# Patient Record
Sex: MALE | Race: WHITE | NOT HISPANIC OR LATINO | Employment: OTHER | URBAN - METROPOLITAN AREA
[De-identification: names, ages, dates, MRNs, and addresses within clinical notes are randomized per-mention and may not be internally consistent; named-entity substitution may affect disease eponyms.]

---

## 2022-09-20 ENCOUNTER — HOSPITAL ENCOUNTER (INPATIENT)
Facility: HOSPITAL | Age: 72
LOS: 4 days | Discharge: HOME | End: 2022-09-24
Attending: EMERGENCY MEDICINE | Admitting: INTERNAL MEDICINE

## 2022-09-20 ENCOUNTER — APPOINTMENT (OUTPATIENT)
Dept: CT IMAGING | Facility: HOSPITAL | Age: 72
End: 2022-09-20

## 2022-09-20 DIAGNOSIS — K62.89 RECTAL MASS: ICD-10-CM

## 2022-09-20 DIAGNOSIS — C79.9 METASTATIC MALIGNANT NEOPLASM, UNSPECIFIED SITE: ICD-10-CM

## 2022-09-20 DIAGNOSIS — C20 RECTAL CANCER: Primary | ICD-10-CM

## 2022-09-20 LAB
ALBUMIN SERPL-MCNC: 2.5 G/DL (ref 3.5–5.2)
ALBUMIN/GLOB SERPL: 0.9 G/DL
ALP SERPL-CCNC: 192 U/L (ref 39–117)
ALT SERPL W P-5'-P-CCNC: 30 U/L (ref 1–41)
ANION GAP SERPL CALCULATED.3IONS-SCNC: 13 MMOL/L (ref 5–15)
AST SERPL-CCNC: 25 U/L (ref 1–40)
B PARAPERT DNA SPEC QL NAA+PROBE: NOT DETECTED
B PERT DNA SPEC QL NAA+PROBE: NOT DETECTED
BASOPHILS # BLD AUTO: 0.01 10*3/MM3 (ref 0–0.2)
BASOPHILS NFR BLD AUTO: 0.1 % (ref 0–1.5)
BILIRUB SERPL-MCNC: 0.7 MG/DL (ref 0–1.2)
BILIRUB UR QL STRIP: NEGATIVE
BUN SERPL-MCNC: 9 MG/DL (ref 8–23)
BUN/CREAT SERPL: 22 (ref 7–25)
C DIFF TOX GENS STL QL NAA+PROBE: NOT DETECTED
C PNEUM DNA NPH QL NAA+NON-PROBE: NOT DETECTED
CALCIUM SPEC-SCNC: 8 MG/DL (ref 8.6–10.5)
CHLORIDE SERPL-SCNC: 95 MMOL/L (ref 98–107)
CLARITY UR: CLEAR
CO2 SERPL-SCNC: 21 MMOL/L (ref 22–29)
COLOR UR: ABNORMAL
CREAT SERPL-MCNC: 0.41 MG/DL (ref 0.76–1.27)
D DIMER PPP FEU-MCNC: 2.15 MCGFEU/ML (ref 0.01–0.5)
D-LACTATE SERPL-SCNC: 1.1 MMOL/L (ref 0.5–2)
DEPRECATED RDW RBC AUTO: 46.4 FL (ref 37–54)
EGFRCR SERPLBLD CKD-EPI 2021: 116.5 ML/MIN/1.73
EOSINOPHIL # BLD AUTO: 0.01 10*3/MM3 (ref 0–0.4)
EOSINOPHIL NFR BLD AUTO: 0.1 % (ref 0.3–6.2)
ERYTHROCYTE [DISTWIDTH] IN BLOOD BY AUTOMATED COUNT: 14.9 % (ref 12.3–15.4)
FLUAV SUBTYP SPEC NAA+PROBE: NOT DETECTED
FLUBV RNA ISLT QL NAA+PROBE: NOT DETECTED
GLOBULIN UR ELPH-MCNC: 2.9 GM/DL
GLUCOSE SERPL-MCNC: 93 MG/DL (ref 65–99)
GLUCOSE UR STRIP-MCNC: NEGATIVE MG/DL
HADV DNA SPEC NAA+PROBE: NOT DETECTED
HCOV 229E RNA SPEC QL NAA+PROBE: NOT DETECTED
HCOV HKU1 RNA SPEC QL NAA+PROBE: NOT DETECTED
HCOV NL63 RNA SPEC QL NAA+PROBE: NOT DETECTED
HCOV OC43 RNA SPEC QL NAA+PROBE: NOT DETECTED
HCT VFR BLD AUTO: 28.5 % (ref 37.5–51)
HGB BLD-MCNC: 9.4 G/DL (ref 13–17.7)
HGB UR QL STRIP.AUTO: NEGATIVE
HMPV RNA NPH QL NAA+NON-PROBE: NOT DETECTED
HOLD SPECIMEN: NORMAL
HPIV1 RNA ISLT QL NAA+PROBE: NOT DETECTED
HPIV2 RNA SPEC QL NAA+PROBE: NOT DETECTED
HPIV3 RNA NPH QL NAA+PROBE: NOT DETECTED
HPIV4 P GENE NPH QL NAA+PROBE: NOT DETECTED
IMM GRANULOCYTES # BLD AUTO: 0.05 10*3/MM3 (ref 0–0.05)
IMM GRANULOCYTES NFR BLD AUTO: 0.6 % (ref 0–0.5)
KETONES UR QL STRIP: NEGATIVE
LEUKOCYTE ESTERASE UR QL STRIP.AUTO: NEGATIVE
LYMPHOCYTES # BLD AUTO: 0.31 10*3/MM3 (ref 0.7–3.1)
LYMPHOCYTES NFR BLD AUTO: 3.8 % (ref 19.6–45.3)
M PNEUMO IGG SER IA-ACNC: NOT DETECTED
MAGNESIUM SERPL-MCNC: 1.8 MG/DL (ref 1.6–2.4)
MCH RBC QN AUTO: 28.1 PG (ref 26.6–33)
MCHC RBC AUTO-ENTMCNC: 33 G/DL (ref 31.5–35.7)
MCV RBC AUTO: 85.1 FL (ref 79–97)
MONOCYTES # BLD AUTO: 0.92 10*3/MM3 (ref 0.1–0.9)
MONOCYTES NFR BLD AUTO: 11.3 % (ref 5–12)
NEUTROPHILS NFR BLD AUTO: 6.86 10*3/MM3 (ref 1.7–7)
NEUTROPHILS NFR BLD AUTO: 84.1 % (ref 42.7–76)
NITRITE UR QL STRIP: NEGATIVE
NRBC BLD AUTO-RTO: 0 /100 WBC (ref 0–0.2)
PH UR STRIP.AUTO: 6.5 [PH] (ref 5–8)
PLATELET # BLD AUTO: 324 10*3/MM3 (ref 140–450)
PMV BLD AUTO: 9.1 FL (ref 6–12)
POTASSIUM SERPL-SCNC: 3.9 MMOL/L (ref 3.5–5.2)
PROCALCITONIN SERPL-MCNC: 0.12 NG/ML (ref 0–0.25)
PROT SERPL-MCNC: 5.4 G/DL (ref 6–8.5)
PROT UR QL STRIP: NEGATIVE
RBC # BLD AUTO: 3.35 10*6/MM3 (ref 4.14–5.8)
RHINOVIRUS RNA SPEC NAA+PROBE: NOT DETECTED
RSV RNA NPH QL NAA+NON-PROBE: NOT DETECTED
SARS-COV-2 RNA NPH QL NAA+NON-PROBE: NOT DETECTED
SODIUM SERPL-SCNC: 129 MMOL/L (ref 136–145)
SP GR UR STRIP: 1.02 (ref 1–1.03)
UROBILINOGEN UR QL STRIP: ABNORMAL
WBC NRBC COR # BLD: 8.16 10*3/MM3 (ref 3.4–10.8)
WHOLE BLOOD HOLD COAG: NORMAL
WHOLE BLOOD HOLD SPECIMEN: NORMAL

## 2022-09-20 PROCEDURE — 25010000002 IOPAMIDOL 61 % SOLUTION: Performed by: EMERGENCY MEDICINE

## 2022-09-20 PROCEDURE — 86901 BLOOD TYPING SEROLOGIC RH(D): CPT

## 2022-09-20 PROCEDURE — 85025 COMPLETE CBC W/AUTO DIFF WBC: CPT | Performed by: EMERGENCY MEDICINE

## 2022-09-20 PROCEDURE — 74177 CT ABD & PELVIS W/CONTRAST: CPT

## 2022-09-20 PROCEDURE — 25010000002 VANCOMYCIN 10 G RECONSTITUTED SOLUTION: Performed by: EMERGENCY MEDICINE

## 2022-09-20 PROCEDURE — 81003 URINALYSIS AUTO W/O SCOPE: CPT | Performed by: EMERGENCY MEDICINE

## 2022-09-20 PROCEDURE — 87040 BLOOD CULTURE FOR BACTERIA: CPT | Performed by: EMERGENCY MEDICINE

## 2022-09-20 PROCEDURE — 87507 IADNA-DNA/RNA PROBE TQ 12-25: CPT | Performed by: EMERGENCY MEDICINE

## 2022-09-20 PROCEDURE — 36415 COLL VENOUS BLD VENIPUNCTURE: CPT

## 2022-09-20 PROCEDURE — 83735 ASSAY OF MAGNESIUM: CPT | Performed by: EMERGENCY MEDICINE

## 2022-09-20 PROCEDURE — 84145 PROCALCITONIN (PCT): CPT | Performed by: EMERGENCY MEDICINE

## 2022-09-20 PROCEDURE — 80053 COMPREHEN METABOLIC PANEL: CPT | Performed by: EMERGENCY MEDICINE

## 2022-09-20 PROCEDURE — 99285 EMERGENCY DEPT VISIT HI MDM: CPT

## 2022-09-20 PROCEDURE — 85379 FIBRIN DEGRADATION QUANT: CPT | Performed by: EMERGENCY MEDICINE

## 2022-09-20 PROCEDURE — 86900 BLOOD TYPING SEROLOGIC ABO: CPT

## 2022-09-20 PROCEDURE — 0202U NFCT DS 22 TRGT SARS-COV-2: CPT | Performed by: EMERGENCY MEDICINE

## 2022-09-20 PROCEDURE — 87493 C DIFF AMPLIFIED PROBE: CPT | Performed by: EMERGENCY MEDICINE

## 2022-09-20 PROCEDURE — 93005 ELECTROCARDIOGRAM TRACING: CPT | Performed by: EMERGENCY MEDICINE

## 2022-09-20 PROCEDURE — 25010000002 PIPERACILLIN SOD-TAZOBACTAM PER 1 G: Performed by: EMERGENCY MEDICINE

## 2022-09-20 PROCEDURE — 83605 ASSAY OF LACTIC ACID: CPT | Performed by: EMERGENCY MEDICINE

## 2022-09-20 PROCEDURE — 83930 ASSAY OF BLOOD OSMOLALITY: CPT | Performed by: NURSE PRACTITIONER

## 2022-09-20 RX ORDER — SODIUM CHLORIDE 0.9 % (FLUSH) 0.9 %
10 SYRINGE (ML) INJECTION AS NEEDED
Status: DISCONTINUED | OUTPATIENT
Start: 2022-09-20 | End: 2022-09-24 | Stop reason: HOSPADM

## 2022-09-20 RX ORDER — ACETAMINOPHEN 325 MG/1
650 TABLET ORAL ONCE
Status: COMPLETED | OUTPATIENT
Start: 2022-09-20 | End: 2022-09-20

## 2022-09-20 RX ADMIN — TAZOBACTAM SODIUM AND PIPERACILLIN SODIUM 3.38 G: 375; 3 INJECTION, SOLUTION INTRAVENOUS at 22:39

## 2022-09-20 RX ADMIN — VANCOMYCIN HYDROCHLORIDE 1500 MG: 10 INJECTION, POWDER, LYOPHILIZED, FOR SOLUTION INTRAVENOUS at 23:20

## 2022-09-20 RX ADMIN — IOPAMIDOL 100 ML: 612 INJECTION, SOLUTION INTRAVENOUS at 20:09

## 2022-09-20 RX ADMIN — ACETAMINOPHEN 650 MG: 325 TABLET, FILM COATED ORAL at 22:42

## 2022-09-20 RX ADMIN — SODIUM CHLORIDE 1000 ML: 9 INJECTION, SOLUTION INTRAVENOUS at 18:43

## 2022-09-21 ENCOUNTER — ANESTHESIA EVENT (OUTPATIENT)
Dept: PERIOP | Facility: HOSPITAL | Age: 72
End: 2022-09-21

## 2022-09-21 ENCOUNTER — APPOINTMENT (OUTPATIENT)
Dept: CT IMAGING | Facility: HOSPITAL | Age: 72
End: 2022-09-21

## 2022-09-21 PROBLEM — E78.5 DYSLIPIDEMIA: Status: ACTIVE | Noted: 2022-09-21

## 2022-09-21 PROBLEM — E88.09 HYPOALBUMINEMIA: Status: ACTIVE | Noted: 2022-09-21

## 2022-09-21 PROBLEM — E83.42 HYPOMAGNESEMIA: Status: ACTIVE | Noted: 2022-09-21

## 2022-09-21 PROBLEM — R63.4 UNINTENTIONAL WEIGHT LOSS: Status: ACTIVE | Noted: 2022-09-21

## 2022-09-21 PROBLEM — E87.1 HYPONATREMIA: Status: ACTIVE | Noted: 2022-09-21

## 2022-09-21 PROBLEM — D64.9 ANEMIA: Status: ACTIVE | Noted: 2022-09-21

## 2022-09-21 PROBLEM — I10 ESSENTIAL HYPERTENSION: Status: ACTIVE | Noted: 2022-09-21

## 2022-09-21 PROBLEM — E86.0 DEHYDRATION: Status: ACTIVE | Noted: 2022-09-21

## 2022-09-21 LAB
ABO GROUP BLD: NORMAL
ABO GROUP BLD: NORMAL
ADV 40+41 DNA STL QL NAA+NON-PROBE: NOT DETECTED
ANION GAP SERPL CALCULATED.3IONS-SCNC: 10 MMOL/L (ref 5–15)
ANION GAP SERPL CALCULATED.3IONS-SCNC: 13 MMOL/L (ref 5–15)
ASTRO TYP 1-8 RNA STL QL NAA+NON-PROBE: NOT DETECTED
BASOPHILS # BLD AUTO: 0.01 10*3/MM3 (ref 0–0.2)
BASOPHILS NFR BLD AUTO: 0.1 % (ref 0–1.5)
BLD GP AB SCN SERPL QL: NEGATIVE
BUN SERPL-MCNC: 7 MG/DL (ref 8–23)
BUN SERPL-MCNC: 7 MG/DL (ref 8–23)
BUN/CREAT SERPL: 15.6 (ref 7–25)
BUN/CREAT SERPL: 15.9 (ref 7–25)
C CAYETANENSIS DNA STL QL NAA+NON-PROBE: NOT DETECTED
C COLI+JEJ+UPSA DNA STL QL NAA+NON-PROBE: NOT DETECTED
CALCIUM SPEC-SCNC: 8.8 MG/DL (ref 8.6–10.5)
CALCIUM SPEC-SCNC: 8.9 MG/DL (ref 8.6–10.5)
CEA SERPL-MCNC: 1.82 NG/ML
CHLORIDE SERPL-SCNC: 94 MMOL/L (ref 98–107)
CHLORIDE SERPL-SCNC: 95 MMOL/L (ref 98–107)
CO2 SERPL-SCNC: 23 MMOL/L (ref 22–29)
CO2 SERPL-SCNC: 24 MMOL/L (ref 22–29)
CREAT SERPL-MCNC: 0.44 MG/DL (ref 0.76–1.27)
CREAT SERPL-MCNC: 0.45 MG/DL (ref 0.76–1.27)
CREAT UR-MCNC: 11.8 MG/DL
CRYPTOSP DNA STL QL NAA+NON-PROBE: NOT DETECTED
DEPRECATED RDW RBC AUTO: 47.2 FL (ref 37–54)
E HISTOLYT DNA STL QL NAA+NON-PROBE: NOT DETECTED
EAEC PAA PLAS AGGR+AATA ST NAA+NON-PRB: NOT DETECTED
EC STX1+STX2 GENES STL QL NAA+NON-PROBE: NOT DETECTED
EGFRCR SERPLBLD CKD-EPI 2021: 113.3 ML/MIN/1.73
EGFRCR SERPLBLD CKD-EPI 2021: 114 ML/MIN/1.73
EOSINOPHIL # BLD AUTO: 0.02 10*3/MM3 (ref 0–0.4)
EOSINOPHIL NFR BLD AUTO: 0.3 % (ref 0.3–6.2)
EPEC EAE GENE STL QL NAA+NON-PROBE: DETECTED
ERYTHROCYTE [DISTWIDTH] IN BLOOD BY AUTOMATED COUNT: 15 % (ref 12.3–15.4)
ETEC LTA+ST1A+ST1B TOX ST NAA+NON-PROBE: DETECTED
FERRITIN SERPL-MCNC: 891.4 NG/ML (ref 30–400)
FOLATE SERPL-MCNC: 6.1 NG/ML (ref 4.78–24.2)
G LAMBLIA DNA STL QL NAA+NON-PROBE: NOT DETECTED
GLUCOSE SERPL-MCNC: 95 MG/DL (ref 65–99)
GLUCOSE SERPL-MCNC: 99 MG/DL (ref 65–99)
HAPTOGLOB SERPL-MCNC: 376 MG/DL (ref 30–200)
HBA1C MFR BLD: 5.5 % (ref 4.8–5.6)
HCT VFR BLD AUTO: 29.3 % (ref 37.5–51)
HEMOCCULT STL QL: NORMAL
HGB BLD-MCNC: 9.3 G/DL (ref 13–17.7)
IMM GRANULOCYTES # BLD AUTO: 0.05 10*3/MM3 (ref 0–0.05)
IMM GRANULOCYTES NFR BLD AUTO: 0.7 % (ref 0–0.5)
IRON 24H UR-MRATE: 17 MCG/DL (ref 59–158)
IRON SATN MFR SERPL: 8 % (ref 20–50)
LDH SERPL-CCNC: 197 U/L (ref 135–225)
LYMPHOCYTES # BLD AUTO: 0.26 10*3/MM3 (ref 0.7–3.1)
LYMPHOCYTES NFR BLD AUTO: 3.6 % (ref 19.6–45.3)
MAGNESIUM SERPL-MCNC: 1.9 MG/DL (ref 1.6–2.4)
MCH RBC QN AUTO: 27.1 PG (ref 26.6–33)
MCHC RBC AUTO-ENTMCNC: 31.7 G/DL (ref 31.5–35.7)
MCV RBC AUTO: 85.4 FL (ref 79–97)
MONOCYTES # BLD AUTO: 0.87 10*3/MM3 (ref 0.1–0.9)
MONOCYTES NFR BLD AUTO: 12 % (ref 5–12)
MRSA DNA SPEC QL NAA+PROBE: NEGATIVE
NEUTROPHILS NFR BLD AUTO: 6.01 10*3/MM3 (ref 1.7–7)
NEUTROPHILS NFR BLD AUTO: 83.3 % (ref 42.7–76)
NOROVIRUS GI+II RNA STL QL NAA+NON-PROBE: NOT DETECTED
NRBC BLD AUTO-RTO: 0 /100 WBC (ref 0–0.2)
OSMOLALITY SERPL: 263 MOSM/KG (ref 275–295)
OSMOLALITY UR: 115 MOSM/KG (ref 300–1100)
P SHIGELLOIDES DNA STL QL NAA+NON-PROBE: NOT DETECTED
PHOSPHATE SERPL-MCNC: 3.8 MG/DL (ref 2.5–4.5)
PLATELET # BLD AUTO: 360 10*3/MM3 (ref 140–450)
PMV BLD AUTO: 9.6 FL (ref 6–12)
POTASSIUM SERPL-SCNC: 3.6 MMOL/L (ref 3.5–5.2)
POTASSIUM SERPL-SCNC: 3.7 MMOL/L (ref 3.5–5.2)
RBC # BLD AUTO: 3.43 10*6/MM3 (ref 4.14–5.8)
RETICS # AUTO: 0.06 10*6/MM3 (ref 0.02–0.13)
RETICS/RBC NFR AUTO: 1.74 % (ref 0.7–1.9)
RH BLD: POSITIVE
RH BLD: POSITIVE
RVA RNA STL QL NAA+NON-PROBE: NOT DETECTED
S ENT+BONG DNA STL QL NAA+NON-PROBE: NOT DETECTED
SAPO I+II+IV+V RNA STL QL NAA+NON-PROBE: NOT DETECTED
SHIGELLA SP+EIEC IPAH ST NAA+NON-PROBE: NOT DETECTED
SODIUM SERPL-SCNC: 129 MMOL/L (ref 136–145)
SODIUM SERPL-SCNC: 130 MMOL/L (ref 136–145)
SODIUM UR-SCNC: <20 MMOL/L
T&S EXPIRATION DATE: NORMAL
TIBC SERPL-MCNC: 201 MCG/DL (ref 298–536)
TRANSFERRIN SERPL-MCNC: 135 MG/DL (ref 200–360)
V CHOL+PARA+VUL DNA STL QL NAA+NON-PROBE: NOT DETECTED
V CHOLERAE DNA STL QL NAA+NON-PROBE: NOT DETECTED
VIT B12 BLD-MCNC: 672 PG/ML (ref 211–946)
WBC NRBC COR # BLD: 7.22 10*3/MM3 (ref 3.4–10.8)
Y ENTEROCOL DNA STL QL NAA+NON-PROBE: NOT DETECTED

## 2022-09-21 PROCEDURE — 25010000002 VANCOMYCIN PER 500 MG

## 2022-09-21 PROCEDURE — 99223 1ST HOSP IP/OBS HIGH 75: CPT | Performed by: INTERNAL MEDICINE

## 2022-09-21 PROCEDURE — 0 IOPAMIDOL PER 1 ML: Performed by: FAMILY MEDICINE

## 2022-09-21 PROCEDURE — 87641 MR-STAPH DNA AMP PROBE: CPT | Performed by: NURSE PRACTITIONER

## 2022-09-21 PROCEDURE — 92610 EVALUATE SWALLOWING FUNCTION: CPT | Performed by: SPEECH-LANGUAGE PATHOLOGIST

## 2022-09-21 PROCEDURE — 97161 PT EVAL LOW COMPLEX 20 MIN: CPT

## 2022-09-21 PROCEDURE — 80048 BASIC METABOLIC PNL TOTAL CA: CPT | Performed by: NURSE PRACTITIONER

## 2022-09-21 PROCEDURE — 71275 CT ANGIOGRAPHY CHEST: CPT

## 2022-09-21 PROCEDURE — 83615 LACTATE (LD) (LDH) ENZYME: CPT | Performed by: NURSE PRACTITIONER

## 2022-09-21 PROCEDURE — 83010 ASSAY OF HAPTOGLOBIN QUANT: CPT | Performed by: NURSE PRACTITIONER

## 2022-09-21 PROCEDURE — 83540 ASSAY OF IRON: CPT | Performed by: NURSE PRACTITIONER

## 2022-09-21 PROCEDURE — 85045 AUTOMATED RETICULOCYTE COUNT: CPT | Performed by: NURSE PRACTITIONER

## 2022-09-21 PROCEDURE — 86901 BLOOD TYPING SEROLOGIC RH(D): CPT | Performed by: STUDENT IN AN ORGANIZED HEALTH CARE EDUCATION/TRAINING PROGRAM

## 2022-09-21 PROCEDURE — 84300 ASSAY OF URINE SODIUM: CPT | Performed by: NURSE PRACTITIONER

## 2022-09-21 PROCEDURE — 86850 RBC ANTIBODY SCREEN: CPT | Performed by: STUDENT IN AN ORGANIZED HEALTH CARE EDUCATION/TRAINING PROGRAM

## 2022-09-21 PROCEDURE — 84466 ASSAY OF TRANSFERRIN: CPT | Performed by: NURSE PRACTITIONER

## 2022-09-21 PROCEDURE — 82607 VITAMIN B-12: CPT | Performed by: NURSE PRACTITIONER

## 2022-09-21 PROCEDURE — 84100 ASSAY OF PHOSPHORUS: CPT | Performed by: NURSE PRACTITIONER

## 2022-09-21 PROCEDURE — 0 MAGNESIUM SULFATE 4 GM/100ML SOLUTION: Performed by: NURSE PRACTITIONER

## 2022-09-21 PROCEDURE — 83036 HEMOGLOBIN GLYCOSYLATED A1C: CPT | Performed by: NURSE PRACTITIONER

## 2022-09-21 PROCEDURE — 85014 HEMATOCRIT: CPT | Performed by: NURSE PRACTITIONER

## 2022-09-21 PROCEDURE — 82728 ASSAY OF FERRITIN: CPT | Performed by: NURSE PRACTITIONER

## 2022-09-21 PROCEDURE — 82747 ASSAY OF FOLIC ACID RBC: CPT | Performed by: NURSE PRACTITIONER

## 2022-09-21 PROCEDURE — 82378 CARCINOEMBRYONIC ANTIGEN: CPT | Performed by: STUDENT IN AN ORGANIZED HEALTH CARE EDUCATION/TRAINING PROGRAM

## 2022-09-21 PROCEDURE — 82570 ASSAY OF URINE CREATININE: CPT | Performed by: NURSE PRACTITIONER

## 2022-09-21 PROCEDURE — 82746 ASSAY OF FOLIC ACID SERUM: CPT | Performed by: NURSE PRACTITIONER

## 2022-09-21 PROCEDURE — 85025 COMPLETE CBC W/AUTO DIFF WBC: CPT | Performed by: NURSE PRACTITIONER

## 2022-09-21 PROCEDURE — 99222 1ST HOSP IP/OBS MODERATE 55: CPT | Performed by: INTERNAL MEDICINE

## 2022-09-21 PROCEDURE — 83735 ASSAY OF MAGNESIUM: CPT | Performed by: NURSE PRACTITIONER

## 2022-09-21 PROCEDURE — 82272 OCCULT BLD FECES 1-3 TESTS: CPT | Performed by: NURSE PRACTITIONER

## 2022-09-21 PROCEDURE — 86900 BLOOD TYPING SEROLOGIC ABO: CPT | Performed by: STUDENT IN AN ORGANIZED HEALTH CARE EDUCATION/TRAINING PROGRAM

## 2022-09-21 PROCEDURE — 83935 ASSAY OF URINE OSMOLALITY: CPT | Performed by: NURSE PRACTITIONER

## 2022-09-21 PROCEDURE — 25010000002 PIPERACILLIN SOD-TAZOBACTAM PER 1 G: Performed by: NURSE PRACTITIONER

## 2022-09-21 RX ORDER — ROSUVASTATIN CALCIUM 10 MG/1
10 TABLET, COATED ORAL DAILY
Status: DISCONTINUED | OUTPATIENT
Start: 2022-09-21 | End: 2022-09-24 | Stop reason: HOSPADM

## 2022-09-21 RX ORDER — NEOMYCIN SULFATE 500 MG/1
1000 TABLET ORAL ONCE
Status: DISCONTINUED | OUTPATIENT
Start: 2022-09-21 | End: 2022-09-21

## 2022-09-21 RX ORDER — SODIUM CHLORIDE 9 MG/ML
100 INJECTION, SOLUTION INTRAVENOUS CONTINUOUS
Status: DISCONTINUED | OUTPATIENT
Start: 2022-09-21 | End: 2022-09-23

## 2022-09-21 RX ORDER — ACETAMINOPHEN 325 MG/1
650 TABLET ORAL EVERY 4 HOURS PRN
Status: DISCONTINUED | OUTPATIENT
Start: 2022-09-21 | End: 2022-09-24 | Stop reason: HOSPADM

## 2022-09-21 RX ORDER — SODIUM CHLORIDE 0.9 % (FLUSH) 0.9 %
10 SYRINGE (ML) INJECTION AS NEEDED
Status: CANCELLED | OUTPATIENT
Start: 2022-09-21

## 2022-09-21 RX ORDER — MAGNESIUM SULFATE HEPTAHYDRATE 40 MG/ML
2 INJECTION, SOLUTION INTRAVENOUS AS NEEDED
Status: DISCONTINUED | OUTPATIENT
Start: 2022-09-21 | End: 2022-09-24 | Stop reason: HOSPADM

## 2022-09-21 RX ORDER — FAMOTIDINE 20 MG/1
20 TABLET, FILM COATED ORAL ONCE
Status: CANCELLED | OUTPATIENT
Start: 2022-09-21 | End: 2022-09-21

## 2022-09-21 RX ORDER — LISINOPRIL 10 MG/1
10 TABLET ORAL DAILY
Status: DISCONTINUED | OUTPATIENT
Start: 2022-09-21 | End: 2022-09-24 | Stop reason: HOSPADM

## 2022-09-21 RX ORDER — MONTELUKAST SODIUM 10 MG/1
10 TABLET ORAL NIGHTLY
Status: DISCONTINUED | OUTPATIENT
Start: 2022-09-21 | End: 2022-09-24 | Stop reason: HOSPADM

## 2022-09-21 RX ORDER — MAGNESIUM SULFATE HEPTAHYDRATE 40 MG/ML
4 INJECTION, SOLUTION INTRAVENOUS AS NEEDED
Status: DISCONTINUED | OUTPATIENT
Start: 2022-09-21 | End: 2022-09-24 | Stop reason: HOSPADM

## 2022-09-21 RX ORDER — NEOMYCIN SULFATE 500 MG/1
1000 TABLET ORAL ONCE
Status: DISCONTINUED | OUTPATIENT
Start: 2022-09-22 | End: 2022-09-21

## 2022-09-21 RX ORDER — ACETAMINOPHEN 160 MG/5ML
650 SOLUTION ORAL EVERY 4 HOURS PRN
Status: DISCONTINUED | OUTPATIENT
Start: 2022-09-21 | End: 2022-09-24 | Stop reason: HOSPADM

## 2022-09-21 RX ORDER — POLYETHYLENE GLYCOL 3350 17 G/17G
17 POWDER, FOR SOLUTION ORAL
Status: COMPLETED | OUTPATIENT
Start: 2022-09-21 | End: 2022-09-21

## 2022-09-21 RX ORDER — ROSUVASTATIN CALCIUM 10 MG/1
10 TABLET, COATED ORAL DAILY
COMMUNITY
End: 2022-10-20 | Stop reason: HOSPADM

## 2022-09-21 RX ORDER — METRONIDAZOLE 500 MG/1
500 TABLET ORAL ONCE
Status: COMPLETED | OUTPATIENT
Start: 2022-09-21 | End: 2022-09-21

## 2022-09-21 RX ORDER — MONTELUKAST SODIUM 10 MG/1
10 TABLET ORAL NIGHTLY
COMMUNITY
End: 2022-10-03

## 2022-09-21 RX ORDER — SODIUM CHLORIDE 0.9 % (FLUSH) 0.9 %
10 SYRINGE (ML) INJECTION AS NEEDED
Status: DISCONTINUED | OUTPATIENT
Start: 2022-09-21 | End: 2022-09-24 | Stop reason: HOSPADM

## 2022-09-21 RX ORDER — LISINOPRIL 10 MG/1
10 TABLET ORAL DAILY
COMMUNITY
End: 2022-10-03

## 2022-09-21 RX ORDER — SODIUM CHLORIDE 0.9 % (FLUSH) 0.9 %
10 SYRINGE (ML) INJECTION EVERY 12 HOURS SCHEDULED
Status: DISCONTINUED | OUTPATIENT
Start: 2022-09-21 | End: 2022-09-24 | Stop reason: HOSPADM

## 2022-09-21 RX ORDER — SODIUM CHLORIDE 0.9 % (FLUSH) 0.9 %
10 SYRINGE (ML) INJECTION EVERY 12 HOURS SCHEDULED
Status: CANCELLED | OUTPATIENT
Start: 2022-09-21

## 2022-09-21 RX ORDER — ACETAMINOPHEN 650 MG/1
650 SUPPOSITORY RECTAL EVERY 4 HOURS PRN
Status: DISCONTINUED | OUTPATIENT
Start: 2022-09-21 | End: 2022-09-24 | Stop reason: HOSPADM

## 2022-09-21 RX ADMIN — Medication 10 ML: at 08:44

## 2022-09-21 RX ADMIN — METRONIDAZOLE 500 MG: 500 TABLET ORAL at 22:56

## 2022-09-21 RX ADMIN — LISINOPRIL 10 MG: 10 TABLET ORAL at 08:43

## 2022-09-21 RX ADMIN — METRONIDAZOLE 500 MG: 500 TABLET ORAL at 16:42

## 2022-09-21 RX ADMIN — ACETAMINOPHEN 650 MG: 325 TABLET, FILM COATED ORAL at 08:43

## 2022-09-21 RX ADMIN — POLYETHYLENE GLYCOL 3350 17 G: 17 POWDER, FOR SOLUTION ORAL at 16:42

## 2022-09-21 RX ADMIN — POLYETHYLENE GLYCOL 3350 17 G: 17 POWDER, FOR SOLUTION ORAL at 20:36

## 2022-09-21 RX ADMIN — POLYETHYLENE GLYCOL 3350 17 G: 17 POWDER, FOR SOLUTION ORAL at 17:37

## 2022-09-21 RX ADMIN — VANCOMYCIN HYDROCHLORIDE 750 MG: 750 INJECTION, SOLUTION INTRAVENOUS at 11:39

## 2022-09-21 RX ADMIN — TAZOBACTAM SODIUM AND PIPERACILLIN SODIUM 3.38 G: 375; 3 INJECTION, SOLUTION INTRAVENOUS at 05:21

## 2022-09-21 RX ADMIN — ACETAMINOPHEN 650 MG: 325 TABLET, FILM COATED ORAL at 20:44

## 2022-09-21 RX ADMIN — SODIUM CHLORIDE 50 ML/HR: 9 INJECTION, SOLUTION INTRAVENOUS at 02:23

## 2022-09-21 RX ADMIN — POLYETHYLENE GLYCOL 3350 17 G: 17 POWDER, FOR SOLUTION ORAL at 19:19

## 2022-09-21 RX ADMIN — MAGNESIUM SULFATE HEPTAHYDRATE 4 G: 40 INJECTION, SOLUTION INTRAVENOUS at 11:39

## 2022-09-21 RX ADMIN — Medication 10 ML: at 20:36

## 2022-09-21 RX ADMIN — METRONIDAZOLE 500 MG: 500 TABLET ORAL at 19:19

## 2022-09-21 RX ADMIN — SODIUM CHLORIDE 100 ML/HR: 9 INJECTION, SOLUTION INTRAVENOUS at 20:36

## 2022-09-21 RX ADMIN — POLYETHYLENE GLYCOL 3350 17 G: 17 POWDER, FOR SOLUTION ORAL at 20:54

## 2022-09-21 RX ADMIN — ROSUVASTATIN CALCIUM 10 MG: 10 TABLET, FILM COATED ORAL at 08:43

## 2022-09-21 RX ADMIN — IOPAMIDOL 80 ML: 755 INJECTION, SOLUTION INTRAVENOUS at 02:18

## 2022-09-21 RX ADMIN — MONTELUKAST 10 MG: 10 TABLET, FILM COATED ORAL at 19:19

## 2022-09-22 ENCOUNTER — ANESTHESIA EVENT CONVERTED (OUTPATIENT)
Dept: ANESTHESIOLOGY | Facility: HOSPITAL | Age: 72
End: 2022-09-22

## 2022-09-22 ENCOUNTER — ANESTHESIA (OUTPATIENT)
Dept: PERIOP | Facility: HOSPITAL | Age: 72
End: 2022-09-22

## 2022-09-22 LAB
ANION GAP SERPL CALCULATED.3IONS-SCNC: 13 MMOL/L (ref 5–15)
BASOPHILS # BLD AUTO: 0.02 10*3/MM3 (ref 0–0.2)
BASOPHILS NFR BLD AUTO: 0.2 % (ref 0–1.5)
BUN SERPL-MCNC: 9 MG/DL (ref 8–23)
BUN/CREAT SERPL: 17.6 (ref 7–25)
CALCIUM SPEC-SCNC: 8.5 MG/DL (ref 8.6–10.5)
CHLORIDE SERPL-SCNC: 95 MMOL/L (ref 98–107)
CO2 SERPL-SCNC: 23 MMOL/L (ref 22–29)
CREAT SERPL-MCNC: 0.51 MG/DL (ref 0.76–1.27)
DEPRECATED RDW RBC AUTO: 47.8 FL (ref 37–54)
EGFRCR SERPLBLD CKD-EPI 2021: 109.1 ML/MIN/1.73
EOSINOPHIL # BLD AUTO: 0.02 10*3/MM3 (ref 0–0.4)
EOSINOPHIL NFR BLD AUTO: 0.2 % (ref 0.3–6.2)
ERYTHROCYTE [DISTWIDTH] IN BLOOD BY AUTOMATED COUNT: 15.1 % (ref 12.3–15.4)
FOLATE BLD-MCNC: 286 NG/ML
FOLATE RBC-MCNC: 938 NG/ML
GLUCOSE SERPL-MCNC: 97 MG/DL (ref 65–99)
HCT VFR BLD AUTO: 29.5 % (ref 37.5–51)
HCT VFR BLD AUTO: 30.5 % (ref 37.5–51)
HGB BLD-MCNC: 9.6 G/DL (ref 13–17.7)
IMM GRANULOCYTES # BLD AUTO: 0.06 10*3/MM3 (ref 0–0.05)
IMM GRANULOCYTES NFR BLD AUTO: 0.6 % (ref 0–0.5)
LYMPHOCYTES # BLD AUTO: 0.63 10*3/MM3 (ref 0.7–3.1)
LYMPHOCYTES NFR BLD AUTO: 6.5 % (ref 19.6–45.3)
MAGNESIUM SERPL-MCNC: 2.5 MG/DL (ref 1.6–2.4)
MCH RBC QN AUTO: 28.1 PG (ref 26.6–33)
MCHC RBC AUTO-ENTMCNC: 32.5 G/DL (ref 31.5–35.7)
MCV RBC AUTO: 86.3 FL (ref 79–97)
MONOCYTES # BLD AUTO: 1.03 10*3/MM3 (ref 0.1–0.9)
MONOCYTES NFR BLD AUTO: 10.6 % (ref 5–12)
NEUTROPHILS NFR BLD AUTO: 7.93 10*3/MM3 (ref 1.7–7)
NEUTROPHILS NFR BLD AUTO: 81.9 % (ref 42.7–76)
NRBC BLD AUTO-RTO: 0 /100 WBC (ref 0–0.2)
PLATELET # BLD AUTO: 353 10*3/MM3 (ref 140–450)
PMV BLD AUTO: 9.5 FL (ref 6–12)
POTASSIUM SERPL-SCNC: 4.7 MMOL/L (ref 3.5–5.2)
RBC # BLD AUTO: 3.42 10*6/MM3 (ref 4.14–5.8)
SODIUM SERPL-SCNC: 131 MMOL/L (ref 136–145)
WBC NRBC COR # BLD: 9.69 10*3/MM3 (ref 3.4–10.8)

## 2022-09-22 PROCEDURE — 44188 LAP COLOSTOMY: CPT | Performed by: PHYSICIAN ASSISTANT

## 2022-09-22 PROCEDURE — 0DBP8ZX EXCISION OF RECTUM, VIA NATURAL OR ARTIFICIAL OPENING ENDOSCOPIC, DIAGNOSTIC: ICD-10-PCS | Performed by: STUDENT IN AN ORGANIZED HEALTH CARE EDUCATION/TRAINING PROGRAM

## 2022-09-22 PROCEDURE — 0D1E4Z4 BYPASS LARGE INTESTINE TO CUTANEOUS, PERCUTANEOUS ENDOSCOPIC APPROACH: ICD-10-PCS | Performed by: STUDENT IN AN ORGANIZED HEALTH CARE EDUCATION/TRAINING PROGRAM

## 2022-09-22 PROCEDURE — 80048 BASIC METABOLIC PNL TOTAL CA: CPT | Performed by: INTERNAL MEDICINE

## 2022-09-22 PROCEDURE — 25010000002 DEXAMETHASONE SODIUM PHOSPHATE 10 MG/ML SOLUTION: Performed by: INTERNAL MEDICINE

## 2022-09-22 PROCEDURE — 99233 SBSQ HOSP IP/OBS HIGH 50: CPT | Performed by: INTERNAL MEDICINE

## 2022-09-22 PROCEDURE — 88360 TUMOR IMMUNOHISTOCHEM/MANUAL: CPT | Performed by: STUDENT IN AN ORGANIZED HEALTH CARE EDUCATION/TRAINING PROGRAM

## 2022-09-22 PROCEDURE — 88341 IMHCHEM/IMCYTCHM EA ADD ANTB: CPT | Performed by: STUDENT IN AN ORGANIZED HEALTH CARE EDUCATION/TRAINING PROGRAM

## 2022-09-22 PROCEDURE — 25010000002 ONDANSETRON PER 1 MG: Performed by: ANESTHESIOLOGY

## 2022-09-22 PROCEDURE — 25010000002 PIPERACILLIN SOD-TAZOBACTAM PER 1 G: Performed by: STUDENT IN AN ORGANIZED HEALTH CARE EDUCATION/TRAINING PROGRAM

## 2022-09-22 PROCEDURE — 25010000002 PROPOFOL 10 MG/ML EMULSION

## 2022-09-22 PROCEDURE — 83735 ASSAY OF MAGNESIUM: CPT | Performed by: INTERNAL MEDICINE

## 2022-09-22 PROCEDURE — 25010000002 FENTANYL CITRATE (PF) 50 MCG/ML SOLUTION

## 2022-09-22 PROCEDURE — 85025 COMPLETE CBC W/AUTO DIFF WBC: CPT | Performed by: INTERNAL MEDICINE

## 2022-09-22 PROCEDURE — 88342 IMHCHEM/IMCYTCHM 1ST ANTB: CPT | Performed by: STUDENT IN AN ORGANIZED HEALTH CARE EDUCATION/TRAINING PROGRAM

## 2022-09-22 PROCEDURE — 25010000002 DEXAMETHASONE PER 1 MG

## 2022-09-22 PROCEDURE — 88305 TISSUE EXAM BY PATHOLOGIST: CPT | Performed by: STUDENT IN AN ORGANIZED HEALTH CARE EDUCATION/TRAINING PROGRAM

## 2022-09-22 PROCEDURE — 25010000002 ERTAPENEM PER 500 MG

## 2022-09-22 RX ORDER — ONDANSETRON 2 MG/ML
INJECTION INTRAMUSCULAR; INTRAVENOUS AS NEEDED
Status: DISCONTINUED | OUTPATIENT
Start: 2022-09-22 | End: 2022-09-22 | Stop reason: SURG

## 2022-09-22 RX ORDER — GLYCOPYRROLATE 0.2 MG/ML
INJECTION INTRAMUSCULAR; INTRAVENOUS AS NEEDED
Status: DISCONTINUED | OUTPATIENT
Start: 2022-09-22 | End: 2022-09-22 | Stop reason: SURG

## 2022-09-22 RX ORDER — LIDOCAINE HYDROCHLORIDE 10 MG/ML
0.5 INJECTION, SOLUTION EPIDURAL; INFILTRATION; INTRACAUDAL; PERINEURAL ONCE AS NEEDED
Status: DISCONTINUED | OUTPATIENT
Start: 2022-09-22 | End: 2022-09-22 | Stop reason: HOSPADM

## 2022-09-22 RX ORDER — SODIUM CHLORIDE, SODIUM LACTATE, POTASSIUM CHLORIDE, CALCIUM CHLORIDE 600; 310; 30; 20 MG/100ML; MG/100ML; MG/100ML; MG/100ML
INJECTION, SOLUTION INTRAVENOUS CONTINUOUS PRN
Status: DISCONTINUED | OUTPATIENT
Start: 2022-09-22 | End: 2022-09-22 | Stop reason: SURG

## 2022-09-22 RX ORDER — SODIUM CHLORIDE 9 MG/ML
75 INJECTION, SOLUTION INTRAVENOUS CONTINUOUS
Status: DISCONTINUED | OUTPATIENT
Start: 2022-09-22 | End: 2022-09-24 | Stop reason: HOSPADM

## 2022-09-22 RX ORDER — DEXAMETHASONE SODIUM PHOSPHATE 10 MG/ML
INJECTION, SOLUTION INTRAMUSCULAR; INTRAVENOUS
Status: COMPLETED | OUTPATIENT
Start: 2022-09-22 | End: 2022-09-22

## 2022-09-22 RX ORDER — FAMOTIDINE 10 MG/ML
20 INJECTION, SOLUTION INTRAVENOUS ONCE
Status: COMPLETED | OUTPATIENT
Start: 2022-09-22 | End: 2022-09-22

## 2022-09-22 RX ORDER — MIDAZOLAM HYDROCHLORIDE 1 MG/ML
0.5 INJECTION INTRAMUSCULAR; INTRAVENOUS
Status: DISCONTINUED | OUTPATIENT
Start: 2022-09-22 | End: 2022-09-22 | Stop reason: HOSPADM

## 2022-09-22 RX ORDER — ROCURONIUM BROMIDE 10 MG/ML
INJECTION, SOLUTION INTRAVENOUS AS NEEDED
Status: DISCONTINUED | OUTPATIENT
Start: 2022-09-22 | End: 2022-09-22 | Stop reason: SURG

## 2022-09-22 RX ORDER — DEXAMETHASONE SODIUM PHOSPHATE 10 MG/ML
INJECTION INTRAMUSCULAR; INTRAVENOUS AS NEEDED
Status: DISCONTINUED | OUTPATIENT
Start: 2022-09-22 | End: 2022-09-22 | Stop reason: SURG

## 2022-09-22 RX ORDER — PROPOFOL 10 MG/ML
VIAL (ML) INTRAVENOUS AS NEEDED
Status: DISCONTINUED | OUTPATIENT
Start: 2022-09-22 | End: 2022-09-22 | Stop reason: SURG

## 2022-09-22 RX ORDER — FENTANYL CITRATE 50 UG/ML
INJECTION, SOLUTION INTRAMUSCULAR; INTRAVENOUS AS NEEDED
Status: DISCONTINUED | OUTPATIENT
Start: 2022-09-22 | End: 2022-09-22 | Stop reason: SURG

## 2022-09-22 RX ORDER — LIDOCAINE HYDROCHLORIDE 10 MG/ML
INJECTION, SOLUTION EPIDURAL; INFILTRATION; INTRACAUDAL; PERINEURAL AS NEEDED
Status: DISCONTINUED | OUTPATIENT
Start: 2022-09-22 | End: 2022-09-22 | Stop reason: SURG

## 2022-09-22 RX ORDER — MAGNESIUM HYDROXIDE 1200 MG/15ML
LIQUID ORAL AS NEEDED
Status: DISCONTINUED | OUTPATIENT
Start: 2022-09-22 | End: 2022-09-22 | Stop reason: HOSPADM

## 2022-09-22 RX ORDER — ENOXAPARIN SODIUM 100 MG/ML
40 INJECTION SUBCUTANEOUS DAILY
Status: DISCONTINUED | OUTPATIENT
Start: 2022-09-23 | End: 2022-09-24 | Stop reason: HOSPADM

## 2022-09-22 RX ORDER — BUPIVACAINE HYDROCHLORIDE 2.5 MG/ML
INJECTION, SOLUTION EPIDURAL; INFILTRATION; INTRACAUDAL
Status: COMPLETED | OUTPATIENT
Start: 2022-09-22 | End: 2022-09-22

## 2022-09-22 RX ORDER — LABETALOL HYDROCHLORIDE 5 MG/ML
5 INJECTION, SOLUTION INTRAVENOUS
Status: DISCONTINUED | OUTPATIENT
Start: 2022-09-22 | End: 2022-09-22 | Stop reason: HOSPADM

## 2022-09-22 RX ORDER — ACETAMINOPHEN 650 MG/1
650 SUPPOSITORY RECTAL ONCE AS NEEDED
Status: DISCONTINUED | OUTPATIENT
Start: 2022-09-22 | End: 2022-09-22 | Stop reason: HOSPADM

## 2022-09-22 RX ORDER — SODIUM CHLORIDE, SODIUM LACTATE, POTASSIUM CHLORIDE, CALCIUM CHLORIDE 600; 310; 30; 20 MG/100ML; MG/100ML; MG/100ML; MG/100ML
9 INJECTION, SOLUTION INTRAVENOUS CONTINUOUS
Status: DISCONTINUED | OUTPATIENT
Start: 2022-09-22 | End: 2022-09-22

## 2022-09-22 RX ORDER — EPHEDRINE SULFATE 50 MG/ML
5 INJECTION, SOLUTION INTRAVENOUS ONCE AS NEEDED
Status: DISCONTINUED | OUTPATIENT
Start: 2022-09-22 | End: 2022-09-22 | Stop reason: HOSPADM

## 2022-09-22 RX ORDER — FENTANYL CITRATE 50 UG/ML
50 INJECTION, SOLUTION INTRAMUSCULAR; INTRAVENOUS
Status: DISCONTINUED | OUTPATIENT
Start: 2022-09-22 | End: 2022-09-22 | Stop reason: HOSPADM

## 2022-09-22 RX ORDER — HYDROMORPHONE HYDROCHLORIDE 1 MG/ML
0.5 INJECTION, SOLUTION INTRAMUSCULAR; INTRAVENOUS; SUBCUTANEOUS
Status: DISCONTINUED | OUTPATIENT
Start: 2022-09-22 | End: 2022-09-24 | Stop reason: HOSPADM

## 2022-09-22 RX ORDER — ACETAMINOPHEN 325 MG/1
650 TABLET ORAL EVERY 4 HOURS PRN
Status: DISCONTINUED | OUTPATIENT
Start: 2022-09-22 | End: 2022-09-22 | Stop reason: SDUPTHER

## 2022-09-22 RX ORDER — OXYCODONE HYDROCHLORIDE 5 MG/1
5 TABLET ORAL ONCE AS NEEDED
Status: DISCONTINUED | OUTPATIENT
Start: 2022-09-22 | End: 2022-09-22 | Stop reason: HOSPADM

## 2022-09-22 RX ORDER — NAPROXEN 250 MG/1
250 TABLET ORAL 2 TIMES DAILY PRN
Status: DISCONTINUED | OUTPATIENT
Start: 2022-09-22 | End: 2022-09-24 | Stop reason: HOSPADM

## 2022-09-22 RX ORDER — ONDANSETRON 2 MG/ML
4 INJECTION INTRAMUSCULAR; INTRAVENOUS ONCE AS NEEDED
Status: DISCONTINUED | OUTPATIENT
Start: 2022-09-22 | End: 2022-09-22 | Stop reason: HOSPADM

## 2022-09-22 RX ORDER — NALOXONE HCL 0.4 MG/ML
0.4 VIAL (ML) INJECTION
Status: DISCONTINUED | OUTPATIENT
Start: 2022-09-22 | End: 2022-09-24 | Stop reason: HOSPADM

## 2022-09-22 RX ORDER — ACETAMINOPHEN 325 MG/1
650 TABLET ORAL ONCE AS NEEDED
Status: DISCONTINUED | OUTPATIENT
Start: 2022-09-22 | End: 2022-09-22 | Stop reason: HOSPADM

## 2022-09-22 RX ADMIN — Medication 10 ML: at 11:08

## 2022-09-22 RX ADMIN — SODIUM CHLORIDE 75 ML/HR: 9 INJECTION, SOLUTION INTRAVENOUS at 15:48

## 2022-09-22 RX ADMIN — DEXAMETHASONE SODIUM PHOSPHATE 4 MG: 10 INJECTION, SOLUTION INTRAMUSCULAR; INTRAVENOUS at 12:40

## 2022-09-22 RX ADMIN — GLYCOPYRROLATE 0.2 MG: 0.2 INJECTION INTRAMUSCULAR; INTRAVENOUS at 12:56

## 2022-09-22 RX ADMIN — DEXAMETHASONE SODIUM PHOSPHATE 8 MG: 10 INJECTION INTRAMUSCULAR; INTRAVENOUS at 12:39

## 2022-09-22 RX ADMIN — TAZOBACTAM SODIUM AND PIPERACILLIN SODIUM 3.38 G: 375; 3 INJECTION, SOLUTION INTRAVENOUS at 17:10

## 2022-09-22 RX ADMIN — FENTANYL CITRATE 50 MCG: 50 INJECTION, SOLUTION INTRAMUSCULAR; INTRAVENOUS at 12:27

## 2022-09-22 RX ADMIN — FAMOTIDINE 20 MG: 10 INJECTION INTRAVENOUS at 11:06

## 2022-09-22 RX ADMIN — LISINOPRIL 10 MG: 10 TABLET ORAL at 08:08

## 2022-09-22 RX ADMIN — ROCURONIUM BROMIDE 50 MG: 50 INJECTION, SOLUTION INTRAVENOUS at 12:29

## 2022-09-22 RX ADMIN — SODIUM CHLORIDE, POTASSIUM CHLORIDE, SODIUM LACTATE AND CALCIUM CHLORIDE 9 ML/HR: 600; 310; 30; 20 INJECTION, SOLUTION INTRAVENOUS at 11:08

## 2022-09-22 RX ADMIN — FENTANYL CITRATE 50 MCG: 50 INJECTION, SOLUTION INTRAMUSCULAR; INTRAVENOUS at 12:51

## 2022-09-22 RX ADMIN — MONTELUKAST 10 MG: 10 TABLET, FILM COATED ORAL at 20:23

## 2022-09-22 RX ADMIN — PROPOFOL 150 MG: 10 INJECTION, EMULSION INTRAVENOUS at 12:27

## 2022-09-22 RX ADMIN — BUPIVACAINE HYDROCHLORIDE 60 ML: 2.5 INJECTION, SOLUTION EPIDURAL; INFILTRATION; INTRACAUDAL; PERINEURAL at 12:40

## 2022-09-22 RX ADMIN — ROSUVASTATIN CALCIUM 10 MG: 10 TABLET, FILM COATED ORAL at 08:08

## 2022-09-22 RX ADMIN — SUGAMMADEX 200 MG: 100 INJECTION, SOLUTION INTRAVENOUS at 13:34

## 2022-09-22 RX ADMIN — Medication 1 G: at 12:31

## 2022-09-22 RX ADMIN — GLYCOPYRROLATE 0.2 MG: 0.2 INJECTION INTRAMUSCULAR; INTRAVENOUS at 13:00

## 2022-09-22 RX ADMIN — SODIUM CHLORIDE, POTASSIUM CHLORIDE, SODIUM LACTATE AND CALCIUM CHLORIDE: 600; 310; 30; 20 INJECTION, SOLUTION INTRAVENOUS at 12:21

## 2022-09-22 RX ADMIN — ROCURONIUM BROMIDE 30 MG: 50 INJECTION, SOLUTION INTRAVENOUS at 12:44

## 2022-09-22 RX ADMIN — Medication 10 ML: at 08:07

## 2022-09-22 RX ADMIN — Medication 10 ML: at 20:23

## 2022-09-22 RX ADMIN — ONDANSETRON 4 MG: 2 INJECTION INTRAMUSCULAR; INTRAVENOUS at 13:32

## 2022-09-22 RX ADMIN — SODIUM CHLORIDE, POTASSIUM CHLORIDE, SODIUM LACTATE AND CALCIUM CHLORIDE: 600; 310; 30; 20 INJECTION, SOLUTION INTRAVENOUS at 13:35

## 2022-09-22 RX ADMIN — LIDOCAINE HYDROCHLORIDE 50 MG: 10 INJECTION, SOLUTION EPIDURAL; INFILTRATION; INTRACAUDAL; PERINEURAL at 12:27

## 2022-09-22 RX ADMIN — ACETAMINOPHEN 650 MG: 325 TABLET, FILM COATED ORAL at 20:28

## 2022-09-22 NOTE — ANESTHESIA POSTPROCEDURE EVALUATION
Patient: Abraham Wu    Procedure Summary     Date: 09/22/22 Room / Location:  [facility] /  [facility]    Anesthesia Start: 1221 Anesthesia Stop: 1419    Procedure: LAPAROSCOPIC COLOSTOMY CREATION, FLEXIBLE SIGMOIDOSCOPY (N/A Abdomen) Diagnosis:       Rectal mass      (Rectal mass)    Surgeons: Hiram Viveros MD Provider: Tim Garcia MD    Anesthesia Type: general with block ASA Status: 3          Anesthesia Type: general with block    Vitals  No vitals data found for the desired time range.          Post Anesthesia Care and Evaluation    Patient location during evaluation: PACU  Patient participation: complete - patient participated  Level of consciousness: awake and alert  Pain management: adequate    Airway patency: patent  Anesthetic complications: No anesthetic complications  PONV Status: none  Cardiovascular status: hemodynamically stable and acceptable  Respiratory status: nonlabored ventilation, acceptable and nasal cannula  Hydration status: acceptable

## 2022-09-22 NOTE — ANESTHESIA PREPROCEDURE EVALUATION
Anesthesia Evaluation                  Airway   Mallampati: II  Dental      Pulmonary    Cardiovascular     (+) hypertension,       Neuro/Psych  GI/Hepatic/Renal/Endo      Musculoskeletal     Abdominal    Substance History      OB/GYN          Other                        Anesthesia Plan    ASA 3     general with block     intravenous induction     Anesthetic plan, risks, benefits, and alternatives have been provided, discussed and informed consent has been obtained with: patient.        CODE STATUS:    Code Status (Patient has no pulse and is not breathing): CPR (Attempt to Resuscitate)  Medical Interventions (Patient has pulse or is breathing): Full Support

## 2022-09-22 NOTE — ANESTHESIA PROCEDURE NOTES
Peripheral Block      Patient reassessed immediately prior to procedure    Patient location during procedure: OR  Stop time: 9/22/2022 12:40 PM  Reason for block: at surgeon's request and post-op pain management  Performed byLEXY: Tracie Christiansen SRNA  Preanesthetic Checklist  Completed: patient identified, IV checked, site marked, risks and benefits discussed, surgical consent, monitors and equipment checked, pre-op evaluation and timeout performed  Prep:  Pt Position: supine  Sterile barriers:cap, gloves, mask and washed/disinfected hands  Prep: ChloraPrep  Patient monitoring: blood pressure monitoring, continuous pulse oximetry and EKG  Procedure    Sedation: yes  Performed under: general  Guidance:ultrasound guided  Images:still images obtained, printed/placed on chart    Laterality:Bilateral  Block Type:TAP  Injection Technique:single-shot  Needle Type:short-bevel and echogenic  Needle Gauge:20 G  Resistance on Injection: none    Medications Used: bupivacaine PF (MARCAINE) 0.25 % injection, 60 mL  dexamethasone sodium phosphate injection, 4 mg  Med administered at 9/22/2022 12:40 PM      Medications  Comment:Block Injection:  LA dose divided between Right and Left block        Post Assessment  Injection Assessment: negative aspiration for heme, incremental injection and no paresthesia on injection  Patient Tolerance:comfortable throughout block  Complications:no  Additional Notes      Under Ultrasound guidance, a BBraun 4inch 360 degree needle was advanced with Normal Saline hydro dissection of tissue.  The Internal Oblique and Transversus Abdominus muscles where visualized.  At or before the aponeurosis of Internal Oblique, local anesthetic spread was visualized in the Transversus Abdominus Plane. Injection was made incrementally with aspiration every 5 mls.  There was no  intravascular injection,  injection pressure was normal, there was no neural injection, and the procedure was completed without difficulty.   Thank You.

## 2022-09-22 NOTE — ANESTHESIA PROCEDURE NOTES
Airway  Urgency: elective    Date/Time: 9/22/2022 12:30 PM  Airway not difficult    General Information and Staff    Patient location during procedure: OR  CRNA/CAA: Lexus Huerta CRNA    Indications and Patient Condition  Indications for airway management: airway protection    Preoxygenated: yes  MILS not maintained throughout  Mask difficulty assessment: 1 - vent by mask    Final Airway Details  Final airway type: endotracheal airway      Successful airway: ETT  Cuffed: yes   Successful intubation technique: direct laryngoscopy  Endotracheal tube insertion site: oral  Blade: Erick  Blade size: 4  ETT size (mm): 7.5  Cormack-Lehane Classification: grade I - full view of glottis  Placement verified by: chest auscultation and capnometry   Measured from: lips  ETT/EBT  to lips (cm): 20  Number of attempts at approach: 1  Assessment: lips, teeth, and gum same as pre-op and atraumatic intubation    Additional Comments  Negative epigastric sounds, Breath sound equal bilaterally with symmetric chest rise and fall

## 2022-09-23 LAB
ANION GAP SERPL CALCULATED.3IONS-SCNC: 9 MMOL/L (ref 5–15)
BASOPHILS # BLD AUTO: 0.01 10*3/MM3 (ref 0–0.2)
BASOPHILS NFR BLD AUTO: 0.1 % (ref 0–1.5)
BUN SERPL-MCNC: 11 MG/DL (ref 8–23)
BUN/CREAT SERPL: 25.6 (ref 7–25)
CALCIUM SPEC-SCNC: 8.2 MG/DL (ref 8.6–10.5)
CHLORIDE SERPL-SCNC: 96 MMOL/L (ref 98–107)
CO2 SERPL-SCNC: 23 MMOL/L (ref 22–29)
CREAT SERPL-MCNC: 0.43 MG/DL (ref 0.76–1.27)
DEPRECATED RDW RBC AUTO: 47.8 FL (ref 37–54)
EGFRCR SERPLBLD CKD-EPI 2021: 114.8 ML/MIN/1.73
EOSINOPHIL # BLD AUTO: 0 10*3/MM3 (ref 0–0.4)
EOSINOPHIL NFR BLD AUTO: 0 % (ref 0.3–6.2)
ERYTHROCYTE [DISTWIDTH] IN BLOOD BY AUTOMATED COUNT: 15.2 % (ref 12.3–15.4)
GLUCOSE SERPL-MCNC: 166 MG/DL (ref 65–99)
HCT VFR BLD AUTO: 27.8 % (ref 37.5–51)
HGB BLD-MCNC: 8.9 G/DL (ref 13–17.7)
IMM GRANULOCYTES # BLD AUTO: 0.08 10*3/MM3 (ref 0–0.05)
IMM GRANULOCYTES NFR BLD AUTO: 0.7 % (ref 0–0.5)
LYMPHOCYTES # BLD AUTO: 0.27 10*3/MM3 (ref 0.7–3.1)
LYMPHOCYTES NFR BLD AUTO: 2.4 % (ref 19.6–45.3)
MAGNESIUM SERPL-MCNC: 2 MG/DL (ref 1.6–2.4)
MCH RBC QN AUTO: 27.5 PG (ref 26.6–33)
MCHC RBC AUTO-ENTMCNC: 32 G/DL (ref 31.5–35.7)
MCV RBC AUTO: 85.8 FL (ref 79–97)
MONOCYTES # BLD AUTO: 0.73 10*3/MM3 (ref 0.1–0.9)
MONOCYTES NFR BLD AUTO: 6.5 % (ref 5–12)
NEUTROPHILS NFR BLD AUTO: 10.08 10*3/MM3 (ref 1.7–7)
NEUTROPHILS NFR BLD AUTO: 90.3 % (ref 42.7–76)
NRBC BLD AUTO-RTO: 0 /100 WBC (ref 0–0.2)
PLATELET # BLD AUTO: 330 10*3/MM3 (ref 140–450)
PMV BLD AUTO: 9 FL (ref 6–12)
POTASSIUM SERPL-SCNC: 3.5 MMOL/L (ref 3.5–5.2)
RBC # BLD AUTO: 3.24 10*6/MM3 (ref 4.14–5.8)
SODIUM SERPL-SCNC: 128 MMOL/L (ref 136–145)
WBC NRBC COR # BLD: 11.17 10*3/MM3 (ref 3.4–10.8)

## 2022-09-23 PROCEDURE — 93005 ELECTROCARDIOGRAM TRACING: CPT | Performed by: NURSE PRACTITIONER

## 2022-09-23 PROCEDURE — 99232 SBSQ HOSP IP/OBS MODERATE 35: CPT | Performed by: INTERNAL MEDICINE

## 2022-09-23 PROCEDURE — 83735 ASSAY OF MAGNESIUM: CPT | Performed by: NURSE PRACTITIONER

## 2022-09-23 PROCEDURE — 25010000002 ENOXAPARIN PER 10 MG: Performed by: STUDENT IN AN ORGANIZED HEALTH CARE EDUCATION/TRAINING PROGRAM

## 2022-09-23 PROCEDURE — 93010 ELECTROCARDIOGRAM REPORT: CPT | Performed by: INTERNAL MEDICINE

## 2022-09-23 PROCEDURE — 85025 COMPLETE CBC W/AUTO DIFF WBC: CPT | Performed by: STUDENT IN AN ORGANIZED HEALTH CARE EDUCATION/TRAINING PROGRAM

## 2022-09-23 PROCEDURE — 80048 BASIC METABOLIC PNL TOTAL CA: CPT | Performed by: STUDENT IN AN ORGANIZED HEALTH CARE EDUCATION/TRAINING PROGRAM

## 2022-09-23 PROCEDURE — 25010000002 PIPERACILLIN SOD-TAZOBACTAM PER 1 G: Performed by: STUDENT IN AN ORGANIZED HEALTH CARE EDUCATION/TRAINING PROGRAM

## 2022-09-23 RX ORDER — METRONIDAZOLE 500 MG/1
500 TABLET ORAL EVERY 8 HOURS SCHEDULED
Status: DISCONTINUED | OUTPATIENT
Start: 2022-09-23 | End: 2022-09-24 | Stop reason: HOSPADM

## 2022-09-23 RX ORDER — LEVOFLOXACIN 750 MG/1
750 TABLET ORAL
Status: DISCONTINUED | OUTPATIENT
Start: 2022-09-23 | End: 2022-09-24 | Stop reason: HOSPADM

## 2022-09-23 RX ADMIN — ACETAMINOPHEN 650 MG: 325 TABLET, FILM COATED ORAL at 22:42

## 2022-09-23 RX ADMIN — LISINOPRIL 10 MG: 10 TABLET ORAL at 08:17

## 2022-09-23 RX ADMIN — MONTELUKAST 10 MG: 10 TABLET, FILM COATED ORAL at 22:43

## 2022-09-23 RX ADMIN — ROSUVASTATIN CALCIUM 10 MG: 10 TABLET, FILM COATED ORAL at 08:17

## 2022-09-23 RX ADMIN — SODIUM CHLORIDE 75 ML/HR: 9 INJECTION, SOLUTION INTRAVENOUS at 00:25

## 2022-09-23 RX ADMIN — ENOXAPARIN SODIUM 40 MG: 40 INJECTION SUBCUTANEOUS at 08:17

## 2022-09-23 RX ADMIN — TAZOBACTAM SODIUM AND PIPERACILLIN SODIUM 3.38 G: 375; 3 INJECTION, SOLUTION INTRAVENOUS at 00:26

## 2022-09-23 RX ADMIN — Medication 10 ML: at 22:43

## 2022-09-23 RX ADMIN — Medication 10 ML: at 08:21

## 2022-09-23 RX ADMIN — LEVOFLOXACIN 750 MG: 750 TABLET, FILM COATED ORAL at 08:17

## 2022-09-23 RX ADMIN — METRONIDAZOLE 500 MG: 500 TABLET ORAL at 22:43

## 2022-09-23 RX ADMIN — METRONIDAZOLE 500 MG: 500 TABLET ORAL at 14:32

## 2022-09-23 RX ADMIN — METRONIDAZOLE 500 MG: 500 TABLET ORAL at 08:17

## 2022-09-23 RX ADMIN — ACETAMINOPHEN 650 MG: 325 TABLET, FILM COATED ORAL at 12:18

## 2022-09-24 ENCOUNTER — READMISSION MANAGEMENT (OUTPATIENT)
Dept: CALL CENTER | Facility: HOSPITAL | Age: 72
End: 2022-09-24

## 2022-09-24 VITALS
DIASTOLIC BLOOD PRESSURE: 56 MMHG | OXYGEN SATURATION: 100 % | HEART RATE: 80 BPM | HEIGHT: 64 IN | WEIGHT: 187.1 LBS | RESPIRATION RATE: 18 BRPM | TEMPERATURE: 98.2 F | SYSTOLIC BLOOD PRESSURE: 129 MMHG | BODY MASS INDEX: 31.94 KG/M2

## 2022-09-24 PROBLEM — E86.0 DEHYDRATION: Status: RESOLVED | Noted: 2022-09-21 | Resolved: 2022-09-24

## 2022-09-24 PROBLEM — E88.09 HYPOALBUMINEMIA: Status: RESOLVED | Noted: 2022-09-21 | Resolved: 2022-09-24

## 2022-09-24 PROBLEM — E87.1 HYPONATREMIA: Status: RESOLVED | Noted: 2022-09-21 | Resolved: 2022-09-24

## 2022-09-24 PROBLEM — E83.42 HYPOMAGNESEMIA: Status: RESOLVED | Noted: 2022-09-21 | Resolved: 2022-09-24

## 2022-09-24 LAB
ANION GAP SERPL CALCULATED.3IONS-SCNC: 8 MMOL/L (ref 5–15)
BUN SERPL-MCNC: 13 MG/DL (ref 8–23)
BUN/CREAT SERPL: 27.1 (ref 7–25)
CALCIUM SPEC-SCNC: 8.3 MG/DL (ref 8.6–10.5)
CHLORIDE SERPL-SCNC: 98 MMOL/L (ref 98–107)
CO2 SERPL-SCNC: 25 MMOL/L (ref 22–29)
CREAT SERPL-MCNC: 0.48 MG/DL (ref 0.76–1.27)
EGFRCR SERPLBLD CKD-EPI 2021: 111.1 ML/MIN/1.73
GLUCOSE SERPL-MCNC: 112 MG/DL (ref 65–99)
POTASSIUM SERPL-SCNC: 3.7 MMOL/L (ref 3.5–5.2)
SODIUM SERPL-SCNC: 131 MMOL/L (ref 136–145)

## 2022-09-24 PROCEDURE — 25010000002 ENOXAPARIN PER 10 MG: Performed by: STUDENT IN AN ORGANIZED HEALTH CARE EDUCATION/TRAINING PROGRAM

## 2022-09-24 PROCEDURE — 80048 BASIC METABOLIC PNL TOTAL CA: CPT | Performed by: INTERNAL MEDICINE

## 2022-09-24 PROCEDURE — 99239 HOSP IP/OBS DSCHRG MGMT >30: CPT | Performed by: NURSE PRACTITIONER

## 2022-09-24 RX ORDER — LEVOFLOXACIN 750 MG/1
750 TABLET ORAL
Qty: 3 TABLET | Refills: 0 | Status: SHIPPED | OUTPATIENT
Start: 2022-09-25 | End: 2022-09-28

## 2022-09-24 RX ORDER — ACETAMINOPHEN 325 MG/1
650 TABLET ORAL EVERY 6 HOURS PRN
Status: ON HOLD
Start: 2022-09-24 | End: 2022-10-04

## 2022-09-24 RX ORDER — NAPROXEN 250 MG/1
250 TABLET ORAL 2 TIMES DAILY PRN
Start: 2022-09-24 | End: 2022-10-03

## 2022-09-24 RX ORDER — METRONIDAZOLE 500 MG/1
500 TABLET ORAL EVERY 8 HOURS SCHEDULED
Qty: 11 TABLET | Refills: 0 | Status: SHIPPED | OUTPATIENT
Start: 2022-09-24 | End: 2022-09-28

## 2022-09-24 RX ADMIN — ENOXAPARIN SODIUM 40 MG: 40 INJECTION SUBCUTANEOUS at 08:52

## 2022-09-24 RX ADMIN — ROSUVASTATIN CALCIUM 10 MG: 10 TABLET, FILM COATED ORAL at 08:52

## 2022-09-24 RX ADMIN — METRONIDAZOLE 500 MG: 500 TABLET ORAL at 06:44

## 2022-09-24 RX ADMIN — LEVOFLOXACIN 750 MG: 750 TABLET, FILM COATED ORAL at 08:52

## 2022-09-24 RX ADMIN — LISINOPRIL 10 MG: 10 TABLET ORAL at 08:52

## 2022-09-25 LAB
BACTERIA SPEC AEROBE CULT: NORMAL
BACTERIA SPEC AEROBE CULT: NORMAL
QT INTERVAL: 494 MS
QTC INTERVAL: 427 MS

## 2022-09-25 NOTE — OUTREACH NOTE
Prep Survey    Flowsheet Row Responses   [facility] facility patient discharged from? [facility]   Is LACE score < 7 ? No   Emergency Room discharge w/ pulse ox? No   Eligibility Readm Mgmt   Discharge diagnosis Rectal Mass   Does the patient have one of the following disease processes/diagnoses(primary or secondary)? General Surgery   Does the patient have Home health ordered? No   Is there a DME ordered? No   General alerts for this patient s/p laparoscopic colostomy creation   Prep survey completed? Yes          HANNAH DANIELS - Registered Nurse

## 2022-09-27 ENCOUNTER — READMISSION MANAGEMENT (OUTPATIENT)
Dept: CALL CENTER | Facility: HOSPITAL | Age: 72
End: 2022-09-27

## 2022-09-27 LAB
QT INTERVAL: 400 MS
QTC INTERVAL: 425 MS

## 2022-09-27 NOTE — OUTREACH NOTE
General Surgery Week 1 Survey    Flowsheet Row Responses   [facility] facility patient discharged from? [place]   Does the patient have one of the following disease processes/diagnoses(primary or secondary)? General Surgery   Week 1 attempt successful? No   Unsuccessful attempts Attempt 1          KASHMIR CHOI - Registered Nurse

## 2022-09-29 ENCOUNTER — TELEPHONE (OUTPATIENT)
Dept: ONCOLOGY | Facility: CLINIC | Age: 72
End: 2022-09-29

## 2022-09-29 ENCOUNTER — READMISSION MANAGEMENT (OUTPATIENT)
Dept: CALL CENTER | Facility: HOSPITAL | Age: 72
End: 2022-09-29

## 2022-09-29 NOTE — TELEPHONE ENCOUNTER
ARMAND IS CALLING WANTED TO TALK TO DENNISE. SHE STATES THAT HIS APPT WAS PUSHED BACK BECAUSE OF NOT HAVING RESULTS, AND SHE WAS WONDERING IF THEY WERE WAISTING TIME AND PUSHING IT BACK TO Tuesday WAS A LONG WAY AWAY AND IF THIS WAS SOMETHING THAT NEEDED TO BE TAKEN CARE OF QUICKER.

## 2022-09-29 NOTE — TELEPHONE ENCOUNTER
Contacted patient and notified him that Dr. Leary would like to re-schedule his appointment today due to his pathology report not being complete.  Dr. Leary would like to see him next Tuesday, 10/4/22 and scheduling will be calling him with appointment time.  Patient jelly/keegan Harry in referral department notified of appointment change need.

## 2022-09-29 NOTE — OUTREACH NOTE
General Surgery Week 1 Survey    Flowsheet Row Responses   [facility] patient discharged from? [place]   Does the patient have one of the following disease processes/diagnoses(primary or secondary)? General Surgery   Week 1 attempt successful? Yes   Call start time 1300   Call end time 1308   General alerts for this patient s/p laparoscopic colostomy creation   Discharge diagnosis Rectal Mass   Person spoke with today (if not patient) and relationship Peggy-spouse   Meds reviewed with patient/caregiver? Yes   Is the patient having any side effects they believe may be caused by any medication additions or changes? No   Does the patient have all medications related to this admission filled (includes all antibiotics, pain medications, etc.) Yes   Is the patient taking all medications as directed (includes completed medication regime)? Yes   Does the patient have a follow up appointment scheduled with their surgeon? Yes  [10/4/22]   Has the patient kept scheduled appointments due by today? N/A   Comments PCP appt next week   Has home health visited the patient within 72 hours of discharge? N/A   Psychosocial issues? No   Did the patient receive a copy of their discharge instructions? Yes   Nursing interventions Reviewed instructions with patient   What is the patient's perception of their health status since discharge? Improving   Nursing interventions Nurse provided patient education   Is the patient /caregiver able to teach back basic post-op care? Keep incision areas clean,dry and protected, Lifting as instructed by MD in discharge instructions   Is the patient/caregiver able to teach back signs and symptoms of incisional infection? Increased redness, swelling or pain at the incisonal site   Is the patient/caregiver able to teach back steps to recovery at home? Set small, achievable goals for return to baseline health   Is the patient/caregiver able to teach back the hierarchy of who to call/visit for  symptoms/problems? PCP, Specialist, Home health nurse, Urgent Care, ED, 911 Yes   Week 1 call completed? Yes          KIMBERLEE TOLENTINO - Registered Nurse

## 2022-09-29 NOTE — TELEPHONE ENCOUNTER
Explained to patient's spouse that Dr. Leary is out if the office on Fridays and she see's patients in [place] on Mondays and is out next week (except for Tuesday, when appointment is now scheduled.) She v/u.

## 2022-10-03 ENCOUNTER — HOSPITAL ENCOUNTER (INPATIENT)
Facility: HOSPITAL | Age: 72
LOS: 17 days | Discharge: HOME HEALTH | End: 2022-10-20
Attending: EMERGENCY MEDICINE | Admitting: INTERNAL MEDICINE

## 2022-10-03 ENCOUNTER — TELEPHONE (OUTPATIENT)
Dept: UROLOGY | Facility: CLINIC | Age: 72
End: 2022-10-03

## 2022-10-03 ENCOUNTER — APPOINTMENT (OUTPATIENT)
Dept: CT IMAGING | Facility: HOSPITAL | Age: 72
End: 2022-10-03

## 2022-10-03 ENCOUNTER — READMISSION MANAGEMENT (OUTPATIENT)
Dept: CALL CENTER | Facility: HOSPITAL | Age: 72
End: 2022-10-03

## 2022-10-03 ENCOUNTER — APPOINTMENT (OUTPATIENT)
Dept: CARDIOLOGY | Facility: HOSPITAL | Age: 72
End: 2022-10-03

## 2022-10-03 ENCOUNTER — APPOINTMENT (OUTPATIENT)
Dept: GENERAL RADIOLOGY | Facility: HOSPITAL | Age: 72
End: 2022-10-03

## 2022-10-03 DIAGNOSIS — K92.2 ACUTE UPPER GI BLEED: ICD-10-CM

## 2022-10-03 DIAGNOSIS — R13.10 DYSPHAGIA, UNSPECIFIED TYPE: ICD-10-CM

## 2022-10-03 DIAGNOSIS — K62.89 RECTAL MASS: ICD-10-CM

## 2022-10-03 DIAGNOSIS — K92.1 MELENA: ICD-10-CM

## 2022-10-03 DIAGNOSIS — E87.0 HYPERNATREMIA: ICD-10-CM

## 2022-10-03 DIAGNOSIS — E87.6 HYPOKALEMIA: ICD-10-CM

## 2022-10-03 DIAGNOSIS — C81.98 HODGKIN LYMPHOMA OF LYMPH NODES OF MULTIPLE REGIONS, UNSPECIFIED HODGKIN LYMPHOMA TYPE: ICD-10-CM

## 2022-10-03 DIAGNOSIS — C85.90 LYMPHOMA: ICD-10-CM

## 2022-10-03 DIAGNOSIS — C79.9 METASTATIC MALIGNANT NEOPLASM, UNSPECIFIED SITE: ICD-10-CM

## 2022-10-03 DIAGNOSIS — N13.30 HYDRONEPHROSIS, UNSPECIFIED HYDRONEPHROSIS TYPE: ICD-10-CM

## 2022-10-03 DIAGNOSIS — Z93.3 STATUS POST COLOSTOMY: ICD-10-CM

## 2022-10-03 DIAGNOSIS — R50.9 FEVER, UNSPECIFIED FEVER CAUSE: Primary | ICD-10-CM

## 2022-10-03 LAB
ALBUMIN SERPL-MCNC: 2.9 G/DL (ref 3.5–5.2)
ALBUMIN/GLOB SERPL: 0.9 G/DL
ALP SERPL-CCNC: 439 U/L (ref 39–117)
ALT SERPL W P-5'-P-CCNC: 48 U/L (ref 1–41)
ANION GAP SERPL CALCULATED.3IONS-SCNC: 17 MMOL/L (ref 5–15)
ASCENDING AORTA: 4.3 CM
AST SERPL-CCNC: 178 U/L (ref 1–40)
B PARAPERT DNA SPEC QL NAA+PROBE: NOT DETECTED
B PERT DNA SPEC QL NAA+PROBE: NOT DETECTED
BACTERIA UR QL AUTO: ABNORMAL /HPF
BACTERIA UR QL AUTO: ABNORMAL /HPF
BASOPHILS # BLD AUTO: 0.01 10*3/MM3 (ref 0–0.2)
BASOPHILS NFR BLD AUTO: 0.1 % (ref 0–1.5)
BH CV ECHO LEFT VENTRICLE GLOBAL LONGITUDINAL STRAIN: -27 %
BH CV ECHO MEAS - AO MAX PG: 13.3 MMHG
BH CV ECHO MEAS - AO MEAN PG: 7 MMHG
BH CV ECHO MEAS - AO ROOT DIAM: 3.6 CM
BH CV ECHO MEAS - AO V2 MAX: 182 CM/SEC
BH CV ECHO MEAS - AO V2 VTI: 40.7 CM
BH CV ECHO MEAS - AVA(I,D): 2.44 CM2
BH CV ECHO MEAS - EDV(CUBED): 117.6 ML
BH CV ECHO MEAS - EDV(MOD-SP2): 138 ML
BH CV ECHO MEAS - EDV(MOD-SP4): 181 ML
BH CV ECHO MEAS - EF(MOD-BP): 58.2 %
BH CV ECHO MEAS - EF(MOD-SP2): 54.6 %
BH CV ECHO MEAS - EF(MOD-SP4): 58 %
BH CV ECHO MEAS - ESV(CUBED): 19.7 ML
BH CV ECHO MEAS - ESV(MOD-SP2): 62.7 ML
BH CV ECHO MEAS - ESV(MOD-SP4): 76.1 ML
BH CV ECHO MEAS - FS: 44.9 %
BH CV ECHO MEAS - IVS/LVPW: 1 CM
BH CV ECHO MEAS - IVSD: 1.1 CM
BH CV ECHO MEAS - LA DIMENSION: 4 CM
BH CV ECHO MEAS - LAT PEAK E' VEL: 9.5 CM/SEC
BH CV ECHO MEAS - LV MASS(C)D: 200.5 GRAMS
BH CV ECHO MEAS - LV MAX PG: 7.6 MMHG
BH CV ECHO MEAS - LV MEAN PG: 4 MMHG
BH CV ECHO MEAS - LV V1 MAX: 137 CM/SEC
BH CV ECHO MEAS - LV V1 VTI: 31.7 CM
BH CV ECHO MEAS - LVIDD: 4.9 CM
BH CV ECHO MEAS - LVIDS: 2.7 CM
BH CV ECHO MEAS - LVOT AREA: 3.1 CM2
BH CV ECHO MEAS - LVOT DIAM: 2 CM
BH CV ECHO MEAS - LVPWD: 1.1 CM
BH CV ECHO MEAS - MED PEAK E' VEL: 7.7 CM/SEC
BH CV ECHO MEAS - MV A MAX VEL: 86.5 CM/SEC
BH CV ECHO MEAS - MV DEC TIME: 0.25 MSEC
BH CV ECHO MEAS - MV E MAX VEL: 103 CM/SEC
BH CV ECHO MEAS - MV E/A: 1.19
BH CV ECHO MEAS - PA ACC TIME: 0.15 SEC
BH CV ECHO MEAS - PA PR(ACCEL): 12.4 MMHG
BH CV ECHO MEAS - PA V2 MAX: 117 CM/SEC
BH CV ECHO MEAS - RAP SYSTOLE: 3 MMHG
BH CV ECHO MEAS - RVSP: 43 MMHG
BH CV ECHO MEAS - SV(LVOT): 99.4 ML
BH CV ECHO MEAS - SV(MOD-SP2): 75.3 ML
BH CV ECHO MEAS - SV(MOD-SP4): 104.9 ML
BH CV ECHO MEAS - TAPSE (>1.6): 2.7 CM
BH CV ECHO MEAS - TR MAX PG: 39.9 MMHG
BH CV ECHO MEAS - TR MAX VEL: 316 CM/SEC
BH CV ECHO MEASUREMENTS AVERAGE E/E' RATIO: 11.98
BH CV VAS BP LEFT ARM: NORMAL MMHG
BH CV XLRA - RV BASE: 4.3 CM
BH CV XLRA - RV LENGTH: 7.4 CM
BH CV XLRA - RV MID: 2.8 CM
BH CV XLRA - TDI S': 24.1 CM/SEC
BILIRUB SERPL-MCNC: 1 MG/DL (ref 0–1.2)
BILIRUB UR QL STRIP: NEGATIVE
BILIRUB UR QL STRIP: NEGATIVE
BUN SERPL-MCNC: 8 MG/DL (ref 8–23)
BUN/CREAT SERPL: 16 (ref 7–25)
C PNEUM DNA NPH QL NAA+NON-PROBE: NOT DETECTED
CALCIUM SPEC-SCNC: 8.6 MG/DL (ref 8.6–10.5)
CHLORIDE SERPL-SCNC: 109 MMOL/L (ref 98–107)
CLARITY UR: CLEAR
CLARITY UR: CLEAR
CO2 SERPL-SCNC: 28 MMOL/L (ref 22–29)
COLOR UR: ABNORMAL
COLOR UR: YELLOW
CREAT SERPL-MCNC: 0.5 MG/DL (ref 0.76–1.27)
D-LACTATE SERPL-SCNC: 1.2 MMOL/L (ref 0.5–2)
DEPRECATED RDW RBC AUTO: 46.7 FL (ref 37–54)
EGFRCR SERPLBLD CKD-EPI 2021: 109.7 ML/MIN/1.73
EOSINOPHIL # BLD AUTO: 0 10*3/MM3 (ref 0–0.4)
EOSINOPHIL NFR BLD AUTO: 0 % (ref 0.3–6.2)
ERYTHROCYTE [DISTWIDTH] IN BLOOD BY AUTOMATED COUNT: 15.2 % (ref 12.3–15.4)
ERYTHROCYTE [SEDIMENTATION RATE] IN BLOOD: 88 MM/HR (ref 0–20)
FLUAV SUBTYP SPEC NAA+PROBE: NOT DETECTED
FLUBV RNA ISLT QL NAA+PROBE: NOT DETECTED
GLOBULIN UR ELPH-MCNC: 3.4 GM/DL
GLUCOSE SERPL-MCNC: 106 MG/DL (ref 65–99)
GLUCOSE UR STRIP-MCNC: NEGATIVE MG/DL
GLUCOSE UR STRIP-MCNC: NEGATIVE MG/DL
HADV DNA SPEC NAA+PROBE: NOT DETECTED
HCOV 229E RNA SPEC QL NAA+PROBE: NOT DETECTED
HCOV HKU1 RNA SPEC QL NAA+PROBE: NOT DETECTED
HCOV NL63 RNA SPEC QL NAA+PROBE: NOT DETECTED
HCOV OC43 RNA SPEC QL NAA+PROBE: NOT DETECTED
HCT VFR BLD AUTO: 32.7 % (ref 37.5–51)
HGB BLD-MCNC: 10.6 G/DL (ref 13–17.7)
HGB UR QL STRIP.AUTO: ABNORMAL
HGB UR QL STRIP.AUTO: ABNORMAL
HMPV RNA NPH QL NAA+NON-PROBE: NOT DETECTED
HOLD SPECIMEN: NORMAL
HPIV1 RNA ISLT QL NAA+PROBE: NOT DETECTED
HPIV2 RNA SPEC QL NAA+PROBE: NOT DETECTED
HPIV3 RNA NPH QL NAA+PROBE: NOT DETECTED
HPIV4 P GENE NPH QL NAA+PROBE: NOT DETECTED
HYALINE CASTS UR QL AUTO: ABNORMAL /LPF
HYALINE CASTS UR QL AUTO: ABNORMAL /LPF
IMM GRANULOCYTES # BLD AUTO: 0.11 10*3/MM3 (ref 0–0.05)
IMM GRANULOCYTES NFR BLD AUTO: 1 % (ref 0–0.5)
KETONES UR QL STRIP: ABNORMAL
KETONES UR QL STRIP: NEGATIVE
LEFT ATRIUM VOLUME INDEX: 44.3 ML/M2
LEUKOCYTE ESTERASE UR QL STRIP.AUTO: ABNORMAL
LEUKOCYTE ESTERASE UR QL STRIP.AUTO: NEGATIVE
LYMPHOCYTES # BLD AUTO: 0.25 10*3/MM3 (ref 0.7–3.1)
LYMPHOCYTES NFR BLD AUTO: 2.3 % (ref 19.6–45.3)
M PNEUMO IGG SER IA-ACNC: NOT DETECTED
MAXIMAL PREDICTED HEART RATE: 150 BPM
MCH RBC QN AUTO: 27 PG (ref 26.6–33)
MCHC RBC AUTO-ENTMCNC: 32.4 G/DL (ref 31.5–35.7)
MCV RBC AUTO: 83.4 FL (ref 79–97)
MONOCYTES # BLD AUTO: 0.69 10*3/MM3 (ref 0.1–0.9)
MONOCYTES NFR BLD AUTO: 6.4 % (ref 5–12)
NEUTROPHILS NFR BLD AUTO: 9.73 10*3/MM3 (ref 1.7–7)
NEUTROPHILS NFR BLD AUTO: 90.2 % (ref 42.7–76)
NITRITE UR QL STRIP: NEGATIVE
NITRITE UR QL STRIP: NEGATIVE
NRBC BLD AUTO-RTO: 0 /100 WBC (ref 0–0.2)
PH UR STRIP.AUTO: 7 [PH] (ref 5–8)
PH UR STRIP.AUTO: 7 [PH] (ref 5–8)
PLATELET # BLD AUTO: 355 10*3/MM3 (ref 140–450)
PMV BLD AUTO: 8.8 FL (ref 6–12)
POTASSIUM SERPL-SCNC: 3.4 MMOL/L (ref 3.5–5.2)
POTASSIUM SERPL-SCNC: 4.1 MMOL/L (ref 3.5–5.2)
PROCALCITONIN SERPL-MCNC: 0.39 NG/ML (ref 0–0.25)
PROT SERPL-MCNC: 6.3 G/DL (ref 6–8.5)
PROT UR QL STRIP: ABNORMAL
PROT UR QL STRIP: ABNORMAL
RBC # BLD AUTO: 3.92 10*6/MM3 (ref 4.14–5.8)
RBC # UR STRIP: ABNORMAL /HPF
RBC # UR STRIP: ABNORMAL /HPF
REF LAB TEST METHOD: ABNORMAL
REF LAB TEST METHOD: ABNORMAL
RHINOVIRUS RNA SPEC NAA+PROBE: NOT DETECTED
RSV RNA NPH QL NAA+NON-PROBE: NOT DETECTED
SARS-COV-2 RNA NPH QL NAA+NON-PROBE: NOT DETECTED
SODIUM SERPL-SCNC: 154 MMOL/L (ref 136–145)
SP GR UR STRIP: 1.02 (ref 1–1.03)
SP GR UR STRIP: 1.02 (ref 1–1.03)
SQUAMOUS #/AREA URNS HPF: ABNORMAL /HPF
SQUAMOUS #/AREA URNS HPF: ABNORMAL /HPF
STRESS TARGET HR: 128 BPM
URATE SERPL-MCNC: 2 MG/DL (ref 3.4–7)
UROBILINOGEN UR QL STRIP: ABNORMAL
UROBILINOGEN UR QL STRIP: ABNORMAL
WBC # UR STRIP: ABNORMAL /HPF
WBC # UR STRIP: ABNORMAL /HPF
WBC NRBC COR # BLD: 10.79 10*3/MM3 (ref 3.4–10.8)
WHOLE BLOOD HOLD COAG: NORMAL
WHOLE BLOOD HOLD SPECIMEN: NORMAL

## 2022-10-03 PROCEDURE — 0202U NFCT DS 22 TRGT SARS-COV-2: CPT | Performed by: EMERGENCY MEDICINE

## 2022-10-03 PROCEDURE — 25010000002 PIPERACILLIN SOD-TAZOBACTAM PER 1 G: Performed by: EMERGENCY MEDICINE

## 2022-10-03 PROCEDURE — 83605 ASSAY OF LACTIC ACID: CPT | Performed by: EMERGENCY MEDICINE

## 2022-10-03 PROCEDURE — 85025 COMPLETE CBC W/AUTO DIFF WBC: CPT | Performed by: EMERGENCY MEDICINE

## 2022-10-03 PROCEDURE — 85652 RBC SED RATE AUTOMATED: CPT | Performed by: INTERNAL MEDICINE

## 2022-10-03 PROCEDURE — 84145 PROCALCITONIN (PCT): CPT | Performed by: EMERGENCY MEDICINE

## 2022-10-03 PROCEDURE — 99285 EMERGENCY DEPT VISIT HI MDM: CPT

## 2022-10-03 PROCEDURE — 82570 ASSAY OF URINE CREATININE: CPT | Performed by: INTERNAL MEDICINE

## 2022-10-03 PROCEDURE — 0 IOPAMIDOL PER 1 ML: Performed by: EMERGENCY MEDICINE

## 2022-10-03 PROCEDURE — 25010000002 PIPERACILLIN SOD-TAZOBACTAM PER 1 G: Performed by: INTERNAL MEDICINE

## 2022-10-03 PROCEDURE — 93306 TTE W/DOPPLER COMPLETE: CPT | Performed by: INTERNAL MEDICINE

## 2022-10-03 PROCEDURE — 93306 TTE W/DOPPLER COMPLETE: CPT

## 2022-10-03 PROCEDURE — 99223 1ST HOSP IP/OBS HIGH 75: CPT | Performed by: INTERNAL MEDICINE

## 2022-10-03 PROCEDURE — 99222 1ST HOSP IP/OBS MODERATE 55: CPT | Performed by: UROLOGY

## 2022-10-03 PROCEDURE — 71045 X-RAY EXAM CHEST 1 VIEW: CPT

## 2022-10-03 PROCEDURE — 84132 ASSAY OF SERUM POTASSIUM: CPT | Performed by: INTERNAL MEDICINE

## 2022-10-03 PROCEDURE — 84300 ASSAY OF URINE SODIUM: CPT | Performed by: INTERNAL MEDICINE

## 2022-10-03 PROCEDURE — 87040 BLOOD CULTURE FOR BACTERIA: CPT | Performed by: EMERGENCY MEDICINE

## 2022-10-03 PROCEDURE — 84550 ASSAY OF BLOOD/URIC ACID: CPT | Performed by: INTERNAL MEDICINE

## 2022-10-03 PROCEDURE — C1894 INTRO/SHEATH, NON-LASER: HCPCS

## 2022-10-03 PROCEDURE — 81001 URINALYSIS AUTO W/SCOPE: CPT | Performed by: UROLOGY

## 2022-10-03 PROCEDURE — 81001 URINALYSIS AUTO W/SCOPE: CPT | Performed by: EMERGENCY MEDICINE

## 2022-10-03 PROCEDURE — C1751 CATH, INF, PER/CENT/MIDLINE: HCPCS

## 2022-10-03 PROCEDURE — 71275 CT ANGIOGRAPHY CHEST: CPT

## 2022-10-03 PROCEDURE — 02HV33Z INSERTION OF INFUSION DEVICE INTO SUPERIOR VENA CAVA, PERCUTANEOUS APPROACH: ICD-10-PCS | Performed by: INTERNAL MEDICINE

## 2022-10-03 PROCEDURE — 25010000002 ENOXAPARIN PER 10 MG: Performed by: INTERNAL MEDICINE

## 2022-10-03 PROCEDURE — 74177 CT ABD & PELVIS W/CONTRAST: CPT

## 2022-10-03 PROCEDURE — 80053 COMPREHEN METABOLIC PANEL: CPT | Performed by: EMERGENCY MEDICINE

## 2022-10-03 RX ORDER — LISINOPRIL 10 MG/1
10 TABLET ORAL DAILY
COMMUNITY
End: 2022-10-20 | Stop reason: HOSPADM

## 2022-10-03 RX ORDER — IPRATROPIUM BROMIDE AND ALBUTEROL SULFATE 2.5; .5 MG/3ML; MG/3ML
3 SOLUTION RESPIRATORY (INHALATION) EVERY 6 HOURS SCHEDULED
COMMUNITY
End: 2022-10-03

## 2022-10-03 RX ORDER — LISINOPRIL 10 MG/1
10 TABLET ORAL DAILY
Status: DISCONTINUED | OUTPATIENT
Start: 2022-10-04 | End: 2022-10-11

## 2022-10-03 RX ORDER — SODIUM CHLORIDE 0.9 % (FLUSH) 0.9 %
10 SYRINGE (ML) INJECTION EVERY 12 HOURS SCHEDULED
Status: DISCONTINUED | OUTPATIENT
Start: 2022-10-03 | End: 2022-10-12

## 2022-10-03 RX ORDER — POTASSIUM CHLORIDE 7.45 MG/ML
10 INJECTION INTRAVENOUS
Status: DISCONTINUED | OUTPATIENT
Start: 2022-10-03 | End: 2022-10-14

## 2022-10-03 RX ORDER — TAMSULOSIN HYDROCHLORIDE 0.4 MG/1
0.4 CAPSULE ORAL DAILY
Status: DISCONTINUED | OUTPATIENT
Start: 2022-10-03 | End: 2022-10-20 | Stop reason: HOSPADM

## 2022-10-03 RX ORDER — SODIUM CHLORIDE 0.9 % (FLUSH) 0.9 %
10 SYRINGE (ML) INJECTION AS NEEDED
Status: DISCONTINUED | OUTPATIENT
Start: 2022-10-03 | End: 2022-10-11

## 2022-10-03 RX ORDER — DEXTROSE AND SODIUM CHLORIDE 5; .45 G/100ML; G/100ML
75 INJECTION, SOLUTION INTRAVENOUS CONTINUOUS
Status: DISCONTINUED | OUTPATIENT
Start: 2022-10-03 | End: 2022-10-04

## 2022-10-03 RX ORDER — MONTELUKAST SODIUM 10 MG/1
10 TABLET ORAL NIGHTLY
COMMUNITY
End: 2022-10-20 | Stop reason: HOSPADM

## 2022-10-03 RX ORDER — ACETAMINOPHEN 325 MG/1
TABLET ORAL EVERY 6 HOURS
COMMUNITY
End: 2022-10-03

## 2022-10-03 RX ORDER — ALLOPURINOL 100 MG/1
200 TABLET ORAL 3 TIMES DAILY
Status: DISCONTINUED | OUTPATIENT
Start: 2022-10-03 | End: 2022-10-11

## 2022-10-03 RX ORDER — NAPROXEN 250 MG/1
TABLET ORAL EVERY 12 HOURS SCHEDULED
COMMUNITY
End: 2022-10-03

## 2022-10-03 RX ORDER — SODIUM CHLORIDE 0.9 % (FLUSH) 0.9 %
10 SYRINGE (ML) INJECTION AS NEEDED
Status: DISCONTINUED | OUTPATIENT
Start: 2022-10-03 | End: 2022-10-17

## 2022-10-03 RX ORDER — LEVOFLOXACIN 750 MG/1
TABLET ORAL EVERY 24 HOURS
COMMUNITY
End: 2022-10-03

## 2022-10-03 RX ORDER — MONTELUKAST SODIUM 10 MG/1
10 TABLET ORAL NIGHTLY
Status: DISCONTINUED | OUTPATIENT
Start: 2022-10-03 | End: 2022-10-11

## 2022-10-03 RX ORDER — BISACODYL 10 MG
10 SUPPOSITORY, RECTAL RECTAL DAILY PRN
Status: DISCONTINUED | OUTPATIENT
Start: 2022-10-03 | End: 2022-10-20 | Stop reason: HOSPADM

## 2022-10-03 RX ORDER — NYSTATIN 100000 U/G
OINTMENT TOPICAL
COMMUNITY
Start: 2022-09-28 | End: 2022-10-03

## 2022-10-03 RX ORDER — ROSUVASTATIN CALCIUM 10 MG/1
10 TABLET, COATED ORAL DAILY
Status: DISCONTINUED | OUTPATIENT
Start: 2022-10-04 | End: 2022-10-11

## 2022-10-03 RX ORDER — AMOXICILLIN 875 MG/1
TABLET, COATED ORAL
COMMUNITY
Start: 2022-06-29 | End: 2022-10-03

## 2022-10-03 RX ORDER — FLUTICASONE PROPIONATE 50 MCG
1 SPRAY, SUSPENSION (ML) NASAL DAILY
COMMUNITY

## 2022-10-03 RX ORDER — BISACODYL 5 MG/1
5 TABLET, DELAYED RELEASE ORAL DAILY PRN
Status: DISCONTINUED | OUTPATIENT
Start: 2022-10-03 | End: 2022-10-11

## 2022-10-03 RX ORDER — SODIUM CHLORIDE 0.9 % (FLUSH) 0.9 %
10 SYRINGE (ML) INJECTION EVERY 12 HOURS SCHEDULED
Status: DISCONTINUED | OUTPATIENT
Start: 2022-10-03 | End: 2022-10-17

## 2022-10-03 RX ORDER — POTASSIUM CHLORIDE 750 MG/1
40 CAPSULE, EXTENDED RELEASE ORAL AS NEEDED
Status: DISCONTINUED | OUTPATIENT
Start: 2022-10-03 | End: 2022-10-20 | Stop reason: HOSPADM

## 2022-10-03 RX ORDER — AMOXICILLIN 250 MG
2 CAPSULE ORAL 2 TIMES DAILY
Status: DISCONTINUED | OUTPATIENT
Start: 2022-10-03 | End: 2022-10-11

## 2022-10-03 RX ORDER — ONDANSETRON 8 MG/1
TABLET, ORALLY DISINTEGRATING ORAL
COMMUNITY
Start: 2022-09-28 | End: 2022-10-03

## 2022-10-03 RX ORDER — ACETAMINOPHEN 325 MG/1
650 TABLET ORAL EVERY 6 HOURS PRN
Status: DISCONTINUED | OUTPATIENT
Start: 2022-10-03 | End: 2022-10-20 | Stop reason: HOSPADM

## 2022-10-03 RX ORDER — POTASSIUM CHLORIDE 1.5 G/1.77G
40 POWDER, FOR SOLUTION ORAL AS NEEDED
Status: DISCONTINUED | OUTPATIENT
Start: 2022-10-03 | End: 2022-10-12

## 2022-10-03 RX ORDER — POLYETHYLENE GLYCOL 3350 17 G/17G
17 POWDER, FOR SOLUTION ORAL DAILY PRN
Status: DISCONTINUED | OUTPATIENT
Start: 2022-10-03 | End: 2022-10-11

## 2022-10-03 RX ORDER — ONDANSETRON 2 MG/ML
4 INJECTION INTRAMUSCULAR; INTRAVENOUS EVERY 6 HOURS PRN
Status: DISCONTINUED | OUTPATIENT
Start: 2022-10-03 | End: 2022-10-20 | Stop reason: HOSPADM

## 2022-10-03 RX ORDER — ENOXAPARIN SODIUM 100 MG/ML
40 INJECTION SUBCUTANEOUS DAILY
Status: DISCONTINUED | OUTPATIENT
Start: 2022-10-03 | End: 2022-10-09

## 2022-10-03 RX ADMIN — ALLOPURINOL 200 MG: 100 TABLET ORAL at 19:58

## 2022-10-03 RX ADMIN — TAMSULOSIN HYDROCHLORIDE 0.4 MG: 0.4 CAPSULE ORAL at 18:31

## 2022-10-03 RX ADMIN — MONTELUKAST 10 MG: 10 TABLET, FILM COATED ORAL at 19:58

## 2022-10-03 RX ADMIN — POTASSIUM CHLORIDE 40 MEQ: 750 CAPSULE, EXTENDED RELEASE ORAL at 17:45

## 2022-10-03 RX ADMIN — ACETAMINOPHEN 650 MG: 325 TABLET, FILM COATED ORAL at 13:37

## 2022-10-03 RX ADMIN — TAZOBACTAM SODIUM AND PIPERACILLIN SODIUM 3.38 G: 375; 3 INJECTION, SOLUTION INTRAVENOUS at 10:51

## 2022-10-03 RX ADMIN — DEXTROSE AND SODIUM CHLORIDE 75 ML/HR: 5; 450 INJECTION, SOLUTION INTRAVENOUS at 17:33

## 2022-10-03 RX ADMIN — ALLOPURINOL 200 MG: 100 TABLET ORAL at 16:15

## 2022-10-03 RX ADMIN — DEXTROSE AND SODIUM CHLORIDE 75 ML/HR: 5; 450 INJECTION, SOLUTION INTRAVENOUS at 13:37

## 2022-10-03 RX ADMIN — TAZOBACTAM SODIUM AND PIPERACILLIN SODIUM 3.38 G: 375; 3 INJECTION, SOLUTION INTRAVENOUS at 23:40

## 2022-10-03 RX ADMIN — ACETAMINOPHEN 650 MG: 325 TABLET, FILM COATED ORAL at 23:40

## 2022-10-03 RX ADMIN — IOPAMIDOL 80 ML: 755 INJECTION, SOLUTION INTRAVENOUS at 08:39

## 2022-10-03 RX ADMIN — TAZOBACTAM SODIUM AND PIPERACILLIN SODIUM 3.38 G: 375; 3 INJECTION, SOLUTION INTRAVENOUS at 17:34

## 2022-10-03 RX ADMIN — Medication 10 ML: at 19:58

## 2022-10-03 RX ADMIN — ENOXAPARIN SODIUM 40 MG: 40 INJECTION SUBCUTANEOUS at 13:37

## 2022-10-03 RX ADMIN — POTASSIUM CHLORIDE 40 MEQ: 750 CAPSULE, EXTENDED RELEASE ORAL at 13:37

## 2022-10-03 NOTE — CONSULTS
DOM MADISON PICC placed by CAMRYN Ramirez Rn A-BC.  Xray ordered for confirmation.  37cm total length, 0cm exposed

## 2022-10-03 NOTE — CONSULTS
[facility] Consult Note     Date of Consult: 10/03/2022     Patient Name: Abraham Wu  MRN: [id number]   : 1950     Referring Provider: * No referring provider recorded for this case *    Care Team: Patient Care Team:  Jatinder Haynes MD as PCP - General (Family Medicine)     Reason for Hospitalization: Fever     History of Present Illness: Was contacted for hospital consultation on Abraham Wu a 72 y.o. male who presents to the hospital for fever.  He reports his fever was high as 102 and came into the ER.  He reports he does not have a urologist and has never seen a urologist before.  He recently underwent a colostomy with the colorectal service on .  This was done for a large perirectal mass causing obstruction.    He denies any flank pain or discomfort.  He reports that his abdominal pain has been improving since surgery.  He states that his stream is somewhat weaker but is not having any difficulty voiding.  He denies any frequency.  He does report urgency and states that when he needs to go he needs to go immediately.  This has been going on for quite some time.  He does report nocturia as well.  He states he has never been on any medication for his prostate.       I reviewed his CT scan from 22 as well as his scan from 10/3/22.  He does have a slight increase in the hydronephrosis bilaterally.  However, he has good uptake of contrast and has symmetric nephrograms.    His urinalysis had 31-50 RBCs and 6-12 white blood cells.  However, this was a dirty specimen with 7-12 squamous cells.  Nitrite negative     Subjective      Pertinent items are noted in HPI.     Past Medical History:   Past Medical History:   Diagnosis Date   • benign polypoid tissue right lung    • Hyperlipidemia    • Hypertension    • Mycobacterium mucogenicum    • SHERRI (obstructive sleep apnea)    • Seasonal allergies        Past Surgical History:   Past Surgical History:   Procedure Laterality  Date   • BRONCHOSCOPY      removal of obstructing polypoid tissue right middle lung   • COLOSTOMY N/A 9/22/2022    Procedure: LAPAROSCOPIC COLOSTOMY CREATION, FLEXIBLE SIGMOIDOSCOPY;  Surgeon: Hiram Viveros MD;  Location: [facility];  Service: General;  Laterality: N/A;       Family History:   Family History   Problem Relation Age of Onset   • Diabetes Mother    • Diabetes Father    • Heart disease Father        Social History:   Social History     Socioeconomic History   • Marital status:    Tobacco Use   • Smoking status: Never Smoker   • Smokeless tobacco: Never Used   Substance and Sexual Activity   • Alcohol use: Not Currently     Comment: previously drank 2 glasses of wine/beer nightly   • Drug use: Never       Medications:     Current Facility-Administered Medications:   •  acetaminophen (TYLENOL) tablet 650 mg, 650 mg, Oral, Q6H PRN, Lorri Lauren MD, 650 mg at 10/03/22 1337  •  allopurinol (ZYLOPRIM) tablet 200 mg, 200 mg, Oral, TID, River Odom MD, 200 mg at 10/03/22 1615  •  sennosides-docusate (PERICOLACE) 8.6-50 MG per tablet 2 tablet, 2 tablet, Oral, BID **AND** polyethylene glycol (MIRALAX) packet 17 g, 17 g, Oral, Daily PRN **AND** bisacodyl (DULCOLAX) EC tablet 5 mg, 5 mg, Oral, Daily PRN **AND** bisacodyl (DULCOLAX) suppository 10 mg, 10 mg, Rectal, Daily PRN, Lorri Lauren MD  •  dextrose 5 % and sodium chloride 0.45 % infusion, 75 mL/hr, Intravenous, Continuous, Lorri Lauren MD, Last Rate: 75 mL/hr at 10/03/22 1733, 75 mL/hr at 10/03/22 1733  •  Enoxaparin Sodium (LOVENOX) syringe 40 mg, 40 mg, Subcutaneous, Daily, Lorri Lauren MD, 40 mg at 10/03/22 1337  •  [START ON 10/4/2022] lisinopril (PRINIVIL,ZESTRIL) tablet 10 mg, 10 mg, Oral, Daily, Lorri Lauren MD  •  montelukast (SINGULAIR) tablet 10 mg, 10 mg, Oral, Nightly, Lorri Lauren MD  •  ondansetron (ZOFRAN) injection 4 mg, 4 mg, Intravenous, Q6H PRN, Lorri Lauren MD  •  piperacillin-tazobactam (ZOSYN)  3.375 g in iso-osmotic dextrose 50 ml (premix), 3.375 g, Intravenous, Q8H, Lorri Lauren MD, 3.375 g at 10/03/22 1734  •  potassium chloride (MICRO-K) CR capsule 40 mEq, 40 mEq, Oral, PRN, 40 mEq at 10/03/22 1337 **OR** potassium chloride (KLOR-CON) packet 40 mEq, 40 mEq, Oral, PRN **OR** potassium chloride 10 mEq in 100 mL IVPB, 10 mEq, Intravenous, Q1H PRN, Lorri Lauren MD  •  [START ON 10/4/2022] rosuvastatin (CRESTOR) tablet 10 mg, 10 mg, Oral, Daily, Lorri Lauren MD  •  sodium chloride 0.9 % flush 10 mL, 10 mL, Intravenous, PRN, Edouard Hernández, DO  •  sodium chloride 0.9 % flush 10 mL, 10 mL, Intravenous, Q12H, Lorri Lauren MD  •  sodium chloride 0.9 % flush 10 mL, 10 mL, Intravenous, PRN, Lorri Lauren MD  •  sodium chloride 0.9 % flush 10 mL, 10 mL, Intravenous, Q12H, River Odom MD  •  sodium chloride 0.9 % flush 10 mL, 10 mL, Intravenous, PRN, River Odom MD    Allergies:   No Known Allergies    Problem:   Patient Active Problem List   Diagnosis   • Rectal mass   • Anemia   • Essential hypertension   • Dyslipidemia   • Unintentional weight loss   • Hydronephrosis   • Febrile illness   • Hypernatremia   • Hypokalemia       Review of Systems:     12 point review of system was performed and is negative except above.      Objective     Physical Exam:  Vital Signs:   Temp:  [99.1 °F (37.3 °C)-101.7 °F (38.7 °C)] 99.1 °F (37.3 °C)  Heart Rate:  [64-88] 64  Resp:  [18] 18  BP: (137-149)/(72-88) 137/88  78 kg (172 lb)  Body mass index is 29.52 kg/m².     General: well appearing male in no acute distress  HEENT: sclerae anicteric, oropharynx clear  Lymphatics: no cervical, supraclavicular, inguinal, or axillary adenopathy  Neck: Supple. No thyromegaly.  Cardiovascular: regular rate and rhythm, no murmurs  Lungs: clear to auscultation bilaterally. No respiratory distress  Abdomen: soft, nontender, nondistended.  No palpable organomegaly  Extremities: no lower extremity edema, cyanosis, or  clubbing  Skin: no rashes, lesions, bruising, or petechiae  Neuro: Alert and oriented x3. Moves all extremities.        No intake/output data recorded.    Labs  Lab Results   Component Value Date    GLUCOSE 106 (H) 10/03/2022    CALCIUM 8.6 10/03/2022     (H) 10/03/2022    K 3.4 (L) 10/03/2022    CO2 28.0 10/03/2022     (H) 10/03/2022    BUN 8 10/03/2022    CREATININE 0.50 (L) 10/03/2022    BCR 16.0 10/03/2022    ANIONGAP 17.0 (H) 10/03/2022       Lab Results   Component Value Date    WBC 10.79 10/03/2022    HGB 10.6 (L) 10/03/2022    HCT 32.7 (L) 10/03/2022    MCV 83.4 10/03/2022     10/03/2022       No results found for: URINECX    Brief Urine Lab Results  (Last result in the past 365 days)      Color   Clarity   Blood   Leuk Est   Nitrite   Protein   CREAT   Urine HCG        10/03/22 0734 Dark Yellow   Clear   Moderate (2+)   Trace   Negative   100 mg/dL (2+)                 Radiographic Studies  CT Angiogram Chest With & Without Contrast    Result Date: 9/21/2022  1. No evidence of pulmonary embolism. 2. No evidence of thoracic aortic aneurysm or dissection. 3. Left upper quadrant mass likely related to the left kidney which was better evaluated on the CT abdomen and pelvis study. 4. Retroperitoneal adenopathy compatible with metastatic disease was also better evaluated on the CT abdomen and pelvis. 5. Small right pleural effusion. This was also better evaluated on the CT abdomen and pelvis due to the portal venous phase of imaging on this study. There did appear to be some mild soft tissue areas of enhancing nodularity as this still raises the suspicion of a malignant effusion. Electronically signed by:  Deonte Dash D.O.  9/21/2022 1:00 AM Mountain Time    CT Abdomen Pelvis With Contrast    Result Date: 10/3/2022  1. No evidence of pulmonary embolic disease. 2. Small but increasing right pleural effusion compared to 9/21/2022. 3. Stable small area of linear scarring in the right middle  lobe. 4. Mild ascending aortic ectasia to approximately 4.0 cm again incidentally noted.    CT SCAN OF THE ABDOMEN AND PELVIS WITH IV CONTRAST: Numerous hepatic metastases are again noted. There is extensive retroperitoneal lymphadenopathy, and an approximately 5.4 cm left upper quadrant mass involving the left lateral renal parenchyma. Volume and shape of the exophytic mass favors that this is invasive to the kidney, rather than originating from kidney. There are multiple small splenic lesions consistent with metastases. Adrenal glands appear to be spared. Gallbladder appears normal. Pancreas appears to be normal, although the tip of the pancreatic tail is immediately adjacent the left upper quadrant mass.  No upper abdominal free air is seen. There is a small amount of ascites. Diffusely increased bowel gas suggests a low level ileus. Left mid abdominal ostomy site is clear. Large pelvic mass is again noted, which appears to extend into the small bowel mesentery, although these may be adjacent but separate nodes. Bladder is normally distended and normal in appearance. There is relatively little intrapelvic free fluid.  Delayed venous phase images show contrast within the renal collecting systems, which appear minimally more dilated than on the prior study. Distal ureters may be mildly compressed by the large pelvic mass. The bladder itself is displaced anteriorly and superiorly, but normally distended and grossly normal in appearance. No evidence of a discrete, drainable inflammatory collection is appreciated. Bony structures appear to be intact.   IMPRESSION:  1. Expected changes of recent colostomy placement. No visible associated inflammation. 2. Advanced metastatic disease, including numerous liver lesions, extensive retrocrural lymphadenopathy, multiple splenic lesions, left upper quadrant mass that that involves the lateral margin of the left kidney. Large pelvic mass and extensive infiltration of the lower  "small bowel mesentery, whether by direct extension of tumor, or multiple irregularly enlarged adjacent lymph nodes. 3. Bilateral hydronephrosis which appears increased from 9/20/2022, but no evidence of high-grade obstructive uropathy. 4. Mildly increased ascites. No evidence of drainable inflammatory collection. 5. \"Gassy\" appearance of nondependent large and small bowel, suggesting a low level ileus  This report was finalized on 10/3/2022 9:14 AM by Dr. Del Wilks MD.      CT Abdomen Pelvis With Contrast    Result Date: 9/20/2022   1. Large rectal mass measuring 12.8 x 10.6 x 8.1 cm concerning for rectal adenocarcinoma. There is extension beyond the rectal wall and there is conglomerate adjacent lymphadenopathy. There is also bulky retroperitoneal lymphadenopathy and there are areas suspicious for left renal and liver metastasis. 2. There are lobulated areas of right basilar intrathoracic pleural thickening with small adjacent effusion concerning for pleural metastasis with malignant effusion. 3. Coronary artery disease, chronic bilateral L5 spondylolysis with spondylolisthesis and moderate spinal degenerative changes.  This report was finalized on 9/20/2022 8:33 PM by Ashutosh Banks MD.      XR Chest 1 View    Result Date: 10/3/2022   1. The tip of the right upper extremity PICC line terminates in the superior vena cava. 2. No active pulmonary disease. 3. Cardiomegaly.  This report was finalized on 10/3/2022 4:09 PM by Preet Kline MD.      CT Angiogram Chest    Result Date: 10/3/2022  1. No evidence of pulmonary embolic disease. 2. Small but increasing right pleural effusion compared to 9/21/2022. 3. Stable small area of linear scarring in the right middle lobe. 4. Mild ascending aortic ectasia to approximately 4.0 cm again incidentally noted.    CT SCAN OF THE ABDOMEN AND PELVIS WITH IV CONTRAST: Numerous hepatic metastases are again noted. There is extensive retroperitoneal lymphadenopathy, and an approximately " 5.4 cm left upper quadrant mass involving the left lateral renal parenchyma. Volume and shape of the exophytic mass favors that this is invasive to the kidney, rather than originating from kidney. There are multiple small splenic lesions consistent with metastases. Adrenal glands appear to be spared. Gallbladder appears normal. Pancreas appears to be normal, although the tip of the pancreatic tail is immediately adjacent the left upper quadrant mass.  No upper abdominal free air is seen. There is a small amount of ascites. Diffusely increased bowel gas suggests a low level ileus. Left mid abdominal ostomy site is clear. Large pelvic mass is again noted, which appears to extend into the small bowel mesentery, although these may be adjacent but separate nodes. Bladder is normally distended and normal in appearance. There is relatively little intrapelvic free fluid.  Delayed venous phase images show contrast within the renal collecting systems, which appear minimally more dilated than on the prior study. Distal ureters may be mildly compressed by the large pelvic mass. The bladder itself is displaced anteriorly and superiorly, but normally distended and grossly normal in appearance. No evidence of a discrete, drainable inflammatory collection is appreciated. Bony structures appear to be intact.   IMPRESSION:  1. Expected changes of recent colostomy placement. No visible associated inflammation. 2. Advanced metastatic disease, including numerous liver lesions, extensive retrocrural lymphadenopathy, multiple splenic lesions, left upper quadrant mass that that involves the lateral margin of the left kidney. Large pelvic mass and extensive infiltration of the lower small bowel mesentery, whether by direct extension of tumor, or multiple irregularly enlarged adjacent lymph nodes. 3. Bilateral hydronephrosis which appears increased from 9/20/2022, but no evidence of high-grade obstructive uropathy. 4. Mildly increased  "ascites. No evidence of drainable inflammatory collection. 5. \"Gassy\" appearance of nondependent large and small bowel, suggesting a low level ileus  This report was finalized on 10/3/2022 9:14 AM by Dr. Del Wilks MD.        Results Review:   I reviewed the patient's new clinical results.     Imaging Results (Last 72 Hours)     Procedure Component Value Units Date/Time    XR Chest 1 View [[id number]] Collected: 10/03/22 1608     Updated: 10/03/22 1612    Narrative:      DATE OF EXAM:  10/3/2022 3:51 PM     PROCEDURE:  XR CHEST 1 VW-     INDICATIONS:  Verify PICC placement      COMPARISON:  03/21/2005.     TECHNIQUE:   Single radiographic view of the chest was obtained.     FINDINGS:  The tip of the right upper extremity PICC line terminates in the  superior vena cava. The heart is enlarged. The pulmonary vascular  markings are normal. The lungs and pleural spaces are clear.  There are  chronic age-related changes involving the bony thorax and thoracic  aorta.       Impression:         1. The tip of the right upper extremity PICC line terminates in the  superior vena cava.  2. No active pulmonary disease.  3. Cardiomegaly.     This report was finalized on 10/3/2022 4:09 PM by Preet Kline MD.       CT Angiogram Chest [[id number]] Collected: 10/03/22 0849     Updated: 10/03/22 0917    Narrative:      DATE OF EXAM: 10/3/2022 8:20 AM     PROCEDURE: CT ANGIOGRAM CHEST-, CT ABDOMEN PELVIS W CONTRAST-     INDICATIONS: fever, unknown etiology, recent surgery, pleural effusions     COMPARISON: 9/21/2022 angiographic chest CT scan 9/20/2022 abdomen  pelvis CT scan     TECHNIQUE: Contiguous axial imaging was obtained from the thoracic inlet  through the chest abdomen and pelvis following the intravenous  administration of 80 mL of Isovue 370. Reconstructed coronal and  sagittal images were also obtained. Automated exposure control and  iterative reconstruction methods were used.     The radiation dose reduction device was turned " on for each scan per the  ALARA (As Low as Reasonably Achievable) protocol.     FINDINGS: Patient history indicates fever of unknown etiology, recent  colon surgery. The prior exams from 9/20/2022 and 9/21/2022 by report  showed a large rectal mass, extensive adenopathy, concern for renal and  hepatic metastatic disease. Small right pleural effusion.     ANGIOGRAPHIC CT SCAN OF THE CHEST: There is good contrast opacification  of the pulmonary arteries. No evidence of pulmonary embolic disease is  seen. Thoracic aorta is mildly ectatic to approximately 4.0 cm. There is  no evidence of dissection or focal aneurysm. There is moderately  extensive coronary artery calcification. No pericardial effusion or  mediastinal adenopathy is seen. No axillary or lower cervical  lymphadenopathy is appreciated. There is a relatively small but  increasing free-flowing right pleural effusion, and no left effusion.  The lungs appear clear except for mild discoid atelectasis associated  with the right pleural effusion, and focal peribronchial thickening  scarring and mild bronchiectasis of the posterior right middle lobe,  unchanged from prior study. Bony structures appear to be intact.       Impression:      1. No evidence of pulmonary embolic disease.  2. Small but increasing right pleural effusion compared to 9/21/2022.  3. Stable small area of linear scarring in the right middle lobe.  4. Mild ascending aortic ectasia to approximately 4.0 cm again  incidentally noted.           CT SCAN OF THE ABDOMEN AND PELVIS WITH IV CONTRAST: Numerous hepatic  metastases are again noted. There is extensive retroperitoneal  lymphadenopathy, and an approximately 5.4 cm left upper quadrant mass  involving the left lateral renal parenchyma. Volume and shape of the  exophytic mass favors that this is invasive to the kidney, rather than  originating from kidney. There are multiple small splenic lesions  consistent with metastases. Adrenal glands appear  "to be spared.  Gallbladder appears normal. Pancreas appears to be normal, although the  tip of the pancreatic tail is immediately adjacent the left upper  quadrant mass.     No upper abdominal free air is seen. There is a small amount of ascites.  Diffusely increased bowel gas suggests a low level ileus. Left mid  abdominal ostomy site is clear. Large pelvic mass is again noted, which  appears to extend into the small bowel mesentery, although these may be  adjacent but separate nodes. Bladder is normally distended and normal in  appearance. There is relatively little intrapelvic free fluid.     Delayed venous phase images show contrast within the renal collecting  systems, which appear minimally more dilated than on the prior study.  Distal ureters may be mildly compressed by the large pelvic mass. The  bladder itself is displaced anteriorly and superiorly, but normally  distended and grossly normal in appearance. No evidence of a discrete,  drainable inflammatory collection is appreciated. Bony structures appear  to be intact.        IMPRESSION:     1. Expected changes of recent colostomy placement. No visible associated  inflammation.  2. Advanced metastatic disease, including numerous liver lesions,  extensive retrocrural lymphadenopathy, multiple splenic lesions, left  upper quadrant mass that that involves the lateral margin of the left  kidney. Large pelvic mass and extensive infiltration of the lower small  bowel mesentery, whether by direct extension of tumor, or multiple  irregularly enlarged adjacent lymph nodes.  3. Bilateral hydronephrosis which appears increased from 9/20/2022, but  no evidence of high-grade obstructive uropathy.  4. Mildly increased ascites. No evidence of drainable inflammatory  collection.  5. \"Gassy\" appearance of nondependent large and small bowel, suggesting  a low level ileus     This report was finalized on 10/3/2022 9:14 AM by Dr. Del Wilks MD.       CT Abdomen Pelvis With " Contrast [[id number]] Collected: 10/03/22 0849     Updated: 10/03/22 0917    Narrative:      DATE OF EXAM: 10/3/2022 8:20 AM     PROCEDURE: CT ANGIOGRAM CHEST-, CT ABDOMEN PELVIS W CONTRAST-     INDICATIONS: fever, unknown etiology, recent surgery, pleural effusions     COMPARISON: 9/21/2022 angiographic chest CT scan 9/20/2022 abdomen  pelvis CT scan     TECHNIQUE: Contiguous axial imaging was obtained from the thoracic inlet  through the chest abdomen and pelvis following the intravenous  administration of 80 mL of Isovue 370. Reconstructed coronal and  sagittal images were also obtained. Automated exposure control and  iterative reconstruction methods were used.     The radiation dose reduction device was turned on for each scan per the  ALARA (As Low as Reasonably Achievable) protocol.     FINDINGS: Patient history indicates fever of unknown etiology, recent  colon surgery. The prior exams from 9/20/2022 and 9/21/2022 by report  showed a large rectal mass, extensive adenopathy, concern for renal and  hepatic metastatic disease. Small right pleural effusion.     ANGIOGRAPHIC CT SCAN OF THE CHEST: There is good contrast opacification  of the pulmonary arteries. No evidence of pulmonary embolic disease is  seen. Thoracic aorta is mildly ectatic to approximately 4.0 cm. There is  no evidence of dissection or focal aneurysm. There is moderately  extensive coronary artery calcification. No pericardial effusion or  mediastinal adenopathy is seen. No axillary or lower cervical  lymphadenopathy is appreciated. There is a relatively small but  increasing free-flowing right pleural effusion, and no left effusion.  The lungs appear clear except for mild discoid atelectasis associated  with the right pleural effusion, and focal peribronchial thickening  scarring and mild bronchiectasis of the posterior right middle lobe,  unchanged from prior study. Bony structures appear to be intact.       Impression:      1. No evidence of  pulmonary embolic disease.  2. Small but increasing right pleural effusion compared to 9/21/2022.  3. Stable small area of linear scarring in the right middle lobe.  4. Mild ascending aortic ectasia to approximately 4.0 cm again  incidentally noted.           CT SCAN OF THE ABDOMEN AND PELVIS WITH IV CONTRAST: Numerous hepatic  metastases are again noted. There is extensive retroperitoneal  lymphadenopathy, and an approximately 5.4 cm left upper quadrant mass  involving the left lateral renal parenchyma. Volume and shape of the  exophytic mass favors that this is invasive to the kidney, rather than  originating from kidney. There are multiple small splenic lesions  consistent with metastases. Adrenal glands appear to be spared.  Gallbladder appears normal. Pancreas appears to be normal, although the  tip of the pancreatic tail is immediately adjacent the left upper  quadrant mass.     No upper abdominal free air is seen. There is a small amount of ascites.  Diffusely increased bowel gas suggests a low level ileus. Left mid  abdominal ostomy site is clear. Large pelvic mass is again noted, which  appears to extend into the small bowel mesentery, although these may be  adjacent but separate nodes. Bladder is normally distended and normal in  appearance. There is relatively little intrapelvic free fluid.     Delayed venous phase images show contrast within the renal collecting  systems, which appear minimally more dilated than on the prior study.  Distal ureters may be mildly compressed by the large pelvic mass. The  bladder itself is displaced anteriorly and superiorly, but normally  distended and grossly normal in appearance. No evidence of a discrete,  drainable inflammatory collection is appreciated. Bony structures appear  to be intact.        IMPRESSION:     1. Expected changes of recent colostomy placement. No visible associated  inflammation.  2. Advanced metastatic disease, including numerous liver  "lesions,  extensive retrocrural lymphadenopathy, multiple splenic lesions, left  upper quadrant mass that that involves the lateral margin of the left  kidney. Large pelvic mass and extensive infiltration of the lower small  bowel mesentery, whether by direct extension of tumor, or multiple  irregularly enlarged adjacent lymph nodes.  3. Bilateral hydronephrosis which appears increased from 9/20/2022, but  no evidence of high-grade obstructive uropathy.  4. Mildly increased ascites. No evidence of drainable inflammatory  collection.  5. \"Gassy\" appearance of nondependent large and small bowel, suggesting  a low level ileus     This report was finalized on 10/3/2022 9:14 AM by Dr. Del Wilks MD.             Assessment / Plan      Assessment/Plan:  Mr. Wu is a 73 yo gentleman who is admitted with fever.  He does have mild hydronephrosis bilaterally, however, his bladder is empty on CT scan.  He also does not have any flank pain and his GFR and Cr is stable from previous.  At this time I do not believe he requires any urologic surgical intervention.  There are no diagnoses linked to this encounter.     1.  No stenting required at this time.  Should his kidney function diminish or he start having significant flank pain we can revisit stenting.  2.  Cont antibiotic coverage.   3.  We will send a urine culture although at this point he has received antibiotics and it will likely be negative.  (ORDERED)  4.  Cont to monitor renal function  5.  Will start tamsulosin for his mild lower urinary tract symptoms.  This may also help his hydronephrosis. (ORDERED)    I discussed the patients findings and my recommendations with the patient.     Time: Total face-to-face/floor time. 60 min    Bernadine Almodovar MD   10/03/22  17:44 EDT  "

## 2022-10-03 NOTE — CASE MANAGEMENT/SOCIAL WORK
Discharge Planning Assessment  [facility]     Patient Name: Abraham Wu  MRN: [id number]  Today's Date: 10/3/2022    Admit Date: 10/3/2022    Plan: initial   Discharge Needs Assessment     Row Name 10/03/22 1325       Living Environment    People in Home spouse    Current Living Arrangements home    Primary Care Provided by self    Provides Primary Care For no one    Family Caregiver if Needed spouse    Quality of Family Relationships involved;helpful;supportive    Able to Return to Prior Arrangements yes       Transition Planning    Patient/Family Anticipates Transition to home with family       Discharge Needs Assessment    Readmission Within the Last 30 Days other (see comments)  possible complication of disease process.    Equipment Currently Used at Home cpap    Outpatient/Agency/Support Group Needs homecare agency    Discharge Facility/Level of Care Needs home with home health               Discharge Plan     Row Name 10/03/22 1326       Plan    Plan initial    Patient/Family in Agreement with Plan yes    Plan Comments Mr. Wu lives with his spouse in [facility].  He has been independent with ADL's and uses bipap at night.  He is not current with home health or PT  Was just recently discharged after surgery. Plan pending patients progress. CM following for discharge planning.    Final Discharge Disposition Code 01 - home or self-care              Continued Care and Services - Admitted Since 10/3/2022    Coordination has not been started for this encounter.          Demographic Summary     Row Name 10/03/22 1328       General Information    Reason for Consult discharge planning    Preferred Language English    General Information Comments PCP - Jatinder Haynes               Functional Status     Row Name 10/03/22 1328       Functional Status    Usual Activity Tolerance good    Current Activity Tolerance moderate       Functional Status, IADL    Medications independent    Meal Preparation independent     Housekeeping independent    Laundry independent    Shopping independent               Psychosocial    No documentation.                Abuse/Neglect    No documentation.                Legal    No documentation.                Substance Abuse    No documentation.                Patient Forms    No documentation.                   Bette Ca RN     alert

## 2022-10-03 NOTE — TELEPHONE ENCOUNTER
Caller: MALVIN    Relationship to patient: HOSPITAL PROVIDER    Best call back number: [phone]    Patient is needing: MALVIN WAS CALLING ON BEHALF OF DR JUSTIN WHO WAS WANTING TO DO A CONSULT. HUB UNABLE TO TRANSFER CALL TO OFFICE.    PLEASE GIVE MALVIN A CALL BACK FOR THE CONSULT.

## 2022-10-03 NOTE — ED PROVIDER NOTES
Subjective   History of Present Illness  Patient is a very pleasant 72-year-old male who presents with a fever.  Wife states that yesterday evening he felt warm and had a temperature of 102.  Took Tylenol last night that improved the fever but again this morning they rechecked the temperature and it is again above 102 degrees, prompting his visit to the emergency department.  Surprisingly, he has not had any infectious symptoms.  He denies upper respiratory congestion, cough, shortness of breath, abdominal pain, nausea, vomiting, or other acute complaints.    Who presents status post diagnosis of unspecified perirectal cancer/mass and colostomy creation by the colorectal service on 9/22/22.  procedure went well with no complications.  He had experienced fevers and weight loss prior to his recent admission he was started on antibiotics for enterotoxic E. coli and completed that course.  Normal colostomy output.  No infectious symptoms.        Review of Systems   All other systems reviewed and are negative.      Past Medical History:   Diagnosis Date   • benign polypoid tissue right lung    • Hyperlipidemia    • Hypertension    • Mycobacterium mucogenicum    • SHERRI (obstructive sleep apnea)    • Seasonal allergies        No Known Allergies    Past Surgical History:   Procedure Laterality Date   • BRONCHOSCOPY      removal of obstructing polypoid tissue right middle lung   • COLOSTOMY N/A 9/22/2022    Procedure: LAPAROSCOPIC COLOSTOMY CREATION, FLEXIBLE SIGMOIDOSCOPY;  Surgeon: Hiram Viveros MD;  Location: [facility];  Service: General;  Laterality: N/A;       Family History   Problem Relation Age of Onset   • Diabetes Mother    • Diabetes Father    • Heart disease Father        Social History     Socioeconomic History   • Marital status:    Tobacco Use   • Smoking status: Never Smoker   • Smokeless tobacco: Never Used   Substance and Sexual Activity   • Alcohol use: Not Currently     Comment: previously  drank 2 glasses of wine/beer nightly   • Drug use: Never           Objective   Physical Exam  Vitals and nursing note reviewed.   Constitutional:       Appearance: Normal appearance.   HENT:      Head: Normocephalic and atraumatic.      Mouth/Throat:      Mouth: Mucous membranes are moist.   Eyes:      Extraocular Movements: Extraocular movements intact.      Conjunctiva/sclera: Conjunctivae normal.      Pupils: Pupils are equal, round, and reactive to light.   Cardiovascular:      Rate and Rhythm: Normal rate and regular rhythm.      Pulses: Normal pulses.      Heart sounds: Normal heart sounds. No murmur heard.    No friction rub. No gallop.   Pulmonary:      Effort: Pulmonary effort is normal. No respiratory distress.      Breath sounds: Normal breath sounds. No wheezing or rhonchi.   Abdominal:      General: Abdomen is flat. Bowel sounds are normal. There is no distension.      Palpations: Abdomen is soft.      Tenderness: There is no abdominal tenderness. There is no guarding or rebound.      Comments: Colostomy in place.  Normal appearing stool output.   Musculoskeletal:         General: No swelling, deformity or signs of injury. Normal range of motion.      Cervical back: Normal range of motion. No rigidity.   Skin:     General: Skin is warm and dry.      Capillary Refill: Capillary refill takes less than 2 seconds.      Coloration: Skin is not jaundiced.      Findings: No bruising or erythema.   Neurological:      General: No focal deficit present.      Mental Status: He is alert and oriented to person, place, and time. Mental status is at baseline.   Psychiatric:         Mood and Affect: Mood normal.         Behavior: Behavior normal.         Thought Content: Thought content normal.         Procedures           ED Course      Recent Results (from the past 24 hour(s))   Adult Transthoracic Echo Complete W/ Cont if Necessary Per Protocol    Collection Time: 10/03/22  5:01 PM   Result Value Ref Range    Target  HR (85%) 128 bpm    Max. Pred. HR (100%) 150 bpm    BH CV VAS BP LEFT /88 mmHg    RV S' 24.1 cm/sec    RV Base 4.3 cm    RV Length 7.4 cm    RV Mid 2.8 cm    Ascending aorta 4.3 cm    LA ESV Index (BP) 44.3 ml/m2    Avg E/e' ratio 11.98     LV GLOBAL STRAIN  -27.0 %    Ao root diam 3.6 cm    Ao pk ben 182.0 cm/sec    Ao V2 VTI 40.7 cm    ADAN(I,D) 2.44 cm2    EDV(cubed) 117.6 ml    EDV(MOD-sp2) 138.0 ml    EDV(MOD-sp4) 181.0 ml    EF(MOD-bp) 58.2 %    EF(MOD-sp2) 54.6 %    EF(MOD-sp4) 58.0 %    ESV(cubed) 19.7 ml    ESV(MOD-sp2) 62.7 ml    ESV(MOD-sp4) 76.1 ml    IVS/LVPW 1.00 cm    Lat Peak E' Ben 9.5 cm/sec    LV mass(C)d 200.5 grams    LV V1 max PG 7.6 mmHg    LV V1 mean PG 4.0 mmHg    LV V1 max 137.0 cm/sec    LVPWd 1.10 cm    Med Peak E' Ben 7.7 cm/sec    MV dec time 0.25 msec    PA acc time 0.15 sec    PA pr(Accel) 12.4 mmHg    PA V2 max 117.0 cm/sec    RAP systole 3 mmHg    RVSP(TR) 43 mmHg    SV(LVOT) 99.4 ml    SV(MOD-sp2) 75.3 ml    SV(MOD-sp4) 104.9 ml    TR max PG 39.9 mmHg    Ao max PG 13.3 mmHg    Ao mean PG 7.0 mmHg    FS 44.9 %    IVSd 1.10 cm    LA dimension (2D)  4.0 cm    LV V1 VTI 31.7 cm    LVIDd 4.9 cm    LVIDs 2.7 cm    LVOT area 3.1 cm2    LVOT diam 2.00 cm    MV E/A 1.19     MV A max ben 86.5 cm/sec    MV E max ben 103.0 cm/sec    TR max ben 316.0 cm/sec    TAPSE (>1.6) 2.7 cm   Urinalysis With Culture If Indicated - Urine, Clean Catch    Collection Time: 10/03/22  8:16 PM    Specimen: Urine, Clean Catch   Result Value Ref Range    Color, UA Yellow Yellow, Straw    Appearance, UA Clear Clear    pH, UA 7.0 5.0 - 8.0    Specific Gravity, UA 1.022 1.001 - 1.030    Glucose, UA Negative Negative    Ketones, UA Negative Negative    Bilirubin, UA Negative Negative    Blood, UA Trace (A) Negative    Protein, UA Trace (A) Negative    Leuk Esterase, UA Negative Negative    Nitrite, UA Negative Negative    Urobilinogen, UA 2.0 E.U./dL (A) 0.2 - 1.0 E.U./dL   Urinalysis, Microscopic Only - Urine,  Clean Catch    Collection Time: 10/03/22  8:16 PM    Specimen: Urine, Clean Catch   Result Value Ref Range    RBC, UA 0-2 None Seen, 0-2 /HPF    WBC, UA 3-5 (A) None Seen, 0-2 /HPF    Bacteria, UA Trace None Seen, Trace /HPF    Squamous Epithelial Cells, UA 0-2 None Seen, 0-2 /HPF    Hyaline Casts, UA None Seen 0 - 6 /LPF    Methodology Manual Light Microscopy    Potassium    Collection Time: 10/03/22 10:13 PM    Specimen: Blood   Result Value Ref Range    Potassium 4.1 3.5 - 5.2 mmol/L   Comprehensive Metabolic Panel    Collection Time: 10/04/22  6:39 AM    Specimen: Blood   Result Value Ref Range    Glucose 139 (H) 65 - 99 mg/dL    BUN 6 (L) 8 - 23 mg/dL    Creatinine 0.57 (L) 0.76 - 1.27 mg/dL    Sodium 130 (L) 136 - 145 mmol/L    Potassium 3.7 3.5 - 5.2 mmol/L    Chloride 92 (L) 98 - 107 mmol/L    CO2 30.0 (H) 22.0 - 29.0 mmol/L    Calcium 8.4 (L) 8.6 - 10.5 mg/dL    Total Protein 5.4 (L) 6.0 - 8.5 g/dL    Albumin 2.30 (L) 3.50 - 5.20 g/dL    ALT (SGPT) 52 (H) 1 - 41 U/L    AST (SGOT) 151 (H) 1 - 40 U/L    Alkaline Phosphatase 413 (H) 39 - 117 U/L    Total Bilirubin 1.4 (H) 0.0 - 1.2 mg/dL    Globulin 3.1 gm/dL    A/G Ratio 0.7 g/dL    BUN/Creatinine Ratio 10.5 7.0 - 25.0    Anion Gap 8.0 5.0 - 15.0 mmol/L    eGFR 105.5 >60.0 mL/min/1.73   CBC Auto Differential    Collection Time: 10/04/22  6:39 AM    Specimen: Blood   Result Value Ref Range    WBC 9.44 3.40 - 10.80 10*3/mm3    RBC 3.49 (L) 4.14 - 5.80 10*6/mm3    Hemoglobin 9.3 (L) 13.0 - 17.7 g/dL    Hematocrit 29.3 (L) 37.5 - 51.0 %    MCV 84.0 79.0 - 97.0 fL    MCH 26.6 26.6 - 33.0 pg    MCHC 31.7 31.5 - 35.7 g/dL    RDW 15.2 12.3 - 15.4 %    RDW-SD 46.3 37.0 - 54.0 fl    MPV 9.1 6.0 - 12.0 fL    Platelets 319 140 - 450 10*3/mm3    Neutrophil % 85.7 (H) 42.7 - 76.0 %    Lymphocyte % 3.5 (L) 19.6 - 45.3 %    Monocyte % 9.4 5.0 - 12.0 %    Eosinophil % 0.0 (L) 0.3 - 6.2 %    Basophil % 0.1 0.0 - 1.5 %    Immature Grans % 1.3 (H) 0.0 - 0.5 %    Neutrophils,  Absolute 8.09 (H) 1.70 - 7.00 10*3/mm3    Lymphocytes, Absolute 0.33 (L) 0.70 - 3.10 10*3/mm3    Monocytes, Absolute 0.89 0.10 - 0.90 10*3/mm3    Eosinophils, Absolute 0.00 0.00 - 0.40 10*3/mm3    Basophils, Absolute 0.01 0.00 - 0.20 10*3/mm3    Immature Grans, Absolute 0.12 (H) 0.00 - 0.05 10*3/mm3    nRBC 0.0 0.0 - 0.2 /100 WBC     Note: In addition to lab results from this visit, the labs listed above may include labs taken at another facility or during a different encounter within the last 24 hours. Please correlate lab times with ED admission and discharge times for further clarification of the services performed during this visit.    XR Chest 1 View   Final Result       1. The tip of the right upper extremity PICC line terminates in the   superior vena cava.   2. No active pulmonary disease.   3. Cardiomegaly.       This report was finalized on 10/3/2022 4:09 PM by Preet Kline MD.          CT Angiogram Chest   Final Result   1. No evidence of pulmonary embolic disease.   2. Small but increasing right pleural effusion compared to 9/21/2022.   3. Stable small area of linear scarring in the right middle lobe.   4. Mild ascending aortic ectasia to approximately 4.0 cm again   incidentally noted.               CT SCAN OF THE ABDOMEN AND PELVIS WITH IV CONTRAST: Numerous hepatic   metastases are again noted. There is extensive retroperitoneal   lymphadenopathy, and an approximately 5.4 cm left upper quadrant mass   involving the left lateral renal parenchyma. Volume and shape of the   exophytic mass favors that this is invasive to the kidney, rather than   originating from kidney. There are multiple small splenic lesions   consistent with metastases. Adrenal glands appear to be spared.   Gallbladder appears normal. Pancreas appears to be normal, although the   tip of the pancreatic tail is immediately adjacent the left upper   quadrant mass.       No upper abdominal free air is seen. There is a small amount of  "ascites.   Diffusely increased bowel gas suggests a low level ileus. Left mid   abdominal ostomy site is clear. Large pelvic mass is again noted, which   appears to extend into the small bowel mesentery, although these may be   adjacent but separate nodes. Bladder is normally distended and normal in   appearance. There is relatively little intrapelvic free fluid.       Delayed venous phase images show contrast within the renal collecting   systems, which appear minimally more dilated than on the prior study.   Distal ureters may be mildly compressed by the large pelvic mass. The   bladder itself is displaced anteriorly and superiorly, but normally   distended and grossly normal in appearance. No evidence of a discrete,   drainable inflammatory collection is appreciated. Bony structures appear   to be intact.           IMPRESSION:       1. Expected changes of recent colostomy placement. No visible associated   inflammation.   2. Advanced metastatic disease, including numerous liver lesions,   extensive retrocrural lymphadenopathy, multiple splenic lesions, left   upper quadrant mass that that involves the lateral margin of the left   kidney. Large pelvic mass and extensive infiltration of the lower small   bowel mesentery, whether by direct extension of tumor, or multiple   irregularly enlarged adjacent lymph nodes.   3. Bilateral hydronephrosis which appears increased from 9/20/2022, but   no evidence of high-grade obstructive uropathy.   4. Mildly increased ascites. No evidence of drainable inflammatory   collection.   5. \"Gassy\" appearance of nondependent large and small bowel, suggesting   a low level ileus       This report was finalized on 10/3/2022 9:14 AM by Dr. Del Wilks MD.          CT Abdomen Pelvis With Contrast   Final Result   1. No evidence of pulmonary embolic disease.   2. Small but increasing right pleural effusion compared to 9/21/2022.   3. Stable small area of linear scarring in the right middle " lobe.   4. Mild ascending aortic ectasia to approximately 4.0 cm again   incidentally noted.               CT SCAN OF THE ABDOMEN AND PELVIS WITH IV CONTRAST: Numerous hepatic   metastases are again noted. There is extensive retroperitoneal   lymphadenopathy, and an approximately 5.4 cm left upper quadrant mass   involving the left lateral renal parenchyma. Volume and shape of the   exophytic mass favors that this is invasive to the kidney, rather than   originating from kidney. There are multiple small splenic lesions   consistent with metastases. Adrenal glands appear to be spared.   Gallbladder appears normal. Pancreas appears to be normal, although the   tip of the pancreatic tail is immediately adjacent the left upper   quadrant mass.       No upper abdominal free air is seen. There is a small amount of ascites.   Diffusely increased bowel gas suggests a low level ileus. Left mid   abdominal ostomy site is clear. Large pelvic mass is again noted, which   appears to extend into the small bowel mesentery, although these may be   adjacent but separate nodes. Bladder is normally distended and normal in   appearance. There is relatively little intrapelvic free fluid.       Delayed venous phase images show contrast within the renal collecting   systems, which appear minimally more dilated than on the prior study.   Distal ureters may be mildly compressed by the large pelvic mass. The   bladder itself is displaced anteriorly and superiorly, but normally   distended and grossly normal in appearance. No evidence of a discrete,   drainable inflammatory collection is appreciated. Bony structures appear   to be intact.           IMPRESSION:       1. Expected changes of recent colostomy placement. No visible associated   inflammation.   2. Advanced metastatic disease, including numerous liver lesions,   extensive retrocrural lymphadenopathy, multiple splenic lesions, left   upper quadrant mass that that involves the lateral  "margin of the left   kidney. Large pelvic mass and extensive infiltration of the lower small   bowel mesentery, whether by direct extension of tumor, or multiple   irregularly enlarged adjacent lymph nodes.   3. Bilateral hydronephrosis which appears increased from 9/20/2022, but   no evidence of high-grade obstructive uropathy.   4. Mildly increased ascites. No evidence of drainable inflammatory   collection.   5. \"Gassy\" appearance of nondependent large and small bowel, suggesting   a low level ileus       This report was finalized on 10/3/2022 9:14 AM by Dr. Del Wilks MD.            Vitals:    10/04/22 0102 10/04/22 0404 10/04/22 0715 10/04/22 0845   BP:  145/82 146/84    BP Location:  Left arm Left arm    Patient Position:  Lying Lying    Pulse:   64 62   Resp:  18 18    Temp: 99.7 °F (37.6 °C) 96.2 °F (35.7 °C) 98.6 °F (37 °C)    TempSrc: Oral Oral Oral    SpO2:    100%   Weight:       Height:         Medications   sodium chloride 0.9 % flush 10 mL (has no administration in time range)   lisinopril (PRINIVIL,ZESTRIL) tablet 10 mg (has no administration in time range)   rosuvastatin (CRESTOR) tablet 10 mg (has no administration in time range)   montelukast (SINGULAIR) tablet 10 mg ( Oral Canceled Entry 10/3/22 2100)   sodium chloride 0.9 % flush 10 mL ( Intravenous Canceled Entry 10/4/22 0838)   sodium chloride 0.9 % flush 10 mL (has no administration in time range)   Enoxaparin Sodium (LOVENOX) syringe 40 mg (40 mg Subcutaneous Given 10/4/22 0838)   ondansetron (ZOFRAN) injection 4 mg (has no administration in time range)   sennosides-docusate (PERICOLACE) 8.6-50 MG per tablet 2 tablet ( Oral Canceled Entry 10/3/22 2100)     And   polyethylene glycol (MIRALAX) packet 17 g (has no administration in time range)     And   bisacodyl (DULCOLAX) EC tablet 5 mg (has no administration in time range)     And   bisacodyl (DULCOLAX) suppository 10 mg (has no administration in time range)   acetaminophen (TYLENOL) tablet " 650 mg (650 mg Oral Given 10/3/22 2340)   potassium chloride (MICRO-K) CR capsule 40 mEq (40 mEq Oral Given 10/3/22 1745)     Or   potassium chloride (KLOR-CON) packet 40 mEq ( Oral Not Given:  See Alt 10/3/22 1745)     Or   potassium chloride 10 mEq in 100 mL IVPB ( Intravenous Not Given:  See Alt 10/3/22 1745)   piperacillin-tazobactam (ZOSYN) 3.375 g in iso-osmotic dextrose 50 ml (premix) (3.375 g Intravenous New Bag 10/4/22 0838)   allopurinol (ZYLOPRIM) tablet 200 mg (200 mg Oral Not Given 10/4/22 1030)   sodium chloride 0.9 % flush 10 mL (10 mL Intravenous Given 10/4/22 0838)   sodium chloride 0.9 % flush 10 mL (has no administration in time range)   tamsulosin (FLOMAX) 24 hr capsule 0.4 mg (0.4 mg Oral Given 10/3/22 1831)   sodium chloride 0.9 % infusion (75 mL/hr Intravenous New Bag 10/4/22 1050)   iopamidol (ISOVUE-370) 76 % injection 100 mL (80 mL Intravenous Given 10/3/22 0839)   piperacillin-tazobactam (ZOSYN) 3.375 g in iso-osmotic dextrose 50 ml (premix) (0 g Intravenous Stopped 10/3/22 1153)     ECG/EMG Results (last 24 hours)     ** No results found for the last 24 hours. **        No orders to display                                            MDM    Final diagnoses:   Fever, unspecified fever cause   Hydronephrosis, unspecified hydronephrosis type   Status post colostomy (HCC)   Metastatic malignant neoplasm, unspecified site (HCC)   Hypernatremia   Hypokalemia       ED Disposition  ED Disposition     ED Disposition   Decision to Admit    Condition   --    Comment   Level of Care: Telemetry [5]   Diagnosis: Hydronephrosis [591.ICD-9-CM]   Admitting Physician: GILDARDO JUSTIN [[id number]]   Attending Physician: GILDARDO JUSTIN [[id number]]   Isolate for COVID?: No [0]   Certification: I Certify That Inpatient Hospital Services Are Medically Necessary For Greater Than 2 Midnights                  Edouard Hernández DO  10/04/22 1146

## 2022-10-03 NOTE — H&P
"Patient Care Team:  Jatinder Haynes MD as PCP - General (Family Medicine)    Chief complaint fever, postop    Subjective     This is a 72-year-old male who is well-known to me.  He is postop day 11 from laparoscopic loop colostomy creation with peritoneal nodule biopsies as well as flexible sigmoidoscopy with biopsies.  This was done for a large rectal mass and large bowel obstruction.  Pathology has not formally resulted but I had a discussion with the pathologist today, concerning for lymphoma, CD30 positive.    He was just seen in office 4 days ago, he states that overall he is doing well but occasionally will have a fever to 102 °F.  He denies any nausea or vomiting.  Ostomy continues to work and does not have abdominal pain.  In the ER today, his procalcitonin is elevated however his leukocytosis is within normal limits, lactate is normal and CT scan shows no signs of abscess however persistent large bulky lymphadenopathy convalescing into a large perirectal mass as well as other systemic signs of spread including left kidney lesion, liver lesions and right pleural lesions.    Review of Systems   Pertinent items are noted in HPI    History  Past Medical History:   Diagnosis Date   • benign polypoid tissue right lung    • Hyperlipidemia    • Hypertension    • Mycobacterium mucogenicum    • SHERRI (obstructive sleep apnea)    • Seasonal allergies        Objective     Vital Signs  Blood pressure 149/73, pulse 78, temperature 100.1 °F (37.8 °C), temperature source Oral, resp. rate 18, height 162.6 cm (64\"), weight 78 kg (172 lb), SpO2 93 %.    Physical Exam:      General Appearance:    Alert, cooperative, in no acute distress   Head:    Normocephalic, without obvious abnormality, atraumatic   Eyes:            Lids and lashes normal, conjunctivae and sclerae normal, no   icterus, no pallor, corneas clear, PERRLA   Ears:    Ears appear intact with no abnormalities noted   Throat:   No oral lesions, no thrush, oral " mucosa moist   Neck:   No adenopathy, supple, trachea midline, no thyromegaly, no   carotid bruit, no JVD   Back:     No kyphosis present, no scoliosis present, no skin lesions,      erythema or scars, no tenderness to percussion or                   palpation,   range of motion normal   Lungs:     Clear to auscultation,respirations regular, even and                  unlabored    Heart:    Regular rhythm and normal rate, normal S1 and S2, no            murmur, no gallop, no rub, no click   Chest Wall:    No abnormalities observed       Abdomen:    Soft, nontender, incisions are clean dry and intact.  Colostomy is pink patent and productive.   Rectal:     Deferred     Extremities:   Moves all extremities well, no edema, no cyanosis, no             redness   Pulses:   Pulses palpable and equal bilaterally   Skin:   No bleeding, bruising or rash   Lymph nodes:   No palpable adenopathy   Neurologic:   Cranial nerves 2 - 12 grossly intact, sensation intact, DTR       present and equal bilaterally         Results Review:    I reviewed the patient's new clinical results.    Assessment & Plan       Febrile illness    Rectal mass    Essential hypertension    Hydronephrosis    Hypernatremia    Hypokalemia      This is a 9-year-old male who is postop day 11 from a laparoscopic loop colostomy creation for impending intestinal obstruction from a large perirectal mass along with liver lesions, left kidney lesion and right pleural lesions.  Preliminary pathology favors lymphoma.    I certainly see no evidence of postoperative complication requiring any surgical intervention at this time  I think this is likely fevers from his lymphoma or tumor necrosis fevers as his rectal lesion is rather large and bulky  Suggest consultation of medical oncology, he saw Dr. Leary last admission  Follow-up pathology results  No surgical intervention plans at the moment    I discussed the patients findings and my recommendations with patient and  family.     Hiram Gill MD  10/03/22  12:21 EDT    Time: More than 50% of time spent in counseling and coordination of care:  Total face-to-face/floor time 30 min.  Time spent in counseling 20 min. Counseling included the following topics: Lymphoma, intestinal obstruction, postoperative care

## 2022-10-03 NOTE — H&P
[facility] Medicine Services  HISTORY AND PHYSICAL    Patient Name: Abraham Wu  : 1950  MRN: [id number]  Primary Care Physician: Jatinder Haynes MD  Date of admission: 10/3/2022      Subjective   Subjective     Chief Complaint: Fever    HPI:  Abraham Wu is a 72 y.o. male with history of hypertension, hyperlipidemia, SHERRI, recent admission to [facility] - for diarrhea, unintentional weight loss, found to have enteropathogenic and enterotoxigenic E. coli s/p Flagyl and Levaquin. He was also found to have a large rectal mass concerning for rectal adenocarcinoma s/p laparoscopic colostomy creation, pathology stillpending.  He presents to the ED with fever T-max 102.8 He denies and nausea, vomiting, no abd pain. Initial work-up reveals Pro-Robert 0.39, WBC 10.79, Na+ 154, potassium 3.4, anion gap of 17.  Respiratory panel PCR with COVID-19 was negative. CT abd/pelvis was concerning for advanced metastatic disease and bilateral hydronephrosis worse from previous.  Blood cultures were collected and patient did receive a dose of IV Zosyn.  Hospital medicine was asked to admit for further evaluation and management.      Review of Systems   Gen- + fevers, chills  CV- No chest pain, palpitations  Resp- No cough, dyspnea  GI- No N/V/D, abd pain    All other systems reviewed and are negative.     Personal History     Past Medical History:   Diagnosis Date   • benign polypoid tissue right lung    • Hyperlipidemia    • Hypertension    • Mycobacterium mucogenicum    • SHERRI (obstructive sleep apnea)    • Seasonal allergies              Past Surgical History:   Procedure Laterality Date   • BRONCHOSCOPY      removal of obstructing polypoid tissue right middle lung   • COLOSTOMY N/A 2022    Procedure: LAPAROSCOPIC COLOSTOMY CREATION, FLEXIBLE SIGMOIDOSCOPY;  Surgeon: Hiram Viveros MD;  Location: [facility];  Service: General;  Laterality: N/A;       Family History:  family history  includes Diabetes in his father and mother; Heart disease in his father. Otherwise pertinent FHx was reviewed and unremarkable.     Social History:  reports that he has never smoked. He has never used smokeless tobacco. He reports previous alcohol use. He reports that he does not use drugs.  Social History     Social History Narrative   • Not on file       Medications:  Available home medication information reviewed.  Medications Prior to Admission   Medication Sig Dispense Refill Last Dose   • acetaminophen (TYLENOL) 325 MG tablet Take 2 tablets by mouth Every 6 (Six) Hours As Needed for Mild Pain. (Patient taking differently: Take 325 mg by mouth Every 6 (Six) Hours As Needed for Mild Pain.)   10/2/2022 at Unknown time   • fluticasone (FLONASE) 50 MCG/ACT nasal spray 1 spray into the nostril(s) as directed by provider Daily.   Past Month at Unknown time   • lisinopril (PRINIVIL,ZESTRIL) 10 MG tablet Take 10 mg by mouth Daily.   10/3/2022 at Unknown time   • montelukast (SINGULAIR) 10 MG tablet Take 10 mg by mouth Every Night.   10/2/2022 at Unknown time   • rosuvastatin (CRESTOR) 10 MG tablet Take 10 mg by mouth Daily.   10/3/2022 at Unknown time       No Known Allergies    Objective   Objective     Vital Signs:   Temp:  [99.8 °F (37.7 °C)-101.7 °F (38.7 °C)] 101.7 °F (38.7 °C)  Heart Rate:  [70-88] 88  Resp:  [18] 18  BP: (137-149)/(72-88) 137/88       Physical Exam   Constitutional: Elderly male in no acute distress, awake, alert  Eyes: PERRLA, sclerae anicteric, no conjunctival injection  HENT: NCAT, mucous membranes moist  Neck: Supple, no thyromegaly, no lymphadenopathy, trachea midline  Respiratory: Clear to auscultation bilaterally, nonlabored respirations   Cardiovascular: RRR, no murmurs, rubs, or gallops, palpable pedal pulses bilaterally  Gastrointestinal: Positive bowel sounds, soft, nontender, nondistended  Musculoskeletal: No bilateral ankle edema, no clubbing or cyanosis to extremities  Psychiatric:  Appropriate affect, cooperative  Neurologic: Oriented x 3, strength symmetric in all extremities, Cranial Nerves grossly intact to confrontation, speech clear  Skin: No rashes    Result Review:  I have personally reviewed the results from the time of this admission to 10/3/2022 13:21 EDT and agree with these findings:  [x]  Laboratory list / accordion  [x]  Microbiology  [x]  Radiology  []  EKG/Telemetry   []  Cardiology/Vascular   []  Pathology  []  Old records  []  Other:  Most notable findings include:    LAB RESULTS:      Lab 10/03/22  0734   WBC 10.79   HEMOGLOBIN 10.6*   HEMATOCRIT 32.7*   PLATELETS 355   NEUTROS ABS 9.73*   IMMATURE GRANS (ABS) 0.11*   LYMPHS ABS 0.25*   MONOS ABS 0.69   EOS ABS 0.00   MCV 83.4   PROCALCITONIN 0.39*   LACTATE 1.2         Lab 10/03/22  0734   SODIUM 154*   POTASSIUM 3.4*   CHLORIDE 109*   CO2 28.0   ANION GAP 17.0*   BUN 8   CREATININE 0.50*   EGFR 109.7   GLUCOSE 106*   CALCIUM 8.6         Lab 10/03/22  0734   TOTAL PROTEIN 6.3   ALBUMIN 2.90*   GLOBULIN 3.4   ALT (SGPT) 48*   AST (SGOT) 178*   BILIRUBIN 1.0   ALK PHOS 439*                     UA    Urinalysis 9/20/22 10/3/22 10/3/22     0734 0734   Squamous Epithelial Cells, UA   7-12 (A)   Specific [place], UA 1.018 1.016    Ketones, UA Negative Trace (A)    Blood, UA Negative Moderate (2+) (A)    Leukocytes, UA Negative Trace (A)    Nitrite, UA Negative Negative    RBC, UA   31-50 (A)   WBC, UA   6-12 (A)   Bacteria, UA   Trace   (A) Abnormal value              Microbiology Results (last 10 days)     Procedure Component Value - Date/Time    Respiratory Panel PCR w/COVID-19(SARS-CoV-2) BARRY/KEIRA/MARY/PAD/COR/MAD/AKSHAT In-House, NP Swab in UTM/VTM, 3-4 HR TAT - Swab, Nasopharynx [[id number]]  (Normal) Collected: 10/03/22 0734    Lab Status: Final result Specimen: Swab from Nasopharynx Updated: 10/03/22 0836     ADENOVIRUS, PCR Not Detected     Coronavirus 229E Not Detected     Coronavirus HKU1 Not Detected     Coronavirus NL63 Not  Detected     Coronavirus OC43 Not Detected     COVID19 Not Detected     Human Metapneumovirus Not Detected     Human Rhinovirus/Enterovirus Not Detected     Influenza A PCR Not Detected     Influenza B PCR Not Detected     Parainfluenza Virus 1 Not Detected     Parainfluenza Virus 2 Not Detected     Parainfluenza Virus 3 Not Detected     Parainfluenza Virus 4 Not Detected     RSV, PCR Not Detected     Bordetella pertussis pcr Not Detected     Bordetella parapertussis PCR Not Detected     Chlamydophila pneumoniae PCR Not Detected     Mycoplasma pneumo by PCR Not Detected    Narrative:      In the setting of a positive respiratory panel with a viral infection PLUS a negative procalcitonin without other underlying concern for bacterial infection, consider observing off antibiotics or discontinuation of antibiotics and continue supportive care. If the respiratory panel is positive for atypical bacterial infection (Bordetella pertussis, Chlamydophila pneumoniae, or Mycoplasma pneumoniae), consider antibiotic de-escalation to target atypical bacterial infection.          CT Abdomen Pelvis With Contrast    Result Date: 10/3/2022  DATE OF EXAM: 10/3/2022 8:20 AM  PROCEDURE: CT ANGIOGRAM CHEST-, CT ABDOMEN PELVIS W CONTRAST-  INDICATIONS: fever, unknown etiology, recent surgery, pleural effusions  COMPARISON: 9/21/2022 angiographic chest CT scan 9/20/2022 abdomen pelvis CT scan  TECHNIQUE: Contiguous axial imaging was obtained from the thoracic inlet through the chest abdomen and pelvis following the intravenous administration of 80 mL of Isovue 370. Reconstructed coronal and sagittal images were also obtained. Automated exposure control and iterative reconstruction methods were used.  The radiation dose reduction device was turned on for each scan per the ALARA (As Low as Reasonably Achievable) protocol.  FINDINGS: Patient history indicates fever of unknown etiology, recent colon surgery. The prior exams from 9/20/2022 and  9/21/2022 by report showed a large rectal mass, extensive adenopathy, concern for renal and hepatic metastatic disease. Small right pleural effusion.  ANGIOGRAPHIC CT SCAN OF THE CHEST: There is good contrast opacification of the pulmonary arteries. No evidence of pulmonary embolic disease is seen. Thoracic aorta is mildly ectatic to approximately 4.0 cm. There is no evidence of dissection or focal aneurysm. There is moderately extensive coronary artery calcification. No pericardial effusion or mediastinal adenopathy is seen. No axillary or lower cervical lymphadenopathy is appreciated. There is a relatively small but increasing free-flowing right pleural effusion, and no left effusion. The lungs appear clear except for mild discoid atelectasis associated with the right pleural effusion, and focal peribronchial thickening scarring and mild bronchiectasis of the posterior right middle lobe, unchanged from prior study. Bony structures appear to be intact.      Impression: 1. No evidence of pulmonary embolic disease. 2. Small but increasing right pleural effusion compared to 9/21/2022. 3. Stable small area of linear scarring in the right middle lobe. 4. Mild ascending aortic ectasia to approximately 4.0 cm again incidentally noted.    CT SCAN OF THE ABDOMEN AND PELVIS WITH IV CONTRAST: Numerous hepatic metastases are again noted. There is extensive retroperitoneal lymphadenopathy, and an approximately 5.4 cm left upper quadrant mass involving the left lateral renal parenchyma. Volume and shape of the exophytic mass favors that this is invasive to the kidney, rather than originating from kidney. There are multiple small splenic lesions consistent with metastases. Adrenal glands appear to be spared. Gallbladder appears normal. Pancreas appears to be normal, although the tip of the pancreatic tail is immediately adjacent the left upper quadrant mass.  No upper abdominal free air is seen. There is a small amount of  "ascites. Diffusely increased bowel gas suggests a low level ileus. Left mid abdominal ostomy site is clear. Large pelvic mass is again noted, which appears to extend into the small bowel mesentery, although these may be adjacent but separate nodes. Bladder is normally distended and normal in appearance. There is relatively little intrapelvic free fluid.  Delayed venous phase images show contrast within the renal collecting systems, which appear minimally more dilated than on the prior study. Distal ureters may be mildly compressed by the large pelvic mass. The bladder itself is displaced anteriorly and superiorly, but normally distended and grossly normal in appearance. No evidence of a discrete, drainable inflammatory collection is appreciated. Bony structures appear to be intact.   IMPRESSION:  1. Expected changes of recent colostomy placement. No visible associated inflammation. 2. Advanced metastatic disease, including numerous liver lesions, extensive retrocrural lymphadenopathy, multiple splenic lesions, left upper quadrant mass that that involves the lateral margin of the left kidney. Large pelvic mass and extensive infiltration of the lower small bowel mesentery, whether by direct extension of tumor, or multiple irregularly enlarged adjacent lymph nodes. 3. Bilateral hydronephrosis which appears increased from 9/20/2022, but no evidence of high-grade obstructive uropathy. 4. Mildly increased ascites. No evidence of drainable inflammatory collection. 5. \"Gassy\" appearance of nondependent large and small bowel, suggesting a low level ileus  This report was finalized on 10/3/2022 9:14 AM by Dr. Del Wilks MD.      CT Angiogram Chest    Result Date: 10/3/2022  DATE OF EXAM: 10/3/2022 8:20 AM  PROCEDURE: CT ANGIOGRAM CHEST-, CT ABDOMEN PELVIS W CONTRAST-  INDICATIONS: fever, unknown etiology, recent surgery, pleural effusions  COMPARISON: 9/21/2022 angiographic chest CT scan 9/20/2022 abdomen pelvis CT scan  " TECHNIQUE: Contiguous axial imaging was obtained from the thoracic inlet through the chest abdomen and pelvis following the intravenous administration of 80 mL of Isovue 370. Reconstructed coronal and sagittal images were also obtained. Automated exposure control and iterative reconstruction methods were used.  The radiation dose reduction device was turned on for each scan per the ALARA (As Low as Reasonably Achievable) protocol.  FINDINGS: Patient history indicates fever of unknown etiology, recent colon surgery. The prior exams from 9/20/2022 and 9/21/2022 by report showed a large rectal mass, extensive adenopathy, concern for renal and hepatic metastatic disease. Small right pleural effusion.  ANGIOGRAPHIC CT SCAN OF THE CHEST: There is good contrast opacification of the pulmonary arteries. No evidence of pulmonary embolic disease is seen. Thoracic aorta is mildly ectatic to approximately 4.0 cm. There is no evidence of dissection or focal aneurysm. There is moderately extensive coronary artery calcification. No pericardial effusion or mediastinal adenopathy is seen. No axillary or lower cervical lymphadenopathy is appreciated. There is a relatively small but increasing free-flowing right pleural effusion, and no left effusion. The lungs appear clear except for mild discoid atelectasis associated with the right pleural effusion, and focal peribronchial thickening scarring and mild bronchiectasis of the posterior right middle lobe, unchanged from prior study. Bony structures appear to be intact.      Impression: 1. No evidence of pulmonary embolic disease. 2. Small but increasing right pleural effusion compared to 9/21/2022. 3. Stable small area of linear scarring in the right middle lobe. 4. Mild ascending aortic ectasia to approximately 4.0 cm again incidentally noted.    CT SCAN OF THE ABDOMEN AND PELVIS WITH IV CONTRAST: Numerous hepatic metastases are again noted. There is extensive retroperitoneal  lymphadenopathy, and an approximately 5.4 cm left upper quadrant mass involving the left lateral renal parenchyma. Volume and shape of the exophytic mass favors that this is invasive to the kidney, rather than originating from kidney. There are multiple small splenic lesions consistent with metastases. Adrenal glands appear to be spared. Gallbladder appears normal. Pancreas appears to be normal, although the tip of the pancreatic tail is immediately adjacent the left upper quadrant mass.  No upper abdominal free air is seen. There is a small amount of ascites. Diffusely increased bowel gas suggests a low level ileus. Left mid abdominal ostomy site is clear. Large pelvic mass is again noted, which appears to extend into the small bowel mesentery, although these may be adjacent but separate nodes. Bladder is normally distended and normal in appearance. There is relatively little intrapelvic free fluid.  Delayed venous phase images show contrast within the renal collecting systems, which appear minimally more dilated than on the prior study. Distal ureters may be mildly compressed by the large pelvic mass. The bladder itself is displaced anteriorly and superiorly, but normally distended and grossly normal in appearance. No evidence of a discrete, drainable inflammatory collection is appreciated. Bony structures appear to be intact.   IMPRESSION:  1. Expected changes of recent colostomy placement. No visible associated inflammation. 2. Advanced metastatic disease, including numerous liver lesions, extensive retrocrural lymphadenopathy, multiple splenic lesions, left upper quadrant mass that that involves the lateral margin of the left kidney. Large pelvic mass and extensive infiltration of the lower small bowel mesentery, whether by direct extension of tumor, or multiple irregularly enlarged adjacent lymph nodes. 3. Bilateral hydronephrosis which appears increased from 9/20/2022, but no evidence of high-grade  "obstructive uropathy. 4. Mildly increased ascites. No evidence of drainable inflammatory collection. 5. \"Gassy\" appearance of nondependent large and small bowel, suggesting a low level ileus  This report was finalized on 10/3/2022 9:14 AM by Dr. Del Wilks MD.            Assessment & Plan   Assessment & Plan     Active Hospital Problems    Diagnosis  POA   • **Febrile illness [R50.9]  Yes   • Hydronephrosis [N13.30]  Yes   • Hypernatremia [E87.0]  Yes   • Hypokalemia [E87.6]  Yes   • Essential hypertension [I10]  Yes   • Rectal mass [K62.89]  Yes     72-year-old male with history of hypertension, hyperlipidemia, SHERRI, recent admission to [facility] 9/21-9/24 for diarrhea, unintentional weight loss found to have enteropathogenic E. coli entero toxigenic E. coli s/p Flagyl and Levaquin he also found to have large rectal mass concerning for rectal adenocarcinoma s/p laparoscopic colostomy.  He presents to the ED with fever, of unknown etiology    Febrile illness  - Etiology remains unknown, suspect this is secondary to underlying malignancy, patient is also s/p recent treatment for enterotoxigenic enteropathogenic E. coli with Flagyl and Levaquin.    -He did have T-max 101.7, elevated procalcitonin, normal WBC, respiratory panel with COVID-19 is negative, UA was unremarkable.  -CT abd/pelvis, shows advanced metastatic disease, (liver, renal, splenic lesions)  -Patient did get a dose of Zosyn in the ED, will continue this for now.    Bilateral hydronephrosis  -Appears increased from prior imaging, however, no evidence of high-grade obstructive uropathy  -We will ask urology to evaluate    Large pelvic mass, ?adenocarcinoma  -He is s/p laparoscopic colostomy creation, CT abd/pelvis as above  -Pathology still pending, will consult heme-onc  -[facility] is already following.    Hypernatremia  -Suspect hypovolemic hypernatremia, we will start D5/NS @ 75ml/hr  -Encourage p.o. intake   -Follow-up repeat labs in the " a.m.    Hypokalemia  -Continue to monitor and replete per protocol    Chronic severe malnutrition  -We will have dietitian following    Hypertension  -BP remains stable, resume lisinopril    Hyperlipidemia  -Continue statin    SHERRI    DVT prophylaxis: Lovenox    CODE STATUS: Full code  Code Status and Medical Interventions:   Ordered at: 10/03/22 1318     Level Of Support Discussed With:    Patient     Code Status (Patient has no pulse and is not breathing):    CPR (Attempt to Resuscitate)     Medical Interventions (Patient has pulse or is breathing):    Full Support       Lorri Lauren MD  10/03/22

## 2022-10-03 NOTE — PLAN OF CARE
Goal Outcome Evaluation:  Plan of Care Reviewed With: patient, spouse        Progress: no change   VSS with patient on RA and NSR on tele. No complaints of pain and no acute events. Pt scheduled to have first chemo tomorrow. Will cont to monitor.

## 2022-10-03 NOTE — CONSULTS
HEMATOLOGY/ONCOLOGY INPATIENT CONSULT    REASON FOR CONSULT: Classical Hodgkin's lymphoma vs CD30 positive T/B-cell lymphoma    Subjective   HISTORY OF PRESENT ILLNESS;   Mr. Wu is a 72-year-old gent with a past medical history of hyperlipidemia, hypertension, SHERRI, seasonal allergies who presents to [facility] with fever of unknown origin.  Patient recently admitted in September 2022 for abdominal pain and discomfort.  Imaging at that time was notable for large rectal mass with concerns for large bowel obstruction.  Patient had a diverting loop colostomy placed at that time.  He has had continued fevers up to 102 since this admission.  Has lost 60 pounds in the past 9 months.  His rectal biopsy has resulted as a classical Hodgkin's lymphoma vs CD30 positive T/B-cell lymphoma.  He denies any dysuria however his UA was concerning for possible UTI.  CT scans concerning for bilateral hydronephrosis that is worse from his previous scans about a month ago.  He otherwise feels well and states that he is hungry.  Denies any nausea, vomiting, diarrhea, shortness of breath, chest pain      Past Medical History:   Diagnosis Date   • benign polypoid tissue right lung    • Hyperlipidemia    • Hypertension    • Mycobacterium mucogenicum    • SHERRI (obstructive sleep apnea)    • Seasonal allergies      Past Surgical History:   Procedure Laterality Date   • BRONCHOSCOPY      removal of obstructing polypoid tissue right middle lung   • COLOSTOMY N/A 9/22/2022    Procedure: LAPAROSCOPIC COLOSTOMY CREATION, FLEXIBLE SIGMOIDOSCOPY;  Surgeon: Hiram Viveros MD;  Location: [facility];  Service: General;  Laterality: N/A;       No current facility-administered medications on file prior to encounter.     Current Outpatient Medications on File Prior to Encounter   Medication Sig Dispense Refill   • acetaminophen (TYLENOL) 325 MG tablet Take 2 tablets by mouth Every 6 (Six) Hours As Needed for Mild Pain. (Patient taking  differently: Take 325 mg by mouth Every 6 (Six) Hours As Needed for Mild Pain.)     • fluticasone (FLONASE) 50 MCG/ACT nasal spray 1 spray into the nostril(s) as directed by provider Daily.     • lisinopril (PRINIVIL,ZESTRIL) 10 MG tablet Take 10 mg by mouth Daily.     • montelukast (SINGULAIR) 10 MG tablet Take 10 mg by mouth Every Night.     • rosuvastatin (CRESTOR) 10 MG tablet Take 10 mg by mouth Daily.     • [DISCONTINUED] amoxicillin (AMOXIL) 875 MG tablet      • [DISCONTINUED] ipratropium-albuterol (DUO-NEB) 0.5-2.5 mg/3 ml nebulizer 3 mL Every 6 (Six) Hours.     • [DISCONTINUED] levoFLOXacin (LEVAQUIN) 750 MG tablet Daily.     • [DISCONTINUED] naproxen (NAPROSYN) 250 MG tablet Take 1 tablet by mouth 2 (Two) Times a Day As Needed for Mild Pain.     • [DISCONTINUED] nystatin (MYCOSTATIN) 826498 UNIT/GM ointment nystatin 100,000 unit/gram topical ointment   APPLY TO THE AFFECTED AREA(S) BY TOPICAL ROUTE 2 TIMES PER DAY     • [DISCONTINUED] ondansetron ODT (ZOFRAN-ODT) 8 MG disintegrating tablet      • [DISCONTINUED] acetaminophen (TYLENOL) 325 MG tablet Every 6 (Six) Hours.     • [DISCONTINUED] lisinopril (PRINIVIL,ZESTRIL) 10 MG tablet Take 10 mg by mouth Daily.     • [DISCONTINUED] montelukast (SINGULAIR) 10 MG tablet Take 10 mg by mouth Every Night.     • [DISCONTINUED] naproxen (NAPROSYN) 250 MG tablet Every 12 (Twelve) Hours.         No Known Allergies    Social History     Socioeconomic History   • Marital status:    Tobacco Use   • Smoking status: Never Smoker   • Smokeless tobacco: Never Used   Substance and Sexual Activity   • Alcohol use: Not Currently     Comment: previously drank 2 glasses of wine/beer nightly   • Drug use: Never       Family History   Problem Relation Age of Onset   • Diabetes Mother    • Diabetes Father    • Heart disease Father          REVIEW OF SYSTEMS:  A 12 point review of systems was performed and is negative except as noted above.    Objective   PHYSICAL EXAM:    BP  "137/88 (BP Location: Left arm, Patient Position: Lying)   Pulse 77   Temp 99.1 °F (37.3 °C) (Oral)   Resp 18   Ht 162.6 cm (64\")   Wt 78 kg (172 lb)   SpO2 94%   BMI 29.52 kg/m²       General: well appearing male in no acute distress  HEENT: sclerae anicteric, oropharynx clear  Lymphatics: no cervical, supraclavicular, inguinal, or axillary adenopathy  Neck: Supple. No thyromegaly.  Cardiovascular: regular rate and rhythm, no murmurs  Lungs: clear to auscultation bilaterally. No respiratory distress  Abdomen: soft, nontender, nondistended.  No palpable organomegaly  Extremities: no lower extremity edema, cyanosis, or clubbing  Skin: no rashes, lesions, bruising, or petechiae  Neuro: Alert and oriented x3. Moves all extremities.    Results:    Results from last 7 days   Lab Units 10/03/22  0734   WBC 10*3/mm3 10.79   HEMOGLOBIN g/dL 10.6*   PLATELETS 10*3/mm3 355     Results from last 7 days   Lab Units 10/03/22  0734   SODIUM mmol/L 154*   POTASSIUM mmol/L 3.4*   CO2 mmol/L 28.0   BUN mg/dL 8   CREATININE mg/dL 0.50*   GLUCOSE mg/dL 106*     Results from last 7 days   Lab Units 10/03/22  0734   AST (SGOT) U/L 178*   ALT (SGPT) U/L 48*   BILIRUBIN mg/dL 1.0   ALK PHOS U/L 439*         CT Abdomen Pelvis With Contrast    Result Date: 10/3/2022  1. No evidence of pulmonary embolic disease. 2. Small but increasing right pleural effusion compared to 9/21/2022. 3. Stable small area of linear scarring in the right middle lobe. 4. Mild ascending aortic ectasia to approximately 4.0 cm again incidentally noted.    CT SCAN OF THE ABDOMEN AND PELVIS WITH IV CONTRAST: Numerous hepatic metastases are again noted. There is extensive retroperitoneal lymphadenopathy, and an approximately 5.4 cm left upper quadrant mass involving the left lateral renal parenchyma. Volume and shape of the exophytic mass favors that this is invasive to the kidney, rather than originating from kidney. There are multiple small splenic lesions " "consistent with metastases. Adrenal glands appear to be spared. Gallbladder appears normal. Pancreas appears to be normal, although the tip of the pancreatic tail is immediately adjacent the left upper quadrant mass.  No upper abdominal free air is seen. There is a small amount of ascites. Diffusely increased bowel gas suggests a low level ileus. Left mid abdominal ostomy site is clear. Large pelvic mass is again noted, which appears to extend into the small bowel mesentery, although these may be adjacent but separate nodes. Bladder is normally distended and normal in appearance. There is relatively little intrapelvic free fluid.  Delayed venous phase images show contrast within the renal collecting systems, which appear minimally more dilated than on the prior study. Distal ureters may be mildly compressed by the large pelvic mass. The bladder itself is displaced anteriorly and superiorly, but normally distended and grossly normal in appearance. No evidence of a discrete, drainable inflammatory collection is appreciated. Bony structures appear to be intact.   IMPRESSION:  1. Expected changes of recent colostomy placement. No visible associated inflammation. 2. Advanced metastatic disease, including numerous liver lesions, extensive retrocrural lymphadenopathy, multiple splenic lesions, left upper quadrant mass that that involves the lateral margin of the left kidney. Large pelvic mass and extensive infiltration of the lower small bowel mesentery, whether by direct extension of tumor, or multiple irregularly enlarged adjacent lymph nodes. 3. Bilateral hydronephrosis which appears increased from 9/20/2022, but no evidence of high-grade obstructive uropathy. 4. Mildly increased ascites. No evidence of drainable inflammatory collection. 5. \"Gassy\" appearance of nondependent large and small bowel, suggesting a low level ileus  This report was finalized on 10/3/2022 9:14 AM by Dr. Del Wilks MD.      CT Angiogram " Chest    Result Date: 10/3/2022  1. No evidence of pulmonary embolic disease. 2. Small but increasing right pleural effusion compared to 9/21/2022. 3. Stable small area of linear scarring in the right middle lobe. 4. Mild ascending aortic ectasia to approximately 4.0 cm again incidentally noted.    CT SCAN OF THE ABDOMEN AND PELVIS WITH IV CONTRAST: Numerous hepatic metastases are again noted. There is extensive retroperitoneal lymphadenopathy, and an approximately 5.4 cm left upper quadrant mass involving the left lateral renal parenchyma. Volume and shape of the exophytic mass favors that this is invasive to the kidney, rather than originating from kidney. There are multiple small splenic lesions consistent with metastases. Adrenal glands appear to be spared. Gallbladder appears normal. Pancreas appears to be normal, although the tip of the pancreatic tail is immediately adjacent the left upper quadrant mass.  No upper abdominal free air is seen. There is a small amount of ascites. Diffusely increased bowel gas suggests a low level ileus. Left mid abdominal ostomy site is clear. Large pelvic mass is again noted, which appears to extend into the small bowel mesentery, although these may be adjacent but separate nodes. Bladder is normally distended and normal in appearance. There is relatively little intrapelvic free fluid.  Delayed venous phase images show contrast within the renal collecting systems, which appear minimally more dilated than on the prior study. Distal ureters may be mildly compressed by the large pelvic mass. The bladder itself is displaced anteriorly and superiorly, but normally distended and grossly normal in appearance. No evidence of a discrete, drainable inflammatory collection is appreciated. Bony structures appear to be intact.   IMPRESSION:  1. Expected changes of recent colostomy placement. No visible associated inflammation. 2. Advanced metastatic disease, including numerous liver  "lesions, extensive retrocrural lymphadenopathy, multiple splenic lesions, left upper quadrant mass that that involves the lateral margin of the left kidney. Large pelvic mass and extensive infiltration of the lower small bowel mesentery, whether by direct extension of tumor, or multiple irregularly enlarged adjacent lymph nodes. 3. Bilateral hydronephrosis which appears increased from 9/20/2022, but no evidence of high-grade obstructive uropathy. 4. Mildly increased ascites. No evidence of drainable inflammatory collection. 5. \"Gassy\" appearance of nondependent large and small bowel, suggesting a low level ileus  This report was finalized on 10/3/2022 9:14 AM by Dr. Del Wilks MD.        Assessment    ASSESSMENT & PLAN:  Classical Hodgkin's lymphoma vs CD 30 positive T/B-cell lymphoma  -Noted on recent rectal biopsy  -CT C/A/P on admission concerning for significant disease burden involving the liver, lymph nodes, spleen, left upper quadrant mass, kidney, mesentery  -Discussed results with patient, wife and son today  -Patient would benefit from chemotherapy using either ABVD vs AAVD.  Will defer to Dr. Leary on which chemotherapy to use however patient would benefit from inpatient chemotherapy starting tomorrow  -Have ordered PICC placement  -Have ordered ECHO  -High risk for tumor lysis syndrome given his disease burden  -Have ordered uric acid, ESR and I started the patient on allopurinol  -Would consider monitoring tumor lysis labs every 8 hours for 24-48 hours after chemotherapy  -Have discussed the case with Dr. Lauren today and patient will be moved to  in preparation for chemotherapy tomorrow    Bilateral hydronephrosis  -Secondary to his active malignancy  -Urology consulted however patient likely to have a significant response to chemotherapy tomorrow  -Unless emergently needed, would consider holding on bilateral ureteral stent placement at this time    Fever of unknown origin  -Likely secondary to his " diffuse malignancy  -Okay for broad-spectrum antibiotics while infectious work-up is pending    Hypernatremia  -Unclear etiology  -Management per primary team    Thank you for the consult.  Please do not hesitate to contact with any questions or concerns.  Oncology team will continue to follow while inpatient    River Odom MD  Hematology and Oncology    10/3/2022  14:59 EDT

## 2022-10-03 NOTE — CASE MANAGEMENT/SOCIAL WORK
Case Management Readmission Assessment Note    Case Management Readmission Assessment (all recorded)     Readmission Interview     Row Name 10/03/22 1340             Readmission Indications    Is this hospitalization related to the prior hospital diagnosis? Yes      What was the reason you were admitted? Temperature of unknown origin      Row Name 10/03/22 1340             Recommendation for rehospitalization    Did you speak with your physician prior to coming to the hospital No      Did you seek care elsewhere prior to coming to the hospital? No      Row Name 10/03/22 1340             Follow up appointment    Do you have a PCP? Yes      Did you have an appointment with PCP/specialist after hospitalization within 7 days? Yes      Did you keep your appointment? Yes      Row Name 10/03/22 1340             Index discharge location/services    Where did you go upon discharge? Home      Do you have supportive family or friends in the home? Yes      Row Name 10/03/22 1340             Discharge Readiness    Recommendation based on interview Education on diagnosis/self management

## 2022-10-04 ENCOUNTER — ANESTHESIA (OUTPATIENT)
Dept: PERIOP | Facility: HOSPITAL | Age: 72
End: 2022-10-04

## 2022-10-04 ENCOUNTER — ANESTHESIA EVENT (OUTPATIENT)
Dept: PERIOP | Facility: HOSPITAL | Age: 72
End: 2022-10-04

## 2022-10-04 PROBLEM — C85.90 LYMPHOMA: Status: ACTIVE | Noted: 2022-10-04

## 2022-10-04 LAB
ABO GROUP BLD: NORMAL
ALBUMIN SERPL-MCNC: 2.3 G/DL (ref 3.5–5.2)
ALBUMIN/GLOB SERPL: 0.7 G/DL
ALP SERPL-CCNC: 413 U/L (ref 39–117)
ALT SERPL W P-5'-P-CCNC: 52 U/L (ref 1–41)
ANION GAP SERPL CALCULATED.3IONS-SCNC: 10 MMOL/L (ref 5–15)
ANION GAP SERPL CALCULATED.3IONS-SCNC: 8 MMOL/L (ref 5–15)
AST SERPL-CCNC: 151 U/L (ref 1–40)
BASOPHILS # BLD AUTO: 0.01 10*3/MM3 (ref 0–0.2)
BASOPHILS NFR BLD AUTO: 0.1 % (ref 0–1.5)
BILIRUB SERPL-MCNC: 1.4 MG/DL (ref 0–1.2)
BLD GP AB SCN SERPL QL: NEGATIVE
BUN SERPL-MCNC: 6 MG/DL (ref 8–23)
BUN SERPL-MCNC: 8 MG/DL (ref 8–23)
BUN/CREAT SERPL: 10.5 (ref 7–25)
BUN/CREAT SERPL: 16.7 (ref 7–25)
CALCIUM SPEC-SCNC: 7.9 MG/DL (ref 8.6–10.5)
CALCIUM SPEC-SCNC: 8.4 MG/DL (ref 8.6–10.5)
CHLORIDE SERPL-SCNC: 89 MMOL/L (ref 98–107)
CHLORIDE SERPL-SCNC: 92 MMOL/L (ref 98–107)
CO2 SERPL-SCNC: 28 MMOL/L (ref 22–29)
CO2 SERPL-SCNC: 30 MMOL/L (ref 22–29)
CREAT SERPL-MCNC: 0.48 MG/DL (ref 0.76–1.27)
CREAT SERPL-MCNC: 0.57 MG/DL (ref 0.76–1.27)
CYTO UR: NORMAL
DEPRECATED RDW RBC AUTO: 46.3 FL (ref 37–54)
EGFRCR SERPLBLD CKD-EPI 2021: 105.5 ML/MIN/1.73
EGFRCR SERPLBLD CKD-EPI 2021: 111.1 ML/MIN/1.73
EOSINOPHIL # BLD AUTO: 0 10*3/MM3 (ref 0–0.4)
EOSINOPHIL NFR BLD AUTO: 0 % (ref 0.3–6.2)
ERYTHROCYTE [DISTWIDTH] IN BLOOD BY AUTOMATED COUNT: 15.2 % (ref 12.3–15.4)
GLOBULIN UR ELPH-MCNC: 3.1 GM/DL
GLUCOSE SERPL-MCNC: 126 MG/DL (ref 65–99)
GLUCOSE SERPL-MCNC: 139 MG/DL (ref 65–99)
HCT VFR BLD AUTO: 29.3 % (ref 37.5–51)
HGB BLD-MCNC: 9.3 G/DL (ref 13–17.7)
IMM GRANULOCYTES # BLD AUTO: 0.12 10*3/MM3 (ref 0–0.05)
IMM GRANULOCYTES NFR BLD AUTO: 1.3 % (ref 0–0.5)
LAB AP CASE REPORT: NORMAL
LAB AP CLINICAL INFORMATION: NORMAL
LAB AP DIAGNOSIS COMMENT: NORMAL
LYMPHOCYTES # BLD AUTO: 0.33 10*3/MM3 (ref 0.7–3.1)
LYMPHOCYTES NFR BLD AUTO: 3.5 % (ref 19.6–45.3)
MCH RBC QN AUTO: 26.6 PG (ref 26.6–33)
MCHC RBC AUTO-ENTMCNC: 31.7 G/DL (ref 31.5–35.7)
MCV RBC AUTO: 84 FL (ref 79–97)
MONOCYTES # BLD AUTO: 0.89 10*3/MM3 (ref 0.1–0.9)
MONOCYTES NFR BLD AUTO: 9.4 % (ref 5–12)
NEUTROPHILS NFR BLD AUTO: 8.09 10*3/MM3 (ref 1.7–7)
NEUTROPHILS NFR BLD AUTO: 85.7 % (ref 42.7–76)
NRBC BLD AUTO-RTO: 0 /100 WBC (ref 0–0.2)
PATH REPORT.ADDENDUM SPEC: NORMAL
PATH REPORT.FINAL DX SPEC: NORMAL
PATH REPORT.GROSS SPEC: NORMAL
PLATELET # BLD AUTO: 319 10*3/MM3 (ref 140–450)
PMV BLD AUTO: 9.1 FL (ref 6–12)
POTASSIUM SERPL-SCNC: 3.3 MMOL/L (ref 3.5–5.2)
POTASSIUM SERPL-SCNC: 3.7 MMOL/L (ref 3.5–5.2)
PROT SERPL-MCNC: 5.4 G/DL (ref 6–8.5)
RBC # BLD AUTO: 3.49 10*6/MM3 (ref 4.14–5.8)
RH BLD: POSITIVE
SODIUM SERPL-SCNC: 127 MMOL/L (ref 136–145)
SODIUM SERPL-SCNC: 130 MMOL/L (ref 136–145)
T&S EXPIRATION DATE: NORMAL
WBC NRBC COR # BLD: 9.44 10*3/MM3 (ref 3.4–10.8)

## 2022-10-04 PROCEDURE — 25010000002 DEXAMETHASONE PER 1 MG

## 2022-10-04 PROCEDURE — 88305 TISSUE EXAM BY PATHOLOGIST: CPT | Performed by: STUDENT IN AN ORGANIZED HEALTH CARE EDUCATION/TRAINING PROGRAM

## 2022-10-04 PROCEDURE — 88360 TUMOR IMMUNOHISTOCHEM/MANUAL: CPT | Performed by: STUDENT IN AN ORGANIZED HEALTH CARE EDUCATION/TRAINING PROGRAM

## 2022-10-04 PROCEDURE — 86900 BLOOD TYPING SEROLOGIC ABO: CPT | Performed by: STUDENT IN AN ORGANIZED HEALTH CARE EDUCATION/TRAINING PROGRAM

## 2022-10-04 PROCEDURE — 88341 IMHCHEM/IMCYTCHM EA ADD ANTB: CPT | Performed by: STUDENT IN AN ORGANIZED HEALTH CARE EDUCATION/TRAINING PROGRAM

## 2022-10-04 PROCEDURE — 86901 BLOOD TYPING SEROLOGIC RH(D): CPT | Performed by: STUDENT IN AN ORGANIZED HEALTH CARE EDUCATION/TRAINING PROGRAM

## 2022-10-04 PROCEDURE — 25010000002 ENOXAPARIN PER 10 MG: Performed by: INTERNAL MEDICINE

## 2022-10-04 PROCEDURE — 80053 COMPREHEN METABOLIC PANEL: CPT | Performed by: INTERNAL MEDICINE

## 2022-10-04 PROCEDURE — 25010000002 PIPERACILLIN SOD-TAZOBACTAM PER 1 G: Performed by: STUDENT IN AN ORGANIZED HEALTH CARE EDUCATION/TRAINING PROGRAM

## 2022-10-04 PROCEDURE — 85025 COMPLETE CBC W/AUTO DIFF WBC: CPT | Performed by: INTERNAL MEDICINE

## 2022-10-04 PROCEDURE — 25010000002 PIPERACILLIN SOD-TAZOBACTAM PER 1 G: Performed by: INTERNAL MEDICINE

## 2022-10-04 PROCEDURE — 99233 SBSQ HOSP IP/OBS HIGH 50: CPT | Performed by: INTERNAL MEDICINE

## 2022-10-04 PROCEDURE — 25010000002 PIPERACILLIN SOD-TAZOBACTAM PER 1 G: Performed by: ANESTHESIOLOGY

## 2022-10-04 PROCEDURE — 88342 IMHCHEM/IMCYTCHM 1ST ANTB: CPT | Performed by: STUDENT IN AN ORGANIZED HEALTH CARE EDUCATION/TRAINING PROGRAM

## 2022-10-04 PROCEDURE — 25010000002 PROPOFOL 10 MG/ML EMULSION

## 2022-10-04 PROCEDURE — 25010000002 FENTANYL CITRATE (PF) 50 MCG/ML SOLUTION

## 2022-10-04 PROCEDURE — 25010000002 ONDANSETRON PER 1 MG: Performed by: ANESTHESIOLOGY

## 2022-10-04 PROCEDURE — 86850 RBC ANTIBODY SCREEN: CPT | Performed by: STUDENT IN AN ORGANIZED HEALTH CARE EDUCATION/TRAINING PROGRAM

## 2022-10-04 PROCEDURE — 0DBP3ZX EXCISION OF RECTUM, PERCUTANEOUS APPROACH, DIAGNOSTIC: ICD-10-PCS | Performed by: STUDENT IN AN ORGANIZED HEALTH CARE EDUCATION/TRAINING PROGRAM

## 2022-10-04 RX ORDER — LIDOCAINE HYDROCHLORIDE 20 MG/ML
INJECTION, SOLUTION INFILTRATION; PERINEURAL AS NEEDED
Status: DISCONTINUED | OUTPATIENT
Start: 2022-10-04 | End: 2022-10-04 | Stop reason: SURG

## 2022-10-04 RX ORDER — DEXAMETHASONE 4 MG/1
20 TABLET ORAL
Status: COMPLETED | OUTPATIENT
Start: 2022-10-05 | End: 2022-10-07

## 2022-10-04 RX ORDER — SODIUM CHLORIDE, SODIUM LACTATE, POTASSIUM CHLORIDE, CALCIUM CHLORIDE 600; 310; 30; 20 MG/100ML; MG/100ML; MG/100ML; MG/100ML
9 INJECTION, SOLUTION INTRAVENOUS CONTINUOUS PRN
Status: DISCONTINUED | OUTPATIENT
Start: 2022-10-04 | End: 2022-10-11

## 2022-10-04 RX ORDER — SODIUM CHLORIDE, SODIUM LACTATE, POTASSIUM CHLORIDE, CALCIUM CHLORIDE 600; 310; 30; 20 MG/100ML; MG/100ML; MG/100ML; MG/100ML
100 INJECTION, SOLUTION INTRAVENOUS CONTINUOUS
Status: DISCONTINUED | OUTPATIENT
Start: 2022-10-04 | End: 2022-10-05

## 2022-10-04 RX ORDER — FAMOTIDINE 20 MG/1
20 TABLET, FILM COATED ORAL
Status: COMPLETED | OUTPATIENT
Start: 2022-10-04 | End: 2022-10-04

## 2022-10-04 RX ORDER — LIDOCAINE HYDROCHLORIDE 10 MG/ML
0.5 INJECTION, SOLUTION EPIDURAL; INFILTRATION; INTRACAUDAL; PERINEURAL ONCE AS NEEDED
Status: COMPLETED | OUTPATIENT
Start: 2022-10-04 | End: 2022-10-04

## 2022-10-04 RX ORDER — ONDANSETRON 2 MG/ML
INJECTION INTRAMUSCULAR; INTRAVENOUS AS NEEDED
Status: DISCONTINUED | OUTPATIENT
Start: 2022-10-04 | End: 2022-10-04 | Stop reason: SURG

## 2022-10-04 RX ORDER — SODIUM CHLORIDE 9 MG/ML
100 INJECTION, SOLUTION INTRAVENOUS CONTINUOUS
Status: DISCONTINUED | OUTPATIENT
Start: 2022-10-04 | End: 2022-10-06

## 2022-10-04 RX ORDER — FENTANYL CITRATE 50 UG/ML
50 INJECTION, SOLUTION INTRAMUSCULAR; INTRAVENOUS
Status: DISCONTINUED | OUTPATIENT
Start: 2022-10-04 | End: 2022-10-04 | Stop reason: HOSPADM

## 2022-10-04 RX ORDER — ONDANSETRON 2 MG/ML
4 INJECTION INTRAMUSCULAR; INTRAVENOUS ONCE AS NEEDED
Status: DISCONTINUED | OUTPATIENT
Start: 2022-10-04 | End: 2022-10-04 | Stop reason: HOSPADM

## 2022-10-04 RX ORDER — FENTANYL CITRATE 50 UG/ML
INJECTION, SOLUTION INTRAMUSCULAR; INTRAVENOUS AS NEEDED
Status: DISCONTINUED | OUTPATIENT
Start: 2022-10-04 | End: 2022-10-04 | Stop reason: SURG

## 2022-10-04 RX ORDER — ROCURONIUM BROMIDE 10 MG/ML
INJECTION, SOLUTION INTRAVENOUS AS NEEDED
Status: DISCONTINUED | OUTPATIENT
Start: 2022-10-04 | End: 2022-10-04 | Stop reason: SURG

## 2022-10-04 RX ORDER — DEXAMETHASONE SODIUM PHOSPHATE 10 MG/ML
INJECTION INTRAMUSCULAR; INTRAVENOUS AS NEEDED
Status: DISCONTINUED | OUTPATIENT
Start: 2022-10-04 | End: 2022-10-04 | Stop reason: SURG

## 2022-10-04 RX ORDER — MAGNESIUM HYDROXIDE 1200 MG/15ML
LIQUID ORAL AS NEEDED
Status: DISCONTINUED | OUTPATIENT
Start: 2022-10-04 | End: 2022-10-04 | Stop reason: HOSPADM

## 2022-10-04 RX ORDER — PROPOFOL 10 MG/ML
VIAL (ML) INTRAVENOUS AS NEEDED
Status: DISCONTINUED | OUTPATIENT
Start: 2022-10-04 | End: 2022-10-04 | Stop reason: SURG

## 2022-10-04 RX ORDER — NALOXONE HCL 0.4 MG/ML
0.4 VIAL (ML) INJECTION AS NEEDED
Status: DISCONTINUED | OUTPATIENT
Start: 2022-10-04 | End: 2022-10-04 | Stop reason: HOSPADM

## 2022-10-04 RX ORDER — BUPIVACAINE HYDROCHLORIDE AND EPINEPHRINE 2.5; 5 MG/ML; UG/ML
INJECTION, SOLUTION EPIDURAL; INFILTRATION; INTRACAUDAL; PERINEURAL AS NEEDED
Status: DISCONTINUED | OUTPATIENT
Start: 2022-10-04 | End: 2022-10-04 | Stop reason: HOSPADM

## 2022-10-04 RX ORDER — SODIUM CHLORIDE 0.9 % (FLUSH) 0.9 %
10 SYRINGE (ML) INJECTION EVERY 12 HOURS SCHEDULED
Status: DISCONTINUED | OUTPATIENT
Start: 2022-10-04 | End: 2022-10-04 | Stop reason: HOSPADM

## 2022-10-04 RX ORDER — EPHEDRINE SULFATE 50 MG/ML
5 INJECTION, SOLUTION INTRAVENOUS ONCE AS NEEDED
Status: DISCONTINUED | OUTPATIENT
Start: 2022-10-04 | End: 2022-10-04 | Stop reason: HOSPADM

## 2022-10-04 RX ORDER — ACETAMINOPHEN AND CODEINE PHOSPHATE 300; 30 MG/1; MG/1
1 TABLET ORAL EVERY 4 HOURS PRN
Status: ACTIVE | OUTPATIENT
Start: 2022-10-04 | End: 2022-10-11

## 2022-10-04 RX ORDER — SODIUM CHLORIDE 0.9 % (FLUSH) 0.9 %
10 SYRINGE (ML) INJECTION AS NEEDED
Status: DISCONTINUED | OUTPATIENT
Start: 2022-10-04 | End: 2022-10-04 | Stop reason: HOSPADM

## 2022-10-04 RX ORDER — FLUTICASONE PROPIONATE 50 MCG
1 SPRAY, SUSPENSION (ML) NASAL DAILY
Status: DISCONTINUED | OUTPATIENT
Start: 2022-10-04 | End: 2022-10-20 | Stop reason: HOSPADM

## 2022-10-04 RX ADMIN — ENOXAPARIN SODIUM 40 MG: 40 INJECTION SUBCUTANEOUS at 08:38

## 2022-10-04 RX ADMIN — Medication 10 ML: at 20:32

## 2022-10-04 RX ADMIN — FAMOTIDINE 20 MG: 20 TABLET ORAL at 15:03

## 2022-10-04 RX ADMIN — LIDOCAINE HYDROCHLORIDE 0.5 ML: 10 INJECTION, SOLUTION EPIDURAL; INFILTRATION; INTRACAUDAL; PERINEURAL at 15:03

## 2022-10-04 RX ADMIN — ROCURONIUM BROMIDE 40 MG: 10 INJECTION INTRAVENOUS at 16:53

## 2022-10-04 RX ADMIN — SUGAMMADEX 200 MG: 100 INJECTION, SOLUTION INTRAVENOUS at 17:31

## 2022-10-04 RX ADMIN — MONTELUKAST 10 MG: 10 TABLET, FILM COATED ORAL at 20:33

## 2022-10-04 RX ADMIN — ALLOPURINOL 200 MG: 100 TABLET ORAL at 20:33

## 2022-10-04 RX ADMIN — ONDANSETRON 4 MG: 2 INJECTION INTRAMUSCULAR; INTRAVENOUS at 17:16

## 2022-10-04 RX ADMIN — FENTANYL CITRATE 100 MCG: 50 INJECTION, SOLUTION INTRAMUSCULAR; INTRAVENOUS at 16:52

## 2022-10-04 RX ADMIN — FLUTICASONE PROPIONATE 1 SPRAY: 50 SPRAY, METERED NASAL at 20:32

## 2022-10-04 RX ADMIN — PROPOFOL 160 MG: 10 INJECTION, EMULSION INTRAVENOUS at 16:52

## 2022-10-04 RX ADMIN — TAZOBACTAM SODIUM AND PIPERACILLIN SODIUM 3.38 G: 375; 3 INJECTION, SOLUTION INTRAVENOUS at 16:51

## 2022-10-04 RX ADMIN — LIDOCAINE HYDROCHLORIDE 50 MG: 20 INJECTION, SOLUTION INFILTRATION; PERINEURAL at 16:52

## 2022-10-04 RX ADMIN — Medication 10 ML: at 08:38

## 2022-10-04 RX ADMIN — DEXAMETHASONE SODIUM PHOSPHATE 4 MG: 10 INJECTION INTRAMUSCULAR; INTRAVENOUS at 16:57

## 2022-10-04 RX ADMIN — DEXTROSE AND SODIUM CHLORIDE 75 ML/HR: 5; 450 INJECTION, SOLUTION INTRAVENOUS at 09:18

## 2022-10-04 RX ADMIN — TAZOBACTAM SODIUM AND PIPERACILLIN SODIUM 3.38 G: 375; 3 INJECTION, SOLUTION INTRAVENOUS at 08:38

## 2022-10-04 RX ADMIN — SODIUM CHLORIDE 75 ML/HR: 9 INJECTION, SOLUTION INTRAVENOUS at 10:50

## 2022-10-04 RX ADMIN — SODIUM CHLORIDE, POTASSIUM CHLORIDE, SODIUM LACTATE AND CALCIUM CHLORIDE 9 ML/HR: 600; 310; 30; 20 INJECTION, SOLUTION INTRAVENOUS at 15:03

## 2022-10-04 RX ADMIN — TAZOBACTAM SODIUM AND PIPERACILLIN SODIUM 3.38 G: 375; 3 INJECTION, SOLUTION INTRAVENOUS at 18:30

## 2022-10-04 RX ADMIN — Medication 10 ML: at 20:41

## 2022-10-04 NOTE — PLAN OF CARE
Goal Outcome Evaluation:  Plan of Care Reviewed With: patient, spouse        Progress: no change   Pt is currently off unit getting anorectal biopsy..

## 2022-10-04 NOTE — PROGRESS NOTES
"[facility] ([facility])      Subjective: Pathology consistent with lymphoma, further tissue was required in order to further delineate his line of lymphoma.  I have been asked to obtain additional tissue.  He is having intermittent fevers still.  White count is normal.    Physical Exam:  Blood pressure 145/79, pulse 78, temperature (!) 101.7 °F (38.7 °C), temperature source Temporal, resp. rate 18, height 162.6 cm (64\"), weight 78 kg (172 lb), SpO2 95 %.    Abd: Soft, nontender, protuberant but nondistended.  Colostomy is pink patent and productive    Labs past 24 hours:  Lab Results (last 24 hours)     Procedure Component Value Units Date/Time    Blood Culture - Blood, Arm, Right [[id number]]  (Normal) Collected: 10/03/22 0808    Specimen: Blood from Arm, Right Updated: 10/04/22 1032     Blood Culture No growth at 24 hours    Comprehensive Metabolic Panel [[id number]]  (Abnormal) Collected: 10/04/22 0639    Specimen: Blood Updated: 10/04/22 0833     Glucose 139 mg/dL      BUN 6 mg/dL      Creatinine 0.57 mg/dL      Sodium 130 mmol/L      Potassium 3.7 mmol/L      Chloride 92 mmol/L      CO2 30.0 mmol/L      Calcium 8.4 mg/dL      Total Protein 5.4 g/dL      Albumin 2.30 g/dL      ALT (SGPT) 52 U/L      AST (SGOT) 151 U/L      Alkaline Phosphatase 413 U/L      Total Bilirubin 1.4 mg/dL      Globulin 3.1 gm/dL      Comment: Calculated Result        A/G Ratio 0.7 g/dL      BUN/Creatinine Ratio 10.5     Anion Gap 8.0 mmol/L      eGFR 105.5 mL/min/1.73      Comment: National Kidney Foundation and American Society of Nephrology (ASN) Task Force recommended calculation based on the Chronic Kidney Disease Epidemiology Collaboration (CKD-EPI) equation refit without adjustment for race.       Narrative:      GFR Normal >60  Chronic Kidney Disease <60  Kidney Failure <15      CBC Auto Differential [[id number]]  (Abnormal) Collected: 10/04/22 0639    Specimen: Blood Updated: 10/04/22 0806     WBC " 9.44 10*3/mm3      RBC 3.49 10*6/mm3      Hemoglobin 9.3 g/dL      Hematocrit 29.3 %      MCV 84.0 fL      MCH 26.6 pg      MCHC 31.7 g/dL      RDW 15.2 %      RDW-SD 46.3 fl      MPV 9.1 fL      Platelets 319 10*3/mm3      Neutrophil % 85.7 %      Lymphocyte % 3.5 %      Monocyte % 9.4 %      Eosinophil % 0.0 %      Basophil % 0.1 %      Immature Grans % 1.3 %      Neutrophils, Absolute 8.09 10*3/mm3      Lymphocytes, Absolute 0.33 10*3/mm3      Monocytes, Absolute 0.89 10*3/mm3      Eosinophils, Absolute 0.00 10*3/mm3      Basophils, Absolute 0.01 10*3/mm3      Immature Grans, Absolute 0.12 10*3/mm3      nRBC 0.0 /100 WBC     Blood Culture - Blood, Arm, Left [[id number]]  (Normal) Collected: 10/03/22 0734    Specimen: Blood from Arm, Left Updated: 10/04/22 0803     Blood Culture No growth at 24 hours    Potassium [[id number]]  (Normal) Collected: 10/03/22 2213    Specimen: Blood Updated: 10/03/22 2305     Potassium 4.1 mmol/L     Urinalysis, Microscopic Only - Urine, Clean Catch [[id number]]  (Abnormal) Collected: 10/03/22 2016    Specimen: Urine, Clean Catch Updated: 10/03/22 2045     RBC, UA 0-2 /HPF      WBC, UA 3-5 /HPF      Comment: Urine culture not indicated.        Bacteria, UA Trace /HPF      Squamous Epithelial Cells, UA 0-2 /HPF      Hyaline Casts, UA None Seen /LPF      Methodology Manual Light Microscopy    Urinalysis With Culture If Indicated - Urine, Clean Catch [[id number]]  (Abnormal) Collected: 10/03/22 2016    Specimen: Urine, Clean Catch Updated: 10/03/22 2027     Color, UA Yellow     Appearance, UA Clear     pH, UA 7.0     Specific Gravity, UA 1.022     Glucose, UA Negative     Ketones, UA Negative     Bilirubin, UA Negative     Blood, UA Trace     Protein, UA Trace     Leuk Esterase, UA Negative     Nitrite, UA Negative     Urobilinogen, UA 2.0 E.U./dL    Narrative:      In absence of clinical symptoms, the presence of pyuria, bacteria, and/or nitrites on the urinalysis result does not correlate  with infection.          I/O last shift:  I/O this shift:  In: 1000 [I.V.:1000]  Out: 100 [Urine:100]       Assessment and Plan:  72-year-old male with lymphoma involving the rectum, small bowel mesentery, liver, left kidney and right pleura.  Discussions with pathology and oncology, need further tissue to further delineate his lymphoma    Plan  I will proceed now to the OR for anorectal exam under anesthesia and transanal biopsy of mass  Hopefully will take multiple cores in order to elucidate a further diagnosis  I discussed this with both the patient and his wife, they understand the risks and benefits associated with this and wished to proceed.    Hiram Viveros MD  [facility] ([facility]  10/04/22  16:41 EDT

## 2022-10-04 NOTE — PLAN OF CARE
Goal Outcome Evaluation:  Progress: no change  Outcome Evaluation: Patient is A&O. NSR on tele. Placed on 2L nasal cannula while sleeping. Tmax of 101.7 overnight, PRN tylenol given. Good UO overnight. Continue plan of care.

## 2022-10-04 NOTE — PROGRESS NOTES
"HEMATOLOGY/ONCOLOGY PROGRESS NOTE    S: Feeling ok today. Denies abdominal pain or nausea/vomiting.    Past medical history, social history and family history was reviewed and unchanged from prior visit.    Review of Systems:  Limited by [facility]      Medications:  The current medication list was reviewed in the EMR    ALLERGIES:  No Known Allergies      Physical Exam    VITAL SIGNS:  /79 (BP Location: Right arm, Patient Position: Lying)   Pulse 78   Temp (!) 101.7 °F (38.7 °C) (Temporal)   Resp 18   Ht 162.6 cm (64\")   Wt 78 kg (172 lb)   SpO2 95%   BMI 29.52 kg/m²   Temp:  [96.2 °F (35.7 °C)-102.3 °F (39.1 °C)] 101.7 °F (38.7 °C)      Performance Status:                Physical Exam    General: well appearing, in no acute distress  HEENT: sclerae anicteric,   Lymphatics: no cervical, supraclavicular, or axillary adenopathy  Cardiovascular: regular rate and rhythm, no murmurs, rubs or gallops  Lungs: clear to auscultation bilaterally  Abdomen: soft, nontender, nondistended.  No palpable organomegaly  Extremities: no lower extremity edema  Skin: no rashes, lesions, bruising, or petechiae  Msk:  Shows no weakness of the large muscle groups  Psych: Mood is stable        RECENT LABS:    Lab Results   Component Value Date    HGB 9.3 (L) 10/04/2022    HCT 29.3 (L) 10/04/2022    MCV 84.0 10/04/2022     10/04/2022    WBC 9.44 10/04/2022    NEUTROABS 8.09 (H) 10/04/2022    LYMPHSABS 0.33 (L) 10/04/2022    MONOSABS 0.89 10/04/2022    EOSABS 0.00 10/04/2022    BASOSABS 0.01 10/04/2022       Lab Results   Component Value Date    GLUCOSE 139 (H) 10/04/2022    BUN 6 (L) 10/04/2022    CREATININE 0.57 (L) 10/04/2022     (L) 10/04/2022    K 3.7 10/04/2022    CL 92 (L) 10/04/2022    CO2 30.0 (H) 10/04/2022    CALCIUM 8.4 (L) 10/04/2022    PROTEINTOT 5.4 (L) 10/04/2022    ALBUMIN 2.30 (L) 10/04/2022    BILITOT 1.4 (H) 10/04/2022    ALKPHOS 413 (H) 10/04/2022     (H) 10/04/2022    ALT 52 (H) 10/04/2022 "     Assessment/Plan  72-year-old male with history of hypertension, sleep apnea, hard of hearing who presented with intractable diarrhea, low appetite, and a greater than 50 pound weight loss over several month period.  He also endorses intermittent fevers.  CT of the chest abdomen pelvis obtained in the showed of rectal mass spanning greater than 12 cm with adjacent adenopathy, bulky RP adenopathy, and findings concerning for left renal and liver metastases.  Small right pleural effusion with nodularity.  He is readmitted with fever.  Imaging shows increasing pleural effusion and bilateral hydronephrosis which appears increased from 9/20/2022.    I reviewed the results of the patient's pathology with Dr. Roberts and also discussed with his colorectal surgeon.  I recounted this discussion with the patient.  His biopsy shows a CD30 positive lymphoma, which could be either classic Hodgkin lymphoma or CD30 positive T-cell lymphoma, or a CD30 positive B-cell lymphoma.  The pathology specimen showed extensive crush artifact, limiting further studies.  Pathology does feel that additional tissue might help clarify the diagnosis.  His colorectal surgery is planning a transanal biopsy this afternoon for additional tissue.  He has a PICC placed for inpatient chemotherapy initiation.    After additional biopsy is obtained, we will start the patient on Decadron pending final pathology results.  I will asked my partner to follow-up with the patient with additional tissue results.  Depending on his clinical course, we may elect to proceed with further inpatient treatment pending these results.  An echo has already been obtained and shows an adequate EF.      Kaycee Leary MD  [facility]ematology and Oncology    10/4/2022       Time spent on the day of service was 50 min inclusive of 50% direct counseling.

## 2022-10-04 NOTE — PROGRESS NOTES
[facility] Medicine Services  PROGRESS NOTE    Patient Name: Abraham Wu  : 1950  MRN: [id number]    Date of Admission: 10/3/2022  Primary Care Physician: Jatinder Haynes MD    Subjective   Subjective     CC: Follow-up fever    HPI:No acute events overnight, patient states that he is doing ok, no fevers overnight    ROS:  Gen- No fevers, chills  CV- No chest pain, palpitations  Resp- No cough, dyspnea  GI- No N/V/D, abd pain      Objective   Objective     Vital Signs:   Temp:  [96.2 °F (35.7 °C)-101.7 °F (38.7 °C)] 98.6 °F (37 °C)  Heart Rate:  [64-88] 64  Resp:  [18] 18  BP: (135-150)/(72-88) 146/84  Flow (L/min):  [2] 2     Physical Exam:  Constitutional: No acute distress, awake, alert  HENT: NCAT, mucous membranes moist  Respiratory: Clear to auscultation bilaterally, respiratory effort normal   Cardiovascular: RRR, no murmurs, rubs, or gallops  Gastrointestinal: Positive bowel sounds, soft, nontender, nondistended  Musculoskeletal: No bilateral ankle edema  Psychiatric: Appropriate affect, cooperative  Neurologic: Oriented x 3, nonfocal  Skin: No rashes      Results Reviewed:  LAB RESULTS:      Lab 10/04/22  0639 10/03/22  0734   WBC 9.44 10.79   HEMOGLOBIN 9.3* 10.6*   HEMATOCRIT 29.3* 32.7*   PLATELETS 319 355   NEUTROS ABS 8.09* 9.73*   IMMATURE GRANS (ABS) 0.12* 0.11*   LYMPHS ABS 0.33* 0.25*   MONOS ABS 0.89 0.69   EOS ABS 0.00 0.00   MCV 84.0 83.4   SED RATE  --  88*   PROCALCITONIN  --  0.39*   LACTATE  --  1.2         Lab 10/03/22  2213 10/03/22  0734   SODIUM  --  154*   POTASSIUM 4.1 3.4*   CHLORIDE  --  109*   CO2  --  28.0   ANION GAP  --  17.0*   BUN  --  8   CREATININE  --  0.50*   EGFR  --  109.7   GLUCOSE  --  106*   CALCIUM  --  8.6         Lab 10/03/22  0734   TOTAL PROTEIN 6.3   ALBUMIN 2.90*   GLOBULIN 3.4   ALT (SGPT) 48*   AST (SGOT) 178*   BILIRUBIN 1.0   ALK PHOS 439*                     Brief Urine Lab Results  (Last result in the past 365 days)       Color   Clarity   Blood   Leuk Est   Nitrite   Protein   CREAT   Urine HCG        10/03/22 2016 Yellow   Clear   Trace   Negative   Negative   Trace                 Microbiology Results Abnormal     Procedure Component Value - Date/Time    Blood Culture - Blood, Arm, Left [[id number]]  (Normal) Collected: 10/03/22 0734    Lab Status: Preliminary result Specimen: Blood from Arm, Left Updated: 10/04/22 0803     Blood Culture No growth at 24 hours    Respiratory Panel PCR w/COVID-19(SARS-CoV-2) BARRY/KEIRA/MARY/PAD/COR/MAD/AKSHAT In-House, NP Swab in UTM/VTM, 3-4 HR TAT - Swab, Nasopharynx [[id number]]  (Normal) Collected: 10/03/22 0734    Lab Status: Final result Specimen: Swab from Nasopharynx Updated: 10/03/22 0836     ADENOVIRUS, PCR Not Detected     Coronavirus 229E Not Detected     Coronavirus HKU1 Not Detected     Coronavirus NL63 Not Detected     Coronavirus OC43 Not Detected     COVID19 Not Detected     Human Metapneumovirus Not Detected     Human Rhinovirus/Enterovirus Not Detected     Influenza A PCR Not Detected     Influenza B PCR Not Detected     Parainfluenza Virus 1 Not Detected     Parainfluenza Virus 2 Not Detected     Parainfluenza Virus 3 Not Detected     Parainfluenza Virus 4 Not Detected     RSV, PCR Not Detected     Bordetella pertussis pcr Not Detected     Bordetella parapertussis PCR Not Detected     Chlamydophila pneumoniae PCR Not Detected     Mycoplasma pneumo by PCR Not Detected    Narrative:      In the setting of a positive respiratory panel with a viral infection PLUS a negative procalcitonin without other underlying concern for bacterial infection, consider observing off antibiotics or discontinuation of antibiotics and continue supportive care. If the respiratory panel is positive for atypical bacterial infection (Bordetella pertussis, Chlamydophila pneumoniae, or Mycoplasma pneumoniae), consider antibiotic de-escalation to target atypical bacterial infection.          Adult Transthoracic  Echo Complete W/ Cont if Necessary Per Protocol    Result Date: 10/3/2022  · Left ventricular ejection fraction appears to be 56 - 60%. Left ventricular systolic function is normal. · Global longitudinal LV strain (GLS) = -27.0%. · Left atrial volume is moderately increased. · Mild mitral valve regurgitation is present. · Mild tricuspid valve regurgitation is present. · Estimated right ventricular systolic pressure from tricuspid regurgitation is mildly elevated (35-45 mmHg). · Mild dilation of the ascending aorta is present.      CT Abdomen Pelvis With Contrast    Result Date: 10/3/2022  DATE OF EXAM: 10/3/2022 8:20 AM  PROCEDURE: CT ANGIOGRAM CHEST-, CT ABDOMEN PELVIS W CONTRAST-  INDICATIONS: fever, unknown etiology, recent surgery, pleural effusions  COMPARISON: 9/21/2022 angiographic chest CT scan 9/20/2022 abdomen pelvis CT scan  TECHNIQUE: Contiguous axial imaging was obtained from the thoracic inlet through the chest abdomen and pelvis following the intravenous administration of 80 mL of Isovue 370. Reconstructed coronal and sagittal images were also obtained. Automated exposure control and iterative reconstruction methods were used.  The radiation dose reduction device was turned on for each scan per the ALARA (As Low as Reasonably Achievable) protocol.  FINDINGS: Patient history indicates fever of unknown etiology, recent colon surgery. The prior exams from 9/20/2022 and 9/21/2022 by report showed a large rectal mass, extensive adenopathy, concern for renal and hepatic metastatic disease. Small right pleural effusion.  ANGIOGRAPHIC CT SCAN OF THE CHEST: There is good contrast opacification of the pulmonary arteries. No evidence of pulmonary embolic disease is seen. Thoracic aorta is mildly ectatic to approximately 4.0 cm. There is no evidence of dissection or focal aneurysm. There is moderately extensive coronary artery calcification. No pericardial effusion or mediastinal adenopathy is seen. No axillary  or lower cervical lymphadenopathy is appreciated. There is a relatively small but increasing free-flowing right pleural effusion, and no left effusion. The lungs appear clear except for mild discoid atelectasis associated with the right pleural effusion, and focal peribronchial thickening scarring and mild bronchiectasis of the posterior right middle lobe, unchanged from prior study. Bony structures appear to be intact.      Impression: 1. No evidence of pulmonary embolic disease. 2. Small but increasing right pleural effusion compared to 9/21/2022. 3. Stable small area of linear scarring in the right middle lobe. 4. Mild ascending aortic ectasia to approximately 4.0 cm again incidentally noted.    CT SCAN OF THE ABDOMEN AND PELVIS WITH IV CONTRAST: Numerous hepatic metastases are again noted. There is extensive retroperitoneal lymphadenopathy, and an approximately 5.4 cm left upper quadrant mass involving the left lateral renal parenchyma. Volume and shape of the exophytic mass favors that this is invasive to the kidney, rather than originating from kidney. There are multiple small splenic lesions consistent with metastases. Adrenal glands appear to be spared. Gallbladder appears normal. Pancreas appears to be normal, although the tip of the pancreatic tail is immediately adjacent the left upper quadrant mass.  No upper abdominal free air is seen. There is a small amount of ascites. Diffusely increased bowel gas suggests a low level ileus. Left mid abdominal ostomy site is clear. Large pelvic mass is again noted, which appears to extend into the small bowel mesentery, although these may be adjacent but separate nodes. Bladder is normally distended and normal in appearance. There is relatively little intrapelvic free fluid.  Delayed venous phase images show contrast within the renal collecting systems, which appear minimally more dilated than on the prior study. Distal ureters may be mildly compressed by the large  "pelvic mass. The bladder itself is displaced anteriorly and superiorly, but normally distended and grossly normal in appearance. No evidence of a discrete, drainable inflammatory collection is appreciated. Bony structures appear to be intact.   IMPRESSION:  1. Expected changes of recent colostomy placement. No visible associated inflammation. 2. Advanced metastatic disease, including numerous liver lesions, extensive retrocrural lymphadenopathy, multiple splenic lesions, left upper quadrant mass that that involves the lateral margin of the left kidney. Large pelvic mass and extensive infiltration of the lower small bowel mesentery, whether by direct extension of tumor, or multiple irregularly enlarged adjacent lymph nodes. 3. Bilateral hydronephrosis which appears increased from 9/20/2022, but no evidence of high-grade obstructive uropathy. 4. Mildly increased ascites. No evidence of drainable inflammatory collection. 5. \"Gassy\" appearance of nondependent large and small bowel, suggesting a low level ileus  This report was finalized on 10/3/2022 9:14 AM by Dr. Del Wilks MD.      XR Chest 1 View    Result Date: 10/3/2022  DATE OF EXAM: 10/3/2022 3:51 PM  PROCEDURE: XR CHEST 1 VW-  INDICATIONS: Verify PICC placement  COMPARISON: 03/21/2005.  TECHNIQUE: Single radiographic view of the chest was obtained.  FINDINGS: The tip of the right upper extremity PICC line terminates in the superior vena cava. The heart is enlarged. The pulmonary vascular markings are normal. The lungs and pleural spaces are clear.  There are chronic age-related changes involving the bony thorax and thoracic aorta.      Impression:  1. The tip of the right upper extremity PICC line terminates in the superior vena cava. 2. No active pulmonary disease. 3. Cardiomegaly.  This report was finalized on 10/3/2022 4:09 PM by Preet Kline MD.      CT Angiogram Chest    Result Date: 10/3/2022  DATE OF EXAM: 10/3/2022 8:20 AM  PROCEDURE: CT ANGIOGRAM CHEST-, " CT ABDOMEN PELVIS W CONTRAST-  INDICATIONS: fever, unknown etiology, recent surgery, pleural effusions  COMPARISON: 9/21/2022 angiographic chest CT scan 9/20/2022 abdomen pelvis CT scan  TECHNIQUE: Contiguous axial imaging was obtained from the thoracic inlet through the chest abdomen and pelvis following the intravenous administration of 80 mL of Isovue 370. Reconstructed coronal and sagittal images were also obtained. Automated exposure control and iterative reconstruction methods were used.  The radiation dose reduction device was turned on for each scan per the ALARA (As Low as Reasonably Achievable) protocol.  FINDINGS: Patient history indicates fever of unknown etiology, recent colon surgery. The prior exams from 9/20/2022 and 9/21/2022 by report showed a large rectal mass, extensive adenopathy, concern for renal and hepatic metastatic disease. Small right pleural effusion.  ANGIOGRAPHIC CT SCAN OF THE CHEST: There is good contrast opacification of the pulmonary arteries. No evidence of pulmonary embolic disease is seen. Thoracic aorta is mildly ectatic to approximately 4.0 cm. There is no evidence of dissection or focal aneurysm. There is moderately extensive coronary artery calcification. No pericardial effusion or mediastinal adenopathy is seen. No axillary or lower cervical lymphadenopathy is appreciated. There is a relatively small but increasing free-flowing right pleural effusion, and no left effusion. The lungs appear clear except for mild discoid atelectasis associated with the right pleural effusion, and focal peribronchial thickening scarring and mild bronchiectasis of the posterior right middle lobe, unchanged from prior study. Bony structures appear to be intact.      Impression: 1. No evidence of pulmonary embolic disease. 2. Small but increasing right pleural effusion compared to 9/21/2022. 3. Stable small area of linear scarring in the right middle lobe. 4. Mild ascending aortic ectasia to  approximately 4.0 cm again incidentally noted.    CT SCAN OF THE ABDOMEN AND PELVIS WITH IV CONTRAST: Numerous hepatic metastases are again noted. There is extensive retroperitoneal lymphadenopathy, and an approximately 5.4 cm left upper quadrant mass involving the left lateral renal parenchyma. Volume and shape of the exophytic mass favors that this is invasive to the kidney, rather than originating from kidney. There are multiple small splenic lesions consistent with metastases. Adrenal glands appear to be spared. Gallbladder appears normal. Pancreas appears to be normal, although the tip of the pancreatic tail is immediately adjacent the left upper quadrant mass.  No upper abdominal free air is seen. There is a small amount of ascites. Diffusely increased bowel gas suggests a low level ileus. Left mid abdominal ostomy site is clear. Large pelvic mass is again noted, which appears to extend into the small bowel mesentery, although these may be adjacent but separate nodes. Bladder is normally distended and normal in appearance. There is relatively little intrapelvic free fluid.  Delayed venous phase images show contrast within the renal collecting systems, which appear minimally more dilated than on the prior study. Distal ureters may be mildly compressed by the large pelvic mass. The bladder itself is displaced anteriorly and superiorly, but normally distended and grossly normal in appearance. No evidence of a discrete, drainable inflammatory collection is appreciated. Bony structures appear to be intact.   IMPRESSION:  1. Expected changes of recent colostomy placement. No visible associated inflammation. 2. Advanced metastatic disease, including numerous liver lesions, extensive retrocrural lymphadenopathy, multiple splenic lesions, left upper quadrant mass that that involves the lateral margin of the left kidney. Large pelvic mass and extensive infiltration of the lower small bowel mesentery, whether by direct  "extension of tumor, or multiple irregularly enlarged adjacent lymph nodes. 3. Bilateral hydronephrosis which appears increased from 9/20/2022, but no evidence of high-grade obstructive uropathy. 4. Mildly increased ascites. No evidence of drainable inflammatory collection. 5. \"Gassy\" appearance of nondependent large and small bowel, suggesting a low level ileus  This report was finalized on 10/3/2022 9:14 AM by Dr. Del Wilks MD.        Results for orders placed during the hospital encounter of 10/03/22    Adult Transthoracic Echo Complete W/ Cont if Necessary Per Protocol    Interpretation Summary  · Left ventricular ejection fraction appears to be 56 - 60%. Left ventricular systolic function is normal.  · Global longitudinal LV strain (GLS) = -27.0%.  · Left atrial volume is moderately increased.  · Mild mitral valve regurgitation is present.  · Mild tricuspid valve regurgitation is present.  · Estimated right ventricular systolic pressure from tricuspid regurgitation is mildly elevated (35-45 mmHg).  · Mild dilation of the ascending aorta is present.      I have reviewed the medications:  Scheduled Meds:allopurinol, 200 mg, Oral, TID  enoxaparin, 40 mg, Subcutaneous, Daily  lisinopril, 10 mg, Oral, Daily  montelukast, 10 mg, Oral, Nightly  piperacillin-tazobactam, 3.375 g, Intravenous, Q8H  rosuvastatin, 10 mg, Oral, Daily  senna-docusate sodium, 2 tablet, Oral, BID  sodium chloride, 10 mL, Intravenous, Q12H  sodium chloride, 10 mL, Intravenous, Q12H  tamsulosin, 0.4 mg, Oral, Daily      Continuous Infusions:dextrose 5 % and sodium chloride 0.45 %, 75 mL/hr, Last Rate: 75 mL/hr (10/03/22 1733)      PRN Meds:.•  acetaminophen  •  senna-docusate sodium **AND** polyethylene glycol **AND** bisacodyl **AND** bisacodyl  •  ondansetron  •  potassium chloride **OR** potassium chloride **OR** potassium chloride  •  sodium chloride  •  sodium chloride  •  sodium chloride    Assessment & Plan   Assessment & Plan     Active " Hospital Problems    Diagnosis  POA   • **Febrile illness [R50.9]  Yes   • Hydronephrosis [N13.30]  Yes   • Hypernatremia [E87.0]  Yes   • Hypokalemia [E87.6]  Yes   • Essential hypertension [I10]  Yes   • Rectal mass [K62.89]  Yes      Resolved Hospital Problems   No resolved problems to display.        Brief Hospital Course to date:  Abraham Wu is a 72 y.o. male  with history of hypertension, hyperlipidemia, SHERRI, recent admission to [facility] 9/21-9/24 for diarrhea, unintentional weight loss found to have enteropathogenic E. coli entero toxigenic E. coli s/p Flagyl and Levaquin he also found to have large rectal mass  s/p laparoscopic colostomy, pathology concerning for lymphoma.  He presented to the ED with fever, likely secondary underlying malignancy     Febrile illness  -Suspect this is secondary to underlying malignancy,   -He did have T-max 101.7, elevated procalcitonin, normal WBC, respiratory panel with COVID-19 is negative, UA was unremarkable.  -CT abd/pelvis, continues to show advanced metastatic disease, (liver, renal, splenic lesions)  -Patient is currently on Zosyn, cultures NGTD, plan to discontinue antibiotics in the next few days, and monitor as his fever is likely secondary to lymphoma    Bilateral hydronephrosis  -Appears increased from prior imaging, however, no evidence of high-grade obstructive uropathy  -Suspect secondary to underlying malignancy, urology evaluated, started on tamsulosin, no stenting required at this time     Hodgkin's lymphoma  -Patient has large pelvic mass, he is s/p laparoscopic colostomy creation, CT abd/pelvis as above  -CRS following plan for transanal biopsy today  -Pathology is now positive for Hodgkin's lymphoma, CD30 positive  -Heme-onc following, plan to initiate chemotherapy in the next few days     Hypernatremia, resolved  -Suspect hypovolemic hypernatremia, s/p D5/NS @ 75ml/hr, we will switch to NS  -Encourage p.o. intake   -Follow-up repeat  labs     Hypokalemia  -Continue to monitor and replete per protocol     Chronic severe malnutrition  -We will have dietitian following.     Hypertension  -BP remains stable, resume lisinopril     Hyperlipidemia  -Continue statin     Expected Discharge Location and Transportation: Home versus rehab  Expected Discharge Date: 10/6/2022    DVT prophylaxis:  Medical DVT prophylaxis orders are present.     AM-PAC 6 Clicks Score (PT): 24 (10/03/22 1245)    CODE STATUS:   Code Status and Medical Interventions:   Ordered at: 10/03/22 1318     Level Of Support Discussed With:    Patient     Code Status (Patient has no pulse and is not breathing):    CPR (Attempt to Resuscitate)     Medical Interventions (Patient has pulse or is breathing):    Full Support       Lorri Lauren MD  10/04/22

## 2022-10-04 NOTE — OUTREACH NOTE
General Surgery Week 2 Survey    Flowsheet Row Responses   [facility] patient discharged from? [facility]   Does the patient have one of the following disease processes/diagnoses(primary or secondary)? General Surgery   Week 2 attempt successful? No   Unsuccessful attempts Attempt 1   Revoke Readmitted          LAWRENCE HINOJOSA - Registered Nurse

## 2022-10-04 NOTE — OP NOTE
[facility]  OPERATIVE REPORT      Abraham Wu  10/4/2022    Pre-op Diagnosis:   Rectal lymphoma      Post-op Diagnosis:    Post-Op Diagnosis Codes:  Same      Procedure(s):  EXAM UNDER ANESTHESIA, TRANSANAL  BIOPSY W/ TRUCUT NEEDLE (LITHOTOMY-CANDY CANE)    Surgeon(s):  Hiram Viveros MD    Anesthesia: General    Staff:   Circulator: Denisse Verma RN; Jagrtui Meneses RN        Estimated Blood Loss: 25 mL    Urine Voided: * No values recorded between 10/4/2022  4:45 PM and 10/4/2022  5:36 PM *    Specimens:                Specimens     ID Source Type Tests Collected By Collected At Frozen?    A Per Rectum Tissue · TISSUE PATHOLOGY EXAM   Hiram Viveros MD 10/4/22 5769     Description: TRANSRECTAL BIOPSY    This specimen was not marked as sent.                Drains:   Colostomy LLQ (Active)   Stomal Appliance 2 piece 10/04/22 1400   Stoma Appearance round;red;rosebud appearance 10/04/22 1400   Peristomal Assessment Clean;Intact 10/04/22 1400   Stoma Function flatus;stool 10/04/22 1400   Stool Color clear;yellow 10/04/22 1438   Stool Consistency mucoid 10/04/22 1438       [REMOVED] NG/OG Tube Orogastric 10 Fr Center mouth (Removed)       Findings: On exam: As stated before there is a large palpable mass on the posterior and left lateral aspect aspect of the rectal wall, this is just beyond the anal verge.  There was a cavitary portion roughly 8 cm up into the rectum which directed back towards the sacrum.  No obvious purulent material was seen here though.  Multiple biopsies of the Bhanu-Cut needle were performed here for adequate tissue sampling.  Small of venous oozing which improved with reapproximation of the anal canal.    Complications: None    Procedure in Detail:    After informed written consent was obtained, patient was brought to the operating theater, timeout was performed all parties in agreement.  Antibiotics were administered, patient was placed in  high lithotomy with candycane's.  The perineum was prepped and draped in usual sterile fashion.    I first started by anesthetizing the anus and the anal sphincter.  This was performed with 30 cc of quarter percent Marcaine to perform a perianal fan block.    I then serially dilated the anal canal which at most tolerated a medium Gonzales retractor due to the anal sphincter.  Here, I could easily palpate this mass which was posterior to the rectum and eroding into the rectum both posteriorly on the left lateral side. This mass was just beyond the anal verge.  Utilizing a Bhanu-Cut needle, multiple passes were performed obtaining core needle biopsies.  There was a cavitary portion roughly 8 cm up into the rectum which directed back towards the sacrum.  No obvious purulent material was seen here though. An attempt at complete hemostasis was futile due to this being raw surface neoplastic bleeding, However the bleeding did slow down with removal of the gonzales retractor with reapproximation of the rectal walls and closure of the anus.  Reinspection was show no significant bleeding.    Patient tolerated procedure well, he was subsequently extubated and transferred to the recovery room.  I immediately spoke to his wife Peggy about the procedure.    Hiram Gill MD     Date: 10/4/2022  Time: 17:37 EDT

## 2022-10-04 NOTE — ANESTHESIA PROCEDURE NOTES
Airway  Urgency: elective    Date/Time: 10/4/2022 4:53 PM  Airway not difficult    General Information and Staff    Patient location during procedure: OR  CRNA/CAA: Mohit Juárez CRNA  SRNA: Noemy Gates SRNA  Indications and Patient Condition  Indications for airway management: airway protection    Preoxygenated: yes  MILS maintained throughout  Mask difficulty assessment: 1 - vent by mask    Final Airway Details  Final airway type: endotracheal airway      Successful airway: ETT  Cuffed: yes   Successful intubation technique: direct laryngoscopy  Blade: Erick  Blade size: 4  ETT size (mm): 7.5  Cormack-Lehane Classification: grade I - full view of glottis  Placement verified by: chest auscultation   Cuff volume (mL): 7  Measured from: lips  ETT/EBT  to lips (cm): 22  Number of attempts at approach: 1  Assessment: lips, teeth, and gum same as pre-op and atraumatic intubation

## 2022-10-04 NOTE — PROGRESS NOTES
Discussed case with Oncology and pathology.  They would like additional tissue to further assist in the diagnosis.    I will add him on for anorectal exam under anesthesia with transanal biopsy of mass later today.    Continue n.p.o.  Full note to come later

## 2022-10-04 NOTE — ANESTHESIA POSTPROCEDURE EVALUATION
Patient: Abraham Wu    Procedure Summary     Date: 10/04/22 [facility] / [facility]:  [facility]  [facility]    Anesthesia Start: 1644 Anesthesia Stop: 1739    Procedure: EXAM UNDER ANESTHESIA, TRANSANAL  BIOPSY W/ TRUCUT NEEDLE (LITHOTOMY-CANDY CANE) (N/A Rectum) Diagnosis:     Surgeons: Hiram iVveros MD Provider: Mary Barroso DO    Anesthesia Type: general ASA Status: 3          Anesthesia Type: general    Vitals  No vitals data found for the desired time range.          Post Anesthesia Care and Evaluation    Patient location during evaluation: PACU  Patient participation: complete - patient participated  Level of consciousness: awake and alert  Pain score: 0  Pain management: adequate    Airway patency: patent  Anesthetic complications: No anesthetic complications  PONV Status: none  Cardiovascular status: hemodynamically stable and acceptable  Respiratory status: nonlabored ventilation, acceptable and nasal cannula  Hydration status: acceptable

## 2022-10-04 NOTE — ANESTHESIA PREPROCEDURE EVALUATION
Anesthesia Evaluation     Patient summary reviewed and Nursing notes reviewed   no history of anesthetic complications:  NPO Solid Status: > 8 hours  NPO Liquid Status: > 2 hours           Airway   Mallampati: II  TM distance: >3 FB  Neck ROM: full  No difficulty expected  Dental - normal exam     Pulmonary - normal exam   (+) sleep apnea,   (-) not a smoker  Cardiovascular - normal exam    (+) hypertension, hyperlipidemia,     ROS comment: 10/3/22 - lvef 56-60, mild mr, mild tr, rvsp 35-45, mild asc aorta dilation    Neuro/Psych  GI/Hepatic/Renal/Endo      Musculoskeletal     Abdominal    Substance History      OB/GYN          Other        ROS/Med Hx Other: 9/22 - [id number]  hgb 9.3 k  3.7    hodgekins lymphoma  nephrosis                  Anesthesia Plan    ASA 3     general     (Risks and benefits of general anesthesia discussed with patient (including MI, CVA, death, recall, aspiration, oropharyngeal/dental damage), questions answered, agreeable to proceed.  )  intravenous induction     Anesthetic plan, risks, benefits, and alternatives have been provided, discussed and informed consent has been obtained with: patient.    Plan discussed with CRNA.        CODE STATUS:    Level Of Support Discussed With: Patient  Code Status (Patient has no pulse and is not breathing): CPR (Attempt to Resuscitate)  Medical Interventions (Patient has pulse or is breathing): Full Support

## 2022-10-05 LAB
ALBUMIN SERPL-MCNC: 2.5 G/DL (ref 3.5–5.2)
ALBUMIN/GLOB SERPL: 1.2 G/DL
ALP SERPL-CCNC: 395 U/L (ref 39–117)
ALT SERPL W P-5'-P-CCNC: 41 U/L (ref 1–41)
ANION GAP SERPL CALCULATED.3IONS-SCNC: 11 MMOL/L (ref 5–15)
ANION GAP SERPL CALCULATED.3IONS-SCNC: 9 MMOL/L (ref 5–15)
AST SERPL-CCNC: 69 U/L (ref 1–40)
BASOPHILS # BLD AUTO: 0.01 10*3/MM3 (ref 0–0.2)
BASOPHILS NFR BLD AUTO: 0.1 % (ref 0–1.5)
BILIRUB SERPL-MCNC: 1 MG/DL (ref 0–1.2)
BUN SERPL-MCNC: 11 MG/DL (ref 8–23)
BUN SERPL-MCNC: 9 MG/DL (ref 8–23)
BUN/CREAT SERPL: 19.6 (ref 7–25)
BUN/CREAT SERPL: 22.9 (ref 7–25)
CALCIUM SPEC-SCNC: 8.1 MG/DL (ref 8.6–10.5)
CALCIUM SPEC-SCNC: 8.3 MG/DL (ref 8.6–10.5)
CHLORIDE SERPL-SCNC: 91 MMOL/L (ref 98–107)
CHLORIDE SERPL-SCNC: 92 MMOL/L (ref 98–107)
CO2 SERPL-SCNC: 26 MMOL/L (ref 22–29)
CO2 SERPL-SCNC: 27 MMOL/L (ref 22–29)
CREAT SERPL-MCNC: 0.46 MG/DL (ref 0.76–1.27)
CREAT SERPL-MCNC: 0.48 MG/DL (ref 0.76–1.27)
CREAT UR-MCNC: 58.8 MG/DL
DEPRECATED RDW RBC AUTO: 46.4 FL (ref 37–54)
EGFRCR SERPLBLD CKD-EPI 2021: 111.1 ML/MIN/1.73
EGFRCR SERPLBLD CKD-EPI 2021: 112.5 ML/MIN/1.73
EOSINOPHIL # BLD AUTO: 0 10*3/MM3 (ref 0–0.4)
EOSINOPHIL NFR BLD AUTO: 0 % (ref 0.3–6.2)
ERYTHROCYTE [DISTWIDTH] IN BLOOD BY AUTOMATED COUNT: 15.2 % (ref 12.3–15.4)
FERRITIN SERPL-MCNC: 2506 NG/ML (ref 30–400)
GLOBULIN UR ELPH-MCNC: 2.1 GM/DL
GLUCOSE SERPL-MCNC: 112 MG/DL (ref 65–99)
GLUCOSE SERPL-MCNC: 114 MG/DL (ref 65–99)
HCT VFR BLD AUTO: 25.3 % (ref 37.5–51)
HGB BLD-MCNC: 8.1 G/DL (ref 13–17.7)
IMM GRANULOCYTES # BLD AUTO: 0.15 10*3/MM3 (ref 0–0.05)
IMM GRANULOCYTES NFR BLD AUTO: 1.6 % (ref 0–0.5)
IRON 24H UR-MRATE: 9 MCG/DL (ref 59–158)
IRON SATN MFR SERPL: 7 % (ref 20–50)
LYMPHOCYTES # BLD AUTO: 0.21 10*3/MM3 (ref 0.7–3.1)
LYMPHOCYTES NFR BLD AUTO: 2.2 % (ref 19.6–45.3)
MCH RBC QN AUTO: 26.7 PG (ref 26.6–33)
MCHC RBC AUTO-ENTMCNC: 32 G/DL (ref 31.5–35.7)
MCV RBC AUTO: 83.5 FL (ref 79–97)
MONOCYTES # BLD AUTO: 0.62 10*3/MM3 (ref 0.1–0.9)
MONOCYTES NFR BLD AUTO: 6.6 % (ref 5–12)
NEUTROPHILS NFR BLD AUTO: 8.41 10*3/MM3 (ref 1.7–7)
NEUTROPHILS NFR BLD AUTO: 89.5 % (ref 42.7–76)
NRBC BLD AUTO-RTO: 0 /100 WBC (ref 0–0.2)
PLATELET # BLD AUTO: 296 10*3/MM3 (ref 140–450)
PMV BLD AUTO: 9.5 FL (ref 6–12)
POTASSIUM SERPL-SCNC: 3.4 MMOL/L (ref 3.5–5.2)
POTASSIUM SERPL-SCNC: 3.5 MMOL/L (ref 3.5–5.2)
POTASSIUM SERPL-SCNC: 4.1 MMOL/L (ref 3.5–5.2)
PROT SERPL-MCNC: 4.6 G/DL (ref 6–8.5)
RBC # BLD AUTO: 3.03 10*6/MM3 (ref 4.14–5.8)
SODIUM SERPL-SCNC: 127 MMOL/L (ref 136–145)
SODIUM SERPL-SCNC: 129 MMOL/L (ref 136–145)
SODIUM UR-SCNC: <20 MMOL/L
TIBC SERPL-MCNC: 122 MCG/DL (ref 298–536)
TRANSFERRIN SERPL-MCNC: 82 MG/DL (ref 200–360)
WBC NRBC COR # BLD: 9.4 10*3/MM3 (ref 3.4–10.8)

## 2022-10-05 PROCEDURE — 85025 COMPLETE CBC W/AUTO DIFF WBC: CPT | Performed by: STUDENT IN AN ORGANIZED HEALTH CARE EDUCATION/TRAINING PROGRAM

## 2022-10-05 PROCEDURE — 82728 ASSAY OF FERRITIN: CPT | Performed by: INTERNAL MEDICINE

## 2022-10-05 PROCEDURE — 99232 SBSQ HOSP IP/OBS MODERATE 35: CPT | Performed by: INTERNAL MEDICINE

## 2022-10-05 PROCEDURE — 25010000002 ENOXAPARIN PER 10 MG: Performed by: STUDENT IN AN ORGANIZED HEALTH CARE EDUCATION/TRAINING PROGRAM

## 2022-10-05 PROCEDURE — 84466 ASSAY OF TRANSFERRIN: CPT | Performed by: INTERNAL MEDICINE

## 2022-10-05 PROCEDURE — 25010000002 PIPERACILLIN SOD-TAZOBACTAM PER 1 G: Performed by: STUDENT IN AN ORGANIZED HEALTH CARE EDUCATION/TRAINING PROGRAM

## 2022-10-05 PROCEDURE — 63710000001 DEXAMETHASONE PER 0.25 MG: Performed by: STUDENT IN AN ORGANIZED HEALTH CARE EDUCATION/TRAINING PROGRAM

## 2022-10-05 PROCEDURE — 99231 SBSQ HOSP IP/OBS SF/LOW 25: CPT | Performed by: INTERNAL MEDICINE

## 2022-10-05 PROCEDURE — 83540 ASSAY OF IRON: CPT | Performed by: INTERNAL MEDICINE

## 2022-10-05 PROCEDURE — 84132 ASSAY OF SERUM POTASSIUM: CPT | Performed by: INTERNAL MEDICINE

## 2022-10-05 PROCEDURE — 80053 COMPREHEN METABOLIC PANEL: CPT | Performed by: STUDENT IN AN ORGANIZED HEALTH CARE EDUCATION/TRAINING PROGRAM

## 2022-10-05 RX ADMIN — POTASSIUM CHLORIDE 40 MEQ: 750 CAPSULE, EXTENDED RELEASE ORAL at 09:12

## 2022-10-05 RX ADMIN — Medication 10 ML: at 09:10

## 2022-10-05 RX ADMIN — ROSUVASTATIN CALCIUM 10 MG: 10 TABLET, COATED ORAL at 09:13

## 2022-10-05 RX ADMIN — SODIUM CHLORIDE 75 ML/HR: 9 INJECTION, SOLUTION INTRAVENOUS at 06:00

## 2022-10-05 RX ADMIN — POTASSIUM CHLORIDE 40 MEQ: 750 CAPSULE, EXTENDED RELEASE ORAL at 15:46

## 2022-10-05 RX ADMIN — ALLOPURINOL 200 MG: 100 TABLET ORAL at 21:06

## 2022-10-05 RX ADMIN — TAZOBACTAM SODIUM AND PIPERACILLIN SODIUM 3.38 G: 375; 3 INJECTION, SOLUTION INTRAVENOUS at 00:16

## 2022-10-05 RX ADMIN — Medication 10 ML: at 21:07

## 2022-10-05 RX ADMIN — Medication 10 ML: at 21:08

## 2022-10-05 RX ADMIN — FLUTICASONE PROPIONATE 1 SPRAY: 50 SPRAY, METERED NASAL at 09:22

## 2022-10-05 RX ADMIN — TAZOBACTAM SODIUM AND PIPERACILLIN SODIUM 3.38 G: 375; 3 INJECTION, SOLUTION INTRAVENOUS at 09:10

## 2022-10-05 RX ADMIN — ENOXAPARIN SODIUM 40 MG: 40 INJECTION SUBCUTANEOUS at 09:11

## 2022-10-05 RX ADMIN — ALLOPURINOL 200 MG: 100 TABLET ORAL at 09:12

## 2022-10-05 RX ADMIN — TAMSULOSIN HYDROCHLORIDE 0.4 MG: 0.4 CAPSULE ORAL at 09:13

## 2022-10-05 RX ADMIN — SODIUM CHLORIDE 100 ML/HR: 9 INJECTION, SOLUTION INTRAVENOUS at 15:46

## 2022-10-05 RX ADMIN — MONTELUKAST 10 MG: 10 TABLET, FILM COATED ORAL at 21:06

## 2022-10-05 RX ADMIN — LISINOPRIL 10 MG: 10 TABLET ORAL at 09:12

## 2022-10-05 RX ADMIN — Medication 10 ML: at 09:14

## 2022-10-05 RX ADMIN — ALLOPURINOL 200 MG: 100 TABLET ORAL at 15:46

## 2022-10-05 RX ADMIN — DEXAMETHASONE 20 MG: 4 TABLET ORAL at 09:11

## 2022-10-05 NOTE — ADDENDUM NOTE
Addendum  created 10/05/22 0922 by Mohit Juárez CRNA    Clinical Note Signed, Intraprocedure Blocks edited

## 2022-10-05 NOTE — PLAN OF CARE
Goal Outcome Evaluation:  Plan of Care Reviewed With: patient        Progress: no change  Outcome Evaluation: Patient afebrile overnight. VSS. Remains in NSR/SB. CPAP worn while asleep-- patient noted to desaturate to 40's% on CPAP-- pulse oximeter changed to forehead sensor and picks up much better than on finger. Pt currently 94% on 2L NC. Pt removed anal gauze himself this shift with small amount of bright red blood/mucous noted. No further bleeding noted. Continue current POC.

## 2022-10-05 NOTE — CASE MANAGEMENT/SOCIAL WORK
Continued Stay Note  [facility]     Patient Name: Abraham Wu  MRN: [id number]  Today's Date: 10/5/2022    Admit Date: 10/3/2022    Plan: Home   Discharge Plan     Row Name 10/05/22 1132       Plan    Plan Home    Patient/Family in Agreement with Plan yes    Plan Comments Patient's plan is still to return home at discharge.  No needs noted at this time.  CM will continue to follow.    Final Discharge Disposition Code 01 - home or self-care               Discharge Codes    No documentation.               Expected Discharge Date and Time     Expected Discharge Date Expected Discharge Time    Oct 7, 2022             Cyndie Newsome RN

## 2022-10-05 NOTE — PROGRESS NOTES
"HEMATOLOGY/ONCOLOGY PROGRESS NOTE    S: S/p repeat biopsy yesterday.        Past medical history, social history and family history was reviewed and unchanged from prior visit.    Review of Systems:    Review of Systems   Constitutional: Negative.    HENT: Negative.    Eyes: Negative.    Respiratory: Negative.    Cardiovascular: Negative.    Gastrointestinal: Positive for abdominal distention and abdominal pain.      Constitutional: Positive for appetite change, fatigue and unexpected weight change.   HENT:   Negative for mouth sores and nosebleeds.    Eyes: Negative for eye problems and icterus.   Respiratory: Positive for hemoptysis. Negative for cough.    Cardiovascular: Negative for leg swelling.   Gastrointestinal: Positive for abdominal distention, blood in stool and diarrhea.   Genitourinary: Negative for difficulty urinating and hematuria.    Musculoskeletal: Negative for back pain and neck pain.   Skin: Negative for itching and rash.   Neurological: Negative for headaches, numbness and seizures.   Psychiatric/Behavioral: Positive for depression. The patient is not nervous/anxious.        Medications:  The current medication list was reviewed in the EMR    ALLERGIES:  No Known Allergies      Physical Exam    VITAL SIGNS:  /75 (BP Location: Left arm, Patient Position: Sitting)   Pulse 65   Temp 98.2 °F (36.8 °C) (Oral)   Resp 18   Ht 162.6 cm (64\")   Wt 78 kg (172 lb)   SpO2 99%   BMI 29.52 kg/m²   Temp:  [97.9 °F (36.6 °C)-100.3 °F (37.9 °C)] 98.2 °F (36.8 °C)      Performance Status:1                Physical Exam    General: well appearing, in no acute distress  HEENT: sclerae anicteric, oropharynx clear, neck is supple  Lymphatics: no cervical, supraclavicular, or axillary adenopathy  Cardiovascular: regular rate and rhythm, no murmurs, rubs or gallops  Lungs: clear to auscultation bilaterally  Abdomen: soft, nontender, nondistended. Ostomy site not examined. +BS  Extremities: no lower " extremity edema  Skin: no rashes, lesions, bruising, or petechiae  Msk:  Shows no weakness of the large muscle groups  Psych: Mood is stable        RECENT LABS:    Lab Results   Component Value Date    HGB 8.1 (L) 10/05/2022    HCT 25.3 (L) 10/05/2022    MCV 83.5 10/05/2022     10/05/2022    WBC 9.40 10/05/2022    NEUTROABS 8.41 (H) 10/05/2022    LYMPHSABS 0.21 (L) 10/05/2022    MONOSABS 0.62 10/05/2022    EOSABS 0.00 10/05/2022    BASOSABS 0.01 10/05/2022       Lab Results   Component Value Date    GLUCOSE 114 (H) 10/05/2022    BUN 11 10/05/2022    CREATININE 0.48 (L) 10/05/2022     (L) 10/05/2022    K 3.5 10/05/2022    CL 92 (L) 10/05/2022    CO2 26.0 10/05/2022    CALCIUM 8.3 (L) 10/05/2022    PROTEINTOT 4.6 (L) 10/05/2022    ALBUMIN 2.50 (L) 10/05/2022    BILITOT 1.0 10/05/2022    ALKPHOS 395 (H) 10/05/2022    AST 69 (H) 10/05/2022    ALT 41 10/05/2022         Assessment/Plan  1.  Rectal mass  2. Liver mass  3.  Pleural effusion  4.  GI obstruction s/p diversion        Concerning for non-Hodgkin's lymphoma.  Its CD30 positive.  Repeat biopsy for further testing.  That was done yesterday.  Still pending.  I spoke to the patient and his family that we really need to know diagnosis before we can consider treatment.  We will have to initiate therapy while inpatient.        Sofya Osborn MD  [facility]    10/5/2022

## 2022-10-05 NOTE — PROGRESS NOTES
[facility] Medicine Services  PROGRESS NOTE    Patient Name: Abraham Wu  : 1950  MRN: [id number]    Date of Admission: 10/3/2022  Primary Care Physician: Jatinder Haynes MD    Subjective   Subjective     CC: Follow-up fever, lymphoma    HPI: No acute events overnight, patient that he rested well, has no new complaints this morning    ROS:  Gen- No fevers, chills  CV- No chest pain, palpitations  Resp- No cough, dyspnea  GI- No N/V/D, abd pain      Objective   Objective     Vital Signs:   Temp:  [97.9 °F (36.6 °C)-102.3 °F (39.1 °C)] 98.7 °F (37.1 °C)  Heart Rate:  [56-85] 65  Resp:  [16-18] 18  BP: (113-151)/() 126/63  Flow (L/min):  [1.5-4] 2     Physical Exam:  Constitutional: Elderly male, ill-appearing but nontoxic,awake, alert, seated in chair  HENT: NCAT, mucous membranes moist  Respiratory: Clear to auscultation bilaterally, respiratory effort normal   Cardiovascular: RRR, no murmurs, rubs, or gallops  Gastrointestinal: Positive bowel sounds, soft, nontender, nondistended  Musculoskeletal: No bilateral ankle edema  Psychiatric: Appropriate affect, cooperative  Neurologic: Oriented x 3, nonfocal  Skin: No rashes    Results Reviewed:  LAB RESULTS:      Lab 10/05/22  0456 10/04/22  0639 10/03/22  0734   WBC 9.40 9.44 10.79   HEMOGLOBIN 8.1* 9.3* 10.6*   HEMATOCRIT 25.3* 29.3* 32.7*   PLATELETS 296 319 355   NEUTROS ABS 8.41* 8.09* 9.73*   IMMATURE GRANS (ABS) 0.15* 0.12* 0.11*   LYMPHS ABS 0.21* 0.33* 0.25*   MONOS ABS 0.62 0.89 0.69   EOS ABS 0.00 0.00 0.00   MCV 83.5 84.0 83.4   SED RATE  --   --  88*   PROCALCITONIN  --   --  0.39*   LACTATE  --   --  1.2         Lab 10/05/22  0456 10/04/22  1941 10/04/22  0639 10/03/22  2213 10/03/22  0734   SODIUM 129* 127* 130*  --  154*   POTASSIUM 3.4* 3.3* 3.7 4.1 3.4*   CHLORIDE 91* 89* 92*  --  109*   CO2 27.0 28.0 30.0*  --  28.0   ANION GAP 11.0 10.0 8.0  --  17.0*   BUN 9 8 6*  --  8   CREATININE 0.46* 0.48* 0.57*  --   0.50*   EGFR 112.5 111.1 105.5  --  109.7   GLUCOSE 112* 126* 139*  --  106*   CALCIUM 8.1* 7.9* 8.4*  --  8.6         Lab 10/05/22  0456 10/04/22  0639 10/03/22  0734   TOTAL PROTEIN 4.6* 5.4* 6.3   ALBUMIN 2.50* 2.30* 2.90*   GLOBULIN 2.1 3.1 3.4   ALT (SGPT) 41 52* 48*   AST (SGOT) 69* 151* 178*   BILIRUBIN 1.0 1.4* 1.0   ALK PHOS 395* 413* 439*                 Lab 10/04/22  1342   ABO TYPING O   RH TYPING Positive   ANTIBODY SCREEN Negative         Brief Urine Lab Results  (Last result in the past 365 days)      Color   Clarity   Blood   Leuk Est   Nitrite   Protein   CREAT   Urine HCG        10/03/22 2016 Yellow   Clear   Trace   Negative   Negative   Trace                 Microbiology Results Abnormal     Procedure Component Value - Date/Time    Blood Culture - Blood, Arm, Right [[id number]]  (Normal) Collected: 10/03/22 0808    Lab Status: Preliminary result Specimen: Blood from Arm, Right Updated: 10/04/22 1032     Blood Culture No growth at 24 hours    Blood Culture - Blood, Arm, Left [[id number]]  (Normal) Collected: 10/03/22 0734    Lab Status: Preliminary result Specimen: Blood from Arm, Left Updated: 10/04/22 0803     Blood Culture No growth at 24 hours    Respiratory Panel PCR w/COVID-19(SARS-CoV-2) BARRY/KEIRA/MARY/PAD/COR/MAD/AKSHAT In-House, NP Swab in UTM/VTM, 3-4 HR TAT - Swab, Nasopharynx [[id number]]  (Normal) Collected: 10/03/22 0734    Lab Status: Final result Specimen: Swab from Nasopharynx Updated: 10/03/22 0836     ADENOVIRUS, PCR Not Detected     Coronavirus 229E Not Detected     Coronavirus HKU1 Not Detected     Coronavirus NL63 Not Detected     Coronavirus OC43 Not Detected     COVID19 Not Detected     Human Metapneumovirus Not Detected     Human Rhinovirus/Enterovirus Not Detected     Influenza A PCR Not Detected     Influenza B PCR Not Detected     Parainfluenza Virus 1 Not Detected     Parainfluenza Virus 2 Not Detected     Parainfluenza Virus 3 Not Detected     Parainfluenza Virus 4 Not  Detected     RSV, PCR Not Detected     Bordetella pertussis pcr Not Detected     Bordetella parapertussis PCR Not Detected     Chlamydophila pneumoniae PCR Not Detected     Mycoplasma pneumo by PCR Not Detected    Narrative:      In the setting of a positive respiratory panel with a viral infection PLUS a negative procalcitonin without other underlying concern for bacterial infection, consider observing off antibiotics or discontinuation of antibiotics and continue supportive care. If the respiratory panel is positive for atypical bacterial infection (Bordetella pertussis, Chlamydophila pneumoniae, or Mycoplasma pneumoniae), consider antibiotic de-escalation to target atypical bacterial infection.          Adult Transthoracic Echo Complete W/ Cont if Necessary Per Protocol    Result Date: 10/3/2022  · Left ventricular ejection fraction appears to be 56 - 60%. Left ventricular systolic function is normal. · Global longitudinal LV strain (GLS) = -27.0%. · Left atrial volume is moderately increased. · Mild mitral valve regurgitation is present. · Mild tricuspid valve regurgitation is present. · Estimated right ventricular systolic pressure from tricuspid regurgitation is mildly elevated (35-45 mmHg). · Mild dilation of the ascending aorta is present.      CT Abdomen Pelvis With Contrast    Result Date: 10/3/2022  DATE OF EXAM: 10/3/2022 8:20 AM  PROCEDURE: CT ANGIOGRAM CHEST-, CT ABDOMEN PELVIS W CONTRAST-  INDICATIONS: fever, unknown etiology, recent surgery, pleural effusions  COMPARISON: 9/21/2022 angiographic chest CT scan 9/20/2022 abdomen pelvis CT scan  TECHNIQUE: Contiguous axial imaging was obtained from the thoracic inlet through the chest abdomen and pelvis following the intravenous administration of 80 mL of Isovue 370. Reconstructed coronal and sagittal images were also obtained. Automated exposure control and iterative reconstruction methods were used.  The radiation dose reduction device was turned on  for each scan per the ALARA (As Low as Reasonably Achievable) protocol.  FINDINGS: Patient history indicates fever of unknown etiology, recent colon surgery. The prior exams from 9/20/2022 and 9/21/2022 by report showed a large rectal mass, extensive adenopathy, concern for renal and hepatic metastatic disease. Small right pleural effusion.  ANGIOGRAPHIC CT SCAN OF THE CHEST: There is good contrast opacification of the pulmonary arteries. No evidence of pulmonary embolic disease is seen. Thoracic aorta is mildly ectatic to approximately 4.0 cm. There is no evidence of dissection or focal aneurysm. There is moderately extensive coronary artery calcification. No pericardial effusion or mediastinal adenopathy is seen. No axillary or lower cervical lymphadenopathy is appreciated. There is a relatively small but increasing free-flowing right pleural effusion, and no left effusion. The lungs appear clear except for mild discoid atelectasis associated with the right pleural effusion, and focal peribronchial thickening scarring and mild bronchiectasis of the posterior right middle lobe, unchanged from prior study. Bony structures appear to be intact.      Impression: 1. No evidence of pulmonary embolic disease. 2. Small but increasing right pleural effusion compared to 9/21/2022. 3. Stable small area of linear scarring in the right middle lobe. 4. Mild ascending aortic ectasia to approximately 4.0 cm again incidentally noted.    CT SCAN OF THE ABDOMEN AND PELVIS WITH IV CONTRAST: Numerous hepatic metastases are again noted. There is extensive retroperitoneal lymphadenopathy, and an approximately 5.4 cm left upper quadrant mass involving the left lateral renal parenchyma. Volume and shape of the exophytic mass favors that this is invasive to the kidney, rather than originating from kidney. There are multiple small splenic lesions consistent with metastases. Adrenal glands appear to be spared. Gallbladder appears normal.  "Pancreas appears to be normal, although the tip of the pancreatic tail is immediately adjacent the left upper quadrant mass.  No upper abdominal free air is seen. There is a small amount of ascites. Diffusely increased bowel gas suggests a low level ileus. Left mid abdominal ostomy site is clear. Large pelvic mass is again noted, which appears to extend into the small bowel mesentery, although these may be adjacent but separate nodes. Bladder is normally distended and normal in appearance. There is relatively little intrapelvic free fluid.  Delayed venous phase images show contrast within the renal collecting systems, which appear minimally more dilated than on the prior study. Distal ureters may be mildly compressed by the large pelvic mass. The bladder itself is displaced anteriorly and superiorly, but normally distended and grossly normal in appearance. No evidence of a discrete, drainable inflammatory collection is appreciated. Bony structures appear to be intact.   IMPRESSION:  1. Expected changes of recent colostomy placement. No visible associated inflammation. 2. Advanced metastatic disease, including numerous liver lesions, extensive retrocrural lymphadenopathy, multiple splenic lesions, left upper quadrant mass that that involves the lateral margin of the left kidney. Large pelvic mass and extensive infiltration of the lower small bowel mesentery, whether by direct extension of tumor, or multiple irregularly enlarged adjacent lymph nodes. 3. Bilateral hydronephrosis which appears increased from 9/20/2022, but no evidence of high-grade obstructive uropathy. 4. Mildly increased ascites. No evidence of drainable inflammatory collection. 5. \"Gassy\" appearance of nondependent large and small bowel, suggesting a low level ileus  This report was finalized on 10/3/2022 9:14 AM by Dr. Del Wilks MD.      XR Chest 1 View    Result Date: 10/3/2022  DATE OF EXAM: 10/3/2022 3:51 PM  PROCEDURE: XR CHEST 1 VW-  " INDICATIONS: Verify PICC placement  COMPARISON: 03/21/2005.  TECHNIQUE: Single radiographic view of the chest was obtained.  FINDINGS: The tip of the right upper extremity PICC line terminates in the superior vena cava. The heart is enlarged. The pulmonary vascular markings are normal. The lungs and pleural spaces are clear.  There are chronic age-related changes involving the bony thorax and thoracic aorta.      Impression:  1. The tip of the right upper extremity PICC line terminates in the superior vena cava. 2. No active pulmonary disease. 3. Cardiomegaly.  This report was finalized on 10/3/2022 4:09 PM by Preet Kline MD.      CT Angiogram Chest    Result Date: 10/3/2022  DATE OF EXAM: 10/3/2022 8:20 AM  PROCEDURE: CT ANGIOGRAM CHEST-, CT ABDOMEN PELVIS W CONTRAST-  INDICATIONS: fever, unknown etiology, recent surgery, pleural effusions  COMPARISON: 9/21/2022 angiographic chest CT scan 9/20/2022 abdomen pelvis CT scan  TECHNIQUE: Contiguous axial imaging was obtained from the thoracic inlet through the chest abdomen and pelvis following the intravenous administration of 80 mL of Isovue 370. Reconstructed coronal and sagittal images were also obtained. Automated exposure control and iterative reconstruction methods were used.  The radiation dose reduction device was turned on for each scan per the ALARA (As Low as Reasonably Achievable) protocol.  FINDINGS: Patient history indicates fever of unknown etiology, recent colon surgery. The prior exams from 9/20/2022 and 9/21/2022 by report showed a large rectal mass, extensive adenopathy, concern for renal and hepatic metastatic disease. Small right pleural effusion.  ANGIOGRAPHIC CT SCAN OF THE CHEST: There is good contrast opacification of the pulmonary arteries. No evidence of pulmonary embolic disease is seen. Thoracic aorta is mildly ectatic to approximately 4.0 cm. There is no evidence of dissection or focal aneurysm. There is moderately extensive coronary  artery calcification. No pericardial effusion or mediastinal adenopathy is seen. No axillary or lower cervical lymphadenopathy is appreciated. There is a relatively small but increasing free-flowing right pleural effusion, and no left effusion. The lungs appear clear except for mild discoid atelectasis associated with the right pleural effusion, and focal peribronchial thickening scarring and mild bronchiectasis of the posterior right middle lobe, unchanged from prior study. Bony structures appear to be intact.      Impression: 1. No evidence of pulmonary embolic disease. 2. Small but increasing right pleural effusion compared to 9/21/2022. 3. Stable small area of linear scarring in the right middle lobe. 4. Mild ascending aortic ectasia to approximately 4.0 cm again incidentally noted.    CT SCAN OF THE ABDOMEN AND PELVIS WITH IV CONTRAST: Numerous hepatic metastases are again noted. There is extensive retroperitoneal lymphadenopathy, and an approximately 5.4 cm left upper quadrant mass involving the left lateral renal parenchyma. Volume and shape of the exophytic mass favors that this is invasive to the kidney, rather than originating from kidney. There are multiple small splenic lesions consistent with metastases. Adrenal glands appear to be spared. Gallbladder appears normal. Pancreas appears to be normal, although the tip of the pancreatic tail is immediately adjacent the left upper quadrant mass.  No upper abdominal free air is seen. There is a small amount of ascites. Diffusely increased bowel gas suggests a low level ileus. Left mid abdominal ostomy site is clear. Large pelvic mass is again noted, which appears to extend into the small bowel mesentery, although these may be adjacent but separate nodes. Bladder is normally distended and normal in appearance. There is relatively little intrapelvic free fluid.  Delayed venous phase images show contrast within the renal collecting systems, which appear minimally  "more dilated than on the prior study. Distal ureters may be mildly compressed by the large pelvic mass. The bladder itself is displaced anteriorly and superiorly, but normally distended and grossly normal in appearance. No evidence of a discrete, drainable inflammatory collection is appreciated. Bony structures appear to be intact.   IMPRESSION:  1. Expected changes of recent colostomy placement. No visible associated inflammation. 2. Advanced metastatic disease, including numerous liver lesions, extensive retrocrural lymphadenopathy, multiple splenic lesions, left upper quadrant mass that that involves the lateral margin of the left kidney. Large pelvic mass and extensive infiltration of the lower small bowel mesentery, whether by direct extension of tumor, or multiple irregularly enlarged adjacent lymph nodes. 3. Bilateral hydronephrosis which appears increased from 9/20/2022, but no evidence of high-grade obstructive uropathy. 4. Mildly increased ascites. No evidence of drainable inflammatory collection. 5. \"Gassy\" appearance of nondependent large and small bowel, suggesting a low level ileus  This report was finalized on 10/3/2022 9:14 AM by Dr. Del Wilks MD.        Results for orders placed during the hospital encounter of 10/03/22    Adult Transthoracic Echo Complete W/ Cont if Necessary Per Protocol    Interpretation Summary  · Left ventricular ejection fraction appears to be 56 - 60%. Left ventricular systolic function is normal.  · Global longitudinal LV strain (GLS) = -27.0%.  · Left atrial volume is moderately increased.  · Mild mitral valve regurgitation is present.  · Mild tricuspid valve regurgitation is present.  · Estimated right ventricular systolic pressure from tricuspid regurgitation is mildly elevated (35-45 mmHg).  · Mild dilation of the ascending aorta is present.      I have reviewed the medications:  Scheduled Meds:allopurinol, 200 mg, Oral, TID  dexamethasone, 20 mg, Oral, Daily With " Breakfast  enoxaparin, 40 mg, Subcutaneous, Daily  fluticasone, 1 spray, Nasal, Daily  lisinopril, 10 mg, Oral, Daily  montelukast, 10 mg, Oral, Nightly  piperacillin-tazobactam, 3.375 g, Intravenous, Q8H  rosuvastatin, 10 mg, Oral, Daily  senna-docusate sodium, 2 tablet, Oral, BID  sodium chloride, 10 mL, Intravenous, Q12H  sodium chloride, 10 mL, Intravenous, Q12H  tamsulosin, 0.4 mg, Oral, Daily      Continuous Infusions:lactated ringers, 9 mL/hr, Last Rate: 9 mL/hr (10/04/22 1644)  lactated ringers, 100 mL/hr  sodium chloride, 75 mL/hr, Last Rate: 75 mL/hr (10/05/22 0600)      PRN Meds:.•  acetaminophen  •  acetaminophen-codeine  •  senna-docusate sodium **AND** polyethylene glycol **AND** bisacodyl **AND** bisacodyl  •  lactated ringers  •  ondansetron  •  potassium chloride **OR** potassium chloride **OR** potassium chloride  •  sodium chloride  •  sodium chloride  •  sodium chloride    Assessment & Plan   Assessment & Plan     Active Hospital Problems    Diagnosis  POA   • **Lymphoma (HCC) [C85.90]  Unknown   • Hydronephrosis [N13.30]  Yes   • Febrile illness [R50.9]  Yes   • Hypernatremia [E87.0]  Yes   • Hypokalemia [E87.6]  Yes   • Essential hypertension [I10]  Yes   • Rectal mass [K62.89]  Yes      Resolved Hospital Problems   No resolved problems to display.        Brief Hospital Course to date:  Abraham Wu is a 72 y.o. male  with history of hypertension, hyperlipidemia, SHERRI, recent admission to [facility] 9/21-9/24 for diarrhea, unintentional weight loss found to have enteropathogenic E. coli entero toxigenic E. coli s/p Flagyl and Levaquin he also found to have large rectal mass  s/p laparoscopic colostomy, pathology concerning for lymphoma.  He presented to the ED with fever, likely secondary underlying malignancy     Febrile illness  -Suspect this is secondary to underlying malignancy,   -He did have T-max 101.7, elevated procalcitonin, normal WBC, respiratory panel with COVID-19 is negative, UA was  unremarkable.  -CT abd/pelvis, continues to show advanced metastatic disease, (liver, renal, splenic lesions)  -Patient is currently on Zosyn, cultures NGTD, we will discontinue antibiotics  today and monitor as his fever is likely secondary to lymphoma     Lymphoma  -Patient has large pelvic mass, he is s/p laparoscopic colostomy creation, CT abd/pelvis as above  -Pathology shows CD30 positive lymphoma, per hematology, this could be classic Hodgkin's lymphoma or CD30 positive T or B-cell lymphoma.  CRS following he is s/p transanal biopsy to obtain more tissue to assist with definitive diagnosis  -Heme-onc following, plan to initiate chemotherapy  once diagnosis is confirmed  -He has been started on steroids    Bilateral hydronephrosis  -Appears increased from prior imaging, however, no evidence of high-grade obstructive uropathy  -Suspect secondary to underlying malignancy, urology evaluated, started on tamsulosin, no stenting required at this time.      Hyponatremia  -Patient was initially hypernatremic on presentation, this was suspected to be hypovolemic hypernatremia, he is s/p D5/half NS, however, sodium has now dropped   -Will send urinary electrolytes  -Encourage p.o. intake, continue IV fluids with normal saline  -Consider renal evaluation if sodium does not improve     Anemia  -H&H trending down, currently low but stable, suspect dilutional  -We will send iron studies and continue to monitor for now    Hypokalemia  -Continue to monitor and replete per protocol.     Chronic severe malnutrition  -We will have dietitian following.     Hypertension  -BP remains stable, resume lisinopril     Hyperlipidemia  -Continue statin     Expected Discharge Location and Transportation: Home versus rehab  Expected Discharge Date: 10/8/2022    DVT prophylaxis:  Medical DVT prophylaxis orders are present.     AM-PAC 6 Clicks Score (PT): 24 (10/04/22 5410)    CODE STATUS:   Code Status and Medical Interventions:   Ordered  at: 10/03/22 1318     Level Of Support Discussed With:    Patient     Code Status (Patient has no pulse and is not breathing):    CPR (Attempt to Resuscitate)     Medical Interventions (Patient has pulse or is breathing):    Full Support       Lorri Lauren MD  10/05/22

## 2022-10-05 NOTE — PROGRESS NOTES
"[facility] ([facility]    POD #1 from anorectal exam under anesthesia and transanal biopsy of perirectal and presacral mass  Length of stay: 2 days    Subjective: Patient is doing well this morning.  Had a minimal amount of bleeding overnight transanally.  He is starting steroid treatment this morning    Physical Exam:  Blood pressure 132/75, pulse 65, temperature 98.2 °F (36.8 °C), temperature source Oral, resp. rate 18, height 162.6 cm (64\"), weight 78 kg (172 lb), SpO2 99 %.    Abd: Soft, nontender, productive ostomy    Labs past 24 hours:  Lab Results (last 24 hours)     Procedure Component Value Units Date/Time    Blood Culture - Blood, Arm, Right [[id number]]  (Normal) Collected: 10/03/22 0808    Specimen: Blood from Arm, Right Updated: 10/05/22 1030     Blood Culture No growth at 2 days    Sodium, Urine, Random - Urine, Clean Catch [[id number]] Collected: 10/03/22 2016    Specimen: Urine, Clean Catch Updated: 10/05/22 0846     Sodium, Urine <20 mmol/L     Narrative:      Reference intervals for random urine have not been established.  Clinical usage is dependent upon physician's interpretation in combination with other laboratory tests.       Creatinine, Urine, Random - Urine, Clean Catch [[id number]] Collected: 10/03/22 2016    Specimen: Urine, Clean Catch Updated: 10/05/22 0810    Iron Profile [[id number]] Collected: 10/05/22 0456    Specimen: Blood Updated: 10/05/22 0807    Ferritin [[id number]] Collected: 10/05/22 0456    Specimen: Blood Updated: 10/05/22 0807    Blood Culture - Blood, Arm, Left [[id number]]  (Normal) Collected: 10/03/22 0734    Specimen: Blood from Arm, Left Updated: 10/05/22 0800     Blood Culture No growth at 2 days    Tissue Pathology Exam [[id number]] Collected: 10/04/22 1709    Specimen: Tissue from Per Rectum Updated: 10/05/22 0702    Comprehensive Metabolic Panel [[id number]]  (Abnormal) Collected: 10/05/22 0456    Specimen: Blood Updated: 10/05/22 0631 "     Glucose 112 mg/dL      BUN 9 mg/dL      Creatinine 0.46 mg/dL      Sodium 129 mmol/L      Potassium 3.4 mmol/L      Chloride 91 mmol/L      CO2 27.0 mmol/L      Calcium 8.1 mg/dL      Total Protein 4.6 g/dL      Albumin 2.50 g/dL      ALT (SGPT) 41 U/L      AST (SGOT) 69 U/L      Alkaline Phosphatase 395 U/L      Total Bilirubin 1.0 mg/dL      Globulin 2.1 gm/dL      Comment: Calculated Result        A/G Ratio 1.2 g/dL      BUN/Creatinine Ratio 19.6     Anion Gap 11.0 mmol/L      eGFR 112.5 mL/min/1.73      Comment: National Kidney Foundation and American Society of Nephrology (ASN) Task Force recommended calculation based on the Chronic Kidney Disease Epidemiology Collaboration (CKD-EPI) equation refit without adjustment for race.       Narrative:      GFR Normal >60  Chronic Kidney Disease <60  Kidney Failure <15      CBC & Differential [[id number]]  (Abnormal) Collected: 10/05/22 0456    Specimen: Blood Updated: 10/05/22 0612    Narrative:      The following orders were created for panel order CBC & Differential.  Procedure                               Abnormality         Status                     ---------                               -----------         ------                     CBC Auto Differential[[id number]]        Abnormal            Final result                 Please view results for these tests on the individual orders.    CBC Auto Differential [[id number]]  (Abnormal) Collected: 10/05/22 0456    Specimen: Blood Updated: 10/05/22 0612     WBC 9.40 10*3/mm3      RBC 3.03 10*6/mm3      Hemoglobin 8.1 g/dL      Hematocrit 25.3 %      MCV 83.5 fL      MCH 26.7 pg      MCHC 32.0 g/dL      RDW 15.2 %      RDW-SD 46.4 fl      MPV 9.5 fL      Platelets 296 10*3/mm3      Neutrophil % 89.5 %      Lymphocyte % 2.2 %      Monocyte % 6.6 %      Eosinophil % 0.0 %      Basophil % 0.1 %      Immature Grans % 1.6 %      Neutrophils, Absolute 8.41 10*3/mm3      Lymphocytes, Absolute 0.21 10*3/mm3      Monocytes,  Absolute 0.62 10*3/mm3      Eosinophils, Absolute 0.00 10*3/mm3      Basophils, Absolute 0.01 10*3/mm3      Immature Grans, Absolute 0.15 10*3/mm3      nRBC 0.0 /100 WBC     Basic Metabolic Panel [[id number]]  (Abnormal) Collected: 10/04/22 1941    Specimen: Blood Updated: 10/04/22 2035     Glucose 126 mg/dL      BUN 8 mg/dL      Creatinine 0.48 mg/dL      Sodium 127 mmol/L      Potassium 3.3 mmol/L      Chloride 89 mmol/L      CO2 28.0 mmol/L      Calcium 7.9 mg/dL      BUN/Creatinine Ratio 16.7     Anion Gap 10.0 mmol/L      eGFR 111.1 mL/min/1.73      Comment: National Kidney Foundation and American Society of Nephrology (ASN) Task Force recommended calculation based on the Chronic Kidney Disease Epidemiology Collaboration (CKD-EPI) equation refit without adjustment for race.       Narrative:      GFR Normal >60  Chronic Kidney Disease <60  Kidney Failure <15            I/O last shift:  I/O this shift:  In: 480 [P.O.:480]  Out: -        Pathology:  Order Name Source Comment Collection Info Order Time   TISSUE PATHOLOGY EXAM Per Rectum  Collected By: Hiram Viveros MD 10/4/2022  5:10 PM     Release to patient   Routine Release        .    Assessment and Plan:  Pleasant 72-year-old male with lymphoma involving the rectum, small bowel mesentery, liver, left kidney and right pleura.  Status post diversion and now postop day 1 from transanal biopsy for additional tissue to delineate his lymphoma    Plan  He is cleared from my perspective to proceed with any sort of steroids or antineoplastic medications as needed  I did discuss with them the fact that this lymphoma encases a large portion of the rectum and that as he begins his antineoplastic therapy, he may develop some complications as this lymphoma has likely replaced a large portion of the wall of the rectum  He is diverted which should be helpful in decreasing the likelihood of serious complications such as peritonitis but they were made aware of this  potential risk    I will continue to spotcheck him intermittently in the hospital, please call me with any questions or concerns  He does have a follow-up appointment with me in 1 month    Hiram Viveros MD  [facility] ([facility]  10/05/22  11:49 EDT

## 2022-10-06 LAB
ANION GAP SERPL CALCULATED.3IONS-SCNC: 13 MMOL/L (ref 5–15)
BASOPHILS # BLD AUTO: 0.02 10*3/MM3 (ref 0–0.2)
BASOPHILS NFR BLD AUTO: 0.1 % (ref 0–1.5)
BUN SERPL-MCNC: 12 MG/DL (ref 8–23)
BUN/CREAT SERPL: 27.9 (ref 7–25)
CALCIUM SPEC-SCNC: 8.4 MG/DL (ref 8.6–10.5)
CHLORIDE SERPL-SCNC: 92 MMOL/L (ref 98–107)
CO2 SERPL-SCNC: 23 MMOL/L (ref 22–29)
CREAT SERPL-MCNC: 0.43 MG/DL (ref 0.76–1.27)
DEPRECATED RDW RBC AUTO: 46.6 FL (ref 37–54)
EGFRCR SERPLBLD CKD-EPI 2021: 114.8 ML/MIN/1.73
EOSINOPHIL # BLD AUTO: 0 10*3/MM3 (ref 0–0.4)
EOSINOPHIL NFR BLD AUTO: 0 % (ref 0.3–6.2)
ERYTHROCYTE [DISTWIDTH] IN BLOOD BY AUTOMATED COUNT: 15.4 % (ref 12.3–15.4)
GLUCOSE SERPL-MCNC: 123 MG/DL (ref 65–99)
HCT VFR BLD AUTO: 31.6 % (ref 37.5–51)
HGB BLD-MCNC: 10.2 G/DL (ref 13–17.7)
IMM GRANULOCYTES # BLD AUTO: 0.47 10*3/MM3 (ref 0–0.05)
IMM GRANULOCYTES NFR BLD AUTO: 2.4 % (ref 0–0.5)
LYMPHOCYTES # BLD AUTO: 0.18 10*3/MM3 (ref 0.7–3.1)
LYMPHOCYTES NFR BLD AUTO: 0.9 % (ref 19.6–45.3)
MCH RBC QN AUTO: 27.1 PG (ref 26.6–33)
MCHC RBC AUTO-ENTMCNC: 32.3 G/DL (ref 31.5–35.7)
MCV RBC AUTO: 84 FL (ref 79–97)
MONOCYTES # BLD AUTO: 0.77 10*3/MM3 (ref 0.1–0.9)
MONOCYTES NFR BLD AUTO: 3.9 % (ref 5–12)
NEUTROPHILS NFR BLD AUTO: 18.51 10*3/MM3 (ref 1.7–7)
NEUTROPHILS NFR BLD AUTO: 92.7 % (ref 42.7–76)
NRBC BLD AUTO-RTO: 0 /100 WBC (ref 0–0.2)
PLATELET # BLD AUTO: 402 10*3/MM3 (ref 140–450)
PMV BLD AUTO: 9.3 FL (ref 6–12)
POTASSIUM SERPL-SCNC: 4.1 MMOL/L (ref 3.5–5.2)
RBC # BLD AUTO: 3.76 10*6/MM3 (ref 4.14–5.8)
SODIUM SERPL-SCNC: 128 MMOL/L (ref 136–145)
WBC NRBC COR # BLD: 19.95 10*3/MM3 (ref 3.4–10.8)

## 2022-10-06 PROCEDURE — 99232 SBSQ HOSP IP/OBS MODERATE 35: CPT | Performed by: INTERNAL MEDICINE

## 2022-10-06 PROCEDURE — 25010000002 ONDANSETRON PER 1 MG: Performed by: STUDENT IN AN ORGANIZED HEALTH CARE EDUCATION/TRAINING PROGRAM

## 2022-10-06 PROCEDURE — 80048 BASIC METABOLIC PNL TOTAL CA: CPT | Performed by: INTERNAL MEDICINE

## 2022-10-06 PROCEDURE — 93005 ELECTROCARDIOGRAM TRACING: CPT | Performed by: INTERNAL MEDICINE

## 2022-10-06 PROCEDURE — 63710000001 DEXAMETHASONE PER 0.25 MG: Performed by: STUDENT IN AN ORGANIZED HEALTH CARE EDUCATION/TRAINING PROGRAM

## 2022-10-06 PROCEDURE — 85025 COMPLETE CBC W/AUTO DIFF WBC: CPT | Performed by: INTERNAL MEDICINE

## 2022-10-06 PROCEDURE — 92610 EVALUATE SWALLOWING FUNCTION: CPT | Performed by: SPEECH-LANGUAGE PATHOLOGIST

## 2022-10-06 PROCEDURE — 93010 ELECTROCARDIOGRAM REPORT: CPT | Performed by: INTERNAL MEDICINE

## 2022-10-06 PROCEDURE — 25010000002 ENOXAPARIN PER 10 MG: Performed by: STUDENT IN AN ORGANIZED HEALTH CARE EDUCATION/TRAINING PROGRAM

## 2022-10-06 RX ORDER — SODIUM CHLORIDE 1000 MG
2 TABLET, SOLUBLE MISCELLANEOUS
Status: DISCONTINUED | OUTPATIENT
Start: 2022-10-07 | End: 2022-10-10

## 2022-10-06 RX ORDER — QUETIAPINE FUMARATE 25 MG/1
25 TABLET, FILM COATED ORAL NIGHTLY
Status: DISCONTINUED | OUTPATIENT
Start: 2022-10-06 | End: 2022-10-07

## 2022-10-06 RX ADMIN — Medication 10 ML: at 08:01

## 2022-10-06 RX ADMIN — ALLOPURINOL 200 MG: 100 TABLET ORAL at 21:21

## 2022-10-06 RX ADMIN — ONDANSETRON 4 MG: 2 INJECTION INTRAMUSCULAR; INTRAVENOUS at 21:21

## 2022-10-06 RX ADMIN — ROSUVASTATIN CALCIUM 10 MG: 10 TABLET, COATED ORAL at 08:01

## 2022-10-06 RX ADMIN — TAMSULOSIN HYDROCHLORIDE 0.4 MG: 0.4 CAPSULE ORAL at 08:01

## 2022-10-06 RX ADMIN — LISINOPRIL 10 MG: 10 TABLET ORAL at 08:01

## 2022-10-06 RX ADMIN — ALLOPURINOL 200 MG: 100 TABLET ORAL at 08:01

## 2022-10-06 RX ADMIN — ENOXAPARIN SODIUM 40 MG: 40 INJECTION SUBCUTANEOUS at 08:01

## 2022-10-06 RX ADMIN — MONTELUKAST 10 MG: 10 TABLET, FILM COATED ORAL at 21:21

## 2022-10-06 RX ADMIN — Medication 10 ML: at 21:22

## 2022-10-06 RX ADMIN — ALLOPURINOL 200 MG: 100 TABLET ORAL at 16:02

## 2022-10-06 RX ADMIN — DEXAMETHASONE 20 MG: 4 TABLET ORAL at 08:00

## 2022-10-06 RX ADMIN — FLUTICASONE PROPIONATE 1 SPRAY: 50 SPRAY, METERED NASAL at 08:02

## 2022-10-06 RX ADMIN — QUETIAPINE FUMARATE 25 MG: 25 TABLET ORAL at 21:21

## 2022-10-06 NOTE — PLAN OF CARE
"Goal Outcome Evaluation:  Plan of Care Reviewed With: patient        Progress: no change  Outcome Evaluation: Pt remains afebrile this shift. VSS. Remains on 2L NC while asleep-- saturations remained in 90s overnight; patient wanted trial without CPAP because of his sore/dry throat. No other events overnight. Continue to await tissue pathology.     Addendum: This AM, 05:39 EDT  Patient's wife states he began \"talking out of his head\" yesterday afternoon and his wife also endorses visual hallucinations (\"did you see that man come in here?\"/\"do you see that light under the door?\") Patient's wife is concerned for possible brain mets? Patient still A&O x4.  "

## 2022-10-06 NOTE — PLAN OF CARE
Goal Outcome Evaluation:  Plan of Care Reviewed With: patient, spouse, son        Progress:  (initial eval)   SLP evaluation completed. Will continue to address dysphagia w/ plan for MBS w/ ltd UGI. Please see note for further details and recommendations.

## 2022-10-06 NOTE — PROGRESS NOTES
[facility] Medicine Services  PROGRESS NOTE    Patient Name: Abraham Wu  : 1950  MRN: [id number]    Date of Admission: 10/3/2022  Primary Care Physician: Jatinder Haynes MD    Subjective   Subjective     CC: Follow-up fever, lymphoma    HPI: Per spouse, patient was restless, confused overnight, he did not get much sleep this morning he is awake alert and appropriate    ROS:  Gen- No fevers, chills  CV- No chest pain, palpitations  Resp- No cough, dyspnea  GI- No N/V/D, abd pain    Objective   Objective     Vital Signs:   Temp:  [97.1 °F (36.2 °C)-98.6 °F (37 °C)] 97.2 °F (36.2 °C)  Heart Rate:  [57-70] 66  Resp:  [18] 18  BP: (124-143)/(69-79) 136/79  Flow (L/min):  [2] 2     Physical Exam:  Constitutional: Ill-appearing male, in no acute distress, awake, alert  HENT: NCAT, mucous membranes moist  Respiratory: Clear to auscultation bilaterally, respiratory effort normal   Cardiovascular: RRR, no murmurs, rubs, or gallops  Gastrointestinal: Positive bowel sounds, soft, nontender, nondistended  Musculoskeletal: Left lower extremity edema 3+  Psychiatric: Appropriate affect, cooperative  Neurologic: Oriented x 3, nonfocal  Skin: No rashes    Results Reviewed:  LAB RESULTS:      Lab 10/06/22  0614 10/05/22  0456 10/04/22  0639 10/03/22  0734   WBC 19.95* 9.40 9.44 10.79   HEMOGLOBIN 10.2* 8.1* 9.3* 10.6*   HEMATOCRIT 31.6* 25.3* 29.3* 32.7*   PLATELETS 402 296 319 355   NEUTROS ABS 18.51* 8.41* 8.09* 9.73*   IMMATURE GRANS (ABS) 0.47* 0.15* 0.12* 0.11*   LYMPHS ABS 0.18* 0.21* 0.33* 0.25*   MONOS ABS 0.77 0.62 0.89 0.69   EOS ABS 0.00 0.00 0.00 0.00   MCV 84.0 83.5 84.0 83.4   SED RATE  --   --   --  88*   PROCALCITONIN  --   --   --  0.39*   LACTATE  --   --   --  1.2         Lab 10/06/22  0614 10/05/22  2020 10/05/22  1153 10/05/22  0456 10/04/22  1941 10/04/22  0639   SODIUM 128*  --  127* 129* 127* 130*   POTASSIUM 4.1 4.1 3.5 3.4* 3.3* 3.7   CHLORIDE 92*  --  92* 91* 89* 92*    CO2 23.0  --  26.0 27.0 28.0 30.0*   ANION GAP 13.0  --  9.0 11.0 10.0 8.0   BUN 12  --  11 9 8 6*   CREATININE 0.43*  --  0.48* 0.46* 0.48* 0.57*   EGFR 114.8  --  111.1 112.5 111.1 105.5   GLUCOSE 123*  --  114* 112* 126* 139*   CALCIUM 8.4*  --  8.3* 8.1* 7.9* 8.4*         Lab 10/05/22  0456 10/04/22  0639 10/03/22  0734   TOTAL PROTEIN 4.6* 5.4* 6.3   ALBUMIN 2.50* 2.30* 2.90*   GLOBULIN 2.1 3.1 3.4   ALT (SGPT) 41 52* 48*   AST (SGOT) 69* 151* 178*   BILIRUBIN 1.0 1.4* 1.0   ALK PHOS 395* 413* 439*                 Lab 10/05/22  0456 10/04/22  1342   IRON 9*  --    IRON SATURATION 7*  --    TIBC 122*  --    TRANSFERRIN 82*  --    FERRITIN 2,506.00*  --    ABO TYPING  --  O   RH TYPING  --  Positive   ANTIBODY SCREEN  --  Negative         Brief Urine Lab Results  (Last result in the past 365 days)      Color   Clarity   Blood   Leuk Est   Nitrite   Protein   CREAT   Urine HCG        10/03/22 2016             58.8         10/03/22 2016 Yellow   Clear   Trace   Negative   Negative   Trace                 Microbiology Results Abnormal     Procedure Component Value - Date/Time    Blood Culture - Blood, Arm, Left [[id number]]  (Normal) Collected: 10/03/22 0734    Lab Status: Preliminary result Specimen: Blood from Arm, Left Updated: 10/06/22 0802     Blood Culture No growth at 3 days    Blood Culture - Blood, Arm, Right [[id number]]  (Normal) Collected: 10/03/22 0808    Lab Status: Preliminary result Specimen: Blood from Arm, Right Updated: 10/05/22 1030     Blood Culture No growth at 2 days    Respiratory Panel PCR w/COVID-19(SARS-CoV-2) BARRY/KEIRA/MARY/PAD/COR/MAD/AKSHAT In-House, NP Swab in UTM/VTM, 3-4 HR TAT - Swab, Nasopharynx [[id number]]  (Normal) Collected: 10/03/22 0734    Lab Status: Final result Specimen: Swab from Nasopharynx Updated: 10/03/22 0836     ADENOVIRUS, PCR Not Detected     Coronavirus 229E Not Detected     Coronavirus HKU1 Not Detected     Coronavirus NL63 Not Detected     Coronavirus OC43 Not  Detected     COVID19 Not Detected     Human Metapneumovirus Not Detected     Human Rhinovirus/Enterovirus Not Detected     Influenza A PCR Not Detected     Influenza B PCR Not Detected     Parainfluenza Virus 1 Not Detected     Parainfluenza Virus 2 Not Detected     Parainfluenza Virus 3 Not Detected     Parainfluenza Virus 4 Not Detected     RSV, PCR Not Detected     Bordetella pertussis pcr Not Detected     Bordetella parapertussis PCR Not Detected     Chlamydophila pneumoniae PCR Not Detected     Mycoplasma pneumo by PCR Not Detected    Narrative:      In the setting of a positive respiratory panel with a viral infection PLUS a negative procalcitonin without other underlying concern for bacterial infection, consider observing off antibiotics or discontinuation of antibiotics and continue supportive care. If the respiratory panel is positive for atypical bacterial infection (Bordetella pertussis, Chlamydophila pneumoniae, or Mycoplasma pneumoniae), consider antibiotic de-escalation to target atypical bacterial infection.          No radiology results from the last 24 hrs    Results for orders placed during the hospital encounter of 10/03/22    Adult Transthoracic Echo Complete W/ Cont if Necessary Per Protocol    Interpretation Summary  · Left ventricular ejection fraction appears to be 56 - 60%. Left ventricular systolic function is normal.  · Global longitudinal LV strain (GLS) = -27.0%.  · Left atrial volume is moderately increased.  · Mild mitral valve regurgitation is present.  · Mild tricuspid valve regurgitation is present.  · Estimated right ventricular systolic pressure from tricuspid regurgitation is mildly elevated (35-45 mmHg).  · Mild dilation of the ascending aorta is present.      I have reviewed the medications:  Scheduled Meds:allopurinol, 200 mg, Oral, TID  dexamethasone, 20 mg, Oral, Daily With Breakfast  enoxaparin, 40 mg, Subcutaneous, Daily  fluticasone, 1 spray, Nasal, Daily  lisinopril, 10  mg, Oral, Daily  montelukast, 10 mg, Oral, Nightly  rosuvastatin, 10 mg, Oral, Daily  senna-docusate sodium, 2 tablet, Oral, BID  sodium chloride, 10 mL, Intravenous, Q12H  sodium chloride, 10 mL, Intravenous, Q12H  tamsulosin, 0.4 mg, Oral, Daily      Continuous Infusions:lactated ringers, 9 mL/hr, Last Rate: 9 mL/hr (10/04/22 1644)  sodium chloride, 100 mL/hr, Last Rate: 100 mL/hr (10/06/22 0800)      PRN Meds:.•  acetaminophen  •  acetaminophen-codeine  •  senna-docusate sodium **AND** polyethylene glycol **AND** bisacodyl **AND** bisacodyl  •  lactated ringers  •  ondansetron  •  potassium chloride **OR** potassium chloride **OR** potassium chloride  •  sodium chloride  •  sodium chloride  •  sodium chloride    Assessment & Plan   Assessment & Plan     Active Hospital Problems    Diagnosis  POA   • **Lymphoma (HCC) [C85.90]  Unknown   • Hydronephrosis [N13.30]  Yes   • Febrile illness [R50.9]  Yes   • Hypernatremia [E87.0]  Yes   • Hypokalemia [E87.6]  Yes   • Essential hypertension [I10]  Yes   • Rectal mass [K62.89]  Yes      Resolved Hospital Problems   No resolved problems to display.        Brief Hospital Course to date:  Abraham Wu is a 72 y.o. male  with history of hypertension, hyperlipidemia, SHERRI, recent admission to [facility] 9/21-9/24 for diarrhea, unintentional weight loss found to have enteropathogenic E. coli entero toxigenic E. coli s/p Flagyl and Levaquin he also found to have large rectal mass  s/p laparoscopic colostomy, pathology concerning for lymphoma.  He presented to the ED with fever, likely secondary underlying malignancy     Febrile illness  -Suspect this is secondary to underlying malignancy,   -He did have T-max 101.7, elevated procalcitonin, normal WBC, respiratory panel with COVID-19 is negative, UA was unremarkable.  -CT abd/pelvis, continues to show advanced metastatic disease, (liver, renal, splenic lesions)  -Patient is s/p diuretics, blood cultures NGTD,  these have been  discontinue as his fever is likely secondary to lymphoma     Lymphoma  -Patient has large pelvic mass, he is s/p laparoscopic colostomy creation, CT abd/pelvis as above  -Pathology shows CD30 positive lymphoma, per hematology, this could be classic Hodgkin's lymphoma or CD30 positive T or B-cell lymphoma.  CRS following, he is s/p transanal biopsy to obtain more tissue to assist with definitive diagnosis  -Heme-onc following, plan to initiate chemotherapy  once diagnosis is confirmed  -He has been started on steroids     Leukocytosis  -WBC elevated this morning, suspect this is secondary to steroids  -We will continue to monitor for now    Bilateral hydronephrosis  -Appears increased from prior imaging, however, no evidence of high-grade obstructive uropathy  -Suspect secondary to underlying malignancy, urology evaluated, started on tamsulosin, no stenting required at this time.      Hyponatremia  -Patient was initially hypernatremic on presentation, this was suspected to be hypovolemic hypernatremia, he is s/p D5/half NS, however, sodium is now low ?  SIADH  -Will send urinary electrolytes  -Encourage p.o. intake, continue IV fluids with normal saline  -We will ask renal to evaluate     Anemia  -H&H trending down, currently low but stable, suspect dilutional  -We will send iron studies and continue to monitor for now     Hypokalemia  -Continue to monitor and replete per protocol.     Chronic severe malnutrition  -We will have dietitian following.     Hypertension  -BP remains stable, resume lisinopril     Hyperlipidemia  -Continue statin     Expected Discharge Location and Transportation: Home versus rehab  Expected Discharge Date: 10/8/2022    DVT prophylaxis:  Medical DVT prophylaxis orders are present.     AM-PAC 6 Clicks Score (PT): 24 (10/06/22 0800)    CODE STATUS:   Code Status and Medical Interventions:   Ordered at: 10/03/22 1318     Level Of Support Discussed With:    Patient     Code Status (Patient has  no pulse and is not breathing):    CPR (Attempt to Resuscitate)     Medical Interventions (Patient has pulse or is breathing):    Full Support       Lorri Lauren MD  10/06/22

## 2022-10-06 NOTE — THERAPY EVALUATION
Acute Care - Speech Language Pathology   Swallow Initial Evaluation [facility]   Clinical Swallow Evaluation       Patient Name: Abraham Wu  : 1950  MRN: [id number]  Today's Date: 10/6/2022               Admit Date: 10/3/2022    Visit Dx:     ICD-10-CM ICD-9-CM   1. Fever, unspecified fever cause  R50.9 780.60   2. Hydronephrosis, unspecified hydronephrosis type  N13.30 591   3. Status post colostomy (HCC)  Z93.3 V44.3   4. Metastatic malignant neoplasm, unspecified site (HCC)  C79.9 199.1   5. Hypernatremia  E87.0 276.0   6. Hypokalemia  E87.6 276.8   7. Lymphoma (HCC)  C85.90 202.80   8. Dysphagia, unspecified type  R13.10 787.20     Patient Active Problem List   Diagnosis   • Rectal mass   • Anemia   • Essential hypertension   • Dyslipidemia   • Unintentional weight loss   • Hydronephrosis   • Febrile illness   • Hypernatremia   • Hypokalemia   • Lymphoma (HCC)     Past Medical History:   Diagnosis Date   • benign polypoid tissue right lung    • Hyperlipidemia    • Hypertension    • Mycobacterium mucogenicum    • SHERRI (obstructive sleep apnea)    • Seasonal allergies      Past Surgical History:   Procedure Laterality Date   • BRONCHOSCOPY      removal of obstructing polypoid tissue right middle lung   • COLOSTOMY N/A 2022    Procedure: LAPAROSCOPIC COLOSTOMY CREATION, FLEXIBLE SIGMOIDOSCOPY;  Surgeon: Hiram Viveros MD;  Location: [facility];  Service: General;  Laterality: N/A;   • EXAM UNDER ANESTHESIA, RECTAL BIOPSY N/A 10/4/2022    Procedure: EXAM UNDER ANESTHESIA, TRANSANAL BIOPSY WITH TRUCUT NEEDLE (LITHOTOMY-CANDY CANE);  Surgeon: Hiram Viveros MD;  Location: [facility];  Service: General;  Laterality: N/A;       SLP Recommendation and Plan  SLP Swallowing Diagnosis: suspected pharyngeal dysphagia, R/O pharyngeal dysphagia (10/06/22 115)  SLP Diet Recommendation: regular textures, thin liquids, other (see comments) (no straws) (10/06/22 1155)  Recommended Precautions and  Strategies: upright posture during/after eating, small bites of food and sips of liquid, general aspiration precautions, no straw, reflux precautions (10/06/22 1155)  SLP Rec. for Method of Medication Administration: meds whole, with pudding or applesauce, as tolerated (10/06/22 1155)     Monitor for Signs of Aspiration: yes, notify SLP if any concerns (10/06/22 1155)  Recommended Diagnostics: VFSS ([facility]), other (see comments) (10/7 w/ ltd UGI) (10/06/22 1155)  Swallow Criteria for Skilled Therapeutic Interventions Met: demonstrates skilled criteria (10/06/22 1155)  Anticipated Discharge Disposition (SLP): unknown, anticipate therapy at next level of care (10/06/22 1155)  Rehab Potential/Prognosis, Swallowing: good, to achieve stated therapy goals (10/06/22 1155)     Predicted Duration Therapy Intervention (Days): until discharge (10/06/22 1155)                                     Plan of Care Reviewed With: patient, spouse, son  Progress:  (initial eval)      SWALLOW EVALUATION (last 72 hours)     SLP Adult Swallow Evaluation     Row Name 10/06/22 1155                   Rehab Evaluation    Document Type evaluation  -CJ        Subjective Information no complaints  -CJ        Patient Observations alert;cooperative  -CJ        Patient/Family/Caregiver Comments/Observations spouse and son present  -CJ        Patient Effort good  -CJ        Symptoms Noted During/After Treatment none  -CJ                  General Information    Patient Profile Reviewed yes  -CJ        Pertinent History Of Current Problem Pt adm w/ fever, hypernatremia, hypokalemia,lymphoma. Has sig h/o recent dx of rectal mass w/ metastatic disease, anemia, dyslipidemia, weight loss. Pt was seen during previous admission w/ CSE completed w no suspected impairment. Pt now reports increased pain in throat as well as esophageal concerns  -CJ        Current Method of Nutrition regular textures;thin liquids  -CJ        Precautions/Limitations, Vision WFL;for  purposes of eval  -CJ        Precautions/Limitations, Hearing WFL;for purposes of eval  -CJ        Prior Level of Function-Communication unknown  -CJ        Prior Level of Function-Swallowing unknown  -        Plans/Goals Discussed with patient;spouse/S.O.;family  -        Barriers to Rehab medically complex  -        Patient's Goals for Discharge patient did not state  -                  Pain    Additional Documentation Pain Scale: FACES Pre/Post-Treatment (Group)  -CJ                  Pain Scale: FACES Pre/Post-Treatment    Pain: FACES Scale, Pretreatment 0-->no hurt  -CJ        Posttreatment Pain Rating 0-->no hurt  -CJ                  Oral Motor Structure and Function    Dentition Assessment missing teeth  -CJ        Secretion Management WNL/WFL  -CJ        Mucosal Quality moist, healthy  -                  Oral Musculature and Cranial Nerve Assessment    Oral Motor General Assessment WFL  -                  General Eating/Swallowing Observations    Respiratory Support Currently in Use nasal cannula  -        Eating/Swallowing Skills self-fed  -        Positioning During Eating upright 90 degree;upright in bed  -        Utensils Used spoon;cup;straw  -        Consistencies Trialed regular textures;ice chips;thin liquids;pureed  -CJ                  Clinical Swallow Eval    Pharyngeal Phase suspected pharyngeal impairment  -CJ        Esophageal Phase suspected esophageal impairment  -        Clinical Swallow Evaluation Summary Oral phase is seemingly wfl. Adequate mastication w/ solids. No oral residue. Pt does report discomfort w/ solid foods. Throat clear w/ thins via straw only. No other overt s/s of aspiration. No other clinical s/s concerning for silent aspiration as pt is w/o changes in vocal quality, respirations or secretions post po presentations. Will recommend continue regulat diet w/ thins, no straws and plan for MBS w/ ltd UGI. Pt w/ belching and complaining of sticking  -CJ                   Pharyngeal Phase Concerns    Pharyngeal Phase Concerns throat clear  -        Throat Clear thin  -                  Esophageal Phase Concerns    Esophageal Phase Concerns belching;sensation of material sticking  -        Belching all consistencies  -        Sensation of Material Sticking all consistencies  -                  SLP Evaluation Clinical Impression    SLP Swallowing Diagnosis suspected pharyngeal dysphagia;R/O pharyngeal dysphagia  -        Functional Impact risk of aspiration/pneumonia  -        Rehab Potential/Prognosis, Swallowing good, to achieve stated therapy goals  -        Swallow Criteria for Skilled Therapeutic Interventions Met demonstrates skilled criteria  -                  Recommendations    Predicted Duration Therapy Intervention (Days) until discharge  -        SLP Diet Recommendation regular textures;thin liquids;other (see comments)  no straws  -        Recommended Diagnostics VFSS (MBS);other (see comments)  10/7 w/ ltd UGI  -        Recommended Precautions and Strategies upright posture during/after eating;small bites of food and sips of liquid;general aspiration precautions;no straw;reflux precautions  -        Oral Care Recommendations Oral Care BID/PRN  -        SLP Rec. for Method of Medication Administration meds whole;with pudding or applesauce;as tolerated  -        Monitor for Signs of Aspiration yes;notify SLP if any concerns  -        Anticipated Discharge Disposition (SLP) unknown;anticipate therapy at next level of care  -              User Key  (r) = Recorded By, (t) = Taken By, (c) = Cosigned By    Initials Name Effective Dates    Divya Kilgore, MS CCC-SLP 06/16/21 -                 EDUCATION  The patient has been educated in the following areas:   Dysphagia (Swallowing Impairment) Oral Care/Hydration Modified Diet Instruction.              Time Calculation:    Time Calculation- SLP     Row Name 10/06/22 3397              Time Calculation- SLP    SLP Start Time 1155  -      SLP Received On 10/06/22  -              Untimed Charges    [id number] Eval Oral Pharyng Swallow Minutes 40  -CJ              Total Minutes    Untimed Charges Total Minutes 40  -CJ       Total Minutes 40  -CJ            User Key  (r) = Recorded By, (t) = Taken By, (c) = Cosigned By    Initials Name Provider Type    Divya Kilgore MS CCC-SLP Speech and Language Pathologist                Therapy Charges for Today     Code Description Service Date Service Provider Modifiers Qty    [id number]  ST EVAL ORAL PHARYNG SWALLOW 3 10/6/2022 Divya Sims MS CCC-SLP GN 1               Divya Sims MS CCC-SLP  10/6/2022

## 2022-10-06 NOTE — PROGRESS NOTES
"[facility]    2 Days Post-Op   Length of stay: 3 days    Subjective: Patient doing fair today.  He did not have much sleep yesterday evening.  Denies any significant pain.  Minimal bleeding per rectum.  Started steroid therapy yesterday, pathology still pending    Physical Exam:  Blood pressure 140/80, pulse 64, temperature 97.5 °F (36.4 °C), temperature source Oral, resp. rate 18, height 162.6 cm (64\"), weight 78 kg (172 lb), SpO2 100 %.    Abd: Soft, nontender, nondistended.  Colostomy is pink patent and productive    Labs past 24 hours:  Lab Results (last 24 hours)     Procedure Component Value Units Date/Time    Blood Culture - Blood, Arm, Right [[id number]]  (Normal) Collected: 10/03/22 0808    Specimen: Blood from Arm, Right Updated: 10/06/22 1032     Blood Culture No growth at 3 days    Basic Metabolic Panel [[id number]]  (Abnormal) Collected: 10/06/22 0614    Specimen: Blood Updated: 10/06/22 0815     Glucose 123 mg/dL      BUN 12 mg/dL      Creatinine 0.43 mg/dL      Sodium 128 mmol/L      Potassium 4.1 mmol/L      Chloride 92 mmol/L      CO2 23.0 mmol/L      Calcium 8.4 mg/dL      BUN/Creatinine Ratio 27.9     Anion Gap 13.0 mmol/L      eGFR 114.8 mL/min/1.73      Comment: National Kidney Foundation and American Society of Nephrology (ASN) Task Force recommended calculation based on the Chronic Kidney Disease Epidemiology Collaboration (CKD-EPI) equation refit without adjustment for race.       Narrative:      GFR Normal >60  Chronic Kidney Disease <60  Kidney Failure <15      Blood Culture - Blood, Arm, Left [[id number]]  (Normal) Collected: 10/03/22 0734    Specimen: Blood from Arm, Left Updated: 10/06/22 0802     Blood Culture No growth at 3 days    CBC & Differential [[id number]]  (Abnormal) Collected: 10/06/22 0614    Specimen: Blood Updated: 10/06/22 0757    Narrative:      The following orders were created for panel order CBC & Differential.  Procedure      "                          Abnormality         Status                     ---------                               -----------         ------                     CBC Auto Differential[[id number]]        Abnormal            Final result                 Please view results for these tests on the individual orders.    CBC Auto Differential [[id number]]  (Abnormal) Collected: 10/06/22 0614    Specimen: Blood Updated: 10/06/22 0757     WBC 19.95 10*3/mm3      RBC 3.76 10*6/mm3      Hemoglobin 10.2 g/dL      Hematocrit 31.6 %      MCV 84.0 fL      MCH 27.1 pg      MCHC 32.3 g/dL      RDW 15.4 %      RDW-SD 46.6 fl      MPV 9.3 fL      Platelets 402 10*3/mm3      Neutrophil % 92.7 %      Lymphocyte % 0.9 %      Monocyte % 3.9 %      Eosinophil % 0.0 %      Basophil % 0.1 %      Immature Grans % 2.4 %      Neutrophils, Absolute 18.51 10*3/mm3      Lymphocytes, Absolute 0.18 10*3/mm3      Monocytes, Absolute 0.77 10*3/mm3      Eosinophils, Absolute 0.00 10*3/mm3      Basophils, Absolute 0.02 10*3/mm3      Immature Grans, Absolute 0.47 10*3/mm3      nRBC 0.0 /100 WBC     Potassium [[id number]]  (Normal) Collected: 10/05/22 2020    Specimen: Blood Updated: 10/05/22 2056     Potassium 4.1 mmol/L     Creatinine, Urine, Random - Urine, Clean Catch [[id number]] Collected: 10/03/22 2016    Specimen: Urine, Clean Catch Updated: 10/05/22 1346     Creatinine, Urine 58.8 mg/dL     Narrative:      Reference intervals for random urine have not been established.  Clinical usage is dependent upon physician's interpretation in combination with other laboratory tests.       Basic Metabolic Panel [[id number]]  (Abnormal) Collected: 10/05/22 1153    Specimen: Blood Updated: 10/05/22 1241     Glucose 114 mg/dL      BUN 11 mg/dL      Creatinine 0.48 mg/dL      Sodium 127 mmol/L      Potassium 3.5 mmol/L      Chloride 92 mmol/L      CO2 26.0 mmol/L      Calcium 8.3 mg/dL      BUN/Creatinine Ratio 22.9     Anion Gap 9.0 mmol/L      eGFR 111.1 mL/min/1.73       Comment: National Kidney Foundation and American Society of Nephrology (ASN) Task Force recommended calculation based on the Chronic Kidney Disease Epidemiology Collaboration (CKD-EPI) equation refit without adjustment for race.       Narrative:      GFR Normal >60  Chronic Kidney Disease <60  Kidney Failure <15      Ferritin [[id number]]  (Abnormal) Collected: 10/05/22 0456    Specimen: Blood Updated: 10/05/22 1233     Ferritin 2,506.00 ng/mL     Narrative:      Results may be falsely decreased if patient taking Biotin.            I/O last shift:  No intake/output data recorded.       Pathology:  Order Name Source Comment Collection Info Order Time   TISSUE PATHOLOGY EXAM Per Rectum  Collected By: Hiram Viveros MD 10/4/2022  5:10 PM     Release to patient   Routine Release        .    Assessment and Plan:  72-year-old male with lymphoma involving the rectum, small bowel mesentery, liver, left kidney and right pleura.  Status post diversion and now postop day 1 2 from transanal biopsy for additional tissue to delineate lineage of lymphoma    Plan  Clear to initiate any sort of antineoplastic therapy, final pathology still pending  I did discuss again the fact that his lymphoma encases a large portion of the rectum as he begins antineoplastic therapy, he may develop complications as his lymphoma has replaced a large portion of the rectum.  He is diverted which should be helpful in decreasing the likelihood of serious complications such as peritonitis but they were again made aware of his risks.      Hiram Viveros MD  [facility]  10/06/22  12:26 EDT

## 2022-10-06 NOTE — PLAN OF CARE
Problem: Adult Inpatient Plan of Care  Goal: Plan of Care Review  Outcome: Ongoing, Progressing  Flowsheets (Taken 10/6/2022 5607)  Progress: no change  Plan of Care Reviewed With: patient  Outcome Evaluation: VSS. On 2 liters. Ekg done. Seroquel started for nights. Speech consulted and will have a MBS tomorrow.   Goal Outcome Evaluation:  Plan of Care Reviewed With: patient        Progress: no change  Outcome Evaluation: VSS. On 2 liters. Ekg done. Seroquel started for nights. Speech consulted and will have a MBS tomorrow.

## 2022-10-07 ENCOUNTER — APPOINTMENT (OUTPATIENT)
Dept: GENERAL RADIOLOGY | Facility: HOSPITAL | Age: 72
End: 2022-10-07

## 2022-10-07 PROBLEM — C81.90 HODGKIN LYMPHOMA: Status: ACTIVE | Noted: 2022-10-07

## 2022-10-07 LAB
ALBUMIN SERPL-MCNC: 2.7 G/DL (ref 3.5–5.2)
ALBUMIN/GLOB SERPL: 1.1 G/DL
ALP SERPL-CCNC: 457 U/L (ref 39–117)
ALT SERPL W P-5'-P-CCNC: 113 U/L (ref 1–41)
ANION GAP SERPL CALCULATED.3IONS-SCNC: 11 MMOL/L (ref 5–15)
AST SERPL-CCNC: 255 U/L (ref 1–40)
BASOPHILS # BLD AUTO: 0.02 10*3/MM3 (ref 0–0.2)
BASOPHILS NFR BLD AUTO: 0.1 % (ref 0–1.5)
BILIRUB SERPL-MCNC: 0.8 MG/DL (ref 0–1.2)
BUN SERPL-MCNC: 13 MG/DL (ref 8–23)
BUN/CREAT SERPL: 23.6 (ref 7–25)
CALCIUM SPEC-SCNC: 9 MG/DL (ref 8.6–10.5)
CHLORIDE SERPL-SCNC: 94 MMOL/L (ref 98–107)
CO2 SERPL-SCNC: 25 MMOL/L (ref 22–29)
CREAT SERPL-MCNC: 0.55 MG/DL (ref 0.76–1.27)
CYTO UR: NORMAL
DEPRECATED RDW RBC AUTO: 47.3 FL (ref 37–54)
EGFRCR SERPLBLD CKD-EPI 2021: 106.6 ML/MIN/1.73
EOSINOPHIL # BLD AUTO: 0.01 10*3/MM3 (ref 0–0.4)
EOSINOPHIL NFR BLD AUTO: 0 % (ref 0.3–6.2)
ERYTHROCYTE [DISTWIDTH] IN BLOOD BY AUTOMATED COUNT: 15.7 % (ref 12.3–15.4)
GLOBULIN UR ELPH-MCNC: 2.5 GM/DL
GLUCOSE SERPL-MCNC: 148 MG/DL (ref 65–99)
HCT VFR BLD AUTO: 34.2 % (ref 37.5–51)
HGB BLD-MCNC: 10.8 G/DL (ref 13–17.7)
IMM GRANULOCYTES # BLD AUTO: 0.44 10*3/MM3 (ref 0–0.05)
IMM GRANULOCYTES NFR BLD AUTO: 2 % (ref 0–0.5)
LAB AP CASE REPORT: NORMAL
LAB AP CLINICAL INFORMATION: NORMAL
LAB AP DIAGNOSIS COMMENT: NORMAL
LYMPHOCYTES # BLD AUTO: 0.57 10*3/MM3 (ref 0.7–3.1)
LYMPHOCYTES NFR BLD AUTO: 2.6 % (ref 19.6–45.3)
MCH RBC QN AUTO: 26.5 PG (ref 26.6–33)
MCHC RBC AUTO-ENTMCNC: 31.6 G/DL (ref 31.5–35.7)
MCV RBC AUTO: 83.8 FL (ref 79–97)
MONOCYTES # BLD AUTO: 0.72 10*3/MM3 (ref 0.1–0.9)
MONOCYTES NFR BLD AUTO: 3.3 % (ref 5–12)
NEUTROPHILS NFR BLD AUTO: 20.04 10*3/MM3 (ref 1.7–7)
NEUTROPHILS NFR BLD AUTO: 92 % (ref 42.7–76)
NRBC BLD AUTO-RTO: 0 /100 WBC (ref 0–0.2)
PATH REPORT.FINAL DX SPEC: NORMAL
PATH REPORT.GROSS SPEC: NORMAL
PLATELET # BLD AUTO: 489 10*3/MM3 (ref 140–450)
PMV BLD AUTO: 9.3 FL (ref 6–12)
POTASSIUM SERPL-SCNC: 4.2 MMOL/L (ref 3.5–5.2)
PROT SERPL-MCNC: 5.2 G/DL (ref 6–8.5)
RBC # BLD AUTO: 4.08 10*6/MM3 (ref 4.14–5.8)
SODIUM SERPL-SCNC: 130 MMOL/L (ref 136–145)
WBC NRBC COR # BLD: 21.8 10*3/MM3 (ref 3.4–10.8)

## 2022-10-07 PROCEDURE — 92611 MOTION FLUOROSCOPY/SWALLOW: CPT

## 2022-10-07 PROCEDURE — 74240 X-RAY XM UPR GI TRC 1CNTRST: CPT

## 2022-10-07 PROCEDURE — 85025 COMPLETE CBC W/AUTO DIFF WBC: CPT | Performed by: INTERNAL MEDICINE

## 2022-10-07 PROCEDURE — 80053 COMPREHEN METABOLIC PANEL: CPT | Performed by: INTERNAL MEDICINE

## 2022-10-07 PROCEDURE — 74230 X-RAY XM SWLNG FUNCJ C+: CPT

## 2022-10-07 PROCEDURE — 99232 SBSQ HOSP IP/OBS MODERATE 35: CPT | Performed by: INTERNAL MEDICINE

## 2022-10-07 PROCEDURE — 63710000001 DEXAMETHASONE PER 0.25 MG: Performed by: STUDENT IN AN ORGANIZED HEALTH CARE EDUCATION/TRAINING PROGRAM

## 2022-10-07 PROCEDURE — 25010000002 ENOXAPARIN PER 10 MG: Performed by: STUDENT IN AN ORGANIZED HEALTH CARE EDUCATION/TRAINING PROGRAM

## 2022-10-07 RX ORDER — SODIUM CHLORIDE 9 MG/ML
250 INJECTION, SOLUTION INTRAVENOUS ONCE
Status: CANCELLED | OUTPATIENT
Start: 2022-11-01

## 2022-10-07 RX ORDER — DIPHENHYDRAMINE HCL 25 MG
25 CAPSULE ORAL ONCE
Status: CANCELLED | OUTPATIENT
Start: 2022-11-01

## 2022-10-07 RX ORDER — DOXORUBICIN HYDROCHLORIDE 2 MG/ML
25 INJECTION, SOLUTION INTRAVENOUS ONCE
Status: CANCELLED | OUTPATIENT
Start: 2022-11-01

## 2022-10-07 RX ORDER — QUETIAPINE FUMARATE 25 MG/1
12.5 TABLET, FILM COATED ORAL NIGHTLY
Status: DISCONTINUED | OUTPATIENT
Start: 2022-10-07 | End: 2022-10-11

## 2022-10-07 RX ORDER — OLANZAPINE 5 MG/1
5 TABLET ORAL ONCE
Status: CANCELLED | OUTPATIENT
Start: 2022-11-01 | End: 2022-10-22

## 2022-10-07 RX ORDER — PALONOSETRON 0.05 MG/ML
0.25 INJECTION, SOLUTION INTRAVENOUS ONCE
Status: CANCELLED | OUTPATIENT
Start: 2022-11-01

## 2022-10-07 RX ORDER — ACETAMINOPHEN 325 MG/1
650 TABLET ORAL ONCE
Status: CANCELLED | OUTPATIENT
Start: 2022-11-01

## 2022-10-07 RX ADMIN — SODIUM CHLORIDE 2 G: 1 TABLET ORAL at 08:28

## 2022-10-07 RX ADMIN — ALLOPURINOL 200 MG: 100 TABLET ORAL at 08:25

## 2022-10-07 RX ADMIN — BARIUM SULFATE 100 ML: 0.81 POWDER, FOR SUSPENSION ORAL at 11:17

## 2022-10-07 RX ADMIN — SODIUM CHLORIDE 2 G: 1 TABLET ORAL at 17:37

## 2022-10-07 RX ADMIN — ROSUVASTATIN CALCIUM 10 MG: 10 TABLET, COATED ORAL at 08:26

## 2022-10-07 RX ADMIN — DEXAMETHASONE 20 MG: 4 TABLET ORAL at 08:25

## 2022-10-07 RX ADMIN — MONTELUKAST 10 MG: 10 TABLET, FILM COATED ORAL at 20:00

## 2022-10-07 RX ADMIN — ENOXAPARIN SODIUM 40 MG: 40 INJECTION SUBCUTANEOUS at 08:26

## 2022-10-07 RX ADMIN — Medication 10 ML: at 20:42

## 2022-10-07 RX ADMIN — BARIUM SULFATE 20 ML: 400 PASTE ORAL at 11:17

## 2022-10-07 RX ADMIN — TAMSULOSIN HYDROCHLORIDE 0.4 MG: 0.4 CAPSULE ORAL at 08:25

## 2022-10-07 RX ADMIN — LISINOPRIL 10 MG: 10 TABLET ORAL at 08:26

## 2022-10-07 RX ADMIN — Medication 10 ML: at 08:26

## 2022-10-07 RX ADMIN — QUETIAPINE FUMARATE 12.5 MG: 25 TABLET ORAL at 20:01

## 2022-10-07 RX ADMIN — Medication 10 ML: at 20:01

## 2022-10-07 RX ADMIN — ALLOPURINOL 200 MG: 100 TABLET ORAL at 20:00

## 2022-10-07 RX ADMIN — FLUTICASONE PROPIONATE 1 SPRAY: 50 SPRAY, METERED NASAL at 08:27

## 2022-10-07 RX ADMIN — SODIUM CHLORIDE 2 G: 1 TABLET ORAL at 12:47

## 2022-10-07 RX ADMIN — ALLOPURINOL 200 MG: 100 TABLET ORAL at 15:49

## 2022-10-07 NOTE — PROGRESS NOTES
"[facility]    3 Days Post-Op   Length of stay: 4 days    Subjective: Patient doing better this morning.  States he was able to get 6 to 7 hours of sleep.  He does have increasing leukocytosis secondary to steroid therapy.  Denies any nausea vomiting.  No bleeding per rectum.    Physical Exam:  Blood pressure 135/72, pulse 75, temperature 97.7 °F (36.5 °C), temperature source Oral, resp. rate 18, height 162.6 cm (64\"), weight 78 kg (172 lb), SpO2 97 %.    Abd: Soft, nontender, incisions are clean dry and intact.  Ostomy is pink patent and productive    Labs past 24 hours:  Lab Results (last 24 hours)     Procedure Component Value Units Date/Time    CBC & Differential [[id number]]  (Abnormal) Collected: 10/07/22 0647    Specimen: Blood Updated: 10/07/22 0728    Narrative:      The following orders were created for panel order CBC & Differential.  Procedure                               Abnormality         Status                     ---------                               -----------         ------                     CBC Auto Differential[[id number]]        Abnormal            Final result                 Please view results for these tests on the individual orders.    CBC Auto Differential [[id number]]  (Abnormal) Collected: 10/07/22 0647    Specimen: Blood Updated: 10/07/22 0728     WBC 21.80 10*3/mm3      RBC 4.08 10*6/mm3      Hemoglobin 10.8 g/dL      Hematocrit 34.2 %      MCV 83.8 fL      MCH 26.5 pg      MCHC 31.6 g/dL      RDW 15.7 %      RDW-SD 47.3 fl      MPV 9.3 fL      Platelets 489 10*3/mm3      Neutrophil % 92.0 %      Lymphocyte % 2.6 %      Monocyte % 3.3 %      Eosinophil % 0.0 %      Basophil % 0.1 %      Immature Grans % 2.0 %      Neutrophils, Absolute 20.04 10*3/mm3      Lymphocytes, Absolute 0.57 10*3/mm3      Monocytes, Absolute 0.72 10*3/mm3      Eosinophils, Absolute 0.01 10*3/mm3      Basophils, Absolute 0.02 10*3/mm3      Immature Grans, Absolute " 0.44 10*3/mm3      nRBC 0.0 /100 WBC     Comprehensive Metabolic Panel [[id number]] Collected: 10/07/22 0647    Specimen: Blood Updated: 10/07/22 0721    Blood Culture - Blood, Arm, Right [[id number]]  (Normal) Collected: 10/03/22 0808    Specimen: Blood from Arm, Right Updated: 10/06/22 1032     Blood Culture No growth at 3 days    Basic Metabolic Panel [[id number]]  (Abnormal) Collected: 10/06/22 0614    Specimen: Blood Updated: 10/06/22 0815     Glucose 123 mg/dL      BUN 12 mg/dL      Creatinine 0.43 mg/dL      Sodium 128 mmol/L      Potassium 4.1 mmol/L      Chloride 92 mmol/L      CO2 23.0 mmol/L      Calcium 8.4 mg/dL      BUN/Creatinine Ratio 27.9     Anion Gap 13.0 mmol/L      eGFR 114.8 mL/min/1.73      Comment: National Kidney Foundation and American Society of Nephrology (ASN) Task Force recommended calculation based on the Chronic Kidney Disease Epidemiology Collaboration (CKD-EPI) equation refit without adjustment for race.       Narrative:      GFR Normal >60  Chronic Kidney Disease <60  Kidney Failure <15      Blood Culture - Blood, Arm, Left [[id number]]  (Normal) Collected: 10/03/22 0734    Specimen: Blood from Arm, Left Updated: 10/06/22 0802     Blood Culture No growth at 3 days    CBC & Differential [[id number]]  (Abnormal) Collected: 10/06/22 0614    Specimen: Blood Updated: 10/06/22 0757    Narrative:      The following orders were created for panel order CBC & Differential.  Procedure                               Abnormality         Status                     ---------                               -----------         ------                     CBC Auto Differential[[id number]]        Abnormal            Final result                 Please view results for these tests on the individual orders.    CBC Auto Differential [[id number]]  (Abnormal) Collected: 10/06/22 0614    Specimen: Blood Updated: 10/06/22 0757     WBC 19.95 10*3/mm3      RBC 3.76 10*6/mm3      Hemoglobin 10.2 g/dL      Hematocrit  31.6 %      MCV 84.0 fL      MCH 27.1 pg      MCHC 32.3 g/dL      RDW 15.4 %      RDW-SD 46.6 fl      MPV 9.3 fL      Platelets 402 10*3/mm3      Neutrophil % 92.7 %      Lymphocyte % 0.9 %      Monocyte % 3.9 %      Eosinophil % 0.0 %      Basophil % 0.1 %      Immature Grans % 2.4 %      Neutrophils, Absolute 18.51 10*3/mm3      Lymphocytes, Absolute 0.18 10*3/mm3      Monocytes, Absolute 0.77 10*3/mm3      Eosinophils, Absolute 0.00 10*3/mm3      Basophils, Absolute 0.02 10*3/mm3      Immature Grans, Absolute 0.47 10*3/mm3      nRBC 0.0 /100 WBC           I/O last shift:  No intake/output data recorded.       Pathology:  Order Name Source Comment Collection Info Order Time   TISSUE PATHOLOGY EXAM Per Rectum  Collected By: Hiram Viveros MD 10/4/2022  5:10 PM     Release to patient   Routine Release        .    Assessment and Plan:  72-year-old male with lymphoma involving the rectum, left kidney, liver and right pleura, now initiated on steroid therapy.  Final pathology from repeat biopsy still pending    Plan  Clear to initiate any sort of antineoplastic therapy, final pathology still pending  I did discuss again the fact that his lymphoma encases a large portion of the rectum as he begins antineoplastic therapy, he may develop complications as his lymphoma has replaced a large portion of the rectum.  He is diverted which should be helpful in decreasing the likelihood of serious complications such as peritonitis but they were again made aware of his risks.    I will see him next Monday, please call me if he were to develop any complications.    Hiram Viveros MD  [facility]  10/07/22  07:45 EDT

## 2022-10-07 NOTE — MBS/VFSS/FEES
Acute Care - Speech Language Pathology   Swallow Initial Evaluation  [place]   Modified Barium Swallow Study (MBS)       Patient Name: Abraham Wu  : 1950  MRN: [id number]  Today's Date: 10/7/2022               Admit Date: 10/3/2022    Visit Dx:     ICD-10-CM ICD-9-CM   1. Fever, unspecified fever cause  R50.9 780.60   2. Hydronephrosis, unspecified hydronephrosis type  N13.30 591   3. Status post colostomy (HCC)  Z93.3 V44.3   4. Metastatic malignant neoplasm, unspecified site (HCC)  C79.9 199.1   5. Hypernatremia  E87.0 276.0   6. Hypokalemia  E87.6 276.8   7. Lymphoma (HCC)  C85.90 202.80   8. Dysphagia, unspecified type  R13.10 787.20     Patient Active Problem List   Diagnosis   • Rectal mass   • Anemia   • Essential hypertension   • Dyslipidemia   • Unintentional weight loss   • Hydronephrosis   • Febrile illness   • Hypernatremia   • Hypokalemia   • Lymphoma (HCC)     Past Medical History:   Diagnosis Date   • benign polypoid tissue right lung    • Hyperlipidemia    • Hypertension    • Mycobacterium mucogenicum    • SHERRI (obstructive sleep apnea)    • Seasonal allergies      Past Surgical History:   Procedure Laterality Date   • BRONCHOSCOPY      removal of obstructing polypoid tissue right middle lung   • COLOSTOMY N/A 2022    Procedure: LAPAROSCOPIC COLOSTOMY CREATION, FLEXIBLE SIGMOIDOSCOPY;  Surgeon: Hiram Viveros MD;  Location: [facility];  Service: General;  Laterality: N/A;   • EXAM UNDER ANESTHESIA, RECTAL BIOPSY N/A 10/4/2022    Procedure: EXAM UNDER ANESTHESIA, TRANSANAL BIOPSY WITH TRUCUT NEEDLE (LITHOTOMY-CANDY CANE);  Surgeon: Hiram Viveros MD;  Location: [facility];  Service: General;  Laterality: N/A;       SLP Recommendation and Plan  SLP Swallowing Diagnosis: functional oral phase, functional pharyngeal phase (10/07/22 1050)  SLP Diet Recommendation: regular textures, thin liquids (10/07/22 1050)  Recommended Precautions and Strategies: upright posture  during/after eating, small bites of food and sips of liquid, general aspiration precautions, no straw, reflux precautions (10/07/22 1050)  SLP Rec. for Method of Medication Administration: meds whole, with pudding or applesauce, as tolerated (10/07/22 1050)     Monitor for Signs of Aspiration: yes, notify SLP if any concerns (10/07/22 1050)     Swallow Criteria for Skilled Therapeutic Interventions Met: demonstrates skilled criteria (10/07/22 1050)  Anticipated Discharge Disposition (SLP): unknown, anticipate therapy at next level of care (10/07/22 1050)  Rehab Potential/Prognosis, Swallowing: good, to achieve stated therapy goals (10/07/22 1050)  Therapy Frequency (Swallow): PRN (10/07/22 1050)  Predicted Duration Therapy Intervention (Days): until discharge (10/07/22 1050)                                            SWALLOW EVALUATION (last 72 hours)     SLP Adult Swallow Evaluation     Row Name 10/07/22 1050 10/06/22 1155                Rehab Evaluation    Document Type evaluation  - evaluation  -       Subjective Information no complaints  - no complaints  -       Patient Observations alert;cooperative  - alert;cooperative  -       Patient/Family/Caregiver Comments/Observations No family present  - spouse and son present  -       Patient Effort good  - good  -       Symptoms Noted During/After Treatment none  - none  -                General Information    Patient Profile Reviewed yes  - yes  -       Pertinent History Of Current Problem See initial eval, pt referred for MBS. Completed in conjunction w/ CJ  - Pt adm w/ fever, hypernatremia, hypokalemia,lymphoma. Has sig h/o recent dx of rectal mass w/ metastatic disease, anemia, dyslipidemia, weight loss. Pt was seen during previous admission w/ CSE completed w no suspected impairment. Pt now reports increased pain in throat as well as esophageal concerns  -       Current Method of Nutrition regular textures;thin liquids  -  regular textures;thin liquids  -CJ       Precautions/Limitations, Vision WFL;for purposes of eval  - WFL;for purposes of eval  -       Precautions/Limitations, Hearing WFL;for purposes of eval  - WFL;for purposes of eval  -       Prior Level of Function-Communication unknown  - unknown  -       Prior Level of Function-Swallowing unknown  - unknown  -       Plans/Goals Discussed with patient;agreed upon  - patient;spouse/S.O.;family  -       Barriers to Rehab medically complex  - medically complex  -       Patient's Goals for Discharge patient did not state  - patient did not state  -                Pain    Additional Documentation Pain Scale: FACES Pre/Post-Treatment (Group)  - Pain Scale: FACES Pre/Post-Treatment (Group)  -                Pain Scale: FACES Pre/Post-Treatment    Pain: FACES Scale, Pretreatment 0-->no hurt  - 0-->no hurt  -       Posttreatment Pain Rating 0-->no hurt  - 0-->no hurt  -                Oral Motor Structure and Function    Dentition Assessment -- missing teeth  -       Secretion Management -- WNL/WFL  -       Mucosal Quality -- moist, healthy  -                Oral Musculature and Cranial Nerve Assessment    Oral Motor General Assessment -- WFL  -                General Eating/Swallowing Observations    Respiratory Support Currently in Use -- nasal cannula  -       Eating/Swallowing Skills -- self-fed  -       Positioning During Eating -- upright 90 degree;upright in bed  -       Utensils Used -- spoon;cup;straw  -       Consistencies Trialed -- regular textures;ice chips;thin liquids;pureed  -                Clinical Swallow Eval    Pharyngeal Phase -- suspected pharyngeal impairment  -       Esophageal Phase -- suspected esophageal impairment  -       Clinical Swallow Evaluation Summary -- Oral phase is seemingly wfl. Adequate mastication w/ solids. No oral residue. Pt does report discomfort w/ solid foods. Throat clear w/  thins via straw only. No other overt s/s of aspiration. No other clinical s/s concerning for silent aspiration as pt is w/o changes in vocal quality, respirations or secretions post po presentations. Will recommend continue regulat diet w/ thins, no straws and plan for MBS w/ ltd UGI. Pt w/ belching and complaining of sticking  -                Pharyngeal Phase Concerns    Pharyngeal Phase Concerns -- throat clear  -       Throat Clear -- thin  -CJ                Esophageal Phase Concerns    Esophageal Phase Concerns -- belching;sensation of material sticking  -       Belching -- all consistencies  -       Sensation of Material Sticking -- all consistencies  -CJ                MBS/VFSS    Utensils Used spoon;cup;straw  - --       Consistencies Trialed thin liquids;pudding thick;regular textures  - --                MBS/VFSS Interpretation    Oral Prep Phase WFL  - --       Oral Transit Phase WFL  - --       Oral Residue WFL  - --       VFSS Summary Oral & pharyngeal phases grossly functional. Prespill w/ thins to the pyriforms. Transient penetration w/ thin, material fully cleared from vestibule upon completion of the swallow. No other events of laryngeal aspiration/penetration w/ pudding or regular solid textures. Pt noted w/ throat clear in response to mild residue w/ pudding; however residue not signifantly impacting safety of swallow. Pt ok to cont regular diet w/ thin liquids.  SLP will f/u for diet tolerance  - --                Initiation of Pharyngeal Swallow    Initiation of Pharyngeal Swallow bolus in pyriform sinuses  - --       Pharyngeal Phase functional pharyngeal phase of swallowing  - --                Esophageal Phase    Esophageal Phase see radiology report for further details;other (see comments)  Osteophytes C4-C7 & prominent CP per radiology PA  - --                SLP Evaluation Clinical Impression    SLP Swallowing Diagnosis functional oral phase;functional pharyngeal  phase  - suspected pharyngeal dysphagia;R/O pharyngeal dysphagia  -       Functional Impact risk of aspiration/pneumonia  - risk of aspiration/pneumonia  -       Rehab Potential/Prognosis, Swallowing good, to achieve stated therapy goals  - good, to achieve stated therapy goals  -       Swallow Criteria for Skilled Therapeutic Interventions Met demonstrates skilled criteria  - demonstrates skilled criteria  -                Recommendations    Therapy Frequency (Swallow) PRN  - --       Predicted Duration Therapy Intervention (Days) until discharge  - until discharge  -       SLP Diet Recommendation regular textures;thin liquids  - regular textures;thin liquids;other (see comments)  no straws  -       Recommended Diagnostics -- VFSS (MBS);other (see comments)  10/7 w/ ltd UGI  -       Recommended Precautions and Strategies upright posture during/after eating;small bites of food and sips of liquid;general aspiration precautions;no straw;reflux precautions  - upright posture during/after eating;small bites of food and sips of liquid;general aspiration precautions;no straw;reflux precautions  -       Oral Care Recommendations Oral Care BID/PRN  - Oral Care BID/PRN  -       SLP Rec. for Method of Medication Administration meds whole;with pudding or applesauce;as tolerated  - meds whole;with pudding or applesauce;as tolerated  -       Monitor for Signs of Aspiration yes;notify SLP if any concerns  - yes;notify SLP if any concerns  -       Anticipated Discharge Disposition (SLP) unknown;anticipate therapy at next level of care  - unknown;anticipate therapy at next level of care  -             User Key  (r) = Recorded By, (t) = Taken By, (c) = Cosigned By    Initials Name Effective Dates     Divya Sims, MS CCC-SLP 06/16/21 -      Yuliet Tucker MS, CFY-SLP 06/22/22 -                 EDUCATION  The patient has been educated in the following areas:   Dysphagia  (Swallowing Impairment).        SLP GOALS     Row Name 10/07/22 1050             (LTG) Patient will demonstrate functional swallow for    Diet Texture (Demonstrate functional swallow) regular textures  -      Liquid viscosity (Demonstrate functional swallow) thin liquids  -      [place] (Demonstrate functional swallow) independently (over 90% accuracy)  -      Time Frame (Demonstrate functional swallow) 1 week  -      Progress/Outcomes (Demonstrate functional swallow) new goal  -              (STG) Patient will tolerate trials of    Consistencies Trialed (Tolerate trials) regular textures;thin liquids  -      Desired Outcome (Tolerate trials) without signs/symptoms of aspiration  -      [place] (Tolerate trials) independently (over 90% accuracy)  -      Time Frame (Tolerate trials) 1 week  -      Progress/Outcomes (Tolerate trials) new goal  -            User Key  (r) = Recorded By, (t) = Taken By, (c) = Cosigned By    Initials Name Provider Type     Yuliet Tucker MS, FAVIOLA-SLP Speech and Language Pathologist                   Time Calculation:    Time Calculation- SLP     Row Name 10/07/22 1445             Time Calculation- [facility]    SLP Start Time 1050  -      SLP Received On 10/07/22  -              Untimed Charges    61264-II Motion Fluoro Eval Swallow Minutes 90  -              Total Minutes    Untimed Charges Total Minutes 90  -       Total Minutes 90  -            User Key  (r) = Recorded By, (t) = Taken By, (c) = Cosigned By    Initials Name Provider Type     Yuliet Tucker MS, CFY-SLP Speech and Language Pathologist                Therapy Charges for Today     Code Description Service Date Service Provider Modifiers Qty    17104601670 HC ST MOTION FLUORO EVAL SWALLOW 6 10/7/2022 Yuliet Tucker MS, CFY-SLP GN 1            Patient was not wearing a face mask and did not exhibit coughing during this therapy encounter.  Procedure performed was not  aerosolizing, did not involve close contact (within 6 feet for at least 15 minutes or longer), and did not involve contact with infectious secretions or specimens.  Therapist used appropriate personal protective equipment including gloves, standard procedure mask and eye protection.  Appropriate PPE was worn during the entire therapy session.  Hand hygiene was completed before and after therapy session.       Yuliet Tucker MS, CFY-SLP  10/7/2022

## 2022-10-07 NOTE — PROGRESS NOTES
[facility] Medicine Services  PROGRESS NOTE    Patient Name: Abraham Wu  : 1950  MRN: [id number]    Date of Admission: 10/3/2022  Primary Care Physician: Jatinder Haynes MD    Subjective   Subjective     CC: Follow-up lymphoma    HPI: Patient said that he had a very good night, slept well, ate some of his breakfast well    ROS:  Gen- No fevers, chills  CV- No chest pain, palpitations  Resp- No cough, dyspnea  GI- No N/V/D, abd pain     Objective   Objective     Vital Signs:   Temp:  [97.5 °F (36.4 °C)-98.1 °F (36.7 °C)] 97.7 °F (36.5 °C)  Heart Rate:  [64-98] 98  Resp:  [16-18] 18  BP: (135-143)/(72-85) 135/72  Flow (L/min):  [2-3] 3     Physical Exam:  Constitutional: No acute distress, awake, alert  HENT: NCAT, mucous membranes moist  Respiratory: Clear to auscultation bilaterally, respiratory effort normal   Cardiovascular: RRR, no murmurs, rubs, or gallops  Gastrointestinal: Positive bowel sounds, soft, nontender, nondistended  Musculoskeletal: Lateral lower extremity edema 3+  Psychiatric: Appropriate affect, cooperative  Neurologic: Oriented x 3, nonfocal  Skin: No rashes    Results Reviewed:  LAB RESULTS:      Lab 10/07/22  0647 10/06/22  0614 10/05/22  0456 10/04/22  0639 10/03/22  0734   WBC 21.80* 19.95* 9.40 9.44 10.79   HEMOGLOBIN 10.8* 10.2* 8.1* 9.3* 10.6*   HEMATOCRIT 34.2* 31.6* 25.3* 29.3* 32.7*   PLATELETS 489* 402 296 319 355   NEUTROS ABS 20.04* 18.51* 8.41* 8.09* 9.73*   IMMATURE GRANS (ABS) 0.44* 0.47* 0.15* 0.12* 0.11*   LYMPHS ABS 0.57* 0.18* 0.21* 0.33* 0.25*   MONOS ABS 0.72 0.77 0.62 0.89 0.69   EOS ABS 0.01 0.00 0.00 0.00 0.00   MCV 83.8 84.0 83.5 84.0 83.4   SED RATE  --   --   --   --  88*   PROCALCITONIN  --   --   --   --  0.39*   LACTATE  --   --   --   --  1.2         Lab 10/07/22  0647 10/06/22  0614 10/05/22  2020 10/05/22  1153 10/05/22  0456 10/04/22  1941   SODIUM 130* 128*  --  127* 129* 127*   POTASSIUM 4.2 4.1 4.1 3.5 3.4* 3.3*    CHLORIDE 94* 92*  --  92* 91* 89*   CO2 25.0 23.0  --  26.0 27.0 28.0   ANION GAP 11.0 13.0  --  9.0 11.0 10.0   BUN 13 12  --  11 9 8   CREATININE 0.55* 0.43*  --  0.48* 0.46* 0.48*   EGFR 106.6 114.8  --  111.1 112.5 111.1   GLUCOSE 148* 123*  --  114* 112* 126*   CALCIUM 9.0 8.4*  --  8.3* 8.1* 7.9*         Lab 10/07/22  0647 10/05/22  0456 10/04/22  0639 10/03/22  0734   TOTAL PROTEIN 5.2* 4.6* 5.4* 6.3   ALBUMIN 2.70* 2.50* 2.30* 2.90*   GLOBULIN 2.5 2.1 3.1 3.4   ALT (SGPT) 113* 41 52* 48*   AST (SGOT) 255* 69* 151* 178*   BILIRUBIN 0.8 1.0 1.4* 1.0   ALK PHOS 457* 395* 413* 439*                 Lab 10/05/22  0456 10/04/22  1342   IRON 9*  --    IRON SATURATION 7*  --    TIBC 122*  --    TRANSFERRIN 82*  --    FERRITIN 2,506.00*  --    ABO TYPING  --  O   RH TYPING  --  Positive   ANTIBODY SCREEN  --  Negative         Brief Urine Lab Results  (Last result in the past 365 days)      Color   Clarity   Blood   Leuk Est   Nitrite   Protein   CREAT   Urine HCG        10/03/22 2016             58.8         10/03/22 2016 Yellow   Clear   Trace   Negative   Negative   Trace                 Microbiology Results Abnormal     Procedure Component Value - Date/Time    Blood Culture - Blood, Arm, Left [[id number]]  (Normal) Collected: 10/03/22 0734    Lab Status: Preliminary result Specimen: Blood from Arm, Left Updated: 10/07/22 0802     Blood Culture No growth at 4 days    Blood Culture - Blood, Arm, Right [[id number]]  (Normal) Collected: 10/03/22 0808    Lab Status: Preliminary result Specimen: Blood from Arm, Right Updated: 10/06/22 1032     Blood Culture No growth at 3 days    Respiratory Panel PCR w/COVID-19(SARS-CoV-2) BARRY/KEIRA/MARY/PAD/COR/MAD/AKSHAT In-House, NP Swab in UTM/VTM, 3-4 HR TAT - Swab, Nasopharynx [[id number]]  (Normal) Collected: 10/03/22 0734    Lab Status: Final result Specimen: Swab from Nasopharynx Updated: 10/03/22 0836     ADENOVIRUS, PCR Not Detected     Coronavirus 229E Not Detected     Coronavirus  HKU1 Not Detected     Coronavirus NL63 Not Detected     Coronavirus OC43 Not Detected     COVID19 Not Detected     Human Metapneumovirus Not Detected     Human Rhinovirus/Enterovirus Not Detected     Influenza A PCR Not Detected     Influenza B PCR Not Detected     Parainfluenza Virus 1 Not Detected     Parainfluenza Virus 2 Not Detected     Parainfluenza Virus 3 Not Detected     Parainfluenza Virus 4 Not Detected     RSV, PCR Not Detected     Bordetella pertussis pcr Not Detected     Bordetella parapertussis PCR Not Detected     Chlamydophila pneumoniae PCR Not Detected     Mycoplasma pneumo by PCR Not Detected    Narrative:      In the setting of a positive respiratory panel with a viral infection PLUS a negative procalcitonin without other underlying concern for bacterial infection, consider observing off antibiotics or discontinuation of antibiotics and continue supportive care. If the respiratory panel is positive for atypical bacterial infection (Bordetella pertussis, Chlamydophila pneumoniae, or Mycoplasma pneumoniae), consider antibiotic de-escalation to target atypical bacterial infection.          No radiology results from the last 24 hrs    Results for orders placed during the hospital encounter of 10/03/22    Adult Transthoracic Echo Complete W/ Cont if Necessary Per Protocol    Interpretation Summary  · Left ventricular ejection fraction appears to be 56 - 60%. Left ventricular systolic function is normal.  · Global longitudinal LV strain (GLS) = -27.0%.  · Left atrial volume is moderately increased.  · Mild mitral valve regurgitation is present.  · Mild tricuspid valve regurgitation is present.  · Estimated right ventricular systolic pressure from tricuspid regurgitation is mildly elevated (35-45 mmHg).  · Mild dilation of the ascending aorta is present.      I have reviewed the medications:  Scheduled Meds:allopurinol, 200 mg, Oral, TID  enoxaparin, 40 mg, Subcutaneous, Daily  fluticasone, 1 spray,  Nasal, Daily  lisinopril, 10 mg, Oral, Daily  montelukast, 10 mg, Oral, Nightly  QUEtiapine, 25 mg, Oral, Nightly  rosuvastatin, 10 mg, Oral, Daily  senna-docusate sodium, 2 tablet, Oral, BID  sodium chloride, 10 mL, Intravenous, Q12H  sodium chloride, 10 mL, Intravenous, Q12H  sodium chloride, 2 g, Oral, TID With Meals  tamsulosin, 0.4 mg, Oral, Daily      Continuous Infusions:lactated ringers, 9 mL/hr, Last Rate: 9 mL/hr (10/04/22 1644)      PRN Meds:.•  acetaminophen  •  acetaminophen-codeine  •  senna-docusate sodium **AND** polyethylene glycol **AND** bisacodyl **AND** bisacodyl  •  lactated ringers  •  ondansetron  •  potassium chloride **OR** potassium chloride **OR** potassium chloride  •  sodium chloride  •  sodium chloride  •  sodium chloride    Assessment & Plan   Assessment & Plan     Active Hospital Problems    Diagnosis  POA   • **Lymphoma (HCC) [C85.90]  Unknown   • Hydronephrosis [N13.30]  Yes   • Febrile illness [R50.9]  Yes   • Hypernatremia [E87.0]  Yes   • Hypokalemia [E87.6]  Yes   • Essential hypertension [I10]  Yes   • Rectal mass [K62.89]  Yes      Resolved Hospital Problems   No resolved problems to display.        Brief Hospital Course to date:  Abraham Wu is a 72 y.o. male  with history of hypertension, hyperlipidemia, SHERRI, recent admission to [facility] 9/21-9/24 for diarrhea, unintentional weight loss found to have enteropathogenic E. coli entero toxigenic E. coli s/p Flagyl and Levaquin he also found to have large rectal mass  s/p laparoscopic colostomy, pathology concerning for lymphoma.  He presented to the ED with fever, likely secondary underlying malignancy     Febrile illness  -Suspect this is secondary to underlying malignancy,   -He did have T-max 101.7, elevated procalcitonin, normal WBC, respiratory panel with COVID-19 is negative, UA was unremarkable.  -CT abd/pelvis, continues to show advanced metastatic disease, (liver, renal, splenic lesions)  -Patient is s/p diuretics,  blood cultures NGTD,  these have been discontinue as his fever is likely secondary to lymphoma     Lymphoma  -Patient has large pelvic mass, he is s/p laparoscopic colostomy creation, CT abd/pelvis as above  -Pathology shows CD30 positive lymphoma, per hematology, this could be classic Hodgkin's lymphoma or CD30 positive T or B-cell lymphoma.  CRS following, he is s/p transanal biopsy to obtain more tissue to assist with definitive diagnosis  -Heme-onc following, plan to initiate chemotherapy once diagnosis is confirmed, pathology still pending  -He has been started on steroids     Leukocytosis  -WBC remains elevated suspect this is secondary to steroids  -We will continue to monitor for now     Bilateral hydronephrosis  -Appears increased from prior imaging, however, no evidence of high-grade obstructive uropathy  -Suspect secondary to underlying malignancy, urology evaluated, started on tamsulosin, no stenting required at this time.      Hyponatremia, clinically significant, improving  -Renal following, patient started on salt tabs 2 g TID, fluid restriction at 1.2 L daily  -Continue to closely monitor    Anemia  -H&H remains stable  -Iron profile is uninterpretable     Hypokalemia  -Continue to monitor and replete per protocol.     Chronic severe malnutrition  -Dietitian following     Hypertension  -BP remains stable, continue lisinopril     Hyperlipidemia  -Continue statin     Expected Discharge Location and Transportation: Home versus rehab  Expected Discharge Date: 10/8/2022     DVT prophylaxis:  Medical DVT prophylaxis orders are present.     AM-PAC 6 Clicks Score (PT): 24 (10/06/22 0800)    CODE STATUS:   Code Status and Medical Interventions:   Ordered at: 10/03/22 1318     Level Of Support Discussed With:    Patient     Code Status (Patient has no pulse and is not breathing):    CPR (Attempt to Resuscitate)     Medical Interventions (Patient has pulse or is breathing):    Full Support       Lorri Lauren  MD  10/07/22

## 2022-10-07 NOTE — PLAN OF CARE
Problem: Adult Inpatient Plan of Care  Goal: Plan of Care Review  Outcome: Ongoing, Not Progressing  Flowsheets  Taken 10/7/2022 1616  Progress: no change  Outcome Evaluation: VSS. On room air. MBS done today. No complaints. Awaiting Bx results for treatment plan.  Taken 10/6/2022 1629  Plan of Care Reviewed With: patient   Goal Outcome Evaluation:  Plan of Care Reviewed With: patient        Progress: no change  Outcome Evaluation: VSS. On room air. MBS done today. No complaints. Awaiting Bx results for treatment plan.

## 2022-10-07 NOTE — CASE MANAGEMENT/SOCIAL WORK
Continued Stay Note  [facility]     Patient Name: Abraham Wu  MRN: [id number]  Today's Date: 10/7/2022    Admit Date: 10/3/2022    Plan: Home   Discharge Plan     Row Name 10/07/22 1121       Plan    Plan Home    Patient/Family in Agreement with Plan yes    Plan Comments Spoke with patient's wife in room.  His plan is still to return home at discharge.  Patient requesting a rollator.  Order called to Pool with [facility].  No other needs noted at this time.  CM will continue to follow.    Final Discharge Disposition Code 01 - home or self-care               Discharge Codes    No documentation.               Expected Discharge Date and Time     Expected Discharge Date Expected Discharge Time    Oct 10, 2022             Cyndie Newsome RN

## 2022-10-07 NOTE — CONSULTS
Patient Care Team:  Jatinder Haynes MD as PCP - General (Family Medicine)    Chief complaint: Hyponatremia  Lymphoma  Coloectomy       History of Present Illness: This is a 72-year-old male who is well-known to me.  He is postop day 11 from laparoscopic loop colostomy creation with peritoneal nodule biopsies as well as flexible sigmoidoscopy with biopsies.  This was done for a large rectal mass and large bowel obstruction. Patho suggestive of possible lymphoma. Nephrology has been consulted for evaluation of hyponatremia. Patient mentioned about low sodium for few months. He has been losing weight in last few months due to poor appetite. He has been mainly taking liquid diet including lot of water, Gatorade, and juice. Na level has been btw 127-130 on recent labs. Urine Na<20 Urine and serum osm pending uric 2.0     Review of Systems   Constitutional: Positive for appetite change, fatigue and unexpected weight change.   HENT: Negative.    Respiratory: Negative.    Cardiovascular: Negative.    Gastrointestinal: Negative.    Genitourinary: Negative.    Musculoskeletal: Negative.    Skin: Negative.    Neurological: Negative.    Hematological: Negative.    Psychiatric/Behavioral: Negative.         Past Medical History:   Diagnosis Date   • benign polypoid tissue right lung    • Hyperlipidemia    • Hypertension    • Mycobacterium mucogenicum    • SHERRI (obstructive sleep apnea)    • Seasonal allergies    ,   Past Surgical History:   Procedure Laterality Date   • BRONCHOSCOPY      removal of obstructing polypoid tissue right middle lung   • COLOSTOMY N/A 9/22/2022    Procedure: LAPAROSCOPIC COLOSTOMY CREATION, FLEXIBLE SIGMOIDOSCOPY;  Surgeon: Hiram Viveros MD;  Location: [facility];  Service: General;  Laterality: N/A;   • EXAM UNDER ANESTHESIA, RECTAL BIOPSY N/A 10/4/2022    Procedure: EXAM UNDER ANESTHESIA, TRANSANAL BIOPSY WITH TRUCUT NEEDLE (LITHOTOMY-CANDY CANE);  Surgeon: Hiram Viveros MD;  Location:   [facility];  Service: General;  Laterality: N/A;   ,   Family History   Problem Relation Age of Onset   • Diabetes Mother    • Diabetes Father    • Heart disease Father    ,   Social History     Socioeconomic History   • Marital status:    Tobacco Use   • Smoking status: Never Smoker   • Smokeless tobacco: Never Used   Substance and Sexual Activity   • Alcohol use: Not Currently     Comment: previously drank 2 glasses of wine/beer nightly   • Drug use: Never     E-cigarette/Vaping     E-cigarette/Vaping Substances     E-cigarette/Vaping Devices        and   Medications Prior to Admission   Medication Sig Dispense Refill Last Dose   • fluticasone (FLONASE) 50 MCG/ACT nasal spray 1 spray into the nostril(s) as directed by provider Daily.   Past Month at Unknown time   • lisinopril (PRINIVIL,ZESTRIL) 10 MG tablet Take 10 mg by mouth Daily.   10/3/2022 at Unknown time   • montelukast (SINGULAIR) 10 MG tablet Take 10 mg by mouth Every Night.   10/2/2022 at Unknown time   • rosuvastatin (CRESTOR) 10 MG tablet Take 10 mg by mouth Daily.   10/3/2022 at Unknown time       Objective     Vital Signs  Temp:  [97.1 °F (36.2 °C)-98.6 °F (37 °C)] 97.7 °F (36.5 °C)  Heart Rate:  [57-76] 66  Resp:  [16-18] 16  BP: (127-143)/(75-80) 143/76    No intake/output data recorded.  I/O last 3 completed shifts:  In: 2480 [P.O.:1480; I.V.:1000]  Out: -     Physical Exam  Constitutional:       Appearance: Normal appearance. He is ill-appearing.   HENT:      Head: Normocephalic and atraumatic.      Nose: Nose normal.   Eyes:      Extraocular Movements: Extraocular movements intact.      Pupils: Pupils are equal, round, and reactive to light.   Cardiovascular:      Rate and Rhythm: Normal rate and regular rhythm.      Pulses: Normal pulses.      Heart sounds: Normal heart sounds.   Pulmonary:      Effort: Pulmonary effort is normal.      Breath sounds: Normal breath sounds.   Abdominal:      General: Abdomen is flat.   Musculoskeletal:       Right lower leg: No edema.      Left lower leg: No edema.   Skin:     General: Skin is warm.   Neurological:      General: No focal deficit present.      Mental Status: He is alert and oriented to person, place, and time. Mental status is at baseline.         Results Review:    I reviewed the patient's new clinical results.    WBC WBC   Date Value Ref Range Status   10/06/2022 19.95 (H) 3.40 - 10.80 10*3/mm3 Final   10/05/2022 9.40 3.40 - 10.80 10*3/mm3 Final   10/04/2022 9.44 3.40 - 10.80 10*3/mm3 Final      HGB Hemoglobin   Date Value Ref Range Status   10/06/2022 10.2 (L) 13.0 - 17.7 g/dL Final   10/05/2022 8.1 (L) 13.0 - 17.7 g/dL Final   10/04/2022 9.3 (L) 13.0 - 17.7 g/dL Final      HCT Hematocrit   Date Value Ref Range Status   10/06/2022 31.6 (L) 37.5 - 51.0 % Final   10/05/2022 25.3 (L) 37.5 - 51.0 % Final   10/04/2022 29.3 (L) 37.5 - 51.0 % Final      Platlets No results found for: LABPLAT   MCV MCV   Date Value Ref Range Status   10/06/2022 84.0 79.0 - 97.0 fL Final   10/05/2022 83.5 79.0 - 97.0 fL Final   10/04/2022 84.0 79.0 - 97.0 fL Final          Sodium Sodium   Date Value Ref Range Status   10/06/2022 128 (L) 136 - 145 mmol/L Final   10/05/2022 127 (L) 136 - 145 mmol/L Final   10/05/2022 129 (L) 136 - 145 mmol/L Final   10/04/2022 127 (L) 136 - 145 mmol/L Final   10/04/2022 130 (L) 136 - 145 mmol/L Final      Potassium Potassium   Date Value Ref Range Status   10/06/2022 4.1 3.5 - 5.2 mmol/L Final   10/05/2022 4.1 3.5 - 5.2 mmol/L Final   10/05/2022 3.5 3.5 - 5.2 mmol/L Final   10/05/2022 3.4 (L) 3.5 - 5.2 mmol/L Final   10/04/2022 3.3 (L) 3.5 - 5.2 mmol/L Final   10/04/2022 3.7 3.5 - 5.2 mmol/L Final   10/03/2022 4.1 3.5 - 5.2 mmol/L Final      Chloride Chloride   Date Value Ref Range Status   10/06/2022 92 (L) 98 - 107 mmol/L Final   10/05/2022 92 (L) 98 - 107 mmol/L Final   10/05/2022 91 (L) 98 - 107 mmol/L Final   10/04/2022 89 (L) 98 - 107 mmol/L Final   10/04/2022 92 (L) 98 - 107 mmol/L Final       CO2 CO2   Date Value Ref Range Status   10/06/2022 23.0 22.0 - 29.0 mmol/L Final   10/05/2022 26.0 22.0 - 29.0 mmol/L Final   10/05/2022 27.0 22.0 - 29.0 mmol/L Final   10/04/2022 28.0 22.0 - 29.0 mmol/L Final   10/04/2022 30.0 (H) 22.0 - 29.0 mmol/L Final      BUN BUN   Date Value Ref Range Status   10/06/2022 12 8 - 23 mg/dL Final   10/05/2022 11 8 - 23 mg/dL Final   10/05/2022 9 8 - 23 mg/dL Final   10/04/2022 8 8 - 23 mg/dL Final   10/04/2022 6 (L) 8 - 23 mg/dL Final      Creatinine Creatinine   Date Value Ref Range Status   10/06/2022 0.43 (L) 0.76 - 1.27 mg/dL Final   10/05/2022 0.48 (L) 0.76 - 1.27 mg/dL Final   10/05/2022 0.46 (L) 0.76 - 1.27 mg/dL Final   10/04/2022 0.48 (L) 0.76 - 1.27 mg/dL Final   10/04/2022 0.57 (L) 0.76 - 1.27 mg/dL Final      Calcium Calcium   Date Value Ref Range Status   10/06/2022 8.4 (L) 8.6 - 10.5 mg/dL Final   10/05/2022 8.3 (L) 8.6 - 10.5 mg/dL Final   10/05/2022 8.1 (L) 8.6 - 10.5 mg/dL Final   10/04/2022 7.9 (L) 8.6 - 10.5 mg/dL Final   10/04/2022 8.4 (L) 8.6 - 10.5 mg/dL Final      PO4 No results found for: CAPO4   Albumin Albumin   Date Value Ref Range Status   10/05/2022 2.50 (L) 3.50 - 5.20 g/dL Final   10/04/2022 2.30 (L) 3.50 - 5.20 g/dL Final      Magnesium No results found for: MG   Uric Acid No results found for: URICACID         Assessment & Plan       Lymphoma (HCC)    Rectal mass    Essential hypertension    Hydronephrosis    Febrile illness    Hypernatremia    Hypokalemia      Assessment & Plan     Hyponatremia: Na 128-130. Urine Na<20. Pending serum and urine osm .Serum uric acid 2.1. Struggling with low sodium for few months. In the setting of hyponatremia due to poor solute intake. Low urine sodium could also suggest low effective arterial volume however low uric acid goes against it.     Hypokalemia: Due to poor oral intake and GI loss    Rectal mass: S/p colectomy. Biopsy suggestive of lymphoma       Recs  Add salt tab 2gm TID. Limit FW intake to 1.2  liter/day. F/u with urine and serum osm. Check daily sodium levels. Patient doesn't think he can take protein supplementation. Encourage oral intake.   I discussed the patients findings and my recommendations with patient    Nik Kelly MD  10/06/22  20:19 EDT

## 2022-10-07 NOTE — PROGRESS NOTES
HEMATOLOGY/ONCOLOGY PROGRESS NOTE    Subjective      CC: Hodgkin's lymphoma    SUBJECTIVE:   No acute events overnight.  Patient anxious about his pathology diagnosis.  Denies any worsening abdominal pain or discomfort        Past Medical History, Past Surgical History, Social History, Family History have been reviewed and are without significant changes except as mentioned.      Medications:  The current medication list was reviewed in the EMR    ALLERGIES: No Known Allergies    ROS:  A comprehensive 10 point review of systems was performed and was negative except as mentioned.      Objective      Vitals:    10/07/22 1000 10/07/22 1400 10/07/22 1524 10/07/22 1600   BP:   146/85    BP Location:   Left arm    Patient Position:   Lying    Pulse: 70 66 69 59   Resp:   18    Temp:   97.7 °F (36.5 °C)    TempSrc:   Oral    SpO2: 95% 95%     Weight:       Height:                General: Laying in bed, in no acute distress  HEENT: sclerae anicteric, oropharynx clear  Lymphatics: no cervical, supraclavicular, inguinal, or axillary adenopathy  Cardiovascular: regular rate and rhythm, no murmurs  Lungs: clear to auscultation bilaterally  Abdomen: soft, nontender, nondistended.  No palpable organomegaly  Extremities: no lower extremity edema  Skin: no rashes, lesions, bruising, or petechiae  Neuro: Alert and oriented x 3. Moves all extremities.    RECENT LABS:    Results from last 7 days   Lab Units 10/07/22  0647 10/06/22  0614 10/05/22  0456   WBC 10*3/mm3 21.80* 19.95* 9.40   HEMOGLOBIN g/dL 10.8* 10.2* 8.1*   PLATELETS 10*3/mm3 489* 402 296     Results from last 7 days   Lab Units 10/07/22  0647 10/06/22  0614 10/05/22  2020 10/05/22  1153   SODIUM mmol/L 130* 128*  --  127*   POTASSIUM mmol/L 4.2 4.1 4.1 3.5   CO2 mmol/L 25.0 23.0  --  26.0   BUN mg/dL 13 12  --  11   CREATININE mg/dL 0.55* 0.43*  --  0.48*   GLUCOSE mg/dL 148* 123*  --  114*     Results from last 7 days   Lab Units 10/07/22  0647 10/05/22  0456  10/04/22  0639   AST (SGOT) U/L 255* 69* 151*   ALT (SGPT) U/L 113* 41 52*   BILIRUBIN mg/dL 0.8 1.0 1.4*   ALK PHOS U/L 457* 395* 413*         CT Abdomen Pelvis With Contrast    Result Date: 10/3/2022  1. No evidence of pulmonary embolic disease. 2. Small but increasing right pleural effusion compared to 9/21/2022. 3. Stable small area of linear scarring in the right middle lobe. 4. Mild ascending aortic ectasia to approximately 4.0 cm again incidentally noted.    CT SCAN OF THE ABDOMEN AND PELVIS WITH IV CONTRAST: Numerous hepatic metastases are again noted. There is extensive retroperitoneal lymphadenopathy, and an approximately 5.4 cm left upper quadrant mass involving the left lateral renal parenchyma. Volume and shape of the exophytic mass favors that this is invasive to the kidney, rather than originating from kidney. There are multiple small splenic lesions consistent with metastases. Adrenal glands appear to be spared. Gallbladder appears normal. Pancreas appears to be normal, although the tip of the pancreatic tail is immediately adjacent the left upper quadrant mass.  No upper abdominal free air is seen. There is a small amount of ascites. Diffusely increased bowel gas suggests a low level ileus. Left mid abdominal ostomy site is clear. Large pelvic mass is again noted, which appears to extend into the small bowel mesentery, although these may be adjacent but separate nodes. Bladder is normally distended and normal in appearance. There is relatively little intrapelvic free fluid.  Delayed venous phase images show contrast within the renal collecting systems, which appear minimally more dilated than on the prior study. Distal ureters may be mildly compressed by the large pelvic mass. The bladder itself is displaced anteriorly and superiorly, but normally distended and grossly normal in appearance. No evidence of a discrete, drainable inflammatory collection is appreciated. Bony structures appear to be  "intact.   IMPRESSION:  1. Expected changes of recent colostomy placement. No visible associated inflammation. 2. Advanced metastatic disease, including numerous liver lesions, extensive retrocrural lymphadenopathy, multiple splenic lesions, left upper quadrant mass that that involves the lateral margin of the left kidney. Large pelvic mass and extensive infiltration of the lower small bowel mesentery, whether by direct extension of tumor, or multiple irregularly enlarged adjacent lymph nodes. 3. Bilateral hydronephrosis which appears increased from 9/20/2022, but no evidence of high-grade obstructive uropathy. 4. Mildly increased ascites. No evidence of drainable inflammatory collection. 5. \"Gassy\" appearance of nondependent large and small bowel, suggesting a low level ileus  This report was finalized on 10/3/2022 9:14 AM by Dr. Del Wilks MD.      FL Video Swallow With Speech Single Contrast    Result Date: 10/7/2022  Fluoroscopy provided for a modified barium swallow, and limited upper GI series. Please see speech therapy report for full details and recommendations. Limited upper GI series appeared within normal limits.    This report was finalized on 10/7/2022 3:10 PM by Yvon Donnelly.      XR Chest 1 View    Result Date: 10/3/2022   1. The tip of the right upper extremity PICC line terminates in the superior vena cava. 2. No active pulmonary disease. 3. Cardiomegaly.  This report was finalized on 10/3/2022 4:09 PM by Preet Kline MD.      FL Limited Ugi For Mbs Reflux Single-Contrast    Result Date: 10/7/2022  Fluoroscopy provided for a modified barium swallow, and limited upper GI series. Please see speech therapy report for full details and recommendations. Limited upper GI series appeared within normal limits.    This report was finalized on 10/7/2022 3:10 PM by Yvon Donnelly.      CT Angiogram Chest    Result Date: 10/3/2022  1. No evidence of pulmonary embolic disease. 2. Small but increasing right " pleural effusion compared to 9/21/2022. 3. Stable small area of linear scarring in the right middle lobe. 4. Mild ascending aortic ectasia to approximately 4.0 cm again incidentally noted.    CT SCAN OF THE ABDOMEN AND PELVIS WITH IV CONTRAST: Numerous hepatic metastases are again noted. There is extensive retroperitoneal lymphadenopathy, and an approximately 5.4 cm left upper quadrant mass involving the left lateral renal parenchyma. Volume and shape of the exophytic mass favors that this is invasive to the kidney, rather than originating from kidney. There are multiple small splenic lesions consistent with metastases. Adrenal glands appear to be spared. Gallbladder appears normal. Pancreas appears to be normal, although the tip of the pancreatic tail is immediately adjacent the left upper quadrant mass.  No upper abdominal free air is seen. There is a small amount of ascites. Diffusely increased bowel gas suggests a low level ileus. Left mid abdominal ostomy site is clear. Large pelvic mass is again noted, which appears to extend into the small bowel mesentery, although these may be adjacent but separate nodes. Bladder is normally distended and normal in appearance. There is relatively little intrapelvic free fluid.  Delayed venous phase images show contrast within the renal collecting systems, which appear minimally more dilated than on the prior study. Distal ureters may be mildly compressed by the large pelvic mass. The bladder itself is displaced anteriorly and superiorly, but normally distended and grossly normal in appearance. No evidence of a discrete, drainable inflammatory collection is appreciated. Bony structures appear to be intact.   IMPRESSION:  1. Expected changes of recent colostomy placement. No visible associated inflammation. 2. Advanced metastatic disease, including numerous liver lesions, extensive retrocrural lymphadenopathy, multiple splenic lesions, left upper quadrant mass that that  "involves the lateral margin of the left kidney. Large pelvic mass and extensive infiltration of the lower small bowel mesentery, whether by direct extension of tumor, or multiple irregularly enlarged adjacent lymph nodes. 3. Bilateral hydronephrosis which appears increased from 9/20/2022, but no evidence of high-grade obstructive uropathy. 4. Mildly increased ascites. No evidence of drainable inflammatory collection. 5. \"Gassy\" appearance of nondependent large and small bowel, suggesting a low level ileus  This report was finalized on 10/3/2022 9:14 AM by Dr. Del Wilks MD.            Assessment   ASSESSMENT & PLAN:  Classical Hodgkin's lymphoma vs CD 30 positive T/B-cell lymphoma  -Noted on recent rectal biopsy  -CT C/A/P on admission concerning for significant disease burden involving the liver, lymph nodes, spleen, left upper quadrant mass, kidney, mesentery  -Spoke with pathology today and repeat biopsy consistent with classical Hodgkin's lymphoma  -Plan to initiate AAVD tomorrow morning  -Baseline ECHO with an EF of 56-60%  -Continue allopurinol for TLS prophylaxis  -We will give his pegfilgrastim on Sunday and then he will be appropriate for discharge     Bilateral hydronephrosis  -Secondary to his active malignancy  -Urology consulted however patient likely to have a significant response to chemotherapy tomorrow  -Unless emergently needed, would consider holding on bilateral ureteral stent placement at this time     Fever of unknown origin  -Likely secondary to his diffuse malignancy     Hypernatremia  -Now resolved     Thank you for the consult.  Please do not hesitate to contact with any questions or concerns.  Oncology team will continue to follow while inpatient    River Oodm MD  Hematology and Oncology    10/7/2022  16:33 EDT                        "

## 2022-10-07 NOTE — DISCHARGE INSTR - DIET
MBS/VFSS Reason for Referral  Patient was referred for a MBS to assess the efficiency of his/her swallow function, rule out aspiration and make recommendations regarding safe dietary consistencies, effective compensatory strategies, and safe eating environment.             Recommendations/Treatment  SLP Swallowing Diagnosis: functional oral phase, functional pharyngeal phase (10/07/22 1050)  Functional Impact: risk of aspiration/pneumonia (10/07/22 1050)  Rehab Potential/Prognosis, Swallowing: good, to achieve stated therapy goals (10/07/22 1050)  Swallow Criteria for Skilled Therapeutic Interventions Met: demonstrates skilled criteria (10/07/22 1050)  Therapy Frequency (Swallow): PRN (10/07/22 1050)  Predicted Duration Therapy Intervention (Days): until discharge (10/07/22 1050)  SLP Diet Recommendation: regular textures, thin liquids (10/07/22 1050)  Recommended Precautions and Strategies: upright posture during/after eating, small bites of food and sips of liquid, general aspiration precautions, no straw, reflux precautions (10/07/22 1050)  SLP Rec. for Method of Medication Administration: meds whole, with pudding or applesauce, as tolerated (10/07/22 1050)  Monitor for Signs of Aspiration: yes, notify SLP if any concerns (10/07/22 1050)  Anticipated Discharge Disposition (SLP): unknown, anticipate therapy at next level of care (10/07/22 1050)    Instrumental Set-up  Utensils Used: spoon, cup, straw (10/07/22 1050)  Consistencies Trialed: thin liquids, pudding thick, regular textures (10/07/22 1050)    Oral Preparation/ Oral Phase  Oral Prep Phase: WFL (10/07/22 1050)  Oral Transit Phase: WFL (10/07/22 1050)  Oral Residue: WFL (10/07/22 1050)             Pharyngeal Phase  Initiation of Pharyngeal Swallow: bolus in pyriform sinuses (10/07/22 1050)  Pharyngeal Phase: functional pharyngeal phase of swallowing (10/07/22 1050)  VFSS Summary: Oral & pharyngeal phases grossly functional. Prespill w/ thins to the  pyriforms. Transient penetration w/ thin, material fully cleared from vestibule upon completion of the swallow. No other events of laryngeal aspiration/penetration w/ pudding or regular solid textures. Pt noted w/ throat clear in response to mild residue w/ pudding; however residue not signifantly impacting safety of swallow. Pt ok to cont regular diet w/ thin liquids.  SLP will f/u for diet tolerance (10/07/22 1050)    Cervical Esophageal Phase  Esophageal Phase: see radiology report for further details, other (see comments) (Osteophytes C4-C7 & prominent CP per radiology PA) (10/07/22 1050)

## 2022-10-08 LAB
ALBUMIN SERPL-MCNC: 2.4 G/DL (ref 3.5–5.2)
ALBUMIN/GLOB SERPL: 1.1 G/DL
ALP SERPL-CCNC: 439 U/L (ref 39–117)
ALT SERPL W P-5'-P-CCNC: 114 U/L (ref 1–41)
ANION GAP SERPL CALCULATED.3IONS-SCNC: 9 MMOL/L (ref 5–15)
AST SERPL-CCNC: 194 U/L (ref 1–40)
BACTERIA SPEC AEROBE CULT: NORMAL
BACTERIA SPEC AEROBE CULT: NORMAL
BASOPHILS # BLD AUTO: 0.02 10*3/MM3 (ref 0–0.2)
BASOPHILS NFR BLD AUTO: 0.1 % (ref 0–1.5)
BILIRUB SERPL-MCNC: 1 MG/DL (ref 0–1.2)
BUN SERPL-MCNC: 16 MG/DL (ref 8–23)
BUN/CREAT SERPL: 34.8 (ref 7–25)
CALCIUM SPEC-SCNC: 8.5 MG/DL (ref 8.6–10.5)
CHLORIDE SERPL-SCNC: 97 MMOL/L (ref 98–107)
CO2 SERPL-SCNC: 26 MMOL/L (ref 22–29)
CREAT SERPL-MCNC: 0.46 MG/DL (ref 0.76–1.27)
DEPRECATED RDW RBC AUTO: 47.8 FL (ref 37–54)
EGFRCR SERPLBLD CKD-EPI 2021: 112.5 ML/MIN/1.73
EOSINOPHIL # BLD AUTO: 0 10*3/MM3 (ref 0–0.4)
EOSINOPHIL NFR BLD AUTO: 0 % (ref 0.3–6.2)
ERYTHROCYTE [DISTWIDTH] IN BLOOD BY AUTOMATED COUNT: 15.7 % (ref 12.3–15.4)
GLOBULIN UR ELPH-MCNC: 2.2 GM/DL
GLUCOSE SERPL-MCNC: 161 MG/DL (ref 65–99)
HCT VFR BLD AUTO: 26.9 % (ref 37.5–51)
HEMOCCULT STL QL: POSITIVE
HGB BLD-MCNC: 8.5 G/DL (ref 13–17.7)
IMM GRANULOCYTES # BLD AUTO: 0.35 10*3/MM3 (ref 0–0.05)
IMM GRANULOCYTES NFR BLD AUTO: 2.1 % (ref 0–0.5)
LYMPHOCYTES # BLD AUTO: 0.27 10*3/MM3 (ref 0.7–3.1)
LYMPHOCYTES NFR BLD AUTO: 1.6 % (ref 19.6–45.3)
MCH RBC QN AUTO: 26.5 PG (ref 26.6–33)
MCHC RBC AUTO-ENTMCNC: 31.6 G/DL (ref 31.5–35.7)
MCV RBC AUTO: 83.8 FL (ref 79–97)
MONOCYTES # BLD AUTO: 0.67 10*3/MM3 (ref 0.1–0.9)
MONOCYTES NFR BLD AUTO: 3.9 % (ref 5–12)
NEUTROPHILS NFR BLD AUTO: 15.71 10*3/MM3 (ref 1.7–7)
NEUTROPHILS NFR BLD AUTO: 92.3 % (ref 42.7–76)
NRBC BLD AUTO-RTO: 0 /100 WBC (ref 0–0.2)
PLATELET # BLD AUTO: 338 10*3/MM3 (ref 140–450)
PMV BLD AUTO: 9.6 FL (ref 6–12)
POTASSIUM SERPL-SCNC: 4.1 MMOL/L (ref 3.5–5.2)
PROT SERPL-MCNC: 4.6 G/DL (ref 6–8.5)
RBC # BLD AUTO: 3.21 10*6/MM3 (ref 4.14–5.8)
SODIUM SERPL-SCNC: 132 MMOL/L (ref 136–145)
WBC NRBC COR # BLD: 17.02 10*3/MM3 (ref 3.4–10.8)

## 2022-10-08 PROCEDURE — 25010000002 BRENTUXIMAB 50 MG RECONSTITUTED SOLUTION 1 EACH VIAL: Performed by: INTERNAL MEDICINE

## 2022-10-08 PROCEDURE — 82272 OCCULT BLD FECES 1-3 TESTS: CPT | Performed by: NURSE PRACTITIONER

## 2022-10-08 PROCEDURE — 25010000002 DACARBAZINE PER 200 MG: Performed by: INTERNAL MEDICINE

## 2022-10-08 PROCEDURE — 25010000002 VINBLASTINE PER 1 MG: Performed by: INTERNAL MEDICINE

## 2022-10-08 PROCEDURE — 25010000002 DEXAMETHASONE SODIUM PHOSPHATE 100 MG/10ML SOLUTION: Performed by: INTERNAL MEDICINE

## 2022-10-08 PROCEDURE — 80053 COMPREHEN METABOLIC PANEL: CPT | Performed by: INTERNAL MEDICINE

## 2022-10-08 PROCEDURE — 25010000002 ENOXAPARIN PER 10 MG: Performed by: STUDENT IN AN ORGANIZED HEALTH CARE EDUCATION/TRAINING PROGRAM

## 2022-10-08 PROCEDURE — 99232 SBSQ HOSP IP/OBS MODERATE 35: CPT | Performed by: INTERNAL MEDICINE

## 2022-10-08 PROCEDURE — 25010000002 ONDANSETRON PER 1 MG

## 2022-10-08 PROCEDURE — 85025 COMPLETE CBC W/AUTO DIFF WBC: CPT | Performed by: INTERNAL MEDICINE

## 2022-10-08 PROCEDURE — 25010000002 FOSAPREPITANT PER 1 MG: Performed by: INTERNAL MEDICINE

## 2022-10-08 PROCEDURE — 63710000001 DIPHENHYDRAMINE PER 50 MG: Performed by: INTERNAL MEDICINE

## 2022-10-08 PROCEDURE — 99233 SBSQ HOSP IP/OBS HIGH 50: CPT | Performed by: INTERNAL MEDICINE

## 2022-10-08 PROCEDURE — 25010000002 DOXORUBICIN PER 10 MG: Performed by: INTERNAL MEDICINE

## 2022-10-08 RX ORDER — PALONOSETRON 0.05 MG/ML
0.25 INJECTION, SOLUTION INTRAVENOUS ONCE
Status: DISCONTINUED | OUTPATIENT
Start: 2022-10-08 | End: 2022-10-08 | Stop reason: ALTCHOICE

## 2022-10-08 RX ORDER — ACETAMINOPHEN 325 MG/1
650 TABLET ORAL ONCE
Status: COMPLETED | OUTPATIENT
Start: 2022-10-08 | End: 2022-10-08

## 2022-10-08 RX ORDER — DIPHENHYDRAMINE HCL 25 MG
25 CAPSULE ORAL ONCE
Status: COMPLETED | OUTPATIENT
Start: 2022-10-08 | End: 2022-10-08

## 2022-10-08 RX ORDER — SODIUM CHLORIDE 9 MG/ML
250 INJECTION, SOLUTION INTRAVENOUS ONCE
Status: COMPLETED | OUTPATIENT
Start: 2022-10-08 | End: 2022-10-08

## 2022-10-08 RX ORDER — OLANZAPINE 5 MG/1
5 TABLET ORAL ONCE
Status: COMPLETED | OUTPATIENT
Start: 2022-10-08 | End: 2022-10-08

## 2022-10-08 RX ORDER — DOXORUBICIN HYDROCHLORIDE 2 MG/ML
25 INJECTION, SOLUTION INTRAVENOUS ONCE
Status: COMPLETED | OUTPATIENT
Start: 2022-10-08 | End: 2022-10-08

## 2022-10-08 RX ADMIN — DOXORUBICIN HYDROCHLORIDE 46 MG: 2 INJECTION, SOLUTION INTRAVENOUS at 15:30

## 2022-10-08 RX ADMIN — SODIUM CHLORIDE 2 G: 1 TABLET ORAL at 17:26

## 2022-10-08 RX ADMIN — ROSUVASTATIN CALCIUM 10 MG: 10 TABLET, COATED ORAL at 09:45

## 2022-10-08 RX ADMIN — SODIUM CHLORIDE 150 MG: 9 INJECTION, SOLUTION INTRAVENOUS at 14:48

## 2022-10-08 RX ADMIN — SODIUM CHLORIDE 2 G: 1 TABLET ORAL at 09:45

## 2022-10-08 RX ADMIN — MONTELUKAST 10 MG: 10 TABLET, FILM COATED ORAL at 20:15

## 2022-10-08 RX ADMIN — Medication 10 ML: at 09:46

## 2022-10-08 RX ADMIN — ALLOPURINOL 200 MG: 100 TABLET ORAL at 17:27

## 2022-10-08 RX ADMIN — ENOXAPARIN SODIUM 40 MG: 40 INJECTION SUBCUTANEOUS at 09:45

## 2022-10-08 RX ADMIN — BRENTUXIMAB VEDOTIN 95 MG: 50 INJECTION, POWDER, LYOPHILIZED, FOR SOLUTION INTRAVENOUS at 16:44

## 2022-10-08 RX ADMIN — SODIUM CHLORIDE 250 ML: 9 INJECTION, SOLUTION INTRAVENOUS at 15:32

## 2022-10-08 RX ADMIN — Medication 10 ML: at 20:15

## 2022-10-08 RX ADMIN — LISINOPRIL 10 MG: 10 TABLET ORAL at 09:45

## 2022-10-08 RX ADMIN — FLUTICASONE PROPIONATE 1 SPRAY: 50 SPRAY, METERED NASAL at 09:46

## 2022-10-08 RX ADMIN — DACARBAZINE 690 MG: 10 INJECTION, POWDER, FOR SOLUTION INTRAVENOUS at 16:06

## 2022-10-08 RX ADMIN — SODIUM CHLORIDE 2 G: 1 TABLET ORAL at 11:38

## 2022-10-08 RX ADMIN — DEXAMETHASONE SODIUM PHOSPHATE 12 MG: 10 INJECTION, SOLUTION INTRAMUSCULAR; INTRAVENOUS at 14:50

## 2022-10-08 RX ADMIN — ALLOPURINOL 200 MG: 100 TABLET ORAL at 09:45

## 2022-10-08 RX ADMIN — OLANZAPINE 5 MG: 5 TABLET, FILM COATED ORAL at 14:48

## 2022-10-08 RX ADMIN — DIPHENHYDRAMINE HYDROCHLORIDE 25 MG: 25 CAPSULE ORAL at 15:28

## 2022-10-08 RX ADMIN — VINBLASTINE SULFATE 11 MG: 1 INJECTION INTRAVENOUS at 15:49

## 2022-10-08 RX ADMIN — ONDANSETRON 16 MG: 2 INJECTION INTRAMUSCULAR; INTRAVENOUS at 14:51

## 2022-10-08 RX ADMIN — ALLOPURINOL 200 MG: 100 TABLET ORAL at 20:14

## 2022-10-08 RX ADMIN — ACETAMINOPHEN 650 MG: 325 TABLET ORAL at 15:28

## 2022-10-08 RX ADMIN — TAMSULOSIN HYDROCHLORIDE 0.4 MG: 0.4 CAPSULE ORAL at 09:45

## 2022-10-08 RX ADMIN — Medication 10 ML: at 09:48

## 2022-10-08 NOTE — PROGRESS NOTES
"   LOS: 5 days    Patient Care Team:  Jatinder Haynes MD as PCP - General (Family Medicine)  Hyponatremia follow up     Subjective     Interval History:     Na improving 132       Review of Systems:   No CP or SOA , fever, chills, rigors, rash, N/V, Constipation.       Objective     Vital Sign Min/Max for last 24 hours  Temp  Min: 97 °F (36.1 °C)  Max: 99.8 °F (37.7 °C)   BP  Min: 144/78  Max: 154/81   Pulse  Min: 59  Max: 89   Resp  Min: 18  Max: 18   SpO2  Min: 93 %  Max: 98 %   No data recorded   No data recorded     Flowsheet Rows    Flowsheet Row First Filed Value   Admission Height 162.6 cm (64\") Documented at 10/03/2022 0604   Admission Weight 78 kg (172 lb) Documented at 10/03/2022 0604          I/O this shift:  In: -   Out: 300 [Urine:300]  I/O last 3 completed shifts:  In: 765 [P.O.:765]  Out: -     Physical Exam:    Gen: Alert, NAD   HENT: NC, AT, EOMI   NECK: Supple, no JVD, Trachea midline   LUNGS: CTA bilaterally, non labored respirtation   CVS: S1/S2 audible, RRR, no murmur   Abd: Soft, NT, ND, BS+   Ext: No pedal edema, no cyanosis   CNS: Alert, NFD      WBC WBC   Date Value Ref Range Status   10/08/2022 17.02 (H) 3.40 - 10.80 10*3/mm3 Final   10/07/2022 21.80 (H) 3.40 - 10.80 10*3/mm3 Final   10/06/2022 19.95 (H) 3.40 - 10.80 10*3/mm3 Final      HGB Hemoglobin   Date Value Ref Range Status   10/08/2022 8.5 (L) 13.0 - 17.7 g/dL Final   10/07/2022 10.8 (L) 13.0 - 17.7 g/dL Final   10/06/2022 10.2 (L) 13.0 - 17.7 g/dL Final      HCT Hematocrit   Date Value Ref Range Status   10/08/2022 26.9 (L) 37.5 - 51.0 % Final   10/07/2022 34.2 (L) 37.5 - 51.0 % Final   10/06/2022 31.6 (L) 37.5 - 51.0 % Final      Platlets No results found for: LABPLAT   MCV MCV   Date Value Ref Range Status   10/08/2022 83.8 79.0 - 97.0 fL Final   10/07/2022 83.8 79.0 - 97.0 fL Final   10/06/2022 84.0 79.0 - 97.0 fL Final          Sodium Sodium   Date Value Ref Range Status   10/08/2022 132 (L) 136 - 145 mmol/L Final "   10/07/2022 130 (L) 136 - 145 mmol/L Final   10/06/2022 128 (L) 136 - 145 mmol/L Final      Potassium Potassium   Date Value Ref Range Status   10/08/2022 4.1 3.5 - 5.2 mmol/L Final   10/07/2022 4.2 3.5 - 5.2 mmol/L Final   10/06/2022 4.1 3.5 - 5.2 mmol/L Final   10/05/2022 4.1 3.5 - 5.2 mmol/L Final      Chloride Chloride   Date Value Ref Range Status   10/08/2022 97 (L) 98 - 107 mmol/L Final   10/07/2022 94 (L) 98 - 107 mmol/L Final   10/06/2022 92 (L) 98 - 107 mmol/L Final      CO2 CO2   Date Value Ref Range Status   10/08/2022 26.0 22.0 - 29.0 mmol/L Final   10/07/2022 25.0 22.0 - 29.0 mmol/L Final   10/06/2022 23.0 22.0 - 29.0 mmol/L Final      BUN BUN   Date Value Ref Range Status   10/08/2022 16 8 - 23 mg/dL Final   10/07/2022 13 8 - 23 mg/dL Final   10/06/2022 12 8 - 23 mg/dL Final      Creatinine Creatinine   Date Value Ref Range Status   10/08/2022 0.46 (L) 0.76 - 1.27 mg/dL Final   10/07/2022 0.55 (L) 0.76 - 1.27 mg/dL Final   10/06/2022 0.43 (L) 0.76 - 1.27 mg/dL Final      Calcium Calcium   Date Value Ref Range Status   10/08/2022 8.5 (L) 8.6 - 10.5 mg/dL Final   10/07/2022 9.0 8.6 - 10.5 mg/dL Final   10/06/2022 8.4 (L) 8.6 - 10.5 mg/dL Final      PO4 No results found for: CAPO4   Albumin Albumin   Date Value Ref Range Status   10/08/2022 2.40 (L) 3.50 - 5.20 g/dL Final   10/07/2022 2.70 (L) 3.50 - 5.20 g/dL Final      Magnesium No results found for: MG   Uric Acid No results found for: URICACID        Results Review:     I reviewed the patient's new clinical results.    acetaminophen, 650 mg, Oral, Once  allopurinol, 200 mg, Oral, TID  brentuximab vedotin (ADCETRIS) chemo IVPB, 1.2 mg/kg (Treatment Plan Recorded), Intravenous, Once  dacarbazine (DTIC) chemo IVPB, 375 mg/m2 (Treatment Plan Recorded), Intravenous, Once  dexamethasone, 12 mg, Intravenous, Once  diphenhydrAMINE, 25 mg, Oral, Once  DOXOrubicin, 25 mg/m2 (Treatment Plan Recorded), Intravenous, Once  enoxaparin, 40 mg, Subcutaneous,  Daily  fluticasone, 1 spray, Nasal, Daily  fosaprepitant (EMEND) IVPB, 150 mg, Intravenous, Once  lisinopril, 10 mg, Oral, Daily  montelukast, 10 mg, Oral, Nightly  OLANZapine, 5 mg, Oral, Once  ondansetron, 16 mg, Intravenous, Once  QUEtiapine, 12.5 mg, Oral, Nightly  rosuvastatin, 10 mg, Oral, Daily  senna-docusate sodium, 2 tablet, Oral, BID  sodium chloride, 10 mL, Intravenous, Q12H  sodium chloride, 10 mL, Intravenous, Q12H  sodium chloride, 250 mL, Intravenous, Once  sodium chloride, 2 g, Oral, TID With Meals  tamsulosin, 0.4 mg, Oral, Daily  vinBLAStine (VELBAN) chemo IVPB, 6 mg/m2 (Treatment Plan Recorded), Intravenous, Once      lactated ringers, 9 mL/hr, Last Rate: 9 mL/hr (10/04/22 1644)        Medication Review:     Assessment & Plan       Lymphoma (HCC)    Rectal mass    Essential hypertension    Hydronephrosis    Febrile illness    Hypernatremia    Hypokalemia    Hodgkin lymphoma (HCC)    1- Hyponatremia - Serum osmolality 263, Urine osmolality 115 and urine sodium less than 20. Suggest of poor oral solute intake vs polydipsia . Uric acid low. Improving.   2- Rectal mass   3-Hypokalemia - resolved.     Plan:  - Fluid restriction 1.2 lit/day   - Continue with salt tablets     Nik Kelly MD  10/08/22  13:40 EDT

## 2022-10-08 NOTE — PROGRESS NOTES
HEMATOLOGY/ONCOLOGY PROGRESS NOTE    Subjective      CC: Hodgkin's lymphoma    SUBJECTIVE:   No acute events overnight.  Patient resting comfortably in bed this morning.  Anxious about starting treatment today        Past Medical History, Past Surgical History, Social History, Family History have been reviewed and are without significant changes except as mentioned.      Medications:  The current medication list was reviewed in the EMR    ALLERGIES: No Known Allergies    ROS:  A comprehensive 10 point review of systems was performed and was negative except as mentioned.      Objective      Vitals:    10/07/22 2351 10/08/22 0359 10/08/22 0700 10/08/22 0945   BP: 149/83 145/87 144/78    BP Location: Left arm Left arm Left arm    Patient Position: Lying Lying Lying    Pulse: 63 69 70 89   Resp: 18 18 18    Temp: 97.8 °F (36.6 °C) 97.1 °F (36.2 °C) 99.1 °F (37.3 °C)    TempSrc: Axillary Axillary Oral    SpO2: 98% 96%     Weight:       Height:                General: well appearing, in no acute distress  HEENT: sclerae anicteric, oropharynx clear  Lymphatics: no cervical, supraclavicular, inguinal, or axillary adenopathy  Cardiovascular: regular rate and rhythm, no murmurs  Lungs: clear to auscultation bilaterally  Abdomen: soft, nontender, nondistended.  No palpable organomegaly  Extremities: no lower extremity edema  Skin: no rashes, lesions, bruising, or petechiae  Neuro: Alert and oriented x 3. Moves all extremities.    RECENT LABS:    Results from last 7 days   Lab Units 10/08/22  0455 10/07/22  0647 10/06/22  0614   WBC 10*3/mm3 17.02* 21.80* 19.95*   HEMOGLOBIN g/dL 8.5* 10.8* 10.2*   PLATELETS 10*3/mm3 338 489* 402     Results from last 7 days   Lab Units 10/08/22  0455 10/07/22  0647 10/06/22  0614   SODIUM mmol/L 132* 130* 128*   POTASSIUM mmol/L 4.1 4.2 4.1   CO2 mmol/L 26.0 25.0 23.0   BUN mg/dL 16 13 12   CREATININE mg/dL 0.46* 0.55* 0.43*   GLUCOSE mg/dL 161* 148* 123*     Results from last 7 days   Lab  Units 10/08/22  0455 10/07/22  0647 10/05/22  0456   AST (SGOT) U/L 194* 255* 69*   ALT (SGPT) U/L 114* 113* 41   BILIRUBIN mg/dL 1.0 0.8 1.0   ALK PHOS U/L 439* 457* 395*         CT Abdomen Pelvis With Contrast    Result Date: 10/3/2022  1. No evidence of pulmonary embolic disease. 2. Small but increasing right pleural effusion compared to 9/21/2022. 3. Stable small area of linear scarring in the right middle lobe. 4. Mild ascending aortic ectasia to approximately 4.0 cm again incidentally noted.    CT SCAN OF THE ABDOMEN AND PELVIS WITH IV CONTRAST: Numerous hepatic metastases are again noted. There is extensive retroperitoneal lymphadenopathy, and an approximately 5.4 cm left upper quadrant mass involving the left lateral renal parenchyma. Volume and shape of the exophytic mass favors that this is invasive to the kidney, rather than originating from kidney. There are multiple small splenic lesions consistent with metastases. Adrenal glands appear to be spared. Gallbladder appears normal. Pancreas appears to be normal, although the tip of the pancreatic tail is immediately adjacent the left upper quadrant mass.  No upper abdominal free air is seen. There is a small amount of ascites. Diffusely increased bowel gas suggests a low level ileus. Left mid abdominal ostomy site is clear. Large pelvic mass is again noted, which appears to extend into the small bowel mesentery, although these may be adjacent but separate nodes. Bladder is normally distended and normal in appearance. There is relatively little intrapelvic free fluid.  Delayed venous phase images show contrast within the renal collecting systems, which appear minimally more dilated than on the prior study. Distal ureters may be mildly compressed by the large pelvic mass. The bladder itself is displaced anteriorly and superiorly, but normally distended and grossly normal in appearance. No evidence of a discrete, drainable inflammatory collection is  "appreciated. Bony structures appear to be intact.   IMPRESSION:  1. Expected changes of recent colostomy placement. No visible associated inflammation. 2. Advanced metastatic disease, including numerous liver lesions, extensive retrocrural lymphadenopathy, multiple splenic lesions, left upper quadrant mass that that involves the lateral margin of the left kidney. Large pelvic mass and extensive infiltration of the lower small bowel mesentery, whether by direct extension of tumor, or multiple irregularly enlarged adjacent lymph nodes. 3. Bilateral hydronephrosis which appears increased from 9/20/2022, but no evidence of high-grade obstructive uropathy. 4. Mildly increased ascites. No evidence of drainable inflammatory collection. 5. \"Gassy\" appearance of nondependent large and small bowel, suggesting a low level ileus  This report was finalized on 10/3/2022 9:14 AM by Dr. Del Wilks MD.      FL Video Swallow With Speech Single Contrast    Result Date: 10/7/2022  Fluoroscopy provided for a modified barium swallow, and limited upper GI series. Please see speech therapy report for full details and recommendations. Limited upper GI series appeared within normal limits.    This report was finalized on 10/7/2022 3:10 PM by Yvon Donnelly.      XR Chest 1 View    Result Date: 10/3/2022   1. The tip of the right upper extremity PICC line terminates in the superior vena cava. 2. No active pulmonary disease. 3. Cardiomegaly.  This report was finalized on 10/3/2022 4:09 PM by Preet Kline MD.      FL Limited Ugi For Mbs Reflux Single-Contrast    Result Date: 10/7/2022  Fluoroscopy provided for a modified barium swallow, and limited upper GI series. Please see speech therapy report for full details and recommendations. Limited upper GI series appeared within normal limits.    This report was finalized on 10/7/2022 3:10 PM by Yvon Donnelly.      CT Angiogram Chest    Result Date: 10/3/2022  1. No evidence of pulmonary embolic " disease. 2. Small but increasing right pleural effusion compared to 9/21/2022. 3. Stable small area of linear scarring in the right middle lobe. 4. Mild ascending aortic ectasia to approximately 4.0 cm again incidentally noted.    CT SCAN OF THE ABDOMEN AND PELVIS WITH IV CONTRAST: Numerous hepatic metastases are again noted. There is extensive retroperitoneal lymphadenopathy, and an approximately 5.4 cm left upper quadrant mass involving the left lateral renal parenchyma. Volume and shape of the exophytic mass favors that this is invasive to the kidney, rather than originating from kidney. There are multiple small splenic lesions consistent with metastases. Adrenal glands appear to be spared. Gallbladder appears normal. Pancreas appears to be normal, although the tip of the pancreatic tail is immediately adjacent the left upper quadrant mass.  No upper abdominal free air is seen. There is a small amount of ascites. Diffusely increased bowel gas suggests a low level ileus. Left mid abdominal ostomy site is clear. Large pelvic mass is again noted, which appears to extend into the small bowel mesentery, although these may be adjacent but separate nodes. Bladder is normally distended and normal in appearance. There is relatively little intrapelvic free fluid.  Delayed venous phase images show contrast within the renal collecting systems, which appear minimally more dilated than on the prior study. Distal ureters may be mildly compressed by the large pelvic mass. The bladder itself is displaced anteriorly and superiorly, but normally distended and grossly normal in appearance. No evidence of a discrete, drainable inflammatory collection is appreciated. Bony structures appear to be intact.   IMPRESSION:  1. Expected changes of recent colostomy placement. No visible associated inflammation. 2. Advanced metastatic disease, including numerous liver lesions, extensive retrocrural lymphadenopathy, multiple splenic lesions,  "left upper quadrant mass that that involves the lateral margin of the left kidney. Large pelvic mass and extensive infiltration of the lower small bowel mesentery, whether by direct extension of tumor, or multiple irregularly enlarged adjacent lymph nodes. 3. Bilateral hydronephrosis which appears increased from 9/20/2022, but no evidence of high-grade obstructive uropathy. 4. Mildly increased ascites. No evidence of drainable inflammatory collection. 5. \"Gassy\" appearance of nondependent large and small bowel, suggesting a low level ileus  This report was finalized on 10/3/2022 9:14 AM by Dr. Del Wilks MD.            Assessment   ASSESSMENT & PLAN:  Classical Hodgkin's lymphoma vs CD 30 positive T/B-cell lymphoma  -Noted on recent rectal biopsy  -CT C/A/P on admission concerning for significant disease burden involving the liver, lymph nodes, spleen, left upper quadrant mass, kidney, mesentery  -Spoke with pathology today and repeat biopsy consistent with classical Hodgkin's lymphoma  -Baseline ECHO with an EF of 56-60%  -Starting AAVD today.  Clinically appropriate for treatment.  Labs reviewed and appropriate for treatment  -Continue allopurinol for TLS prophylaxis  -Unable to get Peg-filgrastim inpatient, he is on the schedule for infusion at 130 on Monday to receive this medication  -He has follow-up with Dr. Leary on Wednesday  -Okay for discharge from a oncology standpoint after finishing chemotherapy.  Follow-up as above     Bilateral hydronephrosis  -Secondary to his active malignancy  -Urology consulted however patient likely to have a significant response to chemotherapy tomorrow  -Unless emergently needed, would consider holding on bilateral ureteral stent placement at this time     Fever of unknown origin  -Likely secondary to his diffuse malignancy     Hypernatremia  -Now resolved     Thank you for the consult.  Please do not hesitate to contact with any questions or concerns.  Patient okay for " discharge after finishing chemotherapy today    River Odom MD  Hematology and Oncology    10/8/2022  11:02 EDT

## 2022-10-08 NOTE — PLAN OF CARE
Goal Outcome Evaluation:              Outcome Evaluation: VSS. pt on RA while awake with O2 sat 97%. No complaints. Family remained at BS throughout shift. Pt received 1st dose of Chemo, tolerated without diff. will cont. POC

## 2022-10-08 NOTE — PROGRESS NOTES
[facility] Medicine Services  PROGRESS NOTE    Patient Name: Abraham Wu  : 1950  MRN: [id number]    Date of Admission: 10/3/2022  Primary Care Physician: Jatinder Haynes MD    Subjective   Subjective     CC: Follow-up lymphoma    HPI: No acute events overnight, patient rested well, patient that he is ready to start chemo    ROS:  Gen- No fevers, chills  CV- No chest pain, palpitations  Resp- No cough, dyspnea  GI- No N/V/D, abd pain      Objective   Objective     Vital Signs:   Temp:  [97 °F (36.1 °C)-97.8 °F (36.6 °C)] 97.1 °F (36.2 °C)  Heart Rate:  [59-98] 69  Resp:  [18] 18  BP: (135-154)/(72-87) 145/87     Physical Exam:  Constitutional: No acute distress, awake, alert  HENT: NCAT, mucous membranes moist  Respiratory: Clear to auscultation bilaterally, respiratory effort normal   Cardiovascular: RRR, no murmurs, rubs, or gallops  Gastrointestinal: Positive bowel sounds, soft, nontender, nondistended  Musculoskeletal: Bilateral lower extremity edema, improved  Psychiatric: Appropriate affect, cooperative  Neurologic: Oriented x 3, nonfocal  Skin: No rashes    Results Reviewed:  LAB RESULTS:      Lab 10/07/22  0647 10/06/22  0614 10/05/22  0456 10/04/22  0639 10/03/22  0734   WBC 21.80* 19.95* 9.40 9.44 10.79   HEMOGLOBIN 10.8* 10.2* 8.1* 9.3* 10.6*   HEMATOCRIT 34.2* 31.6* 25.3* 29.3* 32.7*   PLATELETS 489* 402 296 319 355   NEUTROS ABS 20.04* 18.51* 8.41* 8.09* 9.73*   IMMATURE GRANS (ABS) 0.44* 0.47* 0.15* 0.12* 0.11*   LYMPHS ABS 0.57* 0.18* 0.21* 0.33* 0.25*   MONOS ABS 0.72 0.77 0.62 0.89 0.69   EOS ABS 0.01 0.00 0.00 0.00 0.00   MCV 83.8 84.0 83.5 84.0 83.4   SED RATE  --   --   --   --  88*   PROCALCITONIN  --   --   --   --  0.39*   LACTATE  --   --   --   --  1.2         Lab 10/07/22  0647 10/06/22  0614 10/05/22  2020 10/05/22  1153 10/05/22  0456 10/04/22  1941   SODIUM 130* 128*  --  127* 129* 127*   POTASSIUM 4.2 4.1 4.1 3.5 3.4* 3.3*   CHLORIDE 94* 92*  --   92* 91* 89*   CO2 25.0 23.0  --  26.0 27.0 28.0   ANION GAP 11.0 13.0  --  9.0 11.0 10.0   BUN 13 12  --  11 9 8   CREATININE 0.55* 0.43*  --  0.48* 0.46* 0.48*   EGFR 106.6 114.8  --  111.1 112.5 111.1   GLUCOSE 148* 123*  --  114* 112* 126*   CALCIUM 9.0 8.4*  --  8.3* 8.1* 7.9*         Lab 10/07/22  0647 10/05/22  0456 10/04/22  0639 10/03/22  0734   TOTAL PROTEIN 5.2* 4.6* 5.4* 6.3   ALBUMIN 2.70* 2.50* 2.30* 2.90*   GLOBULIN 2.5 2.1 3.1 3.4   ALT (SGPT) 113* 41 52* 48*   AST (SGOT) 255* 69* 151* 178*   BILIRUBIN 0.8 1.0 1.4* 1.0   ALK PHOS 457* 395* 413* 439*                 Lab 10/05/22  0456 10/04/22  1342   IRON 9*  --    IRON SATURATION 7*  --    TIBC 122*  --    TRANSFERRIN 82*  --    FERRITIN 2,506.00*  --    ABO TYPING  --  O   RH TYPING  --  Positive   ANTIBODY SCREEN  --  Negative         Brief Urine Lab Results  (Last result in the past 365 days)      Color   Clarity   Blood   Leuk Est   Nitrite   Protein   CREAT   Urine HCG        10/03/22 2016             58.8         10/03/22 2016 Yellow   Clear   Trace   Negative   Negative   Trace                 Microbiology Results Abnormal     Procedure Component Value - Date/Time    Blood Culture - Blood, Arm, Right [[id number]]  (Normal) Collected: 10/03/22 0808    Lab Status: Preliminary result Specimen: Blood from Arm, Right Updated: 10/07/22 1032     Blood Culture No growth at 4 days    Blood Culture - Blood, Arm, Left [[id number]]  (Normal) Collected: 10/03/22 0734    Lab Status: Preliminary result Specimen: Blood from Arm, Left Updated: 10/07/22 0802     Blood Culture No growth at 4 days    Respiratory Panel PCR w/COVID-19(SARS-CoV-2) BARRY/KEIRA/MARY/PAD/COR/MAD/AKSHAT In-House, NP Swab in UTM/VTM, 3-4 HR TAT - Swab, Nasopharynx [[id number]]  (Normal) Collected: 10/03/22 0734    Lab Status: Final result Specimen: Swab from Nasopharynx Updated: 10/03/22 0836     ADENOVIRUS, PCR Not Detected     Coronavirus 229E Not Detected     Coronavirus HKU1 Not Detected      Coronavirus NL63 Not Detected     Coronavirus OC43 Not Detected     COVID19 Not Detected     Human Metapneumovirus Not Detected     Human Rhinovirus/Enterovirus Not Detected     Influenza A PCR Not Detected     Influenza B PCR Not Detected     Parainfluenza Virus 1 Not Detected     Parainfluenza Virus 2 Not Detected     Parainfluenza Virus 3 Not Detected     Parainfluenza Virus 4 Not Detected     RSV, PCR Not Detected     Bordetella pertussis pcr Not Detected     Bordetella parapertussis PCR Not Detected     Chlamydophila pneumoniae PCR Not Detected     Mycoplasma pneumo by PCR Not Detected    Narrative:      In the setting of a positive respiratory panel with a viral infection PLUS a negative procalcitonin without other underlying concern for bacterial infection, consider observing off antibiotics or discontinuation of antibiotics and continue supportive care. If the respiratory panel is positive for atypical bacterial infection (Bordetella pertussis, Chlamydophila pneumoniae, or Mycoplasma pneumoniae), consider antibiotic de-escalation to target atypical bacterial infection.          FL Video Swallow With Speech Single Contrast    Result Date: 10/7/2022  EXAMINATION: FL VIDEO SWALLOW W SPEECH SINGLE-CONTRAST-, FL LIMITED UGI FOR MBS REFLUX SINGLE-CONTRAST-  INDICATION: dysphagia; R50.9-Fever, unspecified; N13.30-Unspecified hydronephrosis; Z93.3-Colostomy status; C79.9-Secondary malignant neoplasm of unspecified site; E87.0-Hyperosmolality and hypernatremia; E87.6-Hypokalemia; C85.90-Non-Hodgkin lymphoma, unspecified, unspecified site; R13.10-Dysphagia, unspecified  TECHNIQUE: 42 seconds of fluoroscopic time was used for this exam. It is associated fluoroscopic loops are saved. The patient was evaluated in the seated lateral position while taking a variety of consistencies of barium by mouth under the direction of speech pathology.  COMPARISON: NONE  FINDINGS: 42 seconds of fluoroscopy provided for a modified  barium swallow, and limited upper GI series. Please see speech therapy report for full details and recommendations. Limited upper GI series demonstrated no signs of a high-grade focal esophageal stricture. There is a moderate sized anterior osteophyte visible on the C4-C5 vertebral levels. There is a mild posterior impression on the cervical esophagus due to this osteophyte, however this impression does not appear to significantly impede swallowing. Gastroesophageal reflux was not demonstrated during this exam.       Impression: Fluoroscopy provided for a modified barium swallow, and limited upper GI series. Please see speech therapy report for full details and recommendations. Limited upper GI series appeared within normal limits.    This report was finalized on 10/7/2022 3:10 PM by Yvon Donnelly.      FL Limited Ugi For Mbs Reflux Single-Contrast    Result Date: 10/7/2022  EXAMINATION: FL VIDEO SWALLOW W SPEECH SINGLE-CONTRAST-, FL LIMITED UGI FOR MBS REFLUX SINGLE-CONTRAST-  INDICATION: dysphagia; R50.9-Fever, unspecified; N13.30-Unspecified hydronephrosis; Z93.3-Colostomy status; C79.9-Secondary malignant neoplasm of unspecified site; E87.0-Hyperosmolality and hypernatremia; E87.6-Hypokalemia; C85.90-Non-Hodgkin lymphoma, unspecified, unspecified site; R13.10-Dysphagia, unspecified  TECHNIQUE: 42 seconds of fluoroscopic time was used for this exam. It is associated fluoroscopic loops are saved. The patient was evaluated in the seated lateral position while taking a variety of consistencies of barium by mouth under the direction of speech pathology.  COMPARISON: NONE  FINDINGS: 42 seconds of fluoroscopy provided for a modified barium swallow, and limited upper GI series. Please see speech therapy report for full details and recommendations. Limited upper GI series demonstrated no signs of a high-grade focal esophageal stricture. There is a moderate sized anterior osteophyte visible on the C4-C5 vertebral  levels. There is a mild posterior impression on the cervical esophagus due to this osteophyte, however this impression does not appear to significantly impede swallowing. Gastroesophageal reflux was not demonstrated during this exam.       Impression: Fluoroscopy provided for a modified barium swallow, and limited upper GI series. Please see speech therapy report for full details and recommendations. Limited upper GI series appeared within normal limits.    This report was finalized on 10/7/2022 3:10 PM by Yvon Donnelly.        Results for orders placed during the hospital encounter of 10/03/22    Adult Transthoracic Echo Complete W/ Cont if Necessary Per Protocol    Interpretation Summary  · Left ventricular ejection fraction appears to be 56 - 60%. Left ventricular systolic function is normal.  · Global longitudinal LV strain (GLS) = -27.0%.  · Left atrial volume is moderately increased.  · Mild mitral valve regurgitation is present.  · Mild tricuspid valve regurgitation is present.  · Estimated right ventricular systolic pressure from tricuspid regurgitation is mildly elevated (35-45 mmHg).  · Mild dilation of the ascending aorta is present.      I have reviewed the medications:  Scheduled Meds:allopurinol, 200 mg, Oral, TID  enoxaparin, 40 mg, Subcutaneous, Daily  fluticasone, 1 spray, Nasal, Daily  lisinopril, 10 mg, Oral, Daily  montelukast, 10 mg, Oral, Nightly  QUEtiapine, 12.5 mg, Oral, Nightly  rosuvastatin, 10 mg, Oral, Daily  senna-docusate sodium, 2 tablet, Oral, BID  sodium chloride, 10 mL, Intravenous, Q12H  sodium chloride, 10 mL, Intravenous, Q12H  sodium chloride, 2 g, Oral, TID With Meals  tamsulosin, 0.4 mg, Oral, Daily      Continuous Infusions:lactated ringers, 9 mL/hr, Last Rate: 9 mL/hr (10/04/22 1644)      PRN Meds:.•  acetaminophen  •  acetaminophen-codeine  •  senna-docusate sodium **AND** polyethylene glycol **AND** bisacodyl **AND** bisacodyl  •  lactated ringers  •  ondansetron  •   potassium chloride **OR** potassium chloride **OR** potassium chloride  •  sodium chloride  •  sodium chloride  •  sodium chloride    Assessment & Plan   Assessment & Plan     Active Hospital Problems    Diagnosis  POA   • **Lymphoma (HCC) [C85.90]  Unknown   • Hodgkin lymphoma (HCC) [C81.90]  Unknown   • Hydronephrosis [N13.30]  Yes   • Febrile illness [R50.9]  Yes   • Hypernatremia [E87.0]  Yes   • Hypokalemia [E87.6]  Yes   • Essential hypertension [I10]  Yes   • Rectal mass [K62.89]  Yes      Resolved Hospital Problems   No resolved problems to display.        Brief Hospital Course to date:  Abraham Wu is a 72 y.o. male  with history of hypertension, hyperlipidemia, SHERRI, recent admission to [facility] 9/21-9/24 for diarrhea, unintentional weight loss found to have enteropathogenic E. coli entero toxigenic E. coli s/p Flagyl and Levaquin he also found to have large rectal mass  s/p laparoscopic colostomy, pathology concerning for lymphoma.  He presented to the ED with fever, likely secondary underlying malignancy     Classic Hodgkin's lymphoma  -Patient has large pelvic mass, he is s/p laparoscopic colostomy creation, CT abd/pelvis shows advancement of bronchiectasis  -Pathology shows CD30 positive lymphoma, consistent with classic Hodgkin's lymphoma   -Heme-onc following, starting chemotherapy today with AAVD, plan to give pegfilgrastim tomorrow 10/9/2022 and discharge home thereafter  -He is s/p 3 days of steroids    Febrile illness  -Patient presented with T-max 101.7, elevated procalcitonin, normal WBC, respiratory panel with COVID-19 was negative, UA was unremarkable. Suspect this is secondary to underlying malignancy as below,   -CT abd/pelvis, continues to show advanced metastatic disease, (liver, renal, splenic lesions)  -Blood cultures NGTD, he was initially on antibiotics, these have since been discontinued     Leukocytosis  -WBC remains elevated suspect this is secondary to steroids  -We will continue to  monitor for now     Bilateral hydronephrosis  -Appears increased from prior imaging, however, no evidence of high-grade obstructive uropathy  -Suspect secondary to underlying malignancy, urology evaluated, started on tamsulosin, no stenting required at this time.      Hyponatremia, clinically significant, improving  -Renal following, continue salt tabs 2 g TID, fluid restriction at 1.2 L daily  -Continue to closely monitor     Anemia  -H&H remains stable  -Iron profile is uninterpretable     Hypokalemia  -Continue to monitor and replete per protocol.     Chronic severe malnutrition  -Dietitian following     Hypertension  -BP remains stable, continue lisinopril     Hyperlipidemia  -Continue statin     Expected Discharge Location and Transportation: Home versus rehab  Expected Discharge Date: 10/9/2022 or when okay per hematology    DVT prophylaxis:  Medical DVT prophylaxis orders are present.     AM-PAC 6 Clicks Score (PT): 24 (10/06/22 0800)    CODE STATUS:   Code Status and Medical Interventions:   Ordered at: 10/03/22 1318     Level Of Support Discussed With:    Patient     Code Status (Patient has no pulse and is not breathing):    CPR (Attempt to Resuscitate)     Medical Interventions (Patient has pulse or is breathing):    Full Support       Lorri Lauren MD  10/08/22

## 2022-10-09 ENCOUNTER — ANESTHESIA EVENT (OUTPATIENT)
Dept: GASTROENTEROLOGY | Facility: HOSPITAL | Age: 72
End: 2022-10-09

## 2022-10-09 PROBLEM — K92.2 ACUTE UPPER GI BLEED: Status: ACTIVE | Noted: 2022-10-03

## 2022-10-09 PROBLEM — K92.1 MELENA: Status: ACTIVE | Noted: 2022-10-03

## 2022-10-09 LAB
ABO GROUP BLD: NORMAL
ALBUMIN SERPL-MCNC: 2.1 G/DL (ref 3.5–5.2)
ALBUMIN/GLOB SERPL: 0.8 G/DL
ALP SERPL-CCNC: 381 U/L (ref 39–117)
ALT SERPL W P-5'-P-CCNC: 80 U/L (ref 1–41)
ANION GAP SERPL CALCULATED.3IONS-SCNC: 7 MMOL/L (ref 5–15)
AST SERPL-CCNC: 91 U/L (ref 1–40)
BILIRUB SERPL-MCNC: 0.9 MG/DL (ref 0–1.2)
BLD GP AB SCN SERPL QL: NEGATIVE
BUN SERPL-MCNC: 12 MG/DL (ref 8–23)
BUN/CREAT SERPL: 27.9 (ref 7–25)
CALCIUM SPEC-SCNC: 8.3 MG/DL (ref 8.6–10.5)
CHLORIDE SERPL-SCNC: 100 MMOL/L (ref 98–107)
CO2 SERPL-SCNC: 26 MMOL/L (ref 22–29)
CREAT SERPL-MCNC: 0.43 MG/DL (ref 0.76–1.27)
DEPRECATED RDW RBC AUTO: 49 FL (ref 37–54)
EGFRCR SERPLBLD CKD-EPI 2021: 114.8 ML/MIN/1.73
ERYTHROCYTE [DISTWIDTH] IN BLOOD BY AUTOMATED COUNT: 15.7 % (ref 12.3–15.4)
GLOBULIN UR ELPH-MCNC: 2.8 GM/DL
GLUCOSE BLDC GLUCOMTR-MCNC: 154 MG/DL (ref 70–130)
GLUCOSE SERPL-MCNC: 154 MG/DL (ref 65–99)
HCT VFR BLD AUTO: 22.9 % (ref 37.5–51)
HCT VFR BLD AUTO: 23.2 % (ref 37.5–51)
HCT VFR BLD AUTO: 24.2 % (ref 37.5–51)
HCT VFR BLD AUTO: 24.9 % (ref 37.5–51)
HCT VFR BLD AUTO: 26.2 % (ref 37.5–51)
HGB BLD-MCNC: 7.2 G/DL (ref 13–17.7)
HGB BLD-MCNC: 7.4 G/DL (ref 13–17.7)
HGB BLD-MCNC: 7.7 G/DL (ref 13–17.7)
HGB BLD-MCNC: 7.7 G/DL (ref 13–17.7)
HGB BLD-MCNC: 8.3 G/DL (ref 13–17.7)
MCH RBC QN AUTO: 27.3 PG (ref 26.6–33)
MCHC RBC AUTO-ENTMCNC: 31.8 G/DL (ref 31.5–35.7)
MCV RBC AUTO: 85.8 FL (ref 79–97)
PLATELET # BLD AUTO: 273 10*3/MM3 (ref 140–450)
PMV BLD AUTO: 9.9 FL (ref 6–12)
POTASSIUM SERPL-SCNC: 4 MMOL/L (ref 3.5–5.2)
PROT SERPL-MCNC: 4.9 G/DL (ref 6–8.5)
QT INTERVAL: 408 MS
QTC INTERVAL: 427 MS
RBC # BLD AUTO: 2.82 10*6/MM3 (ref 4.14–5.8)
RH BLD: POSITIVE
SODIUM SERPL-SCNC: 133 MMOL/L (ref 136–145)
T&S EXPIRATION DATE: NORMAL
WBC NRBC COR # BLD: 14.67 10*3/MM3 (ref 3.4–10.8)

## 2022-10-09 PROCEDURE — 85018 HEMOGLOBIN: CPT | Performed by: NURSE PRACTITIONER

## 2022-10-09 PROCEDURE — 85027 COMPLETE CBC AUTOMATED: CPT | Performed by: INTERNAL MEDICINE

## 2022-10-09 PROCEDURE — 99232 SBSQ HOSP IP/OBS MODERATE 35: CPT | Performed by: INTERNAL MEDICINE

## 2022-10-09 PROCEDURE — 86923 COMPATIBILITY TEST ELECTRIC: CPT

## 2022-10-09 PROCEDURE — 84484 ASSAY OF TROPONIN QUANT: CPT | Performed by: NURSE PRACTITIONER

## 2022-10-09 PROCEDURE — 99223 1ST HOSP IP/OBS HIGH 75: CPT | Performed by: NURSE PRACTITIONER

## 2022-10-09 PROCEDURE — 83735 ASSAY OF MAGNESIUM: CPT | Performed by: NURSE PRACTITIONER

## 2022-10-09 PROCEDURE — 85025 COMPLETE CBC W/AUTO DIFF WBC: CPT | Performed by: NURSE PRACTITIONER

## 2022-10-09 PROCEDURE — 82550 ASSAY OF CK (CPK): CPT | Performed by: NURSE PRACTITIONER

## 2022-10-09 PROCEDURE — 80053 COMPREHEN METABOLIC PANEL: CPT | Performed by: NURSE PRACTITIONER

## 2022-10-09 PROCEDURE — 85007 BL SMEAR W/DIFF WBC COUNT: CPT | Performed by: NURSE PRACTITIONER

## 2022-10-09 PROCEDURE — 86900 BLOOD TYPING SEROLOGIC ABO: CPT | Performed by: NURSE PRACTITIONER

## 2022-10-09 PROCEDURE — 85014 HEMATOCRIT: CPT | Performed by: NURSE PRACTITIONER

## 2022-10-09 PROCEDURE — 82962 GLUCOSE BLOOD TEST: CPT

## 2022-10-09 PROCEDURE — 80053 COMPREHEN METABOLIC PANEL: CPT | Performed by: INTERNAL MEDICINE

## 2022-10-09 PROCEDURE — 86850 RBC ANTIBODY SCREEN: CPT | Performed by: NURSE PRACTITIONER

## 2022-10-09 PROCEDURE — 92526 ORAL FUNCTION THERAPY: CPT

## 2022-10-09 PROCEDURE — 86901 BLOOD TYPING SEROLOGIC RH(D): CPT | Performed by: NURSE PRACTITIONER

## 2022-10-09 RX ORDER — SODIUM CHLORIDE 9 MG/ML
30 INJECTION, SOLUTION INTRAVENOUS CONTINUOUS PRN
Status: CANCELLED | OUTPATIENT
Start: 2022-10-09

## 2022-10-09 RX ORDER — PANTOPRAZOLE SODIUM 40 MG/10ML
40 INJECTION, POWDER, LYOPHILIZED, FOR SOLUTION INTRAVENOUS
Status: DISCONTINUED | OUTPATIENT
Start: 2022-10-09 | End: 2022-10-20 | Stop reason: HOSPADM

## 2022-10-09 RX ORDER — FAMOTIDINE 20 MG/1
20 TABLET, FILM COATED ORAL ONCE
Status: CANCELLED | OUTPATIENT
Start: 2022-10-09 | End: 2022-10-09

## 2022-10-09 RX ORDER — SODIUM CHLORIDE 0.9 % (FLUSH) 0.9 %
10 SYRINGE (ML) INJECTION EVERY 12 HOURS SCHEDULED
Status: CANCELLED | OUTPATIENT
Start: 2022-10-09

## 2022-10-09 RX ORDER — FAMOTIDINE 10 MG/ML
20 INJECTION, SOLUTION INTRAVENOUS ONCE
Status: CANCELLED | OUTPATIENT
Start: 2022-10-09 | End: 2022-10-09

## 2022-10-09 RX ORDER — LIDOCAINE HYDROCHLORIDE 10 MG/ML
0.5 INJECTION, SOLUTION EPIDURAL; INFILTRATION; INTRACAUDAL; PERINEURAL ONCE AS NEEDED
Status: CANCELLED | OUTPATIENT
Start: 2022-10-09

## 2022-10-09 RX ADMIN — ALLOPURINOL 200 MG: 100 TABLET ORAL at 21:29

## 2022-10-09 RX ADMIN — ALLOPURINOL 200 MG: 100 TABLET ORAL at 08:14

## 2022-10-09 RX ADMIN — SODIUM CHLORIDE 2 G: 1 TABLET ORAL at 08:44

## 2022-10-09 RX ADMIN — LISINOPRIL 10 MG: 10 TABLET ORAL at 08:14

## 2022-10-09 RX ADMIN — SODIUM CHLORIDE 2 G: 1 TABLET ORAL at 11:33

## 2022-10-09 RX ADMIN — FLUTICASONE PROPIONATE 1 SPRAY: 50 SPRAY, METERED NASAL at 08:15

## 2022-10-09 RX ADMIN — Medication 10 ML: at 08:16

## 2022-10-09 RX ADMIN — PANTOPRAZOLE SODIUM 40 MG: 40 INJECTION, POWDER, FOR SOLUTION INTRAVENOUS at 17:08

## 2022-10-09 RX ADMIN — ROSUVASTATIN CALCIUM 10 MG: 10 TABLET, COATED ORAL at 08:14

## 2022-10-09 RX ADMIN — ACETAMINOPHEN 650 MG: 325 TABLET, FILM COATED ORAL at 23:55

## 2022-10-09 RX ADMIN — SODIUM CHLORIDE 2 G: 1 TABLET ORAL at 17:08

## 2022-10-09 RX ADMIN — Medication 10 ML: at 21:29

## 2022-10-09 RX ADMIN — TAMSULOSIN HYDROCHLORIDE 0.4 MG: 0.4 CAPSULE ORAL at 08:14

## 2022-10-09 RX ADMIN — MONTELUKAST 10 MG: 10 TABLET, FILM COATED ORAL at 21:29

## 2022-10-09 RX ADMIN — Medication 10 ML: at 08:15

## 2022-10-09 RX ADMIN — PANTOPRAZOLE SODIUM 40 MG: 40 INJECTION, POWDER, FOR SOLUTION INTRAVENOUS at 11:33

## 2022-10-09 RX ADMIN — ACETAMINOPHEN 650 MG: 325 TABLET, FILM COATED ORAL at 13:06

## 2022-10-09 RX ADMIN — ALLOPURINOL 200 MG: 100 TABLET ORAL at 16:55

## 2022-10-09 NOTE — PROGRESS NOTES
HEMATOLOGY/ONCOLOGY PROGRESS NOTE    Subjective      CC: Hodgkin lymphoma    SUBJECTIVE:   Did note some slight darkening in his ostomy bag yesterday.  Hemoglobin slightly decreased and he received 1 unit PRBC.  Patient tolerated chemotherapy well yesterday.  Denies any nausea or vomiting today.        Past Medical History, Past Surgical History, Social History, Family History have been reviewed and are without significant changes except as mentioned.      Medications:  The current medication list was reviewed in the EMR    ALLERGIES: No Known Allergies    ROS:  A comprehensive 10 point review of systems was performed and was negative except as mentioned.      Objective      Vitals:    10/08/22 2041 10/09/22 0015 10/09/22 0504 10/09/22 0749   BP: 132/75 140/78 154/90 152/78   BP Location: Left arm Left arm Left arm Left arm   Patient Position: Lying Lying Lying Lying   Pulse: 64 79 79 84   Resp: 18 18 18 17   Temp: 97.4 °F (36.3 °C) 97.1 °F (36.2 °C) 97.1 °F (36.2 °C) 97 °F (36.1 °C)   TempSrc: Oral Oral Oral Oral   SpO2: 100% 100% 90% 97%   Weight:       Height:                General: Laying in bed, in no acute distress  HEENT: sclerae anicteric, oropharynx clear  Lymphatics: no cervical, supraclavicular, inguinal, or axillary adenopathy  Cardiovascular: regular rate and rhythm, no murmurs  Lungs: clear to auscultation bilaterally  Abdomen: soft, nontender, nondistended.  No palpable organomegaly  Extremities: no lower extremity edema  Skin: no rashes, lesions, bruising, or petechiae  Neuro: Alert and oriented x 3. Moves all extremities.    RECENT LABS:    Results from last 7 days   Lab Units 10/09/22  0929 10/09/22  0457 10/09/22  0202 10/08/22  0455 10/07/22  0647   WBC 10*3/mm3  --   --  14.67* 17.02* 21.80*   HEMOGLOBIN g/dL 7.7* 8.3* 7.7* 8.5* 10.8*   PLATELETS 10*3/mm3  --   --  273 338 489*     Results from last 7 days   Lab Units 10/09/22  0202 10/08/22  0455 10/07/22  0647   SODIUM mmol/L 133* 132* 130*    POTASSIUM mmol/L 4.0 4.1 4.2   CO2 mmol/L 26.0 26.0 25.0   BUN mg/dL 12 16 13   CREATININE mg/dL 0.43* 0.46* 0.55*   GLUCOSE mg/dL 154* 161* 148*     Results from last 7 days   Lab Units 10/09/22  0202 10/08/22  0455 10/07/22  0647   AST (SGOT) U/L 91* 194* 255*   ALT (SGPT) U/L 80* 114* 113*   BILIRUBIN mg/dL 0.9 1.0 0.8   ALK PHOS U/L 381* 439* 457*         CT Abdomen Pelvis With Contrast    Result Date: 10/3/2022  1. No evidence of pulmonary embolic disease. 2. Small but increasing right pleural effusion compared to 9/21/2022. 3. Stable small area of linear scarring in the right middle lobe. 4. Mild ascending aortic ectasia to approximately 4.0 cm again incidentally noted.    CT SCAN OF THE ABDOMEN AND PELVIS WITH IV CONTRAST: Numerous hepatic metastases are again noted. There is extensive retroperitoneal lymphadenopathy, and an approximately 5.4 cm left upper quadrant mass involving the left lateral renal parenchyma. Volume and shape of the exophytic mass favors that this is invasive to the kidney, rather than originating from kidney. There are multiple small splenic lesions consistent with metastases. Adrenal glands appear to be spared. Gallbladder appears normal. Pancreas appears to be normal, although the tip of the pancreatic tail is immediately adjacent the left upper quadrant mass.  No upper abdominal free air is seen. There is a small amount of ascites. Diffusely increased bowel gas suggests a low level ileus. Left mid abdominal ostomy site is clear. Large pelvic mass is again noted, which appears to extend into the small bowel mesentery, although these may be adjacent but separate nodes. Bladder is normally distended and normal in appearance. There is relatively little intrapelvic free fluid.  Delayed venous phase images show contrast within the renal collecting systems, which appear minimally more dilated than on the prior study. Distal ureters may be mildly compressed by the large pelvic mass. The  "bladder itself is displaced anteriorly and superiorly, but normally distended and grossly normal in appearance. No evidence of a discrete, drainable inflammatory collection is appreciated. Bony structures appear to be intact.   IMPRESSION:  1. Expected changes of recent colostomy placement. No visible associated inflammation. 2. Advanced metastatic disease, including numerous liver lesions, extensive retrocrural lymphadenopathy, multiple splenic lesions, left upper quadrant mass that that involves the lateral margin of the left kidney. Large pelvic mass and extensive infiltration of the lower small bowel mesentery, whether by direct extension of tumor, or multiple irregularly enlarged adjacent lymph nodes. 3. Bilateral hydronephrosis which appears increased from 9/20/2022, but no evidence of high-grade obstructive uropathy. 4. Mildly increased ascites. No evidence of drainable inflammatory collection. 5. \"Gassy\" appearance of nondependent large and small bowel, suggesting a low level ileus  This report was finalized on 10/3/2022 9:14 AM by Dr. Del Wilks MD.      FL Video Swallow With Speech Single Contrast    Result Date: 10/7/2022  Fluoroscopy provided for a modified barium swallow, and limited upper GI series. Please see speech therapy report for full details and recommendations. Limited upper GI series appeared within normal limits.    This report was finalized on 10/7/2022 3:10 PM by Yvon Donnelly.      XR Chest 1 View    Result Date: 10/3/2022   1. The tip of the right upper extremity PICC line terminates in the superior vena cava. 2. No active pulmonary disease. 3. Cardiomegaly.  This report was finalized on 10/3/2022 4:09 PM by Preet Kline MD.      FL Limited Ugi For Mbs Reflux Single-Contrast    Result Date: 10/7/2022  Fluoroscopy provided for a modified barium swallow, and limited upper GI series. Please see speech therapy report for full details and recommendations. Limited upper GI series appeared " within normal limits.    This report was finalized on 10/7/2022 3:10 PM by Yvon Donnelly.      CT Angiogram Chest    Result Date: 10/3/2022  1. No evidence of pulmonary embolic disease. 2. Small but increasing right pleural effusion compared to 9/21/2022. 3. Stable small area of linear scarring in the right middle lobe. 4. Mild ascending aortic ectasia to approximately 4.0 cm again incidentally noted.    CT SCAN OF THE ABDOMEN AND PELVIS WITH IV CONTRAST: Numerous hepatic metastases are again noted. There is extensive retroperitoneal lymphadenopathy, and an approximately 5.4 cm left upper quadrant mass involving the left lateral renal parenchyma. Volume and shape of the exophytic mass favors that this is invasive to the kidney, rather than originating from kidney. There are multiple small splenic lesions consistent with metastases. Adrenal glands appear to be spared. Gallbladder appears normal. Pancreas appears to be normal, although the tip of the pancreatic tail is immediately adjacent the left upper quadrant mass.  No upper abdominal free air is seen. There is a small amount of ascites. Diffusely increased bowel gas suggests a low level ileus. Left mid abdominal ostomy site is clear. Large pelvic mass is again noted, which appears to extend into the small bowel mesentery, although these may be adjacent but separate nodes. Bladder is normally distended and normal in appearance. There is relatively little intrapelvic free fluid.  Delayed venous phase images show contrast within the renal collecting systems, which appear minimally more dilated than on the prior study. Distal ureters may be mildly compressed by the large pelvic mass. The bladder itself is displaced anteriorly and superiorly, but normally distended and grossly normal in appearance. No evidence of a discrete, drainable inflammatory collection is appreciated. Bony structures appear to be intact.   IMPRESSION:  1. Expected changes of recent colostomy  "placement. No visible associated inflammation. 2. Advanced metastatic disease, including numerous liver lesions, extensive retrocrural lymphadenopathy, multiple splenic lesions, left upper quadrant mass that that involves the lateral margin of the left kidney. Large pelvic mass and extensive infiltration of the lower small bowel mesentery, whether by direct extension of tumor, or multiple irregularly enlarged adjacent lymph nodes. 3. Bilateral hydronephrosis which appears increased from 9/20/2022, but no evidence of high-grade obstructive uropathy. 4. Mildly increased ascites. No evidence of drainable inflammatory collection. 5. \"Gassy\" appearance of nondependent large and small bowel, suggesting a low level ileus  This report was finalized on 10/3/2022 9:14 AM by Dr. Del Wilks MD.            Assessment   ASSESSMENT & PLAN:  Classical Hodgkin's lymphoma vs CD 30 positive T/B-cell lymphoma  -Noted on recent rectal biopsy  -CT C/A/P on admission concerning for significant disease burden involving the liver, lymph nodes, spleen, left upper quadrant mass, kidney, mesentery  -Spoke with pathology today and repeat biopsy consistent with classical Hodgkin's lymphoma  -Baseline ECHO with an EF of 56-60%  -Status post cycle 1 day 1 of AAVD on 10/8/2022.  Tolerated well  -Continue allopurinol for TLS prophylaxis  -Unable to get Peg-filgrastim inpatient, he is on the schedule for infusion at 130 on Monday to receive this medication  -He has follow-up with Dr. Leary on Wednesday  -Okay for discharge from a oncology standpoint     Anemia  -Likely secondary to his malignancy and tumor breakdown from recent chemotherapy  -Status post 1 unit PRBC transfusion on 10/8/2022  -Hemoglobin stable today  -Still okay for discharge from oncology standpoint as he has quick follow-up tomorrow as well as Wednesday where he can have CBCs checked     Bilateral hydronephrosis  -Secondary to his active malignancy  -Urology consulted however " patient likely to have a significant response to chemotherapy tomorrow  -Unless emergently needed, would consider holding on bilateral ureteral stent placement at this time     Fever of unknown origin  -Likely secondary to his diffuse malignancy     Hypernatremia  -Now resolved     Thank you for the consult.  Please do not hesitate to contact with any questions or concerns.  Patient okay for discharge from an oncology standpoint today    River Odom MD  Hematology and Oncology    10/9/2022  10:21 EDT

## 2022-10-09 NOTE — PROGRESS NOTES
"[facility] ([facility]    5 Days Post-Op   Length of stay: 6 days    Subjective: Patient initiated chemotherapy yesterday.  Overnight, he did have some melena per his colostomy, this was proven fecal occult positive.  Had a drop in his H&H.    Physical Exam:  Blood pressure 159/60, pulse 98, temperature (!) 101.2 °F (38.4 °C), temperature source Oral, resp. rate 18, height 162.6 cm (64\"), weight 78 kg (172 lb), SpO2 95 %.    Abd: Soft, nontender, minimally distended.  Colostomy is pink patent and productive with melena    Labs past 24 hours:  Lab Results (last 24 hours)     Procedure Component Value Units Date/Time    Hemoglobin & Hematocrit, Blood [[id number]]  (Abnormal) Collected: 10/09/22 0929    Specimen: Blood Updated: 10/09/22 1004     Hemoglobin 7.7 g/dL      Hematocrit 24.9 %     Hemoglobin & Hematocrit, Blood [[id number]]  (Abnormal) Collected: 10/09/22 0457    Specimen: Blood Updated: 10/09/22 0532     Hemoglobin 8.3 g/dL      Hematocrit 26.2 %     Comprehensive Metabolic Panel [[id number]]  (Abnormal) Collected: 10/09/22 0202    Specimen: Blood Updated: 10/09/22 0333     Glucose 154 mg/dL      BUN 12 mg/dL      Creatinine 0.43 mg/dL      Sodium 133 mmol/L      Potassium 4.0 mmol/L      Chloride 100 mmol/L      CO2 26.0 mmol/L      Calcium 8.3 mg/dL      Total Protein 4.9 g/dL      Albumin 2.10 g/dL      ALT (SGPT) 80 U/L      AST (SGOT) 91 U/L      Alkaline Phosphatase 381 U/L      Total Bilirubin 0.9 mg/dL      Globulin 2.8 gm/dL      Comment: Calculated Result        A/G Ratio 0.8 g/dL      BUN/Creatinine Ratio 27.9     Anion Gap 7.0 mmol/L      eGFR 114.8 mL/min/1.73      Comment: National Kidney Foundation and American Society of Nephrology (ASN) Task Force recommended calculation based on the Chronic Kidney Disease Epidemiology Collaboration (CKD-EPI) equation refit without adjustment for race.       Narrative:      GFR Normal >60  Chronic Kidney Disease <60  Kidney " Failure <15      CBC (No Diff) [[id number]]  (Abnormal) Collected: 10/09/22 0202    Specimen: Blood Updated: 10/09/22 0321     WBC 14.67 10*3/mm3      RBC 2.82 10*6/mm3      Hemoglobin 7.7 g/dL      Hematocrit 24.2 %      MCV 85.8 fL      MCH 27.3 pg      MCHC 31.8 g/dL      RDW 15.7 %      RDW-SD 49.0 fl      MPV 9.9 fL      Platelets 273 10*3/mm3     Occult Blood X 1, Stool - Stool, Per Rectum [[id number]]  (Abnormal) Collected: 10/08/22 2241    Specimen: Stool from Per Rectum Updated: 10/08/22 2359     Fecal Occult Blood Positive          I/O last shift:  No intake/output data recorded.       Pathology:  Order Name Source Comment Collection Info Order Time   TISSUE PATHOLOGY EXAM Per Rectum  Collected By: Hiram Viveros MD 10/4/2022  5:10 PM     Release to patient   Routine Release        .    Assessment and Plan:  72-year-old male with Hodgkin's lymphoma involving the rectum, left kidney, liver and right pleura, he is 1 day into his chemotherapy regiment.    Developed melena overnight, believe this is upper GI in nature.    Plan  I initiated him on some Protonix 40 mg twice daily  I have also, consulted Dr. Lynn, I spoke with him directly for GI evaluation in case he needs an upper endoscopy.    Of note, he had a colonoscopy 2 years ago, when I performed his biopsy in the operating room, I only performed a flexible sigmoidoscopy    Hiram Viveros MD  [facility] ([facility]  10/09/22  12:08 EDT

## 2022-10-09 NOTE — PLAN OF CARE
Goal Outcome Evaluation:  Plan of Care Reviewed With: patient        Progress: improving       SLP treatment completed. Will sign-off dysphagia. Please see note for further details and recommendations.

## 2022-10-09 NOTE — THERAPY TREATMENT NOTE
Acute Care - Speech Language Pathology   Swallow Progress Note  Kar[facility]     Patient Name: Abraham Wu  : 1950  MRN: [id number]  Today's Date: 10/9/2022               Admit Date: 10/3/2022    Visit Dx:     ICD-10-CM ICD-9-CM   1. Fever, unspecified fever cause  R50.9 780.60   2. Hydronephrosis, unspecified hydronephrosis type  N13.30 591   3. Status post colostomy (HCC)  Z93.3 V44.3   4. Metastatic malignant neoplasm, unspecified site (HCC)  C79.9 199.1   5. Hypernatremia  E87.0 276.0   6. Hypokalemia  E87.6 276.8   7. Lymphoma (HCC)  C85.90 202.80   8. Dysphagia, unspecified type  R13.10 787.20   9. Hodgkin lymphoma of lymph nodes of multiple regions, unspecified Hodgkin lymphoma type (HCC)  C81.98 201.98     Patient Active Problem List   Diagnosis   • Rectal mass   • Anemia   • Essential hypertension   • Dyslipidemia   • Unintentional weight loss   • Hydronephrosis   • Febrile illness   • Hypernatremia   • Hypokalemia   • Lymphoma (HCC)   • Hodgkin lymphoma (HCC)     Past Medical History:   Diagnosis Date   • benign polypoid tissue right lung    • Hyperlipidemia    • Hypertension    • Mycobacterium mucogenicum    • SHERRI (obstructive sleep apnea)    • Seasonal allergies      Past Surgical History:   Procedure Laterality Date   • BRONCHOSCOPY      removal of obstructing polypoid tissue right middle lung   • COLOSTOMY N/A 2022    Procedure: LAPAROSCOPIC COLOSTOMY CREATION, FLEXIBLE SIGMOIDOSCOPY;  Surgeon: Hiram Viveros MD;  Location: [facility];  Service: General;  Laterality: N/A;   • EXAM UNDER ANESTHESIA, RECTAL BIOPSY N/A 10/4/2022    Procedure: EXAM UNDER ANESTHESIA, TRANSANAL BIOPSY WITH TRUCUT NEEDLE (LITHOTOMY-CANDY CANE);  Surgeon: Hiram Viveros MD;  Location: [facility];  Service: General;  Laterality: N/A;       SLP Recommendation and Plan  SLP Swallowing Diagnosis: functional oral phase, functional pharyngeal phase (10/09/22 1100)  SLP Diet Recommendation: regular textures,  thin liquids (10/09/22 1100)  Recommended Precautions and Strategies: upright posture during/after eating, small bites of food and sips of liquid (10/09/22 1100)  SLP Rec. for Method of Medication Administration: meds whole, as tolerated (10/09/22 1100)     Monitor for Signs of Aspiration: yes, notify SLP if any concerns (10/09/22 1100)     Swallow Criteria for Skilled Therapeutic Interventions Met: no problems identified which require skilled intervention (10/09/22 1100)  Anticipated Discharge Disposition (SLP): unknown, anticipate therapy at next level of care (10/09/22 1100)  Rehab Potential/Prognosis, Swallowing: good, to achieve stated therapy goals (10/09/22 1100)              Daily Summary of Progress (SLP): progress toward functional goals is good (10/09/22 1100)               Treatment Assessment (SLP): tolerating diet.  No further speech needs at this time. (10/09/22 1100)  Plan for Continued Treatment (SLP): treatment no longer indicated as all goals met (10/09/22 1100)         Plan of Care Reviewed With: patient  Progress: improving      SWALLOW EVALUATION (last 72 hours)     SLP Adult Swallow Evaluation     Row Name 10/09/22 1100 10/07/22 1050 10/06/22 1155             Rehab Evaluation    Document Type therapy note (daily note)  -AV evaluation  - evaluation  -      Subjective Information no complaints  -AV no complaints  - no complaints  -      Patient Observations alert;cooperative  -AV alert;cooperative  - alert;cooperative  -      Patient/Family/Caregiver Comments/Observations -- No family present  - spouse and son present  -      Patient Effort good  -AV good  - good  -      Symptoms Noted During/After Treatment -- none  - none  -         General Information    Patient Profile Reviewed -- yes  - yes  -      Pertinent History Of Current Problem -- See initial eval, pt referred for MBS. Completed in conjunction w/ CJ  - Pt adm w/ fever, hypernatremia,  hypokalemia,lymphoma. Has sig h/o recent dx of rectal mass w/ metastatic disease, anemia, dyslipidemia, weight loss. Pt was seen during previous admission w/ CSE completed w no suspected impairment. Pt now reports increased pain in throat as well as esophageal concerns  -      Current Method of Nutrition -- regular textures;thin liquids  - regular textures;thin liquids  -      Precautions/Limitations, Vision -- WFL;for purposes of eval  - WFL;for purposes of eval  -      Precautions/Limitations, Hearing -- WFL;for purposes of eval  - WFL;for purposes of eval  -      Prior Level of Function-Communication -- unknown  - unknown  -      Prior Level of Function-Swallowing -- unknown  - unknown  -      Plans/Goals Discussed with -- patient;agreed upon  - patient;spouse/S.O.;family  -      Barriers to Rehab -- medically complex  - medically complex  -      Patient's Goals for Discharge -- patient did not state  - patient did not state  -         Pain    Additional Documentation Pain Scale: FACES Pre/Post-Treatment (Group)  -AV Pain Scale: FACES Pre/Post-Treatment (Group)  - Pain Scale: FACES Pre/Post-Treatment (Group)  -         Pain Scale: FACES Pre/Post-Treatment    Pain: FACES Scale, Pretreatment 0-->no hurt  -AV 0-->no hurt  - 0-->no hurt  -      Posttreatment Pain Rating 0-->no hurt  -AV 0-->no hurt  -MH 0-->no hurt  -         Oral Motor Structure and Function    Dentition Assessment -- -- missing teeth  -      Secretion Management -- -- WNL/WFL  -      Mucosal Quality -- -- moist, healthy  -         Oral Musculature and Cranial Nerve Assessment    Oral Motor General Assessment -- -- WFL  -         General Eating/Swallowing Observations    Respiratory Support Currently in Use -- -- nasal cannula  -      Eating/Swallowing Skills -- -- self-fed  -      Positioning During Eating -- -- upright 90 degree;upright in bed  -      Utensils Used -- -- spoon;cup;straw   -      Consistencies Trialed -- -- regular textures;ice chips;thin liquids;pureed  -         Clinical Swallow Eval    Pharyngeal Phase -- -- suspected pharyngeal impairment  -CJ      Esophageal Phase -- -- suspected esophageal impairment  -      Clinical Swallow Evaluation Summary -- -- Oral phase is seemingly wfl. Adequate mastication w/ solids. No oral residue. Pt does report discomfort w/ solid foods. Throat clear w/ thins via straw only. No other overt s/s of aspiration. No other clinical s/s concerning for silent aspiration as pt is w/o changes in vocal quality, respirations or secretions post po presentations. Will recommend continue regulat diet w/ thins, no straws and plan for MBS w/ ltd UGI. Pt w/ belching and complaining of sticking  -         Pharyngeal Phase Concerns    Pharyngeal Phase Concerns -- -- throat clear  -      Throat Clear -- -- thin  -CJ         Esophageal Phase Concerns    Esophageal Phase Concerns -- -- belching;sensation of material sticking  -      Belching -- -- all consistencies  -      Sensation of Material Sticking -- -- all consistencies  -         MBS/VFSS    Utensils Used -- spoon;cup;straw  - --      Consistencies Trialed -- thin liquids;pudding thick;regular textures  - --         MBS/VFSS Interpretation    Oral Prep Phase -- WFL  - --      Oral Transit Phase -- WFL  - --      Oral Residue -- WFL  - --      VFSS Summary -- Oral & pharyngeal phases grossly functional. Prespill w/ thins to the pyriforms. Transient penetration w/ thin, material fully cleared from vestibule upon completion of the swallow. No other events of laryngeal aspiration/penetration w/ pudding or regular solid textures. Pt noted w/ throat clear in response to mild residue w/ pudding; however residue not signifantly impacting safety of swallow. Pt ok to cont regular diet w/ thin liquids.  SLP will f/u for diet tolerance  - --         Initiation of Pharyngeal Swallow    Initiation of  Pharyngeal Swallow -- bolus in pyriform sinuses  - --      Pharyngeal Phase -- functional pharyngeal phase of swallowing  - --         Esophageal Phase    Esophageal Phase -- see radiology report for further details;other (see comments)  Osteophytes C4-C7 & prominent CP per radiology PA  - --         SLP Evaluation Clinical Impression    SLP Swallowing Diagnosis functional oral phase;functional pharyngeal phase  -AV functional oral phase;functional pharyngeal phase  - suspected pharyngeal dysphagia;R/O pharyngeal dysphagia  -      Functional Impact no impact on function  -AV risk of aspiration/pneumonia  - risk of aspiration/pneumonia  -      Rehab Potential/Prognosis, Swallowing good, to achieve stated therapy goals  -AV good, to achieve stated therapy goals  - good, to achieve stated therapy goals  -      Swallow Criteria for Skilled Therapeutic Interventions Met no problems identified which require skilled intervention  - demonstrates skilled criteria  - demonstrates skilled criteria  -         SLP Treatment Clinical Impressions    Treatment Assessment (SLP) tolerating diet.  No further speech needs at this time.  -AV -- --      Daily Summary of Progress (SLP) progress toward functional goals is good  - -- --      Plan for Continued Treatment (SLP) treatment no longer indicated as all goals met  - -- --      Care Plan Review care plan/treatment goals reviewed  - -- --         Recommendations    Therapy Frequency (Swallow) -- PRN  - --      Predicted Duration Therapy Intervention (Days) -- until discharge  - until discharge  -      SLP Diet Recommendation regular textures;thin liquids  - regular textures;thin liquids  - regular textures;thin liquids;other (see comments)  no straws  -      Recommended Diagnostics -- -- VFSS (MBS);other (see comments)  10/7 w/ ltd UGI  -      Recommended Precautions and Strategies upright posture during/after eating;small bites of food and  sips of liquid  -AV upright posture during/after eating;small bites of food and sips of liquid;general aspiration precautions;no straw;reflux precautions  - upright posture during/after eating;small bites of food and sips of liquid;general aspiration precautions;no straw;reflux precautions  -      Oral Care Recommendations Oral Care BID/PRN  -AV Oral Care BID/PRN  - Oral Care BID/PRN  -      SLP Rec. for Method of Medication Administration meds whole;as tolerated  -AV meds whole;with pudding or applesauce;as tolerated  - meds whole;with pudding or applesauce;as tolerated  -      Monitor for Signs of Aspiration yes;notify SLP if any concerns  -AV yes;notify SLP if any concerns  - yes;notify SLP if any concerns  -      Anticipated Discharge Disposition (SLP) unknown;anticipate therapy at next level of care  -AV unknown;anticipate therapy at next level of care  - unknown;anticipate therapy at next level of care  -            User Key  (r) = Recorded By, (t) = Taken By, (c) = Cosigned By    Initials Name Effective Dates    AV Sherlyn Coronel, MS CCC-SLP 06/16/21 -     Divya Kilgore, MS CCC-SLP 06/16/21 -      Yuliet Tucker, MS, [facility]-SLP 06/22/22 -                 EDUCATION  The patient has been educated in the following areas:   Dysphagia (Swallowing Impairment) Oral Care/Hydration.        SLP GOALS     Row Name 10/09/22 1100 10/07/22 1050          (LTG) Patient will demonstrate functional swallow for    Diet Texture (Demonstrate functional swallow) regular textures  - regular textures  -     Liquid viscosity (Demonstrate functional swallow) thin liquids  - thin liquids  -     [place] (Demonstrate functional swallow) independently (over 90% accuracy)  - independently (over 90% accuracy)  -     Time Frame (Demonstrate functional swallow) 1 week  -AV 1 week  -     Progress/Outcomes (Demonstrate functional swallow) goal met  - new goal  -        (STG) Patient  will tolerate trials of    Consistencies Trialed (Tolerate trials) regular textures;thin liquids  -AV regular textures;thin liquids  -     Desired Outcome (Tolerate trials) without signs/symptoms of aspiration  -AV without signs/symptoms of aspiration  -MH     [place] (Tolerate trials) independently (over 90% accuracy)  -AV independently (over 90% accuracy)  -     Time Frame (Tolerate trials) 1 week  -AV 1 week  -     Progress/Outcomes (Tolerate trials) goal met  -AV new goal  -           User Key  (r) = Recorded By, (t) = Taken By, (c) = Cosigned By    Initials Name Provider Type    AV Sherlyn Coronel MS CCC-SLP Speech and Language Pathologist     Yuliet Tucker MS, CFY-SLP Speech and Language Pathologist                   Time Calculation:    Time Calculation- SLP     Row Name 10/09/22 1133             Time Calculation- SLP    SLP Start Time 1100  -AV      SLP Received On 10/09/22  -AV         Untimed Charges    [id number] Treatment Swallow Minutes 30  -AV         Total Minutes    Untimed Charges Total Minutes 30  -AV       Total Minutes 30  -AV            User Key  (r) = Recorded By, (t) = Taken By, (c) = Cosigned By    Initials Name Provider Type    AV Sherlyn Coronel MS CCC-SLP Speech and Language Pathologist                Therapy Charges for Today     Code Description Service Date Service Provider Modifiers Qty    [id number]  ST TREATMENT SWALLOW 2 10/9/2022 Sherlyn Coronel MS CCC-SLP GN 1               Sherlyn Dia MS CCC-SLP  10/9/2022

## 2022-10-09 NOTE — SIGNIFICANT NOTE
Rn called concerning dark colored output from ostomy.  Occult stool positive.  Will obtain H&H now and serially.  Type and screen. Hold lovenox for now. Colorectal following.

## 2022-10-09 NOTE — CONSULTS
[facility] [place] Gastroenterology Consult    Referring Provider: River Odom MD    PCP: Jatinder Haynes MD    Reason for Consultation: GI bleed    Chief complaint: Dark stool in ostomy    History of present illness:    Abraham Wu is a 72 y.o. male who is admitted with initial chief complaint of postoperative fever.  GI consult received on hospital day 6 for worsening anemia and dark melanotic output in ostomy appliance.  Patient and spouse note this is a fairly recent change over the past few days.  There is been no reported N/V, abdominal pain, chest pain, or shortness of breath associated with onset.  Is not anticoagulated, though prior to presentation patient had been using ibuprofen several times weekly with additional use of steroids.  Hemoglobin has continued to drift down throughout admission.  Melanotic stool was unsurprisingly FOBT positive.  Iron panel consistent with acute on chronic disease.  Patient has classic Hodgkin's lymphoma with significant disease burden including large pelvic mass and left upper quadrant mass.  Reports he had a prior ulcer greater than 20 years ago when he was younger; no recent microscope did undergo flex sig and during surgical diversion. Past medical, surgical, social, and family histories are reviewed for accuracy.  No documented alleviating or exacerbating factors.  Does not endorse pain at time of exam.    Allergies:  Patient has no known allergies.    Scheduled Meds:  allopurinol, 200 mg, Oral, TID  fluticasone, 1 spray, Nasal, Daily  lisinopril, 10 mg, Oral, Daily  montelukast, 10 mg, Oral, Nightly  pantoprazole, 40 mg, Intravenous, BID AC  QUEtiapine, 12.5 mg, Oral, Nightly  rosuvastatin, 10 mg, Oral, Daily  senna-docusate sodium, 2 tablet, Oral, BID  sodium chloride, 10 mL, Intravenous, Q12H  sodium chloride, 10 mL, Intravenous, Q12H  sodium chloride, 2 g, Oral, TID With Meals  tamsulosin, 0.4 mg, Oral, Daily         Infusions:  lactated ringers, 9 mL/hr, Last Rate: 9  mL/hr (10/04/22 1644)        PRN Meds:  •  acetaminophen  •  acetaminophen-codeine  •  senna-docusate sodium **AND** polyethylene glycol **AND** bisacodyl **AND** bisacodyl  •  lactated ringers  •  ondansetron  •  potassium chloride **OR** potassium chloride **OR** potassium chloride  •  sodium chloride  •  sodium chloride  •  sodium chloride    Home Meds:  Medications Prior to Admission   Medication Sig Dispense Refill Last Dose   • fluticasone (FLONASE) 50 MCG/ACT nasal spray 1 spray into the nostril(s) as directed by provider Daily.   Past Month at Unknown time   • lisinopril (PRINIVIL,ZESTRIL) 10 MG tablet Take 10 mg by mouth Daily.   10/3/2022 at Unknown time   • montelukast (SINGULAIR) 10 MG tablet Take 10 mg by mouth Every Night.   10/2/2022 at Unknown time   • rosuvastatin (CRESTOR) 10 MG tablet Take 10 mg by mouth Daily.   10/3/2022 at Unknown time       ROS: Review of Systems   Constitutional: Positive for activity change, appetite change, fatigue and fever. Negative for chills, diaphoresis and unexpected weight change.   HENT: Negative for sore throat, trouble swallowing and voice change.    Eyes: Negative.    Respiratory: Negative for apnea, cough, choking, chest tightness, shortness of breath, wheezing and stridor.    Cardiovascular: Positive for leg swelling. Negative for chest pain and palpitations.   Gastrointestinal: Positive for abdominal distention and blood in stool. Negative for abdominal pain, anal bleeding, constipation, diarrhea, nausea, rectal pain and vomiting.   Endocrine: Negative.    Genitourinary: Negative.    Musculoskeletal: Negative.    Skin: Positive for pallor.   Allergic/Immunologic: Negative.    Neurological: Negative.    Hematological: Negative.    Psychiatric/Behavioral: Negative.    All other systems reviewed and are negative.      PAST MED HX:  Past Medical History:   Diagnosis Date   • benign polypoid tissue right lung    • Hyperlipidemia    • Hypertension    • Mycobacterium  "mucogenicum    • SHERRI (obstructive sleep apnea)    • Seasonal allergies        PAST SURG HX:  Past Surgical History:   Procedure Laterality Date   • BRONCHOSCOPY      removal of obstructing polypoid tissue right middle lung   • COLOSTOMY N/A 9/22/2022    Procedure: LAPAROSCOPIC COLOSTOMY CREATION, FLEXIBLE SIGMOIDOSCOPY;  Surgeon: Hiram Viveros MD;  Location:  [facility];  Service: General;  Laterality: N/A;   • EXAM UNDER ANESTHESIA, RECTAL BIOPSY N/A 10/4/2022    Procedure: EXAM UNDER ANESTHESIA, TRANSANAL BIOPSY WITH TRUCUT NEEDLE (LITHOTOMY-CANDY CANE);  Surgeon: Hiram Viveros MD;  Location:  [facility];  Service: General;  Laterality: N/A;       FAM HX:  Family History   Problem Relation Age of Onset   • Diabetes Mother    • Diabetes Father    • Heart disease Father        SOC HX:  Social History     Socioeconomic History   • Marital status:    Tobacco Use   • Smoking status: Never   • Smokeless tobacco: Never   Substance and Sexual Activity   • Alcohol use: Not Currently     Comment: previously drank 2 glasses of wine/beer nightly   • Drug use: Never       PHYSICAL EXAM  /60 (BP Location: Left arm, Patient Position: Sitting)   Pulse 98   Temp (!) 100.9 °F (38.3 °C)   Resp 18   Ht 162.6 cm (64\")   Wt 78 kg (172 lb)   SpO2 95%   BMI 29.52 kg/m²   Wt Readings from Last 3 Encounters:   10/03/22 78 kg (172 lb)   09/24/22 84.9 kg (187 lb 1.6 oz)   ,body mass index is 29.52 kg/m².  Physical Exam  Vitals and nursing note reviewed.   Constitutional:       General: He is not in acute distress.     Appearance: Normal appearance. He is normal weight. He is ill-appearing. He is not toxic-appearing.   HENT:      Head: Normocephalic and atraumatic.   Eyes:      General: No scleral icterus.     Extraocular Movements: Extraocular movements intact.      Conjunctiva/sclera: Conjunctivae normal.      Pupils: Pupils are equal, round, and reactive to light.   Cardiovascular:      Rate and Rhythm: Normal " rate and regular rhythm.      Pulses: Normal pulses.      Heart sounds: Normal heart sounds.   Pulmonary:      Effort: Pulmonary effort is normal. No respiratory distress.      Breath sounds: Normal breath sounds.   Abdominal:      General: Abdomen is flat. Bowel sounds are normal. There is no distension.      Palpations: Abdomen is soft. There is no mass.      Tenderness: There is no abdominal tenderness. There is no guarding or rebound.      Hernia: No hernia is present.      Comments: Ostomy in place; joseph melanotic stool appreciated in appliance   Genitourinary:     Rectum: Guaiac result positive.   Musculoskeletal:         General: Normal range of motion.      Cervical back: Normal range of motion and neck supple. No rigidity or tenderness.      Right lower leg: Edema present.      Left lower leg: Edema present.   Lymphadenopathy:      Cervical: No cervical adenopathy.   Skin:     General: Skin is warm and dry.      Capillary Refill: Capillary refill takes less than 2 seconds.      Coloration: Skin is pale. Skin is not jaundiced.   Neurological:      General: No focal deficit present.      Mental Status: He is alert and oriented to person, place, and time.   Psychiatric:         Mood and Affect: Mood normal.         Behavior: Behavior normal.         Thought Content: Thought content normal.         Judgment: Judgment normal.         Results Review:   I reviewed the patient's new clinical results.  I reviewed the patient's new imaging results and agree with the interpretation.  I personally viewed and interpreted the patient's EKG/Telemetry data    Lab Results   Component Value Date    WBC 14.67 (H) 10/09/2022    HGB 7.2 (L) 10/09/2022    HGB 7.7 (L) 10/09/2022    HGB 8.3 (L) 10/09/2022    HCT 22.9 (L) 10/09/2022    MCV 85.8 10/09/2022     10/09/2022       No results found for: INR    Lab Results   Component Value Date    GLUCOSE 154 (H) 10/09/2022    BUN 12 10/09/2022    CREATININE 0.43 (L) 10/09/2022     BCR 27.9 (H) 10/09/2022     (L) 10/09/2022    K 4.0 10/09/2022    CO2 26.0 10/09/2022    CALCIUM 8.3 (L) 10/09/2022    ALBUMIN 2.10 (L) 10/09/2022    ALKPHOS 381 (H) 10/09/2022    BILITOT 0.9 10/09/2022    ALT 80 (H) 10/09/2022    AST 91 (H) 10/09/2022       Adult Transthoracic Echo Complete W/ Cont if Necessary Per Protocol    Result Date: 10/3/2022  · Left ventricular ejection fraction appears to be 56 - 60%. Left ventricular systolic function is normal. · Global longitudinal LV strain (GLS) = -27.0%. · Left atrial volume is moderately increased. · Mild mitral valve regurgitation is present. · Mild tricuspid valve regurgitation is present. · Estimated right ventricular systolic pressure from tricuspid regurgitation is mildly elevated (35-45 mmHg). · Mild dilation of the ascending aorta is present.      CT Angiogram Chest With & Without Contrast    Result Date: 9/21/2022  Exam: CT Angiography of the Chest. Date: 9/21/2022. Comparison: CT abdomen and pelvis on 9/20/2022. History: Concern for metastatic disease. Technique: CT examination of the chest was performed following the intravenous administration of 80 mL of Isovue-370. Sagittal, coronal and 3-D reformatted images were provided. CT dose lowering techniques were used, to include: automated exposure control, adjustment for patient size, and/or use of iterative reconstruction. FINDINGS: Mediastinum and Bebe: There is no axillary, mediastinal or hilar lymphadenopathy. There are calcified pretracheal and right hilar lymph nodes. Moderate patchy coronary artery calcifications are seen. Pleural and Pericardial spaces: There is a small right pleural effusion. There does not appear to be clear soft tissue nodularity based on this examination. However, on the CT abdomen and pelvis there appears to be some subtle pleural nodularity which was also administrated the portal venous phase which is likely better for visualizing these areas of nodularity. Upper  Abdomen: Partially included mass in the left upper quadrant and is likely arising from the left kidney was better evaluated on the CT abdomen and pelvis. There is retroperitoneal adenopathy also seen in the upper abdomen which is partially included  on the evaluation and better evaluated on the CT abdomen and pelvis. There is a small amount of ascites also seen in the visualized upper abdomen. Cardiovascular: The thoracic aorta is normal in size without evidence of aneurysm or dissection. Pulmonary Artery: There are no filling defects in the pulmonary arteries. Lung Parenchyma and Airways: The lungs are clear. Bones: No fracture or aggressive osseous lesion.     1. No evidence of pulmonary embolism. 2. No evidence of thoracic aortic aneurysm or dissection. 3. Left upper quadrant mass likely related to the left kidney which was better evaluated on the CT abdomen and pelvis study. 4. Retroperitoneal adenopathy compatible with metastatic disease was also better evaluated on the CT abdomen and pelvis. 5. Small right pleural effusion. This was also better evaluated on the CT abdomen and pelvis due to the portal venous phase of imaging on this study. There did appear to be some mild soft tissue areas of enhancing nodularity as this still raises the suspicion of a malignant effusion. Electronically signed by:  Deonte Dash D.O.  9/21/2022 1:00 AM Mountain Time    CT Abdomen Pelvis With Contrast    Result Date: 10/3/2022  DATE OF EXAM: 10/3/2022 8:20 AM  PROCEDURE: CT ANGIOGRAM CHEST-, CT ABDOMEN PELVIS W CONTRAST-  INDICATIONS: fever, unknown etiology, recent surgery, pleural effusions  COMPARISON: 9/21/2022 angiographic chest CT scan 9/20/2022 abdomen pelvis CT scan  TECHNIQUE: Contiguous axial imaging was obtained from the thoracic inlet through the chest abdomen and pelvis following the intravenous administration of 80 mL of Isovue 370. Reconstructed coronal and sagittal images were also obtained. Automated exposure  control and iterative reconstruction methods were used.  The radiation dose reduction device was turned on for each scan per the ALARA (As Low as Reasonably Achievable) protocol.  FINDINGS: Patient history indicates fever of unknown etiology, recent colon surgery. The prior exams from 9/20/2022 and 9/21/2022 by report showed a large rectal mass, extensive adenopathy, concern for renal and hepatic metastatic disease. Small right pleural effusion.  ANGIOGRAPHIC CT SCAN OF THE CHEST: There is good contrast opacification of the pulmonary arteries. No evidence of pulmonary embolic disease is seen. Thoracic aorta is mildly ectatic to approximately 4.0 cm. There is no evidence of dissection or focal aneurysm. There is moderately extensive coronary artery calcification. No pericardial effusion or mediastinal adenopathy is seen. No axillary or lower cervical lymphadenopathy is appreciated. There is a relatively small but increasing free-flowing right pleural effusion, and no left effusion. The lungs appear clear except for mild discoid atelectasis associated with the right pleural effusion, and focal peribronchial thickening scarring and mild bronchiectasis of the posterior right middle lobe, unchanged from prior study. Bony structures appear to be intact.      1. No evidence of pulmonary embolic disease. 2. Small but increasing right pleural effusion compared to 9/21/2022. 3. Stable small area of linear scarring in the right middle lobe. 4. Mild ascending aortic ectasia to approximately 4.0 cm again incidentally noted.    CT SCAN OF THE ABDOMEN AND PELVIS WITH IV CONTRAST: Numerous hepatic metastases are again noted. There is extensive retroperitoneal lymphadenopathy, and an approximately 5.4 cm left upper quadrant mass involving the left lateral renal parenchyma. Volume and shape of the exophytic mass favors that this is invasive to the kidney, rather than originating from kidney. There are multiple small splenic lesions  "consistent with metastases. Adrenal glands appear to be spared. Gallbladder appears normal. Pancreas appears to be normal, although the tip of the pancreatic tail is immediately adjacent the left upper quadrant mass.  No upper abdominal free air is seen. There is a small amount of ascites. Diffusely increased bowel gas suggests a low level ileus. Left mid abdominal ostomy site is clear. Large pelvic mass is again noted, which appears to extend into the small bowel mesentery, although these may be adjacent but separate nodes. Bladder is normally distended and normal in appearance. There is relatively little intrapelvic free fluid.  Delayed venous phase images show contrast within the renal collecting systems, which appear minimally more dilated than on the prior study. Distal ureters may be mildly compressed by the large pelvic mass. The bladder itself is displaced anteriorly and superiorly, but normally distended and grossly normal in appearance. No evidence of a discrete, drainable inflammatory collection is appreciated. Bony structures appear to be intact.   IMPRESSION:  1. Expected changes of recent colostomy placement. No visible associated inflammation. 2. Advanced metastatic disease, including numerous liver lesions, extensive retrocrural lymphadenopathy, multiple splenic lesions, left upper quadrant mass that that involves the lateral margin of the left kidney. Large pelvic mass and extensive infiltration of the lower small bowel mesentery, whether by direct extension of tumor, or multiple irregularly enlarged adjacent lymph nodes. 3. Bilateral hydronephrosis which appears increased from 9/20/2022, but no evidence of high-grade obstructive uropathy. 4. Mildly increased ascites. No evidence of drainable inflammatory collection. 5. \"Gassy\" appearance of nondependent large and small bowel, suggesting a low level ileus  This report was finalized on 10/3/2022 9:14 AM by Dr. Del Wilks MD.      CT Abdomen Pelvis " With Contrast    Result Date: 9/20/2022  Examination: CT ABDOMEN PELVIS W CONTRAST-  Date of Exam: 9/20/2022 7:57 PM  Indication: Nausea, diarrhea.  Comparison: None available.  Technique: Axial volumetric contrast-enhanced CT imaging of the abdomen and pelvis was performed utilizing 100 mL Isovue-300 IV contrast. Sagittal and coronal reconstructions were created for interpretation. Automated exposure control and iterative reconstruction methods were used.  Findings: There is a lobulated area of diminished attenuation within the periphery of the liver at segment 8 measuring up to 52 mm diameter. The liver parenchyma is mildly heterogeneous elsewhere. There is a similar area at the inferior aspect of liver segment 6, which measures up to 38 mm, but is much less well-defined. The portal vein is patent. Hepatic arterial supply is conventional. The spleen contains a small hypoattenuating area measuring 10 mm diameter on axial image 47. There is left adrenal gland thickening. The pancreas is homogeneous. The gallbladder and bile ducts appear within normal limits. There is an exophytic infiltrating mass extending from the superior pole the left kidney anteriorly measuring up to 67 x 46 mm. There is bulky retroperitoneal lymphadenopathy including an abnormally rounded lymph node at the left aspect of the aorta measuring up to 25 mm on axial image 66. There is a very large colorectal mass extending from the lower rectum approximately 5 cm from the anus and as high up as the distal sigmoid colon. The mass measures up to 12.8 cm craniocaudal by 10.6 cm AP by 8.1 cm transverse. This does obscure the prostate. There are perirectal conglomerate lymph nodes including a mass in the upper central pelvis on axial image 96 measuring 4.3 x 7.0 cm. Urinary bladder is displaced anteriorly. The stomach is within normal limits. There is mild aortic atherosclerotic disease. There is no evidence of ascites or pneumoperitoneum.  There is a  small right-sided pleural effusion. There is nodularity along the right basilar pleural surface although this area is partially obscured by motion. The appearance raises concern for pleural metastasis with malignant effusion. Left lung base is clear. The distal esophagus is unremarkable. There are moderate coronary artery calcifications. The heart size is normal. Subcutaneous fat and underlying musculature appear within normal limits. There are no acute osseous abnormalities or destructive bone lesions. There is chronic bilateral L5 spondylolysis with grade I spondylolisthesis. There are moderate spinal degenerative changes.       1. Large rectal mass measuring 12.8 x 10.6 x 8.1 cm concerning for rectal adenocarcinoma. There is extension beyond the rectal wall and there is conglomerate adjacent lymphadenopathy. There is also bulky retroperitoneal lymphadenopathy and there are areas suspicious for left renal and liver metastasis. 2. There are lobulated areas of right basilar intrathoracic pleural thickening with small adjacent effusion concerning for pleural metastasis with malignant effusion. 3. Coronary artery disease, chronic bilateral L5 spondylolysis with spondylolisthesis and moderate spinal degenerative changes.  This report was finalized on 9/20/2022 8:33 PM by Ashutosh Banks MD.      FL Video Swallow With Speech Single Contrast    Result Date: 10/7/2022  EXAMINATION: FL VIDEO SWALLOW W SPEECH SINGLE-CONTRAST-, FL LIMITED UGI FOR MBS REFLUX SINGLE-CONTRAST-  INDICATION: dysphagia; R50.9-Fever, unspecified; N13.30-Unspecified hydronephrosis; Z93.3-Colostomy status; C79.9-Secondary malignant neoplasm of unspecified site; E87.0-Hyperosmolality and hypernatremia; E87.6-Hypokalemia; C85.90-Non-Hodgkin lymphoma, unspecified, unspecified site; R13.10-Dysphagia, unspecified  TECHNIQUE: 42 seconds of fluoroscopic time was used for this exam. It is associated fluoroscopic loops are saved. The patient was evaluated in  the seated lateral position while taking a variety of consistencies of barium by mouth under the direction of speech pathology.  COMPARISON: NONE  FINDINGS: 42 seconds of fluoroscopy provided for a modified barium swallow, and limited upper GI series. Please see speech therapy report for full details and recommendations. Limited upper GI series demonstrated no signs of a high-grade focal esophageal stricture. There is a moderate sized anterior osteophyte visible on the C4-C5 vertebral levels. There is a mild posterior impression on the cervical esophagus due to this osteophyte, however this impression does not appear to significantly impede swallowing. Gastroesophageal reflux was not demonstrated during this exam.       Fluoroscopy provided for a modified barium swallow, and limited upper GI series. Please see speech therapy report for full details and recommendations. Limited upper GI series appeared within normal limits.    This report was finalized on 10/7/2022 3:10 PM by Yvon Donnelly.      XR Chest 1 View    Result Date: 10/3/2022  DATE OF EXAM: 10/3/2022 3:51 PM  PROCEDURE: XR CHEST 1 VW-  INDICATIONS: Verify PICC placement  COMPARISON: 03/21/2005.  TECHNIQUE: Single radiographic view of the chest was obtained.  FINDINGS: The tip of the right upper extremity PICC line terminates in the superior vena cava. The heart is enlarged. The pulmonary vascular markings are normal. The lungs and pleural spaces are clear.  There are chronic age-related changes involving the bony thorax and thoracic aorta.       1. The tip of the right upper extremity PICC line terminates in the superior vena cava. 2. No active pulmonary disease. 3. Cardiomegaly.  This report was finalized on 10/3/2022 4:09 PM by Preet Kline MD.      Peripheral Block    Result Date: 9/22/2022  Lexus Huerta CRNA     9/22/2022  2:18 PM Peripheral Block Patient reassessed immediately prior to procedure Patient location during procedure: OR Stop time:  9/22/2022 12:40 PM Reason for block: at surgeon's request and post-op pain management Performed byLEXY: Tracie Christiansen SRNA Preanesthetic Checklist Completed: patient identified, IV checked, site marked, risks and benefits discussed, surgical consent, monitors and equipment checked, pre-op evaluation and timeout performed Prep: Pt Position: supine Sterile barriers:cap, gloves, mask and washed/disinfected hands Prep: ChloraPrep Patient monitoring: blood pressure monitoring, continuous pulse oximetry and EKG Procedure Sedation: yes Performed under: general Guidance:ultrasound guided Images:still images obtained, printed/placed on chart Laterality:Bilateral Block Type:TAP Injection Technique:single-shot Needle Type:short-bevel and echogenic Needle Gauge:20 G Resistance on Injection: none Medications Used: bupivacaine PF (MARCAINE) 0.25 % injection, 60 mL dexamethasone sodium phosphate injection, 4 mg Med administered at 9/22/2022 12:40 PM Medications Comment:Block Injection:  LA dose divided between Right and Left block Post Assessment Injection Assessment: negative aspiration for heme, incremental injection and no paresthesia on injection Patient Tolerance:comfortable throughout block Complications:no Additional Notes   Under Ultrasound guidance, a BBraun 4inch 360 degree needle was advanced with Normal Saline hydro dissection of tissue.  The Internal Oblique and Transversus Abdominus muscles where visualized.  At or before the aponeurosis of Internal Oblique, local anesthetic spread was visualized in the Transversus Abdominus Plane. Injection was made incrementally with aspiration every 5 mls.  There was no  intravascular injection,  injection pressure was normal, there was no neural injection, and the procedure was completed without difficulty.  Thank You.     FL Limited Ugi For Mbs Reflux Single-Contrast    Result Date: 10/7/2022  EXAMINATION: FL VIDEO SWALLOW W SPEECH SINGLE-CONTRAST-, FL LIMITED UGI FOR MBS  REFLUX SINGLE-CONTRAST-  INDICATION: dysphagia; R50.9-Fever, unspecified; N13.30-Unspecified hydronephrosis; Z93.3-Colostomy status; C79.9-Secondary malignant neoplasm of unspecified site; E87.0-Hyperosmolality and hypernatremia; E87.6-Hypokalemia; C85.90-Non-Hodgkin lymphoma, unspecified, unspecified site; R13.10-Dysphagia, unspecified  TECHNIQUE: 42 seconds of fluoroscopic time was used for this exam. It is associated fluoroscopic loops are saved. The patient was evaluated in the seated lateral position while taking a variety of consistencies of barium by mouth under the direction of speech pathology.  COMPARISON: NONE  FINDINGS: 42 seconds of fluoroscopy provided for a modified barium swallow, and limited upper GI series. Please see speech therapy report for full details and recommendations. Limited upper GI series demonstrated no signs of a high-grade focal esophageal stricture. There is a moderate sized anterior osteophyte visible on the C4-C5 vertebral levels. There is a mild posterior impression on the cervical esophagus due to this osteophyte, however this impression does not appear to significantly impede swallowing. Gastroesophageal reflux was not demonstrated during this exam.       Fluoroscopy provided for a modified barium swallow, and limited upper GI series. Please see speech therapy report for full details and recommendations. Limited upper GI series appeared within normal limits.    This report was finalized on 10/7/2022 3:10 PM by Yvon Donnelly.      CT Angiogram Chest    Result Date: 10/3/2022  DATE OF EXAM: 10/3/2022 8:20 AM  PROCEDURE: CT ANGIOGRAM CHEST-, CT ABDOMEN PELVIS W CONTRAST-  INDICATIONS: fever, unknown etiology, recent surgery, pleural effusions  COMPARISON: 9/21/2022 angiographic chest CT scan 9/20/2022 abdomen pelvis CT scan  TECHNIQUE: Contiguous axial imaging was obtained from the thoracic inlet through the chest abdomen and pelvis following the intravenous administration of  80 mL of Isovue 370. Reconstructed coronal and sagittal images were also obtained. Automated exposure control and iterative reconstruction methods were used.  The radiation dose reduction device was turned on for each scan per the ALARA (As Low as Reasonably Achievable) protocol.  FINDINGS: Patient history indicates fever of unknown etiology, recent colon surgery. The prior exams from 9/20/2022 and 9/21/2022 by report showed a large rectal mass, extensive adenopathy, concern for renal and hepatic metastatic disease. Small right pleural effusion.  ANGIOGRAPHIC CT SCAN OF THE CHEST: There is good contrast opacification of the pulmonary arteries. No evidence of pulmonary embolic disease is seen. Thoracic aorta is mildly ectatic to approximately 4.0 cm. There is no evidence of dissection or focal aneurysm. There is moderately extensive coronary artery calcification. No pericardial effusion or mediastinal adenopathy is seen. No axillary or lower cervical lymphadenopathy is appreciated. There is a relatively small but increasing free-flowing right pleural effusion, and no left effusion. The lungs appear clear except for mild discoid atelectasis associated with the right pleural effusion, and focal peribronchial thickening scarring and mild bronchiectasis of the posterior right middle lobe, unchanged from prior study. Bony structures appear to be intact.      1. No evidence of pulmonary embolic disease. 2. Small but increasing right pleural effusion compared to 9/21/2022. 3. Stable small area of linear scarring in the right middle lobe. 4. Mild ascending aortic ectasia to approximately 4.0 cm again incidentally noted.    CT SCAN OF THE ABDOMEN AND PELVIS WITH IV CONTRAST: Numerous hepatic metastases are again noted. There is extensive retroperitoneal lymphadenopathy, and an approximately 5.4 cm left upper quadrant mass involving the left lateral renal parenchyma. Volume and shape of the exophytic mass favors that this is  "invasive to the kidney, rather than originating from kidney. There are multiple small splenic lesions consistent with metastases. Adrenal glands appear to be spared. Gallbladder appears normal. Pancreas appears to be normal, although the tip of the pancreatic tail is immediately adjacent the left upper quadrant mass.  No upper abdominal free air is seen. There is a small amount of ascites. Diffusely increased bowel gas suggests a low level ileus. Left mid abdominal ostomy site is clear. Large pelvic mass is again noted, which appears to extend into the small bowel mesentery, although these may be adjacent but separate nodes. Bladder is normally distended and normal in appearance. There is relatively little intrapelvic free fluid.  Delayed venous phase images show contrast within the renal collecting systems, which appear minimally more dilated than on the prior study. Distal ureters may be mildly compressed by the large pelvic mass. The bladder itself is displaced anteriorly and superiorly, but normally distended and grossly normal in appearance. No evidence of a discrete, drainable inflammatory collection is appreciated. Bony structures appear to be intact.   IMPRESSION:  1. Expected changes of recent colostomy placement. No visible associated inflammation. 2. Advanced metastatic disease, including numerous liver lesions, extensive retrocrural lymphadenopathy, multiple splenic lesions, left upper quadrant mass that that involves the lateral margin of the left kidney. Large pelvic mass and extensive infiltration of the lower small bowel mesentery, whether by direct extension of tumor, or multiple irregularly enlarged adjacent lymph nodes. 3. Bilateral hydronephrosis which appears increased from 9/20/2022, but no evidence of high-grade obstructive uropathy. 4. Mildly increased ascites. No evidence of drainable inflammatory collection. 5. \"Gassy\" appearance of nondependent large and small bowel, suggesting a low level " ileus  This report was finalized on 10/3/2022 9:14 AM by Dr. Del Wilks MD.        COVID19   Date Value Ref Range Status   10/03/2022 Not Detected Not Detected - Ref. Range Final       Blood Culture   Date Value Ref Range Status   10/03/2022 No growth at 5 days  Final     ASSESSMENTS/PLANS  1.  Gastrointestinal bleeding with melena  2.  Symptomatic blood loss anemia, heme positive stool  3.  Classic Hodgkin's lymphoma with significant disease burden  4.  Chronic severe malnutrition  5.  FUO, leukocytosis.    Abraham Wu is a 72 y.o. male presenting to hospital with a postop fever.  GI consult received for melanotic stool in stoma.  At time of exam, ostomy appliance is full of dark melanotic stool.  Additionally, hemoglobin has trended down over the past few days concerning for upper GI bleeding source.  Given this, recommend EGD tomorrow.    >>> N.p.o. at midnight  >>> Obtain consent for esophagogastroduodenoscopy  >>> Prophylaxis with IV PPI twice daily  >>> Continue to trend H&H and transfuse for hemoglobins less than 7 or symptomatic anemia per protocol.    I discussed the patient's findings and my recommendations with patient, family and nursing staff.    Raj Mcclellan, PORTILLO  10/09/22  15:17 EDT

## 2022-10-09 NOTE — PROGRESS NOTES
[facility] Medicine Services  PROGRESS NOTE    Patient Name: Abraham Wu  : 1950  MRN: [id number]    Date of Admission: 10/3/2022  Primary Care Physician: Jatinder Haynes MD    Subjective   Subjective     CC: Follow-up lymphoma    HPI: Patient slept okay, however continues to have hiccups, nursing did notice some dark stools through his ostomy which was fecal occult positive    ROS:  Gen- No fevers, chills  CV- No chest pain, palpitations  Resp- No cough, dyspnea  GI- No N/V/D, abd pain    Objective   Objective     Vital Signs:   Temp:  [97.1 °F (36.2 °C)-99.8 °F (37.7 °C)] 97.1 °F (36.2 °C)  Heart Rate:  [] 79  Resp:  [18] 18  BP: (130-154)/(70-90) 154/90  Flow (L/min):  [3] 3     Physical Exam:  Constitutional: No acute distress, awake, alert  HENT: NCAT, mucous membranes moist  Respiratory: Clear to auscultation bilaterally, respiratory effort normal   Cardiovascular: RRR, no murmurs, rubs, or gallops  Gastrointestinal: Positive bowel sounds, soft, nontender, nondistended, colostomy in place  Musculoskeletal: No bilateral ankle edema  Psychiatric: Appropriate affect, cooperative  Neurologic: Oriented x 3, nonfocal  Skin: No rashes      Results Reviewed:  LAB RESULTS:      Lab 10/09/22  0457 10/09/22  0202 10/08/22  0455 10/07/22  0647 10/06/22  0614 10/05/22  0456 10/04/22  0639 10/03/22  0734   WBC  --  14.67* 17.02* 21.80* 19.95* 9.40 9.44 10.79   HEMOGLOBIN 8.3* 7.7* 8.5* 10.8* 10.2* 8.1* 9.3* 10.6*   HEMATOCRIT 26.2* 24.2* 26.9* 34.2* 31.6* 25.3* 29.3* 32.7*   PLATELETS  --  273 338 489* 402 296 319 355   NEUTROS ABS  --   --  15.71* 20.04* 18.51* 8.41* 8.09* 9.73*   IMMATURE GRANS (ABS)  --   --  0.35* 0.44* 0.47* 0.15* 0.12* 0.11*   LYMPHS ABS  --   --  0.27* 0.57* 0.18* 0.21* 0.33* 0.25*   MONOS ABS  --   --  0.67 0.72 0.77 0.62 0.89 0.69   EOS ABS  --   --  0.00 0.01 0.00 0.00 0.00 0.00   MCV  --  85.8 83.8 83.8 84.0 83.5 84.0 83.4   SED RATE  --   --   --    --   --   --   --  88*   PROCALCITONIN  --   --   --   --   --   --   --  0.39*   LACTATE  --   --   --   --   --   --   --  1.2         Lab 10/09/22  0202 10/08/22  0455 10/07/22  0647 10/06/22  0614 10/05/22  2020 10/05/22  1153   SODIUM 133* 132* 130* 128*  --  127*   POTASSIUM 4.0 4.1 4.2 4.1 4.1 3.5   CHLORIDE 100 97* 94* 92*  --  92*   CO2 26.0 26.0 25.0 23.0  --  26.0   ANION GAP 7.0 9.0 11.0 13.0  --  9.0   BUN 12 16 13 12  --  11   CREATININE 0.43* 0.46* 0.55* 0.43*  --  0.48*   EGFR 114.8 112.5 106.6 114.8  --  111.1   GLUCOSE 154* 161* 148* 123*  --  114*   CALCIUM 8.3* 8.5* 9.0 8.4*  --  8.3*         Lab 10/09/22  0202 10/08/22  0455 10/07/22  0647 10/05/22  0456 10/04/22  0639   TOTAL PROTEIN 4.9* 4.6* 5.2* 4.6* 5.4*   ALBUMIN 2.10* 2.40* 2.70* 2.50* 2.30*   GLOBULIN 2.8 2.2 2.5 2.1 3.1   ALT (SGPT) 80* 114* 113* 41 52*   AST (SGOT) 91* 194* 255* 69* 151*   BILIRUBIN 0.9 1.0 0.8 1.0 1.4*   ALK PHOS 381* 439* 457* 395* 413*                 Lab 10/09/22  0202 10/05/22  0456 10/04/22  1342   IRON  --  9*  --    IRON SATURATION  --  7*  --    TIBC  --  122*  --    TRANSFERRIN  --  82*  --    FERRITIN  --  2,506.00*  --    ABO TYPING O  --  O   RH TYPING Positive  --  Positive   ANTIBODY SCREEN Negative  --  Negative         Brief Urine Lab Results  (Last result in the past 365 days)      Color   Clarity   Blood   Leuk Est   Nitrite   Protein   CREAT   Urine HCG        10/03/22 2016             58.8         10/03/22 2016 Yellow   Clear   Trace   Negative   Negative   Trace                 Microbiology Results Abnormal     Procedure Component Value - Date/Time    Blood Culture - Blood, Arm, Right [[id number]]  (Normal) Collected: 10/03/22 0808    Lab Status: Final result Specimen: Blood from Arm, Right Updated: 10/08/22 1030     Blood Culture No growth at 5 days    Blood Culture - Blood, Arm, Left [[id number]]  (Normal) Collected: 10/03/22 0734    Lab Status: Final result Specimen: Blood from Arm, Left Updated:  10/08/22 0801     Blood Culture No growth at 5 days    Respiratory Panel PCR w/COVID-19(SARS-CoV-2) BARRY/KEIRA/MARY/PAD/COR/MAD/AKSHAT In-House, NP Swab in UTM/VTM, 3-4 HR TAT - Swab, Nasopharynx [[id number]]  (Normal) Collected: 10/03/22 0734    Lab Status: Final result Specimen: Swab from Nasopharynx Updated: 10/03/22 0836     ADENOVIRUS, PCR Not Detected     Coronavirus 229E Not Detected     Coronavirus HKU1 Not Detected     Coronavirus NL63 Not Detected     Coronavirus OC43 Not Detected     COVID19 Not Detected     Human Metapneumovirus Not Detected     Human Rhinovirus/Enterovirus Not Detected     Influenza A PCR Not Detected     Influenza B PCR Not Detected     Parainfluenza Virus 1 Not Detected     Parainfluenza Virus 2 Not Detected     Parainfluenza Virus 3 Not Detected     Parainfluenza Virus 4 Not Detected     RSV, PCR Not Detected     Bordetella pertussis pcr Not Detected     Bordetella parapertussis PCR Not Detected     Chlamydophila pneumoniae PCR Not Detected     Mycoplasma pneumo by PCR Not Detected    Narrative:      In the setting of a positive respiratory panel with a viral infection PLUS a negative procalcitonin without other underlying concern for bacterial infection, consider observing off antibiotics or discontinuation of antibiotics and continue supportive care. If the respiratory panel is positive for atypical bacterial infection (Bordetella pertussis, Chlamydophila pneumoniae, or Mycoplasma pneumoniae), consider antibiotic de-escalation to target atypical bacterial infection.          FL Video Swallow With Speech Single Contrast    Result Date: 10/7/2022  EXAMINATION: FL VIDEO SWALLOW W SPEECH SINGLE-CONTRAST-, FL LIMITED UGI FOR MBS REFLUX SINGLE-CONTRAST-  INDICATION: dysphagia; R50.9-Fever, unspecified; N13.30-Unspecified hydronephrosis; Z93.3-Colostomy status; C79.9-Secondary malignant neoplasm of unspecified site; E87.0-Hyperosmolality and hypernatremia; E87.6-Hypokalemia; C85.90-Non-Hodgkin  lymphoma, unspecified, unspecified site; R13.10-Dysphagia, unspecified  TECHNIQUE: 42 seconds of fluoroscopic time was used for this exam. It is associated fluoroscopic loops are saved. The patient was evaluated in the seated lateral position while taking a variety of consistencies of barium by mouth under the direction of speech pathology.  COMPARISON: NONE  FINDINGS: 42 seconds of fluoroscopy provided for a modified barium swallow, and limited upper GI series. Please see speech therapy report for full details and recommendations. Limited upper GI series demonstrated no signs of a high-grade focal esophageal stricture. There is a moderate sized anterior osteophyte visible on the C4-C5 vertebral levels. There is a mild posterior impression on the cervical esophagus due to this osteophyte, however this impression does not appear to significantly impede swallowing. Gastroesophageal reflux was not demonstrated during this exam.       Impression: Fluoroscopy provided for a modified barium swallow, and limited upper GI series. Please see speech therapy report for full details and recommendations. Limited upper GI series appeared within normal limits.    This report was finalized on 10/7/2022 3:10 PM by Yvon Donnelly.      FL Limited Ugi For Mbs Reflux Single-Contrast    Result Date: 10/7/2022  EXAMINATION: FL VIDEO SWALLOW W SPEECH SINGLE-CONTRAST-, FL LIMITED UGI FOR MBS REFLUX SINGLE-CONTRAST-  INDICATION: dysphagia; R50.9-Fever, unspecified; N13.30-Unspecified hydronephrosis; Z93.3-Colostomy status; C79.9-Secondary malignant neoplasm of unspecified site; E87.0-Hyperosmolality and hypernatremia; E87.6-Hypokalemia; C85.90-Non-Hodgkin lymphoma, unspecified, unspecified site; R13.10-Dysphagia, unspecified  TECHNIQUE: 42 seconds of fluoroscopic time was used for this exam. It is associated fluoroscopic loops are saved. The patient was evaluated in the seated lateral position while taking a variety of consistencies of  barium by mouth under the direction of speech pathology.  COMPARISON: NONE  FINDINGS: 42 seconds of fluoroscopy provided for a modified barium swallow, and limited upper GI series. Please see speech therapy report for full details and recommendations. Limited upper GI series demonstrated no signs of a high-grade focal esophageal stricture. There is a moderate sized anterior osteophyte visible on the C4-C5 vertebral levels. There is a mild posterior impression on the cervical esophagus due to this osteophyte, however this impression does not appear to significantly impede swallowing. Gastroesophageal reflux was not demonstrated during this exam.       Impression: Fluoroscopy provided for a modified barium swallow, and limited upper GI series. Please see speech therapy report for full details and recommendations. Limited upper GI series appeared within normal limits.    This report was finalized on 10/7/2022 3:10 PM by Yvon Donnelly.        Results for orders placed during the hospital encounter of 10/03/22    Adult Transthoracic Echo Complete W/ Cont if Necessary Per Protocol    Interpretation Summary  · Left ventricular ejection fraction appears to be 56 - 60%. Left ventricular systolic function is normal.  · Global longitudinal LV strain (GLS) = -27.0%.  · Left atrial volume is moderately increased.  · Mild mitral valve regurgitation is present.  · Mild tricuspid valve regurgitation is present.  · Estimated right ventricular systolic pressure from tricuspid regurgitation is mildly elevated (35-45 mmHg).  · Mild dilation of the ascending aorta is present.      I have reviewed the medications:  Scheduled Meds:allopurinol, 200 mg, Oral, TID  fluticasone, 1 spray, Nasal, Daily  lisinopril, 10 mg, Oral, Daily  montelukast, 10 mg, Oral, Nightly  QUEtiapine, 12.5 mg, Oral, Nightly  rosuvastatin, 10 mg, Oral, Daily  senna-docusate sodium, 2 tablet, Oral, BID  sodium chloride, 10 mL, Intravenous, Q12H  sodium chloride,  10 mL, Intravenous, Q12H  sodium chloride, 2 g, Oral, TID With Meals  tamsulosin, 0.4 mg, Oral, Daily      Continuous Infusions:lactated ringers, 9 mL/hr, Last Rate: 9 mL/hr (10/04/22 1644)      PRN Meds:.•  acetaminophen  •  acetaminophen-codeine  •  senna-docusate sodium **AND** polyethylene glycol **AND** bisacodyl **AND** bisacodyl  •  lactated ringers  •  ondansetron  •  potassium chloride **OR** potassium chloride **OR** potassium chloride  •  sodium chloride  •  sodium chloride  •  sodium chloride    Assessment & Plan   Assessment & Plan     Active Hospital Problems    Diagnosis  POA   • **Lymphoma (HCC) [C85.90]  Unknown   • Hodgkin lymphoma (HCC) [C81.90]  Unknown   • Hydronephrosis [N13.30]  Yes   • Febrile illness [R50.9]  Yes   • Hypernatremia [E87.0]  Yes   • Hypokalemia [E87.6]  Yes   • Essential hypertension [I10]  Yes   • Rectal mass [K62.89]  Yes      Resolved Hospital Problems   No resolved problems to display.        Brief Hospital Course to date:  Abraham Wu is a 72 y.o. male  with history of hypertension, hyperlipidemia, SHERRI, recent admission to [facility] 9/21-9/24 for diarrhea, unintentional weight loss found to have enteropathogenic E. coli entero toxigenic E. coli s/p Flagyl and Levaquin he also found to have large rectal mass  s/p laparoscopic colostomy, pathology concerning for lymphoma.  He presented to the ED with fever, secondary underlying malignancy.  Final pathology was concerning for classic Hodgkin's lymphoma patient was reviewed initiated on chemotherapy and has also tolerated it well     Classic Hodgkin's lymphoma  -Patient has large pelvic mass, he is s/p laparoscopic colostomy creation, CT abd/pelvis shows advancement of bronchiectasis  -Pathology showed CD30 positive lymphoma, consistent with classic Hodgkin's lymphoma   -Heme-onc following, started on chemotherapy 10/8/2022 with AAVD, he is scheduled to receive pegfilgrastim as outpatient tomorrow 10/10 at 1:30 PM.  -He s/p 3  days of steroids. He has tolerated initiation of chemo well, he will be d/c home to f/u with  for further management      Anemia  Dark stools from ostomy, heme occult positive  -H&H dropped to 7.7, much improved this morning  -Iron profile suggested ACD  -In light of the slight drop in H&H, dark stool in his colostomy that is Hemoccult positive, we will have CRS to reevaluate.       Febrile illness  -Patient presented with T-max 101.7, elevated procalcitonin, normal WBC, respiratory panel with COVID-19 was negative, UA was unremarkable. Suspect this was secondary to underlying malignancy  -CT abd/pelvis showed advanced metastatic disease, (liver, renal, splenic lesions)  -Blood cultures NGTD, he was initially on antibiotics, but these have since been discontinued, he has done well off abx     Leukocytosis  -WBC remains elevated, suspect this is secondary to steroids     Bilateral hydronephrosis  -This appeared to be increased from prior imaging, however, there was no evidence of high-grade obstructive uropathy  -Suspect secondary to underlying malignancy, urology evaluated, started on tamsulosin, no stenting required at this time.      Hyponatremia, clinically significant, improving  -Renal following he is on salt tabs 2 g TID and  fluid restriction at 1.2 L daily  -Follow-up repeat labs    Hypokalemia  -s/p replacement     Chronic severe malnutrition  -Dietitian following     Hypertension  -BP remains stable, continue lisinopril     Hyperlipidemia  -Continue statin     Expected Discharge Location and Transportation: Home  Expected Discharge Date: 10/10/2022-if H&H is stable and ok with CRS    DVT prophylaxis:  No DVT prophylaxis order currently exists.     AM-PAC 6 Clicks Score (PT): 24 (10/06/22 0800)    CODE STATUS:   Code Status and Medical Interventions:   Ordered at: 10/03/22 1318     Level Of Support Discussed With:    Patient     Code Status (Patient has no pulse and is not breathing):    CPR  (Attempt to Resuscitate)     Medical Interventions (Patient has pulse or is breathing):    Full Support       Lorri Lauren MD  10/09/22

## 2022-10-10 ENCOUNTER — APPOINTMENT (OUTPATIENT)
Dept: CT IMAGING | Facility: HOSPITAL | Age: 72
End: 2022-10-10

## 2022-10-10 ENCOUNTER — ANESTHESIA (OUTPATIENT)
Dept: GASTROENTEROLOGY | Facility: HOSPITAL | Age: 72
End: 2022-10-10

## 2022-10-10 LAB
ALBUMIN SERPL-MCNC: 2.2 G/DL (ref 3.5–5.2)
ALBUMIN/GLOB SERPL: 0.7 G/DL
ALP SERPL-CCNC: 322 U/L (ref 39–117)
ALT SERPL W P-5'-P-CCNC: 52 U/L (ref 1–41)
ANION GAP SERPL CALCULATED.3IONS-SCNC: 10 MMOL/L (ref 5–15)
AST SERPL-CCNC: 46 U/L (ref 1–40)
BASOPHILS # BLD MANUAL: 0 10*3/MM3 (ref 0–0.2)
BASOPHILS NFR BLD MANUAL: 0 % (ref 0–1.5)
BILIRUB SERPL-MCNC: 0.9 MG/DL (ref 0–1.2)
BUN SERPL-MCNC: 21 MG/DL (ref 8–23)
BUN/CREAT SERPL: 47.7 (ref 7–25)
CALCIUM SPEC-SCNC: 8.4 MG/DL (ref 8.6–10.5)
CHLORIDE SERPL-SCNC: 96 MMOL/L (ref 98–107)
CK SERPL-CCNC: 15 U/L (ref 20–200)
CO2 SERPL-SCNC: 23 MMOL/L (ref 22–29)
CREAT SERPL-MCNC: 0.44 MG/DL (ref 0.76–1.27)
DEPRECATED RDW RBC AUTO: 49.1 FL (ref 37–54)
EGFRCR SERPLBLD CKD-EPI 2021: 114 ML/MIN/1.73
EOSINOPHIL # BLD MANUAL: 0 10*3/MM3 (ref 0–0.4)
EOSINOPHIL NFR BLD MANUAL: 0 % (ref 0.3–6.2)
ERYTHROCYTE [DISTWIDTH] IN BLOOD BY AUTOMATED COUNT: 16.1 % (ref 12.3–15.4)
GLOBULIN UR ELPH-MCNC: 3 GM/DL
GLUCOSE SERPL-MCNC: 161 MG/DL (ref 65–99)
HCT VFR BLD AUTO: 19.5 % (ref 37.5–51)
HCT VFR BLD AUTO: 20.4 % (ref 37.5–51)
HCT VFR BLD AUTO: 20.5 % (ref 37.5–51)
HCT VFR BLD AUTO: 21.2 % (ref 37.5–51)
HCT VFR BLD AUTO: 23.9 % (ref 37.5–51)
HGB BLD-MCNC: 6.5 G/DL (ref 13–17.7)
HGB BLD-MCNC: 6.5 G/DL (ref 13–17.7)
HGB BLD-MCNC: 6.6 G/DL (ref 13–17.7)
HGB BLD-MCNC: 7 G/DL (ref 13–17.7)
HGB BLD-MCNC: 7.7 G/DL (ref 13–17.7)
LYMPHOCYTES # BLD MANUAL: 0.48 10*3/MM3 (ref 0.7–3.1)
LYMPHOCYTES NFR BLD MANUAL: 0 % (ref 5–12)
MAGNESIUM SERPL-MCNC: 2.1 MG/DL (ref 1.6–2.4)
MCH RBC QN AUTO: 27 PG (ref 26.6–33)
MCHC RBC AUTO-ENTMCNC: 31.7 G/DL (ref 31.5–35.7)
MCV RBC AUTO: 85.1 FL (ref 79–97)
MONOCYTES # BLD: 0 10*3/MM3 (ref 0.1–0.9)
NEUTROPHILS # BLD AUTO: 11.62 10*3/MM3 (ref 1.7–7)
NEUTROPHILS NFR BLD MANUAL: 92 % (ref 42.7–76)
NEUTS BAND NFR BLD MANUAL: 4 % (ref 0–5)
OVALOCYTES BLD QL SMEAR: ABNORMAL
PLAT MORPH BLD: NORMAL
PLATELET # BLD AUTO: 268 10*3/MM3 (ref 140–450)
PMV BLD AUTO: 9.8 FL (ref 6–12)
POTASSIUM SERPL-SCNC: 3.9 MMOL/L (ref 3.5–5.2)
PROT SERPL-MCNC: 5.2 G/DL (ref 6–8.5)
RBC # BLD AUTO: 2.41 10*6/MM3 (ref 4.14–5.8)
SCHISTOCYTES BLD QL SMEAR: ABNORMAL
SODIUM SERPL-SCNC: 129 MMOL/L (ref 136–145)
TROPONIN T SERPL-MCNC: <0.01 NG/ML (ref 0–0.03)
VARIANT LYMPHS NFR BLD MANUAL: 1 % (ref 0–5)
VARIANT LYMPHS NFR BLD MANUAL: 3 % (ref 19.6–45.3)
WBC MORPH BLD: NORMAL
WBC NRBC COR # BLD: 12.1 10*3/MM3 (ref 3.4–10.8)

## 2022-10-10 PROCEDURE — 36430 TRANSFUSION BLD/BLD COMPNT: CPT

## 2022-10-10 PROCEDURE — 86900 BLOOD TYPING SEROLOGIC ABO: CPT

## 2022-10-10 PROCEDURE — 0DB78ZX EXCISION OF STOMACH, PYLORUS, VIA NATURAL OR ARTIFICIAL OPENING ENDOSCOPIC, DIAGNOSTIC: ICD-10-PCS | Performed by: INTERNAL MEDICINE

## 2022-10-10 PROCEDURE — 43239 EGD BIOPSY SINGLE/MULTIPLE: CPT | Performed by: INTERNAL MEDICINE

## 2022-10-10 PROCEDURE — 85014 HEMATOCRIT: CPT | Performed by: NURSE PRACTITIONER

## 2022-10-10 PROCEDURE — 85018 HEMOGLOBIN: CPT | Performed by: NURSE PRACTITIONER

## 2022-10-10 PROCEDURE — 99233 SBSQ HOSP IP/OBS HIGH 50: CPT | Performed by: HOSPITALIST

## 2022-10-10 PROCEDURE — 99232 SBSQ HOSP IP/OBS MODERATE 35: CPT | Performed by: INTERNAL MEDICINE

## 2022-10-10 PROCEDURE — 0 LIDOCAINE 1 % SOLUTION: Performed by: NURSE ANESTHETIST, CERTIFIED REGISTERED

## 2022-10-10 PROCEDURE — 25010000002 PROPOFOL 10 MG/ML EMULSION: Performed by: NURSE ANESTHETIST, CERTIFIED REGISTERED

## 2022-10-10 PROCEDURE — 85014 HEMATOCRIT: CPT | Performed by: INTERNAL MEDICINE

## 2022-10-10 PROCEDURE — 0DB38ZX EXCISION OF LOWER ESOPHAGUS, VIA NATURAL OR ARTIFICIAL OPENING ENDOSCOPIC, DIAGNOSTIC: ICD-10-PCS | Performed by: INTERNAL MEDICINE

## 2022-10-10 PROCEDURE — 88305 TISSUE EXAM BY PATHOLOGIST: CPT | Performed by: INTERNAL MEDICINE

## 2022-10-10 PROCEDURE — P9016 RBC LEUKOCYTES REDUCED: HCPCS

## 2022-10-10 PROCEDURE — 0W3P8ZZ CONTROL BLEEDING IN GASTROINTESTINAL TRACT, VIA NATURAL OR ARTIFICIAL OPENING ENDOSCOPIC: ICD-10-PCS | Performed by: INTERNAL MEDICINE

## 2022-10-10 PROCEDURE — 70450 CT HEAD/BRAIN W/O DYE: CPT

## 2022-10-10 PROCEDURE — 25010000002 FILGRASTIM PER 480 MCG: Performed by: INTERNAL MEDICINE

## 2022-10-10 PROCEDURE — 43255 EGD CONTROL BLEEDING ANY: CPT | Performed by: INTERNAL MEDICINE

## 2022-10-10 PROCEDURE — 85018 HEMOGLOBIN: CPT | Performed by: INTERNAL MEDICINE

## 2022-10-10 PROCEDURE — 25010000002 FLUCONAZOLE PER 200 MG: Performed by: INTERNAL MEDICINE

## 2022-10-10 DEVICE — SYS CLIP GI OTSC 11/6T 165CM: Type: IMPLANTABLE DEVICE | Site: DUODENUM | Status: FUNCTIONAL

## 2022-10-10 DEVICE — DEV CLIP GI INSTINCT MRI/CONDTN 7F 230CM: Type: IMPLANTABLE DEVICE | Site: DUODENUM | Status: FUNCTIONAL

## 2022-10-10 RX ORDER — FLUCONAZOLE 2 MG/ML
100 INJECTION, SOLUTION INTRAVENOUS DAILY
Status: DISCONTINUED | OUTPATIENT
Start: 2022-10-11 | End: 2022-10-18

## 2022-10-10 RX ORDER — FLUCONAZOLE 2 MG/ML
200 INJECTION, SOLUTION INTRAVENOUS ONCE
Status: DISCONTINUED | OUTPATIENT
Start: 2022-10-10 | End: 2022-10-10

## 2022-10-10 RX ORDER — FLUCONAZOLE 2 MG/ML
200 INJECTION, SOLUTION INTRAVENOUS ONCE
Status: COMPLETED | OUTPATIENT
Start: 2022-10-10 | End: 2022-10-10

## 2022-10-10 RX ORDER — SODIUM CHLORIDE, SODIUM LACTATE, POTASSIUM CHLORIDE, CALCIUM CHLORIDE 600; 310; 30; 20 MG/100ML; MG/100ML; MG/100ML; MG/100ML
9 INJECTION, SOLUTION INTRAVENOUS CONTINUOUS
Status: DISCONTINUED | OUTPATIENT
Start: 2022-10-10 | End: 2022-10-11

## 2022-10-10 RX ORDER — SODIUM CHLORIDE 1000 MG
3 TABLET, SOLUBLE MISCELLANEOUS
Status: DISCONTINUED | OUTPATIENT
Start: 2022-10-10 | End: 2022-10-11

## 2022-10-10 RX ORDER — PROPOFOL 10 MG/ML
VIAL (ML) INTRAVENOUS AS NEEDED
Status: DISCONTINUED | OUTPATIENT
Start: 2022-10-10 | End: 2022-10-10 | Stop reason: SURG

## 2022-10-10 RX ORDER — MIDAZOLAM HYDROCHLORIDE 1 MG/ML
0.5 INJECTION INTRAMUSCULAR; INTRAVENOUS
Status: DISCONTINUED | OUTPATIENT
Start: 2022-10-10 | End: 2022-10-10 | Stop reason: HOSPADM

## 2022-10-10 RX ORDER — LIDOCAINE HYDROCHLORIDE 10 MG/ML
INJECTION, SOLUTION INFILTRATION; PERINEURAL AS NEEDED
Status: DISCONTINUED | OUTPATIENT
Start: 2022-10-10 | End: 2022-10-10 | Stop reason: SURG

## 2022-10-10 RX ORDER — SODIUM CHLORIDE 0.9 % (FLUSH) 0.9 %
10 SYRINGE (ML) INJECTION AS NEEDED
Status: DISCONTINUED | OUTPATIENT
Start: 2022-10-10 | End: 2022-10-10 | Stop reason: HOSPADM

## 2022-10-10 RX ORDER — ACETAMINOPHEN 160 MG/5ML
650 SOLUTION ORAL EVERY 6 HOURS PRN
Status: DISCONTINUED | OUTPATIENT
Start: 2022-10-10 | End: 2022-10-20 | Stop reason: HOSPADM

## 2022-10-10 RX ADMIN — PROPOFOL 50 MG: 10 INJECTION, EMULSION INTRAVENOUS at 09:02

## 2022-10-10 RX ADMIN — ALLOPURINOL 200 MG: 100 TABLET ORAL at 20:47

## 2022-10-10 RX ADMIN — PANTOPRAZOLE SODIUM 40 MG: 40 INJECTION, POWDER, FOR SOLUTION INTRAVENOUS at 17:26

## 2022-10-10 RX ADMIN — ACETAMINOPHEN 650 MG: 325 TABLET, FILM COATED ORAL at 20:47

## 2022-10-10 RX ADMIN — PROPOFOL 50 MG: 10 INJECTION, EMULSION INTRAVENOUS at 08:58

## 2022-10-10 RX ADMIN — SODIUM CHLORIDE 2 G: 1 TABLET ORAL at 10:12

## 2022-10-10 RX ADMIN — PROPOFOL 50 MG: 10 INJECTION, EMULSION INTRAVENOUS at 08:52

## 2022-10-10 RX ADMIN — FLUTICASONE PROPIONATE 1 SPRAY: 50 SPRAY, METERED NASAL at 10:14

## 2022-10-10 RX ADMIN — SODIUM CHLORIDE, POTASSIUM CHLORIDE, SODIUM LACTATE AND CALCIUM CHLORIDE 9 ML/HR: 600; 310; 30; 20 INJECTION, SOLUTION INTRAVENOUS at 08:20

## 2022-10-10 RX ADMIN — Medication 10 ML: at 08:21

## 2022-10-10 RX ADMIN — SODIUM CHLORIDE 2 G: 1 TABLET ORAL at 11:10

## 2022-10-10 RX ADMIN — PROPOFOL 20 MG: 10 INJECTION, EMULSION INTRAVENOUS at 08:46

## 2022-10-10 RX ADMIN — QUETIAPINE FUMARATE 12.5 MG: 25 TABLET ORAL at 20:47

## 2022-10-10 RX ADMIN — PANTOPRAZOLE SODIUM 40 MG: 40 INJECTION, POWDER, FOR SOLUTION INTRAVENOUS at 08:22

## 2022-10-10 RX ADMIN — LISINOPRIL 10 MG: 10 TABLET ORAL at 10:12

## 2022-10-10 RX ADMIN — SENNOSIDES AND DOCUSATE SODIUM 2 TABLET: 50; 8.6 TABLET ORAL at 20:47

## 2022-10-10 RX ADMIN — LIDOCAINE HYDROCHLORIDE 100 MG: 10 INJECTION, SOLUTION INFILTRATION; PERINEURAL at 08:44

## 2022-10-10 RX ADMIN — Medication 10 ML: at 10:13

## 2022-10-10 RX ADMIN — FILGRASTIM 480 MCG: 480 INJECTION, SOLUTION INTRAVENOUS; SUBCUTANEOUS at 17:53

## 2022-10-10 RX ADMIN — TAMSULOSIN HYDROCHLORIDE 0.4 MG: 0.4 CAPSULE ORAL at 10:13

## 2022-10-10 RX ADMIN — ALLOPURINOL 200 MG: 100 TABLET ORAL at 10:12

## 2022-10-10 RX ADMIN — FLUCONAZOLE 200 MG: 200 INJECTION, SOLUTION INTRAVENOUS at 11:10

## 2022-10-10 RX ADMIN — PROPOFOL 80 MG: 10 INJECTION, EMULSION INTRAVENOUS at 08:44

## 2022-10-10 RX ADMIN — PROPOFOL 20 MG: 10 INJECTION, EMULSION INTRAVENOUS at 09:06

## 2022-10-10 RX ADMIN — ROSUVASTATIN CALCIUM 10 MG: 10 TABLET, COATED ORAL at 10:12

## 2022-10-10 RX ADMIN — Medication 10 ML: at 20:47

## 2022-10-10 RX ADMIN — SODIUM CHLORIDE 3 G: 1 TABLET ORAL at 17:26

## 2022-10-10 RX ADMIN — ALLOPURINOL 200 MG: 100 TABLET ORAL at 17:26

## 2022-10-10 RX ADMIN — MONTELUKAST 10 MG: 10 TABLET, FILM COATED ORAL at 20:47

## 2022-10-10 NOTE — ANESTHESIA PREPROCEDURE EVALUATION
Anesthesia Evaluation     Patient summary reviewed and Nursing notes reviewed                Airway   Mallampati: II  TM distance: >3 FB  Neck ROM: full  No difficulty expected  Dental - normal exam   (+) poor dentition    Pulmonary - normal exam   (+) sleep apnea on CPAP,   Cardiovascular - normal exam    (+) hypertension, hyperlipidemia,     ROS comment:   Echo 10/22: normal EF, mild MR/TR (RVSP 35-45)    Neuro/Psych- negative ROS  GI/Hepatic/Renal/Endo    (+)  GI bleeding (HCT 20 s/p pRBCs --> 23.9) , liver disease history of elevated LFT, renal disease CRI,     Musculoskeletal (-) negative ROS    Abdominal  - normal exam    Bowel sounds: normal.   Substance History - negative use     OB/GYN negative ob/gyn ROS         Other      history of cancer (Hodgkin Lymphoma)      Other Comment: Chronic hyponatremia Na+ 129                Anesthesia Plan    ASA 3     general     (propofol)  intravenous induction     Anesthetic plan, risks, benefits, and alternatives have been provided, discussed and informed consent has been obtained with: patient.    Plan discussed with CRNA.        CODE STATUS:    Level Of Support Discussed With: Patient  Code Status (Patient has no pulse and is not breathing): CPR (Attempt to Resuscitate)  Medical Interventions (Patient has pulse or is breathing): Full Support

## 2022-10-10 NOTE — PLAN OF CARE
Problem: Adult Inpatient Plan of Care  Goal: Plan of Care Review  Outcome: Ongoing, Not Progressing  Flowsheets (Taken 10/10/2022 0631)  Progress: no change  Plan of Care Reviewed With: patient  Outcome Evaluation: After rapid response pt given 1 unit of blood. Pt sleeping between care but easy to wake up. VSS. On 2L NC when sleeping. Able to answer all orientation questions. Forgetful of thing at times (like not being allowed to drink water due to being NPO). Ambulated to bedside commode. Tolerated well. No complaints of feeling dizzy or weak. Will continue to monitor.   Goal Outcome Evaluation:

## 2022-10-10 NOTE — SIGNIFICANT NOTE
Pt alert and oriented. Easy to wake up. Answered all orientation questions appropriately. At 2300 RN called to room by wife. Pt found to be unresponsive on bedside commode. Responded to sternal rubbing. Rapid called.

## 2022-10-10 NOTE — CASE MANAGEMENT/SOCIAL WORK
Continued Stay Note  [facility]     Patient Name: Abraham Wu  MRN: [id number]  Today's Date: 10/10/2022    Admit Date: 10/3/2022    Plan: Home   Discharge Plan     Row Name 10/10/22 1353       Plan    Plan Home    Patient/Family in Agreement with Plan yes    Plan Comments Spoke with patient in room.  His plan is still to return home at discharge.  Rollator at bedside.  No other needs noted.    Final Discharge Disposition Code 01 - home or self-care               Discharge Codes    No documentation.               Expected Discharge Date and Time     Expected Discharge Date Expected Discharge Time    Oct 11, 2022             Cyndie Newsome RN

## 2022-10-10 NOTE — PROGRESS NOTES
[facility] Medicine Services  PROGRESS NOTE    Patient Name: Abraham Wu  : 1950  MRN: [id number]    Date of Admission: 10/3/2022  Primary Care Physician: Jatinder Haynes MD    Subjective   Subjective     CC: Follow-up lymphoma    HPI:  Fever noted last night. Having coffee and feeling better. No dyspnea. Denies pain.   Review of Systems   Constitutional: Positive for activity change and fatigue.   HENT: Negative.    Respiratory: Negative.    Cardiovascular: Negative.    Gastrointestinal: Negative.    Genitourinary: Negative.    Musculoskeletal: Negative.    Neurological: Positive for weakness.         Objective   Objective     Vital Signs:   Temp:  [97.5 °F (36.4 °C)-101.4 °F (38.6 °C)] 98.2 °F (36.8 °C)  Heart Rate:  [] 90  Resp:  [12-24] 20  BP: (118-159)/(60-84) 147/73  Flow (L/min):  [2-3] 2     Physical Exam:  NAD, alert and oriented  OP clear, MMM  Neck supple  RRR  CTAB  +BS, ND, NT, soft  RUE PICC in place  No c/c, trace LE edema B  No rashes  Normal affect    Results Reviewed:  LAB RESULTS:      Lab 10/10/22  0723 10/09/22  2323 10/09/22  1806 10/09/22  1400 10/09/22  0929 10/09/22  0457 10/09/22  0202 10/08/22  0455 10/07/22  0647 10/06/22  0614 10/05/22  0456 10/04/22  0639   WBC  --  12.10*  --   --   --   --  14.67* 17.02* 21.80* 19.95* 9.40 9.44   HEMOGLOBIN 7.7* 6.5* 7.4* 7.2* 7.7*   < > 7.7* 8.5* 10.8* 10.2* 8.1* 9.3*   HEMATOCRIT 23.9* 20.5* 23.2* 22.9* 24.9*   < > 24.2* 26.9* 34.2* 31.6* 25.3* 29.3*   PLATELETS  --  268  --   --   --   --  273 338 489* 402 296 319   NEUTROS ABS  --  11.62*  --   --   --   --   --  15.71* 20.04* 18.51* 8.41* 8.09*   IMMATURE GRANS (ABS)  --   --   --   --   --   --   --  0.35* 0.44* 0.47* 0.15* 0.12*   LYMPHS ABS  --   --   --   --   --   --   --  0.27* 0.57* 0.18* 0.21* 0.33*   MONOS ABS  --   --   --   --   --   --   --  0.67 0.72 0.77 0.62 0.89   EOS ABS  --  0.00  --   --   --   --   --  0.00 0.01 0.00 0.00 0.00    MCV  --  85.1  --   --   --   --  85.8 83.8 83.8 84.0 83.5 84.0    < > = values in this interval not displayed.         Lab 10/09/22  2323 10/09/22  0202 10/08/22  0455 10/07/22  0647 10/06/22  0614   SODIUM 129* 133* 132* 130* 128*   POTASSIUM 3.9 4.0 4.1 4.2 4.1   CHLORIDE 96* 100 97* 94* 92*   CO2 23.0 26.0 26.0 25.0 23.0   ANION GAP 10.0 7.0 9.0 11.0 13.0   BUN 21 12 16 13 12   CREATININE 0.44* 0.43* 0.46* 0.55* 0.43*   EGFR 114.0 114.8 112.5 106.6 114.8   GLUCOSE 161* 154* 161* 148* 123*   CALCIUM 8.4* 8.3* 8.5* 9.0 8.4*   MAGNESIUM 2.1  --   --   --   --          Lab 10/09/22  2323 10/09/22  0202 10/08/22  0455 10/07/22  0647 10/05/22  0456   TOTAL PROTEIN 5.2* 4.9* 4.6* 5.2* 4.6*   ALBUMIN 2.20* 2.10* 2.40* 2.70* 2.50*   GLOBULIN 3.0 2.8 2.2 2.5 2.1   ALT (SGPT) 52* 80* 114* 113* 41   AST (SGOT) 46* 91* 194* 255* 69*   BILIRUBIN 0.9 0.9 1.0 0.8 1.0   ALK PHOS 322* 381* 439* 457* 395*         Lab 10/09/22  2323   TROPONIN T <0.010             Lab 10/09/22  0202 10/05/22  0456 10/04/22  1342   IRON  --  9*  --    IRON SATURATION  --  7*  --    TIBC  --  122*  --    TRANSFERRIN  --  82*  --    FERRITIN  --  2,506.00*  --    ABO TYPING O  --  O   RH TYPING Positive  --  Positive   ANTIBODY SCREEN Negative  --  Negative         Brief Urine Lab Results  (Last result in the past 365 days)      Color   Clarity   Blood   Leuk Est   Nitrite   Protein   CREAT   Urine HCG        10/03/22 2016             58.8         10/03/22 2016 Yellow   Clear   Trace   Negative   Negative   Trace                 Microbiology Results Abnormal     Procedure Component Value - Date/Time    Blood Culture - Blood, Arm, Right [[id number]]  (Normal) Collected: 10/03/22 0808    Lab Status: Final result Specimen: Blood from Arm, Right Updated: 10/08/22 1030     Blood Culture No growth at 5 days    Blood Culture - Blood, Arm, Left [[id number]]  (Normal) Collected: 10/03/22 0734    Lab Status: Final result Specimen: Blood from Arm, Left Updated:  10/08/22 0801     Blood Culture No growth at 5 days    Respiratory Panel PCR w/COVID-19(SARS-CoV-2) BARRY/KEIRA/MARY/PAD/COR/MAD/AKSHAT In-House, NP Swab in UTM/VTM, 3-4 HR TAT - Swab, Nasopharynx [[id number]]  (Normal) Collected: 10/03/22 0734    Lab Status: Final result Specimen: Swab from Nasopharynx Updated: 10/03/22 0836     ADENOVIRUS, PCR Not Detected     Coronavirus 229E Not Detected     Coronavirus HKU1 Not Detected     Coronavirus NL63 Not Detected     Coronavirus OC43 Not Detected     COVID19 Not Detected     Human Metapneumovirus Not Detected     Human Rhinovirus/Enterovirus Not Detected     Influenza A PCR Not Detected     Influenza B PCR Not Detected     Parainfluenza Virus 1 Not Detected     Parainfluenza Virus 2 Not Detected     Parainfluenza Virus 3 Not Detected     Parainfluenza Virus 4 Not Detected     RSV, PCR Not Detected     Bordetella pertussis pcr Not Detected     Bordetella parapertussis PCR Not Detected     Chlamydophila pneumoniae PCR Not Detected     Mycoplasma pneumo by PCR Not Detected    Narrative:      In the setting of a positive respiratory panel with a viral infection PLUS a negative procalcitonin without other underlying concern for bacterial infection, consider observing off antibiotics or discontinuation of antibiotics and continue supportive care. If the respiratory panel is positive for atypical bacterial infection (Bordetella pertussis, Chlamydophila pneumoniae, or Mycoplasma pneumoniae), consider antibiotic de-escalation to target atypical bacterial infection.          CT Head Without Contrast    Result Date: 10/10/2022  EXAMINATION: CT HEAD WITHOUT CONTRAST DATE: 10/10/2022 12:18 AM INDICATION: Altered mental status COMPARISON: None available at the time of this dictation. TECHNIQUE: Noncontrast imaging obtained from the vertex to the skull base.  CT dose lowering techniques were used, to include: automated exposure control, adjustment for patient size, and or use of iterative  reconstruction.? FINDINGS: Soft Tissues: No significant soft tissue abnormality. Skull: No underlying skull fracture or radiopaque foreign body. Sinuses: Paranasal sinuses are clear. Mastoids: Mastoid air cells are clear. Globes and Orbits: Globes and orbits are intact. Brain: No acute hemorrhage.  No midline shift, masses, or mass effect.  No evidence of acute infarct by noncontrast CT. Ventricles and Cisterns: Ventricular size and configuration is within normal limits. Basal cisterns are patent. No abnormal extra-axial fluid collection. Senescent Changes: Mild volume loss     Impression: 1. No acute intracranial abnormality. 2. Mild generalized volume loss.  Electronically signed by:  Neeraj Coronado M.D.  10/9/2022 10:53 PM Mountain Time      Results for orders placed during the hospital encounter of 10/03/22    Adult Transthoracic Echo Complete W/ Cont if Necessary Per Protocol    Interpretation Summary  · Left ventricular ejection fraction appears to be 56 - 60%. Left ventricular systolic function is normal.  · Global longitudinal LV strain (GLS) = -27.0%.  · Left atrial volume is moderately increased.  · Mild mitral valve regurgitation is present.  · Mild tricuspid valve regurgitation is present.  · Estimated right ventricular systolic pressure from tricuspid regurgitation is mildly elevated (35-45 mmHg).  · Mild dilation of the ascending aorta is present.      I have reviewed the medications:  Scheduled Meds:allopurinol, 200 mg, Oral, TID  [START ON 10/11/2022] fluconazole, 100 mg, Intravenous, Daily  fluconazole, 200 mg, Intravenous, Once  fluticasone, 1 spray, Nasal, Daily  lisinopril, 10 mg, Oral, Daily  montelukast, 10 mg, Oral, Nightly  pantoprazole, 40 mg, Intravenous, BID AC  QUEtiapine, 12.5 mg, Oral, Nightly  rosuvastatin, 10 mg, Oral, Daily  senna-docusate sodium, 2 tablet, Oral, BID  sodium chloride, 10 mL, Intravenous, Q12H  sodium chloride, 10 mL, Intravenous, Q12H  sodium chloride, 2 g, Oral,  TID With Meals  tamsulosin, 0.4 mg, Oral, Daily      Continuous Infusions:lactated ringers, 9 mL/hr, Last Rate: 9 mL/hr (10/04/22 1644)  lactated ringers, 9 mL/hr, Last Rate: 125 mL/hr (10/10/22 0844)      PRN Meds:.•  acetaminophen  •  acetaminophen-codeine  •  senna-docusate sodium **AND** polyethylene glycol **AND** bisacodyl **AND** bisacodyl  •  lactated ringers  •  ondansetron  •  potassium chloride **OR** potassium chloride **OR** potassium chloride  •  sodium chloride  •  sodium chloride  •  sodium chloride    Assessment & Plan   Assessment & Plan     Active Hospital Problems    Diagnosis  POA   • **Lymphoma (HCC) [C85.90]  Unknown   • Hodgkin lymphoma (HCC) [C81.90]  Unknown   • Hydronephrosis [N13.30]  Yes   • Febrile illness [R50.9]  Yes   • Hypernatremia [E87.0]  Yes   • Hypokalemia [E87.6]  Yes   • Acute upper GI bleed [K92.2]  Unknown   • Melena [K92.1]  Unknown   • Essential hypertension [I10]  Yes   • Rectal mass [K62.89]  Yes      Resolved Hospital Problems   No resolved problems to display.        Brief Hospital Course to date:  Abraham Wu is a 72 y.o. male  with history of hypertension, hyperlipidemia, SHERRI, recent admission to [facility] 9/21-9/24 for diarrhea, unintentional weight loss found to have enteropathogenic E. coli entero toxigenic E. coli s/p Flagyl and Levaquin he also found to have large rectal mass  s/p laparoscopic colostomy, pathology concerning for lymphoma.  He presented to the ED with fever, secondary underlying malignancy.  Final pathology was concerning for classic Hodgkin's lymphoma patient was reviewed initiated on chemotherapy and has also tolerated it well     Classic Hodgkin's lymphoma  -Patient has large pelvic mass, he is s/p laparoscopic colostomy creation, CT abd/pelvis shows advancement of bronchiectasis  -Pathology showed CD30 positive lymphoma, consistent with classic Hodgkin's lymphoma   -Heme-onc following, started on chemotherapy 10/8/2022 with AAVD, he was  scheduled to receive pegfilgrastim as outpatient tomorrow 10/10 at 1:30 PM.  -He s/p 3 days of steroids. He has tolerated initiation of chemo well, he will be d/c home to f/u with  for further management      Anemia  Dark stools from ostomy, heme occult positive  -s/p EGD with ulcer noted, candidat noted  -on PPI\  -monitor CBC through 10/11     Febrile illness  -Patient presented with T-max 101.7, elevated procalcitonin, normal WBC, respiratory panel with COVID-19 was negative, UA was unremarkable. Suspect this was secondary to underlying malignancy  -CT abd/pelvis showed advanced metastatic disease, (liver, renal, splenic lesions)  -Blood cultures NGTD, he was initially on antibiotics, but these have since been discontinued, he has done well off abx, but with continued fevers noted     Leukocytosis  -WBC remains elevated, suspected econdary to steroids     Bilateral hydronephrosis  -This appeared to be increased from prior imaging, however, there was no evidence of high-grade obstructive uropathy  -Suspect secondary to underlying malignancy, urology evaluated, started on tamsulosin, no stenting required at this time.      Hyponatremia, clinically significant, improving  -Renal following he is on salt tabs 2 g TID and  fluid restriction at 1.2 L daily  -Follow-up repeat labs, low but stable    Hypokalemia  -s/p replacement     Chronic severe malnutrition  -Dietitian following     Hypertension  -BP remains stable, continue lisinopril     Hyperlipidemia  -Continue statin     Expected Discharge Location and Transportation: Home  Expected Discharge Date: 10/11 if CBC stable    DVT prophylaxis:  No DVT prophylaxis order currently exists.     AM-PAC 6 Clicks Score (PT): 24 (10/06/22 0800)    CODE STATUS:   Code Status and Medical Interventions:   Ordered at: 10/03/22 1318     Level Of Support Discussed With:    Patient     Code Status (Patient has no pulse and is not breathing):    CPR (Attempt to Resuscitate)      Medical Interventions (Patient has pulse or is breathing):    Full Support       Del Bourne MD  10/10/22

## 2022-10-10 NOTE — PROGRESS NOTES
"   LOS: 6 days    Patient Care Team:  Jatinder Haynes MD as PCP - General (Family Medicine)  Hyponatremia follow up     Subjective     Interval History:     Na improving 133      Review of Systems:   No CP or SOA , fever, chills, rigors, rash, N/V, Constipation.       Objective     Vital Sign Min/Max for last 24 hours  Temp  Min: 97 °F (36.1 °C)  Max: 101.2 °F (38.4 °C)   BP  Min: 132/75  Max: 159/60   Pulse  Min: 64  Max: 98   Resp  Min: 16  Max: 18   SpO2  Min: 90 %  Max: 100 %   Flow (L/min)  Min: 3  Max: 3   No data recorded     Flowsheet Rows    Flowsheet Row First Filed Value   Admission Height 162.6 cm (64\") Documented at 10/03/2022 0604   Admission Weight 78 kg (172 lb) Documented at 10/03/2022 0604          No intake/output data recorded.  I/O last 3 completed shifts:  In: 1059 [P.O.:959; IV Piggyback:100]  Out: 900 [Urine:900]    Physical Exam:    Gen: Alert, NAD   HENT: NC, AT, EOMI   NECK: Supple, no JVD, Trachea midline   LUNGS: CTA bilaterally, non labored respirtation   CVS: S1/S2 audible, RRR, no murmur   Abd: Soft, NT, ND, BS+   Ext: No pedal edema, no cyanosis   CNS: Alert, NFD      WBC WBC   Date Value Ref Range Status   10/09/2022 14.67 (H) 3.40 - 10.80 10*3/mm3 Final   10/08/2022 17.02 (H) 3.40 - 10.80 10*3/mm3 Final   10/07/2022 21.80 (H) 3.40 - 10.80 10*3/mm3 Final      HGB Hemoglobin   Date Value Ref Range Status   10/09/2022 7.4 (L) 13.0 - 17.7 g/dL Final   10/09/2022 7.2 (L) 13.0 - 17.7 g/dL Final   10/09/2022 7.7 (L) 13.0 - 17.7 g/dL Final   10/09/2022 8.3 (L) 13.0 - 17.7 g/dL Final   10/09/2022 7.7 (L) 13.0 - 17.7 g/dL Final   10/08/2022 8.5 (L) 13.0 - 17.7 g/dL Final   10/07/2022 10.8 (L) 13.0 - 17.7 g/dL Final      HCT Hematocrit   Date Value Ref Range Status   10/09/2022 23.2 (L) 37.5 - 51.0 % Final   10/09/2022 22.9 (L) 37.5 - 51.0 % Final   10/09/2022 24.9 (L) 37.5 - 51.0 % Final   10/09/2022 26.2 (L) 37.5 - 51.0 % Final   10/09/2022 24.2 (L) 37.5 - 51.0 % Final   10/08/2022 26.9 " (L) 37.5 - 51.0 % Final   10/07/2022 34.2 (L) 37.5 - 51.0 % Final      Platlets No results found for: LABPLAT   MCV MCV   Date Value Ref Range Status   10/09/2022 85.8 79.0 - 97.0 fL Final   10/08/2022 83.8 79.0 - 97.0 fL Final   10/07/2022 83.8 79.0 - 97.0 fL Final          Sodium Sodium   Date Value Ref Range Status   10/09/2022 133 (L) 136 - 145 mmol/L Final   10/08/2022 132 (L) 136 - 145 mmol/L Final   10/07/2022 130 (L) 136 - 145 mmol/L Final      Potassium Potassium   Date Value Ref Range Status   10/09/2022 4.0 3.5 - 5.2 mmol/L Final   10/08/2022 4.1 3.5 - 5.2 mmol/L Final   10/07/2022 4.2 3.5 - 5.2 mmol/L Final      Chloride Chloride   Date Value Ref Range Status   10/09/2022 100 98 - 107 mmol/L Final   10/08/2022 97 (L) 98 - 107 mmol/L Final   10/07/2022 94 (L) 98 - 107 mmol/L Final      CO2 CO2   Date Value Ref Range Status   10/09/2022 26.0 22.0 - 29.0 mmol/L Final   10/08/2022 26.0 22.0 - 29.0 mmol/L Final   10/07/2022 25.0 22.0 - 29.0 mmol/L Final      BUN BUN   Date Value Ref Range Status   10/09/2022 12 8 - 23 mg/dL Final   10/08/2022 16 8 - 23 mg/dL Final   10/07/2022 13 8 - 23 mg/dL Final      Creatinine Creatinine   Date Value Ref Range Status   10/09/2022 0.43 (L) 0.76 - 1.27 mg/dL Final   10/08/2022 0.46 (L) 0.76 - 1.27 mg/dL Final   10/07/2022 0.55 (L) 0.76 - 1.27 mg/dL Final      Calcium Calcium   Date Value Ref Range Status   10/09/2022 8.3 (L) 8.6 - 10.5 mg/dL Final   10/08/2022 8.5 (L) 8.6 - 10.5 mg/dL Final   10/07/2022 9.0 8.6 - 10.5 mg/dL Final      PO4 No results found for: CAPO4   Albumin Albumin   Date Value Ref Range Status   10/09/2022 2.10 (L) 3.50 - 5.20 g/dL Final   10/08/2022 2.40 (L) 3.50 - 5.20 g/dL Final   10/07/2022 2.70 (L) 3.50 - 5.20 g/dL Final      Magnesium No results found for: MG   Uric Acid No results found for: URICACID        Results Review:     I reviewed the patient's new clinical results.    allopurinol, 200 mg, Oral, TID  fluticasone, 1 spray, Nasal,  Daily  lisinopril, 10 mg, Oral, Daily  montelukast, 10 mg, Oral, Nightly  pantoprazole, 40 mg, Intravenous, BID AC  QUEtiapine, 12.5 mg, Oral, Nightly  rosuvastatin, 10 mg, Oral, Daily  senna-docusate sodium, 2 tablet, Oral, BID  sodium chloride, 10 mL, Intravenous, Q12H  sodium chloride, 10 mL, Intravenous, Q12H  sodium chloride, 2 g, Oral, TID With Meals  tamsulosin, 0.4 mg, Oral, Daily      lactated ringers, 9 mL/hr, Last Rate: 9 mL/hr (10/04/22 4094)        Medication Review:     Assessment & Plan       Lymphoma (HCC)    Rectal mass    Essential hypertension    Hydronephrosis    Febrile illness    Hypernatremia    Hypokalemia    Hodgkin lymphoma (HCC)    Acute upper GI bleed    Melena    1- Hyponatremia - Serum osmolality 263, Urine osmolality 115 and urine sodium less than 20. Suggest of poor oral solute intake vs polydipsia . Uric acid low. Improving.   2- Rectal mass   3-Hypokalemia - resolved.     Plan:  - Fluid restriction 1.2 lit/day   - Continue with salt tablets     Nik Kelly MD  10/09/22  20:11 EDT

## 2022-10-10 NOTE — PROGRESS NOTES
"   LOS: 7 days    Patient Care Team:  Jatinder Haynes MD as PCP - General (Family Medicine)  Hyponatremia follow up     Subjective     Interval History:     Na dropped from 133> 129       Review of Systems:   No CP or SOA , fever, chills, rigors, rash, N/V, Constipation.       Objective     Vital Sign Min/Max for last 24 hours  Temp  Min: 97.5 °F (36.4 °C)  Max: 101.4 °F (38.6 °C)   BP  Min: 118/65  Max: 154/74   Pulse  Min: 79  Max: 104   Resp  Min: 12  Max: 24   SpO2  Min: 88 %  Max: 100 %   Flow (L/min)  Min: 2  Max: 3   No data recorded     Flowsheet Rows    Flowsheet Row First Filed Value   Admission Height 162.6 cm (64\") Documented at 10/03/2022 0604   Admission Weight 78 kg (172 lb) Documented at 10/03/2022 0604          I/O this shift:  In: 550 [P.O.:450; IV Piggyback:100]  Out: -   I/O last 3 completed shifts:  In: 1309 [P.O.:959; Blood:350]  Out: 1150 [Urine:1100; Stool:50]    Physical Exam:    Gen: Alert, NAD   HENT: NC, AT, EOMI   NECK: Supple, no JVD, Trachea midline   LUNGS: CTA bilaterally, non labored respirtation   CVS: S1/S2 audible, RRR, no murmur   Abd: Soft, NT, ND, BS+   Ext: No pedal edema, no cyanosis   CNS: Alert, NFD      WBC WBC   Date Value Ref Range Status   10/09/2022 12.10 (H) 3.40 - 10.80 10*3/mm3 Final   10/09/2022 14.67 (H) 3.40 - 10.80 10*3/mm3 Final   10/08/2022 17.02 (H) 3.40 - 10.80 10*3/mm3 Final      HGB Hemoglobin   Date Value Ref Range Status   10/10/2022 7.0 (L) 13.0 - 17.7 g/dL Final   10/10/2022 7.7 (L) 13.0 - 17.7 g/dL Final   10/09/2022 6.5 (C) 13.0 - 17.7 g/dL Final   10/09/2022 7.4 (L) 13.0 - 17.7 g/dL Final   10/09/2022 7.2 (L) 13.0 - 17.7 g/dL Final   10/09/2022 7.7 (L) 13.0 - 17.7 g/dL Final   10/09/2022 8.3 (L) 13.0 - 17.7 g/dL Final   10/09/2022 7.7 (L) 13.0 - 17.7 g/dL Final   10/08/2022 8.5 (L) 13.0 - 17.7 g/dL Final      HCT Hematocrit   Date Value Ref Range Status   10/10/2022 21.2 (L) 37.5 - 51.0 % Final   10/10/2022 23.9 (L) 37.5 - 51.0 % Final "   10/09/2022 20.5 (C) 37.5 - 51.0 % Final   10/09/2022 23.2 (L) 37.5 - 51.0 % Final   10/09/2022 22.9 (L) 37.5 - 51.0 % Final   10/09/2022 24.9 (L) 37.5 - 51.0 % Final   10/09/2022 26.2 (L) 37.5 - 51.0 % Final   10/09/2022 24.2 (L) 37.5 - 51.0 % Final   10/08/2022 26.9 (L) 37.5 - 51.0 % Final      Platlets No results found for: LABPLAT   MCV MCV   Date Value Ref Range Status   10/09/2022 85.1 79.0 - 97.0 fL Final   10/09/2022 85.8 79.0 - 97.0 fL Final   10/08/2022 83.8 79.0 - 97.0 fL Final          Sodium Sodium   Date Value Ref Range Status   10/09/2022 129 (L) 136 - 145 mmol/L Final   10/09/2022 133 (L) 136 - 145 mmol/L Final   10/08/2022 132 (L) 136 - 145 mmol/L Final      Potassium Potassium   Date Value Ref Range Status   10/09/2022 3.9 3.5 - 5.2 mmol/L Final   10/09/2022 4.0 3.5 - 5.2 mmol/L Final   10/08/2022 4.1 3.5 - 5.2 mmol/L Final      Chloride Chloride   Date Value Ref Range Status   10/09/2022 96 (L) 98 - 107 mmol/L Final   10/09/2022 100 98 - 107 mmol/L Final   10/08/2022 97 (L) 98 - 107 mmol/L Final      CO2 CO2   Date Value Ref Range Status   10/09/2022 23.0 22.0 - 29.0 mmol/L Final   10/09/2022 26.0 22.0 - 29.0 mmol/L Final   10/08/2022 26.0 22.0 - 29.0 mmol/L Final      BUN BUN   Date Value Ref Range Status   10/09/2022 21 8 - 23 mg/dL Final   10/09/2022 12 8 - 23 mg/dL Final   10/08/2022 16 8 - 23 mg/dL Final      Creatinine Creatinine   Date Value Ref Range Status   10/09/2022 0.44 (L) 0.76 - 1.27 mg/dL Final   10/09/2022 0.43 (L) 0.76 - 1.27 mg/dL Final   10/08/2022 0.46 (L) 0.76 - 1.27 mg/dL Final      Calcium Calcium   Date Value Ref Range Status   10/09/2022 8.4 (L) 8.6 - 10.5 mg/dL Final   10/09/2022 8.3 (L) 8.6 - 10.5 mg/dL Final   10/08/2022 8.5 (L) 8.6 - 10.5 mg/dL Final      PO4 No results found for: CAPO4   Albumin Albumin   Date Value Ref Range Status   10/09/2022 2.20 (L) 3.50 - 5.20 g/dL Final   10/09/2022 2.10 (L) 3.50 - 5.20 g/dL Final   10/08/2022 2.40 (L) 3.50 - 5.20 g/dL Final       Magnesium Magnesium   Date Value Ref Range Status   10/09/2022 2.1 1.6 - 2.4 mg/dL Final      Uric Acid No results found for: URICACID        Results Review:     I reviewed the patient's new clinical results.    allopurinol, 200 mg, Oral, TID  filgrastim (NEUPOGEN) injection, 480 mcg, Subcutaneous, Q PM  [START ON 10/11/2022] fluconazole, 100 mg, Intravenous, Daily  fluticasone, 1 spray, Nasal, Daily  lisinopril, 10 mg, Oral, Daily  montelukast, 10 mg, Oral, Nightly  pantoprazole, 40 mg, Intravenous, BID AC  QUEtiapine, 12.5 mg, Oral, Nightly  rosuvastatin, 10 mg, Oral, Daily  senna-docusate sodium, 2 tablet, Oral, BID  sodium chloride, 10 mL, Intravenous, Q12H  sodium chloride, 10 mL, Intravenous, Q12H  sodium chloride, 2 g, Oral, TID With Meals  tamsulosin, 0.4 mg, Oral, Daily      lactated ringers, 9 mL/hr, Last Rate: 9 mL/hr (10/04/22 1644)  lactated ringers, 9 mL/hr, Last Rate: 125 mL/hr (10/10/22 6710)        Medication Review:     Assessment & Plan       Lymphoma (HCC)    Rectal mass    Essential hypertension    Hydronephrosis    Febrile illness    Hypernatremia    Hypokalemia    Hodgkin lymphoma (HCC)    Acute upper GI bleed    Melena    1- Hyponatremia - Serum osmolality 263, Urine osmolality 115 and urine sodium less than 20. Suggest of poor oral solute intake vs polydipsia . Uric acid low. Improving.   2- Rectal mass   3-Hypokalemia - resolved  4. Anemia: Transfuse at hb<7.     Plan:  - Fluid restriction 1.2 lit/day   - Increase salt tabs 3 g TID    Nik Kelly MD  10/10/22  14:39 EDT

## 2022-10-10 NOTE — PROGRESS NOTES
Patient continued to have blood loss overnight, had a syncopal episode, CT scan of the was negative.  This morning he went with gastroenterology for upper endoscopy, found candidal esophagitis and bleeding due to ulcer status post clipping.    I saw him later this morning, denies any abdominal pain.      From a colorectal and in her perspective, he is doing well.  No further interventions on my behalf.  He has a follow-up appointment with me in the near future in the outpatient setting.  Once he shows no further signs of bleeding, clear for discharge from my perspective

## 2022-10-10 NOTE — ANESTHESIA POSTPROCEDURE EVALUATION
Patient: Abraham Wu    Procedure Summary     Date: 10/10/22 Room / Location:  [facility]    Anesthesia Start: 0837 Anesthesia Stop: 0915    Procedure: ESOPHAGOGASTRODUODENOSCOPY Diagnosis:       Hodgkin lymphoma of lymph nodes of multiple regions, unspecified Hodgkin lymphoma type (HCC)      Acute upper GI bleed      Melena      (Hodgkin lymphoma of lymph nodes of multiple regions, unspecified Hodgkin lymphoma type (HCC) [C81.98])      (Acute upper GI bleed [K92.2])      (Melena [K92.1])    Surgeons: Neeraj Lynn MD Provider: Amarjit Saleh MD    Anesthesia Type: general ASA Status: 3          Anesthesia Type: general    Vitals  Vitals Value Taken Time   /62 10/10/22 0915   Temp 98.2 °F (36.8 °C) 10/10/22 0915   Pulse 94 10/10/22 0916   Resp 12 10/10/22 0915   SpO2 98 % 10/10/22 0916   Vitals shown include unvalidated device data.        Post Anesthesia Care and Evaluation    Patient location during evaluation: PACU  Patient participation: complete - patient participated  Level of consciousness: awake and alert  Pain score: 0  Pain management: adequate    Airway patency: patent  Anesthetic complications: No anesthetic complications  PONV Status: none  Cardiovascular status: hemodynamically stable and acceptable  Respiratory status: nonlabored ventilation, acceptable and nasal cannula  Hydration status: acceptable

## 2022-10-10 NOTE — SIGNIFICANT NOTE
Rapid response called due to patient having convulsions witnessed by wife who is at bedside; wife was assisting patient to the side of the bed because he needed to urinate (however she reports he had already been incontinent), while sitting him up to pivot to bedside commode she reports his eyes rolled back in his head, his head was shaking and he was unresponsive. Nursing staff to bedside and RRT called. Initially patient with decreased responsiveness. Vitals stable, glucose normal. On my exam, patient back to baseline and talking with me. He denies any acute pain, chest pain, shortness of breath. He is unable to tell me what occurred. No history of seizures. He is actively undergoing chemo for Hodgkins lymphoma. Labs ordered. CT head ordered.

## 2022-10-10 NOTE — POST-PROCEDURE NOTE
EGD  Ulceration on hard palate.  Severe candidiasis in esophagus  Bleeding ulcer in D2 -Clipped        REC   <Continue PPI  <Add Diflucan  <FU Path  < IR if rebleeds

## 2022-10-10 NOTE — PROGRESS NOTES
HEMATOLOGY/ONCOLOGY PROGRESS NOTE    Subjective      CC: Hodgkin's lymphoma    SUBJECTIVE:   Patient with a syncopal episode overnight.  Hemoglobin 6.5.  He was transfused 1 unit PRBC.  EGD this morning notable for severe candidiasis in the esophagus as well as a bleeding ulcer in D2 which was clipped.  He denies any abdominal pain or discomfort today.  Denies any significant pain with swallowing        Past Medical History, Past Surgical History, Social History, Family History have been reviewed and are without significant changes except as mentioned.      Medications:  The current medication list was reviewed in the EMR    ALLERGIES: No Known Allergies    ROS:  A comprehensive 10 point review of systems was performed and was negative except as mentioned.      Objective      Vitals:    10/10/22 0925 10/10/22 0930 10/10/22 1012 10/10/22 1111   BP: 129/68 134/67 147/73 154/74   BP Location:       Patient Position:       Pulse: 90 90 90 104   Resp: 20 20  16   Temp:    100.3 °F (37.9 °C)   TempSrc:    Oral   SpO2: 96% 98%  100%   Weight:       Height:                General: Sitting in bed, in no acute distress  HEENT: sclerae anicteric, oropharynx clear  Lymphatics: no cervical, supraclavicular, inguinal, or axillary adenopathy  Cardiovascular: regular rate and rhythm, no murmurs  Lungs: clear to auscultation bilaterally  Abdomen: soft, nontender, nondistended.  No palpable organomegaly  Extremities: no lower extremity edema  Skin: no rashes, lesions, bruising, or petechiae  Neuro: Alert and oriented x 3. Moves all extremities.    RECENT LABS:    Results from last 7 days   Lab Units 10/10/22  1126 10/10/22  0723 10/09/22  2323 10/09/22  0457 10/09/22  0202 10/08/22  0455   WBC 10*3/mm3  --   --  12.10*  --  14.67* 17.02*   HEMOGLOBIN g/dL 7.0* 7.7* 6.5*   < > 7.7* 8.5*   PLATELETS 10*3/mm3  --   --  268  --  273 338    < > = values in this interval not displayed.     Results from last 7 days   Lab Units  10/09/22  2323 10/09/22  0202 10/08/22  0455   SODIUM mmol/L 129* 133* 132*   POTASSIUM mmol/L 3.9 4.0 4.1   CO2 mmol/L 23.0 26.0 26.0   BUN mg/dL 21 12 16   CREATININE mg/dL 0.44* 0.43* 0.46*   GLUCOSE mg/dL 161* 154* 161*     Results from last 7 days   Lab Units 10/09/22  2323 10/09/22  0202 10/08/22  0455   AST (SGOT) U/L 46* 91* 194*   ALT (SGPT) U/L 52* 80* 114*   BILIRUBIN mg/dL 0.9 0.9 1.0   ALK PHOS U/L 322* 381* 439*         CT Head Without Contrast    Result Date: 10/10/2022  1. No acute intracranial abnormality. 2. Mild generalized volume loss.  Electronically signed by:  Neeraj Coronado M.D.  10/9/2022 10:53 PM Mountain Time    CT Abdomen Pelvis With Contrast    Result Date: 10/3/2022  1. No evidence of pulmonary embolic disease. 2. Small but increasing right pleural effusion compared to 9/21/2022. 3. Stable small area of linear scarring in the right middle lobe. 4. Mild ascending aortic ectasia to approximately 4.0 cm again incidentally noted.    CT SCAN OF THE ABDOMEN AND PELVIS WITH IV CONTRAST: Numerous hepatic metastases are again noted. There is extensive retroperitoneal lymphadenopathy, and an approximately 5.4 cm left upper quadrant mass involving the left lateral renal parenchyma. Volume and shape of the exophytic mass favors that this is invasive to the kidney, rather than originating from kidney. There are multiple small splenic lesions consistent with metastases. Adrenal glands appear to be spared. Gallbladder appears normal. Pancreas appears to be normal, although the tip of the pancreatic tail is immediately adjacent the left upper quadrant mass.  No upper abdominal free air is seen. There is a small amount of ascites. Diffusely increased bowel gas suggests a low level ileus. Left mid abdominal ostomy site is clear. Large pelvic mass is again noted, which appears to extend into the small bowel mesentery, although these may be adjacent but separate nodes. Bladder is normally distended and  "normal in appearance. There is relatively little intrapelvic free fluid.  Delayed venous phase images show contrast within the renal collecting systems, which appear minimally more dilated than on the prior study. Distal ureters may be mildly compressed by the large pelvic mass. The bladder itself is displaced anteriorly and superiorly, but normally distended and grossly normal in appearance. No evidence of a discrete, drainable inflammatory collection is appreciated. Bony structures appear to be intact.   IMPRESSION:  1. Expected changes of recent colostomy placement. No visible associated inflammation. 2. Advanced metastatic disease, including numerous liver lesions, extensive retrocrural lymphadenopathy, multiple splenic lesions, left upper quadrant mass that that involves the lateral margin of the left kidney. Large pelvic mass and extensive infiltration of the lower small bowel mesentery, whether by direct extension of tumor, or multiple irregularly enlarged adjacent lymph nodes. 3. Bilateral hydronephrosis which appears increased from 9/20/2022, but no evidence of high-grade obstructive uropathy. 4. Mildly increased ascites. No evidence of drainable inflammatory collection. 5. \"Gassy\" appearance of nondependent large and small bowel, suggesting a low level ileus  This report was finalized on 10/3/2022 9:14 AM by Dr. Del Wilks MD.      FL Video Swallow With Speech Single Contrast    Result Date: 10/7/2022  Fluoroscopy provided for a modified barium swallow, and limited upper GI series. Please see speech therapy report for full details and recommendations. Limited upper GI series appeared within normal limits.    This report was finalized on 10/7/2022 3:10 PM by Yvon Donnelly.      XR Chest 1 View    Result Date: 10/3/2022   1. The tip of the right upper extremity PICC line terminates in the superior vena cava. 2. No active pulmonary disease. 3. Cardiomegaly.  This report was finalized on 10/3/2022 4:09 PM " by Preet Kline MD.      FL Limited Ugi For Mbs Reflux Single-Contrast    Result Date: 10/7/2022  Fluoroscopy provided for a modified barium swallow, and limited upper GI series. Please see speech therapy report for full details and recommendations. Limited upper GI series appeared within normal limits.    This report was finalized on 10/7/2022 3:10 PM by Yvon Donnelly.      CT Angiogram Chest    Result Date: 10/3/2022  1. No evidence of pulmonary embolic disease. 2. Small but increasing right pleural effusion compared to 9/21/2022. 3. Stable small area of linear scarring in the right middle lobe. 4. Mild ascending aortic ectasia to approximately 4.0 cm again incidentally noted.    CT SCAN OF THE ABDOMEN AND PELVIS WITH IV CONTRAST: Numerous hepatic metastases are again noted. There is extensive retroperitoneal lymphadenopathy, and an approximately 5.4 cm left upper quadrant mass involving the left lateral renal parenchyma. Volume and shape of the exophytic mass favors that this is invasive to the kidney, rather than originating from kidney. There are multiple small splenic lesions consistent with metastases. Adrenal glands appear to be spared. Gallbladder appears normal. Pancreas appears to be normal, although the tip of the pancreatic tail is immediately adjacent the left upper quadrant mass.  No upper abdominal free air is seen. There is a small amount of ascites. Diffusely increased bowel gas suggests a low level ileus. Left mid abdominal ostomy site is clear. Large pelvic mass is again noted, which appears to extend into the small bowel mesentery, although these may be adjacent but separate nodes. Bladder is normally distended and normal in appearance. There is relatively little intrapelvic free fluid.  Delayed venous phase images show contrast within the renal collecting systems, which appear minimally more dilated than on the prior study. Distal ureters may be mildly compressed by the large pelvic mass. The  "bladder itself is displaced anteriorly and superiorly, but normally distended and grossly normal in appearance. No evidence of a discrete, drainable inflammatory collection is appreciated. Bony structures appear to be intact.   IMPRESSION:  1. Expected changes of recent colostomy placement. No visible associated inflammation. 2. Advanced metastatic disease, including numerous liver lesions, extensive retrocrural lymphadenopathy, multiple splenic lesions, left upper quadrant mass that that involves the lateral margin of the left kidney. Large pelvic mass and extensive infiltration of the lower small bowel mesentery, whether by direct extension of tumor, or multiple irregularly enlarged adjacent lymph nodes. 3. Bilateral hydronephrosis which appears increased from 9/20/2022, but no evidence of high-grade obstructive uropathy. 4. Mildly increased ascites. No evidence of drainable inflammatory collection. 5. \"Gassy\" appearance of nondependent large and small bowel, suggesting a low level ileus  This report was finalized on 10/3/2022 9:14 AM by Dr. Del Wilks MD.            Assessment   ASSESSMENT & PLAN:  Classical Hodgkin's lymphoma vs CD 30 positive T/B-cell lymphoma  -Noted on recent rectal biopsy  -CT C/A/P on admission concerning for significant disease burden involving the liver, lymph nodes, spleen, left upper quadrant mass, kidney, mesentery  -Spoke with pathology today and repeat biopsy consistent with classical Hodgkin's lymphoma  -Baseline ECHO with an EF of 56-60%  -Status post cycle 1 day 1 of AAVD on 10/8/2022.  Tolerated well  -Continue allopurinol for TLS prophylaxis  -We will plan to start Neupogen today and give another dose tomorrow prior to being discharged if his hemoglobin stays     Anemia  -Likely secondary to his malignancy and tumor breakdown from recent chemotherapy  -Status post 1 unit PRBC transfusion on 10/8/2022  -Status post 1 unit PRBC transfusion on 10/10/2022  -EGD showing a bleeding " lesion in D2 which was treated  -Patient to have his hemoglobin monitored overnight    Bilateral hydronephrosis  -Secondary to his active malignancy  -Urology consulted however patient likely to have a significant response to chemotherapy tomorrow  -Unless emergently needed, would consider holding on bilateral ureteral stent placement at this time     Fever of unknown origin  -Likely secondary to his diffuse malignancy     Hypernatremia  -Now resolved    Candida esophagitis  -Noted on recent EGD  -Treatment per primary team     Thank you for the consult.  Please do not hesitate to contact with any questions or concerns.      River Odom MD  Hematology and Oncology    10/10/2022  12:40 EDT

## 2022-10-11 ENCOUNTER — APPOINTMENT (OUTPATIENT)
Dept: GENERAL RADIOLOGY | Facility: HOSPITAL | Age: 72
End: 2022-10-11

## 2022-10-11 ENCOUNTER — APPOINTMENT (OUTPATIENT)
Dept: CT IMAGING | Facility: HOSPITAL | Age: 72
End: 2022-10-11

## 2022-10-11 PROBLEM — B37.81 ESOPHAGEAL CANDIDIASIS: Status: ACTIVE | Noted: 2022-10-11

## 2022-10-11 PROBLEM — E44.0 MODERATE MALNUTRITION: Status: ACTIVE | Noted: 2022-10-11

## 2022-10-11 LAB
ANION GAP SERPL CALCULATED.3IONS-SCNC: 8 MMOL/L (ref 5–15)
BH BB BLOOD EXPIRATION DATE: NORMAL
BH BB BLOOD TYPE BARCODE: 5100
BH BB DISPENSE STATUS: NORMAL
BH BB PRODUCT CODE: NORMAL
BH BB UNIT NUMBER: NORMAL
BUN SERPL-MCNC: 14 MG/DL (ref 8–23)
BUN/CREAT SERPL: 35.9 (ref 7–25)
CALCIUM SPEC-SCNC: 7.8 MG/DL (ref 8.6–10.5)
CHLORIDE SERPL-SCNC: 99 MMOL/L (ref 98–107)
CO2 SERPL-SCNC: 27 MMOL/L (ref 22–29)
CREAT SERPL-MCNC: 0.39 MG/DL (ref 0.76–1.27)
CROSSMATCH INTERPRETATION: NORMAL
CYTO UR: NORMAL
DEPRECATED RDW RBC AUTO: 47.9 FL (ref 37–54)
EGFRCR SERPLBLD CKD-EPI 2021: 118.3 ML/MIN/1.73
ERYTHROCYTE [DISTWIDTH] IN BLOOD BY AUTOMATED COUNT: 15.3 % (ref 12.3–15.4)
GLUCOSE SERPL-MCNC: 127 MG/DL (ref 65–99)
HCT VFR BLD AUTO: 18.3 % (ref 37.5–51)
HCT VFR BLD AUTO: 21.9 % (ref 37.5–51)
HCT VFR BLD AUTO: 22.2 % (ref 37.5–51)
HCT VFR BLD AUTO: 22.4 % (ref 37.5–51)
HCT VFR BLD AUTO: 22.4 % (ref 37.5–51)
HGB BLD-MCNC: 6.2 G/DL (ref 13–17.7)
HGB BLD-MCNC: 7.3 G/DL (ref 13–17.7)
HGB BLD-MCNC: 7.3 G/DL (ref 13–17.7)
HGB BLD-MCNC: 7.4 G/DL (ref 13–17.7)
HGB BLD-MCNC: 7.4 G/DL (ref 13–17.7)
LAB AP CASE REPORT: NORMAL
LAB AP CLINICAL INFORMATION: NORMAL
MCH RBC QN AUTO: 28.8 PG (ref 26.6–33)
MCHC RBC AUTO-ENTMCNC: 33 G/DL (ref 31.5–35.7)
MCV RBC AUTO: 87.2 FL (ref 79–97)
PATH REPORT.FINAL DX SPEC: NORMAL
PATH REPORT.GROSS SPEC: NORMAL
PLATELET # BLD AUTO: 169 10*3/MM3 (ref 140–450)
PMV BLD AUTO: 9.9 FL (ref 6–12)
POTASSIUM SERPL-SCNC: 3.7 MMOL/L (ref 3.5–5.2)
RBC # BLD AUTO: 2.57 10*6/MM3 (ref 4.14–5.8)
SODIUM SERPL-SCNC: 134 MMOL/L (ref 136–145)
UNIT  ABO: NORMAL
UNIT  RH: NORMAL
WBC NRBC COR # BLD: 13.23 10*3/MM3 (ref 3.4–10.8)

## 2022-10-11 PROCEDURE — 99232 SBSQ HOSP IP/OBS MODERATE 35: CPT | Performed by: HOSPITALIST

## 2022-10-11 PROCEDURE — 85014 HEMATOCRIT: CPT | Performed by: INTERNAL MEDICINE

## 2022-10-11 PROCEDURE — 25010000002 FLUCONAZOLE PER 200 MG: Performed by: INTERNAL MEDICINE

## 2022-10-11 PROCEDURE — 85018 HEMOGLOBIN: CPT | Performed by: HOSPITALIST

## 2022-10-11 PROCEDURE — 99233 SBSQ HOSP IP/OBS HIGH 50: CPT | Performed by: INTERNAL MEDICINE

## 2022-10-11 PROCEDURE — 85027 COMPLETE CBC AUTOMATED: CPT | Performed by: HOSPITALIST

## 2022-10-11 PROCEDURE — 36430 TRANSFUSION BLD/BLD COMPNT: CPT

## 2022-10-11 PROCEDURE — 85018 HEMOGLOBIN: CPT | Performed by: INTERNAL MEDICINE

## 2022-10-11 PROCEDURE — 99232 SBSQ HOSP IP/OBS MODERATE 35: CPT | Performed by: INTERNAL MEDICINE

## 2022-10-11 PROCEDURE — 74178 CT ABD&PLV WO CNTR FLWD CNTR: CPT

## 2022-10-11 PROCEDURE — 80048 BASIC METABOLIC PNL TOTAL CA: CPT | Performed by: HOSPITALIST

## 2022-10-11 PROCEDURE — 0 IOPAMIDOL PER 1 ML: Performed by: HOSPITALIST

## 2022-10-11 PROCEDURE — 86900 BLOOD TYPING SEROLOGIC ABO: CPT

## 2022-10-11 PROCEDURE — P9016 RBC LEUKOCYTES REDUCED: HCPCS

## 2022-10-11 PROCEDURE — 25010000002 FILGRASTIM PER 480 MCG: Performed by: INTERNAL MEDICINE

## 2022-10-11 PROCEDURE — 74220 X-RAY XM ESOPHAGUS 1CNTRST: CPT

## 2022-10-11 PROCEDURE — 99291 CRITICAL CARE FIRST HOUR: CPT | Performed by: INTERNAL MEDICINE

## 2022-10-11 PROCEDURE — 85014 HEMATOCRIT: CPT | Performed by: HOSPITALIST

## 2022-10-11 RX ADMIN — PANTOPRAZOLE SODIUM 40 MG: 40 INJECTION, POWDER, FOR SOLUTION INTRAVENOUS at 06:13

## 2022-10-11 RX ADMIN — Medication 10 ML: at 20:56

## 2022-10-11 RX ADMIN — FLUTICASONE PROPIONATE 1 SPRAY: 50 SPRAY, METERED NASAL at 09:44

## 2022-10-11 RX ADMIN — Medication 10 ML: at 09:45

## 2022-10-11 RX ADMIN — BARIUM SULFATE 100 ML: 960 POWDER, FOR SUSPENSION ORAL at 11:11

## 2022-10-11 RX ADMIN — Medication 10 ML: at 20:57

## 2022-10-11 RX ADMIN — FILGRASTIM 480 MCG: 480 INJECTION, SOLUTION INTRAVENOUS; SUBCUTANEOUS at 23:09

## 2022-10-11 RX ADMIN — LISINOPRIL 10 MG: 10 TABLET ORAL at 09:46

## 2022-10-11 RX ADMIN — IOPAMIDOL 100 ML: 755 INJECTION, SOLUTION INTRAVENOUS at 17:51

## 2022-10-11 RX ADMIN — TAMSULOSIN HYDROCHLORIDE 0.4 MG: 0.4 CAPSULE ORAL at 09:45

## 2022-10-11 RX ADMIN — SODIUM CHLORIDE 3 G: 1 TABLET ORAL at 09:47

## 2022-10-11 RX ADMIN — FLUCONAZOLE 100 MG: 2 INJECTION, SOLUTION INTRAVENOUS at 11:54

## 2022-10-11 RX ADMIN — SODIUM CHLORIDE 3 G: 1 TABLET ORAL at 11:54

## 2022-10-11 RX ADMIN — ACETAMINOPHEN 650 MG: 650 SOLUTION ORAL at 11:54

## 2022-10-11 RX ADMIN — PANTOPRAZOLE SODIUM 40 MG: 40 INJECTION, POWDER, FOR SOLUTION INTRAVENOUS at 20:56

## 2022-10-11 RX ADMIN — ROSUVASTATIN CALCIUM 10 MG: 10 TABLET, COATED ORAL at 09:46

## 2022-10-11 RX ADMIN — ALLOPURINOL 200 MG: 100 TABLET ORAL at 09:46

## 2022-10-11 RX ADMIN — Medication 10 ML: at 09:46

## 2022-10-11 NOTE — PLAN OF CARE
Goal Outcome Evaluation:  Plan of Care Reviewed With: patient        Progress: improving  Outcome Evaluation: Slept most of the night. 1 unit PRBC given. No transfusion reaction noted. Remains on 2LNC. SR on tele. No c/o pain or discomfort. TMax  99.9F. VSS. Will cont with POC

## 2022-10-11 NOTE — PROGRESS NOTES
Clinical Nutrition     Nutrition Assessment  Reason for Visit:   Malnutrition Severity Assessment      Patient Name: Abraham Wu  YOB: 1950  MRN: [id number]  Date of Encounter: 10/10/22 20:11 EDT  Admission date: 10/3/2022      Comments:  Technically per standard categories, pt meets criteria for non severe chronic malnutrition based on intake hx and wasting Note 19% wt loss over 10 mo is 1% < criterion for significant yearly loss. Avg per mo of 1.9% meets severe 6 mo criterion. If 6 mo criterion applied pt meets overall criteria for severe chronic malnutrition. See flowsheet note.      Admission Diagnosis    Hydronephrosis [N13.30]     Hospital Problem List    Lymphoma (HCC)    Rectal mass    Essential hypertension    Hydronephrosis    Febrile illness    Hypernatremia    Hypokalemia    Hodgkin lymphoma (HCC)    Acute upper GI bleed    Melena    Anemia    Other Applicable: recent colostomy     Applicable Interval History:  10/9 SLP rec Reg texture thin liquid  10/10 EGD - ulcer identified    Applicable PMH/PSxH:     PMH: He  has a past medical history of benign polypoid tissue right lung, Hyperlipidemia, Hypertension, Mycobacterium mucogenicum, SHERRI (obstructive sleep apnea), and Seasonal allergies.   PSxH: He  has a past surgical history that includes Bronchoscopy; Colostomy (N/A, 9/22/2022); and exam under anesthesia, rectal biopsy (N/A, 10/4/2022).         Diet/Nutrition Related History:     Family/pt confirm wt of 172 lbs Rpt intake has been depressed for protracted time. Rpt pt refuses use of suppl      Labs reviewed   Yes  Low serum Na   Results from last 7 days   Lab Units 10/09/22  2323 10/09/22  0202 10/08/22  0455   GLUCOSE mg/dL 161* 154* 161*   BUN mg/dL 21 12 16   CREATININE mg/dL 0.44* 0.43* 0.46*   SODIUM mmol/L 129* 133* 132*   CHLORIDE mmol/L 96* 100 97*   POTASSIUM mmol/L 3.9 4.0 4.1   MAGNESIUM mg/dL 2.1  --   --    ALT (SGPT) U/L 52* 80* 114*     Results from  "last 7 days   Lab Units 10/09/22  2323 10/09/22  0202 10/08/22  0455   ALBUMIN g/dL 2.20* 2.10* 2.40*            Results from last 7 days   Lab Units 10/09/22  2310   GLUCOSE mg/dL 154*       Lab Results   Lab Value Date/Time    HGBA1C 5.50 09/21/2022 0410       Medications reviewed   Yes  Allopurinol, Protonix, Percolace, 3g Na tablets   LR @ 125    Intake/Ouptut 24 hrs reviewed   Yes  Intake & Output (last day)       10/10 0701  10/11 0700    P.O. 450    Blood     IV Piggyback 100    Total Intake(mL/kg) 550 (7.1)    Urine (mL/kg/hr) 400 (0.4)    Stool 400    Total Output 800    Net -250             Anthropometrics     Flowsheet Rows    Flowsheet Row First Filed Value   Admission Height 162.6 cm (64\") Documented at 10/03/2022 0604   Admission Weight 78 kg (172 lb) Documented at 10/03/2022 0604          Height: 162.6 cm (64\")    Last filed wt: Weight: 78 kg (172 lb) (10/03/22 0604)  Weight Method: Stated  Confirmed per pt / family    BMI: BMI (Calculated): 29.5  Overweight: 25.0-29.9kg/m2     Ideal Body Weight (IBW) (kg): 59.72    Weight Change   UBW: Jan 2022 212 lbs Per EMR on 4/4/22 186 lbs   Weight change: now 172 lbs  % wt change: Jan- Apr 26 lbs  Jan-Oct 40 lbs  Time frame of weight loss: Jan-Apr 12%, Jan-Oct 19% Avg for 6 mo period 11.4%    Nutrition Focused Physical Exam  Date: 10/10     Technically per standard categories, pt meets criteria for non severe chronic malnutrition based on intake hx and wasting Note 19% wt loss over 10 mo is 1% <criterion for yearly loss. Avg per mo of 1.9% meets severe 6 mo criterion. If 6 mo criterion applied pt meets overall criteria for severe chronic malnutrition. See flowsheet note.      Current Nutrition Prescription     PO: Diet Full Liquid      Intake: 17% x last 3 meals recorded; sparse recording for adm 36% x 7 meals     Nutrition Diagnosis     10/10  Problem Malnutrition  non severe chronic (severe if wt loss avg over 6 mo accepted   Etiology Effect tx on appetite "   Signs/Symptoms Intake hx w wasting   Status:      Goal:   General: Nutrition to support treatment  PO: Increase intake  Additional goals:      Nutrition Intervention     Follow treatment progress, Care plan reviewed, Advise alternate selection, Menu provided, Supplement offered/refused      Monitoring/Evaluation:   Per protocol, I&O, PO intake, Pertinent labs, Weight, Skin status, GI status, Symptoms        Esperanza Altamirano RD,   Time Spent: 35 min

## 2022-10-11 NOTE — PROGRESS NOTES
"HEMATOLOGY/ONCOLOGY PROGRESS NOTE    S: Status post 1 unit PRBCs yesterday.     He is examined in his hospital bed.  His supportive wife is at bedside.  She reports poor appetite.  He has really only been interested in coffee, Gatorade, water, and milkshakes.  They have tried Ensure at 1 point but he found this unappealing.  He denies any current nausea or mucositis.  He has a sore on his external lip that he states is been present for about a week with stigmata of recent bleeding.  He does have a history of rare cold sores in the past.  He denies any current nausea.  No mucositis.  Denies abdominal pain.  His wife reports he has not been out of bed since admission and feels very weak.    Past medical history, social history and family history was reviewed and unchanged from prior visit.    Review of Systems:    Review of Systems   Constitutional: Positive for fatigue. Negative for fever.   HENT: Negative for facial swelling, sore throat and trouble swallowing.    Eyes: Negative for visual disturbance.   Respiratory: Negative for chest tightness and shortness of breath.    Cardiovascular: Negative for chest pain and palpitations.   Gastrointestinal: Negative for abdominal pain, diarrhea, nausea and vomiting.   Genitourinary: Negative for flank pain and hematuria.   Musculoskeletal: Negative for back pain and joint swelling.   Skin: Negative for rash.   Neurological: Positive for weakness. Negative for dizziness, tremors and speech difficulty.   Psychiatric/Behavioral: Negative for confusion.      Medications:  The current medication list was reviewed in the EMR    ALLERGIES:  No Known Allergies      Physical Exam    VITAL SIGNS:  /77 (BP Location: Left arm, Patient Position: Lying)   Pulse 90   Temp 98.1 °F (36.7 °C) (Axillary)   Resp 18   Ht 162.6 cm (64\")   Wt 78 kg (172 lb)   SpO2 94%   BMI 29.52 kg/m²   Temp:  [96 °F (35.6 °C)-100.3 °F (37.9 °C)] 98.1 °F (36.7 °C)      Performance Status:          "       Physical Exam    General: well appearing, in no acute distress on supplemental oxygen  HEENT: sclerae anicteric, oropharynx clear, neck is supple  Lymphatics: no cervical, supraclavicular, or axillary adenopathy  Cardiovascular: regular rate and rhythm, no murmurs, rubs or gallops  Lungs: clear to auscultation bilaterally  Abdomen: soft, nontender, nondistended.  No palpable organomegaly  Extremities: no lower extremity edema  Skin: no rashes, lesions, bruising, or petechiae  Psych: Mood is stable        RECENT LABS:    Lab Results   Component Value Date    HGB 7.4 (L) 10/11/2022    HGB 7.3 (L) 10/11/2022    HGB 7.4 (L) 10/11/2022    HCT 22.4 (L) 10/11/2022    HCT 22.2 (L) 10/11/2022    HCT 22.4 (L) 10/11/2022    MCV 87.2 10/11/2022     10/11/2022    WBC 13.23 (H) 10/11/2022    NEUTROABS 11.62 (H) 10/09/2022    LYMPHSABS 0.27 (L) 10/08/2022    MONOSABS 0.67 10/08/2022    EOSABS 0.00 10/09/2022    BASOSABS 0.00 10/09/2022       Lab Results   Component Value Date    GLUCOSE 127 (H) 10/11/2022    BUN 14 10/11/2022    CREATININE 0.39 (L) 10/11/2022     (L) 10/11/2022    K 3.7 10/11/2022    CL 99 10/11/2022    CO2 27.0 10/11/2022    CALCIUM 7.8 (L) 10/11/2022    PROTEINTOT 5.2 (L) 10/09/2022    ALBUMIN 2.20 (L) 10/09/2022    BILITOT 0.9 10/09/2022    ALKPHOS 322 (H) 10/09/2022    AST 46 (H) 10/09/2022    ALT 52 (H) 10/09/2022         Assessment/Plan  72-year-old female with CD30 positive classic Hodgkin lymphoma involving the rectum, perinephric, liver, and.  He is status post laparoscopic colostomy and biopsy.  He was started on inpatient chemotherapy on 10/8/2022.  Course has been complicated by a GI bleed.  He is status post 1 unit PRBCs yesterday.    Hodgkin lymphoma  Status post cycle 1 AVVD on 10/8/22  Continue daily Neupogen.    If he is discharged piror to completion this will need to be continued as an outpatient.    Continue to monitor for adverse reactions of treatment    Anemia   Secondary  to malignancy, recent chemotherapy/marrow suppression, and upper GI bleed   He had an appropriate increment following recent transfusion.  A repeat CBC from 8 AM is pending this morning.      Diffuse weakness  I reviewed with the patient and his wife that his generalized weakness may worsen over the next 1 to 2 weeks given recent chemotherapy, but hopefully then will improve.  May benefit from outpatient or home health physical therapy. Please consider physical therapy consult for assessment    Bilateral hydronephrosis  Secondary to malignancy    Malnutrition  Low appetite  Secondary to malignancy and recent treatment  Trial of boost clear as he was not able to tolerate traditional boost.    Kaycee Leary MD  [facility] Hematology and Oncology    10/11/2022       Time spent on the day of service was 50 minutes with greater than 50% in direct counseling and the remainder in coordination of care.

## 2022-10-11 NOTE — PROGRESS NOTES
Intensive Care Admission Note     Chief Complaint:  Hodgkin lymphoma (HCC)    History of Present Illness       Patient is a 72 y.o. male PMH hypertension, hyperlipidemia, SHERRI, who is being transferred from the floor with ongoing issues related to acute GI bleed.  Patient was recently admitted to [facility] from 9/21-9/24 for diarrhea, unintentional weight loss.  At that time he was found to have enteropathogenic E. Coli, enterotoxic E. coli status post Flagyl and Levaquin and he was also found to have a large rectal mass.  He underwent laparoscopic colostomy. Pathology was concerning for lymphoma.      He presented to the ED on 10/3 with fever.  His procalcitonin was elevated however white count and lactate were normal.  CT scan showed no signs of abscess however persistent large bulky lymphadenopathy, listening to a large perirectal mass as well as other systemic signs of spreading including left kidney lesion, liver lesions and right pleural lesions. Since admission heme-onc's been following him and he was started on chemotherapy on 10/8/2022.    On hospital day 6 he developed worsening anemia and dark melanotic output from his ostomy.  He underwent upper endoscopy on 10/10 which showed ulceration on the hard palate., severe candidiasis in the esophagus and bleeding ulcer in the second portion of the duodenum which was clipped.  At that time he was continued on PPI.  They also added Diflucan.  Unfortunately on the afternoon of 10/11 he began passing more melanotic stool through his ostomy.  His hemoglobin did trend down for which he received 2 units of blood.  Initially his hemoglobin responded to the units of blood but then trended back down.  With concern for continued bleeding it was felt he needed angiogram and was transferred to the ICU for management.    Of note hospitalization has been complicated by hyponatremia for which nephrology has been following.  Labs are suggestive of poor oral solute  intake versus polydipsia.  For this he has been on a fluid restriction of 1.2 L/day with continued salt tabs 3 g 3 times daily.    I spent 10 minutes of time on this.  Divine Giordano, MSN, AGACNP-BC, APRN    Electronically signed by PORTILLO Woo, 10/11/22, 5:02 PM EDT.    Attending attestation:  On arrival to the ICU patient notes that he is feeling better.  Denies any chest pain, nausea, fever, or chills.  Currently hemodynamically stable.  He is receiving blood.  He is afebrile.  History otherwise as noted above.    Problem List, Surgical History, Family, Social History, and ROS     Patient Active Problem List    Diagnosis    • *Hodgkin lymphoma (HCC) [C81.90]    • Moderate malnutrition (HCC) [E44.0]    • Esophageal candidiasis (HCC) [B37.81]    • Hydronephrosis [N13.30]    • Febrile illness [R50.9]    • Hypernatremia [E87.0]    • Hypokalemia [E87.6]    • Acute upper GI bleed [K92.2]    • Melena [K92.1]    • Anemia [D64.9]    • Essential hypertension [I10]    • Dyslipidemia [E78.5]    • Unintentional weight loss [R63.4]    • Rectal mass s/p laparoscopic colostomy (9/2022) [K62.89]      Past Surgical History:   Procedure Laterality Date   • BRONCHOSCOPY      removal of obstructing polypoid tissue right middle lung   • COLOSTOMY N/A 9/22/2022    Procedure: LAPAROSCOPIC COLOSTOMY CREATION, FLEXIBLE SIGMOIDOSCOPY;  Surgeon: Hiram Viveros MD;  Location:  [facility];  Service: General;  Laterality: N/A;   • ENDOSCOPY N/A 10/10/2022    Procedure: ESOPHAGOGASTRODUODENOSCOPY;  Surgeon: Neeraj Lynn MD;  Location:  [facility];  Service: Gastroenterology;  Laterality: N/A;   • EXAM UNDER ANESTHESIA, RECTAL BIOPSY N/A 10/4/2022    Procedure: EXAM UNDER ANESTHESIA, TRANSANAL BIOPSY WITH TRUCUT NEEDLE (LITHOTOMY-CANDY CANE);  Surgeon: Hiram Viveros MD;  Location:  [facility];  Service: General;  Laterality: N/A;       No Known Allergies  No current facility-administered medications on file prior to  "encounter.     Current Outpatient Medications on File Prior to Encounter   Medication Sig   • fluticasone (FLONASE) 50 MCG/ACT nasal spray 1 spray into the nostril(s) as directed by provider Daily.   • lisinopril (PRINIVIL,ZESTRIL) 10 MG tablet Take 10 mg by mouth Daily.   • montelukast (SINGULAIR) 10 MG tablet Take 10 mg by mouth Every Night.   • rosuvastatin (CRESTOR) 10 MG tablet Take 10 mg by mouth Daily.     MEDICATION LIST AND ALLERGIES REVIEWED.    Family History   Problem Relation Age of Onset   • Diabetes Mother    • Diabetes Father    • Heart disease Father      Social History     Tobacco Use   • Smoking status: Never   • Smokeless tobacco: Never   Substance Use Topics   • Alcohol use: Not Currently     Comment: previously drank 2 glasses of wine/beer nightly   • Drug use: Never     Social History     Social History Narrative   • Not on file     FAMILY AND SOCIAL HISTORY REVIEWED.    Review of Systems   Constitutional: Positive for activity change, fatigue and fever. Negative for appetite change and chills.   HENT: Negative for congestion, sore throat and voice change.    Eyes: Negative for photophobia and visual disturbance.   Respiratory: Negative for cough, shortness of breath and wheezing.    Cardiovascular: Negative for chest pain, palpitations and leg swelling.   Gastrointestinal: Positive for abdominal pain and blood in stool. Negative for abdominal distention.   Genitourinary: Negative for difficulty urinating and flank pain.   Musculoskeletal: Negative for myalgias and neck stiffness.   Skin: Negative for color change and rash.   Neurological: Negative for dizziness, seizures and headaches.   Hematological: Negative for adenopathy.   Psychiatric/Behavioral: Negative for agitation, hallucinations and sleep disturbance.     ALL OTHER SYSTEMS REVIEWED AND ARE NEGATIVE.    Physical Exam and Clinical Information   /68   Pulse 83   Temp 98.5 °F (36.9 °C) (Oral)   Resp 20   Ht 162.6 cm (64\")   " Wt 78 kg (172 lb)   SpO2 91%   BMI 29.52 kg/m²   Physical Exam  Vitals and nursing note reviewed.   Constitutional:       General: He is not in acute distress.     Appearance: He is well-developed and normal weight. He is not ill-appearing or toxic-appearing.   HENT:      Head: Normocephalic and atraumatic.      Right Ear: External ear normal.      Left Ear: External ear normal.      Nose: Nose normal.      Mouth/Throat:      Mouth: Mucous membranes are moist.      Pharynx: Oropharynx is clear. No oropharyngeal exudate or posterior oropharyngeal erythema.   Eyes:      Conjunctiva/sclera: Conjunctivae normal.      Pupils: Pupils are equal, round, and reactive to light.   Cardiovascular:      Rate and Rhythm: Normal rate and regular rhythm.      Pulses: Normal pulses.      Heart sounds: Normal heart sounds. No murmur heard.    No friction rub. No gallop.   Pulmonary:      Effort: Pulmonary effort is normal. No respiratory distress.      Breath sounds: Normal breath sounds. No wheezing, rhonchi or rales.   Abdominal:      General: Bowel sounds are normal. There is no distension.      Palpations: Abdomen is soft.      Tenderness: There is no abdominal tenderness. There is no rebound.   Musculoskeletal:         General: Normal range of motion.      Cervical back: Normal range of motion and neck supple. No rigidity.      Right lower leg: No edema.      Left lower leg: No edema.   Skin:     General: Skin is warm and dry.      Capillary Refill: Capillary refill takes less than 2 seconds.   Neurological:      General: No focal deficit present.      Mental Status: He is alert and oriented to person, place, and time.   Psychiatric:         Mood and Affect: Mood normal.         Behavior: Behavior normal.         Thought Content: Thought content normal.         Judgment: Judgment normal.         Results from last 7 days   Lab Units 10/11/22  1611 10/11/22  0756 10/11/22  0223 10/10/22  0723 10/09/22  2323 10/09/22  1957  10/09/22  0202   WBC 10*3/mm3  --   --  13.23*  --  12.10*  --  14.67*   HEMOGLOBIN g/dL 6.2* 7.3* 7.4*  7.4*  7.3*   < > 6.5*   < > 7.7*   PLATELETS 10*3/mm3  --   --  169  --  268  --  273    < > = values in this interval not displayed.     Results from last 7 days   Lab Units 10/11/22  0223 10/09/22  2323 10/09/22  0202   SODIUM mmol/L 134* 129* 133*   POTASSIUM mmol/L 3.7 3.9 4.0   CO2 mmol/L 27.0 23.0 26.0   BUN mg/dL 14 21 12   CREATININE mg/dL 0.39* 0.44* 0.43*   MAGNESIUM mg/dL  --  2.1  --    GLUCOSE mg/dL 127* 161* 154*     Estimated Creatinine Clearance: 166.3 mL/min (A) (by C-G formula based on SCr of 0.39 mg/dL (L)).          Lab Results   Component Value Date    LACTATE 1.2 10/03/2022          I reviewed the patient's results/ images and I agree with the reports.     Impression     Hodgkin lymphoma (HCC)    Rectal mass s/p laparoscopic colostomy (9/2022)    Essential hypertension    Hydronephrosis    Febrile illness    Hypernatremia    Hypokalemia    Acute upper GI bleed    Melena    Moderate malnutrition (HCC)    Esophageal candidiasis (HCC)      Plan/Recommendations     72-year-old male with a past medical history significant for hypertension, Hodgkin's lymphoma, and hyperlipidemia.  He was transferred to the [facility] ICU on 10/11/2022 with an acute high risk GI bleed not responding to resuscitation.  Initial EGD from 10/10/2022 showed ulcerations with bleeding ulcer and severe esophageal candidiasis.  Clip was placed and patient was started on fluconazole for candidiasis.  Patient was started on chemotherapy on 10/8/2022.  Repeat melena occurred on 10/11/2022 minimally responsive to transfusion.  CT abdomen pelvis was performed showing no obvious site of bleeding and known metastatic disease.    -Admit patient to ICU  -Continue volume resuscitation  -Transfuse for PRBC hemoglobin less than 7, platelets 4 platelet count of less than 50,000, or FFP for INR greater than 1.8  -Trend  hemoglobin every 6 hours  -IV Protonix twice daily  -GI consulted, appreciate recommendations.  They did recommend if there is ongoing bleeding to consider interventional radiology.  -s/p chemotherapy on 10/8/2022, continue Neupogen per oncology recommendations  -Mechanical DVT prophylaxis  -Ice chips and sips with meds   -A.m. labs    Critical Care time spent in direct patient care: 35 minutes (excluding procedure time, if applicable) including high complexity decision making to assess, manipulate, and support vital organ system failure in this individual who has impairment of one or more vital organ systems such that there is a high probability of imminent or life threatening deterioration in the patient's condition.      ALYSA Oliva DO  Pulmonary and Critical Care Medicine  10/11/22 21:09 EDT     CC: Jatinder Haynes MD

## 2022-10-11 NOTE — PROGRESS NOTES
"   LOS: 8 days    Patient Care Team:  Jatinder Haynes MD as PCP - General (Family Medicine)  Hyponatremia follow up     Subjective     Interval History:     No acute events overnight. No new complaints     Review of Systems:   No CP or SOA , fever, chills, rigors, rash, N/V, Constipation.       Objective     Vital Sign Min/Max for last 24 hours  Temp  Min: 96 °F (35.6 °C)  Max: 100.3 °F (37.9 °C)   BP  Min: 127/69  Max: 162/84   Pulse  Min: 75  Max: 104   Resp  Min: 16  Max: 20   SpO2  Min: 94 %  Max: 100 %   Flow (L/min)  Min: 2  Max: 2   No data recorded     Flowsheet Rows    Flowsheet Row First Filed Value   Admission Height 162.6 cm (64\") Documented at 10/03/2022 0604   Admission Weight 78 kg (172 lb) Documented at 10/03/2022 0604          No intake/output data recorded.  I/O last 3 completed shifts:  In: 1200 [P.O.:450; Blood:650; IV Piggyback:100]  Out: 1750 [Urine:1300; Stool:450]    Physical Exam:    Gen: Alert, NAD   HENT: NC, AT, EOMI   NECK: Supple, no JVD, Trachea midline   LUNGS: CTA bilaterally, non labored respirtation   CVS: S1/S2 audible, RRR, no murmur   Abd: Soft, NT, ND, BS+   Ext: +pedal edema, no cyanosis   CNS: Alert, NFD      WBC WBC   Date Value Ref Range Status   10/11/2022 13.23 (H) 3.40 - 10.80 10*3/mm3 Final   10/09/2022 12.10 (H) 3.40 - 10.80 10*3/mm3 Final   10/09/2022 14.67 (H) 3.40 - 10.80 10*3/mm3 Final      HGB Hemoglobin   Date Value Ref Range Status   10/11/2022 7.3 (L) 13.0 - 17.7 g/dL Final   10/11/2022 7.4 (L) 13.0 - 17.7 g/dL Final   10/11/2022 7.3 (L) 13.0 - 17.7 g/dL Final   10/11/2022 7.4 (L) 13.0 - 17.7 g/dL Final   10/10/2022 6.5 (C) 13.0 - 17.7 g/dL Final   10/10/2022 6.6 (C) 13.0 - 17.7 g/dL Final   10/10/2022 7.0 (L) 13.0 - 17.7 g/dL Final   10/10/2022 7.7 (L) 13.0 - 17.7 g/dL Final   10/09/2022 6.5 (C) 13.0 - 17.7 g/dL Final   10/09/2022 7.4 (L) 13.0 - 17.7 g/dL Final   10/09/2022 7.2 (L) 13.0 - 17.7 g/dL Final   10/09/2022 7.7 (L) 13.0 - 17.7 g/dL Final "   10/09/2022 8.3 (L) 13.0 - 17.7 g/dL Final   10/09/2022 7.7 (L) 13.0 - 17.7 g/dL Final      HCT Hematocrit   Date Value Ref Range Status   10/11/2022 21.9 (L) 37.5 - 51.0 % Final   10/11/2022 22.4 (L) 37.5 - 51.0 % Final   10/11/2022 22.2 (L) 37.5 - 51.0 % Final   10/11/2022 22.4 (L) 37.5 - 51.0 % Final   10/10/2022 19.5 (C) 37.5 - 51.0 % Final   10/10/2022 20.4 (C) 37.5 - 51.0 % Final   10/10/2022 21.2 (L) 37.5 - 51.0 % Final   10/10/2022 23.9 (L) 37.5 - 51.0 % Final   10/09/2022 20.5 (C) 37.5 - 51.0 % Final   10/09/2022 23.2 (L) 37.5 - 51.0 % Final   10/09/2022 22.9 (L) 37.5 - 51.0 % Final   10/09/2022 24.9 (L) 37.5 - 51.0 % Final   10/09/2022 26.2 (L) 37.5 - 51.0 % Final   10/09/2022 24.2 (L) 37.5 - 51.0 % Final      Platlets No results found for: LABPLAT   MCV MCV   Date Value Ref Range Status   10/11/2022 87.2 79.0 - 97.0 fL Final   10/09/2022 85.1 79.0 - 97.0 fL Final   10/09/2022 85.8 79.0 - 97.0 fL Final          Sodium Sodium   Date Value Ref Range Status   10/11/2022 134 (L) 136 - 145 mmol/L Final   10/09/2022 129 (L) 136 - 145 mmol/L Final   10/09/2022 133 (L) 136 - 145 mmol/L Final      Potassium Potassium   Date Value Ref Range Status   10/11/2022 3.7 3.5 - 5.2 mmol/L Final   10/09/2022 3.9 3.5 - 5.2 mmol/L Final   10/09/2022 4.0 3.5 - 5.2 mmol/L Final      Chloride Chloride   Date Value Ref Range Status   10/11/2022 99 98 - 107 mmol/L Final   10/09/2022 96 (L) 98 - 107 mmol/L Final   10/09/2022 100 98 - 107 mmol/L Final      CO2 CO2   Date Value Ref Range Status   10/11/2022 27.0 22.0 - 29.0 mmol/L Final   10/09/2022 23.0 22.0 - 29.0 mmol/L Final   10/09/2022 26.0 22.0 - 29.0 mmol/L Final      BUN BUN   Date Value Ref Range Status   10/11/2022 14 8 - 23 mg/dL Final   10/09/2022 21 8 - 23 mg/dL Final   10/09/2022 12 8 - 23 mg/dL Final      Creatinine Creatinine   Date Value Ref Range Status   10/11/2022 0.39 (L) 0.76 - 1.27 mg/dL Final   10/09/2022 0.44 (L) 0.76 - 1.27 mg/dL Final   10/09/2022 0.43 (L)  0.76 - 1.27 mg/dL Final      Calcium Calcium   Date Value Ref Range Status   10/11/2022 7.8 (L) 8.6 - 10.5 mg/dL Final   10/09/2022 8.4 (L) 8.6 - 10.5 mg/dL Final   10/09/2022 8.3 (L) 8.6 - 10.5 mg/dL Final      PO4 No results found for: CAPO4   Albumin Albumin   Date Value Ref Range Status   10/09/2022 2.20 (L) 3.50 - 5.20 g/dL Final   10/09/2022 2.10 (L) 3.50 - 5.20 g/dL Final      Magnesium Magnesium   Date Value Ref Range Status   10/09/2022 2.1 1.6 - 2.4 mg/dL Final      Uric Acid No results found for: URICACID        Results Review:     I reviewed the patient's new clinical results.    allopurinol, 200 mg, Oral, TID  filgrastim (NEUPOGEN) injection, 480 mcg, Subcutaneous, Q PM  fluconazole, 100 mg, Intravenous, Daily  fluticasone, 1 spray, Nasal, Daily  lisinopril, 10 mg, Oral, Daily  montelukast, 10 mg, Oral, Nightly  pantoprazole, 40 mg, Intravenous, BID AC  QUEtiapine, 12.5 mg, Oral, Nightly  rosuvastatin, 10 mg, Oral, Daily  senna-docusate sodium, 2 tablet, Oral, BID  sodium chloride, 10 mL, Intravenous, Q12H  sodium chloride, 10 mL, Intravenous, Q12H  sodium chloride, 3 g, Oral, TID With Meals  tamsulosin, 0.4 mg, Oral, Daily      lactated ringers, 9 mL/hr, Last Rate: 9 mL/hr (10/04/22 9379)  lactated ringers, 9 mL/hr, Last Rate: 125 mL/hr (10/10/22 6848)        Medication Review:     Assessment & Plan       Lymphoma (HCC)    Rectal mass    Essential hypertension    Hydronephrosis    Febrile illness    Hypernatremia    Hypokalemia    Hodgkin lymphoma (HCC)    Acute upper GI bleed    Melena    Moderate malnutrition (HCC)    1- Hyponatremia - Serum osmolality 263, Urine osmolality 115 and urine sodium less than 20. Suggest of poor oral solute intake vs polydipsia . Uric acid low. Improving.   2- Rectal mass   3-Hypokalemia - resolved  4. Anemia: Transfuse at hb<7.     Plan:  - Fluid restriction 1.2 lit/day   - Continue with salt tabs 3 g TID    Lise Chicas MD  10/11/22  09:54 EDT

## 2022-10-11 NOTE — PROGRESS NOTES
"GI Daily Progress Note  Subjective:    Chief Complaint: Follow-up duodenal ulcer with hemorrhage.    Patient has fatigue.  Denies abdominal pain or vomiting.  He ambulated today with assistance of PT.  Has dark output from ostomy, but no red blood.    Objective:    /71 (BP Location: Left arm, Patient Position: Lying)   Pulse 85   Temp 96.5 °F (35.8 °C) (Axillary)   Resp 18   Ht 162.6 cm (64\")   Wt 78 kg (172 lb)   SpO2 98%   BMI 29.52 kg/m²     Physical Exam  Constitutional:       General: He is not in acute distress.     Appearance: He is ill-appearing. He is not toxic-appearing or diaphoretic.   HENT:      Head: Normocephalic.      Nose: Nose normal. No congestion.      Mouth/Throat:      Pharynx: No oropharyngeal exudate.      Comments: Scab on lower lip.  Eyes:      General: No scleral icterus.     Conjunctiva/sclera: Conjunctivae normal.   Cardiovascular:      Rate and Rhythm: Normal rate.      Pulses: Normal pulses.   Pulmonary:      Effort: Pulmonary effort is normal. No respiratory distress.      Breath sounds: No wheezing or rales.   Abdominal:      General: There is no distension.      Palpations: Abdomen is soft.      Tenderness: There is no abdominal tenderness. There is no guarding.      Comments: Ostomy with thin melenic stool   Genitourinary:     Comments: Deferred  Musculoskeletal:      Cervical back: Normal range of motion.      Right lower leg: Edema present.      Left lower leg: Edema present.   Skin:     General: Skin is warm and dry.      Capillary Refill: Capillary refill takes less than 2 seconds.      Coloration: Skin is pale. Skin is not jaundiced.      Findings: No erythema or rash.   Neurological:      General: No focal deficit present.      Mental Status: He is alert and oriented to person, place, and time.   Psychiatric:         Mood and Affect: Mood normal.         Behavior: Behavior normal.         Lab  Lab Results   Component Value Date    WBC 13.23 (H) 10/11/2022    HGB " 6.2 (C) 10/11/2022    HGB 7.3 (L) 10/11/2022    HGB 7.4 (L) 10/11/2022    HGB 7.3 (L) 10/11/2022    HGB 7.4 (L) 10/11/2022    MCV 87.2 10/11/2022     10/11/2022       Lab Results   Component Value Date    GLUCOSE 127 (H) 10/11/2022    BUN 14 10/11/2022    CREATININE 0.39 (L) 10/11/2022    BCR 35.9 (H) 10/11/2022     (L) 10/11/2022    K 3.7 10/11/2022    CO2 27.0 10/11/2022    CALCIUM 7.8 (L) 10/11/2022    ALBUMIN 2.20 (L) 10/09/2022    ALKPHOS 322 (H) 10/09/2022    BILITOT 0.9 10/09/2022    ALT 52 (H) 10/09/2022    AST 46 (H) 10/09/2022       Assessment:    1.  Acute duodenal ulcer in the second portion duodenum, with hemorrhage, postop day 1 Ovesco clip and Endo Clip.  2.  Clean-based duodenal bulb ulcers.  3.  Acute blood loss anemia, worsening on last check of hemoglobin.  3.  Severe esophageal candidiasis.    Plan:    >> Continue twice daily PPI  >> PRBC transfusion.  >> If hemoglobin continues to drop or overt bleeding, consider interventional angiography.    Mark I. Brunner, MD  10/11/22  17:05 EDT

## 2022-10-11 NOTE — PROGRESS NOTES
Malnutrition Severity Assessment    Patient Name:  Abraham Wu  YOB: 1950  MRN: [id number]  Admit Date:  10/3/2022    Patient meets criteria for : Moderate (non-severe) Malnutrition (Technically per standard categories, pt meets criteria for non severe chronic malnutrition based on intake hx and wasting Note 19% wt loss over 10 mo is 1% <criterion for signif yearly loss. Avg per mo of 1.9% meets severe 6mo criterion.)    Comments:      Malnutrition Severity Assessment  Malnutrition Type: Chronic Disease - Related Malnutrition  Malnutrition Type (last 8 hours)     Malnutrition Severity Assessment     Row Name 10/10/22 2000       Malnutrition Severity Assessment    Malnutrition Type Chronic Disease - Related Malnutrition    Row Name 10/10/22 2000       Insufficient Energy Intake     Insufficient Energy Intake Findings Severe    Insufficient Energy Intake  <75% of est. energy requirement for > or equal to 1 month    Row Name 10/10/22 2000       Unintentional Weight Loss     Unintentional Weight Loss Findings --  Does not fit categories but loss of  26 lbs 12% body wt Jan to Apr Overall since Jan loss of 40 lbs 19% body wt. in 10 mo    Row Name 10/10/22 2000       Muscle Loss    Loss of Muscle Mass Findings Mild    [place] Region --  mild    Clavicle Bone Region --  mild    Acromion Bone Region --  mild    Scapular Bone Region Moderate - mild depression, bones may show slightly    Dorsal Hand Region --  mild    Patellar Region None    Anterior Thigh Region None    Posterior Calf Region Moderate - some roundness, slight firmness    Row Name 10/10/22 2000       Fat Loss    Subcutaneous Fat Loss Findings Moderate    Orbital Region  Moderate -  somewhat hollowness, slightly dark circles    Upper Arm Region --  mild    Thoracic & Lumbar Region Moderate - ribs visible with mild depressions, iliac crest somewhat prominent    Row Name 10/10/22 2000       Criteria Met (Must meet criteria for severity in at  least 2 of these categories: M Wasting, Fat Loss, Fluid, Secondary Signs, Wt. Status, Intake)    Patient meets criteria for  Moderate (non-severe) Malnutrition  Technically per standard categories, pt meets criteria for non severe chronic malnutrition based on intake hx and wasting Note 19% wt loss over 10 mo is 1% <criterion for signif yearly loss. Avg per mo of 1.9% meets severe 6mo criterion.                Electronically signed by:  Esperanza Altamirano RD  10/10/22 20:27 EDT

## 2022-10-11 NOTE — PROGRESS NOTES
[facility] Medicine Services  PROGRESS NOTE    Patient Name: Abraham Wu  : 1950  MRN: [id number]    Date of Admission: 10/3/2022  Primary Care Physician: Jatinder Haynes MD    Subjective   Subjective     CC: Follow-up lymphoma    HPI:  Fever/sweats noted again. LE edema. Dark ostomy output/liquid noted. SOA with exertion. Wants to shower.  Review of Systems   Constitutional: Positive for activity change and fatigue.   HENT: Negative.    Respiratory: Negative.    Cardiovascular: Negative.    Gastrointestinal: Negative.    Genitourinary: Negative.    Musculoskeletal: Negative.    Neurological: Positive for weakness.         Objective   Objective     Vital Signs:   Temp:  [96 °F (35.6 °C)-100.3 °F (37.9 °C)] 99.1 °F (37.3 °C)  Heart Rate:  [] 97  Resp:  [16-20] 18  BP: (127-162)/(60-84) 152/75  Flow (L/min):  [2] 2     Physical Exam:  NAD, alert and oriented  OP clear, MMM  Neck supple  RRR  CTAB  +BS, ND, NT, soft  RUE PICC in place  Dark liquid ostomy output  No c/c, trace LE edema B, remains, non-tender  No rashes  Normal affect    Results Reviewed:  LAB RESULTS:      Lab 10/11/22  0756 10/11/22  0223 10/10/22  2017 10/10/22  1613 10/10/22  1126 10/10/22  0723 10/09/22  2323 10/09/22  0457 10/09/22  0202 10/08/22  0455 10/07/22  0647 10/06/22  0614 10/05/22  0456   WBC  --  13.23*  --   --   --   --  12.10*  --  14.67* 17.02* 21.80* 19.95* 9.40   HEMOGLOBIN 7.3* 7.4*  7.4*  7.3* 6.5* 6.6* 7.0*   < > 6.5*   < > 7.7* 8.5* 10.8* 10.2* 8.1*   HEMATOCRIT 21.9* 22.4*  22.4*  22.2* 19.5* 20.4* 21.2*   < > 20.5*   < > 24.2* 26.9* 34.2* 31.6* 25.3*   PLATELETS  --  169  --   --   --   --  268  --  273 338 489* 402 296   NEUTROS ABS  --   --   --   --   --   --  11.62*  --   --  15.71* 20.04* 18.51* 8.41*   IMMATURE GRANS (ABS)  --   --   --   --   --   --   --   --   --  0.35* 0.44* 0.47* 0.15*   LYMPHS ABS  --   --   --   --   --   --   --   --   --  0.27* 0.57* 0.18* 0.21*    MONOS ABS  --   --   --   --   --   --   --   --   --  0.67 0.72 0.77 0.62   EOS ABS  --   --   --   --   --   --  0.00  --   --  0.00 0.01 0.00 0.00   MCV  --  87.2  --   --   --   --  85.1  --  85.8 83.8 83.8 84.0 83.5    < > = values in this interval not displayed.         Lab 10/11/22  0223 10/09/22  2323 10/09/22  0202 10/08/22  0455 10/07/22  0647   SODIUM 134* 129* 133* 132* 130*   POTASSIUM 3.7 3.9 4.0 4.1 4.2   CHLORIDE 99 96* 100 97* 94*   CO2 27.0 23.0 26.0 26.0 25.0   ANION GAP 8.0 10.0 7.0 9.0 11.0   BUN 14 21 12 16 13   CREATININE 0.39* 0.44* 0.43* 0.46* 0.55*   EGFR 118.3 114.0 114.8 112.5 106.6   GLUCOSE 127* 161* 154* 161* 148*   CALCIUM 7.8* 8.4* 8.3* 8.5* 9.0   MAGNESIUM  --  2.1  --   --   --          Lab 10/09/22  2323 10/09/22  0202 10/08/22  0455 10/07/22  0647 10/05/22  0456   TOTAL PROTEIN 5.2* 4.9* 4.6* 5.2* 4.6*   ALBUMIN 2.20* 2.10* 2.40* 2.70* 2.50*   GLOBULIN 3.0 2.8 2.2 2.5 2.1   ALT (SGPT) 52* 80* 114* 113* 41   AST (SGOT) 46* 91* 194* 255* 69*   BILIRUBIN 0.9 0.9 1.0 0.8 1.0   ALK PHOS 322* 381* 439* 457* 395*         Lab 10/09/22  2323   TROPONIN T <0.010             Lab 10/09/22  0202 10/05/22  0456 10/04/22  1342   IRON  --  9*  --    IRON SATURATION  --  7*  --    TIBC  --  122*  --    TRANSFERRIN  --  82*  --    FERRITIN  --  2,506.00*  --    ABO TYPING O  --  O   RH TYPING Positive  --  Positive   ANTIBODY SCREEN Negative  --  Negative         Brief Urine Lab Results  (Last result in the past 365 days)      Color   Clarity   Blood   Leuk Est   Nitrite   Protein   CREAT   Urine HCG        10/03/22 2016             58.8         10/03/22 2016 Yellow   Clear   Trace   Negative   Negative   Trace                 Microbiology Results Abnormal     Procedure Component Value - Date/Time    Blood Culture - Blood, Arm, Right [[id number]]  (Normal) Collected: 10/03/22 0808    Lab Status: Final result Specimen: Blood from Arm, Right Updated: 10/08/22 1030     Blood Culture No growth at 5  days    Blood Culture - Blood, Arm, Left [[id number]]  (Normal) Collected: 10/03/22 0734    Lab Status: Final result Specimen: Blood from Arm, Left Updated: 10/08/22 0801     Blood Culture No growth at 5 days    Respiratory Panel PCR w/COVID-19(SARS-CoV-2) [facility] In-House, NP Swab in UTM/VTM, 3-4 HR TAT - Swab, Nasopharynx [[id number]]  (Normal) Collected: 10/03/22 0734    Lab Status: Final result Specimen: Swab from Nasopharynx Updated: 10/03/22 0836     ADENOVIRUS, PCR Not Detected     Coronavirus 229E Not Detected     Coronavirus HKU1 Not Detected     Coronavirus NL63 Not Detected     Coronavirus OC43 Not Detected     COVID19 Not Detected     Human Metapneumovirus Not Detected     Human Rhinovirus/Enterovirus Not Detected     Influenza A PCR Not Detected     Influenza B PCR Not Detected     Parainfluenza Virus 1 Not Detected     Parainfluenza Virus 2 Not Detected     Parainfluenza Virus 3 Not Detected     Parainfluenza Virus 4 Not Detected     RSV, PCR Not Detected     Bordetella pertussis pcr Not Detected     Bordetella parapertussis PCR Not Detected     Chlamydophila pneumoniae PCR Not Detected     Mycoplasma pneumo by PCR Not Detected    Narrative:      In the setting of a positive respiratory panel with a viral infection PLUS a negative procalcitonin without other underlying concern for bacterial infection, consider observing off antibiotics or discontinuation of antibiotics and continue supportive care. If the respiratory panel is positive for atypical bacterial infection (Bordetella pertussis, Chlamydophila pneumoniae, or Mycoplasma pneumoniae), consider antibiotic de-escalation to target atypical bacterial infection.          CT Head Without Contrast    Result Date: 10/10/2022  EXAMINATION: CT HEAD WITHOUT CONTRAST DATE: 10/10/2022 12:18 AM INDICATION: Altered mental status COMPARISON: None available at the time of this dictation. TECHNIQUE: Noncontrast imaging obtained from the vertex  to the skull base.  CT dose lowering techniques were used, to include: automated exposure control, adjustment for patient size, and or use of iterative reconstruction.? FINDINGS: Soft Tissues: No significant soft tissue abnormality. Skull: No underlying skull fracture or radiopaque foreign body. Sinuses: Paranasal sinuses are clear. Mastoids: Mastoid air cells are clear. Globes and Orbits: Globes and orbits are intact. Brain: No acute hemorrhage.  No midline shift, masses, or mass effect.  No evidence of acute infarct by noncontrast CT. Ventricles and Cisterns: Ventricular size and configuration is within normal limits. Basal cisterns are patent. No abnormal extra-axial fluid collection. Senescent Changes: Mild volume loss     Impression: 1. No acute intracranial abnormality. 2. Mild generalized volume loss.  Electronically signed by:  Neeraj Coronado M.D.  10/9/2022 10:53 PM Mountain Time      Results for orders placed during the hospital encounter of 10/03/22    Adult Transthoracic Echo Complete W/ Cont if Necessary Per Protocol    Interpretation Summary  · Left ventricular ejection fraction appears to be 56 - 60%. Left ventricular systolic function is normal.  · Global longitudinal LV strain (GLS) = -27.0%.  · Left atrial volume is moderately increased.  · Mild mitral valve regurgitation is present.  · Mild tricuspid valve regurgitation is present.  · Estimated right ventricular systolic pressure from tricuspid regurgitation is mildly elevated (35-45 mmHg).  · Mild dilation of the ascending aorta is present.      I have reviewed the medications:  Scheduled Meds:allopurinol, 200 mg, Oral, TID  filgrastim (NEUPOGEN) injection, 480 mcg, Subcutaneous, Q PM  fluconazole, 100 mg, Intravenous, Daily  fluticasone, 1 spray, Nasal, Daily  lisinopril, 10 mg, Oral, Daily  montelukast, 10 mg, Oral, Nightly  pantoprazole, 40 mg, Intravenous, BID AC  QUEtiapine, 12.5 mg, Oral, Nightly  rosuvastatin, 10 mg, Oral,  Daily  senna-docusate sodium, 2 tablet, Oral, BID  sodium chloride, 10 mL, Intravenous, Q12H  sodium chloride, 10 mL, Intravenous, Q12H  sodium chloride, 3 g, Oral, TID With Meals  tamsulosin, 0.4 mg, Oral, Daily      Continuous Infusions:lactated ringers, 9 mL/hr, Last Rate: 9 mL/hr (10/04/22 1644)  lactated ringers, 9 mL/hr, Last Rate: 125 mL/hr (10/10/22 0879)      PRN Meds:.•  acetaminophen  •  acetaminophen  •  acetaminophen-codeine  •  senna-docusate sodium **AND** polyethylene glycol **AND** bisacodyl **AND** bisacodyl  •  lactated ringers  •  ondansetron  •  phenol  •  potassium chloride **OR** potassium chloride **OR** potassium chloride  •  sodium chloride  •  sodium chloride  •  sodium chloride    Assessment & Plan   Assessment & Plan     Active Hospital Problems    Diagnosis  POA   • **Lymphoma (HCC) [C85.90]  Unknown   • Moderate malnutrition (HCC) [E44.0]  Yes   • Hodgkin lymphoma (HCC) [C81.90]  Unknown   • Hydronephrosis [N13.30]  Yes   • Febrile illness [R50.9]  Yes   • Hypernatremia [E87.0]  Yes   • Hypokalemia [E87.6]  Yes   • Acute upper GI bleed [K92.2]  Unknown   • Melena [K92.1]  Unknown   • Essential hypertension [I10]  Yes   • Rectal mass [K62.89]  Yes      Resolved Hospital Problems   No resolved problems to display.        Brief Hospital Course to date:  Abraham Wu is a 72 y.o. male  with history of hypertension, hyperlipidemia, SHERRI, recent admission to [facility] 9/21-9/24 for diarrhea, unintentional weight loss found to have enteropathogenic E. coli entero toxigenic E. coli s/p Flagyl and Levaquin he also found to have large rectal mass  s/p laparoscopic colostomy, pathology concerning for lymphoma.  He presented to the ED with fever, secondary underlying malignancy.  Final pathology was concerning for classic Hodgkin's lymphoma patient was reviewed initiated on chemotherapy and has also tolerated it well     Classic Hodgkin's lymphoma  -Patient has large pelvic mass, he is s/p laparoscopic  colostomy creation, CT abd/pelvis shows advancement of bronchiectasis  -Pathology showed CD30 positive lymphoma, consistent with classic Hodgkin's lymphoma   -Heme-onc following, started on chemotherapy 10/8/2022 with AAVD, he was scheduled to receive pegfilgrastim as outpatient tomorrow 10/10 at 1:30 PM.  -He s/p 3 days of steroids. He has tolerated initiation of chemo well, he will be d/c home to f/u with  for further management      Anemia  Dark stools from ostomy, heme occult positive  -s/p EGD with ulcer noted, s/p clip, candida noted  -on PPI  -s/p 2 Units PRBC 10/10, ostomy output remains dark  -transfuse prn  -d/w GI  -some anemia may be in part due to recent chemotherapy     Febrile illness  -Patient presented with T-max 101.7, elevated procalcitonin, normal WBC, respiratory panel with COVID-19 was negative, UA was unremarkable. Suspect this was secondary to underlying malignancy  -CT abd/pelvis showed advanced metastatic disease, (liver, renal, splenic lesions)  -Blood cultures NGTD, he was initially on antibiotics, but these have since been discontinued, he has done well off abx, but with continued fevers noted     Leukocytosis  -WBC remains elevated, suspected econdary to steroids     Bilateral hydronephrosis  -This appeared to be increased from prior imaging, however, there was no evidence of high-grade obstructive uropathy  -Suspect secondary to underlying malignancy, urology evaluated, started on tamsulosin, no stenting required at this time.      Hyponatremia, clinically significant, improving  -Renal following he is on salt tabs 2 g TID and  fluid restriction at 1.2 L daily  -Follow-up repeat labs, low but stable    Hypokalemia  -s/p replacement     Chronic severe malnutrition  -Dietitian following     Hypertension  -BP remains stable, continue lisinopril     Hyperlipidemia  -Continue statin     Expected Discharge Location and Transportation: Home  Expected Discharge Date: 10/13 if CBC  stable    DVT prophylaxis:  Mechanical DVT prophylaxis orders are present.     AM-PAC 6 Clicks Score (PT): 24 (10/06/22 0800)    CODE STATUS:   Code Status and Medical Interventions:   Ordered at: 10/03/22 1318     Level Of Support Discussed With:    Patient     Code Status (Patient has no pulse and is not breathing):    CPR (Attempt to Resuscitate)     Medical Interventions (Patient has pulse or is breathing):    Full Support       Del Bourne MD  10/11/22

## 2022-10-12 ENCOUNTER — ANESTHESIA EVENT (OUTPATIENT)
Dept: GASTROENTEROLOGY | Facility: HOSPITAL | Age: 72
End: 2022-10-12

## 2022-10-12 ENCOUNTER — ANESTHESIA (OUTPATIENT)
Dept: INTERVENTIONAL RADIOLOGY/VASCULAR | Facility: HOSPITAL | Age: 72
End: 2022-10-12

## 2022-10-12 ENCOUNTER — ANESTHESIA EVENT (OUTPATIENT)
Dept: INTERVENTIONAL RADIOLOGY/VASCULAR | Facility: HOSPITAL | Age: 72
End: 2022-10-12

## 2022-10-12 ENCOUNTER — ANESTHESIA (OUTPATIENT)
Dept: GASTROENTEROLOGY | Facility: HOSPITAL | Age: 72
End: 2022-10-12

## 2022-10-12 ENCOUNTER — APPOINTMENT (OUTPATIENT)
Dept: INTERVENTIONAL RADIOLOGY/VASCULAR | Facility: HOSPITAL | Age: 72
End: 2022-10-12

## 2022-10-12 LAB
ANION GAP SERPL CALCULATED.3IONS-SCNC: 6 MMOL/L (ref 5–15)
BASOPHILS # BLD MANUAL: 0 10*3/MM3 (ref 0–0.2)
BASOPHILS # BLD MANUAL: 0 10*3/MM3 (ref 0–0.2)
BASOPHILS NFR BLD MANUAL: 0 % (ref 0–1.5)
BASOPHILS NFR BLD MANUAL: 0 % (ref 0–1.5)
BH BB BLOOD EXPIRATION DATE: NORMAL
BH BB BLOOD EXPIRATION DATE: NORMAL
BH BB BLOOD TYPE BARCODE: 9500
BH BB BLOOD TYPE BARCODE: 9500
BH BB DISPENSE STATUS: NORMAL
BH BB DISPENSE STATUS: NORMAL
BH BB PRODUCT CODE: NORMAL
BH BB PRODUCT CODE: NORMAL
BH BB UNIT NUMBER: NORMAL
BH BB UNIT NUMBER: NORMAL
BUN SERPL-MCNC: 18 MG/DL (ref 8–23)
BUN/CREAT SERPL: 54.5 (ref 7–25)
CALCIUM SPEC-SCNC: 7.6 MG/DL (ref 8.6–10.5)
CHLORIDE SERPL-SCNC: 104 MMOL/L (ref 98–107)
CO2 SERPL-SCNC: 27 MMOL/L (ref 22–29)
CREAT SERPL-MCNC: 0.33 MG/DL (ref 0.76–1.27)
CROSSMATCH INTERPRETATION: NORMAL
CROSSMATCH INTERPRETATION: NORMAL
DEPRECATED RDW RBC AUTO: 45.4 FL (ref 37–54)
DEPRECATED RDW RBC AUTO: 49.2 FL (ref 37–54)
EGFRCR SERPLBLD CKD-EPI 2021: 124.4 ML/MIN/1.73
EOSINOPHIL # BLD MANUAL: 0 10*3/MM3 (ref 0–0.4)
EOSINOPHIL # BLD MANUAL: 0.09 10*3/MM3 (ref 0–0.4)
EOSINOPHIL NFR BLD MANUAL: 0 % (ref 0.3–6.2)
EOSINOPHIL NFR BLD MANUAL: 1 % (ref 0.3–6.2)
ERYTHROCYTE [DISTWIDTH] IN BLOOD BY AUTOMATED COUNT: 15.1 % (ref 12.3–15.4)
ERYTHROCYTE [DISTWIDTH] IN BLOOD BY AUTOMATED COUNT: 15.6 % (ref 12.3–15.4)
GLUCOSE SERPL-MCNC: 145 MG/DL (ref 65–99)
HCT VFR BLD AUTO: 18.7 % (ref 37.5–51)
HCT VFR BLD AUTO: 18.8 % (ref 37.5–51)
HCT VFR BLD AUTO: 22.5 % (ref 37.5–51)
HCT VFR BLD AUTO: 22.5 % (ref 37.5–51)
HCT VFR BLD AUTO: 22.8 % (ref 37.5–51)
HCT VFR BLD AUTO: 22.9 % (ref 37.5–51)
HGB BLD-MCNC: 6.2 G/DL (ref 13–17.7)
HGB BLD-MCNC: 6.2 G/DL (ref 13–17.7)
HGB BLD-MCNC: 7.6 G/DL (ref 13–17.7)
HGB BLD-MCNC: 7.7 G/DL (ref 13–17.7)
HGB BLD-MCNC: 7.8 G/DL (ref 13–17.7)
HGB BLD-MCNC: 7.9 G/DL (ref 13–17.7)
LYMPHOCYTES # BLD MANUAL: 0.18 10*3/MM3 (ref 0.7–3.1)
LYMPHOCYTES # BLD MANUAL: 0.28 10*3/MM3 (ref 0.7–3.1)
LYMPHOCYTES NFR BLD MANUAL: 1 % (ref 5–12)
LYMPHOCYTES NFR BLD MANUAL: 2 % (ref 5–12)
MCH RBC QN AUTO: 28.3 PG (ref 26.6–33)
MCH RBC QN AUTO: 28.8 PG (ref 26.6–33)
MCHC RBC AUTO-ENTMCNC: 33 G/DL (ref 31.5–35.7)
MCHC RBC AUTO-ENTMCNC: 33.8 G/DL (ref 31.5–35.7)
MCV RBC AUTO: 83.6 FL (ref 79–97)
MCV RBC AUTO: 87.4 FL (ref 79–97)
METAMYELOCYTES NFR BLD MANUAL: 1 % (ref 0–0)
MONOCYTES # BLD: 0.09 10*3/MM3 (ref 0.1–0.9)
MONOCYTES # BLD: 0.19 10*3/MM3 (ref 0.1–0.9)
NEUTROPHILS # BLD AUTO: 8.43 10*3/MM3 (ref 1.7–7)
NEUTROPHILS # BLD AUTO: 8.85 10*3/MM3 (ref 1.7–7)
NEUTROPHILS NFR BLD MANUAL: 89 % (ref 42.7–76)
NEUTROPHILS NFR BLD MANUAL: 89 % (ref 42.7–76)
NEUTS BAND NFR BLD MANUAL: 5 % (ref 0–5)
NEUTS BAND NFR BLD MANUAL: 7 % (ref 0–5)
PLAT MORPH BLD: NORMAL
PLAT MORPH BLD: NORMAL
PLATELET # BLD AUTO: 160 10*3/MM3 (ref 140–450)
PLATELET # BLD AUTO: 160 10*3/MM3 (ref 140–450)
PMV BLD AUTO: 10.4 FL (ref 6–12)
PMV BLD AUTO: 10.7 FL (ref 6–12)
POTASSIUM SERPL-SCNC: 3.5 MMOL/L (ref 3.5–5.2)
RBC # BLD AUTO: 2.15 10*6/MM3 (ref 4.14–5.8)
RBC # BLD AUTO: 2.69 10*6/MM3 (ref 4.14–5.8)
RBC MORPH BLD: NORMAL
RBC MORPH BLD: NORMAL
SODIUM SERPL-SCNC: 137 MMOL/L (ref 136–145)
UNIT  ABO: NORMAL
UNIT  ABO: NORMAL
UNIT  RH: NORMAL
UNIT  RH: NORMAL
VARIANT LYMPHS NFR BLD MANUAL: 2 % (ref 19.6–45.3)
VARIANT LYMPHS NFR BLD MANUAL: 3 % (ref 19.6–45.3)
WBC MORPH BLD: NORMAL
WBC MORPH BLD: NORMAL
WBC NRBC COR # BLD: 8.78 10*3/MM3 (ref 3.4–10.8)
WBC NRBC COR # BLD: 9.42 10*3/MM3 (ref 3.4–10.8)

## 2022-10-12 PROCEDURE — 0DJ08ZZ INSPECTION OF UPPER INTESTINAL TRACT, VIA NATURAL OR ARTIFICIAL OPENING ENDOSCOPIC: ICD-10-PCS | Performed by: INTERNAL MEDICINE

## 2022-10-12 PROCEDURE — C1887 CATHETER, GUIDING: HCPCS

## 2022-10-12 PROCEDURE — C1769 GUIDE WIRE: HCPCS

## 2022-10-12 PROCEDURE — C1760 CLOSURE DEV, VASC: HCPCS

## 2022-10-12 PROCEDURE — P9016 RBC LEUKOCYTES REDUCED: HCPCS

## 2022-10-12 PROCEDURE — 85025 COMPLETE CBC W/AUTO DIFF WBC: CPT | Performed by: INTERNAL MEDICINE

## 2022-10-12 PROCEDURE — 25010000002 PROPOFOL 10 MG/ML EMULSION: Performed by: NURSE ANESTHETIST, CERTIFIED REGISTERED

## 2022-10-12 PROCEDURE — 86923 COMPATIBILITY TEST ELECTRIC: CPT

## 2022-10-12 PROCEDURE — 04L53DZ OCCLUSION OF SUPERIOR MESENTERIC ARTERY WITH INTRALUMINAL DEVICE, PERCUTANEOUS APPROACH: ICD-10-PCS | Performed by: RADIOLOGY

## 2022-10-12 PROCEDURE — 85014 HEMATOCRIT: CPT | Performed by: NURSE PRACTITIONER

## 2022-10-12 PROCEDURE — 43255 EGD CONTROL BLEEDING ANY: CPT | Performed by: INTERNAL MEDICINE

## 2022-10-12 PROCEDURE — 76937 US GUIDE VASCULAR ACCESS: CPT

## 2022-10-12 PROCEDURE — 85007 BL SMEAR W/DIFF WBC COUNT: CPT | Performed by: INTERNAL MEDICINE

## 2022-10-12 PROCEDURE — 3E0G8GC INTRODUCTION OF OTHER THERAPEUTIC SUBSTANCE INTO UPPER GI, VIA NATURAL OR ARTIFICIAL OPENING ENDOSCOPIC: ICD-10-PCS | Performed by: INTERNAL MEDICINE

## 2022-10-12 PROCEDURE — 75726 ARTERY X-RAYS ABDOMEN: CPT

## 2022-10-12 PROCEDURE — C1052 HEMOSTATIC AGENT, GI, TOPIC: HCPCS | Performed by: INTERNAL MEDICINE

## 2022-10-12 PROCEDURE — 99153 MOD SED SAME PHYS/QHP EA: CPT

## 2022-10-12 PROCEDURE — C1894 INTRO/SHEATH, NON-LASER: HCPCS

## 2022-10-12 PROCEDURE — 85027 COMPLETE CBC AUTOMATED: CPT | Performed by: INTERNAL MEDICINE

## 2022-10-12 PROCEDURE — 36430 TRANSFUSION BLD/BLD COMPNT: CPT

## 2022-10-12 PROCEDURE — C1889 IMPLANT/INSERT DEVICE, NOC: HCPCS

## 2022-10-12 PROCEDURE — 99232 SBSQ HOSP IP/OBS MODERATE 35: CPT | Performed by: INTERNAL MEDICINE

## 2022-10-12 PROCEDURE — 86900 BLOOD TYPING SEROLOGIC ABO: CPT

## 2022-10-12 PROCEDURE — 75774 ARTERY X-RAY EACH VESSEL: CPT

## 2022-10-12 PROCEDURE — 99152 MOD SED SAME PHYS/QHP 5/>YRS: CPT

## 2022-10-12 PROCEDURE — 25010000002 FLUCONAZOLE PER 200 MG: Performed by: INTERNAL MEDICINE

## 2022-10-12 PROCEDURE — 85018 HEMOGLOBIN: CPT | Performed by: NURSE PRACTITIONER

## 2022-10-12 PROCEDURE — 80048 BASIC METABOLIC PNL TOTAL CA: CPT | Performed by: HOSPITALIST

## 2022-10-12 PROCEDURE — 25010000002 MIDAZOLAM PER 1 MG: Performed by: RADIOLOGY

## 2022-10-12 PROCEDURE — 25010000002 IOPAMIDOL 61 % SOLUTION

## 2022-10-12 PROCEDURE — 25010000002 FILGRASTIM PER 480 MCG: Performed by: INTERNAL MEDICINE

## 2022-10-12 PROCEDURE — 25010000002 FENTANYL CITRATE (PF) 50 MCG/ML SOLUTION: Performed by: RADIOLOGY

## 2022-10-12 PROCEDURE — 04L33DZ OCCLUSION OF HEPATIC ARTERY WITH INTRALUMINAL DEVICE, PERCUTANEOUS APPROACH: ICD-10-PCS | Performed by: RADIOLOGY

## 2022-10-12 RX ORDER — PROPOFOL 10 MG/ML
VIAL (ML) INTRAVENOUS AS NEEDED
Status: DISCONTINUED | OUTPATIENT
Start: 2022-10-12 | End: 2022-10-12 | Stop reason: SURG

## 2022-10-12 RX ORDER — SODIUM CHLORIDE 9 MG/ML
INJECTION, SOLUTION INTRAVENOUS CONTINUOUS PRN
Status: DISCONTINUED | OUTPATIENT
Start: 2022-10-12 | End: 2022-10-12 | Stop reason: SURG

## 2022-10-12 RX ORDER — FENTANYL CITRATE 50 UG/ML
INJECTION, SOLUTION INTRAMUSCULAR; INTRAVENOUS
Status: DISPENSED
Start: 2022-10-12 | End: 2022-10-12

## 2022-10-12 RX ORDER — MIDAZOLAM HYDROCHLORIDE 1 MG/ML
INJECTION INTRAMUSCULAR; INTRAVENOUS AS NEEDED
Status: COMPLETED | OUTPATIENT
Start: 2022-10-12 | End: 2022-10-12

## 2022-10-12 RX ORDER — LIDOCAINE HYDROCHLORIDE 10 MG/ML
6 INJECTION, SOLUTION INFILTRATION; PERINEURAL ONCE
Status: DISCONTINUED | OUTPATIENT
Start: 2022-10-12 | End: 2022-10-13

## 2022-10-12 RX ORDER — HEPARIN SODIUM 200 [USP'U]/100ML
INJECTION, SOLUTION INTRAVENOUS
Status: DISPENSED
Start: 2022-10-12 | End: 2022-10-12

## 2022-10-12 RX ORDER — MIDAZOLAM HYDROCHLORIDE 1 MG/ML
INJECTION INTRAMUSCULAR; INTRAVENOUS
Status: DISPENSED
Start: 2022-10-12 | End: 2022-10-12

## 2022-10-12 RX ORDER — LIDOCAINE HYDROCHLORIDE 10 MG/ML
INJECTION, SOLUTION EPIDURAL; INFILTRATION; INTRACAUDAL; PERINEURAL AS NEEDED
Status: DISCONTINUED | OUTPATIENT
Start: 2022-10-12 | End: 2022-10-12 | Stop reason: SURG

## 2022-10-12 RX ORDER — CHOLECALCIFEROL (VITAMIN D3) 125 MCG
5 CAPSULE ORAL NIGHTLY PRN
Status: COMPLETED | OUTPATIENT
Start: 2022-10-12 | End: 2022-10-12

## 2022-10-12 RX ORDER — SODIUM CHLORIDE, SODIUM LACTATE, POTASSIUM CHLORIDE, CALCIUM CHLORIDE 600; 310; 30; 20 MG/100ML; MG/100ML; MG/100ML; MG/100ML
INJECTION, SOLUTION INTRAVENOUS CONTINUOUS PRN
Status: DISCONTINUED | OUTPATIENT
Start: 2022-10-12 | End: 2022-10-12 | Stop reason: SURG

## 2022-10-12 RX ORDER — FENTANYL CITRATE 50 UG/ML
INJECTION, SOLUTION INTRAMUSCULAR; INTRAVENOUS AS NEEDED
Status: COMPLETED | OUTPATIENT
Start: 2022-10-12 | End: 2022-10-12

## 2022-10-12 RX ADMIN — Medication 10 ML: at 20:57

## 2022-10-12 RX ADMIN — SODIUM CHLORIDE, POTASSIUM CHLORIDE, SODIUM LACTATE AND CALCIUM CHLORIDE: 600; 310; 30; 20 INJECTION, SOLUTION INTRAVENOUS at 10:30

## 2022-10-12 RX ADMIN — FENTANYL CITRATE 25 MCG: 50 INJECTION, SOLUTION INTRAMUSCULAR; INTRAVENOUS at 12:13

## 2022-10-12 RX ADMIN — SODIUM CHLORIDE: 0.9 INJECTION, SOLUTION INTRAVENOUS at 13:00

## 2022-10-12 RX ADMIN — MIDAZOLAM HYDROCHLORIDE 1 MG: 1 INJECTION, SOLUTION INTRAMUSCULAR; INTRAVENOUS at 12:18

## 2022-10-12 RX ADMIN — PANTOPRAZOLE SODIUM 40 MG: 40 INJECTION, POWDER, FOR SOLUTION INTRAVENOUS at 17:12

## 2022-10-12 RX ADMIN — PROPOFOL 85 MCG/KG/MIN: 10 INJECTION, EMULSION INTRAVENOUS at 13:07

## 2022-10-12 RX ADMIN — Medication 10 ML: at 08:17

## 2022-10-12 RX ADMIN — IOPAMIDOL 180 ML: 612 INJECTION, SOLUTION INTRAVENOUS at 13:44

## 2022-10-12 RX ADMIN — PROPOFOL 100 MG: 10 INJECTION, EMULSION INTRAVENOUS at 13:07

## 2022-10-12 RX ADMIN — TAMSULOSIN HYDROCHLORIDE 0.4 MG: 0.4 CAPSULE ORAL at 08:17

## 2022-10-12 RX ADMIN — FENTANYL CITRATE 25 MCG: 50 INJECTION, SOLUTION INTRAMUSCULAR; INTRAVENOUS at 12:18

## 2022-10-12 RX ADMIN — LIDOCAINE HYDROCHLORIDE 100 MG: 10 INJECTION, SOLUTION EPIDURAL; INFILTRATION; INTRACAUDAL; PERINEURAL at 10:34

## 2022-10-12 RX ADMIN — MIDAZOLAM HYDROCHLORIDE 1.5 MG: 1 INJECTION, SOLUTION INTRAMUSCULAR; INTRAVENOUS at 12:32

## 2022-10-12 RX ADMIN — Medication 10 ML: at 08:18

## 2022-10-12 RX ADMIN — FILGRASTIM 480 MCG: 480 INJECTION, SOLUTION INTRAVENOUS; SUBCUTANEOUS at 17:12

## 2022-10-12 RX ADMIN — Medication 5 MG: at 22:03

## 2022-10-12 RX ADMIN — PROPOFOL 100 MCG/KG/MIN: 10 INJECTION, EMULSION INTRAVENOUS at 10:34

## 2022-10-12 RX ADMIN — FLUCONAZOLE 100 MG: 2 INJECTION, SOLUTION INTRAVENOUS at 08:17

## 2022-10-12 RX ADMIN — PANTOPRAZOLE SODIUM 40 MG: 40 INJECTION, POWDER, FOR SOLUTION INTRAVENOUS at 08:17

## 2022-10-12 RX ADMIN — FLUTICASONE PROPIONATE 1 SPRAY: 50 SPRAY, METERED NASAL at 08:17

## 2022-10-12 RX ADMIN — MIDAZOLAM HYDROCHLORIDE 0.5 MG: 1 INJECTION, SOLUTION INTRAMUSCULAR; INTRAVENOUS at 12:25

## 2022-10-12 RX ADMIN — MIDAZOLAM HYDROCHLORIDE 0.5 MG: 1 INJECTION, SOLUTION INTRAMUSCULAR; INTRAVENOUS at 12:10

## 2022-10-12 RX ADMIN — FENTANYL CITRATE 50 MCG: 50 INJECTION, SOLUTION INTRAMUSCULAR; INTRAVENOUS at 12:32

## 2022-10-12 NOTE — PROGRESS NOTES
"HEMATOLOGY/ONCOLOGY PROGRESS NOTE    S: He developed melena yesterday afternoon and was transferred to the ICU.  His hemoglobin acutely decreased to 6 and his status post 2 units PRBCs.  He reports feeling  Very weak and fatigued.  Denies any tightness, chest pain or shortness of breath.  Denies nausea.  Positive dry mouth.    Review of Systems:    Review of Systems   Constitutional: Positive for fatigue. Negative for fever.   HENT: Negative for facial swelling, sore throat and trouble swallowing.    Eyes: Negative for visual disturbance.   Respiratory: Negative for chest tightness and shortness of breath.    Cardiovascular: Negative for chest pain and palpitations.   Gastrointestinal: Positive for blood in stool. Negative for abdominal pain, diarrhea, nausea and vomiting.   Genitourinary: Negative for flank pain and hematuria.   Musculoskeletal: Negative for back pain and joint swelling.   Skin: Negative for rash.   Neurological: Positive for weakness. Negative for dizziness, tremors and speech difficulty.   Psychiatric/Behavioral: Negative for confusion.      Medications:  The current medication list was reviewed in the EMR    ALLERGIES:  No Known Allergies      Physical Exam    VITAL SIGNS:  /74   Pulse 98   Temp 98.4 °F (36.9 °C) (Oral)   Resp 20   Ht 162.6 cm (64\")   Wt 78 kg (172 lb)   SpO2 99%   BMI 29.52 kg/m²   Temp:  [96.5 °F (35.8 °C)-100.4 °F (38 °C)] 98.4 °F (36.9 °C)      Performance Status:                Physical Exam    General: well appearing, in no acute distress   HEENT: sclerae anicteric, mucous membranes dry, neck is supple  Lymphatics: no cervical, supraclavicular, or axillary adenopathy  Cardiovascular: regular rate and rhythm, no murmurs, rubs or gallops  Lungs: clear to auscultation bilaterally  Abdomen: soft, nontender, nondistended.  Ostomy bag with large volume of melanotic soft stool  Extremities: no lower extremity edema  Skin: no rashes, lesions, bruising, or " petechiae  Psych: Mood is stable        RECENT LABS:    Lab Results   Component Value Date    HGB 6.2 (C) 10/12/2022    HCT 18.7 (C) 10/12/2022    MCV 87.2 10/11/2022     10/11/2022    WBC 13.23 (H) 10/11/2022    NEUTROABS 11.62 (H) 10/09/2022    LYMPHSABS 0.27 (L) 10/08/2022    MONOSABS 0.67 10/08/2022    EOSABS 0.00 10/09/2022    BASOSABS 0.00 10/09/2022       Lab Results   Component Value Date    GLUCOSE 127 (H) 10/11/2022    BUN 14 10/11/2022    CREATININE 0.39 (L) 10/11/2022     (L) 10/11/2022    K 3.7 10/11/2022    CL 99 10/11/2022    CO2 27.0 10/11/2022    CALCIUM 7.8 (L) 10/11/2022    PROTEINTOT 5.2 (L) 10/09/2022    ALBUMIN 2.20 (L) 10/09/2022    BILITOT 0.9 10/09/2022    ALKPHOS 322 (H) 10/09/2022    AST 46 (H) 10/09/2022    ALT 52 (H) 10/09/2022         Assessment/Plan  72-year-old female with CD30 positive classic Hodgkin lymphoma involving the rectum, perinephric, liver, and.  He is status post laparoscopic colostomy and biopsy.  He was started on inpatient chemotherapy on 10/8/2022.  Course has been complicated by a GI bleed.  He was transferred to the ICU overnight with melena and recurrent acute blood loss anemia.    Hodgkin lymphoma  Status post cycle 1 AVVD on 10/8/22  Continue daily Neupogen.    Continue to monitor for adverse reactions of treatment    Anemia   Secondary to malignancy, recent chemotherapy/marrow suppression, and upper GI bleed   He developed recurrent melena and is status post 2 units PRBC.  A repeat CBC is pending from this morning.    Bilateral hydronephrosis  Secondary to malignancy  Creatinine has been normal.    Malnutrition  Low appetite  Secondary to malignancy and recent treatment  Trial of boost clear as he was not able to tolerate traditional boost.  He is currently n.p.o. for GI bleed.    Kaycee Leary MD  [facility] Hematology and Oncology    10/12/2022

## 2022-10-12 NOTE — PLAN OF CARE
Goal Outcome Evaluation:           Progress: no change  Outcome Evaluation: VSS on 2L NC while sleeping. Started 1 unit PRBCs at shift change for H&H of 6.2/18.3. No transfusion reaction noted. Rechecked H&H at 0000, 6.2/18.7. No active bleeding noted. APRN notified. 2 units PRBCs ordered. Second unit still currently infusing. UOP adequate. No BM.

## 2022-10-12 NOTE — PRE-SEDATION DOCUMENTATION
[facility]   Vascular Interventional Radiology  History & Physicial    Patient Name:Abraham Wu    : 1950    MRN: [id number]    Primary Care Physician: Jatinder Haynes MD    Referring Physician: River Odom MD     Date of admission: 10/3/2022    Subjective     Reason for Consult: GI bleed    History of Present Illness     Abraham Wu is a 72 y.o. male referred to IR as noted above.    Late entry. The patient is known to me and was taken to Angio for ongoing active UGI bleed.      Active Hospital Problems:  Active Hospital Problems    Diagnosis    • **Hodgkin lymphoma (HCC)    • Moderate malnutrition (HCC)    • Esophageal candidiasis (HCC)    • Hydronephrosis    • Febrile illness    • Hypokalemia    • Acute upper GI bleed      Added automatically from request for surgery [id number]     • Melena      Added automatically from request for surgery [id number]     • Essential hypertension    • Rectal mass s/p laparoscopic colostomy (2022)        Personal History     Past Medical History:   Diagnosis Date   • benign polypoid tissue right lung    • Hyperlipidemia    • Hypertension    • Mycobacterium mucogenicum    • SHERRI (obstructive sleep apnea)    • Seasonal allergies        Past Surgical History:   Procedure Laterality Date   • BRONCHOSCOPY      removal of obstructing polypoid tissue right middle lung   • COLOSTOMY N/A 2022    Procedure: LAPAROSCOPIC COLOSTOMY CREATION, FLEXIBLE SIGMOIDOSCOPY;  Surgeon: Hiram Viveros MD;  Location:  [facility];  Service: General;  Laterality: N/A;   • ENDOSCOPY N/A 10/10/2022    Procedure: ESOPHAGOGASTRODUODENOSCOPY;  Surgeon: Neeraj Lynn MD;  Location:  [facility];  Service: Gastroenterology;  Laterality: N/A;   • EXAM UNDER ANESTHESIA, RECTAL BIOPSY N/A 10/4/2022    Procedure: EXAM UNDER ANESTHESIA, TRANSANAL BIOPSY WITH TRUCUT NEEDLE (LITHOTOMY-CANDY CANE);  Surgeon: Hiram Viveros MD;  Location:  [facility];  Service: General;  Laterality:  "N/A;       Family History: His family history includes Diabetes in his father and mother; Heart disease in his father.     Social History: He  reports that he has never smoked. He has never used smokeless tobacco. He reports that he does not currently use alcohol. He reports that he does not use drugs.    Home Medications:  fluticasone, lisinopril, montelukast, and rosuvastatin    Current Medications:  •  acetaminophen  •  acetaminophen  •  [DISCONTINUED] senna-docusate sodium **AND** [DISCONTINUED] polyethylene glycol **AND** [DISCONTINUED] bisacodyl **AND** bisacodyl  •  filgrastim (NEUPOGEN) injection  •  fluconazole  •  fluticasone  •  heparin (porcine)  •  lidocaine  •  ondansetron  •  pantoprazole  •  phenol  •  potassium chloride **OR** potassium chloride **OR** potassium chloride  •  sodium chloride  •  sodium chloride  •  tamsulosin     Allergies:  He has No Known Allergies.    Review of Systems    Objective     Visit Vitals  /82   Pulse 111   Temp 99.8 °F (37.7 °C) (Oral)   Resp 28   Ht 162.6 cm (64\")   Wt 78 kg (172 lb)   SpO2 92%   BMI 29.52 kg/m²        Physical Exam    Sedated.  Phone consent from the wife.      Result Review      I have personally reviewed the results from the time of this admission to 10/12/2022 14:28 EDT and agree with these findings.  [x]  Laboratory  []  Microbiology  [x]  Radiology  []  EKG/Telemetry   []  Cardiology/Vascular   []  Pathology  []  Old records  []  Other:    Most notable findings include: As noted:    Results from last 7 days   Lab Units 10/12/22  0924 10/12/22  0012 10/11/22  1611 10/11/22  0756 10/11/22  0223 10/10/22  2017 10/10/22  1613 10/10/22  0723 10/09/22  2323 10/09/22  0457 10/09/22  0202 10/08/22  0455 10/07/22  0647   HEMOGLOBIN g/dL 7.6*  7.7* 6.2*  6.2* 6.2* 7.3* 7.4*  7.4*  7.3* 6.5* 6.6*   < > 6.5*   < > 7.7* 8.5* 10.8*   HEMATOCRIT % 22.5*  22.5* 18.8*  18.7* 18.3* 21.9* 22.4*  22.4*  22.2* 19.5* 20.4*   < > 20.5*   < > 24.2* 26.9* " 34.2*   PLATELETS 10*3/mm3 160 160  --   --  169  --   --   --  268  --  273 338 489*    < > = values in this interval not displayed.       Estimated Creatinine Clearance: 196.5 mL/min (A) (by C-G formula based on SCr of 0.33 mg/dL (L)).   Creatinine   Date Value Ref Range Status   10/12/2022 0.33 (L) 0.76 - 1.27 mg/dL Final   10/11/2022 0.39 (L) 0.76 - 1.27 mg/dL Final   10/09/2022 0.44 (L) 0.76 - 1.27 mg/dL Final       COVID19   Date Value Ref Range Status   10/03/2022 Not Detected Not Detected - Ref. Range Final        No results found for: PREGTESTUR, PREGSERUM, HCG, HCGQUANT     ASA SCALE ASSESSMENT (applicable if sedation planned):  4-Severe incapacitating disease process that is a constant threat to life     MALLAMPATI CLASSIFICATION (applicable if sedation planned):  2-Able to visualize the soft palate, fauces, uvula. The anterior & posterior tonsilar pillars are hidden by the tongue.    Assessment / Plan     Abraham Wu is a 72 y.o. male referred to the IR service with above problem.    Plan:   As above.    Yassine Lopez MD   Vascular Interventional Radiology  10/12/22   2:26 PM EDT

## 2022-10-12 NOTE — PROGRESS NOTES
INTENSIVIST   PROGRESS NOTE        SUBJECTIVE     Abraham 72 y.o. male is followed for: Fever       GIB    Hodgkin lymphoma (HCC)    Rectal mass s/p laparoscopic colostomy (2022)    Esophageal candidiasis (HCC)    As an Intensivist, we provide an integrated approach to the ICU patient and family, medical management of comorbid conditions, including but not limited to electrolytes, glycemic control, organ dysfunction, lead interdisciplinary rounds and coordinate the care with all other services, including those from other specialists.     Interval History:  POD: 2 Days Post-Op (EGD)    Comfortable this morning.    He underwent another EGD today, and followed by IR embolization.    The patient has started, but not completed, their COVID-19 vaccination series.     Temp  Min: 96.5 °F (35.8 °C)  Max: 100.4 °F (38 °C)       History     Last Reviewed by Tomy Morales MD on 10/12/2022 at  7:59 AM    Sections Reviewed    Medical, Family, Surgical, Tobacco, Alcohol, Drug Use, Sexual Activity,   Social Documentation    Problem list reviewed by Tomy Morales MD on 10/12/2022 at  7:59 AM  Medicines reviewed by Tomy Morales MD on 10/12/2022 at  7:59 AM  Allergies reviewed by Tomy Morales MD on 10/12/2022 at  7:59 AM       The patient's relevant past medical, surgical and social history were reviewed and updated in Epic as appropriate.        OBJECTIVE     Vitals:  Temp: 98.4 °F (36.9 °C) (10/12/22 0600) Temp  Min: 96.5 °F (35.8 °C)  Max: 100.4 °F (38 °C)   Temp core:      BP: 141/74 (10/12/22 0715) BP  Min: 116/75  Max: 163/123   MAP (non-invasive) Noninvasive MAP (mmHg): 97 (10/12/22 0715) Noninvasive MAP (mmHg)  Av.8  Min: 68  Max: 138   Pulse: 98 (10/12/22 0715) Pulse  Min: 75  Max: 102   Resp: 20 (10/12/22 0600) Resp  Min: 14  Max: 22   SpO2: 99 % (10/12/22 0715) SpO2  Min: 89 %  Max: 100 %   Device: nasal cannula (10/12/22 0600)    Flow Rate: 2 (10/12/22 0600) Flow (L/min)  Min: 2  Max: 2          10/03/22  0604   Weight: 78 kg (172 lb)        Intake/Ouptut 24 hrs (7:00AM - 6:59 AM)  Intake & Output (last 3 days)       10/09 0701  10/10 0700 10/10 0701  10/11 0700 10/11 0701  10/12 0700 10/12 0701  10/13 0700    P.O. 959 450 240     Blood 350 300 661.9     IV Piggyback  100 50     Total Intake(mL/kg) 1309 (16.8) 850 (10.9) 951.9 (12.2)     Urine (mL/kg/hr) 700 (0.4) 600 (0.3) 725 (0.4)     Stool 50 400 525     Total Output 750 1000 1250     Net +559 -150 -298.1             Urine Unmeasured Occurrence 3 x 1 x 2 x         Non Invasive Ventilation   Settings: Observed:   Mode of Delivery: other (see comments) (REFUSED UNIT/WILL HAVE HOME UNIT TOMORROW) (10/04/22 0215)                                      Physical Examination  Telemetry:  Rhythm: normal sinus rhythm (10/12/22 0600)         Constitutional:  No acute distress.   Cardiovascular: RRR.   Normal heart sounds.  No murmurs, gallop or rub.   Respiratory: Normal breath sounds  No adventitious sounds.   Abdominal:  Soft with no tenderness.  No distension.   No HSM.   Extremities: Warm.  Dry.  No cyanosis.  No Edema   Neurological:   Alert, Oriented, Cooperative.  Best Eye Response: 4-->(E4) spontaneous (10/12/22 0600)  Best Motor Response: 6-->(M6) obeys commands (10/12/22 0600)  Best Verbal Response: 5-->(V5) oriented (10/12/22 0600)  [place] Coma Scale Score: 15 (10/12/22 0600)     R PICC    Results Reviewed:  Laboratory  Microbiology  Radiology  Pathology    Hematology:  Results from last 7 days   Lab Units 10/12/22  0012 10/11/22  1611 10/11/22  0756 10/11/22  0223 10/10/22  0723 10/09/22  2323 10/09/22  0202 10/08/22  0455 10/07/22  0647   WBC 10*3/mm3  --   --   --  13.23*  --  12.10*   < > 17.02* 21.80*   HEMOGLOBIN g/dL 6.2* 6.2*   < > 7.4*  7.4*  7.3*   < > 6.5*   < > 8.5* 10.8*   MCV fL  --   --   --  87.2  --  85.1   < > 83.8 83.8   PLATELETS 10*3/mm3  --   --   --  169  --  268   < > 338 489*   NEUTROS ABS 10*3/mm3  --   --   --   --   --   11.62*  --  15.71* 20.04*   LYMPHS ABS 10*3/mm3  --   --   --   --   --   --   --  0.27* 0.57*   EOS ABS 10*3/mm3  --   --   --   --   --  0.00  --  0.00 0.01    < > = values in this interval not displayed.       Chemistry:  Estimated Creatinine Clearance: 166.3 mL/min (A) (by C-G formula based on SCr of 0.39 mg/dL (L)).    Results from last 7 days   Lab Units 10/11/22  0223 10/09/22  2323   SODIUM mmol/L 134* 129*   POTASSIUM mmol/L 3.7 3.9   CHLORIDE mmol/L 99 96*   CO2 mmol/L 27.0 23.0   BUN mg/dL 14 21   CREATININE mg/dL 0.39* 0.44*   GLUCOSE mg/dL 127* 161*     Results from last 7 days   Lab Units 10/11/22  0223 10/09/22  2323   CALCIUM mg/dL 7.8* 8.4*   MAGNESIUM mg/dL  --  2.1     Results from last 7 days   Lab Units 10/09/22  2323 10/09/22  0202   BILIRUBIN mg/dL 0.9 0.9   AST (SGOT) U/L 46* 91*   ALT (SGPT) U/L 52* 80*   ALK PHOS U/L 322* 381*       Cardiac Labs:  Results from last 7 days   Lab Units 10/09/22  2323   CK TOTAL U/L 15*   TROPONIN T ng/mL <0.010     COVID-19  Lab Results   Component Value Date    COVID19 Not Detected 10/03/2022    COVID19 Not Detected 09/20/2022       Images:  CT Abdomen Pelvis With & Without Contrast    Result Date: 10/11/2022  No evidence of active GI bleeding allowing for limitations of scattered hyperdense material throughout the GI tract, either ingested medication or prior oral contrast. Interval duodenal ulcer clipping, without evidence of active GI bleeding at this site.  No new or acute abdominopelvic findings. Grossly unchanged metastatic disease as detailed on 10/3/2022 CT and please refer to that report for complete details.  This report was finalized on 10/11/2022 6:39 PM by Santino Kaur MD.      FL Esophagram Complete Single Contrast    Result Date: 10/11/2022  Mild gastroesophageal reflux to the level of the thoracic inlet          Echo:  Results for orders placed during the hospital encounter of 10/03/22    Adult Transthoracic Echo Complete W/ Cont if  Necessary Per Protocol    Interpretation Summary  · Left ventricular ejection fraction appears to be 56 - 60%. Left ventricular systolic function is normal.  · Global longitudinal LV strain (GLS) = -27.0%.  · Left atrial volume is moderately increased.  · Mild mitral valve regurgitation is present.  · Mild tricuspid valve regurgitation is present.  · Estimated right ventricular systolic pressure from tricuspid regurgitation is mildly elevated (35-45 mmHg).  · Mild dilation of the ascending aorta is present.      Results: Reviewed.  I reviewed the patient's new laboratory and imaging results.  I independently reviewed the patient's new images.    Medications: Reviewed.    Assessment   A/P     Hospital:  LOS: 9 days   ICU: 13h      Diagnosis   • **Hodgkin lymphoma (HCC) [C81.90]   • Moderate malnutrition (HCC) [E44.0]   • Esophageal candidiasis (HCC) [B37.81]   • Hydronephrosis [N13.30]   • Febrile illness [R50.9]   • Hypernatremia [E87.0]   • Hypokalemia [E87.6]   • Acute upper GI bleed [K92.2]       • Melena [K92.1]       • Essential hypertension [I10]   • Rectal mass s/p laparoscopic colostomy (9/2022) [K62.89]     Abraham is a 72 y.o. male admitted on 10/3/2022 with Hydronephrosis [N13.30]:    Assessment/Management/Treatment Plan:    1. GIB  a. EGD 10/11/22: Severe esophageal candidiasis, D2 bleeding ulcer  b. EGD 10/12/22 2 clean-based duodenal ulcers. In D2 there is an Ovesco clip wilth large clot, and oozing of blood.  c. Angiogram 10/12/22 - GDA embolization  d. Fluconazole  e. Anemia ABL - 7 units PRBCs transufsed since admissoin.    Results from last 7 days   Lab Units 10/12/22  2055 10/12/22  1554 10/12/22  0924 10/12/22  0012 10/11/22  1611 10/11/22  0756 10/11/22  0223   HEMOGLOBIN g/dL 7.8* 7.9* 7.6*  7.7* 6.2*  6.2* 6.2* 7.3* 7.4*  7.4*  7.3*     2. Hodgkin Lymphoma  a. Chemotherapy started 10/08/2022 } Dr Gibran lira. Filgrastim  3. Cardiovascular  a. HTN  b. Dyslipidemia   4. Sleep  Medicine  a. SHERRI  5. Renal  a. Hyponatremia} Improving.    Lab Results   Component Value Date     (L) 10/11/2022     (L) 10/09/2022     (L) 10/09/2022     (L) 10/08/2022     (L) 10/07/2022     (L) 10/06/2022     (L) 10/05/2022     (L) 10/05/2022     6. Endocrine  a. Body mass index is 29.52 kg/m². Overweight: 25.0-29.9kg/m2   b. No h/o DM    Lab Results   Lab Value Date/Time    HGBA1C 5.50 09/21/2022 0410     Results from last 7 days   Lab Units 10/09/22  2310   GLUCOSE mg/dL 154*       Diet: NPO Diet NPO Type: Sips with Meds  Orders Placed This Encounter      DIET MESSAGE May have any soup requested. OK per SW.      Dietary Nutrition Supplements Boost Breeze (Clear Liquid)      Advance Directives: Code Status and Medical Interventions:   Ordered at: 10/03/22 1318     Level Of Support Discussed With:    Patient     Code Status (Patient has no pulse and is not breathing):    CPR (Attempt to Resuscitate)     Medical Interventions (Patient has pulse or is breathing):    Full Support        DVT prophylaxis:  Mechanical DVT prophylaxis orders are present.     In brief:  1. Monitor H/H  2. Supportive therapy  3. Disposition: Keep in ICU.    Plan of care and goals reviewed during interdisciplinary rounds.  I discussed the patient's findings and my recommendations with patient and nursing staff     Level of Risk is High due to: illness with threat to life or bodily function.     Time: 25 minutes, in direct patient care, with the patient and/or in the ICU or beard coordinating care with other health care providers. This is non-concurrent time.    I have spent > 50% percent of this time, counseling and discussing management.       [x] Primary Attending Intensive Care Medicine - Nutrition Support   [] Consultant

## 2022-10-12 NOTE — PROCEDURES
RCFA access 6 Fr/Angioseal.  Celiac > TAMI > GDA angio and GDA (RGE/SPDA/GDA) coil embo.  SMA > no bleed from IPDA. To be targeted if bleeding continues.  2B dictated.

## 2022-10-12 NOTE — PROGRESS NOTES
"   LOS: 9 days    Patient Care Team:  Jatinder Haynes MD as PCP - General (Family Medicine)  Hyponatremia follow up     Subjective     Interval History:     Events from last night noted. Transferred to ICU. Melena occurred yesterday.     Review of Systems:   No CP or SOA , fever, chills, rigors, rash, N/V, Constipation.       Objective     Vital Sign Min/Max for last 24 hours  Temp  Min: 96.5 °F (35.8 °C)  Max: 100.2 °F (37.9 °C)   BP  Min: 116/75  Max: 163/123   Pulse  Min: 75  Max: 111   Resp  Min: 14  Max: 28   SpO2  Min: 89 %  Max: 100 %   Flow (L/min)  Min: 2  Max: 5   No data recorded     Flowsheet Rows    Flowsheet Row First Filed Value   Admission Height 162.6 cm (64\") Documented at 10/03/2022 0604   Admission Weight 78 kg (172 lb) Documented at 10/03/2022 0604          I/O this shift:  In: 1065 [I.V.:750; Blood:315]  Out: 350 [Urine:100; Stool:250]  I/O last 3 completed shifts:  In: 1251.9 [P.O.:240; Blood:961.9; IV Piggyback:50]  Out: 1450 [Urine:925; Stool:525]    Physical Exam:    Gen: Alert, NAD   HENT: NC, AT, EOMI   NECK: Supple, no JVD, Trachea midline   LUNGS: CTA bilaterally, non labored respirtation   CVS: S1/S2 audible, RRR, no murmur   Abd: Soft, NT, ND, BS+   Ext: +pedal edema, no cyanosis   CNS: Alert, NFD      WBC WBC   Date Value Ref Range Status   10/12/2022 8.78 3.40 - 10.80 10*3/mm3 Final   10/12/2022 9.42 3.40 - 10.80 10*3/mm3 Final   10/11/2022 13.23 (H) 3.40 - 10.80 10*3/mm3 Final   10/09/2022 12.10 (H) 3.40 - 10.80 10*3/mm3 Final      HGB Hemoglobin   Date Value Ref Range Status   10/12/2022 7.7 (L) 13.0 - 17.7 g/dL Final   10/12/2022 7.6 (L) 13.0 - 17.7 g/dL Final   10/12/2022 6.2 (C) 13.0 - 17.7 g/dL Final   10/12/2022 6.2 (C) 13.0 - 17.7 g/dL Final   10/11/2022 6.2 (C) 13.0 - 17.7 g/dL Final   10/11/2022 7.3 (L) 13.0 - 17.7 g/dL Final   10/11/2022 7.4 (L) 13.0 - 17.7 g/dL Final   10/11/2022 7.3 (L) 13.0 - 17.7 g/dL Final   10/11/2022 7.4 (L) 13.0 - 17.7 g/dL Final   10/10/2022 " 6.5 (C) 13.0 - 17.7 g/dL Final   10/10/2022 6.6 (C) 13.0 - 17.7 g/dL Final   10/10/2022 7.0 (L) 13.0 - 17.7 g/dL Final   10/10/2022 7.7 (L) 13.0 - 17.7 g/dL Final   10/09/2022 6.5 (C) 13.0 - 17.7 g/dL Final   10/09/2022 7.4 (L) 13.0 - 17.7 g/dL Final   10/09/2022 7.2 (L) 13.0 - 17.7 g/dL Final      HCT Hematocrit   Date Value Ref Range Status   10/12/2022 22.5 (L) 37.5 - 51.0 % Final   10/12/2022 22.5 (L) 37.5 - 51.0 % Final   10/12/2022 18.7 (C) 37.5 - 51.0 % Final   10/12/2022 18.8 (C) 37.5 - 51.0 % Final   10/11/2022 18.3 (C) 37.5 - 51.0 % Final   10/11/2022 21.9 (L) 37.5 - 51.0 % Final   10/11/2022 22.4 (L) 37.5 - 51.0 % Final   10/11/2022 22.2 (L) 37.5 - 51.0 % Final   10/11/2022 22.4 (L) 37.5 - 51.0 % Final   10/10/2022 19.5 (C) 37.5 - 51.0 % Final   10/10/2022 20.4 (C) 37.5 - 51.0 % Final   10/10/2022 21.2 (L) 37.5 - 51.0 % Final   10/10/2022 23.9 (L) 37.5 - 51.0 % Final   10/09/2022 20.5 (C) 37.5 - 51.0 % Final   10/09/2022 23.2 (L) 37.5 - 51.0 % Final   10/09/2022 22.9 (L) 37.5 - 51.0 % Final      Platlets No results found for: LABPLAT   MCV MCV   Date Value Ref Range Status   10/12/2022 83.6 79.0 - 97.0 fL Final   10/12/2022 87.4 79.0 - 97.0 fL Final   10/11/2022 87.2 79.0 - 97.0 fL Final   10/09/2022 85.1 79.0 - 97.0 fL Final          Sodium Sodium   Date Value Ref Range Status   10/12/2022 137 136 - 145 mmol/L Final   10/11/2022 134 (L) 136 - 145 mmol/L Final   10/09/2022 129 (L) 136 - 145 mmol/L Final      Potassium Potassium   Date Value Ref Range Status   10/12/2022 3.5 3.5 - 5.2 mmol/L Final   10/11/2022 3.7 3.5 - 5.2 mmol/L Final   10/09/2022 3.9 3.5 - 5.2 mmol/L Final      Chloride Chloride   Date Value Ref Range Status   10/12/2022 104 98 - 107 mmol/L Final   10/11/2022 99 98 - 107 mmol/L Final   10/09/2022 96 (L) 98 - 107 mmol/L Final      CO2 CO2   Date Value Ref Range Status   10/12/2022 27.0 22.0 - 29.0 mmol/L Final   10/11/2022 27.0 22.0 - 29.0 mmol/L Final   10/09/2022 23.0 22.0 - 29.0  mmol/L Final      BUN BUN   Date Value Ref Range Status   10/12/2022 18 8 - 23 mg/dL Final   10/11/2022 14 8 - 23 mg/dL Final   10/09/2022 21 8 - 23 mg/dL Final      Creatinine Creatinine   Date Value Ref Range Status   10/12/2022 0.33 (L) 0.76 - 1.27 mg/dL Final   10/11/2022 0.39 (L) 0.76 - 1.27 mg/dL Final   10/09/2022 0.44 (L) 0.76 - 1.27 mg/dL Final      Calcium Calcium   Date Value Ref Range Status   10/12/2022 7.6 (L) 8.6 - 10.5 mg/dL Final   10/11/2022 7.8 (L) 8.6 - 10.5 mg/dL Final   10/09/2022 8.4 (L) 8.6 - 10.5 mg/dL Final      PO4 No results found for: CAPO4   Albumin Albumin   Date Value Ref Range Status   10/09/2022 2.20 (L) 3.50 - 5.20 g/dL Final      Magnesium Magnesium   Date Value Ref Range Status   10/09/2022 2.1 1.6 - 2.4 mg/dL Final      Uric Acid No results found for: URICACID        Results Review:     I reviewed the patient's new clinical results.    filgrastim (NEUPOGEN) injection, 480 mcg, Subcutaneous, Q PM  fluconazole, 100 mg, Intravenous, Daily  fluticasone, 1 spray, Nasal, Daily  heparin (porcine), , ,   lidocaine, 6 mL, Infiltration, Once  pantoprazole, 40 mg, Intravenous, BID AC  sodium chloride, 10 mL, Intravenous, Q12H  tamsulosin, 0.4 mg, Oral, Daily           Medication Review:     Assessment & Plan       Hodgkin lymphoma (HCC)    Rectal mass s/p laparoscopic colostomy (9/2022)    Essential hypertension    Hydronephrosis    Febrile illness    Hypokalemia    Acute upper GI bleed    Melena    Moderate malnutrition (HCC)    Esophageal candidiasis (HCC)    1- Hyponatremia - Serum osmolality 263, Urine osmolality 115 and urine sodium less than 20. Suggest of poor oral solute intake vs polydipsia . Uric acid low. Improving.   2- Rectal mass   3-Hypokalemia - resolved  4. Anemia: Transfuse at hb<7.     Plan:  - Fluid restriction 1.2 lit/day   - Continue with salt tabs 3 g TID    Lise Chicas MD  10/12/22  13:50 EDT

## 2022-10-12 NOTE — ANESTHESIA PREPROCEDURE EVALUATION
Anesthesia Evaluation     Patient summary reviewed and Nursing notes reviewed   no history of anesthetic complications:  NPO Solid Status: > 8 hours  NPO Liquid Status: > 2 hours           Airway   Mallampati: II  Dental      Pulmonary - normal exam   (+) sleep apnea,   Cardiovascular - normal exam    (+) hypertension, hyperlipidemia,   (-) CHF    ROS comment: ECHO:  Interpretation Summary    ? Left ventricular ejection fraction appears to be 56 - 60%. Left ventricular systolic function is normal.  ? Global longitudinal LV strain (GLS) = -27.0%.  ? Left atrial volume is moderately increased.  ? Mild mitral valve regurgitation is present.  ? Mild tricuspid valve regurgitation is present.  ? Estimated right ventricular systolic pressure from tricuspid regurgitation is mildly elevated (35-45 mmHg).  ? Mild dilation of the ascending aorta is present.      Neuro/Psych  GI/Hepatic/Renal/Endo    (+)  GI bleeding active bleeding, renal disease,     Musculoskeletal     Abdominal    Substance History      OB/GYN          Other      history of cancer active                  Anesthesia Plan    ASA 3     general     (PROPOFOL)  intravenous induction     Anesthetic plan, risks, benefits, and alternatives have been provided, discussed and informed consent has been obtained with: patient.    Plan discussed with CRNA.        CODE STATUS:    Level Of Support Discussed With: Patient  Code Status (Patient has no pulse and is not breathing): CPR (Attempt to Resuscitate)  Medical Interventions (Patient has pulse or is breathing): Full Support

## 2022-10-12 NOTE — PROGRESS NOTES
"[facility]    2 Days Post-Op   Length of stay: 9 days    Subjective: Redeveloped signs of melena overnight.  Drop in his H&H.  CT scan of the abdomen pelvis did not elucidate an active bleeding site.  Today he is awake, alert and interactive.  With melena at his colostomy site.  No drainage from the rectum    Physical Exam:  Blood pressure 148/78, pulse 94, temperature 98.4 °F (36.9 °C), temperature source Oral, resp. rate 20, height 162.6 cm (64\"), weight 78 kg (172 lb), SpO2 100 %.    Abd: Soft, nontender, colostomy is pink patent and productive with melena    Labs past 24 hours:  Lab Results (last 24 hours)     Procedure Component Value Units Date/Time    CBC Auto Differential [[id number]]  (Abnormal) Collected: 10/12/22 0012    Specimen: Blood Updated: 10/12/22 0841     WBC 9.42 10*3/mm3      RBC 2.15 10*6/mm3      Hemoglobin 6.2 g/dL      Hematocrit 18.8 %      MCV 87.4 fL      MCH 28.8 pg      MCHC 33.0 g/dL      RDW 15.6 %      RDW-SD 49.2 fl      MPV 10.7 fL      Platelets 160 10*3/mm3      nRBC 0.0 /100 WBC     CBC & Differential [[id number]]  (Abnormal) Collected: 10/12/22 0012    Specimen: Blood Updated: 10/12/22 0838    Narrative:      The following orders were created for panel order CBC & Differential.  Procedure                               Abnormality         Status                     ---------                               -----------         ------                     CBC Auto Differential[[id number]]        Abnormal            Preliminary result         Scan Slide[[id number]]                                       In process                   Please view results for these tests on the individual orders.    Scan Slide [[id number]] Collected: 10/12/22 0012    Specimen: Blood Updated: 10/12/22 0838    Hemoglobin & Hematocrit, Blood [[id number]]  (Abnormal) Collected: 10/12/22 0012    Specimen: Blood Updated: 10/12/22 0057     Hemoglobin 6.2 g/dL      " Hematocrit 18.7 %     Hemoglobin & Hematocrit, Blood [[id number]]  (Abnormal) Collected: 10/11/22 1611    Specimen: Blood Updated: 10/11/22 1638     Hemoglobin 6.2 g/dL      Hematocrit 18.3 %     Tissue Pathology Exam [[id number]] Collected: 10/10/22 0905    Specimen: Tissue from Gastric, Antrum; Tissue from Esophagus, Distal Updated: 10/11/22 0916     Case Report --     Surgical Pathology Report                         Case: [id number]                                  Authorizing Provider:  Neeraj Lynn MD        Collected:           10/10/2022 09:05 AM          Ordering Location:     [facility]   Received:            10/10/2022 12:52 PM                                 ENDO SUITES                                                                  Pathologist:           Peter Roberts MD                                                        Specimens:   1) - Gastric, Antrum, Antrum bx for path                                                            2) - Esophagus, Distal, Distal Esophagus bx for path                                        Clinical Information --     Hodgkin lymphoma of lymph nodes of multiple regions, unspecified Hodgkin lymphoma type (HCC)  Acute upper GI bleed  Melena       Final Diagnosis --     1.  GASTRIC ANTRUM BIOPSY:  Reactive gastropathic changes with background of minimal chronic gastritis without activity.  Negative for intestinal metaplasia and dysplasia.  No Helicobacter pylori-like organisms identified on routine stains.    2.  DISTAL ESOPHAGUS BIOPSY:  Squamous mucosa with basal layer hyperplasia and reactive changes associated with acute esophagitis.  Yeast and fungal forms suggestive of Candida present identifiable on routine stains.  Negative for intestinal metaplasia, dysplasia and glandular mucosa.       Gross Description --     1. Gastric, Antrum.  Received in formalin labeled antrum biopsy is a 0.4 x 0.2 x 0.1 cm aggregate of 2 tan tissue fragments,  submitted entirely in a single cassette.  LDP    2. Esophagus, Distal.  Received in formalin labeled distal esophagus biopsy is a 1 x 0.1 x 0.1 cm aggregate of multiple tan tissue fragments, submitted entirely in a single cassette.  LDP         Microscopic Description --     The slides are reviewed and demonstrate histopathologic features supporting the above rendered diagnosis.              I/O last shift:  I/O this shift:  In: 315 [Blood:315]  Out: -        Pathology:  Order Name Source Comment Collection Info Order Time   TISSUE PATHOLOGY EXAM Gastric, Antrum  Collected By: Neeraj Lynn MD 10/10/2022  9:05 AM     Release to patient   Routine Release        .    Assessment and Plan:  72-year-old male who presented with Hodgkin's lymphoma involving the rectum, liver, left kidney and right pleura status post colostomy and repeat transanal biopsy for additional tissue.  Initiated chemotherapy and unfortunately developed D2 ulcer status post clipping.    Now with recurrent GI bleeding.  Believe this to be upper GI in nature, likely rebleed from his D2 ulcer.    Agree with ICU admission, transfusion as needed  If redevelops bleed, will require repeat CTA versus angiogram versus endoscopy.  Please call if I can be of assistance, however if he needs an exploration for his duodenal bleeding, would defer this to general surgery.      Hiram Viveros MD  [facility]  10/12/22  09:02 EDT

## 2022-10-12 NOTE — CASE MANAGEMENT/SOCIAL WORK
Continued Stay Note  [facility]     Patient Name: Abraham Wu  MRN: [id number]  Today's Date: 10/12/2022    Admit Date: 10/3/2022    Plan: Home   Discharge Plan     Row Name 10/12/22 1107       Plan    Plan Home    Plan Comments Patient off unit for procedure, Spoke with wife at bedside.  Wife states they are still planning on patient going home upon discharge.  Rollator that was ordered at bedside.  Encouraged wife to take home until patient is ready for discharge.  CM will continue to follow.               Discharge Codes    No documentation.               Expected Discharge Date and Time     Expected Discharge Date Expected Discharge Time    Oct 17, 2022             Aisha Hernandez RN

## 2022-10-12 NOTE — ANESTHESIA POSTPROCEDURE EVALUATION
Patient: Abraham Wu    Procedure Summary     Date: 10/12/22 Room / Location: [facility] INTERVENTIONAL RADIOLOGY    Anesthesia Start: 1300 Anesthesia Stop: 1401    Procedure: IR ARTERY EMBOLIZATION OCCLUSION Diagnosis: (GI bleed)    Scheduled Providers:  Provider: Jhoan Tom Jr., MD    Anesthesia Type: general ASA Status: 3          Anesthesia Type: general    Vitals  No vitals data found for the desired time range.          Post Anesthesia Care and Evaluation    Patient location during evaluation: ICU  Patient participation: complete - patient participated  Level of consciousness: awake and alert  Pain management: adequate    Airway patency: patent  Anesthetic complications: No anesthetic complications  PONV Status: none  Cardiovascular status: hemodynamically stable and acceptable  Respiratory status: acceptable, nasal cannula and nonlabored ventilation  Hydration status: acceptable    Comments: PT care returned to [facility] for transfer back to icu stats last on record OR

## 2022-10-12 NOTE — NURSING NOTE
Pt brought to IR from ENDO for embolization of bleed.  Accompanied by ICU Nurse(No active) bleed seen, but 6 coils placed to GDA.  Pt tolerated procedure well.  Sheath removed and closed with Angio-Seal.  Bedrest order x 2 hours (with right leg straight).  Patient sedated per RN under MD Lopez for 30 minutes. Given 100mcg Fentanyl & 3.5 Versed. During the case MD ordered anesthesia coverage, patient moving and desaturating. Unable to continue RN sedation. Pt returned to unit with nurse; pt drowsy, but arousable to noxious stimuli.  VSS.  Blood transfusing throughout procedure.

## 2022-10-12 NOTE — BRIEF OP NOTE
ESOPHAGOGASTRODUODENOSCOPY  Progress Note    Abraham Wu  10/12/2022    EGD shows some improvement in prior severe Candida esophagitis.  There is mild gastritis, without hemorrhage.  2 clean-based duodenal ulcers are seen in the bulb, measuring up to 6 mm.  In the second portion duodenum there is an Ovesco clip, with large adherent clot. Prior Endoclip is not seen. Manipulation of clot induced constant oozing of blood.  Epinephrine was injected without full hemostasis.  Hemostatic spray was applied, with improvement in bleeding, but still had oozing at conclusion of procedure.    >> Proceed with interventional angiography.  >> Continue fluconazole, and high-dose PPI.    Mark I. Brunner, MD     Date: 10/12/2022  Time: 11:01 EDT

## 2022-10-12 NOTE — PLAN OF CARE
Goal Outcome Evaluation:      VSS. NC 4L. Pt has received 2 units PRBCs today. Pt went to Endo this AM and per MD went straight from Endo to IR to find/ stop bleeding. Multiple vessels embolized and pt returned to ICU around 1420. Family @ bedside and updated. Adequate UOP. Ostomy ouput 450. Continuing to monitor pt.

## 2022-10-12 NOTE — ANESTHESIA PREPROCEDURE EVALUATION
Anesthesia Evaluation     Patient summary reviewed and Nursing notes reviewed                Airway   Mallampati: II  TM distance: >3 FB  Neck ROM: full  No difficulty expected  Dental - normal exam   (+) poor dentition    Pulmonary - normal exam   (+) sleep apnea,   Cardiovascular - normal exam    (+) hypertension, hyperlipidemia,       Neuro/Psych- negative ROS  GI/Hepatic/Renal/Endo    (+)  GI bleeding , liver disease history of elevated LFT, renal disease (HYDRONEPHROSIS) CRI,     ROS Comment: RECTAL MASS S/P LAPAROSCOPIC COLOSTOMY    Musculoskeletal (-) negative ROS    Abdominal  - normal exam    Bowel sounds: normal.   Substance History - negative use     OB/GYN negative ob/gyn ROS         Other      history of cancer (HODGKINS LYMPHOMA)                    Anesthesia Plan    ASA 3     general     (PROPOFOL)  intravenous induction     Anesthetic plan, risks, benefits, and alternatives have been provided, discussed and informed consent has been obtained with: patient.    Plan discussed with CRNA.        CODE STATUS:    Level Of Support Discussed With: Patient  Code Status (Patient has no pulse and is not breathing): CPR (Attempt to Resuscitate)  Medical Interventions (Patient has pulse or is breathing): Full Support

## 2022-10-12 NOTE — ANESTHESIA POSTPROCEDURE EVALUATION
Patient: Abraham Wu    Procedure Summary     Date: 10/12/22 Room / Location:  [facility]    Anesthesia Start: 1030 Anesthesia Stop: 1056    Procedure: ESOPHAGOGASTRODUODENOSCOPY Diagnosis:       Acute upper GI bleed      (Acute upper GI bleed [K92.2])    Surgeons: Brunner, Mark I, MD Provider: Freeman Hennessy MD    Anesthesia Type: general ASA Status: 3          Anesthesia Type: general    Vitals  No vitals data found for the desired time range.    /75  T 97F  RR 12  SpO2 95% 2L NC  HR 93 SR      Post Anesthesia Care and Evaluation    Patient location during evaluation: ICU  Patient participation: complete - patient participated  Level of consciousness: sleepy but conscious and responsive to light touch  Pain management: adequate    Airway patency: patent  Anesthetic complications: No anesthetic complications  PONV Status: none  Cardiovascular status: hemodynamically stable and acceptable  Respiratory status: nonlabored ventilation, acceptable and nasal cannula  Hydration status: acceptable

## 2022-10-13 LAB
ABO GROUP BLD: NORMAL
ALBUMIN SERPL-MCNC: 1.8 G/DL (ref 3.5–5.2)
ALBUMIN/GLOB SERPL: 0.9 G/DL
ALP SERPL-CCNC: 213 U/L (ref 39–117)
ALT SERPL W P-5'-P-CCNC: 26 U/L (ref 1–41)
ANION GAP SERPL CALCULATED.3IONS-SCNC: 5 MMOL/L (ref 5–15)
AST SERPL-CCNC: 41 U/L (ref 1–40)
BASOPHILS # BLD AUTO: 0.04 10*3/MM3 (ref 0–0.2)
BASOPHILS NFR BLD AUTO: 0.9 % (ref 0–1.5)
BH BB BLOOD EXPIRATION DATE: NORMAL
BH BB BLOOD TYPE BARCODE: 5100
BH BB DISPENSE STATUS: NORMAL
BH BB PRODUCT CODE: NORMAL
BH BB UNIT NUMBER: NORMAL
BILIRUB SERPL-MCNC: 2 MG/DL (ref 0–1.2)
BLD GP AB SCN SERPL QL: NEGATIVE
BUN SERPL-MCNC: 16 MG/DL (ref 8–23)
BUN/CREAT SERPL: 55.2 (ref 7–25)
CALCIUM SPEC-SCNC: 7.5 MG/DL (ref 8.6–10.5)
CHLORIDE SERPL-SCNC: 105 MMOL/L (ref 98–107)
CO2 SERPL-SCNC: 26 MMOL/L (ref 22–29)
CREAT SERPL-MCNC: 0.29 MG/DL (ref 0.76–1.27)
CROSSMATCH INTERPRETATION: NORMAL
DEPRECATED RDW RBC AUTO: 49 FL (ref 37–54)
DOHLE BODIES: PRESENT
EGFRCR SERPLBLD CKD-EPI 2021: 129.3 ML/MIN/1.73
EOSINOPHIL # BLD AUTO: 0 10*3/MM3 (ref 0–0.4)
EOSINOPHIL NFR BLD AUTO: 0 % (ref 0.3–6.2)
ERYTHROCYTE [DISTWIDTH] IN BLOOD BY AUTOMATED COUNT: 16.2 % (ref 12.3–15.4)
GLOBULIN UR ELPH-MCNC: 1.9 GM/DL
GLUCOSE SERPL-MCNC: 145 MG/DL (ref 65–99)
HCT VFR BLD AUTO: 21.3 % (ref 37.5–51)
HCT VFR BLD AUTO: 21.6 % (ref 37.5–51)
HCT VFR BLD AUTO: 21.8 % (ref 37.5–51)
HCT VFR BLD AUTO: 22.2 % (ref 37.5–51)
HCT VFR BLD AUTO: 23.2 % (ref 37.5–51)
HGB BLD-MCNC: 7.3 G/DL (ref 13–17.7)
HGB BLD-MCNC: 7.3 G/DL (ref 13–17.7)
HGB BLD-MCNC: 7.4 G/DL (ref 13–17.7)
HGB BLD-MCNC: 7.4 G/DL (ref 13–17.7)
HGB BLD-MCNC: 7.9 G/DL (ref 13–17.7)
IMM GRANULOCYTES # BLD AUTO: 0.66 10*3/MM3 (ref 0–0.05)
IMM GRANULOCYTES NFR BLD AUTO: 15 % (ref 0–0.5)
LDH SERPL-CCNC: 291 U/L (ref 135–225)
LYMPHOCYTES # BLD AUTO: 0.14 10*3/MM3 (ref 0.7–3.1)
LYMPHOCYTES NFR BLD AUTO: 3.2 % (ref 19.6–45.3)
MAGNESIUM SERPL-MCNC: 1.9 MG/DL (ref 1.6–2.4)
MCH RBC QN AUTO: 28.3 PG (ref 26.6–33)
MCHC RBC AUTO-ENTMCNC: 34.3 G/DL (ref 31.5–35.7)
MCV RBC AUTO: 82.6 FL (ref 79–97)
MONOCYTES # BLD AUTO: 0.02 10*3/MM3 (ref 0.1–0.9)
MONOCYTES NFR BLD AUTO: 0.5 % (ref 5–12)
NEUTROPHILS NFR BLD AUTO: 3.55 10*3/MM3 (ref 1.7–7)
NEUTROPHILS NFR BLD AUTO: 80.4 % (ref 42.7–76)
NEUTS HYPERSEG # BLD: NORMAL 10*3/UL
NRBC BLD AUTO-RTO: 0 /100 WBC (ref 0–0.2)
PHOSPHATE SERPL-MCNC: 2.6 MG/DL (ref 2.5–4.5)
PLAT MORPH BLD: NORMAL
PLATELET # BLD AUTO: 138 10*3/MM3 (ref 140–450)
PMV BLD AUTO: 9.7 FL (ref 6–12)
POTASSIUM SERPL-SCNC: 3.6 MMOL/L (ref 3.5–5.2)
PROT SERPL-MCNC: 3.7 G/DL (ref 6–8.5)
RBC # BLD AUTO: 2.58 10*6/MM3 (ref 4.14–5.8)
RBC MORPH BLD: NORMAL
RH BLD: POSITIVE
SODIUM SERPL-SCNC: 136 MMOL/L (ref 136–145)
T&S EXPIRATION DATE: NORMAL
TOXIC GRANULATION: NORMAL
UNIT  ABO: NORMAL
UNIT  RH: NORMAL
URATE SERPL-MCNC: 1.1 MG/DL (ref 3.4–7)
WBC NRBC COR # BLD: 4.41 10*3/MM3 (ref 3.4–10.8)

## 2022-10-13 PROCEDURE — 86900 BLOOD TYPING SEROLOGIC ABO: CPT | Performed by: INTERNAL MEDICINE

## 2022-10-13 PROCEDURE — 84100 ASSAY OF PHOSPHORUS: CPT | Performed by: INTERNAL MEDICINE

## 2022-10-13 PROCEDURE — 84550 ASSAY OF BLOOD/URIC ACID: CPT | Performed by: INTERNAL MEDICINE

## 2022-10-13 PROCEDURE — 99233 SBSQ HOSP IP/OBS HIGH 50: CPT | Performed by: INTERNAL MEDICINE

## 2022-10-13 PROCEDURE — 86900 BLOOD TYPING SEROLOGIC ABO: CPT

## 2022-10-13 PROCEDURE — 85018 HEMOGLOBIN: CPT | Performed by: INTERNAL MEDICINE

## 2022-10-13 PROCEDURE — 85014 HEMATOCRIT: CPT | Performed by: INTERNAL MEDICINE

## 2022-10-13 PROCEDURE — 25010000002 FILGRASTIM PER 480 MCG: Performed by: INTERNAL MEDICINE

## 2022-10-13 PROCEDURE — P9016 RBC LEUKOCYTES REDUCED: HCPCS

## 2022-10-13 PROCEDURE — 0 MAGNESIUM SULFATE 4 GM/100ML SOLUTION

## 2022-10-13 PROCEDURE — 85007 BL SMEAR W/DIFF WBC COUNT: CPT | Performed by: INTERNAL MEDICINE

## 2022-10-13 PROCEDURE — 36430 TRANSFUSION BLD/BLD COMPNT: CPT

## 2022-10-13 PROCEDURE — 85025 COMPLETE CBC W/AUTO DIFF WBC: CPT | Performed by: INTERNAL MEDICINE

## 2022-10-13 PROCEDURE — 85014 HEMATOCRIT: CPT

## 2022-10-13 PROCEDURE — 99232 SBSQ HOSP IP/OBS MODERATE 35: CPT | Performed by: INTERNAL MEDICINE

## 2022-10-13 PROCEDURE — 25010000002 ALBUMIN HUMAN 25% PER 50 ML: Performed by: INTERNAL MEDICINE

## 2022-10-13 PROCEDURE — 86850 RBC ANTIBODY SCREEN: CPT | Performed by: INTERNAL MEDICINE

## 2022-10-13 PROCEDURE — 86923 COMPATIBILITY TEST ELECTRIC: CPT

## 2022-10-13 PROCEDURE — 85018 HEMOGLOBIN: CPT

## 2022-10-13 PROCEDURE — 83615 LACTATE (LD) (LDH) ENZYME: CPT | Performed by: INTERNAL MEDICINE

## 2022-10-13 PROCEDURE — 86901 BLOOD TYPING SEROLOGIC RH(D): CPT | Performed by: INTERNAL MEDICINE

## 2022-10-13 PROCEDURE — 83735 ASSAY OF MAGNESIUM: CPT | Performed by: INTERNAL MEDICINE

## 2022-10-13 PROCEDURE — 80053 COMPREHEN METABOLIC PANEL: CPT | Performed by: INTERNAL MEDICINE

## 2022-10-13 PROCEDURE — 25010000002 FLUCONAZOLE PER 200 MG: Performed by: INTERNAL MEDICINE

## 2022-10-13 PROCEDURE — P9047 ALBUMIN (HUMAN), 25%, 50ML: HCPCS | Performed by: INTERNAL MEDICINE

## 2022-10-13 RX ORDER — MAGNESIUM SULFATE HEPTAHYDRATE 40 MG/ML
4 INJECTION, SOLUTION INTRAVENOUS AS NEEDED
Status: DISCONTINUED | OUTPATIENT
Start: 2022-10-13 | End: 2022-10-13

## 2022-10-13 RX ORDER — MAGNESIUM SULFATE HEPTAHYDRATE 40 MG/ML
2 INJECTION, SOLUTION INTRAVENOUS AS NEEDED
Status: DISCONTINUED | OUTPATIENT
Start: 2022-10-13 | End: 2022-10-13

## 2022-10-13 RX ORDER — ALBUMIN (HUMAN) 12.5 G/50ML
25 SOLUTION INTRAVENOUS ONCE
Status: COMPLETED | OUTPATIENT
Start: 2022-10-13 | End: 2022-10-13

## 2022-10-13 RX ORDER — BUMETANIDE 0.25 MG/ML
1 INJECTION INTRAMUSCULAR; INTRAVENOUS EVERY 12 HOURS
Status: COMPLETED | OUTPATIENT
Start: 2022-10-13 | End: 2022-10-14

## 2022-10-13 RX ADMIN — MAGNESIUM SULFATE HEPTAHYDRATE 4 G: 40 INJECTION, SOLUTION INTRAVENOUS at 04:11

## 2022-10-13 RX ADMIN — ALBUMIN (HUMAN) 25 G: 0.25 INJECTION, SOLUTION INTRAVENOUS at 12:41

## 2022-10-13 RX ADMIN — Medication 10 ML: at 08:50

## 2022-10-13 RX ADMIN — BUMETANIDE 1 MG: 0.25 INJECTION, SOLUTION INTRAMUSCULAR; INTRAVENOUS at 12:41

## 2022-10-13 RX ADMIN — PANTOPRAZOLE SODIUM 40 MG: 40 INJECTION, POWDER, FOR SOLUTION INTRAVENOUS at 08:50

## 2022-10-13 RX ADMIN — POTASSIUM CHLORIDE 40 MEQ: 750 CAPSULE, EXTENDED RELEASE ORAL at 04:11

## 2022-10-13 RX ADMIN — FILGRASTIM 480 MCG: 480 INJECTION, SOLUTION INTRAVENOUS; SUBCUTANEOUS at 18:32

## 2022-10-13 RX ADMIN — FLUTICASONE PROPIONATE 1 SPRAY: 50 SPRAY, METERED NASAL at 08:50

## 2022-10-13 RX ADMIN — TAMSULOSIN HYDROCHLORIDE 0.4 MG: 0.4 CAPSULE ORAL at 08:50

## 2022-10-13 RX ADMIN — Medication 10 ML: at 20:00

## 2022-10-13 RX ADMIN — PANTOPRAZOLE SODIUM 40 MG: 40 INJECTION, POWDER, FOR SOLUTION INTRAVENOUS at 17:11

## 2022-10-13 RX ADMIN — FLUCONAZOLE 100 MG: 2 INJECTION, SOLUTION INTRAVENOUS at 08:49

## 2022-10-13 NOTE — PROGRESS NOTES
Clinical Nutrition     Nutrition Assessment  Reason for Visit:   Follow-up protocol      Patient Name: Abraham Wu  YOB: 1950  MRN: [id number]  Date of Encounter: 10/13/22 08:59 EDT  Admission date: 10/3/2022      Comments:  Patient and family asking about diet advancement.        Admission Diagnosis    Hydronephrosis [N13.30]     Hospital Problem List    Hodgkin lymphoma (HCC)    Rectal mass s/p laparoscopic colostomy (9/2022)    Essential hypertension    Hydronephrosis    Febrile illness    Hypokalemia    Acute upper GI bleed    Melena    Moderate malnutrition (HCC)    Esophageal candidiasis (HCC)    Anemia    Other Applicable: recent colostomy     Applicable Interval History:  10/8 chemotherapy started.  10/9 [facility] [place] rec Reg texture thin liquid  10/10 EGD - ulcer identified; candidiasis in the esophagus.   10/11 moved to the ICU due to on going bleeding.   10/12     Applicable PMH/PSxH:   (9/21-9/24) recent admission for diarrhea and weight loss - + E. Coli   PMH: He  has a past medical history of benign polypoid tissue right lung, Hyperlipidemia, Hypertension, Mycobacterium mucogenicum, SHERRI (obstructive sleep apnea), and Seasonal allergies.   PSxH: He  has a past surgical history that includes Bronchoscopy; Colostomy (N/A, 9/22/2022); exam under anesthesia, rectal biopsy (N/A, 10/4/2022); and Esophagogastroduodenoscopy (N/A, 10/10/2022).         Diet/Nutrition Related History:     Family/pt confirm wt of 172 lbs Rpt intake has been depressed for protracted time. Rpt pt refuses use of suppl      Labs reviewed   Yes  Low serum Na   Results from last 7 days   Lab Units 10/13/22  0012 10/12/22  0924 10/11/22  0223 10/09/22  2323 10/09/22  0202   GLUCOSE mg/dL 145* 145* 127* 161* 154*   BUN mg/dL 16 18 14 21 12   CREATININE mg/dL 0.29* 0.33* 0.39* 0.44* 0.43*   SODIUM mmol/L 136 137 134* 129* 133*   CHLORIDE mmol/L 105 104 99 96* 100   POTASSIUM mmol/L 3.6 3.5 3.7 3.9 4.0  "  PHOSPHORUS mg/dL 2.6  --   --   --   --    MAGNESIUM mg/dL 1.9  --   --  2.1  --    ALT (SGPT) U/L 26  --   --  52* 80*     Results from last 7 days   Lab Units 10/13/22  0012 10/09/22  2323 10/09/22  0202   ALBUMIN g/dL 1.80* 2.20* 2.10*            Results from last 7 days   Lab Units 10/09/22  2310   GLUCOSE mg/dL 154*       Lab Results   Lab Value Date/Time    HGBA1C 5.50 09/21/2022 0410       Medications reviewed   Yes  Allopurinol, Protonix, Percolace, 3g Na tablets   LR @ 125    Intake/Ouptut 24 hrs reviewed   Yes  Intake & Output (last day)       10/12 0701  10/13 0700 10/13 0701  10/14 0700    P.O.      I.V. (mL/kg) 750 (9.6)     Blood 976.3     IV Piggyback      Total Intake(mL/kg) 1726.3 (22.1)     Urine (mL/kg/hr) 850 (0.5) 100 (0.7)    Stool 1425     Total Output 2275 100    Net -548.7 -100          Urine Unmeasured Occurrence 2 x         Anthropometrics     Flowsheet Rows    Flowsheet Row First Filed Value   Admission Height 162.6 cm (64\") Documented at 10/03/2022 0604   Admission Weight 78 kg (172 lb) Documented at 10/03/2022 0604          Height: 162.6 cm (64\")    Last filed wt: Weight: 78 kg (172 lb) (10/03/22 0604)  Weight Method: Stated  Confirmed per pt / family    BMI: BMI (Calculated): 29.5  Overweight: 25.0-29.9kg/m2     Ideal Body Weight (IBW) (kg): 59.72    Weight Change   UBW: (1/22) 212; (4/4/22) 186 lbs   Weight change: 11.4% x 6 months     Weight Weight (kg) Weight (lbs) Weight Method   9/20/2022 76.658 kg 169 lb Stated   9/20/2022 76.749 kg 169 lb 3.2 oz Bed scale   9/22/2022 76.658 kg 169 lb    9/24/2022 84.868 kg 187 lb 1.6 oz Bed scale   10/3/2022 78.019 kg 172 lb Stated     Current Nutrition Prescription     PO: NPO Diet NPO Type: Sips with Meds    Intake:  No intake documented since last review.      Nutrition Diagnosis     10/10  Problem Malnutrition  non severe chronic (severe if wt loss avg over 6 mo accepted   Etiology Effect tx on appetite   Signs/Symptoms Intake hx w wasting "   Status:    10/13  Problem inadequate nutrition intake    Etiology Clinical condition; diet order    Signs/Symptoms NPO for multiple procedures; overall poor appetite      Goal:   General: Nutrition to support treatment  PO: Advace diet as medically feasible/appropriate  Patient asking for clear liquid diet   Additional goals:      Nutrition Intervention     Follow treatment progress, Care plan reviewed   Recommend start clear liquid diet and advance as tolerated.         Monitoring/Evaluation:   Per protocol, I&O, PO intake, Pertinent labs, Weight, Skin status, GI status, Symptoms        Gosia Benítez RD,   Time Spent: 35 min

## 2022-10-13 NOTE — PROGRESS NOTES
"HEMATOLOGY/ONCOLOGY PROGRESS NOTE    S: He is feeling somewhat better today.  He complains of dry mouth and is hoping to be able to come off of n.p.o. status soon.  He denies any nausea or abdominal pain.  Denies any tightness, chest pain or shortness of breath. His wife and son are at bedside.  He underwent coil embolization of the right gastroepiploic artery, superior pancreaticoduodenal artery and main trunk of the gastroduodenal artery yesterday.   Review of Systems:    Review of Systems   Constitutional: Positive for fatigue. Negative for fever.   HENT: Negative for facial swelling, sore throat and trouble swallowing.    Eyes: Negative for visual disturbance.   Respiratory: Negative for chest tightness and shortness of breath.    Cardiovascular: Negative for chest pain and palpitations.   Gastrointestinal: Positive for blood in stool. Negative for abdominal pain, diarrhea, nausea and vomiting.   Genitourinary: Negative for flank pain and hematuria.   Musculoskeletal: Negative for back pain and joint swelling.   Skin: Negative for rash.   Neurological: Positive for weakness. Negative for dizziness, tremors and speech difficulty.   Psychiatric/Behavioral: Negative for confusion.      Medications:  The current medication list was reviewed in the EMR    ALLERGIES:  No Known Allergies      Physical Exam    VITAL SIGNS:  /66   Pulse 93   Temp 98.4 °F (36.9 °C) (Axillary)   Resp 22   Ht 162.6 cm (64\")   Wt 78 kg (172 lb)   SpO2 99%   BMI 29.52 kg/m²   Temp:  [97.6 °F (36.4 °C)-98.8 °F (37.1 °C)] 98.4 °F (36.9 °C)      Performance Status:                Physical Exam    General: well appearing, in no acute distress   HEENT: sclerae anicteric, mucous membranes dry, neck is supple  Lymphatics: no cervical, supraclavicular, or axillary adenopathy  Cardiovascular: regular rate and rhythm, no murmurs, rubs or gallops  Lungs: clear to auscultation bilaterally  Abdomen: soft, nontender, nondistended.  Ostomy bag " with large volume of melanotic soft stool  Extremities: no lower extremity edema  Skin: no rashes, lesions, bruising, or petechiae  Psych: Mood is stable        RECENT LABS:    Lab Results   Component Value Date    HGB 7.3 (L) 10/13/2022    HCT 21.6 (L) 10/13/2022    MCV 82.6 10/13/2022     (L) 10/13/2022    WBC 4.41 10/13/2022    NEUTROABS 3.55 10/13/2022    LYMPHSABS 0.14 (L) 10/13/2022    MONOSABS 0.02 (L) 10/13/2022    EOSABS 0.00 10/13/2022    BASOSABS 0.04 10/13/2022       Lab Results   Component Value Date    GLUCOSE 145 (H) 10/13/2022    BUN 16 10/13/2022    CREATININE 0.29 (L) 10/13/2022     10/13/2022    K 3.6 10/13/2022     10/13/2022    CO2 26.0 10/13/2022    CALCIUM 7.5 (L) 10/13/2022    PROTEINTOT 3.7 (L) 10/13/2022    ALBUMIN 1.80 (L) 10/13/2022    BILITOT 2.0 (H) 10/13/2022    ALKPHOS 213 (H) 10/13/2022    AST 41 (H) 10/13/2022    ALT 26 10/13/2022         Assessment/Plan  72-year-old female with CD30 positive classic Hodgkin lymphoma involving the rectum, perinephric, liver, and.  He is status post laparoscopic colostomy and biopsy.  He was started on inpatient chemotherapy on 10/8/2022.  Course has been complicated by a GI bleed.  He is status post coil artery embolization 1012.    Hodgkin lymphoma  Status post cycle 1 AVVD on 10/8/22  Continue daily Neupogen x 5 days.    Continue to monitor for adverse reactions of treatment  -Add uric acid and LDH to a.m. labs.  -Family updated about ongoing plans for treatment that will be after resolution of acute illness.    Anemia   Secondary to malignancy, recent chemotherapy/marrow suppression, GI bleed  -Now status post coil embolization and continued ICU support for close monitoring.  Currently receiving red blood cell transfusion.      Bilateral hydronephrosis  Secondary to malignancy  Creatinine has remained normal.    Malnutrition  Low appetite  Secondary to malignancy and recent treatment  Trial of boost clear as he was not able to  tolerate traditional boost.  He is currently n.p.o. for GI bleed.    Kaycee Leary MD  [facility]    10/13/2022     Time spent was 35 minutes with 50% in direct counseling of patient and his family

## 2022-10-13 NOTE — PROGRESS NOTES
"   LOS: 10 days    Patient Care Team:  Jatinder Haynes MD as PCP - General (Family Medicine)  Hyponatremia follow up     Subjective     Interval History:   No new events no added distress.  Sodium improved.    Review of Systems:   Appetite remains poor.  Patient denies shortness of breath, chest pain, dysuria, hematuria, nausea, vomiting.        Objective     Vital Sign Min/Max for last 24 hours  Temp  Min: 97.6 °F (36.4 °C)  Max: 98.8 °F (37.1 °C)   BP  Min: 105/69  Max: 151/79   Pulse  Min: 75  Max: 111   Resp  Min: 14  Max: 28   SpO2  Min: 89 %  Max: 100 %   Flow (L/min)  Min: 4  Max: 5   No data recorded     Flowsheet Rows    Flowsheet Row First Filed Value   Admission Height 162.6 cm (64\") Documented at 10/03/2022 0604   Admission Weight 78 kg (172 lb) Documented at 10/03/2022 0604          I/O this shift:  In: -   Out: 100 [Urine:100]  I/O last 3 completed shifts:  In: 2628.2 [P.O.:240; I.V.:750; Blood:1638.2]  Out: 3025 [Urine:1575; Stool:1450]    Physical Exam:  General Appearance: Alert, oriented, no obvious distress.  Sick looking  male eyes: PER, EOMI.  Neck: Supple no JVD.  Lungs: Clear auscultation, no rales rhonchi's, equal chest movement, nonlabored.  Heart: No gallop, murmur, rub, RRR.  Abdomen: Obesity soft, nontender, positive bowel sounds, no organomegaly.  Extremities: Positive 2+ upper and lower extremity edema noted.  No cyanosis.  Neuro: No focal deficit, moving all extremities, alert oriented X 3  Skin: Warm and dry.  Erythematous.   no suprapubic fullness or tenderness.      WBC WBC   Date Value Ref Range Status   10/13/2022 4.41 3.40 - 10.80 10*3/mm3 Final   10/12/2022 8.78 3.40 - 10.80 10*3/mm3 Final   10/12/2022 9.42 3.40 - 10.80 10*3/mm3 Final   10/11/2022 13.23 (H) 3.40 - 10.80 10*3/mm3 Final      HGB Hemoglobin   Date Value Ref Range Status   10/13/2022 7.3 (L) 13.0 - 17.7 g/dL Final   10/13/2022 7.3 (L) 13.0 - 17.7 g/dL Final   10/12/2022 7.8 (L) 13.0 - 17.7 g/dL Final "   10/12/2022 7.9 (L) 13.0 - 17.7 g/dL Final   10/12/2022 7.7 (L) 13.0 - 17.7 g/dL Final   10/12/2022 7.6 (L) 13.0 - 17.7 g/dL Final   10/12/2022 6.2 (C) 13.0 - 17.7 g/dL Final   10/12/2022 6.2 (C) 13.0 - 17.7 g/dL Final   10/11/2022 6.2 (C) 13.0 - 17.7 g/dL Final   10/11/2022 7.3 (L) 13.0 - 17.7 g/dL Final   10/11/2022 7.4 (L) 13.0 - 17.7 g/dL Final   10/11/2022 7.3 (L) 13.0 - 17.7 g/dL Final   10/11/2022 7.4 (L) 13.0 - 17.7 g/dL Final   10/10/2022 6.5 (C) 13.0 - 17.7 g/dL Final   10/10/2022 6.6 (C) 13.0 - 17.7 g/dL Final      HCT Hematocrit   Date Value Ref Range Status   10/13/2022 21.6 (L) 37.5 - 51.0 % Final   10/13/2022 21.3 (L) 37.5 - 51.0 % Final   10/12/2022 22.8 (L) 37.5 - 51.0 % Final   10/12/2022 22.9 (L) 37.5 - 51.0 % Final   10/12/2022 22.5 (L) 37.5 - 51.0 % Final   10/12/2022 22.5 (L) 37.5 - 51.0 % Final   10/12/2022 18.7 (C) 37.5 - 51.0 % Final   10/12/2022 18.8 (C) 37.5 - 51.0 % Final   10/11/2022 18.3 (C) 37.5 - 51.0 % Final   10/11/2022 21.9 (L) 37.5 - 51.0 % Final   10/11/2022 22.4 (L) 37.5 - 51.0 % Final   10/11/2022 22.2 (L) 37.5 - 51.0 % Final   10/11/2022 22.4 (L) 37.5 - 51.0 % Final   10/10/2022 19.5 (C) 37.5 - 51.0 % Final   10/10/2022 20.4 (C) 37.5 - 51.0 % Final      Platlets No results found for: LABPLAT   MCV MCV   Date Value Ref Range Status   10/13/2022 82.6 79.0 - 97.0 fL Final   10/12/2022 83.6 79.0 - 97.0 fL Final   10/12/2022 87.4 79.0 - 97.0 fL Final   10/11/2022 87.2 79.0 - 97.0 fL Final          Sodium Sodium   Date Value Ref Range Status   10/13/2022 136 136 - 145 mmol/L Final   10/12/2022 137 136 - 145 mmol/L Final   10/11/2022 134 (L) 136 - 145 mmol/L Final      Potassium Potassium   Date Value Ref Range Status   10/13/2022 3.6 3.5 - 5.2 mmol/L Final   10/12/2022 3.5 3.5 - 5.2 mmol/L Final   10/11/2022 3.7 3.5 - 5.2 mmol/L Final      Chloride Chloride   Date Value Ref Range Status   10/13/2022 105 98 - 107 mmol/L Final   10/12/2022 104 98 - 107 mmol/L Final   10/11/2022 99 98  - 107 mmol/L Final      CO2 CO2   Date Value Ref Range Status   10/13/2022 26.0 22.0 - 29.0 mmol/L Final   10/12/2022 27.0 22.0 - 29.0 mmol/L Final   10/11/2022 27.0 22.0 - 29.0 mmol/L Final      BUN BUN   Date Value Ref Range Status   10/13/2022 16 8 - 23 mg/dL Final   10/12/2022 18 8 - 23 mg/dL Final   10/11/2022 14 8 - 23 mg/dL Final      Creatinine Creatinine   Date Value Ref Range Status   10/13/2022 0.29 (L) 0.76 - 1.27 mg/dL Final   10/12/2022 0.33 (L) 0.76 - 1.27 mg/dL Final   10/11/2022 0.39 (L) 0.76 - 1.27 mg/dL Final      Calcium Calcium   Date Value Ref Range Status   10/13/2022 7.5 (L) 8.6 - 10.5 mg/dL Final   10/12/2022 7.6 (L) 8.6 - 10.5 mg/dL Final   10/11/2022 7.8 (L) 8.6 - 10.5 mg/dL Final      PO4 No results found for: CAPO4   Albumin Albumin   Date Value Ref Range Status   10/13/2022 1.80 (L) 3.50 - 5.20 g/dL Final      Magnesium Magnesium   Date Value Ref Range Status   10/13/2022 1.9 1.6 - 2.4 mg/dL Final      Uric Acid No results found for: URICACID        Results Review:     I reviewed the patient's new clinical results.    filgrastim (NEUPOGEN) injection, 480 mcg, Subcutaneous, Q PM  fluconazole, 100 mg, Intravenous, Daily  fluticasone, 1 spray, Nasal, Daily  pantoprazole, 40 mg, Intravenous, BID AC  sodium chloride, 10 mL, Intravenous, Q12H  tamsulosin, 0.4 mg, Oral, Daily           Medication Review:     Assessment & Plan       Hodgkin lymphoma (HCC)    Rectal mass s/p laparoscopic colostomy (9/2022)    Essential hypertension    Hydronephrosis    Febrile illness    Hypokalemia    Acute upper GI bleed    Melena    Moderate malnutrition (HCC)    Esophageal candidiasis (HCC)    1- Hyponatremia - Serum osmolality 263, Urine osmolality 115 and urine sodium< 20. Suggest of poor oral solute intake vs polydipsia . Uric acid low. Improving.   2- Rectal mass   3-Hypokalemia - resolved  4. Anemia: Transfuse at hb<7.     Plan:  - Fluid restriction 1.2 lit/day   - Continue with salt tabs 3 g  TID  Significant edema is noted we will add diuretics plus IV albumin  Case discussed with patient's family and patient    Jessica Espinoza MD  10/13/22  11:50 EDT

## 2022-10-13 NOTE — PROGRESS NOTES
"GI Daily Progress Note  Subjective:    Chief Complaint: Follow-up bleeding duodenal ulcer.    Patient feels stronger today.  His appetite remains poor.  Denies abdominal pain or nausea.  Continues to have melena per ostomy.    Objective:    /53   Pulse 97   Temp 98.3 °F (36.8 °C) (Axillary)   Resp 20   Ht 162.6 cm (64\")   Wt 78 kg (172 lb)   SpO2 98%   BMI 29.52 kg/m²     Physical Exam  Constitutional:       General: He is not in acute distress.     Appearance: He is ill-appearing. He is not toxic-appearing or diaphoretic.   HENT:      Head: Normocephalic.      Nose: Nose normal. No congestion.      Mouth/Throat:      Mouth: Mucous membranes are moist.      Pharynx: No oropharyngeal exudate.   Eyes:      General: No scleral icterus.     Conjunctiva/sclera: Conjunctivae normal.   Cardiovascular:      Rate and Rhythm: Normal rate.      Pulses: Normal pulses.   Pulmonary:      Effort: Pulmonary effort is normal. No respiratory distress.      Breath sounds: No stridor. No wheezing or rales.   Abdominal:      General: Bowel sounds are normal. There is no distension.      Palpations: Abdomen is soft.      Tenderness: There is no abdominal tenderness. There is no guarding.      Comments: Thin melenic stool in ostomy bag.   Genitourinary:     Comments: Deferred  Musculoskeletal:      Cervical back: Normal range of motion.      Right lower leg: Edema present.      Left lower leg: Edema present.   Skin:     General: Skin is warm and dry.      Capillary Refill: Capillary refill takes less than 2 seconds.      Coloration: Skin is not jaundiced or pale.      Findings: No erythema or rash.      Comments: Less pale than yesterday.   Neurological:      General: No focal deficit present.      Mental Status: He is alert and oriented to person, place, and time.   Psychiatric:         Mood and Affect: Mood normal.         Behavior: Behavior normal.         Lab  Lab Results   Component Value Date    WBC 4.41 10/13/2022    " HGB 7.4 (L) 10/13/2022    HGB 7.4 (L) 10/13/2022    HGB 7.3 (L) 10/13/2022    MCV 82.6 10/13/2022     (L) 10/13/2022       Lab Results   Component Value Date    GLUCOSE 145 (H) 10/13/2022    BUN 16 10/13/2022    CREATININE 0.29 (L) 10/13/2022    BCR 55.2 (H) 10/13/2022     10/13/2022    K 3.6 10/13/2022    CO2 26.0 10/13/2022    CALCIUM 7.5 (L) 10/13/2022    ALBUMIN 1.80 (L) 10/13/2022    ALKPHOS 213 (H) 10/13/2022    BILITOT 2.0 (H) 10/13/2022    ALT 26 10/13/2022    AST 41 (H) 10/13/2022       Assessment:    1.  Acute duodenal ulcer in the second portion duodenum, with hemorrhage.  Failed endoscopic intervention with Ovesco clip.  Postop day 1 GDA embolization.  Continues to have clinical signs of hemorrhage, and ongoing transfusion requirements.  10 units total transfusion since admission.  2.  Acute blood loss anemia, ongoing.  3.  Severe esophageal candidiasis, improved on repeat endoscopy yesterday.  4.  Widespread Hodgkin's lymphoma.      Plan:    >> Continue high-dose PPI.  >> Continue to transfuse as needed.  >> Discussed with IR, Dr. Lopez. Further embolization from SMA territory risks duodenal ischemia.  Plan nuclear medicine tagged RBC scan tomorrow morning, to rule out other sites of bleeding in the gut.  Pending these findings, will consider repeat endoscopic intervention while in interventional radiology, and if significant bleeding occurs immediately proceed with further angiographic embolization.    Mark I. Brunner, MD  10/13/22  18:30 EDT

## 2022-10-13 NOTE — PLAN OF CARE
Goal Outcome Evaluation:           Progress: no change  Outcome Evaluation: Pt was receiving 1 unit of PRBCs at shift change. No transfusion reaction noted.H&H checked 1 hr after completion- 7.8/22.8. APRN notified. Rechecked H&H at 0000- 7.3/21.3. APRN notified. Rechecked H&H at 0400- 7.3/21.6. APRN notified. H&H to be checked q4hr. VSS on 4L. Afebrile. [place] output 825ml black/dark red with clots. UOP adequate. Replacing potassium and magnesium. Pt rested well throughout the night with no complaints.

## 2022-10-13 NOTE — PLAN OF CARE
Goal Outcome Evaluation:      VSS. 2L NC. Family @ bedside and updated. Continuing to monitor H&H's. Hemo levels 7.4 from 7.4 after 2 units of PRBCs. Additional 3rd unit infusing currently. Spoke to MD Brunner and pt may go for a scope if bleeding continues. Pt will be NPO at midnight in anticipation for procedure. Repeat H&H to be completed after 3rd unit is complete.

## 2022-10-13 NOTE — PROGRESS NOTES
INTENSIVIST   PROGRESS NOTE        SUBJECTIVE     Abraham 72 y.o. male is followed for: Fever       GIB    Hodgkin lymphoma (HCC)    Rectal mass s/p laparoscopic colostomy (2022)    Esophageal candidiasis (HCC)    As an Intensivist, we provide an integrated approach to the ICU patient and family, medical management of comorbid conditions, including but not limited to electrolytes, glycemic control, organ dysfunction, lead interdisciplinary rounds and coordinate the care with all other services, including those from other specialists.     Interval History:  POD: 1 Day Post-Op (EGD)    Comfortable this morning.    He underwent EGD and IR embolizatio yesterday.    The patient has started, but not completed, their COVID-19 vaccination series.     Temp  Min: 98.1 °F (36.7 °C)  Max: 98.8 °F (37.1 °C)       History     Last Reviewed by Freeman Hennessy MD on 10/12/2022 at 10:09 AM    Sections Reviewed    Medical, Surgical, Tobacco, Alcohol, Drug Use, Family      Problem list reviewed by Tomy Morales MD on 10/12/2022 at  7:59 AM  Medicines reviewed by Tomy Morales MD on 10/12/2022 at  7:59 AM  Allergies reviewed by Tomy Morales MD on 10/12/2022 at  7:59 AM       The patient's relevant past medical, surgical and social history were reviewed and updated in Epic as appropriate.        OBJECTIVE     Vitals:  Temp: 98.4 °F (36.9 °C) (10/13/22 1247) Temp  Min: 98.1 °F (36.7 °C)  Max: 98.8 °F (37.1 °C)   Temp core:      BP: 133/63 (10/13/22 1530) BP  Min: 105/69  Max: 151/79   MAP (non-invasive) Noninvasive MAP (mmHg): 87 (10/13/22 1530) Noninvasive MAP (mmHg)  Av.8  Min: 68  Max: 138   Pulse: 91 (10/13/22 1530) Pulse  Min: 83  Max: 106   Resp: 22 (10/13/22 1247) Resp  Min: 14  Max: 22   SpO2: 100 % (10/13/22 1530) SpO2  Min: 89 %  Max: 100 %   Device: nasal cannula (10/13/22 1200)    Flow Rate: 2 (10/13/22 1200) Flow (L/min)  Min: 2  Max: 4         10/03/22  0604   Weight: 78 kg (172 lb)        Intake/Ouptut 24 hrs  (7:00AM - 6:59 AM)  Intake & Output (last 3 days)       10/10 0701  10/11 0700 10/11 0701  10/12 0700 10/12 0701  10/13 0700 10/13 0701  10/14 0700    P.O. 450 240      I.V. (mL/kg)   750 (9.6)     Blood 300 661.9 976.3 663.8    IV Piggyback 100 50      Total Intake(mL/kg) 850 (10.9) 951.9 (12.2) 1726.3 (22.1) 663.8 (8.5)    Urine (mL/kg/hr) 600 (0.3) 725 (0.4) 850 (0.5) 550 (0.8)    Stool      Total Output 1000 1250 2275 550    Net -150 -298.1 -548.7 +113.8            Urine Unmeasured Occurrence 1 x 2 x 2 x 1 x        Physical Examination  Telemetry:  Rhythm: normal sinus rhythm (10/13/22 1400)         Constitutional:  No acute distress.   Cardiovascular: RRR.   Normal heart sounds.  No murmurs, gallop or rub.   Respiratory: Normal breath sounds  No adventitious sounds.   Abdominal:  Soft with no tenderness.  No distension.   No HSM.   Extremities: Warm.  Dry.  No cyanosis.  Edema   Neurological:   Alert, Oriented, Cooperative.  Best Eye Response: 4-->(E4) spontaneous (10/13/22 1400)  Best Motor Response: 6-->(M6) obeys commands (10/13/22 1400)  Best Verbal Response: 5-->(V5) oriented (10/13/22 1400)  Suzanna Coma Scale Score: 15 (10/13/22 1400)     R PICC    Results Reviewed:  Laboratory  Microbiology  Radiology  Pathology    Hematology:  Results from last 7 days   Lab Units 10/13/22  0419 10/13/22  0012 10/12/22  1554 10/12/22  0924 10/09/22  0202 10/08/22  0455   WBC 10*3/mm3  --  4.41  --  8.78   < > 17.02*   HEMOGLOBIN g/dL 7.3* 7.3*   < > 7.6*  7.7*   < > 8.5*   MCV fL  --  82.6  --  83.6   < > 83.8   PLATELETS 10*3/mm3  --  138*  --  160   < > 338   NEUTROS ABS 10*3/mm3  --  3.55  --  8.43*   < > 15.71*   LYMPHS ABS 10*3/mm3  --  0.14*  --   --   --  0.27*   EOS ABS 10*3/mm3  --  0.00  --  0.00   < > 0.00    < > = values in this interval not displayed.       Chemistry:  Estimated Creatinine Clearance: 223.6 mL/min (A) (by C-G formula based on SCr of 0.29 mg/dL (L)).    Results from last 7 days    Lab Units 10/13/22  0012 10/12/22  0924   SODIUM mmol/L 136 137   POTASSIUM mmol/L 3.6 3.5   CHLORIDE mmol/L 105 104   CO2 mmol/L 26.0 27.0   BUN mg/dL 16 18   CREATININE mg/dL 0.29* 0.33*   GLUCOSE mg/dL 145* 145*     Results from last 7 days   Lab Units 10/13/22  0012 10/12/22  0924 10/11/22  0223 10/09/22  2323   CALCIUM mg/dL 7.5* 7.6*   < > 8.4*   MAGNESIUM mg/dL 1.9  --   --  2.1   PHOSPHORUS mg/dL 2.6  --   --   --     < > = values in this interval not displayed.     Results from last 7 days   Lab Units 10/13/22  0012 10/09/22  2323   BILIRUBIN mg/dL 2.0* 0.9   AST (SGOT) U/L 41* 46*   ALT (SGPT) U/L 26 52*   ALK PHOS U/L 213* 322*       Cardiac Labs:  Results from last 7 days   Lab Units 10/09/22  2323   CK TOTAL U/L 15*   TROPONIN T ng/mL <0.010     COVID-19  Lab Results   Component Value Date    COVID19 Not Detected 10/03/2022    COVID19 Not Detected 09/20/2022       Images:  CT Abdomen Pelvis With & Without Contrast    Result Date: 10/11/2022  No evidence of active GI bleeding allowing for limitations of scattered hyperdense material throughout the GI tract, either ingested medication or prior oral contrast. Interval duodenal ulcer clipping, without evidence of active GI bleeding at this site.  No new or acute abdominopelvic findings. Grossly unchanged metastatic disease as detailed on 10/3/2022 CT and please refer to that report for complete details.  This report was finalized on 10/11/2022 6:39 PM by Santino Kaur MD.      IR Artery Embolization Occlusion    Result Date: 10/12/2022   Successful catheterization and angiography of the following: Celiac artery, common hepatic artery, gastroduodenal artery, superior mesenteric artery  Successful embolization of the following: Most proximal aspects of the right gastroepiploic artery, superior pancreaticoduodenal arteries, the main trunk of the gastroduodenal artery.  The embolic agent used in the endpoint are mentioned above.  Thank you for the  opportunity to assist you in the care of your patient.  This report was finalized on 10/12/2022 2:36 PM by Yassine Lopez MD.        Echo:  Results for orders placed during the hospital encounter of 10/03/22    Adult Transthoracic Echo Complete W/ Cont if Necessary Per Protocol    Interpretation Summary  · Left ventricular ejection fraction appears to be 56 - 60%. Left ventricular systolic function is normal.  · Global longitudinal LV strain (GLS) = -27.0%.  · Left atrial volume is moderately increased.  · Mild mitral valve regurgitation is present.  · Mild tricuspid valve regurgitation is present.  · Estimated right ventricular systolic pressure from tricuspid regurgitation is mildly elevated (35-45 mmHg).  · Mild dilation of the ascending aorta is present.      Results: Reviewed.  I reviewed the patient's new laboratory and imaging results.  I independently reviewed the patient's new images.    Medications: Reviewed.    Assessment   A/P     Hospital:  LOS: 10 days   ICU: 1d 21h      Diagnosis   • **Hodgkin lymphoma (HCC) [C81.90]   • Moderate malnutrition (HCC) [E44.0]   • Esophageal candidiasis (HCC) [B37.81]   • Hydronephrosis [N13.30]   • Febrile illness [R50.9]   • Hypernatremia [E87.0]   • Hypokalemia [E87.6]   • Acute upper GI bleed [K92.2]       • Melena [K92.1]       • Essential hypertension [I10]   • Rectal mass s/p laparoscopic colostomy (9/2022) [K62.89]     Abraham is a 72 y.o. male admitted on 10/3/2022 with Hydronephrosis [N13.30]:    Assessment/Management/Treatment Plan:    1. GIB  a. EGD 10/11/22: Severe esophageal candidiasis, D2 bleeding ulcer  b. EGD 10/12/22 2 clean-based duodenal ulcers. In D2 there is an Ovesco clip wilth large clot, and oozing of blood.  c. Angiogram 10/12/22 - GDA embolization  d. Fluconazole  e. Anemia ABL - 9 units PRBCs transufsed since admission.    10/03          10/13      2. Hodgkin Lymphoma  a. Chemotherapy started 10/08/2022 }   Gibran lira. Filgrastim  3. Cardiovascular  a. HTN  b. Dyslipidemia   4. Sleep Medicine  a. SHERRI  5. Renal  a. Hyponatremia} Resolved.  6. Endocrine  a. Body mass index is 29.52 kg/m². Overweight: 25.0-29.9kg/m2   b. No h/o DM    Lab Results   Lab Value Date/Time    HGBA1C 5.50 09/21/2022 0410     Results from last 7 days   Lab Units 10/09/22  2310   GLUCOSE mg/dL 154*       Diet: NPO Diet NPO Type: Sips with Meds  Orders Placed This Encounter      DIET MESSAGE May have any soup requested. OK per SW.      Dietary Nutrition Supplements Boost Breeze (Clear Liquid)      Advance Directives: Code Status and Medical Interventions:   Ordered at: 10/03/22 1318     Level Of Support Discussed With:    Patient     Code Status (Patient has no pulse and is not breathing):    CPR (Attempt to Resuscitate)     Medical Interventions (Patient has pulse or is breathing):    Full Support        DVT prophylaxis:  Mechanical DVT prophylaxis orders are present.     In brief:  1. Transfuse 2 PRBCs today.  2. Monitor H/H  3. Supportive therapy  4. Disposition: Keep in ICU.- Telemetry soon if no more bleeding.    Plan of care and goals reviewed during interdisciplinary rounds.  I discussed the patient's findings and my recommendations with patient and nursing staff     Level of Risk is High due to: illness with threat to life or bodily function.     Time: 25 minutes, in direct patient care, with the patient and/or in the ICU or beard coordinating care with other health care providers. This is non-concurrent time.    I have spent > 50% percent of this time, counseling and discussing management.       [x] Primary Attending Intensive Care Medicine - Nutrition Support   [] Consultant

## 2022-10-14 ENCOUNTER — APPOINTMENT (OUTPATIENT)
Dept: NUCLEAR MEDICINE | Facility: HOSPITAL | Age: 72
End: 2022-10-14

## 2022-10-14 ENCOUNTER — APPOINTMENT (OUTPATIENT)
Dept: GENERAL RADIOLOGY | Facility: HOSPITAL | Age: 72
End: 2022-10-14

## 2022-10-14 LAB
ALBUMIN SERPL-MCNC: 2.3 G/DL (ref 3.5–5.2)
ALBUMIN/GLOB SERPL: 1 G/DL
ALP SERPL-CCNC: 190 U/L (ref 39–117)
ALT SERPL W P-5'-P-CCNC: 20 U/L (ref 1–41)
ANION GAP SERPL CALCULATED.3IONS-SCNC: 7 MMOL/L (ref 5–15)
AST SERPL-CCNC: 17 U/L (ref 1–40)
BACTERIA UR QL AUTO: ABNORMAL /HPF
BASOPHILS # BLD MANUAL: 0 10*3/MM3 (ref 0–0.2)
BASOPHILS NFR BLD MANUAL: 0 % (ref 0–1.5)
BH BB BLOOD EXPIRATION DATE: NORMAL
BH BB BLOOD TYPE BARCODE: 5100
BH BB DISPENSE STATUS: NORMAL
BH BB PRODUCT CODE: NORMAL
BH BB UNIT NUMBER: NORMAL
BILIRUB SERPL-MCNC: 1.7 MG/DL (ref 0–1.2)
BILIRUB UR QL STRIP: ABNORMAL
BUN SERPL-MCNC: 15 MG/DL (ref 8–23)
BUN/CREAT SERPL: 44.1 (ref 7–25)
BURR CELLS BLD QL SMEAR: ABNORMAL
CALCIUM SPEC-SCNC: 7.9 MG/DL (ref 8.6–10.5)
CHLORIDE SERPL-SCNC: 101 MMOL/L (ref 98–107)
CLARITY UR: CLEAR
CO2 SERPL-SCNC: 28 MMOL/L (ref 22–29)
COLOR UR: ABNORMAL
CREAT SERPL-MCNC: 0.34 MG/DL (ref 0.76–1.27)
CROSSMATCH INTERPRETATION: NORMAL
D-LACTATE SERPL-SCNC: 1.3 MMOL/L (ref 0.5–2)
DEPRECATED RDW RBC AUTO: 47.5 FL (ref 37–54)
DOHLE BODIES: PRESENT
EGFRCR SERPLBLD CKD-EPI 2021: 123.3 ML/MIN/1.73
EOSINOPHIL # BLD MANUAL: 0.04 10*3/MM3 (ref 0–0.4)
EOSINOPHIL NFR BLD MANUAL: 8 % (ref 0.3–6.2)
ERYTHROCYTE [DISTWIDTH] IN BLOOD BY AUTOMATED COUNT: 15.5 % (ref 12.3–15.4)
GLOBULIN UR ELPH-MCNC: 2.4 GM/DL
GLUCOSE SERPL-MCNC: 141 MG/DL (ref 65–99)
GLUCOSE UR STRIP-MCNC: NEGATIVE MG/DL
HCT VFR BLD AUTO: 22.5 % (ref 37.5–51)
HCT VFR BLD AUTO: 24.9 % (ref 37.5–51)
HCT VFR BLD AUTO: 25.5 % (ref 37.5–51)
HCT VFR BLD AUTO: 27.2 % (ref 37.5–51)
HGB BLD-MCNC: 7.5 G/DL (ref 13–17.7)
HGB BLD-MCNC: 8.2 G/DL (ref 13–17.7)
HGB BLD-MCNC: 8.3 G/DL (ref 13–17.7)
HGB BLD-MCNC: 9.2 G/DL (ref 13–17.7)
HGB UR QL STRIP.AUTO: NEGATIVE
HYALINE CASTS UR QL AUTO: ABNORMAL /LPF
KETONES UR QL STRIP: NEGATIVE
LEUKOCYTE ESTERASE UR QL STRIP.AUTO: ABNORMAL
LYMPHOCYTES # BLD MANUAL: 0.15 10*3/MM3 (ref 0.7–3.1)
LYMPHOCYTES NFR BLD MANUAL: 0 % (ref 5–12)
MAGNESIUM SERPL-MCNC: 2.2 MG/DL (ref 1.6–2.4)
MCH RBC QN AUTO: 28.2 PG (ref 26.6–33)
MCHC RBC AUTO-ENTMCNC: 33.8 G/DL (ref 31.5–35.7)
MCV RBC AUTO: 83.4 FL (ref 79–97)
MONOCYTES # BLD: 0 10*3/MM3 (ref 0.1–0.9)
NEUTROPHILS # BLD AUTO: 0.29 10*3/MM3 (ref 1.7–7)
NEUTROPHILS NFR BLD MANUAL: 55 % (ref 42.7–76)
NEUTS BAND NFR BLD MANUAL: 6 % (ref 0–5)
NEUTS HYPERSEG # BLD: ABNORMAL 10*3/UL
NITRITE UR QL STRIP: NEGATIVE
OVALOCYTES BLD QL SMEAR: ABNORMAL
PH UR STRIP.AUTO: 7 [PH] (ref 5–8)
PHOSPHATE SERPL-MCNC: 2.1 MG/DL (ref 2.5–4.5)
PHOSPHATE SERPL-MCNC: 2.8 MG/DL (ref 2.5–4.5)
PLAT MORPH BLD: NORMAL
PLATELET # BLD AUTO: 137 10*3/MM3 (ref 140–450)
PMV BLD AUTO: 10.1 FL (ref 6–12)
POTASSIUM SERPL-SCNC: 3.1 MMOL/L (ref 3.5–5.2)
POTASSIUM SERPL-SCNC: 3.8 MMOL/L (ref 3.5–5.2)
PROCALCITONIN SERPL-MCNC: 0.91 NG/ML (ref 0–0.25)
PROT SERPL-MCNC: 4.7 G/DL (ref 6–8.5)
PROT UR QL STRIP: ABNORMAL
RBC # BLD AUTO: 3.26 10*6/MM3 (ref 4.14–5.8)
RBC # UR STRIP: ABNORMAL /HPF
REF LAB TEST METHOD: ABNORMAL
SODIUM SERPL-SCNC: 136 MMOL/L (ref 136–145)
SP GR UR STRIP: 1.02 (ref 1–1.03)
SQUAMOUS #/AREA URNS HPF: ABNORMAL /HPF
TOXIC GRANULATION: ABNORMAL
UNIT  ABO: NORMAL
UNIT  RH: NORMAL
UROBILINOGEN UR QL STRIP: ABNORMAL
VARIANT LYMPHS NFR BLD MANUAL: 27 % (ref 19.6–45.3)
VARIANT LYMPHS NFR BLD MANUAL: 4 % (ref 0–5)
WBC # UR STRIP: ABNORMAL /HPF
WBC NRBC COR # BLD: 0.48 10*3/MM3 (ref 3.4–10.8)

## 2022-10-14 PROCEDURE — 0 TECHNETIUM LABELED RED BLOOD CELLS: Performed by: INTERNAL MEDICINE

## 2022-10-14 PROCEDURE — 83605 ASSAY OF LACTIC ACID: CPT | Performed by: INTERNAL MEDICINE

## 2022-10-14 PROCEDURE — 85014 HEMATOCRIT: CPT

## 2022-10-14 PROCEDURE — 85018 HEMOGLOBIN: CPT | Performed by: INTERNAL MEDICINE

## 2022-10-14 PROCEDURE — 83735 ASSAY OF MAGNESIUM: CPT | Performed by: INTERNAL MEDICINE

## 2022-10-14 PROCEDURE — 99232 SBSQ HOSP IP/OBS MODERATE 35: CPT | Performed by: INTERNAL MEDICINE

## 2022-10-14 PROCEDURE — 25010000002 FLUCONAZOLE PER 200 MG: Performed by: INTERNAL MEDICINE

## 2022-10-14 PROCEDURE — 0 POTASSIUM CHLORIDE PER 2 MEQ: Performed by: INTERNAL MEDICINE

## 2022-10-14 PROCEDURE — 25010000002 CEFEPIME PER 500 MG: Performed by: INTERNAL MEDICINE

## 2022-10-14 PROCEDURE — 81001 URINALYSIS AUTO W/SCOPE: CPT | Performed by: INTERNAL MEDICINE

## 2022-10-14 PROCEDURE — 85018 HEMOGLOBIN: CPT

## 2022-10-14 PROCEDURE — 87086 URINE CULTURE/COLONY COUNT: CPT | Performed by: INTERNAL MEDICINE

## 2022-10-14 PROCEDURE — 71046 X-RAY EXAM CHEST 2 VIEWS: CPT

## 2022-10-14 PROCEDURE — 85014 HEMATOCRIT: CPT | Performed by: INTERNAL MEDICINE

## 2022-10-14 PROCEDURE — 80053 COMPREHEN METABOLIC PANEL: CPT | Performed by: INTERNAL MEDICINE

## 2022-10-14 PROCEDURE — A9560 TC99M LABELED RBC: HCPCS | Performed by: INTERNAL MEDICINE

## 2022-10-14 PROCEDURE — 25010000002 FILGRASTIM PER 480 MCG: Performed by: INTERNAL MEDICINE

## 2022-10-14 PROCEDURE — 85007 BL SMEAR W/DIFF WBC COUNT: CPT | Performed by: INTERNAL MEDICINE

## 2022-10-14 PROCEDURE — 99233 SBSQ HOSP IP/OBS HIGH 50: CPT | Performed by: INTERNAL MEDICINE

## 2022-10-14 PROCEDURE — 78278 ACUTE GI BLOOD LOSS IMAGING: CPT

## 2022-10-14 PROCEDURE — 84100 ASSAY OF PHOSPHORUS: CPT

## 2022-10-14 PROCEDURE — 85025 COMPLETE CBC W/AUTO DIFF WBC: CPT | Performed by: INTERNAL MEDICINE

## 2022-10-14 PROCEDURE — 87040 BLOOD CULTURE FOR BACTERIA: CPT | Performed by: INTERNAL MEDICINE

## 2022-10-14 PROCEDURE — 84100 ASSAY OF PHOSPHORUS: CPT | Performed by: INTERNAL MEDICINE

## 2022-10-14 PROCEDURE — 84145 PROCALCITONIN (PCT): CPT | Performed by: INTERNAL MEDICINE

## 2022-10-14 PROCEDURE — 84132 ASSAY OF SERUM POTASSIUM: CPT

## 2022-10-14 PROCEDURE — 25010000002 VANCOMYCIN 10 G RECONSTITUTED SOLUTION

## 2022-10-14 RX ORDER — DIPHENHYDRAMINE HYDROCHLORIDE AND LIDOCAINE HYDROCHLORIDE AND ALUMINUM HYDROXIDE AND MAGNESIUM HYDRO
5 KIT EVERY 4 HOURS
Status: DISCONTINUED | OUTPATIENT
Start: 2022-10-14 | End: 2022-10-20 | Stop reason: HOSPADM

## 2022-10-14 RX ORDER — POTASSIUM CHLORIDE 29.8 MG/ML
20 INJECTION INTRAVENOUS
Status: DISCONTINUED | OUTPATIENT
Start: 2022-10-14 | End: 2022-10-16

## 2022-10-14 RX ORDER — FENTANYL/ROPIVACAINE/NS/PF 2-625MCG/1
15 PLASTIC BAG, INJECTION (ML) EPIDURAL AS NEEDED
Status: DISCONTINUED | OUTPATIENT
Start: 2022-10-14 | End: 2022-10-20 | Stop reason: HOSPADM

## 2022-10-14 RX ORDER — LIDOCAINE HYDROCHLORIDE 20 MG/ML
5 SOLUTION OROPHARYNGEAL
Status: DISCONTINUED | OUTPATIENT
Start: 2022-10-14 | End: 2022-10-17

## 2022-10-14 RX ADMIN — LIDOCAINE HYDROCHLORIDE 5 ML: 20 SOLUTION ORAL; TOPICAL at 13:39

## 2022-10-14 RX ADMIN — DIPHENHYDRAMINE HYDROCHLORIDE AND LIDOCAINE HYDROCHLORIDE AND ALUMINUM HYDROXIDE AND MAGNESIUM HYDRO 5 ML: KIT at 17:08

## 2022-10-14 RX ADMIN — POTASSIUM CHLORIDE 20 MEQ: 29.8 INJECTION, SOLUTION INTRAVENOUS at 13:46

## 2022-10-14 RX ADMIN — Medication 10 ML: at 21:03

## 2022-10-14 RX ADMIN — ACETAMINOPHEN 650 MG: 325 TABLET, FILM COATED ORAL at 09:25

## 2022-10-14 RX ADMIN — FLUCONAZOLE 100 MG: 2 INJECTION, SOLUTION INTRAVENOUS at 09:13

## 2022-10-14 RX ADMIN — DIPHENHYDRAMINE HYDROCHLORIDE AND LIDOCAINE HYDROCHLORIDE AND ALUMINUM HYDROXIDE AND MAGNESIUM HYDRO 5 ML: KIT at 13:38

## 2022-10-14 RX ADMIN — CEFEPIME HYDROCHLORIDE 2 G: 2 INJECTION, POWDER, FOR SOLUTION INTRAMUSCULAR; INTRAVENOUS at 10:23

## 2022-10-14 RX ADMIN — POTASSIUM CHLORIDE 40 MEQ: 750 CAPSULE, EXTENDED RELEASE ORAL at 08:45

## 2022-10-14 RX ADMIN — BUMETANIDE 1 MG: 0.25 INJECTION, SOLUTION INTRAMUSCULAR; INTRAVENOUS at 02:42

## 2022-10-14 RX ADMIN — PANTOPRAZOLE SODIUM 40 MG: 40 INJECTION, POWDER, FOR SOLUTION INTRAVENOUS at 17:10

## 2022-10-14 RX ADMIN — PANTOPRAZOLE SODIUM 40 MG: 40 INJECTION, POWDER, FOR SOLUTION INTRAVENOUS at 08:45

## 2022-10-14 RX ADMIN — DIPHENHYDRAMINE HYDROCHLORIDE AND LIDOCAINE HYDROCHLORIDE AND ALUMINUM HYDROXIDE AND MAGNESIUM HYDRO 5 ML: KIT at 21:03

## 2022-10-14 RX ADMIN — FILGRASTIM 480 MCG: 480 INJECTION, SOLUTION INTRAVENOUS; SUBCUTANEOUS at 17:08

## 2022-10-14 RX ADMIN — POTASSIUM PHOSPHATE, MONOBASIC AND POTASSIUM PHOSPHATE, DIBASIC 15 MMOL: 224; 236 INJECTION, SOLUTION, CONCENTRATE INTRAVENOUS at 11:25

## 2022-10-14 RX ADMIN — FLUTICASONE PROPIONATE 1 SPRAY: 50 SPRAY, METERED NASAL at 08:46

## 2022-10-14 RX ADMIN — VANCOMYCIN HYDROCHLORIDE 1500 MG: 10 INJECTION, POWDER, LYOPHILIZED, FOR SOLUTION INTRAVENOUS at 18:43

## 2022-10-14 RX ADMIN — TECHNETIUM TC 99M-LABELED RED BLOOD CELLS 1 DOSE: KIT at 11:44

## 2022-10-14 RX ADMIN — CEFEPIME HYDROCHLORIDE 2 G: 2 INJECTION, POWDER, FOR SOLUTION INTRAMUSCULAR; INTRAVENOUS at 17:30

## 2022-10-14 RX ADMIN — POTASSIUM CHLORIDE 20 MEQ: 29.8 INJECTION, SOLUTION INTRAVENOUS at 15:21

## 2022-10-14 RX ADMIN — Medication 10 ML: at 08:45

## 2022-10-14 RX ADMIN — TAMSULOSIN HYDROCHLORIDE 0.4 MG: 0.4 CAPSULE ORAL at 08:45

## 2022-10-14 NOTE — CASE MANAGEMENT/SOCIAL WORK
Continued Stay Note  [facility]     Patient Name: Abraham Wu  MRN: [id number]  Today's Date: 10/14/2022    Admit Date: 10/3/2022    Plan: Home   Discharge Plan     Row Name 10/14/22 1443       Plan    Plan Home    Patient/Family in Agreement with Plan yes    Plan Comments Patient sleeping, spoke with wife at bedside.  Patient had RBC bleeding scan this am.  Patients rollator remains at bedside.  Discharge plan is currently home.  Discharge plan  may change pending patients medical progress.  CM will continue to follow.               Discharge Codes    No documentation.               Expected Discharge Date and Time     Expected Discharge Date Expected Discharge Time    Oct 17, 2022             Aisha Hernandez RN

## 2022-10-14 NOTE — PROGRESS NOTES
"HEMATOLOGY/ONCOLOGY PROGRESS NOTE    S: Patient is feeling ok today.  He denies any nausea.  His mouth is still dry, and tender.  He has visible rigors at the time of my exam.  He continues to have melanotic ostomy output, but his weakness and fatigue feels better.  His supportive wife, son are at bedside.  They have questions regarding anticipated side effects of treatment and treatment course.      Review of Systems:    Review of Systems   Constitutional: Positive for fatigue. Negative for fever.   HENT: Negative for facial swelling, sore throat and trouble swallowing.    Eyes: Negative for visual disturbance.   Respiratory: Negative for chest tightness and shortness of breath.    Cardiovascular: Negative for chest pain and palpitations.   Gastrointestinal: Positive for blood in stool. Negative for abdominal pain, diarrhea, nausea and vomiting.   Genitourinary: Negative for flank pain and hematuria.   Musculoskeletal: Negative for back pain and joint swelling.   Skin: Negative for rash.   Neurological: Positive for weakness. Negative for dizziness, tremors and speech difficulty.   Psychiatric/Behavioral: Negative for confusion.      Medications:  The current medication list was reviewed in the EMR    ALLERGIES:  No Known Allergies      Physical Exam    VITAL SIGNS:  /72   Pulse 88   Temp 100.5 °F (38.1 °C) (Axillary) Comment: Dr. Leary at [facility], ordering pan cultures, abx  Resp 22   Ht 162.6 cm (64\")   Wt 78 kg (172 lb)   SpO2 94%   BMI 29.52 kg/m²   Temp:  [97.9 °F (36.6 °C)-100.5 °F (38.1 °C)] 100.5 °F (38.1 °C)      Performance Status: 2                Physical Exam    General: well appearing, in no acute distress   HEENT: sclerae anicteric, mucous membranes dry + MM, neck is supple  Lymphatics: no cervical, supraclavicular, or axillary adenopathy  Cardiovascular: regular rate and rhythm, no murmurs, rubs or gallops  Lungs: clear to auscultation bilaterally  Abdomen: soft, nontender, " nondistended.  Ostomy bag with large volume of melanotic soft stool  Extremities: no lower extremity edema  Skin: no rashes, lesions, bruising, or petechiae  Psych: Mood is stable        RECENT LABS:    Lab Results   Component Value Date    HGB 9.2 (L) 10/14/2022    HCT 27.2 (L) 10/14/2022    MCV 83.4 10/14/2022     (L) 10/14/2022    WBC 0.48 (C) 10/14/2022    NEUTROABS 0.29 (L) 10/14/2022    LYMPHSABS 0.14 (L) 10/13/2022    MONOSABS 0.02 (L) 10/13/2022    EOSABS 0.04 10/14/2022    BASOSABS 0.00 10/14/2022       Lab Results   Component Value Date    GLUCOSE 141 (H) 10/14/2022    BUN 15 10/14/2022    CREATININE 0.34 (L) 10/14/2022     10/14/2022    K 3.1 (L) 10/14/2022     10/14/2022    CO2 28.0 10/14/2022    CALCIUM 7.9 (L) 10/14/2022    PROTEINTOT 4.7 (L) 10/14/2022    ALBUMIN 2.30 (L) 10/14/2022    BILITOT 1.7 (H) 10/14/2022    ALKPHOS 190 (H) 10/14/2022    AST 17 10/14/2022    ALT 20 10/14/2022         Assessment/Plan  72-year-old female with CD30 positive classic Hodgkin lymphoma involving the rectum, perinephric, liver, and.  He is status post laparoscopic colostomy and biopsy.  He was started on inpatient chemotherapy on 10/8/2022.  Course has been complicated by a GI bleed.  He is status post coil artery embolization 10/12. Now with neutropenic fever.    Hodgkin lymphoma  Status post cycle 1 AVVD on 10/8/22  Continue daily Neupogen. He is now with neutropenic fever.  Will extend Neupogen course to 7 days.  Continue to monitor for adverse side effects of chemotherapy.  No evidence of tumor lysis on lab reviewe.  Repeat with a.m. labs.    Anemia secondary to malignancy, recent chemotherapy/marrow suppression and blood loss  GI bleed  -Now status post coil embolization and continued ICU support for close monitoring.  Hemoglobin improved today and repeat IR assessment pending this morning    Bilateral hydronephrosis  Secondary to malignancy  Creatinine has remained normal.    Neutropenic  fever  Send stat cultures, urinalysis, chest x-ray.  Start empiric cefepime.  Would recommend consideration of addition of vancomycin given PICC line and mucositis.  I have communicated with the bedside nurse who will update ICU attending this AM. Appreciate their input on ongoing antibiotic management    Mucositis  Add Magic mouthwash    My partner Dr. Choudhury will be rounding this weekend and I have asked him to follow along. I will follow up with the patient on Monday.        Kaycee Leary MD  [facility]ematology [facility] Oncology    10/14/2022     I have spent a total of 50 min with 50% in direct patient counseling

## 2022-10-14 NOTE — PLAN OF CARE
Goal Outcome Evaluation:  Plan of Care Reviewed With: patient        Progress: improving  Outcome Evaluation: VSS. Afebrile. One unit of PRBCs completed at beginning of shift, recheck H&H 7.9/23.2. Ostomy continues to put out tarry stool, 525 ml. No complaints of pain. Scheduled Bumex given, uop adequate. H&H now trending up, 9.2/27.2 on am labs. WBC trending down, 0.48, aprn aware. Possible RBC scan this am.

## 2022-10-14 NOTE — PROGRESS NOTES
"   LOS: 11 days    Patient Care Team:  Jatinder Haynes MD as PCP - General (Family Medicine)  Hyponatremia follow up     Subjective     Interval History:   No new events no added distress.  So labs electrolytes much better.    Review of Systems:   Appetite remains poor.  Patient denies shortness of breath, chest pain, dysuria, hematuria, nausea, vomiting.        Objective     Vital Sign Min/Max for last 24 hours  Temp  Min: 97.9 °F (36.6 °C)  Max: 100.5 °F (38.1 °C)   BP  Min: 100/61  Max: 160/72   Pulse  Min: 81  Max: 101   Resp  Min: 15  Max: 22   SpO2  Min: 87 %  Max: 100 %   Flow (L/min)  Min: 1  Max: 2   No data recorded     Flowsheet Rows    Flowsheet Row First Filed Value   Admission Height 162.6 cm (64\") Documented at 10/03/2022 0604   Admission Weight 78 kg (172 lb) Documented at 10/03/2022 0604          I/O this shift:  In: 200 [IV Piggyback:200]  Out: 195 [Urine:70; Stool:125]  I/O last 3 completed shifts:  In: 1331.8 [Blood:1331.8]  Out: 3800 [Urine:1950; Stool:1850]    Physical Exam:  General Appearance: Sleepy, no obvious distress.  Sick looking  male eyes: PER, EOMI.  Neck: Supple no JVD.  Lungs: Clear auscultation, no rales rhonchi's, equal chest movement, nonlabored.  Heart: No gallop, murmur, rub, RRR.  Abdomen: Obesity soft, nontender, positive bowel sounds, no organomegaly.  Extremities: 1+ upper and lower extremity edema noted.  No cyanosis.  Neuro: No focal deficit, moving all extremities, alert oriented X 3  Skin: Warm and dry.  Erythematous.   no suprapubic fullness or tenderness.      WBC WBC   Date Value Ref Range Status   10/14/2022 0.48 (C) 3.40 - 10.80 10*3/mm3 Final   10/13/2022 4.41 3.40 - 10.80 10*3/mm3 Final   10/12/2022 8.78 3.40 - 10.80 10*3/mm3 Final   10/12/2022 9.42 3.40 - 10.80 10*3/mm3 Final      HGB Hemoglobin   Date Value Ref Range Status   10/14/2022 8.3 (L) 13.0 - 17.7 g/dL Final   10/14/2022 9.2 (L) 13.0 - 17.7 g/dL Final   10/13/2022 7.9 (L) 13.0 - 17.7 g/dL " Final   10/13/2022 7.4 (L) 13.0 - 17.7 g/dL Final   10/13/2022 7.4 (L) 13.0 - 17.7 g/dL Final   10/13/2022 7.3 (L) 13.0 - 17.7 g/dL Final   10/13/2022 7.3 (L) 13.0 - 17.7 g/dL Final   10/12/2022 7.8 (L) 13.0 - 17.7 g/dL Final   10/12/2022 7.9 (L) 13.0 - 17.7 g/dL Final   10/12/2022 7.7 (L) 13.0 - 17.7 g/dL Final   10/12/2022 7.6 (L) 13.0 - 17.7 g/dL Final   10/12/2022 6.2 (C) 13.0 - 17.7 g/dL Final   10/12/2022 6.2 (C) 13.0 - 17.7 g/dL Final   10/11/2022 6.2 (C) 13.0 - 17.7 g/dL Final      HCT Hematocrit   Date Value Ref Range Status   10/14/2022 25.5 (L) 37.5 - 51.0 % Final   10/14/2022 27.2 (L) 37.5 - 51.0 % Final   10/13/2022 23.2 (L) 37.5 - 51.0 % Final   10/13/2022 21.8 (L) 37.5 - 51.0 % Final   10/13/2022 22.2 (L) 37.5 - 51.0 % Final   10/13/2022 21.6 (L) 37.5 - 51.0 % Final   10/13/2022 21.3 (L) 37.5 - 51.0 % Final   10/12/2022 22.8 (L) 37.5 - 51.0 % Final   10/12/2022 22.9 (L) 37.5 - 51.0 % Final   10/12/2022 22.5 (L) 37.5 - 51.0 % Final   10/12/2022 22.5 (L) 37.5 - 51.0 % Final   10/12/2022 18.7 (C) 37.5 - 51.0 % Final   10/12/2022 18.8 (C) 37.5 - 51.0 % Final   10/11/2022 18.3 (C) 37.5 - 51.0 % Final      Platlets No results found for: LABPLAT   MCV MCV   Date Value Ref Range Status   10/14/2022 83.4 79.0 - 97.0 fL Final   10/13/2022 82.6 79.0 - 97.0 fL Final   10/12/2022 83.6 79.0 - 97.0 fL Final   10/12/2022 87.4 79.0 - 97.0 fL Final          Sodium Sodium   Date Value Ref Range Status   10/14/2022 136 136 - 145 mmol/L Final   10/13/2022 136 136 - 145 mmol/L Final   10/12/2022 137 136 - 145 mmol/L Final      Potassium Potassium   Date Value Ref Range Status   10/14/2022 3.1 (L) 3.5 - 5.2 mmol/L Final   10/13/2022 3.6 3.5 - 5.2 mmol/L Final   10/12/2022 3.5 3.5 - 5.2 mmol/L Final      Chloride Chloride   Date Value Ref Range Status   10/14/2022 101 98 - 107 mmol/L Final   10/13/2022 105 98 - 107 mmol/L Final   10/12/2022 104 98 - 107 mmol/L Final      CO2 CO2   Date Value Ref Range Status   10/14/2022  28.0 22.0 - 29.0 mmol/L Final   10/13/2022 26.0 22.0 - 29.0 mmol/L Final   10/12/2022 27.0 22.0 - 29.0 mmol/L Final      BUN BUN   Date Value Ref Range Status   10/14/2022 15 8 - 23 mg/dL Final   10/13/2022 16 8 - 23 mg/dL Final   10/12/2022 18 8 - 23 mg/dL Final      Creatinine Creatinine   Date Value Ref Range Status   10/14/2022 0.34 (L) 0.76 - 1.27 mg/dL Final   10/13/2022 0.29 (L) 0.76 - 1.27 mg/dL Final   10/12/2022 0.33 (L) 0.76 - 1.27 mg/dL Final      Calcium Calcium   Date Value Ref Range Status   10/14/2022 7.9 (L) 8.6 - 10.5 mg/dL Final   10/13/2022 7.5 (L) 8.6 - 10.5 mg/dL Final   10/12/2022 7.6 (L) 8.6 - 10.5 mg/dL Final      PO4 No results found for: CAPO4   Albumin Albumin   Date Value Ref Range Status   10/14/2022 2.30 (L) 3.50 - 5.20 g/dL Final   10/13/2022 1.80 (L) 3.50 - 5.20 g/dL Final      Magnesium Magnesium   Date Value Ref Range Status   10/14/2022 2.2 1.6 - 2.4 mg/dL Final   10/13/2022 1.9 1.6 - 2.4 mg/dL Final      Uric Acid Uric Acid   Date Value Ref Range Status   10/13/2022 1.1 (L) 3.4 - 7.0 mg/dL Final     Comment:     Falsely depressed results may occur on samples drawn from patients receiving N-Acetylcysteine (NAC) or Metamizole.           Results Review:     I reviewed the patient's new clinical results.    cefepime, 2 g, Intravenous, Q8H  filgrastim (NEUPOGEN) injection, 480 mcg, Subcutaneous, Q PM  First Mouthwash (Magic Mouthwash), 5 mL, Swish & Spit, Q4H  fluconazole, 100 mg, Intravenous, Daily  fluticasone, 1 spray, Nasal, Daily  pantoprazole, 40 mg, Intravenous, BID AC  sodium chloride, 10 mL, Intravenous, Q12H  tamsulosin, 0.4 mg, Oral, Daily           Medication Review:     Assessment & Plan       Hodgkin lymphoma (HCC)    Rectal mass s/p laparoscopic colostomy (9/2022)    Essential hypertension    Hydronephrosis    Febrile illness    Hypokalemia    Acute upper GI bleed    Melena    Moderate malnutrition (HCC)    Esophageal candidiasis (HCC)    1- Hyponatremia - Serum  osmolality 263, Urine osmolality 115 and urine sodium< 20. Suggest of poor oral solute intake vs polydipsia . Uric acid low. Improving.   2- Rectal mass   3-Hypokalemia - resolved  4. Anemia: Transfuse at hb<7.     Plan:  - Fluid restriction 1.2 lit/day.  Significant edema is noted we will add diuretics plus IV albumin  Case discussed with patient and patient's wife    Jessica Espinoza MD  10/14/22  13:04 EDT

## 2022-10-14 NOTE — PROGRESS NOTES
INTENSIVIST   PROGRESS NOTE        SUBJECTIVE     Abraham 72 y.o. male is followed for: Fever       GIB    Hodgkin lymphoma (HCC)    Rectal mass s/p laparoscopic colostomy (2022)    Esophageal candidiasis (HCC)    As an Intensivist, we provide an integrated approach to the ICU patient and family, medical management of comorbid conditions, including but not limited to electrolytes, glycemic control, organ dysfunction, lead interdisciplinary rounds and coordinate the care with all other services, including those from other specialists.     Interval History:  POD: 2 Days Post-Op (EGD)    Hb trending down.  Discussed with Dr. Brunner.  Bleeding scan this morning.  Potential for EGD/Embolization.  Bleeding scan was negative.  Continue monitoring Hb.    Wife at bedside.    Fevers.    Neutropenic.    Sore throat.    The patient has started, but not completed, their COVID-19 vaccination series.     Temp  Min: 97.9 °F (36.6 °C)  Max: 100.5 °F (38.1 °C)       History     Last Reviewed by Freeman Hennessy MD on 10/12/2022 at 10:09 AM    Sections Reviewed    Medical, Surgical, Tobacco, Alcohol, Drug Use, Family      Problem list reviewed by Tomy Morales MD on 10/12/2022 at  7:59 AM  Medicines reviewed by Tomy Morales MD on 10/12/2022 at  7:59 AM  Allergies reviewed by Tomy Morales MD on 10/12/2022 at  7:59 AM       The patient's relevant past medical, surgical and social history were reviewed and updated in Epic as appropriate.        OBJECTIVE     Vitals:  Temp: 99.7 °F (37.6 °C) (10/14/22 1500) Temp  Min: 97.9 °F (36.6 °C)  Max: 100.5 °F (38.1 °C)   Temp core:      BP: 142/68 (10/14/22 1530) BP  Min: 112/68  Max: 160/72   MAP (non-invasive) Noninvasive MAP (mmHg): 85 (10/14/22 1530) Noninvasive MAP (mmHg)  Av.4  Min: 68  Max: 138   Pulse: 98 (10/14/22 1530) Pulse  Min: 81  Max: 102   Resp: 16 (10/14/22 1500) Resp  Min: 15  Max: 22   SpO2: 100 % (10/14/22 1530) SpO2  Min: 87 %  Max: 100 %   Device: nasal cannula  (10/14/22 1200)    Flow Rate: 2 (10/14/22 1200) Flow (L/min)  Min: 1  Max: 2         10/03/22  0604   Weight: 78 kg (172 lb)        Intake/Ouptut 24 hrs (7:00AM - 6:59 AM)  Intake & Output (last 3 days)       10/11 0701  10/12 0700 10/12 0701  10/13 0700 10/13 0701  10/14 0700 10/14 0701  10/15 0700    P.O. 240       I.V. (mL/kg)  750 (9.6)  113 (1.4)    Blood 661.9 976.3 1023.8     IV Piggyback 50   300    Total Intake(mL/kg) 951.9 (12.2) 1726.3 (22.1) 1023.8 (13.1) 413 (5.3)    Urine (mL/kg/hr) 725 (0.4) 850 (0.5) 1200 (0.6) 70 (0.1)    Stool 525 1425 875 125    Total Output 1250 2275 2075 195    Net -298.1 -548.7 -1051.3 +218            Urine Unmeasured Occurrence 2 x 2 x 2 x 2 x        Physical Examination  Telemetry:  Rhythm: normal sinus rhythm (10/14/22 1400)         Constitutional:  No acute distress.   Cardiovascular: RRR.   Normal heart sounds.  No murmurs, gallop or rub.   Respiratory: Normal breath sounds  No adventitious sounds.   Abdominal:  Soft with no tenderness.  No distension.   No HSM.   Extremities: Warm.  Dry.  No cyanosis.  Edema   Neurological:   Alert, Oriented, Cooperative.  Best Eye Response: 4-->(E4) spontaneous (10/14/22 1400)  Best Motor Response: 6-->(M6) obeys commands (10/14/22 1400)  Best Verbal Response: 5-->(V5) oriented (10/14/22 1400)  Suzanna Coma Scale Score: 15 (10/14/22 1400)     R PICC    Results Reviewed:  Laboratory  Microbiology  Radiology  Pathology    Hematology:  Results from last 7 days   Lab Units 10/14/22  1520 10/14/22  0940 10/14/22  0436 10/13/22  0419 10/13/22  0012 10/09/22  0202 10/08/22  0455   WBC 10*3/mm3  --   --  0.48*  --  4.41   < > 17.02*   HEMOGLOBIN g/dL 8.2* 8.3* 9.2*   < > 7.3*   < > 8.5*   MCV fL  --   --  83.4  --  82.6   < > 83.8   PLATELETS 10*3/mm3  --   --  137*  --  138*   < > 338   NEUTROS ABS 10*3/mm3  --   --  0.29*  --  3.55   < > 15.71*   LYMPHS ABS 10*3/mm3  --   --   --   --  0.14*  --  0.27*   EOS ABS 10*3/mm3  --   --  0.04  --   0.00   < > 0.00    < > = values in this interval not displayed.       Chemistry:  Estimated Creatinine Clearance: 190.7 mL/min (A) (by C-G formula based on SCr of 0.34 mg/dL (L)).    Results from last 7 days   Lab Units 10/14/22  0436 10/13/22  0012   SODIUM mmol/L 136 136   POTASSIUM mmol/L 3.1* 3.6   CHLORIDE mmol/L 101 105   CO2 mmol/L 28.0 26.0   BUN mg/dL 15 16   CREATININE mg/dL 0.34* 0.29*   GLUCOSE mg/dL 141* 145*     Results from last 7 days   Lab Units 10/14/22  0436 10/13/22  0012   CALCIUM mg/dL 7.9* 7.5*   MAGNESIUM mg/dL 2.2 1.9   PHOSPHORUS mg/dL 2.1* 2.6     Results from last 7 days   Lab Units 10/14/22  0436 10/13/22  0012   BILIRUBIN mg/dL 1.7* 2.0*   AST (SGOT) U/L 17 41*   ALT (SGPT) U/L 20 26   ALK PHOS U/L 190* 213*     Lab Results   Component Value Date    PROCALCITO 0.91 (H) 10/14/2022    PROCALCITO 0.39 (H) 10/03/2022    PROCALCITO 0.12 09/20/2022       Cardiac Labs:  Results from last 7 days   Lab Units 10/09/22  2323   CK TOTAL U/L 15*   TROPONIN T ng/mL <0.010     COVID-19  Lab Results   Component Value Date    COVID19 Not Detected 10/03/2022    COVID19 Not Detected 09/20/2022       Images:  NM GI Blood Loss    Result Date: 10/14/2022  No evidence of active GI bleed. Suspected normal variant uptake in the spleen.       XR Chest PA & Lateral    Result Date: 10/14/2022  1.  Bibasilar effusions. Some underlying atelectasis could not be excluded. 2.  Suggested cardiomegaly.  This report was finalized on 10/14/2022 11:09 AM by Chiki Escobar MD.        Echo:  Results for orders placed during the hospital encounter of 10/03/22    Adult Transthoracic Echo Complete W/ Cont if Necessary Per Protocol    Interpretation Summary  · Left ventricular ejection fraction appears to be 56 - 60%. Left ventricular systolic function is normal.  · Global longitudinal LV strain (GLS) = -27.0%.  · Left atrial volume is moderately increased.  · Mild mitral valve regurgitation is present.  · Mild tricuspid valve  regurgitation is present.  · Estimated right ventricular systolic pressure from tricuspid regurgitation is mildly elevated (35-45 mmHg).  · Mild dilation of the ascending aorta is present.      Results: Reviewed.  I reviewed the patient's new laboratory and imaging results.  I independently reviewed the patient's new images.    Medications: Reviewed.    Assessment   A/P     Hospital:  LOS: 11 days   ICU: 2d 22h      Diagnosis   • **Hodgkin lymphoma (HCC) [C81.90]   • Moderate malnutrition (HCC) [E44.0]   • Esophageal candidiasis (HCC) [B37.81]   • Hydronephrosis [N13.30]   • Febrile illness [R50.9]   • Hypernatremia [E87.0]   • Hypokalemia [E87.6]   • Acute upper GI bleed [K92.2]       • Melena [K92.1]       • Essential hypertension [I10]   • Rectal mass s/p laparoscopic colostomy (9/2022) [K62.89]     Abraham is a 72 y.o. male admitted on 10/3/2022 with Hydronephrosis [N13.30]:    Assessment/Management/Treatment Plan:    1. GIB  a. EGD 10/11/22: Severe esophageal candidiasis, D2 bleeding ulcer  b. EGD 10/12/22 2 clean-based duodenal ulcers. In D2 there is an Ovesco clip wilth large clot, and oozing of blood.  c. Angiogram 10/12/22 - GDA embolization  d. Fluconazole  e. Anemia ABL - >9 units PRBCs transufsed since admission.  2. Hodgkin Lymphoma  a. Chemotherapy started 10/08/2022 } Dr Gibran brooke Filgrastim  3. Cardiovascular  a. HTN  b. Dyslipidemia   4. Sleep Medicine  a. SHERRI  5. Renal  a. Hyponatremia} Resolved.  6. Endocrine  a. Body mass index is 29.52 kg/m². Overweight: 25.0-29.9kg/m2   b. No h/o DM    Lab Results   Lab Value Date/Time    HGBA1C 5.50 09/21/2022 0410     Results from last 7 days   Lab Units 10/09/22  2310   GLUCOSE mg/dL 154*       Diet: Diet Regular; Low Microbial / Neutropenic  Orders Placed This Encounter      Dietary Nutrition Supplements Boost Breeze (Clear Liquid)      Advance Directives: Code Status and Medical Interventions:   Ordered at: 10/03/22 1318     Level Of Support Discussed  With:    Patient     Code Status (Patient has no pulse and is not breathing):    CPR (Attempt to Resuscitate)     Medical Interventions (Patient has pulse or is breathing):    Full Support        DVT prophylaxis:  Mechanical DVT prophylaxis orders are present.     In brief:  1. ANC down to 290. Cefepime started by oncology  1. Agree to add Vanco IV  2. Discussed with Dr. Brunner. No plans for EGD as Bleeding scan was negative.  1. NPO canceled. Diet: Neutropenic diet.  3. Monitor H/H  4. Supportive therapy  5. Replace phosphorus IV  6. PCT elevated but not in the sepsis range, f/u in AM  7. Disposition: Keep in ICU.    Plan of care and goals reviewed during interdisciplinary rounds.  I discussed the patient's findings and my recommendations with patient and nursing staff     Level of Risk is High due to: illness with threat to life or bodily function.     Time: 35 minutes, in direct patient care, with the patient and/or in the ICU or beard coordinating care with other health care providers. This is non-concurrent time.    I have spent > 50% percent of this time, counseling and discussing management.       [x] Primary Attending Intensive Care Medicine - Nutrition Support   [] Consultant

## 2022-10-14 NOTE — PROGRESS NOTES
"Pharmacy Consult-Vancomycin Dosing  Abraham Wu is a  72 y.o. male receiving vancomycin therapy.     Indication: neutropenic fever   Consulting Provider: Tomy Morales MD  ID Consult: no    Goal AUC: 400 - 600 mg/L*hr    Current Antimicrobial Therapy  Vancomycin day 1  Cefepime 2gm q8h  Fluconazole 100mg q24h    Allergies  Allergies as of 10/03/2022    (No Known Allergies)       Labs    Results from last 7 days   Lab Units 10/14/22  0436 10/13/22  0012 10/12/22  0924   BUN mg/dL 15 16 18   CREATININE mg/dL 0.34* 0.29* 0.33*       Results from last 7 days   Lab Units 10/14/22  0436 10/13/22  0012 10/12/22  0924   WBC 10*3/mm3 0.48* 4.41 8.78       Evaluation of Dosing     Last Dose Received in the ED/Outside Facility: no  Is Patient on Dialysis or Renal Replacement: no    Ht - 162.6 cm (64\")  Wt - 78 kg (172 lb)    Estimated Creatinine Clearance: 190.7 mL/min (A) (by C-G formula based on SCr of 0.34 mg/dL (L)).    Intake & Output (last 3 days)         10/11 0701  10/12 0700 10/12 0701  10/13 0700 10/13 0701  10/14 0700 10/14 0701  10/15 0700    P.O. 240       I.V. (mL/kg)  750 (9.6)  113 (1.4)    Blood 661.9 976.3 1023.8     IV Piggyback 50   300    Total Intake(mL/kg) 951.9 (12.2) 1726.3 (22.1) 1023.8 (13.1) 413 (5.3)    Urine (mL/kg/hr) 725 (0.4) 850 (0.5) 1200 (0.6) 70 (0.1)    Stool 525 1425 875 125    Total Output 1250 2275 2075 195    Net -298.1 -548.7 -1051.3 +218            Urine Unmeasured Occurrence 2 x 2 x 2 x 2 x            Microbiology and Radiology  Microbiology Results (last 10 days)       ** No results found for the last 240 hours. **            Reported Vancomycin Levels                         InsightRX AUC Calculation:    Current AUC:   -- mg/L*hr    Predicted Steady State AUC on Current Dose: 460 mg/L*hr  _________________________________    Predicted Steady State AUC on New Dose:   -- mg/L*hr    Assessment/Plan:  Vancomycin dosing for neutropenic fever  Goal AUC: 400-600 mg/L*hr  10/14 SCr - " 0.34  10/14 WBC - 0.48  Tmax - 100.5    Initial vancomycin 1500mg x1  Followed by vancomycin 1250mg q12h  Obtain vancomycin trough 10/16 prior to 0600 dose  BMP x3 days  Monitor renal function, clinical status and infusion related reactions  Follow vancomycin levels and adjust dose accordingly    Thanks  Gaurang Villafuerte, [facility]  10/14/2022  17:35 EDT

## 2022-10-14 NOTE — PROGRESS NOTES
Clinical Nutrition     Nutrition Assessment  Reason for Visit:   Follow-up protocol      Patient Name: Abraham Wu  YOB: 1950  MRN: [id number]  Date of Encounter: 10/14/22 12:01 EDT  Admission date: 10/3/2022      Comments:  Remains NPO. Plan for another procedure today as patient appears to be still bleeding.  Nutrition status discussed in MDR.  Patient does not have PN access at this time (single lumen PICC, has multiple IV medications orders). If unable to start diet today/ tomorrow, will need to consider nutrition support.  Patient with history of poor intake and significant weight loss, I don't anticipate patient will eat even if diet started.  PN vs EN for nutrition pending clinical course today. RD will continue to monitor and provide nutrition support recommendations as indicated.       Admission Diagnosis    Hydronephrosis [N13.30]     Hospital Problem List    Hodgkin lymphoma (HCC)    Rectal mass s/p laparoscopic colostomy (9/2022)    Essential hypertension    Hydronephrosis    Febrile illness    Hypokalemia    Acute upper GI bleed    Melena    Moderate malnutrition (HCC)    Esophageal candidiasis (HCC)    Anemia    Other Applicable: recent colostomy     Applicable Interval History:  10/8 chemotherapy started.  10/9 SLP ([facility] [place]) rec Reg texture thin liquid  10/10 EGD - ulcer identified; candidiasis in the esophagus.   10/10 moderate - non-severe malnutrition due to chronic illness per RD assessment.   10/11 moved to the ICU due to on going bleeding.   10/12 RUBÉN/IR - s/P multiple vessels embolized.   10/14 pending: Plan nuclear medicine tagged RBC scan tomorrow morning, to rule out other sites of bleeding in the gut.  Pending these findings, will consider repeat endoscopic intervention while in interventional radiology, and if significant bleeding occurs immediately proceed with further angiographic embolization.    Applicable PMH/PSxH:   (9/21-9/24) recent admission for  diarrhea and weight loss - + E. Coli   (9/21) Severe malnutrition per RD assessment (severe due to chronic dz)  PMH: He  has a past medical history of benign polypoid tissue right lung, Hyperlipidemia, Hypertension, Mycobacterium mucogenicum, SHERRI (obstructive sleep apnea), and Seasonal allergies.   PSxH: He  has a past surgical history that includes Bronchoscopy; Colostomy (N/A, 9/22/2022); exam under anesthesia, rectal biopsy (N/A, 10/4/2022); Esophagogastroduodenoscopy (N/A, 10/10/2022); and Esophagogastroduodenoscopy (N/A, 10/12/2022).       Diet/Nutrition Related History:   10/14  Remains NPO. Plan for another procedure today as patient appears to still be bleeding.  Nutrition status discussed in MDR.  Patient does not have PN access at this time (single lumen PICC, has multiple IV medications orders). Replacing electrolytes.     10/13  Wife at bedside at time of visit, asking if patient can have something to eat.  She is concerned about his nutrition.      10/10  Family/pt confirm wt of 172 lbs Rpt intake has been depressed for protracted time. Rpt pt refuses use of suppl    Labs reviewed   Yes    Results from last 7 days   Lab Units 10/14/22  0436 10/13/22  0012 10/12/22  0924 10/11/22  0223 10/09/22  2323   GLUCOSE mg/dL 141* 145* 145*   < > 161*   BUN mg/dL 15 16 18   < > 21   CREATININE mg/dL 0.34* 0.29* 0.33*   < > 0.44*   SODIUM mmol/L 136 136 137   < > 129*   CHLORIDE mmol/L 101 105 104   < > 96*   POTASSIUM mmol/L 3.1* 3.6 3.5   < > 3.9   PHOSPHORUS mg/dL 2.1* 2.6  --   --   --    MAGNESIUM mg/dL 2.2 1.9  --   --  2.1   ALT (SGPT) U/L 20 26  --   --  52*    < > = values in this interval not displayed.     Results from last 7 days   Lab Units 10/14/22  0436 10/13/22  0012 10/09/22  2323   ALBUMIN g/dL 2.30* 1.80* 2.20*         Lab 10/14/22  1007   LACTATE 1.3        Results from last 7 days   Lab Units 10/09/22  2310   GLUCOSE mg/dL 154*       Lab Results   Lab Value Date/Time    HGBA1C 5.50 09/21/2022  "0410       Medications reviewed   Yes  Magic mouth wash, protonix,     GTT: none at this time     Intake/Ouptut 24 hrs reviewed   Yes  Intake & Output (last day)       10/13 0701  10/14 0700 10/14 0701  10/15 0700    I.V. (mL/kg)      Blood 1023.8     Total Intake(mL/kg) 1023.8 (13.1)     Urine (mL/kg/hr) 1200 (0.6) 70 (0.2)    Stool 875 125    Total Output 2075 195    Net -1051.3 -195          Urine Unmeasured Occurrence 2 x 1 x        Anthropometrics     Flowsheet Rows    Flowsheet Row First Filed Value   Admission Height 162.6 cm (64\") Documented at 10/03/2022 0604   Admission Weight 78 kg (172 lb) Documented at 10/03/2022 0604          Height: 162.6 cm (64\")    Last filed wt: Weight: 78 kg (172 lb) (10/03/22 0604)  Weight Method: Stated  Confirmed per pt / family    BMI: BMI (Calculated): 29.5  Overweight: 25.0-29.9kg/m2     Ideal Body Weight (IBW) (kg): 59.72    Weight Change   UBW: (1/22) 212; (4/4/22) 186 lbs   Weight change: 11.4% x 6 months     Weight Weight (kg) Weight (lbs) Weight Method   9/20/2022 76.658 kg 169 lb Stated   9/20/2022 76.749 kg 169 lb 3.2 oz Bed scale   9/22/2022 76.658 kg 169 lb    9/24/2022 84.868 kg 187 lb 1.6 oz Bed scale   10/3/2022 78.019 kg 172 lb Stated       Needs Assessment   Height used: 163cm  Weights used: 76kg    Estimated calorie needs: ~1800 kcal/day  Method:20-25kcals/kg= 2973-6833    Estimated protein needs: ~ 103g pro/day  1.2-1.5g/kg= 91-114g pro    Estimated fluid needs: ~ ml/day   Method:    Current Nutrition Prescription     PO: NPO Diet NPO Type: Sips with Meds  Overall NPO ~ 5 days during admission.     Diet order history:  (10/3) Regular   (10/4) NPO  (10/6) Cardiac   (10/9) NPO  (10/10) Regular, then full   (10/10) NPO    Intake: overall only 6 meals documented since admission with average intake 42%.     Nutrition Diagnosis     10/10  Problem Malnutrition  non severe chronic (severe if wt loss avg over 6 mo accepted   Etiology Effect tx on appetite "   Signs/Symptoms Intake hx w wasting   Status:    10/13, 10/14  Problem inadequate nutrition intake    Etiology Clinical condition; diet order    Signs/Symptoms NPO for multiple procedures; overall poor appetite      Goal:   General: Nutrition to support treatment  PO: Advace diet as medically feasible/appropriate  Patient asking for clear liquid diet   Additional goals: EN vs PN pending clinical course.       Nutrition Intervention     Follow treatment progress, Care plan reviewed     For EN recommend:  Impact Peptide @ goal rate 60ml/hr     Impact Peptide 1.5 1200ml 1800kcals 112.8gm .0 924 free water from EN     For PN recommend:    16% dextrose (288gm), 6% AA (108gm PRO) @ goal rate 75ml/hr. SMOF 20% 200ml daily   = 1811kcals, 108gm pro    Start slow for first 24hrs at risk for refeeding.     Monitoring/Evaluation:   Per protocol, I&O, PO intake, Pertinent labs, Weight, Skin status, GI status, Symptoms        Gosia Benítez RD,   Time Spent: 45 min

## 2022-10-14 NOTE — PLAN OF CARE
Goal Outcome Evaluation:  Plan of Care Reviewed With: patient, spouse        Progress: improving  Outcome Evaluation: VSS. RBC scan completed earlier today, no signs of active bleeding seen. H/H remained stable throughout shift. Potassium and Phos replaced. Pt spike a temp of 100.5. Blood cultures, urine culture, CXR obtained and Cefepime and Vancomycin started for prophylactic neutropenic fever. Family updated throughout shift at bedside.

## 2022-10-14 NOTE — PROGRESS NOTES
Saw patient this afternoon, he still is having melena from his colostomy bag.  He has received multiple blood products over the course last several days.  He is neutropenic today receiving Neupogen.    Underwent nuclear medicine tagged red blood cell scan which was did not show evidence of bleeding.    Still no intervention from a colorectal perspective, agree with gastroenterology and interventional radiology intervention if rebleeding.  If he were to require exploration and oversewing of his gastroduodenal vessel, I would prefer my colleagues, the foregut surgeons to do this.  Certainly he would be at a very high risk for any sort of complications as he is neutropenic.  Agree with less invasive measures including GI and IR

## 2022-10-15 PROBLEM — D61.9 ANEMIA DUE TO BONE MARROW FAILURE: Status: ACTIVE | Noted: 2022-09-21

## 2022-10-15 PROBLEM — R50.81 NEUTROPENIC FEVER: Status: ACTIVE | Noted: 2022-10-03

## 2022-10-15 PROBLEM — D70.9 NEUTROPENIC FEVER: Status: ACTIVE | Noted: 2022-10-03

## 2022-10-15 LAB
ANION GAP SERPL CALCULATED.3IONS-SCNC: 8 MMOL/L (ref 5–15)
BACTERIA SPEC AEROBE CULT: ABNORMAL
BASOPHILS # BLD MANUAL: 0 10*3/MM3 (ref 0–0.2)
BASOPHILS NFR BLD MANUAL: 0 % (ref 0–1.5)
BUN SERPL-MCNC: 11 MG/DL (ref 8–23)
BUN/CREAT SERPL: 37.9 (ref 7–25)
BURR CELLS BLD QL SMEAR: ABNORMAL
CALCIUM SPEC-SCNC: 7.6 MG/DL (ref 8.6–10.5)
CHLORIDE SERPL-SCNC: 109 MMOL/L (ref 98–107)
CO2 SERPL-SCNC: 26 MMOL/L (ref 22–29)
CREAT SERPL-MCNC: 0.29 MG/DL (ref 0.76–1.27)
DEPRECATED RDW RBC AUTO: 50 FL (ref 37–54)
EGFRCR SERPLBLD CKD-EPI 2021: 129.3 ML/MIN/1.73
EOSINOPHIL # BLD MANUAL: 0.01 10*3/MM3 (ref 0–0.4)
EOSINOPHIL NFR BLD MANUAL: 4 % (ref 0.3–6.2)
ERYTHROCYTE [DISTWIDTH] IN BLOOD BY AUTOMATED COUNT: 15.6 % (ref 12.3–15.4)
GLUCOSE SERPL-MCNC: 120 MG/DL (ref 65–99)
HCT VFR BLD AUTO: 19.6 % (ref 37.5–51)
HCT VFR BLD AUTO: 21.5 % (ref 37.5–51)
HCT VFR BLD AUTO: 21.7 % (ref 37.5–51)
HCT VFR BLD AUTO: 22.7 % (ref 37.5–51)
HGB BLD-MCNC: 6.6 G/DL (ref 13–17.7)
HGB BLD-MCNC: 7 G/DL (ref 13–17.7)
HGB BLD-MCNC: 7.2 G/DL (ref 13–17.7)
HGB BLD-MCNC: 7.4 G/DL (ref 13–17.7)
LYMPHOCYTES # BLD MANUAL: 0.11 10*3/MM3 (ref 0.7–3.1)
LYMPHOCYTES NFR BLD MANUAL: 2 % (ref 5–12)
MAGNESIUM SERPL-MCNC: 1.9 MG/DL (ref 1.6–2.4)
MCH RBC QN AUTO: 28.2 PG (ref 26.6–33)
MCHC RBC AUTO-ENTMCNC: 32.3 G/DL (ref 31.5–35.7)
MCV RBC AUTO: 87.5 FL (ref 79–97)
MONOCYTES # BLD: 0 10*3/MM3 (ref 0.1–0.9)
MYELOCYTES NFR BLD MANUAL: 2 % (ref 0–0)
NEUTROPHILS # BLD AUTO: 0.02 10*3/MM3 (ref 1.7–7)
NEUTROPHILS NFR BLD MANUAL: 12 % (ref 42.7–76)
OVALOCYTES BLD QL SMEAR: ABNORMAL
PHOSPHATE SERPL-MCNC: 2.9 MG/DL (ref 2.5–4.5)
PLAT MORPH BLD: NORMAL
PLATELET # BLD AUTO: 116 10*3/MM3 (ref 140–450)
PMV BLD AUTO: 10.4 FL (ref 6–12)
POTASSIUM SERPL-SCNC: 2.3 MMOL/L (ref 3.5–5.2)
POTASSIUM SERPL-SCNC: 2.5 MMOL/L (ref 3.5–5.2)
POTASSIUM SERPL-SCNC: 3.7 MMOL/L (ref 3.5–5.2)
PROCALCITONIN SERPL-MCNC: 0.89 NG/ML (ref 0–0.25)
RBC # BLD AUTO: 2.48 10*6/MM3 (ref 4.14–5.8)
SODIUM SERPL-SCNC: 143 MMOL/L (ref 136–145)
VARIANT LYMPHS NFR BLD MANUAL: 6 % (ref 0–5)
VARIANT LYMPHS NFR BLD MANUAL: 74 % (ref 19.6–45.3)
WBC MORPH BLD: NORMAL
WBC NRBC COR # BLD: 0.14 10*3/MM3 (ref 3.4–10.8)

## 2022-10-15 PROCEDURE — 86900 BLOOD TYPING SEROLOGIC ABO: CPT

## 2022-10-15 PROCEDURE — 0 POTASSIUM CHLORIDE PER 2 MEQ: Performed by: INTERNAL MEDICINE

## 2022-10-15 PROCEDURE — P9047 ALBUMIN (HUMAN), 25%, 50ML: HCPCS | Performed by: INTERNAL MEDICINE

## 2022-10-15 PROCEDURE — 83735 ASSAY OF MAGNESIUM: CPT | Performed by: INTERNAL MEDICINE

## 2022-10-15 PROCEDURE — 0 MAGNESIUM SULFATE 4 GM/100ML SOLUTION

## 2022-10-15 PROCEDURE — 94761 N-INVAS EAR/PLS OXIMETRY MLT: CPT

## 2022-10-15 PROCEDURE — 85018 HEMOGLOBIN: CPT | Performed by: NURSE PRACTITIONER

## 2022-10-15 PROCEDURE — 85018 HEMOGLOBIN: CPT | Performed by: INTERNAL MEDICINE

## 2022-10-15 PROCEDURE — 99233 SBSQ HOSP IP/OBS HIGH 50: CPT | Performed by: INTERNAL MEDICINE

## 2022-10-15 PROCEDURE — 85014 HEMATOCRIT: CPT | Performed by: NURSE PRACTITIONER

## 2022-10-15 PROCEDURE — 84145 PROCALCITONIN (PCT): CPT | Performed by: INTERNAL MEDICINE

## 2022-10-15 PROCEDURE — 94799 UNLISTED PULMONARY SVC/PX: CPT

## 2022-10-15 PROCEDURE — P9016 RBC LEUKOCYTES REDUCED: HCPCS

## 2022-10-15 PROCEDURE — 85025 COMPLETE CBC W/AUTO DIFF WBC: CPT | Performed by: INTERNAL MEDICINE

## 2022-10-15 PROCEDURE — 85014 HEMATOCRIT: CPT | Performed by: INTERNAL MEDICINE

## 2022-10-15 PROCEDURE — 84100 ASSAY OF PHOSPHORUS: CPT | Performed by: INTERNAL MEDICINE

## 2022-10-15 PROCEDURE — 94664 DEMO&/EVAL PT USE INHALER: CPT

## 2022-10-15 PROCEDURE — 84132 ASSAY OF SERUM POTASSIUM: CPT

## 2022-10-15 PROCEDURE — 85007 BL SMEAR W/DIFF WBC COUNT: CPT | Performed by: INTERNAL MEDICINE

## 2022-10-15 PROCEDURE — 80048 BASIC METABOLIC PNL TOTAL CA: CPT

## 2022-10-15 PROCEDURE — 25010000002 FILGRASTIM PER 480 MCG: Performed by: INTERNAL MEDICINE

## 2022-10-15 PROCEDURE — 25010000002 CEFEPIME PER 500 MG: Performed by: INTERNAL MEDICINE

## 2022-10-15 PROCEDURE — 25010000002 VANCOMYCIN 10 G RECONSTITUTED SOLUTION

## 2022-10-15 PROCEDURE — 25010000002 FLUCONAZOLE PER 200 MG: Performed by: INTERNAL MEDICINE

## 2022-10-15 PROCEDURE — 25010000002 ALBUMIN HUMAN 25% PER 50 ML: Performed by: INTERNAL MEDICINE

## 2022-10-15 PROCEDURE — 94640 AIRWAY INHALATION TREATMENT: CPT

## 2022-10-15 PROCEDURE — 87255 GENET VIRUS ISOLATE HSV: CPT | Performed by: INTERNAL MEDICINE

## 2022-10-15 PROCEDURE — 36430 TRANSFUSION BLD/BLD COMPNT: CPT

## 2022-10-15 RX ORDER — ALBUTEROL SULFATE 2.5 MG/3ML
2.5 SOLUTION RESPIRATORY (INHALATION)
Status: COMPLETED | OUTPATIENT
Start: 2022-10-15 | End: 2022-10-18

## 2022-10-15 RX ORDER — MAGNESIUM SULFATE HEPTAHYDRATE 40 MG/ML
2 INJECTION, SOLUTION INTRAVENOUS AS NEEDED
Status: DISCONTINUED | OUTPATIENT
Start: 2022-10-15 | End: 2022-10-20 | Stop reason: HOSPADM

## 2022-10-15 RX ORDER — MAGNESIUM SULFATE HEPTAHYDRATE 40 MG/ML
4 INJECTION, SOLUTION INTRAVENOUS AS NEEDED
Status: DISCONTINUED | OUTPATIENT
Start: 2022-10-15 | End: 2022-10-20 | Stop reason: HOSPADM

## 2022-10-15 RX ORDER — BUMETANIDE 0.25 MG/ML
2 INJECTION INTRAMUSCULAR; INTRAVENOUS ONCE
Status: COMPLETED | OUTPATIENT
Start: 2022-10-15 | End: 2022-10-15

## 2022-10-15 RX ORDER — BUMETANIDE 0.25 MG/ML
1 INJECTION INTRAMUSCULAR; INTRAVENOUS EVERY 6 HOURS
Status: COMPLETED | OUTPATIENT
Start: 2022-10-15 | End: 2022-10-16

## 2022-10-15 RX ORDER — ALBUMIN (HUMAN) 12.5 G/50ML
25 SOLUTION INTRAVENOUS EVERY 6 HOURS
Status: COMPLETED | OUTPATIENT
Start: 2022-10-15 | End: 2022-10-15

## 2022-10-15 RX ORDER — SODIUM CHLORIDE FOR INHALATION 7 %
4 VIAL, NEBULIZER (ML) INHALATION
Status: DISPENSED | OUTPATIENT
Start: 2022-10-15 | End: 2022-10-18

## 2022-10-15 RX ORDER — ALBUTEROL SULFATE 2.5 MG/3ML
2.5 SOLUTION RESPIRATORY (INHALATION) EVERY 6 HOURS PRN
Status: DISCONTINUED | OUTPATIENT
Start: 2022-10-15 | End: 2022-10-15

## 2022-10-15 RX ADMIN — VANCOMYCIN HYDROCHLORIDE 1250 MG: 10 INJECTION, POWDER, LYOPHILIZED, FOR SOLUTION INTRAVENOUS at 06:53

## 2022-10-15 RX ADMIN — POTASSIUM CHLORIDE 20 MEQ: 29.8 INJECTION, SOLUTION INTRAVENOUS at 17:34

## 2022-10-15 RX ADMIN — BUMETANIDE 2 MG: 0.25 INJECTION, SOLUTION INTRAMUSCULAR; INTRAVENOUS at 11:17

## 2022-10-15 RX ADMIN — ALBUMIN (HUMAN) 25 G: 0.25 INJECTION, SOLUTION INTRAVENOUS at 16:51

## 2022-10-15 RX ADMIN — PANTOPRAZOLE SODIUM 40 MG: 40 INJECTION, POWDER, FOR SOLUTION INTRAVENOUS at 10:23

## 2022-10-15 RX ADMIN — SODIUM CHLORIDE SOLN NEBU 7% 4 ML: 7 NEBU SOLN at 19:36

## 2022-10-15 RX ADMIN — POTASSIUM CHLORIDE 20 MEQ: 29.8 INJECTION, SOLUTION INTRAVENOUS at 23:10

## 2022-10-15 RX ADMIN — DIPHENHYDRAMINE HYDROCHLORIDE AND LIDOCAINE HYDROCHLORIDE AND ALUMINUM HYDROXIDE AND MAGNESIUM HYDRO 5 ML: KIT at 10:23

## 2022-10-15 RX ADMIN — CEFEPIME HYDROCHLORIDE 2 G: 2 INJECTION, POWDER, FOR SOLUTION INTRAMUSCULAR; INTRAVENOUS at 10:50

## 2022-10-15 RX ADMIN — ALBUMIN (HUMAN) 25 G: 0.25 INJECTION, SOLUTION INTRAVENOUS at 11:22

## 2022-10-15 RX ADMIN — MAGNESIUM SULFATE HEPTAHYDRATE 4 G: 40 INJECTION, SOLUTION INTRAVENOUS at 11:20

## 2022-10-15 RX ADMIN — FLUTICASONE PROPIONATE 1 SPRAY: 50 SPRAY, METERED NASAL at 10:23

## 2022-10-15 RX ADMIN — DIPHENHYDRAMINE HYDROCHLORIDE AND LIDOCAINE HYDROCHLORIDE AND ALUMINUM HYDROXIDE AND MAGNESIUM HYDRO 5 ML: KIT at 16:14

## 2022-10-15 RX ADMIN — LIDOCAINE HYDROCHLORIDE 5 ML: 20 SOLUTION ORAL; TOPICAL at 00:32

## 2022-10-15 RX ADMIN — BUMETANIDE 1 MG: 0.25 INJECTION, SOLUTION INTRAMUSCULAR; INTRAVENOUS at 16:40

## 2022-10-15 RX ADMIN — DIPHENHYDRAMINE HYDROCHLORIDE AND LIDOCAINE HYDROCHLORIDE AND ALUMINUM HYDROXIDE AND MAGNESIUM HYDRO 5 ML: KIT at 20:38

## 2022-10-15 RX ADMIN — DIPHENHYDRAMINE HYDROCHLORIDE AND LIDOCAINE HYDROCHLORIDE AND ALUMINUM HYDROXIDE AND MAGNESIUM HYDRO 5 ML: KIT at 02:45

## 2022-10-15 RX ADMIN — BUMETANIDE 1 MG: 0.25 INJECTION, SOLUTION INTRAMUSCULAR; INTRAVENOUS at 20:38

## 2022-10-15 RX ADMIN — PANTOPRAZOLE SODIUM 40 MG: 40 INJECTION, POWDER, FOR SOLUTION INTRAVENOUS at 16:40

## 2022-10-15 RX ADMIN — DIPHENHYDRAMINE HYDROCHLORIDE AND LIDOCAINE HYDROCHLORIDE AND ALUMINUM HYDROXIDE AND MAGNESIUM HYDRO 5 ML: KIT at 00:32

## 2022-10-15 RX ADMIN — Medication 10 ML: at 10:24

## 2022-10-15 RX ADMIN — POTASSIUM CHLORIDE 20 MEQ: 29.8 INJECTION, SOLUTION INTRAVENOUS at 18:52

## 2022-10-15 RX ADMIN — Medication 10 ML: at 20:38

## 2022-10-15 RX ADMIN — TAMSULOSIN HYDROCHLORIDE 0.4 MG: 0.4 CAPSULE ORAL at 10:23

## 2022-10-15 RX ADMIN — FLUCONAZOLE 100 MG: 2 INJECTION, SOLUTION INTRAVENOUS at 10:23

## 2022-10-15 RX ADMIN — ALBUTEROL SULFATE 2.5 MG: 2.5 SOLUTION RESPIRATORY (INHALATION) at 19:37

## 2022-10-15 RX ADMIN — POTASSIUM CHLORIDE 20 MEQ: 29.8 INJECTION, SOLUTION INTRAVENOUS at 20:15

## 2022-10-15 RX ADMIN — CEFEPIME HYDROCHLORIDE 2 G: 2 INJECTION, POWDER, FOR SOLUTION INTRAMUSCULAR; INTRAVENOUS at 17:46

## 2022-10-15 RX ADMIN — FILGRASTIM 480 MCG: 480 INJECTION, SOLUTION INTRAVENOUS; SUBCUTANEOUS at 18:10

## 2022-10-15 RX ADMIN — ALBUMIN (HUMAN) 25 G: 0.25 INJECTION, SOLUTION INTRAVENOUS at 23:23

## 2022-10-15 RX ADMIN — DIPHENHYDRAMINE HYDROCHLORIDE AND LIDOCAINE HYDROCHLORIDE AND ALUMINUM HYDROXIDE AND MAGNESIUM HYDRO 5 ML: KIT at 23:27

## 2022-10-15 RX ADMIN — VANCOMYCIN HYDROCHLORIDE 1250 MG: 10 INJECTION, POWDER, LYOPHILIZED, FOR SOLUTION INTRAVENOUS at 18:48

## 2022-10-15 RX ADMIN — CEFEPIME HYDROCHLORIDE 2 G: 2 INJECTION, POWDER, FOR SOLUTION INTRAMUSCULAR; INTRAVENOUS at 02:45

## 2022-10-15 RX ADMIN — ALBUTEROL SULFATE 2.5 MG: 2.5 SOLUTION RESPIRATORY (INHALATION) at 13:11

## 2022-10-15 NOTE — PLAN OF CARE
Goal Outcome Evaluation:  Plan of Care Reviewed With: patient        Progress: no change  Outcome Evaluation: VSS. H&H trending down, aprn aware. Color of stool improving, currently brown. Complaints of sore mouth and throat, magic mouth wash and lidocaine administered frequently. Recheck H&H in for noon. Will continue to monitor closely.

## 2022-10-15 NOTE — PROGRESS NOTES
Intensive Care Follow-up     Hospital:  LOS: 12 days   Mr. Abraham Wu, 72 y.o. male is followed for:   Hodgkin lymphoma (HCC)        History of present illness:   Patient is a 72 y.o. male PMH hypertension, hyperlipidemia, SHERRI, who is being transferred from the floor with ongoing issues related to acute GI bleed.  Patient was recently admitted to [facility] from 9/21-9/24 for diarrhea, unintentional weight loss.  At that time he was found to have enteropathogenic E. Coli, enterotoxic E. coli status post Flagyl and Levaquin and he was also found to have a large rectal mass.  He underwent laparoscopic colostomy. Pathology was concerning for lymphoma.       He presented to the ED on 10/3 with fever.  His procalcitonin was elevated however white count and lactate were normal.  CT scan showed no signs of abscess however persistent large bulky lymphadenopathy, listening to a large perirectal mass as well as other systemic signs of spreading including left kidney lesion, liver lesions and right pleural lesions. Since admission heme-onc's been following him and he was started on chemotherapy on 10/8/2022.     On hospital day 6 he developed worsening anemia and dark melanotic output from his ostomy.  He underwent upper endoscopy on 10/10 which showed ulceration on the hard palate., severe candidiasis in the esophagus and bleeding ulcer in the second portion of the duodenum which was clipped.  At that time he was continued on PPI.  They also added Diflucan.  Unfortunately on the afternoon of 10/11 he began passing more melanotic stool through his ostomy.  His hemoglobin did trend down for which he received 2 units of blood.  Initially his hemoglobin responded to the units of blood but then trended back down.  With concern for continued bleeding it was felt he needed angiogram and was transferred to the ICU for management.     Of note hospitalization has been complicated by hyponatremia for which nephrology  has been following.  Labs are suggestive of poor oral solute intake versus polydipsia.  For this he has been on a fluid restriction of 1.2 L/day with continued salt tabs 3 g 3 times daily.      Subjective   Interval History:  Patient is currently stable denies any chest pain, nausea, fever, or chills.  He is having increased mucus production this morning with significant cough.  Continues on broad-spectrum antibiotics.  He is having difficulty obtaining enough nutrition.  Hemoglobin continues to trend down but no signs of active bleeding.  Currently afebrile and hemodynamically stable.             The patient's past medical, surgical and social history were reviewed and updated in Epic as appropriate.       Objective     Infusions:  Pharmacy to dose vancomycin,       Medications:  [START ON 10/16/2022] Pharmacy Consult, , Does not apply, Once  albumin human, 25 g, Intravenous, Q6H  albuterol, 2.5 mg, Nebulization, Q6H - RT  bumetanide, 1 mg, Intravenous, Q6H  bumetanide, 2 mg, Intravenous, Once  cefepime, 2 g, Intravenous, Q8H  filgrastim (NEUPOGEN) injection, 480 mcg, Subcutaneous, Q PM  First Mouthwash (Magic Mouthwash), 5 mL, Swish & Spit, Q4H  fluconazole, 100 mg, Intravenous, Daily  fluticasone, 1 spray, Nasal, Daily  pantoprazole, 40 mg, Intravenous, BID AC  sodium chloride, 10 mL, Intravenous, Q12H  sodium chloride, 4 mL, Nebulization, BID - RT  tamsulosin, 0.4 mg, Oral, Daily  vancomycin, 1,250 mg, Intravenous, Q12H      I reviewed the patient's medications.    Vital Sign Min/Max for last 24 hours  Temp  Min: 98.3 °F (36.8 °C)  Max: 100.1 °F (37.8 °C)   BP  Min: 129/63  Max: 153/90   Pulse  Min: 86  Max: 106   Resp  Min: 15  Max: 22   SpO2  Min: 91 %  Max: 100 %   Flow (L/min)  Min: 1  Max: 2       Input/Output for last 24 hour shift  10/14 0701 - 10/15 0700  In: 1169.9 [I.V.:669.9]  Out: 1120 [Urine:645]      GENERAL : NAD, conversant  RESPIRATORY/THORAX : normal respiratory effort and no intercostal  retractions, Coarse crackles bilaterally  CARDIOVASCULAR : Normal S1/S2, RRR. 1+ lower ext edema.  GASTROINTESTINAL : Soft, NT/ND. BS x 4 normoactive. No hepatosplenomegaly.  MUSCULOSKELETAL : No cyanosis, clubbing, or ischemia  NEUROLOGICAL: alert and oriented to person, place and time  PSYCHOLOGICAL : Appropriate affect      Results from last 7 days   Lab Units 10/15/22  0416 10/14/22  2112 10/14/22  1520 10/14/22  0940 10/14/22  0436 10/13/22  0419 10/13/22  0012   WBC 10*3/mm3 0.14*  --   --   --  0.48*  --  4.41   HEMOGLOBIN g/dL 7.0* 7.5* 8.2*   < > 9.2*   < > 7.3*   PLATELETS 10*3/mm3 116*  --   --   --  137*  --  138*    < > = values in this interval not displayed.     Results from last 7 days   Lab Units 10/15/22  0416 10/14/22  2112 10/14/22  0436 10/13/22  0012   SODIUM mmol/L 143  --  136 136   POTASSIUM mmol/L 3.7 3.8 3.1* 3.6   CO2 mmol/L 26.0  --  28.0 26.0   BUN mg/dL 11  --  15 16   CREATININE mg/dL 0.29*  --  0.34* 0.29*   MAGNESIUM mg/dL 1.9  --  2.2 1.9   PHOSPHORUS mg/dL 2.9 2.8 2.1* 2.6   GLUCOSE mg/dL 120*  --  141* 145*     Estimated Creatinine Clearance: 223.6 mL/min (A) (by C-G formula based on SCr of 0.29 mg/dL (L)).          I reviewed the patient's new clinical results.  I reviewed the patient's new imaging results/reports including actual images and agree with reports.       Imaging Results (Last 24 Hours)     Procedure Component Value Units Date/Time    NM GI Blood Loss [[id number]] Collected: 10/14/22 1306     Updated: 10/14/22 1415    Narrative:      DATE OF EXAM: 10/14/2022 11:10 AM     PROCEDURE: NM GI BLOOD LOSS-     INDICATIONS: GI bleed     COMPARISON: No comparisons available.     TECHNIQUE: Scintigraphic imaging of the abdomen occurred following  injection of 25.3 mCi of technetium 99m labeled pertechnetate tagged to  pyrophosphate labeled red blood cells. Exam is performed per GI bleeding  exam protocol.     FINDINGS:  Blood flow images through 60 minutes show generally  expected  distribution of radiotracer, with slightly greater activity in the  expected location of the spleen than typically seen. Images obtained  through 60 minutes again show somewhat greater than typical activity in  the area of the spleen, however, this is unchanging from 5 minutes to 60  minutes, and appears to represent normal variant splenic activity. No  evidence of a transient focus of activity or mobile focus of activity is  seen to suggest active GI bleed.        Impression:      No evidence of active GI bleed. Suspected normal variant uptake in the  spleen.          XR Chest PA & Lateral [[id number]] Collected: 10/14/22 1107     Updated: 10/14/22 1112    Narrative:      DATE OF EXAM: 10/14/2022 10:53 AM     PROCEDURE: XR CHEST PA AND LATERAL-     INDICATIONS: neutropenic fever; R50.9-Fever, unspecified;  N13.30-Unspecified hydronephrosis; Z93.3-Colostomy status;  C79.9-Secondary malignant neoplasm of unspecified site;  E87.0-Hyperosmolality and hypernatremia; E87.6-Hypokalemia;  C85.90-Non-Hodgkin lymphoma, unspecified, unspecified site;  R13.10-Dysphagia, unspecified; C81.98-Hodgkin lymphoma, unspecified,  lymph nodes of multiple sites; K92.2-Gastrointesti     COMPARISON: 10/03/2022     TECHNIQUE: Two radiologic views of the chest, PA and lateral, were  obtained.     FINDINGS:  There is density along the posterior pleural margins that could relate  to underlying effusions. The heart looks enlarged. There are  degenerative changes involving the dorsal spine and both shoulder areas.  There is a PICC line via the right arm with its tip in the superior vena  cava.       Impression:      1.  Bibasilar effusions. Some underlying atelectasis could not be  excluded.  2.  Suggested cardiomegaly.     This report was finalized on 10/14/2022 11:09 AM by Chiki Escobar MD.             Assessment & Plan   Impression        Hodgkin lymphoma (HCC)    Rectal mass s/p laparoscopic colostomy (9/2022)    Anemia secondary to  chemotherapy and acute blood loss    Essential hypertension    Hydronephrosis    Neutropenic fever (HCC)    Hypokalemia    Acute upper GI bleed    Melena    Moderate malnutrition (HCC)    Esophageal candidiasis (HCC)       Plan        72-year-old male with a past medical history significant for hypertension, Hodgkin's lymphoma, and hyperlipidemia.  He was transferred to the [facility] ICU on 10/11/2022 with an acute high risk GI bleed not responding to resuscitation.  Initial EGD from 10/10/2022 showed ulcerations with bleeding ulcer and severe esophageal candidiasis.  Clip was placed and patient was started on fluconazole for candidiasis.  Patient was started on chemotherapy on 10/8/2022.  Repeat melena occurred on 10/11/2022 minimally responsive to transfusion.  CT abdomen pelvis was performed showing no obvious site of bleeding and known metastatic disease.  Patient underwent a embolization on 10 12,022 with successful embolization of the right gastroepiploic artery, pancreatic duodenal artery, and the main trunk of the gastroduodenal artery.  Hemoglobin is continue to trend down requiring intermittent transfusions, thought is that most likely bone marrow suppression is the main cause of the patient's ongoing requirement for transfusion.     -Repeat hemoglobin this afternoon, will plan to transfuse 2 units of less than 7  -For mouth ulcers, will swab for herpes in addition to continuing Magic mouthwash.  We will treat with Valtrex if positive  -IV Protonix twice daily  -GI consulted, appreciate recommendations.    -s/p chemotherapy on 10/8/2022, continue Neupogen per oncology recommendations  -Continue vancomycin and cefepime for possible infection  -Will add saline nebs and albuterol for to help with secretions  -Continue Bumex, chest x-ray from 10/14/2022 showing bilateral pleural effusions.  -Patient needs to significantly increase p.o. diet, if nutrition goals cannot be obtained would recommend  placing NG and starting tube feeds.  -Mechanical DVT prophylaxis  -A.m. labs    Plan of care and goals reviewed with multidisciplinary/antibiotic stewardship team during rounds.   I discussed the patient's findings and my recommendations with patient and nursing staff     High level of risk due to:  illness with threat to life or bodily function.      Lili Oliva DO  Pulmonary, Critical care and Sleep Medicine

## 2022-10-16 ENCOUNTER — APPOINTMENT (OUTPATIENT)
Dept: GENERAL RADIOLOGY | Facility: HOSPITAL | Age: 72
End: 2022-10-16

## 2022-10-16 PROBLEM — R41.0 DELIRIUM: Status: ACTIVE | Noted: 2022-10-16

## 2022-10-16 PROBLEM — G47.33 OSA (OBSTRUCTIVE SLEEP APNEA): Status: ACTIVE | Noted: 2022-10-16

## 2022-10-16 LAB
ABO GROUP BLD: NORMAL
ANION GAP SERPL CALCULATED.3IONS-SCNC: 10 MMOL/L (ref 5–15)
ARTERIAL PATENCY WRIST A: ABNORMAL
ATMOSPHERIC PRESS: ABNORMAL MM[HG]
BASE EXCESS BLDA CALC-SCNC: 9.1 MMOL/L (ref 0–2)
BASOPHILS # BLD MANUAL: 0 10*3/MM3 (ref 0–0.2)
BASOPHILS NFR BLD MANUAL: 0 % (ref 0–1.5)
BDY SITE: ABNORMAL
BH BB BLOOD EXPIRATION DATE: NORMAL
BH BB BLOOD TYPE BARCODE: 9500
BH BB DISPENSE STATUS: NORMAL
BH BB PRODUCT CODE: NORMAL
BH BB UNIT NUMBER: NORMAL
BLD GP AB SCN SERPL QL: NEGATIVE
BODY TEMPERATURE: 37 C
BUN SERPL-MCNC: 9 MG/DL (ref 8–23)
BUN/CREAT SERPL: 19.6 (ref 7–25)
CALCIUM SPEC-SCNC: 7.9 MG/DL (ref 8.6–10.5)
CHLORIDE SERPL-SCNC: 102 MMOL/L (ref 98–107)
CO2 BLDA-SCNC: 33.8 MMOL/L (ref 22–33)
CO2 SERPL-SCNC: 30 MMOL/L (ref 22–29)
COHGB MFR BLD: 1.5 % (ref 0–2)
CREAT SERPL-MCNC: 0.46 MG/DL (ref 0.76–1.27)
CROSSMATCH INTERPRETATION: NORMAL
DEPRECATED RDW RBC AUTO: 47 FL (ref 37–54)
EGFRCR SERPLBLD CKD-EPI 2021: 112.5 ML/MIN/1.73
EOSINOPHIL # BLD MANUAL: 0 10*3/MM3 (ref 0–0.4)
EOSINOPHIL NFR BLD MANUAL: 1 % (ref 0.3–6.2)
EPAP: 0
ERYTHROCYTE [DISTWIDTH] IN BLOOD BY AUTOMATED COUNT: 15.1 % (ref 12.3–15.4)
GLUCOSE SERPL-MCNC: 136 MG/DL (ref 65–99)
HCO3 BLDA-SCNC: 32.6 MMOL/L (ref 20–26)
HCT VFR BLD AUTO: 21.4 % (ref 37.5–51)
HCT VFR BLD CALC: 23.3 % (ref 38–51)
HGB BLD-MCNC: 7.2 G/DL (ref 13–17.7)
HGB BLDA-MCNC: 7.6 G/DL (ref 13.5–17.5)
INHALED O2 CONCENTRATION: 32 %
IPAP: 0
LYMPHOCYTES # BLD MANUAL: 0.11 10*3/MM3 (ref 0.7–3.1)
LYMPHOCYTES NFR BLD MANUAL: 23 % (ref 5–12)
MAGNESIUM SERPL-MCNC: 2.4 MG/DL (ref 1.6–2.4)
MCH RBC QN AUTO: 28.6 PG (ref 26.6–33)
MCHC RBC AUTO-ENTMCNC: 33.6 G/DL (ref 31.5–35.7)
MCV RBC AUTO: 84.9 FL (ref 79–97)
METAMYELOCYTES NFR BLD MANUAL: 3 % (ref 0–0)
METHGB BLD QL: 0.6 % (ref 0–1.5)
MODALITY: ABNORMAL
MONOCYTES # BLD: 0.05 10*3/MM3 (ref 0.1–0.9)
NEUTROPHILS # BLD AUTO: 0.06 10*3/MM3 (ref 1.7–7)
NEUTROPHILS NFR BLD MANUAL: 17 % (ref 42.7–76)
NEUTS BAND NFR BLD MANUAL: 8 % (ref 0–5)
NOTE: ABNORMAL
OVALOCYTES BLD QL SMEAR: ABNORMAL
OXYHGB MFR BLDV: 97 % (ref 94–99)
PAW @ PEAK INSP FLOW SETTING VENT: 0 CMH2O
PCO2 BLDA: 39.5 MM HG (ref 35–45)
PCO2 TEMP ADJ BLD: 39.5 MM HG (ref 35–48)
PH BLDA: 7.53 PH UNITS (ref 7.35–7.45)
PH, TEMP CORRECTED: 7.53 PH UNITS
PHOSPHATE SERPL-MCNC: 2.3 MG/DL (ref 2.5–4.5)
PLATELET # BLD AUTO: 95 10*3/MM3 (ref 140–450)
PMV BLD AUTO: 11.3 FL (ref 6–12)
PO2 BLDA: 101 MM HG (ref 83–108)
PO2 TEMP ADJ BLD: 101 MM HG (ref 83–108)
POTASSIUM SERPL-SCNC: 2.7 MMOL/L (ref 3.5–5.2)
RBC # BLD AUTO: 2.52 10*6/MM3 (ref 4.14–5.8)
RH BLD: POSITIVE
SMALL PLATELETS BLD QL SMEAR: ABNORMAL
SODIUM SERPL-SCNC: 142 MMOL/L (ref 136–145)
T&S EXPIRATION DATE: NORMAL
TOTAL RATE: 0 BREATHS/MINUTE
TOXIC GRANULATION: ABNORMAL
UNIT  ABO: NORMAL
UNIT  RH: NORMAL
VANCOMYCIN TROUGH SERPL-MCNC: 9.7 MCG/ML (ref 5–20)
VARIANT LYMPHS NFR BLD MANUAL: 43 % (ref 19.6–45.3)
VARIANT LYMPHS NFR BLD MANUAL: 5 % (ref 0–5)
WBC NRBC COR # BLD: 0.22 10*3/MM3 (ref 3.4–10.8)

## 2022-10-16 PROCEDURE — 74018 RADEX ABDOMEN 1 VIEW: CPT

## 2022-10-16 PROCEDURE — 83050 HGB METHEMOGLOBIN QUAN: CPT

## 2022-10-16 PROCEDURE — P9047 ALBUMIN (HUMAN), 25%, 50ML: HCPCS | Performed by: INTERNAL MEDICINE

## 2022-10-16 PROCEDURE — 80048 BASIC METABOLIC PNL TOTAL CA: CPT

## 2022-10-16 PROCEDURE — 0 POTASSIUM CHLORIDE 10 MEQ/100ML SOLUTION: Performed by: NURSE PRACTITIONER

## 2022-10-16 PROCEDURE — C1894 INTRO/SHEATH, NON-LASER: HCPCS

## 2022-10-16 PROCEDURE — 25010000002 FLUCONAZOLE PER 200 MG: Performed by: INTERNAL MEDICINE

## 2022-10-16 PROCEDURE — 85007 BL SMEAR W/DIFF WBC COUNT: CPT | Performed by: INTERNAL MEDICINE

## 2022-10-16 PROCEDURE — C1751 CATH, INF, PER/CENT/MIDLINE: HCPCS

## 2022-10-16 PROCEDURE — 83735 ASSAY OF MAGNESIUM: CPT | Performed by: INTERNAL MEDICINE

## 2022-10-16 PROCEDURE — 99231 SBSQ HOSP IP/OBS SF/LOW 25: CPT | Performed by: INTERNAL MEDICINE

## 2022-10-16 PROCEDURE — 25010000002 FILGRASTIM PER 480 MCG: Performed by: INTERNAL MEDICINE

## 2022-10-16 PROCEDURE — 94799 UNLISTED PULMONARY SVC/PX: CPT

## 2022-10-16 PROCEDURE — 25010000002 CEFEPIME PER 500 MG: Performed by: INTERNAL MEDICINE

## 2022-10-16 PROCEDURE — 86901 BLOOD TYPING SEROLOGIC RH(D): CPT | Performed by: NURSE PRACTITIONER

## 2022-10-16 PROCEDURE — 0 POTASSIUM CHLORIDE PER 2 MEQ: Performed by: INTERNAL MEDICINE

## 2022-10-16 PROCEDURE — 80202 ASSAY OF VANCOMYCIN: CPT

## 2022-10-16 PROCEDURE — 25010000002 ALBUMIN HUMAN 25% PER 50 ML: Performed by: INTERNAL MEDICINE

## 2022-10-16 PROCEDURE — 82375 ASSAY CARBOXYHB QUANT: CPT

## 2022-10-16 PROCEDURE — 86900 BLOOD TYPING SEROLOGIC ABO: CPT | Performed by: NURSE PRACTITIONER

## 2022-10-16 PROCEDURE — 82805 BLOOD GASES W/O2 SATURATION: CPT

## 2022-10-16 PROCEDURE — 25010000002 VANCOMYCIN 10 G RECONSTITUTED SOLUTION

## 2022-10-16 PROCEDURE — 36600 WITHDRAWAL OF ARTERIAL BLOOD: CPT

## 2022-10-16 PROCEDURE — 02HV33Z INSERTION OF INFUSION DEVICE INTO SUPERIOR VENA CAVA, PERCUTANEOUS APPROACH: ICD-10-PCS | Performed by: INTERNAL MEDICINE

## 2022-10-16 PROCEDURE — 86850 RBC ANTIBODY SCREEN: CPT | Performed by: NURSE PRACTITIONER

## 2022-10-16 PROCEDURE — 84100 ASSAY OF PHOSPHORUS: CPT | Performed by: INTERNAL MEDICINE

## 2022-10-16 PROCEDURE — 99233 SBSQ HOSP IP/OBS HIGH 50: CPT | Performed by: INTERNAL MEDICINE

## 2022-10-16 PROCEDURE — 85025 COMPLETE CBC W/AUTO DIFF WBC: CPT | Performed by: INTERNAL MEDICINE

## 2022-10-16 RX ORDER — POTASSIUM CHLORIDE 1.5 G/1.77G
40 POWDER, FOR SOLUTION ORAL AS NEEDED
Status: DISCONTINUED | OUTPATIENT
Start: 2022-10-16 | End: 2022-10-20

## 2022-10-16 RX ORDER — VALACYCLOVIR HYDROCHLORIDE 500 MG/1
1000 TABLET, FILM COATED ORAL EVERY 12 HOURS SCHEDULED
Status: DISCONTINUED | OUTPATIENT
Start: 2022-10-16 | End: 2022-10-20 | Stop reason: HOSPADM

## 2022-10-16 RX ORDER — SODIUM CHLORIDE 0.9 % (FLUSH) 0.9 %
10 SYRINGE (ML) INJECTION EVERY 12 HOURS SCHEDULED
Status: DISCONTINUED | OUTPATIENT
Start: 2022-10-16 | End: 2022-10-20 | Stop reason: HOSPADM

## 2022-10-16 RX ORDER — POTASSIUM CHLORIDE 7.45 MG/ML
10 INJECTION INTRAVENOUS
Status: DISCONTINUED | OUTPATIENT
Start: 2022-10-16 | End: 2022-10-20 | Stop reason: HOSPADM

## 2022-10-16 RX ORDER — SODIUM CHLORIDE 0.9 % (FLUSH) 0.9 %
10 SYRINGE (ML) INJECTION AS NEEDED
Status: DISCONTINUED | OUTPATIENT
Start: 2022-10-16 | End: 2022-10-20 | Stop reason: HOSPADM

## 2022-10-16 RX ORDER — BUMETANIDE 0.25 MG/ML
1 INJECTION INTRAMUSCULAR; INTRAVENOUS EVERY 6 HOURS
Status: DISCONTINUED | OUTPATIENT
Start: 2022-10-16 | End: 2022-10-17

## 2022-10-16 RX ORDER — ALBUMIN (HUMAN) 12.5 G/50ML
25 SOLUTION INTRAVENOUS EVERY 6 HOURS
Status: COMPLETED | OUTPATIENT
Start: 2022-10-16 | End: 2022-10-16

## 2022-10-16 RX ADMIN — DIPHENHYDRAMINE HYDROCHLORIDE AND LIDOCAINE HYDROCHLORIDE AND ALUMINUM HYDROXIDE AND MAGNESIUM HYDRO 5 ML: KIT at 08:20

## 2022-10-16 RX ADMIN — ALBUTEROL SULFATE 2.5 MG: 2.5 SOLUTION RESPIRATORY (INHALATION) at 08:30

## 2022-10-16 RX ADMIN — CEFEPIME HYDROCHLORIDE 2 G: 2 INJECTION, POWDER, FOR SOLUTION INTRAMUSCULAR; INTRAVENOUS at 02:10

## 2022-10-16 RX ADMIN — DIPHENHYDRAMINE HYDROCHLORIDE AND LIDOCAINE HYDROCHLORIDE AND ALUMINUM HYDROXIDE AND MAGNESIUM HYDRO 5 ML: KIT at 15:05

## 2022-10-16 RX ADMIN — PANTOPRAZOLE SODIUM 40 MG: 40 INJECTION, POWDER, FOR SOLUTION INTRAVENOUS at 08:18

## 2022-10-16 RX ADMIN — SODIUM CHLORIDE SOLN NEBU 7% 4 ML: 7 NEBU SOLN at 19:54

## 2022-10-16 RX ADMIN — BUMETANIDE 1 MG: 0.25 INJECTION, SOLUTION INTRAMUSCULAR; INTRAVENOUS at 08:18

## 2022-10-16 RX ADMIN — VANCOMYCIN HYDROCHLORIDE 1250 MG: 10 INJECTION, POWDER, LYOPHILIZED, FOR SOLUTION INTRAVENOUS at 17:58

## 2022-10-16 RX ADMIN — VALACYCLOVIR HYDROCHLORIDE 1000 MG: 500 TABLET, FILM COATED ORAL at 10:44

## 2022-10-16 RX ADMIN — ALBUTEROL SULFATE 2.5 MG: 2.5 SOLUTION RESPIRATORY (INHALATION) at 13:00

## 2022-10-16 RX ADMIN — ALBUTEROL SULFATE 2.5 MG: 2.5 SOLUTION RESPIRATORY (INHALATION) at 19:54

## 2022-10-16 RX ADMIN — POTASSIUM CHLORIDE 10 MEQ: 7.46 INJECTION, SOLUTION INTRAVENOUS at 08:19

## 2022-10-16 RX ADMIN — FLUTICASONE PROPIONATE 1 SPRAY: 50 SPRAY, METERED NASAL at 08:21

## 2022-10-16 RX ADMIN — FLUCONAZOLE 100 MG: 2 INJECTION, SOLUTION INTRAVENOUS at 08:19

## 2022-10-16 RX ADMIN — Medication 10 ML: at 20:26

## 2022-10-16 RX ADMIN — CEFEPIME HYDROCHLORIDE 2 G: 2 INJECTION, POWDER, FOR SOLUTION INTRAMUSCULAR; INTRAVENOUS at 10:44

## 2022-10-16 RX ADMIN — BUMETANIDE 1 MG: 0.25 INJECTION, SOLUTION INTRAMUSCULAR; INTRAVENOUS at 20:26

## 2022-10-16 RX ADMIN — DIPHENHYDRAMINE HYDROCHLORIDE AND LIDOCAINE HYDROCHLORIDE AND ALUMINUM HYDROXIDE AND MAGNESIUM HYDRO 5 ML: KIT at 03:43

## 2022-10-16 RX ADMIN — CEFEPIME HYDROCHLORIDE 2 G: 2 INJECTION, POWDER, FOR SOLUTION INTRAMUSCULAR; INTRAVENOUS at 17:58

## 2022-10-16 RX ADMIN — POTASSIUM CHLORIDE 20 MEQ: 29.8 INJECTION, SOLUTION INTRAVENOUS at 02:08

## 2022-10-16 RX ADMIN — ALBUMIN (HUMAN) 25 G: 0.25 INJECTION, SOLUTION INTRAVENOUS at 11:11

## 2022-10-16 RX ADMIN — BUMETANIDE 1 MG: 0.25 INJECTION, SOLUTION INTRAMUSCULAR; INTRAVENOUS at 02:22

## 2022-10-16 RX ADMIN — PANTOPRAZOLE SODIUM 40 MG: 40 INJECTION, POWDER, FOR SOLUTION INTRAVENOUS at 17:59

## 2022-10-16 RX ADMIN — SODIUM CHLORIDE SOLN NEBU 7% 4 ML: 7 NEBU SOLN at 08:29

## 2022-10-16 RX ADMIN — POTASSIUM CHLORIDE 20 MEQ: 29.8 INJECTION, SOLUTION INTRAVENOUS at 00:36

## 2022-10-16 RX ADMIN — POTASSIUM PHOSPHATE, MONOBASIC AND POTASSIUM PHOSPHATE, DIBASIC 15 MMOL: 224; 236 INJECTION, SOLUTION, CONCENTRATE INTRAVENOUS at 17:59

## 2022-10-16 RX ADMIN — POTASSIUM CHLORIDE 10 MEQ: 7.46 INJECTION, SOLUTION INTRAVENOUS at 10:44

## 2022-10-16 RX ADMIN — VANCOMYCIN HYDROCHLORIDE 1250 MG: 10 INJECTION, POWDER, LYOPHILIZED, FOR SOLUTION INTRAVENOUS at 08:19

## 2022-10-16 RX ADMIN — VALACYCLOVIR HYDROCHLORIDE 1000 MG: 500 TABLET, FILM COATED ORAL at 20:26

## 2022-10-16 RX ADMIN — BUMETANIDE 1 MG: 0.25 INJECTION, SOLUTION INTRAMUSCULAR; INTRAVENOUS at 15:00

## 2022-10-16 RX ADMIN — DIPHENHYDRAMINE HYDROCHLORIDE AND LIDOCAINE HYDROCHLORIDE AND ALUMINUM HYDROXIDE AND MAGNESIUM HYDRO 5 ML: KIT at 10:43

## 2022-10-16 RX ADMIN — TAMSULOSIN HYDROCHLORIDE 0.4 MG: 0.4 CAPSULE ORAL at 08:18

## 2022-10-16 RX ADMIN — POTASSIUM CHLORIDE 40 MEQ: 1.5 POWDER, FOR SOLUTION ORAL at 15:05

## 2022-10-16 RX ADMIN — Medication 10 ML: at 08:47

## 2022-10-16 RX ADMIN — FILGRASTIM 480 MCG: 480 INJECTION, SOLUTION INTRAVENOUS; SUBCUTANEOUS at 17:58

## 2022-10-16 RX ADMIN — ALBUTEROL SULFATE 2.5 MG: 2.5 SOLUTION RESPIRATORY (INHALATION) at 02:00

## 2022-10-16 NOTE — PROGRESS NOTES
"   LOS: 13 days    Patient Care Team:  Jatinder Haynes MD as PCP - General (Family Medicine)  Hyponatremia follow up     Subjective     Interval History:   Edema is worse.  Patient ICU setting.  So labs electrolytes much better.    Review of Systems:   Appetite remains poor.  Complains of shortness of breath, edema, generalized weakness diet.  All other systems negative.    Objective     Vital Sign Min/Max for last 24 hours  Temp  Min: 97.7 °F (36.5 °C)  Max: 98.8 °F (37.1 °C)   BP  Min: 108/66  Max: 144/54   Pulse  Min: 70  Max: 108   Resp  Min: 0  Max: 24   SpO2  Min: 38 %  Max: 100 %   Flow (L/min)  Min: 1  Max: 3   No data recorded     Flowsheet Rows    Flowsheet Row First Filed Value   Admission Height 162.6 cm (64\") Documented at 10/03/2022 0604   Admission Weight 78 kg (172 lb) Documented at 10/03/2022 0604          I/O this shift:  In: -   Out: 200 [Urine:200]  I/O last 3 completed shifts:  In: 3297.3 [P.O.:725; I.V.:702.8; Blood:373.8; IV Piggyback:1495.7]  Out: 6805 [Urine:6275; Stool:530]    Physical Exam:  General Appearance: Sick looking  male awake alert oriented.  Mild distress.  Eyes: PER, EOMI.  Neck: Supple no JVD.  Lungs: Positive basal Rales are heard.  Equal chest movement, nonlabored.  Heart: No gallop, murmur, rub, RRR.  Abdomen: Obesity soft, nontender, positive bowel sounds, no organomegaly.  Extremities: Bilateral upper and lower extremity dependent 2+ edema.  No cyanosis.  Neuro: No focal deficit, moving all extremities, alert oriented X 3  Skin: Warm and dry.    no suprapubic fullness or tenderness.  Psych: Anxiety noted    WBC WBC   Date Value Ref Range Status   10/15/2022 0.14 (C) 3.40 - 10.80 10*3/mm3 Final   10/14/2022 0.48 (C) 3.40 - 10.80 10*3/mm3 Final      HGB Hemoglobin   Date Value Ref Range Status   10/15/2022 7.4 (L) 13.0 - 17.7 g/dL Final   10/15/2022 6.6 (C) 13.0 - 17.7 g/dL Final   10/15/2022 7.2 (L) 13.0 - 17.7 g/dL Final   10/15/2022 7.0 (L) 13.0 - 17.7 g/dL " Final   10/14/2022 7.5 (L) 13.0 - 17.7 g/dL Final   10/14/2022 8.2 (L) 13.0 - 17.7 g/dL Final   10/14/2022 8.3 (L) 13.0 - 17.7 g/dL Final   10/14/2022 9.2 (L) 13.0 - 17.7 g/dL Final   10/13/2022 7.9 (L) 13.0 - 17.7 g/dL Final   10/13/2022 7.4 (L) 13.0 - 17.7 g/dL Final   10/13/2022 7.4 (L) 13.0 - 17.7 g/dL Final      HCT Hematocrit   Date Value Ref Range Status   10/15/2022 21.5 (L) 37.5 - 51.0 % Final   10/15/2022 19.6 (C) 37.5 - 51.0 % Final   10/15/2022 22.7 (L) 37.5 - 51.0 % Final   10/15/2022 21.7 (L) 37.5 - 51.0 % Final   10/14/2022 22.5 (L) 37.5 - 51.0 % Final   10/14/2022 24.9 (L) 37.5 - 51.0 % Final   10/14/2022 25.5 (L) 37.5 - 51.0 % Final   10/14/2022 27.2 (L) 37.5 - 51.0 % Final   10/13/2022 23.2 (L) 37.5 - 51.0 % Final   10/13/2022 21.8 (L) 37.5 - 51.0 % Final   10/13/2022 22.2 (L) 37.5 - 51.0 % Final      Platlets No results found for: LABPLAT   MCV MCV   Date Value Ref Range Status   10/15/2022 87.5 79.0 - 97.0 fL Final   10/14/2022 83.4 79.0 - 97.0 fL Final          Sodium Sodium   Date Value Ref Range Status   10/16/2022 142 136 - 145 mmol/L Final   10/15/2022 143 136 - 145 mmol/L Final   10/14/2022 136 136 - 145 mmol/L Final      Potassium Potassium   Date Value Ref Range Status   10/16/2022 2.7 (L) 3.5 - 5.2 mmol/L Final     Comment:     Slight hemolysis detected by analyzer. Results may be affected.   10/15/2022 2.5 (C) 3.5 - 5.2 mmol/L Final   10/15/2022 2.3 (C) 3.5 - 5.2 mmol/L Final   10/15/2022 3.7 3.5 - 5.2 mmol/L Final   10/14/2022 3.8 3.5 - 5.2 mmol/L Final   10/14/2022 3.1 (L) 3.5 - 5.2 mmol/L Final      Chloride Chloride   Date Value Ref Range Status   10/16/2022 102 98 - 107 mmol/L Final   10/15/2022 109 (H) 98 - 107 mmol/L Final   10/14/2022 101 98 - 107 mmol/L Final      CO2 CO2   Date Value Ref Range Status   10/16/2022 30.0 (H) 22.0 - 29.0 mmol/L Final   10/15/2022 26.0 22.0 - 29.0 mmol/L Final   10/14/2022 28.0 22.0 - 29.0 mmol/L Final      BUN BUN   Date Value Ref Range Status    10/16/2022 9 8 - 23 mg/dL Final   10/15/2022 11 8 - 23 mg/dL Final   10/14/2022 15 8 - 23 mg/dL Final      Creatinine Creatinine   Date Value Ref Range Status   10/16/2022 0.46 (L) 0.76 - 1.27 mg/dL Final   10/15/2022 0.29 (L) 0.76 - 1.27 mg/dL Final   10/14/2022 0.34 (L) 0.76 - 1.27 mg/dL Final      Calcium Calcium   Date Value Ref Range Status   10/16/2022 7.9 (L) 8.6 - 10.5 mg/dL Final   10/15/2022 7.6 (L) 8.6 - 10.5 mg/dL Final   10/14/2022 7.9 (L) 8.6 - 10.5 mg/dL Final      PO4 No results found for: CAPO4   Albumin Albumin   Date Value Ref Range Status   10/14/2022 2.30 (L) 3.50 - 5.20 g/dL Final      Magnesium Magnesium   Date Value Ref Range Status   10/16/2022 2.4 1.6 - 2.4 mg/dL Final   10/15/2022 1.9 1.6 - 2.4 mg/dL Final   10/14/2022 2.2 1.6 - 2.4 mg/dL Final      Uric Acid No results found for: URICACID        Results Review:     I reviewed the patient's new clinical results.    albuterol, 2.5 mg, Nebulization, Q6H - RT  cefepime, 2 g, Intravenous, Q8H  filgrastim (NEUPOGEN) injection, 480 mcg, Subcutaneous, Q PM  First Mouthwash (Magic Mouthwash), 5 mL, Swish & Spit, Q4H  fluconazole, 100 mg, Intravenous, Daily  fluticasone, 1 spray, Nasal, Daily  pantoprazole, 40 mg, Intravenous, BID AC  sodium chloride, 10 mL, Intravenous, Q12H  sodium chloride, 4 mL, Nebulization, BID - RT  tamsulosin, 0.4 mg, Oral, Daily  valACYclovir, 1,000 mg, Oral, Q12H  vancomycin, 1,250 mg, Intravenous, Q12H      Pharmacy to dose vancomycin,         Medication Review:     Assessment & Plan       Hodgkin lymphoma (HCC)    Rectal mass s/p laparoscopic colostomy (9/2022)    Anemia secondary to chemotherapy and acute blood loss    Essential hypertension    Hydronephrosis    Neutropenic fever (HCC)    Hypokalemia    Acute upper GI bleed    Melena    Moderate malnutrition (HCC)    Esophageal candidiasis (HCC)    1- Hyponatremia - Serum osmolality 263, Urine osmolality 115 and urine sodium< 20. Suggest of poor oral solute intake vs  polydipsia . Uric acid low.  Sodium level stable.  2-mild bilateral hydronephrosis on CT scan  3-Hypokalemia - resolved  4. Anemia: Transfuse at hb<7.   5.  Neutropenia s/p chemotherapy.  6.  Classic Hodgkin's lymphoma CD30 positive involving rectum, perinephric, liver.  Chemotherapy started 10/8/2022  7.  S/p laparoscopic colostomy and biopsy.  8.  GI bleed: S/p coil artery embolization 10/12/2022  Plan:  Replacement potassium ordered.  Patient still have edema although edema has markedly improved, will continue with IV diuretics and IV albumin at this time.  High risk patient.  Sodium and creatinine stable.  Case discussed with the wife, son and RN taking care of the patient.  Remain on CPAP machine at this time.  High risk patient with multiple medical problems.     Jessica Espinoza MD  10/16/22  10:21 EDT

## 2022-10-16 NOTE — PROGRESS NOTES
HEMATOLOGY/ONCOLOGY PROGRESS NOTE    Subjective      CC: Very weak and tired    SUBJECTIVE: Still very weak and tired.  Wants to get out of the ICU.         Past Medical History, Past Surgical History, Social History, Family History have been reviewed and are without significant changes except as mentioned.      Medications:  The current medication list was reviewed in the EMR    ALLERGIES: No Known Allergies    ROS:  A comprehensive 14 point review of systems was performed and was negative except as mentioned.      Objective      Vitals:    10/16/22 1200 10/16/22 1300 10/16/22 1400 10/16/22 1500   BP: 129/67 131/64 122/64 125/66   BP Location:       Patient Position:       Pulse: 68 77 68 66   Resp: 16 16 16    Temp: 98.5 °F (36.9 °C)      TempSrc: Axillary      SpO2: 98% 99% 97%    Weight:       Height:                       ECOG: (3) Capable of Limited Self Care, Confined to Bed or Chair Greater Than 50% of Waking Hours    General: Frail appearing, in no acute distress  HEENT: sclerae anicteric, oropharynx dry mucosa  Lymphatics: no cervical, supraclavicular, inguinal, or axillary adenopathy  Cardiovascular: regular rate and rhythm, no murmurs  Lungs: clear to auscultation bilaterally  Abdomen: soft, nontender, nondistended.  No palpable organomegaly  Extremities: no lower extremity edema  Skin: no rashes, lesions, bruising, or petechiae  Neuro: Alert and oriented x 3. Moves all extremities.    RECENT LABS:    Results from last 7 days   Lab Units 10/16/22  0543 10/15/22  2203 10/15/22  1632 10/15/22  1110 10/15/22  0416 10/14/22  0940 10/14/22  0436   WBC 10*3/mm3 0.22*  --   --   --  0.14*  --  0.48*   HEMOGLOBIN g/dL 7.2* 7.4* 6.6*   < > 7.0*   < > 9.2*   PLATELETS 10*3/mm3 95*  --   --   --  116*  --  137*    < > = values in this interval not displayed.     Results from last 7 days   Lab Units 10/16/22  0543 10/15/22  2203 10/15/22  1632 10/15/22  0416 10/14/22  2112 10/14/22  0436   SODIUM mmol/L 142  --   --   143  --  136   POTASSIUM mmol/L 2.7* 2.5* 2.3* 3.7   < > 3.1*   CO2 mmol/L 30.0*  --   --  26.0  --  28.0   BUN mg/dL 9  --   --  11  --  15   CREATININE mg/dL 0.46*  --   --  0.29*  --  0.34*   GLUCOSE mg/dL 136*  --   --  120*  --  141*    < > = values in this interval not displayed.     Results from last 7 days   Lab Units 10/14/22  0436 10/13/22  0012 10/09/22  2323   AST (SGOT) U/L 17 41* 46*   ALT (SGPT) U/L 20 26 52*   BILIRUBIN mg/dL 1.7* 2.0* 0.9   ALK PHOS U/L 190* 213* 322*     Results from last 7 days   Lab Units 10/16/22  0352   PH, ARTERIAL pH units 7.525*   PCO2, ARTERIAL mm Hg 39.5   PO2 ART mm Hg 101.0     CT Abdomen Pelvis With & Without Contrast    Result Date: 10/11/2022  No evidence of active GI bleeding allowing for limitations of scattered hyperdense material throughout the GI tract, either ingested medication or prior oral contrast. Interval duodenal ulcer clipping, without evidence of active GI bleeding at this site.  No new or acute abdominopelvic findings. Grossly unchanged metastatic disease as detailed on 10/3/2022 CT and please refer to that report for complete details.  This report was finalized on 10/11/2022 6:39 PM by Santino Kaur MD.      CT Head Without Contrast    Result Date: 10/10/2022  1. No acute intracranial abnormality. 2. Mild generalized volume loss.  Electronically signed by:  Neeraj Coronado M.D.  10/9/2022 10:53 PM Mountain Time    NM GI Blood Loss    Result Date: 10/15/2022  No evidence of active GI bleed. Suspected normal variant uptake in the spleen.  This report was finalized on 10/15/2022 2:18 PM by Dr. Del Wilks MD.      FL Esophagram Complete Single Contrast    Result Date: 10/12/2022  Mild gastroesophageal reflux to the level of the thoracic inlet    This report was finalized on 10/12/2022 9:09 AM by Dr. Del Wilks MD.      IR Artery Embolization Occlusion    Result Date: 10/12/2022   Successful catheterization and angiography of the following: Celiac  artery, common hepatic artery, gastroduodenal artery, superior mesenteric artery  Successful embolization of the following: Most proximal aspects of the right gastroepiploic artery, superior pancreaticoduodenal arteries, the main trunk of the gastroduodenal artery.  The embolic agent used in the endpoint are mentioned above.  Thank you for the opportunity to assist you in the care of your patient.  This report was finalized on 10/12/2022 2:36 PM by Yassine Lopez MD.      XR Abdomen KUB    Result Date: 10/16/2022  Feeding tube terminates in the stomach. Upper abdominal bowel gas pattern is nonspecific, with some gas filled segments of colon noted. There is no overt pneumoperitoneum.  This report was finalized on 10/16/2022 2:23 PM by Josef Mckee.      XR Chest PA & Lateral    Result Date: 10/14/2022  1.  Bibasilar effusions. Some underlying atelectasis could not be excluded. 2.  Suggested cardiomegaly.  This report was finalized on 10/14/2022 11:09 AM by Chiki Escobar MD.                Assessment   ASSESSMENT & PLAN:  1.  Hodgkin lymphoma status post cycle 1 AVVD  10/8/2022 with subsequent severe neutropenia with neutropenic fevers.  2.  Anemia secondary to GI blood loss status post coil embolization.    Discussion: where this purely anemia due to chemotherapy induced pancytopenia, his increments should last longer.  A very modest increment with red blood cell transfusion yesterday up to the sevens from sixes but already trending downward.  We will continue Neupogen with total white count still well below 1000 and neutrophil count well below 500.  No joseph fevers or chills currently on broad-spectrum antibiotics.  If spikes, please consult infectious disease.  On fluid restrictionPer guidance of nephrology.  Platelets a little lower on vancomycin.  Currently on 100% oxygenating on nasal cannula O2.    Total time of care 30 minutes, 15 minutes face-to-face discussing with patient his predicament and management  as outlined.                    Renny Choudhury MD    10/16/2022

## 2022-10-16 NOTE — PLAN OF CARE
Goal Outcome Evaluation:  Plan of Care Reviewed With: patient, spouse        Progress: no change  Outcome Evaluation: VSS, on room air to 2 L NC. Keofeed placed for gastric feeds to supplement PO intake due to poor appetite and painful oral lesions. Potassium and phos replaced. Dark liquid stool from ostomy ,? old blood, 25 ml. H/H remains low but has required no further transfusions since last night. No obvious signs of active bleeding.

## 2022-10-16 NOTE — PROGRESS NOTES
HEMATOLOGY/ONCOLOGY PROGRESS NOTE    Subjective      CC: Generally weak and frail but no new somatic complaints    SUBJECTIVE: Generally weak and frail but no new somatic complaints        Past Medical History, Past Surgical History, Social History, Family History have been reviewed and are without significant changes except as mentioned.      Medications:  The current medication list was reviewed in the EMR    ALLERGIES: No Known Allergies    ROS:  A comprehensive 14 point review of systems was performed and was negative except as mentioned.      Objective      Vitals:    10/15/22 1936 10/15/22 2000 10/15/22 2038 10/15/22 2043   BP:  123/66 130/63    BP Location:       Patient Position:       Pulse: 80 82 75 74   Resp: 16   24   Temp:    98.1 °F (36.7 °C)   TempSrc:    Axillary   SpO2: 100% 100%  97%   Weight:       Height:                       ECOG: (2) Ambulatory & Capable of Self Care, Unable to Carry Out Work Activity, Up & About Greater Than 50% of Waking Hours  Temperature max 100.1 in the last 24 hours.  General: Frail appearing, in no acute distress  HEENT: sclerae anicteric, oropharynx clear  Lymphatics: no cervical, supraclavicular, inguinal, or axillary adenopathy  Cardiovascular: regular rate and rhythm, no murmurs  Lungs: clear to auscultation bilaterally  Abdomen: soft, nontender, nondistended.  No palpable organomegaly  Extremities: no lower extremity edema  Skin: no rashes, lesions, bruising, or petechiae.  Pallor  Neuro: Alert and oriented x 3. Moves all extremities.    RECENT LABS:    Results from last 7 days   Lab Units 10/15/22  1632 10/15/22  1110 10/15/22  0416 10/14/22  0940 10/14/22  0436 10/13/22  0419 10/13/22  0012   WBC 10*3/mm3  --   --  0.14*  --  0.48*  --  4.41   HEMOGLOBIN g/dL 6.6* 7.2* 7.0*   < > 9.2*   < > 7.3*   PLATELETS 10*3/mm3  --   --  116*  --  137*  --  138*    < > = values in this interval not displayed.     Results from last 7 days   Lab Units 10/15/22  1632  10/15/22  0416 10/14/22  2112 10/14/22  0436 10/13/22  0012   SODIUM mmol/L  --  143  --  136 136   POTASSIUM mmol/L 2.3* 3.7 3.8 3.1* 3.6   CO2 mmol/L  --  26.0  --  28.0 26.0   BUN mg/dL  --  11  --  15 16   CREATININE mg/dL  --  0.29*  --  0.34* 0.29*   GLUCOSE mg/dL  --  120*  --  141* 145*     Results from last 7 days   Lab Units 10/14/22  0436 10/13/22  0012 10/09/22  2323   AST (SGOT) U/L 17 41* 46*   ALT (SGPT) U/L 20 26 52*   BILIRUBIN mg/dL 1.7* 2.0* 0.9   ALK PHOS U/L 190* 213* 322*         CT Abdomen Pelvis With & Without Contrast    Result Date: 10/11/2022  No evidence of active GI bleeding allowing for limitations of scattered hyperdense material throughout the GI tract, either ingested medication or prior oral contrast. Interval duodenal ulcer clipping, without evidence of active GI bleeding at this site.  No new or acute abdominopelvic findings. Grossly unchanged metastatic disease as detailed on 10/3/2022 CT and please refer to that report for complete details.  This report was finalized on 10/11/2022 6:39 PM by Santino Kaur MD.      CT Head Without Contrast    Result Date: 10/10/2022  1. No acute intracranial abnormality. 2. Mild generalized volume loss.  Electronically signed by:  Neeraj Coronado M.D.  10/9/2022 10:53 PM Mountain Time    NM GI Blood Loss    Result Date: 10/15/2022  No evidence of active GI bleed. Suspected normal variant uptake in the spleen.  This report was finalized on 10/15/2022 2:18 PM by Dr. Del Wilks MD.      FL Esophagram Complete Single Contrast    Result Date: 10/12/2022  Mild gastroesophageal reflux to the level of the thoracic inlet    This report was finalized on 10/12/2022 9:09 AM by Dr. Del Wilks MD.      IR Artery Embolization Occlusion    Result Date: 10/12/2022   Successful catheterization and angiography of the following: Celiac artery, common hepatic artery, gastroduodenal artery, superior mesenteric artery  Successful embolization of the following: Most  proximal aspects of the right gastroepiploic artery, superior pancreaticoduodenal arteries, the main trunk of the gastroduodenal artery.  The embolic agent used in the endpoint are mentioned above.  Thank you for the opportunity to assist you in the care of your patient.  This report was finalized on 10/12/2022 2:36 PM by Yassine Lopez MD.      XR Chest PA & Lateral    Result Date: 10/14/2022  1.  Bibasilar effusions. Some underlying atelectasis could not be excluded. 2.  Suggested cardiomegaly.  This report was finalized on 10/14/2022 11:09 AM by Chiki Escobar MD.                Assessment   ASSESSMENT & PLAN:    1.  Hodgkin lymphoma status post cycle 1 AVVD 10/8/2022 and with neutropenic fevers on 480 mcg subcu daily Neupogen with persistent severe leukopenia and worsening anemia  2.  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  Little else from our standpoint to do other than to transfuse red blood cells for hemoglobin less than 7 which has already been ordered by intensivist.  I have ordered CBC daily and H&H's are being checked regularly.No joseph fevers or chills on broad-spectrum antibiotics along with vancomycin.  If spikes, please consult infectious disease.    Total time of care today inclusive of time spent reviewing records of Dr. Leary as well as gastroenterology, colorectal surgery, nephrology, and intensivist notes from the last 3 days and relaying that information and discussing his care and conundrum's took 45 minutes of patient care time throughout the day today.            Renny Choudhury MD    10/15/2022

## 2022-10-16 NOTE — PLAN OF CARE
Goal Outcome Evaluation:  Plan of Care Reviewed With: patient, spouse, son        Progress: no change    Vital stigns remain stable. H/H continued to trend out. Recheck this evening 6.6/19- 1unit PRBCs ordered. Bumex/Albumin ordered per Nephrology. Potassium checked this evening 2.3- replacement started. Mg replaced. Pt remains afebrile. VSS. Colostomy appliance changed this evening. Family updated at bedside.

## 2022-10-16 NOTE — PROGRESS NOTES
Clinically, GI bleeding has fortunately slowed.    Agree with supplemental enteral feeds.    Following peripherally.

## 2022-10-16 NOTE — PROGRESS NOTES
"Pharmacy Consult-Vancomycin Dosing  Abraham Wu is a  72 y.o. male receiving vancomycin therapy.     Indication: neutropenic fever   Consulting Provider: Tomy Morales MD  ID Consult: no    Goal AUC: 400 - 600 mg/L*hr    Current Antimicrobial Therapy  Fluconazole (10/10 - 10/20)   Cefepime (10/14 - 10/20)   Vanco (10/14 - 10/20    Allergies  Allergies as of 10/03/2022    (No Known Allergies)     Labs  Results from last 7 days   Lab Units 10/16/22  0543 10/15/22  0416 10/14/22  0436   BUN mg/dL 9 11 15   CREATININE mg/dL 0.46* 0.29* 0.34*     Results from last 7 days   Lab Units 10/15/22  0416 10/14/22  0436 10/13/22  0012   WBC 10*3/mm3 0.14* 0.48* 4.41     Evaluation of Dosing   Ht - 162.6 cm (64\")  Wt - 78 kg (172 lb)    Estimated Creatinine Clearance: 141 mL/min (A) (by C-G formula based on SCr of 0.46 mg/dL (L)).  Intake & Output (last 3 days)         10/13 0701  10/14 0700 10/14 0701  10/15 0700 10/15 0701  10/16 0700 10/16 0701  10/17 0700    P.O.   725     I.V. (mL/kg)  669.9 (8.6) 173.6 (2.2)     Blood 1023.8  373.8     IV Piggyback  500 1395.7     Total Intake(mL/kg) 1023.8 (13.1) 1169.9 (15) 2668.1 (34.2)     Urine (mL/kg/hr) 1200 (0.6) 645 (0.3) 5925 (3.2)     Stool 875 475 430     Total Output 2075 1120 6355     Net -1051.3 +49.9 -3687             Urine Unmeasured Occurrence 2 x 4 x 3 x     Stool Unmeasured Occurrence  1 x 1 x           Microbiology and Radiology  Microbiology Results (last 10 days)       Procedure Component Value - Date/Time    Urine Culture - Urine, Urine, Clean Catch [[id number]]  (Abnormal) Collected: 10/14/22 1012    Lab Status: Final result Specimen: Urine, Clean Catch Updated: 10/15/22 1051     Urine Culture <10,000 CFU/mL Gram Negative Bacilli     Comment: Call if further workup needed.         Narrative:      Colonization of the urinary tract without infection is common. Treatment is discouraged unless the patient is symptomatic, pregnant, or undergoing an invasive urologic " procedure.    Blood Culture - Blood, Wrist, Left [[id number]]  (Normal) Collected: 10/14/22 1007    Lab Status: Preliminary result Specimen: Blood from Wrist, Left Updated: 10/15/22 1046     Blood Culture No growth at 24 hours    Blood Culture - Blood, Blood, PICC Line [[id number]]  (Normal) Collected: 10/14/22 1001    Lab Status: Preliminary result Specimen: Blood, PICC Line Updated: 10/15/22 1046     Blood Culture No growth at 24 hours          Reported Vancomycin Levels  Results from last 7 days   Lab Units 10/16/22  0543   VANCOMYCIN TR mcg/mL 9.70     Insight[employer] AUC Calculation:  Predicted Steady State AUC on Current Dose: 415 mg/L*hr  _________________________________    Assessment/Plan:  Continue current dosing of Vancomycin.  Pharmacy will follow for any change in renal clearance and ADR's.    Thanks,   Yanet Gamboa PharmD BCPS

## 2022-10-16 NOTE — SIGNIFICANT NOTE
"0411 Pt o2sats started to drop. RN went to bedside. Pt continued to drop quickly to 38%. Pt not breathing when RN went into room. Pt does have sleep apnea. RN shook arms of patient and yelled, pt woke up started breathing again and Bipap was reapplied.      At 0330 Pt was standing without gown on at side of bed, urine all over the floor. Pt was confused and having visual hallucinations stating \"that man over there helped me out of the bed.\" RN looked on the bed and the PICC line was out of the patient, tip intact. Pressure held at PICC site. Pt reoriented, placed back in bed. ABG ordered.   "

## 2022-10-16 NOTE — CONSULTS
Placed by ASHISH Mason RN - confirmed with 3cg.  RUE double lumen PICC with 43cm total and 0cm exposed.

## 2022-10-16 NOTE — PROGRESS NOTES
Intensive Care Follow-up     Hospital:  LOS: 13 days   Mr. Abraham Wu, 72 y.o. male is followed for:   Hodgkin lymphoma (HCC)            History of present illness:   Patient is a 72 y.o. male PMH hypertension, hyperlipidemia, SHERRI, who is being transferred from the floor with ongoing issues related to acute GI bleed.  Patient was recently admitted to [facility] from 9/21-9/24 for diarrhea, unintentional weight loss.  At that time he was found to have enteropathogenic E. Coli, enterotoxic E. coli status post Flagyl and Levaquin and he was also found to have a large rectal mass.  He underwent laparoscopic colostomy. Pathology was concerning for lymphoma.       He presented to the ED on 10/3 with fever.  His procalcitonin was elevated however white count and lactate were normal.  CT scan showed no signs of abscess however persistent large bulky lymphadenopathy, listening to a large perirectal mass as well as other systemic signs of spreading including left kidney lesion, liver lesions and right pleural lesions. Since admission heme-onc's been following him and he was started on chemotherapy on 10/8/2022.     On hospital day 6 he developed worsening anemia and dark melanotic output from his ostomy.  He underwent upper endoscopy on 10/10 which showed ulceration on the hard palate., severe candidiasis in the esophagus and bleeding ulcer in the second portion of the duodenum which was clipped.  At that time he was continued on PPI.  They also added Diflucan.  Unfortunately on the afternoon of 10/11 he began passing more melanotic stool through his ostomy.  His hemoglobin did trend down for which he received 2 units of blood.  Initially his hemoglobin responded to the units of blood but then trended back down.  With concern for continued bleeding it was felt he needed angiogram and was transferred to the ICU for management.     Of note hospitalization has been complicated by hyponatremia for which  nephrology has been following.  Labs are suggestive of poor oral solute intake versus polydipsia.  For this he has been on a fluid restriction of 1.2 L/day with continued salt tabs 3 g 3 times daily.      Subjective   Interval History:  Patient is currently stable this morning.  But overnight was confused and pulled out his PICC line.  Also was hallucinating.  Continues on broad-spectrum antibiotics this morning.  He is remained afebrile.  He is hemodynamically stable.  He is much more awake and alert this morning.  There was concern for apneic event overnight, but he has no issues wearing his CPAP.  States that he does not want aware though due to the fact that his throat is currently sore.  No other acute complaints.             The patient's past medical, surgical and social history were reviewed and updated in Epic as appropriate.       Objective     Infusions:  Pharmacy to dose vancomycin,       Medications:  albuterol, 2.5 mg, Nebulization, Q6H - RT  bumetanide, 1 mg, Intravenous, Q6H  cefepime, 2 g, Intravenous, Q8H  filgrastim (NEUPOGEN) injection, 480 mcg, Subcutaneous, Q PM  First Mouthwash (Magic Mouthwash), 5 mL, Swish & Spit, Q4H  fluconazole, 100 mg, Intravenous, Daily  fluticasone, 1 spray, Nasal, Daily  pantoprazole, 40 mg, Intravenous, BID AC  sodium chloride, 10 mL, Intravenous, Q12H  sodium chloride, 4 mL, Nebulization, BID - RT  tamsulosin, 0.4 mg, Oral, Daily  valACYclovir, 1,000 mg, Oral, Q12H  vancomycin, 1,250 mg, Intravenous, Q12H      I reviewed the patient's medications.    Vital Sign Min/Max for last 24 hours  Temp  Min: 97.7 °F (36.5 °C)  Max: 98.7 °F (37.1 °C)   BP  Min: 108/66  Max: 144/54   Pulse  Min: 70  Max: 108   Resp  Min: 0  Max: 24   SpO2  Min: 38 %  Max: 100 %   Flow (L/min)  Min: 1  Max: 3       Input/Output for last 24 hour shift  10/15 0701 - 10/16 0700  In: 2668.1 [P.O.:725; I.V.:173.6]  Out: 6355 [Urine:5925]      GENERAL : NAD, conversant  RESPIRATORY/THORAX : normal  respiratory effort and no intercostal retractions, Coarse crackles bilaterally  CARDIOVASCULAR : Normal S1/S2, RRR. 1+ lower ext edema.  GASTROINTESTINAL : Soft, NT/ND. BS x 4 normoactive. No hepatosplenomegaly.  MUSCULOSKELETAL : No cyanosis, clubbing, or ischemia  NEUROLOGICAL: alert and oriented to person, place and time  PSYCHOLOGICAL : Appropriate affect    Results from last 7 days   Lab Units 10/16/22  0543 10/15/22  2203 10/15/22  1632 10/15/22  1110 10/15/22  0416 10/14/22  0940 10/14/22  0436   WBC 10*3/mm3 0.22*  --   --   --  0.14*  --  0.48*   HEMOGLOBIN g/dL 7.2* 7.4* 6.6*   < > 7.0*   < > 9.2*   PLATELETS 10*3/mm3 95*  --   --   --  116*  --  137*    < > = values in this interval not displayed.     Results from last 7 days   Lab Units 10/16/22  0543 10/15/22  2203 10/15/22  1632 10/15/22  0416 10/14/22  2112 10/14/22  0436   SODIUM mmol/L 142  --   --  143  --  136   POTASSIUM mmol/L 2.7* 2.5* 2.3* 3.7 3.8 3.1*   CO2 mmol/L 30.0*  --   --  26.0  --  28.0   BUN mg/dL 9  --   --  11  --  15   CREATININE mg/dL 0.46*  --   --  0.29*  --  0.34*   MAGNESIUM mg/dL 2.4  --   --  1.9  --  2.2   PHOSPHORUS mg/dL 2.3*  --   --  2.9 2.8 2.1*   GLUCOSE mg/dL 136*  --   --  120*  --  141*     Estimated Creatinine Clearance: 141 mL/min (A) (by C-G formula based on SCr of 0.46 mg/dL (L)).    Results from last 7 days   Lab Units 10/16/22  0352   PH, ARTERIAL pH units 7.525*   PCO2, ARTERIAL mm Hg 39.5   PO2 ART mm Hg 101.0       I reviewed the patient's new clinical results.  I reviewed the patient's new imaging results/reports including actual images and agree with reports.       Imaging Results (Last 24 Hours)     Procedure Component Value Units Date/Time    XR Abdomen KUB [[id number]] Resulted: 10/16/22 1154     Updated: 10/16/22 1216    NM GI Blood Loss [[id number]] Collected: 10/14/22 1306     Updated: 10/15/22 1421    Narrative:      DATE OF EXAM: 10/14/2022 11:10 AM     PROCEDURE: NM GI BLOOD LOSS-      INDICATIONS: GI bleed     COMPARISON: No comparisons available.     TECHNIQUE: Scintigraphic imaging of the abdomen occurred following  injection of 25.3 mCi of technetium 99m labeled pertechnetate tagged to  pyrophosphate labeled red blood cells. Exam is performed per GI bleeding  exam protocol.     FINDINGS:  Blood flow images through 60 minutes show generally expected  distribution of radiotracer, with slightly greater activity in the  expected location of the spleen than typically seen. Images obtained  through 60 minutes again show somewhat greater than typical activity in  the area of the spleen, however, this is unchanging from 5 minutes to 60  minutes, and appears to represent normal variant splenic activity. No  evidence of a transient focus of activity or mobile focus of activity is  seen to suggest active GI bleed.        Impression:      No evidence of active GI bleed. Suspected normal variant uptake in the  spleen.     This report was finalized on 10/15/2022 2:18 PM by Dr. Del Wilks MD.             Assessment & Plan   Impression        Hodgkin lymphoma (HCC)    Rectal mass s/p laparoscopic colostomy (9/2022)    Anemia secondary to chemotherapy and acute blood loss    Essential hypertension    Hydronephrosis    Neutropenic fever (HCC)    Hypokalemia    Acute upper GI bleed    Melena    Moderate malnutrition (HCC)    Esophageal candidiasis (HCC)    SHERRI (obstructive sleep apnea)    ICU delirium        Plan        72-year-old male with a past medical history significant for hypertension, Hodgkin's lymphoma, and hyperlipidemia.  He was transferred to the  ICU on 10/11/2022 with an acute high risk GI bleed not responding to resuscitation.  Initial EGD from 10/10/2022 showed ulcerations with bleeding ulcer and severe esophageal candidiasis.  Clip was placed and patient was started on fluconazole for candidiasis.  Patient was started on chemotherapy on 10/8/2022.  Repeat melena  occurred on 10/11/2022 minimally responsive to transfusion.  CT abdomen pelvis was performed showing no obvious site of bleeding and known metastatic disease.  Patient underwent a embolization on 10 12,022 with successful embolization of the right gastroepiploic artery, pancreatic duodenal artery, and the main trunk of the gastroduodenal artery.  Hemoglobin is continue to trend down requiring intermittent transfusions, thought is that most likely bone marrow suppression is the main cause of the patient's ongoing requirement for transfusion.     -Monitor hemoglobin daily, plan to transfuse for hemoglobin less than 7  -For mouth ulcers, herpes swab is currently pending, will go ahead and cover with Valtrex for now and continue Magic mouthwash  -Continue fluconazole for esophageal candidiasis  -IV Protonix twice daily  -s/p chemotherapy on 10/8/2022, continue Neupogen per oncology recommendations  -Continue vancomycin and cefepime for possible infection  -Continue saline nebs and albuterol for to help with secretions  -Continue Bumex, per nephrology's recommendations  -Patient's nutrition at this point is abysmal, had a long conversation with the patient and his family today about starting some enteral nutrition we are trying to let his throat heal and he is agreeable.  Therefore we will place an NG and start tube feeds today.  Patient is okay to eat as he wants to the, tube feeds are to make sure the patient maintains his minimal nutritional requirements.  -Replace all electrolytes aggressively  -CPAP at night for obstructive sleep apnea  -Mechanical DVT prophylaxis  -A.m. labs    Plan of care and goals reviewed with multidisciplinary/antibiotic stewardship team during rounds.   I discussed the patient's findings and my recommendations with patient, family and nursing staff     High level of risk due to:  illness with threat to life or bodily function.      Lili Oliva, DO  Pulmonary, Critical care and Sleep  Medicine

## 2022-10-16 NOTE — PROGRESS NOTES
Clinical Nutrition     Nutrition Assessment  Reason for Visit:   Follow-up protocol      Patient Name: Abraham Wu  YOB: 1950  MRN: [id number]  Date of Encounter: 10/16/22 13:35 EDT  Admission date: 10/3/2022      Comments: Order placed:  Begin Impact Peptide 20 ml/hr advance by 20 ml/hr w tolerance ev 12 hr to goal 60 ml/hr Water at 10 ml ev 2 hr. At goal this will meet needs. Pending p.o intake adjust goal rate accordingly. (Will be at 40 ml/hr after 12 hr, if eating well, keep this rate.)    From 10/14  Patient does not have PN access at this time (single lumen PICC, has multiple IV medications orders). If unable to start diet today/ tomorrow, will need to consider nutrition support.  Patient with history of poor intake and significant weight loss, I don't anticipate patient will eat even if diet started.  PN vs EN for nutrition pending clinical course today. RD will continue to monitor and provide nutrition support recommendations as indicated.       Admission Diagnosis    Hydronephrosis [N13.30]     Hospital Problem List    Hodgkin lymphoma (HCC)    Rectal mass s/p laparoscopic colostomy (9/2022)    Anemia secondary to chemotherapy and acute blood loss    Essential hypertension    Hydronephrosis    Neutropenic fever (HCC)    Hypokalemia    Acute upper GI bleed    Melena    Moderate malnutrition (HCC)    Esophageal candidiasis (HCC)    SHERRI (obstructive sleep apnea)    ICU delirium     Anemia    Other Applicable: recent colostomy     Applicable Interval History:  10/8 chemotherapy started.  [place] [place] thin liquid  10/10 EGD - ulcer identified; candidiasis in the esophagus.   10/10 moderate - non-severe malnutrition due to chronic illness per RD assessment.   10/11 moved to the ICU due to on going bleeding.   10/12 RUBÉN/IR - s/P multiple vessels embolized.   10/14 pending: Plan nuclear medicine tagged RBC scan tomorrow morning, to rule out other sites of  bleeding in the gut.  Pending these findings, will consider repeat endoscopic intervention while in interventional radiology, and if significant bleeding occurs immediately proceed with further angiographic embolization.    Applicable PMH/PSxH:   (9/21-9/24) recent admission for diarrhea and weight loss - + E. Coli   (9/21) Severe malnutrition per RD assessment (severe due to chronic dz)  PMH: He  has a past medical history of benign polypoid tissue right lung, Hyperlipidemia, Hypertension, Mycobacterium mucogenicum, SHERRI (obstructive sleep apnea), and Seasonal allergies.   PSxH: He  has a past surgical history that includes Bronchoscopy; Colostomy (N/A, 9/22/2022); exam under anesthesia, rectal biopsy (N/A, 10/4/2022); Esophagogastroduodenoscopy (N/A, 10/10/2022); and Esophagogastroduodenoscopy (N/A, 10/12/2022).       Diet/Nutrition Related History:     10/16   Started on EN, has been on p.o diet     10/14  Remains NPO. Plan for another procedure today as patient appears to still be bleeding.  Nutrition status discussed in MDR.  Patient does not have PN access at this time (single lumen PICC, has multiple IV medications orders). Replacing electrolytes.     10/13  Wife at bedside at time of visit, asking if patient can have something to eat.  She is concerned about his nutrition.      10/10  Family/pt confirm wt of 172 lbs Rpt intake has been depressed for protracted time. Rpt pt refuses use of suppl    Labs reviewed   Yes    Results from last 7 days   Lab Units 10/16/22  0543 10/15/22  2203 10/15/22  1632 10/15/22  0416 10/14/22  2112 10/14/22  0436 10/13/22  0012 10/11/22  0223 10/09/22  2323   GLUCOSE mg/dL 136*  --   --  120*  --  141* 145*   < > 161*   BUN mg/dL 9  --   --  11  --  15 16   < > 21   CREATININE mg/dL 0.46*  --   --  0.29*  --  0.34* 0.29*   < > 0.44*   SODIUM mmol/L 142  --   --  143  --  136 136   < > 129*   CHLORIDE mmol/L 102  --   --  109*  --  101 105   < > 96*   POTASSIUM mmol/L 2.7* 2.5*  "2.3* 3.7 3.8 3.1* 3.6   < > 3.9   PHOSPHORUS mg/dL 2.3*  --   --  2.9 2.8 2.1* 2.6   < >  --    MAGNESIUM mg/dL 2.4  --   --  1.9  --  2.2 1.9  --  2.1   ALT (SGPT) U/L  --   --   --   --   --  20 26  --  52*    < > = values in this interval not displayed.     Results from last 7 days   Lab Units 10/14/22  0436 10/13/22  0012 10/09/22  2323   ALBUMIN g/dL 2.30* 1.80* 2.20*         Lab 10/14/22  1007   LACTATE 1.3        Results from last 7 days   Lab Units 10/09/22  2310   GLUCOSE mg/dL 154*       Lab Results   Lab Value Date/Time    HGBA1C 5.50 09/21/2022 0410       Medications reviewed   Yes  Magic mouthwash, protonix Order for bumex     GTT: none at this time     Intake/Ouptut 24 hrs reviewed   Yes  Intake & Output (last day)       10/15 0701  10/16 0700 10/16 0701  10/17 0700    P.O. 725     I.V. (mL/kg) 173.6 (2.2)     Blood 373.8     IV Piggyback 1395.7     Total Intake(mL/kg) 2668.1 (34.2)     Urine (mL/kg/hr) 5925 (3.2) 800 (1.6)    Stool 430     Total Output 6355 800    Net -3687 -800          Urine Unmeasured Occurrence 4 x     Stool Unmeasured Occurrence 2 x         Anthropometrics     Flowsheet Rows    Flowsheet Row First Filed Value   Admission Height 162.6 cm (64\") Documented at 10/03/2022 0604   Admission Weight 78 kg (172 lb) Documented at 10/03/2022 0604          Height: 162.6 cm (64\")    Last filed wt: Weight: 78 kg (172 lb) (10/03/22 0604)  Weight Method: Stated  Confirmed per pt / family    BMI: BMI (Calculated): 29.5  Overweight: 25.0-29.9kg/m2     Ideal Body Weight (IBW) (kg): 59.72    Weight Change   UBW: (1/22) 212; (4/4/22) 186 lbs   Weight change: 11.4% x 6 months     Weight Weight (kg) Weight (lbs) Weight Method   9/20/2022 76.658 kg 169 lb Stated   9/20/2022 76.749 kg 169 lb 3.2 oz Bed scale   9/22/2022 76.658 kg 169 lb    9/24/2022 84.868 kg 187 lb 1.6 oz Bed scale   10/3/2022 78.019 kg 172 lb Stated       Needs Assessment  10/14   Height used: 163cm  Weights used: 76kg    Estimated " calorie needs: ~1800 kcal/day  Method:20-25kcals/kg= 0790-3005    Estimated protein needs: ~ 103g pro/day  1.2-1.5g/kg= 91-114g pro    Estimated fluid needs: ~ ml/day   Method:    Current Nutrition Prescription     PO: Diet Regular; Low Microbial / Neutropenic  Oral Nutrition Supplement: Boost Breeze 3x/da    Intake: 35% x 10 meals  Note: entire adm 10 meals recorded per charting eats well when family brings meals.    EN: Impact Peptide goal 60 ml/hr Water at 10 ml ev 2 hr    Route: NG    At goal over 20 hr EN will supply:  1200 ml for 1800 Kcal 100% est need,113 g  % est need, 924 ml H20 in TF 1024 total ml Free Water.    Delivery:       Nutrition Diagnosis     10/10  Problem Malnutrition  non severe chronic (severe if wt loss avg over 6 mo accepted   Etiology Effect tx on appetite   Signs/Symptoms Intake hx w wasting   Status:    10/13, 10/14, 10/15  Problem inadequate nutrition intake    Etiology Clinical condition; diet order    Signs/Symptoms NPO for multiple procedures; overall poor appetite    Status: p.o and EN ordered    Goal:   General: Nutrition to support treatment  PO: Tolerate PO, Increase intake as feasible  EN: Initiate EN, dayna at appropriate goal          Nutrition Intervention     Follow treatment progress, Care plan reviewed, Nutrition support order placed       Monitoring/Evaluation:   Per protocol, I&O, PO intake, Supplement intake, Pertinent labs, EN delivery/tolerance, Weight, GI status, Symptoms      Esperanza Altamirano RD,   Time Spent: 40min

## 2022-10-16 NOTE — NURSING NOTE
Nephrology now seeing patient.  Awaiting ok for PICC replacement.  Previous PICC inadvertently removed during the night.

## 2022-10-17 LAB
ALBUMIN SERPL-MCNC: 2.8 G/DL (ref 3.5–5.2)
ALP SERPL-CCNC: 127 U/L (ref 39–117)
ALT SERPL W P-5'-P-CCNC: 12 U/L (ref 1–41)
ANION GAP SERPL CALCULATED.3IONS-SCNC: 9 MMOL/L (ref 5–15)
ANION GAP SERPL CALCULATED.3IONS-SCNC: 9 MMOL/L (ref 5–15)
AST SERPL-CCNC: 10 U/L (ref 1–40)
BASOPHILS # BLD MANUAL: 0 10*3/MM3 (ref 0–0.2)
BASOPHILS NFR BLD MANUAL: 0 % (ref 0–1.5)
BILIRUB SERPL-MCNC: 1.1 MG/DL (ref 0–1.2)
BUN SERPL-MCNC: 10 MG/DL (ref 8–23)
BUN SERPL-MCNC: 10 MG/DL (ref 8–23)
BUN/CREAT SERPL: 22.2 (ref 7–25)
BURR CELLS BLD QL SMEAR: ABNORMAL
CALCIUM SPEC-SCNC: 8.3 MG/DL (ref 8.6–10.5)
CALCIUM SPEC-SCNC: 8.3 MG/DL (ref 8.6–10.5)
CHLORIDE SERPL-SCNC: 102 MMOL/L (ref 98–107)
CHLORIDE SERPL-SCNC: 102 MMOL/L (ref 98–107)
CHOLEST SERPL-MCNC: 78 MG/DL (ref 0–200)
CO2 SERPL-SCNC: 30 MMOL/L (ref 22–29)
CO2 SERPL-SCNC: 30 MMOL/L (ref 22–29)
CREAT SERPL-MCNC: 0.45 MG/DL (ref 0.76–1.27)
CREAT SERPL-MCNC: 0.45 MG/DL (ref 0.76–1.27)
DEPRECATED RDW RBC AUTO: 46.5 FL (ref 37–54)
EGFRCR SERPLBLD CKD-EPI 2021: 113.3 ML/MIN/1.73
EOSINOPHIL # BLD MANUAL: 0 10*3/MM3 (ref 0–0.4)
EOSINOPHIL NFR BLD MANUAL: 0 % (ref 0.3–6.2)
ERYTHROCYTE [DISTWIDTH] IN BLOOD BY AUTOMATED COUNT: 15 % (ref 12.3–15.4)
GLUCOSE BLDC GLUCOMTR-MCNC: 164 MG/DL (ref 70–130)
GLUCOSE BLDC GLUCOMTR-MCNC: 177 MG/DL (ref 70–130)
GLUCOSE SERPL-MCNC: 121 MG/DL (ref 65–99)
GLUCOSE SERPL-MCNC: 121 MG/DL (ref 65–99)
HCT VFR BLD AUTO: 21 % (ref 37.5–51)
HGB BLD-MCNC: 7 G/DL (ref 13–17.7)
LYMPHOCYTES # BLD MANUAL: 0.09 10*3/MM3 (ref 0.7–3.1)
LYMPHOCYTES NFR BLD MANUAL: 10 % (ref 5–12)
MAGNESIUM SERPL-MCNC: 2 MG/DL (ref 1.6–2.4)
MAGNESIUM SERPL-MCNC: 2 MG/DL (ref 1.6–2.4)
MCH RBC QN AUTO: 28.5 PG (ref 26.6–33)
MCHC RBC AUTO-ENTMCNC: 33.3 G/DL (ref 31.5–35.7)
MCV RBC AUTO: 85.4 FL (ref 79–97)
METAMYELOCYTES NFR BLD MANUAL: 2 % (ref 0–0)
MONOCYTES # BLD: 0.07 10*3/MM3 (ref 0.1–0.9)
NEUTROPHILS # BLD AUTO: 0.5 10*3/MM3 (ref 1.7–7)
NEUTROPHILS NFR BLD MANUAL: 52 % (ref 42.7–76)
NEUTS BAND NFR BLD MANUAL: 23 % (ref 0–5)
PHOSPHATE SERPL-MCNC: 3 MG/DL (ref 2.5–4.5)
PHOSPHATE SERPL-MCNC: 3 MG/DL (ref 2.5–4.5)
PLAT MORPH BLD: NORMAL
PLATELET # BLD AUTO: 98 10*3/MM3 (ref 140–450)
PMV BLD AUTO: 10.4 FL (ref 6–12)
POTASSIUM SERPL-SCNC: 2.7 MMOL/L (ref 3.5–5.2)
POTASSIUM SERPL-SCNC: 2.7 MMOL/L (ref 3.5–5.2)
POTASSIUM SERPL-SCNC: 3.4 MMOL/L (ref 3.5–5.2)
PREALB SERPL-MCNC: 3.7 MG/DL (ref 20–40)
PROT SERPL-MCNC: 4.9 G/DL (ref 6–8.5)
RBC # BLD AUTO: 2.46 10*6/MM3 (ref 4.14–5.8)
SCAN SLIDE: NORMAL
SODIUM SERPL-SCNC: 141 MMOL/L (ref 136–145)
SODIUM SERPL-SCNC: 141 MMOL/L (ref 136–145)
TOXIC GRANULATION: ABNORMAL
TRIGL SERPL-MCNC: 118 MG/DL (ref 0–150)
VARIANT LYMPHS NFR BLD MANUAL: 13 % (ref 19.6–45.3)
WBC NRBC COR # BLD: 0.67 10*3/MM3 (ref 3.4–10.8)

## 2022-10-17 PROCEDURE — 83735 ASSAY OF MAGNESIUM: CPT

## 2022-10-17 PROCEDURE — 25010000002 FILGRASTIM PER 480 MCG: Performed by: INTERNAL MEDICINE

## 2022-10-17 PROCEDURE — 82962 GLUCOSE BLOOD TEST: CPT

## 2022-10-17 PROCEDURE — 94799 UNLISTED PULMONARY SVC/PX: CPT

## 2022-10-17 PROCEDURE — 82465 ASSAY BLD/SERUM CHOLESTEROL: CPT

## 2022-10-17 PROCEDURE — 84134 ASSAY OF PREALBUMIN: CPT

## 2022-10-17 PROCEDURE — 83735 ASSAY OF MAGNESIUM: CPT | Performed by: INTERNAL MEDICINE

## 2022-10-17 PROCEDURE — 84100 ASSAY OF PHOSPHORUS: CPT

## 2022-10-17 PROCEDURE — 99233 SBSQ HOSP IP/OBS HIGH 50: CPT | Performed by: INTERNAL MEDICINE

## 2022-10-17 PROCEDURE — 80053 COMPREHEN METABOLIC PANEL: CPT

## 2022-10-17 PROCEDURE — 94664 DEMO&/EVAL PT USE INHALER: CPT

## 2022-10-17 PROCEDURE — 84100 ASSAY OF PHOSPHORUS: CPT | Performed by: INTERNAL MEDICINE

## 2022-10-17 PROCEDURE — 94761 N-INVAS EAR/PLS OXIMETRY MLT: CPT

## 2022-10-17 PROCEDURE — 84132 ASSAY OF SERUM POTASSIUM: CPT | Performed by: INTERNAL MEDICINE

## 2022-10-17 PROCEDURE — 25010000002 CEFEPIME PER 500 MG: Performed by: INTERNAL MEDICINE

## 2022-10-17 PROCEDURE — 85007 BL SMEAR W/DIFF WBC COUNT: CPT | Performed by: INTERNAL MEDICINE

## 2022-10-17 PROCEDURE — 85025 COMPLETE CBC W/AUTO DIFF WBC: CPT | Performed by: INTERNAL MEDICINE

## 2022-10-17 PROCEDURE — 25010000002 VANCOMYCIN 10 G RECONSTITUTED SOLUTION

## 2022-10-17 PROCEDURE — 84478 ASSAY OF TRIGLYCERIDES: CPT

## 2022-10-17 PROCEDURE — 25010000002 FLUCONAZOLE PER 200 MG: Performed by: INTERNAL MEDICINE

## 2022-10-17 RX ORDER — LIDOCAINE HYDROCHLORIDE 20 MG/ML
5 SOLUTION OROPHARYNGEAL EVERY 4 HOURS PRN
Status: DISCONTINUED | OUTPATIENT
Start: 2022-10-17 | End: 2022-10-20 | Stop reason: HOSPADM

## 2022-10-17 RX ORDER — BUMETANIDE 0.25 MG/ML
1 INJECTION INTRAMUSCULAR; INTRAVENOUS EVERY 12 HOURS
Status: COMPLETED | OUTPATIENT
Start: 2022-10-17 | End: 2022-10-18

## 2022-10-17 RX ORDER — POTASSIUM CHLORIDE 1.5 G/1.77G
40 POWDER, FOR SOLUTION ORAL ONCE
Status: COMPLETED | OUTPATIENT
Start: 2022-10-17 | End: 2022-10-17

## 2022-10-17 RX ADMIN — CEFEPIME HYDROCHLORIDE 2 G: 2 INJECTION, POWDER, FOR SOLUTION INTRAMUSCULAR; INTRAVENOUS at 03:13

## 2022-10-17 RX ADMIN — DIPHENHYDRAMINE HYDROCHLORIDE AND LIDOCAINE HYDROCHLORIDE AND ALUMINUM HYDROXIDE AND MAGNESIUM HYDRO 5 ML: KIT at 00:00

## 2022-10-17 RX ADMIN — POTASSIUM CHLORIDE 40 MEQ: 1.5 POWDER, FOR SOLUTION ORAL at 03:13

## 2022-10-17 RX ADMIN — BUMETANIDE 1 MG: 0.25 INJECTION, SOLUTION INTRAMUSCULAR; INTRAVENOUS at 16:46

## 2022-10-17 RX ADMIN — DIPHENHYDRAMINE HYDROCHLORIDE AND LIDOCAINE HYDROCHLORIDE AND ALUMINUM HYDROXIDE AND MAGNESIUM HYDRO 5 ML: KIT at 22:04

## 2022-10-17 RX ADMIN — ALBUTEROL SULFATE 2.5 MG: 2.5 SOLUTION RESPIRATORY (INHALATION) at 08:52

## 2022-10-17 RX ADMIN — Medication 10 ML: at 08:49

## 2022-10-17 RX ADMIN — FILGRASTIM 480 MCG: 480 INJECTION, SOLUTION INTRAVENOUS; SUBCUTANEOUS at 16:53

## 2022-10-17 RX ADMIN — POTASSIUM CHLORIDE 40 MEQ: 1.5 POWDER, FOR SOLUTION ORAL at 16:46

## 2022-10-17 RX ADMIN — ALBUTEROL SULFATE 2.5 MG: 2.5 SOLUTION RESPIRATORY (INHALATION) at 01:40

## 2022-10-17 RX ADMIN — VANCOMYCIN HYDROCHLORIDE 1250 MG: 10 INJECTION, POWDER, LYOPHILIZED, FOR SOLUTION INTRAVENOUS at 06:57

## 2022-10-17 RX ADMIN — SODIUM CHLORIDE SOLN NEBU 7% 4 ML: 7 NEBU SOLN at 19:05

## 2022-10-17 RX ADMIN — DIPHENHYDRAMINE HYDROCHLORIDE AND LIDOCAINE HYDROCHLORIDE AND ALUMINUM HYDROXIDE AND MAGNESIUM HYDRO 5 ML: KIT at 03:13

## 2022-10-17 RX ADMIN — PANTOPRAZOLE SODIUM 40 MG: 40 INJECTION, POWDER, FOR SOLUTION INTRAVENOUS at 08:47

## 2022-10-17 RX ADMIN — CEFEPIME HYDROCHLORIDE 2 G: 2 INJECTION, POWDER, FOR SOLUTION INTRAMUSCULAR; INTRAVENOUS at 11:02

## 2022-10-17 RX ADMIN — POTASSIUM CHLORIDE 40 MEQ: 1.5 POWDER, FOR SOLUTION ORAL at 11:03

## 2022-10-17 RX ADMIN — BUMETANIDE 1 MG: 0.25 INJECTION, SOLUTION INTRAMUSCULAR; INTRAVENOUS at 03:13

## 2022-10-17 RX ADMIN — ALBUTEROL SULFATE 2.5 MG: 2.5 SOLUTION RESPIRATORY (INHALATION) at 19:05

## 2022-10-17 RX ADMIN — PANTOPRAZOLE SODIUM 40 MG: 40 INJECTION, POWDER, FOR SOLUTION INTRAVENOUS at 16:46

## 2022-10-17 RX ADMIN — TAMSULOSIN HYDROCHLORIDE 0.4 MG: 0.4 CAPSULE ORAL at 08:47

## 2022-10-17 RX ADMIN — POTASSIUM CHLORIDE 40 MEQ: 1.5 POWDER, FOR SOLUTION ORAL at 08:47

## 2022-10-17 RX ADMIN — FLUCONAZOLE 100 MG: 2 INJECTION, SOLUTION INTRAVENOUS at 08:48

## 2022-10-17 RX ADMIN — VALACYCLOVIR HYDROCHLORIDE 1000 MG: 500 TABLET, FILM COATED ORAL at 08:47

## 2022-10-17 RX ADMIN — DIPHENHYDRAMINE HYDROCHLORIDE AND LIDOCAINE HYDROCHLORIDE AND ALUMINUM HYDROXIDE AND MAGNESIUM HYDRO 5 ML: KIT at 11:03

## 2022-10-17 RX ADMIN — SODIUM CHLORIDE SOLN NEBU 7% 4 ML: 7 NEBU SOLN at 08:53

## 2022-10-17 RX ADMIN — VANCOMYCIN HYDROCHLORIDE 1250 MG: 10 INJECTION, POWDER, LYOPHILIZED, FOR SOLUTION INTRAVENOUS at 16:53

## 2022-10-17 RX ADMIN — DIPHENHYDRAMINE HYDROCHLORIDE AND LIDOCAINE HYDROCHLORIDE AND ALUMINUM HYDROXIDE AND MAGNESIUM HYDRO 5 ML: KIT at 16:46

## 2022-10-17 RX ADMIN — Medication 10 ML: at 22:04

## 2022-10-17 RX ADMIN — VALACYCLOVIR HYDROCHLORIDE 1000 MG: 500 TABLET, FILM COATED ORAL at 22:04

## 2022-10-17 RX ADMIN — CEFEPIME HYDROCHLORIDE 2 G: 2 INJECTION, POWDER, FOR SOLUTION INTRAMUSCULAR; INTRAVENOUS at 16:53

## 2022-10-17 RX ADMIN — POTASSIUM CHLORIDE 40 MEQ: 1.5 POWDER, FOR SOLUTION ORAL at 22:04

## 2022-10-17 RX ADMIN — ALBUTEROL SULFATE 2.5 MG: 2.5 SOLUTION RESPIRATORY (INHALATION) at 13:29

## 2022-10-17 RX ADMIN — DIPHENHYDRAMINE HYDROCHLORIDE AND LIDOCAINE HYDROCHLORIDE AND ALUMINUM HYDROXIDE AND MAGNESIUM HYDRO 5 ML: KIT at 08:48

## 2022-10-17 RX ADMIN — FLUTICASONE PROPIONATE 1 SPRAY: 50 SPRAY, METERED NASAL at 08:48

## 2022-10-17 NOTE — CASE MANAGEMENT/SOCIAL WORK
Continued Stay Note  [facility]     Patient Name: Abraham Wu  MRN: [id number]  Today's Date: 10/17/2022    Admit Date: 10/3/2022    Plan: ongoing   Discharge Plan     Row Name 10/17/22 1531       Plan    Plan ongoing    Plan Comments Discharge plan is ongoing at this time.  Patient has NG with tube feedings, rollator at bedside (ordered prior to patient transferring to ICU).  CM will continue to follow.               Discharge Codes    No documentation.               Expected Discharge Date and Time     Expected Discharge Date Expected Discharge Time    Oct 24, 2022             Aisha Hernandez RN

## 2022-10-17 NOTE — PLAN OF CARE
Goal Outcome Evaluation:  Plan of Care Reviewed With: patient, spouse        Progress: no change  Outcome Evaluation: Patient has rested for most of the day. Sleeping between care but wakes easily. Answers orientation questions correctly, but avoids speaking d/t sore throat. Hemoglobin 7 this am, did not require a transfusion. 1L NC, intermittent hypertension. Bumex x 1. Now at goal on TF, colostomy output 200 ml, dark brown. Still not eating/drinking. Family discussed treatment with hem-onc. Patient has been verbalizing wanting to stop treatment to avoid suffering. Wife stated that they would be open to palliative consult for symptom management.

## 2022-10-17 NOTE — PROGRESS NOTES
"HEMATOLOGY/ONCOLOGY PROGRESS NOTE    S: Abraham was examined this morning.  His wife is at bedside.  He is feeling weak, tired, and defeated.  Poor appetite.  Mucositis pain.  Throat pain.  NG tube is uncomfortable.  Ostomy output is still dark but not black.  He has been asking to stop treatment.  His wife is tearful.  They asked questions regarding prognosis of untreated lymphoma as well as options for moving forward.  His wife says that he is feeling very poorly and wants to die to avoid further suffering.      Review of Systems:  A comprehensive 14 point review of systems was performed and was negative except as noted above.   Medications:  The current medication list was reviewed in the EMR    ALLERGIES:  No Known Allergies      Physical Exam    VITAL SIGNS:  /98   Pulse 100   Temp 98.5 °F (36.9 °C) (Axillary)   Resp 18   Ht 162.6 cm (64\")   Wt 78 kg (172 lb)   SpO2 99%   BMI 29.52 kg/m²   Temp:  [98.2 °F (36.8 °C)-98.8 °F (37.1 °C)] 98.5 °F (36.9 °C)      Performance Status: 2                Physical Exam    General: Frail.  No acute distress.  HEENT: sclerae anicteric, mucous membranes dry positive mucositis.  Positive NG tube in place Lymphatics: no cervical, supraclavicular, or axillary adenopathy  Cardiovascular: regular rate and rhythm, no murmurs, rubs or gallops  Lungs: clear to auscultation bilaterally  Abdomen: soft, nontender, nondistended.  Ostomy bag withdark stool  Extremities: no lower extremity edema  Skin: no rashes, lesions, bruising, or petechiae  Psych: Mood is low        RECENT LABS:    Lab Results   Component Value Date    HGB 7.0 (L) 10/17/2022    HCT 21.0 (L) 10/17/2022    MCV 85.4 10/17/2022    PLT 98 (L) 10/17/2022    WBC 0.67 (C) 10/17/2022    NEUTROABS 0.50 (L) 10/17/2022    LYMPHSABS 0.14 (L) 10/13/2022    MONOSABS 0.02 (L) 10/13/2022    EOSABS 0.00 10/17/2022    BASOSABS 0.00 10/17/2022       Lab Results   Component Value Date    GLUCOSE 121 (H) 10/17/2022    GLUCOSE " 121 (H) 10/17/2022    BUN 10 10/17/2022    BUN 10 10/17/2022    CREATININE 0.45 (L) 10/17/2022    CREATININE 0.45 (L) 10/17/2022     10/17/2022     10/17/2022    K 2.7 (L) 10/17/2022    K 2.7 (L) 10/17/2022     10/17/2022     10/17/2022    CO2 30.0 (H) 10/17/2022    CO2 30.0 (H) 10/17/2022    CALCIUM 8.3 (L) 10/17/2022    CALCIUM 8.3 (L) 10/17/2022    PROTEINTOT 4.9 (L) 10/17/2022    ALBUMIN 2.80 (L) 10/17/2022    BILITOT 1.1 10/17/2022    ALKPHOS 127 (H) 10/17/2022    AST 10 10/17/2022    ALT 12 10/17/2022         Assessment/Plan  72-year-old female with CD30 positive classic Hodgkin lymphoma involving the rectum, perinephric, liver, and.  He is status post laparoscopic colostomy and biopsy.  He was started on inpatient chemotherapy on 10/8/2022.  Bilateral hydronephrosis econdary to malignancy.Course has been complicated by a GI bleed.  He is status post coil artery embolization 10/12. Now with neutropenic fever.    Hodgkin lymphoma  Status post cycle 1 AVVD on 10/8/22  He has had a difficult posttreatment course with neutropenic fever, failure to thrive, and GI bleed.  His platelet count and white blood cell count appear to have nadired and his neutrophil count is increasing today.  I discussed with the patient and his wife that if he recovers from his acute events and elects to pursue further treatment, we can consider future dose reductions and adjustments to improve tolerance.  However, he is not anywhere near proceeding with further systemic therapy.  I encouraged him to continue with the supportive care to help him recover from the mucositis, neutropenic fever and GI bleed.  However,  We can continue to have a goals of care conversation as we see how he feels when side effects under better control and after control the GI bleed.    Anemia secondary to malignancy, recent chemotherapy/marrow suppression and blood loss  GI bleed  -Now status post coil embolization   -Hemoglobin is stable  at 7 this AM.    Hydronephrosis  Creatinine has remained normal.    Neutropenic fever  On vancomycin, Valtrex, fluconazole, and cefepime    Mucositis  Change viscous lidocaine to swish and swallow for throat pain.    Malnutrition  Currently on NG tube support        Kaycee Leary MD  [facility]    10/17/2022     I have spent a total of 45 min with >50% in direct patient counseling

## 2022-10-17 NOTE — PROGRESS NOTES
"   LOS: 14 days    Patient Care Team:  Jatinder Haynes MD as PCP - General (Family Medicine)  Hyponatremia follow up     Subjective     Interval History:   Edema markedly improved.  Patient remained in ICU.  Hypokalemia is noted.  Urine output 7425 mL renal function stable.  Potassium is low.  Review of Systems:   Appetite remains poor.  Complains of shortness of breath, edema, generalized weakness diet.  All other systems are negative.    Objective     Vital Sign Min/Max for last 24 hours  Temp  Min: 98 °F (36.7 °C)  Max: 98.8 °F (37.1 °C)   BP  Min: 114/68  Max: 145/65   Pulse  Min: 66  Max: 95   Resp  Min: 16  Max: 18   SpO2  Min: 92 %  Max: 100 %   Flow (L/min)  Min: 1  Max: 3   No data recorded     Flowsheet Rows    Flowsheet Row First Filed Value   Admission Height 162.6 cm (64\") Documented at 10/03/2022 0604   Admission Weight 78 kg (172 lb) Documented at 10/03/2022 0604          I/O this shift:  In: 1456.5 [I.V.:1456.5]  Out: 2150 [Urine:2000; Stool:150]  I/O last 3 completed shifts:  In: 2668.1 [P.O.:725; I.V.:173.6; Blood:373.8; IV Piggyback:1395.7]  Out: 7880 [Urine:7425; Stool:455]    Physical Exam:  General Appearance: Sick looking  male awake alert oriented.  Mild distress.  Eyes: PER, EOMI.  Neck: Supple no JVD.  Lungs: Positive basal Rales are heard.  Equal chest movement, nonlabored.  Heart: No gallop, murmur, rub, RRR.  Abdomen:  , nontender, positive bowel sounds, no organomegaly.  Extremities: Bilateral lower extremity edema dependent 1+..  No cyanosis.  Neuro: No focal deficit, moving all extremities, alert oriented X 3  Skin: Warm and dry.    no suprapubic fullness or tenderness.  Psych: Anxiety noted    WBC WBC   Date Value Ref Range Status   10/17/2022 0.67 (C) 3.40 - 10.80 10*3/mm3 Final   10/16/2022 0.22 (C) 3.40 - 10.80 10*3/mm3 Final   10/15/2022 0.14 (C) 3.40 - 10.80 10*3/mm3 Final      HGB Hemoglobin   Date Value Ref Range Status   10/17/2022 7.0 (L) 13.0 - 17.7 g/dL Final "   10/16/2022 7.2 (L) 13.0 - 17.7 g/dL Final   10/15/2022 7.4 (L) 13.0 - 17.7 g/dL Final   10/15/2022 6.6 (C) 13.0 - 17.7 g/dL Final   10/15/2022 7.2 (L) 13.0 - 17.7 g/dL Final   10/15/2022 7.0 (L) 13.0 - 17.7 g/dL Final   10/14/2022 7.5 (L) 13.0 - 17.7 g/dL Final   10/14/2022 8.2 (L) 13.0 - 17.7 g/dL Final   10/14/2022 8.3 (L) 13.0 - 17.7 g/dL Final      HCT Hematocrit   Date Value Ref Range Status   10/17/2022 21.0 (L) 37.5 - 51.0 % Final   10/16/2022 21.4 (L) 37.5 - 51.0 % Final   10/15/2022 21.5 (L) 37.5 - 51.0 % Final   10/15/2022 19.6 (C) 37.5 - 51.0 % Final   10/15/2022 22.7 (L) 37.5 - 51.0 % Final   10/15/2022 21.7 (L) 37.5 - 51.0 % Final   10/14/2022 22.5 (L) 37.5 - 51.0 % Final   10/14/2022 24.9 (L) 37.5 - 51.0 % Final   10/14/2022 25.5 (L) 37.5 - 51.0 % Final      Platlets No results found for: LABPLAT   MCV MCV   Date Value Ref Range Status   10/17/2022 85.4 79.0 - 97.0 fL Final   10/16/2022 84.9 79.0 - 97.0 fL Final   10/15/2022 87.5 79.0 - 97.0 fL Final          Sodium Sodium   Date Value Ref Range Status   10/17/2022 141 136 - 145 mmol/L Final   10/17/2022 141 136 - 145 mmol/L Final   10/16/2022 142 136 - 145 mmol/L Final   10/15/2022 143 136 - 145 mmol/L Final      Potassium Potassium   Date Value Ref Range Status   10/17/2022 2.7 (L) 3.5 - 5.2 mmol/L Final   10/17/2022 2.7 (L) 3.5 - 5.2 mmol/L Final   10/16/2022 2.7 (L) 3.5 - 5.2 mmol/L Final     Comment:     Slight hemolysis detected by analyzer. Results may be affected.   10/15/2022 2.5 (C) 3.5 - 5.2 mmol/L Final   10/15/2022 2.3 (C) 3.5 - 5.2 mmol/L Final   10/15/2022 3.7 3.5 - 5.2 mmol/L Final   10/14/2022 3.8 3.5 - 5.2 mmol/L Final      Chloride Chloride   Date Value Ref Range Status   10/17/2022 102 98 - 107 mmol/L Final   10/17/2022 102 98 - 107 mmol/L Final   10/16/2022 102 98 - 107 mmol/L Final   10/15/2022 109 (H) 98 - 107 mmol/L Final      CO2 CO2   Date Value Ref Range Status   10/17/2022 30.0 (H) 22.0 - 29.0 mmol/L Final   10/17/2022  30.0 (H) 22.0 - 29.0 mmol/L Final   10/16/2022 30.0 (H) 22.0 - 29.0 mmol/L Final   10/15/2022 26.0 22.0 - 29.0 mmol/L Final      BUN BUN   Date Value Ref Range Status   10/17/2022 10 8 - 23 mg/dL Final   10/17/2022 10 8 - 23 mg/dL Final   10/16/2022 9 8 - 23 mg/dL Final   10/15/2022 11 8 - 23 mg/dL Final      Creatinine Creatinine   Date Value Ref Range Status   10/17/2022 0.45 (L) 0.76 - 1.27 mg/dL Final   10/17/2022 0.45 (L) 0.76 - 1.27 mg/dL Final   10/16/2022 0.46 (L) 0.76 - 1.27 mg/dL Final   10/15/2022 0.29 (L) 0.76 - 1.27 mg/dL Final      Calcium Calcium   Date Value Ref Range Status   10/17/2022 8.3 (L) 8.6 - 10.5 mg/dL Final   10/17/2022 8.3 (L) 8.6 - 10.5 mg/dL Final   10/16/2022 7.9 (L) 8.6 - 10.5 mg/dL Final   10/15/2022 7.6 (L) 8.6 - 10.5 mg/dL Final      PO4 No results found for: CAPO4   Albumin Albumin   Date Value Ref Range Status   10/17/2022 2.80 (L) 3.50 - 5.20 g/dL Final      Magnesium Magnesium   Date Value Ref Range Status   10/17/2022 2.0 1.6 - 2.4 mg/dL Final   10/17/2022 2.0 1.6 - 2.4 mg/dL Final   10/16/2022 2.4 1.6 - 2.4 mg/dL Final   10/15/2022 1.9 1.6 - 2.4 mg/dL Final      Uric Acid No results found for: URICACID        Results Review:     I reviewed the patient's new clinical results.    albuterol, 2.5 mg, Nebulization, Q6H - RT  bumetanide, 1 mg, Intravenous, Q6H  cefepime, 2 g, Intravenous, Q8H  filgrastim (NEUPOGEN) injection, 480 mcg, Subcutaneous, Q PM  First Mouthwash (Magic Mouthwash), 5 mL, Swish & Spit, Q4H  fluconazole, 100 mg, Intravenous, Daily  fluticasone, 1 spray, Nasal, Daily  pantoprazole, 40 mg, Intravenous, BID AC  sodium chloride, 10 mL, Intravenous, Q12H  sodium chloride, 10 mL, Intravenous, Q12H  sodium chloride, 4 mL, Nebulization, BID - RT  tamsulosin, 0.4 mg, Oral, Daily  valACYclovir, 1,000 mg, Oral, Q12H  vancomycin, 1,250 mg, Intravenous, Q12H      Pharmacy to dose vancomycin,         Medication Review:     Assessment & Plan       Hodgkin lymphoma (HCC)     Rectal mass s/p laparoscopic colostomy (9/2022)    Anemia secondary to chemotherapy and acute blood loss    Essential hypertension    Hydronephrosis    Neutropenic fever (HCC)    Hypokalemia    Acute upper GI bleed    Melena    Moderate malnutrition (HCC)    Esophageal candidiasis (HCC)    SHERRI (obstructive sleep apnea)    ICU delirium     1- Hyponatremia -corrected  2-mild bilateral hydronephrosis on CT scan  3-Hypokalemia -replaced as needed  4. Anemia: Transfuse at hb<7.  Hematology following  5.  Neutropenia s/p chemotherapy.  6.  Classic Hodgkin's lymphoma CD30 positive involving rectum, perinephric, liver.  Chemotherapy started 10/8/2022  7.  S/p laparoscopic colostomy and biopsy.  8.  GI bleed: S/p coil artery embolization 10/12/2022  Plan:  Replacement potassium ordered.  Hypokalemia secondary to diuresis.  Edema is markedly improved we will decrease the dose of diuretics.  High risk patient.  Sodium and creatinine stable.  Case discussed with the wife, son and RN taking care of the patient.  Remain on CPAP machine at this time.  High risk patient with multiple medical problems.     Jessica Espinoza MD  10/17/22  06:44 EDT

## 2022-10-17 NOTE — PROGRESS NOTES
Pulmonary/Critical Care Follow-up     LOS: 14 days   Patient Care Team:  Jatinder Haynes MD as PCP - General (Family Medicine)    Chief Complaint: GI bleeding      Subjective      History reviewed and updated in EMR as indicated.    Interval History:     Patient is awake and alert.  Denies dyspnea.  Hemoglobin stable overnight.  No obvious bleeding.  Family is at the bedside.  Tube feeds started but not at goal.  He is tolerating.  Potassium being repleted    History taken from: Patient, staff    PMH/FH/Social History were reviewed and updated appropriately in the electronic medical record.     Review of Systems:    Review of 14 systems was completed with positives and pertinent negatives noted in the subjective section.  All other systems reviewed and are negative.         Objective     Vital Signs  Temp:  [98.2 °F (36.8 °C)-98.8 °F (37.1 °C)] 98.5 °F (36.9 °C)  Heart Rate:  [] 100  Resp:  [16-18] 18  BP: (128-176)/(59-98) 170/98  10/16 0701 - 10/17 0700  In: 1871.5 [I.V.:1456.5]  Out: 3675 [Urine:3500]  Body mass index is 29.52 kg/m².     IV drips:  Pharmacy to dose vancomycin       Physical Exam:     Constitutional:   Alert, in no acute distress, thin   Head:   Normocephalic, atraumatic   Eyes:           Lids and lashes normal, conjunctivae and sclerae normal.  PER   ENMT:  Ears appear intact with no abnormalities noted     Lips normal.     Neck:  Trachea midline, no JVD   Lungs/Resp:    Normal effort, symmetric chest rise, no crepitus, clear to      auscultation bilaterally.               Heart/CV:   Regular rhythm and normal rate, no murmur   Abdomen/GI:    Soft, nontender, nondistended, ostomy with some dark liquid output.   :    Deferred   Extremities/MSK:  No clubbing or cyanosis.  No edema.     Pulses:  Pulses palpable and equal bilaterally   Skin:  No bleeding, bruising or rash   Heme/Lymph:  .   Neurologic:    Psychiatric:    Moves all extremities with no obvious focal motor deficit.  Cranial  nerves 2 - 12 grossly intact  Non-agitated, normal affect.    The above physical exam findings were reviewed and reflect my exam findings as of today's exam.   Electronically signed by:  Jeffrey White MD  10/17/22  17:44 EDT      Results Review:     I reviewed the patient's new clinical results.   Results from last 7 days   Lab Units 10/17/22  0150 10/16/22  0543 10/15/22  2203 10/15/22  1632 10/15/22  0416 10/14/22  2112 10/14/22  0436 10/13/22  0012   SODIUM mmol/L 141  141 142  --   --  143  --  136 136   POTASSIUM mmol/L 2.7*  2.7* 2.7* 2.5*   < > 3.7   < > 3.1* 3.6   CHLORIDE mmol/L 102  102 102  --   --  109*  --  101 105   CO2 mmol/L 30.0*  30.0* 30.0*  --   --  26.0  --  28.0 26.0   BUN mg/dL 10  10 9  --   --  11  --  15 16   CREATININE mg/dL 0.45*  0.45* 0.46*  --   --  0.29*  --  0.34* 0.29*   CALCIUM mg/dL 8.3*  8.3* 7.9*  --   --  7.6*  --  7.9* 7.5*   BILIRUBIN mg/dL 1.1  --   --   --   --   --  1.7* 2.0*   ALK PHOS U/L 127*  --   --   --   --   --  190* 213*   ALT (SGPT) U/L 12  --   --   --   --   --  20 26   AST (SGOT) U/L 10  --   --   --   --   --  17 41*   GLUCOSE mg/dL 121*  121* 136*  --   --  120*  --  141* 145*    < > = values in this interval not displayed.     Results from last 7 days   Lab Units 10/17/22  0150 10/16/22  0543 10/15/22  2203 10/15/22  1110 10/15/22  0416   WBC 10*3/mm3 0.67* 0.22*  --   --  0.14*   HEMOGLOBIN g/dL 7.0* 7.2* 7.4*   < > 7.0*   HEMATOCRIT % 21.0* 21.4* 21.5*   < > 21.7*   PLATELETS 10*3/mm3 98* 95*  --   --  116*   MONOCYTES % % 10.0 23.0*  --   --  2.0*    < > = values in this interval not displayed.     Results from last 7 days   Lab Units 10/16/22  0352   PH, ARTERIAL pH units 7.525*   PO2 ART mm Hg 101.0   PCO2, ARTERIAL mm Hg 39.5   HCO3 ART mmol/L 32.6*     Results from last 7 days   Lab Units 10/17/22  0150 10/16/22  0543 10/15/22  0416   MAGNESIUM mg/dL 2.0  2.0 2.4 1.9   PHOSPHORUS mg/dL 3.0  3.0 2.3* 2.9       I reviewed the patient's  new imaging including images and reports.    Medication Review:   albuterol, 2.5 mg, Nebulization, Q6H - RT  bumetanide, 1 mg, Intravenous, Q12H  cefepime, 2 g, Intravenous, Q8H  filgrastim (NEUPOGEN) injection, 480 mcg, Subcutaneous, Q PM  First Mouthwash (Magic Mouthwash), 5 mL, Swish & Spit, Q4H  fluconazole, 100 mg, Intravenous, Daily  fluticasone, 1 spray, Nasal, Daily  pantoprazole, 40 mg, Intravenous, BID AC  sodium chloride, 10 mL, Intravenous, Q12H  sodium chloride, 4 mL, Nebulization, BID - RT  tamsulosin, 0.4 mg, Oral, Daily  valACYclovir, 1,000 mg, Oral, Q12H  vancomycin, 1,250 mg, Intravenous, Q12H      Pharmacy to dose vancomycin,         Assessment & Plan         Hodgkin lymphoma (HCC)    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 with history of hypertension, hyperlipidemia, Hodgkin's lymphoma, transferred to [facility] ICU on 10/11/2022 with acute high risk GI bleeding not responding to resuscitation.  Initial EGD from 10/10/2022 showed ulcerations with bleeding ulcer and severe esophageal candidiasis.  Clip was placed and patient was started on fluconazole for candidiasis.  He was started on chemotherapy on 10/8/2022.  Repeat melena occurred on 10/11/2022 mid responsive to transfusion.  CT of the abdomen and pelvis was performed showing no obvious site of bleeding and evidence of known metastatic disease.  Patient underwent an embolization on 10/12/2022 with successful embolization of the right gastroepiploic artery, pancreaticoduodenal artery, and main trunk of the gastroduodenal artery.  Hemoglobin has continued to trend down requiring inner transfusions.  The thought is that this is most  likely combined effect due to bone marrow suppression in the setting of chemotherapy in the setting of bleeding.    Patient was started on tube feeding secondary to poor intake/malnutrition.    Hemoglobin essentially stable this morning.    I think patient is stable for transfer to telemetry.  He is very interested in moving out of the intensive care unit.    Plan:  1.  For GI bleeding: Seems to have stabilized.  Monitor closely.  2.  For ongoing anemia: Likely bone marrow suppression.  Monitor hemoglobin with transfusions as necessary.  3.  For rectal mass status post laparoscopic colectomy/colostomy 9/2022: Pathology consistent with lymphoma.  Doing well.  Monitor.  4.  For lymphoma/neutropenic fever/pancytopenia: Hematology following.  On Neupogen.  Continue Vancocin, cefepime, fluconazole.  5.  For hypokalemia: Replace potassium.  Additional dose of potassium.  Close monitoring.  6.  DVT prophylaxis: Mechanical.    Level of Risk High due to: illness with threat to life or bodily function -neutropenia, ongoing anemia with concern for blood loss/bone marrow suppression with risk of life-threatening decline in condition.    Electronically signed by:    Jeffrey White MD  10/17/22  17:44 EDT      *. Please note that portions of this note were completed with uniRow - a voice recognition program.

## 2022-10-17 NOTE — PLAN OF CARE
Goal Outcome Evaluation:  Plan of Care Reviewed With: patient        Progress: no change  Outcome Evaluation: VSS. Remains on 2 L NC when resting due to sats dropping below 92%. PT refuses CPAP, educated on importance. TF continued without complications. Potassium remains low, replacement started. UOP adequate. H%H trending down. No signs of active bleeding. 150 ml of dark liquid stool from ostomy. Wife remains at bedside.

## 2022-10-18 ENCOUNTER — TELEPHONE (OUTPATIENT)
Dept: ONCOLOGY | Facility: CLINIC | Age: 72
End: 2022-10-18

## 2022-10-18 LAB
ANION GAP SERPL CALCULATED.3IONS-SCNC: 7 MMOL/L (ref 5–15)
BASOPHILS # BLD AUTO: 0.01 10*3/MM3 (ref 0–0.2)
BASOPHILS NFR BLD AUTO: 0.3 % (ref 0–1.5)
BUN SERPL-MCNC: 17 MG/DL (ref 8–23)
BUN/CREAT SERPL: 48.6 (ref 7–25)
CALCIUM SPEC-SCNC: 8.3 MG/DL (ref 8.6–10.5)
CHLORIDE SERPL-SCNC: 104 MMOL/L (ref 98–107)
CO2 SERPL-SCNC: 29 MMOL/L (ref 22–29)
CREAT SERPL-MCNC: 0.35 MG/DL (ref 0.76–1.27)
DEPRECATED RDW RBC AUTO: 46.7 FL (ref 37–54)
EGFRCR SERPLBLD CKD-EPI 2021: 122.2 ML/MIN/1.73
EOSINOPHIL # BLD AUTO: 0 10*3/MM3 (ref 0–0.4)
EOSINOPHIL NFR BLD AUTO: 0 % (ref 0.3–6.2)
ERYTHROCYTE [DISTWIDTH] IN BLOOD BY AUTOMATED COUNT: 15.1 % (ref 12.3–15.4)
GLUCOSE BLDC GLUCOMTR-MCNC: 119 MG/DL (ref 70–130)
GLUCOSE BLDC GLUCOMTR-MCNC: 144 MG/DL (ref 70–130)
GLUCOSE BLDC GLUCOMTR-MCNC: 149 MG/DL (ref 70–130)
GLUCOSE SERPL-MCNC: 155 MG/DL (ref 65–99)
HCT VFR BLD AUTO: 23.7 % (ref 37.5–51)
HGB BLD-MCNC: 7.9 G/DL (ref 13–17.7)
HSV SPEC CULT: POSITIVE
IMM GRANULOCYTES # BLD AUTO: 0.04 10*3/MM3 (ref 0–0.05)
IMM GRANULOCYTES NFR BLD AUTO: 1.4 % (ref 0–0.5)
LYMPHOCYTES # BLD AUTO: 0.18 10*3/MM3 (ref 0.7–3.1)
LYMPHOCYTES NFR BLD AUTO: 6.3 % (ref 19.6–45.3)
MAGNESIUM SERPL-MCNC: 1.8 MG/DL (ref 1.6–2.4)
MCH RBC QN AUTO: 28.5 PG (ref 26.6–33)
MCHC RBC AUTO-ENTMCNC: 33.3 G/DL (ref 31.5–35.7)
MCV RBC AUTO: 85.6 FL (ref 79–97)
MONOCYTES # BLD AUTO: 0.16 10*3/MM3 (ref 0.1–0.9)
MONOCYTES NFR BLD AUTO: 5.6 % (ref 5–12)
NEUTROPHILS NFR BLD AUTO: 2.48 10*3/MM3 (ref 1.7–7)
NEUTROPHILS NFR BLD AUTO: 86.4 % (ref 42.7–76)
NRBC BLD AUTO-RTO: 0 /100 WBC (ref 0–0.2)
PHOSPHATE SERPL-MCNC: 1 MG/DL (ref 2.5–4.5)
PLAT MORPH BLD: NORMAL
PLATELET # BLD AUTO: 126 10*3/MM3 (ref 140–450)
PMV BLD AUTO: 10.7 FL (ref 6–12)
POTASSIUM SERPL-SCNC: 3.4 MMOL/L (ref 3.5–5.2)
RBC # BLD AUTO: 2.77 10*6/MM3 (ref 4.14–5.8)
RBC MORPH BLD: NORMAL
SODIUM SERPL-SCNC: 140 MMOL/L (ref 136–145)
WBC MORPH BLD: NORMAL
WBC NRBC COR # BLD: 2.87 10*3/MM3 (ref 3.4–10.8)

## 2022-10-18 PROCEDURE — 94799 UNLISTED PULMONARY SVC/PX: CPT

## 2022-10-18 PROCEDURE — 99233 SBSQ HOSP IP/OBS HIGH 50: CPT | Performed by: INTERNAL MEDICINE

## 2022-10-18 PROCEDURE — 25010000002 FLUCONAZOLE PER 200 MG: Performed by: INTERNAL MEDICINE

## 2022-10-18 PROCEDURE — 97162 PT EVAL MOD COMPLEX 30 MIN: CPT

## 2022-10-18 PROCEDURE — 97166 OT EVAL MOD COMPLEX 45 MIN: CPT

## 2022-10-18 PROCEDURE — 85007 BL SMEAR W/DIFF WBC COUNT: CPT | Performed by: INTERNAL MEDICINE

## 2022-10-18 PROCEDURE — 97535 SELF CARE MNGMENT TRAINING: CPT

## 2022-10-18 PROCEDURE — 94664 DEMO&/EVAL PT USE INHALER: CPT

## 2022-10-18 PROCEDURE — 25010000002 VANCOMYCIN 10 G RECONSTITUTED SOLUTION

## 2022-10-18 PROCEDURE — 84100 ASSAY OF PHOSPHORUS: CPT | Performed by: INTERNAL MEDICINE

## 2022-10-18 PROCEDURE — 25010000002 CEFEPIME PER 500 MG: Performed by: INTERNAL MEDICINE

## 2022-10-18 PROCEDURE — 0 MAGNESIUM SULFATE 4 GM/100ML SOLUTION

## 2022-10-18 PROCEDURE — 83735 ASSAY OF MAGNESIUM: CPT | Performed by: INTERNAL MEDICINE

## 2022-10-18 PROCEDURE — 94761 N-INVAS EAR/PLS OXIMETRY MLT: CPT

## 2022-10-18 PROCEDURE — 82962 GLUCOSE BLOOD TEST: CPT

## 2022-10-18 PROCEDURE — 85025 COMPLETE CBC W/AUTO DIFF WBC: CPT | Performed by: INTERNAL MEDICINE

## 2022-10-18 PROCEDURE — 80048 BASIC METABOLIC PNL TOTAL CA: CPT | Performed by: INTERNAL MEDICINE

## 2022-10-18 RX ORDER — FENTANYL/ROPIVACAINE/NS/PF 2-625MCG/1
15 PLASTIC BAG, INJECTION (ML) EPIDURAL AS NEEDED
Status: CANCELLED | OUTPATIENT
Start: 2022-10-18

## 2022-10-18 RX ADMIN — FLUCONAZOLE 100 MG: 2 INJECTION, SOLUTION INTRAVENOUS at 09:36

## 2022-10-18 RX ADMIN — CEFEPIME HYDROCHLORIDE 2 G: 2 INJECTION, POWDER, FOR SOLUTION INTRAMUSCULAR; INTRAVENOUS at 17:12

## 2022-10-18 RX ADMIN — VANCOMYCIN HYDROCHLORIDE 1250 MG: 10 INJECTION, POWDER, LYOPHILIZED, FOR SOLUTION INTRAVENOUS at 05:20

## 2022-10-18 RX ADMIN — POTASSIUM CHLORIDE 40 MEQ: 1.5 POWDER, FOR SOLUTION ORAL at 09:37

## 2022-10-18 RX ADMIN — DIPHENHYDRAMINE HYDROCHLORIDE AND LIDOCAINE HYDROCHLORIDE AND ALUMINUM HYDROXIDE AND MAGNESIUM HYDRO 5 ML: KIT at 23:00

## 2022-10-18 RX ADMIN — ALBUTEROL SULFATE 2.5 MG: 2.5 SOLUTION RESPIRATORY (INHALATION) at 00:10

## 2022-10-18 RX ADMIN — TAMSULOSIN HYDROCHLORIDE 0.4 MG: 0.4 CAPSULE ORAL at 09:37

## 2022-10-18 RX ADMIN — ALBUTEROL SULFATE 2.5 MG: 2.5 SOLUTION RESPIRATORY (INHALATION) at 08:13

## 2022-10-18 RX ADMIN — Medication 10 ML: at 21:31

## 2022-10-18 RX ADMIN — VALACYCLOVIR HYDROCHLORIDE 1000 MG: 500 TABLET, FILM COATED ORAL at 09:37

## 2022-10-18 RX ADMIN — DIPHENHYDRAMINE HYDROCHLORIDE AND LIDOCAINE HYDROCHLORIDE AND ALUMINUM HYDROXIDE AND MAGNESIUM HYDRO 5 ML: KIT at 09:37

## 2022-10-18 RX ADMIN — POTASSIUM CHLORIDE 40 MEQ: 1.5 POWDER, FOR SOLUTION ORAL at 05:28

## 2022-10-18 RX ADMIN — POTASSIUM PHOSPHATE, MONOBASIC AND POTASSIUM PHOSPHATE, DIBASIC 45 MMOL: 224; 236 INJECTION, SOLUTION, CONCENTRATE INTRAVENOUS at 09:36

## 2022-10-18 RX ADMIN — DIPHENHYDRAMINE HYDROCHLORIDE AND LIDOCAINE HYDROCHLORIDE AND ALUMINUM HYDROXIDE AND MAGNESIUM HYDRO 5 ML: KIT at 04:29

## 2022-10-18 RX ADMIN — MAGNESIUM SULFATE HEPTAHYDRATE 4 G: 40 INJECTION, SOLUTION INTRAVENOUS at 05:27

## 2022-10-18 RX ADMIN — PANTOPRAZOLE SODIUM 40 MG: 40 INJECTION, POWDER, FOR SOLUTION INTRAVENOUS at 17:12

## 2022-10-18 RX ADMIN — CEFEPIME HYDROCHLORIDE 2 G: 2 INJECTION, POWDER, FOR SOLUTION INTRAMUSCULAR; INTRAVENOUS at 04:29

## 2022-10-18 RX ADMIN — DIPHENHYDRAMINE HYDROCHLORIDE AND LIDOCAINE HYDROCHLORIDE AND ALUMINUM HYDROXIDE AND MAGNESIUM HYDRO 5 ML: KIT at 17:13

## 2022-10-18 RX ADMIN — BUMETANIDE 1 MG: 0.25 INJECTION, SOLUTION INTRAMUSCULAR; INTRAVENOUS at 04:29

## 2022-10-18 RX ADMIN — CEFEPIME HYDROCHLORIDE 2 G: 2 INJECTION, POWDER, FOR SOLUTION INTRAMUSCULAR; INTRAVENOUS at 09:36

## 2022-10-18 RX ADMIN — VANCOMYCIN HYDROCHLORIDE 1250 MG: 10 INJECTION, POWDER, LYOPHILIZED, FOR SOLUTION INTRAVENOUS at 17:12

## 2022-10-18 RX ADMIN — PANTOPRAZOLE SODIUM 40 MG: 40 INJECTION, POWDER, FOR SOLUTION INTRAVENOUS at 09:37

## 2022-10-18 RX ADMIN — FLUTICASONE PROPIONATE 1 SPRAY: 50 SPRAY, METERED NASAL at 09:37

## 2022-10-18 RX ADMIN — VALACYCLOVIR HYDROCHLORIDE 1000 MG: 500 TABLET, FILM COATED ORAL at 21:31

## 2022-10-18 RX ADMIN — Medication 10 ML: at 09:38

## 2022-10-18 NOTE — PLAN OF CARE
Goal Outcome Evaluation:  Plan of Care Reviewed With: patient, spouse        Progress: improving  Outcome Evaluation: VSS, on room air for most of the shift. Up to chair and ambulated 100 ft with PT. Only able to tolerate po liquids, popsicles due to ongoing oral lesions. TF continues at goal, and patient tolerating. Waiting for telemetry bed.

## 2022-10-18 NOTE — THERAPY EVALUATION
Patient Name: Abraham Wu  : 1950    MRN: [id number]                              Today's Date: 10/18/2022       Admit Date: 10/3/2022    Visit Dx:     ICD-10-CM ICD-9-CM   1. Fever, unspecified fever cause  R50.9 780.60   2. Hydronephrosis, unspecified hydronephrosis type  N13.30 591   3. Status post colostomy (HCC)  Z93.3 V44.3   4. Metastatic malignant neoplasm, unspecified site (HCC)  C79.9 199.1   5. Hypernatremia  E87.0 276.0   6. Hypokalemia  E87.6 276.8   7. Lymphoma (HCC)  C85.90 202.80   8. Dysphagia, unspecified type  R13.10 787.20   9. Hodgkin lymphoma of lymph nodes of multiple regions, unspecified Hodgkin lymphoma type (HCC)  C81.98 201.98   10. Acute upper GI bleed  K92.2 578.9   11. Melena  K92.1 578.1     Patient Active Problem List   Diagnosis   • Rectal mass s/p laparoscopic colostomy (2022)   • Anemia secondary to chemotherapy and acute blood loss   • Essential hypertension   • Dyslipidemia   • Unintentional weight loss   • Hydronephrosis   • Neutropenic fever (HCC)   • Hypokalemia   • Hodgkin lymphoma (HCC)   • Acute upper GI bleed   • Melena   • Moderate malnutrition (HCC)   • Esophageal candidiasis (HCC)   • SHERRI (obstructive sleep apnea)   • ICU delirium      Past Medical History:   Diagnosis Date   • benign polypoid tissue right lung    • Hyperlipidemia    • Hypertension    • Mycobacterium mucogenicum    • SHERRI (obstructive sleep apnea)    • Seasonal allergies      Past Surgical History:   Procedure Laterality Date   • BRONCHOSCOPY      removal of obstructing polypoid tissue right middle lung   • COLOSTOMY N/A 2022    Procedure: LAPAROSCOPIC COLOSTOMY CREATION, FLEXIBLE SIGMOIDOSCOPY;  Surgeon: Hiram Viveros MD;  Location: [facility] OR;  Service: General;  Laterality: N/A;   • ENDOSCOPY N/A 10/10/2022    Procedure: ESOPHAGOGASTRODUODENOSCOPY;  Surgeon: Neeraj Lynn MD;  Location: [facility] ENDOSCOPY;  Service: Gastroenterology;  Laterality: N/A;   • ENDOSCOPY N/A  10/12/2022    Procedure: ESOPHAGOGASTRODUODENOSCOPY;  Surgeon: Brunner, Mark I, MD;  Location: [facility] ENDOSCOPY;  Service: Gastroenterology;  Laterality: N/A;   • EXAM UNDER ANESTHESIA, RECTAL BIOPSY N/A 10/4/2022    Procedure: EXAM UNDER ANESTHESIA, TRANSANAL BIOPSY WITH TRUCUT NEEDLE (LITHOTOMY-CANDY CANE);  Surgeon: Hiram Viveros MD;  Location: [facility] OR;  Service: General;  Laterality: N/A;      General Information     Row Name 10/18/22 1048          OT Time and Intention    Document Type evaluation  -     Mode of Treatment occupational therapy  -     Row Name 10/18/22 1048          General Information    Patient Profile Reviewed yes  -     Prior Level of Function independent:;bed mobility;ADL's;transfer;all household mobility;community mobility;driving  -     Existing Precautions/Restrictions fall;other (see comments)  NG, colostomy  -     Barriers to Rehab medically complex  -     Row Name 10/18/22 1048          Living Environment    People in Home spouse  -     Row Name 10/18/22 1048          Home Main Entrance    Number of Stairs, Main Entrance one  -     Row Name 10/18/22 1048          Stairs Within Home, Primary    Number of Stairs, Within Home, Primary none  -     Row Name 10/18/22 1048          Cognition    Orientation Status (Cognition) oriented to;person;other (see comments);unable/difficult to assess  Pt w/ minimal verbalizations throughout session, mainly communicating with head nods and hand gestures.  -     Row Name 10/18/22 1048          Safety Issues, Functional Mobility    Safety Issues Affecting Function (Mobility) awareness of need for assistance;insight into deficits/self-awareness;safety precaution awareness;safety precautions follow-through/compliance;sequencing abilities  -     Impairments Affecting Function (Mobility) balance;endurance/activity tolerance;pain;postural/trunk control;strength  -           User Key  (r) = Recorded By, (t) = Taken By, (c) =  Cosigned By    Initials Name Provider Type     Kylie Ying OT Occupational Therapist                 Mobility/ADL's     Row Name 10/18/22 1050          Bed Mobility    Bed Mobility supine-sit  -     Supine-Sit [place] (Bed Mobility) minimum assist (75% patient effort);1 person assist;verbal cues  -     Bed Mobility, Safety Issues decreased use of arms for pushing/pulling;decreased use of legs for bridging/pushing;impaired trunk control for bed mobility  -     Assistive Device (Bed Mobility) bed rails;draw sheet;head of bed elevated  -     Comment, (Bed Mobility) Pt c/o dizziness initially upon sitting EOB, BP checked and stable.  -     Row Name 10/18/22 1050          Transfers    Transfers sit-stand transfer;stand-sit transfer;bed-chair transfer  -     Row Name 10/18/22 1050          Bed-Chair Transfer    Bed-Chair [place] (Transfers) minimum assist (75% patient effort);1 person assist;verbal cues  -     Assistive Device (Bed-Chair Transfers) other (see comments)  UE support  -     Row Name 10/18/22 1050          Sit-Stand Transfer    Sit-Stand [place] (Transfers) minimum assist (75% patient effort);1 person assist;verbal cues  -     Assistive Device (Sit-Stand Transfers) other (see comments)  -     Row Name 10/18/22 1050          Stand-Sit Transfer    Stand-Sit [place] (Transfers) minimum assist (75% patient effort);1 person assist;verbal cues  -     Assistive Device (Stand-Sit Transfers) other (see comments)  -     Row Name 10/18/22 1050          Functional Mobility    Functional Mobility- Comment Deferred to PT  -     Row Name 10/18/22 1050          Activities of Daily Living    BADL Assessment/Intervention lower body dressing;upper body dressing  -     Row Name 10/18/22 1050          Lower Body Dressing Assessment/Training    [place] Level (Lower Body Dressing) don;socks;dependent (less than 25% patient effort);verbal cues  -     Position (Lower  Body Dressing) supine  -     Row Name 10/18/22 1050          Upper Body Dressing Assessment/Training    [place] Level (Upper Body Dressing) doff;don;other (see comments);maximum assist (25% patient effort);verbal cues  hospital gown  -     Position (Upper Body Dressing) supported sitting  -           User Key  (r) = Recorded By, (t) = Taken By, (c) = Cosigned By    Initials Name Provider Type     Kylie Ying OT Occupational Therapist               Obj/Interventions     [facility] Name 10/18/22 1056          Sensory Assessment (Somatosensory)    Sensory Assessment (Somatosensory) UE sensation intact  -[facility] Name 10/18/22 1056          Vision Assessment/Intervention    Visual Impairment/Limitations WFL  -[facility] Name 10/18/22 1056          Range of Motion Comprehensive    General Range of Motion bilateral upper extremity ROM WFL  -[facility] Name 10/18/22 1056          Strength Comprehensive (MMT)    General Manual Muscle Testing (MMT) Assessment upper extremity strength deficits identified  -     Comment, General Manual Muscle Testing (MMT) Assessment BUE grossly 4-/5  -[facility] Name 10/18/22 1056          Balance    Balance Assessment sitting static balance;sitting dynamic balance;sit to stand dynamic balance;standing static balance;standing dynamic balance  -     Static Sitting Balance contact guard  -     Dynamic Sitting Balance contact guard  -     Position, Sitting Balance unsupported;sitting edge of bed;sitting in chair  -     Sit to Stand Dynamic Balance minimal assist;1-person assist;verbal cues  -     Static Standing Balance minimal assist;1-person assist;verbal cues  -     Dynamic Standing Balance minimal assist;1-person assist;verbal cues  -     Position/Device Used, Standing Balance supported  -     Balance Interventions sitting;sit to stand;occupation based/functional task  -           User Key  (r) = Recorded By, (t) = Taken By, (c) = Cosigned By    Initials  Name Provider Type     Kylie Ying OT Occupational Therapist               Goals/Plan     Row Name 10/18/22 1100          Transfer Goal 1 (OT)    Activity/Assistive Device (Transfer Goal 1, OT) bed-to-chair/chair-to-bed;toilet;commode;walker, rolling  -     [place] Level/Cues Needed (Transfer Goal 1, OT) contact guard required;verbal cues required  -     Time Frame (Transfer Goal 1, OT) long term goal (LTG);10 days  -     Progress/Outcome (Transfer Goal 1, OT) goal ongoing  -     Row Name 10/18/22 1100          Dressing Goal 1 (OT)    Activity/Device (Dressing Goal 1, OT) lower body dressing  don/doff socks w/ AAD  -     [place]/Cues Needed (Dressing Goal 1, OT) moderate assist (50-74% patient effort);verbal cues required  -     Time Frame (Dressing Goal 1, OT) long term goal (LTG);10 days  -     Progress/Outcome (Dressing Goal 1, OT) goal ongoing  -     Row Name 10/18/22 1100          Strength Goal 1 (OT)    Strength Goal 1 (OT) Pt will tolerate 10 reps of BUE TE to support ADL independence.  -     Time Frame (Strength Goal 1, OT) long term goal (LTG);10 days  -MC     Progress/Outcome (Strength Goal 1, OT) goal ongoing  -     Row Name 10/18/22 1100          Therapy Assessment/Plan (OT)    Planned Therapy Interventions (OT) activity tolerance training;adaptive equipment training;BADL retraining;functional balance retraining;IADL retraining;occupation/activity based interventions;ROM/therapeutic exercise;strengthening exercise;transfer/mobility retraining;patient/caregiver education/training  -           User Key  (r) = Recorded By, (t) = Taken By, (c) = Cosigned By    Initials Name Provider Type     Kylie Ying OT Occupational Therapist               Clinical Impression     Row Name 10/18/22 1058          Pain Assessment    Pretreatment Pain Rating 0/10 - no pain  -     Posttreatment Pain Rating 0/10 - no pain  -     Row Name 10/18/22 1058          Plan of Care  Review    Plan of Care Reviewed With patient;spouse  -     Outcome Evaluation OT eval complete. Pt presents w/ generalized weakness, decreased functional endurance, and balance deficits limiting his ADL independence. Pt min Ax1 for supine-to-sit, STS, and SPT from bed-to-chair, dep for LBD, max A for UBD. Pt would benefit from continued skilled IPOT services to address current functional deficits. Rec IRF at d/c.  -     Row Name 10/18/22 1058          Therapy Assessment/Plan (OT)    Rehab Potential (OT) good, to achieve stated therapy goals  -     Criteria for Skilled Therapeutic Interventions Met (OT) yes;skilled treatment is necessary  -     Therapy Frequency (OT) daily  -     Row Name 10/18/22 1058          Therapy Plan Review/Discharge Plan (OT)    Anticipated Discharge Disposition (OT) inpatient rehabilitation facility  -     Row Name 10/18/22 1058          Vital Signs    Pre Systolic BP Rehab 119  -MC     Pre Treatment Diastolic BP 67  -MC     Intra Systolic BP Rehab 124  -MC     Intra Treatment Diastolic BP 77  -MC     Post Systolic BP Rehab 117  -MC     Post Treatment Diastolic BP 66  -MC     Pretreatment Heart Rate (beats/min) 108  -MC     Posttreatment Heart Rate (beats/min) 105  -MC     Pre SpO2 (%) 95  -MC     O2 Delivery Pre Treatment room air  -MC     O2 Delivery Intra Treatment room air  -     Post SpO2 (%) 95  -MC     O2 Delivery Post Treatment room air  -     Pre Patient Position Supine  -     Intra Patient Position Standing  -     Post Patient Position Sitting  -     Row Name 10/18/22 1058          Positioning and Restraints    Pre-Treatment Position in bed  -     Post Treatment Position chair  -MC     In Chair notified nsg;reclined;call light within reach;encouraged to call for assist;exit alarm on;waffle cushion;legs elevated;heels elevated;RUE elevated;LUE elevated  -           User Key  (r) = Recorded By, (t) = Taken By, (c) = Cosigned By    Initials Name Provider Type     Kylie Cody OT Occupational Therapist               Outcome Measures     Row Name 10/18/22 1101          How much help from another is currently needed...    Putting on and taking off regular lower body clothing? 2  -MC     Bathing (including washing, rinsing, and drying) 2  -MC     Toileting (which includes using toilet bed pan or urinal) 2  -MC     Putting on and taking off regular upper body clothing 2  -MC     Taking care of personal grooming (such as brushing teeth) 3  -MC     Eating meals 1  -     AM-PAC 6 Clicks Score (OT) 12  -     Row Name 10/18/22 1101          Functional Assessment    Outcome Measure Options AM-PAC 6 Clicks Daily Activity (OT)  -           User Key  (r) = Recorded By, (t) = Taken By, (c) = Cosigned By    Initials Name Provider Type    Kylie Cody OT Occupational Therapist                Occupational Therapy Education     Title: PT OT SLP Therapies (In Progress)     Topic: Occupational Therapy (In Progress)     Point: ADL training (In Progress)     Description:   Instruct learner(s) on proper safety adaptation and remediation techniques during self care or transfers.   Instruct in proper use of assistive devices.              Learning Progress Summary           Patient Acceptance, E, NR by  at 10/18/2022 1102   Family Acceptance, E, NR by  at 10/18/2022 1102                   Point: Home exercise program (Not Started)     Description:   Instruct learner(s) on appropriate technique for monitoring, assisting and/or progressing therapeutic exercises/activities.              Learner Progress:  Not documented in this visit.          Point: Precautions (In Progress)     Description:   Instruct learner(s) on prescribed precautions during self-care and functional transfers.              Learning Progress Summary           Patient Acceptance, E, NR by  at 10/18/2022 1102   Family Acceptance, E, NR by  at 10/18/2022 1102                   Point: Body mechanics  (In Progress)     Description:   Instruct learner(s) on proper positioning and spine alignment during self-care, functional mobility activities and/or exercises.              Learning Progress Summary           Patient Acceptance, E, NR by  at 10/18/2022 1102   Family Acceptance, E, NR by  at 10/18/2022 1102                               User Key     Initials Effective Dates Name Provider Type Discipline     10/14/22 -  Kylie Ying OT Occupational Therapist OT              OT Recommendation and Plan  Planned Therapy Interventions (OT): activity tolerance training, adaptive equipment training, BADL retraining, functional balance retraining, IADL retraining, occupation/activity based interventions, ROM/therapeutic exercise, strengthening exercise, transfer/mobility retraining, patient/caregiver education/training  Therapy Frequency (OT): daily  Plan of Care Review  Plan of Care Reviewed With: patient, spouse  Outcome Evaluation: OT eval complete. Pt presents w/ generalized weakness, decreased functional endurance, and balance deficits limiting his ADL independence. Pt min Ax1 for supine-to-sit, STS, and SPT from bed-to-chair, dep for LBD, max A for UBD. Pt would benefit from continued skilled IPOT services to address current functional deficits. Rec IRF at d/c.     Time Calculation:    Time Calculation- OT     Row Name 10/18/22 1104             Time Calculation- OT    OT Start Time 1008  -MC      OT Received On 10/18/22  -      OT Goal Re-Cert Due Date 10/28/22  -         Timed Charges    57471 - OT Therapeutic Activity Minutes 8  -MC      31233 - OT Self Care/Mgmt Minutes 8  -MC         Untimed Charges    OT Eval/Re-eval Minutes 32  -         Total Minutes    Timed Charges Total Minutes 16  -MC      Untimed Charges Total Minutes 32  -MC       Total Minutes 48  -MC            User Key  (r) = Recorded By, (t) = Taken By, (c) = Cosigned By    Initials Name Provider Type     Kylie Ying OT  Occupational Therapist              Therapy Charges for Today     Code Description Service Date Service Provider Modifiers Qty    [id number]  OT EVAL MOD COMPLEXITY 3 10/18/2022 Kylie Ying OT GO 1    [id number]  OT SELF CARE/MGMT/TRAIN EA 15 MIN 10/18/2022 Kylie Ying OT GO 1               Kylie Ying OT  10/18/2022

## 2022-10-18 NOTE — PLAN OF CARE
Goal Outcome Evaluation:  Plan of Care Reviewed With: patient, spouse           Outcome Evaluation: OT taras complete. Pt presents w/ generalized weakness, decreased functional endurance, and balance deficits limiting his ADL independence. Pt min Ax1 for supine-to-sit, STS, and SPT from bed-to-chair, dep for LBD, max A for UBD. Pt would benefit from continued skilled IPOT services to address current functional deficits. Rec IRF at d/c.

## 2022-10-18 NOTE — PLAN OF CARE
Goal Outcome Evaluation:  Plan of Care Reviewed With: patient        Progress: no change  Outcome Evaluation: Pt rested well through the shift. Remains oriented. VSS. Currently on 1L NC. Bumex administered x1, UOP 2100 ml. No oral intake due to mouth and throat lesions. H&H and WBC trending up. Ostomy output 100 ml. Magnesium and potassium replacement started. Phos replacement ordered, waiting on med. Wife remains at bedside.

## 2022-10-18 NOTE — PROGRESS NOTES
Clinical Nutrition     Nutrition Assessment  Reason for Visit:   MDR, Follow-up protocol, EN      Patient Name: Abraham Wu  YOB: 1950  MRN: [id number]  Date of Encounter: 10/18/22 18:15 EDT     Admission date: 10/3/2022       Hodgkin lymphoma (HCC)    Rectal mass s/p laparoscopic colostomy (9/2022)    Anemia secondary to chemotherapy and acute blood loss    Essential hypertension    Hydronephrosis    Neutropenic fever (HCC)    Hypokalemia    Acute upper GI bleed    Melena    Moderate malnutrition (HCC)    Esophageal candidiasis (HCC)    SHERRI (obstructive sleep apnea)    ICU delirium     Anemia    Other Applicable: recent colostomy 9-22-22    Applicable Interval History:  10/8 chemotherapy started.  10/9 SLP (MARCELLO) rec Reg texture thin liquid  10/10 EGD - ulcer identified; candidiasis in the esophagus.   10/10 moderate - non-severe malnutrition due to chronic illness per RD assessment.   10/11 moved to the ICU due to on going bleeding.   10/12 ENDO/IR - s/p multiple vessels embolized.     Applicable PMH/PSxH:   (9/21-9/24) recent admission for diarrhea and weight loss - + E. Coli   (9/21) Severe malnutrition per RD assessment (severe due to chronic dz)    PMH: He  has a past medical history of benign polypoid tissue right lung, Hyperlipidemia, Hypertension, Mycobacterium mucogenicum, SHERRI (obstructive sleep apnea), and Seasonal allergies.   PSxH: He  has a past surgical history that includes Bronchoscopy; Colostomy (N/A, 9/22/2022); exam under anesthesia, rectal biopsy (N/A, 10/4/2022); Esophagogastroduodenoscopy (N/A, 10/10/2022); and Esophagogastroduodenoscopy (N/A, 10/12/2022).       Diet/Nutrition Related History:     Per RN: pt perkier today, tolerating TF, unable to eat much  2nd sores in his mouth, is eating popsicle, had ~200ml from colostomy yesterday, had  ~50ml today, has had ~4L UOP, low grade temp ~99    Per family: pt will drink orange breeze only, has been eating  popsicles, not eating any solid food    Pt sitting up in bed, on nasal cannula, hoarse voice, unable to speak much, is willing to try orange sherbet, magic cup    Labs reviewed   Yes    Results from last 7 days   Lab Units 10/18/22  0327 10/17/22  2048 10/17/22  0150 10/16/22  0543 10/14/22  2112 10/14/22  0436 10/13/22  0012   GLUCOSE mg/dL 155*  --  121*  121* 136*   < > 141* 145*   BUN mg/dL 17  --  10  10 9   < > 15 16   CREATININE mg/dL 0.35*  --  0.45*  0.45* 0.46*   < > 0.34* 0.29*   SODIUM mmol/L 140  --  141  141 142   < > 136 136   CHLORIDE mmol/L 104  --  102  102 102   < > 101 105   POTASSIUM mmol/L 3.4* 3.4* 2.7*  2.7* 2.7*   < > 3.1* 3.6   PHOSPHORUS mg/dL 1.0*  --  3.0  3.0 2.3*   < > 2.1* 2.6   MAGNESIUM mg/dL 1.8  --  2.0  2.0 2.4   < > 2.2 1.9   ALT (SGPT) U/L  --   --  12  --   --  20 26    < > = values in this interval not displayed.     Results from last 7 days   Lab Units 10/17/22  0150 10/14/22  0436 10/13/22  0012   ALBUMIN g/dL 2.80* 2.30* 1.80*   PREALBUMIN mg/dL 3.7*  --   --    CHOLESTEROL mg/dL 78  --   --    TRIGLYCERIDES mg/dL 118  --   --          Lab 10/14/22  1007   LACTATE 1.3        Results from last 7 days   Lab Units 10/18/22  1654 10/18/22  1140 10/17/22  2329 10/17/22  1838   GLUCOSE mg/dL 119 149* 177* 164*       Lab Results   Lab Value Date/Time    HGBA1C 5.50 09/21/2022 0410       Medications reviewed   Abx, valtrex, diflucan, magic mouthwash, protonix    Intake/Ouptut 24 hrs reviewed   Yes  Intake & Output (last day)       10/17 0701  10/18 0700 10/18 0701  10/19 0700    P.O. 600     I.V. (mL/kg) 538.7 (6.9) 964 (12.4)    Other 144 216    NG/ 962    IV Piggyback 350 400    Total Intake(mL/kg) 2017.7 (25.9) 2542 (32.6)    Urine (mL/kg/hr) 3200 (1.7) 500 (0.6)    Stool      Total Output 3200 500    Net -1182.3 +2042          Urine Unmeasured Occurrence 3 x         Anthropometrics     Height: 64in  Weight: 169lb (using bedscale 9-20-22 last admission prior to  colostomy)- pt appears to be closest to this weight or less)  BMI: 29?  Overweight: 25.0-29.9kg/m2     Need standing scale weight when feasible!    Last 15 Recorded Weights  View Complete Flowsheet  Weight Weight (kg) Weight (lbs) Weight Method   10/3/2022 78.019 kg 172 lb Stated   9/24/2022 84.868 kg 187 lb 1.6 oz Bed scale   9/22/2022 76.658 kg 169 lb -   9/20/2022 76.749 kg 169 lb 3.2 oz Bed scale   9/20/2022 76.658 kg 169 lb Stated         Needs Assessment  10/18/22   Height used: 64in  Weights used: 169lb    Estimated calorie needs: ~1800kcal/day  Method:20-25kcals/kg= 3278-0809  MSJ x 1.3: 1920kcal    Estimated protein needs: ~ 115g protein/day  1.2-1.5g/kg= 92-115g pro    Current Nutrition Prescription     PO: Diet Regular; Low Microbial / Neutropenic  Oral Nutrition Supplement: Boost Breeze 3x/day    Intake: 8% of 3 meals    EN: Impact Peptide goal @60 ml/hr, free water @10ml/hr    Route: NGT    Intake; 960ml, 80% goal volume    TF at goal volume will provide 1200ml, 1800kcal, 100% kcal needs, 113g protein, 98% protein needs, no fiber, 1124ml free water      Nutrition Diagnosis     10/10  Problem Malnutrition  non severe chronic (severe if wt loss avg over 6 mo accepted   Etiology Effect tx on appetite   Signs/Symptoms Intake hx w wasting       10/13, 10-18  Problem Inadequate oral intake   Etiology Per Clinical Status    Signs/Symptoms Po intake 8%   Status: pt remains on po diet and EN for nutrition support    Goal:   General: Nutrition to support treatment  PO: Tolerate PO   EN: Maintain EN    Nutrition Intervention     Follow treatment progress, Care plan reviewed, Advised available snacks, Interview for preferences, Menu provided, Menu adjusted, Adjusted supplement     Hospital currently does not have orange Boost Breeze which pt prefers, will change supplement to orange magic cups 3x/day as these supplements are frozen, pt should be able to tolerate well with his mouth sores    Monitoring/Evaluation:    Per protocol, I&O, PO intake, Supplement intake, Pertinent labs, EN delivery/tolerance, Weight, GI status, Symptoms      Dayana Bailey RD,   Time Spent: 60min

## 2022-10-18 NOTE — TELEPHONE ENCOUNTER
Caller: SRINIVASAN HARPER    Relationship: Emergency Contact    Best call back number: [phone]    Who are you requesting to speak with (clinical staff, provider,  specific staff member): CLINICAL    What was the call regarding: PATIENT'S SON DANY HAD A DOCUMENT WRITTEN BY OFFICE REGARDING JURY DUTY.    PATIENT'S SON'S  WAS INCORRECT ON FORM.    REQUESTING FORM BE REMADE WITH  1978    Do you require a callback: YES

## 2022-10-18 NOTE — THERAPY EVALUATION
Patient Name: Abraham Wu  : 1950    MRN: [id number]                              Today's Date: 10/18/2022       Admit Date: 10/3/2022    Visit Dx:     ICD-10-CM ICD-9-CM   1. Fever, unspecified fever cause  R50.9 780.60   2. Hydronephrosis, unspecified hydronephrosis type  N13.30 591   3. Status post colostomy (HCC)  Z93.3 V44.3   4. Metastatic malignant neoplasm, unspecified site (HCC)  C79.9 199.1   5. Hypernatremia  E87.0 276.0   6. Hypokalemia  E87.6 276.8   7. Lymphoma (HCC)  C85.90 202.80   8. Dysphagia, unspecified type  R13.10 787.20   9. Hodgkin lymphoma of lymph nodes of multiple regions, unspecified Hodgkin lymphoma type (HCC)  C81.98 201.98   10. Acute upper GI bleed  K92.2 578.9   11. Melena  K92.1 578.1     Patient Active Problem List   Diagnosis   • Rectal mass s/p laparoscopic colostomy (2022)   • Anemia secondary to chemotherapy and acute blood loss   • Essential hypertension   • Dyslipidemia   • Unintentional weight loss   • Hydronephrosis   • Neutropenic fever (HCC)   • Hypokalemia   • Hodgkin lymphoma (HCC)   • Acute upper GI bleed   • Melena   • Moderate malnutrition (HCC)   • Esophageal candidiasis (HCC)   • SHERRI (obstructive sleep apnea)   • ICU delirium      Past Medical History:   Diagnosis Date   • benign polypoid tissue right lung    • Hyperlipidemia    • Hypertension    • Mycobacterium mucogenicum    • SHERRI (obstructive sleep apnea)    • Seasonal allergies      Past Surgical History:   Procedure Laterality Date   • BRONCHOSCOPY      removal of obstructing polypoid tissue right middle lung   • COLOSTOMY N/A 2022    Procedure: LAPAROSCOPIC COLOSTOMY CREATION, FLEXIBLE SIGMOIDOSCOPY;  Surgeon: Hiram Viveros MD;  [facility];  Service: General;  Laterality: N/A;   • ENDOSCOPY N/A 10/10/2022    Procedure: ESOPHAGOGASTRODUODENOSCOPY;  Surgeon: Neeraj Lynn MD;  [facility];  Service: Gastroenterology;  Laterality: N/A;   • ENDOSCOPY N/A  10/12/2022    Procedure: ESOPHAGOGASTRODUODENOSCOPY;  Surgeon: Brunner, Mark I, MD;  Location: [facility] ENDOSCOPY;  Service: Gastroenterology;  Laterality: N/A;   • EXAM UNDER ANESTHESIA, RECTAL BIOPSY N/A 10/4/2022    Procedure: EXAM UNDER ANESTHESIA, TRANSANAL BIOPSY WITH TRUCUT NEEDLE (LITHOTOMY-CANDY CANE);  Surgeon: Hiram Viveros MD;  Location: [facility] OR;  Service: General;  Laterality: N/A;      General Information     Row Name 10/18/22 1526          Physical Therapy Time and Intention    Document Type evaluation  -     Mode of Treatment physical therapy  -     Row Name 10/18/22 1526          General Information    Patient Profile Reviewed yes  -     Prior Level of Function independent:;all household mobility;community mobility;gait  -     Existing Precautions/Restrictions fall;other (see comments)  NG, colostomy  -     Barriers to Rehab medically complex  -     Row Name 10/18/22 1526          Living Environment    People in Home spouse  -     Row Name 10/18/22 1526          Home Main Entrance    Number of Stairs, Main Entrance one  -HM     Stair Railings, Main Entrance none  -HM     Row Name 10/18/22 1526          Stairs Within Home, Primary    Number of Stairs, Within Home, Primary none  -HM     Row Name 10/18/22 1526          Cognition    Orientation Status (Cognition) oriented x 3  -     Row Name 10/18/22 1526          Safety Issues, Functional Mobility    Safety Issues Affecting Function (Mobility) awareness of need for assistance;insight into deficits/self-awareness;safety precaution awareness;safety precautions follow-through/compliance;sequencing abilities  -     Impairments Affecting Function (Mobility) balance;endurance/activity tolerance;pain;postural/trunk control;strength  -           User Key  (r) = Recorded By, (t) = Taken By, (c) = Cosigned By    Initials Name Provider Type     Aleyda Diamond PT Physical Therapist               Mobility     Row Name 10/18/22 1528           Bed Mobility    Bed Mobility sit-supine  -     Sit-Supine [place] (Bed Mobility) verbal cues;1 person assist;minimum assist (75% patient effort)  -     Assistive Device (Bed Mobility) bed rails;draw sheet;head of bed elevated  -     Row Name 10/18/22 1528          Sit-Stand Transfer    Sit-Stand [place] (Transfers) minimum assist (75% patient effort);1 person assist;verbal cues  -     Assistive Device (Sit-Stand Transfers) walker, front-wheeled  -     Row Name 10/18/22 1528          Gait/Stairs (Locomotion)    [place] Level (Gait) minimum assist (75% patient effort);1 person assist;verbal cues  -     Assistive Device (Gait) walker, front-wheeled  -     Distance in Feet (Gait) 90  -HM     Deviations/Abnormal Patterns (Gait) bilateral deviations;base of support, narrow;earnest decreased;gait speed decreased;stride length decreased  -     Bilateral Gait Deviations forward flexed posture;heel strike decreased  -     [place] Level (Stairs) not tested  -     Comment, (Gait/Stairs) pt required tactile cueing for sequencing of walker with ambulation and consistent verbal cueing to keep walker close while ambulating for safety.  -           User Key  (r) = Recorded By, (t) = Taken By, (c) = Cosigned By    Initials Name Provider Type     Aleyda Diamond PT Physical Therapist               Obj/Interventions     Row Name 10/18/22 1530          Range of Motion Comprehensive    General Range of Motion bilateral upper extremity ROM WFL  -     Row Name 10/18/22 1530          Strength Comprehensive (MMT)    General Manual Muscle Testing (MMT) Assessment upper extremity strength deficits identified  -     Comment, General Manual Muscle Testing (MMT) Assessment BLE observed grossly 4-/5  -HM     Row Name 10/18/22 1530          Balance    Balance Assessment sitting dynamic balance;sit to stand dynamic balance;standing dynamic balance;standing static balance;sitting static  balance  -HM     Static Sitting Balance contact guard  -HM     Dynamic Sitting Balance contact guard  -HM     Position, Sitting Balance supported;sitting in chair  -HM     Sit to Stand Dynamic Balance minimal assist;verbal cues;1-person assist  -HM     Static Standing Balance minimal assist;1-person assist;verbal cues  -HM     Dynamic Standing Balance minimal assist;1-person assist;verbal cues  -HM     Position/Device Used, Standing Balance supported;walker, rolling  -HM     Row Name 10/18/22 1534          Sensory Assessment (Somatosensory)    Sensory Assessment (Somatosensory) sensation intact  -HM           User Key  (r) = Recorded By, (t) = Taken By, (c) = Cosigned By    Initials Name Provider Type     Aleyda Diamond, PT Physical Therapist               Goals/Plan     Row Name 10/18/22 1541          Bed Mobility Goal 1 (PT)    Activity/Assistive Device (Bed Mobility Goal 1, PT) sit to supine/supine to sit  -HM     [place] Level/Cues Needed (Bed Mobility Goal 1, PT) standby assist  -HM     Time Frame (Bed Mobility Goal 1, PT) long term goal (LTG);10 days  -HM     Progress/Outcomes (Bed Mobility Goal 1, PT) new goal  -     Row Name 10/18/22 9664          Transfer Goal 1 (PT)    Activity/Assistive Device (Transfer Goal 1, PT) sit-to-stand/stand-to-sit  -HM     [place] Level/Cues Needed (Transfer Goal 1, PT) standby assist  -HM     Time Frame (Transfer Goal 1, PT) long term goal (LTG);10 days  -HM     Progress/Outcome (Transfer Goal 1, PT) new goal  -     Row Name 10/18/22 6740          Gait Training Goal 1 (PT)    Activity/Assistive Device (Gait Training Goal 1, PT) gait (walking locomotion);walker, rolling  -HM     [place] Level (Gait Training Goal 1, PT) standby assist  -HM     Distance (Gait Training Goal 1, PT) 150  -HM     Time Frame (Gait Training Goal 1, PT) long term goal (LTG);10 days  -HM     Progress/Outcome (Gait Training Goal 1, PT) new goal  -     Row Name 10/18/22 0548           Stairs Goal 1 (PT)    Activity/Assistive Device (Stairs Goal 1, PT) ascending stairs;descending stairs;walker, rolling  -     [place] Level/Cues Needed (Stairs Goal 1, PT) standby assist  -     Number of Stairs (Stairs Goal 1, PT) 1  -HM     Time Frame (Stairs Goal 1, PT) long term goal (LTG);10 days  -     Progress/Outcome (Stairs Goal 1, PT) new goal  -     Row Name 10/18/22 0790          Therapy Assessment/Plan (PT)    Planned Therapy Interventions (PT) balance training;bed mobility training;gait training;home exercise program;neuromuscular re-education;patient/family education;stretching;strengthening;stair training;ROM (range of motion);postural re-education;transfer training  -           User Key  (r) = Recorded By, (t) = Taken By, (c) = Cosigned By    Initials Name Provider Type     Aleyda Diamond, PT Physical Therapist               Clinical Impression     Row Name 10/18/22 1538          Pain    Pretreatment Pain Rating 0/10 - no pain  -     Posttreatment Pain Rating 0/10 - no pain  -     Row Name 10/18/22 1195          Plan of Care Review    Plan of Care Reviewed With patient;spouse  -     Progress no change  -     Outcome Evaluation PT eval completed. Pt ambulated 90 feet with front wheeled walker with Min A. Pt requiring verbal and tactile cueing to maintain close proximity to walker with ambulation and maintain an upright posture. Pt is currently at an increased risk for falling d/t decreased safety awareness, decreased functional endurance, and balance deficits limiting his functional mobility. d/c rec IRF and will continue to monitor throughout POC.  -     Row Name 10/18/22 4967          Therapy Assessment/Plan (PT)    Rehab Potential (PT) good, to achieve stated therapy goals  -     Criteria for Skilled Interventions Met (PT) meets criteria;yes;skilled treatment is necessary  -     Therapy Frequency (PT) daily  -     Row Name 10/18/22 5127          Vital Signs     Pre Systolic BP Rehab --  VSS  -HM     Post Systolic BP Rehab 133  -HM     Post Treatment Diastolic BP 73  -HM     Posttreatment Heart Rate (beats/min) 109  -HM     O2 Delivery Pre Treatment room air  -HM     O2 Delivery Intra Treatment room air  -HM     Post SpO2 (%) 96  -HM     O2 Delivery Post Treatment room air  -HM     Pre Patient Position Sitting  -HM     Intra Patient Position Standing  -HM     Post Patient Position Supine  -HM     Row Name 10/18/22 1531          Positioning and Restraints    Pre-Treatment Position sitting in chair/recliner  -     Post Treatment Position bed  -HM     In Bed notified nsg;supine;call light within reach;encouraged to call for assist;exit alarm on;side rails up x2;with family/caregiver;legs elevated;RUE elevated;LUE elevated  -           User Key  (r) = Recorded By, (t) = Taken By, (c) = Cosigned By    Initials Name Provider Type    Aleyda Murphy, PT Physical Therapist               Outcome Measures     Row Name 10/18/22 1545          How much help from another person do you currently need...    Turning from your back to your side while in flat bed without using bedrails? 4  -HM     Moving from lying on back to sitting on the side of a flat bed without bedrails? 3  -HM     Moving to and from a bed to a chair (including a wheelchair)? 3  -HM     Standing up from a chair using your arms (e.g., wheelchair, bedside chair)? 3  -HM     Climbing 3-5 steps with a railing? 3  -HM     To walk in hospital room? 3  -HM     AM-PAC 6 Clicks Score (PT) 19  -     Highest level of mobility 6 --> Walked 10 steps or more  -     Row Name 10/18/22 1545 10/18/22 1101       Functional Assessment    Outcome Measure Options AM-PAC 6 Clicks Basic Mobility (PT)  - AM-PAC 6 Clicks Daily Activity (OT)  -          User Key  (r) = Recorded By, (t) = Taken By, (c) = Cosigned By    Initials Name Provider Type    Kylie Cody, OT Occupational Therapist    Aleyda Murphy, PT  Physical Therapist                             Physical Therapy Education     Title: PT OT SLP Therapies (In Progress)     Topic: Physical Therapy (In Progress)     Point: Mobility training (Done)     Learning Progress Summary           Patient Acceptance, E,TB, VU,NR by  at 10/18/2022 1546                   Point: Home exercise program (Not Started)     Learner Progress:  Not documented in this visit.          Point: Body mechanics (Done)     Learning Progress Summary           Patient Acceptance, E,TB, VU,NR by  at 10/18/2022 1546                   Point: Precautions (Done)     Learning Progress Summary           Patient Acceptance, E,TB, VU,NR by  at 10/18/2022 1546                               User Key     Initials Effective Dates Name Provider Type Discipline     09/22/22 -  Aleyda Diamond, COLLINS Physical Therapist PT              PT Recommendation and Plan  Planned Therapy Interventions (PT): balance training, bed mobility training, gait training, home exercise program, neuromuscular re-education, patient/family education, stretching, strengthening, stair training, ROM (range of motion), postural re-education, transfer training  Plan of Care Reviewed With: patient, spouse  Progress: no change  Outcome Evaluation: PT eval completed. Pt ambulated 90 feet with front wheeled walker with Min A. Pt requiring verbal and tactile cueing to maintain close proximity to walker with ambulation and maintain an upright posture. Pt is currently at an increased risk for falling d/t decreased safety awareness, decreased functional endurance, and balance deficits limiting his functional mobility. d/c rec IRF and will continue to monitor throughout POC.     Time Calculation:    PT Charges     Row Name 10/18/22 1547             Time Calculation    Start Time 1455  -      Stop Time 1518  -HM      Time Calculation (min) 23 min  -HM      PT Received On 10/18/22  -      PT Goal Re-Cert Due Date 10/28/22  -          Untimed Charges    PT Eval/Re-eval Minutes 47  -HM         Total Minutes    Untimed Charges Total Minutes 47  -HM       Total Minutes 47  -HM            User Key  (r) = Recorded By, (t) = Taken By, (c) = Cosigned By    Initials Name Provider Type     Aleyda Diamond, PT Physical Therapist              Therapy Charges for Today     Code Description Service Date Service Provider Modifiers Qty    84429556072 HC PT EVAL MOD COMPLEXITY 4 10/18/2022 Aleyda Diamond, COLLINS GP 1          PT G-Codes  Outcome Measure Options: AM-PAC 6 Clicks Basic Mobility (PT)  AM-PAC 6 Clicks Score (PT): 19  AM-PAC 6 Clicks Score (OT): 12    Aleyda Diamond PT  10/18/2022

## 2022-10-18 NOTE — PROGRESS NOTES
"   LOS: 15 days    Patient Care Team:  Jatinder Haynes MD as PCP - General (Family Medicine)  Hyponatremia follow up     Subjective     Interval History:   Urine output 5350 mL.  No new events no added distress.  Review of Systems:   Appetite remains poor.  Complains of shortness of breath, edema, generalized weakness diet.  All other systems are negative.    Objective     Vital Sign Min/Max for last 24 hours  Temp  Min: 98.1 °F (36.7 °C)  Max: 99.8 °F (37.7 °C)   BP  Min: 128/73  Max: 176/91   Pulse  Min: 89  Max: 111   Resp  Min: 16  Max: 20   SpO2  Min: 92 %  Max: 100 %   Flow (L/min)  Min: 1  Max: 1   No data recorded     Flowsheet Rows    Flowsheet Row First Filed Value   Admission Height 162.6 cm (64\") Documented at 10/03/2022 0604   Admission Weight 78 kg (172 lb) Documented at 10/03/2022 0604          No intake/output data recorded.  I/O last 3 completed shifts:  In: 3889.2 [P.O.:600; I.V.:1995.2; Other:345; NG/GT:599; IV Piggyback:350]  Out: 5350 [Urine:5200; Stool:150]    Physical Exam:  General Appearance: No distress  male sick looking alert oriented following commands.  Eyes: PER, EOMI.  Neck: Supple no JVD.  Lungs: Positive basal Rales are heard.  Equal chest movement, nonlabored.  Heart: No gallop, murmur, rub, RRR.  Abdomen:  , nontender, positive bowel sounds, no organomegaly.  Extremities: Trace bilateral dependent sacral edema.  No cyanosis.  Neuro: No focal deficit, moving all extremities, alert oriented X 3  Skin: Warm and dry.    no suprapubic fullness or tenderness.  Psych: Anxiety noted    WBC WBC   Date Value Ref Range Status   10/18/2022 2.87 (L) 3.40 - 10.80 10*3/mm3 Final   10/17/2022 0.67 (C) 3.40 - 10.80 10*3/mm3 Final   10/16/2022 0.22 (C) 3.40 - 10.80 10*3/mm3 Final      HGB Hemoglobin   Date Value Ref Range Status   10/18/2022 7.9 (L) 13.0 - 17.7 g/dL Final   10/17/2022 7.0 (L) 13.0 - 17.7 g/dL Final   10/16/2022 7.2 (L) 13.0 - 17.7 g/dL Final   10/15/2022 7.4 (L) 13.0 - " 17.7 g/dL Final   10/15/2022 6.6 (C) 13.0 - 17.7 g/dL Final   10/15/2022 7.2 (L) 13.0 - 17.7 g/dL Final      HCT Hematocrit   Date Value Ref Range Status   10/18/2022 23.7 (L) 37.5 - 51.0 % Final   10/17/2022 21.0 (L) 37.5 - 51.0 % Final   10/16/2022 21.4 (L) 37.5 - 51.0 % Final   10/15/2022 21.5 (L) 37.5 - 51.0 % Final   10/15/2022 19.6 (C) 37.5 - 51.0 % Final   10/15/2022 22.7 (L) 37.5 - 51.0 % Final      Platlets No results found for: LABPLAT   MCV MCV   Date Value Ref Range Status   10/18/2022 85.6 79.0 - 97.0 fL Final   10/17/2022 85.4 79.0 - 97.0 fL Final   10/16/2022 84.9 79.0 - 97.0 fL Final          Sodium Sodium   Date Value Ref Range Status   10/18/2022 140 136 - 145 mmol/L Final   10/17/2022 141 136 - 145 mmol/L Final   10/17/2022 141 136 - 145 mmol/L Final   10/16/2022 142 136 - 145 mmol/L Final      Potassium Potassium   Date Value Ref Range Status   10/18/2022 3.4 (L) 3.5 - 5.2 mmol/L Final   10/17/2022 3.4 (L) 3.5 - 5.2 mmol/L Final     Comment:     Slight hemolysis detected by analyzer. Results may be affected.   10/17/2022 2.7 (L) 3.5 - 5.2 mmol/L Final   10/17/2022 2.7 (L) 3.5 - 5.2 mmol/L Final   10/16/2022 2.7 (L) 3.5 - 5.2 mmol/L Final     Comment:     Slight hemolysis detected by analyzer. Results may be affected.   10/15/2022 2.5 (C) 3.5 - 5.2 mmol/L Final   10/15/2022 2.3 (C) 3.5 - 5.2 mmol/L Final      Chloride Chloride   Date Value Ref Range Status   10/18/2022 104 98 - 107 mmol/L Final   10/17/2022 102 98 - 107 mmol/L Final   10/17/2022 102 98 - 107 mmol/L Final   10/16/2022 102 98 - 107 mmol/L Final      CO2 CO2   Date Value Ref Range Status   10/18/2022 29.0 22.0 - 29.0 mmol/L Final   10/17/2022 30.0 (H) 22.0 - 29.0 mmol/L Final   10/17/2022 30.0 (H) 22.0 - 29.0 mmol/L Final   10/16/2022 30.0 (H) 22.0 - 29.0 mmol/L Final      BUN BUN   Date Value Ref Range Status   10/18/2022 17 8 - 23 mg/dL Final   10/17/2022 10 8 - 23 mg/dL Final   10/17/2022 10 8 - 23 mg/dL Final   10/16/2022 9 8 -  23 mg/dL Final      Creatinine Creatinine   Date Value Ref Range Status   10/18/2022 0.35 (L) 0.76 - 1.27 mg/dL Final   10/17/2022 0.45 (L) 0.76 - 1.27 mg/dL Final   10/17/2022 0.45 (L) 0.76 - 1.27 mg/dL Final   10/16/2022 0.46 (L) 0.76 - 1.27 mg/dL Final      Calcium Calcium   Date Value Ref Range Status   10/18/2022 8.3 (L) 8.6 - 10.5 mg/dL Final   10/17/2022 8.3 (L) 8.6 - 10.5 mg/dL Final   10/17/2022 8.3 (L) 8.6 - 10.5 mg/dL Final   10/16/2022 7.9 (L) 8.6 - 10.5 mg/dL Final      PO4 No results found for: CAPO4   Albumin Albumin   Date Value Ref Range Status   10/17/2022 2.80 (L) 3.50 - 5.20 g/dL Final      Magnesium Magnesium   Date Value Ref Range Status   10/18/2022 1.8 1.6 - 2.4 mg/dL Final   10/17/2022 2.0 1.6 - 2.4 mg/dL Final   10/17/2022 2.0 1.6 - 2.4 mg/dL Final   10/16/2022 2.4 1.6 - 2.4 mg/dL Final      Uric Acid No results found for: URICACID        Results Review:     I reviewed the patient's new clinical results.    cefepime, 2 g, Intravenous, Q8H  First Mouthwash (Magic Mouthwash), 5 mL, Swish & Spit, Q4H  fluconazole, 100 mg, Intravenous, Daily  fluticasone, 1 spray, Nasal, Daily  pantoprazole, 40 mg, Intravenous, BID AC  sodium chloride, 10 mL, Intravenous, Q12H  sodium chloride, 4 mL, Nebulization, BID - RT  tamsulosin, 0.4 mg, Oral, Daily  valACYclovir, 1,000 mg, Oral, Q12H  vancomycin, 1,250 mg, Intravenous, Q12H      Pharmacy to dose vancomycin,         Medication Review:     Assessment & Plan       Hodgkin lymphoma (HCC)    Rectal mass s/p laparoscopic colostomy (9/2022)    Anemia secondary to chemotherapy and acute blood loss    Essential hypertension    Hydronephrosis    Neutropenic fever (HCC)    Hypokalemia    Acute upper GI bleed    Melena    Moderate malnutrition (HCC)    Esophageal candidiasis (HCC)    SHERRI (obstructive sleep apnea)    ICU delirium     1- Hyponatremia -corrected  2-mild bilateral hydronephrosis on CT scan  3-Hypokalemia -replaced as needed  4. Anemia: Transfuse at  hb<7.  Hematology following  5.  Neutropenia s/p chemotherapy.  6.  Classic Hodgkin's lymphoma CD30 positive involving rectum, perinephric, liver.  Chemotherapy started 10/8/2022  7.  S/p laparoscopic colostomy and biopsy.  8.  GI bleed: S/p coil artery embolization 10/12/2022  Plan:  Replacement of potassium ordered.  Edema is markedly improved will hold the diuretics today.  High risk patient.  Sodium and creatinine stable.  Case discussed with the wife RN taking care of the patient  High risk patient with multiple medical problems.     Jessica Espinoza MD  10/18/22  08:36 EDT

## 2022-10-18 NOTE — PROGRESS NOTES
Hemoglobin is improving.  No signs of ongoing GI bleed.    >> BID PPI for 1 month, then daily.    Will sign off.  Please call with questions or concerns.

## 2022-10-18 NOTE — PROGRESS NOTES
"HEMATOLOGY/ONCOLOGY PROGRESS NOTE    S: Abraham was examined this morning.  His wife is at bedside.  He gives me a thumbs up when I asked him how he is feeling.  His wife endorses that he is still having substantial mucositis and throat pain.  He continues to feel weak and tired.  No nausea, vomiting.  No further melena from the ostomy site.     Review of Systems:  A comprehensive 14 point review of systems was performed and was negative except as noted above.   Medications:  The current medication list was reviewed in the EMR    ALLERGIES:  No Known Allergies      Physical Exam    VITAL SIGNS:  /73 (BP Location: Left arm, Patient Position: Lying)   Pulse 106   Temp 98.2 °F (36.8 °C) (Axillary)   Resp 16   Ht 162.6 cm (64\")   Wt 78 kg (172 lb)   SpO2 95%   BMI 29.52 kg/m²   Temp:  [98.1 °F (36.7 °C)-99.8 °F (37.7 °C)] 98.2 °F (36.8 °C)      Performance Status: 2                Physical Exam    General: Frail.  No acute distress.  HEENT: sclerae anicteric, mucous membranes dry + mucositis.  + NG tube in place   Lymphatics: no cervical, supraclavicular, or axillary adenopathy  Cardiovascular: regular rate and rhythm, no murmurs, rubs or gallops  Lungs: clear to auscultation bilaterally  Abdomen: soft, nontender, nondistended.  Ostomy bag with dark stool  Extremities: no lower extremity edema  Skin: no rashes, lesions, bruising, or petechiae  Psych: Mood is low        RECENT LABS:    Lab Results   Component Value Date    HGB 7.9 (L) 10/18/2022    HCT 23.7 (L) 10/18/2022    MCV 85.6 10/18/2022     (L) 10/18/2022    WBC 2.87 (L) 10/18/2022    NEUTROABS 2.48 10/18/2022    LYMPHSABS 0.18 (L) 10/18/2022    MONOSABS 0.16 10/18/2022    EOSABS 0.00 10/18/2022    BASOSABS 0.01 10/18/2022       Lab Results   Component Value Date    GLUCOSE 155 (H) 10/18/2022    BUN 17 10/18/2022    CREATININE 0.35 (L) 10/18/2022     10/18/2022    K 3.4 (L) 10/18/2022     10/18/2022    CO2 29.0 10/18/2022    " CALCIUM 8.3 (L) 10/18/2022    PROTEINTOT 4.9 (L) 10/17/2022    ALBUMIN 2.80 (L) 10/17/2022    BILITOT 1.1 10/17/2022    ALKPHOS 127 (H) 10/17/2022    AST 10 10/17/2022    ALT 12 10/17/2022         Assessment/Plan  72-year-old female with CD30 positive classic Hodgkin lymphoma involving the rectum, perinephric, liver, and.  He is status post laparoscopic colostomy and biopsy.  He was started on inpatient chemotherapy on 10/8/2022.  Bilateral hydronephrosis econdary to malignancy.Course has been complicated by a GI bleed.  He is status post coil artery embolization 10/12. Also with neutropenic fever, Candida esophagitis and mucositis.    Hodgkin lymphoma  Status post cycle 1 AVVD on 10/8/22  He has had a difficult posttreatment course with neutropenic fever, failure to thrive, and GI bleed.  His counts have continued to trend up today, consistent with recovery from recent chemotherapy.  We will stop his Neupogen today.  I discussed with the patient and his wife that if he recovers from his acute events and elects to pursue further treatment, we can consider future dose reductions and adjustments to improve tolerance.  However, he is not anywhere near proceeding with further systemic therapy.  I encouraged him to continue with the supportive care to help him recover from the mucositis, neutropenic fever and GI bleed.  However,  We can continue to have a goals of care conversation as we see how he feels when side effects under better control and after control the GI bleed.    Anemia secondary to malignancy, recent chemotherapy/marrow suppression and blood loss  GI bleed  -Now status post coil embolization   -Hemoglobin is improved at 7.9 today    Hydronephrosis  Creatinine has remained normal.    Neutropenic fever  On vancomycin, Valtrex, fluconazole, and cefepime  Afebrile currently  Urine culture shows less than 10,000 coliform units of gram-negative bacilli.  Blood cultures from 10/14 are no growth to date.  Given  culture negative, afebrile, and status post neutrophil count recovered, okay to consider antibiotics complete from hematologic/oncologic standpoint but will defer the final decision to ICU team..    Mucositis  Continue viscous lidocaine to swish and swallow for throat pain.    Malnutrition  Currently on NG tube support    Hypo phosphatemia  Will defer electrolyte repletion to the ICU team.        Kaycee Leary MD  [facility] Hematology and Oncology    10/18/2022     I have spent a total of 35 min with >50% in direct patient counseling

## 2022-10-18 NOTE — PLAN OF CARE
Goal Outcome Evaluation:  Plan of Care Reviewed With: patient, spouse        Progress: no change  Outcome Evaluation: PT eval completed. Pt ambulated 90 feet with front wheeled walker with Min A. Pt requiring verbal and tactile cueing to maintain close proximity to walker with ambulation and maintain an upright posture. Pt is currently at an increased risk for falling d/t decreased safety awareness, decreased functional endurance, and balance deficits limiting his functional mobility. d/c rec IRF and will continue to monitor throughout POC.

## 2022-10-19 ENCOUNTER — APPOINTMENT (OUTPATIENT)
Dept: GENERAL RADIOLOGY | Facility: HOSPITAL | Age: 72
End: 2022-10-19

## 2022-10-19 LAB
ANION GAP SERPL CALCULATED.3IONS-SCNC: 10 MMOL/L (ref 5–15)
BACTERIA SPEC AEROBE CULT: NORMAL
BACTERIA SPEC AEROBE CULT: NORMAL
BUN SERPL-MCNC: 19 MG/DL (ref 8–23)
BUN/CREAT SERPL: 50 (ref 7–25)
CALCIUM SPEC-SCNC: 8.5 MG/DL (ref 8.6–10.5)
CHLORIDE SERPL-SCNC: 103 MMOL/L (ref 98–107)
CO2 SERPL-SCNC: 27 MMOL/L (ref 22–29)
CREAT SERPL-MCNC: 0.38 MG/DL (ref 0.76–1.27)
DEPRECATED RDW RBC AUTO: 48.7 FL (ref 37–54)
EGFRCR SERPLBLD CKD-EPI 2021: 119.2 ML/MIN/1.73
ERYTHROCYTE [DISTWIDTH] IN BLOOD BY AUTOMATED COUNT: 15.3 % (ref 12.3–15.4)
GLUCOSE BLDC GLUCOMTR-MCNC: 109 MG/DL (ref 70–130)
GLUCOSE BLDC GLUCOMTR-MCNC: 123 MG/DL (ref 70–130)
GLUCOSE BLDC GLUCOMTR-MCNC: 137 MG/DL (ref 70–130)
GLUCOSE BLDC GLUCOMTR-MCNC: 152 MG/DL (ref 70–130)
GLUCOSE SERPL-MCNC: 126 MG/DL (ref 65–99)
HCT VFR BLD AUTO: 28.2 % (ref 37.5–51)
HGB BLD-MCNC: 9.3 G/DL (ref 13–17.7)
MAGNESIUM SERPL-MCNC: 2.2 MG/DL (ref 1.6–2.4)
MCH RBC QN AUTO: 29 PG (ref 26.6–33)
MCHC RBC AUTO-ENTMCNC: 33 G/DL (ref 31.5–35.7)
MCV RBC AUTO: 87.9 FL (ref 79–97)
PHOSPHATE SERPL-MCNC: 2.1 MG/DL (ref 2.5–4.5)
PHOSPHATE SERPL-MCNC: 2.3 MG/DL (ref 2.5–4.5)
PLATELET # BLD AUTO: 164 10*3/MM3 (ref 140–450)
PMV BLD AUTO: 11 FL (ref 6–12)
POTASSIUM SERPL-SCNC: 3.5 MMOL/L (ref 3.5–5.2)
POTASSIUM SERPL-SCNC: 3.7 MMOL/L (ref 3.5–5.2)
POTASSIUM SERPL-SCNC: 3.7 MMOL/L (ref 3.5–5.2)
RBC # BLD AUTO: 3.21 10*6/MM3 (ref 4.14–5.8)
SODIUM SERPL-SCNC: 140 MMOL/L (ref 136–145)
WBC NRBC COR # BLD: 7.15 10*3/MM3 (ref 3.4–10.8)

## 2022-10-19 PROCEDURE — 74018 RADEX ABDOMEN 1 VIEW: CPT

## 2022-10-19 PROCEDURE — 99233 SBSQ HOSP IP/OBS HIGH 50: CPT | Performed by: INTERNAL MEDICINE

## 2022-10-19 PROCEDURE — 84100 ASSAY OF PHOSPHORUS: CPT | Performed by: INTERNAL MEDICINE

## 2022-10-19 PROCEDURE — 84132 ASSAY OF SERUM POTASSIUM: CPT | Performed by: INTERNAL MEDICINE

## 2022-10-19 PROCEDURE — 85027 COMPLETE CBC AUTOMATED: CPT | Performed by: INTERNAL MEDICINE

## 2022-10-19 PROCEDURE — 80048 BASIC METABOLIC PNL TOTAL CA: CPT | Performed by: INTERNAL MEDICINE

## 2022-10-19 PROCEDURE — 83735 ASSAY OF MAGNESIUM: CPT | Performed by: INTERNAL MEDICINE

## 2022-10-19 PROCEDURE — 82962 GLUCOSE BLOOD TEST: CPT

## 2022-10-19 PROCEDURE — 99232 SBSQ HOSP IP/OBS MODERATE 35: CPT | Performed by: INTERNAL MEDICINE

## 2022-10-19 RX ADMIN — Medication 10 ML: at 20:59

## 2022-10-19 RX ADMIN — LIDOCAINE HYDROCHLORIDE 5 ML: 20 SOLUTION ORAL; TOPICAL at 11:07

## 2022-10-19 RX ADMIN — VALACYCLOVIR HYDROCHLORIDE 1000 MG: 500 TABLET, FILM COATED ORAL at 20:58

## 2022-10-19 RX ADMIN — VALACYCLOVIR HYDROCHLORIDE 1000 MG: 500 TABLET, FILM COATED ORAL at 11:01

## 2022-10-19 RX ADMIN — PANTOPRAZOLE SODIUM 40 MG: 40 INJECTION, POWDER, FOR SOLUTION INTRAVENOUS at 11:01

## 2022-10-19 RX ADMIN — PANTOPRAZOLE SODIUM 40 MG: 40 INJECTION, POWDER, FOR SOLUTION INTRAVENOUS at 18:21

## 2022-10-19 RX ADMIN — LIDOCAINE HYDROCHLORIDE 5 ML: 20 SOLUTION ORAL; TOPICAL at 20:58

## 2022-10-19 RX ADMIN — POTASSIUM PHOSPHATE, MONOBASIC AND POTASSIUM PHOSPHATE, DIBASIC 15 MMOL: 224; 236 INJECTION, SOLUTION, CONCENTRATE INTRAVENOUS at 12:44

## 2022-10-19 RX ADMIN — TAMSULOSIN HYDROCHLORIDE 0.4 MG: 0.4 CAPSULE ORAL at 11:01

## 2022-10-19 RX ADMIN — LIDOCAINE HYDROCHLORIDE 5 ML: 20 SOLUTION ORAL; TOPICAL at 16:10

## 2022-10-19 RX ADMIN — Medication 10 ML: at 11:25

## 2022-10-19 RX ADMIN — POTASSIUM CHLORIDE 40 MEQ: 1.5 POWDER, FOR SOLUTION ORAL at 16:25

## 2022-10-19 RX ADMIN — DIPHENHYDRAMINE HYDROCHLORIDE AND LIDOCAINE HYDROCHLORIDE AND ALUMINUM HYDROXIDE AND MAGNESIUM HYDRO 5 ML: KIT at 02:09

## 2022-10-19 RX ADMIN — POTASSIUM PHOSPHATE, MONOBASIC AND POTASSIUM PHOSPHATE, DIBASIC 15 MMOL: 224; 236 INJECTION, SOLUTION, CONCENTRATE INTRAVENOUS at 02:08

## 2022-10-19 RX ADMIN — POTASSIUM CHLORIDE 40 MEQ: 1.5 POWDER, FOR SOLUTION ORAL at 11:26

## 2022-10-19 RX ADMIN — DIPHENHYDRAMINE HYDROCHLORIDE AND LIDOCAINE HYDROCHLORIDE AND ALUMINUM HYDROXIDE AND MAGNESIUM HYDRO 5 ML: KIT at 22:30

## 2022-10-19 NOTE — PROGRESS NOTES
Pulmonary/Critical Care Follow-up     LOS: 15 days   Patient Care Team:  Jatinder Haynes MD as PCP - General (Family Medicine)    Chief Complaint: GI bleeding      Subjective      History reviewed and updated in EMR as indicated.    Interval History:     Patient is fatigued per his wife.  No significant dyspnea.  Hemoglobin stable.  Tolerating tube feeding.      History taken from: Patient, staff    PMH/FH/Social History were reviewed and updated appropriately in the electronic medical record.     Review of Systems:    Review of 14 systems was completed with positives and pertinent negatives noted in the subjective section.  All other systems reviewed and are negative.         Objective     Vital Signs  Temp:  [98.2 °F (36.8 °C)] 98.2 °F (36.8 °C)  Heart Rate:  [] 98  Resp:  [16-18] 18  BP: (119-146)/(67-84) 131/78  10/17 0701 - 10/18 0700  In: 2017.7 [P.O.:600; I.V.:538.7]  Out: 3200 [Urine:3200]  Body mass index is 29.52 kg/m².     IV drips:  Pharmacy to dose vancomycin       Physical Exam:     Constitutional:   Alert, in no acute distress, thin   Head:   Normocephalic, atraumatic   Eyes:           Lids and lashes normal, conjunctivae and sclerae normal.  PER   ENMT:  Ears appear intact with no abnormalities noted     Lips normal.     Neck:  Trachea midline, no JVD   Lungs/Resp:    Normal effort, symmetric chest rise, no crepitus, clear to      auscultation bilaterally.               Heart/CV:   Regular rhythm and normal rate, no murmur   Abdomen/GI:    Soft, nontender, nondistended, ostomy with some dark liquid output.   :    Deferred   Extremities/MSK:  No clubbing or cyanosis.  No edema.     Pulses:  Pulses palpable and equal bilaterally   Skin:  No bleeding, bruising or rash   Heme/Lymph:  .   Neurologic:    Psychiatric:    Moves all extremities with no obvious focal motor deficit.  Cranial nerves 2 - 12 grossly intact  Non-agitated, normal affect.    The above physical exam findings were reviewed  and reflect my exam findings as of today's exam.   Electronically signed by:  Jeffrey White MD  10/18/22  20:48 EDT      Results Review:     I reviewed the patient's new clinical results.   Results from last 7 days   Lab Units 10/18/22  0327 10/17/22  2048 10/17/22  0150 10/16/22  0543 10/14/22  2112 10/14/22  0436 10/13/22  0012   SODIUM mmol/L 140  --  141  141 142   < > 136 136   POTASSIUM mmol/L 3.4* 3.4* 2.7*  2.7* 2.7*   < > 3.1* 3.6   CHLORIDE mmol/L 104  --  102  102 102   < > 101 105   CO2 mmol/L 29.0  --  30.0*  30.0* 30.0*   < > 28.0 26.0   BUN mg/dL 17  --  10  10 9   < > 15 16   CREATININE mg/dL 0.35*  --  0.45*  0.45* 0.46*   < > 0.34* 0.29*   CALCIUM mg/dL 8.3*  --  8.3*  8.3* 7.9*   < > 7.9* 7.5*   BILIRUBIN mg/dL  --   --  1.1  --   --  1.7* 2.0*   ALK PHOS U/L  --   --  127*  --   --  190* 213*   ALT (SGPT) U/L  --   --  12  --   --  20 26   AST (SGOT) U/L  --   --  10  --   --  17 41*   GLUCOSE mg/dL 155*  --  121*  121* 136*   < > 141* 145*    < > = values in this interval not displayed.     Results from last 7 days   Lab Units 10/18/22  0327 10/17/22  0150 10/16/22  0543 10/15/22  1110 10/15/22  0416   WBC 10*3/mm3 2.87* 0.67* 0.22*  --  0.14*   HEMOGLOBIN g/dL 7.9* 7.0* 7.2*   < > 7.0*   HEMATOCRIT % 23.7* 21.0* 21.4*   < > 21.7*   PLATELETS 10*3/mm3 126* 98* 95*  --  116*   MONOCYTES % %  --  10.0 23.0*  --  2.0*    < > = values in this interval not displayed.     Results from last 7 days   Lab Units 10/16/22  0352   PH, ARTERIAL pH units 7.525*   PO2 ART mm Hg 101.0   PCO2, ARTERIAL mm Hg 39.5   HCO3 ART mmol/L 32.6*     Results from last 7 days   Lab Units 10/18/22  0327 10/17/22  0150 10/16/22  0543   MAGNESIUM mg/dL 1.8 2.0  2.0 2.4   PHOSPHORUS mg/dL 1.0* 3.0  3.0 2.3*       I reviewed the patient's new imaging including images and reports.    Medication Review:   cefepime, 2 g, Intravenous, Q8H  First Mouthwash (Magic Mouthwash), 5 mL, Swish & Spit, Q4H  fluconazole, 100  mg, Intravenous, Daily  fluticasone, 1 spray, Nasal, Daily  pantoprazole, 40 mg, Intravenous, BID AC  sodium chloride, 10 mL, Intravenous, Q12H  tamsulosin, 0.4 mg, Oral, Daily  valACYclovir, 1,000 mg, Oral, Q12H  vancomycin, 1,250 mg, Intravenous, Q12H      Pharmacy to dose vancomycin,         Assessment & Plan         Hodgkin lymphoma (HCC)    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 with history of hypertension, hyperlipidemia, Hodgkin's lymphoma, transferred to [facility] ICU on 10/11/2022 with acute high risk GI bleeding not responding to resuscitation.  Initial EGD from 10/10/2022 showed ulcerations with bleeding ulcer and severe esophageal candidiasis.  Clip was placed and patient was started on fluconazole for candidiasis.  He was started on chemotherapy on 10/8/2022.  Repeat melena occurred on 10/11/2022 mid responsive to transfusion.  CT of the abdomen and pelvis was performed showing no obvious site of bleeding and evidence of known metastatic disease.  Patient underwent an embolization on 10/12/2022 with successful embolization of the right gastroepiploic artery, pancreaticoduodenal artery, and main trunk of the gastroduodenal artery.  Hemoglobin has continued to trend down requiring inner transfusions.  The thought is that this is most likely combined effect due to bone marrow suppression in the setting of chemotherapy in the setting of bleeding.    Patient was started on tube feeding secondary to poor intake/malnutrition.    Hemoglobin improved this morning.  Potassium improved this morning.    I think patient is stable for transfer to telemetry.  He is very interested in moving out of the  intensive care unit.    Plan:  1.  For GI bleeding: Seems to have stabilized.  Monitor closely.  2.  For ongoing anemia: Likely bone marrow suppression.  Monitor hemoglobin with transfusions as necessary.  3.  For rectal mass status post laparoscopic colectomy/colostomy 9/2022: Pathology consistent with lymphoma.  Doing well.  Monitor.  4.  For lymphoma/neutropenic fever/pancytopenia: Hematology following.  Improved status post Neupogen.  Complete vancomycin, cefepime, fluconazole.  5.  For hypokalemia: Replace potassium per nephrology.  Close monitoring.  6.  DVT prophylaxis: Mechanical.    Level of Risk High due to: illness with threat to life or bodily function -neutropenia, ongoing anemia with concern for blood loss/bone marrow suppression with risk of life-threatening decline in condition.    Electronically signed by:    Jeffrey White MD  10/18/22  20:48 EDT      *. Please note that portions of this note were completed with [employer] - a voice recognition program.

## 2022-10-19 NOTE — PROGRESS NOTES
"HEMATOLOGY/ONCOLOGY PROGRESS NOTE    S: Mr. Wu is looking much better this morning.  His supportive wife is at bedside.  His main complaint is ongoing mucositis related pain.  He is still in the ICU, but he tells me this is because there were not beds available on the regular floor.  He has not had any further black stools.  He denies any nausea or acid reflux.  No shortness of breath or cough.  He is still on 2 L, but his wife reports he has been taking this off intermittently during the day.    He has remained afebrile.  He is tolerating tube feeds.  Additionally he has had 3 boost breeze through the day yesterday.  He still endorses a low appetite.  Ambulated 100 feet with PT yesterday.    Review of Systems:  A comprehensive 14 point review of systems was performed and was negative except as noted above.   Medications:  The current medication list was reviewed in the EMR    ALLERGIES:  No Known Allergies      Physical Exam    VITAL SIGNS:  /77 (BP Location: Left arm, Patient Position: Lying)   Pulse 104   Temp 98.2 °F (36.8 °C) (Axillary)   Resp 16   Ht 162.6 cm (64\")   Wt 78 kg (172 lb)   SpO2 97%   BMI 29.52 kg/m²   Temp:  [98 °F (36.7 °C)-98.2 °F (36.8 °C)] 98.2 °F (36.8 °C)      Performance Status: 2                Physical Exam    General: Frail.  No acute distress.  HEENT: sclerae anicteric, mucous membranes dry + mucositis.  + NG tube in place   Lymphatics: no cervical, supraclavicular, or axillary adenopathy  Cardiovascular: regular rate and rhythm, no murmurs, rubs or gallops  Lungs: clear to auscultation bilaterally  Abdomen: soft, nontender, nondistended.  Ostomy bag with light brown stool  Extremities: no lower extremity edema  Skin: no rashes, lesions, bruising, or petechiae  Psych: Mood is low        RECENT LABS:    Lab Results   Component Value Date    HGB 9.3 (L) 10/19/2022    HCT 28.2 (L) 10/19/2022    MCV 87.9 10/19/2022     10/19/2022    WBC 7.15 10/19/2022    NEUTROABS " 2.48 10/18/2022    LYMPHSABS 0.18 (L) 10/18/2022    MONOSABS 0.16 10/18/2022    EOSABS 0.00 10/18/2022    BASOSABS 0.01 10/18/2022       Lab Results   Component Value Date    GLUCOSE 126 (H) 10/19/2022    BUN 19 10/19/2022    CREATININE 0.38 (L) 10/19/2022     10/19/2022    K 3.5 10/19/2022     10/19/2022    CO2 27.0 10/19/2022    CALCIUM 8.5 (L) 10/19/2022    PROTEINTOT 4.9 (L) 10/17/2022    ALBUMIN 2.80 (L) 10/17/2022    BILITOT 1.1 10/17/2022    ALKPHOS 127 (H) 10/17/2022    AST 10 10/17/2022    ALT 12 10/17/2022         Assessment/Plan  72-year-old female with CD30 positive classic Hodgkin lymphoma involving the rectum, perinephric, liver, and.  He is status post laparoscopic colostomy and biopsy.  He was started on inpatient chemotherapy on 10/8/2022.  Bilateral hydronephrosis econdary to malignancy.Course has been complicated by a GI bleed.  He is status post coil artery embolization 10/12. Also with neutropenic fever, Candida esophagitis and mucositis.  He is now status post count recovery.    Hodgkin lymphoma  Status post cycle 1 AVVD on 10/8/22  He has had a difficult posttreatment course with neutropenic fever, failure to thrive, and GI bleed.  His blood counts have now fully recovered.  He has expressed that he is unsure he wants to continue with cancer directed therapy.  I recommended we meet back as an outpatient after further recovery from acute events I discussed with the patient and his wife that if he recovers from his acute events to make a final decision regarding this.  We discussed that further treatment with dose modification may improve his tolerance.  And elects to pursue further treatment, we can consider future dose reductions and adjustments to improve tolerance.     Anemia secondary to malignancy, recent chemotherapy/marrow suppression and blood loss  GI bleed  -Now status post coil embolization   -Hemoglobin is improved at 9.3 today    Hydronephrosis  Creatinine has remained  normal.    Neutropenic fever  His ANC is fully recovered.  He is now off of antibiotics and remains afebrile.  He remains on fluconazole for Candida esophagitis and was also started on Valtrex.  Urine culture shows less than 10,000 coliform units of gram-negative bacilli.  Blood cultures from 10/14 are no growth to date.  Given culture negative, afebrile, and status post neutrophil count recovered, okay to consider antibiotics complete from hematologic/oncologic standpoint but will defer the final decision to ICU team..    Mucositis  Continue viscous lidocaine to swish and swallow for throat pain.  Discussed anticipate his mucositis to resolve now that he is Fully Recovered.  Encouraged Him to Swish Salt Water Every 2 Hours.    Malnutrition  Currently on NG tube support but has been increasing his oral intake and reports he drank 3 boost breeze yesterday.  I anticipate that his appetite should continue to improve    Kaycee Leary MD  [facility] Hematology and Oncology    10/19/2022     I have spent a total of 35 min with >50% in direct patient counseling    He has outpatient follow-up with me with possible treatment resumption next week.  From a hematologic standpoint, he is okay for discharge.

## 2022-10-19 NOTE — CASE MANAGEMENT/SOCIAL WORK
Continued Stay Note   [place]     Patient Name: Abraham Wu  MRN: [id number]  Today's Date: 10/19/2022    Admit Date: 10/3/2022    Plan: Rehab   Discharge Plan     Row Name 10/19/22 1338       Plan    Plan Rehab    Patient/Family in Agreement with Plan yes    Plan Comments PT/OT recommending rehab for patient.  Spoke with patient and wife at bedside.  Both are in agreement and would like referrals to [facility] and to [facility] at .  Message left for Shana with [facility] with referral information and also for Candida with [facility].  CM will continue to follow.               Discharge Codes    No documentation.               Expected Discharge Date and Time     Expected Discharge Date Expected Discharge Time    Oct 24, 2022             Aisha Hernandez RN

## 2022-10-19 NOTE — PLAN OF CARE
Goal Outcome Evaluation:  Plan of Care Reviewed With: patient        Progress: no change  Outcome Evaluation: VSS. Intermittent periods of confusion this am, easily reoriented. TF continued without complications. Attempting to drink liquids through out shift. Ostomy output, 150 ml. UOP adequate. Phos replaced, waiting on recheck. Wife remains at bedside.

## 2022-10-19 NOTE — PROGRESS NOTES
Pulmonary/Critical Care Follow-up     LOS: 16 days   Patient Care Team:  Jatinder Haynes MD as PCP - General (Family Medicine)    Chief Complaint: GI bleeding      Subjective      History reviewed and updated in EMR as indicated.    Interval History:     Patient is fatigued per his wife.  No significant dyspnea.  Hemoglobin stable.  Tolerating tube feeding.  White blood cell count increased.  No fevers.    History taken from: Patient, staff    PMH/FH/Social History were reviewed and updated appropriately in the electronic medical record.     Review of Systems:    Review of 14 systems was completed with positives and pertinent negatives noted in the subjective section.  All other systems reviewed and are negative.         Objective     Vital Signs  Temp:  [98 °F (36.7 °C)-98.2 °F (36.8 °C)] 98 °F (36.7 °C)  Heart Rate:  [] 102  Resp:  [16-20] 20  BP: ()/(35-93) 133/76  10/18 0701 - 10/19 0700  In: 3652 [I.V.:1223]  Out: 1500 [Urine:1350]  Body mass index is 29.52 kg/m².     IV drips:      Physical Exam:     Constitutional:   Alert, in no acute distress, thin   Head:   Normocephalic, atraumatic   Eyes:           Lids and lashes normal, conjunctivae and sclerae normal.  PER   ENMT:  Ears appear intact with no abnormalities noted     Lips normal.     Neck:  Trachea midline, no JVD   Lungs/Resp:    Normal effort, symmetric chest rise, no crepitus, clear to      auscultation bilaterally.               Heart/CV:   Regular rhythm and normal rate, no murmur   Abdomen/GI:    Soft, nontender, nondistended, ostomy with some dark liquid output.   :    Deferred   Extremities/MSK:  No clubbing or cyanosis.  No edema.     Pulses:  Pulses palpable and equal bilaterally   Skin:  No bleeding, bruising or rash   Heme/Lymph:  .   Neurologic:    Psychiatric:    Moves all extremities with no obvious focal motor deficit.  Cranial nerves 2 - 12 grossly intact  Non-agitated, normal affect.    The above physical exam findings  were reviewed and reflect my exam findings as of today's exam.   Electronically signed by:  Jeffrey White MD  10/19/22  15:05 EDT      Results Review:     I reviewed the patient's new clinical results.   Results from last 7 days   Lab Units 10/19/22  0550 10/19/22  0004 10/18/22  0327 10/17/22  2048 10/17/22  0150 10/14/22  2112 10/14/22  0436 10/13/22  0012   SODIUM mmol/L 140  --  140  --  141  141   < > 136 136   POTASSIUM mmol/L 3.5 3.7 3.4*   < > 2.7*  2.7*   < > 3.1* 3.6   CHLORIDE mmol/L 103  --  104  --  102  102   < > 101 105   CO2 mmol/L 27.0  --  29.0  --  30.0*  30.0*   < > 28.0 26.0   BUN mg/dL 19  --  17  --  10  10   < > 15 16   CREATININE mg/dL 0.38*  --  0.35*  --  0.45*  0.45*   < > 0.34* 0.29*   CALCIUM mg/dL 8.5*  --  8.3*  --  8.3*  8.3*   < > 7.9* 7.5*   BILIRUBIN mg/dL  --   --   --   --  1.1  --  1.7* 2.0*   ALK PHOS U/L  --   --   --   --  127*  --  190* 213*   ALT (SGPT) U/L  --   --   --   --  12  --  20 26   AST (SGOT) U/L  --   --   --   --  10  --  17 41*   GLUCOSE mg/dL 126*  --  155*  --  121*  121*   < > 141* 145*    < > = values in this interval not displayed.     Results from last 7 days   Lab Units 10/19/22  0550 10/18/22  0327 10/17/22  0150 10/16/22  0543 10/15/22  1110 10/15/22  0416   WBC 10*3/mm3 7.15 2.87* 0.67* 0.22*  --  0.14*   HEMOGLOBIN g/dL 9.3* 7.9* 7.0* 7.2*   < > 7.0*   HEMATOCRIT % 28.2* 23.7* 21.0* 21.4*   < > 21.7*   PLATELETS 10*3/mm3 164 126* 98* 95*  --  116*   MONOCYTES % %  --   --  10.0 23.0*  --  2.0*    < > = values in this interval not displayed.     Results from last 7 days   Lab Units 10/16/22  0352   PH, ARTERIAL pH units 7.525*   PO2 ART mm Hg 101.0   PCO2, ARTERIAL mm Hg 39.5   HCO3 ART mmol/L 32.6*     Results from last 7 days   Lab Units 10/19/22  0550 10/19/22  0004 10/18/22  0327 10/17/22  0150   MAGNESIUM mg/dL 2.2  --  1.8 2.0  2.0   PHOSPHORUS mg/dL 2.3* 2.1* 1.0* 3.0  3.0       I reviewed the patient's new imaging including  images and reports.    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 with history of hypertension, hyperlipidemia, Hodgkin's lymphoma, transferred to [facility] ICU on 10/11/2022 with acute high risk GI bleeding not responding to resuscitation.  Initial EGD from 10/10/2022 showed ulcerations with bleeding ulcer and severe esophageal candidiasis.  Clip was placed and patient was started on fluconazole for candidiasis.  He was started on chemotherapy on 10/8/2022.  Repeat melena occurred on 10/11/2022 mid responsive to transfusion.  CT of the abdomen and pelvis was performed showing no obvious site of bleeding and evidence of known metastatic disease.  Patient underwent an embolization on 10/12/2022 with successful embolization of the right gastroepiploic artery, pancreaticoduodenal artery, and main trunk of the gastroduodenal artery.  Hemoglobin has continued to trend down requiring inner transfusions.  The thought is that this is most likely combined effect due to bone marrow suppression in the setting of chemotherapy in the setting of bleeding.    Patient was started on tube feeding secondary to poor intake/malnutrition.  He is tolerating this.    Hemoglobin stable this morning.      I think patient is stable for transfer to telemetry.  Still benefiting from tube feeding.    Plan:  1.   For GI bleeding: Seems to have stabilized.  Monitor closely.  2.  For ongoing anemia: Likely bone marrow suppression.  Monitor hemoglobin with transfusions as necessary.  3.  For rectal mass status post laparoscopic colectomy/colostomy 9/2022: Pathology consistent with lymphoma.  Doing well.  Monitor.  4.  For lymphoma/neutropenic fever/pancytopenia: Hematology following.  Improved status post Neupogen.  Complete vancomycin, cefepime, fluconazole.  5.  For hypokalemia: Replace potassium per nephrology.  Close monitoring.  6.  For malnutrition: Continue tube feeding.  7.  DVT prophylaxis: Mechanical.    Level of Risk High due to: illness with threat to life or bodily function -neutropenia, ongoing anemia with concern for blood loss/bone marrow suppression with risk of life-threatening decline in condition.    Electronically signed by:    Jeffrey White MD  10/19/22  15:05 EDT      *. Please note that portions of this note were completed with [employer] - a voice recognition program.

## 2022-10-20 ENCOUNTER — READMISSION MANAGEMENT (OUTPATIENT)
Dept: CALL CENTER | Facility: HOSPITAL | Age: 72
End: 2022-10-20

## 2022-10-20 VITALS
OXYGEN SATURATION: 98 % | DIASTOLIC BLOOD PRESSURE: 80 MMHG | WEIGHT: 172 LBS | HEIGHT: 64 IN | BODY MASS INDEX: 29.37 KG/M2 | RESPIRATION RATE: 18 BRPM | TEMPERATURE: 98.9 F | HEART RATE: 93 BPM | SYSTOLIC BLOOD PRESSURE: 125 MMHG

## 2022-10-20 LAB
ALBUMIN SERPL-MCNC: 2.8 G/DL (ref 3.5–5.2)
ALP SERPL-CCNC: 192 U/L (ref 39–117)
ALT SERPL W P-5'-P-CCNC: 21 U/L (ref 1–41)
ANION GAP SERPL CALCULATED.3IONS-SCNC: 5 MMOL/L (ref 5–15)
AST SERPL-CCNC: 20 U/L (ref 1–40)
BASO STIPL COARSE BLD QL SMEAR: ABNORMAL
BASOPHILS # BLD MANUAL: 0 10*3/MM3 (ref 0–0.2)
BASOPHILS NFR BLD MANUAL: 0 % (ref 0–1.5)
BILIRUB CONJ SERPL-MCNC: 0.3 MG/DL (ref 0–0.3)
BILIRUB INDIRECT SERPL-MCNC: 0.2 MG/DL
BILIRUB SERPL-MCNC: 0.5 MG/DL (ref 0–1.2)
BUN SERPL-MCNC: 15 MG/DL (ref 8–23)
BUN/CREAT SERPL: 48.4 (ref 7–25)
CALCIUM SPEC-SCNC: 7.7 MG/DL (ref 8.6–10.5)
CHLORIDE SERPL-SCNC: 102 MMOL/L (ref 98–107)
CO2 SERPL-SCNC: 29 MMOL/L (ref 22–29)
CREAT SERPL-MCNC: 0.31 MG/DL (ref 0.76–1.27)
DEPRECATED RDW RBC AUTO: 49.9 FL (ref 37–54)
DEPRECATED RDW RBC AUTO: 50.8 FL (ref 37–54)
DOHLE BODIES: PRESENT
EGFRCR SERPLBLD CKD-EPI 2021: 126.8 ML/MIN/1.73
EOSINOPHIL # BLD MANUAL: 0 10*3/MM3 (ref 0–0.4)
EOSINOPHIL NFR BLD MANUAL: 0 % (ref 0.3–6.2)
ERYTHROCYTE [DISTWIDTH] IN BLOOD BY AUTOMATED COUNT: 15.5 % (ref 12.3–15.4)
ERYTHROCYTE [DISTWIDTH] IN BLOOD BY AUTOMATED COUNT: 15.8 % (ref 12.3–15.4)
GLUCOSE BLDC GLUCOMTR-MCNC: 108 MG/DL (ref 70–130)
GLUCOSE BLDC GLUCOMTR-MCNC: 134 MG/DL (ref 70–130)
GLUCOSE BLDC GLUCOMTR-MCNC: 150 MG/DL (ref 70–130)
GLUCOSE SERPL-MCNC: 133 MG/DL (ref 65–99)
HAPTOGLOB SERPL-MCNC: 322 MG/DL (ref 30–200)
HCT VFR BLD AUTO: 22.6 % (ref 37.5–51)
HCT VFR BLD AUTO: 23.8 % (ref 37.5–51)
HGB BLD-MCNC: 7.1 G/DL (ref 13–17.7)
HGB BLD-MCNC: 8.2 G/DL (ref 13–17.7)
LARGE PLATELETS: ABNORMAL
LYMPHOCYTES # BLD MANUAL: 0.24 10*3/MM3 (ref 0.7–3.1)
LYMPHOCYTES NFR BLD MANUAL: 5 % (ref 5–12)
MAGNESIUM SERPL-MCNC: 1.9 MG/DL (ref 1.6–2.4)
MCH RBC QN AUTO: 28.2 PG (ref 26.6–33)
MCH RBC QN AUTO: 30.3 PG (ref 26.6–33)
MCHC RBC AUTO-ENTMCNC: 31.4 G/DL (ref 31.5–35.7)
MCHC RBC AUTO-ENTMCNC: 34.5 G/DL (ref 31.5–35.7)
MCV RBC AUTO: 87.8 FL (ref 79–97)
MCV RBC AUTO: 89.7 FL (ref 79–97)
MONOCYTES # BLD: 0.41 10*3/MM3 (ref 0.1–0.9)
NEUTROPHILS # BLD AUTO: 7.46 10*3/MM3 (ref 1.7–7)
NEUTROPHILS NFR BLD MANUAL: 78 % (ref 42.7–76)
NEUTS BAND NFR BLD MANUAL: 14 % (ref 0–5)
NRBC SPEC MANUAL: 0 /100 WBC (ref 0–0.2)
OVALOCYTES BLD QL SMEAR: ABNORMAL
PHOSPHATE SERPL-MCNC: 2.5 MG/DL (ref 2.5–4.5)
PLATELET # BLD AUTO: 166 10*3/MM3 (ref 140–450)
PLATELET # BLD AUTO: 210 10*3/MM3 (ref 140–450)
PMV BLD AUTO: 10.8 FL (ref 6–12)
PMV BLD AUTO: 11.1 FL (ref 6–12)
POTASSIUM SERPL-SCNC: 3.5 MMOL/L (ref 3.5–5.2)
PROT SERPL-MCNC: 5.2 G/DL (ref 6–8.5)
RBC # BLD AUTO: 2.52 10*6/MM3 (ref 4.14–5.8)
RBC # BLD AUTO: 2.71 10*6/MM3 (ref 4.14–5.8)
RETICS # AUTO: 0.07 10*6/MM3 (ref 0.02–0.13)
RETICS/RBC NFR AUTO: 2.69 % (ref 0.7–1.9)
SODIUM SERPL-SCNC: 136 MMOL/L (ref 136–145)
TOXIC GRANULATION: ABNORMAL
VARIANT LYMPHS NFR BLD MANUAL: 3 % (ref 19.6–45.3)
WBC NRBC COR # BLD: 7.31 10*3/MM3 (ref 3.4–10.8)
WBC NRBC COR # BLD: 8.11 10*3/MM3 (ref 3.4–10.8)

## 2022-10-20 PROCEDURE — 84100 ASSAY OF PHOSPHORUS: CPT | Performed by: INTERNAL MEDICINE

## 2022-10-20 PROCEDURE — 83735 ASSAY OF MAGNESIUM: CPT | Performed by: INTERNAL MEDICINE

## 2022-10-20 PROCEDURE — 85027 COMPLETE CBC AUTOMATED: CPT | Performed by: INTERNAL MEDICINE

## 2022-10-20 PROCEDURE — 99233 SBSQ HOSP IP/OBS HIGH 50: CPT | Performed by: INTERNAL MEDICINE

## 2022-10-20 PROCEDURE — 97535 SELF CARE MNGMENT TRAINING: CPT

## 2022-10-20 PROCEDURE — 85045 AUTOMATED RETICULOCYTE COUNT: CPT | Performed by: INTERNAL MEDICINE

## 2022-10-20 PROCEDURE — 0 POTASSIUM CHLORIDE PER 2 MEQ: Performed by: INTERNAL MEDICINE

## 2022-10-20 PROCEDURE — 82962 GLUCOSE BLOOD TEST: CPT

## 2022-10-20 PROCEDURE — 99239 HOSP IP/OBS DSCHRG MGMT >30: CPT | Performed by: INTERNAL MEDICINE

## 2022-10-20 PROCEDURE — 85025 COMPLETE CBC W/AUTO DIFF WBC: CPT | Performed by: INTERNAL MEDICINE

## 2022-10-20 PROCEDURE — 0 MAGNESIUM SULFATE 4 GM/100ML SOLUTION

## 2022-10-20 PROCEDURE — 80048 BASIC METABOLIC PNL TOTAL CA: CPT | Performed by: INTERNAL MEDICINE

## 2022-10-20 PROCEDURE — 85060 BLOOD SMEAR INTERPRETATION: CPT | Performed by: INTERNAL MEDICINE

## 2022-10-20 PROCEDURE — 80076 HEPATIC FUNCTION PANEL: CPT | Performed by: INTERNAL MEDICINE

## 2022-10-20 PROCEDURE — 85007 BL SMEAR W/DIFF WBC COUNT: CPT | Performed by: INTERNAL MEDICINE

## 2022-10-20 PROCEDURE — 97530 THERAPEUTIC ACTIVITIES: CPT

## 2022-10-20 PROCEDURE — 83010 ASSAY OF HAPTOGLOBIN QUANT: CPT | Performed by: INTERNAL MEDICINE

## 2022-10-20 RX ORDER — TAMSULOSIN HYDROCHLORIDE 0.4 MG/1
0.4 CAPSULE ORAL DAILY
Qty: 30 CAPSULE | Refills: 0 | Status: SHIPPED | OUTPATIENT
Start: 2022-10-21 | End: 2022-11-20

## 2022-10-20 RX ORDER — LIDOCAINE HYDROCHLORIDE 20 MG/ML
5 SOLUTION OROPHARYNGEAL EVERY 4 HOURS PRN
Qty: 100 ML | Refills: 0 | Status: SHIPPED | OUTPATIENT
Start: 2022-10-20 | End: 2022-10-27

## 2022-10-20 RX ORDER — FLUCONAZOLE 200 MG/1
200 TABLET ORAL DAILY
Qty: 10 TABLET | Refills: 0 | Status: SHIPPED | OUTPATIENT
Start: 2022-10-20 | End: 2022-10-30

## 2022-10-20 RX ORDER — PANTOPRAZOLE SODIUM 40 MG/1
40 TABLET, DELAYED RELEASE ORAL DAILY
Qty: 30 TABLET | Refills: 0 | Status: SHIPPED | OUTPATIENT
Start: 2022-11-20 | End: 2022-11-29 | Stop reason: SDUPTHER

## 2022-10-20 RX ORDER — DIPHENHYDRAMINE HYDROCHLORIDE AND LIDOCAINE HYDROCHLORIDE AND ALUMINUM HYDROXIDE AND MAGNESIUM HYDRO
5 KIT EVERY 4 HOURS
Qty: 237 ML | Refills: 0 | Status: SHIPPED | OUTPATIENT
Start: 2022-10-20 | End: 2022-10-28

## 2022-10-20 RX ORDER — FLUCONAZOLE 200 MG/1
200 TABLET ORAL DAILY
Status: DISCONTINUED | OUTPATIENT
Start: 2022-10-20 | End: 2022-10-20 | Stop reason: HOSPADM

## 2022-10-20 RX ORDER — POTASSIUM CHLORIDE 29.8 MG/ML
20 INJECTION INTRAVENOUS
Status: DISCONTINUED | OUTPATIENT
Start: 2022-10-20 | End: 2022-10-20 | Stop reason: HOSPADM

## 2022-10-20 RX ORDER — PANTOPRAZOLE SODIUM 40 MG/1
40 TABLET, DELAYED RELEASE ORAL 2 TIMES DAILY
Qty: 60 TABLET | Refills: 0 | Status: SHIPPED | OUTPATIENT
Start: 2022-10-20 | End: 2022-11-19

## 2022-10-20 RX ORDER — VALACYCLOVIR HYDROCHLORIDE 1 G/1
1000 TABLET, FILM COATED ORAL EVERY 12 HOURS SCHEDULED
Qty: 5 TABLET | Refills: 0 | Status: SHIPPED | OUTPATIENT
Start: 2022-10-20 | End: 2022-10-23

## 2022-10-20 RX ADMIN — FLUCONAZOLE 200 MG: 200 TABLET ORAL at 18:21

## 2022-10-20 RX ADMIN — LIDOCAINE HYDROCHLORIDE 5 ML: 20 SOLUTION ORAL; TOPICAL at 02:10

## 2022-10-20 RX ADMIN — Medication 10 ML: at 08:56

## 2022-10-20 RX ADMIN — LIDOCAINE HYDROCHLORIDE 5 ML: 20 SOLUTION ORAL; TOPICAL at 09:16

## 2022-10-20 RX ADMIN — PANTOPRAZOLE SODIUM 40 MG: 40 INJECTION, POWDER, FOR SOLUTION INTRAVENOUS at 08:55

## 2022-10-20 RX ADMIN — LIDOCAINE HYDROCHLORIDE 5 ML: 20 SOLUTION ORAL; TOPICAL at 18:55

## 2022-10-20 RX ADMIN — VALACYCLOVIR HYDROCHLORIDE 1000 MG: 500 TABLET, FILM COATED ORAL at 19:35

## 2022-10-20 RX ADMIN — DIPHENHYDRAMINE HYDROCHLORIDE AND LIDOCAINE HYDROCHLORIDE AND ALUMINUM HYDROXIDE AND MAGNESIUM HYDRO 5 ML: KIT at 18:55

## 2022-10-20 RX ADMIN — DIPHENHYDRAMINE HYDROCHLORIDE AND LIDOCAINE HYDROCHLORIDE AND ALUMINUM HYDROXIDE AND MAGNESIUM HYDRO 5 ML: KIT at 13:14

## 2022-10-20 RX ADMIN — POTASSIUM CHLORIDE 40 MEQ: 1.5 POWDER, FOR SOLUTION ORAL at 06:23

## 2022-10-20 RX ADMIN — TAMSULOSIN HYDROCHLORIDE 0.4 MG: 0.4 CAPSULE ORAL at 08:55

## 2022-10-20 RX ADMIN — PANTOPRAZOLE SODIUM 40 MG: 40 INJECTION, POWDER, FOR SOLUTION INTRAVENOUS at 18:21

## 2022-10-20 RX ADMIN — POTASSIUM CHLORIDE 20 MEQ: 29.8 INJECTION, SOLUTION INTRAVENOUS at 11:43

## 2022-10-20 RX ADMIN — POTASSIUM CHLORIDE 20 MEQ: 29.8 INJECTION, SOLUTION INTRAVENOUS at 09:17

## 2022-10-20 RX ADMIN — DIPHENHYDRAMINE HYDROCHLORIDE AND LIDOCAINE HYDROCHLORIDE AND ALUMINUM HYDROXIDE AND MAGNESIUM HYDRO 5 ML: KIT at 06:23

## 2022-10-20 RX ADMIN — VALACYCLOVIR HYDROCHLORIDE 1000 MG: 500 TABLET, FILM COATED ORAL at 08:55

## 2022-10-20 RX ADMIN — MAGNESIUM SULFATE HEPTAHYDRATE 4 G: 40 INJECTION, SOLUTION INTRAVENOUS at 06:23

## 2022-10-20 NOTE — PLAN OF CARE
Goal Outcome Evaluation:  Plan of Care Reviewed With: patient           Outcome Evaluation: No acute events overnight. Restless with situational confusion at times. Keofeed advanced d/t patient partially pulling out. Speech continues to be very weak/hoarse though somewhat improved with viscous lidocaine. UOP >1L. Colostomy output 175ml. Wife at bedside. K+ and Mg replacement initiated.

## 2022-10-20 NOTE — PROGRESS NOTES
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  10/9 SLP (MARCELLO) rec Reg texture thin liquid  10/10 EGD - ulcer identified; candidiasis in the esophagus.   10/10 moderate - non-severe malnutrition due to chronic illness per RD assessment.   10/11 moved to the ICU due to on going bleeding.   10/12 ENDO/IR - s/p multiple vessels embolized.     Applicable PMH/PSxH:   (9/21-9/24) recent admission for diarrhea and weight loss - + E. Coli   (9/21) Severe malnutrition per RD assessment (severe due to chronic dz)    PMH: He  has a past medical history of benign polypoid tissue right lung, Hyperlipidemia, Hypertension, Mycobacterium mucogenicum, SHERRI (obstructive sleep apnea), and Seasonal allergies.   PSxH: He  has a past surgical history that includes Bronchoscopy; Colostomy (N/A, 9/22/2022); exam under anesthesia, rectal biopsy (N/A, 10/4/2022); Esophagogastroduodenoscopy (N/A, 10/10/2022); and Esophagogastroduodenoscopy (N/A, 10/12/2022).       Diet/Nutrition Related History:     Pt sitting up in bed, reports he ate some biscuit + gravy and sausage for breakfast, mouth feels better    Per wife he is drinking boost breeze from home, tried an orange magic cup but he said it burned his tongue, is willing to try vanilla    Per RN: pt eating  better, mouth sores are improved    Plan to transition to nocturnal TF as pt now starting to eat solid food    Labs reviewed   Yes    Results from last 7 days   Lab Units 10/20/22  0427 10/19/22  2052 10/19/22  0550 10/19/22  0004 10/18/22  0327 10/17/22  2048 10/17/22  0150 10/14/22  2112 10/14/22  0436   GLUCOSE mg/dL 133*  --  126*  --  155*  --  121*  121*   < > 141*   BUN mg/dL 15  --  19  --  17  --  10  10   < > 15   CREATININE mg/dL 0.31*  --  0.38*  --  0.35*  --  0.45*  0.45*   < > 0.34*   SODIUM mmol/L 136  --  140  --  140  --  141  141   < > 136   CHLORIDE mmol/L 102  --  103  --  104  --  102  102   < > 101   POTASSIUM mmol/L 3.5 3.7 3.5 3.7 3.4*   < > 2.7*  2.7*   < > 3.1*   PHOSPHORUS mg/dL 2.5  --  2.3* 2.1* 1.0*  --  3.0  3.0   < > 2.1*   MAGNESIUM mg/dL 1.9  --  2.2  --  1.8  --  2.0  2.0   < > 2.2   ALT (SGPT) U/L  --   --   --   --   --   --  12  --  20    < > = values in this interval not displayed.     Results from last 7 days   Lab Units 10/17/22  0150 10/14/22  0436   ALBUMIN g/dL 2.80* 2.30*   PREALBUMIN mg/dL 3.7*  --    CHOLESTEROL mg/dL 78  --    TRIGLYCERIDES mg/dL 118  --          Lab 10/14/22  1007   LACTATE 1.3        Results from last 7 days   Lab Units 10/20/22  1127 10/20/22  0509 10/19/22  2321 10/19/22  1716 10/19/22  1214 10/19/22  0528   GLUCOSE mg/dL 134* 150* 152* 137* 109 123       Lab Results   Lab Value Date/Time    HGBA1C 5.50 09/21/2022 0410       Medications reviewed   valtrex,  magic mouthwash, protonix, PRN viscous lidocaine    Intake/Ouptut 24 hrs reviewed   Yes  Intake & Output (last day)       10/19 0701  10/20 0700 10/20 0701  10/21 0700    P.O. 1080     I.V. (mL/kg) 100 (1.3)     Other 212     NG/GT 1035     IV Piggyback  100    Total Intake(mL/kg) 2427 (31.1) 100 (1.3)    Urine (mL/kg/hr) 2200 (1.2) 450 (1.1)    Stool 175     Total Output 2375 450    Net +52 -350          Urine Unmeasured Occurrence 2 x         Anthropometrics     Height: 64in  Weight:  169lb (using bedscale 9-20-22 last admission prior to colostomy)- pt appears to be closest to this weight or less)  BMI: 29?  Overweight: 25.0-29.9kg/m2     Need standing scale weight when feasible!    Last 15 Recorded Weights  View Complete Flowsheet  Weight Weight (kg) Weight (lbs) Weight Method   10/3/2022 78.019 kg 172 lb Stated   9/24/2022 84.868 kg 187 lb 1.6 oz Bed scale   9/22/2022 76.658 kg 169 lb -   9/20/2022 76.749 kg 169 lb 3.2 oz Bed scale   9/20/2022 76.658 kg 169 lb Stated         Needs Assessment  10/18/22   Height used: 64in  Weights used: 169lb    Estimated calorie needs: ~1800kcal/day  Method:20-25kcals/kg= 2995-9930  MSJ x 1.3: 1920kcal    Estimated protein needs: ~ 115g protein/day  1.2-1.5g/kg= 92-115g pro    Current Nutrition Prescription     PO: Neutropenic  Oral Nutrition Supplement: Magic Cup 3x/day, pt is also drinking boost breeze from home    Intake:0% of 4 previous meals, however pt is drinking boost breeze supplements    Today pt ate biscuits + gravy, sausage for breakfastt    EN: Impact Peptide goal @60 ml/hr, free water @10ml/hr    Route: NGT    Intake:955ml, 80% goal volume    TF at goal volume will provide 1200ml, 1800kcal, 100% kcal needs, 113g protein, 98% protein needs, no fiber, 1124ml free water      Nutrition Diagnosis     10/10  Problem Malnutrition  non severe chronic (severe if wt loss avg over 6 mo accepted   Etiology Effect tx on appetite   Signs/Symptoms Intake hx w wasting       10/13, 10-20  Problem Inadequate oral intake   Etiology Per Clinical Status    Signs/Symptoms % of past 4 meals,  however just starting to eat solid food today   Status: improving, pt remains on po diet and EN for nutrition support    Goal:   General: Nutrition to support treatment  PO: Establish PO   EN: Adjust EN    Nutrition Intervention     Follow treatment progress, Care plan reviewed, Advise alternate selection, Interview for preferences, Menu provided, Menu adjusted, Adjusted  supplement, Encourage intake     As pt has finally started to eat, will transition to nocturnal TF to encourage po intake    Impact Peptide @60ml/hr, free water @10ml/hr: 6pm to 6am    TF at goal volume will provide 720ml, 1080kcal, 60% kcal needs, 68g protein, 59% protein needs,  674ml free water  AS pt finally starting to be able to tolerate oral diet, will transition to Nictur  Monitoring/Evaluation:   Per protocol, I&O, PO intake, Supplement intake, Pertinent labs, EN delivery/tolerance, Weight, GI status, Symptoms      Dayana Bailey, ROSIE,   Time Spent: 45min

## 2022-10-20 NOTE — THERAPY TREATMENT NOTE
Patient Name: Abraham Wu  : 1950    MRN: [id number]                              Today's Date: 10/20/2022       Admit Date: 10/3/2022    Visit Dx:     ICD-10-CM ICD-9-CM   1. Fever, unspecified fever cause  R50.9 780.60   2. Hydronephrosis, unspecified hydronephrosis type  N13.30 591   3. Status post colostomy (HCC)  Z93.3 V44.3   4. Metastatic malignant neoplasm, unspecified site (HCC)  C79.9 199.1   5. Hypernatremia  E87.0 276.0   6. Hypokalemia  E87.6 276.8   7. Lymphoma (HCC)  C85.90 202.80   8. Dysphagia, unspecified type  R13.10 787.20   9. Hodgkin lymphoma of lymph nodes of multiple regions, unspecified Hodgkin lymphoma type (HCC)  C81.98 201.98   10. Acute upper GI bleed  K92.2 578.9   11. Melena  K92.1 578.1     Patient Active Problem List   Diagnosis   • Rectal mass s/p laparoscopic colostomy (2022)   • Anemia secondary to chemotherapy and acute blood loss   • Essential hypertension   • Dyslipidemia   • Unintentional weight loss   • Hydronephrosis   • Neutropenic fever (HCC)   • Hypokalemia   • Hodgkin lymphoma (HCC)   • Acute upper GI bleed   • Melena   • Moderate malnutrition (HCC)   • Esophageal candidiasis (HCC)   • SHERRI (obstructive sleep apnea)   • ICU delirium      Past Medical History:   Diagnosis Date   • benign polypoid tissue right lung    • Hyperlipidemia    • Hypertension    • Mycobacterium mucogenicum    • SHERRI (obstructive sleep apnea)    • Seasonal allergies      Past Surgical History:   Procedure Laterality Date   • BRONCHOSCOPY      removal of obstructing polypoid tissue right middle lung   • COLOSTOMY N/A 2022    Procedure: LAPAROSCOPIC COLOSTOMY CREATION, FLEXIBLE SIGMOIDOSCOPY;  Surgeon: Hiram Viveros MD;  Location: [facility] OR;  Service: General;  Laterality: N/A;   • ENDOSCOPY N/A 10/10/2022    Procedure: ESOPHAGOGASTRODUODENOSCOPY;  Surgeon: Neeraj Lynn MD;  Location: [facility] ENDOSCOPY;  Service: Gastroenterology;  Laterality: N/A;   • ENDOSCOPY N/A  10/12/2022    Procedure: ESOPHAGOGASTRODUODENOSCOPY;  Surgeon: Brunner, Mark I, MD;  Location: [facility] ENDOSCOPY;  Service: Gastroenterology;  Laterality: N/A;   • EXAM UNDER ANESTHESIA, RECTAL BIOPSY N/A 10/4/2022    Procedure: EXAM UNDER ANESTHESIA, TRANSANAL BIOPSY WITH TRUCUT NEEDLE (LITHOTOMY-CANDY CANE);  Surgeon: Hiram Viveros MD;  Location:  [facility] OR;  Service: General;  Laterality: N/A;      General Information     Row Name 10/20/22 1447          OT Time and Intention    Document Type therapy note (daily note)  -CS     Mode of Treatment occupational therapy  -CS     Row Name 10/20/22 1447          General Information    Existing Precautions/Restrictions fall;other (see comments)  colostomy, NG  -CS     Barriers to Rehab medically complex  -CS     Row Name 10/20/22 1447          Cognition    Orientation Status (Cognition) oriented x 3  -CS     Row Name 10/20/22 1447          Safety Issues, Functional Mobility    Impairments Affecting Function (Mobility) strength;endurance/activity tolerance  -CS           User Key  (r) = Recorded By, (t) = Taken By, (c) = Cosigned By    Initials Name Provider Type    CS Julia Espinoza OT Occupational Therapist                 Mobility/ADL's     Row Name 10/20/22 1447          Transfers    Transfers sit-stand transfer;stand-sit transfer  -CS     Row Name 10/20/22 1447          Bed-Chair Transfer    Bed-Chair Vicco (Transfers) supervision  -CS     Assistive Device (Bed-Chair Transfers) walker, 4-wheeled  -CS     Row Name 10/20/22 1447          Sit-Stand Transfer    Sit-Stand Vicco (Transfers) supervision;verbal cues  -CS     Assistive Device (Sit-Stand Transfers) walker, 4-wheeled  -CS     Row Name 10/20/22 1447          Stand-Sit Transfer    Stand-Sit Vicco (Transfers) supervision  -CS     Assistive Device (Stand-Sit Transfers) walker, 4-wheeled  -CS     Row Name 10/20/22 1447          Functional Mobility    Functional Mobility- Ind. Level standby  assist;verbal cues required  -CS     Functional Mobility- Device walker, 4-wheeled  -CS     Functional Mobility-Distance (Feet) --  in hallway  -     Row Name 10/20/22 1447          Activities of Daily Living    BADL Assessment/Intervention lower body dressing;grooming  -     Row Name 10/20/22 1447          Lower Body Dressing Assessment/Training    Matthews Level (Lower Body Dressing) don;socks;supervision  -CS     Position (Lower Body Dressing) supported sitting  -CS     Row Name 10/20/22 1447          Grooming Assessment/Training    Matthews Level (Grooming) wash face, hands;supervision  -CS     Position (Grooming) supported standing;sink side  -CS           User Key  (r) = Recorded By, (t) = Taken By, (c) = Cosigned By    Initials Name Provider Type    Julia Wild OT Occupational Therapist               Obj/Interventions     Row Name 10/20/22 1448          Balance    Balance Assessment sitting static balance;sitting dynamic balance;standing static balance;standing dynamic balance  -CS     Static Sitting Balance supervision  -CS     Dynamic Sitting Balance supervision  -CS     Position, Sitting Balance sitting in chair  -CS     Static Standing Balance standby assist  -CS     Dynamic Standing Balance standby assist  -CS     Position/Device Used, Standing Balance walker, 4-wheeled  -CS     Balance Interventions sitting;standing;static;dynamic;dynamic reaching;occupation based/functional task;weight shifting activity  -CS           User Key  (r) = Recorded By, (t) = Taken By, (c) = Cosigned By    Initials Name Provider Type    Julia Wild OT Occupational Therapist               Goals/Plan    No documentation.                Clinical Impression     Row Name 10/20/22 1448          Pain Assessment    Pretreatment Pain Rating 0/10 - no pain  -     Posttreatment Pain Rating 0/10 - no pain  -CS     Row Name 10/20/22 1448          Plan of Care Review    Plan of Care Reviewed With patient  -CS      Outcome Evaluation Pt demo significantly improved independence by performing t/fs and LBD tasks w/ Supervision. Pt limited d/t c/o fatigue though demo excellent effort. Recommend cont skilled IPOT POC. Recommend pt DC home w/ assist.  -CS     Row Name 10/20/22 1448          Therapy Plan Review/Discharge Plan (OT)    Anticipated Discharge Disposition (OT) home with assist  -CS     Row Name 10/20/22 1448          Vital Signs    Pre Systolic BP Rehab --  VSS  -CS     O2 Delivery Pre Treatment room air  -CS     O2 Delivery Intra Treatment room air  -CS     O2 Delivery Post Treatment room air  -CS     Pre Patient Position Sitting  -CS     Intra Patient Position Standing  -CS     Post Patient Position Sitting  -CS     Row Name 10/20/22 1448          Positioning and Restraints    Pre-Treatment Position sitting in chair/recliner  -CS     Post Treatment Position chair  -CS     In Chair notified nsg;reclined;call light within reach;encouraged to call for assist;RUE elevated;LUE elevated;waffle cushion;legs elevated;heels elevated;with family/caregiver  RN deferred exit alarm  -CS           User Key  (r) = Recorded By, (t) = Taken By, (c) = Cosigned By    Initials Name Provider Type    CS Julia Espinoza, OT Occupational Therapist               Outcome Measures     Row Name 10/20/22 1459          How much help from another is currently needed...    Putting on and taking off regular lower body clothing? 3  -CS     Bathing (including washing, rinsing, and drying) 3  -CS     Toileting (which includes using toilet bed pan or urinal) 3  -CS     Putting on and taking off regular upper body clothing 4  -CS     Taking care of personal grooming (such as brushing teeth) 4  -CS     Eating meals 4  -CS     AM-PAC 6 Clicks Score (OT) 21  -CS     Row Name 10/20/22 1456          Functional Assessment    Outcome Measure Options AM-PAC 6 Clicks Daily Activity (OT)  -CS           User Key  (r) = Recorded By, (t) = Taken By, (c) = Cosigned By     Initials Name Provider Type     Julia Espinoza OT Occupational Therapist                Occupational Therapy Education     Title: PT OT SLP Therapies (In Progress)     Topic: Occupational Therapy (In Progress)     Point: ADL training (In Progress)     Description:   Instruct learner(s) on proper safety adaptation and remediation techniques during self care or transfers.   Instruct in proper use of assistive devices.              Learning Progress Summary           Patient Acceptance, E, NR by  at 10/20/2022 1452    Acceptance, E, NR by  at 10/18/2022 1102   Family Acceptance, E, NR by  at 10/18/2022 1102                   Point: Home exercise program (Not Started)     Description:   Instruct learner(s) on appropriate technique for monitoring, assisting and/or progressing therapeutic exercises/activities.              Learner Progress:  Not documented in this visit.          Point: Precautions (In Progress)     Description:   Instruct learner(s) on prescribed precautions during self-care and functional transfers.              Learning Progress Summary           Patient Acceptance, E, NR by  at 10/20/2022 1452    Acceptance, E, NR by  at 10/18/2022 1102   Family Acceptance, E, NR by  at 10/18/2022 1102                   Point: Body mechanics (In Progress)     Description:   Instruct learner(s) on proper positioning and spine alignment during self-care, functional mobility activities and/or exercises.              Learning Progress Summary           Patient Acceptance, E, NR by  at 10/20/2022 1452    Acceptance, E, NR by  at 10/18/2022 1102   Family Acceptance, E, NR by  at 10/18/2022 1102                               User Key     Initials Effective Dates Name Provider Type Discipline     09/02/21 -  Julia Espinoza OT Occupational Therapist OT     10/14/22 -  Kylie Ying OT Occupational Therapist OT              OT Recommendation and Plan     Plan of Care Review  Plan of Care  Reviewed With: patient  Outcome Evaluation: Pt demo significantly improved independence by performing t/fs and LBD tasks w/ Supervision. Pt limited d/t c/o fatigue though demo excellent effort. Recommend cont skilled IPOT POC. Recommend pt DC home w/ assist.     Time Calculation:    Time Calculation- OT     Row Name 10/20/22 1452             Time Calculation- OT    OT Start Time 1350  -CS      OT Received On 10/20/22  -CS      OT Goal Re-Cert Due Date 10/28/22  -CS         Timed Charges    18264 - OT Therapeutic Activity Minutes 16  -CS      44954 - OT Self Care/Mgmt Minutes 8  -CS         Total Minutes    Timed Charges Total Minutes 24  -CS       Total Minutes 24  -CS            User Key  (r) = Recorded By, (t) = Taken By, (c) = Cosigned By    Initials Name Provider Type    CS Julia Espinoza OT Occupational Therapist              Therapy Charges for Today     Code Description Service Date Service Provider Modifiers Qty    89270610855 HC OT THERAPEUTIC ACT EA 15 MIN 10/20/2022 Julia Espinoza OT GO 1    81792407903 HC OT SELF CARE/MGMT/TRAIN EA 15 MIN 10/20/2022 Julia Espinoza OT GO 1               Julia Espinoza OT  10/20/2022

## 2022-10-20 NOTE — PLAN OF CARE
Goal Outcome Evaluation:  Plan of Care Reviewed With: patient           Outcome Evaluation: Pt demo significantly improved independence by performing t/fs and LBD tasks w/ Supervision. Pt limited d/t c/o fatigue though demo excellent effort. Recommend cont skilled IPOT POC. Recommend pt DC home w/ assist.

## 2022-10-20 NOTE — PLAN OF CARE
Goal Outcome Evaluation:              Outcome Evaluation: anxious and restless initially and c/o excruciating pain with swallowing-VS stable-voice barely audible -refuses to eat because of swallowing pain-viscous lidocaine given to ease pain -voice became slightly stronger after that-up in chair several times and then napped in the bed as well

## 2022-10-20 NOTE — PROGRESS NOTES
Pulmonary/Critical Care Follow-up     LOS: 17 days   Patient Care Team:  Jatinder Haynes MD as PCP - General (Family Medicine)    Chief Complaint: GI bleeding      Subjective      History reviewed and updated in EMR as indicated.    Interval History:     Patient is doing much better today.  More energetic.  Ambulated further with physical therapy today.  No fevers or chills.  Hemoglobin is stable.  He and his wife are asking to be discharged home.      History taken from: Patient, staff    PMH/FH/Social History were reviewed and updated appropriately in the electronic medical record.     Review of Systems:    Review of 14 systems was completed with positives and pertinent negatives noted in the subjective section.  All other systems reviewed and are negative.         Objective     Vital Signs  Temp:  [97.4 °F (36.3 °C)-98.6 °F (37 °C)] 98.3 °F (36.8 °C)  Heart Rate:  [] 96  Resp:  [20-26] 24  BP: (124-153)/(71-83) 124/81  10/19 0701 - 10/20 0700  In: 2427 [P.O.:1080; I.V.:100]  Out: 2375 [Urine:2200]  Body mass index is 29.52 kg/m².     IV drips:      Physical Exam:     Constitutional:   Alert, in no acute distress, thin   Head:   Normocephalic, atraumatic   Eyes:           Lids and lashes normal, conjunctivae and sclerae normal.  PER   ENMT:  Ears appear intact with no abnormalities noted     Lips normal.     Neck:  Trachea midline, no JVD   Lungs/Resp:    Normal effort, symmetric chest rise, no crepitus, clear to      auscultation bilaterally.               Heart/CV:   Regular rhythm and normal rate, no murmur   Abdomen/GI:    Soft, nontender, nondistended, ostomy with some dark liquid output.   :    Deferred   Extremities/MSK:  No clubbing or cyanosis.  No edema.     Pulses:  Pulses palpable and equal bilaterally   Skin:  No bleeding, bruising or rash   Heme/Lymph:  .   Neurologic:    Psychiatric:    Moves all extremities with no obvious focal motor deficit.  Cranial nerves 2 - 12 grossly  intact  Non-agitated, normal affect.    The above physical exam findings were reviewed and reflect my exam findings as of today's exam.   Electronically signed by:  Jeffrey White MD  10/20/22  15:50 EDT      Results Review:     I reviewed the patient's new clinical results.   Results from last 7 days   Lab Units 10/20/22  0910 10/20/22  0427 10/19/22  2052 10/19/22  0550 10/19/22  0004 10/18/22  0327 10/17/22  2048 10/17/22  0150 10/14/22  2112 10/14/22  0436   SODIUM mmol/L  --  136  --  140  --  140  --  141  141   < > 136   POTASSIUM mmol/L  --  3.5 3.7 3.5   < > 3.4*   < > 2.7*  2.7*   < > 3.1*   CHLORIDE mmol/L  --  102  --  103  --  104  --  102  102   < > 101   CO2 mmol/L  --  29.0  --  27.0  --  29.0  --  30.0*  30.0*   < > 28.0   BUN mg/dL  --  15  --  19  --  17  --  10  10   < > 15   CREATININE mg/dL  --  0.31*  --  0.38*  --  0.35*  --  0.45*  0.45*   < > 0.34*   CALCIUM mg/dL  --  7.7*  --  8.5*  --  8.3*  --  8.3*  8.3*   < > 7.9*   BILIRUBIN mg/dL 0.5  --   --   --   --   --   --  1.1  --  1.7*   ALK PHOS U/L 192*  --   --   --   --   --   --  127*  --  190*   ALT (SGPT) U/L 21  --   --   --   --   --   --  12  --  20   AST (SGOT) U/L 20  --   --   --   --   --   --  10  --  17   GLUCOSE mg/dL  --  133*  --  126*  --  155*  --  121*  121*   < > 141*    < > = values in this interval not displayed.     Results from last 7 days   Lab Units 10/20/22  0910 10/20/22  0427 10/19/22  0550 10/18/22  0327 10/17/22  0150 10/16/22  0543   WBC 10*3/mm3 8.11 7.31 7.15   < > 0.67* 0.22*   HEMOGLOBIN g/dL 8.2* 7.1* 9.3*   < > 7.0* 7.2*   HEMATOCRIT % 23.8* 22.6* 28.2*   < > 21.0* 21.4*   PLATELETS 10*3/mm3 210 166 164   < > 98* 95*   MONOCYTES % % 5.0  --   --   --  10.0 23.0*    < > = values in this interval not displayed.     Results from last 7 days   Lab Units 10/16/22  0352   PH, ARTERIAL pH units 7.525*   PO2 ART mm Hg 101.0   PCO2, ARTERIAL mm Hg 39.5   HCO3 ART mmol/L 32.6*     Results from last  7 days   Lab Units 10/20/22  0427 10/19/22  0550 10/19/22  0004 10/18/22  0327   MAGNESIUM mg/dL 1.9 2.2  --  1.8   PHOSPHORUS mg/dL 2.5 2.3* 2.1* 1.0*       I reviewed the patient's new imaging including images and reports.    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 with history of hypertension, hyperlipidemia, Hodgkin's lymphoma, transferred to [facility] ICU on 10/11/2022 with acute high risk GI bleeding not responding to resuscitation.  Initial EGD from 10/10/2022 showed ulcerations with bleeding ulcer and severe esophageal candidiasis.  Clip was placed and patient was started on fluconazole for candidiasis.  He was started on chemotherapy on 10/8/2022.  Repeat melena occurred on 10/11/2022 mid responsive to transfusion.  CT of the abdomen and pelvis was performed showing no obvious site of bleeding and evidence of known metastatic disease.  Patient underwent an embolization on 10/12/2022 with successful embolization of the right gastroepiploic artery, pancreaticoduodenal artery, and main trunk of the gastroduodenal artery.  Hemoglobin has continued to trend down requiring inner transfusions.  The thought is that this is most likely combined effect due to bone marrow suppression in the setting of chemotherapy in the setting of bleeding.    Patient was started on tube feeding  secondary to poor intake/malnutrition.  He is tolerating this.    Hemoglobin stable this morning.      Patient ambulated with occupational therapy today and did much better.  Patient and his wife want to go home and therapy feels this is reasonable given his improvement.  I will go ahead and discharge the patient home.  We will give him some viscous lidocaine to use at home for his odynophagia.  Follow-up with oncology.    Plan:  1.  For GI bleeding/uler/esophageal candidiasis: Seems to have stabilized.  Monitor closely.  Continue fluconazole for 10 days and BID PPI.   2.  For ongoing anemia: Likely bone marrow suppression.  Monitor hemoglobin with transfusions as necessary.  3.  For rectal mass status post laparoscopic colectomy/colostomy 9/2022: Pathology consistent with lymphoma.  Doing well.  Monitor.  4.  For lymphoma/neutropenic fever/pancytopenia: Hematology following.  Improved status post Neupogen.  Complete vancomycin, cefepime, fluconazole.  5.  For hypokalemia: Replace potassium per nephrology.  Close monitoring.  6.  For malnutrition: Continue tube feeding.  7.  DVT prophylaxis: Mechanical.    35 minutes spent on discharge.      Electronically signed by:    Jeffrey White MD  10/20/22  15:50 EDT      *. Please note that portions of this note were completed with [employer] - a voice recognition program.

## 2022-10-20 NOTE — PROGRESS NOTES
"HEMATOLOGY/ONCOLOGY PROGRESS NOTE    S: When asked how he is doing Mr. Wu flexes both biceps and tells me he is feeling great.  His mouth is much improved.  His voice is no longer hoarse.  He reports his appetite is improved.  He is asking for biscuits and gravy and peach oatmeal this morning.  He has not had any further melanotic output from the ostomy site.  He denies any abdominal pain, nausea, or vomiting.  He endorses that his strength is much better.  He continues to work with PT.  He is eager to go home.  He continues on NG tube feeding tube support.  He is utilizing multiple boost breeze through the day.    Review of Systems:  A comprehensive 14 point review of systems was performed and was negative except as noted above.   Medications:  The current medication list was reviewed in the EMR    ALLERGIES:  No Known Allergies      Physical Exam    VITAL SIGNS:  /77 (BP Location: Left arm, Patient Position: Lying)   Pulse 90   Temp 98.5 °F (36.9 °C) (Axillary)   Resp 24   Ht 162.6 cm (64\")   Wt 78 kg (172 lb)   SpO2 99%   BMI 29.52 kg/m²   Temp:  [97.4 °F (36.3 °C)-98.6 °F (37 °C)] 98.5 °F (36.9 °C)      Performance Status: 2                Physical Exam    General: Frail.  No acute distress.  HEENT: sclerae anicteric, mucous membranes dry + mucositis is much improved.  + NG tube in place   Lymphatics: no cervical, supraclavicular, or axillary adenopathy  Cardiovascular: regular rate and rhythm, no murmurs, rubs or gallops  Lungs: clear to auscultation bilaterally  Abdomen: soft, nontender, nondistended.  Ostomy bag with light brown stool  Extremities: no lower extremity edema  Skin: no rashes, lesions, bruising, or petechiae  Psych: Mood is low        RECENT LABS:    Lab Results   Component Value Date    HGB 7.1 (L) 10/20/2022    HCT 22.6 (L) 10/20/2022    MCV 89.7 10/20/2022     10/20/2022    WBC 7.31 10/20/2022    NEUTROABS 2.48 10/18/2022    LYMPHSABS 0.18 (L) 10/18/2022    MONOSABS " 0.16 10/18/2022    EOSABS 0.00 10/18/2022    BASOSABS 0.01 10/18/2022       Lab Results   Component Value Date    GLUCOSE 133 (H) 10/20/2022    BUN 15 10/20/2022    CREATININE 0.31 (L) 10/20/2022     10/20/2022    K 3.5 10/20/2022     10/20/2022    CO2 29.0 10/20/2022    CALCIUM 7.7 (L) 10/20/2022    PROTEINTOT 4.9 (L) 10/17/2022    ALBUMIN 2.80 (L) 10/17/2022    BILITOT 1.1 10/17/2022    ALKPHOS 127 (H) 10/17/2022    AST 10 10/17/2022    ALT 12 10/17/2022         Assessment/Plan  72-year-old female with CD30 positive classic Hodgkin lymphoma involving the rectum, perinephric, liver, and.  He is status post laparoscopic colostomy and biopsy.  He was started on inpatient chemotherapy on 10/8/2022.  Bilateral hydronephrosis econdary to malignancy.Course has been complicated by a GI bleed.  He is status post coil artery embolization 10/12. Also with neutropenic fever, Candida esophagitis and mucositis.  He is now status post count recovery.  Acute hemoglobin drop this morning.    Hodgkin lymphoma  Status post cycle 1 AVVD on 10/8/22  He has had a difficult posttreatment course with neutropenic fever, failure to thrive, and GI bleed.  His blood counts have now fully recovered.  He has expressed that he is unsure he wants to continue with cancer directed therapy.  I recommended we meet back as an outpatient after further recovery from acute events I discussed with the patient and his wife that if he recovers from his acute events to make a final decision regarding this.  We discussed that further treatment with dose modification may improve his tolerance.  And elects to pursue further treatment, we can consider future dose reductions and adjustments to improve tolerance.  He is scheduled for follow-up and possible treatment on 10/26/2022.    Anemia secondary to malignancy, recent chemotherapy/marrow suppression and blood loss  GI bleed  -Now status post coil embolization   -Hemoglobin had recovered to 9.3  yesterday, but now down to 7.1 this morning.  Appears this was drawn from his PICC.  I have spoken with his nurse and ordered repeat CBC as well as peripheral smear, haptoglobin, LDH.  Suspect possible spurious drop due to central line draw.  Will await repeat labs from this morning.    Hydronephrosis  Creatinine has remained normal today.    Neutropenic fever  Urine culture shows less than 10,000 coliform units of gram-negative bacilli.  Blood cultures from 10/14 are no growth to date. His ANC is fully recovered.    He remains off of antibiotics and remains afebrile.    He completed a course of fluconazole for Candida esophagitis.  Continues on Valtrex per the ICU team.     Mucositis  Continue viscous lidocaine to swish and swallow for throat pain.  Discussed anticipate his mucositis to resolve now that he is Fully Recovered.  Encouraged Him to Swish Salt Water Every 2 Hours.    Malnutrition  Currently on NG tube support but has been increasing his oral intake and reports he drank 3 boost breeze yesterday do not anticipate he will need ongoing tube feed support.     Access  Please keep PICC at d/c    Kaycee Leary MD  [facility] Hematology and Oncology    10/20/2022     I have spent a total of 35 min with >50% in direct patient counseling and remainder on review of labs, documentation, ordering tests and communicating with RN

## 2022-10-20 NOTE — PLAN OF CARE
Goal Outcome Evaluation:  Plan of Care Reviewed With: patient   Patient discharging home      Progress: improving

## 2022-10-20 NOTE — SIGNIFICANT NOTE
Patient being discharged this evening. On call SW contacted in regards to patients needs for home health referral for PICC line dressing changes.     SW sending referral for CM to make tomorrow.

## 2022-10-20 NOTE — DISCHARGE SUMMARY
Discharge Summary    Patient name: Abraham Wu  CSN: [id number]  MRN: [id number]  : 1950  Today's date: 10/20/2022     Date of Admission: 10/3/2022  Date of Discharge:  10/20/2022    Admitting Physician:  Lorri Lauren MD  Primary Care Provider: Jatinder Haynes MD  Consultations:   River Odom MD - Oncology  [facility]  Nik Kelly MD - Nephrology  [facility] PORTILLO Anthony -   Hiram Viveros MD - Colorectal surgery    Admission Diagnosis:   Fever    Discharge Diagnoses:   Active Hospital Problems    Diagnosis    • **Hodgkin lymphoma (HCC)    • SHERRI (obstructive sleep apnea)    • ICU delirium     • Moderate malnutrition (HCC)    • Esophageal candidiasis (HCC)    • Hydronephrosis    • Neutropenic fever (HCC)    • Hypokalemia    • Acute upper GI bleed      Added automatically from request for surgery [id number]     • Melena      Added automatically from request for surgery [id number]     • Essential hypertension    • Anemia secondary to chemotherapy and acute blood loss    • Rectal mass s/p laparoscopic colostomy (2022)        Allergies:  Patient has no known allergies.    Code Status and Medical Interventions:   Ordered at: 10/03/22 1318     Level Of Support Discussed With:    Patient     Code Status (Patient has no pulse and is not breathing):    CPR (Attempt to Resuscitate)     Medical Interventions (Patient has pulse or is breathing):    Full Support       Procedures/Testing:  Procedure(s):  10/4 Transanal biopsy with trucut needle  10/10 ESOPHAGOGASTRODUODENOSCOPY  10/12 EGD  10/12 IR procedure with coil embolization       History of Present Illness:  (Copied from H&P 10/20/22)  Abraham Wu is a 72 y.o. male with history of hypertension, hyperlipidemia, SHERRI, recent admission to [facility] - for diarrhea, unintentional weight loss, found to have enteropathogenic and enterotoxigenic E. coli s/p Flagyl and Levaquin. He was also found to have a large rectal mass concerning  for rectal adenocarcinoma s/p laparoscopic colostomy creation, pathology stillpending.  He presented to the ED with fever T-max 102.8 He denies and nausea, vomiting, no abd pain. Initial work-up reveals Pro-Robert 0.39, WBC 10.79, Na+ 154, potassium 3.4, anion gap of 17.  Respiratory panel PCR with COVID-19 was negative. CT abd/pelvis was concerning for advanced metastatic disease and bilateral hydronephrosis worse from previous.  Blood cultures were collected and patient did receive a dose of IV Zosyn.  Hospital medicine was asked to admit for further evaluation and management.  (End of copied text)    Hospital Course:  Mr. Wu was admitted to the ICU secondary to the problems above. His procal was elevated, but his WBC and LA normal. CT scan showed persistent large bulky lymphadenopathy, large perirectal mass, as well as systemic signs of spread. Perirectal mass was re-biopsied on 10/4, showing consistency with Hodgkin lymphoma. Hem/onc followed and he started chemotherapy on 10/8.     On 10/10 an EGD was done for melena output from his ostomy which showed ulceration of the hard palate, severe candidiasis in the esophagus and duodenal bleeding ulcer s/p 1 clip. PPI and Diflucan added. On 10/11, he began passing more melena through his ostomy. He received 2 units PRBCs for downward trending hemoglobin and was transferred to the ICU for further monitoring. On 10/12, another EGD was done and epi was injected into duodenal ulcer after manipulation of clot caused oozing blood. Hemostatic spray applied and IR then took him for a coil embolization. Tagged RBC bleeding scan showed no active GI bleed on 10/14. Hemoglobin trending downward at ties but thought to be due to a combined effect of bone marrow suppression s/p chemo in the setting of bleeding. He continued to require blood product replacement for his stay.     On 10/15, patient's respiratory status declined. Diuretics, saline nebs, albuterol, and bipap therapy  "were started with improvement.     His sodium has been low during his stay. Nephrology was following and he was started on a fluid restriction and salt tablet. This resolved. Electrolytes replaced PRN. Tube feeding was started for malnutrition and lack of intake, was on nocturnal feeds.    It has been determined that the patient has reached the maximum benefit of his inpatient stay. He is hemodynamically stable, PT/OT agree he is able to go home with his walker, and he is tolerating his oral diet.    Vitals:  /80 (BP Location: Left arm, Patient Position: Sitting)   Pulse 93   Temp 98.9 °F (37.2 °C) (Axillary)   Resp 18   Ht 162.6 cm (64\")   Wt 78 kg (172 lb)   SpO2 98%   BMI 29.52 kg/m²     Physical Exam:  Constitutional:  Appears thin  HEENT:  Normocephalic and atraumatic. PERRL  Neck:  Neck supple. No JVD present.   CV: Normal rate, regular rhythm,  intact distal pulses.  No gallop, murmur or rub.  Pulmonary/Chest: Effort normal and breath sounds normal. No respiratory distress. No wheezes, rhonchi or rales.   Abdominal: Soft. +BS. No distension and no mass. There is no tenderness. Ostomy present  Musculoskeletal: Normal muscle tone and strength  Neurological: No focal deficits  Skin: Skin is warm and dry. No rash noted.   Extremities:  No clubbing, edema or cyanosis  Psychiatric: Normal mood and affect. Behavior is normal.     Labs:  Results from last 7 days   Lab Units 10/20/22  0910   WBC 10*3/mm3 8.11   HEMOGLOBIN g/dL 8.2*   HEMATOCRIT % 23.8*   PLATELETS 10*3/mm3 210     Results from last 7 days   Lab Units 10/20/22  0910 10/20/22  0427   SODIUM mmol/L  --  136   POTASSIUM mmol/L  --  3.5   CHLORIDE mmol/L  --  102   CO2 mmol/L  --  29.0   BUN mg/dL  --  15   CREATININE mg/dL  --  0.31*   CALCIUM mg/dL  --  7.7*   BILIRUBIN mg/dL 0.5  --    ALK PHOS U/L 192*  --    ALT (SGPT) U/L 21  --    AST (SGOT) U/L 20  --    GLUCOSE mg/dL  --  133*         Magnesium   Date Value Ref Range Status "   10/20/2022 1.9 1.6 - 2.4 mg/dL Final   10/19/2022 2.2 1.6 - 2.4 mg/dL Final   10/18/2022 1.8 1.6 - 2.4 mg/dL Final     Phosphorus   Date Value Ref Range Status   10/20/2022 2.5 2.5 - 4.5 mg/dL Final   10/19/2022 2.3 (L) 2.5 - 4.5 mg/dL Final   10/19/2022 2.1 (L) 2.5 - 4.5 mg/dL Final                    Discharge Medications      New Medications      Instructions Start Date   First Mouthwash (Magic Mouthwash) suspension   5 mL, Swish & Spit, Every 4 Hours      fluconazole 200 MG tablet  Commonly known as: DIFLUCAN   200 mg, Oral, Daily      Lidocaine Viscous HCl 2 % solution  Commonly known as: XYLOCAINE   5 mL, Oral, Every 4 Hours PRN      pantoprazole 40 MG EC tablet  Commonly known as: PROTONIX   40 mg, Oral, 2 Times Daily      pantoprazole 40 MG EC tablet  Commonly known as: PROTONIX   40 mg, Oral, Daily   Start Date: November 20, 2022     tamsulosin 0.4 MG capsule 24 hr capsule  Commonly known as: FLOMAX   0.4 mg, Oral, Daily   Start Date: October 21, 2022     valACYclovir 1000 MG tablet  Commonly known as: VALTREX   1,000 mg, Oral, Every 12 Hours Scheduled         Continue These Medications      Instructions Start Date   fluticasone 50 MCG/ACT nasal spray  Commonly known as: FLONASE   1 spray, Nasal, Daily         Stop These Medications    lisinopril 10 MG tablet  Commonly known as: PRINIVIL,ZESTRIL     montelukast 10 MG tablet  Commonly known as: SINGULAIR     rosuvastatin 10 MG tablet  Commonly known as: CRESTOR            Diet Instructions     Diet: Regular      Discharge Diet: Regular    GI soft            Activity Instructions     Activity as Tolerated      Other Activity Instructions      Activity Instructions: Use walker          Follow-up Appointments  Future Appointments   Date Time Provider Department Center   10/25/2022  2:45 PM ACCESS AND LAB DRAW CHAIR 1 [facility]   10/25/2022  3:00 PM Vale Patrick APRN [facility]   10/26/2022  8:30 AM Kaycee Leary MD [facility]    10/26/2022  9:00 AM CHAIR 3 [facility]     Additional Instructions for the Follow-ups that You Need to Schedule     CBC and Differential   Oct 08, 2022      Manual Differential: No    Release to patient: Routine Release         Comprehensive metabolic panel   Oct 08, 2022      Release to patient: Routine Release         Discharge Follow-up with PCP   As directed       Currently Documented PCP:    Jatinder Haynes MD    PCP Phone Number:    [phone]     Follow Up Details: Please f/u within 1 week         Discharge Follow-up with Specialty: Please follow-up with hematology/oncology on appointments 10/25 and 10/26; 1 Week   As directed      Specialty: Please follow-up with hematology/oncology on appointments 10/25 and 10/26    Follow Up: 1 Week         CBC and Differential   Oct 22, 2022      Manual Differential: No    Release to patient: Routine Release         Comprehensive metabolic panel   Oct 22, 2022      Release to patient: Routine Release               Discharge Instructions:  1. F/u appointments as above  2. Discharge instructions as above  3. Medication changes as above  4.  will set up home health referral tomorrow morning  5. Please call 911 or bring patient to nearest emergency room if symptoms reoccur or worsen     Neal Nick, MSN, APRN, [facility]  Pulmonary & Critical Care Medicine    I performed an independent history and physical examination. Portions of the history were obtained by APRN and were modified by me according to my findings. The above note reflects my findings, assessment, and plan.    Please see my discharge day progress note for additional details and documentation.  Electronically signed by:  Jeffrey White MD  11/05/22  13:39 EDT        Time: I spent 35 minutes on this discharge activitywhich included: face-to-face encounter with the patient, reviewing the data in the system, coordination of the care with the nursing staff as well as consultants,  documentation, and entering orders.      CC: Jatinder Haynes MD

## 2022-10-20 NOTE — CASE MANAGEMENT/SOCIAL WORK
Continued Stay Note  [facility]     Patient Name: Abraham Wu  MRN: [id number]  Today's Date: 10/20/2022    Admit Date: 10/3/2022    Plan: Home with Home Health   Discharge Plan     Row Name 10/20/22 7040       Plan    Plan Home with Home Health    Plan Comments MSW received a phone call from CINDY Sparks regarding pt. RN reports that pt. is being d/c’d today.  RN reports that pt. needs Home Health for PICC line dressings arranged. MSW advised that would not be able to be arranged this evening. MSW consulted . Day shift  will follow up with arranging Home Health on 10/21/22.  MSW is available.    Final Discharge Disposition Code 06 - home with home health care               Discharge Codes    No documentation.               Expected Discharge Date and Time     Expected Discharge Date Expected Discharge Time    Oct 24, 2022             CARLOS A Foy

## 2022-10-21 LAB
CYTOLOGIST CVX/VAG CYTO: NORMAL
PATH INTERP BLD-IMP: NORMAL

## 2022-10-21 NOTE — OUTREACH NOTE
Prep Survey    Flowsheet Row Responses   [facility] facility patient discharged from? [place]   Is LACE score < 7 ? No   Emergency Room discharge w/ pulse ox? No   Eligibility Readm Mgmt   Discharge diagnosis Hodgkin lymphoma, Acute upper GI bleed, anemia   Does the patient have one of the following disease processes/diagnoses(primary or secondary)? Other   Does the patient have Home health ordered? Yes   What is the Home health agency?  to be arranged 10/21, needs PICC line drsg changes   Is there a DME ordered? Yes   What DME was ordered? [facility]   Prep survey completed? Yes          EDEN LAIRD - Registered Nurse

## 2022-10-21 NOTE — CASE MANAGEMENT/SOCIAL WORK
Continued Stay Note  [facility]     Patient Name: Abraham Wu  MRN: [id number]  Today's Date: 10/21/2022    Admit Date: 10/3/2022    Plan: Home with Home Health   Discharge Plan     Row Name 10/21/22 0821       Plan    Plan Home with Home Health    Plan Comments Received message from MSW that patient had discharged last evening and will need home health set up.  Order placed in [facility] and referral called to Mary with [facility].  Mary will review and will let CM know the determination.    1225  Received call from Mary with [facility] and they have accepted patient.  Spoke with patients wife over the phone and informed her that [facility] Home health will be seeing patient and they could expect a call from them later today or early in the am. Wife voices understanding and is in agreement.    Final Discharge Disposition Code 06 - home with home health care               Discharge Codes    No documentation.               Expected Discharge Date and Time     Expected Discharge Date Expected Discharge Time    Oct 20, 2022             Aisha Hernandez RN

## 2022-10-25 ENCOUNTER — APPOINTMENT (OUTPATIENT)
Dept: ONCOLOGY | Facility: HOSPITAL | Age: 72
End: 2022-10-25

## 2022-10-26 ENCOUNTER — TELEPHONE (OUTPATIENT)
Dept: ONCOLOGY | Facility: CLINIC | Age: 72
End: 2022-10-26

## 2022-10-26 ENCOUNTER — OFFICE VISIT (OUTPATIENT)
Dept: ONCOLOGY | Facility: CLINIC | Age: 72
End: 2022-10-26

## 2022-10-26 ENCOUNTER — HOSPITAL ENCOUNTER (OUTPATIENT)
Dept: ONCOLOGY | Facility: HOSPITAL | Age: 72
Setting detail: INFUSION SERIES
Discharge: HOME | End: 2022-10-26

## 2022-10-26 VITALS
DIASTOLIC BLOOD PRESSURE: 60 MMHG | TEMPERATURE: 97.1 F | HEIGHT: 64 IN | RESPIRATION RATE: 18 BRPM | HEART RATE: 76 BPM | BODY MASS INDEX: 22.88 KG/M2 | SYSTOLIC BLOOD PRESSURE: 126 MMHG | OXYGEN SATURATION: 99 % | WEIGHT: 134 LBS

## 2022-10-26 DIAGNOSIS — D61.810 ANTINEOPLASTIC CHEMOTHERAPY INDUCED PANCYTOPENIA: ICD-10-CM

## 2022-10-26 DIAGNOSIS — C81.98 HODGKIN LYMPHOMA OF LYMPH NODES OF MULTIPLE REGIONS, UNSPECIFIED HODGKIN LYMPHOMA TYPE: Primary | ICD-10-CM

## 2022-10-26 DIAGNOSIS — T45.1X5A ANTINEOPLASTIC CHEMOTHERAPY INDUCED PANCYTOPENIA: ICD-10-CM

## 2022-10-26 DIAGNOSIS — D61.810 ANTINEOPLASTIC CHEMOTHERAPY INDUCED PANCYTOPENIA: Primary | ICD-10-CM

## 2022-10-26 DIAGNOSIS — T45.1X5A ANTINEOPLASTIC CHEMOTHERAPY INDUCED PANCYTOPENIA: Primary | ICD-10-CM

## 2022-10-26 LAB
ALBUMIN SERPL-MCNC: 2.9 G/DL (ref 3.5–5.2)
ALBUMIN/GLOB SERPL: 1 G/DL
ALP SERPL-CCNC: 212 U/L (ref 39–117)
ALT SERPL W P-5'-P-CCNC: 19 U/L (ref 1–41)
ANION GAP SERPL CALCULATED.3IONS-SCNC: 8 MMOL/L (ref 5–15)
AST SERPL-CCNC: 9 U/L (ref 1–40)
BASOPHILS # BLD AUTO: 0.03 10*3/MM3 (ref 0–0.2)
BASOPHILS NFR BLD AUTO: 0.2 % (ref 0–1.5)
BILIRUB SERPL-MCNC: 0.5 MG/DL (ref 0–1.2)
BUN SERPL-MCNC: 5 MG/DL (ref 8–23)
BUN/CREAT SERPL: 12.8 (ref 7–25)
CALCIUM SPEC-SCNC: 8.5 MG/DL (ref 8.6–10.5)
CHLORIDE SERPL-SCNC: 96 MMOL/L (ref 98–107)
CO2 SERPL-SCNC: 28 MMOL/L (ref 22–29)
CREAT SERPL-MCNC: 0.39 MG/DL (ref 0.76–1.27)
DEPRECATED RDW RBC AUTO: 48.7 FL (ref 37–54)
EGFRCR SERPLBLD CKD-EPI 2021: 118.3 ML/MIN/1.73
EOSINOPHIL # BLD AUTO: 0 10*3/MM3 (ref 0–0.4)
EOSINOPHIL NFR BLD AUTO: 0 % (ref 0.3–6.2)
ERYTHROCYTE [DISTWIDTH] IN BLOOD BY AUTOMATED COUNT: 17.2 % (ref 12.3–15.4)
GLOBULIN UR ELPH-MCNC: 2.9 GM/DL
GLUCOSE SERPL-MCNC: 99 MG/DL (ref 65–99)
HCT VFR BLD AUTO: 22.4 % (ref 37.5–51)
HGB BLD-MCNC: 7.5 G/DL (ref 13–17.7)
IMM GRANULOCYTES # BLD AUTO: 0.62 10*3/MM3 (ref 0–0.05)
IMM GRANULOCYTES NFR BLD AUTO: 5 % (ref 0–0.5)
LYMPHOCYTES # BLD AUTO: 0.47 10*3/MM3 (ref 0.7–3.1)
LYMPHOCYTES NFR BLD AUTO: 3.8 % (ref 19.6–45.3)
MAGNESIUM SERPL-MCNC: 1.7 MG/DL (ref 1.6–2.4)
MCH RBC QN AUTO: 29.3 PG (ref 26.6–33)
MCHC RBC AUTO-ENTMCNC: 33.5 G/DL (ref 31.5–35.7)
MCV RBC AUTO: 87.5 FL (ref 79–97)
MONOCYTES # BLD AUTO: 0.53 10*3/MM3 (ref 0.1–0.9)
MONOCYTES NFR BLD AUTO: 4.3 % (ref 5–12)
NEUTROPHILS NFR BLD AUTO: 10.77 10*3/MM3 (ref 1.7–7)
NEUTROPHILS NFR BLD AUTO: 86.7 % (ref 42.7–76)
PLATELET # BLD AUTO: 550 10*3/MM3 (ref 140–450)
PMV BLD AUTO: 8.6 FL (ref 6–12)
POTASSIUM SERPL-SCNC: 2.5 MMOL/L (ref 3.5–5.2)
PROT SERPL-MCNC: 5.8 G/DL (ref 6–8.5)
RBC # BLD AUTO: 2.56 10*6/MM3 (ref 4.14–5.8)
SODIUM SERPL-SCNC: 132 MMOL/L (ref 136–145)
WBC NRBC COR # BLD: 12.42 10*3/MM3 (ref 3.4–10.8)

## 2022-10-26 PROCEDURE — 80053 COMPREHEN METABOLIC PANEL: CPT | Performed by: INTERNAL MEDICINE

## 2022-10-26 PROCEDURE — 83735 ASSAY OF MAGNESIUM: CPT | Performed by: INTERNAL MEDICINE

## 2022-10-26 PROCEDURE — 36592 COLLECT BLOOD FROM PICC: CPT

## 2022-10-26 PROCEDURE — 85025 COMPLETE CBC W/AUTO DIFF WBC: CPT | Performed by: INTERNAL MEDICINE

## 2022-10-26 PROCEDURE — 99215 OFFICE O/P EST HI 40 MIN: CPT | Performed by: INTERNAL MEDICINE

## 2022-10-26 RX ORDER — OLANZAPINE 5 MG/1
5 TABLET ORAL NIGHTLY
Qty: 6 TABLET | Refills: 5 | Status: SHIPPED | OUTPATIENT
Start: 2022-10-26 | End: 2022-12-28 | Stop reason: SDUPTHER

## 2022-10-26 RX ORDER — SODIUM CHLORIDE 0.9 % (FLUSH) 0.9 %
10 SYRINGE (ML) INJECTION AS NEEDED
Status: CANCELLED | OUTPATIENT
Start: 2022-10-26

## 2022-10-26 RX ORDER — SODIUM CHLORIDE 9 MG/ML
250 INJECTION, SOLUTION INTRAVENOUS AS NEEDED
Status: CANCELLED | OUTPATIENT
Start: 2022-10-26

## 2022-10-26 RX ORDER — LIDOCAINE AND PRILOCAINE 25; 25 MG/G; MG/G
1 CREAM TOPICAL AS NEEDED
Qty: 30 G | Refills: 3 | Status: SHIPPED | OUTPATIENT
Start: 2022-10-26

## 2022-10-26 RX ORDER — POTASSIUM CHLORIDE 20 MEQ/1
40 TABLET, EXTENDED RELEASE ORAL DAILY
Qty: 30 TABLET | Refills: 0 | Status: SHIPPED | OUTPATIENT
Start: 2022-10-26 | End: 2022-11-07

## 2022-10-26 RX ORDER — SODIUM CHLORIDE 0.9 % (FLUSH) 0.9 %
20 SYRINGE (ML) INJECTION AS NEEDED
Status: CANCELLED | OUTPATIENT
Start: 2022-10-26

## 2022-10-26 RX ORDER — ONDANSETRON HYDROCHLORIDE 8 MG/1
8 TABLET, FILM COATED ORAL EVERY 8 HOURS PRN
Qty: 30 TABLET | Refills: 5 | Status: SHIPPED | OUTPATIENT
Start: 2022-10-26

## 2022-10-26 NOTE — TELEPHONE ENCOUNTER
Critical Test Results      MD: Dr. Leary    Date: 10/26/22     Critical test result: Potassium = 2.5    Time results received: 11:06     Received from Karuna in lab.

## 2022-10-26 NOTE — TELEPHONE ENCOUNTER
Spoke with CINDY Quiroz in infusion and Cydney in blood bank and notified them 2 orders entered for transfuse 1 unit pRBCs if Hgb < 7, one of those was entered in error as Dr. Leary only wants one unit transfused if needed.  Both verbalized understanding.

## 2022-10-26 NOTE — TELEPHONE ENCOUNTER
Patient wife brought in [facility] paperwork on 10/26/22 paperwork is to be faxed back to the company      Put [facility] paperwork in MA box

## 2022-10-26 NOTE — TELEPHONE ENCOUNTER
Notified patient's spouse, whom was with patient, that patient's potassium result is really low today and that Dr. Jackson called in a prescription for potassium that he should begin today.  Also, Dr. Leary wants him to return Friday, 10/28/22 for repeat lab work to ensure level is back within normal range with an infusion time for possible electrolyte replacement if needed.  Notified her that patient has an appointment for lab draw at 08:00 and infusion at 09:30.  Patient's spouse v/u and stated they have an appointment with [facility] at 09:00 and will be able to get there and back for infusion if needed, she contacted the office and they told her it will be a quick visit.  Peggy v/u of new prescription and POC.

## 2022-10-26 NOTE — TELEPHONE ENCOUNTER
Name of Physician notified: Dr. Leary    Time Physician Notified: 11:30      []  Orders received     []  Protocol/Standing orders followed    [x]  No new orders    Dr. Leary is sending in prescription to his pharmacy.

## 2022-10-27 ENCOUNTER — DOCUMENTATION (OUTPATIENT)
Dept: NUTRITION | Facility: HOSPITAL | Age: 72
End: 2022-10-27

## 2022-10-27 NOTE — PROGRESS NOTES
"Outpatient Oncology Nutrition     Reason for Visit:     Oncology Nutrition Screening, Nutritional Assessment and Patient Education / Physician referral    Patient Name:  Abraham Wu    :  1950    MRN:  [id number]    Date of Encounter: 10/27/2022    Nutrition Assessment     Diagnosis: Newly diagnosed CD30 positive classic Hodgkin lymphoma involving the rectum, perinephric, liver /  bilateral hydronephrosis secondary to malignancy / course has been complicated by a GI bleed / status post coil artery embolization 10/12 / neutropenic fever, Candida esophagitis and mucositis. Hospitalization 10/3/22 - 10/20/22 / 22 - 22    Surgery:  Status post laparoscopic colostomy and biopsy    Chemotherapy:  Chemotherapy (AVVD) initiated during recent hospitalization  10/8/22 - difficult posttreatment course with neutropenic fever, failure to thrive, and GI bleed / further treatment pending    Patient Active Problem List   Diagnosis   • Rectal mass s/p laparoscopic colostomy (2022)   • Anemia secondary to chemotherapy and acute blood loss   • Essential hypertension   • Dyslipidemia   • Unintentional weight loss   • Hydronephrosis   • Neutropenic fever (HCC)   • Hypokalemia   • Hodgkin lymphoma (HCC)   • Acute upper GI bleed   • Melena   • Moderate malnutrition (HCC)   • Esophageal candidiasis (HCC)   • SHERRI (obstructive sleep apnea)   • ICU delirium        Food / Nutrition Related History   10/9 SLP (MARCELLO) rec Reg texture thin liquid    Hydration Status     Goal: 70 ounces    Enteral Feeding     NG tube has been removed  Anthropometric Measurements     Height:    Ht Readings from Last 1 Encounters:   10/26/22 162.6 cm (64.02\")       Weight:    Wt Readings from Last 1 Encounters:   10/26/22 60.8 kg (134 lb)       BMI:  22.99 / low normal    Weight Change:   Admission weight 22 - 169 lbs   Current weight - 134 lbs    35 lbs weight loss during recent hospitalizations    Review of Lab Data (Time Frame - 1 month " / 2 month)     Component Ref Range & Units 10 d ago    Prealbumin 20.0 - 40.0 mg/dL 3.7 Low      Component Ref Range & Units 1 d ago   (10/26/22) 7 d ago   (10/20/22) 7 d ago   (10/20/22) 7 d ago   (10/20/22) 7 d ago   (10/20/22) 7 d ago   (10/20/22) 8 d ago   (10/19/22)   Glucose 65 - 99 mg/dL 99  108 R, CM  134 High  R, CM   150 High  R, CM  133 High   152 High  R, CM    BUN 8 - 23 mg/dL 5 Low       15     Creatinine 0.76 - 1.27 mg/dL 0.39 Low       0.31 Low      Sodium 136 - 145 mmol/L 132 Low       136     Potassium 3.5 - 5.2 mmol/L 2.5 Low Critical       3.5     Comment: Slight hemolysis detected by analyzer. Results may be affected.   Chloride 98 - 107 mmol/L 96 Low       102     CO2 22.0 - 29.0 mmol/L 28.0      29.0     Calcium 8.6 - 10.5 mg/dL 8.5 Low       7.7 Low      Total Protein 6.0 - 8.5 g/dL 5.8 Low     5.2 Low        Albumin 3.50 - 5.20 g/dL 2.90 Low     2.80 Low        ALT (SGPT) 1 - 41 U/L 19    21       AST (SGOT) 1 - 40 U/L 9    20       Alkaline Phosphatase 39 - 117 U/L 212 High     192 High        Total Bilirubin 0.0 - 1.2 mg/dL 0.5    0.5       Globulin gm/dL 2.9          Comment: Calculated Result   A/G Ratio g/dL 1.0          BUN/Creatinine Ratio 7.0 - 25.0 12.8      48.4 High      Anion Gap 5.0 - 15.0 mmol/L 8.0      5.0     eGFR >60.0 mL/min/1.73 118.3      126.8 CM           Medication Review   Reviewed noting protonix, potassium ordered yesterday for hypo    Nutrition Focused Physical Findings       Nutrition Impact Symptoms     Anemia secondary to malignancy  Moderate - non-severe malnutrition due to chronic illness per RD assessment 10/10/22    Physical Activity      Able to do little activity and spend most of the day in bed or chair    Current Nutritional Intake     Oral diet:  Patient was placed on NG nutrition support while hospitalized for malnutrition and lack of oral intake; not anticipated that he will need it post discharge / oral intake is increasing     Oral nutritional  supplements:  Boost Breeze / 3 per day    Malnutrition Risk Assessment     Recent weight loss over the past 6 months:  Yes    How much weight loss:  4 = 34 or more lbs    Eating poorly because of a decreased appetite:  1 = Yes    Malnutrition Screening Score:     MST = 2 more Patient at risk for malnutrition     Nutrition Diagnosis     Problem Severe malnutrition in context of acute illness   Etiology Diagnosis with numerous complications requiring prolonged hospitalization   Signs / Symptoms Weight loss of 35 lbs (21% weight loss) during hospitalization  Muscle and fat wasting  Prealbumin 3.7 10/17/22  Albumin 2.9 10/26/22     Nutrition Intervention   Nutritional screening completed; patient scheduled 10/28/22 for possible electrolyte replacement; will see at that time.    Phone consultation with patient's wife.  Wife states that the patient has been doing better since hospital discharge; he is eating a bit more and has regained enough strength that he is able to ambulate from the chair to standing position and is walking throughout the house several times throughout the day.      Reviewed current intake which indicates that he is consuming broths, white bean soup for dinner last evening, Boost Breeze.  Total caloric intake remains marginal but wife states that every day, he does a little bit better with intake.  Discussed numerous suggestions for foods that patient may find appealing; discussed role of protein and high protein foods; encouraged grazing every 1-2 hours; provided tips for stimulating the appetite.    Wife states that hydration is not an issue; he is drinking Boost Breeze mixed with Gatorade, coffee and water.  Cautioned against too much liquid with a meal that would quickly fill him up and decrease actual food intake.    Discussed appetite stimulant; per wife's request message sent to Dr. Leary asking if implementation of an appetite stimulant would be appropriate.  At baseline, he is a very picky  eater, so this contributes to the difficulty of trying to increase his intake also.    Will continue to follow.    Goal   1.)  Stimulate appetite and Increase oral intake to prevent additional weight loss and promote weight gain  2.)  Repletion of nutritional stores    Calories: 2130+ (35 kcal per kg)  Protein:  grams (1.5-2 gram per kg)  Hydration: 70 ounces    Monitoring / Evaluation

## 2022-10-28 ENCOUNTER — TELEPHONE (OUTPATIENT)
Dept: ONCOLOGY | Facility: CLINIC | Age: 72
End: 2022-10-28

## 2022-10-28 ENCOUNTER — APPOINTMENT (OUTPATIENT)
Dept: ONCOLOGY | Facility: HOSPITAL | Age: 72
End: 2022-10-28

## 2022-10-28 ENCOUNTER — HOSPITAL ENCOUNTER (OUTPATIENT)
Dept: ONCOLOGY | Facility: HOSPITAL | Age: 72
Setting detail: INFUSION SERIES
Discharge: HOME | End: 2022-10-28

## 2022-10-28 VITALS
BODY MASS INDEX: 22.71 KG/M2 | HEIGHT: 64 IN | HEART RATE: 81 BPM | TEMPERATURE: 97.3 F | WEIGHT: 133 LBS | SYSTOLIC BLOOD PRESSURE: 121 MMHG | RESPIRATION RATE: 18 BRPM | DIASTOLIC BLOOD PRESSURE: 86 MMHG

## 2022-10-28 DIAGNOSIS — E87.6 HYPOKALEMIA: Primary | ICD-10-CM

## 2022-10-28 DIAGNOSIS — E87.6 HYPOKALEMIA: ICD-10-CM

## 2022-10-28 DIAGNOSIS — C81.98 HODGKIN LYMPHOMA OF LYMPH NODES OF MULTIPLE REGIONS, UNSPECIFIED HODGKIN LYMPHOMA TYPE: Primary | ICD-10-CM

## 2022-10-28 LAB
ALBUMIN SERPL-MCNC: 2.7 G/DL (ref 3.5–5.2)
ALBUMIN/GLOB SERPL: 0.9 G/DL
ALP SERPL-CCNC: 218 U/L (ref 39–117)
ALT SERPL W P-5'-P-CCNC: 20 U/L (ref 1–41)
ANION GAP SERPL CALCULATED.3IONS-SCNC: 9 MMOL/L (ref 5–15)
AST SERPL-CCNC: 16 U/L (ref 1–40)
BILIRUB SERPL-MCNC: 0.5 MG/DL (ref 0–1.2)
BUN SERPL-MCNC: 4 MG/DL (ref 8–23)
BUN/CREAT SERPL: 10.5 (ref 7–25)
CALCIUM SPEC-SCNC: 8.5 MG/DL (ref 8.6–10.5)
CHLORIDE SERPL-SCNC: 93 MMOL/L (ref 98–107)
CO2 SERPL-SCNC: 26 MMOL/L (ref 22–29)
CREAT SERPL-MCNC: 0.38 MG/DL (ref 0.76–1.27)
EGFRCR SERPLBLD CKD-EPI 2021: 119.2 ML/MIN/1.73
GLOBULIN UR ELPH-MCNC: 3 GM/DL
GLUCOSE SERPL-MCNC: 127 MG/DL (ref 65–99)
MAGNESIUM SERPL-MCNC: 1.8 MG/DL (ref 1.6–2.4)
POTASSIUM SERPL-SCNC: 2.7 MMOL/L (ref 3.5–5.2)
PROT SERPL-MCNC: 5.7 G/DL (ref 6–8.5)
SODIUM SERPL-SCNC: 128 MMOL/L (ref 136–145)

## 2022-10-28 PROCEDURE — 96365 THER/PROPH/DIAG IV INF INIT: CPT

## 2022-10-28 PROCEDURE — 80053 COMPREHEN METABOLIC PANEL: CPT | Performed by: INTERNAL MEDICINE

## 2022-10-28 PROCEDURE — 83735 ASSAY OF MAGNESIUM: CPT | Performed by: INTERNAL MEDICINE

## 2022-10-28 PROCEDURE — 96366 THER/PROPH/DIAG IV INF ADDON: CPT

## 2022-10-28 PROCEDURE — 0 POTASSIUM CHLORIDE 10 MEQ/100ML SOLUTION: Performed by: INTERNAL MEDICINE

## 2022-10-28 RX ORDER — POTASSIUM CHLORIDE 7.45 MG/ML
10 INJECTION INTRAVENOUS ONCE
Status: CANCELLED | OUTPATIENT
Start: 2022-10-28

## 2022-10-28 RX ORDER — SODIUM CHLORIDE 9 MG/ML
250 INJECTION, SOLUTION INTRAVENOUS ONCE
Status: CANCELLED | OUTPATIENT
Start: 2022-10-28

## 2022-10-28 RX ORDER — SODIUM CHLORIDE 9 MG/ML
250 INJECTION, SOLUTION INTRAVENOUS ONCE
Status: COMPLETED | OUTPATIENT
Start: 2022-10-28 | End: 2022-10-28

## 2022-10-28 RX ORDER — POTASSIUM CHLORIDE 7.45 MG/ML
10 INJECTION INTRAVENOUS ONCE
Status: COMPLETED | OUTPATIENT
Start: 2022-10-28 | End: 2022-10-28

## 2022-10-28 RX ADMIN — POTASSIUM CHLORIDE 10 MEQ: 7.46 INJECTION, SOLUTION INTRAVENOUS at 10:19

## 2022-10-28 RX ADMIN — SODIUM CHLORIDE 250 ML: 9 INJECTION, SOLUTION INTRAVENOUS at 10:19

## 2022-10-28 RX ADMIN — POTASSIUM CHLORIDE 10 MEQ: 7.46 INJECTION, SOLUTION INTRAVENOUS at 13:15

## 2022-10-28 RX ADMIN — POTASSIUM CHLORIDE 10 MEQ: 7.46 INJECTION, SOLUTION INTRAVENOUS at 11:19

## 2022-10-28 RX ADMIN — POTASSIUM CHLORIDE 10 MEQ: 7.46 INJECTION, SOLUTION INTRAVENOUS at 12:17

## 2022-10-28 NOTE — PROGRESS NOTES
[facility]  Office number [phone]    Fax number [phone]     Follow up     Date: 10/26/2022     Patient Name: Abraham Wu  MRN: [id number]  : 1950      Chief Complaint: follow up/treatment planning    Surgeon: Dr. Hiram Viveros MD    Cancer Staging: IV    History of Present Illness: Abraham Wu is a pleasant 72 y.o. male with PMH of hypertension, sleep apnea, hard of hearing who presents today for follow up of Hodgkin Lymphoma. The patient is accompanied by his supportive wife.     He initially presented 2022 with intractable diarrhea, low appetite, and a greater than 50 pound weight loss over several month period associated with intermittent fevers.  CT of the chest abdomen pelvis obtained in the ER shows of rectal mass spanning greater than 12 cm with adjacent adenopathy, bulky RP adenopathy, and findings concerning for left renal and liver metastases.  Small right pleural effusion with nodularity.  There was concern for impending obstruction, so he underwent diverting colostomy and biopsy on 2022.  Biopsies from the peritoneam showed a fibrotic nodule histiocytes and eosinophils admixed with CD30 positive large cells.  Rectal biopsies showed involvement by CD30 positive lymphoma, classic Hodgkin lymphoma versus CD30 positive T-cell or B-cell lymphoma.  There was extensive fibrosis and crush artifact.  He underwent repeat transrectal biopsy 10/4/2022 for further characterization which was felt to be most consistent with classical Hodgkin lymphoma.    He initiated chemotherapy with Brentuximab-AVD as an inpatient on 10/8/2022.  Cycle #1 was complicated by GI bleed requiring multiple blood transfusion and ultimately coil artery embolization 10/12; neutropenic fever, Candida esophagitis and mucositis.  He had a prolonged ICU stay  And required enteral feeding.  He was discharged 10/20/2022.    Treatment history:  Brentuximab-AVD: C1  10/8/22    Interval history:    He is feeling substantially better than prior to discharge.  He is up and ambulating around his home with a walker.  His energy still is low, he thinks he is about 25 to 50% better.  He denies any shortness of breath.  His appetite is much improved.  He is using boost +3 times daily and drinking lots of fluid.  His taste is still off this is a barrier to escalating his oral intake.  He denies any nausea.  His ostomy output has remained light brown.  He is emptying approximately twice daily, but has had very little output for the previous 24 hours.  However he did start having light brown output again this morning.  He denies any abdominal pain.  While inpatient he had told me that he did not want to pursue any further chemotherapy, but he has reconsidered.  He does not feel well enough to proceed with treatment today, but is interested in considering retreatment next week.  He was supposed to have home health for PICC dressing changes, but he reports home health called yesterday and said they would not be able to accommodate this because he did not have supplies.    Past Medical History:   Past Medical History:   Diagnosis Date   • benign polypoid tissue right lung    • Hyperlipidemia    • Hypertension    • Mycobacterium mucogenicum    • SHERRI (obstructive sleep apnea)    • Seasonal allergies        Past Surgical History:   Past Surgical History:   Procedure Laterality Date   • BRONCHOSCOPY      removal of obstructing polypoid tissue right middle lung   • COLOSTOMY N/A 9/22/2022    Procedure: LAPAROSCOPIC COLOSTOMY CREATION, FLEXIBLE SIGMOIDOSCOPY;  Surgeon: Hiram Viveros MD;  Location: [facility] OR;  Service: General;  Laterality: N/A;   • ENDOSCOPY N/A 10/10/2022    Procedure: ESOPHAGOGASTRODUODENOSCOPY;  Surgeon: Neeraj Lynn MD;  Location: [facility] ENDOSCOPY;  Service: Gastroenterology;  Laterality: N/A;   • ENDOSCOPY N/A 10/12/2022    Procedure: ESOPHAGOGASTRODUODENOSCOPY;   Surgeon: Brunner, Mark I, MD;  Location:  [facility];  Service: Gastroenterology;  Laterality: N/A;   • EXAM UNDER ANESTHESIA, RECTAL BIOPSY N/A 10/4/2022    Procedure: EXAM UNDER ANESTHESIA, TRANSANAL BIOPSY WITH TRUCUT NEEDLE (LITHOTOMY-CANDY CANE);  Surgeon: Hiram Viveros MD;  Location:  [facility] OR;  Service: General;  Laterality: N/A;       Family History:   Family History   Problem Relation Age of Onset   • Diabetes Mother    • Diabetes Father    • Heart disease Father        Social History:   Social History     Socioeconomic History   • Marital status:    Tobacco Use   • Smoking status: Never   • Smokeless tobacco: Never   Substance and Sexual Activity   • Alcohol use: Not Currently     Comment: previously drank 2 glasses of wine/beer nightly   • Drug use: Never       Medications:     Current Outpatient Medications:   •  DPH-Lido-AlHydr-MgHydr-Simeth (First Mouthwash, Magic Mouthwash,) suspension, Swish and spit 5 mL Every 4 (Four) Hours for 8 days., Disp: 237 mL, Rfl: 0  •  fluconazole (DIFLUCAN) 200 MG tablet, Take 1 tablet by mouth Daily for 10 doses. Indications: Infection of the Skin and/or Soft Tissue, Disp: 10 tablet, Rfl: 0  •  fluticasone (FLONASE) 50 MCG/ACT nasal spray, 1 spray into the nostril(s) as directed by provider Daily., Disp: , Rfl:   •  lidocaine-prilocaine (EMLA) 2.5-2.5 % cream, Apply 1 application topically to the appropriate area as directed As Needed (45-60 minutes prior to port access.  Cover with saran/plastic wrap.)., Disp: 30 g, Rfl: 3  •  OLANZapine (zyPREXA) 5 MG tablet, Take 1 tablet by mouth Every Night. For 3 days on Days 2, 3 and 4 and on days 16, 17, and 18., Disp: 6 tablet, Rfl: 5  •  ondansetron (Zofran) 8 MG tablet, Take 1 tablet by mouth Every 8 (Eight) Hours As Needed for Nausea or Vomiting., Disp: 30 tablet, Rfl: 5  •  pantoprazole (PROTONIX) 40 MG EC tablet, Take 1 tablet by mouth 2 (Two) Times a Day for 30 days., Disp: 60 tablet, Rfl: 0  •  [START  "ON 11/20/2022] pantoprazole (PROTONIX) 40 MG EC tablet, Take 1 tablet by mouth Daily for 30 days., Disp: 30 tablet, Rfl: 0  •  potassium chloride (K-DUR,KLOR-CON) 20 MEQ CR tablet, Take 2 tablets by mouth Daily., Disp: 30 tablet, Rfl: 0  •  tamsulosin (FLOMAX) 0.4 MG capsule 24 hr capsule, Take 1 capsule by mouth Daily for 30 days., Disp: 30 capsule, Rfl: 0  No current facility-administered medications for this visit.    Facility-Administered Medications Ordered in Other Visits:   •  potassium chloride 10 mEq in 100 mL IVPB, 10 mEq, Intravenous, Once, Kaycee Leary MD, Last Rate: 100 mL/hr at 10/28/22 1119, 10 mEq at 10/28/22 1119  •  potassium chloride 10 mEq in 100 mL IVPB, 10 mEq, Intravenous, Once, Kaycee Leary MD  •  potassium chloride 10 mEq in 100 mL IVPB, 10 mEq, Intravenous, Once, Kaycee Leary MD    Allergies:   No Known Allergies    Objective     Vital Signs:   Vitals:    10/26/22 0817   BP: 126/60   Pulse: 76   Resp: 18   Temp: 97.1 °F (36.2 °C)   TempSrc: Temporal   SpO2: 99%   Weight: 60.8 kg (134 lb)   Height: 162.6 cm (64.02\")   PainSc: 0-No pain    Body mass index is 22.99 kg/m².   Pain Score    10/26/22 0817   PainSc: 0-No pain       ECOG Performance Status: 2 - Symptomatic, <50% confined to bed    Physical Exam:   General: No acute distress. Well appearing Pale  HEENT: Normocephalic, atraumatic. Sclera anicteric. Masked.  Neck: supple, no adenopathy.   Cardiovascular: regular rate and rhythm,. No murmurs.   Respiratory: Normal rate. Clear to auscultation bilaterally  Abdomen: Soft, nontender, non distended with normoactive bowel sounds Ostomy output light brown  Lymph: no cervical, supraclavicular or axillary adenopathy  Neuro: Alert and oriented x 3. No focal deficits.   Ext: Symmetric, no swelling.   Psych: Euthymic      Laboratory/Imaging Reviewed:   Hospital Outpatient Visit on 10/26/2022   Component Date Value Ref Range Status   • Magnesium 10/26/2022 1.7  1.6 - 2.4 mg/dL Final   • " Glucose 10/26/2022 99  65 - 99 mg/dL Final   • BUN 10/26/2022 5 (L)  8 - 23 mg/dL Final   • Creatinine 10/26/2022 0.39 (L)  0.76 - 1.27 mg/dL Final   • Sodium 10/26/2022 132 (L)  136 - 145 mmol/L Final   • Potassium 10/26/2022 2.5 (C)  3.5 - 5.2 mmol/L Final    Slight hemolysis detected by analyzer. Results may be affected.   • Chloride 10/26/2022 96 (L)  98 - 107 mmol/L Final   • CO2 10/26/2022 28.0  22.0 - 29.0 mmol/L Final   • Calcium 10/26/2022 8.5 (L)  8.6 - 10.5 mg/dL Final   • Total Protein 10/26/2022 5.8 (L)  6.0 - 8.5 g/dL Final   • Albumin 10/26/2022 2.90 (L)  3.50 - 5.20 g/dL Final   • ALT (SGPT) 10/26/2022 19  1 - 41 U/L Final   • AST (SGOT) 10/26/2022 9  1 - 40 U/L Final   • Alkaline Phosphatase 10/26/2022 212 (H)  39 - 117 U/L Final   • Total Bilirubin 10/26/2022 0.5  0.0 - 1.2 mg/dL Final   • Globulin 10/26/2022 2.9  gm/dL Final    Calculated Result   • A/G Ratio 10/26/2022 1.0  g/dL Final   • BUN/Creatinine Ratio 10/26/2022 12.8  7.0 - 25.0 Final   • Anion Gap 10/26/2022 8.0  5.0 - 15.0 mmol/L Final   • eGFR 10/26/2022 118.3  >60.0 mL/min/1.73 Final    National Kidney Foundation and American Society of Nephrology (ASN) Task Force recommended calculation based on the Chronic Kidney Disease Epidemiology Collaboration (CKD-EPI) equation refit without adjustment for race.   • WBC 10/26/2022 12.42 (H)  3.40 - 10.80 10*3/mm3 Final   • RBC 10/26/2022 2.56 (L)  4.14 - 5.80 10*6/mm3 Final   • Hemoglobin 10/26/2022 7.5 (L)  13.0 - 17.7 g/dL Final   • Hematocrit 10/26/2022 22.4 (L)  37.5 - 51.0 % Final   • MCV 10/26/2022 87.5  79.0 - 97.0 fL Final   • MCH 10/26/2022 29.3  26.6 - 33.0 pg Final   • MCHC 10/26/2022 33.5  31.5 - 35.7 g/dL Final   • RDW 10/26/2022 17.2 (H)  12.3 - 15.4 % Final   • RDW-SD 10/26/2022 48.7  37.0 - 54.0 fl Final   • MPV 10/26/2022 8.6  6.0 - 12.0 fL Final   • Platelets 10/26/2022 550 (H)  140 - 450 10*3/mm3 Final   • Neutrophil % 10/26/2022 86.7 (H)  42.7 - 76.0 % Final   • Lymphocyte %  10/26/2022 3.8 (L)  19.6 - 45.3 % Final   • Monocyte % 10/26/2022 4.3 (L)  5.0 - 12.0 % Final   • Eosinophil % 10/26/2022 0.0 (L)  0.3 - 6.2 % Final   • Basophil % 10/26/2022 0.2  0.0 - 1.5 % Final   • Immature Grans % 10/26/2022 5.0 (H)  0.0 - 0.5 % Final   • Neutrophils, Absolute 10/26/2022 10.77 (H)  1.70 - 7.00 10*3/mm3 Final   • Lymphocytes, Absolute 10/26/2022 0.47 (L)  0.70 - 3.10 10*3/mm3 Final   • Monocytes, Absolute 10/26/2022 0.53  0.10 - 0.90 10*3/mm3 Final   • Eosinophils, Absolute 10/26/2022 0.00  0.00 - 0.40 10*3/mm3 Final   • Basophils, Absolute 10/26/2022 0.03  0.00 - 0.20 10*3/mm3 Final   • Immature Grans, Absolute 10/26/2022 0.62 (H)  0.00 - 0.05 10*3/mm3 Final   No results displayed because visit has over 200 results.          Adult Transthoracic Echo Complete W/ Cont if Necessary Per Protocol    Result Date: 10/3/2022  Narrative: · Left ventricular ejection fraction appears to be 56 - 60%. Left ventricular systolic function is normal. · Global longitudinal LV strain (GLS) = -27.0%. · Left atrial volume is moderately increased. · Mild mitral valve regurgitation is present. · Mild tricuspid valve regurgitation is present. · Estimated right ventricular systolic pressure from tricuspid regurgitation is mildly elevated (35-45 mmHg). · Mild dilation of the ascending aorta is present.      CT Abdomen Pelvis With & Without Contrast    Result Date: 10/11/2022  Narrative: DATE OF EXAM: 10/11/2022 5:38 PM  PROCEDURE: CT ABDOMEN PELVIS W WO CONTRAST-  INDICATIONS: GI bleed, may need IR embolization; R50.9-Fever, unspecified; N13.30-Unspecified hydronephrosis; Z93.3-Colostomy status; C79.9-Secondary malignant neoplasm of unspecified site; E87.0-Hyperosmolality and hypernatremia; E87.6-Hypokalemia; C85.90-Non-Hodgkin lymphoma, unspecified, unspecified site; R13.10-Dysphagia, unspecified; C81.98-Hodgkin lymphoma, unspecified, lymph nodes of multiple sites; K9  COMPARISON: CT abdomen and pelvis 10/3/2022   TECHNIQUE: Routine transaxial slices were obtained through the abdomen and pelvis without the administration of intravenous contrast. Additional scanning through the abdomen and pelvis in the arterial, venous, and delayed phases was performed following the intravenous administration of 100 mL of Isovue 370. Reconstructed coronal and sagittal images were also obtained. Automated exposure control and iterative construction methods were used.  The radiation dose reduction device was turned on for each scan per the ALARA (As Low as Reasonably Achievable) protocol.  FINDINGS: There is a new surgical clip within the duodenum compatible with recent endoscopic duodenal ulcer clipping. The presence of scattered hyperdense medication or oral contrast throughout the GI tract somewhat limits assessment for active GI bleeding. Allowing for this there is no evidence of active GI bleeding. The abdominal aorta and its arterial branches are grossly unremarkable. The venous structures are normal.  There are no new or acute findings within the abdomen or pelvis. Extensive abdominopelvic metastatic disease is unchanged from 10/3/2022 and please refer to that report for further details. Uncomplicated appearance of surgical change of colostomy. Similar small to moderate right and small left pleural effusions within the partially imaged chest. Stable mild bilateral hydronephrosis with symmetric enhancement and excretion of the kidneys. No pneumoperitoneum.      Impression: No evidence of active GI bleeding allowing for limitations of scattered hyperdense material throughout the GI tract, either ingested medication or prior oral contrast. Interval duodenal ulcer clipping, without evidence of active GI bleeding at this site.  No new or acute abdominopelvic findings. Grossly unchanged metastatic disease as detailed on 10/3/2022 CT and please refer to that report for complete details.  This report was finalized on 10/11/2022 6:39 PM by Santino  MD Natasha.      CT Head Without Contrast    Result Date: 10/10/2022  Narrative: EXAMINATION: CT HEAD WITHOUT CONTRAST DATE: 10/10/2022 12:18 AM INDICATION: Altered mental status COMPARISON: None available at the time of this dictation. TECHNIQUE: Noncontrast imaging obtained from the vertex to the skull base.  CT dose lowering techniques were used, to include: automated exposure control, adjustment for patient size, and or use of iterative reconstruction.? FINDINGS: Soft Tissues: No significant soft tissue abnormality. Skull: No underlying skull fracture or radiopaque foreign body. Sinuses: Paranasal sinuses are clear. Mastoids: Mastoid air cells are clear. Globes and Orbits: Globes and orbits are intact. Brain: No acute hemorrhage.  No midline shift, masses, or mass effect.  No evidence of acute infarct by noncontrast CT. Ventricles and Cisterns: Ventricular size and configuration is within normal limits. Basal cisterns are patent. No abnormal extra-axial fluid collection. Senescent Changes: Mild volume loss     Impression: 1. No acute intracranial abnormality. 2. Mild generalized volume loss.  Electronically signed by:  Neeraj Coronado M.D.  10/9/2022 10:53 PM Mountain Time    NM GI Blood Loss    Result Date: 10/15/2022  Narrative: DATE OF EXAM: 10/14/2022 11:10 AM  PROCEDURE: NM GI BLOOD LOSS-  INDICATIONS: GI bleed  COMPARISON: No comparisons available.  TECHNIQUE: Scintigraphic imaging of the abdomen occurred following injection of 25.3 mCi of technetium 99m labeled pertechnetate tagged to pyrophosphate labeled red blood cells. Exam is performed per GI bleeding exam protocol.  FINDINGS: Blood flow images through 60 minutes show generally expected distribution of radiotracer, with slightly greater activity in the expected location of the spleen than typically seen. Images obtained through 60 minutes again show somewhat greater than typical activity in the area of the spleen, however, this is unchanging from  5 minutes to 60 minutes, and appears to represent normal variant splenic activity. No evidence of a transient focus of activity or mobile focus of activity is seen to suggest active GI bleed.      Impression: No evidence of active GI bleed. Suspected normal variant uptake in the spleen.  This report was finalized on 10/15/2022 2:18 PM by Dr. Del Wilks MD.      FL Esophagram Complete Single Contrast    Result Date: 10/12/2022  Narrative: EXAMINATION: FL ESOPHAGRAM COMPLETE SINGLE-CONTRAST-  INDICATION: Scleroderma, Food impaction  Yesterday   Use Barium tablet; R50.9-Fever, unspecified; N13.30-Unspecified hydronephrosis; Z93.3-Colostomy status; C79.9-Secondary malignant neoplasm of unspecified site; E87.0-Hyperosmolality and hypernatremia; E87.6-Hypokalemia; C85.90-Non-Hodgkin lymphoma, unspecified, unspecified site; R13.10-Dysphagia, unspecified; C81.98-Hodgkin lymphoma, unspecified, lymph nod  TECHNIQUE: 1 minute and 24 seconds of fluoroscopic time was used for this exam. 11 associated fluoroscopic series were saved.  imaging reveals a gaseous abdomen.  COMPARISON: NONE  FINDINGS: Preliminary  film shows mildly increased bowel gas in a generalized pattern, but appears to be an endoscopically placed vascular clip in the mid abdomen, that appears to localize to the proximal duodenum.. Under fluoroscopic observation, the patient ingested thin barium. The oral phase of deglutition appeared normal. The esophageal mucosa appeared grossly normal. There was no evidence of a focal esophageal stricture. There was mild gastroesophageal reflux to the level of the thoracic inlet. The patient ingested a barium capsule without difficulty, and it readily passed into the stomach under fluoroscopic observation.       Impression: Mild gastroesophageal reflux to the level of the thoracic inlet    This report was finalized on 10/12/2022 9:09 AM by Dr. Del Wilks MD.      FL Video Swallow With Speech Single Contrast, FL  Limited Ugi For Mbs Reflux Single-Contrast    Result Date: 10/7/2022  Narrative: EXAMINATION: FL VIDEO SWALLOW W SPEECH SINGLE-CONTRAST-, FL LIMITED UGI FOR MBS REFLUX SINGLE-CONTRAST-  INDICATION: dysphagia; R50.9-Fever, unspecified; N13.30-Unspecified hydronephrosis; Z93.3-Colostomy status; C79.9-Secondary malignant neoplasm of unspecified site; E87.0-Hyperosmolality and hypernatremia; E87.6-Hypokalemia; C85.90-Non-Hodgkin lymphoma, unspecified, unspecified site; R13.10-Dysphagia, unspecified  TECHNIQUE: 42 seconds of fluoroscopic time was used for this exam. It is associated fluoroscopic loops are saved. The patient was evaluated in the seated lateral position while taking a variety of consistencies of barium by mouth under the direction of speech pathology.  COMPARISON: NONE  FINDINGS: 42 seconds of fluoroscopy provided for a modified barium swallow, and limited upper GI series. Please see speech therapy report for full details and recommendations. Limited upper GI series demonstrated no signs of a high-grade focal esophageal stricture. There is a moderate sized anterior osteophyte visible on the C4-C5 vertebral levels. There is a mild posterior impression on the cervical esophagus due to this osteophyte, however this impression does not appear to significantly impede swallowing. Gastroesophageal reflux was not demonstrated during this exam.       Impression: Fluoroscopy provided for a modified barium swallow, and limited upper GI series. Please see speech therapy report for full details and recommendations. Limited upper GI series appeared within normal limits.    This report was finalized on 10/7/2022 3:10 PM by Yvon Donnelly.      XR Chest 1 View    Result Date: 10/3/2022  Narrative: DATE OF EXAM: 10/3/2022 3:51 PM  PROCEDURE: XR CHEST 1 VW-  INDICATIONS: Verify PICC placement  COMPARISON: 03/21/2005.  TECHNIQUE: Single radiographic view of the chest was obtained.  FINDINGS: The tip of the right upper  extremity PICC line terminates in the superior vena cava. The heart is enlarged. The pulmonary vascular markings are normal. The lungs and pleural spaces are clear.  There are chronic age-related changes involving the bony thorax and thoracic aorta.      Impression:  1. The tip of the right upper extremity PICC line terminates in the superior vena cava. 2. No active pulmonary disease. 3. Cardiomegaly.  This report was finalized on 10/3/2022 4:09 PM by Preet Kline MD.      IR Artery Embolization Occlusion    Result Date: 10/12/2022  Narrative: IR ARTERY EMBOLIZATION OCCLUSION-                                                         History: GI bleed; R50.9-Fever, unspecified; N13.30-Unspecified hydronephrosis; Z93.3-Colostomy status; C79.9-Secondary malignant neoplasm of unspecified site; E87.0-Hyperosmolality and hypernatremia; E87.6-Hypokalemia; C85.90-Non-Hodgkin lymphoma, unspecified, unspecified site; R13.10-Dysphagia, unspecified; C81.98-Hodgkin lymphoma, unspecified, lymph nodes of multiple sites; K92.2-Gastrointestinal hemor    : Yassine Lopez M.D.                                                                            MODALITY: Sonography and fluoroscopy.                                 DOSE REDUCTION: The examination was performed according to departmental dose-optimization program.  FLUOROSCOPY TIME: 7.7 minutes  RADIATION DOSE: 480.2 mGy air Kerma.   SEDATION: Moderate sedation was administered. 3.5 milligram of Versed and 100 micrograms of fentanyl IV was used for moderate sedation monitored under my direction. Total intra service time of sedation was 30 minutes. The patient's vital signs were monitored throughout the procedure and recorded in the patient's medical record by the nurse.  Because of the patient's condition, the anesthesia service was requested to take over the patient's sedation. Please refer to their notes for details.  ANESTHESIA: Lidocaine, local infiltration     MEDICINES: Not applicable  CONTRAST MEDIUM: Isovue 300, 160 cc.  ESTIMATED blood loss: < 5 cc.          TECHNIQUE: A thorough discussion of the risks, benefits, and alternatives of the procedure, and if applicable, moderate sedation, was carried out with the patient's wife. They were encouraged to ask any questions. Any questions were answered. They verbalized understanding. A written informed consent was then signed.   A multi-component timeout was performed prior to starting the procedure using the departmental protocol.  The procedure room personnel used personal protective equipment. The operators used sterile gloves and if indicated, sterile gowns. The surgical site was prepped with chlorhexidine gluconate  and draped in the maximal applicable sterile fashion.  The right common femoral  artery was localized using anatomic and ultrasonographic landmarks. The artery is patent. Using aseptic precautions, real-time ultrasonographic guidance, after local anesthesia and dermatotomy, the artery was accessed with an access needle. Eventually a guide wire was placed using standard exchanges. Over the wire a 6 French Long vascular sheath was placed. The sheath was connected to a heparinized saline drip.  Selective and superselective catheterization of the following arteries was performed: Celiac artery, superior mesenteric artery, common hepatic artery, gastroduodenal artery.  The equipment used for selective and superselective catheterization was: Sos Omni selective 2, Bentson wire, renegade STC, fathom wire.  At each catheterization and superselective catheterization, following test injection, digital subtraction angiography was performed and repeated in different fluoroscopic projections if and as needed.  This was followed by superselective embolization of the following: Proximal aspects of the right gastroepiploic artery, superior pancreaticoduodenal arteries, the main trunk of the gastroduodenal artery.  The agent  used for embolization was Concerto detachable coils.  A post embolization run was performed from the proximal gastroduodenal artery and the celiac artery.  The endpoint of embolization was stasis in the target artery.  The access site was closed using Angio-Seal and manual compression.  The patient wastransferred to the post procedure area for recovery. The patient was discharged from the department in stable condition.                     COMPLICATIONS: None immediate.     FINDINGS: Unremarkable celiac artery branching pattern. No celiac artery stenosis. Patent main portal vein with hepatopedal flow. Large caliber GDA. Unremarkable branching pattern. No evidence of arterial extravasation on a pseudoaneurysm or other signs of arterial bleeding in the region of the endoscopic clip in the distal second part of duodenum. No large caliber supraduodenal artery seen from the proximal GDA. Coil embolization of the above-mentioned arteries was successfully. A post embolization run shows successful stasis in the targeted artery.  SMA angiography reveals absence of any active bleeding or stigmata of recent arterial bleeding in the region of the endoscopic clip.      Impression:  Successful catheterization and angiography of the following: Celiac artery, common hepatic artery, gastroduodenal artery, superior mesenteric artery  Successful embolization of the following: Most proximal aspects of the right gastroepiploic artery, superior pancreaticoduodenal arteries, the main trunk of the gastroduodenal artery.  The embolic agent used in the endpoint are mentioned above.  Thank you for the opportunity to assist you in the care of your patient.  This report was finalized on 10/12/2022 2:36 PM by Yassine Lopez MD.      CT Angiogram Chest, CT Abdomen Pelvis With Contrast    Result Date: 10/3/2022  Narrative: DATE OF EXAM: 10/3/2022 8:20 AM  PROCEDURE: CT ANGIOGRAM CHEST-, CT ABDOMEN PELVIS W CONTRAST-  INDICATIONS: fever, unknown  etiology, recent surgery, pleural effusions  COMPARISON: 9/21/2022 angiographic chest CT scan 9/20/2022 abdomen pelvis CT scan  TECHNIQUE: Contiguous axial imaging was obtained from the thoracic inlet through the chest abdomen and pelvis following the intravenous administration of 80 mL of Isovue 370. Reconstructed coronal and sagittal images were also obtained. Automated exposure control and iterative reconstruction methods were used.  The radiation dose reduction device was turned on for each scan per the ALARA (As Low as Reasonably Achievable) protocol.  FINDINGS: Patient history indicates fever of unknown etiology, recent colon surgery. The prior exams from 9/20/2022 and 9/21/2022 by report showed a large rectal mass, extensive adenopathy, concern for renal and hepatic metastatic disease. Small right pleural effusion.  ANGIOGRAPHIC CT SCAN OF THE CHEST: There is good contrast opacification of the pulmonary arteries. No evidence of pulmonary embolic disease is seen. Thoracic aorta is mildly ectatic to approximately 4.0 cm. There is no evidence of dissection or focal aneurysm. There is moderately extensive coronary artery calcification. No pericardial effusion or mediastinal adenopathy is seen. No axillary or lower cervical lymphadenopathy is appreciated. There is a relatively small but increasing free-flowing right pleural effusion, and no left effusion. The lungs appear clear except for mild discoid atelectasis associated with the right pleural effusion, and focal peribronchial thickening scarring and mild bronchiectasis of the posterior right middle lobe, unchanged from prior study. Bony structures appear to be intact.      Impression: 1. No evidence of pulmonary embolic disease. 2. Small but increasing right pleural effusion compared to 9/21/2022. 3. Stable small area of linear scarring in the right middle lobe. 4. Mild ascending aortic ectasia to approximately 4.0 cm again incidentally noted.    CT SCAN OF  THE ABDOMEN AND PELVIS WITH IV CONTRAST: Numerous hepatic metastases are again noted. There is extensive retroperitoneal lymphadenopathy, and an approximately 5.4 cm left upper quadrant mass involving the left lateral renal parenchyma. Volume and shape of the exophytic mass favors that this is invasive to the kidney, rather than originating from kidney. There are multiple small splenic lesions consistent with metastases. Adrenal glands appear to be spared. Gallbladder appears normal. Pancreas appears to be normal, although the tip of the pancreatic tail is immediately adjacent the left upper quadrant mass.  No upper abdominal free air is seen. There is a small amount of ascites. Diffusely increased bowel gas suggests a low level ileus. Left mid abdominal ostomy site is clear. Large pelvic mass is again noted, which appears to extend into the small bowel mesentery, although these may be adjacent but separate nodes. Bladder is normally distended and normal in appearance. There is relatively little intrapelvic free fluid.  Delayed venous phase images show contrast within the renal collecting systems, which appear minimally more dilated than on the prior study. Distal ureters may be mildly compressed by the large pelvic mass. The bladder itself is displaced anteriorly and superiorly, but normally distended and grossly normal in appearance. No evidence of a discrete, drainable inflammatory collection is appreciated. Bony structures appear to be intact.   IMPRESSION:  1. Expected changes of recent colostomy placement. No visible associated inflammation. 2. Advanced metastatic disease, including numerous liver lesions, extensive retrocrural lymphadenopathy, multiple splenic lesions, left upper quadrant mass that that involves the lateral margin of the left kidney. Large pelvic mass and extensive infiltration of the lower small bowel mesentery, whether by direct extension of tumor, or multiple irregularly enlarged adjacent  "lymph nodes. 3. Bilateral hydronephrosis which appears increased from 9/20/2022, but no evidence of high-grade obstructive uropathy. 4. Mildly increased ascites. No evidence of drainable inflammatory collection. 5. \"Gassy\" appearance of nondependent large and small bowel, suggesting a low level ileus  This report was finalized on 10/3/2022 9:14 AM by Dr. Del Wilks MD.      XR Abdomen KUB    Result Date: 10/20/2022  Narrative: Supine abdomen. DATE: 10/19/2022 at 11:41 PM. COMPARISON: 10/19/2022 at 10:50 PM. CLINICAL HISTORY: NG tube placement.     Impression: NG tube tip has tip overlying the right midabdomen is likely within the distal aspect/pyloric region of the stomach are proximal duodenal region. Several embolization coils are also seen in the right mid abdomen. Distended loops of nondilated air-filled bowel are seen throughout the visualized portions of the abdomen. Electronically signed by:  Deonte Dash D.O.  10/19/2022 10:36 PM Mountain Time    XR Abdomen KUB    Result Date: 10/20/2022  Narrative: Frontal abdomen CLINICAL INDICATION: Feeding tube placement COMPARISON: October 16, 2022. FINDINGS: Feeding tube tip in the body of the stomach. Postsurgical changes in the mid abdomen. Distended loops of bowel are seen in the upper abdomen. Lung bases are grossly clear.     Impression: Feeding tube tip near the fundus of the stomach. Electronically signed by:  Rehana Estrella D.O.  10/19/2022 10:19 PM Mountain Time    XR Abdomen KUB    Result Date: 10/16/2022  Narrative: DATE OF EXAM: 10/16/2022 11:54 AM  PROCEDURE: XR ABDOMEN KUB-  INDICATIONS: keofeed placement; R50.9-Fever, unspecified; N13.30-Unspecified hydronephrosis; Z93.3-Colostomy status; C79.9-Secondary malignant neoplasm of unspecified site; E87.0-Hyperosmolality and hypernatremia; E87.6-Hypokalemia; C85.90-Non-Hodgkin lymphoma, unspecified, unspecified site; R13.10-Dysphagia, unspecified; C81.98-Hodgkin lymphoma, unspecified, lymph nodes of multiple " sites; K92.2-Gastrointesti  COMPARISON: No comparisons available.  TECHNIQUE: Single radiographic view of the abdomen was obtained.  FINDINGS: Feeding tube terminates in the stomach. Upper abdominal bowel gas pattern is nonspecific, with some gas filled segments of colon noted. There is no overt pneumoperitoneum.      Impression: Feeding tube terminates in the stomach. Upper abdominal bowel gas pattern is nonspecific, with some gas filled segments of colon noted. There is no overt pneumoperitoneum.  This report was finalized on 10/16/2022 2:23 PM by Josef Mckee.      XR Chest PA & Lateral    Result Date: 10/14/2022  Narrative: DATE OF EXAM: 10/14/2022 10:53 AM  PROCEDURE: XR CHEST PA AND LATERAL-  INDICATIONS: neutropenic fever; R50.9-Fever, unspecified; N13.30-Unspecified hydronephrosis; Z93.3-Colostomy status; C79.9-Secondary malignant neoplasm of unspecified site; E87.0-Hyperosmolality and hypernatremia; E87.6-Hypokalemia; C85.90-Non-Hodgkin lymphoma, unspecified, unspecified site; R13.10-Dysphagia, unspecified; C81.98-Hodgkin lymphoma, unspecified, lymph nodes of multiple sites; K92.2-Gastrointesti  COMPARISON: 10/03/2022  TECHNIQUE: Two radiologic views of the chest, PA and lateral, were obtained.  FINDINGS: There is density along the posterior pleural margins that could relate to underlying effusions. The heart looks enlarged. There are degenerative changes involving the dorsal spine and both shoulder areas. There is a PICC line via the right arm with its tip in the superior vena cava.      Impression: 1.  Bibasilar effusions. Some underlying atelectasis could not be excluded. 2.  Suggested cardiomegaly.  This report was finalized on 10/14/2022 11:09 AM by Chiki Escobar MD.        Procedures    Assessment / Plan      Assessment/Plan:   1.  CD30 positive classical Hodgkin's lymphoma.  -He is status post cycle 1 of BV AVD.  Day 15 treatment was held due to pancytopenia, neutropenic fever, and active GI  bleed.  He is very deconditioned, and initially declined further systemic therapy, but he is willing to reconsider.  We will schedule him for follow-up with treatment reinitiation next week.    2. Antineoplastic chemotherapy induced pancytopenia compounded by blood loss anemia from acute GI bleed.  -CBC from today shows stable white blood cell count and platelets.  His hemoglobin is 7.5, overall stable from discharge.  We will continue to monitor it closely.  No transfusion needs today.    3.  Mucositis  -Improving.  Continue supportive care.    4.  Low appetite  -Continue nutrition supplements.    -Dietitian referral.    5.  Hypokalemia  -Start twice daily potassium supplement today.  Follow-up in 48 hours with repeat levels and possible IV repletion.    6.  Obstructive sleep apnea  -He reports he has been unable to tolerate his CPAP machine.  He requests follow-up with sleep medicine to discuss options.  Referral placed.     7.  Weakness and deconditioning  -Home health PT and OT referral.    8.  Access  -PICC dressing changed today and weekly  -Plan port placement prior to cycle 2.    Follow Up:   No follow-ups on file.     Kaycee Leary MD  Hematology and Oncology     Time spent on the day of service was 40 min inclusive of time before, during, and after office visit on record review, medically appropriate history and physical, counseling patient, ordering tests, documenting in the medical record.

## 2022-10-28 NOTE — TELEPHONE ENCOUNTER
Notified patient's spouse per Dr. Leary to increase previously called in potassium chloride 40 mEq daily to 60 mEq daily (take 40 mEq in the morning and 20 mEq in the pm).  Peggy casper

## 2022-10-31 ENCOUNTER — HOSPITAL ENCOUNTER (OUTPATIENT)
Dept: CARDIOLOGY | Facility: HOSPITAL | Age: 72
Discharge: HOME | End: 2022-10-31
Admitting: NURSE PRACTITIONER

## 2022-10-31 ENCOUNTER — OFFICE VISIT (OUTPATIENT)
Dept: ONCOLOGY | Facility: CLINIC | Age: 72
End: 2022-10-31

## 2022-10-31 ENCOUNTER — TELEPHONE (OUTPATIENT)
Dept: ONCOLOGY | Facility: CLINIC | Age: 72
End: 2022-10-31

## 2022-10-31 ENCOUNTER — HOSPITAL ENCOUNTER (OUTPATIENT)
Dept: ONCOLOGY | Facility: HOSPITAL | Age: 72
Setting detail: INFUSION SERIES
Discharge: HOME | End: 2022-10-31

## 2022-10-31 VITALS
BODY MASS INDEX: 22.88 KG/M2 | SYSTOLIC BLOOD PRESSURE: 142 MMHG | HEART RATE: 101 BPM | HEIGHT: 64 IN | WEIGHT: 134 LBS | RESPIRATION RATE: 18 BRPM | DIASTOLIC BLOOD PRESSURE: 93 MMHG | OXYGEN SATURATION: 100 % | TEMPERATURE: 97.3 F

## 2022-10-31 VITALS — BODY MASS INDEX: 22.88 KG/M2 | WEIGHT: 134.04 LBS | HEIGHT: 64 IN

## 2022-10-31 DIAGNOSIS — M79.89 PAIN AND SWELLING OF RIGHT UPPER EXTREMITY: ICD-10-CM

## 2022-10-31 DIAGNOSIS — M79.601 PAIN AND SWELLING OF RIGHT UPPER EXTREMITY: ICD-10-CM

## 2022-10-31 DIAGNOSIS — C81.98 HODGKIN LYMPHOMA OF LYMPH NODES OF MULTIPLE REGIONS, UNSPECIFIED HODGKIN LYMPHOMA TYPE: Primary | ICD-10-CM

## 2022-10-31 DIAGNOSIS — C81.98 HODGKIN LYMPHOMA OF LYMPH NODES OF MULTIPLE REGIONS, UNSPECIFIED HODGKIN LYMPHOMA TYPE: ICD-10-CM

## 2022-10-31 DIAGNOSIS — M79.601 PAIN AND SWELLING OF RIGHT UPPER EXTREMITY: Primary | ICD-10-CM

## 2022-10-31 DIAGNOSIS — M79.89 PAIN AND SWELLING OF RIGHT UPPER EXTREMITY: Primary | ICD-10-CM

## 2022-10-31 LAB
ALBUMIN SERPL-MCNC: 2.8 G/DL (ref 3.5–5.2)
ALBUMIN/GLOB SERPL: 0.9 G/DL
ALP SERPL-CCNC: 247 U/L (ref 39–117)
ALT SERPL W P-5'-P-CCNC: 31 U/L (ref 1–41)
ANION GAP SERPL CALCULATED.3IONS-SCNC: 11 MMOL/L (ref 5–15)
AST SERPL-CCNC: 13 U/L (ref 1–40)
BASOPHILS # BLD AUTO: 0.02 10*3/MM3 (ref 0–0.2)
BASOPHILS NFR BLD AUTO: 0.2 % (ref 0–1.5)
BH CV UPPER VENOUS LEFT SUBCLAVIAN PHASIC: NORMAL
BH CV UPPER VENOUS LEFT SUBCLAVIAN SPONT: NORMAL
BH CV UPPER VENOUS RIGHT AXILLARY AUGMENT: NORMAL
BH CV UPPER VENOUS RIGHT AXILLARY COMPRESS: NORMAL
BH CV UPPER VENOUS RIGHT AXILLARY PHASIC: NORMAL
BH CV UPPER VENOUS RIGHT AXILLARY SPONT: NORMAL
BH CV UPPER VENOUS RIGHT BASILIC FOREARM COMPRESS: NORMAL
BH CV UPPER VENOUS RIGHT BASILIC UPPER COMPRESS: NORMAL
BH CV UPPER VENOUS RIGHT BRACHIAL AUGMENT: NORMAL
BH CV UPPER VENOUS RIGHT BRACHIAL COMPRESS: NORMAL
BH CV UPPER VENOUS RIGHT BRACHIAL PHASIC: NORMAL
BH CV UPPER VENOUS RIGHT BRACHIAL SPONT: NORMAL
BH CV UPPER VENOUS RIGHT CEPHALIC FOREARM COMPRESS: NORMAL
BH CV UPPER VENOUS RIGHT CEPHALIC UPPER COMPRESS: NORMAL
BH CV UPPER VENOUS RIGHT INTERNAL JUGULAR COMPRESS: NORMAL
BH CV UPPER VENOUS RIGHT INTERNAL JUGULAR PHASIC: NORMAL
BH CV UPPER VENOUS RIGHT INTERNAL JUGULAR SPONT: NORMAL
BH CV UPPER VENOUS RIGHT RADIAL COMPRESS: NORMAL
BH CV UPPER VENOUS RIGHT SUBCLAVIAN AUGMENT: NORMAL
BH CV UPPER VENOUS RIGHT SUBCLAVIAN COMPRESS: NORMAL
BH CV UPPER VENOUS RIGHT SUBCLAVIAN PHASIC: NORMAL
BH CV UPPER VENOUS RIGHT SUBCLAVIAN SPONT: NORMAL
BH CV UPPER VENOUS RIGHT ULNAR COMPRESS: NORMAL
BILIRUB SERPL-MCNC: 0.5 MG/DL (ref 0–1.2)
BUN SERPL-MCNC: 6 MG/DL (ref 8–23)
BUN/CREAT SERPL: 14 (ref 7–25)
CALCIUM SPEC-SCNC: 8.1 MG/DL (ref 8.6–10.5)
CHLORIDE SERPL-SCNC: 95 MMOL/L (ref 98–107)
CO2 SERPL-SCNC: 23 MMOL/L (ref 22–29)
CREAT SERPL-MCNC: 0.43 MG/DL (ref 0.76–1.27)
DEPRECATED RDW RBC AUTO: 59.5 FL (ref 37–54)
EGFRCR SERPLBLD CKD-EPI 2021: 114.8 ML/MIN/1.73
EOSINOPHIL # BLD AUTO: 0 10*3/MM3 (ref 0–0.4)
EOSINOPHIL NFR BLD AUTO: 0 % (ref 0.3–6.2)
ERYTHROCYTE [DISTWIDTH] IN BLOOD BY AUTOMATED COUNT: 18.5 % (ref 12.3–15.4)
GLOBULIN UR ELPH-MCNC: 3.2 GM/DL
GLUCOSE SERPL-MCNC: 209 MG/DL (ref 65–99)
HCT VFR BLD AUTO: 24.2 % (ref 37.5–51)
HGB BLD-MCNC: 7.9 G/DL (ref 13–17.7)
IMM GRANULOCYTES # BLD AUTO: 0.26 10*3/MM3 (ref 0–0.05)
IMM GRANULOCYTES NFR BLD AUTO: 2.2 % (ref 0–0.5)
LYMPHOCYTES # BLD AUTO: 0.57 10*3/MM3 (ref 0.7–3.1)
LYMPHOCYTES NFR BLD AUTO: 4.8 % (ref 19.6–45.3)
MAXIMAL PREDICTED HEART RATE: 150 BPM
MCH RBC QN AUTO: 29.4 PG (ref 26.6–33)
MCHC RBC AUTO-ENTMCNC: 32.6 G/DL (ref 31.5–35.7)
MCV RBC AUTO: 90 FL (ref 79–97)
MONOCYTES # BLD AUTO: 0.74 10*3/MM3 (ref 0.1–0.9)
MONOCYTES NFR BLD AUTO: 6.2 % (ref 5–12)
NEUTROPHILS NFR BLD AUTO: 10.26 10*3/MM3 (ref 1.7–7)
NEUTROPHILS NFR BLD AUTO: 86.6 % (ref 42.7–76)
PLATELET # BLD AUTO: 457 10*3/MM3 (ref 140–450)
PMV BLD AUTO: 8.2 FL (ref 6–12)
POTASSIUM SERPL-SCNC: 3.1 MMOL/L (ref 3.5–5.2)
PROT SERPL-MCNC: 6 G/DL (ref 6–8.5)
RBC # BLD AUTO: 2.69 10*6/MM3 (ref 4.14–5.8)
SODIUM SERPL-SCNC: 129 MMOL/L (ref 136–145)
STRESS TARGET HR: 128 BPM
WBC NRBC COR # BLD: 11.85 10*3/MM3 (ref 3.4–10.8)

## 2022-10-31 PROCEDURE — 99214 OFFICE O/P EST MOD 30 MIN: CPT | Performed by: NURSE PRACTITIONER

## 2022-10-31 PROCEDURE — 80053 COMPREHEN METABOLIC PANEL: CPT | Performed by: INTERNAL MEDICINE

## 2022-10-31 PROCEDURE — 93971 EXTREMITY STUDY: CPT | Performed by: INTERNAL MEDICINE

## 2022-10-31 PROCEDURE — 93971 EXTREMITY STUDY: CPT

## 2022-10-31 PROCEDURE — 36592 COLLECT BLOOD FROM PICC: CPT

## 2022-10-31 PROCEDURE — 85025 COMPLETE CBC W/AUTO DIFF WBC: CPT | Performed by: INTERNAL MEDICINE

## 2022-10-31 NOTE — PROGRESS NOTES
Patient here for labs. Complained on right upper arm pain, same arm as picc line. Denies any recent injury. No swelling or redness noted. Red port on PICC would not flush. Purple port flushed easily and good blood return. Call to [facility] to notify. JULIA, RN

## 2022-10-31 NOTE — PROGRESS NOTES
CHEMOTHERAPY PREPARATION    Abraham Wu  [id number]  1950    Chief Complaint: Treatment preparation and needs assessment    History of present illness:  Abraham Wu is a 72 y.o. year old male who is here today for treatment preparation and needs assessment.  The patient has been diagnosed with Hodgkin's lymphoma.  He received his first treatment inpatient on 10/8/2022.  Cycle #1 was complicated by GI bleed requiring multiple blood transfusion and ultimately coil artery embolization 10/12; neutropenic fever, Candida esophagitis and mucositis.  He had a prolonged ICU stay and required enteral feeding.  He was discharged 10/20/2022.  He was unsure if he wanted to proceed with any further treatment but he has improved clinically since discharge and wants to proceed with treatment.      He complains today of right shoulder pain that started yesterday.  He denies any injury to shoulder.  States it hurts when he moves his arm at all and it is sharp pain.  He has a PICC line in his right arm and had blood drawn today.  Nurse states that one port would not flush or draw blood.  Eating and drinking has improved.  Denies nausea, vomiting or diarrhea.  Gaining strength back.  Order has been placed for PT/OT.  Denies shortness of breath or chest pain.      Oncology History:    Oncology/Hematology History   Hodgkin lymphoma (HCC)   10/7/2022 Initial Diagnosis    Hodgkin lymphoma (HCC)     10/8/2022 -  Chemotherapy    OP HODGKIN LYMPHOMA  BV+AVD (Brentuximab vedotin / Doxorubicin / Vinblastine / Dacarbazine)     10/28/2022 Cancer Staged    Staging form: Hodgkin And Non-Hodgkin Lymphoma, AJCC 8th Edition  - Clinical: Stage IV - Signed by Kaycee Leary MD on 10/28/2022         The current medication list and allergy list were reviewed and reconciled.     Past Medical History, Past Surgical History, Social History, Family History have been reviewed and are without significant changes except as mentioned.    Review of  "Systems:    Review of Systems   Constitutional: Negative for appetite change, fatigue, fever and unexpected weight change.   HENT: Negative for mouth sores, sore throat and trouble swallowing.    Respiratory: Negative for cough, shortness of breath and wheezing.    Cardiovascular: Negative for chest pain, palpitations and leg swelling.   Gastrointestinal: Negative for abdominal distention, abdominal pain, constipation, diarrhea, nausea and vomiting.   Genitourinary: Negative for difficulty urinating, dysuria and frequency.   Musculoskeletal: Negative for arthralgias.   Skin: Negative for pallor, rash and wound.   Neurological: Negative for dizziness and weakness.   Hematological: Does not bruise/bleed easily.   Psychiatric/Behavioral: Negative for confusion and sleep disturbance. The patient is not nervous/anxious.        Physical Exam:         10/31/2022   Temp 97.3 °F (36.3 °C)   Pulse 101   Resp 18   /93   SpO2 100 %   Height 162.6 cm (64.02\")   Weight 60.8 kg (134 lb)       Vitals:    10/31/22 0923   PainSc:   7   PainLoc: Shoulder          ECOG: (2) Ambulatory & Capable of Self Care, Unable to Carry Out Work Activity, Up & About Greater Than 50% of Waking Hours    General: well appearing, in no acute distress  Cardiovascular: regular rate and rhythm, no murmurs noted  Lungs: clear to auscultation bilaterally, respirations even and unlabored  Extremities: PICC in right arm, right upper extremity swelling and pain in right shoulder with any movement  Skin: no rashes, lesions, bruising, or petechiae  Psych: Mood is stable            NEEDS ASSESSMENTS    Genetics  The patient's new diagnosis and family history have been reviewed for genetic counseling needs. A genetic referral is not recommended.     Psychosocial  The patient has completed a PHQ-9 Depression Screening and the Distress Thermometer (DT) today.   PHQ-9 results show 5-9 (Mild Depression). The patient scored their distress today as 6 on a scale of " "0-10 with 0 being no distress and 10 being extreme distress.   Problems marked by the patient as being an issue for them within the last week include emotional problems and physical problems.   Results were reviewed along with psychosocial resources offered by our cancer center.  Our oncology social worker will be flagged for a DT score of 4 or above, and a same day call will be made for a score of 9 or 10.  A mental health referral is offered at this time. The patient is not interested in a referral to [facility].   Copies of patient's questionnaires will be scanned into EMR for details and further reference.    Barriers to care  A barriers form was also completed by the patient today. We discussed services offered by our facility to help him have adequate access to care. The patient was given the name and contact information for our Oncology Social Worker, Stephanie Steward.  Based upon barriers assessment today, the patient will require a follow-up call from the  to further discuss needs.   A copy of the barriers form will also be scanned into EMR for details and further reference.     VAD Assessment  The patient and I discussed planned intervenous chemotherapy as well as other IV treatments that are often needed throughout the course of treatment. These may include, but are not limited to blood transfusions, antibiotics, and IV hydration. The vasculature does not appear to be adequate for multiple peripheral IVs throughout their treatment course. PICC line in place.  Planning for Port-A-Cath placement prior to cycle #2.      Advanced Care Planning  The patient and I discussed advanced care planning, \"Conversations that Matter\".   This service was offered, free of charge, for development of advance directives with a certified ACP facilitator.  The patient does not have an up-to-date advanced directive.  The patient is not interested in an appointment with one of our facilitators to create or " update their advanced directives.      Palliative Care  The patient and I discussed palliative care services. Palliative care is not the same as Hospice care. This is specialized medical care for people living with serious illness with the goal of improving quality of life for the patient and their family. [facility] offers our patients outpatient palliative care early along with their treatment to assist in coordination of care, symptom management, pain management, and medical decision making.  Oncology criteria for palliative care referral is not met at this time. The patient is not interested in a palliative care consultation.     Additional Referral needs  none      CHEMOTHERAPY EDUCATION    Chemotherapy education completed during hospitalization.  See their documentation for further details.    Booklets Given: Chemotherapy and You [x]  Nutrition for the Patient with Cancer During Treatment [x]    Sexuality/Fertility Books []     Chemotherapy Regimen:   Treatment Plans     Name Type Plan Dates Plan Provider         Active    OP SUPPORTIVE ELECTROLYTE REPLACEMENT ONCOLOGY SUPPORTIVE CARE 1  10/28/2022 - Present Kaycee Leary MD     OP HODGKIN LYMPHOMA  BV+AVD (Brentuximab vedotin / Doxorubicin / Vinblastine / Dacarbazine) ONCOLOGY TREATMENT  10/7/2022 - Present River Odom MD                    Chemotherapy education comprehension reviewed.      Assessment and Plan:    Diagnoses and all orders for this visit:    1. Pain and swelling of right upper extremity (Primary)  -     Duplex Venous Upper Extremity - Right CAR; Future    2. Hodgkin lymphoma of lymph nodes of multiple regions, unspecified Hodgkin lymphoma type (HCC)    The patient and I have reviewed their cancer diagnosis and scheduled treatment plan. Needs assessment was completed including genetics, psychosocial needs, barriers to care, VAD evaluation, advanced care planning, and palliative care services. Referrals have been ordered as appropriate  based upon our evaluation and patient desires.     Chemotherapy teaching was also completed today per pharmacy.  Adequate time was given to answer questions.  Patient and family are aware of their care team members and contact information if they have questions or problems throughout the treatment course. The patient is adequately prepared for cycle #1 day 15 tomorrow.       Patient had pretreatment labs drawn today.    I ordered duplex right upper extremity to rule out DVT.  I will notify patient of results when available.  Patient is scheduled for follow up with Dr. Leary tomorrow and treatment.      I spent 30 minutes caring for Abraham on this date of service. This time includes time spent by me in the following activities: preparing for the visit, reviewing tests, performing a medically appropriate examination and/or evaluation, counseling and educating the patient/family/caregiver, documenting information in the medical record and care coordination.     Vale Patrick, APRN  10/31/22

## 2022-10-31 NOTE — TELEPHONE ENCOUNTER
Notified patient that per Dr. Leary, he should take 2 tablets twice daily on his potassium (40 meQ BID).  Patient v/u.

## 2022-11-01 ENCOUNTER — HOSPITAL ENCOUNTER (OUTPATIENT)
Dept: ONCOLOGY | Facility: HOSPITAL | Age: 72
Setting detail: INFUSION SERIES
Discharge: HOME | End: 2022-11-01

## 2022-11-01 ENCOUNTER — OFFICE VISIT (OUTPATIENT)
Dept: ONCOLOGY | Facility: CLINIC | Age: 72
End: 2022-11-01

## 2022-11-01 VITALS
TEMPERATURE: 98 F | RESPIRATION RATE: 18 BRPM | HEIGHT: 64 IN | BODY MASS INDEX: 22.88 KG/M2 | SYSTOLIC BLOOD PRESSURE: 102 MMHG | HEART RATE: 82 BPM | WEIGHT: 134 LBS | OXYGEN SATURATION: 98 % | DIASTOLIC BLOOD PRESSURE: 64 MMHG

## 2022-11-01 DIAGNOSIS — C81.98 HODGKIN LYMPHOMA OF LYMPH NODES OF MULTIPLE REGIONS, UNSPECIFIED HODGKIN LYMPHOMA TYPE: Primary | ICD-10-CM

## 2022-11-01 DIAGNOSIS — E87.6 HYPOKALEMIA: Primary | ICD-10-CM

## 2022-11-01 DIAGNOSIS — C81.93 HODGKIN LYMPHOMA OF INTRA-ABDOMINAL LYMPH NODES, UNSPECIFIED HODGKIN LYMPHOMA TYPE: Primary | ICD-10-CM

## 2022-11-01 PROCEDURE — 25010000002 FOSAPREPITANT PER 1 MG: Performed by: INTERNAL MEDICINE

## 2022-11-01 PROCEDURE — 96367 TX/PROPH/DG ADDL SEQ IV INF: CPT

## 2022-11-01 PROCEDURE — 25010000002 DEXAMETHASONE SODIUM PHOSPHATE 100 MG/10ML SOLUTION: Performed by: INTERNAL MEDICINE

## 2022-11-01 PROCEDURE — 96376 TX/PRO/DX INJ SAME DRUG ADON: CPT

## 2022-11-01 PROCEDURE — 99215 OFFICE O/P EST HI 40 MIN: CPT | Performed by: INTERNAL MEDICINE

## 2022-11-01 PROCEDURE — 25010000002 PALONOSETRON 0.25 MG/5ML SOLUTION PREFILLED SYRINGE: Performed by: INTERNAL MEDICINE

## 2022-11-01 PROCEDURE — 25010000002 DACARBAZINE PER 200 MG: Performed by: INTERNAL MEDICINE

## 2022-11-01 PROCEDURE — 96409 CHEMO IV PUSH SNGL DRUG: CPT

## 2022-11-01 PROCEDURE — 96413 CHEMO IV INFUSION 1 HR: CPT

## 2022-11-01 PROCEDURE — 96417 CHEMO IV INFUS EACH ADDL SEQ: CPT

## 2022-11-01 PROCEDURE — 25010000002 BRENTUXIMAB 50 MG RECONSTITUTED SOLUTION 1 EACH VIAL: Performed by: INTERNAL MEDICINE

## 2022-11-01 PROCEDURE — 25010000002 VINBLASTINE PER 1 MG: Performed by: INTERNAL MEDICINE

## 2022-11-01 PROCEDURE — 96411 CHEMO IV PUSH ADDL DRUG: CPT

## 2022-11-01 PROCEDURE — 25010000002 DOXORUBICIN PER 10 MG: Performed by: INTERNAL MEDICINE

## 2022-11-01 PROCEDURE — 63710000001 DIPHENHYDRAMINE PER 50 MG: Performed by: INTERNAL MEDICINE

## 2022-11-01 PROCEDURE — 96375 TX/PRO/DX INJ NEW DRUG ADDON: CPT

## 2022-11-01 RX ORDER — DOXORUBICIN HYDROCHLORIDE 2 MG/ML
20 INJECTION, SOLUTION INTRAVENOUS ONCE
Status: CANCELLED | OUTPATIENT
Start: 2022-11-01

## 2022-11-01 RX ORDER — DOXORUBICIN HYDROCHLORIDE 2 MG/ML
20 INJECTION, SOLUTION INTRAVENOUS ONCE
Status: COMPLETED | OUTPATIENT
Start: 2022-11-01 | End: 2022-11-01

## 2022-11-01 RX ORDER — SODIUM CHLORIDE 9 MG/ML
250 INJECTION, SOLUTION INTRAVENOUS AS NEEDED
Status: CANCELLED | OUTPATIENT
Start: 2022-11-01

## 2022-11-01 RX ORDER — ACETAMINOPHEN 325 MG/1
650 TABLET ORAL ONCE
Status: COMPLETED | OUTPATIENT
Start: 2022-11-01 | End: 2022-11-01

## 2022-11-01 RX ORDER — PALONOSETRON 0.05 MG/ML
0.25 INJECTION, SOLUTION INTRAVENOUS ONCE
Status: COMPLETED | OUTPATIENT
Start: 2022-11-01 | End: 2022-11-01

## 2022-11-01 RX ORDER — SODIUM CHLORIDE 9 MG/ML
250 INJECTION, SOLUTION INTRAVENOUS ONCE
Status: COMPLETED | OUTPATIENT
Start: 2022-11-01 | End: 2022-11-01

## 2022-11-01 RX ORDER — DIPHENHYDRAMINE HCL 25 MG
25 CAPSULE ORAL ONCE
Status: COMPLETED | OUTPATIENT
Start: 2022-11-01 | End: 2022-11-01

## 2022-11-01 RX ORDER — OLANZAPINE 5 MG/1
5 TABLET ORAL ONCE
Status: COMPLETED | OUTPATIENT
Start: 2022-11-01 | End: 2022-11-01

## 2022-11-01 RX ADMIN — VINBLASTINE SULFATE 8 MG: 1 INJECTION INTRAVENOUS at 12:00

## 2022-11-01 RX ADMIN — ACETAMINOPHEN 650 MG: 325 TABLET, FILM COATED ORAL at 12:16

## 2022-11-01 RX ADMIN — DACARBAZINE 500 MG: 10 INJECTION, POWDER, FOR SOLUTION INTRAVENOUS at 12:21

## 2022-11-01 RX ADMIN — DIPHENHYDRAMINE HYDROCHLORIDE 25 MG: 25 CAPSULE ORAL at 12:16

## 2022-11-01 RX ADMIN — SODIUM CHLORIDE 250 ML: 9 INJECTION, SOLUTION INTRAVENOUS at 10:10

## 2022-11-01 RX ADMIN — OLANZAPINE 5 MG: 5 TABLET, FILM COATED ORAL at 10:10

## 2022-11-01 RX ADMIN — DOXORUBICIN HYDROCHLORIDE 34 MG: 2 INJECTION, SOLUTION INTRAVENOUS at 11:52

## 2022-11-01 RX ADMIN — SODIUM CHLORIDE 150 MG: 9 INJECTION, SOLUTION INTRAVENOUS at 10:15

## 2022-11-01 RX ADMIN — PALONOSETRON 0.25 MG: 0.25 INJECTION, SOLUTION INTRAVENOUS at 10:10

## 2022-11-01 RX ADMIN — DEXAMETHASONE SODIUM PHOSPHATE 12 MG: 10 INJECTION, SOLUTION INTRAMUSCULAR; INTRAVENOUS at 10:15

## 2022-11-01 RX ADMIN — BRENTUXIMAB VEDOTIN 75 MG: 50 INJECTION, POWDER, LYOPHILIZED, FOR SOLUTION INTRAVENOUS at 12:56

## 2022-11-01 NOTE — PROGRESS NOTES
Hematology [facility] On[facility] [place]  Office number [phone]    Fax number [phone]     Follow up     Date: 10/26/2022     Patient Name: Abraham Wu  MRN: [id number]  : 1950      Chief Complaint: follow up/treatment planning    Surgeon: Dr. Hiram Viveros MD    Cancer Staging: IV    History of Present Illness: Abraham Wu is a pleasant 72 y.o. male with PMH of hypertension, sleep apnea, hard of hearing who presents today for follow up of Hodgkin Lymphoma. The patient is accompanied by his supportive wife.     He initially presented 2022 with intractable diarrhea, low appetite, and a greater than 50 pound weight loss over several month period associated with intermittent fevers.  CT of the chest abdomen pelvis obtained in the ER shows of rectal mass spanning greater than 12 cm with adjacent adenopathy, bulky RP adenopathy, and findings concerning for left renal and liver metastases.  Small right pleural effusion with nodularity.  There was concern for impending obstruction, so he underwent diverting colostomy and biopsy on 2022.  Biopsies from the peritoneam showed a fibrotic nodule histiocytes and eosinophils admixed with CD30 positive large cells.  Rectal biopsies showed involvement by CD30 positive lymphoma, classic Hodgkin lymphoma versus CD30 positive T-cell or B-cell lymphoma.  There was extensive fibrosis and crush artifact.  He underwent repeat transrectal biopsy 10/4/2022 for further characterization which was felt to be most consistent with classical Hodgkin lymphoma.    He initiated chemotherapy with Brentuximab-AVD as an inpatient on 10/8/2022.  Cycle #1 was complicated by GI bleed requiring multiple blood transfusion and ultimately coil artery embolization 10/12; neutropenic fever, Candida esophagitis and mucositis.  He had a prolonged ICU stay  And required enteral feeding.  He was discharged 10/20/2022.    Treatment history:  Brentuximab-AVD: C1  10/8/22    Interval history:    He is feeling substantially better than prior to discharge.  He is up and ambulating around his home with a walker.  His energy still is low, he thinks he is about 25 to 50% better.  He denies any shortness of breath.  His appetite is much improved.  He is using boost +3 times daily and drinking lots of fluid.  His taste is still off this is a barrier to escalating his oral intake.  He denies any nausea.  His ostomy output has remained light brown.  He is emptying approximately twice daily, but has had very little output for the previous 24 hours.  However he did start having light brown output again this morning.  He denies any abdominal pain.  While inpatient he had told me that he did not want to pursue any further chemotherapy, but he has reconsidered.  He does not feel well enough to proceed with treatment today, but is interested in considering retreatment next week.  He was supposed to have home health for PICC dressing changes, but he reports home health called yesterday and said they would not be able to accommodate this because he did not have supplies.    Past Medical History:   Past Medical History:   Diagnosis Date   • benign polypoid tissue right lung    • Hyperlipidemia    • Hypertension    • Mycobacterium mucogenicum    • SHERRI (obstructive sleep apnea)    • Seasonal allergies        Past Surgical History:   Past Surgical History:   Procedure Laterality Date   • BRONCHOSCOPY      removal of obstructing polypoid tissue right middle lung   • COLOSTOMY N/A 9/22/2022    Procedure: LAPAROSCOPIC COLOSTOMY CREATION, FLEXIBLE SIGMOIDOSCOPY;  Surgeon: Hiram Viveros MD;  Location: [facility] OR;  Service: General;  Laterality: N/A;   • ENDOSCOPY N/A 10/10/2022    Procedure: ESOPHAGOGASTRODUODENOSCOPY;  Surgeon: Neeraj Lynn MD;  Location: [facility] ENDOSCOPY;  Service: Gastroenterology;  Laterality: N/A;   • ENDOSCOPY N/A 10/12/2022    Procedure: ESOPHAGOGASTRODUODENOSCOPY;   Surgeon: Brunner, Mark I, MD;  Location:  [facility] ENDOSCOPY;  Service: Gastroenterology;  Laterality: N/A;   • EXAM UNDER ANESTHESIA, RECTAL BIOPSY N/A 10/4/2022    Procedure: EXAM UNDER ANESTHESIA, TRANSANAL BIOPSY WITH TRUCUT NEEDLE (LITHOTOMY-CANDY CANE);  Surgeon: Hiram Viveros MD;  Location:  [facility] OR;  Service: General;  Laterality: N/A;       Family History:   Family History   Problem Relation Age of Onset   • Diabetes Mother    • Diabetes Father    • Heart disease Father        Social History:   Social History     Socioeconomic History   • Marital status:    Tobacco Use   • Smoking status: Never   • Smokeless tobacco: Never   Vaping Use   • Vaping Use: Never used   Substance and Sexual Activity   • Alcohol use: Not Currently     Comment: previously drank 2 glasses of wine/beer nightly   • Drug use: Never   • Sexual activity: Defer       Medications:     Current Outpatient Medications:   •  fluticasone (FLONASE) 50 MCG/ACT nasal spray, 1 spray into the nostril(s) as directed by provider Daily., Disp: , Rfl:   •  lidocaine-prilocaine (EMLA) 2.5-2.5 % cream, Apply 1 application topically to the appropriate area as directed As Needed (45-60 minutes prior to port access.  Cover with saran/plastic wrap.)., Disp: 30 g, Rfl: 3  •  OLANZapine (zyPREXA) 5 MG tablet, Take 1 tablet by mouth Every Night. For 3 days on Days 2, 3 and 4 and on days 16, 17, and 18., Disp: 6 tablet, Rfl: 5  •  ondansetron (Zofran) 8 MG tablet, Take 1 tablet by mouth Every 8 (Eight) Hours As Needed for Nausea or Vomiting., Disp: 30 tablet, Rfl: 5  •  pantoprazole (PROTONIX) 40 MG EC tablet, Take 1 tablet by mouth 2 (Two) Times a Day for 30 days., Disp: 60 tablet, Rfl: 0  •  [START ON 11/20/2022] pantoprazole (PROTONIX) 40 MG EC tablet, Take 1 tablet by mouth Daily for 30 days., Disp: 30 tablet, Rfl: 0  •  potassium chloride (K-DUR,KLOR-CON) 20 MEQ CR tablet, Take 2 tablets by mouth Daily., Disp: 30 tablet, Rfl: 0  •  tamsulosin  "(FLOMAX) 0.4 MG capsule 24 hr capsule, Take 1 capsule by mouth Daily for 30 days., Disp: 30 capsule, Rfl: 0  No current facility-administered medications for this visit.    Facility-Administered Medications Ordered in Other Visits:   •  acetaminophen (TYLENOL) tablet 650 mg, 650 mg, Oral, Once, River Odom MD  •  brentuximab (ADCETRIS) 75 mg in sodium chloride 0.9 % 100 mL chemo IVPB, 1.2 mg/kg (Treatment Plan Recorded), Intravenous, Once, Kaycee Leary MD  •  dacarbazine (DTIC) 500 mg in dextrose (D5W) 5 % 325 mL chemo IVPB, 300 mg/m2 (Treatment Plan Recorded), Intravenous, Once, Kaycee Leary MD  •  diphenhydrAMINE (BENADRYL) capsule 25 mg, 25 mg, Oral, Once, River Odom MD  •  DOXOrubicin (ADRIAMYCIN) chemo injection 34 mg, 20 mg/m2 (Treatment Plan Recorded), Intravenous, Once, Kaycee Leary MD  •  vinBLAStine (VELBAN) 8 mg in sodium chloride 0.9 % 63 mL chemo IVPB, 4.8 mg/m2 (Treatment Plan Recorded), Intravenous, Once, Kaycee Leary MD    Allergies:   No Known Allergies    Objective     Vital Signs:   Vitals:    11/01/22 0846   BP: 102/64  Comment: LUE   Pulse: 82   Resp: 18   Temp: 98 °F (36.7 °C)   TempSrc: Infrared   SpO2: 98%  Comment: RA   Weight: 60.8 kg (134 lb)  Comment: per pt   Height: 162.6 cm (64\")   PainSc:   6   PainLoc: Shoulder  Comment: Right    Body mass index is 23 kg/m².   Pain Score    11/01/22 0846   PainSc:   6   PainLoc: Shoulder  Comment: Right       ECOG Performance Status: 2 - Symptomatic, <50% confined to bed    Physical Exam:   General: No acute distress. Well appearing Pale  HEENT: Normocephalic, atraumatic. Sclera anicteric. Masked.  Neck: supple, no adenopathy.   Cardiovascular: regular rate and rhythm,. No murmurs.   Respiratory: Normal rate. Clear to auscultation bilaterally  Abdomen: Soft, nontender, non distended with normoactive bowel sounds Ostomy output light brown  Lymph: no cervical, supraclavicular or axillary adenopathy  Neuro: Alert and " oriented x 3. No focal deficits.   Ext: Symmetric, no swelling.   Psych: Euthymic      Laboratory/Imaging Reviewed:   Hospital Outpatient Visit on 10/31/2022   Component Date Value Ref Range Status   • Target HR (85%) 10/31/2022 128  bpm Final   • Max. Pred. HR (100%) 10/31/2022 150  bpm Final   • Right Internal Jugular Spont 10/31/2022 Y   Final   • Right Internal Jugular Phasic 10/31/2022 Y   Final   • Right Internal Jugular Compress 10/31/2022 C   Final   • Right Subclavian Spont 10/31/2022 Y   Final   • Right Subclavian Phasic 10/31/2022 Y   Final   • Right Subclavian Compress 10/31/2022 C   Final   • Right Subclavian Augment 10/31/2022 Y   Final   • Right Axillary Spont 10/31/2022 Y   Final   • Right Axillary Phasic 10/31/2022 Y   Final   • Right Axillary Compress 10/31/2022 C   Final   • Right Axillary Augment 10/31/2022 Y   Final   • Right Brachial Spont 10/31/2022 Y   Final   • Right Brachial Phasic 10/31/2022 Y   Final   • Right Brachial Compress 10/31/2022 C   Final   • Right Brachial Augment 10/31/2022 Y   Final   • Right Radial Compress 10/31/2022 C   Final   • Right Ulnar Compress 10/31/2022 C   Final   • Right Basilic Upper Compress 10/31/2022 C   Final   • Right Basilic Forearm Compress 10/31/2022 C   Final   • Right Cephalic Upper Compress 10/31/2022 C   Final   • Right Cephalic Forearm Compress 10/31/2022 C   Final   • Left Subclavian Spont 10/31/2022 Y   Final   • Left Subclavian Phasic 10/31/2022 Y   Final   Hospital Outpatient Visit on 10/31/2022   Component Date Value Ref Range Status   • Glucose 10/31/2022 209 (H)  65 - 99 mg/dL Final   • BUN 10/31/2022 6 (L)  8 - 23 mg/dL Final   • Creatinine 10/31/2022 0.43 (L)  0.76 - 1.27 mg/dL Final   • Sodium 10/31/2022 129 (L)  136 - 145 mmol/L Final   • Potassium 10/31/2022 3.1 (L)  3.5 - 5.2 mmol/L Final   • Chloride 10/31/2022 95 (L)  98 - 107 mmol/L Final   • CO2 10/31/2022 23.0  22.0 - 29.0 mmol/L Final   • Calcium 10/31/2022 8.1 (L)  8.6 - 10.5  mg/dL Final   • Total Protein 10/31/2022 6.0  6.0 - 8.5 g/dL Final   • Albumin 10/31/2022 2.80 (L)  3.50 - 5.20 g/dL Final   • ALT (SGPT) 10/31/2022 31  1 - 41 U/L Final   • AST (SGOT) 10/31/2022 13  1 - 40 U/L Final   • Alkaline Phosphatase 10/31/2022 247 (H)  39 - 117 U/L Final   • Total Bilirubin 10/31/2022 0.5  0.0 - 1.2 mg/dL Final   • Globulin 10/31/2022 3.2  gm/dL Final    Calculated Result   • A/G Ratio 10/31/2022 0.9  g/dL Final   • BUN/Creatinine Ratio 10/31/2022 14.0  7.0 - 25.0 Final   • Anion Gap 10/31/2022 11.0  5.0 - 15.0 mmol/L Final   • eGFR 10/31/2022 114.8  >60.0 mL/min/1.73 Final    National Kidney Foundation and American Society of Nephrology (ASN) Task Force recommended calculation based on the Chronic Kidney Disease Epidemiology Collaboration (CKD-EPI) equation refit without adjustment for race.   • WBC 10/31/2022 11.85 (H)  3.40 - 10.80 10*3/mm3 Final   • RBC 10/31/2022 2.69 (L)  4.14 - 5.80 10*6/mm3 Final   • Hemoglobin 10/31/2022 7.9 (L)  13.0 - 17.7 g/dL Final   • Hematocrit 10/31/2022 24.2 (L)  37.5 - 51.0 % Final   • MCV 10/31/2022 90.0  79.0 - 97.0 fL Final   • MCH 10/31/2022 29.4  26.6 - 33.0 pg Final   • MCHC 10/31/2022 32.6  31.5 - 35.7 g/dL Final   • RDW 10/31/2022 18.5 (H)  12.3 - 15.4 % Final   • RDW-SD 10/31/2022 59.5 (H)  37.0 - 54.0 fl Final   • MPV 10/31/2022 8.2  6.0 - 12.0 fL Final   • Platelets 10/31/2022 457 (H)  140 - 450 10*3/mm3 Final   • Neutrophil % 10/31/2022 86.6 (H)  42.7 - 76.0 % Final   • Lymphocyte % 10/31/2022 4.8 (L)  19.6 - 45.3 % Final   • Monocyte % 10/31/2022 6.2  5.0 - 12.0 % Final   • Eosinophil % 10/31/2022 0.0 (L)  0.3 - 6.2 % Final   • Basophil % 10/31/2022 0.2  0.0 - 1.5 % Final   • Immature Grans % 10/31/2022 2.2 (H)  0.0 - 0.5 % Final   • Neutrophils, Absolute 10/31/2022 10.26 (H)  1.70 - 7.00 10*3/mm3 Final   • Lymphocytes, Absolute 10/31/2022 0.57 (L)  0.70 - 3.10 10*3/mm3 Final   • Monocytes, Absolute 10/31/2022 0.74  0.10 - 0.90 10*3/mm3 Final    • Eosinophils, Absolute 10/31/2022 0.00  0.00 - 0.40 10*3/mm3 Final   • Basophils, Absolute 10/31/2022 0.02  0.00 - 0.20 10*3/mm3 Final   • Immature Grans, Absolute 10/31/2022 0.26 (H)  0.00 - 0.05 10*3/mm3 Final   Hospital Outpatient Visit on 10/28/2022   Component Date Value Ref Range Status   • Glucose 10/28/2022 127 (H)  65 - 99 mg/dL Final   • BUN 10/28/2022 4 (L)  8 - 23 mg/dL Final   • Creatinine 10/28/2022 0.38 (L)  0.76 - 1.27 mg/dL Final   • Sodium 10/28/2022 128 (L)  136 - 145 mmol/L Final   • Potassium 10/28/2022 2.7 (L)  3.5 - 5.2 mmol/L Final   • Chloride 10/28/2022 93 (L)  98 - 107 mmol/L Final   • CO2 10/28/2022 26.0  22.0 - 29.0 mmol/L Final   • Calcium 10/28/2022 8.5 (L)  8.6 - 10.5 mg/dL Final   • Total Protein 10/28/2022 5.7 (L)  6.0 - 8.5 g/dL Final   • Albumin 10/28/2022 2.70 (L)  3.50 - 5.20 g/dL Final   • ALT (SGPT) 10/28/2022 20  1 - 41 U/L Final   • AST (SGOT) 10/28/2022 16  1 - 40 U/L Final   • Alkaline Phosphatase 10/28/2022 218 (H)  39 - 117 U/L Final   • Total Bilirubin 10/28/2022 0.5  0.0 - 1.2 mg/dL Final   • Globulin 10/28/2022 3.0  gm/dL Final    Calculated Result   • A/G Ratio 10/28/2022 0.9  g/dL Final   • BUN/Creatinine Ratio 10/28/2022 10.5  7.0 - 25.0 Final   • Anion Gap 10/28/2022 9.0  5.0 - 15.0 mmol/L Final   • eGFR 10/28/2022 119.2  >60.0 mL/min/1.73 Final    National Kidney Foundation and American Society of Nephrology (ASN) Task Force recommended calculation based on the Chronic Kidney Disease Epidemiology Collaboration (CKD-EPI) equation refit without adjustment for race.   • Magnesium 10/28/2022 1.8  1.6 - 2.4 mg/dL Final   Hospital Outpatient Visit on 10/26/2022   Component Date Value Ref Range Status   • Magnesium 10/26/2022 1.7  1.6 - 2.4 mg/dL Final   • Glucose 10/26/2022 99  65 - 99 mg/dL Final   • BUN 10/26/2022 5 (L)  8 - 23 mg/dL Final   • Creatinine 10/26/2022 0.39 (L)  0.76 - 1.27 mg/dL Final   • Sodium 10/26/2022 132 (L)  136 - 145 mmol/L Final   •  Potassium 10/26/2022 2.5 (C)  3.5 - 5.2 mmol/L Final    Slight hemolysis detected by analyzer. Results may be affected.   • Chloride 10/26/2022 96 (L)  98 - 107 mmol/L Final   • CO2 10/26/2022 28.0  22.0 - 29.0 mmol/L Final   • Calcium 10/26/2022 8.5 (L)  8.6 - 10.5 mg/dL Final   • Total Protein 10/26/2022 5.8 (L)  6.0 - 8.5 g/dL Final   • Albumin 10/26/2022 2.90 (L)  3.50 - 5.20 g/dL Final   • ALT (SGPT) 10/26/2022 19  1 - 41 U/L Final   • AST (SGOT) 10/26/2022 9  1 - 40 U/L Final   • Alkaline Phosphatase 10/26/2022 212 (H)  39 - 117 U/L Final   • Total Bilirubin 10/26/2022 0.5  0.0 - 1.2 mg/dL Final   • Globulin 10/26/2022 2.9  gm/dL Final    Calculated Result   • A/G Ratio 10/26/2022 1.0  g/dL Final   • BUN/Creatinine Ratio 10/26/2022 12.8  7.0 - 25.0 Final   • Anion Gap 10/26/2022 8.0  5.0 - 15.0 mmol/L Final   • eGFR 10/26/2022 118.3  >60.0 mL/min/1.73 Final    National Kidney Foundation and American Society of Nephrology (ASN) Task Force recommended calculation based on the Chronic Kidney Disease Epidemiology Collaboration (CKD-EPI) equation refit without adjustment for race.   • WBC 10/26/2022 12.42 (H)  3.40 - 10.80 10*3/mm3 Final   • RBC 10/26/2022 2.56 (L)  4.14 - 5.80 10*6/mm3 Final   • Hemoglobin 10/26/2022 7.5 (L)  13.0 - 17.7 g/dL Final   • Hematocrit 10/26/2022 22.4 (L)  37.5 - 51.0 % Final   • MCV 10/26/2022 87.5  79.0 - 97.0 fL Final   • MCH 10/26/2022 29.3  26.6 - 33.0 pg Final   • MCHC 10/26/2022 33.5  31.5 - 35.7 g/dL Final   • RDW 10/26/2022 17.2 (H)  12.3 - 15.4 % Final   • RDW-SD 10/26/2022 48.7  37.0 - 54.0 fl Final   • MPV 10/26/2022 8.6  6.0 - 12.0 fL Final   • Platelets 10/26/2022 550 (H)  140 - 450 10*3/mm3 Final   • Neutrophil % 10/26/2022 86.7 (H)  42.7 - 76.0 % Final   • Lymphocyte % 10/26/2022 3.8 (L)  19.6 - 45.3 % Final   • Monocyte % 10/26/2022 4.3 (L)  5.0 - 12.0 % Final   • Eosinophil % 10/26/2022 0.0 (L)  0.3 - 6.2 % Final   • Basophil % 10/26/2022 0.2  0.0 - 1.5 % Final   •  Immature Grans % 10/26/2022 5.0 (H)  0.0 - 0.5 % Final   • Neutrophils, Absolute 10/26/2022 10.77 (H)  1.70 - 7.00 10*3/mm3 Final   • Lymphocytes, Absolute 10/26/2022 0.47 (L)  0.70 - 3.10 10*3/mm3 Final   • Monocytes, Absolute 10/26/2022 0.53  0.10 - 0.90 10*3/mm3 Final   • Eosinophils, Absolute 10/26/2022 0.00  0.00 - 0.40 10*3/mm3 Final   • Basophils, Absolute 10/26/2022 0.03  0.00 - 0.20 10*3/mm3 Final   • Immature Grans, Absolute 10/26/2022 0.62 (H)  0.00 - 0.05 10*3/mm3 Final   No results displayed because visit has over 200 results.          Adult Transthoracic Echo Complete W/ Cont if Necessary Per Protocol    Result Date: 10/3/2022  Narrative: · Left ventricular ejection fraction appears to be 56 - 60%. Left ventricular systolic function is normal. · Global longitudinal LV strain (GLS) = -27.0%. · Left atrial volume is moderately increased. · Mild mitral valve regurgitation is present. · Mild tricuspid valve regurgitation is present. · Estimated right ventricular systolic pressure from tricuspid regurgitation is mildly elevated (35-45 mmHg). · Mild dilation of the ascending aorta is present.      CT Abdomen Pelvis With & Without Contrast    Result Date: 10/11/2022  Narrative: DATE OF EXAM: 10/11/2022 5:38 PM  PROCEDURE: CT ABDOMEN PELVIS W WO CONTRAST-  INDICATIONS: GI bleed, may need IR embolization; R50.9-Fever, unspecified; N13.30-Unspecified hydronephrosis; Z93.3-Colostomy status; C79.9-Secondary malignant neoplasm of unspecified site; E87.0-Hyperosmolality and hypernatremia; E87.6-Hypokalemia; C85.90-Non-Hodgkin lymphoma, unspecified, unspecified site; R13.10-Dysphagia, unspecified; C81.98-Hodgkin lymphoma, unspecified, lymph nodes of multiple sites; K9  COMPARISON: CT abdomen and pelvis 10/3/2022  TECHNIQUE: Routine transaxial slices were obtained through the abdomen and pelvis without the administration of intravenous contrast. Additional scanning through the abdomen and pelvis in the arterial,  venous, and delayed phases was performed following the intravenous administration of 100 mL of Isovue 370. Reconstructed coronal and sagittal images were also obtained. Automated exposure control and iterative construction methods were used.  The radiation dose reduction device was turned on for each scan per the ALARA (As Low as Reasonably Achievable) protocol.  FINDINGS: There is a new surgical clip within the duodenum compatible with recent endoscopic duodenal ulcer clipping. The presence of scattered hyperdense medication or oral contrast throughout the GI tract somewhat limits assessment for active GI bleeding. Allowing for this there is no evidence of active GI bleeding. The abdominal aorta and its arterial branches are grossly unremarkable. The venous structures are normal.  There are no new or acute findings within the abdomen or pelvis. Extensive abdominopelvic metastatic disease is unchanged from 10/3/2022 and please refer to that report for further details. Uncomplicated appearance of surgical change of colostomy. Similar small to moderate right and small left pleural effusions within the partially imaged chest. Stable mild bilateral hydronephrosis with symmetric enhancement and excretion of the kidneys. No pneumoperitoneum.      Impression: No evidence of active GI bleeding allowing for limitations of scattered hyperdense material throughout the GI tract, either ingested medication or prior oral contrast. Interval duodenal ulcer clipping, without evidence of active GI bleeding at this site.  No new or acute abdominopelvic findings. Grossly unchanged metastatic disease as detailed on 10/3/2022 CT and please refer to that report for complete details.  This report was finalized on 10/11/2022 6:39 PM by Santino Kaur MD.      CT Head Without Contrast    Result Date: 10/10/2022  Narrative: EXAMINATION: CT HEAD WITHOUT CONTRAST DATE: 10/10/2022 12:18 AM INDICATION: Altered mental status COMPARISON: None  available at the time of this dictation. TECHNIQUE: Noncontrast imaging obtained from the vertex to the skull base.  CT dose lowering techniques were used, to include: automated exposure control, adjustment for patient size, and or use of iterative reconstruction.? FINDINGS: Soft Tissues: No significant soft tissue abnormality. Skull: No underlying skull fracture or radiopaque foreign body. Sinuses: Paranasal sinuses are clear. Mastoids: Mastoid air cells are clear. Globes and Orbits: Globes and orbits are intact. Brain: No acute hemorrhage.  No midline shift, masses, or mass effect.  No evidence of acute infarct by noncontrast CT. Ventricles and Cisterns: Ventricular size and configuration is within normal limits. Basal cisterns are patent. No abnormal extra-axial fluid collection. Senescent Changes: Mild volume loss     Impression: 1. No acute intracranial abnormality. 2. Mild generalized volume loss.  Electronically signed by:  Neeraj Coronado M.D.  10/9/2022 10:53 PM Mountain Time    NM GI Blood Loss    Result Date: 10/15/2022  Narrative: DATE OF EXAM: 10/14/2022 11:10 AM  PROCEDURE: NM GI BLOOD LOSS-  INDICATIONS: GI bleed  COMPARISON: No comparisons available.  TECHNIQUE: Scintigraphic imaging of the abdomen occurred following injection of 25.3 mCi of technetium 99m labeled pertechnetate tagged to pyrophosphate labeled red blood cells. Exam is performed per GI bleeding exam protocol.  FINDINGS: Blood flow images through 60 minutes show generally expected distribution of radiotracer, with slightly greater activity in the expected location of the spleen than typically seen. Images obtained through 60 minutes again show somewhat greater than typical activity in the area of the spleen, however, this is unchanging from 5 minutes to 60 minutes, and appears to represent normal variant splenic activity. No evidence of a transient focus of activity or mobile focus of activity is seen to suggest active GI bleed.       Impression: No evidence of active GI bleed. Suspected normal variant uptake in the spleen.  This report was finalized on 10/15/2022 2:18 PM by Dr. Del Wilks MD.      FL Esophagram Complete Single Contrast    Result Date: 10/12/2022  Narrative: EXAMINATION: FL ESOPHAGRAM COMPLETE SINGLE-CONTRAST-  INDICATION: Scleroderma, Food impaction  Yesterday   Use Barium tablet; R50.9-Fever, unspecified; N13.30-Unspecified hydronephrosis; Z93.3-Colostomy status; C79.9-Secondary malignant neoplasm of unspecified site; E87.0-Hyperosmolality and hypernatremia; E87.6-Hypokalemia; C85.90-Non-Hodgkin lymphoma, unspecified, unspecified site; R13.10-Dysphagia, unspecified; C81.98-Hodgkin lymphoma, unspecified, lymph nod  TECHNIQUE: 1 minute and 24 seconds of fluoroscopic time was used for this exam. 11 associated fluoroscopic series were saved.  imaging reveals a gaseous abdomen.  COMPARISON: NONE  FINDINGS: Preliminary  film shows mildly increased bowel gas in a generalized pattern, but appears to be an endoscopically placed vascular clip in the mid abdomen, that appears to localize to the proximal duodenum.. Under fluoroscopic observation, the patient ingested thin barium. The oral phase of deglutition appeared normal. The esophageal mucosa appeared grossly normal. There was no evidence of a focal esophageal stricture. There was mild gastroesophageal reflux to the level of the thoracic inlet. The patient ingested a barium capsule without difficulty, and it readily passed into the stomach under fluoroscopic observation.       Impression: Mild gastroesophageal reflux to the level of the thoracic inlet    This report was finalized on 10/12/2022 9:09 AM by Dr. Del Wilks MD.      FL Video Swallow With Speech Single Contrast, FL Limited Ugi For Mbs Reflux Single-Contrast    Result Date: 10/7/2022  Narrative: EXAMINATION: FL VIDEO SWALLOW W SPEECH SINGLE-CONTRAST-, FL LIMITED UGI FOR MBS REFLUX SINGLE-CONTRAST-  INDICATION:  dysphagia; R50.9-Fever, unspecified; N13.30-Unspecified hydronephrosis; Z93.3-Colostomy status; C79.9-Secondary malignant neoplasm of unspecified site; E87.0-Hyperosmolality and hypernatremia; E87.6-Hypokalemia; C85.90-Non-Hodgkin lymphoma, unspecified, unspecified site; R13.10-Dysphagia, unspecified  TECHNIQUE: 42 seconds of fluoroscopic time was used for this exam. It is associated fluoroscopic loops are saved. The patient was evaluated in the seated lateral position while taking a variety of consistencies of barium by mouth under the direction of speech pathology.  COMPARISON: NONE  FINDINGS: 42 seconds of fluoroscopy provided for a modified barium swallow, and limited upper GI series. Please see speech therapy report for full details and recommendations. Limited upper GI series demonstrated no signs of a high-grade focal esophageal stricture. There is a moderate sized anterior osteophyte visible on the C4-C5 vertebral levels. There is a mild posterior impression on the cervical esophagus due to this osteophyte, however this impression does not appear to significantly impede swallowing. Gastroesophageal reflux was not demonstrated during this exam.       Impression: Fluoroscopy provided for a modified barium swallow, and limited upper GI series. Please see speech therapy report for full details and recommendations. Limited upper GI series appeared within normal limits.    This report was finalized on 10/7/2022 3:10 PM by Yvon Donnelly.      XR Chest 1 View    Result Date: 10/3/2022  Narrative: DATE OF EXAM: 10/3/2022 3:51 PM  PROCEDURE: XR CHEST 1 VW-  INDICATIONS: Verify PICC placement  COMPARISON: 03/21/2005.  TECHNIQUE: Single radiographic view of the chest was obtained.  FINDINGS: The tip of the right upper extremity PICC line terminates in the superior vena cava. The heart is enlarged. The pulmonary vascular markings are normal. The lungs and pleural spaces are clear.  There are chronic age-related  changes involving the bony thorax and thoracic aorta.      Impression:  1. The tip of the right upper extremity PICC line terminates in the superior vena cava. 2. No active pulmonary disease. 3. Cardiomegaly.  This report was finalized on 10/3/2022 4:09 PM by Preet Kline MD.      Duplex Venous Upper Extremity - Right CAR    Result Date: 10/31/2022  Narrative: •  Normal right upper extremity venous duplex scan. •  PICC line is noted in the right brachial vein.     IR Artery Embolization Occlusion    Result Date: 10/12/2022  Narrative: IR ARTERY EMBOLIZATION OCCLUSION-                                                         History: GI bleed; R50.9-Fever, unspecified; N13.30-Unspecified hydronephrosis; Z93.3-Colostomy status; C79.9-Secondary malignant neoplasm of unspecified site; E87.0-Hyperosmolality and hypernatremia; E87.6-Hypokalemia; C85.90-Non-Hodgkin lymphoma, unspecified, unspecified site; R13.10-Dysphagia, unspecified; C81.98-Hodgkin lymphoma, unspecified, lymph nodes of multiple sites; K92.2-Gastrointestinal hemor    : Yassine Lopez M.D.                                                                            MODALITY: Sonography and fluoroscopy.                                 DOSE REDUCTION: The examination was performed according to departmental dose-optimization program.  FLUOROSCOPY TIME: 7.7 minutes  RADIATION DOSE: 480.2 mGy air Kerma.   SEDATION: Moderate sedation was administered. 3.5 milligram of Versed and 100 micrograms of fentanyl IV was used for moderate sedation monitored under my direction. Total intra service time of sedation was 30 minutes. The patient's vital signs were monitored throughout the procedure and recorded in the patient's medical record by the nurse.  Because of the patient's condition, the anesthesia service was requested to take over the patient's sedation. Please refer to their notes for details.  ANESTHESIA: Lidocaine, local infiltration    MEDICINES:  Not applicable  CONTRAST MEDIUM: Isovue 300, 160 cc.  ESTIMATED blood loss: < 5 cc.          TECHNIQUE: A thorough discussion of the risks, benefits, and alternatives of the procedure, and if applicable, moderate sedation, was carried out with the patient's wife. They were encouraged to ask any questions. Any questions were answered. They verbalized understanding. A written informed consent was then signed.   A multi-component timeout was performed prior to starting the procedure using the departmental protocol.  The procedure room personnel used personal protective equipment. The operators used sterile gloves and if indicated, sterile gowns. The surgical site was prepped with chlorhexidine gluconate  and draped in the maximal applicable sterile fashion.  The right common femoral  artery was localized using anatomic and ultrasonographic landmarks. The artery is patent. Using aseptic precautions, real-time ultrasonographic guidance, after local anesthesia and dermatotomy, the artery was accessed with an access needle. Eventually a guide wire was placed using standard exchanges. Over the wire a 6 French Long vascular sheath was placed. The sheath was connected to a heparinized saline drip.  Selective and superselective catheterization of the following arteries was performed: Celiac artery, superior mesenteric artery, common hepatic artery, gastroduodenal artery.  The equipment used for selective and superselective catheterization was: Sos Omni selective 2, Bentson wire, renegade STC, fathom wire.  At each catheterization and superselective catheterization, following test injection, digital subtraction angiography was performed and repeated in different fluoroscopic projections if and as needed.  This was followed by superselective embolization of the following: Proximal aspects of the right gastroepiploic artery, superior pancreaticoduodenal arteries, the main trunk of the gastroduodenal artery.  The agent used for  embolization was Concerto detachable coils.  A post embolization run was performed from the proximal gastroduodenal artery and the celiac artery.  The endpoint of embolization was stasis in the target artery.  The access site was closed using Angio-Seal and manual compression.  The patient wastransferred to the post procedure area for recovery. The patient was discharged from the department in stable condition.                     COMPLICATIONS: None immediate.     FINDINGS: Unremarkable celiac artery branching pattern. No celiac artery stenosis. Patent main portal vein with hepatopedal flow. Large caliber GDA. Unremarkable branching pattern. No evidence of arterial extravasation on a pseudoaneurysm or other signs of arterial bleeding in the region of the endoscopic clip in the distal second part of duodenum. No large caliber supraduodenal artery seen from the proximal GDA. Coil embolization of the above-mentioned arteries was successfully. A post embolization run shows successful stasis in the targeted artery.  SMA angiography reveals absence of any active bleeding or stigmata of recent arterial bleeding in the region of the endoscopic clip.      Impression:  Successful catheterization and angiography of the following: Celiac artery, common hepatic artery, gastroduodenal artery, superior mesenteric artery  Successful embolization of the following: Most proximal aspects of the right gastroepiploic artery, superior pancreaticoduodenal arteries, the main trunk of the gastroduodenal artery.  The embolic agent used in the endpoint are mentioned above.  Thank you for the opportunity to assist you in the care of your patient.  This report was finalized on 10/12/2022 2:36 PM by Yassine Lopez MD.      CT Angiogram Chest, CT Abdomen Pelvis With Contrast    Result Date: 10/3/2022  Narrative: DATE OF EXAM: 10/3/2022 8:20 AM  PROCEDURE: CT ANGIOGRAM CHEST-, CT ABDOMEN PELVIS W CONTRAST-  INDICATIONS: fever, unknown etiology,  recent surgery, pleural effusions  COMPARISON: 9/21/2022 angiographic chest CT scan 9/20/2022 abdomen pelvis CT scan  TECHNIQUE: Contiguous axial imaging was obtained from the thoracic inlet through the chest abdomen and pelvis following the intravenous administration of 80 mL of Isovue 370. Reconstructed coronal and sagittal images were also obtained. Automated exposure control and iterative reconstruction methods were used.  The radiation dose reduction device was turned on for each scan per the ALARA (As Low as Reasonably Achievable) protocol.  FINDINGS: Patient history indicates fever of unknown etiology, recent colon surgery. The prior exams from 9/20/2022 and 9/21/2022 by report showed a large rectal mass, extensive adenopathy, concern for renal and hepatic metastatic disease. Small right pleural effusion.  ANGIOGRAPHIC CT SCAN OF THE CHEST: There is good contrast opacification of the pulmonary arteries. No evidence of pulmonary embolic disease is seen. Thoracic aorta is mildly ectatic to approximately 4.0 cm. There is no evidence of dissection or focal aneurysm. There is moderately extensive coronary artery calcification. No pericardial effusion or mediastinal adenopathy is seen. No axillary or lower cervical lymphadenopathy is appreciated. There is a relatively small but increasing free-flowing right pleural effusion, and no left effusion. The lungs appear clear except for mild discoid atelectasis associated with the right pleural effusion, and focal peribronchial thickening scarring and mild bronchiectasis of the posterior right middle lobe, unchanged from prior study. Bony structures appear to be intact.      Impression: 1. No evidence of pulmonary embolic disease. 2. Small but increasing right pleural effusion compared to 9/21/2022. 3. Stable small area of linear scarring in the right middle lobe. 4. Mild ascending aortic ectasia to approximately 4.0 cm again incidentally noted.    CT SCAN OF THE ABDOMEN  AND PELVIS WITH IV CONTRAST: Numerous hepatic metastases are again noted. There is extensive retroperitoneal lymphadenopathy, and an approximately 5.4 cm left upper quadrant mass involving the left lateral renal parenchyma. Volume and shape of the exophytic mass favors that this is invasive to the kidney, rather than originating from kidney. There are multiple small splenic lesions consistent with metastases. Adrenal glands appear to be spared. Gallbladder appears normal. Pancreas appears to be normal, although the tip of the pancreatic tail is immediately adjacent the left upper quadrant mass.  No upper abdominal free air is seen. There is a small amount of ascites. Diffusely increased bowel gas suggests a low level ileus. Left mid abdominal ostomy site is clear. Large pelvic mass is again noted, which appears to extend into the small bowel mesentery, although these may be adjacent but separate nodes. Bladder is normally distended and normal in appearance. There is relatively little intrapelvic free fluid.  Delayed venous phase images show contrast within the renal collecting systems, which appear minimally more dilated than on the prior study. Distal ureters may be mildly compressed by the large pelvic mass. The bladder itself is displaced anteriorly and superiorly, but normally distended and grossly normal in appearance. No evidence of a discrete, drainable inflammatory collection is appreciated. Bony structures appear to be intact.   IMPRESSION:  1. Expected changes of recent colostomy placement. No visible associated inflammation. 2. Advanced metastatic disease, including numerous liver lesions, extensive retrocrural lymphadenopathy, multiple splenic lesions, left upper quadrant mass that that involves the lateral margin of the left kidney. Large pelvic mass and extensive infiltration of the lower small bowel mesentery, whether by direct extension of tumor, or multiple irregularly enlarged adjacent lymph nodes.  "3. Bilateral hydronephrosis which appears increased from 9/20/2022, but no evidence of high-grade obstructive uropathy. 4. Mildly increased ascites. No evidence of drainable inflammatory collection. 5. \"Gassy\" appearance of nondependent large and small bowel, suggesting a low level ileus  This report was finalized on 10/3/2022 9:14 AM by Dr. Del Wilks MD.      XR Abdomen KUB    Result Date: 10/20/2022  Narrative: Supine abdomen. DATE: 10/19/2022 at 11:41 PM. COMPARISON: 10/19/2022 at 10:50 PM. CLINICAL HISTORY: NG tube placement.     Impression: NG tube tip has tip overlying the right midabdomen is likely within the distal aspect/pyloric region of the stomach are proximal duodenal region. Several embolization coils are also seen in the right mid abdomen. Distended loops of nondilated air-filled bowel are seen throughout the visualized portions of the abdomen. Electronically signed by:  Deonte Dash D.O.  10/19/2022 10:36 PM Mountain Time    XR Abdomen KUB    Result Date: 10/20/2022  Narrative: Frontal abdomen CLINICAL INDICATION: Feeding tube placement COMPARISON: October 16, 2022. FINDINGS: Feeding tube tip in the body of the stomach. Postsurgical changes in the mid abdomen. Distended loops of bowel are seen in the upper abdomen. Lung bases are grossly clear.     Impression: Feeding tube tip near the fundus of the stomach. Electronically signed by:  Rehana Estrella D.O.  10/19/2022 10:19 PM Mountain Time    XR Abdomen KUB    Result Date: 10/16/2022  Narrative: DATE OF EXAM: 10/16/2022 11:54 AM  PROCEDURE: XR ABDOMEN KUB-  INDICATIONS: keofeed placement; R50.9-Fever, unspecified; N13.30-Unspecified hydronephrosis; Z93.3-Colostomy status; C79.9-Secondary malignant neoplasm of unspecified site; E87.0-Hyperosmolality and hypernatremia; E87.6-Hypokalemia; C85.90-Non-Hodgkin lymphoma, unspecified, unspecified site; R13.10-Dysphagia, unspecified; C81.98-Hodgkin lymphoma, unspecified, lymph nodes of multiple sites; " K92.2-Gastrointesti  COMPARISON: No comparisons available.  TECHNIQUE: Single radiographic view of the abdomen was obtained.  FINDINGS: Feeding tube terminates in the stomach. Upper abdominal bowel gas pattern is nonspecific, with some gas filled segments of colon noted. There is no overt pneumoperitoneum.      Impression: Feeding tube terminates in the stomach. Upper abdominal bowel gas pattern is nonspecific, with some gas filled segments of colon noted. There is no overt pneumoperitoneum.  This report was finalized on 10/16/2022 2:23 PM by Josef Mckee.      XR Chest PA & Lateral    Result Date: 10/14/2022  Narrative: DATE OF EXAM: 10/14/2022 10:53 AM  PROCEDURE: XR CHEST PA AND LATERAL-  INDICATIONS: neutropenic fever; R50.9-Fever, unspecified; N13.30-Unspecified hydronephrosis; Z93.3-Colostomy status; C79.9-Secondary malignant neoplasm of unspecified site; E87.0-Hyperosmolality and hypernatremia; E87.6-Hypokalemia; C85.90-Non-Hodgkin lymphoma, unspecified, unspecified site; R13.10-Dysphagia, unspecified; C81.98-Hodgkin lymphoma, unspecified, lymph nodes of multiple sites; K92.2-Gastrointesti  COMPARISON: 10/03/2022  TECHNIQUE: Two radiologic views of the chest, PA and lateral, were obtained.  FINDINGS: There is density along the posterior pleural margins that could relate to underlying effusions. The heart looks enlarged. There are degenerative changes involving the dorsal spine and both shoulder areas. There is a PICC line via the right arm with its tip in the superior vena cava.      Impression: 1.  Bibasilar effusions. Some underlying atelectasis could not be excluded. 2.  Suggested cardiomegaly.  This report was finalized on 10/14/2022 11:09 AM by Chiki Escobar MD.        Procedures    Assessment / Plan      Assessment/Plan:   1.  CD30 positive classical Hodgkin's lymphoma.  -He is status post cycle 1 of BV AVD.  Day 15 treatment was held due to pancytopenia, neutropenic fever, and active GI bleed.  He  was very deconditioned, and initially declined further systemic therapy, but he is willing to reconsider.   -CBC and CMP are reviewed.  Labs are adequate to proceed with treatment.  However we will will plan 20% dose reduction in AVD and continued full dose brentuximab.  Will monitor closely with twice weekly CBCs and possible transfusions.  -Reviewed plan for PET/CT after cycle #2    2. Antineoplastic chemotherapy induced pancytopenia compounded by blood loss anemia from acute GI bleed.  -CBC from today shows stable white blood cell count and platelets.  His hemoglobin is   stable from discharge.  He has no further bleeding from his ostomy.  We will continue to monitor it closely.  No transfusion needs today.    3.  Mucositis  -Improving.  Continue supportive care.    4.  Low appetite  -Continue nutrition supplements.    -Dietitian referral.    5.  Hypokalemia  -Continue twice daily potassium.    6.  Obstructive sleep apnea  -He reports he has been unable to tolerate his CPAP machine.  He requests follow-up with sleep medicine to discuss options.  Referral placed.     7.  Weakness and deconditioning  -Home health PT and OT referral.    8.  Access  -PICC dressing changed today and weekly  -Plan port placement prior to cycle 2.    9. Right shoulder pain  -Check xray  -?rotator cuff injury  -DVT exam negative    Follow Up:   No follow-ups on file.     Kaycee Leary MD  Hematology and Oncology     Time spent on the day of service was 45 min inclusive of time before, during, and after office visit on record review, medically appropriate history and physical, counseling patient, ordering tests, documenting in the medical record.

## 2022-11-02 ENCOUNTER — DOCUMENTATION (OUTPATIENT)
Dept: NUTRITION | Facility: HOSPITAL | Age: 72
End: 2022-11-02

## 2022-11-02 ENCOUNTER — HOSPITAL ENCOUNTER (OUTPATIENT)
Dept: ONCOLOGY | Facility: HOSPITAL | Age: 72
Setting detail: INFUSION SERIES
Discharge: HOME | End: 2022-11-02

## 2022-11-02 VITALS
TEMPERATURE: 98.3 F | DIASTOLIC BLOOD PRESSURE: 59 MMHG | HEART RATE: 89 BPM | BODY MASS INDEX: 22.88 KG/M2 | SYSTOLIC BLOOD PRESSURE: 123 MMHG | HEIGHT: 64 IN | WEIGHT: 134 LBS | RESPIRATION RATE: 16 BRPM

## 2022-11-02 DIAGNOSIS — C81.98 HODGKIN LYMPHOMA OF LYMPH NODES OF MULTIPLE REGIONS, UNSPECIFIED HODGKIN LYMPHOMA TYPE: Primary | ICD-10-CM

## 2022-11-02 PROCEDURE — 25010000002 PEGFILGRASTIM-APGF 6 MG/0.6ML SOLUTION PREFILLED SYRINGE: Performed by: INTERNAL MEDICINE

## 2022-11-02 PROCEDURE — 96372 THER/PROPH/DIAG INJ SC/IM: CPT

## 2022-11-02 RX ADMIN — PEGFILGRASTIM-APGF 6 MG: 6 INJECTION, SOLUTION SUBCUTANEOUS at 14:08

## 2022-11-02 NOTE — PROGRESS NOTES
ON[facility] Nutrition    Diagnosis: Newly diagnosed CD30 positive classic Hodgkin lymphoma involving the rectum, perinephric, liver /  bilateral hydronephrosis secondary to malignancy / course has been complicated by a GI bleed / status post coil artery embolization 10/12 / neutropenic fever, Candida esophagitis and mucositis. Hospitalization 10/3/22 - 10/20/22 / 9/21/22 - 9/24/22     Surgery:  Status post laparoscopic colostomy and biopsy     Chemotherapy:  Chemotherapy (AVVD) initiated during recent hospitalization 10/8/22 - difficult posttreatment course with neutropenic fever, failure to thrive, and GI bleed    Patient completed Cycle #2 yesterday    Weight 134 lbs / 1 lbs weight gain    Nutrition Diagnosis      Problem Severe malnutrition in context of acute illness   Etiology Diagnosis with numerous complications requiring prolonged hospitalization   Signs / Symptoms Weight loss of 35 lbs (21% weight loss) during hospitalization  Muscle and fat wasting  Prealbumin 3.7 10/17/22  Albumin 2.9 10/26/22     Goal   1.)  Stimulate appetite and Increase oral intake to prevent additional weight loss and promote weight gain  2.)  Repletion of nutritional stores     Calories: 2130+ (35 kcal per kg)  Protein:  grams (1.5-2 gram per kg)  Hydration: 70 ounces    Phone follow up with patient's wife.  Nutritional intake continues to improve, especially over the past 2 days per wife.  Wife states that he is a picky eater, but has broadened his food choices and is eating foods that he never ate before.  He has implemented many of the suggestions that were discussed last week in consultation.  He continues to consume 3 servings of the Boost Breeze mixed with Gatorade daily; adequately meeting his hydration recommendations.  Continues to gain stamina; walking within the house without aid of a walker.  No nutritional impact symptoms noted.    Will continue to follow.

## 2022-11-03 ENCOUNTER — READMISSION MANAGEMENT (OUTPATIENT)
Dept: CALL CENTER | Facility: HOSPITAL | Age: 72
End: 2022-11-03

## 2022-11-03 NOTE — OUTREACH NOTE
Medical Week 2 Survey    Flowsheet Row Responses   [facility] patient discharged from? [place]   Does the patient have one of the following disease processes/diagnoses(primary or secondary)? Other   Week 2 attempt successful? No  [Pt is not accepting calls is the message on home phone]   Unsuccessful attempts Attempt 1          BERE FOY - Registered Nurse

## 2022-11-04 ENCOUNTER — TELEPHONE (OUTPATIENT)
Dept: ONCOLOGY | Facility: CLINIC | Age: 72
End: 2022-11-04

## 2022-11-04 ENCOUNTER — HOSPITAL ENCOUNTER (OUTPATIENT)
Dept: ONCOLOGY | Facility: HOSPITAL | Age: 72
Setting detail: INFUSION SERIES
Discharge: HOME | End: 2022-11-04

## 2022-11-04 ENCOUNTER — OFFICE VISIT (OUTPATIENT)
Dept: ONCOLOGY | Facility: CLINIC | Age: 72
End: 2022-11-04

## 2022-11-04 VITALS
SYSTOLIC BLOOD PRESSURE: 94 MMHG | HEART RATE: 100 BPM | HEIGHT: 64 IN | TEMPERATURE: 99.5 F | BODY MASS INDEX: 22.88 KG/M2 | RESPIRATION RATE: 20 BRPM | DIASTOLIC BLOOD PRESSURE: 52 MMHG | WEIGHT: 134 LBS

## 2022-11-04 DIAGNOSIS — C81.93 HODGKIN LYMPHOMA OF INTRA-ABDOMINAL LYMPH NODES, UNSPECIFIED HODGKIN LYMPHOMA TYPE: ICD-10-CM

## 2022-11-04 DIAGNOSIS — E87.6 HYPOKALEMIA: ICD-10-CM

## 2022-11-04 DIAGNOSIS — C81.98 HODGKIN LYMPHOMA OF LYMPH NODES OF MULTIPLE REGIONS, UNSPECIFIED HODGKIN LYMPHOMA TYPE: Primary | ICD-10-CM

## 2022-11-04 LAB
ALBUMIN SERPL-MCNC: 2.9 G/DL (ref 3.5–5.2)
ALBUMIN/GLOB SERPL: 0.9 G/DL
ALP SERPL-CCNC: 325 U/L (ref 39–117)
ALT SERPL W P-5'-P-CCNC: 37 U/L (ref 1–41)
ANION GAP SERPL CALCULATED.3IONS-SCNC: 11 MMOL/L (ref 5–15)
AST SERPL-CCNC: 22 U/L (ref 1–40)
BASOPHILS # BLD AUTO: 0.01 10*3/MM3 (ref 0–0.2)
BASOPHILS NFR BLD AUTO: 0 % (ref 0–1.5)
BILIRUB SERPL-MCNC: 0.6 MG/DL (ref 0–1.2)
BUN SERPL-MCNC: 13 MG/DL (ref 8–23)
BUN/CREAT SERPL: 22 (ref 7–25)
CALCIUM SPEC-SCNC: 8.9 MG/DL (ref 8.6–10.5)
CHLORIDE SERPL-SCNC: 95 MMOL/L (ref 98–107)
CO2 SERPL-SCNC: 24 MMOL/L (ref 22–29)
CREAT SERPL-MCNC: 0.59 MG/DL (ref 0.76–1.27)
DEPRECATED RDW RBC AUTO: 62.6 FL (ref 37–54)
EGFRCR SERPLBLD CKD-EPI 2021: 104.4 ML/MIN/1.73
EOSINOPHIL # BLD AUTO: 0.01 10*3/MM3 (ref 0–0.4)
EOSINOPHIL NFR BLD AUTO: 0 % (ref 0.3–6.2)
ERYTHROCYTE [DISTWIDTH] IN BLOOD BY AUTOMATED COUNT: 18.8 % (ref 12.3–15.4)
GLOBULIN UR ELPH-MCNC: 3.3 GM/DL
GLUCOSE SERPL-MCNC: 145 MG/DL (ref 65–99)
HCT VFR BLD AUTO: 23.9 % (ref 37.5–51)
HGB BLD-MCNC: 7.8 G/DL (ref 13–17.7)
IMM GRANULOCYTES # BLD AUTO: 4.88 10*3/MM3 (ref 0–0.05)
IMM GRANULOCYTES NFR BLD AUTO: 9.9 % (ref 0–0.5)
LYMPHOCYTES # BLD AUTO: 0.38 10*3/MM3 (ref 0.7–3.1)
LYMPHOCYTES NFR BLD AUTO: 0.8 % (ref 19.6–45.3)
MCH RBC QN AUTO: 29.8 PG (ref 26.6–33)
MCHC RBC AUTO-ENTMCNC: 32.6 G/DL (ref 31.5–35.7)
MCV RBC AUTO: 91.2 FL (ref 79–97)
MONOCYTES # BLD AUTO: 0.44 10*3/MM3 (ref 0.1–0.9)
MONOCYTES NFR BLD AUTO: 0.9 % (ref 5–12)
NEUTROPHILS NFR BLD AUTO: 43.55 10*3/MM3 (ref 1.7–7)
NEUTROPHILS NFR BLD AUTO: 88.4 % (ref 42.7–76)
PLATELET # BLD AUTO: 267 10*3/MM3 (ref 140–450)
PMV BLD AUTO: 8.5 FL (ref 6–12)
POTASSIUM SERPL-SCNC: 4.3 MMOL/L (ref 3.5–5.2)
PROT SERPL-MCNC: 6.2 G/DL (ref 6–8.5)
RBC # BLD AUTO: 2.62 10*6/MM3 (ref 4.14–5.8)
SODIUM SERPL-SCNC: 130 MMOL/L (ref 136–145)
WBC NRBC COR # BLD: 49.27 10*3/MM3 (ref 3.4–10.8)

## 2022-11-04 PROCEDURE — 85025 COMPLETE CBC W/AUTO DIFF WBC: CPT | Performed by: INTERNAL MEDICINE

## 2022-11-04 PROCEDURE — 80053 COMPREHEN METABOLIC PANEL: CPT | Performed by: INTERNAL MEDICINE

## 2022-11-04 PROCEDURE — 99214 OFFICE O/P EST MOD 30 MIN: CPT | Performed by: NURSE PRACTITIONER

## 2022-11-04 PROCEDURE — 96360 HYDRATION IV INFUSION INIT: CPT

## 2022-11-04 RX ORDER — SODIUM CHLORIDE 9 MG/ML
1000 INJECTION, SOLUTION INTRAVENOUS ONCE
Status: COMPLETED | OUTPATIENT
Start: 2022-11-04 | End: 2022-11-04

## 2022-11-04 RX ORDER — DIPHENHYDRAMINE, LIDOCAINE, NYSTATIN
KIT ORAL
Qty: 240 ML | Refills: 3 | Status: SHIPPED | OUTPATIENT
Start: 2022-11-04 | End: 2023-01-10

## 2022-11-04 RX ADMIN — SODIUM CHLORIDE 1000 ML: 9 INJECTION, SOLUTION INTRAVENOUS at 11:20

## 2022-11-04 NOTE — PROGRESS NOTES
ONCOLOGY ACUTE CLINIC VISIT    Abraham Wu  [id number]  1950    Chief Complaint:  Dizziness and lightheadedness    History of present illness:  Abraham Wu is a 72 y.o. year old male who is currently undergoing treatment for Hodgkin's lymphoma with BV AVD.  He got cycle 1 day 15 on 11/1/2022 with Neulasta the following day.  He had appointment in infusion today for labs.  He has had significant hypokalemia in has required IV and oral replacement.  Patient's wife told nurse via phone this morning that patient had episode of confusion this morning but has since resolved.  I was asked to see him for further evaluation.    Patient states he went out to breakfast this morning and had an episode of dizziness and lightheadedness.  He denies confusion or any disorientation.  He states it only lasted for a few seconds.  He reports eating and drinking well.  Denies nausea or vomiting.  He is having adequate urine output.  He is having thick stool in his ostomy bag.  He received cycle 1 day 1 during hospitalization that was complicated by GI bleed, pancytopenia, and neutropenic fever.  He had a prolonged ICU stay and ultimately required enteral feedings.  He has improved significantly since discharge on 10/20/2022 and continues to do better with eating and drinking.  He denies headache, chest pain, shortness of breath, or any other acute symptoms.    Oncology History:    Oncology/Hematology History   Hodgkin lymphoma (HCC)   10/7/2022 Initial Diagnosis    Hodgkin lymphoma (HCC)     10/8/2022 -  Chemotherapy    OP HODGKIN LYMPHOMA  BV+AVD (Brentuximab vedotin / Doxorubicin / Vinblastine / Dacarbazine)     10/28/2022 Cancer Staged    Staging form: Hodgkin And Non-Hodgkin Lymphoma, AJCC 8th Edition  - Clinical: Stage IV - Signed by Kaycee Leary MD on 10/28/2022         Past Medical History:   Diagnosis Date   • benign polypoid tissue right lung    • Hyperlipidemia    • Hypertension    • Mycobacterium mucogenicum     • SHERRI (obstructive sleep apnea)    • Seasonal allergies        Past Surgical History:   Procedure Laterality Date   • BRONCHOSCOPY      removal of obstructing polypoid tissue right middle lung   • COLOSTOMY N/A 9/22/2022    Procedure: LAPAROSCOPIC COLOSTOMY CREATION, FLEXIBLE SIGMOIDOSCOPY;  Surgeon: Hiram Viveros MD;  Location:  [facility];  Service: General;  Laterality: N/A;   • ENDOSCOPY N/A 10/10/2022    Procedure: ESOPHAGOGASTRODUODENOSCOPY;  Surgeon: Neeraj Lynn MD;  Location:  [facility];  Service: Gastroenterology;  Laterality: N/A;   • ENDOSCOPY N/A 10/12/2022    Procedure: ESOPHAGOGASTRODUODENOSCOPY;  Surgeon: Brunner, Mark I, MD;  Location:  [facility];  Service: Gastroenterology;  Laterality: N/A;   • EXAM UNDER ANESTHESIA, RECTAL BIOPSY N/A 10/4/2022    Procedure: EXAM UNDER ANESTHESIA, TRANSANAL BIOPSY WITH TRUCUT NEEDLE (LITHOTOMY-CANDY CANE);  Surgeon: Hiram Viveros MD;  Location:  [facility];  Service: General;  Laterality: N/A;       Family History   Problem Relation Age of Onset   • Diabetes Mother    • Diabetes Father    • Heart disease Father        Past medical history, social history and family history was reviewed.    Medications: The current medication list was reviewed and reconciled.     Allergies:  has No Known Allergies.    Review of Systems:    Review of Systems   Constitutional: Negative for appetite change, fatigue, fever and unexpected weight change.   HENT: Negative for mouth sores, sore throat and trouble swallowing.    Respiratory: Negative for cough, shortness of breath and wheezing.    Cardiovascular: Negative for chest pain, palpitations and leg swelling.   Gastrointestinal: Negative for abdominal distention, abdominal pain, constipation, diarrhea, nausea and vomiting.   Genitourinary: Negative for difficulty urinating, dysuria and frequency.   Musculoskeletal: Negative for arthralgias.   Skin: Negative for pallor, rash and wound.   Neurological:  "Positive for dizziness and light-headedness. Negative for weakness.   Hematological: Does not bruise/bleed easily.   Psychiatric/Behavioral: Negative for confusion and sleep disturbance. The patient is not nervous/anxious.        PHYSICAL EXAM:      11/4/2022   Temp 99.5 °F (37.5 °C)   Pulse 100   Resp 20   BP 94/52   Height 162.6 cm (64\")   Weight 60.8 kg (134 lb)  stated        ECOG: (2) Ambulatory & Capable of Self Care, Unable to Carry Out Work Activity, Up & About Greater Than 50% of Waking Hours  General: well appearing male in no acute distress, pale  HEENT: sclerae anicteric, oropharynx clear  Cardiovascular: regular rate and rhythm, no murmurs  Lungs: clear to auscultation bilaterally. No respiratory distress  Abdomen: soft, nontender, nondistended.  Ostomy bag with thick brown stool  Extremities: no lower extremity edema, cyanosis, or clubbing  Skin: no rashes, lesions, bruising, or petechiae  Neuro: Alert and oriented x3. Moves all extremities.    Lab Results   Component Value Date    HGB 7.8 (L) 11/04/2022    HCT 23.9 (L) 11/04/2022    MCV 91.2 11/04/2022     11/04/2022    WBC 49.27 (H) 11/04/2022    NEUTROABS 43.55 (H) 11/04/2022    LYMPHSABS 0.38 (L) 11/04/2022    MONOSABS 0.44 11/04/2022    EOSABS 0.01 11/04/2022    BASOSABS 0.01 11/04/2022     Lab Results   Component Value Date    GLUCOSE 145 (H) 11/04/2022    BUN 13 11/04/2022    CREATININE 0.59 (L) 11/04/2022     (L) 11/04/2022    K 4.3 11/04/2022    CL 95 (L) 11/04/2022    CO2 24.0 11/04/2022    CALCIUM 8.9 11/04/2022    PROTEINTOT 6.2 11/04/2022    ALBUMIN 2.90 (L) 11/04/2022    BILITOT 0.6 11/04/2022    ALKPHOS 325 (H) 11/04/2022    AST 22 11/04/2022    ALT 37 11/04/2022           Assessment and Plan:    Diagnoses and all orders for this visit:    1. Hodgkin lymphoma of lymph nodes of multiple regions, unspecified Hodgkin lymphoma type (HCC) (Primary)      Discussion: Patient currently on treatment for Hodgkin's lymphoma with BV " OLESYA.  He received cycle 1 day 15 on 11/1/2022 with Neulasta the following day.  He presented to infusion today for labs.  He had episode of lightheadedness and dizziness this morning when he was out for breakfast.  Blood pressure 94/52 and heart rate 100.  He seems to be eating and drinking well without nausea or vomiting.  Ostomy bag with thick brown stool.  I will give him a liter of fluids today.  Hemoglobin stable at 7.8.  White count is 49.27 but he did receive Neulasta, no signs or symptoms of infection noted.  Platelets are normal.  CMP stable.  Potassium is 4.3.  He is scheduled for follow-up with Dr. Leary on 11/8/2022.  He will notify us in the interim with any issues or concerns.      Total time of patient care on day of service including time prior to, face to face with patient, and following visit spent in reviewing records, lab results, imaging studies, discussion with patient, managing symptoms, discussing with nurse, and documentation/charting was > 30 minutes.     Vale Patrick, APRN    11/4/2022

## 2022-11-04 NOTE — TELEPHONE ENCOUNTER
Notified Dr. Leary that PORTILLO Hanks saw patient today and the outcome of this visit along with lab results.  Dr. Leary advised that patient go back to taking 2 tablets daily of KDur 20 mEq PO.  Patient's spouse contacted and notified of dose change and result of visit with Vale.  Ms. Wu v/joselo.

## 2022-11-04 NOTE — TELEPHONE ENCOUNTER
"Patient's spouse called. She stated patient went to breakfast with his friend/brother this morning and the brother/friend told her he had a \"spell where he didn't know where he was and was confused.\" Ms. Wu stated patient is fine now. She stated that this happened a couple of times while patient was in the hospital and she was told it was nothing.  Dr. Leary asked if a APRN could see patient today as patient is in office. This nurse spoke with PORTILLO Hanks and Vale agreed to see patient in infusion.  Dr. Leary notified as well as Ms. Wu.  "

## 2022-11-04 NOTE — TELEPHONE ENCOUNTER
Spoke with patient's spouse regarding missed appointment today.  She stated she had to urgently go out of town and didn't know the infusion appointment listed on his My chart was for treatment.  She stated she can have someone bring hum by 10:30 this morning.  Charge nurse stated okay for patient to come at this time and check in updated.

## 2022-11-07 ENCOUNTER — TELEPHONE (OUTPATIENT)
Dept: ONCOLOGY | Facility: CLINIC | Age: 72
End: 2022-11-07

## 2022-11-07 RX ORDER — POTASSIUM CHLORIDE 1500 MG/1
TABLET, EXTENDED RELEASE ORAL
Qty: 60 TABLET | Refills: 5 | Status: SHIPPED | OUTPATIENT
Start: 2022-11-07 | End: 2022-11-08

## 2022-11-08 ENCOUNTER — TELEPHONE (OUTPATIENT)
Dept: ONCOLOGY | Facility: CLINIC | Age: 72
End: 2022-11-08

## 2022-11-08 ENCOUNTER — OFFICE VISIT (OUTPATIENT)
Dept: ONCOLOGY | Facility: CLINIC | Age: 72
End: 2022-11-08

## 2022-11-08 ENCOUNTER — APPOINTMENT (OUTPATIENT)
Dept: ONCOLOGY | Facility: HOSPITAL | Age: 72
End: 2022-11-08

## 2022-11-08 ENCOUNTER — HOSPITAL ENCOUNTER (OUTPATIENT)
Dept: ONCOLOGY | Facility: HOSPITAL | Age: 72
Setting detail: INFUSION SERIES
Discharge: HOME | End: 2022-11-08

## 2022-11-08 VITALS
DIASTOLIC BLOOD PRESSURE: 59 MMHG | SYSTOLIC BLOOD PRESSURE: 98 MMHG | OXYGEN SATURATION: 100 % | WEIGHT: 129 LBS | RESPIRATION RATE: 18 BRPM | HEART RATE: 76 BPM | HEIGHT: 64 IN | TEMPERATURE: 97.7 F | BODY MASS INDEX: 22.02 KG/M2

## 2022-11-08 DIAGNOSIS — C81.98 HODGKIN LYMPHOMA OF LYMPH NODES OF MULTIPLE REGIONS, UNSPECIFIED HODGKIN LYMPHOMA TYPE: Primary | ICD-10-CM

## 2022-11-08 LAB
ALBUMIN SERPL-MCNC: 3.1 G/DL (ref 3.5–5.2)
ALBUMIN/GLOB SERPL: 1 G/DL
ALP SERPL-CCNC: 268 U/L (ref 39–117)
ALT SERPL W P-5'-P-CCNC: 22 U/L (ref 1–41)
ANION GAP SERPL CALCULATED.3IONS-SCNC: 9 MMOL/L (ref 5–15)
AST SERPL-CCNC: 11 U/L (ref 1–40)
BASOPHILS # BLD AUTO: 0.01 10*3/MM3 (ref 0–0.2)
BASOPHILS NFR BLD AUTO: 0.2 % (ref 0–1.5)
BILIRUB SERPL-MCNC: 0.5 MG/DL (ref 0–1.2)
BUN SERPL-MCNC: 7 MG/DL (ref 8–23)
BUN/CREAT SERPL: 17.1 (ref 7–25)
CALCIUM SPEC-SCNC: 8.7 MG/DL (ref 8.6–10.5)
CHLORIDE SERPL-SCNC: 95 MMOL/L (ref 98–107)
CO2 SERPL-SCNC: 26 MMOL/L (ref 22–29)
CREAT SERPL-MCNC: 0.41 MG/DL (ref 0.76–1.27)
DEPRECATED RDW RBC AUTO: 57.1 FL (ref 37–54)
EGFRCR SERPLBLD CKD-EPI 2021: 116.5 ML/MIN/1.73
EOSINOPHIL # BLD AUTO: 0.05 10*3/MM3 (ref 0–0.4)
EOSINOPHIL NFR BLD AUTO: 0.8 % (ref 0.3–6.2)
ERYTHROCYTE [DISTWIDTH] IN BLOOD BY AUTOMATED COUNT: 18 % (ref 12.3–15.4)
GLOBULIN UR ELPH-MCNC: 3 GM/DL
GLUCOSE SERPL-MCNC: 95 MG/DL (ref 65–99)
HCT VFR BLD AUTO: 22.4 % (ref 37.5–51)
HGB BLD-MCNC: 7.5 G/DL (ref 13–17.7)
IMM GRANULOCYTES # BLD AUTO: 0.06 10*3/MM3 (ref 0–0.05)
IMM GRANULOCYTES NFR BLD AUTO: 1 % (ref 0–0.5)
LYMPHOCYTES # BLD AUTO: 0.5 10*3/MM3 (ref 0.7–3.1)
LYMPHOCYTES NFR BLD AUTO: 7.9 % (ref 19.6–45.3)
MAGNESIUM SERPL-MCNC: 1.6 MG/DL (ref 1.6–2.4)
MCH RBC QN AUTO: 29.5 PG (ref 26.6–33)
MCHC RBC AUTO-ENTMCNC: 33.5 G/DL (ref 31.5–35.7)
MCV RBC AUTO: 88.2 FL (ref 79–97)
MONOCYTES # BLD AUTO: 0.42 10*3/MM3 (ref 0.1–0.9)
MONOCYTES NFR BLD AUTO: 6.7 % (ref 5–12)
NEUTROPHILS NFR BLD AUTO: 5.26 10*3/MM3 (ref 1.7–7)
NEUTROPHILS NFR BLD AUTO: 83.4 % (ref 42.7–76)
PLATELET # BLD AUTO: 321 10*3/MM3 (ref 140–450)
PMV BLD AUTO: 8.8 FL (ref 6–12)
POTASSIUM SERPL-SCNC: 3.4 MMOL/L (ref 3.5–5.2)
PROT SERPL-MCNC: 6.1 G/DL (ref 6–8.5)
RBC # BLD AUTO: 2.54 10*6/MM3 (ref 4.14–5.8)
SODIUM SERPL-SCNC: 130 MMOL/L (ref 136–145)
WBC NRBC COR # BLD: 6.3 10*3/MM3 (ref 3.4–10.8)

## 2022-11-08 PROCEDURE — 80053 COMPREHEN METABOLIC PANEL: CPT | Performed by: INTERNAL MEDICINE

## 2022-11-08 PROCEDURE — 99214 OFFICE O/P EST MOD 30 MIN: CPT | Performed by: INTERNAL MEDICINE

## 2022-11-08 PROCEDURE — 36592 COLLECT BLOOD FROM PICC: CPT

## 2022-11-08 PROCEDURE — 83735 ASSAY OF MAGNESIUM: CPT | Performed by: INTERNAL MEDICINE

## 2022-11-08 PROCEDURE — G0463 HOSPITAL OUTPT CLINIC VISIT: HCPCS

## 2022-11-08 PROCEDURE — 85025 COMPLETE CBC W/AUTO DIFF WBC: CPT | Performed by: INTERNAL MEDICINE

## 2022-11-08 RX ORDER — POTASSIUM CHLORIDE 20 MEQ/1
20 TABLET, EXTENDED RELEASE ORAL DAILY
Qty: 30 TABLET | Refills: 5 | Status: SHIPPED | OUTPATIENT
Start: 2022-11-08 | End: 2023-02-07

## 2022-11-08 RX ORDER — PROCHLORPERAZINE MALEATE 5 MG/1
5 TABLET ORAL EVERY 6 HOURS PRN
Qty: 30 TABLET | Refills: 2 | Status: SHIPPED | OUTPATIENT
Start: 2022-11-08

## 2022-11-08 RX ORDER — SODIUM CHLORIDE 9 MG/ML
250 INJECTION, SOLUTION INTRAVENOUS AS NEEDED
Status: CANCELLED | OUTPATIENT
Start: 2022-11-08

## 2022-11-08 RX ORDER — LORATADINE 10 MG/1
10 TABLET ORAL DAILY
Qty: 30 TABLET | Refills: 6 | Status: SHIPPED | OUTPATIENT
Start: 2022-11-08 | End: 2023-01-10

## 2022-11-08 RX ORDER — DICYCLOMINE HCL 20 MG
20 TABLET ORAL EVERY 6 HOURS
Qty: 30 TABLET | Refills: 4 | Status: SHIPPED | OUTPATIENT
Start: 2022-11-08 | End: 2023-01-10

## 2022-11-08 NOTE — TELEPHONE ENCOUNTER
Notified Ms. Wu, patient's spouse, that Dr. Leary sent in a new prescription for potassium and he should take as prescribed.  Ms. Wu v/u and was reminded of next appointment.

## 2022-11-08 NOTE — PROGRESS NOTES
Hematology and Oncology [place]  Office number [phone]    Fax number [phone]     Follow up     Date: 10/26/2022     Patient Name: Abraham Wu  MRN: [id number]  : 1950      Chief Complaint: follow up/treatment planning    Surgeon: Dr. Hiram Viveros MD    Cancer Staging: IV    History of Present Illness: Abraham Wu is a pleasant 72 y.o. male with PMH of hypertension, sleep apnea, hard of hearing who presents today for follow up of Hodgkin Lymphoma. The patient is accompanied by his supportive wife.     He initially presented 2022 with intractable diarrhea, low appetite, and a greater than 50 pound weight loss over several month period associated with intermittent fevers.  CT of the chest abdomen pelvis obtained in the ER shows of rectal mass spanning greater than 12 cm with adjacent adenopathy, bulky RP adenopathy, and findings concerning for left renal and liver metastases.  Small right pleural effusion with nodularity.  There was concern for impending obstruction, so he underwent diverting colostomy and biopsy on 2022.  Biopsies from the peritoneam showed a fibrotic nodule histiocytes and eosinophils admixed with CD30 positive large cells.  Rectal biopsies showed involvement by CD30 positive lymphoma, classic Hodgkin lymphoma versus CD30 positive T-cell or B-cell lymphoma.  There was extensive fibrosis and crush artifact.  He underwent repeat transrectal biopsy 10/4/2022 for further characterization which was felt to be most consistent with classical Hodgkin lymphoma.    He initiated chemotherapy with Brentuximab-AVD as an inpatient on 10/8/2022.  Cycle #1 was complicated by GI bleed requiring multiple blood transfusion and ultimately coil artery embolization 10/12; neutropenic fever, Candida esophagitis and mucositis.  He had a prolonged ICU stay  And required enteral feeding.  He was discharged 10/20/2022.    Treatment history:  Brentuximab-AVD: C1  10/8/22  C2: 11/1/22    Interval history:    She is here for toxicity check after cycle 2. Feeling overall well.   Endorses new onset abdominal cramping, lasts 10 seconds, intermittent for the last week. Can recur several times during the day or can go days without experiencing it. Taking scheduled zofran prior to meals. His weight is down 4 lb but feels he is eating much better. Taking boost breeze once daily.  Taking metamucil. Good ostomy output 1-2 x daily. No bloating. No melena or hematochezia.  +Epigastric discomfort. Taking protonix BID.  Off potassium x 24 hours  Mouth feels better, down to once daily on magic mouthwash  Dry cough in AM, resolves later in day. Has been off cholesterol and allergy pill since hospital discharge.  Saw home health PT and OT, graduated from PT. Still needs a wheelchair for distances.   No further arm swelling    Past Medical History:   Past Medical History:   Diagnosis Date   • benign polypoid tissue right lung    • Hyperlipidemia    • Hypertension    • Mycobacterium mucogenicum    • SHERRI (obstructive sleep apnea)    • Seasonal allergies        Past Surgical History:   Past Surgical History:   Procedure Laterality Date   • BRONCHOSCOPY      removal of obstructing polypoid tissue right middle lung   • COLOSTOMY N/A 9/22/2022    Procedure: LAPAROSCOPIC COLOSTOMY CREATION, FLEXIBLE SIGMOIDOSCOPY;  Surgeon: Hiram Viveros MD;  Location: [facility] OR;  Service: General;  Laterality: N/A;   • ENDOSCOPY N/A 10/10/2022    Procedure: ESOPHAGOGASTRODUODENOSCOPY;  Surgeon: Neeraj Lynn MD;  Location:  [facility];  Service: Gastroenterology;  Laterality: N/A;   • ENDOSCOPY N/A 10/12/2022    Procedure: ESOPHAGOGASTRODUODENOSCOPY;  Surgeon: Brunner, Mark I, MD;  Location:  [facility];  Service: Gastroenterology;  Laterality: N/A;   • EXAM UNDER ANESTHESIA, RECTAL BIOPSY N/A 10/4/2022    Procedure: EXAM UNDER ANESTHESIA, TRANSANAL BIOPSY WITH TRUCUT NEEDLE (LITHOTOMY-CANDY  JUVE);  Surgeon: Hiram Viveros MD;  Location: [facility];  Service: General;  Laterality: N/A;       Family History:   Family History   Problem Relation Age of Onset   • Diabetes Mother    • Diabetes Father    • Heart disease Father        Social History:   Social History     Socioeconomic History   • Marital status:    Tobacco Use   • Smoking status: Never   • Smokeless tobacco: Never   Vaping Use   • Vaping Use: Never used   Substance and Sexual Activity   • Alcohol use: Not Currently     Comment: previously drank 2 glasses of wine/beer nightly   • Drug use: Never   • Sexual activity: Defer       Medications:     Current Outpatient Medications:   •  fluticasone (FLONASE) 50 MCG/ACT nasal spray, 1 spray into the nostril(s) as directed by provider Daily., Disp: , Rfl:   •  KLOR-CON 20 MEQ CR tablet, TAKE TWO TABLETS BY MOUTH DAILY, Disp: 60 tablet, Rfl: 5  •  lidocaine-prilocaine (EMLA) 2.5-2.5 % cream, Apply 1 application topically to the appropriate area as directed As Needed (45-60 minutes prior to port access.  Cover with saran/plastic wrap.)., Disp: 30 g, Rfl: 3  •  nystatin susp + lidocaine viscous (MAGIC MOUTHWASH) oral suspension, 5-10 ml swish and spit or swallow QID prn, Disp: 240 mL, Rfl: 3  •  OLANZapine (zyPREXA) 5 MG tablet, Take 1 tablet by mouth Every Night. For 3 days on Days 2, 3 and 4 and on days 16, 17, and 18., Disp: 6 tablet, Rfl: 5  •  ondansetron (Zofran) 8 MG tablet, Take 1 tablet by mouth Every 8 (Eight) Hours As Needed for Nausea or Vomiting., Disp: 30 tablet, Rfl: 5  •  pantoprazole (PROTONIX) 40 MG EC tablet, Take 1 tablet by mouth 2 (Two) Times a Day for 30 days., Disp: 60 tablet, Rfl: 0  •  [START ON 11/20/2022] pantoprazole (PROTONIX) 40 MG EC tablet, Take 1 tablet by mouth Daily for 30 days., Disp: 30 tablet, Rfl: 0  •  tamsulosin (FLOMAX) 0.4 MG capsule 24 hr capsule, Take 1 capsule by mouth Daily for 30 days., Disp: 30 capsule, Rfl: 0    Allergies:   No Known  "Allergies    Objective     Vital Signs:   Vitals:    11/08/22 0835   BP: 98/59  Comment: ERIN   Pulse: 76   Resp: 18   Temp: 97.7 °F (36.5 °C)   TempSrc: Infrared   SpO2: 100%  Comment: RA   Weight: 58.5 kg (129 lb)   Height: 162.6 cm (64\")   PainSc: 0-No pain    Body mass index is 22.14 kg/m².   Pain Score    11/08/22 0835   PainSc: 0-No pain       ECOG Performance Status: 1-2    Physical Exam:   General: No acute distress. Well appearing.  HEENT: Normocephalic, atraumatic. Sclera anicteric. Masked.  Neck: supple, no adenopathy.   Cardiovascular: regular rate and rhythm. No murmurs.   Respiratory: Normal rate. Clear to auscultation bilaterally  Abdomen: Soft, nontender, non distended with normoactive bowel sounds. Ostomy output light brown  Lymph: no cervical, supraclavicular or axillary adenopathy  Neuro: Alert and oriented x 3. No focal deficits.   Ext: Symmetric, no swelling.   Psych: Euthymic  Skin: right picc c/d/i      Laboratory/Imaging Reviewed:   Hospital Outpatient Visit on 11/04/2022   Component Date Value Ref Range Status   • Glucose 11/04/2022 145 (H)  65 - 99 mg/dL Final   • BUN 11/04/2022 13  8 - 23 mg/dL Final   • Creatinine 11/04/2022 0.59 (L)  0.76 - 1.27 mg/dL Final   • Sodium 11/04/2022 130 (L)  136 - 145 mmol/L Final   • Potassium 11/04/2022 4.3  3.5 - 5.2 mmol/L Final    Slight hemolysis detected by analyzer. Results may be affected.   • Chloride 11/04/2022 95 (L)  98 - 107 mmol/L Final   • CO2 11/04/2022 24.0  22.0 - 29.0 mmol/L Final   • Calcium 11/04/2022 8.9  8.6 - 10.5 mg/dL Final   • Total Protein 11/04/2022 6.2  6.0 - 8.5 g/dL Final   • Albumin 11/04/2022 2.90 (L)  3.50 - 5.20 g/dL Final   • ALT (SGPT) 11/04/2022 37  1 - 41 U/L Final   • AST (SGOT) 11/04/2022 22  1 - 40 U/L Final   • Alkaline Phosphatase 11/04/2022 325 (H)  39 - 117 U/L Final   • Total Bilirubin 11/04/2022 0.6  0.0 - 1.2 mg/dL Final   • Globulin 11/04/2022 3.3  gm/dL Final    Calculated Result   • A/G Ratio 11/04/2022 " 0.9  g/dL Final   • BUN/Creatinine Ratio 11/04/2022 22.0  7.0 - 25.0 Final   • Anion Gap 11/04/2022 11.0  5.0 - 15.0 mmol/L Final   • eGFR 11/04/2022 104.4  >60.0 mL/min/1.73 Final    National Kidney Foundation and American Society of Nephrology (ASN) Task Force recommended calculation based on the Chronic Kidney Disease Epidemiology Collaboration (CKD-EPI) equation refit without adjustment for race.   • WBC 11/04/2022 49.27 (H)  3.40 - 10.80 10*3/mm3 Final   • RBC 11/04/2022 2.62 (L)  4.14 - 5.80 10*6/mm3 Final   • Hemoglobin 11/04/2022 7.8 (L)  13.0 - 17.7 g/dL Final   • Hematocrit 11/04/2022 23.9 (L)  37.5 - 51.0 % Final   • MCV 11/04/2022 91.2  79.0 - 97.0 fL Final   • MCH 11/04/2022 29.8  26.6 - 33.0 pg Final   • MCHC 11/04/2022 32.6  31.5 - 35.7 g/dL Final   • RDW 11/04/2022 18.8 (H)  12.3 - 15.4 % Final   • RDW-SD 11/04/2022 62.6 (H)  37.0 - 54.0 fl Final   • MPV 11/04/2022 8.5  6.0 - 12.0 fL Final   • Platelets 11/04/2022 267  140 - 450 10*3/mm3 Final   • Neutrophil % 11/04/2022 88.4 (H)  42.7 - 76.0 % Final   • Lymphocyte % 11/04/2022 0.8 (L)  19.6 - 45.3 % Final   • Monocyte % 11/04/2022 0.9 (L)  5.0 - 12.0 % Final   • Eosinophil % 11/04/2022 0.0 (L)  0.3 - 6.2 % Final   • Basophil % 11/04/2022 0.0  0.0 - 1.5 % Final   • Immature Grans % 11/04/2022 9.9 (H)  0.0 - 0.5 % Final   • Neutrophils, Absolute 11/04/2022 43.55 (H)  1.70 - 7.00 10*3/mm3 Final   • Lymphocytes, Absolute 11/04/2022 0.38 (L)  0.70 - 3.10 10*3/mm3 Final   • Monocytes, Absolute 11/04/2022 0.44  0.10 - 0.90 10*3/mm3 Final   • Eosinophils, Absolute 11/04/2022 0.01  0.00 - 0.40 10*3/mm3 Final   • Basophils, Absolute 11/04/2022 0.01  0.00 - 0.20 10*3/mm3 Final   • Immature Grans, Absolute 11/04/2022 4.88 (H)  0.00 - 0.05 10*3/mm3 Final   Hospital Outpatient Visit on 10/31/2022   Component Date Value Ref Range Status   • Target HR (85%) 10/31/2022 128  bpm Final   • Max. Pred. HR (100%) 10/31/2022 150  bpm Final   • Right Internal Jugular Spont  10/31/2022 Y   Final   • Right Internal Jugular Phasic 10/31/2022 Y   Final   • Right Internal Jugular Compress 10/31/2022 C   Final   • Right Subclavian Spont 10/31/2022 Y   Final   • Right Subclavian Phasic 10/31/2022 Y   Final   • Right Subclavian Compress 10/31/2022 C   Final   • Right Subclavian Augment 10/31/2022 Y   Final   • Right Axillary Spont 10/31/2022 Y   Final   • Right Axillary Phasic 10/31/2022 Y   Final   • Right Axillary Compress 10/31/2022 C   Final   • Right Axillary Augment 10/31/2022 Y   Final   • Right Brachial Spont 10/31/2022 Y   Final   • Right Brachial Phasic 10/31/2022 Y   Final   • Right Brachial Compress 10/31/2022 C   Final   • Right Brachial Augment 10/31/2022 Y   Final   • Right Radial Compress 10/31/2022 C   Final   • Right Ulnar Compress 10/31/2022 C   Final   • Right Basilic Upper Compress 10/31/2022 C   Final   • Right Basilic Forearm Compress 10/31/2022 C   Final   • Right Cephalic Upper Compress 10/31/2022 C   Final   • Right Cephalic Forearm Compress 10/31/2022 C   Final   • Left Subclavian Spont 10/31/2022 Y   Final   • Left Subclavian Phasic 10/31/2022 Y   Final   Hospital Outpatient Visit on 10/31/2022   Component Date Value Ref Range Status   • Glucose 10/31/2022 209 (H)  65 - 99 mg/dL Final   • BUN 10/31/2022 6 (L)  8 - 23 mg/dL Final   • Creatinine 10/31/2022 0.43 (L)  0.76 - 1.27 mg/dL Final   • Sodium 10/31/2022 129 (L)  136 - 145 mmol/L Final   • Potassium 10/31/2022 3.1 (L)  3.5 - 5.2 mmol/L Final   • Chloride 10/31/2022 95 (L)  98 - 107 mmol/L Final   • CO2 10/31/2022 23.0  22.0 - 29.0 mmol/L Final   • Calcium 10/31/2022 8.1 (L)  8.6 - 10.5 mg/dL Final   • Total Protein 10/31/2022 6.0  6.0 - 8.5 g/dL Final   • Albumin 10/31/2022 2.80 (L)  3.50 - 5.20 g/dL Final   • ALT (SGPT) 10/31/2022 31  1 - 41 U/L Final   • AST (SGOT) 10/31/2022 13  1 - 40 U/L Final   • Alkaline Phosphatase 10/31/2022 247 (H)  39 - 117 U/L Final   • Total Bilirubin 10/31/2022 0.5  0.0 - 1.2  mg/dL Final   • Globulin 10/31/2022 3.2  gm/dL Final    Calculated Result   • A/G Ratio 10/31/2022 0.9  g/dL Final   • BUN/Creatinine Ratio 10/31/2022 14.0  7.0 - 25.0 Final   • Anion Gap 10/31/2022 11.0  5.0 - 15.0 mmol/L Final   • eGFR 10/31/2022 114.8  >60.0 mL/min/1.73 Final    National Kidney Foundation and American Society of Nephrology (ASN) Task Force recommended calculation based on the Chronic Kidney Disease Epidemiology Collaboration (CKD-EPI) equation refit without adjustment for race.   • WBC 10/31/2022 11.85 (H)  3.40 - 10.80 10*3/mm3 Final   • RBC 10/31/2022 2.69 (L)  4.14 - 5.80 10*6/mm3 Final   • Hemoglobin 10/31/2022 7.9 (L)  13.0 - 17.7 g/dL Final   • Hematocrit 10/31/2022 24.2 (L)  37.5 - 51.0 % Final   • MCV 10/31/2022 90.0  79.0 - 97.0 fL Final   • MCH 10/31/2022 29.4  26.6 - 33.0 pg Final   • MCHC 10/31/2022 32.6  31.5 - 35.7 g/dL Final   • RDW 10/31/2022 18.5 (H)  12.3 - 15.4 % Final   • RDW-SD 10/31/2022 59.5 (H)  37.0 - 54.0 fl Final   • MPV 10/31/2022 8.2  6.0 - 12.0 fL Final   • Platelets 10/31/2022 457 (H)  140 - 450 10*3/mm3 Final   • Neutrophil % 10/31/2022 86.6 (H)  42.7 - 76.0 % Final   • Lymphocyte % 10/31/2022 4.8 (L)  19.6 - 45.3 % Final   • Monocyte % 10/31/2022 6.2  5.0 - 12.0 % Final   • Eosinophil % 10/31/2022 0.0 (L)  0.3 - 6.2 % Final   • Basophil % 10/31/2022 0.2  0.0 - 1.5 % Final   • Immature Grans % 10/31/2022 2.2 (H)  0.0 - 0.5 % Final   • Neutrophils, Absolute 10/31/2022 10.26 (H)  1.70 - 7.00 10*3/mm3 Final   • Lymphocytes, Absolute 10/31/2022 0.57 (L)  0.70 - 3.10 10*3/mm3 Final   • Monocytes, Absolute 10/31/2022 0.74  0.10 - 0.90 10*3/mm3 Final   • Eosinophils, Absolute 10/31/2022 0.00  0.00 - 0.40 10*3/mm3 Final   • Basophils, Absolute 10/31/2022 0.02  0.00 - 0.20 10*3/mm3 Final   • Immature Grans, Absolute 10/31/2022 0.26 (H)  0.00 - 0.05 10*3/mm3 Final   Hospital Outpatient Visit on 10/28/2022   Component Date Value Ref Range Status   • Glucose 10/28/2022 127 (H)   65 - 99 mg/dL Final   • BUN 10/28/2022 4 (L)  8 - 23 mg/dL Final   • Creatinine 10/28/2022 0.38 (L)  0.76 - 1.27 mg/dL Final   • Sodium 10/28/2022 128 (L)  136 - 145 mmol/L Final   • Potassium 10/28/2022 2.7 (L)  3.5 - 5.2 mmol/L Final   • Chloride 10/28/2022 93 (L)  98 - 107 mmol/L Final   • CO2 10/28/2022 26.0  22.0 - 29.0 mmol/L Final   • Calcium 10/28/2022 8.5 (L)  8.6 - 10.5 mg/dL Final   • Total Protein 10/28/2022 5.7 (L)  6.0 - 8.5 g/dL Final   • Albumin 10/28/2022 2.70 (L)  3.50 - 5.20 g/dL Final   • ALT (SGPT) 10/28/2022 20  1 - 41 U/L Final   • AST (SGOT) 10/28/2022 16  1 - 40 U/L Final   • Alkaline Phosphatase 10/28/2022 218 (H)  39 - 117 U/L Final   • Total Bilirubin 10/28/2022 0.5  0.0 - 1.2 mg/dL Final   • Globulin 10/28/2022 3.0  gm/dL Final    Calculated Result   • A/G Ratio 10/28/2022 0.9  g/dL Final   • BUN/Creatinine Ratio 10/28/2022 10.5  7.0 - 25.0 Final   • Anion Gap 10/28/2022 9.0  5.0 - 15.0 mmol/L Final   • eGFR 10/28/2022 119.2  >60.0 mL/min/1.73 Final    National Kidney Foundation and American Society of Nephrology (ASN) Task Force recommended calculation based on the Chronic Kidney Disease Epidemiology Collaboration (CKD-EPI) equation refit without adjustment for race.   • Magnesium 10/28/2022 1.8  1.6 - 2.4 mg/dL Final   Hospital Outpatient Visit on 10/26/2022   Component Date Value Ref Range Status   • Magnesium 10/26/2022 1.7  1.6 - 2.4 mg/dL Final   • Glucose 10/26/2022 99  65 - 99 mg/dL Final   • BUN 10/26/2022 5 (L)  8 - 23 mg/dL Final   • Creatinine 10/26/2022 0.39 (L)  0.76 - 1.27 mg/dL Final   • Sodium 10/26/2022 132 (L)  136 - 145 mmol/L Final   • Potassium 10/26/2022 2.5 (C)  3.5 - 5.2 mmol/L Final    Slight hemolysis detected by analyzer. Results may be affected.   • Chloride 10/26/2022 96 (L)  98 - 107 mmol/L Final   • CO2 10/26/2022 28.0  22.0 - 29.0 mmol/L Final   • Calcium 10/26/2022 8.5 (L)  8.6 - 10.5 mg/dL Final   • Total Protein 10/26/2022 5.8 (L)  6.0 - 8.5 g/dL  Final   • Albumin 10/26/2022 2.90 (L)  3.50 - 5.20 g/dL Final   • ALT (SGPT) 10/26/2022 19  1 - 41 U/L Final   • AST (SGOT) 10/26/2022 9  1 - 40 U/L Final   • Alkaline Phosphatase 10/26/2022 212 (H)  39 - 117 U/L Final   • Total Bilirubin 10/26/2022 0.5  0.0 - 1.2 mg/dL Final   • Globulin 10/26/2022 2.9  gm/dL Final    Calculated Result   • A/G Ratio 10/26/2022 1.0  g/dL Final   • BUN/Creatinine Ratio 10/26/2022 12.8  7.0 - 25.0 Final   • Anion Gap 10/26/2022 8.0  5.0 - 15.0 mmol/L Final   • eGFR 10/26/2022 118.3  >60.0 mL/min/1.73 Final    National Kidney Foundation and American Society of Nephrology (ASN) Task Force recommended calculation based on the Chronic Kidney Disease Epidemiology Collaboration (CKD-EPI) equation refit without adjustment for race.   • WBC 10/26/2022 12.42 (H)  3.40 - 10.80 10*3/mm3 Final   • RBC 10/26/2022 2.56 (L)  4.14 - 5.80 10*6/mm3 Final   • Hemoglobin 10/26/2022 7.5 (L)  13.0 - 17.7 g/dL Final   • Hematocrit 10/26/2022 22.4 (L)  37.5 - 51.0 % Final   • MCV 10/26/2022 87.5  79.0 - 97.0 fL Final   • MCH 10/26/2022 29.3  26.6 - 33.0 pg Final   • MCHC 10/26/2022 33.5  31.5 - 35.7 g/dL Final   • RDW 10/26/2022 17.2 (H)  12.3 - 15.4 % Final   • RDW-SD 10/26/2022 48.7  37.0 - 54.0 fl Final   • MPV 10/26/2022 8.6  6.0 - 12.0 fL Final   • Platelets 10/26/2022 550 (H)  140 - 450 10*3/mm3 Final   • Neutrophil % 10/26/2022 86.7 (H)  42.7 - 76.0 % Final   • Lymphocyte % 10/26/2022 3.8 (L)  19.6 - 45.3 % Final   • Monocyte % 10/26/2022 4.3 (L)  5.0 - 12.0 % Final   • Eosinophil % 10/26/2022 0.0 (L)  0.3 - 6.2 % Final   • Basophil % 10/26/2022 0.2  0.0 - 1.5 % Final   • Immature Grans % 10/26/2022 5.0 (H)  0.0 - 0.5 % Final   • Neutrophils, Absolute 10/26/2022 10.77 (H)  1.70 - 7.00 10*3/mm3 Final   • Lymphocytes, Absolute 10/26/2022 0.47 (L)  0.70 - 3.10 10*3/mm3 Final   • Monocytes, Absolute 10/26/2022 0.53  0.10 - 0.90 10*3/mm3 Final   • Eosinophils, Absolute 10/26/2022 0.00  0.00 - 0.40 10*3/mm3  Final   • Basophils, Absolute 10/26/2022 0.03  0.00 - 0.20 10*3/mm3 Final   • Immature Grans, Absolute 10/26/2022 0.62 (H)  0.00 - 0.05 10*3/mm3 Final       CT Abdomen Pelvis With & Without Contrast    Result Date: 10/11/2022  Narrative: DATE OF EXAM: 10/11/2022 5:38 PM  PROCEDURE: CT ABDOMEN PELVIS W WO CONTRAST-  INDICATIONS: GI bleed, may need IR embolization; R50.9-Fever, unspecified; N13.30-Unspecified hydronephrosis; Z93.3-Colostomy status; C79.9-Secondary malignant neoplasm of unspecified site; E87.0-Hyperosmolality and hypernatremia; E87.6-Hypokalemia; C85.90-Non-Hodgkin lymphoma, unspecified, unspecified site; R13.10-Dysphagia, unspecified; C81.98-Hodgkin lymphoma, unspecified, lymph nodes of multiple sites; K9  COMPARISON: CT abdomen and pelvis 10/3/2022  TECHNIQUE: Routine transaxial slices were obtained through the abdomen and pelvis without the administration of intravenous contrast. Additional scanning through the abdomen and pelvis in the arterial, venous, and delayed phases was performed following the intravenous administration of 100 mL of Isovue 370. Reconstructed coronal and sagittal images were also obtained. Automated exposure control and iterative construction methods were used.  The radiation dose reduction device was turned on for each scan per the ALARA (As Low as Reasonably Achievable) protocol.  FINDINGS: There is a new surgical clip within the duodenum compatible with recent endoscopic duodenal ulcer clipping. The presence of scattered hyperdense medication or oral contrast throughout the GI tract somewhat limits assessment for active GI bleeding. Allowing for this there is no evidence of active GI bleeding. The abdominal aorta and its arterial branches are grossly unremarkable. The venous structures are normal.  There are no new or acute findings within the abdomen or pelvis. Extensive abdominopelvic metastatic disease is unchanged from 10/3/2022 and please refer to that report for  further details. Uncomplicated appearance of surgical change of colostomy. Similar small to moderate right and small left pleural effusions within the partially imaged chest. Stable mild bilateral hydronephrosis with symmetric enhancement and excretion of the kidneys. No pneumoperitoneum.      Impression: No evidence of active GI bleeding allowing for limitations of scattered hyperdense material throughout the GI tract, either ingested medication or prior oral contrast. Interval duodenal ulcer clipping, without evidence of active GI bleeding at this site.  No new or acute abdominopelvic findings. Grossly unchanged metastatic disease as detailed on 10/3/2022 CT and please refer to that report for complete details.  This report was finalized on 10/11/2022 6:39 PM by Santino Kaur MD.      CT Head Without Contrast    Result Date: 10/10/2022  Narrative: EXAMINATION: CT HEAD WITHOUT CONTRAST DATE: 10/10/2022 12:18 AM INDICATION: Altered mental status COMPARISON: None available at the time of this dictation. TECHNIQUE: Noncontrast imaging obtained from the vertex to the skull base.  CT dose lowering techniques were used, to include: automated exposure control, adjustment for patient size, and or use of iterative reconstruction.? FINDINGS: Soft Tissues: No significant soft tissue abnormality. Skull: No underlying skull fracture or radiopaque foreign body. Sinuses: Paranasal sinuses are clear. Mastoids: Mastoid air cells are clear. Globes and Orbits: Globes and orbits are intact. Brain: No acute hemorrhage.  No midline shift, masses, or mass effect.  No evidence of acute infarct by noncontrast CT. Ventricles and Cisterns: Ventricular size and configuration is within normal limits. Basal cisterns are patent. No abnormal extra-axial fluid collection. Senescent Changes: Mild volume loss     Impression: 1. No acute intracranial abnormality. 2. Mild generalized volume loss.  Electronically signed by:  Neeraj Coronado M.D.   10/9/2022 10:53 PM Mountain Time    NM GI Blood Loss    Result Date: 10/15/2022  Narrative: DATE OF EXAM: 10/14/2022 11:10 AM  PROCEDURE: NM GI BLOOD LOSS-  INDICATIONS: GI bleed  COMPARISON: No comparisons available.  TECHNIQUE: Scintigraphic imaging of the abdomen occurred following injection of 25.3 mCi of technetium 99m labeled pertechnetate tagged to pyrophosphate labeled red blood cells. Exam is performed per GI bleeding exam protocol.  FINDINGS: Blood flow images through 60 minutes show generally expected distribution of radiotracer, with slightly greater activity in the expected location of the spleen than typically seen. Images obtained through 60 minutes again show somewhat greater than typical activity in the area of the spleen, however, this is unchanging from 5 minutes to 60 minutes, and appears to represent normal variant splenic activity. No evidence of a transient focus of activity or mobile focus of activity is seen to suggest active GI bleed.      Impression: No evidence of active GI bleed. Suspected normal variant uptake in the spleen.  This report was finalized on 10/15/2022 2:18 PM by Dr. Del Wilks MD.      FL Esophagram Complete Single Contrast    Result Date: 10/12/2022  Narrative: EXAMINATION: FL ESOPHAGRAM COMPLETE SINGLE-CONTRAST-  INDICATION: Scleroderma, Food impaction  Yesterday   Use Barium tablet; R50.9-Fever, unspecified; N13.30-Unspecified hydronephrosis; Z93.3-Colostomy status; C79.9-Secondary malignant neoplasm of unspecified site; E87.0-Hyperosmolality and hypernatremia; E87.6-Hypokalemia; C85.90-Non-Hodgkin lymphoma, unspecified, unspecified site; R13.10-Dysphagia, unspecified; C81.98-Hodgkin lymphoma, unspecified, lymph nod  TECHNIQUE: 1 minute and 24 seconds of fluoroscopic time was used for this exam. 11 associated fluoroscopic series were saved.  imaging reveals a gaseous abdomen.  COMPARISON: NONE  FINDINGS: Preliminary  film shows mildly increased bowel gas in a  generalized pattern, but appears to be an endoscopically placed vascular clip in the mid abdomen, that appears to localize to the proximal duodenum.. Under fluoroscopic observation, the patient ingested thin barium. The oral phase of deglutition appeared normal. The esophageal mucosa appeared grossly normal. There was no evidence of a focal esophageal stricture. There was mild gastroesophageal reflux to the level of the thoracic inlet. The patient ingested a barium capsule without difficulty, and it readily passed into the stomach under fluoroscopic observation.       Impression: Mild gastroesophageal reflux to the level of the thoracic inlet    This report was finalized on 10/12/2022 9:09 AM by Dr. Del Wilks MD.      Duplex Venous Upper Extremity - Right CAR    Result Date: 10/31/2022  Narrative: •  Normal right upper extremity venous duplex scan. •  PICC line is noted in the right brachial vein.     IR Artery Embolization Occlusion    Result Date: 10/12/2022  Narrative: IR ARTERY EMBOLIZATION OCCLUSION-                                                         History: GI bleed; R50.9-Fever, unspecified; N13.30-Unspecified hydronephrosis; Z93.3-Colostomy status; C79.9-Secondary malignant neoplasm of unspecified site; E87.0-Hyperosmolality and hypernatremia; E87.6-Hypokalemia; C85.90-Non-Hodgkin lymphoma, unspecified, unspecified site; R13.10-Dysphagia, unspecified; C81.98-Hodgkin lymphoma, unspecified, lymph nodes of multiple sites; K92.2-Gastrointestinal hemor    : Yassine Lopez M.D.                                                                            MODALITY: Sonography and fluoroscopy.                                 DOSE REDUCTION: The examination was performed according to departmental dose-optimization program.  FLUOROSCOPY TIME: 7.7 minutes  RADIATION DOSE: 480.2 mGy air Kerma.   SEDATION: Moderate sedation was administered. 3.5 milligram of Versed and 100 micrograms of fentanyl IV  was used for moderate sedation monitored under my direction. Total intra service time of sedation was 30 minutes. The patient's vital signs were monitored throughout the procedure and recorded in the patient's medical record by the nurse.  Because of the patient's condition, the anesthesia service was requested to take over the patient's sedation. Please refer to their notes for details.  ANESTHESIA: Lidocaine, local infiltration    MEDICINES: Not applicable  CONTRAST MEDIUM: Isovue 300, 160 cc.  ESTIMATED blood loss: < 5 cc.          TECHNIQUE: A thorough discussion of the risks, benefits, and alternatives of the procedure, and if applicable, moderate sedation, was carried out with the patient's wife. They were encouraged to ask any questions. Any questions were answered. They verbalized understanding. A written informed consent was then signed.   A multi-component timeout was performed prior to starting the procedure using the departmental protocol.  The procedure room personnel used personal protective equipment. The operators used sterile gloves and if indicated, sterile gowns. The surgical site was prepped with chlorhexidine gluconate  and draped in the maximal applicable sterile fashion.  The right common femoral  artery was localized using anatomic and ultrasonographic landmarks. The artery is patent. Using aseptic precautions, real-time ultrasonographic guidance, after local anesthesia and dermatotomy, the artery was accessed with an access needle. Eventually a guide wire was placed using standard exchanges. Over the wire a 6 Swedish Long vascular sheath was placed. The sheath was connected to a heparinized saline drip.  Selective and superselective catheterization of the following arteries was performed: Celiac artery, superior mesenteric artery, common hepatic artery, gastroduodenal artery.  The equipment used for selective and superselective catheterization was: Sos Omni selective 2, Holographic Projection for Architecture wire, arnulfo  STC, fathom wire.  At each catheterization and superselective catheterization, following test injection, digital subtraction angiography was performed and repeated in different fluoroscopic projections if and as needed.  This was followed by superselective embolization of the following: Proximal aspects of the right gastroepiploic artery, superior pancreaticoduodenal arteries, the main trunk of the gastroduodenal artery.  The agent used for embolization was Concerto detachable coils.  A post embolization run was performed from the proximal gastroduodenal artery and the celiac artery.  The endpoint of embolization was stasis in the target artery.  The access site was closed using Angio-Seal and manual compression.  The patient wastransferred to the post procedure area for recovery. The patient was discharged from the department in stable condition.                     COMPLICATIONS: None immediate.     FINDINGS: Unremarkable celiac artery branching pattern. No celiac artery stenosis. Patent main portal vein with hepatopedal flow. Large caliber GDA. Unremarkable branching pattern. No evidence of arterial extravasation on a pseudoaneurysm or other signs of arterial bleeding in the region of the endoscopic clip in the distal second part of duodenum. No large caliber supraduodenal artery seen from the proximal GDA. Coil embolization of the above-mentioned arteries was successfully. A post embolization run shows successful stasis in the targeted artery.  SMA angiography reveals absence of any active bleeding or stigmata of recent arterial bleeding in the region of the endoscopic clip.      Impression:  Successful catheterization and angiography of the following: Celiac artery, common hepatic artery, gastroduodenal artery, superior mesenteric artery  Successful embolization of the following: Most proximal aspects of the right gastroepiploic artery, superior pancreaticoduodenal arteries, the main trunk of the gastroduodenal  artery.  The embolic agent used in the endpoint are mentioned above.  Thank you for the opportunity to assist you in the care of your patient.  This report was finalized on 10/12/2022 2:36 PM by Yassine Lopez MD.      XR Abdomen KUB    Result Date: 10/20/2022  Narrative: Supine abdomen. DATE: 10/19/2022 at 11:41 PM. COMPARISON: 10/19/2022 at 10:50 PM. CLINICAL HISTORY: NG tube placement.     Impression: NG tube tip has tip overlying the right midabdomen is likely within the distal aspect/pyloric region of the stomach are proximal duodenal region. Several embolization coils are also seen in the right mid abdomen. Distended loops of nondilated air-filled bowel are seen throughout the visualized portions of the abdomen. Electronically signed by:  Deonte Dash D.O.  10/19/2022 10:36 PM Mountain Time    XR Abdomen KUB    Result Date: 10/20/2022  Narrative: Frontal abdomen CLINICAL INDICATION: Feeding tube placement COMPARISON: October 16, 2022. FINDINGS: Feeding tube tip in the body of the stomach. Postsurgical changes in the mid abdomen. Distended loops of bowel are seen in the upper abdomen. Lung bases are grossly clear.     Impression: Feeding tube tip near the fundus of the stomach. Electronically signed by:  Rehana Estrella D.O.  10/19/2022 10:19 PM Mountain Time    XR Abdomen KUB    Result Date: 10/16/2022  Narrative: DATE OF EXAM: 10/16/2022 11:54 AM  PROCEDURE: XR ABDOMEN KUB-  INDICATIONS: keofeed placement; R50.9-Fever, unspecified; N13.30-Unspecified hydronephrosis; Z93.3-Colostomy status; C79.9-Secondary malignant neoplasm of unspecified site; E87.0-Hyperosmolality and hypernatremia; E87.6-Hypokalemia; C85.90-Non-Hodgkin lymphoma, unspecified, unspecified site; R13.10-Dysphagia, unspecified; C81.98-Hodgkin lymphoma, unspecified, lymph nodes of multiple sites; K92.2-Gastrointesti  COMPARISON: No comparisons available.  TECHNIQUE: Single radiographic view of the abdomen was obtained.  FINDINGS: Feeding tube  terminates in the stomach. Upper abdominal bowel gas pattern is nonspecific, with some gas filled segments of colon noted. There is no overt pneumoperitoneum.      Impression: Feeding tube terminates in the stomach. Upper abdominal bowel gas pattern is nonspecific, with some gas filled segments of colon noted. There is no overt pneumoperitoneum.  This report was finalized on 10/16/2022 2:23 PM by Josef Mckee.      XR Chest PA & Lateral    Result Date: 10/14/2022  Narrative: DATE OF EXAM: 10/14/2022 10:53 AM  PROCEDURE: XR CHEST PA AND LATERAL-  INDICATIONS: neutropenic fever; R50.9-Fever, unspecified; N13.30-Unspecified hydronephrosis; Z93.3-Colostomy status; C79.9-Secondary malignant neoplasm of unspecified site; E87.0-Hyperosmolality and hypernatremia; E87.6-Hypokalemia; C85.90-Non-Hodgkin lymphoma, unspecified, unspecified site; R13.10-Dysphagia, unspecified; C81.98-Hodgkin lymphoma, unspecified, lymph nodes of multiple sites; K92.2-Gastrointesti  COMPARISON: 10/03/2022  TECHNIQUE: Two radiologic views of the chest, PA and lateral, were obtained.  FINDINGS: There is density along the posterior pleural margins that could relate to underlying effusions. The heart looks enlarged. There are degenerative changes involving the dorsal spine and both shoulder areas. There is a PICC line via the right arm with its tip in the superior vena cava.      Impression: 1.  Bibasilar effusions. Some underlying atelectasis could not be excluded. 2.  Suggested cardiomegaly.  This report was finalized on 10/14/2022 11:09 AM by Chiki Escobar MD.        Procedures    Assessment / Plan      Assessment/Plan:   1.  CD30 positive classical Hodgkin's lymphoma.  -He is status post cycle 1 of BV AVD.  Day 15 treatment was held due to pancytopenia, neutropenic fever, and active GI bleed.  He was very deconditioned, and initially declined further systemic therapy, but has now resumed treatment with 20% dose reduction in AVD and continued  full dose brentuximab.   -Continue to monitor closely with twice weekly CBCs and possible transfusions/electrolytes.  -CBC and BMP pending today.  -Reviewed plan for PET/CT after cycle #2    2. Antineoplastic chemotherapy induced pancytopenia compounded by blood loss anemia from acute GI bleed.  -Tranfuse for hemoglobin < 7. Irradiated blood products    3. AM cough  -Resume antihistamine    4.  Low appetite  -Continue nutrition supplements.    -Monitor weight    5.  Hypokalemia  -Now off K supplements. Repeat level pending    6.  Abdominal cramping  -Continue scheduled zofran.   -Add PRN compazine and PRN bentyl     7.  Weakness and deconditioning  -Home health PT and OT referral.    8.  Access  -PICC dressing weekly  -Plan port placement prior to cycle 2 pending 11/29.    Follow Up:   3 days electrolytes and CBC  1 week MD Kaycee Leary MD  Hematology and Oncology     Time spent on the day of service was 30 min inclusive of time before, during, and after office visit on record review, medically appropriate history and physical, counseling patient, ordering tests, documenting in the medical record.

## 2022-11-09 ENCOUNTER — TELEPHONE (OUTPATIENT)
Dept: ONCOLOGY | Facility: CLINIC | Age: 72
End: 2022-11-09

## 2022-11-09 NOTE — TELEPHONE ENCOUNTER
Caller: MEREDITHSRINIVASAN    Relationship: Emergency Contact    Best call back number:[phone]     What is the best time to reach you: ANYTIME    Who are you requesting to speak with (clinical staff, provider,  specific staff member): CLINICAL      What was the call regarding: SRINIVASAN IS WANTING TO KNOW  IF KAL'S STOMACH PAIN COULD POSSIBLY BE HIS ULCER     ALSO SHE IS WANTING TO INQUIRE ABOUT GETTING AN APPETITE PILL    Do you require a callback: YES

## 2022-11-09 NOTE — TELEPHONE ENCOUNTER
Spoke with patient and his spouse.  Patient stated he has been drinking 1-2 Boost per day.  Stomach pain is not worse than yesterday at office visit, it is actually better.  Patient was having formed BM through stoma but today had an episode of liquid BM.  Patient stated he took Bentyl and it is helping he believes.  Dr. Leary notified of patient quesiton about pain with stomach ulcer.  Per MD, patient advised that the protonix prescribed should help with this and that he should continue it and that he can stop the Boost and see if it helps. Patient v/u.  Also, advised per MD that she will speak with him at next appointment regarding appetite stimulant.  Advised that if this does not help, let this office know.  Patient v/u.

## 2022-11-11 ENCOUNTER — HOSPITAL ENCOUNTER (OUTPATIENT)
Dept: ONCOLOGY | Facility: HOSPITAL | Age: 72
Setting detail: INFUSION SERIES
Discharge: HOME | End: 2022-11-11

## 2022-11-11 VITALS
TEMPERATURE: 96.9 F | SYSTOLIC BLOOD PRESSURE: 106 MMHG | DIASTOLIC BLOOD PRESSURE: 60 MMHG | BODY MASS INDEX: 22.31 KG/M2 | RESPIRATION RATE: 20 BRPM | WEIGHT: 130 LBS | HEART RATE: 87 BPM

## 2022-11-11 DIAGNOSIS — C81.00 NODULAR LYMPHOCYTE PREDOMINANT HODGKIN LYMPHOMA, UNSPECIFIED BODY REGION: ICD-10-CM

## 2022-11-11 DIAGNOSIS — C81.98 HODGKIN LYMPHOMA OF LYMPH NODES OF MULTIPLE REGIONS, UNSPECIFIED HODGKIN LYMPHOMA TYPE: ICD-10-CM

## 2022-11-11 DIAGNOSIS — E44.0 MODERATE MALNUTRITION: Primary | ICD-10-CM

## 2022-11-11 LAB
ALBUMIN SERPL-MCNC: 3.1 G/DL (ref 3.5–5.2)
ALBUMIN/GLOB SERPL: 1 G/DL
ALP SERPL-CCNC: 227 U/L (ref 39–117)
ALT SERPL W P-5'-P-CCNC: 15 U/L (ref 1–41)
ANION GAP SERPL CALCULATED.3IONS-SCNC: 8 MMOL/L (ref 5–15)
AST SERPL-CCNC: 11 U/L (ref 1–40)
BASOPHILS # BLD AUTO: 0.03 10*3/MM3 (ref 0–0.2)
BASOPHILS NFR BLD AUTO: 0.4 % (ref 0–1.5)
BILIRUB SERPL-MCNC: 0.4 MG/DL (ref 0–1.2)
BUN SERPL-MCNC: 7 MG/DL (ref 8–23)
BUN/CREAT SERPL: 15.2 (ref 7–25)
CALCIUM SPEC-SCNC: 8.7 MG/DL (ref 8.6–10.5)
CHLORIDE SERPL-SCNC: 96 MMOL/L (ref 98–107)
CO2 SERPL-SCNC: 25 MMOL/L (ref 22–29)
CREAT SERPL-MCNC: 0.46 MG/DL (ref 0.76–1.27)
DEPRECATED RDW RBC AUTO: 61.8 FL (ref 37–54)
EGFRCR SERPLBLD CKD-EPI 2021: 112.5 ML/MIN/1.73
EOSINOPHIL # BLD AUTO: 0.04 10*3/MM3 (ref 0–0.4)
EOSINOPHIL NFR BLD AUTO: 0.6 % (ref 0.3–6.2)
ERYTHROCYTE [DISTWIDTH] IN BLOOD BY AUTOMATED COUNT: 19.8 % (ref 12.3–15.4)
GLOBULIN UR ELPH-MCNC: 3 GM/DL
GLUCOSE SERPL-MCNC: 88 MG/DL (ref 65–99)
HCT VFR BLD AUTO: 24.7 % (ref 37.5–51)
HGB BLD-MCNC: 8 G/DL (ref 13–17.7)
IMM GRANULOCYTES # BLD AUTO: 0.35 10*3/MM3 (ref 0–0.05)
IMM GRANULOCYTES NFR BLD AUTO: 4.9 % (ref 0–0.5)
LYMPHOCYTES # BLD AUTO: 0.66 10*3/MM3 (ref 0.7–3.1)
LYMPHOCYTES NFR BLD AUTO: 9.3 % (ref 19.6–45.3)
MCH RBC QN AUTO: 29.1 PG (ref 26.6–33)
MCHC RBC AUTO-ENTMCNC: 32.4 G/DL (ref 31.5–35.7)
MCV RBC AUTO: 89.8 FL (ref 79–97)
MONOCYTES # BLD AUTO: 0.57 10*3/MM3 (ref 0.1–0.9)
MONOCYTES NFR BLD AUTO: 8 % (ref 5–12)
NEUTROPHILS NFR BLD AUTO: 5.47 10*3/MM3 (ref 1.7–7)
NEUTROPHILS NFR BLD AUTO: 76.8 % (ref 42.7–76)
PLATELET # BLD AUTO: 377 10*3/MM3 (ref 140–450)
PMV BLD AUTO: 8.4 FL (ref 6–12)
POTASSIUM SERPL-SCNC: 3.8 MMOL/L (ref 3.5–5.2)
PROT SERPL-MCNC: 6.1 G/DL (ref 6–8.5)
RBC # BLD AUTO: 2.75 10*6/MM3 (ref 4.14–5.8)
SODIUM SERPL-SCNC: 129 MMOL/L (ref 136–145)
WBC NRBC COR # BLD: 7.12 10*3/MM3 (ref 3.4–10.8)

## 2022-11-11 PROCEDURE — 85025 COMPLETE CBC W/AUTO DIFF WBC: CPT | Performed by: INTERNAL MEDICINE

## 2022-11-11 PROCEDURE — 36592 COLLECT BLOOD FROM PICC: CPT

## 2022-11-11 PROCEDURE — 80053 COMPREHEN METABOLIC PANEL: CPT | Performed by: INTERNAL MEDICINE

## 2022-11-14 DIAGNOSIS — C81.98 HODGKIN LYMPHOMA OF LYMPH NODES OF MULTIPLE REGIONS, UNSPECIFIED HODGKIN LYMPHOMA TYPE: Primary | ICD-10-CM

## 2022-11-15 ENCOUNTER — HOSPITAL ENCOUNTER (OUTPATIENT)
Dept: ONCOLOGY | Facility: HOSPITAL | Age: 72
Setting detail: INFUSION SERIES
Discharge: HOME | End: 2022-11-15

## 2022-11-15 ENCOUNTER — OFFICE VISIT (OUTPATIENT)
Dept: ONCOLOGY | Facility: CLINIC | Age: 72
End: 2022-11-15

## 2022-11-15 VITALS
HEART RATE: 74 BPM | BODY MASS INDEX: 22.02 KG/M2 | HEIGHT: 64 IN | OXYGEN SATURATION: 99 % | DIASTOLIC BLOOD PRESSURE: 61 MMHG | TEMPERATURE: 98 F | SYSTOLIC BLOOD PRESSURE: 105 MMHG | RESPIRATION RATE: 20 BRPM | WEIGHT: 129 LBS

## 2022-11-15 DIAGNOSIS — C81.98 HODGKIN LYMPHOMA OF LYMPH NODES OF MULTIPLE REGIONS, UNSPECIFIED HODGKIN LYMPHOMA TYPE: Primary | ICD-10-CM

## 2022-11-15 LAB
ALBUMIN SERPL-MCNC: 3.2 G/DL (ref 3.5–5.2)
ALBUMIN/GLOB SERPL: 1.1 G/DL
ALP SERPL-CCNC: 226 U/L (ref 39–117)
ALT SERPL W P-5'-P-CCNC: 11 U/L (ref 1–41)
ANION GAP SERPL CALCULATED.3IONS-SCNC: 7 MMOL/L (ref 5–15)
AST SERPL-CCNC: 12 U/L (ref 1–40)
BASOPHILS # BLD AUTO: 0.03 10*3/MM3 (ref 0–0.2)
BASOPHILS NFR BLD AUTO: 0.3 % (ref 0–1.5)
BILIRUB SERPL-MCNC: 0.4 MG/DL (ref 0–1.2)
BUN SERPL-MCNC: 11 MG/DL (ref 8–23)
BUN/CREAT SERPL: 19.3 (ref 7–25)
CALCIUM SPEC-SCNC: 8.8 MG/DL (ref 8.6–10.5)
CHLORIDE SERPL-SCNC: 95 MMOL/L (ref 98–107)
CO2 SERPL-SCNC: 27 MMOL/L (ref 22–29)
CREAT SERPL-MCNC: 0.57 MG/DL (ref 0.76–1.27)
DEPRECATED RDW RBC AUTO: 68.5 FL (ref 37–54)
EGFRCR SERPLBLD CKD-EPI 2021: 105.5 ML/MIN/1.73
EOSINOPHIL # BLD AUTO: 0.09 10*3/MM3 (ref 0–0.4)
EOSINOPHIL NFR BLD AUTO: 0.9 % (ref 0.3–6.2)
ERYTHROCYTE [DISTWIDTH] IN BLOOD BY AUTOMATED COUNT: 20.3 % (ref 12.3–15.4)
GLOBULIN UR ELPH-MCNC: 3 GM/DL
GLUCOSE SERPL-MCNC: 93 MG/DL (ref 65–99)
HCT VFR BLD AUTO: 27.6 % (ref 37.5–51)
HGB BLD-MCNC: 8.7 G/DL (ref 13–17.7)
IMM GRANULOCYTES # BLD AUTO: 0.43 10*3/MM3 (ref 0–0.05)
IMM GRANULOCYTES NFR BLD AUTO: 4.5 % (ref 0–0.5)
LYMPHOCYTES # BLD AUTO: 0.86 10*3/MM3 (ref 0.7–3.1)
LYMPHOCYTES NFR BLD AUTO: 9 % (ref 19.6–45.3)
MAGNESIUM SERPL-MCNC: 1.9 MG/DL (ref 1.6–2.4)
MCH RBC QN AUTO: 29.4 PG (ref 26.6–33)
MCHC RBC AUTO-ENTMCNC: 31.5 G/DL (ref 31.5–35.7)
MCV RBC AUTO: 93.2 FL (ref 79–97)
MONOCYTES # BLD AUTO: 0.69 10*3/MM3 (ref 0.1–0.9)
MONOCYTES NFR BLD AUTO: 7.2 % (ref 5–12)
NEUTROPHILS NFR BLD AUTO: 7.42 10*3/MM3 (ref 1.7–7)
NEUTROPHILS NFR BLD AUTO: 78.1 % (ref 42.7–76)
PLATELET # BLD AUTO: 361 10*3/MM3 (ref 140–450)
PMV BLD AUTO: 8.6 FL (ref 6–12)
POTASSIUM SERPL-SCNC: 4.4 MMOL/L (ref 3.5–5.2)
PROT SERPL-MCNC: 6.2 G/DL (ref 6–8.5)
RBC # BLD AUTO: 2.96 10*6/MM3 (ref 4.14–5.8)
SODIUM SERPL-SCNC: 129 MMOL/L (ref 136–145)
WBC NRBC COR # BLD: 9.52 10*3/MM3 (ref 3.4–10.8)

## 2022-11-15 PROCEDURE — 25010000002 PALONOSETRON 0.25 MG/5ML SOLUTION PREFILLED SYRINGE: Performed by: INTERNAL MEDICINE

## 2022-11-15 PROCEDURE — 25010000002 DEXAMETHASONE SODIUM PHOSPHATE 100 MG/10ML SOLUTION: Performed by: INTERNAL MEDICINE

## 2022-11-15 PROCEDURE — 80053 COMPREHEN METABOLIC PANEL: CPT | Performed by: INTERNAL MEDICINE

## 2022-11-15 PROCEDURE — 96375 TX/PRO/DX INJ NEW DRUG ADDON: CPT

## 2022-11-15 PROCEDURE — 99215 OFFICE O/P EST HI 40 MIN: CPT | Performed by: INTERNAL MEDICINE

## 2022-11-15 PROCEDURE — 63710000001 DIPHENHYDRAMINE PER 50 MG: Performed by: INTERNAL MEDICINE

## 2022-11-15 PROCEDURE — 96413 CHEMO IV INFUSION 1 HR: CPT

## 2022-11-15 PROCEDURE — 96367 TX/PROPH/DG ADDL SEQ IV INF: CPT

## 2022-11-15 PROCEDURE — 25010000002 DOXORUBICIN PER 10 MG: Performed by: INTERNAL MEDICINE

## 2022-11-15 PROCEDURE — 25010000002 FOSAPREPITANT PER 1 MG: Performed by: INTERNAL MEDICINE

## 2022-11-15 PROCEDURE — 96411 CHEMO IV PUSH ADDL DRUG: CPT

## 2022-11-15 PROCEDURE — 25010000002 DACARBAZINE PER 200 MG: Performed by: INTERNAL MEDICINE

## 2022-11-15 PROCEDURE — 96368 THER/DIAG CONCURRENT INF: CPT

## 2022-11-15 PROCEDURE — 96417 CHEMO IV INFUS EACH ADDL SEQ: CPT

## 2022-11-15 PROCEDURE — 85025 COMPLETE CBC W/AUTO DIFF WBC: CPT | Performed by: INTERNAL MEDICINE

## 2022-11-15 PROCEDURE — 25010000002 BRENTUXIMAB 50 MG RECONSTITUTED SOLUTION 1 EACH VIAL: Performed by: INTERNAL MEDICINE

## 2022-11-15 PROCEDURE — 83735 ASSAY OF MAGNESIUM: CPT | Performed by: INTERNAL MEDICINE

## 2022-11-15 PROCEDURE — 25010000002 VINBLASTINE PER 1 MG: Performed by: INTERNAL MEDICINE

## 2022-11-15 PROCEDURE — 96409 CHEMO IV PUSH SNGL DRUG: CPT

## 2022-11-15 RX ORDER — OLANZAPINE 5 MG/1
5 TABLET ORAL ONCE
Status: CANCELLED | OUTPATIENT
Start: 2022-11-29 | End: 2022-11-29

## 2022-11-15 RX ORDER — PALONOSETRON 0.05 MG/ML
0.25 INJECTION, SOLUTION INTRAVENOUS ONCE
Status: CANCELLED | OUTPATIENT
Start: 2022-11-29

## 2022-11-15 RX ORDER — DIPHENHYDRAMINE HCL 25 MG
25 CAPSULE ORAL ONCE
Status: COMPLETED | OUTPATIENT
Start: 2022-11-15 | End: 2022-11-15

## 2022-11-15 RX ORDER — DOXORUBICIN HYDROCHLORIDE 2 MG/ML
20 INJECTION, SOLUTION INTRAVENOUS ONCE
Status: CANCELLED | OUTPATIENT
Start: 2022-11-15

## 2022-11-15 RX ORDER — PALONOSETRON 0.05 MG/ML
0.25 INJECTION, SOLUTION INTRAVENOUS ONCE
Status: COMPLETED | OUTPATIENT
Start: 2022-11-15 | End: 2022-11-15

## 2022-11-15 RX ORDER — DIPHENHYDRAMINE HCL 25 MG
25 CAPSULE ORAL ONCE
Status: CANCELLED | OUTPATIENT
Start: 2022-11-29

## 2022-11-15 RX ORDER — ACETAMINOPHEN 325 MG/1
650 TABLET ORAL ONCE
Status: COMPLETED | OUTPATIENT
Start: 2022-11-15 | End: 2022-11-15

## 2022-11-15 RX ORDER — SODIUM CHLORIDE 9 MG/ML
250 INJECTION, SOLUTION INTRAVENOUS ONCE
Status: COMPLETED | OUTPATIENT
Start: 2022-11-15 | End: 2022-11-15

## 2022-11-15 RX ORDER — PALONOSETRON 0.05 MG/ML
0.25 INJECTION, SOLUTION INTRAVENOUS ONCE
Status: CANCELLED | OUTPATIENT
Start: 2022-11-15

## 2022-11-15 RX ORDER — DOXORUBICIN HYDROCHLORIDE 2 MG/ML
20 INJECTION, SOLUTION INTRAVENOUS ONCE
Status: COMPLETED | OUTPATIENT
Start: 2022-11-15 | End: 2022-11-15

## 2022-11-15 RX ORDER — SODIUM CHLORIDE 9 MG/ML
250 INJECTION, SOLUTION INTRAVENOUS ONCE
Status: CANCELLED | OUTPATIENT
Start: 2022-11-15

## 2022-11-15 RX ORDER — OLANZAPINE 5 MG/1
5 TABLET ORAL ONCE
Status: CANCELLED | OUTPATIENT
Start: 2022-11-15 | End: 2022-11-15

## 2022-11-15 RX ORDER — ACETAMINOPHEN 325 MG/1
650 TABLET ORAL ONCE
Status: CANCELLED | OUTPATIENT
Start: 2022-11-15

## 2022-11-15 RX ORDER — ACETAMINOPHEN 325 MG/1
650 TABLET ORAL ONCE
Status: CANCELLED | OUTPATIENT
Start: 2022-11-29

## 2022-11-15 RX ORDER — DOXORUBICIN HYDROCHLORIDE 2 MG/ML
20 INJECTION, SOLUTION INTRAVENOUS ONCE
Status: CANCELLED | OUTPATIENT
Start: 2022-11-29

## 2022-11-15 RX ORDER — OLANZAPINE 5 MG/1
5 TABLET ORAL ONCE
Status: COMPLETED | OUTPATIENT
Start: 2022-11-15 | End: 2022-11-15

## 2022-11-15 RX ORDER — DIPHENHYDRAMINE HCL 25 MG
25 CAPSULE ORAL ONCE
Status: CANCELLED | OUTPATIENT
Start: 2022-11-15

## 2022-11-15 RX ORDER — SODIUM CHLORIDE 9 MG/ML
250 INJECTION, SOLUTION INTRAVENOUS ONCE
Status: CANCELLED | OUTPATIENT
Start: 2022-11-29

## 2022-11-15 RX ADMIN — DEXAMETHASONE SODIUM PHOSPHATE 12 MG: 10 INJECTION, SOLUTION INTRAMUSCULAR; INTRAVENOUS at 09:43

## 2022-11-15 RX ADMIN — DACARBAZINE 500 MG: 10 INJECTION, POWDER, FOR SOLUTION INTRAVENOUS at 11:18

## 2022-11-15 RX ADMIN — BRENTUXIMAB VEDOTIN 70 MG: 50 INJECTION, POWDER, LYOPHILIZED, FOR SOLUTION INTRAVENOUS at 12:18

## 2022-11-15 RX ADMIN — ACETAMINOPHEN 650 MG: 325 TABLET ORAL at 11:52

## 2022-11-15 RX ADMIN — OLANZAPINE 5 MG: 5 TABLET, FILM COATED ORAL at 09:42

## 2022-11-15 RX ADMIN — DOXORUBICIN HYDROCHLORIDE 34 MG: 2 INJECTION, SOLUTION INTRAVENOUS at 10:55

## 2022-11-15 RX ADMIN — VINBLASTINE SULFATE 8 MG: 1 INJECTION INTRAVENOUS at 11:00

## 2022-11-15 RX ADMIN — PALONOSETRON 0.25 MG: 0.25 INJECTION, SOLUTION INTRAVENOUS at 09:42

## 2022-11-15 RX ADMIN — DIPHENHYDRAMINE HYDROCHLORIDE 25 MG: 25 CAPSULE ORAL at 11:52

## 2022-11-15 RX ADMIN — SODIUM CHLORIDE 250 ML: 9 INJECTION, SOLUTION INTRAVENOUS at 09:31

## 2022-11-15 RX ADMIN — SODIUM CHLORIDE 150 MG: 9 INJECTION, SOLUTION INTRAVENOUS at 09:30

## 2022-11-15 NOTE — PROGRESS NOTES
[facility]ology and [facility]  Office number [phone]    Fax number [phone]     Follow up     Date: 11/15/22    Patient Name: Abraham Wu  MRN: [id number]  : 1950      Chief Complaint: follow up/treatment     Surgeon: Dr. Hiram Viveros MD    Cancer Staging: IV    History of Present Illness: Abraham Wu is a pleasant 72 y.o. male with PMH of hypertension, sleep apnea, hard of hearing who presents today for follow up of Hodgkin Lymphoma. The patient is accompanied by his supportive wife.     He initially presented 2022 with intractable diarrhea, low appetite, and a greater than 50 pound weight loss over several month period associated with intermittent fevers.  CT of the chest abdomen pelvis obtained in the ER shows of rectal mass spanning greater than 12 cm with adjacent adenopathy, bulky RP adenopathy, and findings concerning for left renal and liver metastases.  Small right pleural effusion with nodularity.  There was concern for impending obstruction, so he underwent diverting colostomy and biopsy on 2022.  Biopsies from the peritoneam showed a fibrotic nodule histiocytes and eosinophils admixed with CD30 positive large cells.  Rectal biopsies showed involvement by CD30 positive lymphoma, classic Hodgkin lymphoma versus CD30 positive T-cell or B-cell lymphoma.  There was extensive fibrosis and crush artifact.  He underwent repeat transrectal biopsy 10/4/2022 for further characterization which was felt to be most consistent with classical Hodgkin lymphoma.    He initiated chemotherapy with Brentuximab-AVD as an inpatient on 10/8/2022.  Cycle #1 was complicated by GI bleed requiring multiple blood transfusion and ultimately coil artery embolization 10/12; neutropenic fever, Candida esophagitis and mucositis.  He had a prolonged ICU stay  And required enteral feeding.  He was discharged 10/20/2022.    Treatment history:  Brentuximab-AVD: C1 10/8/22  C1D15:  delayed, 11/1/22 with DA    Interval history:    He is here for consideration of cycle 2. Cough is a little better.  Had 2 days of no ostomy last week despite miralax and fiber pills that subsequently resolved. Since adding miralax daily it has been back to normal.  Has backed off miralax to PRN and bowels are regular now.   Takes zofran on schedule and protonix.   Bentyle helped the stomach cramps, no longer using.   Energy is better. Cooked eggs and lehman. Appetite is back to 60-70%  Energy is low during day.     Still doiong PT. Feels his thighs are still weak. Used to be a . Was going to gym daily until 1 year ago.   No neuropathy or headaches  No further mucosisits. No arm swelling.  Taking potassium 2 in AM.   Does not eat sodium on food.   Told by PCP that his sodium is always low.     Past Medical History:   Past Medical History:   Diagnosis Date   • benign polypoid tissue right lung    • Hyperlipidemia    • Hypertension    • Mycobacterium mucogenicum    • SHERRI (obstructive sleep apnea)    • Seasonal allergies        Past Surgical History:   Past Surgical History:   Procedure Laterality Date   • BRONCHOSCOPY      removal of obstructing polypoid tissue right middle lung   • COLOSTOMY N/A 9/22/2022    Procedure: LAPAROSCOPIC COLOSTOMY CREATION, FLEXIBLE SIGMOIDOSCOPY;  Surgeon: Hiram Viveros MD;  Location: [facility] OR;  Service: General;  Laterality: N/A;   • ENDOSCOPY N/A 10/10/2022    Procedure: ESOPHAGOGASTRODUODENOSCOPY;  Surgeon: Neeraj Lynn MD;  Location:  [facility];  Service: Gastroenterology;  Laterality: N/A;   • ENDOSCOPY N/A 10/12/2022    Procedure: ESOPHAGOGASTRODUODENOSCOPY;  Surgeon: Brunner, Mark I, MD;  Location:  [facility];  Service: Gastroenterology;  Laterality: N/A;   • EXAM UNDER ANESTHESIA, RECTAL BIOPSY N/A 10/4/2022    Procedure: EXAM UNDER ANESTHESIA, TRANSANAL BIOPSY WITH TRUCUT NEEDLE (LITHOTOMY-CANDY CANE);  Surgeon: Hiram Viveros MD;   Location: [facility];  Service: General;  Laterality: N/A;       Family History:   Family History   Problem Relation Age of Onset   • Diabetes Mother    • Diabetes Father    • Heart disease Father        Social History:   Social History     Socioeconomic History   • Marital status:    Tobacco Use   • Smoking status: Never   • Smokeless tobacco: Never   Vaping Use   • Vaping Use: Never used   Substance and Sexual Activity   • Alcohol use: Not Currently     Comment: previously drank 2 glasses of wine/beer nightly   • Drug use: Never   • Sexual activity: Defer       Medications:     Current Outpatient Medications:   •  dicyclomine (BENTYL) 20 MG tablet, Take 1 tablet by mouth Every 6 (Six) Hours. PRN abdominal cramps, Disp: 30 tablet, Rfl: 4  •  fluticasone (FLONASE) 50 MCG/ACT nasal spray, 1 spray into the nostril(s) as directed by provider Daily., Disp: , Rfl:   •  lidocaine-prilocaine (EMLA) 2.5-2.5 % cream, Apply 1 application topically to the appropriate area as directed As Needed (45-60 minutes prior to port access.  Cover with saran/plastic wrap.)., Disp: 30 g, Rfl: 3  •  loratadine (CLARITIN) 10 MG tablet, Take 1 tablet by mouth Daily., Disp: 30 tablet, Rfl: 6  •  nystatin susp + lidocaine viscous (MAGIC MOUTHWASH) oral suspension, 5-10 ml swish and spit or swallow QID prn, Disp: 240 mL, Rfl: 3  •  OLANZapine (zyPREXA) 5 MG tablet, Take 1 tablet by mouth Every Night. For 3 days on Days 2, 3 and 4 and on days 16, 17, and 18., Disp: 6 tablet, Rfl: 5  •  ondansetron (Zofran) 8 MG tablet, Take 1 tablet by mouth Every 8 (Eight) Hours As Needed for Nausea or Vomiting., Disp: 30 tablet, Rfl: 5  •  pantoprazole (PROTONIX) 40 MG EC tablet, Take 1 tablet by mouth 2 (Two) Times a Day for 30 days., Disp: 60 tablet, Rfl: 0  •  [START ON 11/20/2022] pantoprazole (PROTONIX) 40 MG EC tablet, Take 1 tablet by mouth Daily for 30 days., Disp: 30 tablet, Rfl: 0  •  potassium chloride (K-DUR,KLOR-CON) 20 MEQ CR tablet, Take 1  "tablet by mouth Daily., Disp: 30 tablet, Rfl: 5  •  prochlorperazine (COMPAZINE) 5 MG tablet, Take 1 tablet by mouth Every 6 (Six) Hours As Needed for Nausea or Vomiting., Disp: 30 tablet, Rfl: 2  •  tamsulosin (FLOMAX) 0.4 MG capsule 24 hr capsule, Take 1 capsule by mouth Daily for 30 days., Disp: 30 capsule, Rfl: 0    Allergies:   No Known Allergies    Objective     Vital Signs:   Vitals:    11/15/22 0810   BP: 105/61  Comment: LUE   Pulse: 74   Resp: 20   Temp: 98 °F (36.7 °C)   TempSrc: Infrared   SpO2: 99%  Comment: RA   Weight: 58.5 kg (129 lb)   Height: 162.6 cm (64\")   PainSc: 0-No pain    Body mass index is 22.14 kg/m².   Pain Score    11/15/22 0810   PainSc: 0-No pain       ECOG Performance Status: 1    Physical Exam:   General: No acute distress. Well appearing.  HEENT: Normocephalic, atraumatic. Sclera anicteric. Masked.  Neck: supple  Cardiovascular: regular rate and rhythm. No murmurs.   Respiratory: Normal rate. Clear to auscultation bilaterally.  Abdomen: Soft, nontender, non distended with normoactive bowel sounds. Ostomy output light brown.  Lymph: no cervical, supraclavicular or axillary adenopathy.  Neuro: Alert and oriented x 3. No focal deficits.   Ext: Symmetric, no swelling.   Skin: right picc c/d/i      Laboratory/Imaging Reviewed:   Hospital Outpatient Visit on 11/15/2022   Component Date Value Ref Range Status   • WBC 11/15/2022 9.52  3.40 - 10.80 10*3/mm3 Final   • RBC 11/15/2022 2.96 (L)  4.14 - 5.80 10*6/mm3 Final   • Hemoglobin 11/15/2022 8.7 (L)  13.0 - 17.7 g/dL Final   • Hematocrit 11/15/2022 27.6 (L)  37.5 - 51.0 % Final   • MCV 11/15/2022 93.2  79.0 - 97.0 fL Final   • MCH 11/15/2022 29.4  26.6 - 33.0 pg Final   • MCHC 11/15/2022 31.5  31.5 - 35.7 g/dL Final   • RDW 11/15/2022 20.3 (H)  12.3 - 15.4 % Final   • RDW-SD 11/15/2022 68.5 (H)  37.0 - 54.0 fl Final   • MPV 11/15/2022 8.6  6.0 - 12.0 fL Final   • Platelets 11/15/2022 361  140 - 450 10*3/mm3 Final   • Neutrophil % " 11/15/2022 78.1 (H)  42.7 - 76.0 % Final   • Lymphocyte % 11/15/2022 9.0 (L)  19.6 - 45.3 % Final   • Monocyte % 11/15/2022 7.2  5.0 - 12.0 % Final   • Eosinophil % 11/15/2022 0.9  0.3 - 6.2 % Final   • Basophil % 11/15/2022 0.3  0.0 - 1.5 % Final   • Immature Grans % 11/15/2022 4.5 (H)  0.0 - 0.5 % Final   • Neutrophils, Absolute 11/15/2022 7.42 (H)  1.70 - 7.00 10*3/mm3 Final   • Lymphocytes, Absolute 11/15/2022 0.86  0.70 - 3.10 10*3/mm3 Final   • Monocytes, Absolute 11/15/2022 0.69  0.10 - 0.90 10*3/mm3 Final   • Eosinophils, Absolute 11/15/2022 0.09  0.00 - 0.40 10*3/mm3 Final   • Basophils, Absolute 11/15/2022 0.03  0.00 - 0.20 10*3/mm3 Final   • Immature Grans, Absolute 11/15/2022 0.43 (H)  0.00 - 0.05 10*3/mm3 Final   Hospital Outpatient Visit on 11/11/2022   Component Date Value Ref Range Status   • WBC 11/11/2022 7.12  3.40 - 10.80 10*3/mm3 Final   • RBC 11/11/2022 2.75 (L)  4.14 - 5.80 10*6/mm3 Final   • Hemoglobin 11/11/2022 8.0 (L)  13.0 - 17.7 g/dL Final   • Hematocrit 11/11/2022 24.7 (L)  37.5 - 51.0 % Final   • MCV 11/11/2022 89.8  79.0 - 97.0 fL Final   • MCH 11/11/2022 29.1  26.6 - 33.0 pg Final   • MCHC 11/11/2022 32.4  31.5 - 35.7 g/dL Final   • RDW 11/11/2022 19.8 (H)  12.3 - 15.4 % Final   • RDW-SD 11/11/2022 61.8 (H)  37.0 - 54.0 fl Final   • MPV 11/11/2022 8.4  6.0 - 12.0 fL Final   • Platelets 11/11/2022 377  140 - 450 10*3/mm3 Final   • Neutrophil % 11/11/2022 76.8 (H)  42.7 - 76.0 % Final   • Lymphocyte % 11/11/2022 9.3 (L)  19.6 - 45.3 % Final   • Monocyte % 11/11/2022 8.0  5.0 - 12.0 % Final   • Eosinophil % 11/11/2022 0.6  0.3 - 6.2 % Final   • Basophil % 11/11/2022 0.4  0.0 - 1.5 % Final   • Immature Grans % 11/11/2022 4.9 (H)  0.0 - 0.5 % Final   • Neutrophils, Absolute 11/11/2022 5.47  1.70 - 7.00 10*3/mm3 Final   • Lymphocytes, Absolute 11/11/2022 0.66 (L)  0.70 - 3.10 10*3/mm3 Final   • Monocytes, Absolute 11/11/2022 0.57  0.10 - 0.90 10*3/mm3 Final   • Eosinophils, Absolute  11/11/2022 0.04  0.00 - 0.40 10*3/mm3 Final   • Basophils, Absolute 11/11/2022 0.03  0.00 - 0.20 10*3/mm3 Final   • Immature Grans, Absolute 11/11/2022 0.35 (H)  0.00 - 0.05 10*3/mm3 Final   • Glucose 11/11/2022 88  65 - 99 mg/dL Final   • BUN 11/11/2022 7 (L)  8 - 23 mg/dL Final   • Creatinine 11/11/2022 0.46 (L)  0.76 - 1.27 mg/dL Final   • Sodium 11/11/2022 129 (L)  136 - 145 mmol/L Final   • Potassium 11/11/2022 3.8  3.5 - 5.2 mmol/L Final   • Chloride 11/11/2022 96 (L)  98 - 107 mmol/L Final   • CO2 11/11/2022 25.0  22.0 - 29.0 mmol/L Final   • Calcium 11/11/2022 8.7  8.6 - 10.5 mg/dL Final   • Total Protein 11/11/2022 6.1  6.0 - 8.5 g/dL Final   • Albumin 11/11/2022 3.10 (L)  3.50 - 5.20 g/dL Final   • ALT (SGPT) 11/11/2022 15  1 - 41 U/L Final   • AST (SGOT) 11/11/2022 11  1 - 40 U/L Final   • Alkaline Phosphatase 11/11/2022 227 (H)  39 - 117 U/L Final   • Total Bilirubin 11/11/2022 0.4  0.0 - 1.2 mg/dL Final   • Globulin 11/11/2022 3.0  gm/dL Final    Calculated Result   • A/G Ratio 11/11/2022 1.0  g/dL Final   • BUN/Creatinine Ratio 11/11/2022 15.2  7.0 - 25.0 Final   • Anion Gap 11/11/2022 8.0  5.0 - 15.0 mmol/L Final   • eGFR 11/11/2022 112.5  >60.0 mL/min/1.73 Final    National Kidney Foundation and American Society of Nephrology (ASN) Task Force recommended calculation based on the Chronic Kidney Disease Epidemiology Collaboration (CKD-EPI) equation refit without adjustment for race.   Hospital Outpatient Visit on 11/08/2022   Component Date Value Ref Range Status   • Glucose 11/08/2022 95  65 - 99 mg/dL Final   • BUN 11/08/2022 7 (L)  8 - 23 mg/dL Final   • Creatinine 11/08/2022 0.41 (L)  0.76 - 1.27 mg/dL Final   • Sodium 11/08/2022 130 (L)  136 - 145 mmol/L Final   • Potassium 11/08/2022 3.4 (L)  3.5 - 5.2 mmol/L Final   • Chloride 11/08/2022 95 (L)  98 - 107 mmol/L Final   • CO2 11/08/2022 26.0  22.0 - 29.0 mmol/L Final   • Calcium 11/08/2022 8.7  8.6 - 10.5 mg/dL Final   • Total Protein 11/08/2022  6.1  6.0 - 8.5 g/dL Final   • Albumin 11/08/2022 3.10 (L)  3.50 - 5.20 g/dL Final   • ALT (SGPT) 11/08/2022 22  1 - 41 U/L Final   • AST (SGOT) 11/08/2022 11  1 - 40 U/L Final   • Alkaline Phosphatase 11/08/2022 268 (H)  39 - 117 U/L Final   • Total Bilirubin 11/08/2022 0.5  0.0 - 1.2 mg/dL Final   • Globulin 11/08/2022 3.0  gm/dL Final    Calculated Result   • A/G Ratio 11/08/2022 1.0  g/dL Final   • BUN/Creatinine Ratio 11/08/2022 17.1  7.0 - 25.0 Final   • Anion Gap 11/08/2022 9.0  5.0 - 15.0 mmol/L Final   • eGFR 11/08/2022 116.5  >60.0 mL/min/1.73 Final    National Kidney Foundation and American Society of Nephrology (ASN) Task Force recommended calculation based on the Chronic Kidney Disease Epidemiology Collaboration (CKD-EPI) equation refit without adjustment for race.   • Magnesium 11/08/2022 1.6  1.6 - 2.4 mg/dL Final   • WBC 11/08/2022 6.30  3.40 - 10.80 10*3/mm3 Final   • RBC 11/08/2022 2.54 (L)  4.14 - 5.80 10*6/mm3 Final   • Hemoglobin 11/08/2022 7.5 (L)  13.0 - 17.7 g/dL Final   • Hematocrit 11/08/2022 22.4 (L)  37.5 - 51.0 % Final   • MCV 11/08/2022 88.2  79.0 - 97.0 fL Final   • MCH 11/08/2022 29.5  26.6 - 33.0 pg Final   • MCHC 11/08/2022 33.5  31.5 - 35.7 g/dL Final   • RDW 11/08/2022 18.0 (H)  12.3 - 15.4 % Final   • RDW-SD 11/08/2022 57.1 (H)  37.0 - 54.0 fl Final   • MPV 11/08/2022 8.8  6.0 - 12.0 fL Final   • Platelets 11/08/2022 321  140 - 450 10*3/mm3 Final   • Neutrophil % 11/08/2022 83.4 (H)  42.7 - 76.0 % Final   • Lymphocyte % 11/08/2022 7.9 (L)  19.6 - 45.3 % Final   • Monocyte % 11/08/2022 6.7  5.0 - 12.0 % Final   • Eosinophil % 11/08/2022 0.8  0.3 - 6.2 % Final   • Basophil % 11/08/2022 0.2  0.0 - 1.5 % Final   • Immature Grans % 11/08/2022 1.0 (H)  0.0 - 0.5 % Final   • Neutrophils, Absolute 11/08/2022 5.26  1.70 - 7.00 10*3/mm3 Final   • Lymphocytes, Absolute 11/08/2022 0.50 (L)  0.70 - 3.10 10*3/mm3 Final   • Monocytes, Absolute 11/08/2022 0.42  0.10 - 0.90 10*3/mm3 Final   •  Eosinophils, Absolute 11/08/2022 0.05  0.00 - 0.40 10*3/mm3 Final   • Basophils, Absolute 11/08/2022 0.01  0.00 - 0.20 10*3/mm3 Final   • Immature Grans, Absolute 11/08/2022 0.06 (H)  0.00 - 0.05 10*3/mm3 Final   Hospital Outpatient Visit on 11/04/2022   Component Date Value Ref Range Status   • Glucose 11/04/2022 145 (H)  65 - 99 mg/dL Final   • BUN 11/04/2022 13  8 - 23 mg/dL Final   • Creatinine 11/04/2022 0.59 (L)  0.76 - 1.27 mg/dL Final   • Sodium 11/04/2022 130 (L)  136 - 145 mmol/L Final   • Potassium 11/04/2022 4.3  3.5 - 5.2 mmol/L Final    Slight hemolysis detected by analyzer. Results may be affected.   • Chloride 11/04/2022 95 (L)  98 - 107 mmol/L Final   • CO2 11/04/2022 24.0  22.0 - 29.0 mmol/L Final   • Calcium 11/04/2022 8.9  8.6 - 10.5 mg/dL Final   • Total Protein 11/04/2022 6.2  6.0 - 8.5 g/dL Final   • Albumin 11/04/2022 2.90 (L)  3.50 - 5.20 g/dL Final   • ALT (SGPT) 11/04/2022 37  1 - 41 U/L Final   • AST (SGOT) 11/04/2022 22  1 - 40 U/L Final   • Alkaline Phosphatase 11/04/2022 325 (H)  39 - 117 U/L Final   • Total Bilirubin 11/04/2022 0.6  0.0 - 1.2 mg/dL Final   • Globulin 11/04/2022 3.3  gm/dL Final    Calculated Result   • A/G Ratio 11/04/2022 0.9  g/dL Final   • BUN/Creatinine Ratio 11/04/2022 22.0  7.0 - 25.0 Final   • Anion Gap 11/04/2022 11.0  5.0 - 15.0 mmol/L Final   • eGFR 11/04/2022 104.4  >60.0 mL/min/1.73 Final    National Kidney Foundation and American Society of Nephrology (ASN) Task Force recommended calculation based on the Chronic Kidney Disease Epidemiology Collaboration (CKD-EPI) equation refit without adjustment for race.   • WBC 11/04/2022 49.27 (H)  3.40 - 10.80 10*3/mm3 Final   • RBC 11/04/2022 2.62 (L)  4.14 - 5.80 10*6/mm3 Final   • Hemoglobin 11/04/2022 7.8 (L)  13.0 - 17.7 g/dL Final   • Hematocrit 11/04/2022 23.9 (L)  37.5 - 51.0 % Final   • MCV 11/04/2022 91.2  79.0 - 97.0 fL Final   • MCH 11/04/2022 29.8  26.6 - 33.0 pg Final   • MCHC 11/04/2022 32.6  31.5 -  35.7 g/dL Final   • RDW 11/04/2022 18.8 (H)  12.3 - 15.4 % Final   • RDW-SD 11/04/2022 62.6 (H)  37.0 - 54.0 fl Final   • MPV 11/04/2022 8.5  6.0 - 12.0 fL Final   • Platelets 11/04/2022 267  140 - 450 10*3/mm3 Final   • Neutrophil % 11/04/2022 88.4 (H)  42.7 - 76.0 % Final   • Lymphocyte % 11/04/2022 0.8 (L)  19.6 - 45.3 % Final   • Monocyte % 11/04/2022 0.9 (L)  5.0 - 12.0 % Final   • Eosinophil % 11/04/2022 0.0 (L)  0.3 - 6.2 % Final   • Basophil % 11/04/2022 0.0  0.0 - 1.5 % Final   • Immature Grans % 11/04/2022 9.9 (H)  0.0 - 0.5 % Final   • Neutrophils, Absolute 11/04/2022 43.55 (H)  1.70 - 7.00 10*3/mm3 Final   • Lymphocytes, Absolute 11/04/2022 0.38 (L)  0.70 - 3.10 10*3/mm3 Final   • Monocytes, Absolute 11/04/2022 0.44  0.10 - 0.90 10*3/mm3 Final   • Eosinophils, Absolute 11/04/2022 0.01  0.00 - 0.40 10*3/mm3 Final   • Basophils, Absolute 11/04/2022 0.01  0.00 - 0.20 10*3/mm3 Final   • Immature Grans, Absolute 11/04/2022 4.88 (H)  0.00 - 0.05 10*3/mm3 Final     Component      Latest Ref Rng & Units 11/15/2022   WBC      3.40 - 10.80 10*3/mm3 9.52   RBC      4.14 - 5.80 10*6/mm3 2.96 (L)   Hemoglobin      13.0 - 17.7 g/dL 8.7 (L)   Hematocrit      37.5 - 51.0 % 27.6 (L)   MCV      79.0 - 97.0 fL 93.2   MCH      26.6 - 33.0 pg 29.4   MCHC      31.5 - 35.7 g/dL 31.5   RDW      12.3 - 15.4 % 20.3 (H)   RDW-SD      37.0 - 54.0 fl 68.5 (H)   MPV      6.0 - 12.0 fL 8.6   Platelets      140 - 450 10*3/mm3 361   Neutrophil Rel %      42.7 - 76.0 % 78.1 (H)   Lymphocyte Rel %      19.6 - 45.3 % 9.0 (L)   Monocyte Rel %      5.0 - 12.0 % 7.2   Eosinophil Rel %      0.3 - 6.2 % 0.9   Basophil Rel %      0.0 - 1.5 % 0.3   Immature Granulocyte Rel %      0.0 - 0.5 % 4.5 (H)   Neutrophils Absolute      1.70 - 7.00 10*3/mm3 7.42 (H)   Lymphocytes Absolute      0.70 - 3.10 10*3/mm3 0.86   Monocytes Absolute      0.10 - 0.90 10*3/mm3 0.69   Eosinophils Absolute      0.00 - 0.40 10*3/mm3 0.09   Basophils Absolute      0.00 -  0.20 10*3/mm3 0.03   Immature Grans, Absolute      0.00 - 0.05 10*3/mm3 0.43 (H)   Glucose      65 - 99 mg/dL 93   BUN      8 - 23 mg/dL 11   Creatinine      0.76 - 1.27 mg/dL 0.57 (L)   Sodium      136 - 145 mmol/L 129 (L)   Potassium      3.5 - 5.2 mmol/L 4.4   Chloride      98 - 107 mmol/L 95 (L)   CO2      22.0 - 29.0 mmol/L 27.0   Calcium      8.6 - 10.5 mg/dL 8.8   Total Protein      6.0 - 8.5 g/dL 6.2   Albumin      3.50 - 5.20 g/dL 3.20 (L)   ALT (SGPT)      1 - 41 U/L 11   AST (SGOT)      1 - 40 U/L 12   Alkaline Phosphatase      39 - 117 U/L 226 (H)   Total Bilirubin      0.0 - 1.2 mg/dL 0.4   Globulin      gm/dL 3.0   A/G Ratio      g/dL 1.1   BUN/Creatinine Ratio      7.0 - 25.0 19.3   Anion Gap      5.0 - 15.0 mmol/L 7.0   eGFR      >60.0 mL/min/1.73 105.5       Duplex Venous Upper Extremity - Right CAR    Result Date: 10/31/2022  Narrative: •  Normal right upper extremity venous duplex scan. •  PICC line is noted in the right brachial vein.     XR Abdomen KUB    Result Date: 10/20/2022  Narrative: Supine abdomen. DATE: 10/19/2022 at 11:41 PM. COMPARISON: 10/19/2022 at 10:50 PM. CLINICAL HISTORY: NG tube placement.     Impression: NG tube tip has tip overlying the right midabdomen is likely within the distal aspect/pyloric region of the stomach are proximal duodenal region. Several embolization coils are also seen in the right mid abdomen. Distended loops of nondilated air-filled bowel are seen throughout the visualized portions of the abdomen. Electronically signed by:  Deonte Dash D.O.  10/19/2022 10:36 PM Mountain Time    XR Abdomen KUB    Result Date: 10/20/2022  Narrative: Frontal abdomen CLINICAL INDICATION: Feeding tube placement COMPARISON: October 16, 2022. FINDINGS: Feeding tube tip in the body of the stomach. Postsurgical changes in the mid abdomen. Distended loops of bowel are seen in the upper abdomen. Lung bases are grossly clear.     Impression: Feeding tube tip near the fundus of the  stomach. Electronically signed by:  Rehana Estrella D.O.  10/19/2022 10:19 PM Mountain Time    XR Abdomen KUB    Result Date: 10/16/2022  Narrative: DATE OF EXAM: 10/16/2022 11:54 AM  PROCEDURE: XR ABDOMEN KUB-  INDICATIONS: keofeed placement; R50.9-Fever, unspecified; N13.30-Unspecified hydronephrosis; Z93.3-Colostomy status; C79.9-Secondary malignant neoplasm of unspecified site; E87.0-Hyperosmolality and hypernatremia; E87.6-Hypokalemia; C85.90-Non-Hodgkin lymphoma, unspecified, unspecified site; R13.10-Dysphagia, unspecified; C81.98-Hodgkin lymphoma, unspecified, lymph nodes of multiple sites; K92.2-Gastrointesti  COMPARISON: No comparisons available.  TECHNIQUE: Single radiographic view of the abdomen was obtained.  FINDINGS: Feeding tube terminates in the stomach. Upper abdominal bowel gas pattern is nonspecific, with some gas filled segments of colon noted. There is no overt pneumoperitoneum.      Impression: Feeding tube terminates in the stomach. Upper abdominal bowel gas pattern is nonspecific, with some gas filled segments of colon noted. There is no overt pneumoperitoneum.  This report was finalized on 10/16/2022 2:23 PM by Josef Mckee.        Procedures    Assessment / Plan      Assessment/Plan:   1.  CD30 positive classical Hodgkin's lymphoma.  -Undergoing treatment with BV AVD.  C1 complicated by pancytopenia, neutropenic fever, and active GI bleed.  He was very deconditioned, and initially declined further systemic therapy, but has now resumed treatment with 20% dose reduction in AVD and continued full dose brentuximab.   -Continue to monitor closely with weekly CBCs and possible transfusions/electrolytes.  -Labs adequate for treatment today. Monitor CBC closely hemoglobin 8.7. ANC and plts adequate  -Reviewed plan for PET/CT after cycle #2    2. Antineoplastic chemotherapy induced pancytopenia compounded by blood loss anemia from acute GI bleed.  -Tranfuse for hemoglobin < 7. Irradiated blood  products. No needs today. Weekly CBC    3. AM cough  -Resumed antihistamine-a bit better.    4.  Low appetite  -Continue nutrition supplements.    -Monitor weight: stablizing    5.  Hypokalemia  -Decrease potassium to 20 meq daily.    6.  Abdominal cramping  -Continue scheduled zofran.   -Add PRN compazine and PRN bentyl     7.  Weakness and deconditioning  -Home health PT and OT    8.  Access  -PICC dressing weekly  -Plan port placement prior to cycle 2 pending 11/29.    9. Hyponatremia  -Monitor. Increase electrolyte drinks. This predated chemo    Follow Up:   3 days electrolytes and CBC  1 week MD Kaycee Leary MD  Hematology and Oncology     Time spent on the day of service was 40 min inclusive of time before, during, and after office visit on record review, medically appropriate history and physical, counseling patient, ordering tests, documenting in the medical record.

## 2022-11-16 ENCOUNTER — HOSPITAL ENCOUNTER (OUTPATIENT)
Dept: ONCOLOGY | Facility: HOSPITAL | Age: 72
Setting detail: INFUSION SERIES
Discharge: HOME | End: 2022-11-16

## 2022-11-16 VITALS
TEMPERATURE: 97.2 F | HEIGHT: 64 IN | SYSTOLIC BLOOD PRESSURE: 116 MMHG | RESPIRATION RATE: 18 BRPM | BODY MASS INDEX: 22.02 KG/M2 | WEIGHT: 129 LBS | HEART RATE: 84 BPM | DIASTOLIC BLOOD PRESSURE: 66 MMHG

## 2022-11-16 DIAGNOSIS — C81.98 HODGKIN LYMPHOMA OF LYMPH NODES OF MULTIPLE REGIONS, UNSPECIFIED HODGKIN LYMPHOMA TYPE: Primary | ICD-10-CM

## 2022-11-16 PROCEDURE — 25010000002 PEGFILGRASTIM-APGF 6 MG/0.6ML SOLUTION PREFILLED SYRINGE: Performed by: INTERNAL MEDICINE

## 2022-11-16 PROCEDURE — 96372 THER/PROPH/DIAG INJ SC/IM: CPT

## 2022-11-16 RX ADMIN — PEGFILGRASTIM-APGF 6 MG: 6 INJECTION, SOLUTION SUBCUTANEOUS at 14:01

## 2022-11-22 ENCOUNTER — OFFICE VISIT (OUTPATIENT)
Dept: ONCOLOGY | Facility: CLINIC | Age: 72
End: 2022-11-22

## 2022-11-22 ENCOUNTER — HOSPITAL ENCOUNTER (OUTPATIENT)
Dept: ONCOLOGY | Facility: HOSPITAL | Age: 72
Setting detail: INFUSION SERIES
Discharge: HOME | End: 2022-11-22

## 2022-11-22 VITALS
DIASTOLIC BLOOD PRESSURE: 72 MMHG | WEIGHT: 131 LBS | TEMPERATURE: 98.3 F | SYSTOLIC BLOOD PRESSURE: 112 MMHG | HEART RATE: 80 BPM | RESPIRATION RATE: 18 BRPM | HEIGHT: 64 IN | BODY MASS INDEX: 22.36 KG/M2

## 2022-11-22 VITALS
DIASTOLIC BLOOD PRESSURE: 72 MMHG | OXYGEN SATURATION: 99 % | SYSTOLIC BLOOD PRESSURE: 112 MMHG | HEIGHT: 64 IN | TEMPERATURE: 98.3 F | HEART RATE: 80 BPM | BODY MASS INDEX: 22.36 KG/M2 | RESPIRATION RATE: 18 BRPM | WEIGHT: 131 LBS

## 2022-11-22 DIAGNOSIS — T45.1X5A ANTINEOPLASTIC CHEMOTHERAPY INDUCED PANCYTOPENIA: ICD-10-CM

## 2022-11-22 DIAGNOSIS — C81.98 HODGKIN LYMPHOMA OF LYMPH NODES OF MULTIPLE REGIONS, UNSPECIFIED HODGKIN LYMPHOMA TYPE: Primary | ICD-10-CM

## 2022-11-22 DIAGNOSIS — D61.810 ANTINEOPLASTIC CHEMOTHERAPY INDUCED PANCYTOPENIA: ICD-10-CM

## 2022-11-22 DIAGNOSIS — Z51.11 CHEMOTHERAPY MANAGEMENT, ENCOUNTER FOR: Primary | ICD-10-CM

## 2022-11-22 LAB
ANION GAP SERPL CALCULATED.3IONS-SCNC: 6 MMOL/L (ref 5–15)
BASOPHILS # BLD AUTO: 0.05 10*3/MM3 (ref 0–0.2)
BASOPHILS NFR BLD AUTO: 0.5 % (ref 0–1.5)
BUN SERPL-MCNC: 7 MG/DL (ref 8–23)
BUN/CREAT SERPL: 20 (ref 7–25)
CALCIUM SPEC-SCNC: 8.9 MG/DL (ref 8.6–10.5)
CHLORIDE SERPL-SCNC: 97 MMOL/L (ref 98–107)
CO2 SERPL-SCNC: 31 MMOL/L (ref 22–29)
CREAT SERPL-MCNC: 0.35 MG/DL (ref 0.76–1.27)
DEPRECATED RDW RBC AUTO: 65.7 FL (ref 37–54)
EGFRCR SERPLBLD CKD-EPI 2021: 122.2 ML/MIN/1.73
EOSINOPHIL # BLD AUTO: 0.05 10*3/MM3 (ref 0–0.4)
EOSINOPHIL NFR BLD AUTO: 0.5 % (ref 0.3–6.2)
ERYTHROCYTE [DISTWIDTH] IN BLOOD BY AUTOMATED COUNT: 19.1 % (ref 12.3–15.4)
GLUCOSE SERPL-MCNC: 120 MG/DL (ref 65–99)
HCT VFR BLD AUTO: 26 % (ref 37.5–51)
HGB BLD-MCNC: 8.1 G/DL (ref 13–17.7)
IMM GRANULOCYTES # BLD AUTO: 0.06 10*3/MM3 (ref 0–0.05)
IMM GRANULOCYTES NFR BLD AUTO: 0.6 % (ref 0–0.5)
LYMPHOCYTES # BLD AUTO: 0.82 10*3/MM3 (ref 0.7–3.1)
LYMPHOCYTES NFR BLD AUTO: 8.6 % (ref 19.6–45.3)
MCH RBC QN AUTO: 29.5 PG (ref 26.6–33)
MCHC RBC AUTO-ENTMCNC: 31.2 G/DL (ref 31.5–35.7)
MCV RBC AUTO: 94.5 FL (ref 79–97)
MONOCYTES # BLD AUTO: 0.65 10*3/MM3 (ref 0.1–0.9)
MONOCYTES NFR BLD AUTO: 6.8 % (ref 5–12)
NEUTROPHILS NFR BLD AUTO: 7.96 10*3/MM3 (ref 1.7–7)
NEUTROPHILS NFR BLD AUTO: 83 % (ref 42.7–76)
PLATELET # BLD AUTO: 263 10*3/MM3 (ref 140–450)
PMV BLD AUTO: 8.8 FL (ref 6–12)
POTASSIUM SERPL-SCNC: 3.5 MMOL/L (ref 3.5–5.2)
RBC # BLD AUTO: 2.75 10*6/MM3 (ref 4.14–5.8)
SODIUM SERPL-SCNC: 134 MMOL/L (ref 136–145)
WBC NRBC COR # BLD: 9.59 10*3/MM3 (ref 3.4–10.8)

## 2022-11-22 PROCEDURE — 80048 BASIC METABOLIC PNL TOTAL CA: CPT | Performed by: INTERNAL MEDICINE

## 2022-11-22 PROCEDURE — 99214 OFFICE O/P EST MOD 30 MIN: CPT | Performed by: INTERNAL MEDICINE

## 2022-11-22 PROCEDURE — 85025 COMPLETE CBC W/AUTO DIFF WBC: CPT | Performed by: INTERNAL MEDICINE

## 2022-11-22 PROCEDURE — 36592 COLLECT BLOOD FROM PICC: CPT

## 2022-11-22 RX ORDER — SODIUM CHLORIDE 0.9 % (FLUSH) 0.9 %
10 SYRINGE (ML) INJECTION AS NEEDED
Status: CANCELLED | OUTPATIENT
Start: 2022-11-22

## 2022-11-22 RX ORDER — SODIUM CHLORIDE 0.9 % (FLUSH) 0.9 %
20 SYRINGE (ML) INJECTION AS NEEDED
Status: CANCELLED | OUTPATIENT
Start: 2022-11-22

## 2022-11-22 NOTE — PROGRESS NOTES
Hematology and Oncology [facility]  Office number [phone]    Fax number [phone]     Follow up     Date: 10/26/2022     Patient Name: Abraham Wu  MRN: [id number]  : 1950      Chief Complaint: follow up/treatment   Surgeon: Dr. Hiram Viveros MD    Cancer Staging: IV    History of Present Illness: Abraham Wu is a pleasant 72 y.o. male with PMH of hypertension, sleep apnea, hard of hearing who presents today for follow up of Hodgkin Lymphoma. The patient is accompanied by his supportive wife.     He initially presented 2022 with intractable diarrhea, low appetite, and a greater than 50 pound weight loss over several month period associated with intermittent fevers.  CT of the chest abdomen pelvis obtained in the ER shows of rectal mass spanning greater than 12 cm with adjacent adenopathy, bulky RP adenopathy, and findings concerning for left renal and liver metastases.  Small right pleural effusion with nodularity.  There was concern for impending obstruction, so he underwent diverting colostomy and biopsy on 2022.  Biopsies from the peritoneam showed a fibrotic nodule histiocytes and eosinophils admixed with CD30 positive large cells.  Rectal biopsies showed involvement by CD30 positive lymphoma, classic Hodgkin lymphoma versus CD30 positive T-cell or B-cell lymphoma.  There was extensive fibrosis and crush artifact.  He underwent repeat transrectal biopsy 10/4/2022 for further characterization which was felt to be most consistent with classical Hodgkin lymphoma.    He initiated chemotherapy with Brentuximab-AVD as an inpatient on 10/8/2022.  Cycle #1 was complicated by GI bleed requiring multiple blood transfusion and ultimately coil artery embolization 10/12; neutropenic fever, Candida esophagitis and mucositis.  He had a prolonged ICU stay  And required enteral feeding.  He was discharged 10/20/2022.    Treatment history:  Brentuximab-AVD: C1 10/8/22  C2:  11/1/22    Interval history:    He is here for follow up after chemo   Mild pain with swallowing, or coughing. Not painful, tingles. LN not tender.   Mild cough, occasional  Has gained 2lb  No neuopathy  Bowels moving well. No days of no ostomy output on miralax and fiber pills scheduled  Taking zofran and protonix with good control.   Energy continues to improve.   Still napping. Walking well.     Told by PCP that his sodium is always low.     Past Medical History:   Past Medical History:   Diagnosis Date   • benign polypoid tissue right lung    • Hyperlipidemia    • Hypertension    • Mycobacterium mucogenicum    • SHERRI (obstructive sleep apnea)    • Seasonal allergies        Past Surgical History:   Past Surgical History:   Procedure Laterality Date   • BRONCHOSCOPY      removal of obstructing polypoid tissue right middle lung   • COLOSTOMY N/A 9/22/2022    Procedure: LAPAROSCOPIC COLOSTOMY CREATION, FLEXIBLE SIGMOIDOSCOPY;  Surgeon: Hiram Viveros MD;  Location:  [facility];  Service: General;  Laterality: N/A;   • ENDOSCOPY N/A 10/10/2022    Procedure: ESOPHAGOGASTRODUODENOSCOPY;  Surgeon: Neeraj Lynn MD;  Location:  [facility];  Service: Gastroenterology;  Laterality: N/A;   • ENDOSCOPY N/A 10/12/2022    Procedure: ESOPHAGOGASTRODUODENOSCOPY;  Surgeon: Brunner, Mark I, MD;  Location:  [facility];  Service: Gastroenterology;  Laterality: N/A;   • EXAM UNDER ANESTHESIA, RECTAL BIOPSY N/A 10/4/2022    Procedure: EXAM UNDER ANESTHESIA, TRANSANAL BIOPSY WITH TRUCUT NEEDLE (LITHOTOMY-CANDY CANE);  Surgeon: Hiram Viveros MD;  Location:  [facility];  Service: General;  Laterality: N/A;       Family History:   Family History   Problem Relation Age of Onset   • Diabetes Mother    • Diabetes Father    • Heart disease Father        Social History:   Social History     Socioeconomic History   • Marital status:    Tobacco Use   • Smoking status: Never   • Smokeless tobacco: Never   Vaping Use  "  • Vaping Use: Never used   Substance and Sexual Activity   • Alcohol use: Not Currently     Comment: previously drank 2 glasses of wine/beer nightly   • Drug use: Never   • Sexual activity: Defer       Medications:     Current Outpatient Medications:   •  dicyclomine (BENTYL) 20 MG tablet, Take 1 tablet by mouth Every 6 (Six) Hours. PRN abdominal cramps, Disp: 30 tablet, Rfl: 4  •  fluticasone (FLONASE) 50 MCG/ACT nasal spray, 1 spray into the nostril(s) as directed by provider Daily., Disp: , Rfl:   •  lidocaine-prilocaine (EMLA) 2.5-2.5 % cream, Apply 1 application topically to the appropriate area as directed As Needed (45-60 minutes prior to port access.  Cover with saran/plastic wrap.)., Disp: 30 g, Rfl: 3  •  loratadine (CLARITIN) 10 MG tablet, Take 1 tablet by mouth Daily., Disp: 30 tablet, Rfl: 6  •  nystatin susp + lidocaine viscous (MAGIC MOUTHWASH) oral suspension, 5-10 ml swish and spit or swallow QID prn, Disp: 240 mL, Rfl: 3  •  OLANZapine (zyPREXA) 5 MG tablet, Take 1 tablet by mouth Every Night. For 3 days on Days 2, 3 and 4 and on days 16, 17, and 18., Disp: 6 tablet, Rfl: 5  •  ondansetron (Zofran) 8 MG tablet, Take 1 tablet by mouth Every 8 (Eight) Hours As Needed for Nausea or Vomiting., Disp: 30 tablet, Rfl: 5  •  pantoprazole (PROTONIX) 40 MG EC tablet, Take 1 tablet by mouth Daily for 30 days., Disp: 30 tablet, Rfl: 0  •  potassium chloride (K-DUR,KLOR-CON) 20 MEQ CR tablet, Take 1 tablet by mouth Daily., Disp: 30 tablet, Rfl: 5  •  prochlorperazine (COMPAZINE) 5 MG tablet, Take 1 tablet by mouth Every 6 (Six) Hours As Needed for Nausea or Vomiting., Disp: 30 tablet, Rfl: 2    Allergies:   No Known Allergies    Objective     Vital Signs:   Vitals:    11/22/22 1030   BP: 112/72   Pulse: 80   Resp: 18   Temp: 98.3 °F (36.8 °C)   SpO2: 99%  Comment: RA   Weight: 59.4 kg (131 lb)   Height: 162.6 cm (64\")   PainSc: 0-No pain    Body mass index is 22.49 kg/m².   Pain Score    11/22/22 1030 "   PainSc: 0-No pain       ECOG Performance Status: 1-2    Physical Exam:   General: No acute distress. Well appearing.  HEENT: Normocephalic, atraumatic. Sclera anicteric. +thrush  Neck: supple, no adenopathy.   Cardiovascular: regular rate and rhythm. No murmurs.   Respiratory: Normal rate. Clear to auscultation bilaterally  Abdomen: Soft, nontender, non distended with normoactive bowel sounds. Ostomy output light brown  Lymph: no cervical, supraclavicular or axillary adenopathy  Neuro: Alert and oriented x 3. No focal deficits.   Ext: Symmetric, no swelling.   Psych: Euthymic  Skin: right picc c/d/i      Laboratory/Imaging Reviewed:   Hospital Outpatient Visit on 11/22/2022   Component Date Value Ref Range Status   • WBC 11/22/2022 9.59  3.40 - 10.80 10*3/mm3 Final   • RBC 11/22/2022 2.75 (L)  4.14 - 5.80 10*6/mm3 Final   • Hemoglobin 11/22/2022 8.1 (L)  13.0 - 17.7 g/dL Final   • Hematocrit 11/22/2022 26.0 (L)  37.5 - 51.0 % Final   • MCV 11/22/2022 94.5  79.0 - 97.0 fL Final   • MCH 11/22/2022 29.5  26.6 - 33.0 pg Final   • MCHC 11/22/2022 31.2 (L)  31.5 - 35.7 g/dL Final   • RDW 11/22/2022 19.1 (H)  12.3 - 15.4 % Final   • RDW-SD 11/22/2022 65.7 (H)  37.0 - 54.0 fl Final   • MPV 11/22/2022 8.8  6.0 - 12.0 fL Final   • Platelets 11/22/2022 263  140 - 450 10*3/mm3 Final   • Neutrophil % 11/22/2022 83.0 (H)  42.7 - 76.0 % Final   • Lymphocyte % 11/22/2022 8.6 (L)  19.6 - 45.3 % Final   • Monocyte % 11/22/2022 6.8  5.0 - 12.0 % Final   • Eosinophil % 11/22/2022 0.5  0.3 - 6.2 % Final   • Basophil % 11/22/2022 0.5  0.0 - 1.5 % Final   • Immature Grans % 11/22/2022 0.6 (H)  0.0 - 0.5 % Final   • Neutrophils, Absolute 11/22/2022 7.96 (H)  1.70 - 7.00 10*3/mm3 Final   • Lymphocytes, Absolute 11/22/2022 0.82  0.70 - 3.10 10*3/mm3 Final   • Monocytes, Absolute 11/22/2022 0.65  0.10 - 0.90 10*3/mm3 Final   • Eosinophils, Absolute 11/22/2022 0.05  0.00 - 0.40 10*3/mm3 Final   • Basophils, Absolute 11/22/2022 0.05  0.00 -  0.20 10*3/mm3 Final   • Immature Grans, Absolute 11/22/2022 0.06 (H)  0.00 - 0.05 10*3/mm3 Final   Hospital Outpatient Visit on 11/15/2022   Component Date Value Ref Range Status   • Glucose 11/15/2022 93  65 - 99 mg/dL Final   • BUN 11/15/2022 11  8 - 23 mg/dL Final   • Creatinine 11/15/2022 0.57 (L)  0.76 - 1.27 mg/dL Final   • Sodium 11/15/2022 129 (L)  136 - 145 mmol/L Final   • Potassium 11/15/2022 4.4  3.5 - 5.2 mmol/L Final   • Chloride 11/15/2022 95 (L)  98 - 107 mmol/L Final   • CO2 11/15/2022 27.0  22.0 - 29.0 mmol/L Final   • Calcium 11/15/2022 8.8  8.6 - 10.5 mg/dL Final   • Total Protein 11/15/2022 6.2  6.0 - 8.5 g/dL Final   • Albumin 11/15/2022 3.20 (L)  3.50 - 5.20 g/dL Final   • ALT (SGPT) 11/15/2022 11  1 - 41 U/L Final   • AST (SGOT) 11/15/2022 12  1 - 40 U/L Final   • Alkaline Phosphatase 11/15/2022 226 (H)  39 - 117 U/L Final   • Total Bilirubin 11/15/2022 0.4  0.0 - 1.2 mg/dL Final   • Globulin 11/15/2022 3.0  gm/dL Final    Calculated Result   • A/G Ratio 11/15/2022 1.1  g/dL Final   • BUN/Creatinine Ratio 11/15/2022 19.3  7.0 - 25.0 Final   • Anion Gap 11/15/2022 7.0  5.0 - 15.0 mmol/L Final   • eGFR 11/15/2022 105.5  >60.0 mL/min/1.73 Final    National Kidney Foundation and American Society of Nephrology (ASN) Task Force recommended calculation based on the Chronic Kidney Disease Epidemiology Collaboration (CKD-EPI) equation refit without adjustment for race.   • WBC 11/15/2022 9.52  3.40 - 10.80 10*3/mm3 Final   • RBC 11/15/2022 2.96 (L)  4.14 - 5.80 10*6/mm3 Final   • Hemoglobin 11/15/2022 8.7 (L)  13.0 - 17.7 g/dL Final   • Hematocrit 11/15/2022 27.6 (L)  37.5 - 51.0 % Final   • MCV 11/15/2022 93.2  79.0 - 97.0 fL Final   • MCH 11/15/2022 29.4  26.6 - 33.0 pg Final   • MCHC 11/15/2022 31.5  31.5 - 35.7 g/dL Final   • RDW 11/15/2022 20.3 (H)  12.3 - 15.4 % Final   • RDW-SD 11/15/2022 68.5 (H)  37.0 - 54.0 fl Final   • MPV 11/15/2022 8.6  6.0 - 12.0 fL Final   • Platelets 11/15/2022 361   140 - 450 10*3/mm3 Final   • Neutrophil % 11/15/2022 78.1 (H)  42.7 - 76.0 % Final   • Lymphocyte % 11/15/2022 9.0 (L)  19.6 - 45.3 % Final   • Monocyte % 11/15/2022 7.2  5.0 - 12.0 % Final   • Eosinophil % 11/15/2022 0.9  0.3 - 6.2 % Final   • Basophil % 11/15/2022 0.3  0.0 - 1.5 % Final   • Immature Grans % 11/15/2022 4.5 (H)  0.0 - 0.5 % Final   • Neutrophils, Absolute 11/15/2022 7.42 (H)  1.70 - 7.00 10*3/mm3 Final   • Lymphocytes, Absolute 11/15/2022 0.86  0.70 - 3.10 10*3/mm3 Final   • Monocytes, Absolute 11/15/2022 0.69  0.10 - 0.90 10*3/mm3 Final   • Eosinophils, Absolute 11/15/2022 0.09  0.00 - 0.40 10*3/mm3 Final   • Basophils, Absolute 11/15/2022 0.03  0.00 - 0.20 10*3/mm3 Final   • Immature Grans, Absolute 11/15/2022 0.43 (H)  0.00 - 0.05 10*3/mm3 Final   • Magnesium 11/15/2022 1.9  1.6 - 2.4 mg/dL Final   Hospital Outpatient Visit on 11/11/2022   Component Date Value Ref Range Status   • WBC 11/11/2022 7.12  3.40 - 10.80 10*3/mm3 Final   • RBC 11/11/2022 2.75 (L)  4.14 - 5.80 10*6/mm3 Final   • Hemoglobin 11/11/2022 8.0 (L)  13.0 - 17.7 g/dL Final   • Hematocrit 11/11/2022 24.7 (L)  37.5 - 51.0 % Final   • MCV 11/11/2022 89.8  79.0 - 97.0 fL Final   • MCH 11/11/2022 29.1  26.6 - 33.0 pg Final   • MCHC 11/11/2022 32.4  31.5 - 35.7 g/dL Final   • RDW 11/11/2022 19.8 (H)  12.3 - 15.4 % Final   • RDW-SD 11/11/2022 61.8 (H)  37.0 - 54.0 fl Final   • MPV 11/11/2022 8.4  6.0 - 12.0 fL Final   • Platelets 11/11/2022 377  140 - 450 10*3/mm3 Final   • Neutrophil % 11/11/2022 76.8 (H)  42.7 - 76.0 % Final   • Lymphocyte % 11/11/2022 9.3 (L)  19.6 - 45.3 % Final   • Monocyte % 11/11/2022 8.0  5.0 - 12.0 % Final   • Eosinophil % 11/11/2022 0.6  0.3 - 6.2 % Final   • Basophil % 11/11/2022 0.4  0.0 - 1.5 % Final   • Immature Grans % 11/11/2022 4.9 (H)  0.0 - 0.5 % Final   • Neutrophils, Absolute 11/11/2022 5.47  1.70 - 7.00 10*3/mm3 Final   • Lymphocytes, Absolute 11/11/2022 0.66 (L)  0.70 - 3.10 10*3/mm3 Final   •  Monocytes, Absolute 11/11/2022 0.57  0.10 - 0.90 10*3/mm3 Final   • Eosinophils, Absolute 11/11/2022 0.04  0.00 - 0.40 10*3/mm3 Final   • Basophils, Absolute 11/11/2022 0.03  0.00 - 0.20 10*3/mm3 Final   • Immature Grans, Absolute 11/11/2022 0.35 (H)  0.00 - 0.05 10*3/mm3 Final   • Glucose 11/11/2022 88  65 - 99 mg/dL Final   • BUN 11/11/2022 7 (L)  8 - 23 mg/dL Final   • Creatinine 11/11/2022 0.46 (L)  0.76 - 1.27 mg/dL Final   • Sodium 11/11/2022 129 (L)  136 - 145 mmol/L Final   • Potassium 11/11/2022 3.8  3.5 - 5.2 mmol/L Final   • Chloride 11/11/2022 96 (L)  98 - 107 mmol/L Final   • CO2 11/11/2022 25.0  22.0 - 29.0 mmol/L Final   • Calcium 11/11/2022 8.7  8.6 - 10.5 mg/dL Final   • Total Protein 11/11/2022 6.1  6.0 - 8.5 g/dL Final   • Albumin 11/11/2022 3.10 (L)  3.50 - 5.20 g/dL Final   • ALT (SGPT) 11/11/2022 15  1 - 41 U/L Final   • AST (SGOT) 11/11/2022 11  1 - 40 U/L Final   • Alkaline Phosphatase 11/11/2022 227 (H)  39 - 117 U/L Final   • Total Bilirubin 11/11/2022 0.4  0.0 - 1.2 mg/dL Final   • Globulin 11/11/2022 3.0  gm/dL Final    Calculated Result   • A/G Ratio 11/11/2022 1.0  g/dL Final   • BUN/Creatinine Ratio 11/11/2022 15.2  7.0 - 25.0 Final   • Anion Gap 11/11/2022 8.0  5.0 - 15.0 mmol/L Final   • eGFR 11/11/2022 112.5  >60.0 mL/min/1.73 Final    National Kidney Foundation and American Society of Nephrology (ASN) Task Force recommended calculation based on the Chronic Kidney Disease Epidemiology Collaboration (CKD-EPI) equation refit without adjustment for race.     Component      Latest Ref Rng & Units 11/15/2022   WBC      3.40 - 10.80 10*3/mm3 9.52   RBC      4.14 - 5.80 10*6/mm3 2.96 (L)   Hemoglobin      13.0 - 17.7 g/dL 8.7 (L)   Hematocrit      37.5 - 51.0 % 27.6 (L)   MCV      79.0 - 97.0 fL 93.2   MCH      26.6 - 33.0 pg 29.4   MCHC      31.5 - 35.7 g/dL 31.5   RDW      12.3 - 15.4 % 20.3 (H)   RDW-SD      37.0 - 54.0 fl 68.5 (H)   MPV      6.0 - 12.0 fL 8.6   Platelets      140 - 450  10*3/mm3 361   Neutrophil Rel %      42.7 - 76.0 % 78.1 (H)   Lymphocyte Rel %      19.6 - 45.3 % 9.0 (L)   Monocyte Rel %      5.0 - 12.0 % 7.2   Eosinophil Rel %      0.3 - 6.2 % 0.9   Basophil Rel %      0.0 - 1.5 % 0.3   Immature Granulocyte Rel %      0.0 - 0.5 % 4.5 (H)   Neutrophils Absolute      1.70 - 7.00 10*3/mm3 7.42 (H)   Lymphocytes Absolute      0.70 - 3.10 10*3/mm3 0.86   Monocytes Absolute      0.10 - 0.90 10*3/mm3 0.69   Eosinophils Absolute      0.00 - 0.40 10*3/mm3 0.09   Basophils Absolute      0.00 - 0.20 10*3/mm3 0.03   Immature Grans, Absolute      0.00 - 0.05 10*3/mm3 0.43 (H)   Glucose      65 - 99 mg/dL 93   BUN      8 - 23 mg/dL 11   Creatinine      0.76 - 1.27 mg/dL 0.57 (L)   Sodium      136 - 145 mmol/L 129 (L)   Potassium      3.5 - 5.2 mmol/L 4.4   Chloride      98 - 107 mmol/L 95 (L)   CO2      22.0 - 29.0 mmol/L 27.0   Calcium      8.6 - 10.5 mg/dL 8.8   Total Protein      6.0 - 8.5 g/dL 6.2   Albumin      3.50 - 5.20 g/dL 3.20 (L)   ALT (SGPT)      1 - 41 U/L 11   AST (SGOT)      1 - 40 U/L 12   Alkaline Phosphatase      39 - 117 U/L 226 (H)   Total Bilirubin      0.0 - 1.2 mg/dL 0.4   Globulin      gm/dL 3.0   A/G Ratio      g/dL 1.1   BUN/Creatinine Ratio      7.0 - 25.0 19.3   Anion Gap      5.0 - 15.0 mmol/L 7.0   eGFR      >60.0 mL/min/1.73 105.5       Duplex Venous Upper Extremity - Right CAR    Result Date: 10/31/2022  Narrative: •  Normal right upper extremity venous duplex scan. •  PICC line is noted in the right brachial vein.       Procedures    Assessment / Plan      Assessment/Plan:   1.  CD30 positive classical Hodgkin's lymphoma.  -He is status post cycle 1 of BV AVD.  Day 15 treatment was held due to pancytopenia, neutropenic fever, and active GI bleed.  He was very deconditioned, and initially declined further systemic therapy, but has now resumed treatment with 20% dose reduction in AVD and continued full dose brentuximab.   -Labs are adequate for cycle 2 day 1  today.  -Reviewed plan for PET/CT after cycle #2    2. Antineoplastic chemotherapy induced pancytopenia compounded by blood loss anemia from acute GI bleed.  -Tranfuse for hemoglobin < 7. Irradiated blood products only. No needs today    3. Thrush  -Nystatin    4.  Low appetite  -Continue nutrition supplements.    -Monitor weight, improving    5.  Hypokalemia  -Decrease potassium to 20 meq daily. Monitor K weekly.    6.  Abdominal cramping  -Continue scheduled zofran.   -Add PRN compazine and PRN bentyl  -Well controlled     7.  Access  -PICC dressing weekly  -Plan port placement prior to cycle 2 pending 11/29.    Follow Up:   1 week MD Kaycee Leary MD  Hematology and Oncology     Time spent on the day of service was 30 minutes inclusive of time before, during, and after office visit on record review, medically appropriate history and physical, counseling patient, ordering tests, documenting in the medical record.

## 2022-11-28 ENCOUNTER — HOSPITAL ENCOUNTER (OUTPATIENT)
Dept: INTERVENTIONAL RADIOLOGY/VASCULAR | Facility: HOSPITAL | Age: 72
Discharge: HOME | End: 2022-11-28
Admitting: INTERNAL MEDICINE

## 2022-11-28 ENCOUNTER — HOSPITAL ENCOUNTER (OUTPATIENT)
Dept: ONCOLOGY | Facility: HOSPITAL | Age: 72
Setting detail: INFUSION SERIES
Discharge: HOME | End: 2022-11-28

## 2022-11-28 VITALS
SYSTOLIC BLOOD PRESSURE: 125 MMHG | TEMPERATURE: 97 F | BODY MASS INDEX: 22.47 KG/M2 | HEIGHT: 64 IN | HEART RATE: 72 BPM | DIASTOLIC BLOOD PRESSURE: 64 MMHG | WEIGHT: 131.6 LBS | RESPIRATION RATE: 18 BRPM

## 2022-11-28 VITALS
HEART RATE: 81 BPM | OXYGEN SATURATION: 98 % | SYSTOLIC BLOOD PRESSURE: 133 MMHG | TEMPERATURE: 97.3 F | BODY MASS INDEX: 22.53 KG/M2 | RESPIRATION RATE: 18 BRPM | HEIGHT: 64 IN | DIASTOLIC BLOOD PRESSURE: 83 MMHG | WEIGHT: 132 LBS

## 2022-11-28 DIAGNOSIS — T45.1X5A ANTINEOPLASTIC CHEMOTHERAPY INDUCED PANCYTOPENIA: ICD-10-CM

## 2022-11-28 DIAGNOSIS — C81.98 HODGKIN LYMPHOMA OF LYMPH NODES OF MULTIPLE REGIONS, UNSPECIFIED HODGKIN LYMPHOMA TYPE: Primary | ICD-10-CM

## 2022-11-28 DIAGNOSIS — D61.810 ANTINEOPLASTIC CHEMOTHERAPY INDUCED PANCYTOPENIA: ICD-10-CM

## 2022-11-28 DIAGNOSIS — C81.98 HODGKIN LYMPHOMA OF LYMPH NODES OF MULTIPLE REGIONS, UNSPECIFIED HODGKIN LYMPHOMA TYPE: ICD-10-CM

## 2022-11-28 LAB
ALBUMIN SERPL-MCNC: 3.3 G/DL (ref 3.5–5.2)
ALBUMIN/GLOB SERPL: 1.1 G/DL
ALP SERPL-CCNC: 199 U/L (ref 39–117)
ALT SERPL W P-5'-P-CCNC: 8 U/L (ref 1–41)
ANION GAP SERPL CALCULATED.3IONS-SCNC: 7 MMOL/L (ref 5–15)
AST SERPL-CCNC: 15 U/L (ref 1–40)
BASOPHILS # BLD AUTO: 0.06 10*3/MM3 (ref 0–0.2)
BASOPHILS NFR BLD AUTO: 0.6 % (ref 0–1.5)
BILIRUB SERPL-MCNC: 0.3 MG/DL (ref 0–1.2)
BUN SERPL-MCNC: 9 MG/DL (ref 8–23)
BUN/CREAT SERPL: 20.5 (ref 7–25)
CALCIUM SPEC-SCNC: 8.8 MG/DL (ref 8.6–10.5)
CHLORIDE SERPL-SCNC: 94 MMOL/L (ref 98–107)
CO2 SERPL-SCNC: 29 MMOL/L (ref 22–29)
CREAT SERPL-MCNC: 0.44 MG/DL (ref 0.76–1.27)
DEPRECATED RDW RBC AUTO: 70 FL (ref 37–54)
EGFRCR SERPLBLD CKD-EPI 2021: 114 ML/MIN/1.73
EOSINOPHIL # BLD AUTO: 0.23 10*3/MM3 (ref 0–0.4)
EOSINOPHIL NFR BLD AUTO: 2.2 % (ref 0.3–6.2)
ERYTHROCYTE [DISTWIDTH] IN BLOOD BY AUTOMATED COUNT: 20.6 % (ref 12.3–15.4)
GLOBULIN UR ELPH-MCNC: 3 GM/DL
GLUCOSE SERPL-MCNC: 94 MG/DL (ref 65–99)
HCT VFR BLD AUTO: 28.4 % (ref 37.5–51)
HGB BLD-MCNC: 9.1 G/DL (ref 13–17.7)
IMM GRANULOCYTES # BLD AUTO: 0.77 10*3/MM3 (ref 0–0.05)
IMM GRANULOCYTES NFR BLD AUTO: 7.4 % (ref 0–0.5)
INR PPP: 1.11 (ref 0.84–1.13)
LYMPHOCYTES # BLD AUTO: 1.2 10*3/MM3 (ref 0.7–3.1)
LYMPHOCYTES NFR BLD AUTO: 11.5 % (ref 19.6–45.3)
MCH RBC QN AUTO: 30 PG (ref 26.6–33)
MCHC RBC AUTO-ENTMCNC: 32 G/DL (ref 31.5–35.7)
MCV RBC AUTO: 93.7 FL (ref 79–97)
MONOCYTES # BLD AUTO: 0.92 10*3/MM3 (ref 0.1–0.9)
MONOCYTES NFR BLD AUTO: 8.8 % (ref 5–12)
NEUTROPHILS NFR BLD AUTO: 69.5 % (ref 42.7–76)
NEUTROPHILS NFR BLD AUTO: 7.22 10*3/MM3 (ref 1.7–7)
PLATELET # BLD AUTO: 304 10*3/MM3 (ref 140–450)
PMV BLD AUTO: 8.5 FL (ref 6–12)
POTASSIUM SERPL-SCNC: 3.8 MMOL/L (ref 3.5–5.2)
PROT SERPL-MCNC: 6.3 G/DL (ref 6–8.5)
PROTHROMBIN TIME: 14.2 SECONDS (ref 11.4–14.4)
RBC # BLD AUTO: 3.03 10*6/MM3 (ref 4.14–5.8)
SODIUM SERPL-SCNC: 130 MMOL/L (ref 136–145)
WBC NRBC COR # BLD: 10.4 10*3/MM3 (ref 3.4–10.8)

## 2022-11-28 PROCEDURE — 85610 PROTHROMBIN TIME: CPT | Performed by: RADIOLOGY

## 2022-11-28 PROCEDURE — C1788 PORT, INDWELLING, IMP: HCPCS

## 2022-11-28 PROCEDURE — 80053 COMPREHEN METABOLIC PANEL: CPT | Performed by: INTERNAL MEDICINE

## 2022-11-28 PROCEDURE — 36592 COLLECT BLOOD FROM PICC: CPT

## 2022-11-28 PROCEDURE — 77001 FLUOROGUIDE FOR VEIN DEVICE: CPT

## 2022-11-28 PROCEDURE — 99152 MOD SED SAME PHYS/QHP 5/>YRS: CPT

## 2022-11-28 PROCEDURE — C1894 INTRO/SHEATH, NON-LASER: HCPCS

## 2022-11-28 PROCEDURE — 25010000002 FENTANYL CITRATE (PF) 50 MCG/ML SOLUTION: Performed by: RADIOLOGY

## 2022-11-28 PROCEDURE — 85025 COMPLETE CBC W/AUTO DIFF WBC: CPT | Performed by: INTERNAL MEDICINE

## 2022-11-28 PROCEDURE — 76937 US GUIDE VASCULAR ACCESS: CPT

## 2022-11-28 PROCEDURE — 25010000002 MIDAZOLAM PER 1 MG: Performed by: RADIOLOGY

## 2022-11-28 RX ORDER — LIDOCAINE HYDROCHLORIDE AND EPINEPHRINE 10; 10 MG/ML; UG/ML
10 INJECTION, SOLUTION INFILTRATION; PERINEURAL ONCE
Status: DISCONTINUED | OUTPATIENT
Start: 2022-11-28 | End: 2022-11-30 | Stop reason: HOSPADM

## 2022-11-28 RX ORDER — FENTANYL CITRATE 50 UG/ML
INJECTION, SOLUTION INTRAMUSCULAR; INTRAVENOUS
Status: DISCONTINUED
Start: 2022-11-28 | End: 2022-11-29 | Stop reason: HOSPADM

## 2022-11-28 RX ORDER — MIDAZOLAM HYDROCHLORIDE 1 MG/ML
INJECTION INTRAMUSCULAR; INTRAVENOUS AS NEEDED
Status: COMPLETED | OUTPATIENT
Start: 2022-11-28 | End: 2022-11-28

## 2022-11-28 RX ORDER — MIDAZOLAM HYDROCHLORIDE 1 MG/ML
INJECTION INTRAMUSCULAR; INTRAVENOUS
Status: DISCONTINUED
Start: 2022-11-28 | End: 2022-11-29 | Stop reason: HOSPADM

## 2022-11-28 RX ORDER — FENTANYL CITRATE 50 UG/ML
INJECTION, SOLUTION INTRAMUSCULAR; INTRAVENOUS AS NEEDED
Status: COMPLETED | OUTPATIENT
Start: 2022-11-28 | End: 2022-11-28

## 2022-11-28 RX ORDER — HEPARIN SODIUM (PORCINE) LOCK FLUSH IV SOLN 100 UNIT/ML 100 UNIT/ML
SOLUTION INTRAVENOUS
Status: DISCONTINUED
Start: 2022-11-28 | End: 2022-11-29 | Stop reason: HOSPADM

## 2022-11-28 RX ADMIN — MIDAZOLAM HYDROCHLORIDE 1 MG: 1 INJECTION, SOLUTION INTRAMUSCULAR; INTRAVENOUS at 13:17

## 2022-11-28 RX ADMIN — FENTANYL CITRATE 50 MCG: 50 INJECTION, SOLUTION INTRAMUSCULAR; INTRAVENOUS at 13:17

## 2022-11-28 NOTE — NURSING NOTE
Image guided 8 Turkish Smartport placed to Right IJ by Dr Lopez in Interventional Radiology.  Patient tolerated procedure well. Patient given 50 mcg Fentanyl & 1 mg Versed with a sedation time of 15 minutes. Report called to nurse.     Patient states that she had received the results of her colposcopy, but has additional questions.     Patient is requesting a call back from clinical staff to discuss.

## 2022-11-28 NOTE — PRE-PROCEDURE NOTE
[facility]   Vascular Interventional Radiology  History & Physicial    Patient Name:Abraham Wu    : 1950    MRN: [id number]    Primary Care Physician: Jatinder Haynes MD    Referring Physician: Kaycee Leary MD     Date of admission: 2022    Subjective     Reason for Consult: Port pl    History of Present Illness     Abraham Wu is a 72 y.o. male referred to IR as noted above.      Active Hospital Problems:  There are no active hospital problems to display for this patient.      Personal History     Past Medical History:   Diagnosis Date   • benign polypoid tissue right lung    • Hyperlipidemia    • Hypertension    • Mycobacterium mucogenicum    • SHERRI (obstructive sleep apnea)    • Seasonal allergies        Past Surgical History:   Procedure Laterality Date   • BRONCHOSCOPY      removal of obstructing polypoid tissue right middle lung   • COLOSTOMY N/A 2022    Procedure: LAPAROSCOPIC COLOSTOMY CREATION, FLEXIBLE SIGMOIDOSCOPY;  Surgeon: Hiram Viveros MD;  Location:  [facility];  Service: General;  Laterality: N/A;   • ENDOSCOPY N/A 10/10/2022    Procedure: ESOPHAGOGASTRODUODENOSCOPY;  Surgeon: Neeraj Lynn MD;  Location:  [facility];  Service: Gastroenterology;  Laterality: N/A;   • ENDOSCOPY N/A 10/12/2022    Procedure: ESOPHAGOGASTRODUODENOSCOPY;  Surgeon: Brunner, Mark I, MD;  Location:  [facility];  Service: Gastroenterology;  Laterality: N/A;   • EXAM UNDER ANESTHESIA, RECTAL BIOPSY N/A 10/4/2022    Procedure: EXAM UNDER ANESTHESIA, TRANSANAL BIOPSY WITH TRUCUT NEEDLE (LITHOTOMY-CANDY CANE);  Surgeon: Hiram Viveros MD;  Location:  [facility];  Service: General;  Laterality: N/A;       Family History: His family history includes Diabetes in his father and mother; Heart disease in his father.     Social History: He  reports that he has never smoked. He has never used smokeless tobacco. He reports that he does not currently use alcohol. He reports  "that he does not use drugs.    Home Medications:  OLANZapine, dicyclomine, fluticasone, lidocaine-prilocaine, loratadine, nystatin, nystatin susp + lidocaine viscous, ondansetron, pantoprazole, potassium chloride, and prochlorperazine    Current Medications:  •  heparin     Allergies:  He has No Known Allergies.    Review of Systems    IR Procedure pertinent significant findings are mentioned in the PMH and PSH above.    Information from this source was also reviewed: Progress Notes by Kaycee Leary MD (11/15/2022 08:30)       Objective     Visit Vitals  /81   Pulse 74   Temp 97.3 °F (36.3 °C)   Resp 18   Ht 162.6 cm (64\")   Wt 59.9 kg (132 lb)   SpO2 97%   BMI 22.66 kg/m²        Physical Exam    A&Ox3.   Able to communicate  No Apparent Distress  Average physique   CVS: Regular rate and rhythm  Respiratory: Non labored breathing. No signs of respiratory compromise.    Information from this source was also reviewed: Progress Notes by Kaycee Leary MD (11/15/2022 08:30)       Result Review      I have personally reviewed the results from the time of this admission to 11/28/2022 12:49 EST and agree with these findings.  [x]  Laboratory  []  Microbiology  [x]  Radiology  []  EKG/Telemetry   []  Cardiology/Vascular   []  Pathology  []  Old records  []  Other:    Most notable findings include: As noted:    Results from last 7 days   Lab Units 11/28/22  1025 11/28/22  0831 11/22/22  1012   INR  1.11  --   --    HEMOGLOBIN g/dL  --  9.1* 8.1*   HEMATOCRIT %  --  28.4* 26.0*   PLATELETS 10*3/mm3  --  304 263       Estimated Creatinine Clearance: 132.4 mL/min (A) (by C-G formula based on SCr of 0.44 mg/dL (L)).   Creatinine   Date Value Ref Range Status   11/28/2022 0.44 (L) 0.76 - 1.27 mg/dL Final       COVID19   Date Value Ref Range Status   10/03/2022 Not Detected Not Detected - Ref. Range Final        No results found for: PREGTESTUR, PREGSERUM, HCG, HCGQUANT     ASA SCALE ASSESSMENT (applicable ONLY if " sedation planned):  3-Severe systemic disease that results in functional limitation     MALLAMPATI CLASSIFICATION (applicable ONLY if sedation planned):  1-Able to visualize the soft palate, fauces, uvula, anterior & posterior tonsilar pillars.    Assessment / Plan     Abraham Wu is a 72 y.o. male referred to the IR service with above problem.    Plan:   As above.    Notice: The note was created before the performance of the procedure. It might have been left in the pending status and signed off after the procedure. The time stamp on the note may be misleading.    Yassine Lopez MD   Vascular Interventional Radiology  11/28/22   12:46 PM EST

## 2022-11-29 ENCOUNTER — APPOINTMENT (OUTPATIENT)
Dept: PET IMAGING | Facility: HOSPITAL | Age: 72
End: 2022-11-29

## 2022-11-29 ENCOUNTER — TELEPHONE (OUTPATIENT)
Dept: INFUSION THERAPY | Facility: HOSPITAL | Age: 72
End: 2022-11-29

## 2022-11-29 ENCOUNTER — OFFICE VISIT (OUTPATIENT)
Dept: ONCOLOGY | Facility: CLINIC | Age: 72
End: 2022-11-29

## 2022-11-29 ENCOUNTER — APPOINTMENT (OUTPATIENT)
Dept: ONCOLOGY | Facility: HOSPITAL | Age: 72
End: 2022-11-29

## 2022-11-29 ENCOUNTER — HOSPITAL ENCOUNTER (OUTPATIENT)
Dept: ONCOLOGY | Facility: HOSPITAL | Age: 72
Setting detail: INFUSION SERIES
Discharge: HOME | End: 2022-11-29

## 2022-11-29 VITALS
BODY MASS INDEX: 22.2 KG/M2 | SYSTOLIC BLOOD PRESSURE: 109 MMHG | OXYGEN SATURATION: 99 % | HEART RATE: 74 BPM | RESPIRATION RATE: 18 BRPM | HEIGHT: 64 IN | WEIGHT: 130 LBS | TEMPERATURE: 98.4 F | DIASTOLIC BLOOD PRESSURE: 65 MMHG

## 2022-11-29 DIAGNOSIS — Z45.2 ENCOUNTER FOR CARE RELATED TO VASCULAR ACCESS PORT: Primary | ICD-10-CM

## 2022-11-29 DIAGNOSIS — C81.98 HODGKIN LYMPHOMA OF LYMPH NODES OF MULTIPLE REGIONS, UNSPECIFIED HODGKIN LYMPHOMA TYPE: ICD-10-CM

## 2022-11-29 DIAGNOSIS — Z51.11 CHEMOTHERAPY MANAGEMENT, ENCOUNTER FOR: Primary | ICD-10-CM

## 2022-11-29 PROCEDURE — 25010000002 VINBLASTINE PER 1 MG: Performed by: INTERNAL MEDICINE

## 2022-11-29 PROCEDURE — 96411 CHEMO IV PUSH ADDL DRUG: CPT

## 2022-11-29 PROCEDURE — 25010000002 PALONOSETRON 0.25 MG/5ML SOLUTION PREFILLED SYRINGE: Performed by: INTERNAL MEDICINE

## 2022-11-29 PROCEDURE — 96413 CHEMO IV INFUSION 1 HR: CPT

## 2022-11-29 PROCEDURE — 63710000001 DIPHENHYDRAMINE PER 50 MG: Performed by: INTERNAL MEDICINE

## 2022-11-29 PROCEDURE — 25010000002 DEXAMETHASONE SODIUM PHOSPHATE 100 MG/10ML SOLUTION: Performed by: INTERNAL MEDICINE

## 2022-11-29 PROCEDURE — 25010000002 BRENTUXIMAB 50 MG RECONSTITUTED SOLUTION 1 EACH VIAL: Performed by: INTERNAL MEDICINE

## 2022-11-29 PROCEDURE — 25010000002 DOXORUBICIN PER 10 MG: Performed by: INTERNAL MEDICINE

## 2022-11-29 PROCEDURE — 25010000002 FOSAPREPITANT PER 1 MG: Performed by: INTERNAL MEDICINE

## 2022-11-29 PROCEDURE — 96375 TX/PRO/DX INJ NEW DRUG ADDON: CPT

## 2022-11-29 PROCEDURE — 96409 CHEMO IV PUSH SNGL DRUG: CPT

## 2022-11-29 PROCEDURE — 96367 TX/PROPH/DG ADDL SEQ IV INF: CPT

## 2022-11-29 PROCEDURE — 99214 OFFICE O/P EST MOD 30 MIN: CPT | Performed by: INTERNAL MEDICINE

## 2022-11-29 PROCEDURE — 25010000002 DACARBAZINE PER 200 MG: Performed by: INTERNAL MEDICINE

## 2022-11-29 PROCEDURE — 25010000002 HEPARIN LOCK FLUSH PER 10 UNITS: Performed by: INTERNAL MEDICINE

## 2022-11-29 PROCEDURE — 96417 CHEMO IV INFUS EACH ADDL SEQ: CPT

## 2022-11-29 RX ORDER — HEPARIN SODIUM (PORCINE) LOCK FLUSH IV SOLN 100 UNIT/ML 100 UNIT/ML
500 SOLUTION INTRAVENOUS AS NEEDED
Status: CANCELLED | OUTPATIENT
Start: 2022-11-29

## 2022-11-29 RX ORDER — OLANZAPINE 5 MG/1
5 TABLET ORAL ONCE
Status: COMPLETED | OUTPATIENT
Start: 2022-11-29 | End: 2022-11-29

## 2022-11-29 RX ORDER — SODIUM CHLORIDE 9 MG/ML
250 INJECTION, SOLUTION INTRAVENOUS ONCE
Status: COMPLETED | OUTPATIENT
Start: 2022-11-29 | End: 2022-11-29

## 2022-11-29 RX ORDER — ACETAMINOPHEN 325 MG/1
650 TABLET ORAL ONCE
Status: COMPLETED | OUTPATIENT
Start: 2022-11-29 | End: 2022-11-29

## 2022-11-29 RX ORDER — PALONOSETRON 0.05 MG/ML
0.25 INJECTION, SOLUTION INTRAVENOUS ONCE
Status: COMPLETED | OUTPATIENT
Start: 2022-11-29 | End: 2022-11-29

## 2022-11-29 RX ORDER — PANTOPRAZOLE SODIUM 40 MG/1
40 TABLET, DELAYED RELEASE ORAL DAILY
Qty: 90 TABLET | Refills: 1 | Status: SHIPPED | OUTPATIENT
Start: 2022-11-29 | End: 2022-12-29

## 2022-11-29 RX ORDER — DOXORUBICIN HYDROCHLORIDE 2 MG/ML
20 INJECTION, SOLUTION INTRAVENOUS ONCE
Status: COMPLETED | OUTPATIENT
Start: 2022-11-29 | End: 2022-11-29

## 2022-11-29 RX ORDER — DIPHENHYDRAMINE HCL 25 MG
25 CAPSULE ORAL ONCE
Status: COMPLETED | OUTPATIENT
Start: 2022-11-29 | End: 2022-11-29

## 2022-11-29 RX ORDER — HEPARIN SODIUM (PORCINE) LOCK FLUSH IV SOLN 100 UNIT/ML 100 UNIT/ML
500 SOLUTION INTRAVENOUS AS NEEDED
Status: DISCONTINUED | OUTPATIENT
Start: 2022-11-29 | End: 2022-11-30 | Stop reason: HOSPADM

## 2022-11-29 RX ADMIN — DOXORUBICIN HYDROCHLORIDE 32 MG: 2 INJECTION, SOLUTION INTRAVENOUS at 10:40

## 2022-11-29 RX ADMIN — OLANZAPINE 5 MG: 5 TABLET, FILM COATED ORAL at 09:39

## 2022-11-29 RX ADMIN — BRENTUXIMAB VEDOTIN 70 MG: 50 INJECTION, POWDER, LYOPHILIZED, FOR SOLUTION INTRAVENOUS at 12:28

## 2022-11-29 RX ADMIN — ACETAMINOPHEN 650 MG: 325 TABLET ORAL at 11:12

## 2022-11-29 RX ADMIN — SODIUM CHLORIDE 250 ML: 9 INJECTION, SOLUTION INTRAVENOUS at 09:38

## 2022-11-29 RX ADMIN — VINBLASTINE SULFATE 8 MG: 1 INJECTION INTRAVENOUS at 10:45

## 2022-11-29 RX ADMIN — DIPHENHYDRAMINE HYDROCHLORIDE 25 MG: 25 CAPSULE ORAL at 11:12

## 2022-11-29 RX ADMIN — SODIUM CHLORIDE 150 MG: 9 INJECTION, SOLUTION INTRAVENOUS at 09:40

## 2022-11-29 RX ADMIN — DACARBAZINE 500 MG: 10 INJECTION, POWDER, FOR SOLUTION INTRAVENOUS at 11:10

## 2022-11-29 RX ADMIN — PALONOSETRON 0.25 MG: 0.25 INJECTION, SOLUTION INTRAVENOUS at 09:39

## 2022-11-29 RX ADMIN — HEPARIN 500 UNITS: 100 SYRINGE at 13:10

## 2022-11-29 RX ADMIN — DEXAMETHASONE SODIUM PHOSPHATE 12 MG: 10 INJECTION, SOLUTION INTRAMUSCULAR; INTRAVENOUS at 09:40

## 2022-11-29 NOTE — PROGRESS NOTES
[facility]  Office number [phone]    Fax number [phone]     Follow up     Date: 22      Patient Name: Abraham Wu  MRN: [id number]  : 1950      Chief Complaint: follow up/treatment    Surgeon: Dr. Hiram Viveros MD    Cancer Staging: IV    History of Present Illness: Abraham Wu is a pleasant 72 y.o. male with PMH of hypertension, sleep apnea, hard of hearing who presents today for follow up of Hodgkin Lymphoma. The patient is accompanied by his supportive wife.     He initially presented 2022 with intractable diarrhea, low appetite, and a greater than 50 pound weight loss over several month period associated with intermittent fevers.  CT of the chest abdomen pelvis obtained in the ER shows of rectal mass spanning greater than 12 cm with adjacent adenopathy, bulky RP adenopathy, and findings concerning for left renal and liver metastases.  Small right pleural effusion with nodularity.  There was concern for impending obstruction, so he underwent diverting colostomy and biopsy on 2022.  Biopsies from the peritoneam showed a fibrotic nodule histiocytes and eosinophils admixed with CD30 positive large cells.  Rectal biopsies showed involvement by CD30 positive lymphoma, classic Hodgkin lymphoma versus CD30 positive T-cell or B-cell lymphoma.  There was extensive fibrosis and crush artifact.  He underwent repeat transrectal biopsy 10/4/2022 for further characterization which was felt to be most consistent with classical Hodgkin lymphoma.    He initiated chemotherapy with Brentuximab-AVD as an inpatient on 10/8/2022.  Cycle #1 was complicated by GI bleed requiring multiple blood transfusion and ultimately coil artery embolization 10/12; neutropenic fever, Candida esophagitis and mucositis.  He had a prolonged ICU stay  And required enteral feeding.  He was discharged 10/20/2022.    Treatment history:  Brentuximab-AVD: C1 10/8/22  C2:  11/1/22    Interval history:    He is here for follow up after chemo   Thigh weakness with no numbness or low back pain, steadily improving. Energy back to 80%.   Tolerated port placement well.   No neuropathy  No further pain with swallowing.  Completed nystatin.   Up 1 lb since last week. Appetite is good.   Bowels moving well. No days of no ostomy output on miralax and fiber pills scheduled  Taking zofran and protonix with good control.  Needs refills  Taking potassium once daily.    Past Medical History:   Past Medical History:   Diagnosis Date   • benign polypoid tissue right lung    • Hyperlipidemia    • Hypertension    • Mycobacterium mucogenicum    • SHERRI (obstructive sleep apnea)    • Seasonal allergies        Past Surgical History:   Past Surgical History:   Procedure Laterality Date   • BRONCHOSCOPY      removal of obstructing polypoid tissue right middle lung   • COLOSTOMY N/A 09/22/2022    Procedure: LAPAROSCOPIC COLOSTOMY CREATION, FLEXIBLE SIGMOIDOSCOPY;  Surgeon: Hiram Viveros MD;  Location:  [facility];  Service: General;  Laterality: N/A;   • ENDOSCOPY N/A 10/10/2022    Procedure: ESOPHAGOGASTRODUODENOSCOPY;  Surgeon: Neeraj Lynn MD;  Location:  [facility];  Service: Gastroenterology;  Laterality: N/A;   • ENDOSCOPY N/A 10/12/2022    Procedure: ESOPHAGOGASTRODUODENOSCOPY;  Surgeon: Brunner, Mark I, MD;  Location:  [facility];  Service: Gastroenterology;  Laterality: N/A;   • EXAM UNDER ANESTHESIA, RECTAL BIOPSY N/A 10/04/2022    Procedure: EXAM UNDER ANESTHESIA, TRANSANAL BIOPSY WITH TRUCUT NEEDLE (LITHOTOMY-CANDY CANE);  Surgeon: Hiram Viveros MD;  Location:  [facility];  Service: General;  Laterality: N/A;   • PERIPHERALLY INSERTED CENTRAL CATHETER INSERTION      Removed 11/28/2022   • VENOUS ACCESS DEVICE (PORT) INSERTION Right 11/28/2022       Family History:   Family History   Problem Relation Age of Onset   • Diabetes Mother    • Diabetes Father    • Heart disease  Father        Social History:   Social History     Socioeconomic History   • Marital status:    Tobacco Use   • Smoking status: Never   • Smokeless tobacco: Never   Vaping Use   • Vaping Use: Never used   Substance and Sexual Activity   • Alcohol use: Not Currently     Comment: previously drank 2 glasses of wine/beer nightly   • Drug use: Never   • Sexual activity: Defer       Medications:     Current Outpatient Medications:   •  dicyclomine (BENTYL) 20 MG tablet, Take 1 tablet by mouth Every 6 (Six) Hours. PRN abdominal cramps, Disp: 30 tablet, Rfl: 4  •  fluticasone (FLONASE) 50 MCG/ACT nasal spray, 1 spray into the nostril(s) as directed by provider Daily., Disp: , Rfl:   •  lidocaine-prilocaine (EMLA) 2.5-2.5 % cream, Apply 1 application topically to the appropriate area as directed As Needed (45-60 minutes prior to port access.  Cover with saran/plastic wrap.)., Disp: 30 g, Rfl: 3  •  loratadine (CLARITIN) 10 MG tablet, Take 1 tablet by mouth Daily., Disp: 30 tablet, Rfl: 6  •  nystatin (MYCOSTATIN) 100,000 unit/mL suspension, Swish and swallow 5 mL 4 (Four) Times a Day for 7 days., Disp: 60 mL, Rfl: 0  •  nystatin susp + lidocaine viscous (MAGIC MOUTHWASH) oral suspension, 5-10 ml swish and spit or swallow QID prn, Disp: 240 mL, Rfl: 3  •  OLANZapine (zyPREXA) 5 MG tablet, Take 1 tablet by mouth Every Night. For 3 days on Days 2, 3 and 4 and on days 16, 17, and 18., Disp: 6 tablet, Rfl: 5  •  ondansetron (Zofran) 8 MG tablet, Take 1 tablet by mouth Every 8 (Eight) Hours As Needed for Nausea or Vomiting., Disp: 30 tablet, Rfl: 5  •  pantoprazole (PROTONIX) 40 MG EC tablet, Take 1 tablet by mouth Daily for 30 days., Disp: 30 tablet, Rfl: 0  •  potassium chloride (K-DUR,KLOR-CON) 20 MEQ CR tablet, Take 1 tablet by mouth Daily., Disp: 30 tablet, Rfl: 5  •  prochlorperazine (COMPAZINE) 5 MG tablet, Take 1 tablet by mouth Every 6 (Six) Hours As Needed for Nausea or Vomiting., Disp: 30 tablet, Rfl: 2  No  "current facility-administered medications for this visit.    Facility-Administered Medications Ordered in Other Visits:   •  lidocaine 1% - EPINEPHrine 1:[id number] (XYLOCAINE W/EPI) 1 %-1:922175 injection 10 mL, 10 mL, Infiltration, Once, Kaycee Leary MD    Allergies:   No Known Allergies    Objective     Vital Signs:   Vitals:    11/29/22 0825   BP: 109/65  Comment: LUE   Pulse: 74   Resp: 18   Temp: 98.4 °F (36.9 °C)   TempSrc: Infrared   SpO2: 99%  Comment: RA   Weight: 59 kg (130 lb)   Height: 162.6 cm (64\")   PainSc: 0-No pain    Body mass index is 22.31 kg/m².   Pain Score    11/29/22 0825   PainSc: 0-No pain       ECOG Performance Status: 1-2    Physical Exam:   General: No acute distress. Well appearing.  HEENT: Normocephalic, atraumatic. Sclera anicteric. Masked.  Neck: supple, no adenopathy.   Cardiovascular: regular rate and rhythm. No murmurs.   Respiratory: Normal rate. Clear to auscultation bilaterally  Abdomen: Soft, nontender, non distended with normoactive bowel sounds. Ostomy output light brown  Lymph: no cervical, supraclavicular or axillary adenopathy  Neuro: Alert and oriented x 3. No focal deficits.   Ext: Symmetric, no swelling.   Psych: Euthymic  Skin: right picc c/d/i      Laboratory/Imaging Reviewed:   Hospital Outpatient Visit on 11/28/2022   Component Date Value Ref Range Status   • Protime 11/28/2022 14.2  11.4 - 14.4 Seconds Final   • INR 11/28/2022 1.11  0.84 - 1.13 Final   Hospital Outpatient Visit on 11/28/2022   Component Date Value Ref Range Status   • Glucose 11/28/2022 94  65 - 99 mg/dL Final   • BUN 11/28/2022 9  8 - 23 mg/dL Final   • Creatinine 11/28/2022 0.44 (L)  0.76 - 1.27 mg/dL Final   • Sodium 11/28/2022 130 (L)  136 - 145 mmol/L Final   • Potassium 11/28/2022 3.8  3.5 - 5.2 mmol/L Final    Slight hemolysis detected by analyzer. Results may be affected.   • Chloride 11/28/2022 94 (L)  98 - 107 mmol/L Final   • CO2 11/28/2022 29.0  22.0 - 29.0 mmol/L Final   • Calcium " 11/28/2022 8.8  8.6 - 10.5 mg/dL Final   • Total Protein 11/28/2022 6.3  6.0 - 8.5 g/dL Final   • Albumin 11/28/2022 3.30 (L)  3.50 - 5.20 g/dL Final   • ALT (SGPT) 11/28/2022 8  1 - 41 U/L Final   • AST (SGOT) 11/28/2022 15  1 - 40 U/L Final   • Alkaline Phosphatase 11/28/2022 199 (H)  39 - 117 U/L Final   • Total Bilirubin 11/28/2022 0.3  0.0 - 1.2 mg/dL Final   • Globulin 11/28/2022 3.0  gm/dL Final    Calculated Result   • A/G Ratio 11/28/2022 1.1  g/dL Final   • BUN/Creatinine Ratio 11/28/2022 20.5  7.0 - 25.0 Final   • Anion Gap 11/28/2022 7.0  5.0 - 15.0 mmol/L Final   • eGFR 11/28/2022 114.0  >60.0 mL/min/1.73 Final    National Kidney Foundation and American Society of Nephrology (ASN) Task Force recommended calculation based on the Chronic Kidney Disease Epidemiology Collaboration (CKD-EPI) equation refit without adjustment for race.   • WBC 11/28/2022 10.40  3.40 - 10.80 10*3/mm3 Final   • RBC 11/28/2022 3.03 (L)  4.14 - 5.80 10*6/mm3 Final   • Hemoglobin 11/28/2022 9.1 (L)  13.0 - 17.7 g/dL Final   • Hematocrit 11/28/2022 28.4 (L)  37.5 - 51.0 % Final   • MCV 11/28/2022 93.7  79.0 - 97.0 fL Final   • MCH 11/28/2022 30.0  26.6 - 33.0 pg Final   • MCHC 11/28/2022 32.0  31.5 - 35.7 g/dL Final   • RDW 11/28/2022 20.6 (H)  12.3 - 15.4 % Final   • RDW-SD 11/28/2022 70.0 (H)  37.0 - 54.0 fl Final   • MPV 11/28/2022 8.5  6.0 - 12.0 fL Final   • Platelets 11/28/2022 304  140 - 450 10*3/mm3 Final   • Neutrophil % 11/28/2022 69.5  42.7 - 76.0 % Final   • Lymphocyte % 11/28/2022 11.5 (L)  19.6 - 45.3 % Final   • Monocyte % 11/28/2022 8.8  5.0 - 12.0 % Final   • Eosinophil % 11/28/2022 2.2  0.3 - 6.2 % Final   • Basophil % 11/28/2022 0.6  0.0 - 1.5 % Final   • Immature Grans % 11/28/2022 7.4 (H)  0.0 - 0.5 % Final   • Neutrophils, Absolute 11/28/2022 7.22 (H)  1.70 - 7.00 10*3/mm3 Final   • Lymphocytes, Absolute 11/28/2022 1.20  0.70 - 3.10 10*3/mm3 Final   • Monocytes, Absolute 11/28/2022 0.92 (H)  0.10 - 0.90  10*3/mm3 Final   • Eosinophils, Absolute 11/28/2022 0.23  0.00 - 0.40 10*3/mm3 Final   • Basophils, Absolute 11/28/2022 0.06  0.00 - 0.20 10*3/mm3 Final   • Immature Grans, Absolute 11/28/2022 0.77 (H)  0.00 - 0.05 10*3/mm3 Final   Hospital Outpatient Visit on 11/22/2022   Component Date Value Ref Range Status   • Glucose 11/22/2022 120 (H)  65 - 99 mg/dL Final   • BUN 11/22/2022 7 (L)  8 - 23 mg/dL Final   • Creatinine 11/22/2022 0.35 (L)  0.76 - 1.27 mg/dL Final   • Sodium 11/22/2022 134 (L)  136 - 145 mmol/L Final   • Potassium 11/22/2022 3.5  3.5 - 5.2 mmol/L Final    Slight hemolysis detected by analyzer. Results may be affected.   • Chloride 11/22/2022 97 (L)  98 - 107 mmol/L Final   • CO2 11/22/2022 31.0 (H)  22.0 - 29.0 mmol/L Final   • Calcium 11/22/2022 8.9  8.6 - 10.5 mg/dL Final   • BUN/Creatinine Ratio 11/22/2022 20.0  7.0 - 25.0 Final   • Anion Gap 11/22/2022 6.0  5.0 - 15.0 mmol/L Final   • eGFR 11/22/2022 122.2  >60.0 mL/min/1.73 Final    National Kidney Foundation and American Society of Nephrology (ASN) Task Force recommended calculation based on the Chronic Kidney Disease Epidemiology Collaboration (CKD-EPI) equation refit without adjustment for race.   • WBC 11/22/2022 9.59  3.40 - 10.80 10*3/mm3 Final   • RBC 11/22/2022 2.75 (L)  4.14 - 5.80 10*6/mm3 Final   • Hemoglobin 11/22/2022 8.1 (L)  13.0 - 17.7 g/dL Final   • Hematocrit 11/22/2022 26.0 (L)  37.5 - 51.0 % Final   • MCV 11/22/2022 94.5  79.0 - 97.0 fL Final   • MCH 11/22/2022 29.5  26.6 - 33.0 pg Final   • MCHC 11/22/2022 31.2 (L)  31.5 - 35.7 g/dL Final   • RDW 11/22/2022 19.1 (H)  12.3 - 15.4 % Final   • RDW-SD 11/22/2022 65.7 (H)  37.0 - 54.0 fl Final   • MPV 11/22/2022 8.8  6.0 - 12.0 fL Final   • Platelets 11/22/2022 263  140 - 450 10*3/mm3 Final   • Neutrophil % 11/22/2022 83.0 (H)  42.7 - 76.0 % Final   • Lymphocyte % 11/22/2022 8.6 (L)  19.6 - 45.3 % Final   • Monocyte % 11/22/2022 6.8  5.0 - 12.0 % Final   • Eosinophil %  11/22/2022 0.5  0.3 - 6.2 % Final   • Basophil % 11/22/2022 0.5  0.0 - 1.5 % Final   • Immature Grans % 11/22/2022 0.6 (H)  0.0 - 0.5 % Final   • Neutrophils, Absolute 11/22/2022 7.96 (H)  1.70 - 7.00 10*3/mm3 Final   • Lymphocytes, Absolute 11/22/2022 0.82  0.70 - 3.10 10*3/mm3 Final   • Monocytes, Absolute 11/22/2022 0.65  0.10 - 0.90 10*3/mm3 Final   • Eosinophils, Absolute 11/22/2022 0.05  0.00 - 0.40 10*3/mm3 Final   • Basophils, Absolute 11/22/2022 0.05  0.00 - 0.20 10*3/mm3 Final   • Immature Grans, Absolute 11/22/2022 0.06 (H)  0.00 - 0.05 10*3/mm3 Final         IR Port Placement    Result Date: 11/28/2022  Narrative: IR PORT PLACEMENT-                                                   History: lympohoma; D61.810-Antineoplastic chemotherapy induced pancytopenia; T45.1L2G-Fblwpaj effect of antineoplastic and immunosuppressive drugs, initial encounter    : Yassine Lopez MD.                                                                            Modality: Sonography and fluoroscopy                DOSE REDUCTION: The examination was performed according to departmental dose-optimization program which includes automated exposure control, adjustment of the mA and/or kV.  Fluoro time: 0.1 minutes.  Radiation dose: 0.4 mGy air Kerma.   Sedation: SEDATION: Moderate sedation was administered. 1 milligram of Versed and 50 micrograms of fentanyl IV was used for moderate sedation monitored under my direction. Total intra service time of sedation was 15 minutes. The patient's vital signs were monitored throughout the procedure and recorded in the patient's medical record by the nurse.                                                                             Anesthesia: Lidocaine with epinephrine, local infiltration.        Medicines: None       Estimated blood loss:  < 5 cc.          Technique: A thorough discussion of the risks, benefits, and alternatives of the procedure, and if applicable, moderate  sedation, was carried out with the patient. They were encouraged to ask any questions. Any questions were answered. They verbalized understanding. A written informed consent was then signed.   A multi-component timeout was performed prior to starting the procedure using the departmental protocol.  The procedure room personnel used personal protective equipment. The operators used sterile gloves and if indicated, sterile gowns. The surgical site was prepped with chlorhexidine gluconate  and draped in the maximal applicable sterile fashion.  A preliminary ultrasonogram was performed of the neck that revealed a patent and compressible internal jugular vein. Pertinent ultrasound images were stored in the PACS for documentation.  Using aseptic precautions, real-time ultrasound guidance, the internal jugular vein was accessed after local anesthetic infiltration and dermatotomy with a micropuncture needle. A 018 guidewire was advanced into the central venous system under fluoroscopic guidance. Over the wire a micropuncture sheath was placed. Through the micropuncture sheath, a 035 wire was advanced into the venous system under fluoroscopic guidance. Over the wire a peel-away sheath was placed.  After local anesthesia, using sharp and blunt dissection, a reservoir pocket of appropriate size was created in the subclavicular chest wall. The port catheter was connected to the port reservoir. The catheter was tunneled to the venotomy site using a tunneling device. The the reservoir was placed in the reservoir pocket. The catheter was trimmed to an appropriate length. The catheter was advanced into the venous system through the peel-away sheath which was removed.  The port aspirated and flushed well and was terminally packed with heparin 100 units per cc, total 500 units.  The port reservoir was irrigated with saline. The incision was closed in layers using absorbable suture and Dermabond. The venotomy site was closed using  Dermabond. An aseptic dressing was applied using the protocol for Dermabond.  The patient was transferred to the recovery area and was discharged from the department in stable condition.                     Complications: None immediate.     Findings: Patent and compressible internal jugular vein. Final image shows the jackson catheter to be in good position with the catheter tip at the cavoatrial junction/right atrium, an excellent position for use. There is no immediate complication.                                                               Impression: Impression:    Successful ultrasound and fluoroscopic guided right internal jugular vein route single lumen Port-A-Cath placement as described above.  On the patient's request, the existing right arm PICC line was removed.  Thank you for the opportunity to assist in the care of your patient.  This report was finalized on 11/28/2022 3:17 PM by Yassine Lopez MD.      Duplex Venous Upper Extremity - Right CAR    Result Date: 10/31/2022  Narrative: •  Normal right upper extremity venous duplex scan. •  PICC line is noted in the right brachial vein.       Procedures    Assessment / Plan      Assessment/Plan:   1.  CD30 positive classical Hodgkin's lymphoma.  -He is status post cycle 1 of BV AVD.  Day 15 treatment was held due to pancytopenia, neutropenic fever, and active GI bleed.  He was very deconditioned, and initially declined further systemic therapy, but has now resumed treatment with 20% dose reduction in AVD and continued full dose brentuximab.   -CBC and CMP adequate for treatment today.     -Reviewed plan for PET/CT after cycle #2 pending next week. Follow up with me with results.    2. Antineoplastic chemotherapy induced pancytopenia compounded by blood loss anemia from acute GI bleed.  -Tranfuse for hemoglobin < 7. Irradiated blood products only  -Will decrease CBC to every other week given stable counts.    3. Thrush  -Resolved    4.  Low  appetite  -Weight improving    5.  Hypokalemia  -Continue potassium 20 meq daily. Stable K    6.  Constipation  -Controlled on miralax     7. Access  -Port    Follow Up:   1 week MD and results  Kaycee Leary MD  Hematology and Oncology     Time spent on the day of service was 30 minutes inclusive of time before, during, and after office visit on record review, medically appropriate history and physical, counseling patient, ordering tests/chemo, documenting in the medical record.

## 2022-11-30 ENCOUNTER — HOSPITAL ENCOUNTER (OUTPATIENT)
Dept: ONCOLOGY | Facility: HOSPITAL | Age: 72
Setting detail: INFUSION SERIES
Discharge: HOME | End: 2022-11-30

## 2022-11-30 VITALS
HEART RATE: 75 BPM | RESPIRATION RATE: 20 BRPM | DIASTOLIC BLOOD PRESSURE: 61 MMHG | TEMPERATURE: 97.5 F | HEIGHT: 64 IN | BODY MASS INDEX: 22.88 KG/M2 | WEIGHT: 134 LBS | SYSTOLIC BLOOD PRESSURE: 129 MMHG

## 2022-11-30 DIAGNOSIS — C81.98 HODGKIN LYMPHOMA OF LYMPH NODES OF MULTIPLE REGIONS, UNSPECIFIED HODGKIN LYMPHOMA TYPE: Primary | ICD-10-CM

## 2022-11-30 PROCEDURE — 96372 THER/PROPH/DIAG INJ SC/IM: CPT

## 2022-11-30 PROCEDURE — 25010000002 PEGFILGRASTIM-APGF 6 MG/0.6ML SOLUTION PREFILLED SYRINGE: Performed by: INTERNAL MEDICINE

## 2022-11-30 RX ADMIN — PEGFILGRASTIM-APGF 6 MG: 6 INJECTION, SOLUTION SUBCUTANEOUS at 14:57

## 2022-12-01 ENCOUNTER — TELEPHONE (OUTPATIENT)
Dept: ONCOLOGY | Facility: CLINIC | Age: 72
End: 2022-12-01

## 2022-12-01 ENCOUNTER — HOSPITAL ENCOUNTER (OUTPATIENT)
Dept: PET IMAGING | Facility: HOSPITAL | Age: 72
Discharge: HOME | End: 2022-12-01

## 2022-12-01 ENCOUNTER — APPOINTMENT (OUTPATIENT)
Dept: PET IMAGING | Facility: HOSPITAL | Age: 72
End: 2022-12-01

## 2022-12-01 NOTE — TELEPHONE ENCOUNTER
Caller: SRINIVASAN HARPER    Relationship: Emergency Contact    Best call back number: [phone]    What is the best time to reach you: ANYTIME    Who are you requesting to speak with (clinical staff, provider,  specific staff member): CLINICAL      What was the call regarding: [facility] PET SCAN WAS R/S UNTIL DEC 14. SRINIVASAN WAS WANTING TO LET DR PARRISH KNOW     Do you require a callback: N/A

## 2022-12-02 ENCOUNTER — TELEPHONE (OUTPATIENT)
Dept: ONCOLOGY | Facility: CLINIC | Age: 72
End: 2022-12-02

## 2022-12-02 NOTE — TELEPHONE ENCOUNTER
Contacted patient's spouse regarding PET scan being R/S due to machine being down (R/S to 12/14/22).  Patient wishes to see if he can get it at another facility/location, if not leave at time/date it is currently scheduled.  Referral coordinator notified.

## 2022-12-02 NOTE — TELEPHONE ENCOUNTER
Verified with Ms. Wu that patient's f/u with MD on 12/6/22 was for results after PET scan.  She stated it was.  After PET was R/S after machine being down, PET is now on 12/7/22.  Patient stated they would like 12/6/22 appointment with MD and labs to be moved to 12/8/22.  MD aware and agrees.  Message sent to referral coordinator to r/s and let patient know.

## 2022-12-05 DIAGNOSIS — C81.93 HODGKIN LYMPHOMA OF INTRA-ABDOMINAL LYMPH NODES, UNSPECIFIED HODGKIN LYMPHOMA TYPE: Primary | ICD-10-CM

## 2022-12-06 ENCOUNTER — HOSPITAL ENCOUNTER (OUTPATIENT)
Dept: ONCOLOGY | Facility: HOSPITAL | Age: 72
Setting detail: INFUSION SERIES
Discharge: HOME | End: 2022-12-06
Payer: MEDICARE

## 2022-12-08 ENCOUNTER — OFFICE VISIT (OUTPATIENT)
Dept: ONCOLOGY | Facility: CLINIC | Age: 72
End: 2022-12-08

## 2022-12-08 ENCOUNTER — HOSPITAL ENCOUNTER (OUTPATIENT)
Dept: ONCOLOGY | Facility: HOSPITAL | Age: 72
Setting detail: INFUSION SERIES
Discharge: HOME | End: 2022-12-08
Payer: MEDICARE

## 2022-12-08 VITALS
WEIGHT: 132 LBS | HEIGHT: 64 IN | DIASTOLIC BLOOD PRESSURE: 70 MMHG | TEMPERATURE: 97.1 F | SYSTOLIC BLOOD PRESSURE: 129 MMHG | HEART RATE: 87 BPM | RESPIRATION RATE: 16 BRPM | BODY MASS INDEX: 22.53 KG/M2 | OXYGEN SATURATION: 100 %

## 2022-12-08 DIAGNOSIS — C81.98 HODGKIN LYMPHOMA OF LYMPH NODES OF MULTIPLE REGIONS, UNSPECIFIED HODGKIN LYMPHOMA TYPE: Primary | ICD-10-CM

## 2022-12-08 DIAGNOSIS — Z51.11 CHEMOTHERAPY MANAGEMENT, ENCOUNTER FOR: Primary | ICD-10-CM

## 2022-12-08 DIAGNOSIS — Z45.2 ENCOUNTER FOR CARE RELATED TO VASCULAR ACCESS PORT: ICD-10-CM

## 2022-12-08 DIAGNOSIS — C81.93 HODGKIN LYMPHOMA OF INTRA-ABDOMINAL LYMPH NODES, UNSPECIFIED HODGKIN LYMPHOMA TYPE: ICD-10-CM

## 2022-12-08 LAB
ANION GAP SERPL CALCULATED.3IONS-SCNC: 9 MMOL/L (ref 5–15)
BASOPHILS # BLD AUTO: 0.06 10*3/MM3 (ref 0–0.2)
BASOPHILS NFR BLD AUTO: 0.6 % (ref 0–1.5)
BUN SERPL-MCNC: 10 MG/DL (ref 8–23)
BUN/CREAT SERPL: 15.4 (ref 7–25)
CALCIUM SPEC-SCNC: 9.3 MG/DL (ref 8.6–10.5)
CHLORIDE SERPL-SCNC: 101 MMOL/L (ref 98–107)
CO2 SERPL-SCNC: 28 MMOL/L (ref 22–29)
CREAT SERPL-MCNC: 0.65 MG/DL (ref 0.76–1.27)
DEPRECATED RDW RBC AUTO: 68.2 FL (ref 37–54)
EGFRCR SERPLBLD CKD-EPI 2021: 101.4 ML/MIN/1.73
EOSINOPHIL # BLD AUTO: 0.17 10*3/MM3 (ref 0–0.4)
EOSINOPHIL NFR BLD AUTO: 1.7 % (ref 0.3–6.2)
ERYTHROCYTE [DISTWIDTH] IN BLOOD BY AUTOMATED COUNT: 19.8 % (ref 12.3–15.4)
GLUCOSE SERPL-MCNC: 114 MG/DL (ref 65–99)
HCT VFR BLD AUTO: 30.7 % (ref 37.5–51)
HGB BLD-MCNC: 9.6 G/DL (ref 13–17.7)
IMM GRANULOCYTES # BLD AUTO: 0.1 10*3/MM3 (ref 0–0.05)
IMM GRANULOCYTES NFR BLD AUTO: 1 % (ref 0–0.5)
LYMPHOCYTES # BLD AUTO: 1 10*3/MM3 (ref 0.7–3.1)
LYMPHOCYTES NFR BLD AUTO: 10.3 % (ref 19.6–45.3)
MCH RBC QN AUTO: 30 PG (ref 26.6–33)
MCHC RBC AUTO-ENTMCNC: 31.3 G/DL (ref 31.5–35.7)
MCV RBC AUTO: 95.9 FL (ref 79–97)
MONOCYTES # BLD AUTO: 0.98 10*3/MM3 (ref 0.1–0.9)
MONOCYTES NFR BLD AUTO: 10.1 % (ref 5–12)
NEUTROPHILS NFR BLD AUTO: 7.44 10*3/MM3 (ref 1.7–7)
NEUTROPHILS NFR BLD AUTO: 76.3 % (ref 42.7–76)
PLATELET # BLD AUTO: 356 10*3/MM3 (ref 140–450)
PMV BLD AUTO: 8.9 FL (ref 6–12)
POTASSIUM SERPL-SCNC: 3.6 MMOL/L (ref 3.5–5.2)
RBC # BLD AUTO: 3.2 10*6/MM3 (ref 4.14–5.8)
SODIUM SERPL-SCNC: 138 MMOL/L (ref 136–145)
WBC NRBC COR # BLD: 9.75 10*3/MM3 (ref 3.4–10.8)

## 2022-12-08 PROCEDURE — 36591 DRAW BLOOD OFF VENOUS DEVICE: CPT

## 2022-12-08 PROCEDURE — 99215 OFFICE O/P EST HI 40 MIN: CPT | Performed by: INTERNAL MEDICINE

## 2022-12-08 PROCEDURE — 25010000002 HEPARIN LOCK FLUSH PER 10 UNITS: Performed by: INTERNAL MEDICINE

## 2022-12-08 PROCEDURE — 80048 BASIC METABOLIC PNL TOTAL CA: CPT | Performed by: INTERNAL MEDICINE

## 2022-12-08 PROCEDURE — 85025 COMPLETE CBC W/AUTO DIFF WBC: CPT | Performed by: INTERNAL MEDICINE

## 2022-12-08 RX ORDER — OLANZAPINE 5 MG/1
5 TABLET ORAL ONCE
Status: CANCELLED | OUTPATIENT
Start: 2022-12-27 | End: 2022-12-27

## 2022-12-08 RX ORDER — SODIUM CHLORIDE 9 MG/ML
250 INJECTION, SOLUTION INTRAVENOUS ONCE
Status: CANCELLED | OUTPATIENT
Start: 2022-12-27

## 2022-12-08 RX ORDER — ACETAMINOPHEN 325 MG/1
650 TABLET ORAL ONCE
Status: CANCELLED | OUTPATIENT
Start: 2022-12-13

## 2022-12-08 RX ORDER — DIPHENHYDRAMINE HCL 25 MG
25 CAPSULE ORAL ONCE
Status: CANCELLED | OUTPATIENT
Start: 2022-12-13

## 2022-12-08 RX ORDER — DOXORUBICIN HYDROCHLORIDE 2 MG/ML
20 INJECTION, SOLUTION INTRAVENOUS ONCE
Status: CANCELLED | OUTPATIENT
Start: 2022-12-13

## 2022-12-08 RX ORDER — HEPARIN SODIUM (PORCINE) LOCK FLUSH IV SOLN 100 UNIT/ML 100 UNIT/ML
500 SOLUTION INTRAVENOUS AS NEEDED
Status: DISCONTINUED | OUTPATIENT
Start: 2022-12-08 | End: 2022-12-09 | Stop reason: HOSPADM

## 2022-12-08 RX ORDER — PALONOSETRON 0.05 MG/ML
0.25 INJECTION, SOLUTION INTRAVENOUS ONCE
Status: CANCELLED | OUTPATIENT
Start: 2022-12-27

## 2022-12-08 RX ORDER — PALONOSETRON 0.05 MG/ML
0.25 INJECTION, SOLUTION INTRAVENOUS ONCE
Status: CANCELLED | OUTPATIENT
Start: 2022-12-13

## 2022-12-08 RX ORDER — ACETAMINOPHEN 325 MG/1
650 TABLET ORAL ONCE
Status: CANCELLED | OUTPATIENT
Start: 2022-12-27

## 2022-12-08 RX ORDER — ROSUVASTATIN CALCIUM 10 MG/1
TABLET, COATED ORAL
COMMUNITY
Start: 2022-11-01

## 2022-12-08 RX ORDER — HEPARIN SODIUM (PORCINE) LOCK FLUSH IV SOLN 100 UNIT/ML 100 UNIT/ML
500 SOLUTION INTRAVENOUS AS NEEDED
Status: CANCELLED | OUTPATIENT
Start: 2022-12-08

## 2022-12-08 RX ORDER — DOXORUBICIN HYDROCHLORIDE 2 MG/ML
20 INJECTION, SOLUTION INTRAVENOUS ONCE
Status: CANCELLED | OUTPATIENT
Start: 2022-12-27

## 2022-12-08 RX ORDER — SODIUM CHLORIDE 9 MG/ML
250 INJECTION, SOLUTION INTRAVENOUS ONCE
Status: CANCELLED | OUTPATIENT
Start: 2022-12-13

## 2022-12-08 RX ORDER — DIPHENHYDRAMINE HCL 25 MG
25 CAPSULE ORAL ONCE
Status: CANCELLED | OUTPATIENT
Start: 2022-12-27

## 2022-12-08 RX ORDER — OLANZAPINE 5 MG/1
5 TABLET ORAL ONCE
Status: CANCELLED | OUTPATIENT
Start: 2022-12-13 | End: 2022-12-13

## 2022-12-08 RX ADMIN — HEPARIN SODIUM (PORCINE) LOCK FLUSH IV SOLN 100 UNIT/ML 500 UNITS: 100 SOLUTION at 09:20

## 2022-12-11 NOTE — PROGRESS NOTES
H[facility]ology [facility]  Office number [phone]    Fax number [phone]     Follow up     Date: 22    Patient Name: Abraham Wu  MRN: [id number]  : 1950      Chief Complaint: follow up/treatment    Surgeon: Dr. Hiram Viveros MD    Cancer Staging: IV    History of Present Illness: Abraham Wu is a pleasant 72 y.o. male with PMH of hypertension, sleep apnea, hard of hearing who presents today for follow up of Hodgkin Lymphoma.     He initially presented 2022 with intractable diarrhea, low appetite, and a greater than 50 pound weight loss over several month period associated with intermittent fevers.  CT of the chest abdomen pelvis obtained in the ER shows of rectal mass spanning greater than 12 cm with adjacent adenopathy, bulky RP adenopathy, and findings concerning for left renal and liver metastases.  Small right pleural effusion with nodularity.  There was concern for impending obstruction, so he underwent diverting colostomy and biopsy on 2022.  Biopsies from the peritoneam showed a fibrotic nodule histiocytes and eosinophils admixed with CD30 positive large cells.  Rectal biopsies showed involvement by CD30 positive lymphoma, classic Hodgkin lymphoma versus CD30 positive T-cell or B-cell lymphoma.  There was extensive fibrosis and crush artifact.  He underwent repeat transrectal biopsy 10/4/2022 for further characterization which was felt to be most consistent with classical Hodgkin lymphoma.    He initiated chemotherapy with Brentuximab-AVD as an inpatient on 10/8/2022.  Cycle #1 was complicated by GI bleed requiring multiple blood transfusion and ultimately coil artery embolization 10/12; neutropenic fever, Candida esophagitis and mucositis.  He had a prolonged ICU stay  And required enteral feeding.  He was discharged 10/20/2022.    Treatment history:  Brentuximab-AVD: C1 10/8/22  C2: 22 with DA    Interval history:    He is here for  follow up after PET CT. He is tolerating therapy well. He endorses several days of neulasta bone pain involving his low back and hip girdle that improves gradually. Not so severe that he would like to add pain medication. Appetite and energy is good. His weight is continuing to trend up. Bowels moving well with miralax and fiber. Taking zofran and protonix with good control.      Past Medical History:   Past Medical History:   Diagnosis Date   • benign polypoid tissue right lung    • Hyperlipidemia    • Hypertension    • Mycobacterium mucogenicum    • SHERRI (obstructive sleep apnea)    • Seasonal allergies        Past Surgical History:   Past Surgical History:   Procedure Laterality Date   • BRONCHOSCOPY      removal of obstructing polypoid tissue right middle lung   • COLOSTOMY N/A 09/22/2022    Procedure: LAPAROSCOPIC COLOSTOMY CREATION, FLEXIBLE SIGMOIDOSCOPY;  Surgeon: Hiram Viveros MD;  Location:  [facility];  Service: General;  Laterality: N/A;   • ENDOSCOPY N/A 10/10/2022    Procedure: ESOPHAGOGASTRODUODENOSCOPY;  Surgeon: Neeraj Lynn MD;  Location:  [facility];  Service: Gastroenterology;  Laterality: N/A;   • ENDOSCOPY N/A 10/12/2022    Procedure: ESOPHAGOGASTRODUODENOSCOPY;  Surgeon: Brunner, Mark I, MD;  Location:  [facility];  Service: Gastroenterology;  Laterality: N/A;   • EXAM UNDER ANESTHESIA, RECTAL BIOPSY N/A 10/04/2022    Procedure: EXAM UNDER ANESTHESIA, TRANSANAL BIOPSY WITH TRUCUT NEEDLE (LITHOTOMY-CANDY CANE);  Surgeon: Hiram Viveros MD;  Location:  [facility];  Service: General;  Laterality: N/A;   • PERIPHERALLY INSERTED CENTRAL CATHETER INSERTION      Removed 11/28/2022   • VENOUS ACCESS DEVICE (PORT) INSERTION Right 11/28/2022       Family History:   Family History   Problem Relation Age of Onset   • Diabetes Mother    • Diabetes Father    • Heart disease Father        Social History:   Social History     Socioeconomic History   • Marital status:    Tobacco  Use   • Smoking status: Never   • Smokeless tobacco: Never   Vaping Use   • Vaping Use: Never used   Substance and Sexual Activity   • Alcohol use: Not Currently     Comment: previously drank 2 glasses of wine/beer nightly   • Drug use: Never   • Sexual activity: Defer       Medications:     Current Outpatient Medications:   •  dicyclomine (BENTYL) 20 MG tablet, Take 1 tablet by mouth Every 6 (Six) Hours. PRN abdominal cramps, Disp: 30 tablet, Rfl: 4  •  fluticasone (FLONASE) 50 MCG/ACT nasal spray, 1 spray into the nostril(s) as directed by provider Daily., Disp: , Rfl:   •  lidocaine-prilocaine (EMLA) 2.5-2.5 % cream, Apply 1 application topically to the appropriate area as directed As Needed (45-60 minutes prior to port access.  Cover with saran/plastic wrap.)., Disp: 30 g, Rfl: 3  •  loratadine (CLARITIN) 10 MG tablet, Take 1 tablet by mouth Daily., Disp: 30 tablet, Rfl: 6  •  nystatin susp + lidocaine viscous (MAGIC MOUTHWASH) oral suspension, 5-10 ml swish and spit or swallow QID prn, Disp: 240 mL, Rfl: 3  •  OLANZapine (zyPREXA) 5 MG tablet, Take 1 tablet by mouth Every Night. For 3 days on Days 2, 3 and 4 and on days 16, 17, and 18., Disp: 6 tablet, Rfl: 5  •  ondansetron (Zofran) 8 MG tablet, Take 1 tablet by mouth Every 8 (Eight) Hours As Needed for Nausea or Vomiting., Disp: 30 tablet, Rfl: 5  •  pantoprazole (PROTONIX) 40 MG EC tablet, Take 1 tablet by mouth Daily for 30 days., Disp: 90 tablet, Rfl: 1  •  potassium chloride (K-DUR,KLOR-CON) 20 MEQ CR tablet, Take 1 tablet by mouth Daily., Disp: 30 tablet, Rfl: 5  •  prochlorperazine (COMPAZINE) 5 MG tablet, Take 1 tablet by mouth Every 6 (Six) Hours As Needed for Nausea or Vomiting., Disp: 30 tablet, Rfl: 2  •  rosuvastatin (CRESTOR) 10 MG tablet, , Disp: , Rfl:   •  sertraline (ZOLOFT) 50 MG tablet, , Disp: , Rfl:     Allergies:   No Known Allergies    Objective     Vital Signs:   Vitals:    12/08/22 0932   BP: 129/70   Pulse: 87   Resp: 16   Temp: 97.1  "°F (36.2 °C)   TempSrc: Infrared   SpO2: 100%   Weight: 59.9 kg (132 lb)   Height: 162.6 cm (64.02\")   PainSc: 0-No pain    Body mass index is 22.65 kg/m².   Pain Score    12/08/22 0932   PainSc: 0-No pain       ECOG Performance Status: 1-2    Physical Exam:   General: No acute distress. Well appearing.  HEENT: Normocephalic, atraumatic. Sclera anicteric. Masked.  Neck: supple, no adenopathy.   Cardiovascular: regular rate and rhythm. No murmurs.   Respiratory: Normal rate. Clear to auscultation bilaterally  Abdomen: Soft, nontender, non distended with normoactive bowel sounds. Ostomy output light brown  Lymph: no cervical, supraclavicular or axillary adenopathy  Neuro: Alert and oriented x 3. No focal deficits.   Ext: Symmetric, no swelling.   Psych: Euthymic  Skin: right picc c/d/i      Laboratory/Imaging Reviewed:   Hospital Outpatient Visit on 12/08/2022   Component Date Value Ref Range Status   • Glucose 12/08/2022 114 (H)  65 - 99 mg/dL Final   • BUN 12/08/2022 10  8 - 23 mg/dL Final   • Creatinine 12/08/2022 0.65 (L)  0.76 - 1.27 mg/dL Final   • Sodium 12/08/2022 138  136 - 145 mmol/L Final   • Potassium 12/08/2022 3.6  3.5 - 5.2 mmol/L Final    Slight hemolysis detected by analyzer. Results may be affected.   • Chloride 12/08/2022 101  98 - 107 mmol/L Final   • CO2 12/08/2022 28.0  22.0 - 29.0 mmol/L Final   • Calcium 12/08/2022 9.3  8.6 - 10.5 mg/dL Final   • BUN/Creatinine Ratio 12/08/2022 15.4  7.0 - 25.0 Final   • Anion Gap 12/08/2022 9.0  5.0 - 15.0 mmol/L Final   • eGFR 12/08/2022 101.4  >60.0 mL/min/1.73 Final    National Kidney Foundation and American Society of Nephrology (ASN) Task Force recommended calculation based on the Chronic Kidney Disease Epidemiology Collaboration (CKD-EPI) equation refit without adjustment for race.   • WBC 12/08/2022 9.75  3.40 - 10.80 10*3/mm3 Final   • RBC 12/08/2022 3.20 (L)  4.14 - 5.80 10*6/mm3 Final   • Hemoglobin 12/08/2022 9.6 (L)  13.0 - 17.7 g/dL Final   • " Hematocrit 12/08/2022 30.7 (L)  37.5 - 51.0 % Final   • MCV 12/08/2022 95.9  79.0 - 97.0 fL Final   • MCH 12/08/2022 30.0  26.6 - 33.0 pg Final   • MCHC 12/08/2022 31.3 (L)  31.5 - 35.7 g/dL Final   • RDW 12/08/2022 19.8 (H)  12.3 - 15.4 % Final   • RDW-SD 12/08/2022 68.2 (H)  37.0 - 54.0 fl Final   • MPV 12/08/2022 8.9  6.0 - 12.0 fL Final   • Platelets 12/08/2022 356  140 - 450 10*3/mm3 Final   • Neutrophil % 12/08/2022 76.3 (H)  42.7 - 76.0 % Final   • Lymphocyte % 12/08/2022 10.3 (L)  19.6 - 45.3 % Final   • Monocyte % 12/08/2022 10.1  5.0 - 12.0 % Final   • Eosinophil % 12/08/2022 1.7  0.3 - 6.2 % Final   • Basophil % 12/08/2022 0.6  0.0 - 1.5 % Final   • Immature Grans % 12/08/2022 1.0 (H)  0.0 - 0.5 % Final   • Neutrophils, Absolute 12/08/2022 7.44 (H)  1.70 - 7.00 10*3/mm3 Final   • Lymphocytes, Absolute 12/08/2022 1.00  0.70 - 3.10 10*3/mm3 Final   • Monocytes, Absolute 12/08/2022 0.98 (H)  0.10 - 0.90 10*3/mm3 Final   • Eosinophils, Absolute 12/08/2022 0.17  0.00 - 0.40 10*3/mm3 Final   • Basophils, Absolute 12/08/2022 0.06  0.00 - 0.20 10*3/mm3 Final   • Immature Grans, Absolute 12/08/2022 0.10 (H)  0.00 - 0.05 10*3/mm3 Final   Hospital Outpatient Visit on 11/28/2022   Component Date Value Ref Range Status   • Glucose 11/28/2022 94  65 - 99 mg/dL Final   • BUN 11/28/2022 9  8 - 23 mg/dL Final   • Creatinine 11/28/2022 0.44 (L)  0.76 - 1.27 mg/dL Final   • Sodium 11/28/2022 130 (L)  136 - 145 mmol/L Final   • Potassium 11/28/2022 3.8  3.5 - 5.2 mmol/L Final    Slight hemolysis detected by analyzer. Results may be affected.   • Chloride 11/28/2022 94 (L)  98 - 107 mmol/L Final   • CO2 11/28/2022 29.0  22.0 - 29.0 mmol/L Final   • Calcium 11/28/2022 8.8  8.6 - 10.5 mg/dL Final   • Total Protein 11/28/2022 6.3  6.0 - 8.5 g/dL Final   • Albumin 11/28/2022 3.30 (L)  3.50 - 5.20 g/dL Final   • ALT (SGPT) 11/28/2022 8  1 - 41 U/L Final   • AST (SGOT) 11/28/2022 15  1 - 40 U/L Final   • Alkaline Phosphatase  11/28/2022 199 (H)  39 - 117 U/L Final   • Total Bilirubin 11/28/2022 0.3  0.0 - 1.2 mg/dL Final   • Globulin 11/28/2022 3.0  gm/dL Final    Calculated Result   • A/G Ratio 11/28/2022 1.1  g/dL Final   • BUN/Creatinine Ratio 11/28/2022 20.5  7.0 - 25.0 Final   • Anion Gap 11/28/2022 7.0  5.0 - 15.0 mmol/L Final   • eGFR 11/28/2022 114.0  >60.0 mL/min/1.73 Final    National Kidney Foundation and American Society of Nephrology (ASN) Task Force recommended calculation based on the Chronic Kidney Disease Epidemiology Collaboration (CKD-EPI) equation refit without adjustment for race.   • WBC 11/28/2022 10.40  3.40 - 10.80 10*3/mm3 Final   • RBC 11/28/2022 3.03 (L)  4.14 - 5.80 10*6/mm3 Final   • Hemoglobin 11/28/2022 9.1 (L)  13.0 - 17.7 g/dL Final   • Hematocrit 11/28/2022 28.4 (L)  37.5 - 51.0 % Final   • MCV 11/28/2022 93.7  79.0 - 97.0 fL Final   • MCH 11/28/2022 30.0  26.6 - 33.0 pg Final   • MCHC 11/28/2022 32.0  31.5 - 35.7 g/dL Final   • RDW 11/28/2022 20.6 (H)  12.3 - 15.4 % Final   • RDW-SD 11/28/2022 70.0 (H)  37.0 - 54.0 fl Final   • MPV 11/28/2022 8.5  6.0 - 12.0 fL Final   • Platelets 11/28/2022 304  140 - 450 10*3/mm3 Final   • Neutrophil % 11/28/2022 69.5  42.7 - 76.0 % Final   • Lymphocyte % 11/28/2022 11.5 (L)  19.6 - 45.3 % Final   • Monocyte % 11/28/2022 8.8  5.0 - 12.0 % Final   • Eosinophil % 11/28/2022 2.2  0.3 - 6.2 % Final   • Basophil % 11/28/2022 0.6  0.0 - 1.5 % Final   • Immature Grans % 11/28/2022 7.4 (H)  0.0 - 0.5 % Final   • Neutrophils, Absolute 11/28/2022 7.22 (H)  1.70 - 7.00 10*3/mm3 Final   • Lymphocytes, Absolute 11/28/2022 1.20  0.70 - 3.10 10*3/mm3 Final   • Monocytes, Absolute 11/28/2022 0.92 (H)  0.10 - 0.90 10*3/mm3 Final   • Eosinophils, Absolute 11/28/2022 0.23  0.00 - 0.40 10*3/mm3 Final   • Basophils, Absolute 11/28/2022 0.06  0.00 - 0.20 10*3/mm3 Final   • Immature Grans, Absolute 11/28/2022 0.77 (H)  0.00 - 0.05 10*3/mm3 Final   Hospital Outpatient Visit on 11/28/2022    Component Date Value Ref Range Status   • Protime 11/28/2022 14.2  11.4 - 14.4 Seconds Final   • INR 11/28/2022 1.11  0.84 - 1.13 Final         IR Port Placement    Result Date: 11/28/2022  Narrative: IR PORT PLACEMENT-                                                   History: lympohoma; D61.810-Antineoplastic chemotherapy induced pancytopenia; T45.3T6T-[id number] effect of antineoplastic and immunosuppressive drugs, initial encounter    : Yassine Lopez MD.                                                                            Modality: Sonography and fluoroscopy                DOSE REDUCTION: The examination was performed according to departmental dose-optimization program which includes automated exposure control, adjustment of the mA and/or kV.  Fluoro time: 0.1 minutes.  Radiation dose: 0.4 mGy air Kerma.   Sedation: SEDATION: Moderate sedation was administered. 1 milligram of Versed and 50 micrograms of fentanyl IV was used for moderate sedation monitored under my direction. Total intra service time of sedation was 15 minutes. The patient's vital signs were monitored throughout the procedure and recorded in the patient's medical record by the nurse.                                                                             Anesthesia: Lidocaine with epinephrine, local infiltration.        Medicines: None       Estimated blood loss:  < 5 cc.          Technique: A thorough discussion of the risks, benefits, and alternatives of the procedure, and if applicable, moderate sedation, was carried out with the patient. They were encouraged to ask any questions. Any questions were answered. They verbalized understanding. A written informed consent was then signed.   A multi-component timeout was performed prior to starting the procedure using the departmental protocol.  The procedure room personnel used personal protective equipment. The operators used sterile gloves and if indicated, sterile gowns.  The surgical site was prepped with chlorhexidine gluconate  and draped in the maximal applicable sterile fashion.  A preliminary ultrasonogram was performed of the neck that revealed a patent and compressible internal jugular vein. Pertinent ultrasound images were stored in the PACS for documentation.  Using aseptic precautions, real-time ultrasound guidance, the internal jugular vein was accessed after local anesthetic infiltration and dermatotomy with a micropuncture needle. A 018 guidewire was advanced into the central venous system under fluoroscopic guidance. Over the wire a micropuncture sheath was placed. Through the micropuncture sheath, a 035 wire was advanced into the venous system under fluoroscopic guidance. Over the wire a peel-away sheath was placed.  After local anesthesia, using sharp and blunt dissection, a reservoir pocket of appropriate size was created in the subclavicular chest wall. The port catheter was connected to the port reservoir. The catheter was tunneled to the venotomy site using a tunneling device. The the reservoir was placed in the reservoir pocket. The catheter was trimmed to an appropriate length. The catheter was advanced into the venous system through the peel-away sheath which was removed.  The port aspirated and flushed well and was terminally packed with heparin 100 units per cc, total 500 units.  The port reservoir was irrigated with saline. The incision was closed in layers using absorbable suture and Dermabond. The venotomy site was closed using Dermabond. An aseptic dressing was applied using the protocol for Dermabond.  The patient was transferred to the recovery area and was discharged from the department in stable condition.                     Complications: None immediate.     Findings: Patent and compressible internal jugular vein. Final image shows the jackson catheter to be in good position with the catheter tip at the cavoatrial junction/right atrium, an excellent  position for use. There is no immediate complication.                                                               Impression: Impression:    Successful ultrasound and fluoroscopic guided right internal jugular vein route single lumen Port-A-Cath placement as described above.  On the patient's request, the existing right arm PICC line was removed.  Thank you for the opportunity to assist in the care of your patient.  This report was finalized on 11/28/2022 3:17 PM by Yassine Lopez MD.      P.E.T./CT skull base to mid-thigh    Result Date: 12/7/2022  Narrative: PET CT TUMOR IMAGING    12/7/2022 9:00 AM HISTORY: Restaging, Hodgkin's lymphoma COMPARISON: None. TECHNIQUE: Patient was injected with 14.34 mCi of 18-F FDG. Whole-body PET and non-contrast CT were performed of the neck, chest, abdomen and pelvis. Multiplanar reconstruction and fused images were reviewed. Blood glucose at time of injection was 80 mg/dl. FINDINGS: There is diffuse osseous hypermetabolism consistent with marrow hyperstimulation. A port overlies the right chest. There is a left abdominal ostomy present. The ascending aorta is mildly aneurysmal and measures approximately 40 mm. There is a right lower lobe nodule measuring 11 mm that is not hypermetabolic. There is a vague linear opacity in the right perihilar region that is not hypermetabolic. There is abnormal soft tissue anterior to the left kidney measuring 41 mm and is not hypermetabolic. There are periaortic lymph nodes that are not hypermetabolic and measure up to 21 mm. There is severe wall thickening involving the rectum with overall diameter measuring 7 cm without associated hypermetabolism. There are adjacent lymph nodes that are not hypermetabolic measuring up to 30 mm.    Impression: Multiple abnormalities, none of which are hypermetabolic. Deauville Score 1. Images reviewed, interpreted, and dictated by Dr. JD Hudson. Transcribed by Mary Escobar PA-C.    CT outside  films    Result Date: 12/9/2022  Narrative: This procedure was auto-finalized with no dictation required.        Assessment / Plan      Assessment/Plan:   1.  CD30 positive classical Hodgkin's lymphoma  -He is status post cycle 2 of BV AVD.  C1 Day 15 treatment was held due to pancytopenia, neutropenic fever, and active GI bleed, but has now resumed treatment with 20% dose reduction in AVD and continued full dose brentuximab.   -Anticipating cycle #3 next week.   -I personally reviewed his PET/CT and compared it to his prior imaging. Rectal mass has decreased by several cm, resolution of hydronephrosis, liver lesions no longer visible, no persistent pleural effusion. No FDG avid disease. I have requested a formal comparison by the radiologist (original studies were not available to radiology at time of PET due to need to switch facilities bc of equipment failure) but on my review, consistent with treatment response and clearly he is improving clinically.   -Follow up with labs and NP for cycle 3 D15 treatment.   -Follow up with me 1/10 for consideration of cycle 4.     2. Antineoplastic chemotherapy induced pancytopenia compounded by blood loss anemia from acute GI bleed.  -Tranfuse for hemoglobin < 7. Irradiated blood products only  -Counts continue to recover, hemoglobin 9.6 today.    3. Hypokalemia  -Continue potassium 20 meq daily. Stable K at 3.6.    4.  Constipation  -Controlled on miralax/fibercon     5. Access  -Port    Follow Up:   As above    Kaycee Leary MD  Hematology and Oncology       I have spent a total of 40 min on reviewing imaging/preparing to see patient, counseling patient, performing medically appropriate exam, coordination of care and documenting clinical information in the electronic or other health record

## 2022-12-13 ENCOUNTER — HOSPITAL ENCOUNTER (OUTPATIENT)
Dept: ONCOLOGY | Facility: HOSPITAL | Age: 72
Setting detail: INFUSION SERIES
Discharge: HOME | End: 2022-12-13
Payer: MEDICARE

## 2022-12-13 VITALS
RESPIRATION RATE: 18 BRPM | BODY MASS INDEX: 23.22 KG/M2 | HEART RATE: 81 BPM | WEIGHT: 136 LBS | HEIGHT: 64 IN | DIASTOLIC BLOOD PRESSURE: 84 MMHG | SYSTOLIC BLOOD PRESSURE: 145 MMHG | TEMPERATURE: 97.2 F

## 2022-12-13 DIAGNOSIS — C81.98 HODGKIN LYMPHOMA OF LYMPH NODES OF MULTIPLE REGIONS, UNSPECIFIED HODGKIN LYMPHOMA TYPE: Primary | ICD-10-CM

## 2022-12-13 DIAGNOSIS — Z45.2 ENCOUNTER FOR CARE RELATED TO VASCULAR ACCESS PORT: ICD-10-CM

## 2022-12-13 LAB
ALBUMIN SERPL-MCNC: 3.3 G/DL (ref 3.5–5.2)
ALBUMIN/GLOB SERPL: 1 G/DL
ALP SERPL-CCNC: 161 U/L (ref 39–117)
ALT SERPL W P-5'-P-CCNC: 7 U/L (ref 1–41)
ANION GAP SERPL CALCULATED.3IONS-SCNC: 7 MMOL/L (ref 5–15)
AST SERPL-CCNC: 13 U/L (ref 1–40)
BASOPHILS # BLD AUTO: 0.09 10*3/MM3 (ref 0–0.2)
BASOPHILS NFR BLD AUTO: 0.6 % (ref 0–1.5)
BILIRUB SERPL-MCNC: 0.2 MG/DL (ref 0–1.2)
BUN SERPL-MCNC: 13 MG/DL (ref 8–23)
BUN/CREAT SERPL: 27.1 (ref 7–25)
CALCIUM SPEC-SCNC: 9.1 MG/DL (ref 8.6–10.5)
CHLORIDE SERPL-SCNC: 97 MMOL/L (ref 98–107)
CO2 SERPL-SCNC: 29 MMOL/L (ref 22–29)
CREAT SERPL-MCNC: 0.48 MG/DL (ref 0.76–1.27)
DEPRECATED RDW RBC AUTO: 72.3 FL (ref 37–54)
EGFRCR SERPLBLD CKD-EPI 2021: 111.1 ML/MIN/1.73
EOSINOPHIL # BLD AUTO: 0.87 10*3/MM3 (ref 0–0.4)
EOSINOPHIL NFR BLD AUTO: 5.4 % (ref 0.3–6.2)
ERYTHROCYTE [DISTWIDTH] IN BLOOD BY AUTOMATED COUNT: 20.2 % (ref 12.3–15.4)
GLOBULIN UR ELPH-MCNC: 3.2 GM/DL
GLUCOSE SERPL-MCNC: 91 MG/DL (ref 65–99)
HCT VFR BLD AUTO: 32 % (ref 37.5–51)
HGB BLD-MCNC: 10.1 G/DL (ref 13–17.7)
IMM GRANULOCYTES # BLD AUTO: 1.02 10*3/MM3 (ref 0–0.05)
IMM GRANULOCYTES NFR BLD AUTO: 6.3 % (ref 0–0.5)
LYMPHOCYTES # BLD AUTO: 1.28 10*3/MM3 (ref 0.7–3.1)
LYMPHOCYTES NFR BLD AUTO: 7.9 % (ref 19.6–45.3)
MCH RBC QN AUTO: 30.8 PG (ref 26.6–33)
MCHC RBC AUTO-ENTMCNC: 31.6 G/DL (ref 31.5–35.7)
MCV RBC AUTO: 97.6 FL (ref 79–97)
MONOCYTES # BLD AUTO: 1.38 10*3/MM3 (ref 0.1–0.9)
MONOCYTES NFR BLD AUTO: 8.5 % (ref 5–12)
NEUTROPHILS NFR BLD AUTO: 11.51 10*3/MM3 (ref 1.7–7)
NEUTROPHILS NFR BLD AUTO: 71.3 % (ref 42.7–76)
PLATELET # BLD AUTO: 265 10*3/MM3 (ref 140–450)
PMV BLD AUTO: 9.3 FL (ref 6–12)
POTASSIUM SERPL-SCNC: 4.6 MMOL/L (ref 3.5–5.2)
PROT SERPL-MCNC: 6.5 G/DL (ref 6–8.5)
RBC # BLD AUTO: 3.28 10*6/MM3 (ref 4.14–5.8)
SODIUM SERPL-SCNC: 133 MMOL/L (ref 136–145)
WBC NRBC COR # BLD: 16.15 10*3/MM3 (ref 3.4–10.8)

## 2022-12-13 PROCEDURE — 96413 CHEMO IV INFUSION 1 HR: CPT

## 2022-12-13 PROCEDURE — 25010000002 BRENTUXIMAB 50 MG RECONSTITUTED SOLUTION 1 EACH VIAL: Performed by: INTERNAL MEDICINE

## 2022-12-13 PROCEDURE — 96367 TX/PROPH/DG ADDL SEQ IV INF: CPT

## 2022-12-13 PROCEDURE — 80053 COMPREHEN METABOLIC PANEL: CPT | Performed by: INTERNAL MEDICINE

## 2022-12-13 PROCEDURE — 25010000002 DACARBAZINE PER 200 MG: Performed by: INTERNAL MEDICINE

## 2022-12-13 PROCEDURE — 96411 CHEMO IV PUSH ADDL DRUG: CPT

## 2022-12-13 PROCEDURE — 96366 THER/PROPH/DIAG IV INF ADDON: CPT

## 2022-12-13 PROCEDURE — 25010000002 DEXAMETHASONE SODIUM PHOSPHATE 100 MG/10ML SOLUTION: Performed by: INTERNAL MEDICINE

## 2022-12-13 PROCEDURE — 63710000001 DIPHENHYDRAMINE PER 50 MG: Performed by: INTERNAL MEDICINE

## 2022-12-13 PROCEDURE — 25010000002 HEPARIN LOCK FLUSH PER 10 UNITS: Performed by: INTERNAL MEDICINE

## 2022-12-13 PROCEDURE — 96375 TX/PRO/DX INJ NEW DRUG ADDON: CPT

## 2022-12-13 PROCEDURE — 25010000002 VINBLASTINE PER 1 MG: Performed by: INTERNAL MEDICINE

## 2022-12-13 PROCEDURE — 25010000002 PALONOSETRON 0.25 MG/5ML SOLUTION PREFILLED SYRINGE: Performed by: INTERNAL MEDICINE

## 2022-12-13 PROCEDURE — 25010000002 FOSAPREPITANT PER 1 MG: Performed by: INTERNAL MEDICINE

## 2022-12-13 PROCEDURE — 96368 THER/DIAG CONCURRENT INF: CPT

## 2022-12-13 PROCEDURE — 25010000002 DOXORUBICIN PER 10 MG: Performed by: INTERNAL MEDICINE

## 2022-12-13 PROCEDURE — 96417 CHEMO IV INFUS EACH ADDL SEQ: CPT

## 2022-12-13 PROCEDURE — 85025 COMPLETE CBC W/AUTO DIFF WBC: CPT | Performed by: INTERNAL MEDICINE

## 2022-12-13 RX ORDER — HEPARIN SODIUM (PORCINE) LOCK FLUSH IV SOLN 100 UNIT/ML 100 UNIT/ML
500 SOLUTION INTRAVENOUS AS NEEDED
Status: DISCONTINUED | OUTPATIENT
Start: 2022-12-13 | End: 2022-12-14 | Stop reason: HOSPADM

## 2022-12-13 RX ORDER — SODIUM CHLORIDE 9 MG/ML
250 INJECTION, SOLUTION INTRAVENOUS ONCE
Status: COMPLETED | OUTPATIENT
Start: 2022-12-13 | End: 2022-12-13

## 2022-12-13 RX ORDER — HEPARIN SODIUM (PORCINE) LOCK FLUSH IV SOLN 100 UNIT/ML 100 UNIT/ML
500 SOLUTION INTRAVENOUS AS NEEDED
Status: CANCELLED | OUTPATIENT
Start: 2022-12-13

## 2022-12-13 RX ORDER — DIPHENHYDRAMINE HCL 25 MG
25 CAPSULE ORAL ONCE
Status: COMPLETED | OUTPATIENT
Start: 2022-12-13 | End: 2022-12-13

## 2022-12-13 RX ORDER — DOXORUBICIN HYDROCHLORIDE 2 MG/ML
20 INJECTION, SOLUTION INTRAVENOUS ONCE
Status: COMPLETED | OUTPATIENT
Start: 2022-12-13 | End: 2022-12-13

## 2022-12-13 RX ORDER — PALONOSETRON 0.05 MG/ML
0.25 INJECTION, SOLUTION INTRAVENOUS ONCE
Status: COMPLETED | OUTPATIENT
Start: 2022-12-13 | End: 2022-12-13

## 2022-12-13 RX ORDER — OLANZAPINE 5 MG/1
5 TABLET ORAL ONCE
Status: COMPLETED | OUTPATIENT
Start: 2022-12-13 | End: 2022-12-13

## 2022-12-13 RX ORDER — ACETAMINOPHEN 325 MG/1
650 TABLET ORAL ONCE
Status: COMPLETED | OUTPATIENT
Start: 2022-12-13 | End: 2022-12-13

## 2022-12-13 RX ADMIN — DOXORUBICIN HYDROCHLORIDE 32 MG: 2 INJECTION, SOLUTION INTRAVENOUS at 10:36

## 2022-12-13 RX ADMIN — BRENTUXIMAB VEDOTIN 75 MG: 50 INJECTION, POWDER, LYOPHILIZED, FOR SOLUTION INTRAVENOUS at 12:23

## 2022-12-13 RX ADMIN — DIPHENHYDRAMINE HYDROCHLORIDE 25 MG: 25 CAPSULE ORAL at 11:47

## 2022-12-13 RX ADMIN — VINBLASTINE SULFATE 8 MG: 1 INJECTION INTRAVENOUS at 10:40

## 2022-12-13 RX ADMIN — SODIUM CHLORIDE 250 ML: 9 INJECTION, SOLUTION INTRAVENOUS at 09:44

## 2022-12-13 RX ADMIN — PALONOSETRON 0.25 MG: 0.25 INJECTION, SOLUTION INTRAVENOUS at 09:44

## 2022-12-13 RX ADMIN — DEXAMETHASONE SODIUM PHOSPHATE 12 MG: 10 INJECTION, SOLUTION INTRAMUSCULAR; INTRAVENOUS at 09:46

## 2022-12-13 RX ADMIN — DACARBAZINE 490 MG: 10 INJECTION, POWDER, FOR SOLUTION INTRAVENOUS at 11:10

## 2022-12-13 RX ADMIN — SODIUM CHLORIDE 150 MG: 9 INJECTION, SOLUTION INTRAVENOUS at 09:46

## 2022-12-13 RX ADMIN — OLANZAPINE 5 MG: 5 TABLET, FILM COATED ORAL at 09:40

## 2022-12-13 RX ADMIN — HEPARIN 500 UNITS: 100 SYRINGE at 13:08

## 2022-12-13 RX ADMIN — ACETAMINOPHEN 650 MG: 325 TABLET ORAL at 11:47

## 2022-12-14 ENCOUNTER — APPOINTMENT (OUTPATIENT)
Dept: PET IMAGING | Facility: HOSPITAL | Age: 72
End: 2022-12-14

## 2022-12-15 ENCOUNTER — HOSPITAL ENCOUNTER (OUTPATIENT)
Dept: ONCOLOGY | Facility: HOSPITAL | Age: 72
Setting detail: INFUSION SERIES
Discharge: HOME | End: 2022-12-15
Payer: MEDICARE

## 2022-12-15 VITALS
BODY MASS INDEX: 23.56 KG/M2 | SYSTOLIC BLOOD PRESSURE: 114 MMHG | WEIGHT: 138 LBS | HEART RATE: 96 BPM | TEMPERATURE: 97.6 F | HEIGHT: 64 IN | RESPIRATION RATE: 20 BRPM | DIASTOLIC BLOOD PRESSURE: 70 MMHG

## 2022-12-15 DIAGNOSIS — C81.98 HODGKIN LYMPHOMA OF LYMPH NODES OF MULTIPLE REGIONS, UNSPECIFIED HODGKIN LYMPHOMA TYPE: Primary | ICD-10-CM

## 2022-12-15 PROCEDURE — 25010000002 PEGFILGRASTIM-APGF 6 MG/0.6ML SOLUTION PREFILLED SYRINGE: Performed by: INTERNAL MEDICINE

## 2022-12-15 PROCEDURE — 96372 THER/PROPH/DIAG INJ SC/IM: CPT

## 2022-12-15 RX ADMIN — PEGFILGRASTIM-APGF 6 MG: 6 INJECTION, SOLUTION SUBCUTANEOUS at 09:11

## 2022-12-27 ENCOUNTER — HOSPITAL ENCOUNTER (OUTPATIENT)
Dept: ONCOLOGY | Facility: HOSPITAL | Age: 72
Setting detail: INFUSION SERIES
Discharge: HOME | End: 2022-12-27
Payer: MEDICARE

## 2022-12-27 ENCOUNTER — APPOINTMENT (OUTPATIENT)
Dept: ONCOLOGY | Facility: HOSPITAL | Age: 72
End: 2022-12-27
Payer: MEDICARE

## 2022-12-27 ENCOUNTER — OFFICE VISIT (OUTPATIENT)
Dept: ONCOLOGY | Facility: CLINIC | Age: 72
End: 2022-12-27

## 2022-12-27 VITALS
SYSTOLIC BLOOD PRESSURE: 138 MMHG | HEIGHT: 64 IN | WEIGHT: 140 LBS | BODY MASS INDEX: 23.9 KG/M2 | RESPIRATION RATE: 20 BRPM | DIASTOLIC BLOOD PRESSURE: 77 MMHG | HEART RATE: 78 BPM | TEMPERATURE: 96.9 F | OXYGEN SATURATION: 99 %

## 2022-12-27 DIAGNOSIS — C81.98 HODGKIN LYMPHOMA OF LYMPH NODES OF MULTIPLE REGIONS, UNSPECIFIED HODGKIN LYMPHOMA TYPE: Primary | ICD-10-CM

## 2022-12-27 DIAGNOSIS — Z45.2 ENCOUNTER FOR CARE RELATED TO VASCULAR ACCESS PORT: ICD-10-CM

## 2022-12-27 LAB
ALBUMIN SERPL-MCNC: 3.8 G/DL (ref 3.5–5.2)
ALBUMIN/GLOB SERPL: 1.3 G/DL
ALP SERPL-CCNC: 164 U/L (ref 39–117)
ALT SERPL W P-5'-P-CCNC: 8 U/L (ref 1–41)
ANION GAP SERPL CALCULATED.3IONS-SCNC: 8 MMOL/L (ref 5–15)
AST SERPL-CCNC: 13 U/L (ref 1–40)
BASOPHILS # BLD AUTO: 0.13 10*3/MM3 (ref 0–0.2)
BASOPHILS NFR BLD AUTO: 0.8 % (ref 0–1.5)
BILIRUB SERPL-MCNC: 0.2 MG/DL (ref 0–1.2)
BUN SERPL-MCNC: 10 MG/DL (ref 8–23)
BUN/CREAT SERPL: 18.9 (ref 7–25)
CALCIUM SPEC-SCNC: 9.2 MG/DL (ref 8.6–10.5)
CHLORIDE SERPL-SCNC: 98 MMOL/L (ref 98–107)
CO2 SERPL-SCNC: 28 MMOL/L (ref 22–29)
CREAT SERPL-MCNC: 0.53 MG/DL (ref 0.76–1.27)
DEPRECATED RDW RBC AUTO: 70.1 FL (ref 37–54)
EGFRCR SERPLBLD CKD-EPI 2021: 107.8 ML/MIN/1.73
EOSINOPHIL # BLD AUTO: 0.66 10*3/MM3 (ref 0–0.4)
EOSINOPHIL NFR BLD AUTO: 3.9 % (ref 0.3–6.2)
ERYTHROCYTE [DISTWIDTH] IN BLOOD BY AUTOMATED COUNT: 19.4 % (ref 12.3–15.4)
GLOBULIN UR ELPH-MCNC: 2.9 GM/DL
GLUCOSE SERPL-MCNC: 78 MG/DL (ref 65–99)
HCT VFR BLD AUTO: 35 % (ref 37.5–51)
HGB BLD-MCNC: 11 G/DL (ref 13–17.7)
IMM GRANULOCYTES # BLD AUTO: 0.94 10*3/MM3 (ref 0–0.05)
IMM GRANULOCYTES NFR BLD AUTO: 5.5 % (ref 0–0.5)
LYMPHOCYTES # BLD AUTO: 1.49 10*3/MM3 (ref 0.7–3.1)
LYMPHOCYTES NFR BLD AUTO: 8.8 % (ref 19.6–45.3)
MCH RBC QN AUTO: 31.2 PG (ref 26.6–33)
MCHC RBC AUTO-ENTMCNC: 31.4 G/DL (ref 31.5–35.7)
MCV RBC AUTO: 99.2 FL (ref 79–97)
MONOCYTES # BLD AUTO: 1.57 10*3/MM3 (ref 0.1–0.9)
MONOCYTES NFR BLD AUTO: 9.2 % (ref 5–12)
NEUTROPHILS NFR BLD AUTO: 12.23 10*3/MM3 (ref 1.7–7)
NEUTROPHILS NFR BLD AUTO: 71.8 % (ref 42.7–76)
PLATELET # BLD AUTO: 249 10*3/MM3 (ref 140–450)
PMV BLD AUTO: 9.5 FL (ref 6–12)
POTASSIUM SERPL-SCNC: 4.2 MMOL/L (ref 3.5–5.2)
PROT SERPL-MCNC: 6.7 G/DL (ref 6–8.5)
RBC # BLD AUTO: 3.53 10*6/MM3 (ref 4.14–5.8)
SODIUM SERPL-SCNC: 134 MMOL/L (ref 136–145)
WBC NRBC COR # BLD: 17.02 10*3/MM3 (ref 3.4–10.8)

## 2022-12-27 PROCEDURE — 25010000002 DACARBAZINE PER 200 MG: Performed by: NURSE PRACTITIONER

## 2022-12-27 PROCEDURE — 25010000002 HEPARIN LOCK FLUSH PER 10 UNITS: Performed by: INTERNAL MEDICINE

## 2022-12-27 PROCEDURE — 63710000001 DIPHENHYDRAMINE PER 50 MG: Performed by: INTERNAL MEDICINE

## 2022-12-27 PROCEDURE — 96411 CHEMO IV PUSH ADDL DRUG: CPT

## 2022-12-27 PROCEDURE — 25010000002 DOXORUBICIN PER 10 MG: Performed by: NURSE PRACTITIONER

## 2022-12-27 PROCEDURE — 96367 TX/PROPH/DG ADDL SEQ IV INF: CPT

## 2022-12-27 PROCEDURE — 99214 OFFICE O/P EST MOD 30 MIN: CPT | Performed by: NURSE PRACTITIONER

## 2022-12-27 PROCEDURE — 25010000002 DEXAMETHASONE SODIUM PHOSPHATE 100 MG/10ML SOLUTION: Performed by: INTERNAL MEDICINE

## 2022-12-27 PROCEDURE — 80053 COMPREHEN METABOLIC PANEL: CPT | Performed by: INTERNAL MEDICINE

## 2022-12-27 PROCEDURE — 25010000002 VINBLASTINE PER 1 MG: Performed by: NURSE PRACTITIONER

## 2022-12-27 PROCEDURE — 96375 TX/PRO/DX INJ NEW DRUG ADDON: CPT

## 2022-12-27 PROCEDURE — 85025 COMPLETE CBC W/AUTO DIFF WBC: CPT | Performed by: INTERNAL MEDICINE

## 2022-12-27 PROCEDURE — 25010000002 BRENTUXIMAB 50 MG RECONSTITUTED SOLUTION 1 EACH VIAL: Performed by: NURSE PRACTITIONER

## 2022-12-27 PROCEDURE — 25010000002 PALONOSETRON 0.25 MG/5ML SOLUTION PREFILLED SYRINGE: Performed by: INTERNAL MEDICINE

## 2022-12-27 PROCEDURE — 96417 CHEMO IV INFUS EACH ADDL SEQ: CPT

## 2022-12-27 PROCEDURE — 96413 CHEMO IV INFUSION 1 HR: CPT

## 2022-12-27 PROCEDURE — 25010000002 FOSAPREPITANT PER 1 MG: Performed by: INTERNAL MEDICINE

## 2022-12-27 RX ORDER — DOXORUBICIN HYDROCHLORIDE 2 MG/ML
20 INJECTION, SOLUTION INTRAVENOUS ONCE
Status: CANCELLED | OUTPATIENT
Start: 2022-12-27

## 2022-12-27 RX ORDER — HEPARIN SODIUM (PORCINE) LOCK FLUSH IV SOLN 100 UNIT/ML 100 UNIT/ML
500 SOLUTION INTRAVENOUS AS NEEDED
Status: DISCONTINUED | OUTPATIENT
Start: 2022-12-27 | End: 2022-12-28 | Stop reason: HOSPADM

## 2022-12-27 RX ORDER — ACETAMINOPHEN 325 MG/1
650 TABLET ORAL ONCE
Status: COMPLETED | OUTPATIENT
Start: 2022-12-27 | End: 2022-12-27

## 2022-12-27 RX ORDER — SODIUM CHLORIDE 9 MG/ML
250 INJECTION, SOLUTION INTRAVENOUS ONCE
Status: COMPLETED | OUTPATIENT
Start: 2022-12-27 | End: 2022-12-27

## 2022-12-27 RX ORDER — DOXORUBICIN HYDROCHLORIDE 2 MG/ML
20 INJECTION, SOLUTION INTRAVENOUS ONCE
Status: COMPLETED | OUTPATIENT
Start: 2022-12-27 | End: 2022-12-27

## 2022-12-27 RX ORDER — OLANZAPINE 5 MG/1
5 TABLET ORAL ONCE
Status: COMPLETED | OUTPATIENT
Start: 2022-12-27 | End: 2022-12-27

## 2022-12-27 RX ORDER — PALONOSETRON 0.05 MG/ML
0.25 INJECTION, SOLUTION INTRAVENOUS ONCE
Status: COMPLETED | OUTPATIENT
Start: 2022-12-27 | End: 2022-12-27

## 2022-12-27 RX ORDER — DIPHENHYDRAMINE HCL 25 MG
25 CAPSULE ORAL ONCE
Status: COMPLETED | OUTPATIENT
Start: 2022-12-27 | End: 2022-12-27

## 2022-12-27 RX ORDER — HEPARIN SODIUM (PORCINE) LOCK FLUSH IV SOLN 100 UNIT/ML 100 UNIT/ML
500 SOLUTION INTRAVENOUS AS NEEDED
Status: CANCELLED | OUTPATIENT
Start: 2022-12-27

## 2022-12-27 RX ADMIN — ACETAMINOPHEN 650 MG: 325 TABLET ORAL at 09:18

## 2022-12-27 RX ADMIN — VINBLASTINE SULFATE 8 MG: 1 INJECTION INTRAVENOUS at 10:04

## 2022-12-27 RX ADMIN — FOSAPREPITANT 150 MG: 150 INJECTION, POWDER, LYOPHILIZED, FOR SOLUTION INTRAVENOUS at 09:03

## 2022-12-27 RX ADMIN — BRENTUXIMAB VEDOTIN 75 MG: 50 INJECTION, POWDER, LYOPHILIZED, FOR SOLUTION INTRAVENOUS at 11:10

## 2022-12-27 RX ADMIN — PALONOSETRON 0.25 MG: 0.25 INJECTION, SOLUTION INTRAVENOUS at 09:01

## 2022-12-27 RX ADMIN — HEPARIN 500 UNITS: 100 SYRINGE at 11:47

## 2022-12-27 RX ADMIN — DEXAMETHASONE SODIUM PHOSPHATE 12 MG: 10 INJECTION, SOLUTION INTRAMUSCULAR; INTRAVENOUS at 09:04

## 2022-12-27 RX ADMIN — DACARBAZINE 500 MG: 10 INJECTION, POWDER, FOR SOLUTION INTRAVENOUS at 10:24

## 2022-12-27 RX ADMIN — SODIUM CHLORIDE 250 ML: 9 INJECTION, SOLUTION INTRAVENOUS at 09:01

## 2022-12-27 RX ADMIN — DOXORUBICIN HYDROCHLORIDE 34 MG: 2 INJECTION, SOLUTION INTRAVENOUS at 09:56

## 2022-12-27 RX ADMIN — OLANZAPINE 5 MG: 5 TABLET, FILM COATED ORAL at 08:59

## 2022-12-27 RX ADMIN — DIPHENHYDRAMINE HYDROCHLORIDE 25 MG: 25 CAPSULE ORAL at 09:20

## 2022-12-27 NOTE — PROGRESS NOTES
Hematology and Oncology [place]  Office number [phone]    Fax number [phone]     Follow up     Date: 2022      Patient Name: Abraham Wu  MRN: [id number]  : 1950      Chief Complaint: follow up/treatment    Surgeon: Dr. Hiram Viveros MD    Cancer Staging: IV    History of Present Illness: Abraham Wu is a pleasant 72 y.o. male with PMH of hypertension, sleep apnea, hard of hearing who presents today for follow up of Hodgkin Lymphoma.     He initially presented 2022 with intractable diarrhea, low appetite, and a greater than 50 pound weight loss over several month period associated with intermittent fevers.  CT of the chest abdomen pelvis obtained in the ER shows of rectal mass spanning greater than 12 cm with adjacent adenopathy, bulky RP adenopathy, and findings concerning for left renal and liver metastases.  Small right pleural effusion with nodularity.  There was concern for impending obstruction, so he underwent diverting colostomy and biopsy on 2022.  Biopsies from the peritoneam showed a fibrotic nodule histiocytes and eosinophils admixed with CD30 positive large cells.  Rectal biopsies showed involvement by CD30 positive lymphoma, classic Hodgkin lymphoma versus CD30 positive T-cell or B-cell lymphoma.  There was extensive fibrosis and crush artifact.  He underwent repeat transrectal biopsy 10/4/2022 for further characterization which was felt to be most consistent with classical Hodgkin lymphoma.    He initiated chemotherapy with Brentuximab-AVD as an inpatient on 10/8/2022.  Cycle #1 was complicated by GI bleed requiring multiple blood transfusion and ultimately coil artery embolization 10/12; neutropenic fever, Candida esophagitis and mucositis.  He had a prolonged ICU stay  And required enteral feeding.  He was discharged 10/20/2022.    Treatment history:  Brentuximab-AVD: C1 10/8/22  C2: 22 with DA    Interval history:    He is here  for ongoing treatment with brentuximab vedotin AVD.  He is tolerating therapy with no significant side effects.  He denies any neuropathy.  Constipation under good control with MiraLAX and FiberCon.  He denies any pain.  Appetite is good, weight today is 140 pounds.  He denies any fevers or chills, no infectious symptoms.    Past Medical History:   Past Medical History:   Diagnosis Date   • benign polypoid tissue right lung    • Hyperlipidemia    • Hypertension    • Mycobacterium mucogenicum    • SHERRI (obstructive sleep apnea)    • Seasonal allergies        Past Surgical History:   Past Surgical History:   Procedure Laterality Date   • BRONCHOSCOPY      removal of obstructing polypoid tissue right middle lung   • COLOSTOMY N/A 09/22/2022    Procedure: LAPAROSCOPIC COLOSTOMY CREATION, FLEXIBLE SIGMOIDOSCOPY;  Surgeon: Hiram Viveros MD;  Location:  [facility];  Service: General;  Laterality: N/A;   • ENDOSCOPY N/A 10/10/2022    Procedure: ESOPHAGOGASTRODUODENOSCOPY;  Surgeon: Neeraj Lynn MD;  Location:  [facility];  Service: Gastroenterology;  Laterality: N/A;   • ENDOSCOPY N/A 10/12/2022    Procedure: ESOPHAGOGASTRODUODENOSCOPY;  Surgeon: Brunner, Mark I, MD;  Location:  [facility];  Service: Gastroenterology;  Laterality: N/A;   • EXAM UNDER ANESTHESIA, RECTAL BIOPSY N/A 10/04/2022    Procedure: EXAM UNDER ANESTHESIA, TRANSANAL BIOPSY WITH TRUCUT NEEDLE (LITHOTOMY-CANDY CANE);  Surgeon: Hiram Viveros MD;  Location:  [facility];  Service: General;  Laterality: N/A;   • PERIPHERALLY INSERTED CENTRAL CATHETER INSERTION      Removed 11/28/2022   • VENOUS ACCESS DEVICE (PORT) INSERTION Right 11/28/2022       Family History:   Family History   Problem Relation Age of Onset   • Diabetes Mother    • Diabetes Father    • Heart disease Father        Social History:   Social History     Socioeconomic History   • Marital status:    Tobacco Use   • Smoking status: Never   • Smokeless tobacco: Never    Vaping Use   • Vaping Use: Never used   Substance and Sexual Activity   • Alcohol use: Not Currently     Comment: previously drank 2 glasses of wine/beer nightly   • Drug use: Never   • Sexual activity: Defer       Medications:     Current Outpatient Medications:   •  dicyclomine (BENTYL) 20 MG tablet, Take 1 tablet by mouth Every 6 (Six) Hours. PRN abdominal cramps, Disp: 30 tablet, Rfl: 4  •  fluticasone (FLONASE) 50 MCG/ACT nasal spray, 1 spray into the nostril(s) as directed by provider Daily., Disp: , Rfl:   •  lidocaine-prilocaine (EMLA) 2.5-2.5 % cream, Apply 1 application topically to the appropriate area as directed As Needed (45-60 minutes prior to port access.  Cover with saran/plastic wrap.)., Disp: 30 g, Rfl: 3  •  loratadine (CLARITIN) 10 MG tablet, Take 1 tablet by mouth Daily., Disp: 30 tablet, Rfl: 6  •  nystatin susp + lidocaine viscous (MAGIC MOUTHWASH) oral suspension, 5-10 ml swish and spit or swallow QID prn, Disp: 240 mL, Rfl: 3  •  OLANZapine (zyPREXA) 5 MG tablet, Take 1 tablet by mouth Every Night. For 3 days on Days 2, 3 and 4 and on days 16, 17, and 18., Disp: 6 tablet, Rfl: 5  •  ondansetron (Zofran) 8 MG tablet, Take 1 tablet by mouth Every 8 (Eight) Hours As Needed for Nausea or Vomiting., Disp: 30 tablet, Rfl: 5  •  pantoprazole (PROTONIX) 40 MG EC tablet, Take 1 tablet by mouth Daily for 30 days., Disp: 90 tablet, Rfl: 1  •  potassium chloride (K-DUR,KLOR-CON) 20 MEQ CR tablet, Take 1 tablet by mouth Daily., Disp: 30 tablet, Rfl: 5  •  prochlorperazine (COMPAZINE) 5 MG tablet, Take 1 tablet by mouth Every 6 (Six) Hours As Needed for Nausea or Vomiting., Disp: 30 tablet, Rfl: 2  •  rosuvastatin (CRESTOR) 10 MG tablet, , Disp: , Rfl:   •  sertraline (ZOLOFT) 50 MG tablet, , Disp: , Rfl:     Allergies:   No Known Allergies    Objective     Vital Signs:   Vitals:    12/27/22 0749   BP: 138/77  Comment: ERIN   Pulse: 78   Resp: 20   Temp: 96.9 °F (36.1 °C)   TempSrc: Infrared   SpO2:  "99%  Comment: RA   Weight: 63.5 kg (140 lb)   Height: 162.6 cm (64\")   PainSc: 0-No pain    Body mass index is 24.03 kg/m².   Pain Score    12/27/22 0749   PainSc: 0-No pain       ECOG Performance Status: 1-2    Physical Exam:   General: No acute distress. Well appearing.  HEENT: Normocephalic, atraumatic. Sclera anicteric. Masked.  Neck: supple, no adenopathy.   Cardiovascular: regular rate and rhythm. No murmurs.   Respiratory: Normal rate. Clear to auscultation bilaterally  Abdomen: Soft, nontender, non distended with normoactive bowel sounds. Ostomy output light brown  Lymph: no cervical, supraclavicular or axillary adenopathy  Neuro: Alert and oriented x 3. No focal deficits.   Ext: Symmetric, no swelling.   Psych: Euthymic  Skin: right picc c/d/i      Laboratory/Imaging Reviewed:   Hospital Outpatient Visit on 12/27/2022   Component Date Value Ref Range Status   • Glucose 12/27/2022 78  65 - 99 mg/dL Final   • BUN 12/27/2022 10  8 - 23 mg/dL Final   • Creatinine 12/27/2022 0.53 (L)  0.76 - 1.27 mg/dL Final   • Sodium 12/27/2022 134 (L)  136 - 145 mmol/L Final   • Potassium 12/27/2022 4.2  3.5 - 5.2 mmol/L Final   • Chloride 12/27/2022 98  98 - 107 mmol/L Final   • CO2 12/27/2022 28.0  22.0 - 29.0 mmol/L Final   • Calcium 12/27/2022 9.2  8.6 - 10.5 mg/dL Final   • Total Protein 12/27/2022 6.7  6.0 - 8.5 g/dL Final   • Albumin 12/27/2022 3.8  3.5 - 5.2 g/dL Final   • ALT (SGPT) 12/27/2022 8  1 - 41 U/L Final   • AST (SGOT) 12/27/2022 13  1 - 40 U/L Final   • Alkaline Phosphatase 12/27/2022 164 (H)  39 - 117 U/L Final   • Total Bilirubin 12/27/2022 0.2  0.0 - 1.2 mg/dL Final   • Globulin 12/27/2022 2.9  gm/dL Final    Calculated Result   • A/G Ratio 12/27/2022 1.3  g/dL Final   • BUN/Creatinine Ratio 12/27/2022 18.9  7.0 - 25.0 Final   • Anion Gap 12/27/2022 8.0  5.0 - 15.0 mmol/L Final   • eGFR 12/27/2022 107.8  >60.0 mL/min/1.73 Final    National Kidney Foundation and American Society of Nephrology (ASN) Task " Force recommended calculation based on the Chronic Kidney Disease Epidemiology Collaboration (CKD-EPI) equation refit without adjustment for race.         IR Port Placement    Result Date: 11/28/2022  Narrative: IR PORT PLACEMENT-                                                   History: lympohoma; D61.810-Antineoplastic chemotherapy induced pancytopenia; T45.0G9R-Tukdxuf effect of antineoplastic and immunosuppressive drugs, initial encounter    : Yassine Lopez MD.                                                                            Modality: Sonography and fluoroscopy                DOSE REDUCTION: The examination was performed according to departmental dose-optimization program which includes automated exposure control, adjustment of the mA and/or kV.  Fluoro time: 0.1 minutes.  Radiation dose: 0.4 mGy air Kerma.   Sedation: SEDATION: Moderate sedation was administered. 1 milligram of Versed and 50 micrograms of fentanyl IV was used for moderate sedation monitored under my direction. Total intra service time of sedation was 15 minutes. The patient's vital signs were monitored throughout the procedure and recorded in the patient's medical record by the nurse.                                                                             Anesthesia: Lidocaine with epinephrine, local infiltration.        Medicines: None       Estimated blood loss:  < 5 cc.          Technique: A thorough discussion of the risks, benefits, and alternatives of the procedure, and if applicable, moderate sedation, was carried out with the patient. They were encouraged to ask any questions. Any questions were answered. They verbalized understanding. A written informed consent was then signed.   A multi-component timeout was performed prior to starting the procedure using the departmental protocol.  The procedure room personnel used personal protective equipment. The operators used sterile gloves and if indicated,  sterile gowns. The surgical site was prepped with chlorhexidine gluconate  and draped in the maximal applicable sterile fashion.  A preliminary ultrasonogram was performed of the neck that revealed a patent and compressible internal jugular vein. Pertinent ultrasound images were stored in the PACS for documentation.  Using aseptic precautions, real-time ultrasound guidance, the internal jugular vein was accessed after local anesthetic infiltration and dermatotomy with a micropuncture needle. A 018 guidewire was advanced into the central venous system under fluoroscopic guidance. Over the wire a micropuncture sheath was placed. Through the micropuncture sheath, a 035 wire was advanced into the venous system under fluoroscopic guidance. Over the wire a peel-away sheath was placed.  After local anesthesia, using sharp and blunt dissection, a reservoir pocket of appropriate size was created in the subclavicular chest wall. The port catheter was connected to the port reservoir. The catheter was tunneled to the venotomy site using a tunneling device. The the reservoir was placed in the reservoir pocket. The catheter was trimmed to an appropriate length. The catheter was advanced into the venous system through the peel-away sheath which was removed.  The port aspirated and flushed well and was terminally packed with heparin 100 units per cc, total 500 units.  The port reservoir was irrigated with saline. The incision was closed in layers using absorbable suture and Dermabond. The venotomy site was closed using Dermabond. An aseptic dressing was applied using the protocol for Dermabond.  The patient was transferred to the recovery area and was discharged from the department in stable condition.                     Complications: None immediate.     Findings: Patent and compressible internal jugular vein. Final image shows the jackson catheter to be in good position with the catheter tip at the cavoatrial junction/right  atrium, an excellent position for use. There is no immediate complication.                                                               Impression: Impression:    Successful ultrasound and fluoroscopic guided right internal jugular vein route single lumen Port-A-Cath placement as described above.  On the patient's request, the existing right arm PICC line was removed.  Thank you for the opportunity to assist in the care of your patient.  This report was finalized on 11/28/2022 3:17 PM by Yassine Lopez MD.      P.E.T./CT skull base to mid-thigh    Result Date: 12/7/2022  Narrative: PET CT TUMOR IMAGING    12/7/2022 9:00 AM HISTORY: Restaging, Hodgkin's lymphoma COMPARISON: None. TECHNIQUE: Patient was injected with 14.34 mCi of 18-F FDG. Whole-body PET and non-contrast CT were performed of the neck, chest, abdomen and pelvis. Multiplanar reconstruction and fused images were reviewed. Blood glucose at time of injection was 80 mg/dl. FINDINGS: There is diffuse osseous hypermetabolism consistent with marrow hyperstimulation. A port overlies the right chest. There is a left abdominal ostomy present. The ascending aorta is mildly aneurysmal and measures approximately 40 mm. There is a right lower lobe nodule measuring 11 mm that is not hypermetabolic. There is a vague linear opacity in the right perihilar region that is not hypermetabolic. There is abnormal soft tissue anterior to the left kidney measuring 41 mm and is not hypermetabolic. There are periaortic lymph nodes that are not hypermetabolic and measure up to 21 mm. There is severe wall thickening involving the rectum with overall diameter measuring 7 cm without associated hypermetabolism. There are adjacent lymph nodes that are not hypermetabolic measuring up to 30 mm.    Impression: Multiple abnormalities, none of which are hypermetabolic. Deauville Score 1. Images reviewed, interpreted, and dictated by Dr. JD Hudson. Transcribed by Mary  MADALYN Escobar.    CT outside films    Result Date: 12/9/2022  Narrative: This procedure was auto-finalized with no dictation required.        Assessment / Plan      Assessment/Plan:   1.  CD30 positive classical Hodgkin's lymphoma  -He is status post cycle 3 D1 of BV AVD.  C1 Day 15 treatment was held due to pancytopenia, neutropenic fever, and active GI bleed, but has now resumed treatment with 20% dose reduction in AVD and continued full dose brentuximab.   -Anticipating cycle #4 in 2 weeks.   -Mr. Wu is feeling well, he is tolerating therapy with no significant side effects.  He is here today for treatment with cycle 3 D15.  His CMP is unremarkable, CBC is pending.  If his counts allow we will continue with treatment unchanged.  -He has a follow up scheduled with Dr. Leary 1/10 for consideration of cycle 4.     2. Antineoplastic chemotherapy induced pancytopenia compounded by blood loss anemia from acute GI bleed.  -Tranfuse for hemoglobin < 7. Irradiated blood products only  -CBC from today is pending.    3. Hypokalemia  -Continue potassium 20 meq daily. Stable K at 4.2.    4.  Constipation  -Controlled on miralax/fibercon     5. Access  -Port    Follow Up:   As above    PORTILLO Betts  Hematology and Oncology     I spent 30 minutes caring for Abraham on this date of service. This time includes time spent by me in the following activities: preparing for the visit, reviewing tests, performing a medically appropriate examination and/or evaluation, ordering medications, tests, or procedures and documenting information in the medical record.     12/27/2022

## 2022-12-28 ENCOUNTER — TELEPHONE (OUTPATIENT)
Dept: ONCOLOGY | Facility: CLINIC | Age: 72
End: 2022-12-28

## 2022-12-28 ENCOUNTER — HOSPITAL ENCOUNTER (OUTPATIENT)
Dept: ONCOLOGY | Facility: HOSPITAL | Age: 72
Setting detail: INFUSION SERIES
Discharge: HOME | End: 2022-12-28
Payer: MEDICARE

## 2022-12-28 VITALS
BODY MASS INDEX: 24.07 KG/M2 | RESPIRATION RATE: 18 BRPM | SYSTOLIC BLOOD PRESSURE: 139 MMHG | HEART RATE: 88 BPM | DIASTOLIC BLOOD PRESSURE: 75 MMHG | WEIGHT: 141 LBS | HEIGHT: 64 IN | TEMPERATURE: 98.5 F

## 2022-12-28 DIAGNOSIS — C81.98 HODGKIN LYMPHOMA OF LYMPH NODES OF MULTIPLE REGIONS, UNSPECIFIED HODGKIN LYMPHOMA TYPE: Primary | ICD-10-CM

## 2022-12-28 PROCEDURE — 25010000002 PEGFILGRASTIM-APGF 6 MG/0.6ML SOLUTION PREFILLED SYRINGE: Performed by: INTERNAL MEDICINE

## 2022-12-28 PROCEDURE — 96372 THER/PROPH/DIAG INJ SC/IM: CPT

## 2022-12-28 RX ORDER — OLANZAPINE 5 MG/1
5 TABLET ORAL NIGHTLY
Qty: 6 TABLET | Refills: 5 | Status: CANCELLED | OUTPATIENT
Start: 2022-12-28

## 2022-12-28 RX ORDER — OLANZAPINE 5 MG/1
5 TABLET ORAL NIGHTLY
Qty: 6 TABLET | Refills: 5 | Status: SHIPPED | OUTPATIENT
Start: 2022-12-28

## 2022-12-28 RX ADMIN — PEGFILGRASTIM-APGF 6 MG: 6 INJECTION, SOLUTION SUBCUTANEOUS at 12:36

## 2022-12-28 NOTE — TELEPHONE ENCOUNTER
Caller: SRINIVASAN HARPER    Relationship: Emergency Contact    Best call back number: [phone]    Requested Prescriptions:   Requested Prescriptions     Pending Prescriptions Disp Refills   • OLANZapine (zyPREXA) 5 MG tablet 6 tablet 5     Sig: Take 1 tablet by mouth Every Night. For 3 days on Days 2, 3 and 4 and on days 16, 17, and 18.        Pharmacy where request should be sent: [facility] [id number] [address] [phone] [place] [phone] [place]     Does the patient have less than a 3 day supply:  [x] Yes  [] No    Would you like a call back once the refill request has been completed: [x] Yes [] No    If the office needs to give you a call back, can they leave a voicemail: [x] Yes [] No    Abbie Hedrick Rep   12/28/22 14:24 EST

## 2022-12-30 ENCOUNTER — TELEPHONE (OUTPATIENT)
Dept: ONCOLOGY | Facility: CLINIC | Age: 72
End: 2022-12-30

## 2022-12-30 NOTE — TELEPHONE ENCOUNTER
Caller: SRINIVASAN    Relationship to patient: WIFE    Best call back number: [phone]    Patient is needing: TO KNOW IF HE IS SUPPOSED TO BE TAKING OLANZapine (zyPREXA) 5 MG tablet [67010] (Order [id number])    HIS WIFE IS NOT CLEAR IF HE SHOULD BE TAKING THESE NOW.

## 2023-01-10 ENCOUNTER — HOSPITAL ENCOUNTER (OUTPATIENT)
Dept: ONCOLOGY | Facility: HOSPITAL | Age: 73
Setting detail: INFUSION SERIES
End: 2023-01-10
Payer: MEDICARE

## 2023-01-10 ENCOUNTER — OFFICE VISIT (OUTPATIENT)
Dept: ONCOLOGY | Facility: CLINIC | Age: 73
End: 2023-01-10
Payer: MEDICARE

## 2023-01-10 ENCOUNTER — HOSPITAL ENCOUNTER (OUTPATIENT)
Dept: ONCOLOGY | Facility: HOSPITAL | Age: 73
Setting detail: INFUSION SERIES
Discharge: HOME | End: 2023-01-10
Payer: MEDICARE

## 2023-01-10 VITALS
RESPIRATION RATE: 18 BRPM | HEIGHT: 64 IN | HEART RATE: 74 BPM | DIASTOLIC BLOOD PRESSURE: 86 MMHG | TEMPERATURE: 97.1 F | SYSTOLIC BLOOD PRESSURE: 146 MMHG | WEIGHT: 143.8 LBS | OXYGEN SATURATION: 99 % | BODY MASS INDEX: 24.55 KG/M2

## 2023-01-10 DIAGNOSIS — C81.98 HODGKIN LYMPHOMA OF LYMPH NODES OF MULTIPLE REGIONS, UNSPECIFIED HODGKIN LYMPHOMA TYPE: Primary | ICD-10-CM

## 2023-01-10 DIAGNOSIS — Z45.2 ENCOUNTER FOR CARE RELATED TO VASCULAR ACCESS PORT: ICD-10-CM

## 2023-01-10 LAB
ALBUMIN SERPL-MCNC: 3.8 G/DL (ref 3.5–5.2)
ALBUMIN/GLOB SERPL: 1.4 G/DL
ALP SERPL-CCNC: 130 U/L (ref 39–117)
ALT SERPL W P-5'-P-CCNC: 7 U/L (ref 1–41)
ANION GAP SERPL CALCULATED.3IONS-SCNC: 9 MMOL/L (ref 5–15)
AST SERPL-CCNC: 11 U/L (ref 1–40)
BASOPHILS # BLD AUTO: 0.13 10*3/MM3 (ref 0–0.2)
BASOPHILS NFR BLD AUTO: 1.4 % (ref 0–1.5)
BILIRUB SERPL-MCNC: 0.3 MG/DL (ref 0–1.2)
BUN SERPL-MCNC: 13 MG/DL (ref 8–23)
BUN/CREAT SERPL: 19.4 (ref 7–25)
CALCIUM SPEC-SCNC: 9.2 MG/DL (ref 8.6–10.5)
CHLORIDE SERPL-SCNC: 100 MMOL/L (ref 98–107)
CO2 SERPL-SCNC: 28 MMOL/L (ref 22–29)
CREAT SERPL-MCNC: 0.67 MG/DL (ref 0.76–1.27)
DEPRECATED RDW RBC AUTO: 67.1 FL (ref 37–54)
EGFRCR SERPLBLD CKD-EPI 2021: 100.4 ML/MIN/1.73
EOSINOPHIL # BLD AUTO: 0.81 10*3/MM3 (ref 0–0.4)
EOSINOPHIL NFR BLD AUTO: 8.9 % (ref 0.3–6.2)
ERYTHROCYTE [DISTWIDTH] IN BLOOD BY AUTOMATED COUNT: 18.5 % (ref 12.3–15.4)
GLOBULIN UR ELPH-MCNC: 2.7 GM/DL
GLUCOSE SERPL-MCNC: 87 MG/DL (ref 65–99)
HCT VFR BLD AUTO: 35.1 % (ref 37.5–51)
HGB BLD-MCNC: 11.3 G/DL (ref 13–17.7)
IMM GRANULOCYTES # BLD AUTO: 0.17 10*3/MM3 (ref 0–0.05)
IMM GRANULOCYTES NFR BLD AUTO: 1.9 % (ref 0–0.5)
LYMPHOCYTES # BLD AUTO: 0.99 10*3/MM3 (ref 0.7–3.1)
LYMPHOCYTES NFR BLD AUTO: 10.8 % (ref 19.6–45.3)
MCH RBC QN AUTO: 31.4 PG (ref 26.6–33)
MCHC RBC AUTO-ENTMCNC: 32.2 G/DL (ref 31.5–35.7)
MCV RBC AUTO: 97.5 FL (ref 79–97)
MONOCYTES # BLD AUTO: 1.1 10*3/MM3 (ref 0.1–0.9)
MONOCYTES NFR BLD AUTO: 12 % (ref 5–12)
NEUTROPHILS NFR BLD AUTO: 5.93 10*3/MM3 (ref 1.7–7)
NEUTROPHILS NFR BLD AUTO: 65 % (ref 42.7–76)
PLATELET # BLD AUTO: 256 10*3/MM3 (ref 140–450)
PMV BLD AUTO: 9.1 FL (ref 6–12)
POTASSIUM SERPL-SCNC: 4.3 MMOL/L (ref 3.5–5.2)
PROT SERPL-MCNC: 6.5 G/DL (ref 6–8.5)
RBC # BLD AUTO: 3.6 10*6/MM3 (ref 4.14–5.8)
SODIUM SERPL-SCNC: 137 MMOL/L (ref 136–145)
WBC NRBC COR # BLD: 9.13 10*3/MM3 (ref 3.4–10.8)

## 2023-01-10 PROCEDURE — 96411 CHEMO IV PUSH ADDL DRUG: CPT

## 2023-01-10 PROCEDURE — 25010000002 DACARBAZINE PER 200 MG: Performed by: INTERNAL MEDICINE

## 2023-01-10 PROCEDURE — 25010000002 FOSAPREPITANT PER 1 MG: Performed by: INTERNAL MEDICINE

## 2023-01-10 PROCEDURE — 25010000002 BRENTUXIMAB 50 MG RECONSTITUTED SOLUTION 1 EACH VIAL: Performed by: INTERNAL MEDICINE

## 2023-01-10 PROCEDURE — 99215 OFFICE O/P EST HI 40 MIN: CPT | Performed by: INTERNAL MEDICINE

## 2023-01-10 PROCEDURE — 25010000002 PALONOSETRON 0.25 MG/5ML SOLUTION PREFILLED SYRINGE: Performed by: INTERNAL MEDICINE

## 2023-01-10 PROCEDURE — 25010000002 HEPARIN LOCK FLUSH PER 10 UNITS: Performed by: INTERNAL MEDICINE

## 2023-01-10 PROCEDURE — 25010000002 DOXORUBICIN PER 10 MG: Performed by: INTERNAL MEDICINE

## 2023-01-10 PROCEDURE — 96375 TX/PRO/DX INJ NEW DRUG ADDON: CPT

## 2023-01-10 PROCEDURE — 25010000002 VINBLASTINE PER 1 MG: Performed by: INTERNAL MEDICINE

## 2023-01-10 PROCEDURE — 85025 COMPLETE CBC W/AUTO DIFF WBC: CPT | Performed by: NURSE PRACTITIONER

## 2023-01-10 PROCEDURE — 96367 TX/PROPH/DG ADDL SEQ IV INF: CPT

## 2023-01-10 PROCEDURE — 25010000002 DEXAMETHASONE SODIUM PHOSPHATE 100 MG/10ML SOLUTION: Performed by: INTERNAL MEDICINE

## 2023-01-10 PROCEDURE — 96417 CHEMO IV INFUS EACH ADDL SEQ: CPT

## 2023-01-10 PROCEDURE — 80053 COMPREHEN METABOLIC PANEL: CPT | Performed by: NURSE PRACTITIONER

## 2023-01-10 PROCEDURE — 96413 CHEMO IV INFUSION 1 HR: CPT

## 2023-01-10 PROCEDURE — 63710000001 DIPHENHYDRAMINE PER 50 MG: Performed by: INTERNAL MEDICINE

## 2023-01-10 RX ORDER — PANTOPRAZOLE SODIUM 40 MG/1
40 TABLET, DELAYED RELEASE ORAL DAILY
Qty: 90 TABLET | Refills: 1 | Status: SHIPPED | OUTPATIENT
Start: 2023-01-10

## 2023-01-10 RX ORDER — HEPARIN SODIUM (PORCINE) LOCK FLUSH IV SOLN 100 UNIT/ML 100 UNIT/ML
500 SOLUTION INTRAVENOUS AS NEEDED
Status: DISCONTINUED | OUTPATIENT
Start: 2023-01-10 | End: 2023-01-11 | Stop reason: HOSPADM

## 2023-01-10 RX ORDER — HEPARIN SODIUM (PORCINE) LOCK FLUSH IV SOLN 100 UNIT/ML 100 UNIT/ML
500 SOLUTION INTRAVENOUS AS NEEDED
Status: CANCELLED | OUTPATIENT
Start: 2023-01-10

## 2023-01-10 RX ORDER — ACETAMINOPHEN 325 MG/1
650 TABLET ORAL ONCE
Status: COMPLETED | OUTPATIENT
Start: 2023-01-10 | End: 2023-01-10

## 2023-01-10 RX ORDER — PALONOSETRON 0.05 MG/ML
0.25 INJECTION, SOLUTION INTRAVENOUS ONCE
Status: COMPLETED | OUTPATIENT
Start: 2023-01-10 | End: 2023-01-10

## 2023-01-10 RX ORDER — DOXORUBICIN HYDROCHLORIDE 2 MG/ML
20 INJECTION, SOLUTION INTRAVENOUS ONCE
Status: CANCELLED | OUTPATIENT
Start: 2023-01-10

## 2023-01-10 RX ORDER — OLANZAPINE 5 MG/1
5 TABLET ORAL ONCE
Status: CANCELLED | OUTPATIENT
Start: 2023-01-10 | End: 2023-01-10

## 2023-01-10 RX ORDER — OLANZAPINE 5 MG/1
5 TABLET ORAL ONCE
Status: CANCELLED | OUTPATIENT
Start: 2023-01-24 | End: 2023-01-24

## 2023-01-10 RX ORDER — ACETAMINOPHEN 325 MG/1
650 TABLET ORAL ONCE
Status: CANCELLED | OUTPATIENT
Start: 2023-01-24

## 2023-01-10 RX ORDER — ACETAMINOPHEN 325 MG/1
650 TABLET ORAL ONCE
Status: CANCELLED | OUTPATIENT
Start: 2023-01-10

## 2023-01-10 RX ORDER — PALONOSETRON 0.05 MG/ML
0.25 INJECTION, SOLUTION INTRAVENOUS ONCE
Status: CANCELLED | OUTPATIENT
Start: 2023-01-24

## 2023-01-10 RX ORDER — SODIUM CHLORIDE 9 MG/ML
250 INJECTION, SOLUTION INTRAVENOUS ONCE
Status: CANCELLED | OUTPATIENT
Start: 2023-01-24

## 2023-01-10 RX ORDER — SODIUM CHLORIDE 9 MG/ML
250 INJECTION, SOLUTION INTRAVENOUS ONCE
Status: CANCELLED | OUTPATIENT
Start: 2023-01-10

## 2023-01-10 RX ORDER — DIPHENHYDRAMINE HCL 25 MG
25 CAPSULE ORAL ONCE
Status: CANCELLED | OUTPATIENT
Start: 2023-01-24

## 2023-01-10 RX ORDER — DIPHENHYDRAMINE HCL 25 MG
25 CAPSULE ORAL ONCE
Status: COMPLETED | OUTPATIENT
Start: 2023-01-10 | End: 2023-01-10

## 2023-01-10 RX ORDER — SODIUM CHLORIDE 9 MG/ML
250 INJECTION, SOLUTION INTRAVENOUS ONCE
Status: COMPLETED | OUTPATIENT
Start: 2023-01-10 | End: 2023-01-10

## 2023-01-10 RX ORDER — PALONOSETRON 0.05 MG/ML
0.25 INJECTION, SOLUTION INTRAVENOUS ONCE
Status: CANCELLED | OUTPATIENT
Start: 2023-01-10

## 2023-01-10 RX ORDER — DOXORUBICIN HYDROCHLORIDE 2 MG/ML
20 INJECTION, SOLUTION INTRAVENOUS ONCE
Status: COMPLETED | OUTPATIENT
Start: 2023-01-10 | End: 2023-01-10

## 2023-01-10 RX ORDER — DOXORUBICIN HYDROCHLORIDE 2 MG/ML
20 INJECTION, SOLUTION INTRAVENOUS ONCE
Status: CANCELLED | OUTPATIENT
Start: 2023-01-24

## 2023-01-10 RX ORDER — OLANZAPINE 5 MG/1
5 TABLET ORAL ONCE
Status: COMPLETED | OUTPATIENT
Start: 2023-01-10 | End: 2023-01-10

## 2023-01-10 RX ORDER — DIPHENHYDRAMINE HCL 25 MG
25 CAPSULE ORAL ONCE
Status: CANCELLED | OUTPATIENT
Start: 2023-01-10

## 2023-01-10 RX ADMIN — DEXAMETHASONE SODIUM PHOSPHATE 12 MG: 10 INJECTION, SOLUTION INTRAMUSCULAR; INTRAVENOUS at 09:33

## 2023-01-10 RX ADMIN — VINBLASTINE SULFATE 8 MG: 1 INJECTION INTRAVENOUS at 10:41

## 2023-01-10 RX ADMIN — OLANZAPINE 5 MG: 5 TABLET, FILM COATED ORAL at 09:22

## 2023-01-10 RX ADMIN — BRENTUXIMAB VEDOTIN 75 MG: 50 INJECTION, POWDER, LYOPHILIZED, FOR SOLUTION INTRAVENOUS at 11:33

## 2023-01-10 RX ADMIN — DIPHENHYDRAMINE HYDROCHLORIDE 25 MG: 25 CAPSULE ORAL at 10:06

## 2023-01-10 RX ADMIN — SODIUM CHLORIDE 250 ML: 9 INJECTION, SOLUTION INTRAVENOUS at 09:23

## 2023-01-10 RX ADMIN — SODIUM CHLORIDE 150 MG: 9 INJECTION, SOLUTION INTRAVENOUS at 09:33

## 2023-01-10 RX ADMIN — PALONOSETRON 0.25 MG: 0.25 INJECTION, SOLUTION INTRAVENOUS at 09:25

## 2023-01-10 RX ADMIN — DOXORUBICIN HYDROCHLORIDE 34 MG: 2 INJECTION, SOLUTION INTRAVENOUS at 10:30

## 2023-01-10 RX ADMIN — ACETAMINOPHEN 650 MG: 325 TABLET ORAL at 10:06

## 2023-01-10 RX ADMIN — DACARBAZINE 500 MG: 10 INJECTION, POWDER, FOR SOLUTION INTRAVENOUS at 10:58

## 2023-01-10 RX ADMIN — HEPARIN 500 UNITS: 100 SYRINGE at 12:12

## 2023-01-10 NOTE — PROGRESS NOTES
Hematology and Oncology [facility]  Office number [phone]    Fax number [phone]     Follow up     Date: 22    Patient Name: Abraham Wu  MRN: [id number]  : 1950      Chief Complaint: follow up/treatment    Surgeon: Dr. Hiram Viveros MD    Cancer Staging: IV    History of Present Illness: Abraham Wu is a pleasant 72 y.o. male with PMH of hypertension, sleep apnea, hard of hearing who presents today for follow up of Hodgkin Lymphoma.     He initially presented 2022 with intractable diarrhea, low appetite, and a greater than 50 pound weight loss over several month period associated with intermittent fevers.  CT of the chest abdomen pelvis obtained in the ER shows of rectal mass spanning greater than 12 cm with adjacent adenopathy, bulky RP adenopathy, and findings concerning for left renal and liver metastases.  Small right pleural effusion with nodularity.  There was concern for impending obstruction, so he underwent diverting colostomy and biopsy on 2022.  Biopsies from the peritoneam showed a fibrotic nodule histiocytes and eosinophils admixed with CD30 positive large cells.  Rectal biopsies showed involvement by CD30 positive lymphoma, classic Hodgkin lymphoma versus CD30 positive T-cell or B-cell lymphoma.  There was extensive fibrosis and crush artifact.  He underwent repeat transrectal biopsy 10/4/2022 for further characterization which was felt to be most consistent with classical Hodgkin lymphoma.    He initiated chemotherapy with Brentuximab-AVD as an inpatient on 10/8/2022.  Cycle #1 was complicated by GI bleed requiring multiple blood transfusion and ultimately coil artery embolization 10/12; neutropenic fever, Candida esophagitis and mucositis.  He had a prolonged ICU stay  And required enteral feeding.  He was discharged 10/20/2022.    Treatment history:  Brentuximab-AVD: C1 10/8/22  C2: 22 with DA    Interval history:    Still gaining  weight, appetite improved.   Body mass index is 24.67 kg/m².  Nausea occasional responds to zofran.   Soft ostomy output 2 x per day.   No neuropathy, no fine motor difficulties.   No pain.   Still mild soreness right neck.   Swallowing well.   Still taking protonix daily and potassium once daily.      Past Medical History:   Past Medical History:   Diagnosis Date   • benign polypoid tissue right lung    • Hyperlipidemia    • Hypertension    • Mycobacterium mucogenicum    • SHERRI (obstructive sleep apnea)    • Seasonal allergies        Past Surgical History:   Past Surgical History:   Procedure Laterality Date   • BRONCHOSCOPY      removal of obstructing polypoid tissue right middle lung   • COLOSTOMY N/A 09/22/2022    Procedure: LAPAROSCOPIC COLOSTOMY CREATION, FLEXIBLE SIGMOIDOSCOPY;  Surgeon: Hiram Viveros MD;  Location:  [facility];  Service: General;  Laterality: N/A;   • ENDOSCOPY N/A 10/10/2022    Procedure: ESOPHAGOGASTRODUODENOSCOPY;  Surgeon: Neeraj Lynn MD;  Location:  [facility];  Service: Gastroenterology;  Laterality: N/A;   • ENDOSCOPY N/A 10/12/2022    Procedure: ESOPHAGOGASTRODUODENOSCOPY;  Surgeon: Brunner, Mark I, MD;  Location:  [facility];  Service: Gastroenterology;  Laterality: N/A;   • EXAM UNDER ANESTHESIA, RECTAL BIOPSY N/A 10/04/2022    Procedure: EXAM UNDER ANESTHESIA, TRANSANAL BIOPSY WITH TRUCUT NEEDLE (LITHOTOMY-CANDY CANE);  Surgeon: Hiram Viveros MD;  Location:  [facility];  Service: General;  Laterality: N/A;   • PERIPHERALLY INSERTED CENTRAL CATHETER INSERTION      Removed 11/28/2022   • VENOUS ACCESS DEVICE (PORT) INSERTION Right 11/28/2022       Family History:   Family History   Problem Relation Age of Onset   • Diabetes Mother    • Diabetes Father    • Heart disease Father        Social History:   Social History     Socioeconomic History   • Marital status:    Tobacco Use   • Smoking status: Never   • Smokeless tobacco: Never   Vaping Use   •  Vaping Use: Never used   Substance and Sexual Activity   • Alcohol use: Not Currently     Comment: previously drank 2 glasses of wine/beer nightly   • Drug use: Never   • Sexual activity: Defer       Medications:     Current Outpatient Medications:   •  dicyclomine (BENTYL) 20 MG tablet, Take 1 tablet by mouth Every 6 (Six) Hours. PRN abdominal cramps, Disp: 30 tablet, Rfl: 4  •  fluticasone (FLONASE) 50 MCG/ACT nasal spray, 1 spray into the nostril(s) as directed by provider Daily., Disp: , Rfl:   •  lidocaine-prilocaine (EMLA) 2.5-2.5 % cream, Apply 1 application topically to the appropriate area as directed As Needed (45-60 minutes prior to port access.  Cover with saran/plastic wrap.)., Disp: 30 g, Rfl: 3  •  loratadine (CLARITIN) 10 MG tablet, Take 1 tablet by mouth Daily., Disp: 30 tablet, Rfl: 6  •  nystatin susp + lidocaine viscous (MAGIC MOUTHWASH) oral suspension, 5-10 ml swish and spit or swallow QID prn, Disp: 240 mL, Rfl: 3  •  OLANZapine (zyPREXA) 5 MG tablet, Take 1 tablet by mouth Every Night. For 3 days on Days 2, 3 and 4 and on days 16, 17, and 18., Disp: 6 tablet, Rfl: 5  •  ondansetron (Zofran) 8 MG tablet, Take 1 tablet by mouth Every 8 (Eight) Hours As Needed for Nausea or Vomiting., Disp: 30 tablet, Rfl: 5  •  potassium chloride (K-DUR,KLOR-CON) 20 MEQ CR tablet, Take 1 tablet by mouth Daily., Disp: 30 tablet, Rfl: 5  •  prochlorperazine (COMPAZINE) 5 MG tablet, Take 1 tablet by mouth Every 6 (Six) Hours As Needed for Nausea or Vomiting., Disp: 30 tablet, Rfl: 2  •  rosuvastatin (CRESTOR) 10 MG tablet, , Disp: , Rfl:   •  sertraline (ZOLOFT) 50 MG tablet, , Disp: , Rfl:     Allergies:   No Known Allergies    Objective     Vital Signs:   Vitals:    01/10/23 0816   BP: 146/86   Pulse: 74   Resp: 18   Temp: 97.1 °F (36.2 °C)   TempSrc: Infrared   SpO2: 99%   Weight: 65.2 kg (143 lb 12.8 oz)   Height: 162.6 cm (64.02\")   PainSc: 0-No pain    Body mass index is 24.67 kg/m².   Pain Score    01/10/23  0816   PainSc: 0-No pain       ECOG Performance Status: 1-2    Physical Exam:   General: No acute distress. Well appearing.  HEENT: Normocephalic, atraumatic. Sclera anicteric. Masked.  Neck: supple, 1 cm high right submandibular LN, reports improved from prior, soft   Cardiovascular: regular rate and rhythm. No murmurs.   Respiratory: Normal rate. Clear to auscultation bilaterally  Abdomen: Soft, nontender, non distended with normoactive bowel sounds. Ostomy output light brown  Lymph: no cervical, supraclavicular or axillary adenopathy  Neuro: Alert and oriented x 3. No focal deficits.   Ext: Symmetric, no swelling.   Psych: Euthymic  Skin: right picc c/d/i      Laboratory/Imaging Reviewed:   Hospital Outpatient Visit on 01/10/2023   Component Date Value Ref Range Status   • WBC 01/10/2023 9.13  3.40 - 10.80 10*3/mm3 Final   • RBC 01/10/2023 3.60 (L)  4.14 - 5.80 10*6/mm3 Final   • Hemoglobin 01/10/2023 11.3 (L)  13.0 - 17.7 g/dL Final   • Hematocrit 01/10/2023 35.1 (L)  37.5 - 51.0 % Final   • MCV 01/10/2023 97.5 (H)  79.0 - 97.0 fL Final   • MCH 01/10/2023 31.4  26.6 - 33.0 pg Final   • MCHC 01/10/2023 32.2  31.5 - 35.7 g/dL Final   • RDW 01/10/2023 18.5 (H)  12.3 - 15.4 % Final   • RDW-SD 01/10/2023 67.1 (H)  37.0 - 54.0 fl Final   • MPV 01/10/2023 9.1  6.0 - 12.0 fL Final   • Platelets 01/10/2023 256  140 - 450 10*3/mm3 Final   • Neutrophil % 01/10/2023 65.0  42.7 - 76.0 % Final   • Lymphocyte % 01/10/2023 10.8 (L)  19.6 - 45.3 % Final   • Monocyte % 01/10/2023 12.0  5.0 - 12.0 % Final   • Eosinophil % 01/10/2023 8.9 (H)  0.3 - 6.2 % Final   • Basophil % 01/10/2023 1.4  0.0 - 1.5 % Final   • Immature Grans % 01/10/2023 1.9 (H)  0.0 - 0.5 % Final   • Neutrophils, Absolute 01/10/2023 5.93  1.70 - 7.00 10*3/mm3 Final   • Lymphocytes, Absolute 01/10/2023 0.99  0.70 - 3.10 10*3/mm3 Final   • Monocytes, Absolute 01/10/2023 1.10 (H)  0.10 - 0.90 10*3/mm3 Final   • Eosinophils, Absolute 01/10/2023 0.81 (H)  0.00 - 0.40  10*3/mm3 Final   • Basophils, Absolute 01/10/2023 0.13  0.00 - 0.20 10*3/mm3 Final   • Immature Grans, Absolute 01/10/2023 0.17 (H)  0.00 - 0.05 10*3/mm3 Final     Component      Latest Ref Rng & Units 1/10/2023   WBC      3.40 - 10.80 10*3/mm3 9.13   RBC      4.14 - 5.80 10*6/mm3 3.60 (L)   Hemoglobin      13.0 - 17.7 g/dL 11.3 (L)   Hematocrit      37.5 - 51.0 % 35.1 (L)   MCV      79.0 - 97.0 fL 97.5 (H)   MCH      26.6 - 33.0 pg 31.4   MCHC      31.5 - 35.7 g/dL 32.2   RDW      12.3 - 15.4 % 18.5 (H)   RDW-SD      37.0 - 54.0 fl 67.1 (H)   MPV      6.0 - 12.0 fL 9.1   Platelets      140 - 450 10*3/mm3 256   Neutrophil Rel %      42.7 - 76.0 % 65.0   Lymphocyte Rel %      19.6 - 45.3 % 10.8 (L)   Monocyte Rel %      5.0 - 12.0 % 12.0   Eosinophil Rel %      0.3 - 6.2 % 8.9 (H)   Basophil Rel %      0.0 - 1.5 % 1.4   Immature Granulocyte Rel %      0.0 - 0.5 % 1.9 (H)   Neutrophils Absolute      1.70 - 7.00 10*3/mm3 5.93   Lymphocytes Absolute      0.70 - 3.10 10*3/mm3 0.99   Monocytes Absolute      0.10 - 0.90 10*3/mm3 1.10 (H)   Eosinophils Absolute      0.00 - 0.40 10*3/mm3 0.81 (H)   Basophils Absolute      0.00 - 0.20 10*3/mm3 0.13   Immature Grans, Absolute      0.00 - 0.05 10*3/mm3 0.17 (H)       No results found.      Assessment / Plan      Assessment/Plan:   1.  CD30 positive classical Hodgkin's lymphoma  -He is status post cycle 2 of BV AVD.  C1 Day 15 treatment was held due to pancytopenia, neutropenic fever, and active GI bleed, but has now resumed treatment with 20% dose reduction in AVD and continued full dose brentuximab.   -I personally reviewed his PET/CT in 12/2022 shows: Rectal mass has decreased by several cm, resolution of hydronephrosis, liver lesions no longer visible, no persistent pleural effusion. No FDG avid disease. I have requested a formal comparison by the radiologist (original studies were not available to radiology at time of PET due to need to switch facilities bc of equipment  failure) but on my review, consistent with treatment response and clearly he is improving clinically.  He has mild FDG asymmetry in the submandibular gland, asymmetric. He reports this is smaller than prior. We will watch on exam.   -CBC reviewed and adequate for cycle 4. Follow up pending CMP  -PET after cycle 6    2. Antineoplastic chemotherapy induced pancytopenia compounded by blood loss anemia from acute GI bleed.  -Tranfuse for hemoglobin < 7. Irradiated blood products only  -NO transfusion needs today.    3. Hypokalemia  -Continue potassium 20 meq daily pending repeat K level from today.    4.  GI bleed  -He asked about duration of PPI. I asked to continue this through complete course of treatment     5. Access  -Port    6. Deconditioning  -Formal . We discussed incorporating an exercise routine but limiting high weights    Follow Up:   2 weeks    Kaycee Leary MD  Hematology and Oncology     I have spent a total of 40 min on reviewing test results/preparing to see patient, ordering chemo, reviewing PET, counseling patient, performing medically appropriate exam and documenting clinical information in the electronic or other health record

## 2023-01-11 ENCOUNTER — HOSPITAL ENCOUNTER (OUTPATIENT)
Dept: ONCOLOGY | Facility: HOSPITAL | Age: 73
Setting detail: INFUSION SERIES
Discharge: HOME | End: 2023-01-11
Payer: MEDICARE

## 2023-01-11 VITALS
TEMPERATURE: 96.6 F | RESPIRATION RATE: 18 BRPM | DIASTOLIC BLOOD PRESSURE: 79 MMHG | WEIGHT: 147 LBS | SYSTOLIC BLOOD PRESSURE: 135 MMHG | HEART RATE: 71 BPM | HEIGHT: 64 IN | BODY MASS INDEX: 25.1 KG/M2

## 2023-01-11 DIAGNOSIS — C81.98 HODGKIN LYMPHOMA OF LYMPH NODES OF MULTIPLE REGIONS, UNSPECIFIED HODGKIN LYMPHOMA TYPE: Primary | ICD-10-CM

## 2023-01-11 PROCEDURE — 96372 THER/PROPH/DIAG INJ SC/IM: CPT

## 2023-01-11 PROCEDURE — 25010000002 PEGFILGRASTIM-APGF 6 MG/0.6ML SOLUTION PREFILLED SYRINGE: Performed by: INTERNAL MEDICINE

## 2023-01-11 RX ADMIN — PEGFILGRASTIM-APGF 6 MG: 6 INJECTION, SOLUTION SUBCUTANEOUS at 12:41

## 2023-01-24 ENCOUNTER — APPOINTMENT (OUTPATIENT)
Dept: ONCOLOGY | Facility: HOSPITAL | Age: 73
End: 2023-01-24
Payer: MEDICARE

## 2023-01-24 ENCOUNTER — HOSPITAL ENCOUNTER (OUTPATIENT)
Dept: ONCOLOGY | Facility: HOSPITAL | Age: 73
Setting detail: INFUSION SERIES
Discharge: HOME | End: 2023-01-24
Payer: MEDICARE

## 2023-01-24 ENCOUNTER — OFFICE VISIT (OUTPATIENT)
Dept: ONCOLOGY | Facility: CLINIC | Age: 73
End: 2023-01-24
Payer: MEDICARE

## 2023-01-24 VITALS
DIASTOLIC BLOOD PRESSURE: 81 MMHG | WEIGHT: 149 LBS | OXYGEN SATURATION: 98 % | BODY MASS INDEX: 25.44 KG/M2 | TEMPERATURE: 97.1 F | HEART RATE: 88 BPM | HEIGHT: 64 IN | SYSTOLIC BLOOD PRESSURE: 138 MMHG

## 2023-01-24 DIAGNOSIS — C81.98 HODGKIN LYMPHOMA OF LYMPH NODES OF MULTIPLE REGIONS, UNSPECIFIED HODGKIN LYMPHOMA TYPE: ICD-10-CM

## 2023-01-24 DIAGNOSIS — Z51.11 CHEMOTHERAPY MANAGEMENT, ENCOUNTER FOR: Primary | ICD-10-CM

## 2023-01-24 DIAGNOSIS — C81.98 HODGKIN LYMPHOMA OF LYMPH NODES OF MULTIPLE REGIONS, UNSPECIFIED HODGKIN LYMPHOMA TYPE: Primary | ICD-10-CM

## 2023-01-24 DIAGNOSIS — Z45.2 ENCOUNTER FOR CARE RELATED TO VASCULAR ACCESS PORT: ICD-10-CM

## 2023-01-24 LAB
ALBUMIN SERPL-MCNC: 4.2 G/DL (ref 3.5–5.2)
ALBUMIN/GLOB SERPL: 1.6 G/DL
ALP SERPL-CCNC: 169 U/L (ref 39–117)
ALT SERPL W P-5'-P-CCNC: 8 U/L (ref 1–41)
ANION GAP SERPL CALCULATED.3IONS-SCNC: 8 MMOL/L (ref 5–15)
AST SERPL-CCNC: 14 U/L (ref 1–40)
BASOPHILS # BLD AUTO: 0.13 10*3/MM3 (ref 0–0.2)
BASOPHILS NFR BLD AUTO: 0.8 % (ref 0–1.5)
BILIRUB SERPL-MCNC: 0.2 MG/DL (ref 0–1.2)
BUN SERPL-MCNC: 12 MG/DL (ref 8–23)
BUN/CREAT SERPL: 20.7 (ref 7–25)
CALCIUM SPEC-SCNC: 9.5 MG/DL (ref 8.6–10.5)
CHLORIDE SERPL-SCNC: 99 MMOL/L (ref 98–107)
CO2 SERPL-SCNC: 28 MMOL/L (ref 22–29)
CREAT SERPL-MCNC: 0.58 MG/DL (ref 0.76–1.27)
DEPRECATED RDW RBC AUTO: 64.4 FL (ref 37–54)
EGFRCR SERPLBLD CKD-EPI 2021: 104.9 ML/MIN/1.73
EOSINOPHIL # BLD AUTO: 0.56 10*3/MM3 (ref 0–0.4)
EOSINOPHIL NFR BLD AUTO: 3.4 % (ref 0.3–6.2)
ERYTHROCYTE [DISTWIDTH] IN BLOOD BY AUTOMATED COUNT: 17.8 % (ref 12.3–15.4)
GLOBULIN UR ELPH-MCNC: 2.7 GM/DL
GLUCOSE SERPL-MCNC: 134 MG/DL (ref 65–99)
HCT VFR BLD AUTO: 38 % (ref 37.5–51)
HGB BLD-MCNC: 12.2 G/DL (ref 13–17.7)
IMM GRANULOCYTES # BLD AUTO: 0.79 10*3/MM3 (ref 0–0.05)
IMM GRANULOCYTES NFR BLD AUTO: 4.8 % (ref 0–0.5)
LYMPHOCYTES # BLD AUTO: 1.28 10*3/MM3 (ref 0.7–3.1)
LYMPHOCYTES NFR BLD AUTO: 7.8 % (ref 19.6–45.3)
MCH RBC QN AUTO: 31.4 PG (ref 26.6–33)
MCHC RBC AUTO-ENTMCNC: 32.1 G/DL (ref 31.5–35.7)
MCV RBC AUTO: 97.7 FL (ref 79–97)
MONOCYTES # BLD AUTO: 1.18 10*3/MM3 (ref 0.1–0.9)
MONOCYTES NFR BLD AUTO: 7.2 % (ref 5–12)
NEUTROPHILS NFR BLD AUTO: 12.45 10*3/MM3 (ref 1.7–7)
NEUTROPHILS NFR BLD AUTO: 76 % (ref 42.7–76)
PLATELET # BLD AUTO: 227 10*3/MM3 (ref 140–450)
PMV BLD AUTO: 9.5 FL (ref 6–12)
POTASSIUM SERPL-SCNC: 3.9 MMOL/L (ref 3.5–5.2)
PROT SERPL-MCNC: 6.9 G/DL (ref 6–8.5)
RBC # BLD AUTO: 3.89 10*6/MM3 (ref 4.14–5.8)
SODIUM SERPL-SCNC: 135 MMOL/L (ref 136–145)
WBC NRBC COR # BLD: 16.39 10*3/MM3 (ref 3.4–10.8)

## 2023-01-24 PROCEDURE — 25010000002 DACARBAZINE PER 200 MG: Performed by: INTERNAL MEDICINE

## 2023-01-24 PROCEDURE — 25010000002 HEPARIN LOCK FLUSH PER 10 UNITS: Performed by: INTERNAL MEDICINE

## 2023-01-24 PROCEDURE — 99214 OFFICE O/P EST MOD 30 MIN: CPT | Performed by: INTERNAL MEDICINE

## 2023-01-24 PROCEDURE — 25010000002 FOSAPREPITANT PER 1 MG: Performed by: INTERNAL MEDICINE

## 2023-01-24 PROCEDURE — 96413 CHEMO IV INFUSION 1 HR: CPT

## 2023-01-24 PROCEDURE — 96411 CHEMO IV PUSH ADDL DRUG: CPT

## 2023-01-24 PROCEDURE — 25010000002 VINBLASTINE PER 1 MG: Performed by: INTERNAL MEDICINE

## 2023-01-24 PROCEDURE — 85025 COMPLETE CBC W/AUTO DIFF WBC: CPT | Performed by: INTERNAL MEDICINE

## 2023-01-24 PROCEDURE — 25010000002 DEXAMETHASONE SODIUM PHOSPHATE 100 MG/10ML SOLUTION: Performed by: INTERNAL MEDICINE

## 2023-01-24 PROCEDURE — 96375 TX/PRO/DX INJ NEW DRUG ADDON: CPT

## 2023-01-24 PROCEDURE — 25010000002 PALONOSETRON 0.25 MG/5ML SOLUTION PREFILLED SYRINGE: Performed by: INTERNAL MEDICINE

## 2023-01-24 PROCEDURE — 25010000002 BRENTUXIMAB 50 MG RECONSTITUTED SOLUTION 1 EACH VIAL: Performed by: INTERNAL MEDICINE

## 2023-01-24 PROCEDURE — 25010000002 DOXORUBICIN PER 10 MG: Performed by: INTERNAL MEDICINE

## 2023-01-24 PROCEDURE — 96367 TX/PROPH/DG ADDL SEQ IV INF: CPT

## 2023-01-24 PROCEDURE — 63710000001 DIPHENHYDRAMINE PER 50 MG: Performed by: INTERNAL MEDICINE

## 2023-01-24 PROCEDURE — 96417 CHEMO IV INFUS EACH ADDL SEQ: CPT

## 2023-01-24 PROCEDURE — 80053 COMPREHEN METABOLIC PANEL: CPT | Performed by: INTERNAL MEDICINE

## 2023-01-24 RX ORDER — DOXORUBICIN HYDROCHLORIDE 2 MG/ML
20 INJECTION, SOLUTION INTRAVENOUS ONCE
Status: COMPLETED | OUTPATIENT
Start: 2023-01-24 | End: 2023-01-24

## 2023-01-24 RX ORDER — HEPARIN SODIUM (PORCINE) LOCK FLUSH IV SOLN 100 UNIT/ML 100 UNIT/ML
500 SOLUTION INTRAVENOUS AS NEEDED
Status: CANCELLED | OUTPATIENT
Start: 2023-01-24

## 2023-01-24 RX ORDER — HEPARIN SODIUM (PORCINE) LOCK FLUSH IV SOLN 100 UNIT/ML 100 UNIT/ML
500 SOLUTION INTRAVENOUS AS NEEDED
Status: DISCONTINUED | OUTPATIENT
Start: 2023-01-24 | End: 2023-01-25 | Stop reason: HOSPADM

## 2023-01-24 RX ORDER — ACETAMINOPHEN 325 MG/1
650 TABLET ORAL ONCE
Status: COMPLETED | OUTPATIENT
Start: 2023-01-24 | End: 2023-01-24

## 2023-01-24 RX ORDER — DIPHENHYDRAMINE HCL 25 MG
25 CAPSULE ORAL ONCE
Status: COMPLETED | OUTPATIENT
Start: 2023-01-24 | End: 2023-01-24

## 2023-01-24 RX ORDER — SODIUM CHLORIDE 9 MG/ML
250 INJECTION, SOLUTION INTRAVENOUS ONCE
Status: COMPLETED | OUTPATIENT
Start: 2023-01-24 | End: 2023-01-24

## 2023-01-24 RX ORDER — PALONOSETRON 0.05 MG/ML
0.25 INJECTION, SOLUTION INTRAVENOUS ONCE
Status: COMPLETED | OUTPATIENT
Start: 2023-01-24 | End: 2023-01-24

## 2023-01-24 RX ORDER — LORATADINE 10 MG/1
10 TABLET ORAL DAILY
Qty: 30 TABLET | Refills: 5 | Status: SHIPPED | OUTPATIENT
Start: 2023-01-24

## 2023-01-24 RX ORDER — OLANZAPINE 5 MG/1
5 TABLET ORAL ONCE
Status: COMPLETED | OUTPATIENT
Start: 2023-01-24 | End: 2023-01-24

## 2023-01-24 RX ADMIN — PALONOSETRON 0.25 MG: 0.25 INJECTION, SOLUTION INTRAVENOUS at 11:01

## 2023-01-24 RX ADMIN — DACARBAZINE 500 MG: 10 INJECTION, POWDER, FOR SOLUTION INTRAVENOUS at 12:29

## 2023-01-24 RX ADMIN — ACETAMINOPHEN 325MG 650 MG: 325 TABLET ORAL at 12:53

## 2023-01-24 RX ADMIN — SODIUM CHLORIDE 250 ML: 9 INJECTION, SOLUTION INTRAVENOUS at 10:59

## 2023-01-24 RX ADMIN — HEPARIN 500 UNITS: 100 SYRINGE at 14:16

## 2023-01-24 RX ADMIN — BRENTUXIMAB VEDOTIN 80 MG: 50 INJECTION, POWDER, LYOPHILIZED, FOR SOLUTION INTRAVENOUS at 13:26

## 2023-01-24 RX ADMIN — DIPHENHYDRAMINE HYDROCHLORIDE 25 MG: 25 CAPSULE ORAL at 12:53

## 2023-01-24 RX ADMIN — OLANZAPINE 5 MG: 5 TABLET, FILM COATED ORAL at 11:00

## 2023-01-24 RX ADMIN — SODIUM CHLORIDE 150 MG: 9 INJECTION, SOLUTION INTRAVENOUS at 11:04

## 2023-01-24 RX ADMIN — DOXORUBICIN HYDROCHLORIDE 34 MG: 2 INJECTION, SOLUTION INTRAVENOUS at 12:04

## 2023-01-24 RX ADMIN — VINBLASTINE SULFATE 8 MG: 1 INJECTION INTRAVENOUS at 12:11

## 2023-01-24 RX ADMIN — DEXAMETHASONE SODIUM PHOSPHATE 12 MG: 10 INJECTION, SOLUTION INTRAMUSCULAR; INTRAVENOUS at 11:04

## 2023-01-24 NOTE — ADDENDUM NOTE
Encounter addended by: Liliya Stack RN on: 1/24/2023 2:56 PM   Actions taken: Order Reconciliation Section accessed, Medication List reviewed, Allergies reviewed, Flowsheet accepted

## 2023-01-24 NOTE — PROGRESS NOTES
Hematology and Oncology [facility]  Office number [phone]    Fax number [phone]     Follow up     Date: 22    Patient Name: Abraham Wu  MRN: [id number]  : 1950      Chief Complaint: follow up/treatment    Surgeon: Dr. Hiram Viveros MD    Cancer Staging: IV    History of Present Illness: Abraham Wu is a pleasant 72 y.o. male with PMH of hypertension, sleep apnea, hard of hearing who presents today for follow up of Hodgkin Lymphoma.     He initially presented 2022 with intractable diarrhea, low appetite, and a greater than 50 pound weight loss over several month period associated with intermittent fevers.  CT of the chest abdomen pelvis obtained in the ER shows of rectal mass spanning greater than 12 cm with adjacent adenopathy, bulky RP adenopathy, and findings concerning for left renal and liver metastases.  Small right pleural effusion with nodularity.  There was concern for impending obstruction, so he underwent diverting colostomy and biopsy on 2022.  Biopsies from the peritoneam showed a fibrotic nodule histiocytes and eosinophils admixed with CD30 positive large cells.  Rectal biopsies showed involvement by CD30 positive lymphoma, classic Hodgkin lymphoma versus CD30 positive T-cell or B-cell lymphoma.  There was extensive fibrosis and crush artifact.  He underwent repeat transrectal biopsy 10/4/2022 for further characterization which was felt to be most consistent with classical Hodgkin lymphoma.    He initiated chemotherapy with Brentuximab-AVD as an inpatient on 10/8/2022.  Cycle #1 was complicated by GI bleed requiring multiple blood transfusion and ultimately coil artery embolization 10/12; neutropenic fever, Candida esophagitis and mucositis.  He had a prolonged ICU stay  And required enteral feeding.  He was discharged 10/20/2022.    Treatment history:  Brentuximab-AVD: C1 10/8/22  C2: 22 with DA    Interval history:    Still gaining  weight, appetite good. Body mass index is 25.56 kg/m².  Bone pain in shoulder, neck and back following treatment, planning to trial Claritin.   Nausea well controlled on PRN zofran.   Soft ostomy output 2 x per day.   No neuropathy, no fine motor difficulties, no tripping or stumbling.   No residual swelling right neck. Swallowing well.   Still taking protonix daily and potassium once daily.  Active, walking a lot.  No fever, dysuria or cough  Previously used CPAP, no longer snoring since weight loss, but O2 was dropping at night in the hospital. Repeat sleep study pending in March    Past Medical History:   Past Medical History:   Diagnosis Date   • benign polypoid tissue right lung    • Hyperlipidemia    • Hypertension    • Mycobacterium mucogenicum    • SHERRI (obstructive sleep apnea)    • Seasonal allergies        Past Surgical History:   Past Surgical History:   Procedure Laterality Date   • BRONCHOSCOPY      removal of obstructing polypoid tissue right middle lung   • COLOSTOMY N/A 09/22/2022    Procedure: LAPAROSCOPIC COLOSTOMY CREATION, FLEXIBLE SIGMOIDOSCOPY;  Surgeon: Hiram Viveros MD;  Location:  [facility];  Service: General;  Laterality: N/A;   • ENDOSCOPY N/A 10/10/2022    Procedure: ESOPHAGOGASTRODUODENOSCOPY;  Surgeon: Neeraj Lynn MD;  Location:  [facility];  Service: Gastroenterology;  Laterality: N/A;   • ENDOSCOPY N/A 10/12/2022    Procedure: ESOPHAGOGASTRODUODENOSCOPY;  Surgeon: Brunner, Mark I, MD;  Location:  [facility];  Service: Gastroenterology;  Laterality: N/A;   • EXAM UNDER ANESTHESIA, RECTAL BIOPSY N/A 10/04/2022    Procedure: EXAM UNDER ANESTHESIA, TRANSANAL BIOPSY WITH TRUCUT NEEDLE (LITHOTOMY-CANDY CANE);  Surgeon: Hiram Viveros MD;  Location:  [facility];  Service: General;  Laterality: N/A;   • PERIPHERALLY INSERTED CENTRAL CATHETER INSERTION      Removed 11/28/2022   • VENOUS ACCESS DEVICE (PORT) INSERTION Right 11/28/2022       Family History:   Family  "History   Problem Relation Age of Onset   • Diabetes Mother    • Diabetes Father    • Heart disease Father        Social History:   Social History     Socioeconomic History   • Marital status:    Tobacco Use   • Smoking status: Never   • Smokeless tobacco: Never   Vaping Use   • Vaping Use: Never used   Substance and Sexual Activity   • Alcohol use: Not Currently     Comment: previously drank 2 glasses of wine/beer nightly   • Drug use: Never   • Sexual activity: Defer       Medications:     Current Outpatient Medications:   •  fluticasone (FLONASE) 50 MCG/ACT nasal spray, 1 spray into the nostril(s) as directed by provider Daily., Disp: , Rfl:   •  lidocaine-prilocaine (EMLA) 2.5-2.5 % cream, Apply 1 application topically to the appropriate area as directed As Needed (45-60 minutes prior to port access.  Cover with saran/plastic wrap.)., Disp: 30 g, Rfl: 3  •  OLANZapine (zyPREXA) 5 MG tablet, Take 1 tablet by mouth Every Night. For 3 days on Days 2, 3 and 4 and on days 16, 17, and 18., Disp: 6 tablet, Rfl: 5  •  ondansetron (Zofran) 8 MG tablet, Take 1 tablet by mouth Every 8 (Eight) Hours As Needed for Nausea or Vomiting., Disp: 30 tablet, Rfl: 5  •  pantoprazole (PROTONIX) 40 MG EC tablet, Take 1 tablet by mouth Daily., Disp: 90 tablet, Rfl: 1  •  potassium chloride (K-DUR,KLOR-CON) 20 MEQ CR tablet, Take 1 tablet by mouth Daily., Disp: 30 tablet, Rfl: 5  •  prochlorperazine (COMPAZINE) 5 MG tablet, Take 1 tablet by mouth Every 6 (Six) Hours As Needed for Nausea or Vomiting., Disp: 30 tablet, Rfl: 2  •  rosuvastatin (CRESTOR) 10 MG tablet, , Disp: , Rfl:     Allergies:   No Known Allergies    Objective     Vital Signs:   Vitals:    01/24/23 0914   BP: 138/81   Pulse: 88   Temp: 97.1 °F (36.2 °C)   TempSrc: Infrared   SpO2: 98%   Weight: 67.6 kg (149 lb)   Height: 162.6 cm (64.02\")   PainSc: 0-No pain    Body mass index is 25.56 kg/m².   Pain Score    01/24/23 0914   PainSc: 0-No pain       ECOG " Performance Status: 1-2    Physical Exam:   General: No acute distress. Well appearing.  HEENT: Normocephalic, atraumatic. Sclera anicteric. Masked.  Neck: supple, 1 cm high right submandibular LN is now hard to palpate  Cardiovascular: regular rate and rhythm. No murmurs.   Respiratory: Normal rate. Clear to auscultation bilaterally  Abdomen: Soft, nontender, non distended with normoactive bowel sounds. Ostomy LLQ.  Lymph: no cervical, supraclavicular or axillary adenopathy  Neuro: Alert and oriented x 3. No focal deficits.   Ext: Symmetric, no swelling.   Psych: Euthymic  Skin: right port site       Laboratory/Imaging Reviewed:   Hospital Outpatient Visit on 01/24/2023   Component Date Value Ref Range Status   • Glucose 01/24/2023 134 (H)  65 - 99 mg/dL Final   • BUN 01/24/2023 12  8 - 23 mg/dL Final   • Creatinine 01/24/2023 0.58 (L)  0.76 - 1.27 mg/dL Final   • Sodium 01/24/2023 135 (L)  136 - 145 mmol/L Final   • Potassium 01/24/2023 3.9  3.5 - 5.2 mmol/L Final    Slight hemolysis detected by analyzer. Results may be affected.   • Chloride 01/24/2023 99  98 - 107 mmol/L Final   • CO2 01/24/2023 28.0  22.0 - 29.0 mmol/L Final   • Calcium 01/24/2023 9.5  8.6 - 10.5 mg/dL Final   • Total Protein 01/24/2023 6.9  6.0 - 8.5 g/dL Final   • Albumin 01/24/2023 4.2  3.5 - 5.2 g/dL Final   • ALT (SGPT) 01/24/2023 8  1 - 41 U/L Final   • AST (SGOT) 01/24/2023 14  1 - 40 U/L Final   • Alkaline Phosphatase 01/24/2023 169 (H)  39 - 117 U/L Final   • Total Bilirubin 01/24/2023 0.2  0.0 - 1.2 mg/dL Final   • Globulin 01/24/2023 2.7  gm/dL Final    Calculated Result   • A/G Ratio 01/24/2023 1.6  g/dL Final   • BUN/Creatinine Ratio 01/24/2023 20.7  7.0 - 25.0 Final   • Anion Gap 01/24/2023 8.0  5.0 - 15.0 mmol/L Final   • eGFR 01/24/2023 104.9  >60.0 mL/min/1.73 Final   • WBC 01/24/2023 16.39 (H)  3.40 - 10.80 10*3/mm3 Final   • RBC 01/24/2023 3.89 (L)  4.14 - 5.80 10*6/mm3 Final   • Hemoglobin 01/24/2023 12.2 (L)  13.0 - 17.7  g/dL Final   • Hematocrit 01/24/2023 38.0  37.5 - 51.0 % Final   • MCV 01/24/2023 97.7 (H)  79.0 - 97.0 fL Final   • MCH 01/24/2023 31.4  26.6 - 33.0 pg Final   • MCHC 01/24/2023 32.1  31.5 - 35.7 g/dL Final   • RDW 01/24/2023 17.8 (H)  12.3 - 15.4 % Final   • RDW-SD 01/24/2023 64.4 (H)  37.0 - 54.0 fl Final   • MPV 01/24/2023 9.5  6.0 - 12.0 fL Final   • Platelets 01/24/2023 227  140 - 450 10*3/mm3 Final   • Neutrophil % 01/24/2023 76.0  42.7 - 76.0 % Final   • Lymphocyte % 01/24/2023 7.8 (L)  19.6 - 45.3 % Final   • Monocyte % 01/24/2023 7.2  5.0 - 12.0 % Final   • Eosinophil % 01/24/2023 3.4  0.3 - 6.2 % Final   • Basophil % 01/24/2023 0.8  0.0 - 1.5 % Final   • Immature Grans % 01/24/2023 4.8 (H)  0.0 - 0.5 % Final   • Neutrophils, Absolute 01/24/2023 12.45 (H)  1.70 - 7.00 10*3/mm3 Final   • Lymphocytes, Absolute 01/24/2023 1.28  0.70 - 3.10 10*3/mm3 Final   • Monocytes, Absolute 01/24/2023 1.18 (H)  0.10 - 0.90 10*3/mm3 Final   • Eosinophils, Absolute 01/24/2023 0.56 (H)  0.00 - 0.40 10*3/mm3 Final   • Basophils, Absolute 01/24/2023 0.13  0.00 - 0.20 10*3/mm3 Final   • Immature Grans, Absolute 01/24/2023 0.79 (H)  0.00 - 0.05 10*3/mm3 Final       Assessment / Plan      Assessment/Plan:   1.  CD30 positive classical Hodgkin's lymphoma  2.  Chemotherapy monitoring  -He is status post cycle 2 of BV AVD.  C1 Day 15 treatment was held due to pancytopenia, neutropenic fever, and active GI bleed, but has now resumed treatment with 20% dose reduction in AVD and continued full dose brentuximab.   -I personally reviewed his PET/CT in 12/2022 shows: Rectal mass has decreased by several cm, resolution of hydronephrosis, liver lesions no longer visible, no persistent pleural effusion. No FDG avid disease. I have requested a formal comparison by the radiologist (original studies were not available to radiology at time of PET due to need to switch facilities bc of equipment failure) but on my review, consistent with  treatment response and clearly he is improving clinically.  He has mild FDG asymmetry in the submandibular gland, asymmetric. He reports this is smaller than prior. We will watch on exam.   -CBC/CMP reviewed and adequate for cycle 4 day 15. Mild leukocytosis secondary to Neulasta. No infectious symptoms.   -PET after cycle 6    2. Antineoplastic chemotherapy induced pancytopenia compounded by blood loss anemia from acute GI bleed.  -Now nearly resolved. Hemoglobin 12 today.    3. Hypokalemia  -K 3.9 today. Stop potassium and repeat in 2 weeks.     4.  GI bleed  -PPI, continue     5. Access  -Port    6. Bone pain  -Add claritin    7. SHERRI  Previously used CPAP, no longer snoring since weight loss, but O2 was dropping at night in the hospital. Repeat sleep study pending in March    Follow Up:   2 weeks    Kaycee Leary MD  Hematology and Oncology

## 2023-01-25 ENCOUNTER — HOSPITAL ENCOUNTER (OUTPATIENT)
Dept: ONCOLOGY | Facility: HOSPITAL | Age: 73
Setting detail: INFUSION SERIES
Discharge: HOME | End: 2023-01-25
Payer: MEDICARE

## 2023-01-25 VITALS
RESPIRATION RATE: 18 BRPM | TEMPERATURE: 97.9 F | HEIGHT: 64 IN | BODY MASS INDEX: 25.44 KG/M2 | WEIGHT: 149 LBS | HEART RATE: 68 BPM | SYSTOLIC BLOOD PRESSURE: 143 MMHG | DIASTOLIC BLOOD PRESSURE: 70 MMHG

## 2023-01-25 DIAGNOSIS — C81.98 HODGKIN LYMPHOMA OF LYMPH NODES OF MULTIPLE REGIONS, UNSPECIFIED HODGKIN LYMPHOMA TYPE: Primary | ICD-10-CM

## 2023-01-25 PROCEDURE — 25010000002 PEGFILGRASTIM-APGF 6 MG/0.6ML SOLUTION PREFILLED SYRINGE: Performed by: INTERNAL MEDICINE

## 2023-01-25 PROCEDURE — 96372 THER/PROPH/DIAG INJ SC/IM: CPT

## 2023-01-25 RX ADMIN — PEGFILGRASTIM-APGF 6 MG: 6 INJECTION, SOLUTION SUBCUTANEOUS at 14:52

## 2023-02-01 ENCOUNTER — TELEPHONE (OUTPATIENT)
Dept: ONCOLOGY | Facility: CLINIC | Age: 73
End: 2023-02-01
Payer: MEDICARE

## 2023-02-01 NOTE — TELEPHONE ENCOUNTER
Caller: MEREDITHSRINIVASAN    Relationship: Emergency Contact    Best call back number: [phone]    What is the best time to reach you: ANYTIME    Who are you requesting to speak with (clinical staff, provider,  specific staff member): CLINICAL     What was the call regarding: PT WIFE SRINIVASAN CALLING THEY WANT TO [place] A VACATION AT [place] THIS YEAR AND WANTING TO KNOW IF KAL WOULD BE ABLE TO GO, WOULD LIKE DR. PARRISH'S OPINION, THEY WOULD LIKE TO GO ON A CRUISE?        CALL TO DISCUSS     Do you require a callback: YES

## 2023-02-01 NOTE — TELEPHONE ENCOUNTER
Notified Peggy that Dr. Leary notified and stated a vacation will be fine with her.  She verbalized understanding.

## 2023-02-02 DIAGNOSIS — C81.98 HODGKIN LYMPHOMA OF LYMPH NODES OF MULTIPLE REGIONS, UNSPECIFIED HODGKIN LYMPHOMA TYPE: Primary | ICD-10-CM

## 2023-02-07 ENCOUNTER — OFFICE VISIT (OUTPATIENT)
Dept: ONCOLOGY | Facility: CLINIC | Age: 73
End: 2023-02-07
Payer: MEDICARE

## 2023-02-07 ENCOUNTER — HOSPITAL ENCOUNTER (OUTPATIENT)
Dept: ONCOLOGY | Facility: HOSPITAL | Age: 73
Discharge: HOME | End: 2023-02-07
Admitting: INTERNAL MEDICINE
Payer: MEDICARE

## 2023-02-07 ENCOUNTER — APPOINTMENT (OUTPATIENT)
Dept: ONCOLOGY | Facility: HOSPITAL | Age: 73
End: 2023-02-07
Payer: MEDICARE

## 2023-02-07 VITALS
HEART RATE: 75 BPM | OXYGEN SATURATION: 98 % | HEIGHT: 64 IN | RESPIRATION RATE: 18 BRPM | TEMPERATURE: 97.1 F | SYSTOLIC BLOOD PRESSURE: 126 MMHG | BODY MASS INDEX: 25.1 KG/M2 | WEIGHT: 147 LBS | DIASTOLIC BLOOD PRESSURE: 77 MMHG

## 2023-02-07 DIAGNOSIS — Z45.2 ENCOUNTER FOR CARE RELATED TO VASCULAR ACCESS PORT: ICD-10-CM

## 2023-02-07 DIAGNOSIS — C81.98 HODGKIN LYMPHOMA OF LYMPH NODES OF MULTIPLE REGIONS, UNSPECIFIED HODGKIN LYMPHOMA TYPE: Primary | ICD-10-CM

## 2023-02-07 LAB
ALBUMIN SERPL-MCNC: 4.2 G/DL (ref 3.5–5.2)
ALBUMIN/GLOB SERPL: 1.6 G/DL
ALP SERPL-CCNC: 175 U/L (ref 39–117)
ALT SERPL W P-5'-P-CCNC: 13 U/L (ref 1–41)
ANION GAP SERPL CALCULATED.3IONS-SCNC: 10 MMOL/L (ref 5–15)
AST SERPL-CCNC: 22 U/L (ref 1–40)
BASOPHILS # BLD AUTO: 0.13 10*3/MM3 (ref 0–0.2)
BASOPHILS NFR BLD AUTO: 1 % (ref 0–1.5)
BILIRUB SERPL-MCNC: 0.4 MG/DL (ref 0–1.2)
BUN SERPL-MCNC: 12 MG/DL (ref 8–23)
BUN/CREAT SERPL: 17.6 (ref 7–25)
CALCIUM SPEC-SCNC: 9.6 MG/DL (ref 8.6–10.5)
CHLORIDE SERPL-SCNC: 99 MMOL/L (ref 98–107)
CK SERPL-CCNC: 54 U/L (ref 20–200)
CO2 SERPL-SCNC: 28 MMOL/L (ref 22–29)
CREAT SERPL-MCNC: 0.68 MG/DL (ref 0.76–1.27)
DEPRECATED RDW RBC AUTO: 63.4 FL (ref 37–54)
EGFRCR SERPLBLD CKD-EPI 2021: 100 ML/MIN/1.73
EOSINOPHIL # BLD AUTO: 0.64 10*3/MM3 (ref 0–0.4)
EOSINOPHIL NFR BLD AUTO: 5.1 % (ref 0.3–6.2)
ERYTHROCYTE [DISTWIDTH] IN BLOOD BY AUTOMATED COUNT: 17.7 % (ref 12.3–15.4)
GLOBULIN UR ELPH-MCNC: 2.6 GM/DL
GLUCOSE SERPL-MCNC: 82 MG/DL (ref 65–99)
HCT VFR BLD AUTO: 38 % (ref 37.5–51)
HGB BLD-MCNC: 12.3 G/DL (ref 13–17.7)
IMM GRANULOCYTES # BLD AUTO: 0.42 10*3/MM3 (ref 0–0.05)
IMM GRANULOCYTES NFR BLD AUTO: 3.4 % (ref 0–0.5)
LYMPHOCYTES # BLD AUTO: 1.29 10*3/MM3 (ref 0.7–3.1)
LYMPHOCYTES NFR BLD AUTO: 10.3 % (ref 19.6–45.3)
MCH RBC QN AUTO: 31.1 PG (ref 26.6–33)
MCHC RBC AUTO-ENTMCNC: 32.4 G/DL (ref 31.5–35.7)
MCV RBC AUTO: 96.2 FL (ref 79–97)
MONOCYTES # BLD AUTO: 1.24 10*3/MM3 (ref 0.1–0.9)
MONOCYTES NFR BLD AUTO: 9.9 % (ref 5–12)
NEUTROPHILS NFR BLD AUTO: 70.3 % (ref 42.7–76)
NEUTROPHILS NFR BLD AUTO: 8.76 10*3/MM3 (ref 1.7–7)
PLATELET # BLD AUTO: 246 10*3/MM3 (ref 140–450)
PMV BLD AUTO: 9.3 FL (ref 6–12)
POTASSIUM SERPL-SCNC: 4.3 MMOL/L (ref 3.5–5.2)
PROT SERPL-MCNC: 6.8 G/DL (ref 6–8.5)
RBC # BLD AUTO: 3.95 10*6/MM3 (ref 4.14–5.8)
SODIUM SERPL-SCNC: 137 MMOL/L (ref 136–145)
WBC NRBC COR # BLD: 12.48 10*3/MM3 (ref 3.4–10.8)

## 2023-02-07 PROCEDURE — 25010000002 DOXORUBICIN PER 10 MG: Performed by: INTERNAL MEDICINE

## 2023-02-07 PROCEDURE — A9270 NON-COVERED ITEM OR SERVICE: HCPCS | Performed by: INTERNAL MEDICINE

## 2023-02-07 PROCEDURE — 96367 TX/PROPH/DG ADDL SEQ IV INF: CPT

## 2023-02-07 PROCEDURE — 82550 ASSAY OF CK (CPK): CPT | Performed by: INTERNAL MEDICINE

## 2023-02-07 PROCEDURE — 25010000002 DEXAMETHASONE SODIUM PHOSPHATE 100 MG/10ML SOLUTION: Performed by: INTERNAL MEDICINE

## 2023-02-07 PROCEDURE — 63710000001 ACETAMINOPHEN 325 MG TABLET: Performed by: INTERNAL MEDICINE

## 2023-02-07 PROCEDURE — 99214 OFFICE O/P EST MOD 30 MIN: CPT | Performed by: INTERNAL MEDICINE

## 2023-02-07 PROCEDURE — 63710000001 OLANZAPINE 5 MG TABLET: Performed by: INTERNAL MEDICINE

## 2023-02-07 PROCEDURE — 25010000002 HEPARIN LOCK FLUSH PER 10 UNITS: Performed by: INTERNAL MEDICINE

## 2023-02-07 PROCEDURE — 96417 CHEMO IV INFUS EACH ADDL SEQ: CPT

## 2023-02-07 PROCEDURE — 25010000002 VINBLASTINE PER 1 MG: Performed by: INTERNAL MEDICINE

## 2023-02-07 PROCEDURE — 25010000002 BRENTUXIMAB 50 MG RECONSTITUTED SOLUTION 1 EACH VIAL: Performed by: INTERNAL MEDICINE

## 2023-02-07 PROCEDURE — 25010000002 DACARBAZINE PER 200 MG: Performed by: INTERNAL MEDICINE

## 2023-02-07 PROCEDURE — 80053 COMPREHEN METABOLIC PANEL: CPT | Performed by: INTERNAL MEDICINE

## 2023-02-07 PROCEDURE — 25010000002 FOSAPREPITANT PER 1 MG: Performed by: INTERNAL MEDICINE

## 2023-02-07 PROCEDURE — 85025 COMPLETE CBC W/AUTO DIFF WBC: CPT | Performed by: INTERNAL MEDICINE

## 2023-02-07 PROCEDURE — 96413 CHEMO IV INFUSION 1 HR: CPT

## 2023-02-07 PROCEDURE — 96365 THER/PROPH/DIAG IV INF INIT: CPT

## 2023-02-07 PROCEDURE — 96375 TX/PRO/DX INJ NEW DRUG ADDON: CPT

## 2023-02-07 PROCEDURE — 25010000002 PALONOSETRON 0.25 MG/5ML SOLUTION PREFILLED SYRINGE: Performed by: INTERNAL MEDICINE

## 2023-02-07 PROCEDURE — 63710000001 DIPHENHYDRAMINE PER 50 MG: Performed by: INTERNAL MEDICINE

## 2023-02-07 PROCEDURE — 96411 CHEMO IV PUSH ADDL DRUG: CPT

## 2023-02-07 RX ORDER — DOXORUBICIN HYDROCHLORIDE 2 MG/ML
20 INJECTION, SOLUTION INTRAVENOUS ONCE
Status: CANCELLED | OUTPATIENT
Start: 2023-02-21

## 2023-02-07 RX ORDER — ACETAMINOPHEN 325 MG/1
650 TABLET ORAL ONCE
Status: CANCELLED | OUTPATIENT
Start: 2023-02-21

## 2023-02-07 RX ORDER — PALONOSETRON 0.05 MG/ML
0.25 INJECTION, SOLUTION INTRAVENOUS ONCE
Status: CANCELLED | OUTPATIENT
Start: 2023-02-21

## 2023-02-07 RX ORDER — SODIUM CHLORIDE 9 MG/ML
250 INJECTION, SOLUTION INTRAVENOUS ONCE
Status: CANCELLED | OUTPATIENT
Start: 2023-02-21

## 2023-02-07 RX ORDER — DIPHENHYDRAMINE HCL 25 MG
25 CAPSULE ORAL ONCE
Status: CANCELLED | OUTPATIENT
Start: 2023-02-07

## 2023-02-07 RX ORDER — OLANZAPINE 5 MG/1
5 TABLET ORAL ONCE
Status: COMPLETED | OUTPATIENT
Start: 2023-02-07 | End: 2023-02-07

## 2023-02-07 RX ORDER — ACETAMINOPHEN 325 MG/1
650 TABLET ORAL ONCE
Status: COMPLETED | OUTPATIENT
Start: 2023-02-07 | End: 2023-02-07

## 2023-02-07 RX ORDER — ACETAMINOPHEN 325 MG/1
650 TABLET ORAL ONCE
Status: CANCELLED | OUTPATIENT
Start: 2023-02-07

## 2023-02-07 RX ORDER — OLANZAPINE 5 MG/1
5 TABLET ORAL ONCE
Status: CANCELLED | OUTPATIENT
Start: 2023-02-21 | End: 2023-02-21

## 2023-02-07 RX ORDER — FLUTICASONE PROPIONATE 50 MCG
SPRAY, SUSPENSION (ML) NASAL EVERY 24 HOURS
COMMUNITY
End: 2023-03-14

## 2023-02-07 RX ORDER — PALONOSETRON 0.05 MG/ML
0.25 INJECTION, SOLUTION INTRAVENOUS ONCE
Status: COMPLETED | OUTPATIENT
Start: 2023-02-07 | End: 2023-02-07

## 2023-02-07 RX ORDER — DIPHENHYDRAMINE HCL 25 MG
25 CAPSULE ORAL ONCE
Status: COMPLETED | OUTPATIENT
Start: 2023-02-07 | End: 2023-02-07

## 2023-02-07 RX ORDER — DOXORUBICIN HYDROCHLORIDE 2 MG/ML
20 INJECTION, SOLUTION INTRAVENOUS ONCE
Status: CANCELLED | OUTPATIENT
Start: 2023-02-07

## 2023-02-07 RX ORDER — HEPARIN SODIUM (PORCINE) LOCK FLUSH IV SOLN 100 UNIT/ML 100 UNIT/ML
500 SOLUTION INTRAVENOUS AS NEEDED
Status: DISCONTINUED | OUTPATIENT
Start: 2023-02-07 | End: 2023-02-08 | Stop reason: HOSPADM

## 2023-02-07 RX ORDER — DOXORUBICIN HYDROCHLORIDE 2 MG/ML
20 INJECTION, SOLUTION INTRAVENOUS ONCE
Status: COMPLETED | OUTPATIENT
Start: 2023-02-07 | End: 2023-02-07

## 2023-02-07 RX ORDER — PALONOSETRON 0.05 MG/ML
0.25 INJECTION, SOLUTION INTRAVENOUS ONCE
Status: CANCELLED | OUTPATIENT
Start: 2023-02-07

## 2023-02-07 RX ORDER — HEPARIN SODIUM (PORCINE) LOCK FLUSH IV SOLN 100 UNIT/ML 100 UNIT/ML
500 SOLUTION INTRAVENOUS AS NEEDED
Status: CANCELLED | OUTPATIENT
Start: 2023-02-07

## 2023-02-07 RX ORDER — SODIUM CHLORIDE 9 MG/ML
250 INJECTION, SOLUTION INTRAVENOUS ONCE
Status: COMPLETED | OUTPATIENT
Start: 2023-02-07 | End: 2023-02-07

## 2023-02-07 RX ORDER — OLANZAPINE 5 MG/1
5 TABLET ORAL ONCE
Status: CANCELLED | OUTPATIENT
Start: 2023-02-07 | End: 2023-02-07

## 2023-02-07 RX ORDER — DIPHENHYDRAMINE HCL 25 MG
25 CAPSULE ORAL ONCE
Status: CANCELLED | OUTPATIENT
Start: 2023-02-21

## 2023-02-07 RX ORDER — SODIUM CHLORIDE 9 MG/ML
250 INJECTION, SOLUTION INTRAVENOUS ONCE
Status: CANCELLED | OUTPATIENT
Start: 2023-02-07

## 2023-02-07 RX ADMIN — SODIUM CHLORIDE 150 MG: 9 INJECTION, SOLUTION INTRAVENOUS at 11:03

## 2023-02-07 RX ADMIN — DOXORUBICIN HYDROCHLORIDE 34 MG: 2 INJECTION, SOLUTION INTRAVENOUS at 11:53

## 2023-02-07 RX ADMIN — BRENTUXIMAB VEDOTIN 80 MG: 50 INJECTION, POWDER, LYOPHILIZED, FOR SOLUTION INTRAVENOUS at 12:54

## 2023-02-07 RX ADMIN — DIPHENHYDRAMINE HYDROCHLORIDE 25 MG: 25 CAPSULE ORAL at 12:17

## 2023-02-07 RX ADMIN — PALONOSETRON 0.25 MG: 0.25 INJECTION, SOLUTION INTRAVENOUS at 11:06

## 2023-02-07 RX ADMIN — HEPARIN SODIUM (PORCINE) LOCK FLUSH IV SOLN 100 UNIT/ML 500 UNITS: 100 SOLUTION at 13:27

## 2023-02-07 RX ADMIN — OLANZAPINE 5 MG: 5 TABLET, FILM COATED ORAL at 11:06

## 2023-02-07 RX ADMIN — DEXAMETHASONE SODIUM PHOSPHATE 12 MG: 10 INJECTION, SOLUTION INTRAMUSCULAR; INTRAVENOUS at 11:05

## 2023-02-07 RX ADMIN — VINBLASTINE SULFATE 8 MG: 1 INJECTION INTRAVENOUS at 11:59

## 2023-02-07 RX ADMIN — ACETAMINOPHEN 325MG 650 MG: 325 TABLET ORAL at 12:17

## 2023-02-07 RX ADMIN — DACARBAZINE 520 MG: 10 INJECTION, POWDER, FOR SOLUTION INTRAVENOUS at 12:14

## 2023-02-07 RX ADMIN — SODIUM CHLORIDE 250 ML: 9 INJECTION, SOLUTION INTRAVENOUS at 11:03

## 2023-02-07 NOTE — PROGRESS NOTES
[facility] [place]  Office number [phone]    Fax number [phone]     Follow up     Date: 23    Patient Name: Abraham Wu  MRN: [id number]  : 1950      Chief Complaint: Hodgkin Lymphoma follow up/treatment    Surgeon: Dr. Hiram Viveros MD    Cancer Staging: IV    History of Present Illness: Abraham Wu is a pleasant 72 y.o. male with PMH of hypertension, sleep apnea, hard of hearing who presents today for follow up of Hodgkin Lymphoma.     He initially presented 2022 with intractable diarrhea, low appetite, and a greater than 50 pound weight loss over several month period associated with intermittent fevers.  CT of the chest abdomen pelvis obtained in the ER shows of rectal mass spanning greater than 12 cm with adjacent adenopathy, bulky RP adenopathy, and findings concerning for left renal and liver metastases.  Small right pleural effusion with nodularity.  There was concern for impending obstruction, so he underwent diverting colostomy and biopsy on 2022.  Biopsies from the peritoneam showed a fibrotic nodule histiocytes and eosinophils admixed with CD30 positive large cells.  Rectal biopsies showed involvement by CD30 positive lymphoma, classic Hodgkin lymphoma versus CD30 positive T-cell or B-cell lymphoma.  There was extensive fibrosis and crush artifact.  He underwent repeat transrectal biopsy 10/4/2022 for further characterization which was felt to be most consistent with classical Hodgkin lymphoma.    He initiated chemotherapy with Brentuximab-AVD as an inpatient on 10/8/2022.  Cycle #1 was complicated by GI bleed requiring multiple blood transfusion and ultimately coil artery embolization 10/12; neutropenic fever, Candida esophagitis and mucositis.  He had a prolonged ICU stay  And required enteral feeding.  He was discharged 10/20/2022.    Treatment history:  Brentuximab-AVD: C1 10/8/22  C2: 22 with DA    Interval history:     He is here for consideration of cycle 5 of chemotherapy.   He endorses 2 week history of muscle soreness in association with arthralgias. Symmetric in thighs/calves, also shoulders. Started his cholesterol pill back 2 weeks ago around same time as symptom onset. Walks a lot. No lingering neck pain or LN swelling. Nausea controlled on PRN zofran.   Had a couple days of diarrhea, now resolved. Good ostomy output.   No neuropathy.  Tolerating protonix daily.  Eyes have been off/feels script is off. No dryness. Uses Pataday for seasonal allergies.       Past Medical History:   Past Medical History:   Diagnosis Date   • benign polypoid tissue right lung    • Hyperlipidemia    • Hypertension    • Mycobacterium mucogenicum    • SHERRI (obstructive sleep apnea)    • Seasonal allergies        Past Surgical History:   Past Surgical History:   Procedure Laterality Date   • BRONCHOSCOPY      removal of obstructing polypoid tissue right middle lung   • COLOSTOMY N/A 09/22/2022    Procedure: LAPAROSCOPIC COLOSTOMY CREATION, FLEXIBLE SIGMOIDOSCOPY;  Surgeon: Hiram Viveros MD;  Location:  [facility];  Service: General;  Laterality: N/A;   • ENDOSCOPY N/A 10/10/2022    Procedure: ESOPHAGOGASTRODUODENOSCOPY;  Surgeon: Neeraj Lynn MD;  Location:  [facility];  Service: Gastroenterology;  Laterality: N/A;   • ENDOSCOPY N/A 10/12/2022    Procedure: ESOPHAGOGASTRODUODENOSCOPY;  Surgeon: Brunner, Mark I, MD;  Location:  [facility];  Service: Gastroenterology;  Laterality: N/A;   • EXAM UNDER ANESTHESIA, RECTAL BIOPSY N/A 10/04/2022    Procedure: EXAM UNDER ANESTHESIA, TRANSANAL BIOPSY WITH TRUCUT NEEDLE (LITHOTOMY-CANDY CANE);  Surgeon: Hiram Viveros MD;  Location:  [facility];  Service: General;  Laterality: N/A;   • PERIPHERALLY INSERTED CENTRAL CATHETER INSERTION      Removed 11/28/2022   • VENOUS ACCESS DEVICE (PORT) INSERTION Right 11/28/2022       Family History:   Family History   Problem Relation Age of  Onset   • Diabetes Mother    • Diabetes Father    • Heart disease Father        Social History:   Social History     Socioeconomic History   • Marital status:    Tobacco Use   • Smoking status: Never   • Smokeless tobacco: Never   Vaping Use   • Vaping Use: Never used   Substance and Sexual Activity   • Alcohol use: Not Currently     Comment: previously drank 2 glasses of wine/beer nightly   • Drug use: Never   • Sexual activity: Defer       Medications:     Current Outpatient Medications:   •  fluticasone (FLONASE) 50 MCG/ACT nasal spray, 1 spray into the nostril(s) as directed by provider Daily., Disp: , Rfl:   •  fluticasone (FLONASE) 50 MCG/ACT nasal spray, Daily., Disp: , Rfl:   •  lidocaine-prilocaine (EMLA) 2.5-2.5 % cream, Apply 1 application topically to the appropriate area as directed As Needed (45-60 minutes prior to port access.  Cover with saran/plastic wrap.)., Disp: 30 g, Rfl: 3  •  loratadine (CLARITIN) 10 MG tablet, Take 1 tablet by mouth Daily., Disp: 30 tablet, Rfl: 5  •  OLANZapine (zyPREXA) 5 MG tablet, Take 1 tablet by mouth Every Night. For 3 days on Days 2, 3 and 4 and on days 16, 17, and 18., Disp: 6 tablet, Rfl: 5  •  ondansetron (Zofran) 8 MG tablet, Take 1 tablet by mouth Every 8 (Eight) Hours As Needed for Nausea or Vomiting., Disp: 30 tablet, Rfl: 5  •  pantoprazole (PROTONIX) 40 MG EC tablet, Take 1 tablet by mouth Daily., Disp: 90 tablet, Rfl: 1  •  potassium chloride (K-DUR,KLOR-CON) 20 MEQ CR tablet, Take 1 tablet by mouth Daily., Disp: 30 tablet, Rfl: 5  •  prochlorperazine (COMPAZINE) 5 MG tablet, Take 1 tablet by mouth Every 6 (Six) Hours As Needed for Nausea or Vomiting., Disp: 30 tablet, Rfl: 2  •  rosuvastatin (CRESTOR) 10 MG tablet, , Disp: , Rfl:     Allergies:   No Known Allergies    Objective     Vital Signs:   Vitals:    02/07/23 1002   BP: 126/77   Pulse: 75   Resp: 18   Temp: 97.1 °F (36.2 °C)   TempSrc: Infrared   SpO2: 98%   Weight: 66.7 kg (147 lb)   Height:  "162.6 cm (64.02\")   PainSc: 0-No pain    Body mass index is 25.22 kg/m².   Pain Score    02/07/23 1002   PainSc: 0-No pain       ECOG Performance Status: 1-2    Physical Exam:   General: No acute distress. Well appearing.  HEENT: Normocephalic, atraumatic. Sclera anicteric. Masked.  Neck: supple, 1 cm high right submandibular LN is no longer discretely palpable. He reports it waxes and wanes  Cardiovascular: regular rate and rhythm. No murmurs.   Respiratory: Normal rate. Clear to auscultation bilaterally  Abdomen: Soft, nontender, non distended with normoactive bowel sounds. Ostomy LLQ.  Lymph: no cervical, supraclavicular or axillary adenopathy  Neuro: Alert and oriented x 3. No focal deficits.   Ext: Symmetric, no swelling.   Psych: Euthymic  Skin: right port site       Laboratory/Imaging Reviewed:   Hospital Outpatient Visit on 02/07/2023   Component Date Value Ref Range Status   • Glucose 02/07/2023 82  65 - 99 mg/dL Final   • BUN 02/07/2023 12  8 - 23 mg/dL Final   • Creatinine 02/07/2023 0.68 (L)  0.76 - 1.27 mg/dL Final   • Sodium 02/07/2023 137  136 - 145 mmol/L Final   • Potassium 02/07/2023 4.3  3.5 - 5.2 mmol/L Final    Slight hemolysis detected by analyzer. Results may be affected.   • Chloride 02/07/2023 99  98 - 107 mmol/L Final   • CO2 02/07/2023 28.0  22.0 - 29.0 mmol/L Final   • Calcium 02/07/2023 9.6  8.6 - 10.5 mg/dL Final   • Total Protein 02/07/2023 6.8  6.0 - 8.5 g/dL Final   • Albumin 02/07/2023 4.2  3.5 - 5.2 g/dL Final   • ALT (SGPT) 02/07/2023 13  1 - 41 U/L Final   • AST (SGOT) 02/07/2023 22  1 - 40 U/L Final   • Alkaline Phosphatase 02/07/2023 175 (H)  39 - 117 U/L Final   • Total Bilirubin 02/07/2023 0.4  0.0 - 1.2 mg/dL Final   • Globulin 02/07/2023 2.6  gm/dL Final    Calculated Result   • A/G Ratio 02/07/2023 1.6  g/dL Final   • BUN/Creatinine Ratio 02/07/2023 17.6  7.0 - 25.0 Final   • Anion Gap 02/07/2023 10.0  5.0 - 15.0 mmol/L Final   • eGFR 02/07/2023 100.0  >60.0 mL/min/1.73 " Final   • WBC 02/07/2023 12.48 (H)  3.40 - 10.80 10*3/mm3 Final   • RBC 02/07/2023 3.95 (L)  4.14 - 5.80 10*6/mm3 Final   • Hemoglobin 02/07/2023 12.3 (L)  13.0 - 17.7 g/dL Final   • Hematocrit 02/07/2023 38.0  37.5 - 51.0 % Final   • MCV 02/07/2023 96.2  79.0 - 97.0 fL Final   • MCH 02/07/2023 31.1  26.6 - 33.0 pg Final   • MCHC 02/07/2023 32.4  31.5 - 35.7 g/dL Final   • RDW 02/07/2023 17.7 (H)  12.3 - 15.4 % Final   • RDW-SD 02/07/2023 63.4 (H)  37.0 - 54.0 fl Final   • MPV 02/07/2023 9.3  6.0 - 12.0 fL Final   • Platelets 02/07/2023 246  140 - 450 10*3/mm3 Final   • Neutrophil % 02/07/2023 70.3  42.7 - 76.0 % Final   • Lymphocyte % 02/07/2023 10.3 (L)  19.6 - 45.3 % Final   • Monocyte % 02/07/2023 9.9  5.0 - 12.0 % Final   • Eosinophil % 02/07/2023 5.1  0.3 - 6.2 % Final   • Basophil % 02/07/2023 1.0  0.0 - 1.5 % Final   • Immature Grans % 02/07/2023 3.4 (H)  0.0 - 0.5 % Final   • Neutrophils, Absolute 02/07/2023 8.76 (H)  1.70 - 7.00 10*3/mm3 Final   • Lymphocytes, Absolute 02/07/2023 1.29  0.70 - 3.10 10*3/mm3 Final   • Monocytes, Absolute 02/07/2023 1.24 (H)  0.10 - 0.90 10*3/mm3 Final   • Eosinophils, Absolute 02/07/2023 0.64 (H)  0.00 - 0.40 10*3/mm3 Final   • Basophils, Absolute 02/07/2023 0.13  0.00 - 0.20 10*3/mm3 Final   • Immature Grans, Absolute 02/07/2023 0.42 (H)  0.00 - 0.05 10*3/mm3 Final       Assessment / Plan      Assessment/Plan:   1.  CD30 positive classical Hodgkin's lymphoma  2.  Chemotherapy monitoring  -He is status post cycle 2 of BV AVD.  C1 Day 15 treatment was held due to pancytopenia, neutropenic fever, and active GI bleed, but has now resumed treatment with 20% dose reduction in AVD and continued full dose brentuximab.   -I personally reviewed his PET/CT in 12/2022 shows: Rectal mass has decreased by several cm, resolution of hydronephrosis, liver lesions no longer visible, no persistent pleural effusion. No FDG avid disease. I have requested a formal comparison by the radiologist  (original studies were not available to radiology at time of PET due to need to switch facilities bc of equipment failure) but on my review, consistent with treatment response and clearly he is improving clinically.  He has mild FDG asymmetry in the submandibular gland, asymmetric. He reports this is smaller than prior. We will watch on exam.   -CBC/CMP reviewed and adequate for cycle 5 with ongoing improvement in anemia,Mild leukocytosis secondary to Neulasta adequate plt, and normal renal and hepatic function. Continue to monitor every 2 weeks.   -Continue to monitor for cumulative treatment related adverse events. No infectious symptoms or neuropathy currently.  -Interim PET with partial response. Plan PET after cycle 6--ordered and discussed    3.  GI bleed  -PPI, continue    4. Muscle cramps  -New after statin initiation  -CK obtained today normal  -Encouraged him to discuss whether interruption/alternative statin should be considered with his PCP     5. Access  -Port    6. Bone pain  -Added claritin  -Increased cramping this cycle, see 4    7. SHERRI  Previously used CPAP, no longer snoring since weight loss, but O2 was dropping at night in the hospital. Repeat sleep study pending in March    Follow Up:   2 weeks    Kaycee Leary MD  Hematology and Oncology

## 2023-02-07 NOTE — PATIENT INSTRUCTIONS
Refresh preservative free to both eyes twice daily.    Call your doctor about the muscle soreness following cholesterol medication start.

## 2023-02-08 ENCOUNTER — HOSPITAL ENCOUNTER (OUTPATIENT)
Dept: ONCOLOGY | Facility: HOSPITAL | Age: 73
Discharge: HOME | End: 2023-02-08
Admitting: INTERNAL MEDICINE
Payer: MEDICARE

## 2023-02-08 VITALS
HEART RATE: 71 BPM | DIASTOLIC BLOOD PRESSURE: 70 MMHG | BODY MASS INDEX: 25.1 KG/M2 | TEMPERATURE: 97.7 F | HEIGHT: 64 IN | SYSTOLIC BLOOD PRESSURE: 131 MMHG | RESPIRATION RATE: 18 BRPM | WEIGHT: 147 LBS

## 2023-02-08 DIAGNOSIS — C81.98 HODGKIN LYMPHOMA OF LYMPH NODES OF MULTIPLE REGIONS, UNSPECIFIED HODGKIN LYMPHOMA TYPE: Primary | ICD-10-CM

## 2023-02-08 PROCEDURE — 25010000002 PEGFILGRASTIM-APGF 6 MG/0.6ML SOLUTION PREFILLED SYRINGE: Performed by: INTERNAL MEDICINE

## 2023-02-08 PROCEDURE — 96372 THER/PROPH/DIAG INJ SC/IM: CPT

## 2023-02-08 RX ADMIN — PEGFILGRASTIM-APGF 6 MG: 6 INJECTION, SOLUTION SUBCUTANEOUS at 14:10

## 2023-02-21 ENCOUNTER — OFFICE VISIT (OUTPATIENT)
Dept: ONCOLOGY | Facility: CLINIC | Age: 73
End: 2023-02-21
Payer: MEDICARE

## 2023-02-21 ENCOUNTER — APPOINTMENT (OUTPATIENT)
Dept: ONCOLOGY | Facility: HOSPITAL | Age: 73
End: 2023-02-21
Payer: MEDICARE

## 2023-02-21 ENCOUNTER — HOSPITAL ENCOUNTER (OUTPATIENT)
Dept: ONCOLOGY | Facility: HOSPITAL | Age: 73
Discharge: HOME | End: 2023-02-21
Admitting: INTERNAL MEDICINE
Payer: MEDICARE

## 2023-02-21 VITALS
OXYGEN SATURATION: 98 % | HEART RATE: 75 BPM | TEMPERATURE: 97.1 F | BODY MASS INDEX: 25.78 KG/M2 | DIASTOLIC BLOOD PRESSURE: 76 MMHG | SYSTOLIC BLOOD PRESSURE: 131 MMHG | WEIGHT: 151 LBS | HEIGHT: 64 IN | RESPIRATION RATE: 18 BRPM

## 2023-02-21 DIAGNOSIS — Z45.2 ENCOUNTER FOR CARE RELATED TO VASCULAR ACCESS PORT: ICD-10-CM

## 2023-02-21 DIAGNOSIS — Z51.11 CHEMOTHERAPY MANAGEMENT, ENCOUNTER FOR: Primary | ICD-10-CM

## 2023-02-21 DIAGNOSIS — C81.98 HODGKIN LYMPHOMA OF LYMPH NODES OF MULTIPLE REGIONS, UNSPECIFIED HODGKIN LYMPHOMA TYPE: Primary | ICD-10-CM

## 2023-02-21 LAB
ALBUMIN SERPL-MCNC: 4 G/DL (ref 3.5–5.2)
ALBUMIN/GLOB SERPL: 1.7 G/DL
ALP SERPL-CCNC: 157 U/L (ref 39–117)
ALT SERPL W P-5'-P-CCNC: 11 U/L (ref 1–41)
ANION GAP SERPL CALCULATED.3IONS-SCNC: 9 MMOL/L (ref 5–15)
AST SERPL-CCNC: 16 U/L (ref 1–40)
BASOPHILS # BLD AUTO: 0.12 10*3/MM3 (ref 0–0.2)
BASOPHILS NFR BLD AUTO: 0.7 % (ref 0–1.5)
BILIRUB SERPL-MCNC: 0.3 MG/DL (ref 0–1.2)
BUN SERPL-MCNC: 11 MG/DL (ref 8–23)
BUN/CREAT SERPL: 14.9 (ref 7–25)
CALCIUM SPEC-SCNC: 9.3 MG/DL (ref 8.6–10.5)
CHLORIDE SERPL-SCNC: 98 MMOL/L (ref 98–107)
CO2 SERPL-SCNC: 29 MMOL/L (ref 22–29)
CREAT SERPL-MCNC: 0.74 MG/DL (ref 0.76–1.27)
DEPRECATED RDW RBC AUTO: 62.6 FL (ref 37–54)
EGFRCR SERPLBLD CKD-EPI 2021: 97.5 ML/MIN/1.73
EOSINOPHIL # BLD AUTO: 0.53 10*3/MM3 (ref 0–0.4)
EOSINOPHIL NFR BLD AUTO: 3.3 % (ref 0.3–6.2)
ERYTHROCYTE [DISTWIDTH] IN BLOOD BY AUTOMATED COUNT: 17.9 % (ref 12.3–15.4)
GLOBULIN UR ELPH-MCNC: 2.4 GM/DL
GLUCOSE SERPL-MCNC: 93 MG/DL (ref 65–99)
HCT VFR BLD AUTO: 36.8 % (ref 37.5–51)
HGB BLD-MCNC: 12.2 G/DL (ref 13–17.7)
IMM GRANULOCYTES # BLD AUTO: 0.45 10*3/MM3 (ref 0–0.05)
IMM GRANULOCYTES NFR BLD AUTO: 2.8 % (ref 0–0.5)
LYMPHOCYTES # BLD AUTO: 1.39 10*3/MM3 (ref 0.7–3.1)
LYMPHOCYTES NFR BLD AUTO: 8.7 % (ref 19.6–45.3)
MCH RBC QN AUTO: 31.9 PG (ref 26.6–33)
MCHC RBC AUTO-ENTMCNC: 33.2 G/DL (ref 31.5–35.7)
MCV RBC AUTO: 96.3 FL (ref 79–97)
MONOCYTES # BLD AUTO: 1.49 10*3/MM3 (ref 0.1–0.9)
MONOCYTES NFR BLD AUTO: 9.3 % (ref 5–12)
NEUTROPHILS NFR BLD AUTO: 12.05 10*3/MM3 (ref 1.7–7)
NEUTROPHILS NFR BLD AUTO: 75.2 % (ref 42.7–76)
PLATELET # BLD AUTO: 238 10*3/MM3 (ref 140–450)
PMV BLD AUTO: 9.8 FL (ref 6–12)
POTASSIUM SERPL-SCNC: 4.3 MMOL/L (ref 3.5–5.2)
PROT SERPL-MCNC: 6.4 G/DL (ref 6–8.5)
RBC # BLD AUTO: 3.82 10*6/MM3 (ref 4.14–5.8)
SODIUM SERPL-SCNC: 136 MMOL/L (ref 136–145)
WBC NRBC COR # BLD: 16.03 10*3/MM3 (ref 3.4–10.8)

## 2023-02-21 PROCEDURE — 25010000002 HEPARIN LOCK FLUSH PER 10 UNITS: Performed by: INTERNAL MEDICINE

## 2023-02-21 PROCEDURE — 25010000002 PALONOSETRON 0.25 MG/5ML SOLUTION PREFILLED SYRINGE: Performed by: INTERNAL MEDICINE

## 2023-02-21 PROCEDURE — 96375 TX/PRO/DX INJ NEW DRUG ADDON: CPT

## 2023-02-21 PROCEDURE — 85025 COMPLETE CBC W/AUTO DIFF WBC: CPT | Performed by: INTERNAL MEDICINE

## 2023-02-21 PROCEDURE — A9270 NON-COVERED ITEM OR SERVICE: HCPCS | Performed by: INTERNAL MEDICINE

## 2023-02-21 PROCEDURE — 25010000002 DACARBAZINE PER 200 MG: Performed by: INTERNAL MEDICINE

## 2023-02-21 PROCEDURE — 25010000002 VINBLASTINE PER 1 MG: Performed by: INTERNAL MEDICINE

## 2023-02-21 PROCEDURE — 63710000001 DIPHENHYDRAMINE PER 50 MG: Performed by: INTERNAL MEDICINE

## 2023-02-21 PROCEDURE — 96413 CHEMO IV INFUSION 1 HR: CPT

## 2023-02-21 PROCEDURE — 25010000002 DEXAMETHASONE SODIUM PHOSPHATE 100 MG/10ML SOLUTION: Performed by: INTERNAL MEDICINE

## 2023-02-21 PROCEDURE — 63710000001 ACETAMINOPHEN 325 MG TABLET: Performed by: INTERNAL MEDICINE

## 2023-02-21 PROCEDURE — 80053 COMPREHEN METABOLIC PANEL: CPT | Performed by: INTERNAL MEDICINE

## 2023-02-21 PROCEDURE — 25010000002 DOXORUBICIN PER 10 MG: Performed by: INTERNAL MEDICINE

## 2023-02-21 PROCEDURE — 25010000002 FOSAPREPITANT PER 1 MG: Performed by: INTERNAL MEDICINE

## 2023-02-21 PROCEDURE — 99214 OFFICE O/P EST MOD 30 MIN: CPT | Performed by: INTERNAL MEDICINE

## 2023-02-21 PROCEDURE — 25010000002 BRENTUXIMAB 50 MG RECONSTITUTED SOLUTION 1 EACH VIAL: Performed by: INTERNAL MEDICINE

## 2023-02-21 PROCEDURE — 96368 THER/DIAG CONCURRENT INF: CPT

## 2023-02-21 PROCEDURE — 96411 CHEMO IV PUSH ADDL DRUG: CPT

## 2023-02-21 PROCEDURE — 63710000001 OLANZAPINE 5 MG TABLET: Performed by: INTERNAL MEDICINE

## 2023-02-21 PROCEDURE — 96367 TX/PROPH/DG ADDL SEQ IV INF: CPT

## 2023-02-21 PROCEDURE — 96409 CHEMO IV PUSH SNGL DRUG: CPT

## 2023-02-21 PROCEDURE — 96417 CHEMO IV INFUS EACH ADDL SEQ: CPT

## 2023-02-21 RX ORDER — OLANZAPINE 5 MG/1
5 TABLET ORAL ONCE
Status: COMPLETED | OUTPATIENT
Start: 2023-02-21 | End: 2023-02-21

## 2023-02-21 RX ORDER — SODIUM CHLORIDE 9 MG/ML
250 INJECTION, SOLUTION INTRAVENOUS ONCE
Status: COMPLETED | OUTPATIENT
Start: 2023-02-21 | End: 2023-02-21

## 2023-02-21 RX ORDER — POTASSIUM CHLORIDE 1500 MG/1
TABLET, EXTENDED RELEASE ORAL
COMMUNITY
Start: 2023-02-07

## 2023-02-21 RX ORDER — DOXORUBICIN HYDROCHLORIDE 2 MG/ML
20 INJECTION, SOLUTION INTRAVENOUS ONCE
Status: COMPLETED | OUTPATIENT
Start: 2023-02-21 | End: 2023-02-21

## 2023-02-21 RX ORDER — PALONOSETRON 0.05 MG/ML
0.25 INJECTION, SOLUTION INTRAVENOUS ONCE
Status: COMPLETED | OUTPATIENT
Start: 2023-02-21 | End: 2023-02-21

## 2023-02-21 RX ORDER — HEPARIN SODIUM (PORCINE) LOCK FLUSH IV SOLN 100 UNIT/ML 100 UNIT/ML
500 SOLUTION INTRAVENOUS AS NEEDED
Status: DISCONTINUED | OUTPATIENT
Start: 2023-02-21 | End: 2023-02-22 | Stop reason: HOSPADM

## 2023-02-21 RX ORDER — ACETAMINOPHEN 325 MG/1
650 TABLET ORAL ONCE
Status: COMPLETED | OUTPATIENT
Start: 2023-02-21 | End: 2023-02-21

## 2023-02-21 RX ORDER — DIPHENHYDRAMINE HCL 25 MG
25 CAPSULE ORAL ONCE
Status: COMPLETED | OUTPATIENT
Start: 2023-02-21 | End: 2023-02-21

## 2023-02-21 RX ORDER — HEPARIN SODIUM (PORCINE) LOCK FLUSH IV SOLN 100 UNIT/ML 100 UNIT/ML
500 SOLUTION INTRAVENOUS AS NEEDED
Status: CANCELLED | OUTPATIENT
Start: 2023-02-21

## 2023-02-21 RX ADMIN — PALONOSETRON 0.25 MG: 0.25 INJECTION, SOLUTION INTRAVENOUS at 09:05

## 2023-02-21 RX ADMIN — SODIUM CHLORIDE 150 MG: 9 INJECTION, SOLUTION INTRAVENOUS at 09:07

## 2023-02-21 RX ADMIN — DACARBAZINE 520 MG: 10 INJECTION, POWDER, FOR SOLUTION INTRAVENOUS at 10:34

## 2023-02-21 RX ADMIN — VINBLASTINE SULFATE 8 MG: 1 INJECTION INTRAVENOUS at 10:12

## 2023-02-21 RX ADMIN — HEPARIN SODIUM (PORCINE) LOCK FLUSH IV SOLN 100 UNIT/ML 500 UNITS: 100 SOLUTION at 12:18

## 2023-02-21 RX ADMIN — DOXORUBICIN HYDROCHLORIDE 34 MG: 2 INJECTION, SOLUTION INTRAVENOUS at 10:05

## 2023-02-21 RX ADMIN — ACETAMINOPHEN 325MG 650 MG: 325 TABLET ORAL at 11:07

## 2023-02-21 RX ADMIN — DIPHENHYDRAMINE HYDROCHLORIDE 25 MG: 25 CAPSULE ORAL at 11:07

## 2023-02-21 RX ADMIN — DEXAMETHASONE SODIUM PHOSPHATE 12 MG: 10 INJECTION, SOLUTION INTRAMUSCULAR; INTRAVENOUS at 09:06

## 2023-02-21 RX ADMIN — SODIUM CHLORIDE 250 ML: 9 INJECTION, SOLUTION INTRAVENOUS at 09:00

## 2023-02-21 RX ADMIN — OLANZAPINE 5 MG: 5 TABLET, FILM COATED ORAL at 09:05

## 2023-02-21 RX ADMIN — BRENTUXIMAB VEDOTIN 80 MG: 50 INJECTION, POWDER, LYOPHILIZED, FOR SOLUTION INTRAVENOUS at 11:40

## 2023-02-21 NOTE — PROGRESS NOTES
[facility]ology [facility] [place]  Office number [phone]    Fax number [phone]     Follow up     Date: 23    Patient Name: Abraham Wu  MRN: [id number]  : 1950      Chief Complaint: Hodgkin Lymphoma follow up/treatment    Surgeon: Dr. Hiram Viveros MD    Cancer Staging: IV    History of Present Illness: Abraham Wu is a pleasant 72 y.o. male with PMH of hypertension, sleep apnea, hard of hearing who presents today for follow up of Hodgkin Lymphoma.     He initially presented 2022 with intractable diarrhea, low appetite, and a greater than 50 pound weight loss over several month period associated with intermittent fevers.  CT of the chest abdomen pelvis obtained in the ER shows of rectal mass spanning greater than 12 cm with adjacent adenopathy, bulky RP adenopathy, and findings concerning for left renal and liver metastases.  Small right pleural effusion with nodularity.  There was concern for impending obstruction, so he underwent diverting colostomy and biopsy on 2022.  Biopsies from the peritoneam showed a fibrotic nodule histiocytes and eosinophils admixed with CD30 positive large cells.  Rectal biopsies showed involvement by CD30 positive lymphoma, classic Hodgkin lymphoma versus CD30 positive T-cell or B-cell lymphoma.  There was extensive fibrosis and crush artifact.  He underwent repeat transrectal biopsy 10/4/2022 for further characterization which was felt to be most consistent with classical Hodgkin lymphoma.    He initiated chemotherapy with Brentuximab-AVD as an inpatient on 10/8/2022.  Cycle #1 was complicated by GI bleed requiring multiple blood transfusion and ultimately coil artery embolization 10/12; neutropenic fever, Candida esophagitis and mucositis.  He had a prolonged ICU stay  And required enteral feeding.  He was discharged 10/20/2022.    Treatment history:  Brentuximab-AVD: C1 10/8/22  C2: 22 with DA    Interval history:     He is here for consideration of cycle 5 day 15 of chemotherapy.   No neuropathy or fine motor changes.   Mild fatigue, remaining active, walking a lot.   Not back pain. Mild joint stiffness.   Ended up staying on his cholesterol med and muscle soreness has improved.   Nausea controlled on PRN zofran.   GERD controlled on scheduled Protonix.   Good ostomy output. Uses miralax 2 x weekly on average.   No fever or infectious symptoms.       Past Medical History:   Past Medical History:   Diagnosis Date   • benign polypoid tissue right lung    • Hyperlipidemia    • Hypertension    • Mycobacterium mucogenicum    • SHERRI (obstructive sleep apnea)    • Seasonal allergies        Past Surgical History:   Past Surgical History:   Procedure Laterality Date   • BRONCHOSCOPY      removal of obstructing polypoid tissue right middle lung   • COLOSTOMY N/A 09/22/2022    Procedure: LAPAROSCOPIC COLOSTOMY CREATION, FLEXIBLE SIGMOIDOSCOPY;  Surgeon: Hiram Viveros MD;  Location:  [facility];  Service: General;  Laterality: N/A;   • ENDOSCOPY N/A 10/10/2022    Procedure: ESOPHAGOGASTRODUODENOSCOPY;  Surgeon: Neeraj Lynn MD;  Location:  [facility];  Service: Gastroenterology;  Laterality: N/A;   • ENDOSCOPY N/A 10/12/2022    Procedure: ESOPHAGOGASTRODUODENOSCOPY;  Surgeon: Brunner, Mark I, MD;  Location:  [facility];  Service: Gastroenterology;  Laterality: N/A;   • EXAM UNDER ANESTHESIA, RECTAL BIOPSY N/A 10/04/2022    Procedure: EXAM UNDER ANESTHESIA, TRANSANAL BIOPSY WITH TRUCUT NEEDLE (LITHOTOMY-CANDY CANE);  Surgeon: Hiram Viveros MD;  Location:  [facility];  Service: General;  Laterality: N/A;   • PERIPHERALLY INSERTED CENTRAL CATHETER INSERTION      Removed 11/28/2022   • VENOUS ACCESS DEVICE (PORT) INSERTION Right 11/28/2022       Family History:   Family History   Problem Relation Age of Onset   • Diabetes Mother    • Diabetes Father    • Heart disease Father        Social History:   Social History  "    Socioeconomic History   • Marital status:    Tobacco Use   • Smoking status: Never   • Smokeless tobacco: Never   Vaping Use   • Vaping Use: Never used   Substance and Sexual Activity   • Alcohol use: Not Currently     Comment: previously drank 2 glasses of wine/beer nightly   • Drug use: Never   • Sexual activity: Defer       Medications:     Current Outpatient Medications:   •  fluticasone (FLONASE) 50 MCG/ACT nasal spray, 1 spray into the nostril(s) as directed by provider Daily., Disp: , Rfl:   •  fluticasone (FLONASE) 50 MCG/ACT nasal spray, Daily., Disp: , Rfl:   •  KLOR-CON 20 MEQ CR tablet, , Disp: , Rfl:   •  lidocaine-prilocaine (EMLA) 2.5-2.5 % cream, Apply 1 application topically to the appropriate area as directed As Needed (45-60 minutes prior to port access.  Cover with saran/plastic wrap.)., Disp: 30 g, Rfl: 3  •  loratadine (CLARITIN) 10 MG tablet, Take 1 tablet by mouth Daily., Disp: 30 tablet, Rfl: 5  •  OLANZapine (zyPREXA) 5 MG tablet, Take 1 tablet by mouth Every Night. For 3 days on Days 2, 3 and 4 and on days 16, 17, and 18., Disp: 6 tablet, Rfl: 5  •  ondansetron (Zofran) 8 MG tablet, Take 1 tablet by mouth Every 8 (Eight) Hours As Needed for Nausea or Vomiting., Disp: 30 tablet, Rfl: 5  •  pantoprazole (PROTONIX) 40 MG EC tablet, Take 1 tablet by mouth Daily., Disp: 90 tablet, Rfl: 1  •  prochlorperazine (COMPAZINE) 5 MG tablet, Take 1 tablet by mouth Every 6 (Six) Hours As Needed for Nausea or Vomiting., Disp: 30 tablet, Rfl: 2  •  rosuvastatin (CRESTOR) 10 MG tablet, , Disp: , Rfl:     Allergies:   No Known Allergies    Objective     Vital Signs:   Vitals:    02/21/23 0815   BP: 131/76   Pulse: 75   Resp: 18   Temp: 97.1 °F (36.2 °C)   TempSrc: Infrared   SpO2: 98%   Weight: 68.5 kg (151 lb)   Height: 162.6 cm (64.02\")   PainSc: 0-No pain    Body mass index is 25.91 kg/m².   Pain Score    02/21/23 0815   PainSc: 0-No pain       ECOG Performance Status: 1-2    Physical Exam: "   General: No acute distress. Well appearing.  HEENT: Normocephalic, atraumatic. Sclera anicteric. OP clear  Neck: supple, 1 cm high right submandibular LN soft   Cardiovascular: regular rate and rhythm. No murmurs.   Respiratory: Normal rate. Clear to auscultation bilaterally  Abdomen: Soft, nontender, non distended with normoactive bowel sounds. Ostomy LLQ.  Lymph: no supraclavicular or axillary adenopathy  Neuro: Alert and oriented x 3. No focal deficits.   Ext: Symmetric, no swelling.   Psych: Euthymic  Skin: right port site       Laboratory/Imaging Reviewed:   Hospital Outpatient Visit on 02/21/2023   Component Date Value Ref Range Status   • Glucose 02/21/2023 93  65 - 99 mg/dL Final   • BUN 02/21/2023 11  8 - 23 mg/dL Final   • Creatinine 02/21/2023 0.74 (L)  0.76 - 1.27 mg/dL Final   • Sodium 02/21/2023 136  136 - 145 mmol/L Final   • Potassium 02/21/2023 4.3  3.5 - 5.2 mmol/L Final   • Chloride 02/21/2023 98  98 - 107 mmol/L Final   • CO2 02/21/2023 29.0  22.0 - 29.0 mmol/L Final   • Calcium 02/21/2023 9.3  8.6 - 10.5 mg/dL Final   • Total Protein 02/21/2023 6.4  6.0 - 8.5 g/dL Final   • Albumin 02/21/2023 4.0  3.5 - 5.2 g/dL Final   • ALT (SGPT) 02/21/2023 11  1 - 41 U/L Final   • AST (SGOT) 02/21/2023 16  1 - 40 U/L Final   • Alkaline Phosphatase 02/21/2023 157 (H)  39 - 117 U/L Final   • Total Bilirubin 02/21/2023 0.3  0.0 - 1.2 mg/dL Final   • Globulin 02/21/2023 2.4  gm/dL Final    Calculated Result   • A/G Ratio 02/21/2023 1.7  g/dL Final   • BUN/Creatinine Ratio 02/21/2023 14.9  7.0 - 25.0 Final   • Anion Gap 02/21/2023 9.0  5.0 - 15.0 mmol/L Final   • eGFR 02/21/2023 97.5  >60.0 mL/min/1.73 Final       Assessment / Plan      Assessment/Plan:   1.  CD30 positive classical Hodgkin's lymphoma  2.  Chemotherapy monitoring  -He is status post cycle 2 of BV AVD.  C1 Day 15 treatment was held due to pancytopenia, neutropenic fever, and active GI bleed, but has now resumed treatment with 20% dose reduction  in AVD and continued full dose brentuximab.   -I personally reviewed his PET/CT in 12/2022 shows: Rectal mass has decreased by several cm, resolution of hydronephrosis, liver lesions no longer visible, no persistent pleural effusion. No FDG avid disease. I have requested a formal comparison by the radiologist (original studies were not available to radiology at time of PET due to need to switch facilities bc of equipment failure) but on my review, consistent with treatment response and clearly he is improving clinically.  He has mild FDG asymmetry in the submandibular gland, asymmetric. He reports this is smaller than prior. We will watch on exam.   -CMP reviewed and adequate for cycle 5 day 15 with normal creatinine and LFTs, CBC is pending.   Continue to monitor every 2 weeks.   -Continue to monitor for cumulative treatment related adverse events. No infectious symptoms or neuropathy currently.  -Interim PET with partial response. Plan PET after cycle 6--pending 4/2/23    3.  GI bleed  -PPI, continue    4. Muscle cramps  -Better today  -New after statin initiation, but has now resolved. He continues statin. LFTs stable and CK was normal.      5. Access  -Port    6. Bone pain  -Added claritin  -Better    7. SHERRI  Previously used CPAP, no longer snoring since weight loss, but O2 was dropping at night in the hospital. Repeat sleep study pending in March 8. Chemo nausea  -Well controlled on zofran, continue.     Follow Up:   2 weeks    Kaycee Leary MD  Hematology and Oncology

## 2023-02-22 ENCOUNTER — HOSPITAL ENCOUNTER (OUTPATIENT)
Dept: ONCOLOGY | Facility: HOSPITAL | Age: 73
Discharge: HOME | End: 2023-02-22
Admitting: INTERNAL MEDICINE
Payer: MEDICARE

## 2023-02-22 VITALS
RESPIRATION RATE: 16 BRPM | WEIGHT: 152 LBS | SYSTOLIC BLOOD PRESSURE: 128 MMHG | BODY MASS INDEX: 25.95 KG/M2 | TEMPERATURE: 98.3 F | HEIGHT: 64 IN | HEART RATE: 72 BPM | DIASTOLIC BLOOD PRESSURE: 75 MMHG

## 2023-02-22 DIAGNOSIS — C81.98 HODGKIN LYMPHOMA OF LYMPH NODES OF MULTIPLE REGIONS, UNSPECIFIED HODGKIN LYMPHOMA TYPE: Primary | ICD-10-CM

## 2023-02-22 PROCEDURE — 25010000002 PEGFILGRASTIM-APGF 6 MG/0.6ML SOLUTION PREFILLED SYRINGE: Performed by: INTERNAL MEDICINE

## 2023-02-22 PROCEDURE — 96372 THER/PROPH/DIAG INJ SC/IM: CPT

## 2023-02-22 RX ADMIN — PEGFILGRASTIM-APGF 6 MG: 6 INJECTION, SOLUTION SUBCUTANEOUS at 12:56

## 2023-03-06 DIAGNOSIS — C81.98 HODGKIN LYMPHOMA OF LYMPH NODES OF MULTIPLE REGIONS, UNSPECIFIED HODGKIN LYMPHOMA TYPE: Primary | ICD-10-CM

## 2023-03-07 ENCOUNTER — HOSPITAL ENCOUNTER (OUTPATIENT)
Dept: ONCOLOGY | Facility: HOSPITAL | Age: 73
Discharge: HOME | End: 2023-03-07
Admitting: INTERNAL MEDICINE
Payer: MEDICARE

## 2023-03-07 ENCOUNTER — OFFICE VISIT (OUTPATIENT)
Dept: ONCOLOGY | Facility: CLINIC | Age: 73
End: 2023-03-07
Payer: MEDICARE

## 2023-03-07 ENCOUNTER — APPOINTMENT (OUTPATIENT)
Dept: ONCOLOGY | Facility: HOSPITAL | Age: 73
End: 2023-03-07
Payer: MEDICARE

## 2023-03-07 VITALS
BODY MASS INDEX: 25.95 KG/M2 | TEMPERATURE: 97.3 F | DIASTOLIC BLOOD PRESSURE: 73 MMHG | HEIGHT: 64 IN | SYSTOLIC BLOOD PRESSURE: 128 MMHG | HEART RATE: 77 BPM | WEIGHT: 152 LBS | OXYGEN SATURATION: 98 % | RESPIRATION RATE: 18 BRPM

## 2023-03-07 DIAGNOSIS — Z45.2 ENCOUNTER FOR CARE RELATED TO VASCULAR ACCESS PORT: ICD-10-CM

## 2023-03-07 DIAGNOSIS — C81.98 HODGKIN LYMPHOMA OF LYMPH NODES OF MULTIPLE REGIONS, UNSPECIFIED HODGKIN LYMPHOMA TYPE: Primary | ICD-10-CM

## 2023-03-07 LAB
ALBUMIN SERPL-MCNC: 3.9 G/DL (ref 3.5–5.2)
ALBUMIN/GLOB SERPL: 1.7 G/DL
ALP SERPL-CCNC: 160 U/L (ref 39–117)
ALT SERPL W P-5'-P-CCNC: 12 U/L (ref 1–41)
ANION GAP SERPL CALCULATED.3IONS-SCNC: 7 MMOL/L (ref 5–15)
AST SERPL-CCNC: 17 U/L (ref 1–40)
BASOPHILS # BLD AUTO: 0.11 10*3/MM3 (ref 0–0.2)
BASOPHILS NFR BLD AUTO: 0.7 % (ref 0–1.5)
BILIRUB SERPL-MCNC: 0.3 MG/DL (ref 0–1.2)
BUN SERPL-MCNC: 13 MG/DL (ref 8–23)
BUN/CREAT SERPL: 20.3 (ref 7–25)
CALCIUM SPEC-SCNC: 8.9 MG/DL (ref 8.6–10.5)
CHLORIDE SERPL-SCNC: 96 MMOL/L (ref 98–107)
CO2 SERPL-SCNC: 28 MMOL/L (ref 22–29)
CREAT SERPL-MCNC: 0.64 MG/DL (ref 0.76–1.27)
DEPRECATED RDW RBC AUTO: 65 FL (ref 37–54)
EGFRCR SERPLBLD CKD-EPI 2021: 101.8 ML/MIN/1.73
EOSINOPHIL # BLD AUTO: 0.47 10*3/MM3 (ref 0–0.4)
EOSINOPHIL NFR BLD AUTO: 3 % (ref 0.3–6.2)
ERYTHROCYTE [DISTWIDTH] IN BLOOD BY AUTOMATED COUNT: 18.2 % (ref 12.3–15.4)
GLOBULIN UR ELPH-MCNC: 2.3 GM/DL
GLUCOSE SERPL-MCNC: 96 MG/DL (ref 65–99)
HCT VFR BLD AUTO: 36.1 % (ref 37.5–51)
HGB BLD-MCNC: 12 G/DL (ref 13–17.7)
IMM GRANULOCYTES # BLD AUTO: 0.42 10*3/MM3 (ref 0–0.05)
IMM GRANULOCYTES NFR BLD AUTO: 2.7 % (ref 0–0.5)
LYMPHOCYTES # BLD AUTO: 1.48 10*3/MM3 (ref 0.7–3.1)
LYMPHOCYTES NFR BLD AUTO: 9.4 % (ref 19.6–45.3)
MCH RBC QN AUTO: 32.2 PG (ref 26.6–33)
MCHC RBC AUTO-ENTMCNC: 33.2 G/DL (ref 31.5–35.7)
MCV RBC AUTO: 96.8 FL (ref 79–97)
MONOCYTES # BLD AUTO: 1.56 10*3/MM3 (ref 0.1–0.9)
MONOCYTES NFR BLD AUTO: 9.9 % (ref 5–12)
NEUTROPHILS NFR BLD AUTO: 11.74 10*3/MM3 (ref 1.7–7)
NEUTROPHILS NFR BLD AUTO: 74.3 % (ref 42.7–76)
PLATELET # BLD AUTO: 226 10*3/MM3 (ref 140–450)
PMV BLD AUTO: 9.5 FL (ref 6–12)
POTASSIUM SERPL-SCNC: 4.2 MMOL/L (ref 3.5–5.2)
PROT SERPL-MCNC: 6.2 G/DL (ref 6–8.5)
RBC # BLD AUTO: 3.73 10*6/MM3 (ref 4.14–5.8)
SODIUM SERPL-SCNC: 131 MMOL/L (ref 136–145)
WBC NRBC COR # BLD: 15.78 10*3/MM3 (ref 3.4–10.8)

## 2023-03-07 PROCEDURE — 63710000001 ACETAMINOPHEN 325 MG TABLET: Performed by: INTERNAL MEDICINE

## 2023-03-07 PROCEDURE — 63710000001 DIPHENHYDRAMINE PER 50 MG: Performed by: INTERNAL MEDICINE

## 2023-03-07 PROCEDURE — 25010000002 PALONOSETRON PER 25 MCG: Performed by: INTERNAL MEDICINE

## 2023-03-07 PROCEDURE — 96375 TX/PRO/DX INJ NEW DRUG ADDON: CPT

## 2023-03-07 PROCEDURE — 25010000002 FOSAPREPITANT PER 1 MG: Performed by: INTERNAL MEDICINE

## 2023-03-07 PROCEDURE — 25010000002 HEPARIN LOCK FLUSH PER 10 UNITS: Performed by: INTERNAL MEDICINE

## 2023-03-07 PROCEDURE — 96367 TX/PROPH/DG ADDL SEQ IV INF: CPT

## 2023-03-07 PROCEDURE — A9270 NON-COVERED ITEM OR SERVICE: HCPCS | Performed by: INTERNAL MEDICINE

## 2023-03-07 PROCEDURE — 25010000002 VINBLASTINE PER 1 MG: Performed by: INTERNAL MEDICINE

## 2023-03-07 PROCEDURE — 25010000002 BRENTUXIMAB 50 MG RECONSTITUTED SOLUTION 1 EACH VIAL: Performed by: INTERNAL MEDICINE

## 2023-03-07 PROCEDURE — 25010000002 DEXAMETHASONE SODIUM PHOSPHATE 100 MG/10ML SOLUTION: Performed by: INTERNAL MEDICINE

## 2023-03-07 PROCEDURE — 99215 OFFICE O/P EST HI 40 MIN: CPT | Performed by: INTERNAL MEDICINE

## 2023-03-07 PROCEDURE — 25010000002 DACARBAZINE PER 200 MG: Performed by: INTERNAL MEDICINE

## 2023-03-07 PROCEDURE — 96417 CHEMO IV INFUS EACH ADDL SEQ: CPT

## 2023-03-07 PROCEDURE — 80053 COMPREHEN METABOLIC PANEL: CPT | Performed by: INTERNAL MEDICINE

## 2023-03-07 PROCEDURE — 85025 COMPLETE CBC W/AUTO DIFF WBC: CPT | Performed by: INTERNAL MEDICINE

## 2023-03-07 PROCEDURE — 96413 CHEMO IV INFUSION 1 HR: CPT

## 2023-03-07 PROCEDURE — 63710000001 OLANZAPINE 5 MG TABLET: Performed by: INTERNAL MEDICINE

## 2023-03-07 PROCEDURE — 96411 CHEMO IV PUSH ADDL DRUG: CPT

## 2023-03-07 PROCEDURE — 25010000002 DOXORUBICIN PER 10 MG: Performed by: INTERNAL MEDICINE

## 2023-03-07 RX ORDER — SODIUM CHLORIDE 9 MG/ML
250 INJECTION, SOLUTION INTRAVENOUS ONCE
Status: COMPLETED | OUTPATIENT
Start: 2023-03-07 | End: 2023-03-07

## 2023-03-07 RX ORDER — DOXORUBICIN HYDROCHLORIDE 2 MG/ML
20 INJECTION, SOLUTION INTRAVENOUS ONCE
Status: COMPLETED | OUTPATIENT
Start: 2023-03-07 | End: 2023-03-07

## 2023-03-07 RX ORDER — PALONOSETRON 0.05 MG/ML
0.25 INJECTION, SOLUTION INTRAVENOUS ONCE
Status: COMPLETED | OUTPATIENT
Start: 2023-03-07 | End: 2023-03-07

## 2023-03-07 RX ORDER — DOXORUBICIN HYDROCHLORIDE 2 MG/ML
20 INJECTION, SOLUTION INTRAVENOUS ONCE
Status: CANCELLED | OUTPATIENT
Start: 2023-03-07

## 2023-03-07 RX ORDER — HEPARIN SODIUM (PORCINE) LOCK FLUSH IV SOLN 100 UNIT/ML 100 UNIT/ML
500 SOLUTION INTRAVENOUS AS NEEDED
Status: CANCELLED | OUTPATIENT
Start: 2023-03-07

## 2023-03-07 RX ORDER — PALONOSETRON 0.05 MG/ML
0.25 INJECTION, SOLUTION INTRAVENOUS ONCE
Status: CANCELLED | OUTPATIENT
Start: 2023-03-07

## 2023-03-07 RX ORDER — OLANZAPINE 5 MG/1
5 TABLET ORAL ONCE
Status: COMPLETED | OUTPATIENT
Start: 2023-03-07 | End: 2023-03-07

## 2023-03-07 RX ORDER — OLANZAPINE 5 MG/1
5 TABLET ORAL ONCE
Status: CANCELLED | OUTPATIENT
Start: 2023-03-21 | End: 2023-03-21

## 2023-03-07 RX ORDER — HEPARIN SODIUM (PORCINE) LOCK FLUSH IV SOLN 100 UNIT/ML 100 UNIT/ML
500 SOLUTION INTRAVENOUS AS NEEDED
Status: DISCONTINUED | OUTPATIENT
Start: 2023-03-07 | End: 2023-03-08 | Stop reason: HOSPADM

## 2023-03-07 RX ORDER — OLANZAPINE 5 MG/1
5 TABLET ORAL ONCE
Status: CANCELLED | OUTPATIENT
Start: 2023-03-07 | End: 2023-03-07

## 2023-03-07 RX ORDER — DIPHENHYDRAMINE HCL 25 MG
25 CAPSULE ORAL ONCE
Status: CANCELLED | OUTPATIENT
Start: 2023-03-07

## 2023-03-07 RX ORDER — PALONOSETRON 0.05 MG/ML
0.25 INJECTION, SOLUTION INTRAVENOUS ONCE
Status: CANCELLED | OUTPATIENT
Start: 2023-03-21

## 2023-03-07 RX ORDER — DIPHENHYDRAMINE HCL 25 MG
25 CAPSULE ORAL ONCE
Status: COMPLETED | OUTPATIENT
Start: 2023-03-07 | End: 2023-03-07

## 2023-03-07 RX ORDER — DIPHENHYDRAMINE HCL 25 MG
25 CAPSULE ORAL ONCE
Status: CANCELLED | OUTPATIENT
Start: 2023-03-21

## 2023-03-07 RX ORDER — SODIUM CHLORIDE 9 MG/ML
250 INJECTION, SOLUTION INTRAVENOUS ONCE
Status: CANCELLED | OUTPATIENT
Start: 2023-03-07

## 2023-03-07 RX ORDER — SODIUM CHLORIDE 9 MG/ML
250 INJECTION, SOLUTION INTRAVENOUS ONCE
Status: CANCELLED | OUTPATIENT
Start: 2023-03-21

## 2023-03-07 RX ORDER — DOXORUBICIN HYDROCHLORIDE 2 MG/ML
20 INJECTION, SOLUTION INTRAVENOUS ONCE
Status: CANCELLED | OUTPATIENT
Start: 2023-03-21

## 2023-03-07 RX ORDER — ACETAMINOPHEN 325 MG/1
650 TABLET ORAL ONCE
Status: CANCELLED | OUTPATIENT
Start: 2023-03-07

## 2023-03-07 RX ORDER — ACETAMINOPHEN 325 MG/1
650 TABLET ORAL ONCE
Status: COMPLETED | OUTPATIENT
Start: 2023-03-07 | End: 2023-03-07

## 2023-03-07 RX ORDER — ACETAMINOPHEN 325 MG/1
650 TABLET ORAL ONCE
Status: CANCELLED | OUTPATIENT
Start: 2023-03-21

## 2023-03-07 RX ADMIN — HEPARIN 500 UNITS: 100 SYRINGE at 12:19

## 2023-03-07 RX ADMIN — DIPHENHYDRAMINE HYDROCHLORIDE 25 MG: 25 CAPSULE ORAL at 09:34

## 2023-03-07 RX ADMIN — OLANZAPINE 5 MG: 5 TABLET, FILM COATED ORAL at 09:32

## 2023-03-07 RX ADMIN — SODIUM CHLORIDE 150 MG: 9 INJECTION, SOLUTION INTRAVENOUS at 09:44

## 2023-03-07 RX ADMIN — VINBLASTINE SULFATE 8 MG: 1 INJECTION INTRAVENOUS at 10:46

## 2023-03-07 RX ADMIN — DACARBAZINE 520 MG: 10 INJECTION, POWDER, FOR SOLUTION INTRAVENOUS at 11:07

## 2023-03-07 RX ADMIN — PALONOSETRON HYDROCHLORIDE 0.25 MG: 0.25 INJECTION, SOLUTION INTRAVENOUS at 09:32

## 2023-03-07 RX ADMIN — ACETAMINOPHEN 325MG 650 MG: 325 TABLET ORAL at 09:35

## 2023-03-07 RX ADMIN — SODIUM CHLORIDE 250 ML: 9 INJECTION, SOLUTION INTRAVENOUS at 09:40

## 2023-03-07 RX ADMIN — BRENTUXIMAB VEDOTIN 80 MG: 50 INJECTION, POWDER, LYOPHILIZED, FOR SOLUTION INTRAVENOUS at 11:43

## 2023-03-07 RX ADMIN — DEXAMETHASONE SODIUM PHOSPHATE 12 MG: 10 INJECTION, SOLUTION INTRAMUSCULAR; INTRAVENOUS at 09:40

## 2023-03-07 RX ADMIN — DOXORUBICIN HYDROCHLORIDE 34 MG: 2 INJECTION, SOLUTION INTRAVENOUS at 10:37

## 2023-03-07 NOTE — ADDENDUM NOTE
Encounter addended by: Yaa Acosta RN on: 3/7/2023 2:14 PM   Actions taken: Charge Capture section accepted

## 2023-03-07 NOTE — PROGRESS NOTES
[facility]  Office number [phone]    Fax number [phone]     Follow up     Date: 23    Patient Name: Abraham Wu  MRN: [id number]  : 1950      Chief Complaint: Hodgkin Lymphoma follow up/treatment    Surgeon: Dr. Hiram Viveros MD    Cancer Staging: IV    History of Present Illness: Abraham Wu is a pleasant 72 y.o. male with PMH of hypertension, sleep apnea, hard of hearing who presents today for follow up of Hodgkin Lymphoma.     He initially presented 2022 with intractable diarrhea, low appetite, and a greater than 50 pound weight loss over several month period associated with intermittent fevers.  CT of the chest abdomen pelvis obtained in the ER shows of rectal mass spanning greater than 12 cm with adjacent adenopathy, bulky RP adenopathy, and findings concerning for left renal and liver metastases.  Small right pleural effusion with nodularity.  There was concern for impending obstruction, so he underwent diverting colostomy and biopsy on 2022.  Biopsies from the peritoneam showed a fibrotic nodule histiocytes and eosinophils admixed with CD30 positive large cells.  Rectal biopsies showed involvement by CD30 positive lymphoma, classic Hodgkin lymphoma versus CD30 positive T-cell or B-cell lymphoma.  There was extensive fibrosis and crush artifact.  He underwent repeat transrectal biopsy 10/4/2022 for further characterization which was felt to be most consistent with classical Hodgkin lymphoma.    He initiated chemotherapy with Brentuximab-AVD as an inpatient on 10/8/2022.  Cycle #1 was complicated by GI bleed requiring multiple blood transfusion and ultimately coil artery embolization 10/12; neutropenic fever, Candida esophagitis and mucositis.  He had a prolonged ICU stay  And required enteral feeding.  He was discharged 10/20/2022.    Treatment history:  Brentuximab-AVD: C1 10/8/22  C2: 22 with DA    Interval history:     He is here for consideration of cycle 6 day 1 of chemotherapy.   Cough productive of clear phlegm and throat tickling. Has been off of clairitin. Has seasonal allergy symptoms every March and October. No swelling or pain.   Has arthritis in his hands, had been painful but now better. Does note some difficulty picking up coins and doing buttons now that he has been concentrating on that, but denies numbness, unsure if this is more related to the joint stiffness. No tingling in feet.    Nausea controlled on PRN zofran.   GERD controlled on scheduled Protonix.   Good ostomy output. Uses miralax PRN.  No fever or infectious symptoms.       Past Medical History:   Past Medical History:   Diagnosis Date   • benign polypoid tissue right lung    • Hyperlipidemia    • Hypertension    • Mycobacterium mucogenicum    • SHERRI (obstructive sleep apnea)    • Seasonal allergies        Past Surgical History:   Past Surgical History:   Procedure Laterality Date   • BRONCHOSCOPY      removal of obstructing polypoid tissue right middle lung   • COLOSTOMY N/A 09/22/2022    Procedure: LAPAROSCOPIC COLOSTOMY CREATION, FLEXIBLE SIGMOIDOSCOPY;  Surgeon: Hiram Viveros MD;  Location:  [facility];  Service: General;  Laterality: N/A;   • ENDOSCOPY N/A 10/10/2022    Procedure: ESOPHAGOGASTRODUODENOSCOPY;  Surgeon: Neeraj Lynn MD;  Location:  [facility];  Service: Gastroenterology;  Laterality: N/A;   • ENDOSCOPY N/A 10/12/2022    Procedure: ESOPHAGOGASTRODUODENOSCOPY;  Surgeon: Brunner, Mark I, MD;  Location:  [facility];  Service: Gastroenterology;  Laterality: N/A;   • EXAM UNDER ANESTHESIA, RECTAL BIOPSY N/A 10/04/2022    Procedure: EXAM UNDER ANESTHESIA, TRANSANAL BIOPSY WITH TRUCUT NEEDLE (LITHOTOMY-CANDY CANE);  Surgeon: Hiram Viveros MD;  Location:  [facility];  Service: General;  Laterality: N/A;   • PERIPHERALLY INSERTED CENTRAL CATHETER INSERTION      Removed 11/28/2022   • VENOUS ACCESS DEVICE (PORT)  INSERTION Right 11/28/2022       Family History:   Family History   Problem Relation Age of Onset   • Diabetes Mother    • Diabetes Father    • Heart disease Father        Social History:   Social History     Socioeconomic History   • Marital status:    Tobacco Use   • Smoking status: Never   • Smokeless tobacco: Never   Vaping Use   • Vaping Use: Never used   Substance and Sexual Activity   • Alcohol use: Not Currently     Comment: previously drank 2 glasses of wine/beer nightly   • Drug use: Never   • Sexual activity: Defer       Medications:     Current Outpatient Medications:   •  fluticasone (FLONASE) 50 MCG/ACT nasal spray, 1 spray into the nostril(s) as directed by provider Daily., Disp: , Rfl:   •  fluticasone (FLONASE) 50 MCG/ACT nasal spray, Daily., Disp: , Rfl:   •  KLOR-CON 20 MEQ CR tablet, , Disp: , Rfl:   •  lidocaine-prilocaine (EMLA) 2.5-2.5 % cream, Apply 1 application topically to the appropriate area as directed As Needed (45-60 minutes prior to port access.  Cover with saran/plastic wrap.)., Disp: 30 g, Rfl: 3  •  loratadine (CLARITIN) 10 MG tablet, Take 1 tablet by mouth Daily., Disp: 30 tablet, Rfl: 5  •  OLANZapine (zyPREXA) 5 MG tablet, Take 1 tablet by mouth Every Night. For 3 days on Days 2, 3 and 4 and on days 16, 17, and 18., Disp: 6 tablet, Rfl: 5  •  ondansetron (Zofran) 8 MG tablet, Take 1 tablet by mouth Every 8 (Eight) Hours As Needed for Nausea or Vomiting., Disp: 30 tablet, Rfl: 5  •  pantoprazole (PROTONIX) 40 MG EC tablet, Take 1 tablet by mouth Daily., Disp: 90 tablet, Rfl: 1  •  prochlorperazine (COMPAZINE) 5 MG tablet, Take 1 tablet by mouth Every 6 (Six) Hours As Needed for Nausea or Vomiting., Disp: 30 tablet, Rfl: 2  •  rosuvastatin (CRESTOR) 10 MG tablet, , Disp: , Rfl:     Allergies:   No Known Allergies    Objective     Vital Signs:   Vitals:    03/07/23 0809   BP: 128/73  Comment: LUE   Pulse: 77   Resp: 18   Temp: 97.3 °F (36.3 °C)   TempSrc: Infrared   SpO2:  "98%  Comment: RA   Weight: 68.9 kg (152 lb)   Height: 162.6 cm (64\")   PainSc: 0-No pain    Body mass index is 26.09 kg/m².   Pain Score    03/07/23 0809   PainSc: 0-No pain       ECOG Performance Status: 1-2    Physical Exam:   General: No acute distress. Well appearing.  HEENT: Normocephalic, atraumatic. Sclera anicteric. OP clear  Neck: supple, no palpable LAD  Cardiovascular: regular rate and rhythm. No murmurs.   Respiratory: Normal rate. Clear to auscultation bilaterally  Abdomen: Soft, nontender, non distended with normoactive bowel sounds. Ostomy LLQ.  Lymph: no supraclavicular or axillary adenopathy  Neuro: Alert and oriented x 3. No focal deficits. Intact sensation to light touch, pain and temp in fingertips. Intact  strength and fine motor.  Ext: Symmetric, no swelling.   Psych: Euthymic  Skin: right port site       Laboratory/Imaging Reviewed:   Hospital Outpatient Visit on 03/07/2023   Component Date Value Ref Range Status   • Glucose 03/07/2023 96  65 - 99 mg/dL Final   • BUN 03/07/2023 13  8 - 23 mg/dL Final   • Creatinine 03/07/2023 0.64 (L)  0.76 - 1.27 mg/dL Final   • Sodium 03/07/2023 131 (L)  136 - 145 mmol/L Final   • Potassium 03/07/2023 4.2  3.5 - 5.2 mmol/L Final    Slight hemolysis detected by analyzer. Results may be affected.   • Chloride 03/07/2023 96 (L)  98 - 107 mmol/L Final   • CO2 03/07/2023 28.0  22.0 - 29.0 mmol/L Final   • Calcium 03/07/2023 8.9  8.6 - 10.5 mg/dL Final   • Total Protein 03/07/2023 6.2  6.0 - 8.5 g/dL Final   • Albumin 03/07/2023 3.9  3.5 - 5.2 g/dL Final   • ALT (SGPT) 03/07/2023 12  1 - 41 U/L Final   • AST (SGOT) 03/07/2023 17  1 - 40 U/L Final   • Alkaline Phosphatase 03/07/2023 160 (H)  39 - 117 U/L Final   • Total Bilirubin 03/07/2023 0.3  0.0 - 1.2 mg/dL Final   • Globulin 03/07/2023 2.3  gm/dL Final    Calculated Result   • A/G Ratio 03/07/2023 1.7  g/dL Final   • BUN/Creatinine Ratio 03/07/2023 20.3  7.0 - 25.0 Final   • Anion Gap 03/07/2023 7.0  5.0 - " 15.0 mmol/L Final   • eGFR 03/07/2023 101.8  >60.0 mL/min/1.73 Final   • WBC 03/07/2023 15.78 (H)  3.40 - 10.80 10*3/mm3 Final   • RBC 03/07/2023 3.73 (L)  4.14 - 5.80 10*6/mm3 Final   • Hemoglobin 03/07/2023 12.0 (L)  13.0 - 17.7 g/dL Final   • Hematocrit 03/07/2023 36.1 (L)  37.5 - 51.0 % Final   • MCV 03/07/2023 96.8  79.0 - 97.0 fL Final   • MCH 03/07/2023 32.2  26.6 - 33.0 pg Final   • MCHC 03/07/2023 33.2  31.5 - 35.7 g/dL Final   • RDW 03/07/2023 18.2 (H)  12.3 - 15.4 % Final   • RDW-SD 03/07/2023 65.0 (H)  37.0 - 54.0 fl Final   • MPV 03/07/2023 9.5  6.0 - 12.0 fL Final   • Platelets 03/07/2023 226  140 - 450 10*3/mm3 Final   • Neutrophil % 03/07/2023 74.3  42.7 - 76.0 % Final   • Lymphocyte % 03/07/2023 9.4 (L)  19.6 - 45.3 % Final   • Monocyte % 03/07/2023 9.9  5.0 - 12.0 % Final   • Eosinophil % 03/07/2023 3.0  0.3 - 6.2 % Final   • Basophil % 03/07/2023 0.7  0.0 - 1.5 % Final   • Immature Grans % 03/07/2023 2.7 (H)  0.0 - 0.5 % Final   • Neutrophils, Absolute 03/07/2023 11.74 (H)  1.70 - 7.00 10*3/mm3 Final   • Lymphocytes, Absolute 03/07/2023 1.48  0.70 - 3.10 10*3/mm3 Final   • Monocytes, Absolute 03/07/2023 1.56 (H)  0.10 - 0.90 10*3/mm3 Final   • Eosinophils, Absolute 03/07/2023 0.47 (H)  0.00 - 0.40 10*3/mm3 Final   • Basophils, Absolute 03/07/2023 0.11  0.00 - 0.20 10*3/mm3 Final   • Immature Grans, Absolute 03/07/2023 0.42 (H)  0.00 - 0.05 10*3/mm3 Final       Assessment / Plan      Assessment/Plan:   1.  CD30 positive classical Hodgkin's lymphoma  2.  Chemotherapy monitoring  -He is here for consideration of BV AVD.  C1 Day 15 treatment was held due to pancytopenia, neutropenic fever, and active GI bleed, but has now resumed treatment with 20% dose reduction in AVD and continued full dose brentuximab.   -I personally reviewed his PET/CT in 12/2022 shows: Rectal mass has decreased by several cm, resolution of hydronephrosis, liver lesions no longer visible, no persistent pleural effusion. No FDG  avid disease. I have requested a formal comparison by the radiologist (original studies were not available to radiology at time of PET due to need to switch facilities bc of equipment failure) but on my review, consistent with treatment response and clearly he is improving clinically.  He has mild FDG asymmetry in the submandibular gland, asymmetric. He reports this is smaller than prior. Exam improved  -Labs stable to proceed with mild leukocytosis, mild anemia that has improved and normal renal and hepatic function  -We discussed further dose modification vs continuation of current therapy in context of his neuropathy. Prior dose reduction, high risk disease and normal exam, we elected to CTM with stable dose.  -Continue to monitor for cumulative treatment related adverse events. No infectious symptoms or neuropathy currently.  -Interim PET with partial response. Plan PET after cycle 6--pending 4/2/23    3.  GI bleed  -PPI, continue    4. Muscle cramps/arthralgias.  -Well controlled.      5. Access  -Port    6. Bone pain  -Added claritin  -Better    7. SHERRI  Previously used CPAP, no longer snoring since weight loss, but O2 was dropping at night in the hospital. Repeat sleep study pending in March 8. Chemo nausea  -Well controlled on zofran, continue.     Follow Up:   2 weeks    Kaycee Leary MD  Hematology and Oncology     I have spent a total of 40 min on reviewing test results/preparing to see patient, counseling patient, performing medically appropriate exam, ordering chemo, coordinating care and documenting clinical information in the electronic or other health record

## 2023-03-08 ENCOUNTER — HOSPITAL ENCOUNTER (OUTPATIENT)
Dept: ONCOLOGY | Facility: HOSPITAL | Age: 73
Discharge: HOME | End: 2023-03-08
Admitting: INTERNAL MEDICINE
Payer: MEDICARE

## 2023-03-08 VITALS
RESPIRATION RATE: 18 BRPM | SYSTOLIC BLOOD PRESSURE: 124 MMHG | BODY MASS INDEX: 25.95 KG/M2 | TEMPERATURE: 98.4 F | WEIGHT: 152 LBS | HEIGHT: 64 IN | HEART RATE: 82 BPM | DIASTOLIC BLOOD PRESSURE: 60 MMHG

## 2023-03-08 DIAGNOSIS — C81.98 HODGKIN LYMPHOMA OF LYMPH NODES OF MULTIPLE REGIONS, UNSPECIFIED HODGKIN LYMPHOMA TYPE: Primary | ICD-10-CM

## 2023-03-08 PROCEDURE — 25010000002 PEGFILGRASTIM-APGF 6 MG/0.6ML SOLUTION PREFILLED SYRINGE: Performed by: INTERNAL MEDICINE

## 2023-03-08 PROCEDURE — 96372 THER/PROPH/DIAG INJ SC/IM: CPT

## 2023-03-08 RX ADMIN — PEGFILGRASTIM-APGF 6 MG: 6 INJECTION, SOLUTION SUBCUTANEOUS at 13:42

## 2023-03-14 ENCOUNTER — OFFICE VISIT (OUTPATIENT)
Dept: SLEEP MEDICINE | Facility: HOSPITAL | Age: 73
End: 2023-03-14
Payer: MEDICARE

## 2023-03-14 VITALS
HEART RATE: 83 BPM | SYSTOLIC BLOOD PRESSURE: 141 MMHG | WEIGHT: 151 LBS | HEIGHT: 64 IN | DIASTOLIC BLOOD PRESSURE: 71 MMHG | BODY MASS INDEX: 25.78 KG/M2 | OXYGEN SATURATION: 97 %

## 2023-03-14 DIAGNOSIS — I10 ESSENTIAL HYPERTENSION: Primary | ICD-10-CM

## 2023-03-14 DIAGNOSIS — R63.4 EXCESSIVE WEIGHT LOSS: ICD-10-CM

## 2023-03-14 DIAGNOSIS — G47.33 OSA (OBSTRUCTIVE SLEEP APNEA): ICD-10-CM

## 2023-03-14 PROCEDURE — 99213 OFFICE O/P EST LOW 20 MIN: CPT | Performed by: NURSE PRACTITIONER

## 2023-03-14 NOTE — PROGRESS NOTES
Chief Complaint:   Chief Complaint   Patient presents with   • Sleeping Problem       HPI:    Abraham Wu is a 72 y.o. male here to establish care.  Patient does see Dr. Jatinder Kaba his primary care doctor Kaycee Leary.  Patient has a history of hypertension, dyslipidemia, 100 pound weight loss, upper GI bleed, Hodgkin's lymphoma, anemia, sleep apnea.  Patient states he has not used his CPAP for approximately 5 months.  He has recently been in the hospital related to his cancer and also receiving chemotherapy.  He has subsequently lost 100 pounds and he and his wife no longer thinks he has sleep apnea.  He is no longer snoring he no longer has witnessed apneas.  Patient does deny gasping, hypnagogic hallucinations and sleep paralysis.  Patient has no history of nasal fracture or concussion.    Patient does keep the same sleep schedule weekend and weekday going to bed at 8 PM awakening 8 AM.  He does estimate getting 10 hours to use the restroom.  And will take him 1 hour to go to sleep and he does feel rested upon awakening.  Patient states he does get up every 2 hours to use the restroom.  Patient has an Bourg score of 18/24.  Patient states that despite the high Bourg score he does feel rested.    Patient does understand the consequences of sleep apnea such as hypertension, atrial fibrillation, stroke, early on stage dementia and sudden cardiac death.  Patient is willing to go back on CPAP therapy should he still requiring.      Is a very pleasant 72-year-old male.  Patient is self-employed and [employer] in [employer].  Patient is a non-smoker and denies illicit substance use.  Patient will usually drink 1 or 2 drinks 2-3 times a week.  Patient drinks 1 cup of decaf coffee daily, 1 regular cola and a large water intake.    Past Medical History:   Diagnosis Date   • benign polypoid tissue right lung    • Diabetes mellitus (HCC)    • Hyperlipidemia    • Hypertension    • Mycobacterium mucogenicum    • SHERRI  (obstructive sleep apnea)    • Seasonal allergies        Family History   Problem Relation Age of Onset   • Diabetes Mother    • Diabetes Father    • Heart disease Father      Past Surgical History:   Procedure Laterality Date   • BRONCHOSCOPY      removal of obstructing polypoid tissue right middle lung   • COLOSTOMY N/A 09/22/2022    Procedure: LAPAROSCOPIC COLOSTOMY CREATION, FLEXIBLE SIGMOIDOSCOPY;  Surgeon: Hiram Viveros MD;  Location:  [facility];  Service: General;  Laterality: N/A;   • ENDOSCOPY N/A 10/10/2022    Procedure: ESOPHAGOGASTRODUODENOSCOPY;  Surgeon: Neeraj Lynn MD;  Location:  [facility];  Service: Gastroenterology;  Laterality: N/A;   • ENDOSCOPY N/A 10/12/2022    Procedure: ESOPHAGOGASTRODUODENOSCOPY;  Surgeon: Brunner, Mark I, MD;  Location:  [facility];  Service: Gastroenterology;  Laterality: N/A;   • EXAM UNDER ANESTHESIA, RECTAL BIOPSY N/A 10/04/2022    Procedure: EXAM UNDER ANESTHESIA, TRANSANAL BIOPSY WITH TRUCUT NEEDLE (LITHOTOMY-CANDY CANE);  Surgeon: Hiram Viveros MD;  Location:  [facility];  Service: General;  Laterality: N/A;   • PERIPHERALLY INSERTED CENTRAL CATHETER INSERTION      Removed 11/28/2022   • VENOUS ACCESS DEVICE (PORT) INSERTION Right 11/28/2022         Current medications are:   Current Outpatient Medications:   •  fluticasone (FLONASE) 50 MCG/ACT nasal spray, 1 spray into the nostril(s) as directed by provider Daily., Disp: , Rfl:   •  KLOR-CON 20 MEQ CR tablet, , Disp: , Rfl:   •  lidocaine-prilocaine (EMLA) 2.5-2.5 % cream, Apply 1 application topically to the appropriate area as directed As Needed (45-60 minutes prior to port access.  Cover with saran/plastic wrap.)., Disp: 30 g, Rfl: 3  •  loratadine (CLARITIN) 10 MG tablet, Take 1 tablet by mouth Daily., Disp: 30 tablet, Rfl: 5  •  OLANZapine (zyPREXA) 5 MG tablet, Take 1 tablet by mouth Every Night. For 3 days on Days 2, 3 and 4 and on days 16, 17, and 18., Disp: 6 tablet, Rfl: 5  •   ondansetron (Zofran) 8 MG tablet, Take 1 tablet by mouth Every 8 (Eight) Hours As Needed for Nausea or Vomiting., Disp: 30 tablet, Rfl: 5  •  pantoprazole (PROTONIX) 40 MG EC tablet, Take 1 tablet by mouth Daily., Disp: 90 tablet, Rfl: 1  •  prochlorperazine (COMPAZINE) 5 MG tablet, Take 1 tablet by mouth Every 6 (Six) Hours As Needed for Nausea or Vomiting., Disp: 30 tablet, Rfl: 2  •  rosuvastatin (CRESTOR) 10 MG tablet, , Disp: , Rfl: .      The patient's relevant past medical, surgical, family and social history were reviewed and updated in Epic as appropriate.       Review of Systems   HENT: Positive for congestion.    Eyes: Positive for visual disturbance.   Respiratory: Positive for apnea.    Gastrointestinal:          Heartburn   Allergic/Immunologic: Positive for environmental allergies.   Psychiatric/Behavioral: Positive for sleep disturbance.   All other systems reviewed and are negative.        Objective:    Physical Exam  Constitutional:       Appearance: Normal appearance.   HENT:      Head: Normocephalic and atraumatic.      Mouth/Throat:      Mouth: Mucous membranes are moist.      Pharynx: Oropharynx is clear.      Comments: Mallampati 4 anatomy  Cardiovascular:      Rate and Rhythm: Normal rate and regular rhythm.   Pulmonary:      Effort: Pulmonary effort is normal.      Breath sounds: Normal breath sounds.   Skin:     General: Skin is warm and dry.   Neurological:      Mental Status: He is alert and oriented to person, place, and time.   Psychiatric:         Mood and Affect: Mood normal.         Behavior: Behavior normal.         Thought Content: Thought content normal.         Judgment: Judgment normal.           ASSESSMENT/PLAN    Diagnoses and all orders for this visit:    1. Essential hypertension (Primary)  -     Home Sleep Study; Future    2. SHERRI (obstructive sleep apnea)  -     Home Sleep Study; Future    3. Excessive weight loss  -     Home Sleep Study; Future        1. Counseled patient  regarding multimodal approach with healthy nutrition, healthy sleep, regular physical activity, social activities, counseling, and medications. Encouraged to practice lateral sleep position. Avoid alcohol and sedatives close to bedtime.  2.   We will retest HST due to extreme weight loss and follow-up as appropriate.    I have reviewed the results of my evaluation and impression and discussed my recommendations in detail with the patient.      Signed by  Olive Tobar, PORTILLO    March 14, 2023      CC: Jatinder Haynes MD Jackson, Amie E, MD

## 2023-03-17 ENCOUNTER — TELEPHONE (OUTPATIENT)
Dept: ONCOLOGY | Facility: CLINIC | Age: 73
End: 2023-03-17
Payer: MEDICARE

## 2023-03-17 NOTE — TELEPHONE ENCOUNTER
DISCHARGE PLANNING - CARE MANAGEMENT     Discharge to post-acute care or home with appropriate resources Progressing        GENITOURINARY - ADULT     Maintains or returns to baseline urinary function Progressing     Absence of urinary retention Progressing        Nutrition/Hydration-ADULT     Nutrient/Hydration intake appropriate for improving, restoring or maintaining nutritional needs Progressing        Potential for Falls     Patient will remain free of falls Progressing        Prexisting or High Potential for Compromised Skin Integrity     Skin integrity is maintained or improved Progressing        RESPIRATORY - ADULT     Achieves optimal ventilation and oxygenation Progressing        SAFETY,RESTRAINT: NV/NON-SELF DESTRUCTIVE BEHAVIOR     Remains free of harm/injury (restraint for non violent/non self-detsructive behavior) Progressing     Returns to optimal restraint-free functioning Progressing Ms. Wu stated patient began sneezing 2 days ago, with runny nose and productive cough (stated clear sputum). Denies fever or SOA.  Notified Dr. Leary and Ms. Wu advised that patient should test for COVID and if it is positive he should contact the on call provider, whom is Dr. Leary this weekend, and let her know.  Also, advised that if he develops a fever at any time, he should contact the on-call provider.  She verbalized understanding and stated she will go and get a COVID test and perform it.

## 2023-03-17 NOTE — TELEPHONE ENCOUNTER
Peggy called on behalf of pt. She states that patient is getting a bad cold and wanted to discuss his symptoms.     Please return her call at

## 2023-03-21 ENCOUNTER — OFFICE VISIT (OUTPATIENT)
Dept: ONCOLOGY | Facility: CLINIC | Age: 73
End: 2023-03-21
Payer: MEDICARE

## 2023-03-21 ENCOUNTER — APPOINTMENT (OUTPATIENT)
Dept: ONCOLOGY | Facility: HOSPITAL | Age: 73
End: 2023-03-21
Payer: MEDICARE

## 2023-03-21 ENCOUNTER — HOSPITAL ENCOUNTER (OUTPATIENT)
Dept: ONCOLOGY | Facility: HOSPITAL | Age: 73
Discharge: HOME | End: 2023-03-21
Admitting: INTERNAL MEDICINE
Payer: MEDICARE

## 2023-03-21 VITALS
BODY MASS INDEX: 25.95 KG/M2 | WEIGHT: 152 LBS | DIASTOLIC BLOOD PRESSURE: 75 MMHG | OXYGEN SATURATION: 98 % | SYSTOLIC BLOOD PRESSURE: 143 MMHG | TEMPERATURE: 97.5 F | HEART RATE: 73 BPM | HEIGHT: 64 IN | RESPIRATION RATE: 18 BRPM

## 2023-03-21 DIAGNOSIS — C81.98 HODGKIN LYMPHOMA OF LYMPH NODES OF MULTIPLE REGIONS, UNSPECIFIED HODGKIN LYMPHOMA TYPE: Primary | ICD-10-CM

## 2023-03-21 DIAGNOSIS — Z51.11 CHEMOTHERAPY MANAGEMENT, ENCOUNTER FOR: ICD-10-CM

## 2023-03-21 DIAGNOSIS — Z45.2 ENCOUNTER FOR CARE RELATED TO VASCULAR ACCESS PORT: ICD-10-CM

## 2023-03-21 LAB
ALBUMIN SERPL-MCNC: 3.9 G/DL (ref 3.5–5.2)
ALBUMIN/GLOB SERPL: 1.6 G/DL
ALP SERPL-CCNC: 151 U/L (ref 39–117)
ALT SERPL W P-5'-P-CCNC: 10 U/L (ref 1–41)
ANION GAP SERPL CALCULATED.3IONS-SCNC: 7 MMOL/L (ref 5–15)
AST SERPL-CCNC: 15 U/L (ref 1–40)
BASOPHILS # BLD AUTO: 0.07 10*3/MM3 (ref 0–0.2)
BASOPHILS NFR BLD AUTO: 0.5 % (ref 0–1.5)
BILIRUB SERPL-MCNC: 0.2 MG/DL (ref 0–1.2)
BUN SERPL-MCNC: 12 MG/DL (ref 8–23)
BUN/CREAT SERPL: 17.6 (ref 7–25)
CALCIUM SPEC-SCNC: 9.2 MG/DL (ref 8.6–10.5)
CHLORIDE SERPL-SCNC: 97 MMOL/L (ref 98–107)
CO2 SERPL-SCNC: 29 MMOL/L (ref 22–29)
CREAT SERPL-MCNC: 0.68 MG/DL (ref 0.76–1.27)
DEPRECATED RDW RBC AUTO: 64.2 FL (ref 37–54)
EGFRCR SERPLBLD CKD-EPI 2021: 99.4 ML/MIN/1.73
EOSINOPHIL # BLD AUTO: 0.48 10*3/MM3 (ref 0–0.4)
EOSINOPHIL NFR BLD AUTO: 3.1 % (ref 0.3–6.2)
ERYTHROCYTE [DISTWIDTH] IN BLOOD BY AUTOMATED COUNT: 17.7 % (ref 12.3–15.4)
GLOBULIN UR ELPH-MCNC: 2.4 GM/DL
GLUCOSE SERPL-MCNC: 136 MG/DL (ref 65–99)
HCT VFR BLD AUTO: 36.9 % (ref 37.5–51)
HGB BLD-MCNC: 11.9 G/DL (ref 13–17.7)
IMM GRANULOCYTES # BLD AUTO: 0.28 10*3/MM3 (ref 0–0.05)
IMM GRANULOCYTES NFR BLD AUTO: 1.8 % (ref 0–0.5)
LYMPHOCYTES # BLD AUTO: 1.14 10*3/MM3 (ref 0.7–3.1)
LYMPHOCYTES NFR BLD AUTO: 7.4 % (ref 19.6–45.3)
MCH RBC QN AUTO: 31.6 PG (ref 26.6–33)
MCHC RBC AUTO-ENTMCNC: 32.2 G/DL (ref 31.5–35.7)
MCV RBC AUTO: 98.1 FL (ref 79–97)
MONOCYTES # BLD AUTO: 1.19 10*3/MM3 (ref 0.1–0.9)
MONOCYTES NFR BLD AUTO: 7.7 % (ref 5–12)
NEUTROPHILS NFR BLD AUTO: 12.35 10*3/MM3 (ref 1.7–7)
NEUTROPHILS NFR BLD AUTO: 79.5 % (ref 42.7–76)
PLATELET # BLD AUTO: 230 10*3/MM3 (ref 140–450)
PMV BLD AUTO: 9.3 FL (ref 6–12)
POTASSIUM SERPL-SCNC: 3.9 MMOL/L (ref 3.5–5.2)
PROT SERPL-MCNC: 6.3 G/DL (ref 6–8.5)
RBC # BLD AUTO: 3.76 10*6/MM3 (ref 4.14–5.8)
SODIUM SERPL-SCNC: 133 MMOL/L (ref 136–145)
WBC NRBC COR # BLD: 15.51 10*3/MM3 (ref 3.4–10.8)

## 2023-03-21 PROCEDURE — 96366 THER/PROPH/DIAG IV INF ADDON: CPT

## 2023-03-21 PROCEDURE — 25010000002 FOSAPREPITANT PER 1 MG: Performed by: INTERNAL MEDICINE

## 2023-03-21 PROCEDURE — 25010000002 BRENTUXIMAB 50 MG RECONSTITUTED SOLUTION 1 EACH VIAL: Performed by: INTERNAL MEDICINE

## 2023-03-21 PROCEDURE — 25010000002 VINBLASTINE PER 1 MG: Performed by: INTERNAL MEDICINE

## 2023-03-21 PROCEDURE — A9270 NON-COVERED ITEM OR SERVICE: HCPCS | Performed by: INTERNAL MEDICINE

## 2023-03-21 PROCEDURE — 25010000002 DOXORUBICIN PER 10 MG: Performed by: INTERNAL MEDICINE

## 2023-03-21 PROCEDURE — 1126F AMNT PAIN NOTED NONE PRSNT: CPT | Performed by: INTERNAL MEDICINE

## 2023-03-21 PROCEDURE — 96375 TX/PRO/DX INJ NEW DRUG ADDON: CPT

## 2023-03-21 PROCEDURE — 85025 COMPLETE CBC W/AUTO DIFF WBC: CPT | Performed by: INTERNAL MEDICINE

## 2023-03-21 PROCEDURE — 99215 OFFICE O/P EST HI 40 MIN: CPT | Performed by: INTERNAL MEDICINE

## 2023-03-21 PROCEDURE — 96413 CHEMO IV INFUSION 1 HR: CPT

## 2023-03-21 PROCEDURE — 25010000002 HEPARIN LOCK FLUSH PER 10 UNITS: Performed by: INTERNAL MEDICINE

## 2023-03-21 PROCEDURE — 25010000002 PALONOSETRON PER 25 MCG: Performed by: INTERNAL MEDICINE

## 2023-03-21 PROCEDURE — 96417 CHEMO IV INFUS EACH ADDL SEQ: CPT

## 2023-03-21 PROCEDURE — 96411 CHEMO IV PUSH ADDL DRUG: CPT

## 2023-03-21 PROCEDURE — 3077F SYST BP >= 140 MM HG: CPT | Performed by: INTERNAL MEDICINE

## 2023-03-21 PROCEDURE — 25010000002 DACARBAZINE PER 100 MG: Performed by: INTERNAL MEDICINE

## 2023-03-21 PROCEDURE — 63710000001 OLANZAPINE 5 MG TABLET: Performed by: INTERNAL MEDICINE

## 2023-03-21 PROCEDURE — 80053 COMPREHEN METABOLIC PANEL: CPT | Performed by: INTERNAL MEDICINE

## 2023-03-21 PROCEDURE — 96415 CHEMO IV INFUSION ADDL HR: CPT

## 2023-03-21 PROCEDURE — 63710000001 ACETAMINOPHEN 325 MG TABLET: Performed by: INTERNAL MEDICINE

## 2023-03-21 PROCEDURE — 63710000001 DIPHENHYDRAMINE PER 50 MG: Performed by: INTERNAL MEDICINE

## 2023-03-21 PROCEDURE — 96367 TX/PROPH/DG ADDL SEQ IV INF: CPT

## 2023-03-21 PROCEDURE — 3078F DIAST BP <80 MM HG: CPT | Performed by: INTERNAL MEDICINE

## 2023-03-21 PROCEDURE — 25010000002 DEXAMETHASONE SODIUM PHOSPHATE 100 MG/10ML SOLUTION: Performed by: INTERNAL MEDICINE

## 2023-03-21 RX ORDER — OLANZAPINE 5 MG/1
5 TABLET ORAL ONCE
Status: COMPLETED | OUTPATIENT
Start: 2023-03-21 | End: 2023-03-21

## 2023-03-21 RX ORDER — DIPHENHYDRAMINE HCL 25 MG
25 CAPSULE ORAL ONCE
Status: COMPLETED | OUTPATIENT
Start: 2023-03-21 | End: 2023-03-21

## 2023-03-21 RX ORDER — ACETAMINOPHEN 325 MG/1
650 TABLET ORAL ONCE
Status: COMPLETED | OUTPATIENT
Start: 2023-03-21 | End: 2023-03-21

## 2023-03-21 RX ORDER — PALONOSETRON 0.05 MG/ML
0.25 INJECTION, SOLUTION INTRAVENOUS ONCE
Status: COMPLETED | OUTPATIENT
Start: 2023-03-21 | End: 2023-03-21

## 2023-03-21 RX ORDER — SODIUM CHLORIDE 9 MG/ML
250 INJECTION, SOLUTION INTRAVENOUS ONCE
Status: COMPLETED | OUTPATIENT
Start: 2023-03-21 | End: 2023-03-21

## 2023-03-21 RX ORDER — HEPARIN SODIUM (PORCINE) LOCK FLUSH IV SOLN 100 UNIT/ML 100 UNIT/ML
500 SOLUTION INTRAVENOUS AS NEEDED
OUTPATIENT
Start: 2023-03-21

## 2023-03-21 RX ORDER — HEPARIN SODIUM (PORCINE) LOCK FLUSH IV SOLN 100 UNIT/ML 100 UNIT/ML
500 SOLUTION INTRAVENOUS AS NEEDED
Status: DISCONTINUED | OUTPATIENT
Start: 2023-03-21 | End: 2023-03-22 | Stop reason: HOSPADM

## 2023-03-21 RX ORDER — DOXORUBICIN HYDROCHLORIDE 2 MG/ML
20 INJECTION, SOLUTION INTRAVENOUS ONCE
Status: COMPLETED | OUTPATIENT
Start: 2023-03-21 | End: 2023-03-21

## 2023-03-21 RX ADMIN — DOXORUBICIN HYDROCHLORIDE 34 MG: 2 INJECTION, SOLUTION INTRAVENOUS at 10:03

## 2023-03-21 RX ADMIN — BRENTUXIMAB VEDOTIN 80 MG: 50 INJECTION, POWDER, LYOPHILIZED, FOR SOLUTION INTRAVENOUS at 11:02

## 2023-03-21 RX ADMIN — ACETAMINOPHEN 325MG 650 MG: 325 TABLET ORAL at 09:27

## 2023-03-21 RX ADMIN — PALONOSETRON HYDROCHLORIDE 0.25 MG: 0.25 INJECTION, SOLUTION INTRAVENOUS at 09:29

## 2023-03-21 RX ADMIN — OLANZAPINE 5 MG: 5 TABLET, FILM COATED ORAL at 09:27

## 2023-03-21 RX ADMIN — DACARBAZINE 520 MG: 10 INJECTION, POWDER, FOR SOLUTION INTRAVENOUS at 10:21

## 2023-03-21 RX ADMIN — SODIUM CHLORIDE 250 ML: 9 INJECTION, SOLUTION INTRAVENOUS at 09:33

## 2023-03-21 RX ADMIN — DEXAMETHASONE SODIUM PHOSPHATE 12 MG: 10 INJECTION, SOLUTION INTRAMUSCULAR; INTRAVENOUS at 09:29

## 2023-03-21 RX ADMIN — HEPARIN 500 UNITS: 100 SYRINGE at 11:49

## 2023-03-21 RX ADMIN — SODIUM CHLORIDE 150 MG: 9 INJECTION, SOLUTION INTRAVENOUS at 09:29

## 2023-03-21 RX ADMIN — DIPHENHYDRAMINE HYDROCHLORIDE 25 MG: 25 CAPSULE ORAL at 09:28

## 2023-03-21 RX ADMIN — VINBLASTINE SULFATE 8 MG: 1 INJECTION INTRAVENOUS at 10:09

## 2023-03-21 NOTE — PROGRESS NOTES
Hematology and Oncology [place]  Office number [phone]    Fax number [phone]     Follow up     Date: 23    Patient Name: Abraham Wu  MRN: [id number]  : 1950      Chief Complaint: Hodgkin Lymphoma follow up/treatment    Surgeon: Dr. Hiram Viveros MD    Cancer Staging: IV    History of Present Illness: Abraham Wu is a pleasant 73 y.o. male with PMH of hypertension, sleep apnea, hard of hearing who presents today for follow up of Hodgkin Lymphoma.     He initially presented 2022 with intractable diarrhea, low appetite, and a greater than 50 pound weight loss over several month period associated with intermittent fevers.  CT of the chest abdomen pelvis obtained in the ER shows of rectal mass spanning greater than 12 cm with adjacent adenopathy, bulky RP adenopathy, and findings concerning for left renal and liver metastases.  Small right pleural effusion with nodularity.  There was concern for impending obstruction, so he underwent diverting colostomy and biopsy on 2022.  Biopsies from the peritoneam showed a fibrotic nodule histiocytes and eosinophils admixed with CD30 positive large cells.  Rectal biopsies showed involvement by CD30 positive lymphoma, classic Hodgkin lymphoma versus CD30 positive T-cell or B-cell lymphoma.  There was extensive fibrosis and crush artifact.  He underwent repeat transrectal biopsy 10/4/2022 for further characterization which was felt to be most consistent with classical Hodgkin lymphoma.    He initiated chemotherapy with Brentuximab-AVD as an inpatient on 10/8/2022.  Cycle #1 was complicated by GI bleed requiring multiple blood transfusion and ultimately coil artery embolization 10/12; neutropenic fever, Candida esophagitis and mucositis.  He had a prolonged ICU stay  And required enteral feeding.  He was discharged 10/20/2022.    Treatment history:  Brentuximab-AVD: C1 10/8/22  C2: 22 with DA    Interval history:     He is here for consideration of cycle 6 day 15 of chemotherapy. Still with problems with fine motor that he describes as stiffness and pain in PIPs and MCPs that makes it difficult for him to make a full grasp. No numbness in his fingers or weakness. Neck, shoulder and hand pain predating chemo which he attributes to arthritis.  No numbness in his feet. Cough and sneezing improved. Taking claritin daily. Negative COVID test  Nausea controlled on PRN zofran. Increased anticipatory nausea in last few days. No emesis. Zofran helps.  GERD controlled on scheduled Protonix.   Constipation resolved. No longer needing miralax. Mild diarrhea yesterday. Empties ostomy two times daily on average.  No fever or infectious symptoms. No other new concerns.   Has questions about timing on dental extractions after chemo.       Past Medical History:   Past Medical History:   Diagnosis Date   • benign polypoid tissue right lung    • Diabetes mellitus (HCC)    • Hyperlipidemia    • Hypertension    • Mycobacterium mucogenicum    • SHERRI (obstructive sleep apnea)    • Seasonal allergies        Past Surgical History:   Past Surgical History:   Procedure Laterality Date   • BRONCHOSCOPY      removal of obstructing polypoid tissue right middle lung   • COLOSTOMY N/A 09/22/2022    Procedure: LAPAROSCOPIC COLOSTOMY CREATION, FLEXIBLE SIGMOIDOSCOPY;  Surgeon: Hiram Viveros MD;  Location: [facility] OR;  Service: General;  Laterality: N/A;   • ENDOSCOPY N/A 10/10/2022    Procedure: ESOPHAGOGASTRODUODENOSCOPY;  Surgeon: Neeraj Lynn MD;  Location:  [facility];  Service: Gastroenterology;  Laterality: N/A;   • ENDOSCOPY N/A 10/12/2022    Procedure: ESOPHAGOGASTRODUODENOSCOPY;  Surgeon: Brunner, Mark I, MD;  Location:  [facility];  Service: Gastroenterology;  Laterality: N/A;   • EXAM UNDER ANESTHESIA, RECTAL BIOPSY N/A 10/04/2022    Procedure: EXAM UNDER ANESTHESIA, TRANSANAL BIOPSY WITH TRUCUT NEEDLE (LITHOTOMY-CANDY CANE);   Surgeon: Hiram Viveros MD;  Location: [facility];  Service: General;  Laterality: N/A;   • PERIPHERALLY INSERTED CENTRAL CATHETER INSERTION      Removed 11/28/2022   • VENOUS ACCESS DEVICE (PORT) INSERTION Right 11/28/2022       Family History:   Family History   Problem Relation Age of Onset   • Diabetes Mother    • Diabetes Father    • Heart disease Father        Social History:   Social History     Socioeconomic History   • Marital status:    Tobacco Use   • Smoking status: Never   • Smokeless tobacco: Never   Vaping Use   • Vaping Use: Never used   Substance and Sexual Activity   • Alcohol use: Yes     Comment: previously drank 2 glasses of wine/beer nightly   • Drug use: Never   • Sexual activity: Defer       Medications:     Current Outpatient Medications:   •  fluticasone (FLONASE) 50 MCG/ACT nasal spray, 1 spray into the nostril(s) as directed by provider Daily., Disp: , Rfl:   •  KLOR-CON 20 MEQ CR tablet, , Disp: , Rfl:   •  lidocaine-prilocaine (EMLA) 2.5-2.5 % cream, Apply 1 application topically to the appropriate area as directed As Needed (45-60 minutes prior to port access.  Cover with saran/plastic wrap.)., Disp: 30 g, Rfl: 3  •  loratadine (CLARITIN) 10 MG tablet, Take 1 tablet by mouth Daily., Disp: 30 tablet, Rfl: 5  •  OLANZapine (zyPREXA) 5 MG tablet, Take 1 tablet by mouth Every Night. For 3 days on Days 2, 3 and 4 and on days 16, 17, and 18., Disp: 6 tablet, Rfl: 5  •  ondansetron (Zofran) 8 MG tablet, Take 1 tablet by mouth Every 8 (Eight) Hours As Needed for Nausea or Vomiting., Disp: 30 tablet, Rfl: 5  •  pantoprazole (PROTONIX) 40 MG EC tablet, Take 1 tablet by mouth Daily., Disp: 90 tablet, Rfl: 1  •  prochlorperazine (COMPAZINE) 5 MG tablet, Take 1 tablet by mouth Every 6 (Six) Hours As Needed for Nausea or Vomiting., Disp: 30 tablet, Rfl: 2  •  rosuvastatin (CRESTOR) 10 MG tablet, , Disp: , Rfl:     Allergies:   No Known Allergies    Objective     Vital Signs:   Vitals:     "03/21/23 0833   BP: 143/75   Pulse: 73   Resp: 18   Temp: 97.5 °F (36.4 °C)   TempSrc: Infrared   SpO2: 98%   Weight: 68.9 kg (152 lb)   Height: 162.6 cm (64.02\")   PainSc: 0-No pain    Body mass index is 26.08 kg/m².   Pain Score    03/21/23 0833   PainSc: 0-No pain       ECOG Performance Status: 1-2    Physical Exam:   General: No acute distress. Well appearing.  HEENT: Normocephalic, atraumatic. Sclera anicteric. OP clear  Neck: supple, no palpable LAD  Cardiovascular: regular rate and rhythm. No murmurs.   Respiratory: Normal rate. Clear to auscultation bilaterally  Abdomen: Soft, nontender, non distended with normoactive bowel sounds. Ostomy LLQ.  Lymph: no supraclavicular or axillary adenopathy  Neuro: Alert and oriented x 3. No focal deficits. Intact sensation to light touch in fingertips. Intact  strength and fine motor.  Ext: Symmetric, no swelling.   Psych: Euthymic  Skin: right port site   MSK: no erythema or effusions      Laboratory/Imaging Reviewed:   Hospital Outpatient Visit on 03/21/2023   Component Date Value Ref Range Status   • Glucose 03/21/2023 136 (H)  65 - 99 mg/dL Final   • BUN 03/21/2023 12  8 - 23 mg/dL Final   • Creatinine 03/21/2023 0.68 (L)  0.76 - 1.27 mg/dL Final   • Sodium 03/21/2023 133 (L)  136 - 145 mmol/L Final   • Potassium 03/21/2023 3.9  3.5 - 5.2 mmol/L Final   • Chloride 03/21/2023 97 (L)  98 - 107 mmol/L Final   • CO2 03/21/2023 29.0  22.0 - 29.0 mmol/L Final   • Calcium 03/21/2023 9.2  8.6 - 10.5 mg/dL Final   • Total Protein 03/21/2023 6.3  6.0 - 8.5 g/dL Final   • Albumin 03/21/2023 3.9  3.5 - 5.2 g/dL Final   • ALT (SGPT) 03/21/2023 10  1 - 41 U/L Final   • AST (SGOT) 03/21/2023 15  1 - 40 U/L Final   • Alkaline Phosphatase 03/21/2023 151 (H)  39 - 117 U/L Final   • Total Bilirubin 03/21/2023 0.2  0.0 - 1.2 mg/dL Final   • Globulin 03/21/2023 2.4  gm/dL Final    Calculated Result   • A/G Ratio 03/21/2023 1.6  g/dL Final   • BUN/Creatinine Ratio 03/21/2023 17.6  7.0 " - 25.0 Final   • Anion Gap 03/21/2023 7.0  5.0 - 15.0 mmol/L Final   • eGFR 03/21/2023 99.4  >60.0 mL/min/1.73 Final   • WBC 03/21/2023 15.51 (H)  3.40 - 10.80 10*3/mm3 Final   • RBC 03/21/2023 3.76 (L)  4.14 - 5.80 10*6/mm3 Final   • Hemoglobin 03/21/2023 11.9 (L)  13.0 - 17.7 g/dL Final   • Hematocrit 03/21/2023 36.9 (L)  37.5 - 51.0 % Final   • MCV 03/21/2023 98.1 (H)  79.0 - 97.0 fL Final   • MCH 03/21/2023 31.6  26.6 - 33.0 pg Final   • MCHC 03/21/2023 32.2  31.5 - 35.7 g/dL Final   • RDW 03/21/2023 17.7 (H)  12.3 - 15.4 % Final   • RDW-SD 03/21/2023 64.2 (H)  37.0 - 54.0 fl Final   • MPV 03/21/2023 9.3  6.0 - 12.0 fL Final   • Platelets 03/21/2023 230  140 - 450 10*3/mm3 Final   • Neutrophil % 03/21/2023 79.5 (H)  42.7 - 76.0 % Final   • Lymphocyte % 03/21/2023 7.4 (L)  19.6 - 45.3 % Final   • Monocyte % 03/21/2023 7.7  5.0 - 12.0 % Final   • Eosinophil % 03/21/2023 3.1  0.3 - 6.2 % Final   • Basophil % 03/21/2023 0.5  0.0 - 1.5 % Final   • Immature Grans % 03/21/2023 1.8 (H)  0.0 - 0.5 % Final   • Neutrophils, Absolute 03/21/2023 12.35 (H)  1.70 - 7.00 10*3/mm3 Final   • Lymphocytes, Absolute 03/21/2023 1.14  0.70 - 3.10 10*3/mm3 Final   • Monocytes, Absolute 03/21/2023 1.19 (H)  0.10 - 0.90 10*3/mm3 Final   • Eosinophils, Absolute 03/21/2023 0.48 (H)  0.00 - 0.40 10*3/mm3 Final   • Basophils, Absolute 03/21/2023 0.07  0.00 - 0.20 10*3/mm3 Final   • Immature Grans, Absolute 03/21/2023 0.28 (H)  0.00 - 0.05 10*3/mm3 Final       Assessment / Plan      Assessment/Plan:   1.  CD30 positive classical Hodgkin's lymphoma  2.  Chemotherapy monitoring  -He is here for consideration of BV AVD.  C1 Day 15 treatment was held due to pancytopenia, neutropenic fever, and active GI bleed, but has now resumed treatment with 20% dose reduction in AVD and continued full dose brentuximab.   -I personally reviewed his PET/CT in 12/2022 shows: Rectal mass has decreased by several cm, resolution of hydronephrosis, liver lesions no  longer visible, no persistent pleural effusion. No FDG avid disease. I have requested a formal comparison by the radiologist (original studies were not available to radiology at time of PET due to need to switch facilities bc of equipment failure) but on my review, consistent with treatment response and clearly he is improving clinically.  He has mild FDG asymmetry in the submandibular gland, asymmetric. He reports this is smaller than prior. Exam improved  -Labs stable to proceed 3/21/23 with mild leukocytosis, mild anemia that has improved and normal renal and hepatic function  -Continue to monitor for cumulative treatment related adverse events. No infectious symptoms or neuropathy currently. Will continue stable dose.   -Interim PET with partial response. Plan PET after cycle 6--pending 4/2/23    3.  GI bleed  -PPI, continue daily.    4. Arthralgias  -If persist beyond chemo completion will consider hand xrays, further workup. Monitor for now.     5. Access  -Port    6. Bone pain  -Added claritin  -Better    7.  Chemo nausea  -Well controlled on zofran, continue.     Follow Up:   4 weeks with scans  Kaycee Leary MD  Hematology and Oncology     I have spent a total of 40 min on reviewing test results/preparing to see patient, counseling patient, performing medically appropriate exam, ordering treatment and documenting clinical information in the electronic or other health record

## 2023-03-22 ENCOUNTER — HOSPITAL ENCOUNTER (OUTPATIENT)
Dept: ONCOLOGY | Facility: HOSPITAL | Age: 73
Discharge: HOME | End: 2023-03-22
Admitting: INTERNAL MEDICINE
Payer: MEDICARE

## 2023-03-22 VITALS
HEART RATE: 67 BPM | TEMPERATURE: 97.8 F | SYSTOLIC BLOOD PRESSURE: 129 MMHG | DIASTOLIC BLOOD PRESSURE: 66 MMHG | HEIGHT: 64 IN | RESPIRATION RATE: 16 BRPM | WEIGHT: 150 LBS | BODY MASS INDEX: 25.61 KG/M2

## 2023-03-22 DIAGNOSIS — C81.98 HODGKIN LYMPHOMA OF LYMPH NODES OF MULTIPLE REGIONS, UNSPECIFIED HODGKIN LYMPHOMA TYPE: Primary | ICD-10-CM

## 2023-03-22 PROCEDURE — 96372 THER/PROPH/DIAG INJ SC/IM: CPT

## 2023-03-22 PROCEDURE — 25010000002 PEGFILGRASTIM-APGF 6 MG/0.6ML SOLUTION PREFILLED SYRINGE: Performed by: INTERNAL MEDICINE

## 2023-03-22 RX ADMIN — PEGFILGRASTIM-APGF 6 MG: 6 INJECTION, SOLUTION SUBCUTANEOUS at 14:12

## 2023-04-10 ENCOUNTER — APPOINTMENT (OUTPATIENT)
Dept: GENERAL RADIOLOGY | Facility: HOSPITAL | Age: 73
DRG: 193 | End: 2023-04-10
Payer: MEDICARE

## 2023-04-10 ENCOUNTER — HOSPITAL ENCOUNTER (INPATIENT)
Facility: HOSPITAL | Age: 73
LOS: 3 days | Discharge: HOME | DRG: 193 | End: 2023-04-13
Attending: EMERGENCY MEDICINE | Admitting: STUDENT IN AN ORGANIZED HEALTH CARE EDUCATION/TRAINING PROGRAM
Payer: MEDICARE

## 2023-04-10 DIAGNOSIS — I10 ESSENTIAL HYPERTENSION: ICD-10-CM

## 2023-04-10 DIAGNOSIS — D61.810 ANTINEOPLASTIC CHEMOTHERAPY INDUCED PANCYTOPENIA: ICD-10-CM

## 2023-04-10 DIAGNOSIS — J18.9 COMMUNITY ACQUIRED PNEUMONIA, BILATERAL: Primary | ICD-10-CM

## 2023-04-10 DIAGNOSIS — K62.89 RECTAL MASS: ICD-10-CM

## 2023-04-10 DIAGNOSIS — G47.33 OSA (OBSTRUCTIVE SLEEP APNEA): ICD-10-CM

## 2023-04-10 DIAGNOSIS — B37.81 ESOPHAGEAL CANDIDIASIS: ICD-10-CM

## 2023-04-10 DIAGNOSIS — T45.1X5A ANTINEOPLASTIC CHEMOTHERAPY INDUCED PANCYTOPENIA: ICD-10-CM

## 2023-04-10 DIAGNOSIS — J96.01 ACUTE RESPIRATORY FAILURE WITH HYPOXIA: ICD-10-CM

## 2023-04-10 DIAGNOSIS — D84.821 IMMUNOSUPPRESSION DUE TO DRUG THERAPY: ICD-10-CM

## 2023-04-10 DIAGNOSIS — C81.98 HODGKIN LYMPHOMA OF LYMPH NODES OF MULTIPLE REGIONS, UNSPECIFIED HODGKIN LYMPHOMA TYPE: ICD-10-CM

## 2023-04-10 DIAGNOSIS — E87.1 HYPONATREMIA: ICD-10-CM

## 2023-04-10 DIAGNOSIS — Z79.899 IMMUNOSUPPRESSION DUE TO DRUG THERAPY: ICD-10-CM

## 2023-04-10 LAB
ALBUMIN SERPL-MCNC: 3 G/DL (ref 3.5–5.2)
ALBUMIN/GLOB SERPL: 1.1 G/DL
ALP SERPL-CCNC: 98 U/L (ref 39–117)
ALT SERPL W P-5'-P-CCNC: 11 U/L (ref 1–41)
ANION GAP SERPL CALCULATED.3IONS-SCNC: 10 MMOL/L (ref 5–15)
AST SERPL-CCNC: 20 U/L (ref 1–40)
B PARAPERT DNA SPEC QL NAA+PROBE: NOT DETECTED
B PERT DNA SPEC QL NAA+PROBE: NOT DETECTED
BASOPHILS # BLD AUTO: 0.12 10*3/MM3 (ref 0–0.2)
BASOPHILS NFR BLD AUTO: 0.7 % (ref 0–1.5)
BILIRUB SERPL-MCNC: 0.4 MG/DL (ref 0–1.2)
BILIRUB UR QL STRIP: NEGATIVE
BUN SERPL-MCNC: 14 MG/DL (ref 8–23)
BUN/CREAT SERPL: 26.9 (ref 7–25)
C PNEUM DNA NPH QL NAA+NON-PROBE: NOT DETECTED
CALCIUM SPEC-SCNC: 8.8 MG/DL (ref 8.6–10.5)
CHLORIDE SERPL-SCNC: 95 MMOL/L (ref 98–107)
CLARITY UR: CLEAR
CO2 SERPL-SCNC: 24 MMOL/L (ref 22–29)
COLOR UR: YELLOW
CREAT SERPL-MCNC: 0.52 MG/DL (ref 0.76–1.27)
D-LACTATE SERPL-SCNC: 1.2 MMOL/L (ref 0.5–2)
DEPRECATED RDW RBC AUTO: 57.8 FL (ref 37–54)
EGFRCR SERPLBLD CKD-EPI 2021: 107.8 ML/MIN/1.73
EOSINOPHIL # BLD AUTO: 0.12 10*3/MM3 (ref 0–0.4)
EOSINOPHIL NFR BLD AUTO: 0.7 % (ref 0.3–6.2)
ERYTHROCYTE [DISTWIDTH] IN BLOOD BY AUTOMATED COUNT: 16.7 % (ref 12.3–15.4)
FLUAV SUBTYP SPEC NAA+PROBE: NOT DETECTED
FLUBV RNA ISLT QL NAA+PROBE: NOT DETECTED
GLOBULIN UR ELPH-MCNC: 2.7 GM/DL
GLUCOSE SERPL-MCNC: 113 MG/DL (ref 65–99)
GLUCOSE UR STRIP-MCNC: NEGATIVE MG/DL
HADV DNA SPEC NAA+PROBE: NOT DETECTED
HCOV 229E RNA SPEC QL NAA+PROBE: NOT DETECTED
HCOV HKU1 RNA SPEC QL NAA+PROBE: DETECTED
HCOV NL63 RNA SPEC QL NAA+PROBE: NOT DETECTED
HCOV OC43 RNA SPEC QL NAA+PROBE: NOT DETECTED
HCT VFR BLD AUTO: 30.1 % (ref 37.5–51)
HGB BLD-MCNC: 10 G/DL (ref 13–17.7)
HGB UR QL STRIP.AUTO: NEGATIVE
HMPV RNA NPH QL NAA+NON-PROBE: NOT DETECTED
HOLD SPECIMEN: NORMAL
HPIV1 RNA ISLT QL NAA+PROBE: NOT DETECTED
HPIV2 RNA SPEC QL NAA+PROBE: NOT DETECTED
HPIV3 RNA NPH QL NAA+PROBE: NOT DETECTED
HPIV4 P GENE NPH QL NAA+PROBE: NOT DETECTED
IMM GRANULOCYTES # BLD AUTO: 0.16 10*3/MM3 (ref 0–0.05)
IMM GRANULOCYTES NFR BLD AUTO: 0.9 % (ref 0–0.5)
KETONES UR QL STRIP: NEGATIVE
L PNEUMO1 AG UR QL IA: NEGATIVE
LEUKOCYTE ESTERASE UR QL STRIP.AUTO: NEGATIVE
LYMPHOCYTES # BLD AUTO: 0.68 10*3/MM3 (ref 0.7–3.1)
LYMPHOCYTES NFR BLD AUTO: 3.8 % (ref 19.6–45.3)
M PNEUMO IGG SER IA-ACNC: NOT DETECTED
MCH RBC QN AUTO: 31.3 PG (ref 26.6–33)
MCHC RBC AUTO-ENTMCNC: 33.2 G/DL (ref 31.5–35.7)
MCV RBC AUTO: 94.1 FL (ref 79–97)
MONOCYTES # BLD AUTO: 1.17 10*3/MM3 (ref 0.1–0.9)
MONOCYTES NFR BLD AUTO: 6.5 % (ref 5–12)
MRSA DNA SPEC QL NAA+PROBE: NEGATIVE
NEUTROPHILS NFR BLD AUTO: 15.76 10*3/MM3 (ref 1.7–7)
NEUTROPHILS NFR BLD AUTO: 87.4 % (ref 42.7–76)
NITRITE UR QL STRIP: NEGATIVE
NRBC BLD AUTO-RTO: 0 /100 WBC (ref 0–0.2)
NT-PROBNP SERPL-MCNC: 244.9 PG/ML (ref 0–900)
PH UR STRIP.AUTO: 6 [PH] (ref 5–8)
PLATELET # BLD AUTO: 280 10*3/MM3 (ref 140–450)
PMV BLD AUTO: 9 FL (ref 6–12)
POTASSIUM SERPL-SCNC: 4 MMOL/L (ref 3.5–5.2)
PROCALCITONIN SERPL-MCNC: 0.21 NG/ML (ref 0–0.25)
PROT SERPL-MCNC: 5.7 G/DL (ref 6–8.5)
PROT UR QL STRIP: NEGATIVE
QT INTERVAL: 340 MS
QTC INTERVAL: 443 MS
RBC # BLD AUTO: 3.2 10*6/MM3 (ref 4.14–5.8)
RHINOVIRUS RNA SPEC NAA+PROBE: NOT DETECTED
RSV RNA NPH QL NAA+NON-PROBE: NOT DETECTED
S PNEUM AG SPEC QL LA: NEGATIVE
SARS-COV-2 RNA NPH QL NAA+NON-PROBE: NOT DETECTED
SODIUM SERPL-SCNC: 129 MMOL/L (ref 136–145)
SP GR UR STRIP: 1.02 (ref 1–1.03)
TROPONIN T SERPL HS-MCNC: 38 NG/L
UROBILINOGEN UR QL STRIP: NORMAL
WBC NRBC COR # BLD: 18.01 10*3/MM3 (ref 3.4–10.8)
WHOLE BLOOD HOLD COAG: NORMAL
WHOLE BLOOD HOLD SPECIMEN: NORMAL

## 2023-04-10 PROCEDURE — 87040 BLOOD CULTURE FOR BACTERIA: CPT | Performed by: EMERGENCY MEDICINE

## 2023-04-10 PROCEDURE — 94799 UNLISTED PULMONARY SVC/PX: CPT

## 2023-04-10 PROCEDURE — 36415 COLL VENOUS BLD VENIPUNCTURE: CPT

## 2023-04-10 PROCEDURE — 71045 X-RAY EXAM CHEST 1 VIEW: CPT

## 2023-04-10 PROCEDURE — 84145 PROCALCITONIN (PCT): CPT | Performed by: EMERGENCY MEDICINE

## 2023-04-10 PROCEDURE — 87070 CULTURE OTHR SPECIMN AEROBIC: CPT | Performed by: INTERNAL MEDICINE

## 2023-04-10 PROCEDURE — 80053 COMPREHEN METABOLIC PANEL: CPT | Performed by: EMERGENCY MEDICINE

## 2023-04-10 PROCEDURE — 83880 ASSAY OF NATRIURETIC PEPTIDE: CPT | Performed by: EMERGENCY MEDICINE

## 2023-04-10 PROCEDURE — 87641 MR-STAPH DNA AMP PROBE: CPT | Performed by: INTERNAL MEDICINE

## 2023-04-10 PROCEDURE — 25010000002 AZITHROMYCIN PER 500 MG: Performed by: EMERGENCY MEDICINE

## 2023-04-10 PROCEDURE — 83605 ASSAY OF LACTIC ACID: CPT | Performed by: EMERGENCY MEDICINE

## 2023-04-10 PROCEDURE — 87205 SMEAR GRAM STAIN: CPT | Performed by: INTERNAL MEDICINE

## 2023-04-10 PROCEDURE — 81003 URINALYSIS AUTO W/O SCOPE: CPT | Performed by: EMERGENCY MEDICINE

## 2023-04-10 PROCEDURE — 85025 COMPLETE CBC W/AUTO DIFF WBC: CPT | Performed by: EMERGENCY MEDICINE

## 2023-04-10 PROCEDURE — 84484 ASSAY OF TROPONIN QUANT: CPT | Performed by: EMERGENCY MEDICINE

## 2023-04-10 PROCEDURE — 94640 AIRWAY INHALATION TREATMENT: CPT

## 2023-04-10 PROCEDURE — 94664 DEMO&/EVAL PT USE INHALER: CPT

## 2023-04-10 PROCEDURE — 93005 ELECTROCARDIOGRAM TRACING: CPT

## 2023-04-10 PROCEDURE — 99285 EMERGENCY DEPT VISIT HI MDM: CPT

## 2023-04-10 PROCEDURE — 93005 ELECTROCARDIOGRAM TRACING: CPT | Performed by: EMERGENCY MEDICINE

## 2023-04-10 PROCEDURE — 25010000002 VANCOMYCIN 10 G RECONSTITUTED SOLUTION

## 2023-04-10 PROCEDURE — 99222 1ST HOSP IP/OBS MODERATE 55: CPT | Performed by: INTERNAL MEDICINE

## 2023-04-10 PROCEDURE — 25010000002 METHYLPREDNISOLONE PER 40 MG: Performed by: INTERNAL MEDICINE

## 2023-04-10 PROCEDURE — 25010000002 PIPERACILLIN SOD-TAZOBACTAM PER 1 G: Performed by: INTERNAL MEDICINE

## 2023-04-10 PROCEDURE — 25010000002 ENOXAPARIN PER 10 MG: Performed by: INTERNAL MEDICINE

## 2023-04-10 PROCEDURE — 87449 NOS EACH ORGANISM AG IA: CPT | Performed by: EMERGENCY MEDICINE

## 2023-04-10 PROCEDURE — 0202U NFCT DS 22 TRGT SARS-COV-2: CPT | Performed by: EMERGENCY MEDICINE

## 2023-04-10 RX ORDER — FLUTICASONE PROPIONATE 50 MCG
1 SPRAY, SUSPENSION (ML) NASAL DAILY
Status: DISCONTINUED | OUTPATIENT
Start: 2023-04-10 | End: 2023-04-13 | Stop reason: HOSPADM

## 2023-04-10 RX ORDER — ACETAMINOPHEN 160 MG/5ML
650 SOLUTION ORAL EVERY 4 HOURS PRN
Status: DISCONTINUED | OUTPATIENT
Start: 2023-04-10 | End: 2023-04-13 | Stop reason: HOSPADM

## 2023-04-10 RX ORDER — GUAIFENESIN 600 MG/1
1200 TABLET, EXTENDED RELEASE ORAL EVERY 12 HOURS SCHEDULED
Status: DISCONTINUED | OUTPATIENT
Start: 2023-04-10 | End: 2023-04-13 | Stop reason: HOSPADM

## 2023-04-10 RX ORDER — PANTOPRAZOLE SODIUM 40 MG/1
40 TABLET, DELAYED RELEASE ORAL
Status: DISCONTINUED | OUTPATIENT
Start: 2023-04-10 | End: 2023-04-13 | Stop reason: HOSPADM

## 2023-04-10 RX ORDER — SODIUM CHLORIDE 9 MG/ML
40 INJECTION, SOLUTION INTRAVENOUS AS NEEDED
Status: DISCONTINUED | OUTPATIENT
Start: 2023-04-10 | End: 2023-04-13 | Stop reason: HOSPADM

## 2023-04-10 RX ORDER — SODIUM CHLORIDE 0.9 % (FLUSH) 0.9 %
10 SYRINGE (ML) INJECTION AS NEEDED
Status: DISCONTINUED | OUTPATIENT
Start: 2023-04-10 | End: 2023-04-13 | Stop reason: HOSPADM

## 2023-04-10 RX ORDER — DICYCLOMINE HCL 20 MG
20 TABLET ORAL 4 TIMES DAILY PRN
COMMUNITY

## 2023-04-10 RX ORDER — ACETAMINOPHEN 325 MG/1
650 TABLET ORAL EVERY 4 HOURS PRN
Status: DISCONTINUED | OUTPATIENT
Start: 2023-04-10 | End: 2023-04-13 | Stop reason: HOSPADM

## 2023-04-10 RX ORDER — ENOXAPARIN SODIUM 100 MG/ML
40 INJECTION SUBCUTANEOUS DAILY
Status: DISCONTINUED | OUTPATIENT
Start: 2023-04-10 | End: 2023-04-13 | Stop reason: HOSPADM

## 2023-04-10 RX ORDER — ALBUTEROL SULFATE 2.5 MG/3ML
2.5 SOLUTION RESPIRATORY (INHALATION)
Status: COMPLETED | OUTPATIENT
Start: 2023-04-10 | End: 2023-04-13

## 2023-04-10 RX ORDER — PROCHLORPERAZINE MALEATE 5 MG/1
5 TABLET ORAL EVERY 6 HOURS PRN
Status: DISCONTINUED | OUTPATIENT
Start: 2023-04-10 | End: 2023-04-13 | Stop reason: HOSPADM

## 2023-04-10 RX ORDER — ROSUVASTATIN CALCIUM 10 MG/1
10 TABLET, COATED ORAL DAILY
Status: DISCONTINUED | OUTPATIENT
Start: 2023-04-10 | End: 2023-04-13 | Stop reason: HOSPADM

## 2023-04-10 RX ORDER — LIDOCAINE AND PRILOCAINE 25; 25 MG/G; MG/G
1 CREAM TOPICAL AS NEEDED
Status: DISCONTINUED | OUTPATIENT
Start: 2023-04-10 | End: 2023-04-13 | Stop reason: HOSPADM

## 2023-04-10 RX ORDER — ACETAMINOPHEN 500 MG
1000 TABLET ORAL ONCE
Status: COMPLETED | OUTPATIENT
Start: 2023-04-10 | End: 2023-04-10

## 2023-04-10 RX ORDER — POTASSIUM CHLORIDE 1.5 G/1.77G
20 POWDER, FOR SOLUTION ORAL DAILY
Status: DISCONTINUED | OUTPATIENT
Start: 2023-04-10 | End: 2023-04-13 | Stop reason: HOSPADM

## 2023-04-10 RX ORDER — METHYLPREDNISOLONE SODIUM SUCCINATE 40 MG/ML
40 INJECTION, POWDER, LYOPHILIZED, FOR SOLUTION INTRAMUSCULAR; INTRAVENOUS DAILY
Status: DISCONTINUED | OUTPATIENT
Start: 2023-04-10 | End: 2023-04-13 | Stop reason: HOSPADM

## 2023-04-10 RX ORDER — CETIRIZINE HYDROCHLORIDE 10 MG/1
10 TABLET ORAL DAILY
Status: DISCONTINUED | OUTPATIENT
Start: 2023-04-10 | End: 2023-04-13 | Stop reason: HOSPADM

## 2023-04-10 RX ORDER — VANCOMYCIN HYDROCHLORIDE 1 G/200ML
14.7 INJECTION, SOLUTION INTRAVENOUS EVERY 12 HOURS
Status: DISCONTINUED | OUTPATIENT
Start: 2023-04-10 | End: 2023-04-10

## 2023-04-10 RX ORDER — SODIUM CHLORIDE 0.9 % (FLUSH) 0.9 %
10 SYRINGE (ML) INJECTION EVERY 12 HOURS SCHEDULED
Status: DISCONTINUED | OUTPATIENT
Start: 2023-04-10 | End: 2023-04-13 | Stop reason: HOSPADM

## 2023-04-10 RX ORDER — SODIUM CHLORIDE, SODIUM LACTATE, POTASSIUM CHLORIDE, CALCIUM CHLORIDE 600; 310; 30; 20 MG/100ML; MG/100ML; MG/100ML; MG/100ML
100 INJECTION, SOLUTION INTRAVENOUS CONTINUOUS
Status: ACTIVE | OUTPATIENT
Start: 2023-04-10 | End: 2023-04-11

## 2023-04-10 RX ORDER — ACETAMINOPHEN 650 MG/1
650 SUPPOSITORY RECTAL EVERY 4 HOURS PRN
Status: DISCONTINUED | OUTPATIENT
Start: 2023-04-10 | End: 2023-04-13 | Stop reason: HOSPADM

## 2023-04-10 RX ORDER — ALBUTEROL SULFATE 2.5 MG/3ML
2.5 SOLUTION RESPIRATORY (INHALATION) EVERY 6 HOURS PRN
Status: DISCONTINUED | OUTPATIENT
Start: 2023-04-10 | End: 2023-04-13 | Stop reason: HOSPADM

## 2023-04-10 RX ORDER — ONDANSETRON 2 MG/ML
4 INJECTION INTRAMUSCULAR; INTRAVENOUS EVERY 6 HOURS PRN
Status: DISCONTINUED | OUTPATIENT
Start: 2023-04-10 | End: 2023-04-13 | Stop reason: HOSPADM

## 2023-04-10 RX ADMIN — AZITHROMYCIN 500 MG: 500 INJECTION, POWDER, LYOPHILIZED, FOR SOLUTION INTRAVENOUS at 06:47

## 2023-04-10 RX ADMIN — METHYLPREDNISOLONE SODIUM SUCCINATE 40 MG: 40 INJECTION, POWDER, FOR SOLUTION INTRAMUSCULAR; INTRAVENOUS at 06:13

## 2023-04-10 RX ADMIN — TAZOBACTAM SODIUM AND PIPERACILLIN SODIUM 3.38 G: 375; 3 INJECTION, SOLUTION INTRAVENOUS at 20:53

## 2023-04-10 RX ADMIN — GUAIFENESIN 1200 MG: 600 TABLET, EXTENDED RELEASE ORAL at 20:51

## 2023-04-10 RX ADMIN — Medication 10 ML: at 12:22

## 2023-04-10 RX ADMIN — GUAIFENESIN 1200 MG: 600 TABLET, EXTENDED RELEASE ORAL at 12:21

## 2023-04-10 RX ADMIN — NYSTATIN 500000 UNITS: 100000 SUSPENSION ORAL at 17:03

## 2023-04-10 RX ADMIN — ENOXAPARIN SODIUM 40 MG: 40 INJECTION SUBCUTANEOUS at 12:21

## 2023-04-10 RX ADMIN — ACETAMINOPHEN 1000 MG: 500 TABLET ORAL at 04:13

## 2023-04-10 RX ADMIN — SODIUM CHLORIDE, POTASSIUM CHLORIDE, SODIUM LACTATE AND CALCIUM CHLORIDE 1000 ML: 600; 310; 30; 20 INJECTION, SOLUTION INTRAVENOUS at 07:52

## 2023-04-10 RX ADMIN — CETIRIZINE HYDROCHLORIDE 10 MG: 10 TABLET, FILM COATED ORAL at 12:21

## 2023-04-10 RX ADMIN — TAZOBACTAM SODIUM AND PIPERACILLIN SODIUM 3.38 G: 375; 3 INJECTION, SOLUTION INTRAVENOUS at 06:16

## 2023-04-10 RX ADMIN — POTASSIUM CHLORIDE 20 MEQ: 1.5 POWDER, FOR SOLUTION ORAL at 12:21

## 2023-04-10 RX ADMIN — ALBUTEROL SULFATE 2.5 MG: 2.5 SOLUTION RESPIRATORY (INHALATION) at 12:35

## 2023-04-10 RX ADMIN — VANCOMYCIN HYDROCHLORIDE 1250 MG: 10 INJECTION, POWDER, LYOPHILIZED, FOR SOLUTION INTRAVENOUS at 05:55

## 2023-04-10 RX ADMIN — SODIUM CHLORIDE, POTASSIUM CHLORIDE, SODIUM LACTATE AND CALCIUM CHLORIDE 100 ML/HR: 600; 310; 30; 20 INJECTION, SOLUTION INTRAVENOUS at 07:51

## 2023-04-10 RX ADMIN — Medication 10 ML: at 20:53

## 2023-04-10 RX ADMIN — PANTOPRAZOLE SODIUM 40 MG: 40 TABLET, DELAYED RELEASE ORAL at 06:12

## 2023-04-10 RX ADMIN — ALBUTEROL SULFATE 2.5 MG: 2.5 SOLUTION RESPIRATORY (INHALATION) at 19:41

## 2023-04-10 RX ADMIN — ALBUTEROL SULFATE 2.5 MG: 2.5 SOLUTION RESPIRATORY (INHALATION) at 07:55

## 2023-04-10 RX ADMIN — NYSTATIN 500000 UNITS: 100000 SUSPENSION ORAL at 12:21

## 2023-04-10 RX ADMIN — SODIUM CHLORIDE, POTASSIUM CHLORIDE, SODIUM LACTATE AND CALCIUM CHLORIDE 1000 ML: 600; 310; 30; 20 INJECTION, SOLUTION INTRAVENOUS at 04:00

## 2023-04-10 RX ADMIN — TAZOBACTAM SODIUM AND PIPERACILLIN SODIUM 3.38 G: 375; 3 INJECTION, SOLUTION INTRAVENOUS at 12:21

## 2023-04-10 RX ADMIN — NYSTATIN 500000 UNITS: 100000 SUSPENSION ORAL at 20:51

## 2023-04-10 RX ADMIN — ROSUVASTATIN CALCIUM 10 MG: 10 TABLET, FILM COATED ORAL at 12:21

## 2023-04-10 RX ADMIN — SODIUM CHLORIDE, POTASSIUM CHLORIDE, SODIUM LACTATE AND CALCIUM CHLORIDE 100 ML/HR: 600; 310; 30; 20 INJECTION, SOLUTION INTRAVENOUS at 17:04

## 2023-04-10 NOTE — H&P
[facility] Medicine Services  HISTORY AND PHYSICAL    Patient Name: Abraham Wu  : 1950  MRN: [id number]  Primary Care Physician: Jatinder Haynes MD  Date of admission: 4/10/2023      Subjective   Subjective     Chief Complaint:  Fever, cough, soa    HPI:  Abraham Wu is a 73 y.o. male with past medical history of stage IV Hodgkin's lymphoma status postchemotherapy that completed 3 weeks ago, anemia secondary to chemotherapy, history of GI bleed secondary to ulcer, history of rectal mass status post resection with colostomy, SHERRI previously on BiPAP, essential hypertension, hyperlipidemia, who presents the ER with 7-day history of productive cough, increasing shortness of air, fever and chills.    Patient reports for the last week he has had cough with clear sputum production.  He reports progressively worsening general malaise, fatigue, decreased appetite without nausea and vomiting, and weakness.  Wife decided to bring him in tonight as he was tachypneic while sleeping.    Work-up in the ER concerning for bilateral pneumonia.  Patient was 71% O2.  He does not wear any home oxygen.  He is feeling improved after oxygen administration.  Currently requiring 4.5 L.      Review of Systems   Gen- reports fevers, chills  CV- No chest pain, palpitations  Resp- reports cough, dyspnea  GI- Denies n/v, reports some loose stool through ostomy , deniesabd pain        Personal History     Past Medical History:   Diagnosis Date   • benign polypoid tissue right lung    • Diabetes mellitus    • Hyperlipidemia    • Hypertension    • Mycobacterium mucogenicum    • SHERRI (obstructive sleep apnea)    • Seasonal allergies          Oncology Problem List:  Hodgkin lymphoma (10/07/2022; Status: Active)    Oncology/Hematology History   Hodgkin lymphoma   10/7/2022 Initial Diagnosis    Hodgkin lymphoma (HCC)     10/8/2022 -  Chemotherapy    OP HODGKIN LYMPHOMA  BV+AVD (Brentuximab vedotin /  Doxorubicin / Vinblastine / Dacarbazine)     10/28/2022 Cancer Staged    Staging form: Hodgkin And Non-Hodgkin Lymphoma, AJCC 8th Edition  - Clinical: Stage IV - Signed by Kaycee Leary MD on 10/28/2022         Past Surgical History:   Procedure Laterality Date   • BRONCHOSCOPY      removal of obstructing polypoid tissue right middle lung   • COLOSTOMY N/A 09/22/2022    Procedure: LAPAROSCOPIC COLOSTOMY CREATION, FLEXIBLE SIGMOIDOSCOPY;  Surgeon: Hiram Viveros MD;  Location:  [facility];  Service: General;  Laterality: N/A;   • ENDOSCOPY N/A 10/10/2022    Procedure: ESOPHAGOGASTRODUODENOSCOPY;  Surgeon: Neeraj Lynn MD;  Location:  [facility];  Service: Gastroenterology;  Laterality: N/A;   • ENDOSCOPY N/A 10/12/2022    Procedure: ESOPHAGOGASTRODUODENOSCOPY;  Surgeon: Brunner, Mark I, MD;  Location:  [facility];  Service: Gastroenterology;  Laterality: N/A;   • EXAM UNDER ANESTHESIA, RECTAL BIOPSY N/A 10/04/2022    Procedure: EXAM UNDER ANESTHESIA, TRANSANAL BIOPSY WITH TRUCUT NEEDLE (LITHOTOMY-CANDY CANE);  Surgeon: Hiram Viveros MD;  Location:  [facility];  Service: General;  Laterality: N/A;   • PERIPHERALLY INSERTED CENTRAL CATHETER INSERTION      Removed 11/28/2022   • VENOUS ACCESS DEVICE (PORT) INSERTION Right 11/28/2022       Family History: family history includes Diabetes in his father and mother; Heart disease in his father.     Social History:  reports that he has never smoked. He has never used smokeless tobacco. He reports current alcohol use. He reports that he does not use drugs.  Social History     Social History Narrative   • Not on file       Medications:  Available home medication information reviewed.  (Not in a hospital admission)      No Known Allergies    Objective   Objective     Vital Signs:   Temp:  [101.5 °F (38.6 °C)] 101.5 °F (38.6 °C)  Heart Rate:  [] 95  Resp:  [24] 24  BP: (116-151)/(71-81) 116/71       Physical Exam   Constitutional: Awake, alert,  appears chronically ill, sweating  Eyes: PERRLA, sclerae anicteric, no conjunctival injection  HENT: NCAT, mucous membranes dry  Neck: Supple, no thyromegaly, no lymphadenopathy, trachea midline  Respiratory:scattered crackles bilaterally, mildly labored respirations   Cardiovascular: tachy regular, no murmurs, rubs, or gallops, palpable pedal pulses bilaterally  Gastrointestinal: Positive bowel sounds, soft, nontender, nondistended, ostomy with loose normal colored stool  Musculoskeletal: No bilateral ankle edema, no clubbing or cyanosis to extremities  Psychiatric: Appropriate affect, cooperative  Neurologic: Oriented x 3, strength symmetric in all extremities, Cranial Nerves grossly intact to confrontation, speech clear  Skin: No rashes      Result Review:  I have personally reviewed the results from the time of this admission to 4/10/2023 05:44 EDT and agree with these findings:  [x]  Laboratory list / accordion  []  Microbiology  [x]  Radiology  []  EKG/Telemetry   []  Cardiology/Vascular   []  Pathology  [x]  Old records  []  Other:  Most notable findings include: WBC 18, hemoglobin stable at 10.0, sodium slightly low at 129, Trope elevated at 38        LAB RESULTS:      Lab 04/10/23  0359   WBC 18.01*   HEMOGLOBIN 10.0*   HEMATOCRIT 30.1*   PLATELETS 280   NEUTROS ABS 15.76*   IMMATURE GRANS (ABS) 0.16*   LYMPHS ABS 0.68*   MONOS ABS 1.17*   EOS ABS 0.12   MCV 94.1   PROCALCITONIN 0.21   LACTATE 1.2         Lab 04/10/23  0359   SODIUM 129*   POTASSIUM 4.0   CHLORIDE 95*   CO2 24.0   ANION GAP 10.0   BUN 14   CREATININE 0.52*   EGFR 107.8   GLUCOSE 113*   CALCIUM 8.8         Lab 04/10/23  0359   TOTAL PROTEIN 5.7*   ALBUMIN 3.0*   GLOBULIN 2.7   ALT (SGPT) 11   AST (SGOT) 20   BILIRUBIN 0.4   ALK PHOS 98         Lab 04/10/23  0359   PROBNP 244.9   HSTROP T 38*                 UA    Urinalysis 9/20/22 10/3/22 10/3/22 10/3/22 10/3/22 10/14/22 10/14/22     0734 0734 2016 2016 1012 1012   Squamous Epithelial  Cells, UA   7-12 (A)  0-2  3-6 (A)   Specific [employer], UA 1.018 1.016  1.022  1.018    Ketones, UA Negative Trace (A)  Negative  Negative    Blood, UA Negative Moderate (2+) (A)  Trace (A)  Negative    Leukocytes, UA Negative Trace (A)  Negative  Trace (A)    Nitrite, UA Negative Negative  Negative  Negative    RBC, UA   31-50 (A)  0-2  3-6 (A)   WBC, UA   6-12 (A)  3-5 (A)  6-12 (A)   Bacteria, UA   Trace  Trace  None Seen   (A) Abnormal value       Comments are available for some flowsheets but are not being displayed.             Microbiology Results (last 10 days)     Procedure Component Value - Date/Time    COVID PRE-OP / PRE-PROCEDURE SCREENING ORDER (NO ISOLATION) - Swab, Nasopharynx [[id number]]  (Abnormal) Collected: 04/10/23 0429    Lab Status: Final result Specimen: Swab from Nasopharynx Updated: 04/10/23 0541    Narrative:      The following orders were created for panel order COVID PRE-OP / PRE-PROCEDURE SCREENING ORDER (NO ISOLATION) - Swab, Nasopharynx.  Procedure                               Abnormality         Status                     ---------                               -----------         ------                     Respiratory Panel PCR w/...[[id number]]  Abnormal            Final result                 Please view results for these tests on the individual orders.    Respiratory Panel PCR w/COVID-19(SARS-CoV-2) [facility] In-House, NP Swab in UTM/VTM, 3-4 HR TAT - Swab, Nasopharynx [[id number]]  (Abnormal) Collected: 04/10/23 0429    Lab Status: Final result Specimen: Swab from Nasopharynx Updated: 04/10/23 0541     ADENOVIRUS, PCR Not Detected     Coronavirus 229E Not Detected     Coronavirus HKU1 Detected     Coronavirus NL63 Not Detected     Coronavirus OC43 Not Detected     COVID19 Not Detected     Human Metapneumovirus Not Detected     Human Rhinovirus/Enterovirus Not Detected     Influenza A PCR Not Detected     Influenza B PCR Not Detected     Parainfluenza Virus 1 Not  Detected     Parainfluenza Virus 2 Not Detected     Parainfluenza Virus 3 Not Detected     Parainfluenza Virus 4 Not Detected     RSV, PCR Not Detected     Bordetella pertussis pcr Not Detected     Bordetella parapertussis PCR Not Detected     Chlamydophila pneumoniae PCR Not Detected     Mycoplasma pneumo by PCR Not Detected    Narrative:      In the setting of a positive respiratory panel with a viral infection PLUS a negative procalcitonin without other underlying concern for bacterial infection, consider observing off antibiotics or discontinuation of antibiotics and continue supportive care. If the respiratory panel is positive for atypical bacterial infection (Bordetella pertussis, Chlamydophila pneumoniae, or Mycoplasma pneumoniae), consider antibiotic de-escalation to target atypical bacterial infection.          XR Chest 1 View    Result Date: 4/10/2023  EXAMINATION: XR CHEST 1 VW  DATE: 4/10/2023 2:48 AM INDICATIONS: Shortness of air. Cancer patient. COMPARISON: October 14, 2022. CT scan October 3, 2022 FINDINGS: Moderate linear, patchy, hazy, and confluent opacity is seen in the lungs. Sharp lateral costophrenic angles. Normal heart size. Atherosclerotic, elongated, left-sided aorta. Stable mediastinum and guerda. Right internal jugular Port-A-Cath in good position. Stable osseous structures.     Impression: Moderate opacity in the lungs. Pneumonia and pulmonary edema are leading differential considerations. Normal heart size. Electronically signed by:  Chris Santo M.D.  4/10/2023 1:41 AM Mountain Time      Results for orders placed during the hospital encounter of 10/03/22    Adult Transthoracic Echo Complete W/ Cont if Necessary Per Protocol    Interpretation Summary  · Left ventricular ejection fraction appears to be 56 - 60%. Left ventricular systolic function is normal.  · Global longitudinal LV strain (GLS) = -27.0%.  · Left atrial volume is moderately increased.  · Mild mitral valve regurgitation is  present.  · Mild tricuspid valve regurgitation is present.  · Estimated right ventricular systolic pressure from tricuspid regurgitation is mildly elevated (35-45 mmHg).  · Mild dilation of the ascending aorta is present.      Assessment & Plan   Assessment & Plan     Active Hospital Problems    Diagnosis  POA   • **Acute respiratory failure with hypoxia [J96.01]  Unknown   • Community acquired pneumonia, bilateral [J18.9]  Yes   • Immunosuppression due to drug therapy [D84.821, Z79.899]  Not Applicable   • SHERRI (obstructive sleep apnea) [G47.33]  Yes   • Hodgkin lymphoma [C81.90]  Yes   • Anemia secondary to chemotherapy and acute blood loss [D61.9]  Yes   • Essential hypertension [I10]  Yes   • Dyslipidemia [E78.5]  Yes   • Hyponatremia [E87.1]  Unknown   • Rectal mass s/p laparoscopic colostomy (9/2022) [K62.89]  Yes       Patient is a 73-year-old male with past medical history of stage IV Hodgkin's lymphoma status postchemotherapy that completed 3 weeks ago, anemia secondary to chemotherapy, history of GI bleed secondary to ulcer, history of rectal mass status post resection with colostomy, SHERRI previously on BiPAP, essential hypertension, hyperlipidemia, who presents the ER with 7-day history of productive cough, increasing shortness of air, fever and chills.    Acute respiratory failure with hypoxia  Bilateral pneumonia with risk factors for MRSA/Pseudomonas  Immunosuppression secondary to chemotherapy  Stage IV Hodgkin's lymphoma  --Courtesy consult to Dr. Leary  -- Blood cultures  -- Sputum culture  -- Respiratory panel PCR pending  -- Urine strep and Legionella antigen  -- Broad-spectrum antibiotics with vancomycin, Zosyn, azithromycin.  -- MRSA screen  --nebs, solumedrol 40 daily x 5 days  --consider ID eval/CT chest if clinically worsening    Elevated troponin  --suspect secondary to demand from hypoxia  --denies chest pain    Hyponatremia  --suspect volume related, gentle fluids    Chronic  anemia  Hypertension  Hyperlipidemia  Chronic ostomy  --Continue home medication    SHERRI  -- No longer requiring BiPAP, patient is requesting nocturnal O2 order at discharge  --cm consult    Hx of gi bleed, PUD  --continue protonix    History of esophageal candidiasis  -- Nystatin swish and swallow while on antibiotic    Total time spent: 75  Time spent includes time reviewing chart, face-to-face time, counseling patient/family/caregiver, ordering medications/tests/procedures, communicating with other health care professionals, documenting clinical information in the electronic health record, and coordination of care.      DVT prophylaxis:  lovenox      CODE STATUS: full  Code Status and Medical Interventions:   Ordered at: 04/10/23 0537     Code Status (Patient has no pulse and is not breathing):    CPR (Attempt to Resuscitate)     Medical Interventions (Patient has pulse or is breathing):    Full Support     Comments:    does not want prolonged intubation/trach/peg       Expected Discharge   Expected Discharge Date and Time     Expected Discharge Date Expected Discharge Time    Apr 14, 2023            John Mi DO  04/10/23

## 2023-04-10 NOTE — PROGRESS NOTES
[facility] Medicine Services  ADMISSION FOLLOW-UP NOTE          Patient admitted after midnight, H&P by my partner performed earlier on today's date reviewed.  Interim findings, labs, and charting also reviewed.        [facility] Hospital Problem List has been managed and updated to include any new diagnoses:  Active Hospital Problems    Diagnosis  POA   • **Acute respiratory failure with hypoxia [J96.01]  Unknown   • Community acquired pneumonia, bilateral [J18.9]  Yes   • Immunosuppression due to drug therapy [D84.821, Z79.899]  Not Applicable   • SHERRI (obstructive sleep apnea) [G47.33]  Yes   • Hodgkin lymphoma [C81.90]  Yes   • Anemia secondary to chemotherapy and acute blood loss [D61.9]  Yes   • Essential hypertension [I10]  Yes   • Dyslipidemia [E78.5]  Yes   • Hyponatremia [E87.1]  Unknown   • Rectal mass s/p laparoscopic colostomy (9/2022) [K62.89]  Yes      Resolved Hospital Problems   No resolved problems to display.         ADDITIONAL PLAN:  - detailed assessment and plan from admission reviewed    73-year-old male with past medical history of stage IV Hodgkin's lymphoma status postchemotherapy that completed 3 weeks ago, anemia secondary to chemotherapy, history of GI bleed secondary to ulcer, history of rectal mass status post resection with colostomy, SHERRI previously on BiPAP, essential hypertension, hyperlipidemia, who presents the ER with 7-day history of productive cough, increasing shortness of air, fever and chills.     Acute respiratory failure with hypoxia  Bilateral pneumonia with risk factors for MRSA/Pseudomonas  Coronavirus HKU1 infection  -- Cultures, urinary ag pending. MRSA PCR neg - stop Vanc. Continue broad-spectrum antibiotics with Zosyn, azithromycin.  -- Nebs, solumedrol 40 daily x 5 days    Elevated troponin  --Suspect secondary to demand from hypoxia. No CP, no EKG changes.    Immunosuppression secondary to chemotherapy  Stage IV Hodgkin's  lymphoma  Chronic anemia  --S/P completion chemotherapy 3/21/2023. Restaging PET/CT for next week.    Hyponatremia  --Suspect volume related. Continue IVF.     Hypertension  Hyperlipidemia  Chronic ostomy  --Continue home medication     SHERRI  --No longer requiring BiPAP, patient is requesting nocturnal O2 order at discharge     Hx of gi bleed, PUD  --Continue protonix     History of esophageal candidiasis  -- Nystatin swish and swallow while on antibiotic       Expected Discharge 4/13  Expected Discharge Date and Time     Expected Discharge Date Expected Discharge Time    Apr 14, 2023            Aarti Hernandez II, DO  04/10/23

## 2023-04-10 NOTE — ED PROVIDER NOTES
EMERGENCY DEPARTMENT ENCOUNTER    Pt Name: Abraham Wu  MRN: [id number]  Pt :   1950  Room Number:    Date of encounter:  4/10/2023  PCP: Jatinder Haynes MD  ED Provider: Anders Mendoza MD    Historian: Patient      HPI:  Chief Complaint: Cough        Context: Abraham Wu is a 73 y.o. male who presents to the ED c/o cough and congestion over 5 days, prior history of Hodgkin's, and chemotherapy he did finish 13 rounds of this 3 weeks ago.  Has had some fever chills subjective, cough is clear.      PAST MEDICAL HISTORY  Past Medical History:   Diagnosis Date   • benign polypoid tissue right lung    • Diabetes mellitus    • Hyperlipidemia    • Hypertension    • Mycobacterium mucogenicum    • SHERRI (obstructive sleep apnea)    • Seasonal allergies          PAST SURGICAL HISTORY  Past Surgical History:   Procedure Laterality Date   • BRONCHOSCOPY      removal of obstructing polypoid tissue right middle lung   • COLOSTOMY N/A 2022    Procedure: LAPAROSCOPIC COLOSTOMY CREATION, FLEXIBLE SIGMOIDOSCOPY;  Surgeon: Hiram Viveros MD;  Location:  [facility];  Service: General;  Laterality: N/A;   • ENDOSCOPY N/A 10/10/2022    Procedure: ESOPHAGOGASTRODUODENOSCOPY;  Surgeon: Neeraj Lynn MD;  Location:  [facility];  Service: Gastroenterology;  Laterality: N/A;   • ENDOSCOPY N/A 10/12/2022    Procedure: ESOPHAGOGASTRODUODENOSCOPY;  Surgeon: Brunner, Mark I, MD;  Location:  [facility];  Service: Gastroenterology;  Laterality: N/A;   • EXAM UNDER ANESTHESIA, RECTAL BIOPSY N/A 10/04/2022    Procedure: EXAM UNDER ANESTHESIA, TRANSANAL BIOPSY WITH TRUCUT NEEDLE (LITHOTOMY-CANDY CANE);  Surgeon: Hiram Viveros MD;  Location:  [facility];  Service: General;  Laterality: N/A;   • PERIPHERALLY INSERTED CENTRAL CATHETER INSERTION      Removed 2022   • VENOUS ACCESS DEVICE (PORT) INSERTION Right 2022         FAMILY HISTORY  Family History   Problem Relation Age of  Onset   • Diabetes Mother    • Diabetes Father    • Heart disease Father          SOCIAL HISTORY  Social History     Socioeconomic History   • Marital status:    Tobacco Use   • Smoking status: Never   • Smokeless tobacco: Never   Vaping Use   • Vaping Use: Never used   Substance and Sexual Activity   • Alcohol use: Yes     Comment: previously drank 2 glasses of wine/beer nightly   • Drug use: Never   • Sexual activity: Defer         ALLERGIES  Patient has no known allergies.        REVIEW OF SYSTEMS  Review of Systems       All systems reviewed and negative except for those discussed in HPI.       PHYSICAL EXAM    I have reviewed the triage vital signs and nursing notes.    ED Triage Vitals [04/10/23 0251]   Temp Heart Rate Resp BP SpO2   (!) 101.5 °F (38.6 °C) 105 24 151/81 (!) 71 %      Temp src Heart Rate Source Patient Position BP Location FiO2 (%)   Oral Monitor Sitting Left arm --       Physical Exam  GENERAL:   Appears in no acute distress.  Wearing oxygen during my exam at 96% on 4 L.  HENT: Nares patent.  EYES: No scleral icterus.  CV: Regular rhythm, regular rate.  RESPIRATORY: Slight increased effort, no audible wheezes, rales or rhonchi.  ABDOMEN: Soft, nontender  MUSCULOSKELETAL: No deformities.   NEURO: Alert, moves all extremities, follows commands.  SKIN: Warm, dry, no rash visualized.      LAB RESULTS  Recent Results (from the past 24 hour(s))   ECG 12 Lead ED Triage Standing Order; SOA    Collection Time: 04/10/23  2:59 AM   Result Value Ref Range    QT Interval 340 ms    QTC Interval 443 ms   Comprehensive Metabolic Panel    Collection Time: 04/10/23  3:59 AM    Specimen: Blood   Result Value Ref Range    Glucose 113 (H) 65 - 99 mg/dL    BUN 14 8 - 23 mg/dL    Creatinine 0.52 (L) 0.76 - 1.27 mg/dL    Sodium 129 (L) 136 - 145 mmol/L    Potassium 4.0 3.5 - 5.2 mmol/L    Chloride 95 (L) 98 - 107 mmol/L    CO2 24.0 22.0 - 29.0 mmol/L    Calcium 8.8 8.6 - 10.5 mg/dL    Total Protein 5.7 (L) 6.0  - 8.5 g/dL    Albumin 3.0 (L) 3.5 - 5.2 g/dL    ALT (SGPT) 11 1 - 41 U/L    AST (SGOT) 20 1 - 40 U/L    Alkaline Phosphatase 98 39 - 117 U/L    Total Bilirubin 0.4 0.0 - 1.2 mg/dL    Globulin 2.7 gm/dL    A/G Ratio 1.1 g/dL    BUN/Creatinine Ratio 26.9 (H) 7.0 - 25.0    Anion Gap 10.0 5.0 - 15.0 mmol/L    eGFR 107.8 >60.0 mL/min/1.73   BNP    Collection Time: 04/10/23  3:59 AM    Specimen: Blood   Result Value Ref Range    proBNP 244.9 0.0 - 900.0 pg/mL   Single High Sensitivity Troponin T    Collection Time: 04/10/23  3:59 AM    Specimen: Blood   Result Value Ref Range    HS Troponin T 38 (H) <15 ng/L   Green Top (Gel)    Collection Time: 04/10/23  3:59 AM   Result Value Ref Range    Extra Tube Hold for add-ons.    Lavender Top    Collection Time: 04/10/23  3:59 AM   Result Value Ref Range    Extra Tube hold for add-on    Gold Top - SST    Collection Time: 04/10/23  3:59 AM   Result Value Ref Range    Extra Tube Hold for add-ons.    Light Blue Top    Collection Time: 04/10/23  3:59 AM   Result Value Ref Range    Extra Tube Hold for add-ons.    CBC Auto Differential    Collection Time: 04/10/23  3:59 AM    Specimen: Blood   Result Value Ref Range    WBC 18.01 (H) 3.40 - 10.80 10*3/mm3    RBC 3.20 (L) 4.14 - 5.80 10*6/mm3    Hemoglobin 10.0 (L) 13.0 - 17.7 g/dL    Hematocrit 30.1 (L) 37.5 - 51.0 %    MCV 94.1 79.0 - 97.0 fL    MCH 31.3 26.6 - 33.0 pg    MCHC 33.2 31.5 - 35.7 g/dL    RDW 16.7 (H) 12.3 - 15.4 %    RDW-SD 57.8 (H) 37.0 - 54.0 fl    MPV 9.0 6.0 - 12.0 fL    Platelets 280 140 - 450 10*3/mm3    Neutrophil % 87.4 (H) 42.7 - 76.0 %    Lymphocyte % 3.8 (L) 19.6 - 45.3 %    Monocyte % 6.5 5.0 - 12.0 %    Eosinophil % 0.7 0.3 - 6.2 %    Basophil % 0.7 0.0 - 1.5 %    Immature Grans % 0.9 (H) 0.0 - 0.5 %    Neutrophils, Absolute 15.76 (H) 1.70 - 7.00 10*3/mm3    Lymphocytes, Absolute 0.68 (L) 0.70 - 3.10 10*3/mm3    Monocytes, Absolute 1.17 (H) 0.10 - 0.90 10*3/mm3    Eosinophils, Absolute 0.12 0.00 - 0.40  10*3/mm3    Basophils, Absolute 0.12 0.00 - 0.20 10*3/mm3    Immature Grans, Absolute 0.16 (H) 0.00 - 0.05 10*3/mm3    nRBC 0.0 0.0 - 0.2 /100 WBC   Lactic Acid, Plasma    Collection Time: 04/10/23  3:59 AM    Specimen: Blood   Result Value Ref Range    Lactate 1.2 0.5 - 2.0 mmol/L   Procalcitonin    Collection Time: 04/10/23  3:59 AM    Specimen: Blood   Result Value Ref Range    Procalcitonin 0.21 0.00 - 0.25 ng/mL       If labs were ordered, I independently reviewed the results and considered them in treating the patient.        RADIOLOGY  XR Chest 1 View    Result Date: 4/10/2023  EXAMINATION: XR CHEST 1 VW  DATE: 4/10/2023 2:48 AM INDICATIONS: Shortness of air. Cancer patient. COMPARISON: October 14, 2022. CT scan October 3, 2022 FINDINGS: Moderate linear, patchy, hazy, and confluent opacity is seen in the lungs. Sharp lateral costophrenic angles. Normal heart size. Atherosclerotic, elongated, left-sided aorta. Stable mediastinum and guerda. Right internal jugular Port-A-Cath in good position. Stable osseous structures.     Moderate opacity in the lungs. Pneumonia and pulmonary edema are leading differential considerations. Normal heart size. Electronically signed by:  Chris Santo M.D.  4/10/2023 1:41 AM Mountain Time      I ordered and independently reviewed the above noted radiographic studies.      I viewed images of chest x-ray which showed bilateral pneumonic infiltrates per my independent interpretation.    See radiologist's dictation for official interpretation.        PROCEDURES    Procedures    ECG 12 Lead ED Triage Standing Order; SOA   Final Result   Test Reason : SOB   Blood Pressure :   */*   mmHG   Vent. Rate : 102 BPM     Atrial Rate : 102 BPM      P-R Int : 138 ms          QRS Dur :  94 ms       QT Int : 340 ms       P-R-T Axes :  -2  14  12 degrees      QTc Int : 443 ms      Sinus tachycardia   Otherwise normal ECG   When compared with ECG of 06-OCT-2022 11:06,   Vent. rate has increased BY  36  BPM   Confirmed by RENATE DEY ([id number]) on 4/10/2023 3:23:53 AM      Referred By: EDMD           Confirmed By: RENATE DEY          MEDICATIONS GIVEN IN ER    Medications   sodium chloride 0.9 % flush 10 mL (has no administration in time range)   AZITHROMYCIN 500 MG/250 ML 0.9% NS IVPB (vial-mate) (has no administration in time range)   Pharmacy to dose vancomycin (has no administration in time range)   piperacillin-tazobactam (ZOSYN) 3.375 g in iso-osmotic dextrose 50 ml (premix) (has no administration in time range)   piperacillin-tazobactam (ZOSYN) 3.375 g in iso-osmotic dextrose 50 ml (premix) (has no administration in time range)   AZITHROMYCIN 500 MG/250 ML 0.9% NS IVPB (vial-mate) (has no administration in time range)   methylPREDNISolone sodium succinate (SOLU-Medrol) injection 40 mg (has no administration in time range)   lactated ringers infusion (has no administration in time range)   lactated ringers bolus 1,000 mL (has no administration in time range)   lactated ringers bolus 1,000 mL (1,000 mL Intravenous New Bag 4/10/23 0400)   acetaminophen (TYLENOL) tablet 1,000 mg (1,000 mg Oral Given 4/10/23 0413)         MEDICAL DECISION MAKING, PROGRESS, and CONSULTS    All labs have been independently reviewed by me.  All radiology studies have been reviewed by me and the radiologist dictating the report.  All EKG's have been independently viewed and interpreted by me.      Discussion below represents my analysis of pertinent findings related to patient's condition, differential diagnosis, treatment plan and final disposition.      Differential diagnosis:    Pneumonia, PE, respiratory distress          Orders placed during this visit:  Orders Placed This Encounter   Procedures   • COVID PRE-OP / PRE-PROCEDURE SCREENING ORDER (NO ISOLATION) - Swab, Nasopharynx   • Blood Culture - Blood,   • Blood Culture - Blood,   • Respiratory Panel PCR w/COVID-19(SARS-CoV-2) [facility] In-House, NP  Swab in UTM/VTM, 3-4 HR TAT - Swab, Nasopharynx   • Legionella Antigen, Urine - Urine, Urine, Clean Catch   • Respiratory Culture - Sputum, Cough   • S. Pneumo Ag Urine or CSF - Urine, Urine, Clean Catch   • MRSA Screen, PCR (Inpatient) - Swab, Nares   • XR Chest 1 View   • [place] Draw   • Comprehensive Metabolic Panel   • BNP   • Single High Sensitivity Troponin T   • CBC Auto Differential   • Lactic Acid, Plasma   • Procalcitonin   • Urinalysis With Microscopic If Indicated (No Culture) - Urine, Clean Catch   • NPO Diet NPO Type: Strict NPO   • Undress & Gown   • Cardiac Monitoring   • Continuous Pulse Oximetry   • Vital Signs   • Inpatient Hematology & Oncology Consult   • Oxygen Therapy- Nasal Cannula; 2 LPM; Titrate for SPO2: equal to or greater than, 92%   • ECG 12 Lead ED Triage Standing Order; SOA   • Insert Peripheral IV   • Inpatient Admission   • ED Bed Request   • CBC & Differential   • Green Top (Gel)   • Lavender Top   • Gold Top - SST   • Gray Top   • Light Blue Top             ED Course:    Consultants: [facility] medicine services, 0500    ED Course as of 04/10/23 0508   Mon Apr 10, 2023   0508 Findings of pneumonia, are fitting his clinical presentation here and he has responded well to nasal cannula oxygen. [TA]      ED Course User Index  [TA] Anders Mendoza MD                  AS OF 05:08 EDT VITALS:    BP - 151/81  HR - 105  TEMP - (!) 101.5 °F (38.6 °C) (Oral)  O2 SATS - (!) 71%                  DIAGNOSIS  Final diagnoses:   Community acquired pneumonia, bilateral         DISPOSITION  Admit      Please note that portions of this document were completed with voice recognition software.      Anders Mendoza MD  04/10/23 0508

## 2023-04-10 NOTE — Clinical Note
Level of Care: Telemetry [5]   Diagnosis: Community acquired pneumonia, bilateral [[id number]]   Admitting Physician: LEO HASTINGS [[id number]]   Attending Physician: LEO HASTINGS [[id number]]   Certification: I Certify That Inpatient Hospital Services Are Medically Necessary For Greater Than 2 Midnights

## 2023-04-10 NOTE — CONSULTS
HEMATOLOGY/ONCOLOGY INPATIENT CONSULTATION      REFERRING PHYSICIAN: Aarti Hernandez II, DO    PRIMARY CARE PROVIDER: Jatinder Haynes MD    REASON FOR CONSULTATION: Hodgkin's lymphoma/pneumonia      HISTORY OF PRESENT ILLNESS: 73-year-old male who is well-known to me, having recently completed first-line chemotherapy for Hodgkin lymphoma who now presents with fever and hypoxia and found to have bilateral viral pneumonia.    With respect to his oncologic history, he presented in September 2022 with profound weight loss, fever, and intractable diarrhea and found to have a large rectal mass with bulky retroperitoneal adenopathy, with renal and hepatic involvement as well as right pleural effusion.  He underwent diverting colostomy with biopsy that showed CD30 positive Hodgkin lymphoma.    He recently completed cycle 6 of brentuximab-AVD on 3/21/2023, and a PET/CT is pending next week for restaging evaluation.    He reports a 5-day history of malaise, decreased appetite, and diarrhea.  Over the last 48 hours he has had fever and chills.  He noted a cough productive of clear sputum which she initially attributed to his allergies.  This morning he became acutely short of breath prompting ER evaluation.  He denies any lymph node swelling.      On arrival to the ER he was hypoxic with an oxygen saturation of 71%.  Chest x-ray showed bilateral pulmonary infiltrates.  Procalcitonin was negative.  Troponin was mildly elevated.  Viral respiratory panel positive for coronavirus. His CBC was notable for leukocytosis with a white blood cell count of 18, predominantly neutrophilic.  Hemoglobin 10 (Baseline 12), platelet count 280. He is receiving IV antibiotics.  He feels much better since supplemental oxygen has been applied.      No Known Allergies    Past Medical History:   Diagnosis Date   • benign polypoid tissue right lung    • Diabetes mellitus    • Hyperlipidemia    • Hypertension    • Mycobacterium mucogenicum    • SHERRI  (obstructive sleep apnea)    • Seasonal allergies          Current Facility-Administered Medications:   •  albuterol (PROVENTIL) nebulizer solution 0.083% 2.5 mg/3mL, 2.5 mg, Nebulization, Q6H PRN, John Mi, DO  •  albuterol (PROVENTIL) nebulizer solution 0.083% 2.5 mg/3mL, 2.5 mg, Nebulization, Q6H - RT, John Mi, DO  •  AZITHROMYCIN 500 MG/250 ML 0.9% NS IVPB (vial-mate), 500 mg, Intravenous, Once, Anders Mendoza MD, 500 mg at 04/10/23 0647  •  [START ON 4/11/2023] AZITHROMYCIN 500 MG/250 ML 0.9% NS IVPB (vial-mate), 500 mg, Intravenous, Q24H, John Mi, DO  •  guaiFENesin (MUCINEX) 12 hr tablet 1,200 mg, 1,200 mg, Oral, Q12H, John Mi, DO  •  lactated ringers bolus 1,000 mL, 1,000 mL, Intravenous, Once, John Mi, DO  •  lactated ringers infusion, 100 mL/hr, Intravenous, Continuous, John Mi, DO  •  methylPREDNISolone sodium succinate (SOLU-Medrol) injection 40 mg, 40 mg, Intravenous, Daily, John Mi, DO, 40 mg at 04/10/23 0613  •  nystatin (MYCOSTATIN) 100,000 unit/mL suspension 500,000 Units, 5 mL, Oral, 4x Daily, John Mi, DO  •  pantoprazole (PROTONIX) EC tablet 40 mg, 40 mg, Oral, Q AM, John Mi, DO, 40 mg at 04/10/23 0612  •  Pharmacy to dose vancomycin, , Does not apply, Continuous PRN, John Mi, DO  •  piperacillin-tazobactam (ZOSYN) 3.375 g in iso-osmotic dextrose 50 ml (premix), 3.375 g, Intravenous, Q8H, John Mi, DO  •  sodium chloride 0.9 % flush 10 mL, 10 mL, Intravenous, PRN, Anders Mendoza MD  •  vancomycin (VANCOCIN) 1000 mg/200 mL dextrose 5% IVPB, 14.7 mg/kg, Intravenous, Q12H **AND** [START ON 4/11/2023] Vancomycin, Trough, , , Timed, Janusz Serna, PharmD    Current Outpatient Medications:   •  fluticasone (FLONASE) 50 MCG/ACT nasal spray, 1 spray into the nostril(s) as directed by provider Daily., Disp: , Rfl:   •  KLOR-CON 20 MEQ CR tablet, , Disp: , Rfl:   •  lidocaine-prilocaine (EMLA) 2.5-2.5 % cream, Apply  1 application topically to the appropriate area as directed As Needed (45-60 minutes prior to port access.  Cover with saran/plastic wrap.)., Disp: 30 g, Rfl: 3  •  loratadine (CLARITIN) 10 MG tablet, Take 1 tablet by mouth Daily., Disp: 30 tablet, Rfl: 5  •  OLANZapine (zyPREXA) 5 MG tablet, Take 1 tablet by mouth Every Night. For 3 days on Days 2, 3 and 4 and on days 16, 17, and 18., Disp: 6 tablet, Rfl: 5  •  ondansetron (Zofran) 8 MG tablet, Take 1 tablet by mouth Every 8 (Eight) Hours As Needed for Nausea or Vomiting., Disp: 30 tablet, Rfl: 5  •  pantoprazole (PROTONIX) 40 MG EC tablet, Take 1 tablet by mouth Daily., Disp: 90 tablet, Rfl: 1  •  prochlorperazine (COMPAZINE) 5 MG tablet, Take 1 tablet by mouth Every 6 (Six) Hours As Needed for Nausea or Vomiting., Disp: 30 tablet, Rfl: 2  •  rosuvastatin (CRESTOR) 10 MG tablet, , Disp: , Rfl:     Past Surgical History:   Procedure Laterality Date   • BRONCHOSCOPY      removal of obstructing polypoid tissue right middle lung   • COLOSTOMY N/A 09/22/2022    Procedure: LAPAROSCOPIC COLOSTOMY CREATION, FLEXIBLE SIGMOIDOSCOPY;  Surgeon: Hiram Viveros MD;  Location:  [facility];  Service: General;  Laterality: N/A;   • ENDOSCOPY N/A 10/10/2022    Procedure: ESOPHAGOGASTRODUODENOSCOPY;  Surgeon: Neeraj Lynn MD;  Location:  [facility];  Service: Gastroenterology;  Laterality: N/A;   • ENDOSCOPY N/A 10/12/2022    Procedure: ESOPHAGOGASTRODUODENOSCOPY;  Surgeon: Brunner, Mark I, MD;  Location:  [facility];  Service: Gastroenterology;  Laterality: N/A;   • EXAM UNDER ANESTHESIA, RECTAL BIOPSY N/A 10/04/2022    Procedure: EXAM UNDER ANESTHESIA, TRANSANAL BIOPSY WITH TRUCUT NEEDLE (LITHOTOMY-CANDY CANE);  Surgeon: Hiram Viveros MD;  Location:  [facility];  Service: General;  Laterality: N/A;   • PERIPHERALLY INSERTED CENTRAL CATHETER INSERTION      Removed 11/28/2022   • VENOUS ACCESS DEVICE (PORT) INSERTION Right 11/28/2022       Social History  "    Socioeconomic History   • Marital status:    Tobacco Use   • Smoking status: Never   • Smokeless tobacco: Never   Vaping Use   • Vaping Use: Never used   Substance and Sexual Activity   • Alcohol use: Yes     Comment: previously drank 2 glasses of wine/beer nightly   • Drug use: Never   • Sexual activity: Defer       Family History   Problem Relation Age of Onset   • Diabetes Mother    • Diabetes Father    • Heart disease Father        Oncology/Hematology History   Hodgkin lymphoma   10/7/2022 Initial Diagnosis    Hodgkin lymphoma (HCC)     10/8/2022 -  Chemotherapy    OP HODGKIN LYMPHOMA  BV+AVD (Brentuximab vedotin / Doxorubicin / Vinblastine / Dacarbazine)     10/28/2022 Cancer Staged    Staging form: Hodgkin And Non-Hodgkin Lymphoma, AJCC 8th Edition  - Clinical: Stage IV - Signed by Kaycee Leary MD on 10/28/2022           REVIEW OF SYSTEMS:  A 14 point review of systems was performed and is negative except as noted below.    Review of Systems - Oncology      Objective     Vitals:    04/10/23 0251 04/10/23 0303 04/10/23 0328 04/10/23 0358   BP: 151/81 131/79 130/73 116/71   BP Location: Left arm      Patient Position: Sitting      Pulse: 105 104 100 95   Resp: 24      Temp: (!) 101.5 °F (38.6 °C)      TempSrc: Oral      SpO2: (!) 71% 98% 96% 99%   Weight: 68 kg (150 lb)      Height: 162.6 cm (64\")                      Temp:  [101.5 °F (38.6 °C)] 101.5 °F (38.6 °C)     Performance Status: 2    Physical Exam    General: well appearing male in no acute distress on supplemental oxygen  HEENT: sclerae anicteric, oropharynx clear  Lymphatics: no cervical, supraclavicular, or axillary adenopathy  Cardiovascular: regular rate and rhythm, no murmurs  Lungs: Scattered rhonchi  Abdomen: soft, nontender, nondistended.  Ostomy left lower quadrant.  Extremities: no lower extremity edema  Skin: no rashes, lesions, bruising, or petechiae      LABS:    Lab Results   Component Value Date    HGB 10.0 (L) 04/10/2023 "    HCT 30.1 (L) 04/10/2023    MCV 94.1 04/10/2023     04/10/2023    WBC 18.01 (H) 04/10/2023    NEUTROABS 15.76 (H) 04/10/2023    LYMPHSABS 0.68 (L) 04/10/2023    MONOSABS 1.17 (H) 04/10/2023    EOSABS 0.12 04/10/2023    BASOSABS 0.12 04/10/2023     Lab Results   Component Value Date    GLUCOSE 113 (H) 04/10/2023    BUN 14 04/10/2023    CREATININE 0.52 (L) 04/10/2023     (L) 04/10/2023    K 4.0 04/10/2023    CL 95 (L) 04/10/2023    CO2 24.0 04/10/2023    CALCIUM 8.8 04/10/2023    PROTEINTOT 5.7 (L) 04/10/2023    ALBUMIN 3.0 (L) 04/10/2023    BILITOT 0.4 04/10/2023    ALKPHOS 98 04/10/2023    AST 20 04/10/2023    ALT 11 04/10/2023         IMAGING    XR Chest 1 View    Result Date: 4/10/2023  EXAMINATION: XR CHEST 1 VW  DATE: 4/10/2023 2:48 AM INDICATIONS: Shortness of air. Cancer patient. COMPARISON: October 14, 2022. CT scan October 3, 2022 FINDINGS: Moderate linear, patchy, hazy, and confluent opacity is seen in the lungs. Sharp lateral costophrenic angles. Normal heart size. Atherosclerotic, elongated, left-sided aorta. Stable mediastinum and guerda. Right internal jugular Port-A-Cath in good position. Stable osseous structures.     Moderate opacity in the lungs. Pneumonia and pulmonary edema are leading differential considerations. Normal heart size. Electronically signed by:  Chris Santo M.D.  4/10/2023 1:41 AM Mountain Time      ASSESSMENT/PLAN:  1.  Fever  2.  Bilateral pulmonary infiltrates  3.  History of immune compromise  -He presented with fever, hypoxia, and was found to have bilateral pulmonary infiltrates as well as viral respiratory panel positive for coronavirus HKU1 strain.  -This is in the context of immune compromise secondary to recent chemotherapy.  -He is receiving IV antibiotics, supplemental oxygen, and planned for admission to the hospitalist service.  Appreciate ER and hospitalist care.    4.  Hodgkin lymphoma  -Status post recent completion of brentuximab AVD on  3/21/2023.  -Restaging PET/CT is planned for next week.  Pending his clinical course we may proceed with that as scheduled or delay by 1 to 2 weeks.    5.  Leukocytosis  -Likely secondary to pneumonia plus recent growth factor administration on 3/22/2023.    6.  Anemia  -Multifactorial in the setting of recent chemotherapy.  He does have a history of peptic ulcer disease on PPI.        Kaycee Leary MD    4/10/2023

## 2023-04-11 LAB
ANION GAP SERPL CALCULATED.3IONS-SCNC: 10 MMOL/L (ref 5–15)
BASOPHILS # BLD AUTO: 0.07 10*3/MM3 (ref 0–0.2)
BASOPHILS NFR BLD AUTO: 0.3 % (ref 0–1.5)
BUN SERPL-MCNC: 13 MG/DL (ref 8–23)
BUN/CREAT SERPL: 29.5 (ref 7–25)
CALCIUM SPEC-SCNC: 9.3 MG/DL (ref 8.6–10.5)
CHLORIDE SERPL-SCNC: 103 MMOL/L (ref 98–107)
CO2 SERPL-SCNC: 27 MMOL/L (ref 22–29)
CREAT SERPL-MCNC: 0.44 MG/DL (ref 0.76–1.27)
DEPRECATED RDW RBC AUTO: 61.1 FL (ref 37–54)
EGFRCR SERPLBLD CKD-EPI 2021: 113.3 ML/MIN/1.73
EOSINOPHIL # BLD AUTO: 0.01 10*3/MM3 (ref 0–0.4)
EOSINOPHIL NFR BLD AUTO: 0 % (ref 0.3–6.2)
ERYTHROCYTE [DISTWIDTH] IN BLOOD BY AUTOMATED COUNT: 16.8 % (ref 12.3–15.4)
GLUCOSE SERPL-MCNC: 103 MG/DL (ref 65–99)
HCT VFR BLD AUTO: 31.4 % (ref 37.5–51)
HGB BLD-MCNC: 9.9 G/DL (ref 13–17.7)
IMM GRANULOCYTES # BLD AUTO: 0.19 10*3/MM3 (ref 0–0.05)
IMM GRANULOCYTES NFR BLD AUTO: 0.9 % (ref 0–0.5)
LYMPHOCYTES # BLD AUTO: 0.86 10*3/MM3 (ref 0.7–3.1)
LYMPHOCYTES NFR BLD AUTO: 3.9 % (ref 19.6–45.3)
MCH RBC QN AUTO: 31.1 PG (ref 26.6–33)
MCHC RBC AUTO-ENTMCNC: 31.5 G/DL (ref 31.5–35.7)
MCV RBC AUTO: 98.7 FL (ref 79–97)
MONOCYTES # BLD AUTO: 1.03 10*3/MM3 (ref 0.1–0.9)
MONOCYTES NFR BLD AUTO: 4.7 % (ref 5–12)
NEUTROPHILS NFR BLD AUTO: 19.87 10*3/MM3 (ref 1.7–7)
NEUTROPHILS NFR BLD AUTO: 90.2 % (ref 42.7–76)
NRBC BLD AUTO-RTO: 0 /100 WBC (ref 0–0.2)
PLATELET # BLD AUTO: 310 10*3/MM3 (ref 140–450)
PMV BLD AUTO: 9.4 FL (ref 6–12)
POTASSIUM SERPL-SCNC: 4.2 MMOL/L (ref 3.5–5.2)
RBC # BLD AUTO: 3.18 10*6/MM3 (ref 4.14–5.8)
SODIUM SERPL-SCNC: 140 MMOL/L (ref 136–145)
WBC NRBC COR # BLD: 22.03 10*3/MM3 (ref 3.4–10.8)

## 2023-04-11 PROCEDURE — 94799 UNLISTED PULMONARY SVC/PX: CPT

## 2023-04-11 PROCEDURE — 97165 OT EVAL LOW COMPLEX 30 MIN: CPT

## 2023-04-11 PROCEDURE — 97161 PT EVAL LOW COMPLEX 20 MIN: CPT

## 2023-04-11 PROCEDURE — 80048 BASIC METABOLIC PNL TOTAL CA: CPT | Performed by: INTERNAL MEDICINE

## 2023-04-11 PROCEDURE — 99232 SBSQ HOSP IP/OBS MODERATE 35: CPT | Performed by: INTERNAL MEDICINE

## 2023-04-11 PROCEDURE — 25010000002 ENOXAPARIN PER 10 MG: Performed by: INTERNAL MEDICINE

## 2023-04-11 PROCEDURE — 25010000002 PIPERACILLIN SOD-TAZOBACTAM PER 1 G: Performed by: INTERNAL MEDICINE

## 2023-04-11 PROCEDURE — 94761 N-INVAS EAR/PLS OXIMETRY MLT: CPT

## 2023-04-11 PROCEDURE — 97116 GAIT TRAINING THERAPY: CPT

## 2023-04-11 PROCEDURE — 25010000002 METHYLPREDNISOLONE PER 40 MG: Performed by: INTERNAL MEDICINE

## 2023-04-11 PROCEDURE — 94664 DEMO&/EVAL PT USE INHALER: CPT

## 2023-04-11 PROCEDURE — 25010000002 AZITHROMYCIN PER 500 MG: Performed by: INTERNAL MEDICINE

## 2023-04-11 PROCEDURE — 85025 COMPLETE CBC W/AUTO DIFF WBC: CPT | Performed by: INTERNAL MEDICINE

## 2023-04-11 RX ADMIN — NYSTATIN 500000 UNITS: 100000 SUSPENSION ORAL at 16:59

## 2023-04-11 RX ADMIN — CETIRIZINE HYDROCHLORIDE 10 MG: 10 TABLET, FILM COATED ORAL at 07:48

## 2023-04-11 RX ADMIN — AZITHROMYCIN 500 MG: 500 INJECTION, POWDER, LYOPHILIZED, FOR SOLUTION INTRAVENOUS at 05:14

## 2023-04-11 RX ADMIN — ALBUTEROL SULFATE 2.5 MG: 2.5 SOLUTION RESPIRATORY (INHALATION) at 13:34

## 2023-04-11 RX ADMIN — POTASSIUM CHLORIDE 20 MEQ: 1.5 POWDER, FOR SOLUTION ORAL at 07:47

## 2023-04-11 RX ADMIN — PANTOPRAZOLE SODIUM 40 MG: 40 TABLET, DELAYED RELEASE ORAL at 05:13

## 2023-04-11 RX ADMIN — Medication 10 ML: at 20:10

## 2023-04-11 RX ADMIN — ENOXAPARIN SODIUM 40 MG: 40 INJECTION SUBCUTANEOUS at 07:50

## 2023-04-11 RX ADMIN — NYSTATIN 500000 UNITS: 100000 SUSPENSION ORAL at 20:09

## 2023-04-11 RX ADMIN — TAZOBACTAM SODIUM AND PIPERACILLIN SODIUM 3.38 G: 375; 3 INJECTION, SOLUTION INTRAVENOUS at 11:09

## 2023-04-11 RX ADMIN — FLUTICASONE PROPIONATE 1 SPRAY: 50 SPRAY, METERED NASAL at 07:55

## 2023-04-11 RX ADMIN — GUAIFENESIN 1200 MG: 600 TABLET, EXTENDED RELEASE ORAL at 20:09

## 2023-04-11 RX ADMIN — ALBUTEROL SULFATE 2.5 MG: 2.5 SOLUTION RESPIRATORY (INHALATION) at 01:07

## 2023-04-11 RX ADMIN — ALBUTEROL SULFATE 2.5 MG: 2.5 SOLUTION RESPIRATORY (INHALATION) at 20:46

## 2023-04-11 RX ADMIN — SODIUM CHLORIDE, POTASSIUM CHLORIDE, SODIUM LACTATE AND CALCIUM CHLORIDE 100 ML/HR: 600; 310; 30; 20 INJECTION, SOLUTION INTRAVENOUS at 03:15

## 2023-04-11 RX ADMIN — Medication 10 ML: at 07:52

## 2023-04-11 RX ADMIN — NYSTATIN 500000 UNITS: 100000 SUSPENSION ORAL at 11:09

## 2023-04-11 RX ADMIN — ALBUTEROL SULFATE 2.5 MG: 2.5 SOLUTION RESPIRATORY (INHALATION) at 07:02

## 2023-04-11 RX ADMIN — GUAIFENESIN 1200 MG: 600 TABLET, EXTENDED RELEASE ORAL at 07:49

## 2023-04-11 RX ADMIN — NYSTATIN 500000 UNITS: 100000 SUSPENSION ORAL at 07:46

## 2023-04-11 RX ADMIN — TAZOBACTAM SODIUM AND PIPERACILLIN SODIUM 3.38 G: 375; 3 INJECTION, SOLUTION INTRAVENOUS at 20:09

## 2023-04-11 RX ADMIN — METHYLPREDNISOLONE SODIUM SUCCINATE 40 MG: 40 INJECTION, POWDER, FOR SOLUTION INTRAMUSCULAR; INTRAVENOUS at 07:46

## 2023-04-11 RX ADMIN — TAZOBACTAM SODIUM AND PIPERACILLIN SODIUM 3.38 G: 375; 3 INJECTION, SOLUTION INTRAVENOUS at 03:16

## 2023-04-11 RX ADMIN — ROSUVASTATIN CALCIUM 10 MG: 10 TABLET, FILM COATED ORAL at 07:48

## 2023-04-11 NOTE — CASE MANAGEMENT/SOCIAL WORK
Discharge Planning Assessment  [facility]     Patient Name: Abraham Wu  MRN: [id number]  Today's Date: 4/11/2023    Admit Date: 4/10/2023    Plan: Home at DC   Discharge Needs Assessment     Row Name 04/11/23 1449       Living Environment    People in Home spouse    Current Living Arrangements home    Living Arrangement Comments Lives in a one level home. Supportive spouse.       Discharge Needs Assessment    Equipment Currently Used at Home cpap    Equipment Needed After Discharge none    Discharge Coordination/Progress Has a C pap thru [facility]. Has been unable to tolerate it since his CA TXs. Denies other DME or HH services. Has done CA TXs at [facility].               Discharge Plan     Row Name 04/11/23 1452       Plan    Plan Home at DC    Patient/Family in Agreement with Plan yes    Plan Comments I spoke with the pt and spouse in the room. He is interested in 02 at night if he qualifies. Wants to use [facility]. Will need RA sleeping (or resting RA) sats of 88% or below within 24-48hrs of DC. The pt denies any other DC needs at this time.    Final Discharge Disposition Code 01 - home or self-care              Continued Care and Services - Admitted Since 4/10/2023    Coordination has not been started for this encounter.       Expected Discharge Date and Time     Expected Discharge Date Expected Discharge Time    Apr 14, 2023          Demographic Summary    No documentation.                Functional Status     Row Name 04/11/23 1449       Functional Status    Usual Activity Tolerance moderate       Functional Status, IADL    Medications independent    Meal Preparation assistive person    Housekeeping assistive person    Laundry assistive person    Shopping assistive person               Psychosocial    No documentation.                Abuse/Neglect    No documentation.                Legal    No documentation.                Substance Abuse    No documentation.                Patient Forms    No  documentation.                   Katrin Wilson RN

## 2023-04-11 NOTE — PROGRESS NOTES
[facility] Medicine Services  PROGRESS NOTE    Patient Name: Abraham Wu  : 1950  MRN: [id number]    Date of Admission: 4/10/2023  Primary Care Physician: Jatinder Haynes MD    Subjective   Subjective     CC:  dyspnea    HPI:  Seen ambulating the halls without difficulty. Denies any respiratory complaints to me, feels better    ROS:  Gen- No fevers, chills  CV- No chest pain, palpitations  Resp- No cough, dyspnea  GI- No N/V/D, abd pain         Objective   Objective     Vital Signs:   Temp:  [97 °F (36.1 °C)-98.5 °F (36.9 °C)] 98.5 °F (36.9 °C)  Heart Rate:  [74-85] 85  Resp:  [18-21] 19  BP: (118-139)/(65-76) 139/67  Flow (L/min):  [1.5-2] 1.5     Physical Exam:  Constitutional: No acute distress, awake, alert  HENT: NCAT, mucous membranes moist  Respiratory: Clear to auscultation bilaterally, respiratory effort normal , comfortable on NC  Cardiovascular: RRR, no murmurs, rubs, or gallops  Gastrointestinal: Positive bowel sounds, soft, nontender, nondistended  Musculoskeletal: No bilateral ankle edema  Psychiatric: Appropriate affect, cooperative  Neurologic: Oriented x 3, strength symmetric in all extremities, Cranial Nerves grossly intact to confrontation, speech clear  Skin: No rashes      Results Reviewed:  LAB RESULTS:      Lab 2318 04/10/23  0359   WBC 22.03* 18.01*   HEMOGLOBIN 9.9* 10.0*   HEMATOCRIT 31.4* 30.1*   PLATELETS 310 280   NEUTROS ABS 19.87* 15.76*   IMMATURE GRANS (ABS) 0.19* 0.16*   LYMPHS ABS 0.86 0.68*   MONOS ABS 1.03* 1.17*   EOS ABS 0.01 0.12   MCV 98.7* 94.1   PROCALCITONIN  --  0.21   LACTATE  --  1.2         Lab 2318 04/10/23  0359   SODIUM 140 129*   POTASSIUM 4.2 4.0   CHLORIDE 103 95*   CO2 27.0 24.0   ANION GAP 10.0 10.0   BUN 13 14   CREATININE 0.44* 0.52*   EGFR 113.3 107.8   GLUCOSE 103* 113*   CALCIUM 9.3 8.8         Lab 04/10/23  0359   TOTAL PROTEIN 5.7*   ALBUMIN 3.0*   GLOBULIN 2.7   ALT (SGPT) 11   AST (SGOT)  20   BILIRUBIN 0.4   ALK PHOS 98         Lab 04/10/23  0359   PROBNP 244.9   HSTROP T 38*                 Brief Urine Lab Results  (Last result in the past 365 days)      Color   Clarity   Blood   Leuk Est   Nitrite   Protein   CREAT   Urine HCG        04/10/23 0619 Yellow   Clear   Negative   Negative   Negative   Negative                 Microbiology Results Abnormal     Procedure Component Value - Date/Time    Respiratory Culture - Sputum, Cough [[id number]] Collected: 04/10/23 1228    Lab Status: Preliminary result Specimen: Sputum from Cough Updated: 04/11/23 1154     Respiratory Culture Light growth (2+) The culture consists of normal respiratory flavio. This is a preliminary report; final report to follow.     Gram Stain Moderate (3+) Mixed bacterial morphotypes seen on Gram Stain      Few (2+) Budding yeast      Moderate (3+) WBCs per low power field      Few (2+) Epithelial cells per low power field    Blood Culture - Blood, Blood, PICC Line [[id number]]  (Normal) Collected: 04/10/23 0415    Lab Status: Preliminary result Specimen: Blood, PICC Line Updated: 04/11/23 0445     Blood Culture No growth at 24 hours    Blood Culture - Blood, Arm, Left [[id number]]  (Normal) Collected: 04/10/23 0426    Lab Status: Preliminary result Specimen: Blood from Arm, Left Updated: 04/11/23 0445     Blood Culture No growth at 24 hours    S. Pneumo Ag Urine or CSF - Urine, Urine, Clean Catch [[id number]]  (Normal) Collected: 04/10/23 0619    Lab Status: Final result Specimen: Urine, Clean Catch Updated: 04/10/23 0956     Strep Pneumo Ag Negative    Legionella Antigen, Urine - Urine, Urine, Clean Catch [[id number]]  (Normal) Collected: 04/10/23 0619    Lab Status: Final result Specimen: Urine, Clean Catch Updated: 04/10/23 0955     LEGIONELLA ANTIGEN, URINE Negative    MRSA Screen, PCR (Inpatient) - Swab, Nares [[id number]]  (Normal) Collected: 04/10/23 0557    Lab Status: Final result Specimen: Swab from Nares Updated: 04/10/23  0720     MRSA PCR Negative    Narrative:      The negative predictive value of this diagnostic test is high and should only be used to consider de-escalating anti-MRSA therapy. A positive result may indicate colonization with MRSA and must be correlated clinically.  MRSA Negative          XR Chest 1 View    Result Date: 4/10/2023  EXAMINATION: XR CHEST 1 VW  DATE: 4/10/2023 2:48 AM INDICATIONS: Shortness of air. Cancer patient. COMPARISON: October 14, 2022. CT scan October 3, 2022 FINDINGS: Moderate linear, patchy, hazy, and confluent opacity is seen in the lungs. Sharp lateral costophrenic angles. Normal heart size. Atherosclerotic, elongated, left-sided aorta. Stable mediastinum and guerda. Right internal jugular Port-A-Cath in good position. Stable osseous structures.     Impression: Moderate opacity in the lungs. Pneumonia and pulmonary edema are leading differential considerations. Normal heart size. Electronically signed by:  Chris Santo M.D.  4/10/2023 1:41 AM Mountain Time      Results for orders placed during the hospital encounter of 10/03/22    Adult Transthoracic Echo Complete W/ Cont if Necessary Per Protocol    Interpretation Summary  · Left ventricular ejection fraction appears to be 56 - 60%. Left ventricular systolic function is normal.  · Global longitudinal LV strain (GLS) = -27.0%.  · Left atrial volume is moderately increased.  · Mild mitral valve regurgitation is present.  · Mild tricuspid valve regurgitation is present.  · Estimated right ventricular systolic pressure from tricuspid regurgitation is mildly elevated (35-45 mmHg).  · Mild dilation of the ascending aorta is present.      Current medications:  Scheduled Meds:Albuterol Sulfate NEB Orderable, 2.5 mg, Nebulization, Q6H - RT  azithromycin, 500 mg, Intravenous, Q24H  cetirizine, 10 mg, Oral, Daily  enoxaparin, 40 mg, Subcutaneous, Daily  fluticasone, 1 spray, Nasal, Daily  guaiFENesin, 1,200 mg, Oral, Q12H  methylPREDNISolone sodium  succinate, 40 mg, Intravenous, Daily  nystatin, 5 mL, Oral, 4x Daily  pantoprazole, 40 mg, Oral, Q AM  piperacillin-tazobactam, 3.375 g, Intravenous, Q8H  potassium chloride, 20 mEq, Oral, Daily  rosuvastatin, 10 mg, Oral, Daily  sodium chloride, 10 mL, Intravenous, Q12H      Continuous Infusions:   PRN Meds:.•  acetaminophen **OR** acetaminophen **OR** acetaminophen  •  albuterol  •  Calcium Replacement - Follow Nurse / BPA Driven Protocol  •  lidocaine-prilocaine  •  Magnesium Standard Dose Replacement - Follow Nurse / BPA Driven Protocol  •  ondansetron  •  Phosphorus Replacement - Follow Nurse / BPA Driven Protocol  •  Potassium Replacement - Follow Nurse / BPA Driven Protocol  •  prochlorperazine  •  sodium chloride  •  sodium chloride  •  sodium chloride    Assessment & Plan   Assessment & Plan     Active Hospital Problems    Diagnosis  POA   • **Acute respiratory failure with hypoxia [J96.01]  Unknown   • Community acquired pneumonia, bilateral [J18.9]  Yes   • Immunosuppression due to drug therapy [D84.821, Z79.899]  Not Applicable   • SHERRI (obstructive sleep apnea) [G47.33]  Yes   • Hodgkin lymphoma [C81.90]  Yes   • Anemia secondary to chemotherapy and acute blood loss [D61.9]  Yes   • Essential hypertension [I10]  Yes   • Dyslipidemia [E78.5]  Yes   • Hyponatremia [E87.1]  Unknown   • Rectal mass s/p laparoscopic colostomy (9/2022) [K62.89]  Yes      Resolved Hospital Problems   No resolved problems to display.        Brief Hospital Course to date:  Abraham Wu is a 73 y.o. male with past medical history of stage IV Hodgkin's lymphoma status postchemotherapy that completed 3 weeks ago, anemia secondary to chemotherapy, history of GI bleed secondary to ulcer, history of rectal mass status post resection with colostomy, SHERRI previously on BiPAP, essential hypertension, hyperlipidemia, who presents the ER with 7-day history of productive cough, increasing shortness of air, fever and chills.     Acute  respiratory failure with hypoxia  Bilateral pneumonia with risk factors for MRSA/Pseudomonas  Coronavirus HKU1 infection  -- Cultures, urinary ag pending. MRSA PCR neg - stopped Vanc. Continue broad-spectrum antibiotics with Zosyn, azithromycin.  -- Nebs, solumedrol 40 daily x 5 days     Elevated troponin  --Suspect secondary to demand from hypoxia. No CP, no EKG changes.     Immunosuppression secondary to chemotherapy  Stage IV Hodgkin's lymphoma  Chronic anemia  --S/P completion chemotherapy 3/21/2023. Restaging PET/CT for next week.     Hyponatremia  --Suspect volume related. Resolved w IVF     Hypertension  Hyperlipidemia  Chronic ostomy  --Continue home medication     SHERRI  --No longer requiring BiPAP, patient is requesting nocturnal O2 order at discharge     Hx of gi bleed, PUD  --Continue protonix     History of esophageal candidiasis  -- Nystatin swish and swallow while on antibiotic     Expected Discharge Location and Transportation: home  Expected Discharge   Expected Discharge Date and Time     Expected Discharge Date Expected Discharge Time    Apr 14, 2023            DVT prophylaxis:  Medical and mechanical DVT prophylaxis orders are present.     AM-PAC 6 Clicks Score (PT): 21 (04/11/23 1438)    CODE STATUS:   Code Status and Medical Interventions:   Ordered at: 04/10/23 0537     Code Status (Patient has no pulse and is not breathing):    CPR (Attempt to Resuscitate)     Medical Interventions (Patient has pulse or is breathing):    Full Support     Comments:    does not want prolonged intubation/trach/peg       Marcella Leonardo MD  04/11/23

## 2023-04-11 NOTE — PLAN OF CARE
Goal Outcome Evaluation:  Plan of Care Reviewed With: patient, spouse           Outcome Evaluation: PT eval completed. Patient alert and oriented x3. Presenting with deficits in functional endurance effecting functional mobility below baseline. SpO2 levels maitnain > 93% on 2 liters of O2 throughout all activity this date ( inclusive of 400' of gait) with mild SOA and fatigue. Patient will benefit from skilled IP PT services to address impairments in order to return to PLOF. Recommend home with assist at dc.

## 2023-04-11 NOTE — PROGRESS NOTES
"HEMATOLOGY/ONCOLOGY PROGRESS NOTE    S: Sitting up, eating dinner at the time of my interview.  He is currently on 1.5 L of supplemental oxygen.  He reports his breathing has been feeling somewhat improved over the last couple of hours.  No lymph node swelling, sore throat, or neck pain.  Cough is the same remains productive.    Past medical history, social history and family history was reviewed and unchanged from prior visit.     Medications:  The current medication list was reviewed in the EMR    ALLERGIES:  No Known Allergies      Physical Exam    VITAL SIGNS:  /67 (BP Location: Left arm, Patient Position: Sitting)   Pulse 85   Temp 98.5 °F (36.9 °C) (Oral)   Resp 19   Ht 162.6 cm (64\")   Wt 68 kg (150 lb)   SpO2 96%   BMI 25.75 kg/m²   Temp:  [97 °F (36.1 °C)-98.5 °F (36.9 °C)] 98.5 °F (36.9 °C)      Performance Status: 1                Physical Exam    General: well appearing, in no acute distress on supplemental oxygen.  HEENT: sclerae anicteric, neck is supple  Lymphatics: no cervical, supraclavicular, or axillary adenopathy  Cardiovascular: regular rate and rhythm  Lungs: Normal effort and rate.  Abdomen: soft, nontender, nondistended.    Psych: Mood is stable        RECENT LABS:    Lab Results   Component Value Date    HGB 9.9 (L) 04/11/2023    HCT 31.4 (L) 04/11/2023    MCV 98.7 (H) 04/11/2023     04/11/2023    WBC 22.03 (H) 04/11/2023    NEUTROABS 19.87 (H) 04/11/2023    LYMPHSABS 0.86 04/11/2023    MONOSABS 1.03 (H) 04/11/2023    EOSABS 0.01 04/11/2023    BASOSABS 0.07 04/11/2023       Lab Results   Component Value Date    GLUCOSE 103 (H) 04/11/2023    BUN 13 04/11/2023    CREATININE 0.44 (L) 04/11/2023     04/11/2023    K 4.2 04/11/2023     04/11/2023    CO2 27.0 04/11/2023    CALCIUM 9.3 04/11/2023    PROTEINTOT 5.7 (L) 04/10/2023    ALBUMIN 3.0 (L) 04/10/2023    BILITOT 0.4 04/10/2023    ALKPHOS 98 04/10/2023    AST 20 04/10/2023    ALT 11 04/10/2023 "     Assessment/Plan  73-year-old male admitted with bilateral pneumonia with respiratory panel positive for coronavirus HKU1 strain.  He has a history of Hodgkin's lymphoma, recently having completed chemotherapy.  -Leukocytosis secondary to recent chemotherapy, infection, and steroids.  The remainder of his blood counts are stable.  -Appreciate hospitalist care.  He remains on IV antibiotics and supplemental oxygen.  -He is scheduled for restaging PET/CT next week.  In the context of active respiratory infection I recommended we delay this by a couple of weeks until his active infection has resolved.  -He is amenable to this plan.          Kaycee Leary MD  [facility]    4/11/2023

## 2023-04-11 NOTE — CONSULTS
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 Patient required nutrition support at that time.  Refer to documentation in EMR for more information.    Anthropometric      Flowsheet Rows    Flowsheet Row First Filed Value   Admission Height 162.6 cm (64\") Documented at 04/10/2023 0251   Admission Weight 68 kg (150 lb) Documented at 04/10/2023 0251        Height: 162.6 cm (64\")  Last Filed Weight: Weight: 68 kg (150 lb) (04/10/23 0251)  Weight Method: Stated  BMI: BMI (Calculated): 25.7  BMI classification: Overweight: 25.0-29.9kg/m2    IBW:  120lb    UBW: 170lb (prior to dx of lymphoma; oct 22)  Weight change: weight loss Oct to Dec; now with weight gain. Wife reports patient went down to 134lb; now back to 150lb      Reported/Observed/Food/Nutrition Related - Comments     Walking in the hallway at time of visit. Wife in the room, reports patient with great appetite, has been eating really at home and gaining weight. He ate 100% of both of his meals here as well.      NFPE completed, no significant deficits noted.      Current Nutrition Prescription     Diet: Regular/House Diet; Texture: Regular Texture (IDDSI 7); Fluid Consistency: Thin (IDDSI " 0)  No active supplement orders      Average Intake from [facility]: 100% x 1 meal      Nutrition Diagnosis     Problem No nutrition diagnosis at this time   Etiology    Signs/Symptoms    Status:    Actions     Follow treatment progress, Care plan reviewed, Interview for preferences, Encourage intake   Patient reports no issues with appetite.  Recent weight gain vs weight loss, good appetite at home as well.     Monitor Per Protocol      Gosia Benítez RD,   Time Spent: 20min

## 2023-04-11 NOTE — PLAN OF CARE
Goal Outcome Evaluation:  Plan of Care Reviewed With: patient, spouse        Progress: no change  Outcome Evaluation: Pt. presents below baseline with ADLs and functional mobility. Limited by decreased activity tolerance, balance, FMC, and strength. Skilled OT services warranted to promote return to PLOF. Recommend home with assist at discharge.

## 2023-04-11 NOTE — THERAPY EVALUATION
Patient Name: Abarham Wu  : 1950    MRN: [id number]                              Today's Date: 2023       Admit Date: 4/10/2023    Visit Dx:     ICD-10-CM ICD-9-CM   1. Community acquired pneumonia, bilateral  J18.9 486     Patient Active Problem List   Diagnosis   • Rectal mass s/p laparoscopic colostomy (2022)   • Hyponatremia   • Anemia secondary to chemotherapy and acute blood loss   • Essential hypertension   • Dyslipidemia   • Unintentional weight loss   • Hydronephrosis   • Neutropenic fever   • Hypokalemia   • Hodgkin lymphoma   • Acute upper GI bleed   • Melena   • Moderate malnutrition   • Esophageal candidiasis   • SHERRI (obstructive sleep apnea)   • ICU delirium    • Encounter for care related to vascular access port   • Community acquired pneumonia, bilateral   • Immunosuppression due to drug therapy   • Acute respiratory failure with hypoxia     Past Medical History:   Diagnosis Date   • benign polypoid tissue right lung    • Diabetes mellitus    • Hyperlipidemia    • Hypertension    • Mycobacterium mucogenicum    • SHERRI (obstructive sleep apnea)    • Seasonal allergies      Past Surgical History:   Procedure Laterality Date   • BRONCHOSCOPY      removal of obstructing polypoid tissue right middle lung   • COLOSTOMY N/A 2022    Procedure: LAPAROSCOPIC COLOSTOMY CREATION, FLEXIBLE SIGMOIDOSCOPY;  Surgeon: Hiram Viveros MD;  Location: [facility] OR;  Service: General;  Laterality: N/A;   • ENDOSCOPY N/A 10/10/2022    Procedure: ESOPHAGOGASTRODUODENOSCOPY;  Surgeon: Neeraj Lynn MD;  Location:  [facility];  Service: Gastroenterology;  Laterality: N/A;   • ENDOSCOPY N/A 10/12/2022    Procedure: ESOPHAGOGASTRODUODENOSCOPY;  Surgeon: Brunner, Mark I, MD;  Location:  [facility];  Service: Gastroenterology;  Laterality: N/A;   • EXAM UNDER ANESTHESIA, RECTAL BIOPSY N/A 10/04/2022    Procedure: EXAM UNDER ANESTHESIA, TRANSANAL BIOPSY WITH TRUCUT NEEDLE (WON-CANDY  JUVE);  Surgeon: Hiram Viveros MD;  Location: [facility];  Service: General;  Laterality: N/A;   • PERIPHERALLY INSERTED CENTRAL CATHETER INSERTION      Removed 11/28/2022   • VENOUS ACCESS DEVICE (PORT) INSERTION Right 11/28/2022      General Information     Row Name 04/11/23 1427          Physical Therapy Time and Intention    Document Type evaluation  -LO     Mode of Treatment individual therapy;physical therapy  -LO     Row Name 04/11/23 1427          General Information    Patient Profile Reviewed yes  -LO     Prior Level of Function independent:;all household mobility;gait;transfer;bed mobility;community mobility  -LO     Existing Precautions/Restrictions fall;oxygen therapy device and L/min;other (see comments)  ostomy  -LO     Barriers to Rehab medically complex  -LO     Row Name 04/11/23 1427          Living Environment    People in Home spouse  -LO     Row Name 04/11/23 1427          Home Main Entrance    Number of Stairs, Main Entrance one  -LO     Stair Railings, Main Entrance none  -LO     Row Name 04/11/23 1427          Stairs Within Home, Primary    Number of Stairs, Within Home, Primary none  -LO     Row Name 04/11/23 1427          Cognition    Orientation Status (Cognition) oriented x 4  -LO     Row Name 04/11/23 1427          Safety Issues, Functional Mobility    Safety Issues Affecting Function (Mobility) insight into deficits/self-awareness;safety precaution awareness  -LO     Impairments Affecting Function (Mobility) endurance/activity tolerance;shortness of breath  -LO           User Key  (r) = Recorded By, (t) = Taken By, (c) = Cosigned By    Initials Name Provider Type    LO Gabriella Waters PT Physical Therapist               Mobility     Row Name 04/11/23 1428          Bed Mobility    Bed Mobility sit-supine  -LO     Sit-Supine [place] (Bed Mobility) modified independence  -LO     Assistive Device (Bed Mobility) bed rails;head of bed elevated  -LO     Comment, (Bed Mobility) extra  time for effort, vc for line management  -LO     Row Name 04/11/23 1428          Transfers    Comment, (Transfers) good sequencing, vc for line management  -LO     Row Name 04/11/23 1428          Sit-Stand Transfer    Sit-Stand [place] (Transfers) standby assist  -LO     Assistive Device (Sit-Stand Transfers) other (see comments)  none  -LO     Row Name 04/11/23 1428          Gait/Stairs (Locomotion)    [place] Level (Gait) standby assist  -LO     Assistive Device (Gait) other (see comments)  none  -LO     Distance in Feet (Gait) 400  -LO     Deviations/Abnormal Patterns (Gait) bilateral deviations;stride length decreased  -LO     Comment, (Gait/Stairs) Ambulates with good steady speed, no loss of balance. SOA and mild fatigue present  with SpO2 levels maintaining > 93% on 2 liters of O2 throughout.  -LO           User Key  (r) = Recorded By, (t) = Taken By, (c) = Cosigned By    Initials Name Provider Type    LO Gabriella Waters PT Physical Therapist               Obj/Interventions     Row Name 04/11/23 1432          Range of Motion Comprehensive    General Range of Motion bilateral lower extremity ROM WNL  -LO     Row Name 04/11/23 1432          Strength Comprehensive (MMT)    General Manual Muscle Testing (MMT) Assessment other (see comments)  BLE no deficits noted  -LO     Comment, General Manual Muscle Testing (MMT) Assessment BLE grossly 5/5  -LO     Row Name 04/11/23 1432          Motor Skills    Motor Skills functional endurance  -LO     Functional Endurance Mild SOA and fatigue with gait  -LO     Row Name 04/11/23 1432          Balance    Balance Assessment sitting static balance;sitting dynamic balance;standing static balance;standing dynamic balance  -LO     Static Sitting Balance independent  -LO     Dynamic Sitting Balance independent  -LO     Position, Sitting Balance unsupported;sitting edge of bed  -LO     Static Standing Balance standby assist  -LO     Dynamic Standing Balance standby assist   -LO     Position/Device Used, Standing Balance unsupported  -LO     Comment, Balance SBA without AD for safety in standing  -LO     Row Name 04/11/23 1432          Sensory Assessment (Somatosensory)    Sensory Assessment (Somatosensory) LE sensation intact  -LO           User Key  (r) = Recorded By, (t) = Taken By, (c) = Cosigned By    Initials Name Provider Type    Gabriella Whitt, PT Physical Therapist               Goals/Plan     Row Name 04/11/23 1438          Gait Training Goal 1 (PT)    Activity/Assistive Device (Gait Training Goal 1, PT) gait (walking locomotion);increase endurance/gait distance;diminish gait deviation  -LO     [place] Level (Gait Training Goal 1, PT) independent  -LO     Distance (Gait Training Goal 1, PT) 600  -LO     Time Frame (Gait Training Goal 1, PT) long term goal (LTG);10 days  -LO     Row Name 04/11/23 1438          Stairs Goal 1 (PT)    Activity/Assistive Device (Stairs Goal 1, PT) ascending stairs;descending stairs  -LO     [place] Level/Cues Needed (Stairs Goal 1, PT) independent  -LO     Number of Stairs (Stairs Goal 1, PT) 1  -LO     Row Name 04/11/23 1438          Therapy Assessment/Plan (PT)    Planned Therapy Interventions (PT) gait training;patient/family education;stair training  -LO           User Key  (r) = Recorded By, (t) = Taken By, (c) = Cosigned By    Initials Name Provider Type    Gabriella Whitt, PT Physical Therapist               Clinical Impression     Row Name 04/11/23 1434          Pain    Pretreatment Pain Rating 0/10 - no pain  -LO     Posttreatment Pain Rating 0/10 - no pain  -LO     Row Name 04/11/23 1434          Plan of Care Review    Plan of Care Reviewed With patient;spouse  -LO     Outcome Evaluation PT eval completed. Patient alert and oriented x3. Presenting with deficits in functional endurance effecting functional mobility below baseline. SpO2 levels maitnain > 93% on 2 liters of O2 throughout all activity this date ( inclusive of 400'  of gait) with mild SOA and fatigue. Patient will benefit from skilled IP PT services to address impairments in order to return to PLOF. Recommend home with assist at IL.  -     Row Name 04/11/23 1434          Therapy Assessment/Plan (PT)    Patient/Family Therapy Goals Statement (PT) return to PLOF  -LO     Rehab Potential (PT) good, to achieve stated therapy goals  -     Criteria for Skilled Interventions Met (PT) yes;meets criteria;skilled treatment is necessary  -     Therapy Frequency (PT) daily  -LO     Row Name 04/11/23 1434          Vital Signs    Pre Systolic BP Rehab 123  -LO     Pre Treatment Diastolic BP 74  -LO     Pretreatment Heart Rate (beats/min) 88  -LO     Pre SpO2 (%) 96  -LO     O2 Delivery Pre Treatment supplemental O2  -LO     Intra SpO2 (%) 93  -LO     O2 Delivery Intra Treatment supplemental O2  -LO     Post SpO2 (%) 95  -LO     O2 Delivery Post Treatment supplemental O2  -LO     Pre Patient Position Supine  -LO     Intra Patient Position Standing  -LO     Post Patient Position Supine  -LO     Row Name 04/11/23 1434          Positioning and Restraints    Pre-Treatment Position in bed  -LO     Post Treatment Position bed  -LO     In Bed notified nsg;supine;encouraged to call for assist;call light within reach;fowlers;with family/caregiver  -           User Key  (r) = Recorded By, (t) = Taken By, (c) = Cosigned By    Initials Name Provider Type    Gabriella Whitt, PT Physical Therapist               Outcome Measures     Row Name 04/11/23 1438          How much help from another person do you currently need...    Turning from your back to your side while in flat bed without using bedrails? 4  -LO     Moving from lying on back to sitting on the side of a flat bed without bedrails? 4  -LO     Moving to and from a bed to a chair (including a wheelchair)? 3  -LO     Standing up from a chair using your arms (e.g., wheelchair, bedside chair)? 4  -LO     Climbing 3-5 steps with a railing? 3   -LO     To walk in hospital room? 3  -LO     AM-PAC 6 Clicks Score (PT) 21  -     Highest level of mobility 6 --> Walked 10 steps or more  -     Row Name 04/11/23 1438 04/11/23 0912       Functional Assessment    Outcome Measure Options AM-PAC 6 Clicks Basic Mobility (PT)  - AM-PAC 6 Clicks Daily Activity (OT)  -          User Key  (r) = Recorded By, (t) = Taken By, (c) = Cosigned By    Initials Name Provider Type    LC Paulette Hennessy, OT Occupational Therapist    Gabriella Whitt, PT Physical Therapist                             Physical Therapy Education     Title: PT OT SLP Therapies (In Progress)     Topic: Physical Therapy (Done)     Point: Mobility training (Done)     Learning Progress Summary           Patient Acceptance, E, VU,NR by  at 4/11/2023 1335    Comment: PT POC   Family Acceptance, E, VU,NR by  at 4/11/2023 1335    Comment: PT POC                   Point: Home exercise program (Done)     Learning Progress Summary           Patient Acceptance, E, VU,NR by  at 4/11/2023 1335    Comment: PT POC   Family Acceptance, E, VU,NR by  at 4/11/2023 1335    Comment: PT POC                   Point: Body mechanics (Done)     Learning Progress Summary           Patient Acceptance, E, VU,NR by  at 4/11/2023 1335    Comment: PT POC   Family Acceptance, E, VU,NR by  at 4/11/2023 1335    Comment: PT POC                   Point: Precautions (Done)     Learning Progress Summary           Patient Acceptance, E, VU,NR by  at 4/11/2023 1335    Comment: PT POC   Family Acceptance, E, VU,NR by  at 4/11/2023 1335    Comment: PT POC                               User Key     Initials Effective Dates Name Provider Type Discipline     06/16/21 -  Gabriella Waters, PT Physical Therapist PT              PT Recommendation and Plan  Planned Therapy Interventions (PT): gait training, patient/family education, stair training  Plan of Care Reviewed With: patient, spouse  Outcome Evaluation: PT eval completed.  Patient alert and oriented x3. Presenting with deficits in functional endurance effecting functional mobility below baseline. SpO2 levels maitnain > 93% on 2 liters of O2 throughout all activity this date ( inclusive of 400' of gait) with mild SOA and fatigue. Patient will benefit from skilled IP PT services to address impairments in order to return to PLOF. Recommend home with assist at dc.     Time Calculation:    PT Charges     Row Name 04/11/23 1355             Time Calculation    Start Time 1355  -LO      PT Received On 04/11/23  -LO      PT Goal Re-Cert Due Date 04/21/23  -LO         Timed Charges    48705 - Gait Training Minutes  12  -LO      16126 - PT Therapeutic Activity Minutes 5  -LO         Untimed Charges    PT Eval/Re-eval Minutes 38  -LO         Total Minutes    Timed Charges Total Minutes 17  -LO      Untimed Charges Total Minutes 38  -LO       Total Minutes 55  -LO            User Key  (r) = Recorded By, (t) = Taken By, (c) = Cosigned By    Initials Name Provider Type    LO Gabriella Waters, PT Physical Therapist              Therapy Charges for Today     Code Description Service Date Service Provider Modifiers Qty    53259911549 HC GAIT TRAINING EA 15 MIN 4/11/2023 Gabriella Waters, PT GP 1    33923772343 HC PT EVAL LOW COMPLEXITY 3 4/11/2023 Gabriella Waters, PT GP 1          PT G-Codes  Outcome Measure Options: AM-PAC 6 Clicks Basic Mobility (PT)  AM-PAC 6 Clicks Score (PT): 21  AM-PAC 6 Clicks Score (OT): 19       Gabriella Waters PT  4/11/2023

## 2023-04-11 NOTE — THERAPY EVALUATION
Patient Name: Abraham Wu  : 1950    MRN: [id number]                              Today's Date: 2023       Admit Date: 4/10/2023    Visit Dx:     ICD-10-CM ICD-9-CM   1. Community acquired pneumonia, bilateral  J18.9 486     Patient Active Problem List   Diagnosis   • Rectal mass s/p laparoscopic colostomy (2022)   • Hyponatremia   • Anemia secondary to chemotherapy and acute blood loss   • Essential hypertension   • Dyslipidemia   • Unintentional weight loss   • Hydronephrosis   • Neutropenic fever   • Hypokalemia   • Hodgkin lymphoma   • Acute upper GI bleed   • Melena   • Moderate malnutrition   • Esophageal candidiasis   • SHERRI (obstructive sleep apnea)   • ICU delirium    • Encounter for care related to vascular access port   • Community acquired pneumonia, bilateral   • Immunosuppression due to drug therapy   • Acute respiratory failure with hypoxia     Past Medical History:   Diagnosis Date   • benign polypoid tissue right lung    • Diabetes mellitus    • Hyperlipidemia    • Hypertension    • Mycobacterium mucogenicum    • SHERRI (obstructive sleep apnea)    • Seasonal allergies      Past Surgical History:   Procedure Laterality Date   • BRONCHOSCOPY      removal of obstructing polypoid tissue right middle lung   • COLOSTOMY N/A 2022    Procedure: LAPAROSCOPIC COLOSTOMY CREATION, FLEXIBLE SIGMOIDOSCOPY;  Surgeon: Hiram Viveros MD;  Location: [facility] OR;  Service: General;  Laterality: N/A;   • ENDOSCOPY N/A 10/10/2022    Procedure: ESOPHAGOGASTRODUODENOSCOPY;  Surgeon: Neeraj Lynn MD;  Location:  [facility];  Service: Gastroenterology;  Laterality: N/A;   • ENDOSCOPY N/A 10/12/2022    Procedure: ESOPHAGOGASTRODUODENOSCOPY;  Surgeon: Brunner, Mark I, MD;  Location:  [facility];  Service: Gastroenterology;  Laterality: N/A;   • EXAM UNDER ANESTHESIA, RECTAL BIOPSY N/A 10/04/2022    Procedure: EXAM UNDER ANESTHESIA, TRANSANAL BIOPSY WITH TRUCUT NEEDLE (WON-CANDY  JUVE);  Surgeon: Hiram Viveros MD;  Location: [facility];  Service: General;  Laterality: N/A;   • PERIPHERALLY INSERTED CENTRAL CATHETER INSERTION      Removed 11/28/2022   • VENOUS ACCESS DEVICE (PORT) INSERTION Right 11/28/2022      General Information     Row Name 04/11/23 0903          OT Time and Intention    Document Type evaluation  -     Mode of Treatment occupational therapy  -     Row Name 04/11/23 0903          General Information    Patient Profile Reviewed yes  -     Prior Level of Function independent:;all household mobility;transfer;ADL's  -     Existing Precautions/Restrictions fall;oxygen therapy device and L/min;other (see comments)  OSTOMY  -     Barriers to Rehab medically complex  -     Row Name 04/11/23 0903          Living Environment    People in Home spouse  -     Row Name 04/11/23 0903          Home Main Entrance    Number of Stairs, Main Entrance one  -     Row Name 04/11/23 0903          Stairs Within Home, Primary    Number of Stairs, Within Home, Primary none  -     Row Name 04/11/23 0903          Cognition    Orientation Status (Cognition) oriented x 4  -     Row Name 04/11/23 0903          Safety Issues, Functional Mobility    Safety Issues Affecting Function (Mobility) insight into deficits/self-awareness;safety precaution awareness  -     Impairments Affecting Function (Mobility) balance;endurance/activity tolerance;shortness of breath;strength  -           User Key  (r) = Recorded By, (t) = Taken By, (c) = Cosigned By    Initials Name Provider Type     Paulette Hennessy OT Occupational Therapist                 Mobility/ADL's     Row Name 04/11/23 0904          Bed Mobility    Bed Mobility sit-supine  -     Sit-Supine [place] (Bed Mobility) standby assist  -     Assistive Device (Bed Mobility) bed rails;head of bed elevated  -     Comment, (Bed Mobility) Demonstrates good sequencing with bed mobility.  -     Row Name 04/11/23 0904           Transfers    Transfers sit-stand transfer  -     Comment, (Transfers) VC's for hand placement and sequencing.  -[facility] Name 04/11/23 0904          Sit-Stand Transfer    Sit-Stand [place] (Transfers) contact guard;verbal cues  -[facility] Name 04/11/23 0904          Activities of Daily Living    BADL Assessment/Intervention lower body dressing;grooming;feeding  -[facility] Name 04/11/23 0904          Lower Body Dressing Assessment/Training    [place] Level (Lower Body Dressing) don;doff;socks;standby assist  -     Position (Lower Body Dressing) edge of bed sitting  -[facility] Name 04/11/23 0904          Grooming Assessment/Training    [place] Level (Grooming) wash face, hands;set up  -     Position (Grooming) edge of bed sitting  -[facility] Name 04/11/23 0904          Self-Feeding Assessment/Training    Assistive Devices (Feeding) built-up handle utensils  -     Comment, (Feeding) Issued built up handles to trial. Reports FMC impairment.  -           User Key  (r) = Recorded By, (t) = Taken By, (c) = Cosigned By    Initials Name Provider Type     Paulette Hennessy OT Occupational Therapist               Obj/Interventions     [facility] Name 04/11/23 0906          Sensory Assessment (Somatosensory)    Sensory Assessment (Somatosensory) UE sensation intact  -LC     Row Name 04/11/23 0906          Vision Assessment/Intervention    Visual Impairment/Limitations corrective lenses full-time  -LC     Row Name 04/11/23 0906          Range of Motion Comprehensive    General Range of Motion bilateral upper extremity ROM WFL  -LC     Row Name 04/11/23 0906          Strength Comprehensive (MMT)    General Manual Muscle Testing (MMT) Assessment upper extremity strength deficits identified  -     Comment, General Manual Muscle Testing (MMT) Assessment BUE grossly 4/5  -[facility] Name 04/11/23 0906          Motor Skills    Motor Skills coordination;functional endurance  -     Coordination fine motor  deficit;bilateral;upper extremity;minimal impairment  -     Functional Endurance decreased activity tolerance  -     Row Name 04/11/23 0906          Balance    Balance Assessment sitting static balance;sitting dynamic balance;standing dynamic balance;standing static balance  -     Static Sitting Balance standby assist  -     Dynamic Sitting Balance standby assist  -     Position, Sitting Balance unsupported;sitting edge of bed  -     Static Standing Balance contact guard  -     Dynamic Standing Balance contact guard  -     Position/Device Used, Standing Balance supported  -     Balance Interventions sitting;standing;sit to stand;supported;occupation based/functional task;weight shifting activity  -     Comment, Balance CGA for safety  -           User Key  (r) = Recorded By, (t) = Taken By, (c) = Cosigned By    Initials Name Provider Type     Paulette Hennessy OT Occupational Therapist               Goals/Plan     Row Name 04/11/23 0911          Transfer Goal 1 (OT)    Activity/Assistive Device (Transfer Goal 1, OT) sit-to-stand/stand-to-sit;bed-to-chair/chair-to-bed;walker, rolling  -     [place] Level/Cues Needed (Transfer Goal 1, OT) supervision required  -     Time Frame (Transfer Goal 1, OT) long term goal (LTG);by discharge  -     Progress/Outcome (Transfer Goal 1, OT) goal ongoing  -     Row Name 04/11/23 0911          Dressing Goal 1 (OT)    Activity/Device (Dressing Goal 1, OT) lower body dressing  -     [place]/Cues Needed (Dressing Goal 1, OT) supervision required  -     Time Frame (Dressing Goal 1, OT) long term goal (LTG);by discharge  -     Progress/Outcome (Dressing Goal 1, OT) goal ongoing  -     Row Name 04/11/23 0911          Toileting Goal 1 (OT)    Activity/Device (Toileting Goal 1, OT) adjust/manage clothing;perform perineal hygiene;commode;grab bar/safety frame  -     [place] Level/Cues Needed (Toileting Goal 1, OT) supervision required   -     Time Frame (Toileting Goal 1, OT) long term goal (LTG);by discharge  -     Progress/Outcome (Toileting Goal 1, OT) goal ongoing  -           User Key  (r) = Recorded By, (t) = Taken By, (c) = Cosigned By    Initials Name Provider Type     Paulette Hennessy, [facility] Occupational Therapist               Clinical Impression     Row Name 04/11/23 0909          Pain Assessment    Pretreatment Pain Rating 0/10 - no pain  -LC     Posttreatment Pain Rating 0/10 - no pain  -LC     Additional Documentation Pain Scale: Word Pre/Post-Treatment (Group)  -     Row Name 04/11/23 0909          Plan of Care Review    Plan of Care Reviewed With patient;spouse  -     Progress no change  -     Outcome Evaluation Pt. presents below baseline with ADLs and functional mobility. Limited by decreased activity tolerance, balance, FMC, and strength. Skilled OT services warranted to promote return to PLOF. Recommend home with assist at discharge.  -     Row Name 04/11/23 0909          Therapy Assessment/Plan (OT)    Patient/Family Therapy Goal Statement (OT) To return to PLOF  -     Therapy Frequency (OT) daily  -     Row Name 04/11/23 0909          Therapy Plan Review/Discharge Plan (OT)    Anticipated Discharge Disposition (OT) home with assist  -     Row Name 04/11/23 0909          Vital Signs    Pre Systolic BP Rehab --  VSS  -LC     Pre SpO2 (%) 90  -LC     O2 Delivery Pre Treatment nasal cannula  -LC     Intra SpO2 (%) 86  -LC     O2 Delivery Intra Treatment nasal cannula  -LC     Post SpO2 (%) 92  -LC     O2 Delivery Post Treatment nasal cannula  -LC     Pre Patient Position Sitting  -LC     Intra Patient Position Standing  -LC     Post Patient Position Sitting  -LC     Row Name 04/11/23 0909          Positioning and Restraints    Pre-Treatment Position in bed  -LC     Post Treatment Position bed  -LC     In Bed notified nsg;fowlers;call light within reach;encouraged to call for assist;exit alarm on;with  family/caregiver  -           User Key  (r) = Recorded By, (t) = Taken By, (c) = Cosigned By    Initials Name Provider Type    Paulette Ovalle OT Occupational Therapist               Outcome Measures     Row Name 04/11/23 0912          How much help from another is currently needed...    Putting on and taking off regular lower body clothing? 3  -LC     Bathing (including washing, rinsing, and drying) 3  -LC     Toileting (which includes using toilet bed pan or urinal) 3  -LC     Putting on and taking off regular upper body clothing 4  -LC     Taking care of personal grooming (such as brushing teeth) 3  -LC     Eating meals 3  -LC     AM-PAC 6 Clicks Score (OT) 19  -     Row Name 04/11/23 0912          Functional Assessment    Outcome Measure Options AM-PAC 6 Clicks Daily Activity (OT)  -           User Key  (r) = Recorded By, (t) = Taken By, (c) = Cosigned By    Initials Name Provider Type    Paulette Ovalle OT Occupational Therapist                Occupational Therapy Education     Title: PT OT SLP Therapies (In Progress)     Topic: Occupational Therapy (In Progress)     Point: ADL training (In Progress)     Description:   Instruct learner(s) on proper safety adaptation and remediation techniques during self care or transfers.   Instruct in proper use of assistive devices.              Learning Progress Summary           Patient Acceptance, E, NR by  at 4/11/2023 0818   Family Acceptance, E, NR by  at 4/11/2023 0818                   Point: Home exercise program (Not Started)     Description:   Instruct learner(s) on appropriate technique for monitoring, assisting and/or progressing therapeutic exercises/activities.              Learner Progress:  Not documented in this visit.          Point: Precautions (In Progress)     Description:   Instruct learner(s) on prescribed precautions during self-care and functional transfers.              Learning Progress Summary           Patient Acceptance, E, NR  by  at 4/11/2023 0818   Family Acceptance, E, NR by  at 4/11/2023 0818                   Point: Body mechanics (In Progress)     Description:   Instruct learner(s) on proper positioning and spine alignment during self-care, functional mobility activities and/or exercises.              Learning Progress Summary           Patient Acceptance, E, NR by  at 4/11/2023 0818   Family Acceptance, E, NR by  at 4/11/2023 0818                               User Key     Initials Effective Dates Name Provider Type Discipline     06/16/21 -  Paulette Hennessy OT Occupational Therapist OT              OT Recommendation and Plan  Therapy Frequency (OT): daily  Plan of Care Review  Plan of Care Reviewed With: patient, spouse  Progress: no change  Outcome Evaluation: Pt. presents below baseline with ADLs and functional mobility. Limited by decreased activity tolerance, balance, FMC, and strength. Skilled OT services warranted to promote return to PLOF. Recommend home with assist at discharge.     Time Calculation:    Time Calculation- OT     Row Name 04/11/23 0913             Time Calculation- OT    OT Start Time 0818  -LC      OT Received On 04/11/23  -      OT Goal Re-Cert Due Date 04/21/23  -         Untimed Charges    OT Eval/Re-eval Minutes 48  -LC         Total Minutes    Untimed Charges Total Minutes 48  -LC       Total Minutes 48  -LC            User Key  (r) = Recorded By, (t) = Taken By, (c) = Cosigned By    Initials Name Provider Type     Paulette Hennessy OT Occupational Therapist              Therapy Charges for Today     Code Description Service Date Service Provider Modifiers Qty    [id number] HC OT EVAL LOW COMPLEXITY 4 4/11/2023 Paulette Hennessy OT GO 1               Paulette Hennessy OT  4/11/2023

## 2023-04-12 LAB
ANION GAP SERPL CALCULATED.3IONS-SCNC: 10 MMOL/L (ref 5–15)
BACTERIA SPEC RESP CULT: NORMAL
BASOPHILS # BLD AUTO: 0.14 10*3/MM3 (ref 0–0.2)
BASOPHILS NFR BLD AUTO: 0.8 % (ref 0–1.5)
BUN SERPL-MCNC: 13 MG/DL (ref 8–23)
BUN/CREAT SERPL: 24.5 (ref 7–25)
CALCIUM SPEC-SCNC: 9.7 MG/DL (ref 8.6–10.5)
CHLORIDE SERPL-SCNC: 101 MMOL/L (ref 98–107)
CO2 SERPL-SCNC: 28 MMOL/L (ref 22–29)
CREAT SERPL-MCNC: 0.53 MG/DL (ref 0.76–1.27)
DEPRECATED RDW RBC AUTO: 60 FL (ref 37–54)
EGFRCR SERPLBLD CKD-EPI 2021: 107.1 ML/MIN/1.73
EOSINOPHIL # BLD AUTO: 0.05 10*3/MM3 (ref 0–0.4)
EOSINOPHIL NFR BLD AUTO: 0.3 % (ref 0.3–6.2)
ERYTHROCYTE [DISTWIDTH] IN BLOOD BY AUTOMATED COUNT: 16.6 % (ref 12.3–15.4)
GLUCOSE SERPL-MCNC: 97 MG/DL (ref 65–99)
GRAM STN SPEC: NORMAL
HCT VFR BLD AUTO: 33.2 % (ref 37.5–51)
HGB BLD-MCNC: 10.7 G/DL (ref 13–17.7)
IMM GRANULOCYTES # BLD AUTO: 0.14 10*3/MM3 (ref 0–0.05)
IMM GRANULOCYTES NFR BLD AUTO: 0.8 % (ref 0–0.5)
LYMPHOCYTES # BLD AUTO: 1.15 10*3/MM3 (ref 0.7–3.1)
LYMPHOCYTES NFR BLD AUTO: 6.2 % (ref 19.6–45.3)
MCH RBC QN AUTO: 31.8 PG (ref 26.6–33)
MCHC RBC AUTO-ENTMCNC: 32.2 G/DL (ref 31.5–35.7)
MCV RBC AUTO: 98.8 FL (ref 79–97)
MONOCYTES # BLD AUTO: 1.17 10*3/MM3 (ref 0.1–0.9)
MONOCYTES NFR BLD AUTO: 6.3 % (ref 5–12)
NEUTROPHILS NFR BLD AUTO: 15.92 10*3/MM3 (ref 1.7–7)
NEUTROPHILS NFR BLD AUTO: 85.6 % (ref 42.7–76)
NRBC BLD AUTO-RTO: 0 /100 WBC (ref 0–0.2)
PLATELET # BLD AUTO: 354 10*3/MM3 (ref 140–450)
PMV BLD AUTO: 9 FL (ref 6–12)
POTASSIUM SERPL-SCNC: 3.9 MMOL/L (ref 3.5–5.2)
RBC # BLD AUTO: 3.36 10*6/MM3 (ref 4.14–5.8)
SODIUM SERPL-SCNC: 139 MMOL/L (ref 136–145)
WBC NRBC COR # BLD: 18.57 10*3/MM3 (ref 3.4–10.8)

## 2023-04-12 PROCEDURE — 94799 UNLISTED PULMONARY SVC/PX: CPT

## 2023-04-12 PROCEDURE — 25010000002 METHYLPREDNISOLONE PER 40 MG: Performed by: INTERNAL MEDICINE

## 2023-04-12 PROCEDURE — 94761 N-INVAS EAR/PLS OXIMETRY MLT: CPT

## 2023-04-12 PROCEDURE — 25010000002 AZITHROMYCIN PER 500 MG: Performed by: INTERNAL MEDICINE

## 2023-04-12 PROCEDURE — 85025 COMPLETE CBC W/AUTO DIFF WBC: CPT | Performed by: INTERNAL MEDICINE

## 2023-04-12 PROCEDURE — 25010000002 ENOXAPARIN PER 10 MG: Performed by: INTERNAL MEDICINE

## 2023-04-12 PROCEDURE — 25010000002 PIPERACILLIN SOD-TAZOBACTAM PER 1 G: Performed by: INTERNAL MEDICINE

## 2023-04-12 PROCEDURE — 80048 BASIC METABOLIC PNL TOTAL CA: CPT | Performed by: INTERNAL MEDICINE

## 2023-04-12 RX ADMIN — TAZOBACTAM SODIUM AND PIPERACILLIN SODIUM 3.38 G: 375; 3 INJECTION, SOLUTION INTRAVENOUS at 20:48

## 2023-04-12 RX ADMIN — PANTOPRAZOLE SODIUM 40 MG: 40 TABLET, DELAYED RELEASE ORAL at 05:41

## 2023-04-12 RX ADMIN — ALBUTEROL SULFATE 2.5 MG: 2.5 SOLUTION RESPIRATORY (INHALATION) at 01:19

## 2023-04-12 RX ADMIN — TAZOBACTAM SODIUM AND PIPERACILLIN SODIUM 3.38 G: 375; 3 INJECTION, SOLUTION INTRAVENOUS at 11:07

## 2023-04-12 RX ADMIN — POTASSIUM CHLORIDE 20 MEQ: 1.5 POWDER, FOR SOLUTION ORAL at 08:19

## 2023-04-12 RX ADMIN — ALBUTEROL SULFATE 2.5 MG: 2.5 SOLUTION RESPIRATORY (INHALATION) at 11:34

## 2023-04-12 RX ADMIN — GUAIFENESIN 1200 MG: 600 TABLET, EXTENDED RELEASE ORAL at 08:19

## 2023-04-12 RX ADMIN — Medication 10 ML: at 08:20

## 2023-04-12 RX ADMIN — GUAIFENESIN 1200 MG: 600 TABLET, EXTENDED RELEASE ORAL at 20:44

## 2023-04-12 RX ADMIN — AZITHROMYCIN 500 MG: 500 INJECTION, POWDER, LYOPHILIZED, FOR SOLUTION INTRAVENOUS at 05:41

## 2023-04-12 RX ADMIN — ALBUTEROL SULFATE 2.5 MG: 2.5 SOLUTION RESPIRATORY (INHALATION) at 07:37

## 2023-04-12 RX ADMIN — Medication 10 ML: at 20:46

## 2023-04-12 RX ADMIN — METHYLPREDNISOLONE SODIUM SUCCINATE 40 MG: 40 INJECTION, POWDER, FOR SOLUTION INTRAMUSCULAR; INTRAVENOUS at 08:19

## 2023-04-12 RX ADMIN — ROSUVASTATIN CALCIUM 10 MG: 10 TABLET, FILM COATED ORAL at 08:20

## 2023-04-12 RX ADMIN — CETIRIZINE HYDROCHLORIDE 10 MG: 10 TABLET, FILM COATED ORAL at 08:19

## 2023-04-12 RX ADMIN — NYSTATIN 500000 UNITS: 100000 SUSPENSION ORAL at 17:10

## 2023-04-12 RX ADMIN — ENOXAPARIN SODIUM 40 MG: 40 INJECTION SUBCUTANEOUS at 08:19

## 2023-04-12 RX ADMIN — ALBUTEROL SULFATE 2.5 MG: 2.5 SOLUTION RESPIRATORY (INHALATION) at 19:02

## 2023-04-12 RX ADMIN — TAZOBACTAM SODIUM AND PIPERACILLIN SODIUM 3.38 G: 375; 3 INJECTION, SOLUTION INTRAVENOUS at 03:41

## 2023-04-12 RX ADMIN — NYSTATIN 500000 UNITS: 100000 SUSPENSION ORAL at 08:19

## 2023-04-12 RX ADMIN — NYSTATIN 500000 UNITS: 100000 SUSPENSION ORAL at 20:44

## 2023-04-12 RX ADMIN — FLUTICASONE PROPIONATE 1 SPRAY: 50 SPRAY, METERED NASAL at 08:21

## 2023-04-12 RX ADMIN — NYSTATIN 500000 UNITS: 100000 SUSPENSION ORAL at 11:07

## 2023-04-12 NOTE — PROGRESS NOTES
[facility] Medicine Services  PROGRESS NOTE    Patient Name: Abraham Wu  : 1950  MRN: [id number]    Date of Admission: 4/10/2023  Primary Care Physician: Jatinder Hanyes MD    Subjective   Subjective     CC:  dyspnea    HPI:  He is doing well, feels like he is getting a lot of benefit from breathing treatments. We discussed discharge tmrw and him and wife were agreeable    ROS:  Gen- No fevers, chills  CV- No chest pain, palpitations  Resp- No cough, dyspnea  GI- No N/V/D, abd pain         Objective   Objective     Vital Signs:   Temp:  [97.6 °F (36.4 °C)-98.7 °F (37.1 °C)] 98.6 °F (37 °C)  Heart Rate:  [68-99] 77  Resp:  [18-20] 18  BP: (132-148)/(76-86) 148/86  Flow (L/min):  [1-2] 2     Physical Exam:  Constitutional: No acute distress, awake, alert  HENT: NCAT, mucous membranes moist  Respiratory: Clear to auscultation bilaterally, respiratory effort normal , comfortable on NC  Cardiovascular: RRR, no murmurs, rubs, or gallops  Gastrointestinal: Positive bowel sounds, soft, nontender, nondistended  Musculoskeletal: No bilateral ankle edema  Psychiatric: Appropriate affect, cooperative  Neurologic: Oriented x 3, strength symmetric in all extremities, Cranial Nerves grossly intact to confrontation, speech clear  Skin: No rashes      Results Reviewed:  LAB RESULTS:      Lab 23  0530 23  0518 04/10/23  0359   WBC 18.57* 22.03* 18.01*   HEMOGLOBIN 10.7* 9.9* 10.0*   HEMATOCRIT 33.2* 31.4* 30.1*   PLATELETS 354 310 280   NEUTROS ABS 15.92* 19.87* 15.76*   IMMATURE GRANS (ABS) 0.14* 0.19* 0.16*   LYMPHS ABS 1.15 0.86 0.68*   MONOS ABS 1.17* 1.03* 1.17*   EOS ABS 0.05 0.01 0.12   MCV 98.8* 98.7* 94.1   PROCALCITONIN  --   --  0.21   LACTATE  --   --  1.2         Lab 23  0530 23  0518 04/10/23  0359   SODIUM 139 140 129*   POTASSIUM 3.9 4.2 4.0   CHLORIDE 101 103 95*   CO2 28.0 27.0 24.0   ANION GAP 10.0 10.0 10.0   BUN 13 13 14   CREATININE 0.53* 0.44*  0.52*   EGFR 107.1 113.3 107.8   GLUCOSE 97 103* 113*   CALCIUM 9.7 9.3 8.8         Lab 04/10/23  0359   TOTAL PROTEIN 5.7*   ALBUMIN 3.0*   GLOBULIN 2.7   ALT (SGPT) 11   AST (SGOT) 20   BILIRUBIN 0.4   ALK PHOS 98         Lab 04/10/23  0359   PROBNP 244.9   HSTROP T 38*                 Brief Urine Lab Results  (Last result in the past 365 days)      Color   Clarity   Blood   Leuk Est   Nitrite   Protein   CREAT   Urine HCG        04/10/23 0619 Yellow   Clear   Negative   Negative   Negative   Negative                 Microbiology Results Abnormal     Procedure Component Value - Date/Time    Respiratory Culture - Sputum, Cough [[id number]] Collected: 04/10/23 1228    Lab Status: Final result Specimen: Sputum from Cough Updated: 04/12/23 1127     Respiratory Culture Light growth (2+) Normal respiratory flavio. No S. aureus or Pseudomonas aeruginosa detected. Final report.     Gram Stain Moderate (3+) Mixed bacterial morphotypes seen on Gram Stain      Few (2+) Budding yeast      Moderate (3+) WBCs per low power field      Few (2+) Epithelial cells per low power field    Blood Culture - Blood, Blood, PICC Line [[id number]]  (Normal) Collected: 04/10/23 0415    Lab Status: Preliminary result Specimen: Blood, PICC Line Updated: 04/12/23 0445     Blood Culture No growth at 2 days    Blood Culture - Blood, Arm, Left [[id number]]  (Normal) Collected: 04/10/23 0426    Lab Status: Preliminary result Specimen: Blood from Arm, Left Updated: 04/12/23 0445     Blood Culture No growth at 2 days    S. Pneumo Ag Urine or CSF - Urine, Urine, Clean Catch [[id number]]  (Normal) Collected: 04/10/23 0619    Lab Status: Final result Specimen: Urine, Clean Catch Updated: 04/10/23 0956     Strep Pneumo Ag Negative    Legionella Antigen, Urine - Urine, Urine, Clean Catch [[id number]]  (Normal) Collected: 04/10/23 0619    Lab Status: Final result Specimen: Urine, Clean Catch Updated: 04/10/23 0955     LEGIONELLA ANTIGEN, URINE Negative    MRSA  Screen, PCR (Inpatient) - Swab, Nares [[id number]]  (Normal) Collected: 04/10/23 0557    Lab Status: Final result Specimen: Swab from Nares Updated: 04/10/23 0720     MRSA PCR Negative    Narrative:      The negative predictive value of this diagnostic test is high and should only be used to consider de-escalating anti-MRSA therapy. A positive result may indicate colonization with MRSA and must be correlated clinically.  MRSA Negative          No radiology results from the last 24 hrs    Results for orders placed during the hospital encounter of 10/03/22    Adult Transthoracic Echo Complete W/ Cont if Necessary Per Protocol    Interpretation Summary  · Left ventricular ejection fraction appears to be 56 - 60%. Left ventricular systolic function is normal.  · Global longitudinal LV strain (GLS) = -27.0%.  · Left atrial volume is moderately increased.  · Mild mitral valve regurgitation is present.  · Mild tricuspid valve regurgitation is present.  · Estimated right ventricular systolic pressure from tricuspid regurgitation is mildly elevated (35-45 mmHg).  · Mild dilation of the ascending aorta is present.      Current medications:  Scheduled Meds:Albuterol Sulfate NEB Orderable, 2.5 mg, Nebulization, Q6H - RT  azithromycin, 500 mg, Intravenous, Q24H  cetirizine, 10 mg, Oral, Daily  enoxaparin, 40 mg, Subcutaneous, Daily  fluticasone, 1 spray, Nasal, Daily  guaiFENesin, 1,200 mg, Oral, Q12H  methylPREDNISolone sodium succinate, 40 mg, Intravenous, Daily  nystatin, 5 mL, Oral, 4x Daily  pantoprazole, 40 mg, Oral, Q AM  piperacillin-tazobactam, 3.375 g, Intravenous, Q8H  potassium chloride, 20 mEq, Oral, Daily  rosuvastatin, 10 mg, Oral, Daily  sodium chloride, 10 mL, Intravenous, Q12H      Continuous Infusions:   PRN Meds:.•  acetaminophen **OR** acetaminophen **OR** acetaminophen  •  albuterol  •  Calcium Replacement - Follow Nurse / BPA Driven Protocol  •  lidocaine-prilocaine  •  Magnesium Standard Dose Replacement -  Follow Nurse / BPA Driven Protocol  •  ondansetron  •  Phosphorus Replacement - Follow Nurse / BPA Driven Protocol  •  Potassium Replacement - Follow Nurse / BPA Driven Protocol  •  prochlorperazine  •  sodium chloride  •  sodium chloride  •  sodium chloride    Assessment & Plan   Assessment & Plan     Active Hospital Problems    Diagnosis  POA   • **Acute respiratory failure with hypoxia [J96.01]  Unknown   • Community acquired pneumonia, bilateral [J18.9]  Yes   • Immunosuppression due to drug therapy [D84.821, Z79.899]  Not Applicable   • SHERRI (obstructive sleep apnea) [G47.33]  Yes   • Hodgkin lymphoma [C81.90]  Yes   • Anemia secondary to chemotherapy and acute blood loss [D61.9]  Yes   • Essential hypertension [I10]  Yes   • Dyslipidemia [E78.5]  Yes   • Hyponatremia [E87.1]  Unknown   • Rectal mass s/p laparoscopic colostomy (9/2022) [K62.89]  Yes      Resolved Hospital Problems   No resolved problems to display.        Brief Hospital Course to date:  Abraham Wu is a 73 y.o. male with past medical history of stage IV Hodgkin's lymphoma status postchemotherapy that completed 3 weeks ago, anemia secondary to chemotherapy, history of GI bleed secondary to ulcer, history of rectal mass status post resection with colostomy, SHERRI previously on BiPAP, essential hypertension, hyperlipidemia, who presents the ER with 7-day history of productive cough, increasing shortness of air, fever and chills.     Acute respiratory failure with hypoxia  Bilateral pneumonia with risk factors for MRSA/Pseudomonas  Coronavirus HKU1 infection  -- Cultures, urinary ag pending. MRSA PCR neg - stopped Vanc. Continue broad-spectrum antibiotics with Zosyn, azithromycin.  -- Nebs, solumedrol 40 daily x 5 days  --continue flutter valve and IS- patient is very good abt  Using them     Elevated troponin  --Suspect secondary to demand from hypoxia. No CP, no EKG changes.     Immunosuppression secondary to chemotherapy  Stage IV Hodgkin's  lymphoma  Chronic anemia  --S/P completion chemotherapy 3/21/2023. Restaging PET/CT for next week delayed per oncology for a couple weeks until active infection resolved     Hyponatremia  --Suspect volume related. Resolved w IVF     Hypertension  Hyperlipidemia  Chronic ostomy  --Continue home medication     SHERRI  --No longer requiring BiPAP, patient is requesting nocturnal O2 order at discharge     Hx of gi bleed, PUD  --Continue protonix     History of esophageal candidiasis  -- Nystatin swish and swallow while on antibiotic     Expected Discharge Location and Transportation: home  Expected Discharge   Expected Discharge Date and Time     Expected Discharge Date Expected Discharge Time    Apr 13, 2023            DVT prophylaxis:  Medical and mechanical DVT prophylaxis orders are present.     AM-PAC 6 Clicks Score (PT): 24 (04/12/23 6283)    CODE STATUS:   Code Status and Medical Interventions:   Ordered at: 04/10/23 0537     Code Status (Patient has no pulse and is not breathing):    CPR (Attempt to Resuscitate)     Medical Interventions (Patient has pulse or is breathing):    Full Support     Comments:    does not want prolonged intubation/trach/peg       Marcella Leonardo MD  04/12/23

## 2023-04-12 NOTE — PLAN OF CARE
Problem: Adult Inpatient Plan of Care  Goal: Plan of Care Review  Outcome: Ongoing, Progressing  Flowsheets (Taken 4/12/2023 0649)  Plan of Care Reviewed With: patient  Goal: Patient-Specific Goal (Individualized)  Outcome: Ongoing, Progressing  Goal: Absence of Hospital-Acquired Illness or Injury  Outcome: Ongoing, Progressing  Intervention: Identify and Manage Fall Risk  Recent Flowsheet Documentation  Taken 4/12/2023 0341 by Avery Dent RN  Safety Promotion/Fall Prevention:   activity supervised   assistive device/personal items within reach   clutter free environment maintained   fall prevention program maintained   lighting adjusted   nonskid shoes/slippers when out of bed   safety round/check completed   room organization consistent  Taken 4/11/2023 2344 by Avery Dent RN  Safety Promotion/Fall Prevention:   assistive device/personal items within reach   clutter free environment maintained   fall prevention program maintained   lighting adjusted   nonskid shoes/slippers when out of bed   room organization consistent   safety round/check completed  Taken 4/11/2023 2000 by Avery Dent RN  Safety Promotion/Fall Prevention:   assistive device/personal items within reach   clutter free environment maintained   fall prevention program maintained   lighting adjusted   nonskid shoes/slippers when out of bed   room organization consistent   safety round/check completed  Intervention: Prevent Skin Injury  Recent Flowsheet Documentation  Taken 4/12/2023 0341 by Avery Dent RN  Body Position: position changed independently  Skin Protection:   adhesive use limited   incontinence pads utilized   tubing/devices free from skin contact  Taken 4/11/2023 2344 by Avery Dent RN  Body Position: position changed independently  Skin Protection:   adhesive use limited   incontinence pads utilized   tubing/devices free from skin contact  Taken 4/11/2023 2000 by Avery Dent RN  Body Position: sitting up in bed  Skin  Protection:   adhesive use limited   incontinence pads utilized   tubing/devices free from skin contact  Intervention: Prevent and Manage VTE (Venous Thromboembolism) Risk  Recent Flowsheet Documentation  Taken 4/12/2023 0341 by Avery Dent RN  Activity Management: activity encouraged  Taken 4/11/2023 2344 by Avery Dent RN  Activity Management: activity encouraged  Taken 4/11/2023 2000 by Avery Dent RN  Activity Management: activity encouraged  Intervention: Prevent Infection  Recent Flowsheet Documentation  Taken 4/12/2023 0341 by Avery Dent RN  Infection Prevention:   environmental surveillance performed   rest/sleep promoted  Taken 4/11/2023 2344 by Avery Dent RN  Infection Prevention:   environmental surveillance performed   rest/sleep promoted  Taken 4/11/2023 2000 by Avery Dent RN  Infection Prevention:   environmental surveillance performed   rest/sleep promoted  Goal: Optimal Comfort and Wellbeing  Outcome: Ongoing, Progressing  Intervention: Provide Person-Centered Care  Recent Flowsheet Documentation  Taken 4/11/2023 2000 by Avery Dent RN  Trust Relationship/Rapport:   care explained   choices provided   emotional support provided   empathic listening provided   questions answered   questions encouraged   reassurance provided   thoughts/feelings acknowledged  Goal: Readiness for Transition of Care  Outcome: Ongoing, Progressing     Problem: Pain Acute  Goal: Acceptable Pain Control and Functional Ability  Outcome: Ongoing, Progressing  Intervention: Prevent or Manage Pain  Recent Flowsheet Documentation  Taken 4/12/2023 0341 by Avery Dent RN  Medication Review/Management: medications reviewed  Taken 4/11/2023 2344 by Avery Dent RN  Medication Review/Management: medications reviewed  Taken 4/11/2023 2000 by Avery Dent RN  Medication Review/Management: medications reviewed  Intervention: Optimize Psychosocial Wellbeing  Recent Flowsheet Documentation  Taken 4/11/2023 2000 by  Avery Dent RN  Diversional Activities: television     Problem: Fall Injury Risk  Goal: Absence of Fall and Fall-Related Injury  Outcome: Ongoing, Progressing  Intervention: Identify and Manage Contributors  Recent Flowsheet Documentation  Taken 4/12/2023 0341 by Avery Dent RN  Medication Review/Management: medications reviewed  Taken 4/11/2023 2344 by Avery Dent RN  Medication Review/Management: medications reviewed  Taken 4/11/2023 2000 by Avery Dent RN  Medication Review/Management: medications reviewed  Intervention: Promote Injury-Free Environment  Recent Flowsheet Documentation  Taken 4/12/2023 0341 by Avery Dent RN  Safety Promotion/Fall Prevention:   activity supervised   assistive device/personal items within reach   clutter free environment maintained   fall prevention program maintained   lighting adjusted   nonskid shoes/slippers when out of bed   safety round/check completed   room organization consistent  Taken 4/11/2023 2344 by Avery Dent RN  Safety Promotion/Fall Prevention:   assistive device/personal items within reach   clutter free environment maintained   fall prevention program maintained   lighting adjusted   nonskid shoes/slippers when out of bed   room organization consistent   safety round/check completed  Taken 4/11/2023 2000 by Avery Dent RN  Safety Promotion/Fall Prevention:   assistive device/personal items within reach   clutter free environment maintained   fall prevention program maintained   lighting adjusted   nonskid shoes/slippers when out of bed   room organization consistent   safety round/check completed     Problem: Skin Injury Risk Increased  Goal: Skin Health and Integrity  Outcome: Ongoing, Progressing  Intervention: Optimize Skin Protection  Recent Flowsheet Documentation  Taken 4/12/2023 0341 by Avery Dent RN  Pressure Reduction Techniques: frequent weight shift encouraged  Head of Bed (HOB) Positioning: HOB elevated  Pressure Reduction Devices:  positioning supports utilized  Skin Protection:   adhesive use limited   incontinence pads utilized   tubing/devices free from skin contact  Taken 4/11/2023 2344 by Avery Dent RN  Pressure Reduction Techniques: frequent weight shift encouraged  Head of Bed (HOB) Positioning: HOB elevated  Pressure Reduction Devices: positioning supports utilized  Skin Protection:   adhesive use limited   incontinence pads utilized   tubing/devices free from skin contact  Taken 4/11/2023 2000 by Avery Dent RN  Pressure Reduction Techniques: frequent weight shift encouraged  Head of Bed (HOB) Positioning: HOB elevated  Pressure Reduction Devices: positioning supports utilized  Skin Protection:   adhesive use limited   incontinence pads utilized   tubing/devices free from skin contact   Goal Outcome Evaluation:  Plan of Care Reviewed With: patient         Patient with no complaints of pain.  Patient stated that he was supposed to try to sleep on room air.  Trial attempted, patient desaturated , oxygen replaced.  Patient with about 150 mLs of brown , loose output from colostomy. Patient rested intermittently throughout shift.

## 2023-04-12 NOTE — CASE MANAGEMENT/SOCIAL WORK
Continued Stay Note  [facility]     Patient Name: Abraham Wu  MRN: [id number]  Today's Date: 4/12/2023    Admit Date: 4/10/2023    Plan: Home at DC   Discharge Plan     Row Name 04/12/23 0901       Plan    Plan Comments RA sat at sleep is 86%. [place] WHITE               Discharge Codes    No documentation.               Expected Discharge Date and Time     Expected Discharge Date Expected Discharge Time    Apr 14, 2023             Katrin Wilson RN

## 2023-04-12 NOTE — CASE MANAGEMENT/SOCIAL WORK
Continued Stay Note  [facility]     Patient Name: Abraham Wu  MRN: [id number]  Today's Date: 4/12/2023    Admit Date: 4/10/2023    Plan:  02   Discharge Plan     Row Name 04/12/23 0959       Plan    Plan  02    Patient/Family in Agreement with Plan other (see comments)    Plan Comments I called the 02 referral to Pool with [facility]. He will f/u with the pt and family.    Final Discharge Disposition Code 01 - home or self-care    Row Name 04/12/23 0901       Plan    Plan Comments RA sat at sleep is 86%. [place] WHITE               Discharge Codes    No documentation.               Expected Discharge Date and Time     Expected Discharge Date Expected Discharge Time    Apr 14, 2023             Katrin Wilson, RN

## 2023-04-13 ENCOUNTER — READMISSION MANAGEMENT (OUTPATIENT)
Dept: CALL CENTER | Facility: HOSPITAL | Age: 73
End: 2023-04-13
Payer: MEDICARE

## 2023-04-13 VITALS
TEMPERATURE: 98.7 F | HEART RATE: 80 BPM | HEIGHT: 64 IN | WEIGHT: 141.5 LBS | OXYGEN SATURATION: 100 % | DIASTOLIC BLOOD PRESSURE: 88 MMHG | SYSTOLIC BLOOD PRESSURE: 138 MMHG | RESPIRATION RATE: 17 BRPM | BODY MASS INDEX: 24.16 KG/M2

## 2023-04-13 PROBLEM — J96.01 ACUTE RESPIRATORY FAILURE WITH HYPOXIA: Status: RESOLVED | Noted: 2023-04-10 | Resolved: 2023-04-13

## 2023-04-13 PROBLEM — E87.1 HYPONATREMIA: Status: RESOLVED | Noted: 2022-09-21 | Resolved: 2023-04-13

## 2023-04-13 LAB
ANION GAP SERPL CALCULATED.3IONS-SCNC: 8 MMOL/L (ref 5–15)
BASOPHILS # BLD AUTO: 0.06 10*3/MM3 (ref 0–0.2)
BASOPHILS NFR BLD AUTO: 0.5 % (ref 0–1.5)
BUN SERPL-MCNC: 11 MG/DL (ref 8–23)
BUN/CREAT SERPL: 21.2 (ref 7–25)
CALCIUM SPEC-SCNC: 9.1 MG/DL (ref 8.6–10.5)
CHLORIDE SERPL-SCNC: 101 MMOL/L (ref 98–107)
CO2 SERPL-SCNC: 31 MMOL/L (ref 22–29)
CREAT SERPL-MCNC: 0.52 MG/DL (ref 0.76–1.27)
DEPRECATED RDW RBC AUTO: 58.9 FL (ref 37–54)
EGFRCR SERPLBLD CKD-EPI 2021: 107.8 ML/MIN/1.73
EOSINOPHIL # BLD AUTO: 0.02 10*3/MM3 (ref 0–0.4)
EOSINOPHIL NFR BLD AUTO: 0.2 % (ref 0.3–6.2)
ERYTHROCYTE [DISTWIDTH] IN BLOOD BY AUTOMATED COUNT: 16.3 % (ref 12.3–15.4)
GLUCOSE SERPL-MCNC: 85 MG/DL (ref 65–99)
HCT VFR BLD AUTO: 31.6 % (ref 37.5–51)
HGB BLD-MCNC: 10.2 G/DL (ref 13–17.7)
IMM GRANULOCYTES # BLD AUTO: 0.08 10*3/MM3 (ref 0–0.05)
IMM GRANULOCYTES NFR BLD AUTO: 0.7 % (ref 0–0.5)
LYMPHOCYTES # BLD AUTO: 0.76 10*3/MM3 (ref 0.7–3.1)
LYMPHOCYTES NFR BLD AUTO: 6.2 % (ref 19.6–45.3)
MCH RBC QN AUTO: 31.9 PG (ref 26.6–33)
MCHC RBC AUTO-ENTMCNC: 32.3 G/DL (ref 31.5–35.7)
MCV RBC AUTO: 98.8 FL (ref 79–97)
MONOCYTES # BLD AUTO: 0.99 10*3/MM3 (ref 0.1–0.9)
MONOCYTES NFR BLD AUTO: 8.1 % (ref 5–12)
NEUTROPHILS NFR BLD AUTO: 10.31 10*3/MM3 (ref 1.7–7)
NEUTROPHILS NFR BLD AUTO: 84.3 % (ref 42.7–76)
NRBC BLD AUTO-RTO: 0 /100 WBC (ref 0–0.2)
PLATELET # BLD AUTO: 322 10*3/MM3 (ref 140–450)
PMV BLD AUTO: 9.2 FL (ref 6–12)
POTASSIUM SERPL-SCNC: 4.2 MMOL/L (ref 3.5–5.2)
RBC # BLD AUTO: 3.2 10*6/MM3 (ref 4.14–5.8)
SODIUM SERPL-SCNC: 140 MMOL/L (ref 136–145)
WBC NRBC COR # BLD: 12.22 10*3/MM3 (ref 3.4–10.8)

## 2023-04-13 PROCEDURE — 94799 UNLISTED PULMONARY SVC/PX: CPT

## 2023-04-13 PROCEDURE — 80048 BASIC METABOLIC PNL TOTAL CA: CPT | Performed by: INTERNAL MEDICINE

## 2023-04-13 PROCEDURE — 97530 THERAPEUTIC ACTIVITIES: CPT | Performed by: PHYSICAL THERAPIST

## 2023-04-13 PROCEDURE — 25010000002 PIPERACILLIN SOD-TAZOBACTAM PER 1 G: Performed by: INTERNAL MEDICINE

## 2023-04-13 PROCEDURE — 25010000002 AZITHROMYCIN PER 500 MG: Performed by: INTERNAL MEDICINE

## 2023-04-13 PROCEDURE — 94761 N-INVAS EAR/PLS OXIMETRY MLT: CPT

## 2023-04-13 PROCEDURE — 25010000002 HEPARIN LOCK FLUSH PER 10 UNITS: Performed by: STUDENT IN AN ORGANIZED HEALTH CARE EDUCATION/TRAINING PROGRAM

## 2023-04-13 PROCEDURE — 25010000002 ENOXAPARIN PER 10 MG: Performed by: INTERNAL MEDICINE

## 2023-04-13 PROCEDURE — 25010000002 METHYLPREDNISOLONE PER 40 MG: Performed by: INTERNAL MEDICINE

## 2023-04-13 PROCEDURE — 85025 COMPLETE CBC W/AUTO DIFF WBC: CPT | Performed by: INTERNAL MEDICINE

## 2023-04-13 PROCEDURE — 97116 GAIT TRAINING THERAPY: CPT | Performed by: PHYSICAL THERAPIST

## 2023-04-13 RX ORDER — HEPARIN SODIUM (PORCINE) LOCK FLUSH IV SOLN 100 UNIT/ML 100 UNIT/ML
5 SOLUTION INTRAVENOUS ONCE
Status: COMPLETED | OUTPATIENT
Start: 2023-04-13 | End: 2023-04-13

## 2023-04-13 RX ORDER — CEFDINIR 300 MG/1
300 CAPSULE ORAL 2 TIMES DAILY
Qty: 4 CAPSULE | Refills: 0 | Status: SHIPPED | OUTPATIENT
Start: 2023-04-13 | End: 2023-04-15

## 2023-04-13 RX ORDER — DOXYCYCLINE HYCLATE 100 MG/1
100 CAPSULE ORAL 2 TIMES DAILY
Qty: 4 CAPSULE | Refills: 0 | Status: SHIPPED | OUTPATIENT
Start: 2023-04-13 | End: 2023-04-15

## 2023-04-13 RX ORDER — PREDNISONE 20 MG/1
40 TABLET ORAL DAILY
Qty: 4 TABLET | Refills: 0 | Status: SHIPPED | OUTPATIENT
Start: 2023-04-13 | End: 2023-04-15

## 2023-04-13 RX ORDER — GUAIFENESIN 600 MG/1
1200 TABLET, EXTENDED RELEASE ORAL EVERY 12 HOURS SCHEDULED
Qty: 6 TABLET | Refills: 0 | Status: SHIPPED | OUTPATIENT
Start: 2023-04-13 | End: 2023-04-15

## 2023-04-13 RX ADMIN — ALBUTEROL SULFATE 2.5 MG: 2.5 SOLUTION RESPIRATORY (INHALATION) at 00:23

## 2023-04-13 RX ADMIN — FLUTICASONE PROPIONATE 1 SPRAY: 50 SPRAY, METERED NASAL at 08:14

## 2023-04-13 RX ADMIN — ROSUVASTATIN CALCIUM 10 MG: 10 TABLET, FILM COATED ORAL at 08:14

## 2023-04-13 RX ADMIN — POTASSIUM CHLORIDE 20 MEQ: 1.5 POWDER, FOR SOLUTION ORAL at 08:14

## 2023-04-13 RX ADMIN — GUAIFENESIN 1200 MG: 600 TABLET, EXTENDED RELEASE ORAL at 08:14

## 2023-04-13 RX ADMIN — AZITHROMYCIN 500 MG: 500 INJECTION, POWDER, LYOPHILIZED, FOR SOLUTION INTRAVENOUS at 08:29

## 2023-04-13 RX ADMIN — CETIRIZINE HYDROCHLORIDE 10 MG: 10 TABLET, FILM COATED ORAL at 08:14

## 2023-04-13 RX ADMIN — PANTOPRAZOLE SODIUM 40 MG: 40 TABLET, DELAYED RELEASE ORAL at 05:21

## 2023-04-13 RX ADMIN — TAZOBACTAM SODIUM AND PIPERACILLIN SODIUM 3.38 G: 375; 3 INJECTION, SOLUTION INTRAVENOUS at 03:32

## 2023-04-13 RX ADMIN — Medication 10 ML: at 08:14

## 2023-04-13 RX ADMIN — METHYLPREDNISOLONE SODIUM SUCCINATE 40 MG: 40 INJECTION, POWDER, FOR SOLUTION INTRAMUSCULAR; INTRAVENOUS at 08:14

## 2023-04-13 RX ADMIN — HEPARIN 500 UNITS: 100 SYRINGE at 11:37

## 2023-04-13 RX ADMIN — NYSTATIN 500000 UNITS: 100000 SUSPENSION ORAL at 11:55

## 2023-04-13 RX ADMIN — ENOXAPARIN SODIUM 40 MG: 40 INJECTION SUBCUTANEOUS at 08:13

## 2023-04-13 RX ADMIN — NYSTATIN 500000 UNITS: 100000 SUSPENSION ORAL at 08:14

## 2023-04-13 NOTE — PLAN OF CARE
Problem: Adult Inpatient Plan of Care  Goal: Plan of Care Review  Outcome: Ongoing, Progressing  Goal: Patient-Specific Goal (Individualized)  Outcome: Ongoing, Progressing  Goal: Absence of Hospital-Acquired Illness or Injury  Outcome: Ongoing, Progressing  Intervention: Identify and Manage Fall Risk  Recent Flowsheet Documentation  Taken 4/13/2023 0000 by Harpreet Rubin RN  Safety Promotion/Fall Prevention:   activity supervised   safety round/check completed   patient off unit   nonskid shoes/slippers when out of bed  Taken 4/12/2023 2000 by Harpreet Rubin RN  Safety Promotion/Fall Prevention:   activity supervised   clutter free environment maintained   lighting adjusted   nonskid shoes/slippers when out of bed   safety round/check completed   room organization consistent  Intervention: Prevent Skin Injury  Recent Flowsheet Documentation  Taken 4/12/2023 2000 by Harpreet Rubin RN  Skin Protection:   adhesive use limited   incontinence pads utilized   tubing/devices free from skin contact  Intervention: Prevent and Manage VTE (Venous Thromboembolism) Risk  Recent Flowsheet Documentation  Taken 4/12/2023 2000 by Harpreet Rubin RN  Activity Management: activity encouraged  VTE Prevention/Management: (lovernox) --  Intervention: Prevent Infection  Recent Flowsheet Documentation  Taken 4/13/2023 0000 by Harpreet Rubin RN  Infection Prevention:   environmental surveillance performed   equipment surfaces disinfected   hand hygiene promoted   rest/sleep promoted   single patient room provided  Taken 4/12/2023 2000 by Harpreet Rubin RN  Infection Prevention:   environmental surveillance performed   hand hygiene promoted   rest/sleep promoted   single patient room provided   visitors restricted/screened  Goal: Optimal Comfort and Wellbeing  Outcome: Ongoing, Progressing  Intervention: Provide Person-Centered Care  Recent Flowsheet Documentation  Taken 4/12/2023 2000 by Harpreet Rubin RN  Trust  Relationship/Rapport:   care explained   choices provided   emotional support provided   questions answered   questions encouraged   reassurance provided   thoughts/feelings acknowledged  Goal: Readiness for Transition of Care  Outcome: Ongoing, Progressing     Problem: Pain Acute  Goal: Acceptable Pain Control and Functional Ability  Outcome: Ongoing, Progressing  Intervention: Prevent or Manage Pain  Recent Flowsheet Documentation  Taken 4/12/2023 2000 by Harpreet Rubin RN  Medication Review/Management: medications reviewed  Intervention: Optimize Psychosocial Wellbeing  Recent Flowsheet Documentation  Taken 4/12/2023 2000 by Harpreet Rubin RN  Diversional Activities:   television   smartphone     Problem: Fall Injury Risk  Goal: Absence of Fall and Fall-Related Injury  Outcome: Ongoing, Progressing  Intervention: Identify and Manage Contributors  Recent Flowsheet Documentation  Taken 4/12/2023 2000 by Harpreet Rubin RN  Medication Review/Management: medications reviewed  Intervention: Promote Injury-Free Environment  Recent Flowsheet Documentation  Taken 4/13/2023 0000 by Harpreet Rubin RN  Safety Promotion/Fall Prevention:   activity supervised   safety round/check completed   patient off unit   nonskid shoes/slippers when out of bed  Taken 4/12/2023 2000 by Harpreet Rubin RN  Safety Promotion/Fall Prevention:   activity supervised   clutter free environment maintained   lighting adjusted   nonskid shoes/slippers when out of bed   safety round/check completed   room organization consistent     Problem: Skin Injury Risk Increased  Goal: Skin Health and Integrity  Outcome: Ongoing, Progressing  Intervention: Optimize Skin Protection  Recent Flowsheet Documentation  Taken 4/12/2023 2000 by Harpreet Rubin RN  Pressure Reduction Techniques: frequent weight shift encouraged  Pressure Reduction Devices: positioning supports utilized  Skin Protection:   adhesive use limited   incontinence pads utilized    tubing/devices free from skin contact     Problem: Obstructive Sleep Apnea Risk or Actual Comorbidity Management  Goal: Unobstructed Breathing During Sleep  Outcome: Ongoing, Progressing   Goal Outcome Evaluation:

## 2023-04-13 NOTE — DISCHARGE SUMMARY
[facility] Medicine Services  DISCHARGE SUMMARY    Patient Name: Abraham Wu  : 1950  MRN: [id number]    Date of Admission: 4/10/2023  2:51 AM  Date of Discharge:  2023  Primary Care Physician: Jatinder Haynes MD    Consults     Date and Time Order Name Status Description    4/10/2023  5:08 AM Inpatient Hematology & Oncology Consult            Hospital Course     Presenting Problem:   Community acquired pneumonia, bilateral [J18.9]    Active Hospital Problems    Diagnosis  POA   • Community acquired pneumonia, bilateral [J18.9]  Yes   • Immunosuppression due to drug therapy [D84.821, Z79.899]  Not Applicable   • SHERRI (obstructive sleep apnea) [G47.33]  Yes   • Hodgkin lymphoma [C81.90]  Yes   • Anemia secondary to chemotherapy and acute blood loss [D61.9]  Yes   • Essential hypertension [I10]  Yes   • Dyslipidemia [E78.5]  Yes   • Rectal mass s/p laparoscopic colostomy (2022) [K62.89]  Yes      Resolved Hospital Problems    Diagnosis Date Resolved POA   • **Acute respiratory failure with hypoxia [J96.01] 2023 Unknown   • Hyponatremia [E87.1] 2023 Unknown          Hospital Course:  Abraham Wu is a 73 y.o. male  with past medical history of stage IV Hodgkin's lymphoma status postchemotherapy that completed 3 weeks ago, anemia secondary to chemotherapy, history of GI bleed secondary to ulcer, history of rectal mass status post resection with colostomy, SHERRI previously on BiPAP, essential hypertension, hyperlipidemia, who presents the ER with 7-day history of productive cough, increasing shortness of air, fever and chills.     Acute respiratory failure with hypoxia  Bilateral pneumonia with risk factors for MRSA/Pseudomonas  Coronavirus HKU1 infection  -- Cultures, urinary ag pending. MRSA PCR neg - stopped Vanc. Continued on broad-spectrum antibiotics with Zosyn, azithromycin. Dc on PO abx for 2 remaining days  -- Nebs, steroids  daily x 5  days  --continue flutter valve and IS- patient is very good abt  Using them     Elevated troponin  --Suspect secondary to demand from hypoxia. No CP, no EKG changes.     Immunosuppression secondary to chemotherapy  Stage IV Hodgkin's lymphoma  Chronic anemia  --S/P completion chemotherapy 3/21/2023. Restaging PET/CT for next week delayed per oncology for a couple weeks until active infection resolved     Hyponatremia  --Suspect volume related. Resolved w IVF     Hypertension  Hyperlipidemia  Chronic ostomy  --Continue home medication     SHERRI  --No longer requiring BiPAP, patient is requesting nocturnal O2 order at discharge     Hx of gi bleed, PUD  --Continue protonix     History of esophageal candidiasis  -- Nystatin swish and swallow while on antibiotic      Discharge Follow Up Recommendations for outpatient labs/diagnostics:   f/u w PCP, oncology    Day of Discharge     HPI:   Doing great this AM, ready to go home    Review of Systems  Gen- No fevers, chills  CV- No chest pain, palpitations  Resp- No cough, dyspnea  GI- No N/V/D, abd pain        Vital Signs:   Temp:  [96.8 °F (36 °C)-98.7 °F (37.1 °C)] 98.7 °F (37.1 °C)  Heart Rate:  [64-80] 80  Resp:  [17-18] 17  BP: (133-152)/(76-88) 138/88  Flow (L/min):  [2-3] 2      Physical Exam:  Constitutional: No acute distress, awake, alert  HENT: NCAT, mucous membranes moist  Respiratory: Clear to auscultation bilaterally, respiratory effort normal , comfortable on NC  Cardiovascular: RRR, no murmurs, rubs, or gallops  Gastrointestinal: Positive bowel sounds, soft, nontender, nondistended  Musculoskeletal: No bilateral ankle edema  Psychiatric: Appropriate affect, cooperative  Neurologic: Oriented x 3, strength symmetric in all extremities, Cranial Nerves grossly intact to confrontation, speech clear  Skin: No rashes    Pertinent  and/or Most Recent Results     LAB RESULTS:      Lab 04/13/23  0321 04/12/23  0530 04/11/23  0518 04/10/23  0359   WBC 12.22* 18.57* 22.03*  18.01*   HEMOGLOBIN 10.2* 10.7* 9.9* 10.0*   HEMATOCRIT 31.6* 33.2* 31.4* 30.1*   PLATELETS 322 354 310 280   NEUTROS ABS 10.31* 15.92* 19.87* 15.76*   IMMATURE GRANS (ABS) 0.08* 0.14* 0.19* 0.16*   LYMPHS ABS 0.76 1.15 0.86 0.68*   MONOS ABS 0.99* 1.17* 1.03* 1.17*   EOS ABS 0.02 0.05 0.01 0.12   MCV 98.8* 98.8* 98.7* 94.1   PROCALCITONIN  --   --   --  0.21   LACTATE  --   --   --  1.2         Lab 04/13/23  0321 04/12/23  0530 04/11/23  0518 04/10/23  0359   SODIUM 140 139 140 129*   POTASSIUM 4.2 3.9 4.2 4.0   CHLORIDE 101 101 103 95*   CO2 31.0* 28.0 27.0 24.0   ANION GAP 8.0 10.0 10.0 10.0   BUN 11 13 13 14   CREATININE 0.52* 0.53* 0.44* 0.52*   EGFR 107.8 107.1 113.3 107.8   GLUCOSE 85 97 103* 113*   CALCIUM 9.1 9.7 9.3 8.8         Lab 04/10/23  0359   TOTAL PROTEIN 5.7*   ALBUMIN 3.0*   GLOBULIN 2.7   ALT (SGPT) 11   AST (SGOT) 20   BILIRUBIN 0.4   ALK PHOS 98         Lab 04/10/23  0359   PROBNP 244.9   HSTROP T 38*                 Brief Urine Lab Results  (Last result in the past 365 days)      Color   Clarity   Blood   Leuk Est   Nitrite   Protein   CREAT   Urine HCG        04/10/23 0619 Yellow   Clear   Negative   Negative   Negative   Negative               Microbiology Results (last 10 days)     Procedure Component Value - Date/Time    Respiratory Culture - Sputum, Cough [[id number]] Collected: 04/10/23 1228    Lab Status: Final result Specimen: Sputum from Cough Updated: 04/12/23 1127     Respiratory Culture Light growth (2+) Normal respiratory flavio. No S. aureus or Pseudomonas aeruginosa detected. Final report.     Gram Stain Moderate (3+) Mixed bacterial morphotypes seen on Gram Stain      Few (2+) Budding yeast      Moderate (3+) WBCs per low power field      Few (2+) Epithelial cells per low power field    Legionella Antigen, Urine - Urine, Urine, Clean Catch [[id number]]  (Normal) Collected: 04/10/23 0619    Lab Status: Final result Specimen: Urine, Clean Catch Updated: 04/10/23 0955     LEGIONELLA  ANTIGEN, URINE Negative    S. Pneumo Ag Urine or CSF - Urine, Urine, Clean Catch [[id number]]  (Normal) Collected: 04/10/23 0619    Lab Status: Final result Specimen: Urine, Clean Catch Updated: 04/10/23 0956     Strep Pneumo Ag Negative    MRSA Screen, PCR (Inpatient) - Swab, Nares [[id number]]  (Normal) Collected: 04/10/23 0557    Lab Status: Final result Specimen: Swab from Nares Updated: 04/10/23 0720     MRSA PCR Negative    Narrative:      The negative predictive value of this diagnostic test is high and should only be used to consider de-escalating anti-MRSA therapy. A positive result may indicate colonization with MRSA and must be correlated clinically.  MRSA Negative    COVID PRE-OP / PRE-PROCEDURE SCREENING ORDER (NO ISOLATION) - Swab, Nasopharynx [[id number]]  (Abnormal) Collected: 04/10/23 0429    Lab Status: Final result Specimen: Swab from Nasopharynx Updated: 04/10/23 0541    Narrative:      The following orders were created for panel order COVID PRE-OP / PRE-PROCEDURE SCREENING ORDER (NO ISOLATION) - Swab, Nasopharynx.  Procedure                               Abnormality         Status                     ---------                               -----------         ------                     Respiratory Panel PCR w/...[[id number]]  Abnormal            Final result                 Please view results for these tests on the individual orders.    Respiratory Panel PCR w/COVID-19(SARS-CoV-2) B[facility]Y/[facility]/MARY/PAD/[facility]/AKSHAT In-House, NP Swab in UTM/VTM, 3-4 HR TAT - Swab, Nasopharynx [[id number]]  (Abnormal) Collected: 04/10/23 0429    Lab Status: Final result Specimen: Swab from Nasopharynx Updated: 04/10/23 0541     ADENOVIRUS, PCR Not Detected     Coronavirus 229E Not Detected     Coronavirus HKU1 Detected     Coronavirus NL63 Not Detected     Coronavirus OC43 Not Detected     COVID19 Not Detected     Human Metapneumovirus Not Detected     Human Rhinovirus/Enterovirus Not Detected     Influenza A PCR Not  Detected     Influenza B PCR Not Detected     Parainfluenza Virus 1 Not Detected     Parainfluenza Virus 2 Not Detected     Parainfluenza Virus 3 Not Detected     Parainfluenza Virus 4 Not Detected     RSV, PCR Not Detected     Bordetella pertussis pcr Not Detected     Bordetella parapertussis PCR Not Detected     Chlamydophila pneumoniae PCR Not Detected     Mycoplasma pneumo by PCR Not Detected    Narrative:      In the setting of a positive respiratory panel with a viral infection PLUS a negative procalcitonin without other underlying concern for bacterial infection, consider observing off antibiotics or discontinuation of antibiotics and continue supportive care. If the respiratory panel is positive for atypical bacterial infection (Bordetella pertussis, Chlamydophila pneumoniae, or Mycoplasma pneumoniae), consider antibiotic de-escalation to target atypical bacterial infection.    Blood Culture - Blood, Arm, Left [[id number]]  (Normal) Collected: 04/10/23 0426    Lab Status: Preliminary result Specimen: Blood from Arm, Left Updated: 04/13/23 0445     Blood Culture No growth at 3 days    Blood Culture - Blood, Blood, PICC Line [[id number]]  (Normal) Collected: 04/10/23 0415    Lab Status: Preliminary result Specimen: Blood, PICC Line Updated: 04/13/23 0445     Blood Culture No growth at 3 days          XR Chest 1 View    Result Date: 4/10/2023  EXAMINATION: XR CHEST 1 VW  DATE: 4/10/2023 2:48 AM INDICATIONS: Shortness of air. Cancer patient. COMPARISON: October 14, 2022. CT scan October 3, 2022 FINDINGS: Moderate linear, patchy, hazy, and confluent opacity is seen in the lungs. Sharp lateral costophrenic angles. Normal heart size. Atherosclerotic, elongated, left-sided aorta. Stable mediastinum and guerda. Right internal jugular Port-A-Cath in good position. Stable osseous structures.     Moderate opacity in the lungs. Pneumonia and pulmonary edema are leading differential considerations. Normal heart size.  Electronically signed by:  Chris Santo M.D.  4/10/2023 1:41 AM Mountain Time      Results for orders placed during the hospital encounter of 10/31/22    Duplex Venous Upper Extremity - Right CAR    Interpretation Summary  •  Normal right upper extremity venous duplex scan.  •  PICC line is noted in the right brachial vein.      Results for orders placed during the hospital encounter of 10/31/22    Duplex Venous Upper Extremity - Right CAR    Interpretation Summary  •  Normal right upper extremity venous duplex scan.  •  PICC line is noted in the right brachial vein.      Results for orders placed during the hospital encounter of 10/03/22    Adult Transthoracic Echo Complete W/ Cont if Necessary Per Protocol    Interpretation Summary  · Left ventricular ejection fraction appears to be 56 - 60%. Left ventricular systolic function is normal.  · Global longitudinal LV strain (GLS) = -27.0%.  · Left atrial volume is moderately increased.  · Mild mitral valve regurgitation is present.  · Mild tricuspid valve regurgitation is present.  · Estimated right ventricular systolic pressure from tricuspid regurgitation is mildly elevated (35-45 mmHg).  · Mild dilation of the ascending aorta is present.      Plan for Follow-up of Pending Labs/Results: f/u w PCP  Pending Labs     Order Current Status    Blood Culture - Blood, Arm, Left Preliminary result    Blood Culture - Blood, Blood, PICC Line Preliminary result        Discharge Details        Discharge Medications      New Medications      Instructions Start Date   cefdinir 300 MG capsule  Commonly known as: OMNICEF   300 mg, Oral, 2 Times Daily      doxycycline 100 MG capsule  Commonly known as: VIBRAMYCIN   100 mg, Oral, 2 Times Daily      guaiFENesin 600 MG 12 hr tablet  Commonly known as: MUCINEX   1,200 mg, Oral, Every 12 Hours Scheduled      nystatin 100,000 unit/mL suspension  Commonly known as: MYCOSTATIN   500,000 Units, Oral, 4 Times Daily      predniSONE 20 MG  tablet  Commonly known as: DELTASONE   40 mg, Oral, Daily         Continue These Medications      Instructions Start Date   dicyclomine 20 MG tablet  Commonly known as: BENTYL   20 mg, Oral, 4 Times Daily PRN      fluticasone 50 MCG/ACT nasal spray  Commonly known as: FLONASE   1 spray, Nasal, Daily PRN, OTC      KLOR-CON 20 MEQ CR tablet  Generic drug: potassium chloride   20 mEq, Oral, 2 Times Daily      lidocaine-prilocaine 2.5-2.5 % cream  Commonly known as: EMLA   1 application, Topical, As Needed      loratadine 10 MG tablet  Commonly known as: CLARITIN   10 mg, Oral, Daily      OLANZapine 5 MG tablet  Commonly known as: zyPREXA   5 mg, Oral, Nightly, For 3 days on Days 2, 3 and 4 and on days 16, 17, and 18.      ondansetron 8 MG tablet  Commonly known as: Zofran   8 mg, Oral, Every 8 Hours PRN      pantoprazole 40 MG EC tablet  Commonly known as: PROTONIX   40 mg, Oral, Daily      prochlorperazine 5 MG tablet  Commonly known as: COMPAZINE   5 mg, Oral, Every 6 Hours PRN      rosuvastatin 10 MG tablet  Commonly known as: CRESTOR   10 mg, Oral, Nightly             No Known Allergies      Discharge Disposition:  Home or Self Care    Diet:  Hospital:  Diet Order   Procedures   • Diet: Regular/House Diet; Texture: Regular Texture (IDDSI 7); Fluid Consistency: Thin (IDDSI 0)       Activity:  as tolerated    Restrictions or Other Recommendations:  none       CODE STATUS:    Code Status and Medical Interventions:   Ordered at: 04/10/23 0537     Code Status (Patient has no pulse and is not breathing):    CPR (Attempt to Resuscitate)     Medical Interventions (Patient has pulse or is breathing):    Full Support     Comments:    does not want prolonged intubation/trach/peg       Future Appointments   Date Time Provider Department Center   5/8/2023 10:00 AM [facility] PET ADMIN RM1  [facility]   5/8/2023 11:00 AM [facility] PETCT 1  [facility]   5/16/2023 11:00 AM Kaycee Leary MD [facility]    5/16/2023 11:30 AM CHAIR 21 SHELDON Leonardo MD  04/13/23      Time Spent on Discharge:  I spent  45  minutes on this discharge activity which included: face-to-face encounter with the patient, reviewing the data in the system, coordination of the care with the nursing staff as well as consultants, documentation, and entering orders.

## 2023-04-13 NOTE — PLAN OF CARE
Goal Outcome Evaluation:  Plan of Care Reviewed With: patient        Progress: improving  Outcome Evaluation: Progressing well.  Pt stood independently and increased gait distance to 500 feet without AD with SBAx1.  Ambulated on RA - O2 checked twice during gait and lowest noted to be 91%.  Pt contiues to be limited by decreased activity tolerance.  Recommend home with assist at d/c.

## 2023-04-13 NOTE — THERAPY TREATMENT NOTE
Patient Name: Abraham Wu  : 1950    MRN: [id number]                              Today's Date: 2023       Admit Date: 4/10/2023    Visit Dx:     ICD-10-CM ICD-9-CM   1. Community acquired pneumonia, bilateral  J18.9 486   2. Acute respiratory failure with hypoxia  J96.01 518.81   3. Immunosuppression due to drug therapy  D84.821 V58.69    Z79.899    4. SHERRI (obstructive sleep apnea)  G47.33 327.23   5. Esophageal candidiasis  B37.81 112.84   6. Hodgkin lymphoma of lymph nodes of multiple regions, unspecified Hodgkin lymphoma type  C81.98 201.98   7. Rectal mass s/p laparoscopic colostomy (2022)  K62.89 787.99   8. Hyponatremia  E87.1 276.1   9. Antineoplastic chemotherapy induced pancytopenia  D61.810 284.11    T45.1X5A E933.1   10. Essential hypertension  I10 401.9     Patient Active Problem List   Diagnosis   • Rectal mass s/p laparoscopic colostomy (2022)   • Anemia secondary to chemotherapy and acute blood loss   • Essential hypertension   • Dyslipidemia   • Unintentional weight loss   • Hydronephrosis   • Neutropenic fever   • Hypokalemia   • Hodgkin lymphoma   • Acute upper GI bleed   • Melena   • Moderate malnutrition   • Esophageal candidiasis   • SHERRI (obstructive sleep apnea)   • ICU delirium    • Encounter for care related to vascular access port   • Community acquired pneumonia, bilateral   • Immunosuppression due to drug therapy     Past Medical History:   Diagnosis Date   • benign polypoid tissue right lung    • Diabetes mellitus    • Hyperlipidemia    • Hypertension    • Mycobacterium mucogenicum    • SHERRI (obstructive sleep apnea)    • Seasonal allergies      Past Surgical History:   Procedure Laterality Date   • BRONCHOSCOPY      removal of obstructing polypoid tissue right middle lung   • COLOSTOMY N/A 2022    Procedure: LAPAROSCOPIC COLOSTOMY CREATION, FLEXIBLE SIGMOIDOSCOPY;  Surgeon: Hiram Viveros MD;  [facility];  Service: General;  Laterality: N/A;    • ENDOSCOPY N/A 10/10/2022    Procedure: ESOPHAGOGASTRODUODENOSCOPY;  Surgeon: Neeraj Lynn MD;  Location:  [facility];  Service: Gastroenterology;  Laterality: N/A;   • ENDOSCOPY N/A 10/12/2022    Procedure: ESOPHAGOGASTRODUODENOSCOPY;  Surgeon: Brunner, Mark I, MD;  Location:  [facility];  Service: Gastroenterology;  Laterality: N/A;   • EXAM UNDER ANESTHESIA, RECTAL BIOPSY N/A 10/04/2022    Procedure: EXAM UNDER ANESTHESIA, TRANSANAL BIOPSY WITH TRUCUT NEEDLE (LITHOTOMY-CANDY CANE);  Surgeon: Hiram Viveros MD;  Location:  [facility];  Service: General;  Laterality: N/A;   • PERIPHERALLY INSERTED CENTRAL CATHETER INSERTION      Removed 11/28/2022   • VENOUS ACCESS DEVICE (PORT) INSERTION Right 11/28/2022      General Information     Row Name 04/13/23 1048          Physical Therapy Time and Intention    Document Type therapy note (daily note)  -LM     Mode of Treatment individual therapy;physical therapy  -LM     Row Name 04/13/23 1048          General Information    Patient Profile Reviewed yes  -LM     Existing Precautions/Restrictions fall;oxygen therapy device and L/min  -LM     Row Name 04/13/23 1048          Cognition    Orientation Status (Cognition) oriented x 4  -LM     Row Name 04/13/23 1048          Safety Issues, Functional Mobility    Safety Issues Affecting Function (Mobility) insight into deficits/self-awareness;safety precautions follow-through/compliance  -LM     Impairments Affecting Function (Mobility) endurance/activity tolerance;shortness of breath  -LM           User Key  (r) = Recorded By, (t) = Taken By, (c) = Cosigned By    Initials Name Provider Type    LM Emily Walekr, PT Physical Therapist               Mobility     Row Name 04/13/23 1051          Bed Mobility    Comment, (Bed Mobility) Up in chair at initial and end of tx.  -LM     Row Name 04/13/23 1051          Sit-Stand Transfer    Sit-Stand [place] (Transfers) independent  -LM     Row Name 04/13/23 1051           Gait/Stairs (Locomotion)    [place] Level (Gait) standby assist  -LM     Distance in Feet (Gait) 500  -LM     Comment, (Gait/Stairs) Ambulates at a good pace - no unsteadiness noted.  Ambulated on RA - mild SOA noted.  O2 checked at 2 points during gait - lowest noted to be 91%.  Pt educated on PLB and air conservation.  -LM           User Key  (r) = Recorded By, (t) = Taken By, (c) = Cosigned By    Initials Name Provider Type    LM Emily Walker PT Physical Therapist               Obj/Interventions     Row Name 04/13/23 1052          Balance    Static Sitting Balance independent  -LM     Position, Sitting Balance unsupported;sitting edge of bed  -LM     Static Standing Balance independent  -LM     Dynamic Standing Balance standby assist  -LM     Position/Device Used, Standing Balance unsupported  -LM           User Key  (r) = Recorded By, (t) = Taken By, (c) = Cosigned By    Initials Name Provider Type    Emily Ortega PT Physical Therapist               Goals/Plan    No documentation.                Clinical Impression     Row Name 04/13/23 1053          Pain    Pretreatment Pain Rating 0/10 - no pain  -LM     Posttreatment Pain Rating 0/10 - no pain  -LM     [facility] Name 04/13/23 1053          Plan of Care Review    Plan of Care Reviewed With patient  -LM     Progress improving  -LM     Outcome Evaluation Progressing well.  Pt stood independently and increased gait distance to 500 feet without AD with SBAx1.  Ambulated on RA - O2 checked twice during gait and lowest noted to be 91%.  Pt contiues to be limited by decreased activity tolerance.  Recommend home with assist at d/c.  -LM     Row Name 04/13/23 1053          Vital Signs    Pre Systolic BP Rehab 138  -LM     Pre Treatment Diastolic BP 88  -LM     Pretreatment Heart Rate (beats/min) 79  -LM     Posttreatment Heart Rate (beats/min) 78  -LM     Pre SpO2 (%) 96  -LM     O2 Delivery Pre Treatment supplemental O2  -LM     Intra SpO2 (%) 91  -LM      O2 Delivery Intra Treatment room air  -LM     Post SpO2 (%) 94  -LM     O2 Delivery Post Treatment supplemental O2  -LM     Pre Patient Position Sitting  -LM     Intra Patient Position Standing  -LM     Post Patient Position Sitting  -LM     Row Name 04/13/23 1053          Positioning and Restraints    Pre-Treatment Position sitting in chair/recliner  -LM     Post Treatment Position chair  -LM     In Chair reclined;call light within reach;encouraged to call for assist;waffle cushion;with family/caregiver;notified nsg  -LM           User Key  (r) = Recorded By, (t) = Taken By, (c) = Cosigned By    Initials Name Provider Type    Emily Ortega, PT Physical Therapist               Outcome Measures     Row Name 04/13/23 1054 04/13/23 0800       How much help from another person do you currently need...    Turning from your back to your side while in flat bed without using bedrails? 4  -LM 4  -BU    Moving from lying on back to sitting on the side of a flat bed without bedrails? 4  -LM 4  -BU    Moving to and from a bed to a chair (including a wheelchair)? 4  -LM 4  -BU    Standing up from a chair using your arms (e.g., wheelchair, bedside chair)? 4  -LM 4  -BU    Climbing 3-5 steps with a railing? 3  -LM 4  -BU    To walk in hospital room? 3  -LM 4  -BU    AM-PAC 6 Clicks Score (PT) 22  -LM 24  -BU    Highest level of mobility 7 --> Walked 25 feet or more  -LM 8 --> Walked 250 feet or more  -BU    Row Name 04/13/23 1054          Functional Assessment    Outcome Measure Options AM-PAC 6 Clicks Basic Mobility (PT)  -LM           User Key  (r) = Recorded By, (t) = Taken By, (c) = Cosigned By    Initials Name Provider Type    Emily Ortega, COLLINS Physical Therapist    Kourtney Adrian, RN Registered Nurse                             Physical Therapy Education     Title: PT OT SLP Therapies (In Progress)     Topic: Physical Therapy (Done)     Point: Mobility training (Done)     Learning Progress Summary            Patient Acceptance, E, VU,NR by  at 4/11/2023 1335    Comment: PT POC   Family Acceptance, E, VU,NR by  at 4/11/2023 1335    Comment: PT POC                   Point: Home exercise program (Done)     Learning Progress Summary           Patient Acceptance, E, VU,NR by  at 4/11/2023 1335    Comment: PT POC   Family Acceptance, E, VU,NR by  at 4/11/2023 1335    Comment: PT POC                   Point: Body mechanics (Done)     Learning Progress Summary           Patient Acceptance, E, VU,NR by  at 4/11/2023 1335    Comment: PT POC   Family Acceptance, E, VU,NR by  at 4/11/2023 1335    Comment: PT POC                   Point: Precautions (Done)     Learning Progress Summary           Patient Acceptance, E, VU,NR by  at 4/11/2023 1335    Comment: PT POC   Family Acceptance, E, VU,NR by  at 4/11/2023 1335    Comment: PT POC                               User Key     Initials Effective Dates Name Provider Type Discipline     06/16/21 -  Gabriella Waters, COLLINS Physical Therapist PT              PT Recommendation and Plan     Plan of Care Reviewed With: patient  Progress: improving  Outcome Evaluation: Progressing well.  Pt stood independently and increased gait distance to 500 feet without AD with SBAx1.  Ambulated on RA - O2 checked twice during gait and lowest noted to be 91%.  Pt contiues to be limited by decreased activity tolerance.  Recommend home with assist at d/c.     Time Calculation:    PT Charges     Row Name 04/13/23 1055             Time Calculation    Start Time 1020  -LM      PT Received On 04/13/23  -LM      PT Goal Re-Cert Due Date 04/21/23  -LM         Timed Charges    20649 - Gait Training Minutes  15  -LM      23540 - PT Therapeutic Activity Minutes 10  -LM         Total Minutes    Timed Charges Total Minutes 25  -LM       Total Minutes 25  -LM            User Key  (r) = Recorded By, (t) = Taken By, (c) = Cosigned By    Initials Name Provider Type    LM Emily Walker, PT Physical Therapist               Therapy Charges for Today     Code Description Service Date Service Provider Modifiers Qty    17399527262 HC GAIT TRAINING EA 15 MIN 4/13/2023 Emily Walker, PT GP 1    89745466362 HC PT THERAPEUTIC ACT EA 15 MIN 4/13/2023 Emily Walker, PT GP 1          PT G-Codes  Outcome Measure Options: AM-PAC 6 Clicks Basic Mobility (PT)  AM-PAC 6 Clicks Score (PT): 22  AM-PAC 6 Clicks Score (OT): 19  PT Discharge Summary  Anticipated Discharge Disposition (PT): home with assist    Emily Walker, COLLINS  4/13/2023

## 2023-04-14 NOTE — OUTREACH NOTE
Prep Survey    Flowsheet Row Responses   [facility] facility patient discharged from? [place]   Is LACE score < 7 ? No   Eligibility Readm Mgmt   Discharge diagnosis Acute respiratory failure with hypoxia   Does the patient have one of the following disease processes/diagnoses(primary or secondary)? Pneumonia   Does the patient have Home health ordered? No   Is there a DME ordered? Yes   What DME was ordered? Oxygen   Prep survey completed? Yes          Mali DANIELS - Registered Nurse

## 2023-04-15 LAB
BACTERIA SPEC AEROBE CULT: NORMAL
BACTERIA SPEC AEROBE CULT: NORMAL

## 2023-04-18 ENCOUNTER — READMISSION MANAGEMENT (OUTPATIENT)
Dept: CALL CENTER | Facility: HOSPITAL | Age: 73
End: 2023-04-18
Payer: MEDICARE

## 2023-04-18 NOTE — OUTREACH NOTE
COPD/PN Week 1 Survey    Flowsheet Row Responses   [facility] facility patient discharged from? [place]   Does the patient have one of the following disease processes/diagnoses(primary or secondary)? Pneumonia   Week 1 attempt successful? No   Unsuccessful attempts Attempt 1          River FOY - Registered Nurse

## 2023-04-25 ENCOUNTER — READMISSION MANAGEMENT (OUTPATIENT)
Dept: CALL CENTER | Facility: HOSPITAL | Age: 73
End: 2023-04-25
Payer: MEDICARE

## 2023-04-25 NOTE — OUTREACH NOTE
COPD/PN Week 2 Survey    Flowsheet Row Responses   [facility] facility patient discharged from? [place]   Does the patient have one of the following disease processes/diagnoses(primary or secondary)? Pneumonia   Week 2 attempt successful? No   Unsuccessful attempts Attempt 1          Franchesca Jones Licensed Nurse

## 2023-05-01 ENCOUNTER — TELEPHONE (OUTPATIENT)
Dept: ONCOLOGY | Facility: CLINIC | Age: 73
End: 2023-05-01

## 2023-05-01 NOTE — TELEPHONE ENCOUNTER
Caller: SRINIVASAN HARPER    Relationship to patient: Emergency Contact    Best call back number: [phone]    Chief complaint: PATIENT WOULD LIKE TO COME SOONER TO GET RESULTS OF SCAN      Type of visit: FOLLOW UP AND PORT FLUSH   Requested date: SOONER     If rescheduling, when is the original appointment: 5-16-23

## 2023-05-08 ENCOUNTER — HOSPITAL ENCOUNTER (OUTPATIENT)
Dept: PET IMAGING | Facility: HOSPITAL | Age: 73
Discharge: HOME | End: 2023-05-08
Payer: MEDICARE

## 2023-05-08 DIAGNOSIS — C81.98 HODGKIN LYMPHOMA OF LYMPH NODES OF MULTIPLE REGIONS, UNSPECIFIED HODGKIN LYMPHOMA TYPE: ICD-10-CM

## 2023-05-08 LAB — GLUCOSE BLDC GLUCOMTR-MCNC: 94 MG/DL (ref 70–130)

## 2023-05-08 PROCEDURE — 0 FLUDEOXYGLUCOSE F18 SOLUTION: Performed by: INTERNAL MEDICINE

## 2023-05-08 PROCEDURE — 78815 PET IMAGE W/CT SKULL-THIGH: CPT

## 2023-05-08 PROCEDURE — A9552 F18 FDG: HCPCS | Performed by: INTERNAL MEDICINE

## 2023-05-08 PROCEDURE — 82948 REAGENT STRIP/BLOOD GLUCOSE: CPT

## 2023-05-08 RX ADMIN — FLUDEOXYGLUCOSE F18 1 DOSE: 300 INJECTION INTRAVENOUS at 09:54

## 2023-05-11 ENCOUNTER — OFFICE VISIT (OUTPATIENT)
Dept: ONCOLOGY | Facility: CLINIC | Age: 73
End: 2023-05-11
Payer: MEDICARE

## 2023-05-11 ENCOUNTER — HOSPITAL ENCOUNTER (OUTPATIENT)
Dept: ONCOLOGY | Facility: HOSPITAL | Age: 73
Discharge: HOME | End: 2023-05-11
Admitting: INTERNAL MEDICINE
Payer: MEDICARE

## 2023-05-11 VITALS
TEMPERATURE: 98.3 F | HEART RATE: 79 BPM | SYSTOLIC BLOOD PRESSURE: 134 MMHG | OXYGEN SATURATION: 94 % | HEIGHT: 64 IN | WEIGHT: 152 LBS | DIASTOLIC BLOOD PRESSURE: 76 MMHG | BODY MASS INDEX: 25.95 KG/M2

## 2023-05-11 DIAGNOSIS — C81.98 HODGKIN LYMPHOMA OF LYMPH NODES OF MULTIPLE REGIONS, UNSPECIFIED HODGKIN LYMPHOMA TYPE: ICD-10-CM

## 2023-05-11 DIAGNOSIS — C81.98 HODGKIN LYMPHOMA OF LYMPH NODES OF MULTIPLE REGIONS, UNSPECIFIED HODGKIN LYMPHOMA TYPE: Primary | ICD-10-CM

## 2023-05-11 DIAGNOSIS — Z45.2 ENCOUNTER FOR CARE RELATED TO VASCULAR ACCESS PORT: Primary | ICD-10-CM

## 2023-05-11 LAB
ALBUMIN SERPL-MCNC: 3.7 G/DL (ref 3.5–5.2)
ALBUMIN/GLOB SERPL: 0.9 G/DL
ALP SERPL-CCNC: 73 U/L (ref 39–117)
ALT SERPL W P-5'-P-CCNC: 12 U/L (ref 1–41)
ANION GAP SERPL CALCULATED.3IONS-SCNC: 7 MMOL/L (ref 5–15)
AST SERPL-CCNC: 16 U/L (ref 1–40)
BASOPHILS # BLD AUTO: 0.01 10*3/MM3 (ref 0–0.2)
BASOPHILS NFR BLD AUTO: 0.2 % (ref 0–1.5)
BILIRUB SERPL-MCNC: 0.3 MG/DL (ref 0–1.2)
BUN SERPL-MCNC: 18 MG/DL (ref 8–23)
BUN/CREAT SERPL: 27.3 (ref 7–25)
CALCIUM SPEC-SCNC: 9.4 MG/DL (ref 8.6–10.5)
CHLORIDE SERPL-SCNC: 102 MMOL/L (ref 98–107)
CO2 SERPL-SCNC: 28 MMOL/L (ref 22–29)
CREAT SERPL-MCNC: 0.66 MG/DL (ref 0.76–1.27)
DEPRECATED RDW RBC AUTO: 53.5 FL (ref 37–54)
EGFRCR SERPLBLD CKD-EPI 2021: 100.3 ML/MIN/1.73
EOSINOPHIL # BLD AUTO: 0.17 10*3/MM3 (ref 0–0.4)
EOSINOPHIL NFR BLD AUTO: 3.6 % (ref 0.3–6.2)
ERYTHROCYTE [DISTWIDTH] IN BLOOD BY AUTOMATED COUNT: 14.7 % (ref 12.3–15.4)
GLOBULIN UR ELPH-MCNC: 3.9 GM/DL
GLUCOSE SERPL-MCNC: 90 MG/DL (ref 65–99)
HCT VFR BLD AUTO: 34 % (ref 37.5–51)
HGB BLD-MCNC: 10.9 G/DL (ref 13–17.7)
IMM GRANULOCYTES # BLD AUTO: 0.01 10*3/MM3 (ref 0–0.05)
IMM GRANULOCYTES NFR BLD AUTO: 0.2 % (ref 0–0.5)
LDH SERPL-CCNC: 205 U/L (ref 135–225)
LYMPHOCYTES # BLD AUTO: 1.2 10*3/MM3 (ref 0.7–3.1)
LYMPHOCYTES NFR BLD AUTO: 25.4 % (ref 19.6–45.3)
MCH RBC QN AUTO: 31.3 PG (ref 26.6–33)
MCHC RBC AUTO-ENTMCNC: 32.1 G/DL (ref 31.5–35.7)
MCV RBC AUTO: 97.7 FL (ref 79–97)
MONOCYTES # BLD AUTO: 0.6 10*3/MM3 (ref 0.1–0.9)
MONOCYTES NFR BLD AUTO: 12.7 % (ref 5–12)
NEUTROPHILS NFR BLD AUTO: 2.73 10*3/MM3 (ref 1.7–7)
NEUTROPHILS NFR BLD AUTO: 57.9 % (ref 42.7–76)
PLATELET # BLD AUTO: 184 10*3/MM3 (ref 140–450)
PMV BLD AUTO: 9 FL (ref 6–12)
POTASSIUM SERPL-SCNC: 4 MMOL/L (ref 3.5–5.2)
PROT SERPL-MCNC: 7.6 G/DL (ref 6–8.5)
RBC # BLD AUTO: 3.48 10*6/MM3 (ref 4.14–5.8)
SODIUM SERPL-SCNC: 137 MMOL/L (ref 136–145)
WBC NRBC COR # BLD: 4.72 10*3/MM3 (ref 3.4–10.8)

## 2023-05-11 PROCEDURE — 80053 COMPREHEN METABOLIC PANEL: CPT | Performed by: INTERNAL MEDICINE

## 2023-05-11 PROCEDURE — 36591 DRAW BLOOD OFF VENOUS DEVICE: CPT

## 2023-05-11 PROCEDURE — 83615 LACTATE (LD) (LDH) ENZYME: CPT | Performed by: INTERNAL MEDICINE

## 2023-05-11 PROCEDURE — 25010000002 HEPARIN LOCK FLUSH PER 10 UNITS: Performed by: INTERNAL MEDICINE

## 2023-05-11 PROCEDURE — 85025 COMPLETE CBC W/AUTO DIFF WBC: CPT | Performed by: INTERNAL MEDICINE

## 2023-05-11 RX ORDER — HEPARIN SODIUM (PORCINE) LOCK FLUSH IV SOLN 100 UNIT/ML 100 UNIT/ML
500 SOLUTION INTRAVENOUS AS NEEDED
Status: DISCONTINUED | OUTPATIENT
Start: 2023-05-11 | End: 2023-05-12 | Stop reason: HOSPADM

## 2023-05-11 RX ORDER — HEPARIN SODIUM (PORCINE) LOCK FLUSH IV SOLN 100 UNIT/ML 100 UNIT/ML
500 SOLUTION INTRAVENOUS AS NEEDED
OUTPATIENT
Start: 2023-05-11

## 2023-05-11 RX ADMIN — HEPARIN 500 UNITS: 100 SYRINGE at 14:48

## 2023-05-11 NOTE — PROGRESS NOTES
[facility]  Office number [phone]    Fax number [phone]     Follow up     Date: 23    Patient Name: Abraham Wu  MRN: [id number]  : 1950      Chief Complaint: Hodgkin Lymphoma follow up/treatment    Surgeon: Dr. Hiram Viveros MD    Cancer Staging: IV    History of Present Illness: Abraham Wu is a pleasant 73 y.o. male with PMH of hypertension, sleep apnea, hard of hearing who presents today for follow up of Hodgkin Lymphoma.     He initially presented 2022 with intractable diarrhea, low appetite, and a greater than 50 pound weight loss over several month period associated with intermittent fevers.  CT of the chest abdomen pelvis obtained in the ER shows of rectal mass spanning greater than 12 cm with adjacent adenopathy, bulky RP adenopathy, and findings concerning for left renal and liver metastases.  Small right pleural effusion with nodularity.  There was concern for impending obstruction, so he underwent diverting colostomy and biopsy on 2022.  Biopsies from the peritoneam showed a fibrotic nodule histiocytes and eosinophils admixed with CD30 positive large cells.  Rectal biopsies showed involvement by CD30 positive lymphoma, classic Hodgkin lymphoma versus CD30 positive T-cell or B-cell lymphoma.  There was extensive fibrosis and crush artifact.  He underwent repeat transrectal biopsy 10/4/2022 for further characterization which was felt to be most consistent with classical Hodgkin lymphoma.    He initiated chemotherapy with Brentuximab-AVD as an inpatient on 10/8/2022.  Cycle #1 was complicated by GI bleed requiring multiple blood transfusion and ultimately coil artery embolization 10/12; neutropenic fever, Candida esophagitis and mucositis.  He had a prolonged ICU stay  And required enteral feeding.  He was discharged 10/20/2022.    Treatment history:  Brentuximab-AVD: C1 10/8/22  C2: 22 onward with DA 20% in BVD; full  dose brentuximab  Completed C6 3/22/23    Interval history:    He returns with his supportive wife and CLAIR.  Following his last cycle he was admitted with multifocal PNA and +corona virus infection. Since his discharge for coronavirus induced multifocal pneumonia, he has been clinically improving.  Stamina and exercise tolerance are better, although still not back to baseline.  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  He has a lingering cough that is productive of clear sputum, and that is stable to improved.  He reports good ostomy output.  He has not had any blood from his ostomy or his anus.  Denies any substantial persistent pain.  He is using oxygen at night (history of obstructive sleep apnea)  +neuropathy.      Past Medical History:   Past Medical History:   Diagnosis Date   • benign polypoid tissue right lung    • Diabetes mellitus    • Hyperlipidemia    • Hypertension    • Mycobacterium mucogenicum    • SHERRI (obstructive sleep apnea)    • Seasonal allergies        Past Surgical History:   Past Surgical History:   Procedure Laterality Date   • BRONCHOSCOPY      removal of obstructing polypoid tissue right middle lung   • COLOSTOMY N/A 09/22/2022    Procedure: LAPAROSCOPIC COLOSTOMY CREATION, FLEXIBLE SIGMOIDOSCOPY;  Surgeon: Hiram Viveros MD;  Location: [facility] OR;  Service: General;  Laterality: N/A;   • ENDOSCOPY N/A 10/10/2022    Procedure: ESOPHAGOGASTRODUODENOSCOPY;  Surgeon: Neeraj Lynn MD;  Location:  [facility];  Service: Gastroenterology;  Laterality: N/A;   • ENDOSCOPY N/A 10/12/2022    Procedure: ESOPHAGOGASTRODUODENOSCOPY;  Surgeon: Brunner, Mark I, MD;  Location:  [facility];  Service: Gastroenterology;  Laterality: N/A;   • EXAM UNDER ANESTHESIA, RECTAL BIOPSY N/A 10/04/2022    Procedure: EXAM UNDER ANESTHESIA, TRANSANAL BIOPSY WITH TRUCUT NEEDLE (LITHOTOMY-CANDY CANE);   Surgeon: Hiram Viveros MD;  Location: [facility];  Service: General;  Laterality: N/A;   • PERIPHERALLY INSERTED CENTRAL CATHETER INSERTION      Removed 11/28/2022   • VENOUS ACCESS DEVICE (PORT) INSERTION Right 11/28/2022       Family History:   Family History   Problem Relation Age of Onset   • Diabetes Mother    • Diabetes Father    • Heart disease Father        Social History:   Social History     Socioeconomic History   • Marital status:    Tobacco Use   • Smoking status: Never   • Smokeless tobacco: Never   Vaping Use   • Vaping Use: Never used   Substance and Sexual Activity   • Alcohol use: Yes     Comment: previously drank 2 glasses of wine/beer nightly   • Drug use: Never   • Sexual activity: Defer       Medications:     Current Outpatient Medications:   •  dicyclomine (BENTYL) 20 MG tablet, Take 1 tablet by mouth 4 (Four) Times a Day As Needed., Disp: , Rfl:   •  fluticasone (FLONASE) 50 MCG/ACT nasal spray, 1 spray into the nostril(s) as directed by provider Daily As Needed for Allergies or Rhinitis. OTC, Disp: , Rfl:   •  KLOR-CON 20 MEQ CR tablet, Take 1 tablet by mouth 2 (Two) Times a Day., Disp: , Rfl:   •  lidocaine-prilocaine (EMLA) 2.5-2.5 % cream, Apply 1 application topically to the appropriate area as directed As Needed (45-60 minutes prior to port access.  Cover with saran/plastic wrap.)., Disp: 30 g, Rfl: 3  •  loratadine (CLARITIN) 10 MG tablet, Take 1 tablet by mouth Daily., Disp: 30 tablet, Rfl: 5  •  OLANZapine (zyPREXA) 5 MG tablet, Take 1 tablet by mouth Every Night. For 3 days on Days 2, 3 and 4 and on days 16, 17, and 18., Disp: 6 tablet, Rfl: 5  •  ondansetron (Zofran) 8 MG tablet, Take 1 tablet by mouth Every 8 (Eight) Hours As Needed for Nausea or Vomiting., Disp: 30 tablet, Rfl: 5  •  pantoprazole (PROTONIX) 40 MG EC tablet, Take 1 tablet by mouth Daily., Disp: 90 tablet, Rfl: 1  •  prochlorperazine (COMPAZINE) 5 MG tablet, Take 1 tablet by mouth Every 6 (Six) Hours  "As Needed for Nausea or Vomiting., Disp: 30 tablet, Rfl: 2  •  rosuvastatin (CRESTOR) 10 MG tablet, Take 1 tablet by mouth Every Night., Disp: , Rfl:   No current facility-administered medications for this visit.    Allergies:   No Known Allergies    Objective     Vital Signs:   Vitals:    05/11/23 1347   BP: 134/76   Pulse: 79   Temp: 98.3 °F (36.8 °C)   TempSrc: Temporal   SpO2: 94%   Weight: 68.9 kg (152 lb)   Height: 162.6 cm (64.02\")   PainSc: 0-No pain    Body mass index is 26.08 kg/m².   Pain Score    05/11/23 1347   PainSc: 0-No pain       ECOG Performance Status: 1-2    Physical Exam:   General: No acute distress. Well appearing.  HEENT: Normocephalic, atraumatic. Sclera anicteric. OP clear  Neck: supple, no palpable LAD.  Cardiovascular: regular rate and rhythm. No murmurs.   Respiratory: Normal rate. Clear to auscultation bilaterally  Abdomen: Soft, nontender, non distended with normoactive bowel sounds. Ostomy LLQ.  Lymph: no supraclavicular or axillary adenopathy  Neuro: Alert and oriented x 3.   Ext: Symmetric, no swelling.   Psych: Euthymic  Skin: right port site  C/d/i        Laboratory/Imaging Reviewed:   Hospital Outpatient Visit on 05/11/2023   Component Date Value Ref Range Status   • Glucose 05/11/2023 90  65 - 99 mg/dL Final   • BUN 05/11/2023 18  8 - 23 mg/dL Final   • Creatinine 05/11/2023 0.66 (L)  0.76 - 1.27 mg/dL Final   • Sodium 05/11/2023 137  136 - 145 mmol/L Final   • Potassium 05/11/2023 4.0  3.5 - 5.2 mmol/L Final    Slight hemolysis detected by analyzer. Results may be affected.   • Chloride 05/11/2023 102  98 - 107 mmol/L Final   • CO2 05/11/2023 28.0  22.0 - 29.0 mmol/L Final   • Calcium 05/11/2023 9.4  8.6 - 10.5 mg/dL Final   • Total Protein 05/11/2023 7.6  6.0 - 8.5 g/dL Final   • Albumin 05/11/2023 3.7  3.5 - 5.2 g/dL Final   • ALT (SGPT) 05/11/2023 12  1 - 41 U/L Final   • AST (SGOT) 05/11/2023 16  1 - 40 U/L Final   • Alkaline Phosphatase 05/11/2023 73  39 - 117 U/L Final "   • Total Bilirubin 05/11/2023 0.3  0.0 - 1.2 mg/dL Final   • Globulin 05/11/2023 3.9  gm/dL Final    Calculated Result   • A/G Ratio 05/11/2023 0.9  g/dL Final   • BUN/Creatinine Ratio 05/11/2023 27.3 (H)  7.0 - 25.0 Final   • Anion Gap 05/11/2023 7.0  5.0 - 15.0 mmol/L Final   • eGFR 05/11/2023 100.3  >60.0 mL/min/1.73 Final   • LDH 05/11/2023 205  135 - 225 U/L Final   • WBC 05/11/2023 4.72  3.40 - 10.80 10*3/mm3 Final   • RBC 05/11/2023 3.48 (L)  4.14 - 5.80 10*6/mm3 Final   • Hemoglobin 05/11/2023 10.9 (L)  13.0 - 17.7 g/dL Final   • Hematocrit 05/11/2023 34.0 (L)  37.5 - 51.0 % Final   • MCV 05/11/2023 97.7 (H)  79.0 - 97.0 fL Final   • MCH 05/11/2023 31.3  26.6 - 33.0 pg Final   • MCHC 05/11/2023 32.1  31.5 - 35.7 g/dL Final   • RDW 05/11/2023 14.7  12.3 - 15.4 % Final   • RDW-SD 05/11/2023 53.5  37.0 - 54.0 fl Final   • MPV 05/11/2023 9.0  6.0 - 12.0 fL Final   • Platelets 05/11/2023 184  140 - 450 10*3/mm3 Final   • Neutrophil % 05/11/2023 57.9  42.7 - 76.0 % Final   • Lymphocyte % 05/11/2023 25.4  19.6 - 45.3 % Final   • Monocyte % 05/11/2023 12.7 (H)  5.0 - 12.0 % Final   • Eosinophil % 05/11/2023 3.6  0.3 - 6.2 % Final   • Basophil % 05/11/2023 0.2  0.0 - 1.5 % Final   • Immature Grans % 05/11/2023 0.2  0.0 - 0.5 % Final   • Neutrophils, Absolute 05/11/2023 2.73  1.70 - 7.00 10*3/mm3 Final   • Lymphocytes, Absolute 05/11/2023 1.20  0.70 - 3.10 10*3/mm3 Final   • Monocytes, Absolute 05/11/2023 0.60  0.10 - 0.90 10*3/mm3 Final   • Eosinophils, Absolute 05/11/2023 0.17  0.00 - 0.40 10*3/mm3 Final   • Basophils, Absolute 05/11/2023 0.01  0.00 - 0.20 10*3/mm3 Final   • Immature Grans, Absolute 05/11/2023 0.01  0.00 - 0.05 10*3/mm3 Final   Hospital Outpatient Visit on 05/08/2023   Component Date Value Ref Range Status   • Glucose 05/08/2023 94  70 - 130 mg/dL Final    Meter: [id number] : [id number] Janeth Diaz     L_> R before during and after treatment.        L-->R After treatment-->interval  PET          Assessment / Plan      Assessment/Plan:   1.  CD30 positive classical Hodgkin's lymphoma  2.  Residual PET avid rectal mass  3.  Pulmonary interstitial infiltrates  -He completed 6 cycles of BV-AVD 3/23/2023.   -Cycle 6 was complicated by coronavirus infection and multifocal pneumonia.  He is clinically improving from this and returns for post therapy PET/CT.  -His imaging was personally reviewed and compared by me to his interval PET as well as his baseline pretreatment CT.  He did not have a pretreatment PET/CT given that he was profoundly ill and admitted for cycle #1.  He has had clear response with resolution of the hepatic lesions, marked improvement in the retroperitoneal adenopathy, and 50% reduction in the size of the rectal mass.  However he has persistent uptake in the rectal mass, [place] 5, which may be secondary to inflammatory/posttreatment uptake, but certainly could represent some amount of residual disease.  -His initial pathology was difficult to interpret because of marked inflammatory and fibrotic response.  I reviewed his PET/CT images and discussed with his colorectal surgeon.  He believes that the PET avid area would be amenable to biopsy under anoscopy, and so we will proceed with this to try to confirm whether he does have residual disease.  Even if the biopsy is negative, he will need to be closely monitored with interval PET/CT.  Could consider consolidative radiotherapy to the rectal mass given initial bulky disease.  -He does have interstitial infiltrates and mild uptake in lymph nodes in the chest.  This certainly could's be secondary to postinfectious inflammation as we are only for weeks out from his admission for multifocal pneumonia.  Pneumonitis or disease is another possibility.  However clinically he is improving and he has not received any steroid therapy.  I am going to plan a short interval CT in 4 weeks, but of asked him to contact me if he develops any  worsening shortness of breath, or cough in the interim.  -I will see him back with results of the pending rectal biopsy.    4.  GI bleed  -PPI, continue daily.    5. Access  -Port      Follow Up:   With rectal biopsy results: pt will call to schedule when date available    4 weeks with CT  Kaycee Leary MD  Hematology and Oncology     Time spent on the day of service was 45 minutes on the day of service inclusive of time before, during, and after office visit on record review, medically appropriate history and physical, counseling patient, ordering tests, reviewing imaging, discussion with specialists documenting in the medical record.

## 2023-05-22 ENCOUNTER — HOSPITAL ENCOUNTER (OUTPATIENT)
Dept: GENERAL RADIOLOGY | Facility: HOSPITAL | Age: 73
Discharge: HOME | End: 2023-05-22
Admitting: STUDENT IN AN ORGANIZED HEALTH CARE EDUCATION/TRAINING PROGRAM
Payer: MEDICARE

## 2023-05-22 ENCOUNTER — TRANSCRIBE ORDERS (OUTPATIENT)
Dept: GENERAL RADIOLOGY | Facility: HOSPITAL | Age: 73
End: 2023-05-22
Payer: MEDICARE

## 2023-05-22 ENCOUNTER — TELEPHONE (OUTPATIENT)
Dept: ONCOLOGY | Facility: CLINIC | Age: 73
End: 2023-05-22
Payer: MEDICARE

## 2023-05-22 DIAGNOSIS — C81.90 HODGKIN LYMPHOMA, UNSPECIFIED HODGKIN LYMPHOMA TYPE, UNSPECIFIED BODY REGION: Primary | ICD-10-CM

## 2023-05-22 PROCEDURE — 71046 X-RAY EXAM CHEST 2 VIEWS: CPT

## 2023-05-22 NOTE — TELEPHONE ENCOUNTER
Patient's wife called patient feels like he has to poop all the time, is in pain, and what is coming out in his colostomy has some mucous and blood coming out of it. Please call

## 2023-05-22 NOTE — TELEPHONE ENCOUNTER
Patient stated he saw his surgeon and he did a rectal exam and scope.  Patient stated he was bleeding prior to appointment and pain.  Patient stated that the bleeding and pain has gotten worse since this appointment.  Patient stated he feels like his has to go to the bathroom for BM and is blood and mucus.  Dr. Leary notified and patient advised per MD that if bleeding is acutely worse, he should go to the ED, if not, appointment could be obtained with Dr. Leary tomorrow to assess.  Upon speaking further with patient and his spouse, it was described as being acutely worse, so patient and his spouse was advised to go to the nearest emergency room.  Patient did deny SOA, dizziness, weakness, chest pain.  Patient was given an appointment with Dr. Leary tomorrow.  Patient and his spouse verbalized understanding.

## 2023-05-23 ENCOUNTER — HOSPITAL ENCOUNTER (OUTPATIENT)
Dept: RADIATION ONCOLOGY | Facility: HOSPITAL | Age: 73
Setting detail: RADIATION/ONCOLOGY SERIES
Discharge: HOME | End: 2023-05-23
Payer: MEDICARE

## 2023-05-23 ENCOUNTER — OFFICE VISIT (OUTPATIENT)
Dept: RADIATION ONCOLOGY | Facility: HOSPITAL | Age: 73
End: 2023-05-23
Payer: MEDICARE

## 2023-05-23 ENCOUNTER — OFFICE VISIT (OUTPATIENT)
Dept: ONCOLOGY | Facility: CLINIC | Age: 73
End: 2023-05-23
Payer: MEDICARE

## 2023-05-23 ENCOUNTER — TELEPHONE (OUTPATIENT)
Dept: ONCOLOGY | Facility: CLINIC | Age: 73
End: 2023-05-23

## 2023-05-23 ENCOUNTER — LAB (OUTPATIENT)
Dept: LAB | Facility: HOSPITAL | Age: 73
End: 2023-05-23
Payer: MEDICARE

## 2023-05-23 VITALS
HEIGHT: 64 IN | HEART RATE: 78 BPM | OXYGEN SATURATION: 98 % | DIASTOLIC BLOOD PRESSURE: 68 MMHG | SYSTOLIC BLOOD PRESSURE: 132 MMHG | TEMPERATURE: 97.8 F | BODY MASS INDEX: 26.46 KG/M2 | WEIGHT: 155 LBS | RESPIRATION RATE: 16 BRPM

## 2023-05-23 VITALS
OXYGEN SATURATION: 97 % | TEMPERATURE: 97.2 F | BODY MASS INDEX: 26.56 KG/M2 | WEIGHT: 154.8 LBS | RESPIRATION RATE: 16 BRPM | DIASTOLIC BLOOD PRESSURE: 64 MMHG | SYSTOLIC BLOOD PRESSURE: 115 MMHG | HEART RATE: 72 BPM

## 2023-05-23 DIAGNOSIS — C81.98 HODGKIN LYMPHOMA OF LYMPH NODES OF MULTIPLE REGIONS, UNSPECIFIED HODGKIN LYMPHOMA TYPE: ICD-10-CM

## 2023-05-23 DIAGNOSIS — C81.98 HODGKIN LYMPHOMA OF LYMPH NODES OF MULTIPLE REGIONS, UNSPECIFIED HODGKIN LYMPHOMA TYPE: Primary | ICD-10-CM

## 2023-05-23 LAB
ALBUMIN SERPL-MCNC: 3.6 G/DL (ref 3.5–5.2)
ALBUMIN/GLOB SERPL: 0.9 G/DL
ALP SERPL-CCNC: 95 U/L (ref 39–117)
ALT SERPL W P-5'-P-CCNC: 12 U/L (ref 1–41)
ANION GAP SERPL CALCULATED.3IONS-SCNC: 8 MMOL/L (ref 5–15)
AST SERPL-CCNC: 14 U/L (ref 1–40)
BASOPHILS # BLD AUTO: 0.01 10*3/MM3 (ref 0–0.2)
BASOPHILS NFR BLD AUTO: 0.2 % (ref 0–1.5)
BILIRUB SERPL-MCNC: 0.4 MG/DL (ref 0–1.2)
BUN SERPL-MCNC: 14 MG/DL (ref 8–23)
BUN/CREAT SERPL: 23.3 (ref 7–25)
CALCIUM SPEC-SCNC: 9.7 MG/DL (ref 8.6–10.5)
CHLORIDE SERPL-SCNC: 99 MMOL/L (ref 98–107)
CO2 SERPL-SCNC: 29 MMOL/L (ref 22–29)
CREAT SERPL-MCNC: 0.6 MG/DL (ref 0.76–1.27)
DEPRECATED RDW RBC AUTO: 50.1 FL (ref 37–54)
EGFRCR SERPLBLD CKD-EPI 2021: 103.2 ML/MIN/1.73
EOSINOPHIL # BLD AUTO: 0.02 10*3/MM3 (ref 0–0.4)
EOSINOPHIL NFR BLD AUTO: 0.4 % (ref 0.3–6.2)
ERYTHROCYTE [DISTWIDTH] IN BLOOD BY AUTOMATED COUNT: 13.9 % (ref 12.3–15.4)
FERRITIN SERPL-MCNC: 458 NG/ML (ref 30–400)
GLOBULIN UR ELPH-MCNC: 4.2 GM/DL
GLUCOSE SERPL-MCNC: 91 MG/DL (ref 65–99)
HCT VFR BLD AUTO: 35.2 % (ref 37.5–51)
HGB BLD-MCNC: 11.1 G/DL (ref 13–17.7)
IMM GRANULOCYTES # BLD AUTO: 0.02 10*3/MM3 (ref 0–0.05)
IMM GRANULOCYTES NFR BLD AUTO: 0.4 % (ref 0–0.5)
LYMPHOCYTES # BLD AUTO: 0.88 10*3/MM3 (ref 0.7–3.1)
LYMPHOCYTES NFR BLD AUTO: 15.6 % (ref 19.6–45.3)
MCH RBC QN AUTO: 30.5 PG (ref 26.6–33)
MCHC RBC AUTO-ENTMCNC: 31.5 G/DL (ref 31.5–35.7)
MCV RBC AUTO: 96.7 FL (ref 79–97)
MONOCYTES # BLD AUTO: 0.72 10*3/MM3 (ref 0.1–0.9)
MONOCYTES NFR BLD AUTO: 12.8 % (ref 5–12)
NEUTROPHILS NFR BLD AUTO: 3.99 10*3/MM3 (ref 1.7–7)
NEUTROPHILS NFR BLD AUTO: 70.6 % (ref 42.7–76)
PLATELET # BLD AUTO: 304 10*3/MM3 (ref 140–450)
PMV BLD AUTO: 8.6 FL (ref 6–12)
POTASSIUM SERPL-SCNC: 4.6 MMOL/L (ref 3.5–5.2)
PROT SERPL-MCNC: 7.8 G/DL (ref 6–8.5)
RBC # BLD AUTO: 3.64 10*6/MM3 (ref 4.14–5.8)
SODIUM SERPL-SCNC: 136 MMOL/L (ref 136–145)
WBC NRBC COR # BLD: 5.64 10*3/MM3 (ref 3.4–10.8)

## 2023-05-23 PROCEDURE — G0463 HOSPITAL OUTPT CLINIC VISIT: HCPCS

## 2023-05-23 PROCEDURE — 85025 COMPLETE CBC W/AUTO DIFF WBC: CPT

## 2023-05-23 PROCEDURE — 80053 COMPREHEN METABOLIC PANEL: CPT

## 2023-05-23 PROCEDURE — 36415 COLL VENOUS BLD VENIPUNCTURE: CPT

## 2023-05-23 PROCEDURE — 82728 ASSAY OF FERRITIN: CPT

## 2023-05-23 RX ORDER — TRAMADOL HYDROCHLORIDE 50 MG/1
50 TABLET ORAL EVERY 6 HOURS PRN
Qty: 30 TABLET | Refills: 0 | Status: SHIPPED | OUTPATIENT
Start: 2023-05-23

## 2023-05-23 NOTE — PROGRESS NOTES
Hematology and Oncology [place]  Office number [phone]    Fax number [phone]     Follow up     Date: 23    Patient Name: Abraham Wu  MRN: [id number]  : 1950      Chief Complaint: Hodgkin Lymphoma follow up/treatment    Surgeon: Dr. Hiram Viveros MD    Cancer Staging: IV    History of Present Illness: Abraham Wu is a pleasant 73 y.o. male with PMH of hypertension, sleep apnea, hard of hearing who presents today for follow up of Hodgkin Lymphoma.     He initially presented 2022 with intractable diarrhea, low appetite, and a greater than 50 pound weight loss over several month period associated with intermittent fevers.  CT of the chest abdomen pelvis obtained in the ER shows of rectal mass spanning greater than 12 cm with adjacent adenopathy, bulky RP adenopathy, and findings concerning for left renal and liver metastases.  Small right pleural effusion with nodularity.  There was concern for impending obstruction, so he underwent diverting colostomy and biopsy on 2022.  Biopsies from the peritoneam showed a fibrotic nodule histiocytes and eosinophils admixed with CD30 positive large cells.  Rectal biopsies showed involvement by CD30 positive lymphoma, classic Hodgkin lymphoma versus CD30 positive T-cell or B-cell lymphoma.  There was extensive fibrosis and crush artifact.  He underwent repeat transrectal biopsy 10/4/2022 for further characterization which was felt to be most consistent with classical Hodgkin lymphoma.    He initiated chemotherapy with Brentuximab-AVD as an inpatient on 10/8/2022.  Cycle #1 was complicated by GI bleed requiring multiple blood transfusion and ultimately coil artery embolization 10/12; neutropenic fever, Candida esophagitis and mucositis.  He had a prolonged ICU stay  And required enteral feeding.  He was discharged 10/20/2022.    Following his last cycle he was admitted with multifocal PNA and +corona virus  infection.    Treatment history:  Brentuximab-AVD: C1 10/8/22  C2: 11/1/22 onward with DA 20% in BVD; full dose brentuximab  Completed C6 3/22/23    Interval history:    He returns with his supportive wife for an unscheduled visit. After rectal exam with CRS he noted increased pain and bleeding from rectum, rectal urgency. He is taking ibuprofen and tylenol with partial relief. He also notes increased rectabl bleeding 4-6 drops of blood and blood on toilet paper with wiping. Occurring every hour to two with blood. This was happening prior to his CRS exam but has picked up.   With respect to his breathing, this is improving, 50% better. Still using 2L O2 at night (but was being evaled for SHERRI prior to his diagnosis and awaiting CPAP). Getting more exercise but still avoiding strenuous activities, too much walking. Breathing much better. Less productive cough. Using IS at night.  Stable neuropathy.      Past Medical History:   Past Medical History:   Diagnosis Date   • benign polypoid tissue right lung    • Cancer     HODGKINS LYMPHOMA, RECTAL CANCER   • Diabetes mellitus    • Hyperlipidemia    • Hypertension    • Mycobacterium mucogenicum    • SHERRI (obstructive sleep apnea)     O2 2L/MIN AT NIGHT ONLY   • Pneumonia     2023   • Seasonal allergies        Past Surgical History:   Past Surgical History:   Procedure Laterality Date   • BRONCHOSCOPY      removal of obstructing polypoid tissue right middle lung   • COLOSTOMY N/A 09/22/2022    Procedure: LAPAROSCOPIC COLOSTOMY CREATION, FLEXIBLE SIGMOIDOSCOPY;  Surgeon: Hiram Viveros MD;  Location: [facility] OR;  Service: General;  Laterality: N/A;   • ENDOSCOPY N/A 10/10/2022    Procedure: ESOPHAGOGASTRODUODENOSCOPY;  Surgeon: Neeraj Lynn MD;  Location:  [facility];  Service: Gastroenterology;  Laterality: N/A;   • ENDOSCOPY N/A 10/12/2022    Procedure: ESOPHAGOGASTRODUODENOSCOPY;  Surgeon: Brunner, Mark I, MD;  Location:  [facility];  Service:  Gastroenterology;  Laterality: N/A;   • EXAM UNDER ANESTHESIA, RECTAL BIOPSY N/A 10/04/2022    Procedure: EXAM UNDER ANESTHESIA, TRANSANAL BIOPSY WITH TRUCUT NEEDLE (LITHOTOMY-CANDY CANE);  Surgeon: Hiram Viveros MD;  Location: [facility];  Service: General;  Laterality: N/A;   • PERIPHERALLY INSERTED CENTRAL CATHETER INSERTION      Removed 11/28/2022   • VENOUS ACCESS DEVICE (PORT) INSERTION Right 11/28/2022       Family History:   Family History   Problem Relation Age of Onset   • Diabetes Mother    • Diabetes Father    • Heart disease Father        Social History:   Social History     Socioeconomic History   • Marital status:    Tobacco Use   • Smoking status: Never   • Smokeless tobacco: Never   Vaping Use   • Vaping Use: Never used   Substance and Sexual Activity   • Alcohol use: Not Currently     Comment: previously drank 2 glasses of wine/beer nightly   • Drug use: Never   • Sexual activity: Defer       Medications:     Current Outpatient Medications:   •  dicyclomine (BENTYL) 20 MG tablet, Take 1 tablet by mouth 4 (Four) Times a Day As Needed (UPSET STOMACH)., Disp: , Rfl:   •  fluticasone (FLONASE) 50 MCG/ACT nasal spray, 1 spray into the nostril(s) as directed by provider Daily As Needed for Allergies or Rhinitis. OTC, Disp: , Rfl:   •  KLOR-CON 20 MEQ CR tablet, Take 1 tablet by mouth 2 (Two) Times a Day., Disp: , Rfl:   •  lidocaine-prilocaine (EMLA) 2.5-2.5 % cream, Apply 1 application topically to the appropriate area as directed As Needed (45-60 minutes prior to port access.  Cover with saran/plastic wrap.). (Patient taking differently: Apply 1 application topically to the appropriate area as directed As Needed for Mild Pain (45-60 minutes prior to port access.  Cover with saran/plastic wrap.). NOT CURRENTLY USING), Disp: 30 g, Rfl: 3  •  loratadine (CLARITIN) 10 MG tablet, Take 1 tablet by mouth Daily., Disp: 30 tablet, Rfl: 5  •  OLANZapine (zyPREXA) 5 MG tablet, Take 1 tablet by mouth  "Every Night. For 3 days on Days 2, 3 and 4 and on days 16, 17, and 18., Disp: 6 tablet, Rfl: 5  •  ondansetron (Zofran) 8 MG tablet, Take 1 tablet by mouth Every 8 (Eight) Hours As Needed for Nausea or Vomiting., Disp: 30 tablet, Rfl: 5  •  pantoprazole (PROTONIX) 40 MG EC tablet, Take 1 tablet by mouth Daily., Disp: 90 tablet, Rfl: 1  •  prochlorperazine (COMPAZINE) 5 MG tablet, Take 1 tablet by mouth Every 6 (Six) Hours As Needed for Nausea or Vomiting., Disp: 30 tablet, Rfl: 2  •  rosuvastatin (CRESTOR) 10 MG tablet, Take 1 tablet by mouth Daily., Disp: , Rfl:     Allergies:   No Known Allergies    Objective     Vital Signs:   Vitals:    05/23/23 1006   BP: 132/68   Pulse: 78   Resp: 16   Temp: 97.8 °F (36.6 °C)   TempSrc: Infrared   SpO2: 98%   Weight: 70.3 kg (155 lb)   Height: 162.6 cm (64.02\")   PainSc:   7   PainLoc: Rectum    Body mass index is 26.59 kg/m².   Pain Score    05/23/23 1006   PainSc:   7   PainLoc: Rectum       ECOG Performance Status: 2    Physical Exam:   General: No acute distress. Well appearing.  HEENT: Normocephalic, atraumatic. Sclera anicteric. OP clear  Neck: supple, no palpable LAD.  Cardiovascular: regular rate and rhythm. No murmurs.   Respiratory: Normal rate. Clear to auscultation bilaterally  Abdomen: Soft, nontender, non distended with normoactive bowel sounds. Ostomy LLQ.  Lymph: no supraclavicular or axillary adenopathy  Neuro: Alert and oriented x 3.   Ext: Symmetric, no swelling.   Psych: Euthymic  Skin: right port site  C/d/i        Laboratory/Imaging Reviewed:   Hospital Outpatient Visit on 05/11/2023   Component Date Value Ref Range Status   • Glucose 05/11/2023 90  65 - 99 mg/dL Final   • BUN 05/11/2023 18  8 - 23 mg/dL Final   • Creatinine 05/11/2023 0.66 (L)  0.76 - 1.27 mg/dL Final   • Sodium 05/11/2023 137  136 - 145 mmol/L Final   • Potassium 05/11/2023 4.0  3.5 - 5.2 mmol/L Final    Slight hemolysis detected by analyzer. Results may be affected.   • Chloride " 05/11/2023 102  98 - 107 mmol/L Final   • CO2 05/11/2023 28.0  22.0 - 29.0 mmol/L Final   • Calcium 05/11/2023 9.4  8.6 - 10.5 mg/dL Final   • Total Protein 05/11/2023 7.6  6.0 - 8.5 g/dL Final   • Albumin 05/11/2023 3.7  3.5 - 5.2 g/dL Final   • ALT (SGPT) 05/11/2023 12  1 - 41 U/L Final   • AST (SGOT) 05/11/2023 16  1 - 40 U/L Final   • Alkaline Phosphatase 05/11/2023 73  39 - 117 U/L Final   • Total Bilirubin 05/11/2023 0.3  0.0 - 1.2 mg/dL Final   • Globulin 05/11/2023 3.9  gm/dL Final    Calculated Result   • A/G Ratio 05/11/2023 0.9  g/dL Final   • BUN/Creatinine Ratio 05/11/2023 27.3 (H)  7.0 - 25.0 Final   • Anion Gap 05/11/2023 7.0  5.0 - 15.0 mmol/L Final   • eGFR 05/11/2023 100.3  >60.0 mL/min/1.73 Final   • LDH 05/11/2023 205  135 - 225 U/L Final   • WBC 05/11/2023 4.72  3.40 - 10.80 10*3/mm3 Final   • RBC 05/11/2023 3.48 (L)  4.14 - 5.80 10*6/mm3 Final   • Hemoglobin 05/11/2023 10.9 (L)  13.0 - 17.7 g/dL Final   • Hematocrit 05/11/2023 34.0 (L)  37.5 - 51.0 % Final   • MCV 05/11/2023 97.7 (H)  79.0 - 97.0 fL Final   • MCH 05/11/2023 31.3  26.6 - 33.0 pg Final   • MCHC 05/11/2023 32.1  31.5 - 35.7 g/dL Final   • RDW 05/11/2023 14.7  12.3 - 15.4 % Final   • RDW-SD 05/11/2023 53.5  37.0 - 54.0 fl Final   • MPV 05/11/2023 9.0  6.0 - 12.0 fL Final   • Platelets 05/11/2023 184  140 - 450 10*3/mm3 Final   • Neutrophil % 05/11/2023 57.9  42.7 - 76.0 % Final   • Lymphocyte % 05/11/2023 25.4  19.6 - 45.3 % Final   • Monocyte % 05/11/2023 12.7 (H)  5.0 - 12.0 % Final   • Eosinophil % 05/11/2023 3.6  0.3 - 6.2 % Final   • Basophil % 05/11/2023 0.2  0.0 - 1.5 % Final   • Immature Grans % 05/11/2023 0.2  0.0 - 0.5 % Final   • Neutrophils, Absolute 05/11/2023 2.73  1.70 - 7.00 10*3/mm3 Final   • Lymphocytes, Absolute 05/11/2023 1.20  0.70 - 3.10 10*3/mm3 Final   • Monocytes, Absolute 05/11/2023 0.60  0.10 - 0.90 10*3/mm3 Final   • Eosinophils, Absolute 05/11/2023 0.17  0.00 - 0.40 10*3/mm3 Final   • Basophils,  Absolute 05/11/2023 0.01  0.00 - 0.20 10*3/mm3 Final   • Immature Grans, Absolute 05/11/2023 0.01  0.00 - 0.05 10*3/mm3 Final     L_> R before during and after treatment.        L-->R After treatment-->interval PET          Narrative & Impression   XR CHEST PA AND LATERAL     Date of Exam: 5/22/2023 12:40 PM EDT     Indication: c81.90 lymphoma hodgkins     Comparison: March 10, 2023, PET/CT May 8, 2023, chest radiograph October 14, 2022     Findings:  There is a right IJ port catheter with tip projecting in the region of the cavoatrial junction, as before.     The opacities seen in the bilateral lungs on the prior chest radiograph have decreased in the interval. There are some residual airspace and interstitial opacities in these locations. No definitive new or worsening infiltrate is seen at this time. No   significant effusion or pneumothorax. Heart size appears within normal limits. Mediastinal contour appears grossly unchanged.     There are degenerative changes in the thoracic spine. Embolization coils are seen in the upper abdomen on the lateral view.     IMPRESSION:  Impression:  Interval decrease in bilateral lung opacities compared to the most recent prior chest radiograph. There is some residual interstitial and airspace opacities. Findings suggest improving pneumonia with possible residual infectious/inflammatory changes and   scarring. Continued follow-up is recommended.       Assessment / Plan      Assessment/Plan:   1.  CD30 positive classical Hodgkin's lymphoma  2.  Residual PET avid rectal mass  3.  Pulmonary interstitial infiltrates  -He completed 6 cycles of BV-AVD 3/23/2023.   -Cycle 6 was complicated by coronavirus infection and multifocal pneumonia.  He is clinically improving from this but posttherapy PET showed persistent bilateral interstitial changes and mild adenopathy, that I believe is most likely reactive to his recent infection.  -He did not have a pretreatment PET/CT given that he was  profoundly ill and admitted for cycle #1.  He has had clear response with resolution of the hepatic lesions, marked improvement in the retroperitoneal adenopathy, and 50% reduction in the size of the rectal mass.  However he has persistent uptake in the rectal mass, Deuville 5, which may be secondary to inflammatory/posttreatment uptake, but certainly could represent some amount of residual disease.  Last week we discussed that his initial pathology was difficult to interpret because of marked inflammatory and fibrotic response.  I reviewed his PET/CT images and discussed with his colorectal surgeon.  He believes that the PET avid area would be amenable to biopsy under anoscopy, and so we will proceed with this to try to confirm whether he does have residual disease.  Even if the biopsy is negative, he will need to be closely monitored with interval PET/CT and would consider consolidative radiotherapy to the rectal mass given initial bulky disease.  -He is scheduled for anoscopy with biopsy next week.  He is now presenting with rectal bleeding and increased pain which is very concerning for residual/progressive disease.  Given his ongoing recovery from recent viral pneumonia I do not think he is currently fit enough to proceed with further systemic therapy, and given that the rectum was the only clear site of disease on imaging I recommended that we proceed with radiation to the site.  Chest x-ray does show improvement in his pulmonary infiltrate so I do not think we are dealing with drug-induced pneumonitis as he has not received any interim treatment.  I spoke with Dr. Alcocer today.  We will expedite treatment planning so that he is ready to initiate therapy after his biopsy completes.  -If he has symptoms worrisome for ongoing systemic progression, will consider initiating salvage therapy and referring him for evaluation for autologous stem cell transplant.    4.  GI bleed  -PPI, continue daily.    5.  Access  -Port      Follow Up:   In 1 week    Kaycee Leary MD  Hematology and Oncology     I have spent a total of 40 min on reviewing test results/preparing to see patient, counseling patient, performing medically appropriate exam, reviewing imaging, discussion with physicians and documenting clinical information in the electronic or other health record

## 2023-05-23 NOTE — TELEPHONE ENCOUNTER
Caller: SRINIVASAN HARPER    Relationship: Emergency Contact    Best call back number: [phone]      Who are you requesting to speak with (clinical staff, provider,  specific staff member): DENNISE WHITE      What was the call regarding:     SPOKE WITH DENNISE YESTERDAY AFTERNOON,     AND KAL WAS DECIDING TO COME IN TODAY OR GO TO THE ER    HAS DECIDED WILL COME IN TODAY AT 10:45AM        Do you require a callback: NO UNLESS QUESTIONS OR CONCERNS

## 2023-05-23 NOTE — PROGRESS NOTES
CONSULTATION NOTE    NAME:      Abraham Wu  :                                                          1950  DATE OF CONSULTATION:                       23  REQUESTING PHYSICIAN:                   Kaycee Leary MD  REASON FOR CONSULTATION:           Further evaluation management of the patient's refractory Hodgkin lymphoma for consideration of consolidative radiation treatments  Staging form: Hodgkin And Non-Hodgkin Lymphoma, AJCC 8th Edition  - Clinical: Stage IV - Signed by Kaycee Leary MD on 10/28/2022           BRIEF HISTORY:  Abraham Wu  is a very pleasant 73 y.o. male  with a history of Hodgkin's lymphoma of the rectum status post initial treatments.  The patient then had struggle with a pneumonia in the hospital.  He had restaging scans that showed good treatment response with only some residual disease of the rectum and perhaps in the mediastinum however this could be due to the patient's pneumonia, in the mediastinum.  The patient was referred to our clinic to consider radiation treatments as he is having ongoing bleeding and discharge.  Patient is scheduled for biopsies to confirm his recurrence with Dr. Viveros next week.    The patient does report ongoing bleeding.  He also has some discharge.  Patient reports that he is feeling better from a pneumonia standpoint.  The patient is diverted.    BMI:  Body mass index is 26.56 kg/m².      Social History     Substance and Sexual Activity   Alcohol Use Not Currently    Comment: previously drank 2 glasses of wine/beer nightly       No Known Allergies    Social History     Tobacco Use   • Smoking status: Never   • Smokeless tobacco: Never   Vaping Use   • Vaping Use: Never used   Substance Use Topics   • Alcohol use: Not Currently     Comment: previously drank 2 glasses of wine/beer nightly   • Drug use: Never         Past Medical History:   Diagnosis Date   • benign polypoid tissue right lung    • Cancer     HODGKINS  LYMPHOMA, RECTAL CANCER   • Diabetes mellitus    • Hyperlipidemia    • Hypertension    • Lymphoma    • Mycobacterium mucogenicum    • SHERRI (obstructive sleep apnea)     O2 2L/MIN AT NIGHT ONLY   • Pneumonia     2023   • Seasonal allergies        family history includes Diabetes in his father and mother; Heart disease in his father.     Past Surgical History:   Procedure Laterality Date   • BRONCHOSCOPY      removal of obstructing polypoid tissue right middle lung   • COLOSTOMY N/A 09/22/2022    Procedure: LAPAROSCOPIC COLOSTOMY CREATION, FLEXIBLE SIGMOIDOSCOPY;  Surgeon: Hiram Viveros MD;  Location:  [facility];  Service: General;  Laterality: N/A;   • ENDOSCOPY N/A 10/10/2022    Procedure: ESOPHAGOGASTRODUODENOSCOPY;  Surgeon: Neeraj Lynn MD;  Location:  [facility];  Service: Gastroenterology;  Laterality: N/A;   • ENDOSCOPY N/A 10/12/2022    Procedure: ESOPHAGOGASTRODUODENOSCOPY;  Surgeon: Brunner, Mark I, MD;  Location:  [facility];  Service: Gastroenterology;  Laterality: N/A;   • EXAM UNDER ANESTHESIA, RECTAL BIOPSY N/A 10/04/2022    Procedure: EXAM UNDER ANESTHESIA, TRANSANAL BIOPSY WITH TRUCUT NEEDLE (LITHOTOMY-CANDY CANE);  Surgeon: Hiram Viveros MD;  Location:  [facility];  Service: General;  Laterality: N/A;   • PERIPHERALLY INSERTED CENTRAL CATHETER INSERTION      Removed 11/28/2022   • VENOUS ACCESS DEVICE (PORT) INSERTION Right 11/28/2022        Review of Systems   Constitutional: Positive for appetite change.   Gastrointestinal: Positive for rectal pain.   Genitourinary: Positive for difficulty urinating.     A full 14 point review of systems was performed and was negative except as noted in the HPI.         Objective   VITAL SIGNS:   Vitals:    05/23/23 1208   BP: 115/64   Pulse: 72   Resp: 16   Temp: 97.2 °F (36.2 °C)   TempSrc: Temporal   SpO2: 97%   Weight: 70.2 kg (154 lb 12.8 oz)   PainSc:   4        KPS       80%    Physical Exam  Constitutional:       General: He is not  in acute distress.     Appearance: He is well-developed.   HENT:      Head: Normocephalic and atraumatic.   Eyes:      Conjunctiva/sclera: Conjunctivae normal.      Pupils: Pupils are equal, round, and reactive to light.   Neck:      Trachea: No tracheal deviation.   Pulmonary:      Effort: Pulmonary effort is normal. No respiratory distress.      Breath sounds: No wheezing.   Abdominal:      General: There is no distension.      Palpations: Abdomen is soft.      Comments: Ostomy in place   Musculoskeletal:         General: Normal range of motion.      Cervical back: Normal range of motion.   Skin:     General: Skin is warm and dry.   Neurological:      Mental Status: He is alert.      Cranial Nerves: No cranial nerve deficit.      Coordination: Coordination normal.   Psychiatric:         Behavior: Behavior normal.         Judgment: Judgment normal.              The following portions of the patient's history were reviewed and updated as appropriate: allergies, current medications, past family history, past medical history, past social history, past surgical history and problem list.I have personally requested reviewed and interpreted the patient's images and radiology reports and pathology reports listed below:    NM PET/CT Skull Base to Mid Thigh    Result Date: 5/8/2023  Impression: Redemonstration of hypermetabolic rectal mass. Per electronic medical record, this was previously biopsied with results compatible with Hodgkin's lymphoma involvement. Jose E 5. Extensive centrilobular and subpleural groundglass and reticular densities in the bilateral lungs demonstrating low-level FDG avidity, compatible with infection/COVID-19 pneumonia as seen on prior chest x-ray from 4/10/2023. A few newly hypermetabolic mediastinal lymph nodes are presumably reactive to this process although technically indeterminant in the setting of hematologic malignancy; attention on follow-up. Electronically Signed: Santino Kaur   5/8/2023 11:46 AM EDT  Workstation ID: [id number]    I reviewed the patient's older CT scans as well from prior to his initiation of chemotherapy.    Assessment      IMPRESSION:       The patient is very pleasant 73-year-old gentleman with refractory Hodgkin's disease.  The patient also unfortunately had pneumonia and COVID that complicates his most recent PET scan.  He is recovering well but does have evidence of large amount of residual disease that is PET avid on his scan in the area of the rectum.  The area is clearly abnormal.  He is symptomatic and does have bleeding.  The patient will go for a biopsy to confirm recurrent disease next week with Dr. Viveros.    Patient would benefit from consolidative radiotherapy.  We will have the patient return for planning CT scan on Thursday.  I would recommend a dose of 45 Gray 1.8 Gray per fraction per the NCCN guidelines.  I recommend IGR T and IMRT per the NCCN guidelines.    We discussed the risk and benefits today as well as the most likely acute on chronic side effects/complications.  The patient is interested in proceeding with treatments.  RECOMMENDATIONS:      Refractory Hodgkin's lymphoma of the rectum  -Status post diversion  -Status post chemotherapy with Deauville 5 at most recent PET scan  -Ongoing bleeding  -Biopsy scheduled with Dr. Viveros next week  -Recommend IGR T and IMRT  -Recommend 45 Gray in 28 fractions  -Patient will return for simulation on Thursday  -Recommend waiting till biopsy to begin treatments.  -Follows with Dr. Leary.    Pneumonia  -Resolving  -Residual changes in the thorax consistent with reactive changes  -We will continue to monitor closely  -Will observe PET avid findings in the chest at this time.  -Discussed with Dr. Gibran Alcocer MD    Greater than 1 hour was spent preparing for and coordinating this visit. >50% of the time was spent in direct face to face conversation with the patient teaching,  answering question, and providing explanations regarding the patient's case.  The decision to treat the patient with radiation is a complex one and carries the risk of long-term side effects and complications.  The patient's malignancy represents a complicated life threatening condition that requires complex multidisciplinary management for treatment and followup.  Errors in dictation may reflect use of voice recognition software and not all errors in transcription may have been detected prior to signing.

## 2023-05-25 ENCOUNTER — LAB (OUTPATIENT)
Dept: LAB | Facility: HOSPITAL | Age: 73
End: 2023-05-25
Payer: MEDICARE

## 2023-05-25 ENCOUNTER — HOSPITAL ENCOUNTER (OUTPATIENT)
Dept: RADIATION ONCOLOGY | Facility: HOSPITAL | Age: 73
Discharge: HOME | End: 2023-05-25

## 2023-05-25 ENCOUNTER — TELEPHONE (OUTPATIENT)
Dept: ONCOLOGY | Facility: CLINIC | Age: 73
End: 2023-05-25
Payer: MEDICARE

## 2023-05-25 DIAGNOSIS — R30.0 DYSURIA: Primary | ICD-10-CM

## 2023-05-25 DIAGNOSIS — C81.00 NODULAR LYMPHOCYTE PREDOMINANT HODGKIN LYMPHOMA, UNSPECIFIED BODY REGION: ICD-10-CM

## 2023-05-25 DIAGNOSIS — R39.15 URGENCY OF URINATION: ICD-10-CM

## 2023-05-25 DIAGNOSIS — C81.98 HODGKIN LYMPHOMA OF LYMPH NODES OF MULTIPLE REGIONS, UNSPECIFIED HODGKIN LYMPHOMA TYPE: ICD-10-CM

## 2023-05-25 DIAGNOSIS — R30.0 DYSURIA: ICD-10-CM

## 2023-05-25 LAB
ALBUMIN SERPL-MCNC: 3.4 G/DL (ref 3.5–5.2)
ALBUMIN/GLOB SERPL: 0.9 G/DL
ALP SERPL-CCNC: 93 U/L (ref 39–117)
ALT SERPL W P-5'-P-CCNC: 12 U/L (ref 1–41)
ANION GAP SERPL CALCULATED.3IONS-SCNC: 14 MMOL/L (ref 5–15)
AST SERPL-CCNC: 15 U/L (ref 1–40)
BASOPHILS # BLD AUTO: 0.01 10*3/MM3 (ref 0–0.2)
BASOPHILS NFR BLD AUTO: 0.2 % (ref 0–1.5)
BILIRUB SERPL-MCNC: 0.3 MG/DL (ref 0–1.2)
BILIRUB UR QL STRIP: NEGATIVE
BUN SERPL-MCNC: 16 MG/DL (ref 8–23)
BUN/CREAT SERPL: 27.1 (ref 7–25)
CALCIUM SPEC-SCNC: 9.3 MG/DL (ref 8.6–10.5)
CHLORIDE SERPL-SCNC: 95 MMOL/L (ref 98–107)
CLARITY UR: CLEAR
CO2 SERPL-SCNC: 25 MMOL/L (ref 22–29)
COLOR UR: YELLOW
CREAT SERPL-MCNC: 0.59 MG/DL (ref 0.76–1.27)
DEPRECATED RDW RBC AUTO: 48.9 FL (ref 37–54)
EGFRCR SERPLBLD CKD-EPI 2021: 103.7 ML/MIN/1.73
EOSINOPHIL # BLD AUTO: 0.05 10*3/MM3 (ref 0–0.4)
EOSINOPHIL NFR BLD AUTO: 0.8 % (ref 0.3–6.2)
ERYTHROCYTE [DISTWIDTH] IN BLOOD BY AUTOMATED COUNT: 13.9 % (ref 12.3–15.4)
GLOBULIN UR ELPH-MCNC: 4 GM/DL
GLUCOSE SERPL-MCNC: 125 MG/DL (ref 65–99)
GLUCOSE UR STRIP-MCNC: NEGATIVE MG/DL
HCT VFR BLD AUTO: 33.3 % (ref 37.5–51)
HGB BLD-MCNC: 10.7 G/DL (ref 13–17.7)
HGB UR QL STRIP.AUTO: NEGATIVE
IMM GRANULOCYTES # BLD AUTO: 0.02 10*3/MM3 (ref 0–0.05)
IMM GRANULOCYTES NFR BLD AUTO: 0.3 % (ref 0–0.5)
KETONES UR QL STRIP: ABNORMAL
LEUKOCYTE ESTERASE UR QL STRIP.AUTO: NEGATIVE
LYMPHOCYTES # BLD AUTO: 1.11 10*3/MM3 (ref 0.7–3.1)
LYMPHOCYTES NFR BLD AUTO: 17.5 % (ref 19.6–45.3)
MCH RBC QN AUTO: 30.6 PG (ref 26.6–33)
MCHC RBC AUTO-ENTMCNC: 32.1 G/DL (ref 31.5–35.7)
MCV RBC AUTO: 95.1 FL (ref 79–97)
MONOCYTES # BLD AUTO: 0.75 10*3/MM3 (ref 0.1–0.9)
MONOCYTES NFR BLD AUTO: 11.8 % (ref 5–12)
NEUTROPHILS NFR BLD AUTO: 4.4 10*3/MM3 (ref 1.7–7)
NEUTROPHILS NFR BLD AUTO: 69.4 % (ref 42.7–76)
NITRITE UR QL STRIP: NEGATIVE
PH UR STRIP.AUTO: 7 [PH] (ref 5–8)
PLATELET # BLD AUTO: 309 10*3/MM3 (ref 140–450)
PMV BLD AUTO: 8.4 FL (ref 6–12)
POTASSIUM SERPL-SCNC: 4.6 MMOL/L (ref 3.5–5.2)
PROT SERPL-MCNC: 7.4 G/DL (ref 6–8.5)
PROT UR QL STRIP: NEGATIVE
RBC # BLD AUTO: 3.5 10*6/MM3 (ref 4.14–5.8)
SODIUM SERPL-SCNC: 134 MMOL/L (ref 136–145)
SP GR UR STRIP: 1.02 (ref 1–1.03)
UROBILINOGEN UR QL STRIP: ABNORMAL
WBC NRBC COR # BLD: 6.34 10*3/MM3 (ref 3.4–10.8)

## 2023-05-25 PROCEDURE — 81003 URINALYSIS AUTO W/O SCOPE: CPT

## 2023-05-25 PROCEDURE — 36415 COLL VENOUS BLD VENIPUNCTURE: CPT

## 2023-05-25 PROCEDURE — 77399 UNLISTED PX MED RADJ PHYSICS: CPT | Performed by: RADIOLOGY

## 2023-05-25 PROCEDURE — 85025 COMPLETE CBC W/AUTO DIFF WBC: CPT

## 2023-05-25 PROCEDURE — 77290 THER RAD SIMULAJ FIELD CPLX: CPT | Performed by: RADIOLOGY

## 2023-05-25 PROCEDURE — 80053 COMPREHEN METABOLIC PANEL: CPT

## 2023-05-25 NOTE — TELEPHONE ENCOUNTER
Patient wife came in and stated is her having test, she wants to know if he can have a urinalysis done she stated she thinks he has a UTI she stated they can do it here or at urgent. Requesting a phone call to discuss further [phone]

## 2023-05-25 NOTE — TELEPHONE ENCOUNTER
"Patient's spouse, with patient, stated patient was having hesitation with urination, urgency, dysuria and c/o \"chilling and then getting hot\" daily for the last couple of days.  Patient also stated urine is more concentrated.  Dr. Leary notified and per MD order placed for UA with reflex to culture, CBC and CMP.  Advised patient and his spouse that if he begins \"chilling\" again, they should check his temperature and if he has a fever, he should be seen tomorrow and should call the office early so that an appointment could be obtained with an APRN.  Both verbalized understanding and stated they will go now to complete labs.  "

## 2023-05-26 ENCOUNTER — TELEPHONE (OUTPATIENT)
Dept: ONCOLOGY | Facility: CLINIC | Age: 73
End: 2023-05-26

## 2023-05-26 ENCOUNTER — OFFICE VISIT (OUTPATIENT)
Dept: ONCOLOGY | Facility: CLINIC | Age: 73
End: 2023-05-26
Payer: MEDICARE

## 2023-05-26 VITALS
DIASTOLIC BLOOD PRESSURE: 66 MMHG | BODY MASS INDEX: 26.4 KG/M2 | HEART RATE: 71 BPM | OXYGEN SATURATION: 97 % | WEIGHT: 154.6 LBS | RESPIRATION RATE: 16 BRPM | SYSTOLIC BLOOD PRESSURE: 114 MMHG | HEIGHT: 64 IN | TEMPERATURE: 97.1 F

## 2023-05-26 DIAGNOSIS — C81.98 HODGKIN LYMPHOMA OF LYMPH NODES OF MULTIPLE REGIONS, UNSPECIFIED HODGKIN LYMPHOMA TYPE: Primary | ICD-10-CM

## 2023-05-26 DIAGNOSIS — C81.00 NODULAR LYMPHOCYTE PREDOMINANT HODGKIN LYMPHOMA, UNSPECIFIED BODY REGION: Primary | ICD-10-CM

## 2023-05-26 PROCEDURE — 77307 TELETHX ISODOSE PLAN CPLX: CPT | Performed by: RADIOLOGY

## 2023-05-26 PROCEDURE — 77334 RADIATION TREATMENT AID(S): CPT | Performed by: RADIOLOGY

## 2023-05-26 RX ORDER — TRAMADOL HYDROCHLORIDE 50 MG/1
100 TABLET ORAL EVERY 8 HOURS PRN
Qty: 180 TABLET | Refills: 0 | Status: SHIPPED | OUTPATIENT
Start: 2023-05-26 | End: 2023-06-25

## 2023-05-26 RX ORDER — DICYCLOMINE HCL 20 MG
TABLET ORAL
Qty: 30 TABLET | Refills: 1 | Status: SHIPPED | OUTPATIENT
Start: 2023-05-26

## 2023-05-26 NOTE — TELEPHONE ENCOUNTER
Patient's spouse stated no fever last night.  Patient still having symptoms.  Offered patient an appointment with Dr. Leary (and per MD) today.  Patient agreed.  Appointment made and patient notified.

## 2023-05-26 NOTE — TELEPHONE ENCOUNTER
Caller: SRINIVASAN HARPER    Relationship: Emergency Contact    Best call back number: [phone]    What is the best time to reach you: ANYTIME     Who are you requesting to speak with (clinical staff, provider,  specific staff member): DENNISE WHITE         What was the call regarding:     WANTED TO SPEAK WITH DENNISE REGARDING SCHEDULING PERSON HAD JUST  CALLED AND WAS TOLD DIFFERENT INFO THEN WAS RADIATION HAD SAID ABOUT WHEN TO COME IN  WANTED TO CLARIFY     PLEASE CALL TO DISCUSS     Do you require a callback: YES

## 2023-05-26 NOTE — PROGRESS NOTES
[facility]  Office number [phone]    Fax number [phone]     Follow up     Date: 23    Patient Name: Abraham Wu  MRN: [id number]  : 1950      Chief Complaint: Hodgkin Lymphoma follow up/treatment    Surgeon: Dr. Hiram Viveros MD    Cancer Staging: IV    History of Present Illness: Abraham Wu is a pleasant 73 y.o. male with PMH of hypertension, sleep apnea, hard of hearing who presents today for follow up of Hodgkin Lymphoma.     He initially presented 2022 with intractable diarrhea, low appetite, and a greater than 50 pound weight loss over several month period associated with intermittent fevers.  CT of the chest abdomen pelvis obtained in the ER shows of rectal mass spanning greater than 12 cm with adjacent adenopathy, bulky RP adenopathy, and findings concerning for left renal and liver metastases.  Small right pleural effusion with nodularity.  There was concern for impending obstruction, so he underwent diverting colostomy and biopsy on 2022.  Biopsies from the peritoneam showed a fibrotic nodule histiocytes and eosinophils admixed with CD30 positive large cells.  Rectal biopsies showed involvement by CD30 positive lymphoma, classic Hodgkin lymphoma versus CD30 positive T-cell or B-cell lymphoma.  There was extensive fibrosis and crush artifact.  He underwent repeat transrectal biopsy 10/4/2022 for further characterization which was felt to be most consistent with classical Hodgkin lymphoma.    He initiated chemotherapy with Brentuximab-AVD as an inpatient on 10/8/2022.  Cycle #1 was complicated by GI bleed requiring multiple blood transfusion and ultimately coil artery embolization 10/12; neutropenic fever, Candida esophagitis and mucositis.  He had a prolonged ICU stay  And required enteral feeding.  He was discharged 10/20/2022.    Following his last cycle he was admitted with multifocal PNA and +corona virus  infection.    Treatment history:  Brentuximab-AVD: C1 10/8/22  C2: 11/1/22 onward with DA 20% in BVD; full dose brentuximab  Completed C6 3/22/23    Interval history:    He returns with his supportive wife for an unscheduled visit.  More rectal pain. Stable BRBPR, still mild intermittent.   Good ostomy output  Some urinary hesitancy, but making a normal amoutn of urine. Wants to restart flomax. Urine was dark yesterday, but clear today. UA negative.   Using tramadol  1 tablet every 6 -8 hours. Feeling more sluggish. Breathing feels good.   Appetite is lower/mild nausea, but unsure if that is related to tramadol.  Has been checking temp, no fevers   Cough is mostly resolved.   Stable mild neuropathy in fingertips, still with some mild fine motor difficulty. No numbness in feet continuously but does get tingling in balls of feet intermittently      Past Medical History:   Past Medical History:   Diagnosis Date   • benign polypoid tissue right lung    • Cancer     HODGKINS LYMPHOMA, RECTAL CANCER   • Diabetes mellitus    • Hyperlipidemia    • Hypertension    • Lymphoma    • Mycobacterium mucogenicum    • SHERRI (obstructive sleep apnea)     O2 2L/MIN AT NIGHT ONLY   • Pneumonia     2023   • Seasonal allergies        Past Surgical History:   Past Surgical History:   Procedure Laterality Date   • BRONCHOSCOPY      removal of obstructing polypoid tissue right middle lung   • COLOSTOMY N/A 09/22/2022    Procedure: LAPAROSCOPIC COLOSTOMY CREATION, FLEXIBLE SIGMOIDOSCOPY;  Surgeon: Hiram Viveros MD;  Location: [facility] OR;  Service: General;  Laterality: N/A;   • ENDOSCOPY N/A 10/10/2022    Procedure: ESOPHAGOGASTRODUODENOSCOPY;  Surgeon: Neeraj Lynn MD;  Location:  [facility];  Service: Gastroenterology;  Laterality: N/A;   • ENDOSCOPY N/A 10/12/2022    Procedure: ESOPHAGOGASTRODUODENOSCOPY;  Surgeon: Brunner, Mark I, MD;  Location:  [facility];  Service: Gastroenterology;  Laterality: N/A;   • EXAM UNDER  ANESTHESIA, RECTAL BIOPSY N/A 10/04/2022    Procedure: EXAM UNDER ANESTHESIA, TRANSANAL BIOPSY WITH TRUCUT NEEDLE (LITHOTOMY-CANDY CANE);  Surgeon: Hiram Viveros MD;  Location: [facility];  Service: General;  Laterality: N/A;   • PERIPHERALLY INSERTED CENTRAL CATHETER INSERTION      Removed 11/28/2022   • VENOUS ACCESS DEVICE (PORT) INSERTION Right 11/28/2022       Family History:   Family History   Problem Relation Age of Onset   • Diabetes Mother    • Diabetes Father    • Heart disease Father        Social History:   Social History     Socioeconomic History   • Marital status:    Tobacco Use   • Smoking status: Never   • Smokeless tobacco: Never   Vaping Use   • Vaping Use: Never used   Substance and Sexual Activity   • Alcohol use: Not Currently     Comment: previously drank 2 glasses of wine/beer nightly   • Drug use: Never   • Sexual activity: Defer       Medications:     Current Outpatient Medications:   •  dicyclomine (BENTYL) 20 MG tablet, Take 1 tablet by mouth 4 (Four) Times a Day As Needed (UPSET STOMACH)., Disp: , Rfl:   •  fluticasone (FLONASE) 50 MCG/ACT nasal spray, 1 spray into the nostril(s) as directed by provider Daily As Needed for Allergies or Rhinitis. OTC, Disp: , Rfl:   •  KLOR-CON 20 MEQ CR tablet, Take 1 tablet by mouth 2 (Two) Times a Day. (Patient not taking: Reported on 5/23/2023), Disp: , Rfl:   •  lidocaine-prilocaine (EMLA) 2.5-2.5 % cream, Apply 1 application topically to the appropriate area as directed As Needed (45-60 minutes prior to port access.  Cover with saran/plastic wrap.). (Patient taking differently: Apply 1 application topically to the appropriate area as directed As Needed for Mild Pain (45-60 minutes prior to port access.  Cover with saran/plastic wrap.). NOT CURRENTLY USING), Disp: 30 g, Rfl: 3  •  loratadine (CLARITIN) 10 MG tablet, Take 1 tablet by mouth Daily., Disp: 30 tablet, Rfl: 5  •  OLANZapine (zyPREXA) 5 MG tablet, Take 1 tablet by mouth Every  "Night. For 3 days on Days 2, 3 and 4 and on days 16, 17, and 18., Disp: 6 tablet, Rfl: 5  •  ondansetron (Zofran) 8 MG tablet, Take 1 tablet by mouth Every 8 (Eight) Hours As Needed for Nausea or Vomiting., Disp: 30 tablet, Rfl: 5  •  pantoprazole (PROTONIX) 40 MG EC tablet, Take 1 tablet by mouth Daily., Disp: 90 tablet, Rfl: 1  •  prochlorperazine (COMPAZINE) 5 MG tablet, Take 1 tablet by mouth Every 6 (Six) Hours As Needed for Nausea or Vomiting., Disp: 30 tablet, Rfl: 2  •  rosuvastatin (CRESTOR) 10 MG tablet, Take 1 tablet by mouth Daily., Disp: , Rfl:   •  traMADol (ULTRAM) 50 MG tablet, Take 1 tablet by mouth Every 6 (Six) Hours As Needed for Moderate Pain., Disp: 30 tablet, Rfl: 0    Allergies:   No Known Allergies    Objective     Vital Signs:   Vitals:    05/26/23 1059   BP: 114/66   Pulse: 71   Resp: 16   Temp: 97.1 °F (36.2 °C)   TempSrc: Infrared   SpO2: 97%   Weight: 70.1 kg (154 lb 9.6 oz)   Height: 162.6 cm (64.02\")   PainSc: 0-No pain    Body mass index is 26.52 kg/m².   Pain Score    05/26/23 1059   PainSc: 0-No pain       ECOG Performance Status: 2    Physical Exam:   General: No acute distress. Well appearing.  HEENT: Normocephalic, atraumatic. Sclera anicteric. OP clear  Neck: supple, no palpable LAD.  Cardiovascular: regular rate and rhythm. No murmurs.   Respiratory: Normal rate. Clear to auscultation bilaterally  Abdomen: Soft, nontender, non distended with normoactive bowel sounds. Ostomy LLQ.  Lymph: no supraclavicular or axillary adenopathy  Neuro: Alert and oriented x 3.   Ext: Symmetric, no swelling.   Psych: Euthymic  Skin: right port site  C/d/i        Laboratory/Imaging Reviewed:   Lab on 05/25/2023   Component Date Value Ref Range Status   • Color, UA 05/25/2023 Yellow  Yellow, Straw Final   • Appearance, UA 05/25/2023 Clear  Clear Final   • pH, UA 05/25/2023 7.0  5.0 - 8.0 Final   • Specific Gravity, UA 05/25/2023 1.021  1.001 - 1.030 Final   • Glucose, UA 05/25/2023 Negative  " Negative Final   • Ketones, UA 05/25/2023 Trace (A)  Negative Final   • Bilirubin, UA 05/25/2023 Negative  Negative Final   • Blood, UA 05/25/2023 Negative  Negative Final   • Protein, UA 05/25/2023 Negative  Negative Final   • Leuk Esterase, UA 05/25/2023 Negative  Negative Final   • Nitrite, UA 05/25/2023 Negative  Negative Final   • Urobilinogen, UA 05/25/2023 4.0 E.U./dL (A)  0.2 - 1.0 E.U./dL Final   • Glucose 05/25/2023 125 (H)  65 - 99 mg/dL Final   • BUN 05/25/2023 16  8 - 23 mg/dL Final   • Creatinine 05/25/2023 0.59 (L)  0.76 - 1.27 mg/dL Final   • Sodium 05/25/2023 134 (L)  136 - 145 mmol/L Final   • Potassium 05/25/2023 4.6  3.5 - 5.2 mmol/L Final   • Chloride 05/25/2023 95 (L)  98 - 107 mmol/L Final   • CO2 05/25/2023 25.0  22.0 - 29.0 mmol/L Final   • Calcium 05/25/2023 9.3  8.6 - 10.5 mg/dL Final   • Total Protein 05/25/2023 7.4  6.0 - 8.5 g/dL Final   • Albumin 05/25/2023 3.4 (L)  3.5 - 5.2 g/dL Final   • ALT (SGPT) 05/25/2023 12  1 - 41 U/L Final   • AST (SGOT) 05/25/2023 15  1 - 40 U/L Final   • Alkaline Phosphatase 05/25/2023 93  39 - 117 U/L Final   • Total Bilirubin 05/25/2023 0.3  0.0 - 1.2 mg/dL Final   • Globulin 05/25/2023 4.0  gm/dL Final    Calculated Result   • A/G Ratio 05/25/2023 0.9  g/dL Final   • BUN/Creatinine Ratio 05/25/2023 27.1 (H)  7.0 - 25.0 Final   • Anion Gap 05/25/2023 14.0  5.0 - 15.0 mmol/L Final   • eGFR 05/25/2023 103.7  >60.0 mL/min/1.73 Final   • WBC 05/25/2023 6.34  3.40 - 10.80 10*3/mm3 Final   • RBC 05/25/2023 3.50 (L)  4.14 - 5.80 10*6/mm3 Final   • Hemoglobin 05/25/2023 10.7 (L)  13.0 - 17.7 g/dL Final   • Hematocrit 05/25/2023 33.3 (L)  37.5 - 51.0 % Final   • MCV 05/25/2023 95.1  79.0 - 97.0 fL Final   • MCH 05/25/2023 30.6  26.6 - 33.0 pg Final   • MCHC 05/25/2023 32.1  31.5 - 35.7 g/dL Final   • RDW 05/25/2023 13.9  12.3 - 15.4 % Final   • RDW-SD 05/25/2023 48.9  37.0 - 54.0 fl Final   • MPV 05/25/2023 8.4  6.0 - 12.0 fL Final   • Platelets 05/25/2023 309   140 - 450 10*3/mm3 Final   • Neutrophil % 05/25/2023 69.4  42.7 - 76.0 % Final   • Lymphocyte % 05/25/2023 17.5 (L)  19.6 - 45.3 % Final   • Monocyte % 05/25/2023 11.8  5.0 - 12.0 % Final   • Eosinophil % 05/25/2023 0.8  0.3 - 6.2 % Final   • Basophil % 05/25/2023 0.2  0.0 - 1.5 % Final   • Immature Grans % 05/25/2023 0.3  0.0 - 0.5 % Final   • Neutrophils, Absolute 05/25/2023 4.40  1.70 - 7.00 10*3/mm3 Final   • Lymphocytes, Absolute 05/25/2023 1.11  0.70 - 3.10 10*3/mm3 Final   • Monocytes, Absolute 05/25/2023 0.75  0.10 - 0.90 10*3/mm3 Final   • Eosinophils, Absolute 05/25/2023 0.05  0.00 - 0.40 10*3/mm3 Final   • Basophils, Absolute 05/25/2023 0.01  0.00 - 0.20 10*3/mm3 Final   • Immature Grans, Absolute 05/25/2023 0.02  0.00 - 0.05 10*3/mm3 Final   Lab on 05/23/2023   Component Date Value Ref Range Status   • Glucose 05/23/2023 91  65 - 99 mg/dL Final   • BUN 05/23/2023 14  8 - 23 mg/dL Final   • Creatinine 05/23/2023 0.60 (L)  0.76 - 1.27 mg/dL Final   • Sodium 05/23/2023 136  136 - 145 mmol/L Final   • Potassium 05/23/2023 4.6  3.5 - 5.2 mmol/L Final   • Chloride 05/23/2023 99  98 - 107 mmol/L Final   • CO2 05/23/2023 29.0  22.0 - 29.0 mmol/L Final   • Calcium 05/23/2023 9.7  8.6 - 10.5 mg/dL Final   • Total Protein 05/23/2023 7.8  6.0 - 8.5 g/dL Final   • Albumin 05/23/2023 3.6  3.5 - 5.2 g/dL Final   • ALT (SGPT) 05/23/2023 12  1 - 41 U/L Final   • AST (SGOT) 05/23/2023 14  1 - 40 U/L Final   • Alkaline Phosphatase 05/23/2023 95  39 - 117 U/L Final   • Total Bilirubin 05/23/2023 0.4  0.0 - 1.2 mg/dL Final   • Globulin 05/23/2023 4.2  gm/dL Final    Calculated Result   • A/G Ratio 05/23/2023 0.9  g/dL Final   • BUN/Creatinine Ratio 05/23/2023 23.3  7.0 - 25.0 Final   • Anion Gap 05/23/2023 8.0  5.0 - 15.0 mmol/L Final   • eGFR 05/23/2023 103.2  >60.0 mL/min/1.73 Final   • Ferritin 05/23/2023 458.00 (H)  30.00 - 400.00 ng/mL Final   • WBC 05/23/2023 5.64  3.40 - 10.80 10*3/mm3 Final   • RBC 05/23/2023 3.64  (L)  4.14 - 5.80 10*6/mm3 Final   • Hemoglobin 05/23/2023 11.1 (L)  13.0 - 17.7 g/dL Final   • Hematocrit 05/23/2023 35.2 (L)  37.5 - 51.0 % Final   • MCV 05/23/2023 96.7  79.0 - 97.0 fL Final   • MCH 05/23/2023 30.5  26.6 - 33.0 pg Final   • MCHC 05/23/2023 31.5  31.5 - 35.7 g/dL Final   • RDW 05/23/2023 13.9  12.3 - 15.4 % Final   • RDW-SD 05/23/2023 50.1  37.0 - 54.0 fl Final   • MPV 05/23/2023 8.6  6.0 - 12.0 fL Final   • Platelets 05/23/2023 304  140 - 450 10*3/mm3 Final   • Neutrophil % 05/23/2023 70.6  42.7 - 76.0 % Final   • Lymphocyte % 05/23/2023 15.6 (L)  19.6 - 45.3 % Final   • Monocyte % 05/23/2023 12.8 (H)  5.0 - 12.0 % Final   • Eosinophil % 05/23/2023 0.4  0.3 - 6.2 % Final   • Basophil % 05/23/2023 0.2  0.0 - 1.5 % Final   • Immature Grans % 05/23/2023 0.4  0.0 - 0.5 % Final   • Neutrophils, Absolute 05/23/2023 3.99  1.70 - 7.00 10*3/mm3 Final   • Lymphocytes, Absolute 05/23/2023 0.88  0.70 - 3.10 10*3/mm3 Final   • Monocytes, Absolute 05/23/2023 0.72  0.10 - 0.90 10*3/mm3 Final   • Eosinophils, Absolute 05/23/2023 0.02  0.00 - 0.40 10*3/mm3 Final   • Basophils, Absolute 05/23/2023 0.01  0.00 - 0.20 10*3/mm3 Final   • Immature Grans, Absolute 05/23/2023 0.02  0.00 - 0.05 10*3/mm3 Final     L_> R before during and after treatment.        L-->R After treatment-->interval PET          Narrative & Impression   XR CHEST PA AND LATERAL     Date of Exam: 5/22/2023 12:40 PM EDT     Indication: c81.90 lymphoma hodgkins     Comparison: March 10, 2023, PET/CT May 8, 2023, chest radiograph October 14, 2022     Findings:  There is a right IJ port catheter with tip projecting in the region of the cavoatrial junction, as before.     The opacities seen in the bilateral lungs on the prior chest radiograph have decreased in the interval. There are some residual airspace and interstitial opacities in these locations. No definitive new or worsening infiltrate is seen at this time. No   significant effusion or  pneumothorax. Heart size appears within normal limits. Mediastinal contour appears grossly unchanged.     There are degenerative changes in the thoracic spine. Embolization coils are seen in the upper abdomen on the lateral view.     IMPRESSION:  Impression:  Interval decrease in bilateral lung opacities compared to the most recent prior chest radiograph. There is some residual interstitial and airspace opacities. Findings suggest improving pneumonia with possible residual infectious/inflammatory changes and   scarring. Continued follow-up is recommended.       Assessment / Plan      Assessment/Plan:   1.  CD30 positive classical Hodgkin's lymphoma  2.  Residual PET avid rectal mass, now enlarging on radiation planning CT  3.  Pulmonary interstitial infiltrates  -He completed 6 cycles of BV-AVD 3/23/2023.   -Cycle 6 was complicated by coronavirus infection and multifocal pneumonia.  He is clinically improving from this but posttherapy PET showed persistent bilateral interstitial changes and mild adenopathy, that I believe is most likely reactive to his recent infection.  -He did not have a pretreatment PET/CT given that he was profoundly ill and admitted for cycle #1.  He has had clear response with resolution of the hepatic lesions, marked improvement in the retroperitoneal adenopathy, and 50% reduction in the size of the rectal mass.  However he has persistent uptake in the rectal mass, [place] 5, which may be secondary to inflammatory/posttreatment uptake, but certainly could represent some amount of residual disease.  Last week we discussed that his initial pathology was difficult to interpret because of marked inflammatory and fibrotic response.  I reviewed his PET/CT images and discussed with his colorectal surgeon.  He believes that the PET avid area would be amenable to biopsy under anoscopy, and so we will proceed with this to try to confirm whether he does have residual disease.  Even if the biopsy is  negative, he will need to be closely monitored with interval PET/CT and would consider consolidative radiotherapy to the rectal mass given initial bulky disease.  -He is scheduled for anoscopy with biopsy next week.  He is now presenting with rectal bleeding and increased pain which is very concerning for residual/progressive disease.  Given his ongoing recovery from recent viral pneumonia I do not think he is currently fit enough to proceed with further systemic therapy, and given that the rectum was the only clear site of disease on imaging I recommended that we proceed with radiation to the site.  Chest x-ray does show improvement in his pulmonary infiltrate so I do not think we are dealing with drug-induced pneumonitis as he has not received any interim treatment. He is now presenting with worsening symptoms. I called Dr. Alcocer. The mass has grown on planning CT. Will expedite radiation planning and try to begin next week. Pt knows to come to ED over the weekend if his urine output decreases, bleeding increases or poorly controlled pain and would require admission in that setting. We will proceed with palliative radiation but also plan second line systemic therapy.  Given prior treatment with Brentuximab and national shortage of carbo/limited cis, I discussed BeGEV. Will also refer to consideration of ASCT.   Follow up 1 week    4.  Cancer pain  -Escalate tramadol to 2 tabs q 8 hours.     5. GI bleed  -PPI, continue daily.  -New rectal bleeding secondary to malignancy, plan as above    5. Access  -Port    Addendum: mesenteric nodules noted on planning CT. Plan 5 fractions of radiation next week, anticipate chemo start 6/7. If his symptoms worsen he will need admission for urgent radiation vs chemo start.    Follow Up:   In 1 week    Kaycee Leary MD  Hematology and Oncology     I have spent a total of 46 min on reviewing test results/preparing to see patient, counseling patient, performing medically  appropriate exam, coordinating care, discussion with specialists and documenting clinical information in the electronic or other health record

## 2023-05-29 ENCOUNTER — ANESTHESIA EVENT (OUTPATIENT)
Dept: PERIOP | Facility: HOSPITAL | Age: 73
End: 2023-05-29
Payer: MEDICARE

## 2023-05-29 RX ORDER — SODIUM CHLORIDE 0.9 % (FLUSH) 0.9 %
10 SYRINGE (ML) INJECTION AS NEEDED
Status: CANCELLED | OUTPATIENT
Start: 2023-05-29

## 2023-05-29 RX ORDER — SODIUM CHLORIDE 9 MG/ML
40 INJECTION, SOLUTION INTRAVENOUS AS NEEDED
Status: CANCELLED | OUTPATIENT
Start: 2023-05-29

## 2023-05-29 RX ORDER — FAMOTIDINE 10 MG/ML
20 INJECTION, SOLUTION INTRAVENOUS ONCE
Status: CANCELLED | OUTPATIENT
Start: 2023-05-29 | End: 2023-05-29

## 2023-05-29 RX ORDER — SODIUM CHLORIDE 0.9 % (FLUSH) 0.9 %
10 SYRINGE (ML) INJECTION EVERY 12 HOURS SCHEDULED
Status: CANCELLED | OUTPATIENT
Start: 2023-05-29

## 2023-05-30 ENCOUNTER — HOSPITAL ENCOUNTER (OUTPATIENT)
Facility: HOSPITAL | Age: 73
Setting detail: HOSPITAL OUTPATIENT SURGERY
Discharge: HOME | End: 2023-05-30
Attending: STUDENT IN AN ORGANIZED HEALTH CARE EDUCATION/TRAINING PROGRAM | Admitting: STUDENT IN AN ORGANIZED HEALTH CARE EDUCATION/TRAINING PROGRAM
Payer: MEDICARE

## 2023-05-30 ENCOUNTER — ANESTHESIA (OUTPATIENT)
Dept: PERIOP | Facility: HOSPITAL | Age: 73
End: 2023-05-30
Payer: MEDICARE

## 2023-05-30 VITALS
SYSTOLIC BLOOD PRESSURE: 139 MMHG | RESPIRATION RATE: 16 BRPM | TEMPERATURE: 98 F | HEIGHT: 64 IN | HEART RATE: 59 BPM | OXYGEN SATURATION: 99 % | WEIGHT: 154.6 LBS | BODY MASS INDEX: 26.4 KG/M2 | DIASTOLIC BLOOD PRESSURE: 78 MMHG

## 2023-05-30 DIAGNOSIS — K62.89 RECTAL MASS: Primary | ICD-10-CM

## 2023-05-30 LAB — GLUCOSE BLDC GLUCOMTR-MCNC: 109 MG/DL (ref 70–130)

## 2023-05-30 PROCEDURE — 88360 TUMOR IMMUNOHISTOCHEM/MANUAL: CPT | Performed by: STUDENT IN AN ORGANIZED HEALTH CARE EDUCATION/TRAINING PROGRAM

## 2023-05-30 PROCEDURE — 88305 TISSUE EXAM BY PATHOLOGIST: CPT | Performed by: STUDENT IN AN ORGANIZED HEALTH CARE EDUCATION/TRAINING PROGRAM

## 2023-05-30 PROCEDURE — 25010000002 ONDANSETRON PER 1 MG: Performed by: NURSE ANESTHETIST, CERTIFIED REGISTERED

## 2023-05-30 PROCEDURE — 25010000002 FENTANYL CITRATE (PF) 100 MCG/2ML SOLUTION: Performed by: NURSE ANESTHETIST, CERTIFIED REGISTERED

## 2023-05-30 PROCEDURE — 25010000002 DEXAMETHASONE PER 1 MG: Performed by: NURSE ANESTHETIST, CERTIFIED REGISTERED

## 2023-05-30 PROCEDURE — 88342 IMHCHEM/IMCYTCHM 1ST ANTB: CPT | Performed by: STUDENT IN AN ORGANIZED HEALTH CARE EDUCATION/TRAINING PROGRAM

## 2023-05-30 PROCEDURE — 82948 REAGENT STRIP/BLOOD GLUCOSE: CPT

## 2023-05-30 PROCEDURE — 25010000002 PROPOFOL 10 MG/ML EMULSION: Performed by: NURSE ANESTHETIST, CERTIFIED REGISTERED

## 2023-05-30 PROCEDURE — 88341 IMHCHEM/IMCYTCHM EA ADD ANTB: CPT | Performed by: STUDENT IN AN ORGANIZED HEALTH CARE EDUCATION/TRAINING PROGRAM

## 2023-05-30 PROCEDURE — 25010000002 CEFOXITIN PER 1 G: Performed by: STUDENT IN AN ORGANIZED HEALTH CARE EDUCATION/TRAINING PROGRAM

## 2023-05-30 PROCEDURE — 0 LIDOCAINE 1 % SOLUTION 20 ML VIAL: Performed by: STUDENT IN AN ORGANIZED HEALTH CARE EDUCATION/TRAINING PROGRAM

## 2023-05-30 RX ORDER — DIAZEPAM 5 MG/1
5 TABLET ORAL EVERY 8 HOURS PRN
Qty: 21 TABLET | Refills: 0 | Status: SHIPPED | OUTPATIENT
Start: 2023-05-30 | End: 2023-06-06

## 2023-05-30 RX ORDER — LIDOCAINE HYDROCHLORIDE 10 MG/ML
0.5 INJECTION, SOLUTION EPIDURAL; INFILTRATION; INTRACAUDAL; PERINEURAL ONCE AS NEEDED
Status: COMPLETED | OUTPATIENT
Start: 2023-05-30 | End: 2023-05-30

## 2023-05-30 RX ORDER — FAMOTIDINE 20 MG/1
20 TABLET, FILM COATED ORAL ONCE
Status: COMPLETED | OUTPATIENT
Start: 2023-05-30 | End: 2023-05-30

## 2023-05-30 RX ORDER — FENTANYL CITRATE 50 UG/ML
INJECTION, SOLUTION INTRAMUSCULAR; INTRAVENOUS AS NEEDED
Status: DISCONTINUED | OUTPATIENT
Start: 2023-05-30 | End: 2023-05-30 | Stop reason: SURG

## 2023-05-30 RX ORDER — CEFOXITIN 2 G/1
2 INJECTION, POWDER, FOR SOLUTION INTRAVENOUS ONCE
Status: DISCONTINUED | OUTPATIENT
Start: 2023-05-30 | End: 2023-05-30

## 2023-05-30 RX ORDER — MIDAZOLAM HYDROCHLORIDE 1 MG/ML
0.5 INJECTION INTRAMUSCULAR; INTRAVENOUS
Status: DISCONTINUED | OUTPATIENT
Start: 2023-05-30 | End: 2023-05-30 | Stop reason: HOSPADM

## 2023-05-30 RX ORDER — LIDOCAINE HYDROCHLORIDE 10 MG/ML
INJECTION, SOLUTION EPIDURAL; INFILTRATION; INTRACAUDAL; PERINEURAL AS NEEDED
Status: DISCONTINUED | OUTPATIENT
Start: 2023-05-30 | End: 2023-05-30 | Stop reason: SURG

## 2023-05-30 RX ORDER — PROPOFOL 10 MG/ML
VIAL (ML) INTRAVENOUS AS NEEDED
Status: DISCONTINUED | OUTPATIENT
Start: 2023-05-30 | End: 2023-05-30 | Stop reason: SURG

## 2023-05-30 RX ORDER — MAGNESIUM HYDROXIDE 1200 MG/15ML
LIQUID ORAL AS NEEDED
Status: DISCONTINUED | OUTPATIENT
Start: 2023-05-30 | End: 2023-05-30 | Stop reason: HOSPADM

## 2023-05-30 RX ORDER — DEXAMETHASONE SODIUM PHOSPHATE 4 MG/ML
INJECTION, SOLUTION INTRA-ARTICULAR; INTRALESIONAL; INTRAMUSCULAR; INTRAVENOUS; SOFT TISSUE AS NEEDED
Status: DISCONTINUED | OUTPATIENT
Start: 2023-05-30 | End: 2023-05-30 | Stop reason: SURG

## 2023-05-30 RX ORDER — FENTANYL CITRATE 50 UG/ML
50 INJECTION, SOLUTION INTRAMUSCULAR; INTRAVENOUS
Status: DISCONTINUED | OUTPATIENT
Start: 2023-05-30 | End: 2023-05-30 | Stop reason: HOSPADM

## 2023-05-30 RX ORDER — ONDANSETRON 2 MG/ML
INJECTION INTRAMUSCULAR; INTRAVENOUS AS NEEDED
Status: DISCONTINUED | OUTPATIENT
Start: 2023-05-30 | End: 2023-05-30 | Stop reason: SURG

## 2023-05-30 RX ORDER — SODIUM CHLORIDE, SODIUM LACTATE, POTASSIUM CHLORIDE, CALCIUM CHLORIDE 600; 310; 30; 20 MG/100ML; MG/100ML; MG/100ML; MG/100ML
9 INJECTION, SOLUTION INTRAVENOUS CONTINUOUS
Status: DISCONTINUED | OUTPATIENT
Start: 2023-05-30 | End: 2023-05-30 | Stop reason: HOSPADM

## 2023-05-30 RX ADMIN — ONDANSETRON 4 MG: 2 INJECTION INTRAMUSCULAR; INTRAVENOUS at 14:11

## 2023-05-30 RX ADMIN — LIDOCAINE HYDROCHLORIDE 0.5 ML: 10 INJECTION, SOLUTION EPIDURAL; INFILTRATION; INTRACAUDAL; PERINEURAL at 13:32

## 2023-05-30 RX ADMIN — LIDOCAINE HYDROCHLORIDE 50 MG: 10 INJECTION, SOLUTION EPIDURAL; INFILTRATION; INTRACAUDAL; PERINEURAL at 14:07

## 2023-05-30 RX ADMIN — DEXAMETHASONE SODIUM PHOSPHATE 4 MG: 4 INJECTION, SOLUTION INTRAMUSCULAR; INTRAVENOUS at 14:11

## 2023-05-30 RX ADMIN — SODIUM CHLORIDE, POTASSIUM CHLORIDE, SODIUM LACTATE AND CALCIUM CHLORIDE: 600; 310; 30; 20 INJECTION, SOLUTION INTRAVENOUS at 14:34

## 2023-05-30 RX ADMIN — FENTANYL CITRATE 50 MCG: 50 INJECTION, SOLUTION INTRAMUSCULAR; INTRAVENOUS at 14:24

## 2023-05-30 RX ADMIN — CEFOXITIN SODIUM 2 G: 2 POWDER, FOR SOLUTION INTRAVENOUS at 14:07

## 2023-05-30 RX ADMIN — PROPOFOL 150 MG: 10 INJECTION, EMULSION INTRAVENOUS at 14:07

## 2023-05-30 RX ADMIN — FENTANYL CITRATE 50 MCG: 50 INJECTION, SOLUTION INTRAMUSCULAR; INTRAVENOUS at 14:07

## 2023-05-30 RX ADMIN — SODIUM CHLORIDE, POTASSIUM CHLORIDE, SODIUM LACTATE AND CALCIUM CHLORIDE 9 ML/HR: 600; 310; 30; 20 INJECTION, SOLUTION INTRAVENOUS at 13:31

## 2023-05-30 RX ADMIN — FAMOTIDINE 20 MG: 20 TABLET ORAL at 13:32

## 2023-05-30 NOTE — DISCHARGE INSTRUCTIONS
Apply dry gauze to catch any drainage from the rectum.  Do expect increased blood over the next day or 2.  Cleared to start radiation this week  Utilize the Valium as needed for anal spasms  If issues arise, follow-up with Dr. Viveros, otherwise no specific follow-up is required.  Utilize sitz bath's for anal spasms.

## 2023-05-30 NOTE — H&P
Pre-Op H&P  Abraham Wu  [id number]  1950      Chief complaint: Rectal pain      Subjective:  Patient is a 73 y.o.male presents for scheduled surgery by Dr. Viveros. He anticipates a EXAM UNDER ANESTHESIA, TRANSANAL  BIOPSY W/ TRUCUT NEEDLE today. In September 2022 he reported intractable diarrhea and a 50 lb weight loss over a several month period. He was seen in ER and CT chest found a rectal mass spanning greater than 12 cm with adjacent adenopathy, bulky RP adenopathy, and findings concerning for left renal and liver metastases. He underwent diverting colostomy and biopsy on 9/22/2022. He reports worsening rectal pain and bleeding.       Review of Systems:  Constitutional-- No fever, chills or sweats. + fatigue.  CV-- No chest pain, palpitation or syncope. +HTN, HLD  Resp-- No cough, hemoptysis. +SOB, SHERRI on O2 at night   Skin--No rashes or lesions      Allergies: No Known Allergies      Home Meds:  Medications Prior to Admission   Medication Sig Dispense Refill Last Dose   • dicyclomine (BENTYL) 20 MG tablet TAKE ONE TABLET BY MOUTH EVERY 6 HOURS AS NEEDED FOR ABDOMINAL CRAMPS 30 tablet 1 Past Week   • fluticasone (FLONASE) 50 MCG/ACT nasal spray 1 spray into the nostril(s) as directed by provider Daily As Needed for Allergies or Rhinitis. OTC   Past Week   • loratadine (CLARITIN) 10 MG tablet Take 1 tablet by mouth Daily. 30 tablet 5 Past Week   • ondansetron (Zofran) 8 MG tablet Take 1 tablet by mouth Every 8 (Eight) Hours As Needed for Nausea or Vomiting. 30 tablet 5 Past Month   • pantoprazole (PROTONIX) 40 MG EC tablet Take 1 tablet by mouth Daily. 90 tablet 1 Past Week   • prochlorperazine (COMPAZINE) 5 MG tablet Take 1 tablet by mouth Every 6 (Six) Hours As Needed for Nausea or Vomiting. 30 tablet 2 Past Month   • rosuvastatin (CRESTOR) 10 MG tablet Take 1 tablet by mouth Daily.   Past Week   • traMADol (ULTRAM) 50 MG tablet Take 2 tablets by mouth Every 8 (Eight) Hours As Needed for  Moderate Pain (cancer pain) for up to 30 days. 180 tablet 0 Past Week   • KLOR-CON 20 MEQ CR tablet Take 1 tablet by mouth 2 (Two) Times a Day. (Patient not taking: Reported on 5/23/2023)      • lidocaine-prilocaine (EMLA) 2.5-2.5 % cream Apply 1 application topically to the appropriate area as directed As Needed (45-60 minutes prior to port access.  Cover with saran/plastic wrap.). (Patient taking differently: Apply 1 application topically to the appropriate area as directed As Needed for Mild Pain (45-60 minutes prior to port access.  Cover with saran/plastic wrap.). NOT CURRENTLY USING) 30 g 3 Unknown   • OLANZapine (zyPREXA) 5 MG tablet Take 1 tablet by mouth Every Night. For 3 days on Days 2, 3 and 4 and on days 16, 17, and 18. 6 tablet 5          PMH:   Past Medical History:   Diagnosis Date   • benign polypoid tissue right lung    • Cancer     HODGKINS LYMPHOMA, RECTAL CANCER   • Diabetes mellitus    • Hyperlipidemia    • Hypertension    • Lymphoma    • Mycobacterium mucogenicum    • SHERRI (obstructive sleep apnea)     O2 2L/MIN AT NIGHT ONLY   • Pneumonia     2023   • Seasonal allergies      PSH:    Past Surgical History:   Procedure Laterality Date   • BRONCHOSCOPY      removal of obstructing polypoid tissue right middle lung   • COLOSTOMY N/A 09/22/2022    Procedure: LAPAROSCOPIC COLOSTOMY CREATION, FLEXIBLE SIGMOIDOSCOPY;  Surgeon: Hiram Viveros MD;  Location: [facility] OR;  Service: General;  Laterality: N/A;   • DENTAL PROCEDURE     • ENDOSCOPY N/A 10/10/2022    Procedure: ESOPHAGOGASTRODUODENOSCOPY;  Surgeon: Neeraj Lynn MD;  Location:  [facility];  Service: Gastroenterology;  Laterality: N/A;   • ENDOSCOPY N/A 10/12/2022    Procedure: ESOPHAGOGASTRODUODENOSCOPY;  Surgeon: Brunner, Mark I, MD;  Location:  [facility];  Service: Gastroenterology;  Laterality: N/A;   • EXAM UNDER ANESTHESIA, RECTAL BIOPSY N/A 10/04/2022    Procedure: EXAM UNDER ANESTHESIA, TRANSANAL BIOPSY WITH TRUCUT  "NEEDLE (LITHOTOMY-CANDY CANE);  Surgeon: Hiram Viveros MD;  Location: [facility] OR;  Service: General;  Laterality: N/A;   • PERIPHERALLY INSERTED CENTRAL CATHETER INSERTION      Removed 11/28/2022   • VENOUS ACCESS DEVICE (PORT) INSERTION Right 11/28/2022       Social History:   Tobacco:   Social History     Tobacco Use   Smoking Status Never   Smokeless Tobacco Never      Alcohol:     Social History     Substance and Sexual Activity   Alcohol Use Not Currently    Comment: previously drank 2 glasses of wine/beer nightly         Physical Exam:/75 (BP Location: Right arm, Patient Position: Lying)   Pulse 83   Temp 97.4 °F (36.3 °C) (Temporal)   Resp 16   Ht 162.6 cm (64.02\")   Wt 70.1 kg (154 lb 9.6 oz)   SpO2 96%   BMI 26.52 kg/m²       General Appearance:    Alert, cooperative, no distress, appears stated age   Head:    Normocephalic, without obvious abnormality, atraumatic   Lungs:     Clear to auscultation bilaterally, respirations unlabored    Heart:   Regular rate and rhythm, S1 and S2 normal    Abdomen:    Soft without tenderness   Extremities:   Extremities normal, atraumatic, no cyanosis or edema   Skin:   Skin color, texture, turgor normal, no rashes or lesions   Neurologic:   Grossly intact     Results Review:     LABS:  Lab Results   Component Value Date    WBC 6.34 05/25/2023    HGB 10.7 (L) 05/25/2023    HCT 33.3 (L) 05/25/2023    MCV 95.1 05/25/2023     05/25/2023    NEUTROABS 4.40 05/25/2023    GLUCOSE 125 (H) 05/25/2023    BUN 16 05/25/2023    CREATININE 0.59 (L) 05/25/2023     (L) 05/25/2023    K 4.6 05/25/2023    CL 95 (L) 05/25/2023    CO2 25.0 05/25/2023    MG 1.9 11/15/2022    PHOS 2.5 10/20/2022    CALCIUM 9.3 05/25/2023    ALBUMIN 3.4 (L) 05/25/2023    AST 15 05/25/2023    ALT 12 05/25/2023    BILITOT 0.3 05/25/2023       RADIOLOGY:  Imaging Results (Last 72 Hours)     ** No results found for the last 72 hours. **          I reviewed the patient's new clinical " results.    Impression: Rectal mass; CD30 positive classical Hodgkin's lymphoma       Plan: EXAM UNDER ANESTHESIA, TRANSANAL  BIOPSY W/ TRUCUT NEEDLE      Magalie Chery, PORTILLO   5/30/2023   13:13 EDT         73-year-old male who is well-known to me.  I first met him in September 2022 large rectal mass, I biopsied this and confirmed Hodgkin's lymphoma, he underwent chemotherapy and then represented to my office on encouragement from his oncologist in [place] for reevaluation.  On digital rectal exam he had nodular mass within the rectum, I was asked to obtain a biopsy of this to assess for persistent disease from his oncologist.  He voices understanding the risks and benefits associated with an anorectal exam under anesthesia with biopsy of his rectal mass and wishes to proceed.  Specifically we discussed bleeding, infection, the need for reoperation due to these.  Both he and his wife agree and wish to proceed.

## 2023-05-30 NOTE — OP NOTE
[facility]  OPERATIVE REPORT      Abraham Wu  5/30/2023    Pre-op Diagnosis:   History of CD30 positive lymphoma with rectal involvement      Post-op Diagnosis:    Same    Procedure(s) Performed:  Anorectal exam under anesthesia, proctoscopy and biopsy of rectal mass    Surgeon(s):  Hiram Viveros MD    Anesthesia: General    Staff:   Circulator: Erika Jeong RN; Cathy Clemente RN  Scrub Person: Ellen Hopkins; James Kiser      Estimated Blood Loss: 10 mL    Urine Voided: * No values recorded between 5/30/2023  2:02 PM and 5/30/2023  2:39 PM *    Specimens:                Specimens     ID Source Type Tests Collected By Collected At Frozen?    A Large Intestine, Rectum Tissue · TISSUE PATHOLOGY EXAM   Hiram Viveros MD 5/30/23 1441 No    Description: RECTAL MASS NO HISTORY CD-30 LYMPHOMA     This specimen was not marked as sent.                Drains:   Colostomy LLQ (Active)       [REMOVED] Colostomy LLQ (Removed)       Findings: Palpable firm rectal mass remains roughly 5 cm from the anal verge, there is also necrotic tumor seen starting at 8 cm.  Multiple biopsies obtained    Complications: None    Procedure in Detail: After informed written consent was obtained, the patient was brought to the operating theater, timeout was performed all parties in agreement.  General anesthesia was introduced, antibiotics were administered, he was placed in the lithotomy position with candycane stirrups the buttock and perineum were prepped and draped in usual sterile fashion.    I first started the procedure by performing a perianal fan block utilizing a mixture of 15 cc of quarter percent Marcaine with epinephrine mixed with 15 cc of 1% lidocaine plain.  Digital rectal exam was then performed, this noted a circumferential mass starting at roughly 4 to 5 cm from the anal verge, worse on the posterior aspect.  Medium Hatfield retractor was then placed, utilizing a  Bhanu-Cut needle, I took 10 core needle biopsies.  Mirian was then placed deeper, I was able to see necrotic tissue, DeBakey pickups were used to remove some lightly attached tissue.  Proctosigmoidoscopy was then performed noting circumferential mass starting at 5 cm and persistent areas of necrosis starting at 8 cm.  Hemostasis was verified, procedure was then terminated.  Patient tolerated procedure well, immediately went out and spoke to the patient's wife Peggy, after the procedure was complete to let her know the findings.    Hiram Viveros MD     Date: 5/30/2023  Time: 14:44 EDT

## 2023-05-30 NOTE — ANESTHESIA PREPROCEDURE EVALUATION
Anesthesia Evaluation     Patient summary reviewed and Nursing notes reviewed   no history of anesthetic complications:  NPO Solid Status: > 8 hours  NPO Liquid Status: > 8 hours           Airway   Mallampati: II  TM distance: >3 FB  Neck ROM: full  No difficulty expected  Dental      Pulmonary - normal exam   (+) pneumonia , sleep apnea,   Cardiovascular - normal exam    (+) hypertension, hyperlipidemia,       Neuro/Psych  (+) psychiatric history,    GI/Hepatic/Renal/Endo    (+)  GI bleeding , renal disease, diabetes mellitus,     Musculoskeletal     Abdominal    Substance History      OB/GYN          Other   blood dyscrasia,   history of cancer                    Anesthesia Plan    ASA 3     general     intravenous induction     Anesthetic plan, risks, benefits, and alternatives have been provided, discussed and informed consent has been obtained with: patient.    Plan discussed with CRNA.        CODE STATUS:

## 2023-05-30 NOTE — ANESTHESIA POSTPROCEDURE EVALUATION
Patient: Abraham Wu    Procedure Summary     Date: 05/30/23 Room / [facility]:  [facility]  [facility]    Anesthesia Start: 1401 Anesthesia Stop: 1443    Procedure: EXAM UNDER ANESTHESIA, TRANSANAL BIOPSY OF RECTAL MASS W/ TRUCUT NEEDLE, PROCTOSCOPY (Rectum) Diagnosis:     Surgeons: Hiram Viveros MD Provider: Memo Castellanos MD    Anesthesia Type: general ASA Status: 3          Anesthesia Type: general    Vitals  Vitals Value Taken Time   /68 05/30/23 1443   Temp 97.8 °F (36.6 °C) 05/30/23 1443   Pulse 60 05/30/23 1444   Resp 16 05/30/23 1443   SpO2 100 % 05/30/23 1444   Vitals shown include unvalidated device data.        Post Anesthesia Care and Evaluation    Patient location during evaluation: PACU  Patient participation: complete - patient participated  Level of consciousness: sleepy but conscious  Pain score: 0    Airway patency: patent  Anesthetic complications: No anesthetic complications  PONV Status: none  Cardiovascular status: stable  Respiratory status: spontaneous ventilation and nasal cannula  Hydration status: acceptable  No anesthesia care post op

## 2023-05-30 NOTE — ANESTHESIA PROCEDURE NOTES
Airway  Urgency: elective    Date/Time: 5/30/2023 2:08 PM  Airway not difficult    General Information and Staff    Patient location during procedure: OR  CRNA/CAA: Olive Gutierrez CRNA    Indications and Patient Condition  Indications for airway management: airway protection    Preoxygenated: yes  Mask difficulty assessment: 0 - not attempted    Final Airway Details  Final airway type: supraglottic airway      Successful airway: I-gel  Size 4     Number of attempts at approach: 1  Assessment: lips, teeth, and gum same as pre-op

## 2023-05-31 ENCOUNTER — HOSPITAL ENCOUNTER (OUTPATIENT)
Dept: RADIATION ONCOLOGY | Facility: HOSPITAL | Age: 73
Discharge: HOME | End: 2023-05-31

## 2023-05-31 ENCOUNTER — TELEPHONE (OUTPATIENT)
Dept: ONCOLOGY | Facility: CLINIC | Age: 73
End: 2023-05-31

## 2023-05-31 PROCEDURE — 77412 RADIATION TX DELIVERY LVL 3: CPT | Performed by: RADIOLOGY

## 2023-05-31 PROCEDURE — 77417 THER RADIOLOGY PORT IMAGE(S): CPT | Performed by: RADIOLOGY

## 2023-05-31 PROCEDURE — 77280 THER RAD SIMULAJ FIELD SMPL: CPT | Performed by: RADIOLOGY

## 2023-05-31 PROCEDURE — 77387 GUIDANCE FOR RADJ TX DLVR: CPT | Performed by: RADIOLOGY

## 2023-05-31 NOTE — TELEPHONE ENCOUNTER
Called patients wife back and she states that his radiation is at 8:30 tomorrow and he has an appt with Dr. Leary at 8:15. Informed her that radiation would be notified in case he is late. Spoke with Cathy in radiation and she would like patient to come at 8 AM. Called and notified Peggy and she verbalized understanding. Also confirmed with his wife that his appt at  is 6/5 at 9 AM and in basket was sent to Kita LEON, referral coordinator to reschedule education and call patients wife.

## 2023-06-01 ENCOUNTER — LAB (OUTPATIENT)
Dept: LAB | Facility: HOSPITAL | Age: 73
End: 2023-06-01
Payer: MEDICARE

## 2023-06-01 ENCOUNTER — OFFICE VISIT (OUTPATIENT)
Dept: ONCOLOGY | Facility: CLINIC | Age: 73
End: 2023-06-01

## 2023-06-01 ENCOUNTER — HOSPITAL ENCOUNTER (OUTPATIENT)
Dept: RADIATION ONCOLOGY | Facility: HOSPITAL | Age: 73
Discharge: HOME | End: 2023-06-01

## 2023-06-01 ENCOUNTER — HOSPITAL ENCOUNTER (OUTPATIENT)
Dept: RADIATION ONCOLOGY | Facility: HOSPITAL | Age: 73
Setting detail: RADIATION/ONCOLOGY SERIES
Discharge: HOME | End: 2023-06-01

## 2023-06-01 VITALS
WEIGHT: 149.4 LBS | HEART RATE: 70 BPM | TEMPERATURE: 97.5 F | DIASTOLIC BLOOD PRESSURE: 79 MMHG | HEIGHT: 64 IN | BODY MASS INDEX: 25.51 KG/M2 | OXYGEN SATURATION: 99 % | SYSTOLIC BLOOD PRESSURE: 149 MMHG | RESPIRATION RATE: 16 BRPM

## 2023-06-01 VITALS — BODY MASS INDEX: 25.71 KG/M2 | WEIGHT: 149.8 LBS

## 2023-06-01 DIAGNOSIS — C81.98 HODGKIN LYMPHOMA OF LYMPH NODES OF MULTIPLE REGIONS, UNSPECIFIED HODGKIN LYMPHOMA TYPE: Primary | ICD-10-CM

## 2023-06-01 DIAGNOSIS — K62.89 RECTAL MASS: ICD-10-CM

## 2023-06-01 LAB
ALBUMIN SERPL-MCNC: 3.6 G/DL (ref 3.5–5.2)
ALBUMIN/GLOB SERPL: 1 G/DL
ALP SERPL-CCNC: 87 U/L (ref 39–117)
ALT SERPL W P-5'-P-CCNC: 10 U/L (ref 1–41)
ANION GAP SERPL CALCULATED.3IONS-SCNC: 10 MMOL/L (ref 5–15)
AST SERPL-CCNC: 14 U/L (ref 1–40)
BASOPHILS # BLD AUTO: 0.01 10*3/MM3 (ref 0–0.2)
BASOPHILS NFR BLD AUTO: 0.2 % (ref 0–1.5)
BILIRUB SERPL-MCNC: 0.2 MG/DL (ref 0–1.2)
BUN SERPL-MCNC: 13 MG/DL (ref 8–23)
BUN/CREAT SERPL: 23.2 (ref 7–25)
CALCIUM SPEC-SCNC: 9.8 MG/DL (ref 8.6–10.5)
CHLORIDE SERPL-SCNC: 101 MMOL/L (ref 98–107)
CO2 SERPL-SCNC: 28 MMOL/L (ref 22–29)
CREAT SERPL-MCNC: 0.56 MG/DL (ref 0.76–1.27)
DEPRECATED RDW RBC AUTO: 47.5 FL (ref 37–54)
EGFRCR SERPLBLD CKD-EPI 2021: 105.4 ML/MIN/1.73
EOSINOPHIL # BLD AUTO: 0.11 10*3/MM3 (ref 0–0.4)
EOSINOPHIL NFR BLD AUTO: 2.3 % (ref 0.3–6.2)
ERYTHROCYTE [DISTWIDTH] IN BLOOD BY AUTOMATED COUNT: 13.8 % (ref 12.3–15.4)
GLOBULIN UR ELPH-MCNC: 3.7 GM/DL
GLUCOSE SERPL-MCNC: 112 MG/DL (ref 65–99)
HCT VFR BLD AUTO: 34.2 % (ref 37.5–51)
HGB BLD-MCNC: 11 G/DL (ref 13–17.7)
IMM GRANULOCYTES # BLD AUTO: 0.03 10*3/MM3 (ref 0–0.05)
IMM GRANULOCYTES NFR BLD AUTO: 0.6 % (ref 0–0.5)
LDH SERPL-CCNC: 180 U/L (ref 135–225)
LYMPHOCYTES # BLD AUTO: 1.19 10*3/MM3 (ref 0.7–3.1)
LYMPHOCYTES NFR BLD AUTO: 24.8 % (ref 19.6–45.3)
MCH RBC QN AUTO: 30.1 PG (ref 26.6–33)
MCHC RBC AUTO-ENTMCNC: 32.2 G/DL (ref 31.5–35.7)
MCV RBC AUTO: 93.4 FL (ref 79–97)
MONOCYTES # BLD AUTO: 0.57 10*3/MM3 (ref 0.1–0.9)
MONOCYTES NFR BLD AUTO: 11.9 % (ref 5–12)
NEUTROPHILS NFR BLD AUTO: 2.88 10*3/MM3 (ref 1.7–7)
NEUTROPHILS NFR BLD AUTO: 60.2 % (ref 42.7–76)
PLATELET # BLD AUTO: 364 10*3/MM3 (ref 140–450)
PMV BLD AUTO: 8.4 FL (ref 6–12)
POTASSIUM SERPL-SCNC: 3.9 MMOL/L (ref 3.5–5.2)
PROT SERPL-MCNC: 7.3 G/DL (ref 6–8.5)
RBC # BLD AUTO: 3.66 10*6/MM3 (ref 4.14–5.8)
SODIUM SERPL-SCNC: 139 MMOL/L (ref 136–145)
WBC NRBC COR # BLD: 4.79 10*3/MM3 (ref 3.4–10.8)

## 2023-06-01 PROCEDURE — 36415 COLL VENOUS BLD VENIPUNCTURE: CPT

## 2023-06-01 PROCEDURE — 85025 COMPLETE CBC W/AUTO DIFF WBC: CPT

## 2023-06-01 PROCEDURE — 80053 COMPREHEN METABOLIC PANEL: CPT

## 2023-06-01 PROCEDURE — 83615 LACTATE (LD) (LDH) ENZYME: CPT

## 2023-06-01 RX ORDER — SULFAMETHOXAZOLE AND TRIMETHOPRIM 800; 160 MG/1; MG/1
1 TABLET ORAL 3 TIMES WEEKLY
Qty: 12 TABLET | Refills: 3 | Status: SHIPPED | OUTPATIENT
Start: 2023-06-02

## 2023-06-01 RX ORDER — ONDANSETRON HYDROCHLORIDE 8 MG/1
8 TABLET, FILM COATED ORAL 3 TIMES DAILY PRN
Qty: 30 TABLET | Refills: 5 | Status: SHIPPED | OUTPATIENT
Start: 2023-06-01

## 2023-06-01 RX ORDER — PREDNISONE 50 MG/1
100 TABLET ORAL
Qty: 8 TABLET | Refills: 3 | Status: SHIPPED | OUTPATIENT
Start: 2023-06-01

## 2023-06-01 RX ORDER — ACYCLOVIR 400 MG/1
400 TABLET ORAL 2 TIMES DAILY
Qty: 60 TABLET | Refills: 3 | Status: SHIPPED | OUTPATIENT
Start: 2023-06-01

## 2023-06-01 NOTE — PROGRESS NOTES
Hematology and Oncology [place]  Office number [phone]    Fax number [phone]     Follow up     Date: 23    Patient Name: Abraham Wu  MRN: [id number]  : 1950      Chief Complaint: Hodgkin Lymphoma follow up/treatment    Surgeon: Dr. Hiram Viveros MD    Cancer Staging: IV    History of Present Illness: Abraham Wu is a pleasant 73 y.o. male with PMH of hypertension, sleep apnea, hard of hearing who presents today for follow up of Hodgkin Lymphoma.     He initially presented 2022 with intractable diarrhea, low appetite, and a greater than 50 pound weight loss over several month period associated with intermittent fevers.  CT of the chest abdomen pelvis obtained in the ER shows of rectal mass spanning greater than 12 cm with adjacent adenopathy, bulky RP adenopathy, and findings concerning for left renal and liver metastases.  Small right pleural effusion with nodularity.  There was concern for impending obstruction, so he underwent diverting colostomy and biopsy on 2022.  Biopsies from the peritoneam showed a fibrotic nodule histiocytes and eosinophils admixed with CD30 positive large cells.  Rectal biopsies showed involvement by CD30 positive lymphoma, classic Hodgkin lymphoma versus CD30 positive T-cell or B-cell lymphoma.  There was extensive fibrosis and crush artifact.  He underwent repeat transrectal biopsy 10/4/2022 for further characterization which was felt to be most consistent with classical Hodgkin lymphoma.    He initiated chemotherapy with Brentuximab-AVD as an inpatient on 10/8/2022.  Cycle #1 was complicated by GI bleed requiring multiple blood transfusion and ultimately coil artery embolization 10/12; neutropenic fever, Candida esophagitis and mucositis.  He had a prolonged ICU stay  And required enteral feeding.  He was discharged 10/20/2022.    Following his last cycle he was admitted with multifocal PNA and +corona virus  infection.    Treatment history:  Brentuximab-AVD: C1 10/8/22  C2: 11/1/22 onward with DA 20% in BVD; full dose brentuximab  Completed C6 3/22/23  Palliative radiation to rectum 5/30/2023    Interval history:    He returns with his supportive wife for follow up  Rectal pain much improved with tramadol, has stopped this. Using bentyl PRN with good relief.   Still with small amounts of blood, looks more like old blood, no longer bright red.   Good ostomy output.   Urine output is improved. Stopped Flomax 2 days and no urinary hesitancy.   Feels his breathing is good, nearly back to baseline. No cough. Continues daily claritin  No fever  Stable mild neuropathy in fingertips, still with some mild fine motor difficulty. No numbness in feet continuously but does get tingling in balls of feet intermittently    Past Medical History:   Past Medical History:   Diagnosis Date   • benign polypoid tissue right lung    • Cancer     HODGKINS LYMPHOMA, RECTAL CANCER   • Diabetes mellitus    • Hyperlipidemia    • Hypertension    • Lymphoma    • Mycobacterium mucogenicum    • SHERRI (obstructive sleep apnea)     O2 2L/MIN AT NIGHT ONLY   • Pneumonia     2023   • Seasonal allergies        Past Surgical History:   Past Surgical History:   Procedure Laterality Date   • BRONCHOSCOPY      removal of obstructing polypoid tissue right middle lung   • COLOSTOMY N/A 09/22/2022    Procedure: LAPAROSCOPIC COLOSTOMY CREATION, FLEXIBLE SIGMOIDOSCOPY;  Surgeon: Hiram Viveros MD;  Location: [facility] OR;  Service: General;  Laterality: N/A;   • DENTAL PROCEDURE     • ENDOSCOPY N/A 10/10/2022    Procedure: ESOPHAGOGASTRODUODENOSCOPY;  Surgeon: Neeraj Lynn MD;  Location:  [facility];  Service: Gastroenterology;  Laterality: N/A;   • ENDOSCOPY N/A 10/12/2022    Procedure: ESOPHAGOGASTRODUODENOSCOPY;  Surgeon: Brunner, Mark I, MD;  Location:  [facility];  Service: Gastroenterology;  Laterality: N/A;   • EXAM UNDER ANESTHESIA, RECTAL  BIOPSY N/A 10/04/2022    Procedure: EXAM UNDER ANESTHESIA, TRANSANAL BIOPSY WITH TRUCUT NEEDLE (LITHOTOMY-CANDY CANE);  Surgeon: Hiram Viveros MD;  Location:  [facility] OR;  Service: General;  Laterality: N/A;   • EXAM UNDER ANESTHESIA, RECTAL BIOPSY N/A 5/30/2023    Procedure: EXAM UNDER ANESTHESIA, TRANSANAL BIOPSY OF RECTAL MASS WITH TRUCUT NEEDLE, PROCTOSCOPY;  Surgeon: Hiram Viveros MD;  Location:  [facility] OR;  Service: General;  Laterality: N/A;   • PERIPHERALLY INSERTED CENTRAL CATHETER INSERTION      Removed 11/28/2022   • VENOUS ACCESS DEVICE (PORT) INSERTION Right 11/28/2022       Family History:   Family History   Problem Relation Age of Onset   • Diabetes Mother    • Diabetes Father    • Heart disease Father        Social History:   Social History     Socioeconomic History   • Marital status:    Tobacco Use   • Smoking status: Never   • Smokeless tobacco: Never   Vaping Use   • Vaping Use: Never used   Substance and Sexual Activity   • Alcohol use: Not Currently     Comment: previously drank 2 glasses of wine/beer nightly   • Drug use: Never   • Sexual activity: Defer       Medications:     Current Outpatient Medications:   •  diazePAM (Valium) 5 MG tablet, Take 1 tablet by mouth Every 8 (Eight) Hours As Needed for Muscle Spasms for up to 7 days., Disp: 21 tablet, Rfl: 0  •  dicyclomine (BENTYL) 20 MG tablet, TAKE ONE TABLET BY MOUTH EVERY 6 HOURS AS NEEDED FOR ABDOMINAL CRAMPS, Disp: 30 tablet, Rfl: 1  •  fluticasone (FLONASE) 50 MCG/ACT nasal spray, 1 spray into the nostril(s) as directed by provider Daily As Needed for Allergies or Rhinitis. OTC, Disp: , Rfl:   •  lidocaine-prilocaine (EMLA) 2.5-2.5 % cream, Apply 1 application topically to the appropriate area as directed As Needed (45-60 minutes prior to port access.  Cover with saran/plastic wrap.)., Disp: 30 g, Rfl: 3  •  loratadine (CLARITIN) 10 MG tablet, Take 1 tablet by mouth Daily., Disp: 30 tablet, Rfl: 5  •  ondansetron  "(Zofran) 8 MG tablet, Take 1 tablet by mouth Every 8 (Eight) Hours As Needed for Nausea or Vomiting., Disp: 30 tablet, Rfl: 5  •  pantoprazole (PROTONIX) 40 MG EC tablet, Take 1 tablet by mouth Daily., Disp: 90 tablet, Rfl: 1  •  prochlorperazine (COMPAZINE) 5 MG tablet, Take 1 tablet by mouth Every 6 (Six) Hours As Needed for Nausea or Vomiting., Disp: 30 tablet, Rfl: 2  •  rosuvastatin (CRESTOR) 10 MG tablet, Take 1 tablet by mouth Daily., Disp: , Rfl:   •  traMADol (ULTRAM) 50 MG tablet, Take 2 tablets by mouth Every 8 (Eight) Hours As Needed for Moderate Pain (cancer pain) for up to 30 days., Disp: 180 tablet, Rfl: 0    Allergies:   No Known Allergies    Objective     Vital Signs:   Vitals:    06/01/23 0821   BP: 149/79   Pulse: 70   Resp: 16   Temp: 97.5 °F (36.4 °C)   TempSrc: Infrared   SpO2: 99%   Weight: 67.8 kg (149 lb 6.4 oz)   Height: 162.6 cm (64\")  Comment: per patient   PainSc: 0-No pain    Body mass index is 25.64 kg/m².   Pain Score    06/01/23 0821   PainSc: 0-No pain       ECOG Performance Status: 2    Physical Exam:   General: No acute distress. Well appearing.  HEENT: Normocephalic, atraumatic. Sclera anicteric.   Neck: supple, no palpable LAD.  Cardiovascular: regular rate and rhythm. No murmurs.   Respiratory: Normal rate. Clear to auscultation bilaterally  Abdomen: Soft, nontender, non distended with normoactive bowel sounds. Ostomy LLQ.  Lymph: no supraclavicular or axillary adenopathy  Neuro: Alert and oriented x 3.   Ext: Symmetric, no swelling.   Psych: Euthymic  Skin: right port site  C/d/i      Laboratory/Imaging Reviewed:   Lab on 06/01/2023   Component Date Value Ref Range Status   • WBC 06/01/2023 4.79  3.40 - 10.80 10*3/mm3 Final   • RBC 06/01/2023 3.66 (L)  4.14 - 5.80 10*6/mm3 Final   • Hemoglobin 06/01/2023 11.0 (L)  13.0 - 17.7 g/dL Final   • Hematocrit 06/01/2023 34.2 (L)  37.5 - 51.0 % Final   • MCV 06/01/2023 93.4  79.0 - 97.0 fL Final   • MCH 06/01/2023 30.1  26.6 - 33.0 pg " Final   • MCHC 06/01/2023 32.2  31.5 - 35.7 g/dL Final   • RDW 06/01/2023 13.8  12.3 - 15.4 % Final   • RDW-SD 06/01/2023 47.5  37.0 - 54.0 fl Final   • MPV 06/01/2023 8.4  6.0 - 12.0 fL Final   • Platelets 06/01/2023 364  140 - 450 10*3/mm3 Final   • Neutrophil % 06/01/2023 60.2  42.7 - 76.0 % Final   • Lymphocyte % 06/01/2023 24.8  19.6 - 45.3 % Final   • Monocyte % 06/01/2023 11.9  5.0 - 12.0 % Final   • Eosinophil % 06/01/2023 2.3  0.3 - 6.2 % Final   • Basophil % 06/01/2023 0.2  0.0 - 1.5 % Final   • Immature Grans % 06/01/2023 0.6 (H)  0.0 - 0.5 % Final   • Neutrophils, Absolute 06/01/2023 2.88  1.70 - 7.00 10*3/mm3 Final   • Lymphocytes, Absolute 06/01/2023 1.19  0.70 - 3.10 10*3/mm3 Final   • Monocytes, Absolute 06/01/2023 0.57  0.10 - 0.90 10*3/mm3 Final   • Eosinophils, Absolute 06/01/2023 0.11  0.00 - 0.40 10*3/mm3 Final   • Basophils, Absolute 06/01/2023 0.01  0.00 - 0.20 10*3/mm3 Final   • Immature Grans, Absolute 06/01/2023 0.03  0.00 - 0.05 10*3/mm3 Final   Admission on 05/30/2023, Discharged on 05/30/2023   Component Date Value Ref Range Status   • Glucose 05/30/2023 109  70 - 130 mg/dL Final    Meter: [id number] : [id number] ONilam Jenny   Lab on 05/25/2023   Component Date Value Ref Range Status   • Color, UA 05/25/2023 Yellow  Yellow, Straw Final   • Appearance, UA 05/25/2023 Clear  Clear Final   • pH, UA 05/25/2023 7.0  5.0 - 8.0 Final   • Specific Gravity, UA 05/25/2023 1.021  1.001 - 1.030 Final   • Glucose, UA 05/25/2023 Negative  Negative Final   • Ketones, UA 05/25/2023 Trace (A)  Negative Final   • Bilirubin, UA 05/25/2023 Negative  Negative Final   • Blood, UA 05/25/2023 Negative  Negative Final   • Protein, UA 05/25/2023 Negative  Negative Final   • Leuk Esterase, UA 05/25/2023 Negative  Negative Final   • Nitrite, UA 05/25/2023 Negative  Negative Final   • Urobilinogen, UA 05/25/2023 4.0 E.U./dL (A)  0.2 - 1.0 E.U./dL Final   • Glucose 05/25/2023 125 (H)  65 - 99 mg/dL Final   •  BUN 05/25/2023 16  8 - 23 mg/dL Final   • Creatinine 05/25/2023 0.59 (L)  0.76 - 1.27 mg/dL Final   • Sodium 05/25/2023 134 (L)  136 - 145 mmol/L Final   • Potassium 05/25/2023 4.6  3.5 - 5.2 mmol/L Final   • Chloride 05/25/2023 95 (L)  98 - 107 mmol/L Final   • CO2 05/25/2023 25.0  22.0 - 29.0 mmol/L Final   • Calcium 05/25/2023 9.3  8.6 - 10.5 mg/dL Final   • Total Protein 05/25/2023 7.4  6.0 - 8.5 g/dL Final   • Albumin 05/25/2023 3.4 (L)  3.5 - 5.2 g/dL Final   • ALT (SGPT) 05/25/2023 12  1 - 41 U/L Final   • AST (SGOT) 05/25/2023 15  1 - 40 U/L Final   • Alkaline Phosphatase 05/25/2023 93  39 - 117 U/L Final   • Total Bilirubin 05/25/2023 0.3  0.0 - 1.2 mg/dL Final   • Globulin 05/25/2023 4.0  gm/dL Final    Calculated Result   • A/G Ratio 05/25/2023 0.9  g/dL Final   • BUN/Creatinine Ratio 05/25/2023 27.1 (H)  7.0 - 25.0 Final   • Anion Gap 05/25/2023 14.0  5.0 - 15.0 mmol/L Final   • eGFR 05/25/2023 103.7  >60.0 mL/min/1.73 Final   • WBC 05/25/2023 6.34  3.40 - 10.80 10*3/mm3 Final   • RBC 05/25/2023 3.50 (L)  4.14 - 5.80 10*6/mm3 Final   • Hemoglobin 05/25/2023 10.7 (L)  13.0 - 17.7 g/dL Final   • Hematocrit 05/25/2023 33.3 (L)  37.5 - 51.0 % Final   • MCV 05/25/2023 95.1  79.0 - 97.0 fL Final   • MCH 05/25/2023 30.6  26.6 - 33.0 pg Final   • MCHC 05/25/2023 32.1  31.5 - 35.7 g/dL Final   • RDW 05/25/2023 13.9  12.3 - 15.4 % Final   • RDW-SD 05/25/2023 48.9  37.0 - 54.0 fl Final   • MPV 05/25/2023 8.4  6.0 - 12.0 fL Final   • Platelets 05/25/2023 309  140 - 450 10*3/mm3 Final   • Neutrophil % 05/25/2023 69.4  42.7 - 76.0 % Final   • Lymphocyte % 05/25/2023 17.5 (L)  19.6 - 45.3 % Final   • Monocyte % 05/25/2023 11.8  5.0 - 12.0 % Final   • Eosinophil % 05/25/2023 0.8  0.3 - 6.2 % Final   • Basophil % 05/25/2023 0.2  0.0 - 1.5 % Final   • Immature Grans % 05/25/2023 0.3  0.0 - 0.5 % Final   • Neutrophils, Absolute 05/25/2023 4.40  1.70 - 7.00 10*3/mm3 Final   • Lymphocytes, Absolute 05/25/2023 1.11  0.70 -  3.10 10*3/mm3 Final   • Monocytes, Absolute 05/25/2023 0.75  0.10 - 0.90 10*3/mm3 Final   • Eosinophils, Absolute 05/25/2023 0.05  0.00 - 0.40 10*3/mm3 Final   • Basophils, Absolute 05/25/2023 0.01  0.00 - 0.20 10*3/mm3 Final   • Immature Grans, Absolute 05/25/2023 0.02  0.00 - 0.05 10*3/mm3 Final   Lab on 05/23/2023   Component Date Value Ref Range Status   • Glucose 05/23/2023 91  65 - 99 mg/dL Final   • BUN 05/23/2023 14  8 - 23 mg/dL Final   • Creatinine 05/23/2023 0.60 (L)  0.76 - 1.27 mg/dL Final   • Sodium 05/23/2023 136  136 - 145 mmol/L Final   • Potassium 05/23/2023 4.6  3.5 - 5.2 mmol/L Final   • Chloride 05/23/2023 99  98 - 107 mmol/L Final   • CO2 05/23/2023 29.0  22.0 - 29.0 mmol/L Final   • Calcium 05/23/2023 9.7  8.6 - 10.5 mg/dL Final   • Total Protein 05/23/2023 7.8  6.0 - 8.5 g/dL Final   • Albumin 05/23/2023 3.6  3.5 - 5.2 g/dL Final   • ALT (SGPT) 05/23/2023 12  1 - 41 U/L Final   • AST (SGOT) 05/23/2023 14  1 - 40 U/L Final   • Alkaline Phosphatase 05/23/2023 95  39 - 117 U/L Final   • Total Bilirubin 05/23/2023 0.4  0.0 - 1.2 mg/dL Final   • Globulin 05/23/2023 4.2  gm/dL Final    Calculated Result   • A/G Ratio 05/23/2023 0.9  g/dL Final   • BUN/Creatinine Ratio 05/23/2023 23.3  7.0 - 25.0 Final   • Anion Gap 05/23/2023 8.0  5.0 - 15.0 mmol/L Final   • eGFR 05/23/2023 103.2  >60.0 mL/min/1.73 Final   • Ferritin 05/23/2023 458.00 (H)  30.00 - 400.00 ng/mL Final   • WBC 05/23/2023 5.64  3.40 - 10.80 10*3/mm3 Final   • RBC 05/23/2023 3.64 (L)  4.14 - 5.80 10*6/mm3 Final   • Hemoglobin 05/23/2023 11.1 (L)  13.0 - 17.7 g/dL Final   • Hematocrit 05/23/2023 35.2 (L)  37.5 - 51.0 % Final   • MCV 05/23/2023 96.7  79.0 - 97.0 fL Final   • MCH 05/23/2023 30.5  26.6 - 33.0 pg Final   • MCHC 05/23/2023 31.5  31.5 - 35.7 g/dL Final   • RDW 05/23/2023 13.9  12.3 - 15.4 % Final   • RDW-SD 05/23/2023 50.1  37.0 - 54.0 fl Final   • MPV 05/23/2023 8.6  6.0 - 12.0 fL Final   • Platelets 05/23/2023 304  140 - 450  10*3/mm3 Final   • Neutrophil % 05/23/2023 70.6  42.7 - 76.0 % Final   • Lymphocyte % 05/23/2023 15.6 (L)  19.6 - 45.3 % Final   • Monocyte % 05/23/2023 12.8 (H)  5.0 - 12.0 % Final   • Eosinophil % 05/23/2023 0.4  0.3 - 6.2 % Final   • Basophil % 05/23/2023 0.2  0.0 - 1.5 % Final   • Immature Grans % 05/23/2023 0.4  0.0 - 0.5 % Final   • Neutrophils, Absolute 05/23/2023 3.99  1.70 - 7.00 10*3/mm3 Final   • Lymphocytes, Absolute 05/23/2023 0.88  0.70 - 3.10 10*3/mm3 Final   • Monocytes, Absolute 05/23/2023 0.72  0.10 - 0.90 10*3/mm3 Final   • Eosinophils, Absolute 05/23/2023 0.02  0.00 - 0.40 10*3/mm3 Final   • Basophils, Absolute 05/23/2023 0.01  0.00 - 0.20 10*3/mm3 Final   • Immature Grans, Absolute 05/23/2023 0.02  0.00 - 0.05 10*3/mm3 Final     L_> R before during and after treatment.        L-->R After treatment-->interval PET          Narrative & Impression   XR CHEST PA AND LATERAL     Date of Exam: 5/22/2023 12:40 PM EDT     Indication: c81.90 lymphoma hodgkins     Comparison: March 10, 2023, PET/CT May 8, 2023, chest radiograph October 14, 2022     Findings:  There is a right IJ port catheter with tip projecting in the region of the cavoatrial junction, as before.     The opacities seen in the bilateral lungs on the prior chest radiograph have decreased in the interval. There are some residual airspace and interstitial opacities in these locations. No definitive new or worsening infiltrate is seen at this time. No   significant effusion or pneumothorax. Heart size appears within normal limits. Mediastinal contour appears grossly unchanged.     There are degenerative changes in the thoracic spine. Embolization coils are seen in the upper abdomen on the lateral view.     IMPRESSION:  Impression:  Interval decrease in bilateral lung opacities compared to the most recent prior chest radiograph. There is some residual interstitial and airspace opacities. Findings suggest improving pneumonia with possible  residual infectious/inflammatory changes and   scarring. Continued follow-up is recommended.       Assessment / Plan      Assessment/Plan:   1.  CD30 positive classical Hodgkin's lymphoma  2.  Residual PET avid rectal mass, now enlarging on radiation planning CT  3.  Pulmonary interstitial infiltrates  -He completed 6 cycles of BV-AVD 3/23/2023.   -Cycle 6 was complicated by coronavirus infection and multifocal pneumonia.  He is clinically improving from this but posttherapy PET showed persistent bilateral interstitial changes and mild adenopathy, that I believe is most likely reactive to his recent infection. A repeat chest CT is pending tomorrow.  -He did not have a pretreatment PET/CT given that he was profoundly ill and admitted for cycle #1.  He has had clear response with resolution of the hepatic lesions, marked improvement in the retroperitoneal adenopathy, and 50% reduction in the size of the rectal mass.  However he has persistent uptake in the rectal mass, [place] 5, which may be secondary to inflammatory/posttreatment uptake, but certainly could represent some amount of residual disease.  Last week we discussed that his initial pathology was difficult to interpret because of marked inflammatory and fibrotic response.  I reviewed his PET/CT images and discussed with his colorectal surgeon.  He believes that the PET avid area would be amenable to biopsy under anoscopy, and so we elected to proceed with biopsy to confirm whether he does have residual disease.  Even if the biopsy is negative, he will need to be closely monitored with interval PET/CT and would consider consolidative radiotherapy to the rectal mass given initial bulky disease. In the interim he presented with rectal bleeding and pain. He was initiated on palliative radiation. Radiation planning CT showed regrowth of rectal mass and new RP adenopathy compared to prior PET. Given his ongoing recovery from recent viral pneumonia and reduced PS  and given that the rectum was the only clear site of disease on imaging I recommended that we proceed with radiation to the site followed by systemic chemotherapy. He will complete radiation today and he has had some improvement in symptoms. Pending repeat chest CT will plan to initiate second line chemotherapy early next week. He has made further clinical recovery from recent PNA with improvement in his PS. Given prior treatment with Brentuximab and national shortage of carbo/cis, I discussed BeGEV and this is tentatively planned Tuesday. He has a pending ASCT evaluation at . We discussed that if he is felt to be a transplant candidate and alternative second line (platinum containing) regimen desired by transplant team they may elect to initiate an alternative second line therapy regimen at .   Follow up 1 week for treatment start pending pathology results, CT and transplant eval    4.  Cancer pain  -tramadol to 2 tabs q 8 hours PRN  -Better with radiation    5. GI bleed  -PPI, continue daily.  -New rectal bleeding secondary to malignancy, plan as above    5. Access  -Port    Addendum: mesenteric nodules noted on planning CT. Plan 5 fractions of radiation next week, anticipate chemo start 6/7. If his symptoms worsen he will need admission for urgent radiation vs chemo start.    Follow Up:   In 1 week    Kaycee Leary MD  Hematology and Oncology     I have spent a total of 40 min on reviewing test results/preparing to see patient, counseling patient, performing medically appropriate exam, placing orders, coordinating care and documenting clinical information in the electronic or other health record

## 2023-06-02 ENCOUNTER — HOSPITAL ENCOUNTER (OUTPATIENT)
Dept: CT IMAGING | Facility: HOSPITAL | Age: 73
Discharge: HOME | End: 2023-06-02

## 2023-06-02 DIAGNOSIS — C81.98 HODGKIN LYMPHOMA OF LYMPH NODES OF MULTIPLE REGIONS, UNSPECIFIED HODGKIN LYMPHOMA TYPE: ICD-10-CM

## 2023-06-02 PROCEDURE — 71250 CT THORAX DX C-: CPT

## 2023-06-02 RX ORDER — DEXTROSE MONOHYDRATE 50 MG/ML
250 INJECTION, SOLUTION INTRAVENOUS ONCE
OUTPATIENT
Start: 2023-06-07

## 2023-06-02 RX ORDER — PALONOSETRON 0.05 MG/ML
0.25 INJECTION, SOLUTION INTRAVENOUS ONCE
Start: 2023-06-09 | End: 2023-06-09

## 2023-06-02 RX ORDER — DEXTROSE MONOHYDRATE 50 MG/ML
250 INJECTION, SOLUTION INTRAVENOUS ONCE
OUTPATIENT
Start: 2023-06-08

## 2023-06-02 RX ORDER — PALONOSETRON 0.05 MG/ML
0.25 INJECTION, SOLUTION INTRAVENOUS ONCE
Start: 2023-06-08 | End: 2023-06-08

## 2023-06-02 RX ORDER — DEXTROSE MONOHYDRATE 50 MG/ML
250 INJECTION, SOLUTION INTRAVENOUS ONCE
OUTPATIENT
Start: 2023-06-09

## 2023-06-02 RX ORDER — DEXTROSE MONOHYDRATE 50 MG/ML
250 INJECTION, SOLUTION INTRAVENOUS ONCE
OUTPATIENT
Start: 2023-06-10

## 2023-06-05 ENCOUNTER — TELEPHONE (OUTPATIENT)
Dept: ONCOLOGY | Facility: CLINIC | Age: 73
End: 2023-06-05
Payer: MEDICARE

## 2023-06-05 NOTE — TELEPHONE ENCOUNTER
Notified patient and his spouse that appointments this week have been changed per MD and those dates and times.  Both verbalized understanding.

## 2023-06-06 ENCOUNTER — OFFICE VISIT (OUTPATIENT)
Dept: ONCOLOGY | Facility: CLINIC | Age: 73
End: 2023-06-06
Payer: MEDICARE

## 2023-06-06 ENCOUNTER — HOSPITAL ENCOUNTER (OUTPATIENT)
Dept: ONCOLOGY | Facility: HOSPITAL | Age: 73
Discharge: HOME | End: 2023-06-06
Payer: MEDICARE

## 2023-06-06 ENCOUNTER — EDUCATION (OUTPATIENT)
Dept: ONCOLOGY | Facility: HOSPITAL | Age: 73
End: 2023-06-06
Payer: MEDICARE

## 2023-06-06 ENCOUNTER — HOSPITAL ENCOUNTER (OUTPATIENT)
Dept: ONCOLOGY | Facility: HOSPITAL | Age: 73
Discharge: HOME | End: 2023-06-06

## 2023-06-06 VITALS
RESPIRATION RATE: 16 BRPM | SYSTOLIC BLOOD PRESSURE: 129 MMHG | HEART RATE: 66 BPM | WEIGHT: 150 LBS | OXYGEN SATURATION: 99 % | BODY MASS INDEX: 25.61 KG/M2 | DIASTOLIC BLOOD PRESSURE: 79 MMHG | HEIGHT: 64 IN | TEMPERATURE: 97.1 F

## 2023-06-06 DIAGNOSIS — Z45.2 ENCOUNTER FOR CARE RELATED TO VASCULAR ACCESS PORT: Primary | ICD-10-CM

## 2023-06-06 DIAGNOSIS — C81.98 HODGKIN LYMPHOMA OF LYMPH NODES OF MULTIPLE REGIONS, UNSPECIFIED HODGKIN LYMPHOMA TYPE: ICD-10-CM

## 2023-06-06 DIAGNOSIS — C81.98 HODGKIN LYMPHOMA OF LYMPH NODES OF MULTIPLE REGIONS, UNSPECIFIED HODGKIN LYMPHOMA TYPE: Primary | ICD-10-CM

## 2023-06-06 LAB
ALBUMIN SERPL-MCNC: 3.7 G/DL (ref 3.5–5.2)
ALBUMIN/GLOB SERPL: 1.1 G/DL
ALP SERPL-CCNC: 77 U/L (ref 39–117)
ALT SERPL W P-5'-P-CCNC: 7 U/L (ref 1–41)
ANION GAP SERPL CALCULATED.3IONS-SCNC: 9 MMOL/L (ref 5–15)
AST SERPL-CCNC: 13 U/L (ref 1–40)
BASOPHILS # BLD AUTO: 0.02 10*3/MM3 (ref 0–0.2)
BASOPHILS NFR BLD AUTO: 0.6 % (ref 0–1.5)
BILIRUB SERPL-MCNC: 0.2 MG/DL (ref 0–1.2)
BUN SERPL-MCNC: 14 MG/DL (ref 8–23)
BUN/CREAT SERPL: 23.3 (ref 7–25)
CALCIUM SPEC-SCNC: 9.2 MG/DL (ref 8.6–10.5)
CHLORIDE SERPL-SCNC: 98 MMOL/L (ref 98–107)
CO2 SERPL-SCNC: 29 MMOL/L (ref 22–29)
CREAT SERPL-MCNC: 0.6 MG/DL (ref 0.76–1.27)
DEPRECATED RDW RBC AUTO: 49.2 FL (ref 37–54)
EGFRCR SERPLBLD CKD-EPI 2021: 103.2 ML/MIN/1.73
EOSINOPHIL # BLD AUTO: 0.09 10*3/MM3 (ref 0–0.4)
EOSINOPHIL NFR BLD AUTO: 2.9 % (ref 0.3–6.2)
ERYTHROCYTE [DISTWIDTH] IN BLOOD BY AUTOMATED COUNT: 14.2 % (ref 12.3–15.4)
GLOBULIN UR ELPH-MCNC: 3.5 GM/DL
GLUCOSE SERPL-MCNC: 85 MG/DL (ref 65–99)
HCT VFR BLD AUTO: 34.3 % (ref 37.5–51)
HGB BLD-MCNC: 11.1 G/DL (ref 13–17.7)
IMM GRANULOCYTES # BLD AUTO: 0.01 10*3/MM3 (ref 0–0.05)
IMM GRANULOCYTES NFR BLD AUTO: 0.3 % (ref 0–0.5)
LYMPHOCYTES # BLD AUTO: 0.67 10*3/MM3 (ref 0.7–3.1)
LYMPHOCYTES NFR BLD AUTO: 21.7 % (ref 19.6–45.3)
MCH RBC QN AUTO: 30.5 PG (ref 26.6–33)
MCHC RBC AUTO-ENTMCNC: 32.4 G/DL (ref 31.5–35.7)
MCV RBC AUTO: 94.2 FL (ref 79–97)
MONOCYTES # BLD AUTO: 0.42 10*3/MM3 (ref 0.1–0.9)
MONOCYTES NFR BLD AUTO: 13.6 % (ref 5–12)
NEUTROPHILS NFR BLD AUTO: 1.88 10*3/MM3 (ref 1.7–7)
NEUTROPHILS NFR BLD AUTO: 60.9 % (ref 42.7–76)
PLATELET # BLD AUTO: 272 10*3/MM3 (ref 140–450)
PMV BLD AUTO: 8.7 FL (ref 6–12)
POTASSIUM SERPL-SCNC: 4.2 MMOL/L (ref 3.5–5.2)
PROT SERPL-MCNC: 7.2 G/DL (ref 6–8.5)
RBC # BLD AUTO: 3.64 10*6/MM3 (ref 4.14–5.8)
SODIUM SERPL-SCNC: 136 MMOL/L (ref 136–145)
WBC NRBC COR # BLD: 3.09 10*3/MM3 (ref 3.4–10.8)

## 2023-06-06 PROCEDURE — 80053 COMPREHEN METABOLIC PANEL: CPT | Performed by: INTERNAL MEDICINE

## 2023-06-06 PROCEDURE — 85025 COMPLETE CBC W/AUTO DIFF WBC: CPT | Performed by: INTERNAL MEDICINE

## 2023-06-06 PROCEDURE — 25010000002 HEPARIN LOCK FLUSH PER 10 UNITS: Performed by: INTERNAL MEDICINE

## 2023-06-06 PROCEDURE — 36591 DRAW BLOOD OFF VENOUS DEVICE: CPT

## 2023-06-06 RX ORDER — PREDNISONE 50 MG/1
100 TABLET ORAL ONCE
Start: 2023-06-13 | End: 2023-06-07

## 2023-06-06 RX ORDER — HEPARIN SODIUM (PORCINE) LOCK FLUSH IV SOLN 100 UNIT/ML 100 UNIT/ML
500 SOLUTION INTRAVENOUS AS NEEDED
Status: DISCONTINUED | OUTPATIENT
Start: 2023-06-06 | End: 2023-06-07 | Stop reason: HOSPADM

## 2023-06-06 RX ORDER — HEPARIN SODIUM (PORCINE) LOCK FLUSH IV SOLN 100 UNIT/ML 100 UNIT/ML
500 SOLUTION INTRAVENOUS AS NEEDED
OUTPATIENT
Start: 2023-06-06

## 2023-06-06 RX ADMIN — HEPARIN 500 UNITS: 100 SYRINGE at 09:55

## 2023-06-06 NOTE — PROGRESS NOTES
Hematology and Oncology [place]  Office number [phone]    Fax number [phone]     Follow up     Date: 23    Patient Name: Abraham Wu  MRN: [id number]  : 1950      Chief Complaint: Hodgkin Lymphoma follow up/treatment    Surgeon: Dr. Hiram Viveros MD    Cancer Staging: IV    History of Present Illness: Abraham Wu is a pleasant 73 y.o. male with PMH of hypertension, sleep apnea, hard of hearing who presents today for follow up of Hodgkin Lymphoma.     He initially presented 2022 with intractable diarrhea, low appetite, and a greater than 50 pound weight loss over several month period associated with intermittent fevers.  CT of the chest abdomen pelvis obtained in the ER shows of rectal mass spanning greater than 12 cm with adjacent adenopathy, bulky RP adenopathy, and findings concerning for left renal and liver metastases.  Small right pleural effusion with nodularity.  There was concern for impending obstruction, so he underwent diverting colostomy and biopsy on 2022.  Biopsies from the peritoneam showed a fibrotic nodule histiocytes and eosinophils admixed with CD30 positive large cells.  Rectal biopsies showed involvement by CD30 positive lymphoma, classic Hodgkin lymphoma versus CD30 positive T-cell or B-cell lymphoma.  There was extensive fibrosis and crush artifact.  He underwent repeat transrectal biopsy 10/4/2022 for further characterization which was felt to be most consistent with classical Hodgkin lymphoma.    He initiated chemotherapy with Brentuximab-AVD as an inpatient on 10/8/2022.  Cycle #1 was complicated by GI bleed requiring multiple blood transfusion and ultimately coil artery embolization 10/12; neutropenic fever, Candida esophagitis and mucositis.  He had a prolonged ICU stay  And required enteral feeding.  He was discharged 10/20/2022.    Following his last cycle he was admitted with multifocal PNA and +corona virus  infection.    Treatment history:  Brentuximab-AVD: C1 10/8/22  C2: 11/1/22 onward with DA 20% in BVD; full dose brentuximab  Completed C6 3/22/23  Palliative radiation to rectum 5/30/2023    Interval history:    He returns for follow-up with his wife.  Rectal pain has resolved.  Good ostomy output.  Denies any further blood but occasional mucous rectal discharge.  Overall feels well and ready to start treatment.  Had palliative radiation last week for 2 days.  Breathing is stable.  Denies cough or fevers.      Past Medical History:   Past Medical History:   Diagnosis Date    benign polypoid tissue right lung     Cancer     HODGKINS LYMPHOMA, RECTAL CANCER    Diabetes mellitus     Hyperlipidemia     Hypertension     Lymphoma     Mycobacterium mucogenicum     SHERRI (obstructive sleep apnea)     O2 2L/MIN AT NIGHT ONLY    Pneumonia     2023    Seasonal allergies        Past Surgical History:   Past Surgical History:   Procedure Laterality Date    BRONCHOSCOPY      removal of obstructing polypoid tissue right middle lung    COLOSTOMY N/A 09/22/2022    Procedure: LAPAROSCOPIC COLOSTOMY CREATION, FLEXIBLE SIGMOIDOSCOPY;  Surgeon: Hiram Viveros MD;  Location:  [facility];  Service: General;  Laterality: N/A;    DENTAL PROCEDURE      ENDOSCOPY N/A 10/10/2022    Procedure: ESOPHAGOGASTRODUODENOSCOPY;  Surgeon: Neeraj Lynn MD;  Location:  [facility];  Service: Gastroenterology;  Laterality: N/A;    ENDOSCOPY N/A 10/12/2022    Procedure: ESOPHAGOGASTRODUODENOSCOPY;  Surgeon: Brunner, Mark I, MD;  Location:  [facility];  Service: Gastroenterology;  Laterality: N/A;    EXAM UNDER ANESTHESIA, RECTAL BIOPSY N/A 10/04/2022    Procedure: EXAM UNDER ANESTHESIA, TRANSANAL BIOPSY WITH TRUCUT NEEDLE (LITHOTOMY-CANDY CANE);  Surgeon: Hiram Viveros MD;  Location:  [facility];  Service: General;  Laterality: N/A;    EXAM UNDER ANESTHESIA, RECTAL BIOPSY N/A 5/30/2023    Procedure: EXAM UNDER ANESTHESIA, TRANSANAL  BIOPSY OF RECTAL MASS WITH TRUCUT NEEDLE, PROCTOSCOPY;  Surgeon: Hiram Viveros MD;  Location: [facility];  Service: General;  Laterality: N/A;    PERIPHERALLY INSERTED CENTRAL CATHETER INSERTION      Removed 11/28/2022    VENOUS ACCESS DEVICE (PORT) INSERTION Right 11/28/2022       Family History:   Family History   Problem Relation Age of Onset    Diabetes Mother     Diabetes Father     Heart disease Father        Social History:   Social History     Socioeconomic History    Marital status:    Tobacco Use    Smoking status: Never    Smokeless tobacco: Never   Vaping Use    Vaping Use: Never used   Substance and Sexual Activity    Alcohol use: Not Currently     Comment: previously drank 2 glasses of wine/beer nightly    Drug use: Never    Sexual activity: Defer       Medications:     Current Outpatient Medications:     acyclovir (ZOVIRAX) 400 MG tablet, Take 1 tablet by mouth 2 (Two) Times a Day. to prevent shingles reactivation while on chemotherapy., Disp: 60 tablet, Rfl: 3    diazePAM (Valium) 5 MG tablet, Take 1 tablet by mouth Every 8 (Eight) Hours As Needed for Muscle Spasms for up to 7 days., Disp: 21 tablet, Rfl: 0    dicyclomine (BENTYL) 20 MG tablet, TAKE ONE TABLET BY MOUTH EVERY 6 HOURS AS NEEDED FOR ABDOMINAL CRAMPS, Disp: 30 tablet, Rfl: 1    fluticasone (FLONASE) 50 MCG/ACT nasal spray, 1 spray into the nostril(s) as directed by provider Daily As Needed for Allergies or Rhinitis. OTC, Disp: , Rfl:     lidocaine-prilocaine (EMLA) 2.5-2.5 % cream, Apply 1 application topically to the appropriate area as directed As Needed (45-60 minutes prior to port access.  Cover with saran/plastic wrap.)., Disp: 30 g, Rfl: 3    loratadine (CLARITIN) 10 MG tablet, Take 1 tablet by mouth Daily., Disp: 30 tablet, Rfl: 5    ondansetron (Zofran) 8 MG tablet, Take 1 tablet by mouth Every 8 (Eight) Hours As Needed for Nausea or Vomiting., Disp: 30 tablet, Rfl: 5    ondansetron (ZOFRAN) 8 MG tablet, Take 1  "tablet by mouth 3 (Three) Times a Day As Needed for Nausea or Vomiting., Disp: 30 tablet, Rfl: 5    pantoprazole (PROTONIX) 40 MG EC tablet, Take 1 tablet by mouth Daily., Disp: 90 tablet, Rfl: 1    predniSONE (DELTASONE) 50 MG tablet, Take 2 tablets by mouth Daily With Breakfast. on days 1-4 of each 21-day cycle., Disp: 8 tablet, Rfl: 3    prochlorperazine (COMPAZINE) 5 MG tablet, Take 1 tablet by mouth Every 6 (Six) Hours As Needed for Nausea or Vomiting., Disp: 30 tablet, Rfl: 2    rosuvastatin (CRESTOR) 10 MG tablet, Take 1 tablet by mouth Daily., Disp: , Rfl:     sulfamethoxazole-trimethoprim (BACTRIM DS,SEPTRA DS) 800-160 MG per tablet, Take 1 tablet by mouth 3 (Three) Times a Week. on Monday, Wednesday, Friday for prevention of PJP pneumonia., Disp: 12 tablet, Rfl: 3    traMADol (ULTRAM) 50 MG tablet, Take 2 tablets by mouth Every 8 (Eight) Hours As Needed for Moderate Pain (cancer pain) for up to 30 days., Disp: 180 tablet, Rfl: 0    Allergies:   No Known Allergies    Objective     Vital Signs:   Vitals:    06/06/23 0910   BP: 129/79  Comment: LUE   Pulse: 66   Resp: 16   Temp: 97.1 °F (36.2 °C)   TempSrc: Infrared   SpO2: 99%  Comment: RA   Weight: 68 kg (150 lb)   Height: 162.6 cm (64\")   PainSc: 0-No pain    Body mass index is 25.75 kg/m².   Pain Score    06/06/23 0910   PainSc: 0-No pain       ECOG Performance Status: 2    Physical Exam:   General: No acute distress. Well appearing.  HEENT: Normocephalic, atraumatic. Sclera anicteric.   Neck: supple, no palpable LAD.  Cardiovascular: regular rate and rhythm. No murmurs.   Respiratory: Normal rate. Clear to auscultation bilaterally  Abdomen: Soft, nontender, non distended with normoactive bowel sounds. Ostomy LLQ.  Lymph: no supraclavicular or axillary adenopathy  Neuro: Alert and oriented x 3.   Ext: Symmetric, no swelling.   Psych: Euthymic  Skin: right port site  C/d/i      Laboratory/Imaging Reviewed:   Hospital Outpatient Visit on 06/06/2023 "   Component Date Value Ref Range Status    Glucose 06/06/2023 85  65 - 99 mg/dL Final    BUN 06/06/2023 14  8 - 23 mg/dL Final    Creatinine 06/06/2023 0.60 (L)  0.76 - 1.27 mg/dL Final    Sodium 06/06/2023 136  136 - 145 mmol/L Final    Potassium 06/06/2023 4.2  3.5 - 5.2 mmol/L Final    Chloride 06/06/2023 98  98 - 107 mmol/L Final    CO2 06/06/2023 29.0  22.0 - 29.0 mmol/L Final    Calcium 06/06/2023 9.2  8.6 - 10.5 mg/dL Final    Total Protein 06/06/2023 7.2  6.0 - 8.5 g/dL Final    Albumin 06/06/2023 3.7  3.5 - 5.2 g/dL Final    ALT (SGPT) 06/06/2023 7  1 - 41 U/L Final    AST (SGOT) 06/06/2023 13  1 - 40 U/L Final    Alkaline Phosphatase 06/06/2023 77  39 - 117 U/L Final    Total Bilirubin 06/06/2023 0.2  0.0 - 1.2 mg/dL Final    Globulin 06/06/2023 3.5  gm/dL Final    Calculated Result    A/G Ratio 06/06/2023 1.1  g/dL Final    BUN/Creatinine Ratio 06/06/2023 23.3  7.0 - 25.0 Final    Anion Gap 06/06/2023 9.0  5.0 - 15.0 mmol/L Final    eGFR 06/06/2023 103.2  >60.0 mL/min/1.73 Final    WBC 06/06/2023 3.09 (L)  3.40 - 10.80 10*3/mm3 Final    RBC 06/06/2023 3.64 (L)  4.14 - 5.80 10*6/mm3 Final    Hemoglobin 06/06/2023 11.1 (L)  13.0 - 17.7 g/dL Final    Hematocrit 06/06/2023 34.3 (L)  37.5 - 51.0 % Final    MCV 06/06/2023 94.2  79.0 - 97.0 fL Final    MCH 06/06/2023 30.5  26.6 - 33.0 pg Final    MCHC 06/06/2023 32.4  31.5 - 35.7 g/dL Final    RDW 06/06/2023 14.2  12.3 - 15.4 % Final    RDW-SD 06/06/2023 49.2  37.0 - 54.0 fl Final    MPV 06/06/2023 8.7  6.0 - 12.0 fL Final    Platelets 06/06/2023 272  140 - 450 10*3/mm3 Final    Neutrophil % 06/06/2023 60.9  42.7 - 76.0 % Final    Lymphocyte % 06/06/2023 21.7  19.6 - 45.3 % Final    Monocyte % 06/06/2023 13.6 (H)  5.0 - 12.0 % Final    Eosinophil % 06/06/2023 2.9  0.3 - 6.2 % Final    Basophil % 06/06/2023 0.6  0.0 - 1.5 % Final    Immature Grans % 06/06/2023 0.3  0.0 - 0.5 % Final    Neutrophils, Absolute 06/06/2023 1.88  1.70 - 7.00 10*3/mm3 Final     Lymphocytes, Absolute 06/06/2023 0.67 (L)  0.70 - 3.10 10*3/mm3 Final    Monocytes, Absolute 06/06/2023 0.42  0.10 - 0.90 10*3/mm3 Final    Eosinophils, Absolute 06/06/2023 0.09  0.00 - 0.40 10*3/mm3 Final    Basophils, Absolute 06/06/2023 0.02  0.00 - 0.20 10*3/mm3 Final    Immature Grans, Absolute 06/06/2023 0.01  0.00 - 0.05 10*3/mm3 Final   Lab on 06/01/2023   Component Date Value Ref Range Status    LDH 06/01/2023 180  135 - 225 U/L Final    Glucose 06/01/2023 112 (H)  65 - 99 mg/dL Final    BUN 06/01/2023 13  8 - 23 mg/dL Final    Creatinine 06/01/2023 0.56 (L)  0.76 - 1.27 mg/dL Final    Sodium 06/01/2023 139  136 - 145 mmol/L Final    Potassium 06/01/2023 3.9  3.5 - 5.2 mmol/L Final    Chloride 06/01/2023 101  98 - 107 mmol/L Final    CO2 06/01/2023 28.0  22.0 - 29.0 mmol/L Final    Calcium 06/01/2023 9.8  8.6 - 10.5 mg/dL Final    Total Protein 06/01/2023 7.3  6.0 - 8.5 g/dL Final    Albumin 06/01/2023 3.6  3.5 - 5.2 g/dL Final    ALT (SGPT) 06/01/2023 10  1 - 41 U/L Final    AST (SGOT) 06/01/2023 14  1 - 40 U/L Final    Alkaline Phosphatase 06/01/2023 87  39 - 117 U/L Final    Total Bilirubin 06/01/2023 0.2  0.0 - 1.2 mg/dL Final    Globulin 06/01/2023 3.7  gm/dL Final    Calculated Result    A/G Ratio 06/01/2023 1.0  g/dL Final    BUN/Creatinine Ratio 06/01/2023 23.2  7.0 - 25.0 Final    Anion Gap 06/01/2023 10.0  5.0 - 15.0 mmol/L Final    eGFR 06/01/2023 105.4  >60.0 mL/min/1.73 Final    WBC 06/01/2023 4.79  3.40 - 10.80 10*3/mm3 Final    RBC 06/01/2023 3.66 (L)  4.14 - 5.80 10*6/mm3 Final    Hemoglobin 06/01/2023 11.0 (L)  13.0 - 17.7 g/dL Final    Hematocrit 06/01/2023 34.2 (L)  37.5 - 51.0 % Final    MCV 06/01/2023 93.4  79.0 - 97.0 fL Final    MCH 06/01/2023 30.1  26.6 - 33.0 pg Final    MCHC 06/01/2023 32.2  31.5 - 35.7 g/dL Final    RDW 06/01/2023 13.8  12.3 - 15.4 % Final    RDW-SD 06/01/2023 47.5  37.0 - 54.0 fl Final    MPV 06/01/2023 8.4  6.0 - 12.0 fL Final    Platelets 06/01/2023 364  140 -  450 10*3/mm3 Final    Neutrophil % 06/01/2023 60.2  42.7 - 76.0 % Final    Lymphocyte % 06/01/2023 24.8  19.6 - 45.3 % Final    Monocyte % 06/01/2023 11.9  5.0 - 12.0 % Final    Eosinophil % 06/01/2023 2.3  0.3 - 6.2 % Final    Basophil % 06/01/2023 0.2  0.0 - 1.5 % Final    Immature Grans % 06/01/2023 0.6 (H)  0.0 - 0.5 % Final    Neutrophils, Absolute 06/01/2023 2.88  1.70 - 7.00 10*3/mm3 Final    Lymphocytes, Absolute 06/01/2023 1.19  0.70 - 3.10 10*3/mm3 Final    Monocytes, Absolute 06/01/2023 0.57  0.10 - 0.90 10*3/mm3 Final    Eosinophils, Absolute 06/01/2023 0.11  0.00 - 0.40 10*3/mm3 Final    Basophils, Absolute 06/01/2023 0.01  0.00 - 0.20 10*3/mm3 Final    Immature Grans, Absolute 06/01/2023 0.03  0.00 - 0.05 10*3/mm3 Final   Admission on 05/30/2023, Discharged on 05/30/2023   Component Date Value Ref Range Status    Glucose 05/30/2023 109  70 - 130 mg/dL Final    M[facility]: [id number] : [id number]   Lab on 05/25/2023   Component Date Value Ref Range Status    Color, UA 05/25/2023 Yellow  Yellow, Straw Final    Appearance, UA 05/25/2023 Clear  Clear Final    pH, UA 05/25/2023 7.0  5.0 - 8.0 Final    Specific Gravity, UA 05/25/2023 1.021  1.001 - 1.030 Final    Glucose, UA 05/25/2023 Negative  Negative Final    Ketones, UA 05/25/2023 Trace (A)  Negative Final    Bilirubin, UA 05/25/2023 Negative  Negative Final    Blood, UA 05/25/2023 Negative  Negative Final    Protein, UA 05/25/2023 Negative  Negative Final    Leuk Esterase, UA 05/25/2023 Negative  Negative Final    Nitrite, UA 05/25/2023 Negative  Negative Final    Urobilinogen, UA 05/25/2023 4.0 E.U./dL (A)  0.2 - 1.0 E.U./dL Final    Glucose 05/25/2023 125 (H)  65 - 99 mg/dL Final    BUN 05/25/2023 16  8 - 23 mg/dL Final    Creatinine 05/25/2023 0.59 (L)  0.76 - 1.27 mg/dL Final    Sodium 05/25/2023 134 (L)  136 - 145 mmol/L Final    Potassium 05/25/2023 4.6  3.5 - 5.2 mmol/L Final    Chloride 05/25/2023 95 (L)  98 - 107 mmol/L Final     CO2 05/25/2023 25.0  22.0 - 29.0 mmol/L Final    Calcium 05/25/2023 9.3  8.6 - 10.5 mg/dL Final    Total Protein 05/25/2023 7.4  6.0 - 8.5 g/dL Final    Albumin 05/25/2023 3.4 (L)  3.5 - 5.2 g/dL Final    ALT (SGPT) 05/25/2023 12  1 - 41 U/L Final    AST (SGOT) 05/25/2023 15  1 - 40 U/L Final    Alkaline Phosphatase 05/25/2023 93  39 - 117 U/L Final    Total Bilirubin 05/25/2023 0.3  0.0 - 1.2 mg/dL Final    Globulin 05/25/2023 4.0  gm/dL Final    Calculated Result    A/G Ratio 05/25/2023 0.9  g/dL Final    BUN/Creatinine Ratio 05/25/2023 27.1 (H)  7.0 - 25.0 Final    Anion Gap 05/25/2023 14.0  5.0 - 15.0 mmol/L Final    eGFR 05/25/2023 103.7  >60.0 mL/min/1.73 Final    WBC 05/25/2023 6.34  3.40 - 10.80 10*3/mm3 Final    RBC 05/25/2023 3.50 (L)  4.14 - 5.80 10*6/mm3 Final    Hemoglobin 05/25/2023 10.7 (L)  13.0 - 17.7 g/dL Final    Hematocrit 05/25/2023 33.3 (L)  37.5 - 51.0 % Final    MCV 05/25/2023 95.1  79.0 - 97.0 fL Final    MCH 05/25/2023 30.6  26.6 - 33.0 pg Final    MCHC 05/25/2023 32.1  31.5 - 35.7 g/dL Final    RDW 05/25/2023 13.9  12.3 - 15.4 % Final    RDW-SD 05/25/2023 48.9  37.0 - 54.0 fl Final    MPV 05/25/2023 8.4  6.0 - 12.0 fL Final    Platelets 05/25/2023 309  140 - 450 10*3/mm3 Final    Neutrophil % 05/25/2023 69.4  42.7 - 76.0 % Final    Lymphocyte % 05/25/2023 17.5 (L)  19.6 - 45.3 % Final    Monocyte % 05/25/2023 11.8  5.0 - 12.0 % Final    Eosinophil % 05/25/2023 0.8  0.3 - 6.2 % Final    Basophil % 05/25/2023 0.2  0.0 - 1.5 % Final    Immature Grans % 05/25/2023 0.3  0.0 - 0.5 % Final    Neutrophils, Absolute 05/25/2023 4.40  1.70 - 7.00 10*3/mm3 Final    Lymphocytes, Absolute 05/25/2023 1.11  0.70 - 3.10 10*3/mm3 Final    Monocytes, Absolute 05/25/2023 0.75  0.10 - 0.90 10*3/mm3 Final    Eosinophils, Absolute 05/25/2023 0.05  0.00 - 0.40 10*3/mm3 Final    Basophils, Absolute 05/25/2023 0.01  0.00 - 0.20 10*3/mm3 Final    Immature Grans, Absolute 05/25/2023 0.02  0.00 - 0.05 10*3/mm3  Final     L_> R before during and after treatment.        L-->R After treatment-->interval PET          Narrative & Impression   XR CHEST PA AND LATERAL     Date of Exam: 5/22/2023 12:40 PM EDT     Indication: c81.90 lymphoma hodgkins     Comparison: March 10, 2023, PET/CT May 8, 2023, chest radiograph October 14, 2022     Findings:  There is a right IJ port catheter with tip projecting in the region of the cavoatrial junction, as before.     The opacities seen in the bilateral lungs on the prior chest radiograph have decreased in the interval. There are some residual airspace and interstitial opacities in these locations. No definitive new or worsening infiltrate is seen at this time. No   significant effusion or pneumothorax. Heart size appears within normal limits. Mediastinal contour appears grossly unchanged.     There are degenerative changes in the thoracic spine. Embolization coils are seen in the upper abdomen on the lateral view.     IMPRESSION:  Impression:  Interval decrease in bilateral lung opacities compared to the most recent prior chest radiograph. There is some residual interstitial and airspace opacities. Findings suggest improving pneumonia with possible residual infectious/inflammatory changes and   scarring. Continued follow-up is recommended.       Assessment / Plan      Assessment/Plan:   1.  CD30 positive classical Hodgkin's lymphoma  2.  Residual PET avid rectal mass, now enlarging on radiation planning CT  3.  Pulmonary interstitial infiltrates  -He completed 6 cycles of BV-AVD 3/23/2023.   -Cycle 6 was complicated by coronavirus infection and multifocal pneumonia.  He is clinically improving from this but posttherapy PET showed persistent bilateral interstitial changes and mild adenopathy, that I believe is most likely reactive to his recent infection.     -CT chest on 6/2/2023 showed interval improvement in previously noted bilateral areas of peribronchial groundglass opacity, most  consistent with improving pneumonia-.  These findings are not yet resolved and continued follow-up imaging is advised.  No distinct new or enlarging suspicious nodules.    -Recent PET scan showed persistent uptake in the rectal mass which was biopsied on 5/30/2023.  He had new onset rectal bleeding and pain and had palliative radiation last week with resolution of symptoms.  Plan was to initiate second line chemotherapy today with BeGEV.  He had ASCT evaluation at  yesterday.  I discussed with pathologist, Dr. Roberts today and pathology results pending from biopsy.  He is doing further stains and expects the results to be back possibly tomorrow.  I discussed with Dr. Odom in the absence of Dr. Leary and we will hold treatment until final pathology.  We will delay starting treatment until next weeks since he will need treatment on Days 1-4 and would fall on the weekend.  Dr. Odom received a call from Dr. Pruitt that recommended adding possible immunotherapy to treatment but will defer to Dr. Leary to add to treatment plan.  He has follow-up with Dr. Pruitt at  in 1 month.    4.  Cancer pain  -Resolved since receiving radiation last week  -Not using tramadol    5. GI bleed  -PPI, continue daily.  -Rectal bleeding has resolved    5. Access  -Port    Follow Up: 1 week as scheduled with Dr. Leary    Addendum: I reviewed fax of consultation note with Dr. Pruitt.  He recommended treatment with ifosfamide carboplatin etoposide combined with natalizumab.  He also stated per NCCN guidelines you can find gemcitabine vinblastine and liposomal doxorubicin in combination with pembrolizumab.  If decide to go ahead with planned regimen with Bendamustine may want to add a checkpoint inhibitor.  He is not a ideal candidate for autologous stem cell transplant.  I will follow-up with Dr. Leary to determine treatment plan moving forward.    Second addendum:  I discussed with Dr. Roberts on  6/7/2023 and they are sending  pathology to [facility] for further consultation.  Received message on 6/9/2023 that they heard from [facility] and they agree with the diagnosis of Hodgkin's.  Final report should be available on 6/12/2023.      PORTILLO Maldonado    Hematology and Oncology     Total time of patient care on day of service including time prior to, face to face with patient, and following visit spent in reviewing records, lab results, imaging studies, discussion with patient, discussing with Dr. Odom, discussing with Dr. Roberts, and documentation/charting was > 40 minutes.

## 2023-06-06 NOTE — PLAN OF CARE
Outpatient Infusion  [address] [id number]  [phone]      CHEMOTHERAPY EDUCATION    NAME:  Abraham Wu      : 1950           DATE: 23    Medication Education Sheets: (select all that apply)  Bendamustine, Gemcitabine, Pegfilgrastim, Prednisone, and Vinorelbine    Other Education Sheets: (select all that apply)  CINV, Constipation, Diarrhea, Neulasta OnPro Patient Guide, and Symptom Tracker Sheet and LASHAUN Information    Chemotherapy Regimen:   OP HODGKIN LYMPHOMA BeGV (Bendamustine / Gemcitabine / VinORELBine)  cycle = 21 days  -Gemcitabine IV days 1 and 4  -Vinorelbine IV day 1  -Bendamustine IV days 2 and 3  -Prednisone 50 mg tablets - Take 2 tablets by mouth with breakfast on days 1, 2, 3, 4  -- note, he's starting treatment today and has not picked this up from the pharmacy yet, so we will include 100 mg prednisone as a premedication to be given to him at the infusion center today.  -Neulasta OnPro On-Body Injector (pegfilgrastim) applied on day 4 of each cycle after chemotherapy is complete      TOPICS EDUCATION PROVIDED COMMENTS   ANEMIA:  role of RBC, cause, s/s, ways to manage, role of transfusion [x] Reviewed the role of RBC and the use of transfusions if hemoglobin decreases too much.  Patient to notify us if he experiences shortness of breath, dizziness, or palpitations.  Also let patient know he could feel more tired than usual and to try to stay active, but rest if he needs to.    THROMBOCYTOPENIA:  role of platelet, cause, s/s, ways to prevent bleeding, things to avoid, when to seek help [x] Reviewed the role of platelets in blood clotting and when to call clinic (bloody nose that bleeds for 5 minutes despite pressure, a cut that won't stop bleeding despite pressure, gums that bleed excessively with brushing or flossing). Recommended using an electric razor, soft bristle toothbrush, and blowing your nose gently.    NEUTROPENIA:  role of  WBC, cause, infection precautions, s/s of infection, when to call MD [x] Reviewed the role of WBC, good infection prevention practices, and when to call the clinic (temperature 100.4F, sore throat, burning urination, etc)  COVID Vaccines: 2 doses received and Previously had COVID-19 infection  Flu Vaccine: Declined flu vaccine    Due to the risk of bendamustine to suppress the immune system, he will also start on 2 new medications (sent to [facility] on [facility] [place])  -Acyclovir 400 mg PO BID (prevent shingles reactivation)  -Bactrim DS - 1 tablet PO daily on Mondays, Wednesdays, and Fridays of each week (prevent PJP pneumonia)   NUTRITION & APPETITE CHANGES:  importance of maintaining healthy diet & weight, ways to manage to improve intake, dietary consult, exercise regimen, electrolyte and/or blood glucose abnormalities [x] Decreased Appetite: Discussed the risk of decreased appetite. Recommended eating smaller, more frequent meals. Instructed the patient to contact the clinic if losing weight or having difficulty eating enough to maintain energy level.    Increased Appetite: Discussed the possibility of increased appetite with high-dose steroids. Also reviewed the importance of taking steroids with food to avoid stomach upset.     Steroid-Induced Heartburn: This can occur with high-dose steroids. Instructed the patient to notify the clinic if it becomes problematic. He currently takes pantoprazole 40 mg PO daily and will continue this.  Can use PRN TUMS if he needs additional support.   DIARRHEA:  causes, s/s of dehydration, ways to manage, dietary changes, when to call MD [x] Chemotherapy : Discussed the risk of diarrhea which could manifest as increased ostomy output. Instructed patient on use OTC loperamide with diarrhea onset, but emphasized the importance of calling the clinic if 4-6 episodes in 24 hours not relieved by OTC loperamide.   CONSTIPATION:  causes, ways to manage, dietary changes, when  to call MD [x] Discussed this is less likely, especially considering he has an ostomy, however he was given a supplementary handout with instructions for use of docusate and other OTC therapies to manage constipation.  Instructed patient to call us if medications aren't working.    NAUSEA & VOMITING:  cause, use of antiemetics, dietary changes, when to call MD [x] Emetic risk:   -Days 1 and 4: Low Risk  -Days 2 and 3: Medium Risk    Premeds:   -Days 1 and 4: Home prednisone 100 mg oral acts as his premedication  -Days 2 and 3: In addition to his home prednisone dose, he will get Aloxi IV at the infusion center    PRN home meds: Ondansetron 8 mg PO TID PRN N/V  Pharmacy home meds sent to: [facility] in [place]    Instructed the patient to take a dose of the PRN medication at the first onset of nausea and if it's not working to call us for additional medications.  Also provided non-drug measures to mitigate nausea.   MOUTH SORES:  causes, oral care, ways to manage [x] Mouth sores can be prevented by making a mouth wash mixture of salt, baking soda, and water. The patient was instructed to swish and spit four times daily after meals and before bedtime.  Use of a soft bristle toothbrush was recommended.  Also recommended to use a soft bristle toothbrush and avoid alcohol-containing OTC mouthwashes.   ALOPECIA:  cause, ways to manage, resources [x] Discussed the unlikely, but possible risk of hair thinning or loss with the patient. Recommended covering the head with a hat and/or protecting the skin on the head with SPF 30 or higher.    NERVOUS SYSTEM CHANGES:  causes, s/s, neuropathies, cognitive changes, ways to manage [x] discussed the adverse effect of peripheral neuropathy and signs/symptoms associated with this adverse effect. Instructed patient to call if symptoms become more frequent or worsen.  and Steroids: Discussed the impact of high-dose steroids on the nervous system, such as insomnia,  agitation, and emotional liability   PAIN:  causes, ways to manage [x] Growth Factors: Discussed the possibility for bone pain with pegfilgrastim, and reviewed the use of loratadine (which he currently takes daily) to help manage this side effect.    Vesicant Pain: Instructed patient to notify the nurse of any pain at the IV site during the infusion. He does not use EMLA cream.    Flu-like symptoms can occur 1-2 days after gemcitabine infusions.  Reviewed signs/symptoms, and management.   SKIN & NAIL CHANGES:  cause, s/s, ways to manage [x] Discussed the potential for a rash or itchy skin, offered nonpharmacologic prevention strategies, and instructed the patient to call if a rash develops and worsens.   ORGAN TOXICITIES:  cause, s/s, need for diagnostic tests, labs, when to notify MD [x] Discussed potential effects on organ systems, monitoring, diagnostic tests, labs, and when to notify the clinic. Discussed the signs/symptoms of the following: hepatotoxicity, lung changes, and nephrotoxicity   INFUSION RELATED REACTIONS or INJECTION-SITE REACTION:  Cause, s/s, anaphylaxis, monitoring, etc. [x] Premeds: None    Discussed the uncommon, but possible risk of an infusion reaction and symptoms such as: fever, chills, dizziness, itchiness or rash, flushing, trouble breathing, wheezing, sudden back pain, or feeling faint.  Instructed the patient to notify his nurse if he starts feeling weird at any point during his infusion.    Reviewed how infusion reactions are managed.   MISCELLANEOUS:  drug interactions, administration, labs, etc. [x] Discussed chemotherapy schedule, lab draws, infusion times, and total expected visit time.   DDIs: No significant DDIs  Drug-food interactions: None  Lab draws: On or before day 1 of each cycle, no sooner than 3 days early.  Miscellaneous: Fluid Retention: Explained the signs/symptoms of fluid retention around ankles, feet, and possibly in the hands.  Reviewed strategies to minimize this  and recommended that the patient call the clinic if he gains 5 or more pounds in 1 week or experiences shortness of breath without exertion.   INFERTILITY & SEXUALITY:    causes, fertility preservation options, sexuality changes, ways to manage, importance of birth control [x] IV Oncology Therapy: Reviewed safe sex practices and the importance of minimizing exposure to body fluids for 48 hours after each dose of IV oncology therapy., The patient is not sexually active with a woman of childbearing potential.   HOME CARE:  storing of oral chemo, how to manage bodily fluids [x] IV - Counseled on management of soiled linens and proper flush technique.  Discussed how to manage all the side effects at home and advised when to contact the MD office     Medications:  Prior to Admission medications    Medication Sig Start Date End Date Taking? Authorizing Provider   acyclovir (ZOVIRAX) 400 MG tablet Take 1 tablet by mouth 2 (Two) Times a Day. to prevent shingles reactivation while on chemotherapy. 6/1/23   Kaycee Leary MD   diazePAM (Valium) 5 MG tablet Take 1 tablet by mouth Every 8 (Eight) Hours As Needed for Muscle Spasms for up to 7 days. 5/30/23 6/6/23  Hiram Viveros MD   dicyclomine (BENTYL) 20 MG tablet TAKE ONE TABLET BY MOUTH EVERY 6 HOURS AS NEEDED FOR ABDOMINAL CRAMPS 5/26/23   Kaycee Leary MD   fluticasone (FLONASE) 50 MCG/ACT nasal spray 1 spray into the nostril(s) as directed by provider Daily As Needed for Allergies or Rhinitis. OTC    Provider, MD Marko   lidocaine-prilocaine (EMLA) 2.5-2.5 % cream Apply 1 application topically to the appropriate area as directed As Needed (45-60 minutes prior to port access.  Cover with saran/plastic wrap.). 10/26/22   Kaycee Leary MD   loratadine (CLARITIN) 10 MG tablet Take 1 tablet by mouth Daily. 1/24/23   Kaycee Leary MD   ondansetron (Zofran) 8 MG tablet Take 1 tablet by mouth Every 8 (Eight) Hours As Needed for Nausea or Vomiting. 10/26/22    Kaycee Leary MD   ondansetron (ZOFRAN) 8 MG tablet Take 1 tablet by mouth 3 (Three) Times a Day As Needed for Nausea or Vomiting. 6/1/23   Kaycee Leary MD   pantoprazole (PROTONIX) 40 MG EC tablet Take 1 tablet by mouth Daily. 1/10/23   Kaycee Leary MD   predniSONE (DELTASONE) 50 MG tablet Take 2 tablets by mouth Daily With Breakfast. on days 1-4 of each 21-day cycle. 6/1/23   Kaycee Leary MD   prochlorperazine (COMPAZINE) 5 MG tablet Take 1 tablet by mouth Every 6 (Six) Hours As Needed for Nausea or Vomiting. 11/8/22   Kaycee Leary MD   rosuvastatin (CRESTOR) 10 MG tablet Take 1 tablet by mouth Daily. 11/1/22   Marko Jay MD   sulfamethoxazole-trimethoprim (BACTRIM DS,SEPTRA DS) 800-160 MG per tablet Take 1 tablet by mouth 3 (Three) Times a Week. on Monday, Wednesday, Friday for prevention of PJP pneumonia. 6/2/23   Kaycee Leary MD   traMADol (ULTRAM) 50 MG tablet Take 2 tablets by mouth Every 8 (Eight) Hours As Needed for Moderate Pain (cancer pain) for up to 30 days. 5/26/23 6/25/23  Kaycee Leary MD   dicyclomine (BENTYL) 20 MG tablet Take 1 tablet by mouth 4 (Four) Times a Day As Needed (UPSET STOMACH).  5/26/23  Marko Jay MD   KLOR-CON 20 MEQ CR tablet Take 1 tablet by mouth 2 (Two) Times a Day.  Patient not taking: Reported on 5/23/2023 2/7/23 5/30/23  Marko Jay MD   OLANZapine (zyPREXA) 5 MG tablet Take 1 tablet by mouth Every Night. For 3 days on Days 2, 3 and 4 and on days 16, 17, and 18. 12/28/22 5/30/23  River Odom MD   traMADol (ULTRAM) 50 MG tablet Take 1 tablet by mouth Every 6 (Six) Hours As Needed for Moderate Pain. 5/23/23 5/30/23  Kaycee Leary MD       Notes: All questions and concerns were addressed. Provided a personalized treatment calendar to patient (includes treatment and lab schedule). Provided patient with contact information for the pharmacist and clinic while instructing them to call if any questions or concerns  arise. Informed consent for treatment was obtained. Patient was receptive to information and expressed understanding.     Paulette Patrick PharmD, [facility]  Oncology Clinical Pharmacist   Phone: [phone]    6/6/2023  10:13 EDT

## 2023-06-07 LAB
CYTO UR: NORMAL
DX PRELIMINARY: NORMAL
LAB AP CASE REPORT: NORMAL
LAB AP CLINICAL INFORMATION: NORMAL
LAB AP DIAGNOSIS COMMENT: NORMAL
PATH REPORT.GROSS SPEC: NORMAL

## 2023-06-12 ENCOUNTER — TELEPHONE (OUTPATIENT)
Dept: ONCOLOGY | Facility: CLINIC | Age: 73
End: 2023-06-12
Payer: MEDICARE

## 2023-06-12 DIAGNOSIS — C81.98 HODGKIN LYMPHOMA OF LYMPH NODES OF MULTIPLE REGIONS, UNSPECIFIED HODGKIN LYMPHOMA TYPE: Primary | ICD-10-CM

## 2023-06-12 NOTE — TELEPHONE ENCOUNTER
Advised Ms. Wu per Dr. Leary to keep scheduled appointment with her tomorrow and will discuss chemo at that time.  She verbalized understanding.

## 2023-06-12 NOTE — TELEPHONE ENCOUNTER
Caller: SRINIVASAN HARPER    Relationship: Emergency Contact    Best call back number: [phone]    What is the best time to reach you: ANYTIME    Who are you requesting to speak with (clinical staff, provider,  specific staff member): CLINICAL    What was the call regarding:  WANTING TO SEE IF THE BIOPSY HAS CAME BACK IN, AND IF CHEMO WAS STILL ON FOR TOMORROW 6-13    Is it okay if the provider responds through MyChart: CALL BACK

## 2023-06-13 ENCOUNTER — HOSPITAL ENCOUNTER (OUTPATIENT)
Dept: ONCOLOGY | Facility: HOSPITAL | Age: 73
Discharge: HOME | End: 2023-06-13
Payer: MEDICARE

## 2023-06-13 ENCOUNTER — HOSPITAL ENCOUNTER (OUTPATIENT)
Dept: ONCOLOGY | Facility: HOSPITAL | Age: 73
Discharge: HOME | End: 2023-06-13
Admitting: INTERNAL MEDICINE
Payer: MEDICARE

## 2023-06-13 ENCOUNTER — OFFICE VISIT (OUTPATIENT)
Dept: ONCOLOGY | Facility: CLINIC | Age: 73
End: 2023-06-13
Payer: MEDICARE

## 2023-06-13 VITALS
OXYGEN SATURATION: 96 % | HEART RATE: 62 BPM | RESPIRATION RATE: 14 BRPM | DIASTOLIC BLOOD PRESSURE: 68 MMHG | TEMPERATURE: 97.7 F | WEIGHT: 150 LBS | SYSTOLIC BLOOD PRESSURE: 112 MMHG | BODY MASS INDEX: 25.61 KG/M2 | HEIGHT: 64 IN

## 2023-06-13 DIAGNOSIS — C81.98 HODGKIN LYMPHOMA OF LYMPH NODES OF MULTIPLE REGIONS, UNSPECIFIED HODGKIN LYMPHOMA TYPE: Primary | ICD-10-CM

## 2023-06-13 DIAGNOSIS — Z51.11 CHEMOTHERAPY MANAGEMENT, ENCOUNTER FOR: ICD-10-CM

## 2023-06-13 DIAGNOSIS — Z45.2 ENCOUNTER FOR CARE RELATED TO VASCULAR ACCESS PORT: ICD-10-CM

## 2023-06-13 LAB
ALBUMIN SERPL-MCNC: 3.8 G/DL (ref 3.5–5.2)
ALBUMIN/GLOB SERPL: 1.1 G/DL
ALP SERPL-CCNC: 85 U/L (ref 39–117)
ALT SERPL W P-5'-P-CCNC: 10 U/L (ref 1–41)
ANION GAP SERPL CALCULATED.3IONS-SCNC: 11 MMOL/L (ref 5–15)
AST SERPL-CCNC: 13 U/L (ref 1–40)
BASOPHILS # BLD AUTO: 0.01 10*3/MM3 (ref 0–0.2)
BASOPHILS NFR BLD AUTO: 0.2 % (ref 0–1.5)
BILIRUB SERPL-MCNC: 0.3 MG/DL (ref 0–1.2)
BUN SERPL-MCNC: 13 MG/DL (ref 8–23)
BUN/CREAT SERPL: 22.8 (ref 7–25)
CALCIUM SPEC-SCNC: 9.5 MG/DL (ref 8.6–10.5)
CHLORIDE SERPL-SCNC: 98 MMOL/L (ref 98–107)
CO2 SERPL-SCNC: 25 MMOL/L (ref 22–29)
CREAT SERPL-MCNC: 0.57 MG/DL (ref 0.76–1.27)
CYTO UR: NORMAL
DEPRECATED RDW RBC AUTO: 51.2 FL (ref 37–54)
DX PRELIMINARY: NORMAL
EGFRCR SERPLBLD CKD-EPI 2021: 104.8 ML/MIN/1.73
EOSINOPHIL # BLD AUTO: 0.03 10*3/MM3 (ref 0–0.4)
EOSINOPHIL NFR BLD AUTO: 0.7 % (ref 0.3–6.2)
ERYTHROCYTE [DISTWIDTH] IN BLOOD BY AUTOMATED COUNT: 15 % (ref 12.3–15.4)
GLOBULIN UR ELPH-MCNC: 3.4 GM/DL
GLUCOSE SERPL-MCNC: 104 MG/DL (ref 65–99)
HCT VFR BLD AUTO: 33.3 % (ref 37.5–51)
HGB BLD-MCNC: 10.9 G/DL (ref 13–17.7)
IMM GRANULOCYTES # BLD AUTO: 0.01 10*3/MM3 (ref 0–0.05)
IMM GRANULOCYTES NFR BLD AUTO: 0.2 % (ref 0–0.5)
LAB AP CASE REPORT: NORMAL
LAB AP CLINICAL INFORMATION: NORMAL
LAB AP DIAGNOSIS COMMENT: NORMAL
LAB AP OUTSIDE REPORT, ADDENDUM: NORMAL
LAB AP OUTSIDE REPORT, ADDENDUM: NORMAL
LYMPHOCYTES # BLD AUTO: 0.68 10*3/MM3 (ref 0.7–3.1)
LYMPHOCYTES NFR BLD AUTO: 15.8 % (ref 19.6–45.3)
MCH RBC QN AUTO: 30.4 PG (ref 26.6–33)
MCHC RBC AUTO-ENTMCNC: 32.7 G/DL (ref 31.5–35.7)
MCV RBC AUTO: 92.8 FL (ref 79–97)
MONOCYTES # BLD AUTO: 0.26 10*3/MM3 (ref 0.1–0.9)
MONOCYTES NFR BLD AUTO: 6 % (ref 5–12)
NEUTROPHILS NFR BLD AUTO: 3.32 10*3/MM3 (ref 1.7–7)
NEUTROPHILS NFR BLD AUTO: 77.1 % (ref 42.7–76)
PATH REPORT.ADDENDUM SPEC: NORMAL
PATH REPORT.FINAL DX SPEC: NORMAL
PATH REPORT.GROSS SPEC: NORMAL
PLATELET # BLD AUTO: 153 10*3/MM3 (ref 140–450)
PMV BLD AUTO: 8.8 FL (ref 6–12)
POTASSIUM SERPL-SCNC: 4.6 MMOL/L (ref 3.5–5.2)
PROT SERPL-MCNC: 7.2 G/DL (ref 6–8.5)
RBC # BLD AUTO: 3.59 10*6/MM3 (ref 4.14–5.8)
SODIUM SERPL-SCNC: 134 MMOL/L (ref 136–145)
WBC NRBC COR # BLD: 4.31 10*3/MM3 (ref 3.4–10.8)

## 2023-06-13 PROCEDURE — 85025 COMPLETE CBC W/AUTO DIFF WBC: CPT | Performed by: INTERNAL MEDICINE

## 2023-06-13 PROCEDURE — 25010000002 HEPARIN LOCK FLUSH PER 10 UNITS: Performed by: INTERNAL MEDICINE

## 2023-06-13 PROCEDURE — 36591 DRAW BLOOD OFF VENOUS DEVICE: CPT

## 2023-06-13 PROCEDURE — 80053 COMPREHEN METABOLIC PANEL: CPT | Performed by: INTERNAL MEDICINE

## 2023-06-13 RX ORDER — HEPARIN SODIUM (PORCINE) LOCK FLUSH IV SOLN 100 UNIT/ML 100 UNIT/ML
500 SOLUTION INTRAVENOUS AS NEEDED
Status: DISCONTINUED | OUTPATIENT
Start: 2023-06-13 | End: 2023-06-14 | Stop reason: HOSPADM

## 2023-06-13 RX ORDER — HEPARIN SODIUM (PORCINE) LOCK FLUSH IV SOLN 100 UNIT/ML 100 UNIT/ML
500 SOLUTION INTRAVENOUS AS NEEDED
Status: CANCELLED | OUTPATIENT
Start: 2023-06-13

## 2023-06-13 RX ADMIN — HEPARIN 500 UNITS: 100 SYRINGE at 12:01

## 2023-06-14 ENCOUNTER — HOSPITAL ENCOUNTER (OUTPATIENT)
Dept: ONCOLOGY | Facility: HOSPITAL | Age: 73
Discharge: HOME | End: 2023-06-14
Payer: MEDICARE

## 2023-06-14 ENCOUNTER — TELEPHONE (OUTPATIENT)
Dept: ONCOLOGY | Facility: CLINIC | Age: 73
End: 2023-06-14
Payer: MEDICARE

## 2023-06-14 RX ORDER — DEXTROSE MONOHYDRATE 50 MG/ML
250 INJECTION, SOLUTION INTRAVENOUS ONCE
Status: CANCELLED | OUTPATIENT
Start: 2023-06-16

## 2023-06-14 RX ORDER — FAMOTIDINE 10 MG/ML
20 INJECTION, SOLUTION INTRAVENOUS ONCE
Status: CANCELLED | OUTPATIENT
Start: 2023-06-16

## 2023-06-14 RX ORDER — SODIUM CHLORIDE 9 MG/ML
250 INJECTION, SOLUTION INTRAVENOUS ONCE
Status: CANCELLED | OUTPATIENT
Start: 2023-06-16

## 2023-06-14 NOTE — PROGRESS NOTES
H[facility]ology [facility] On[facility] [place]  Office number [phone]    Fax number [phone]     Follow up     Date: 23    Patient Name: Abraham Wu  MRN: [id number]  : 1950      Chief Complaint: Hodgkin Lymphoma follow up/treatment    Surgeon: Dr. Hiram Viveros MD    Cancer Staging: IV    History of Present Illness: Abraham Wu is a pleasant 73 y.o. male with PMH of hypertension, sleep apnea, hard of hearing who presents today for follow up of Hodgkin Lymphoma.     He initially presented 2022 with intractable diarrhea, low appetite, and a greater than 50 pound weight loss over several month period associated with intermittent fevers.  CT of the chest abdomen pelvis obtained in the ER shows of rectal mass spanning greater than 12 cm with adjacent adenopathy, bulky RP adenopathy, and findings concerning for left renal and liver metastases.  Small right pleural effusion with nodularity.  There was concern for impending obstruction, so he underwent diverting colostomy and biopsy on 2022.  Biopsies from the peritoneam showed a fibrotic nodule histiocytes and eosinophils admixed with CD30 positive large cells.  Rectal biopsies showed involvement by CD30 positive lymphoma, classic Hodgkin lymphoma versus CD30 positive T-cell or B-cell lymphoma.  There was extensive fibrosis and crush artifact.  He underwent repeat transrectal biopsy 10/4/2022 for further characterization which was felt to be most consistent with classical Hodgkin lymphoma.    He initiated chemotherapy with Brentuximab-AVD as an inpatient on 10/8/2022.  Cycle #1 was complicated by GI bleed requiring multiple blood transfusion and ultimately coil artery embolization 10/12; neutropenic fever, Candida esophagitis and mucositis.  He had a prolonged ICU stay  And required enteral feeding.  He was discharged 10/20/2022.    Following his last cycle he was admitted with multifocal PNA and +corona virus  infection.    Treatment history:  Brentuximab-AVD: C1 10/8/22  C2: 11/1/22 onward with DA 20% in BVD; full dose brentuximab  Completed C6 3/22/23  Palliative radiation to rectum 5/30/2023    Interval history:    He returns with his supportive wife for follow up  Rectal pain much improved with tramadol, has stopped this. Using bentyl PRN with good relief.   Still with small amounts of blood, looks more like old blood, no longer bright red.   Good ostomy output.   Urine output is improved. Stopped Flomax 2 days and no urinary hesitancy.   Feels his breathing is good, nearly back to baseline. No cough. Continues daily claritin  No fever  Stable mild neuropathy in fingertips, still with some mild fine motor difficulty. No numbness in feet continuously but does get tingling in balls of feet intermittently    Past Medical History:   Past Medical History:   Diagnosis Date   • benign polypoid tissue right lung    • Cancer     HODGKINS LYMPHOMA, RECTAL CANCER   • Diabetes mellitus    • Hyperlipidemia    • Hypertension    • Lymphoma    • Mycobacterium mucogenicum    • SHERRI (obstructive sleep apnea)     O2 2L/MIN AT NIGHT ONLY   • Pneumonia     2023   • Seasonal allergies        Past Surgical History:   Past Surgical History:   Procedure Laterality Date   • BRONCHOSCOPY      removal of obstructing polypoid tissue right middle lung   • COLOSTOMY N/A 09/22/2022    Procedure: LAPAROSCOPIC COLOSTOMY CREATION, FLEXIBLE SIGMOIDOSCOPY;  Surgeon: Hiram Viveros MD;  Location: [facility] OR;  Service: General;  Laterality: N/A;   • DENTAL PROCEDURE     • ENDOSCOPY N/A 10/10/2022    Procedure: ESOPHAGOGASTRODUODENOSCOPY;  Surgeon: Neeraj Lynn MD;  Location:  [facility];  Service: Gastroenterology;  Laterality: N/A;   • ENDOSCOPY N/A 10/12/2022    Procedure: ESOPHAGOGASTRODUODENOSCOPY;  Surgeon: Brunner, Mark I, MD;  Location:  [facility];  Service: Gastroenterology;  Laterality: N/A;   • EXAM UNDER ANESTHESIA, RECTAL  BIOPSY N/A 10/04/2022    Procedure: EXAM UNDER ANESTHESIA, TRANSANAL BIOPSY WITH TRUCUT NEEDLE (LITHOTOMY-CANDY CANE);  Surgeon: Hiram Viveros MD;  Location:  [facility];  Service: General;  Laterality: N/A;   • EXAM UNDER ANESTHESIA, RECTAL BIOPSY N/A 5/30/2023    Procedure: EXAM UNDER ANESTHESIA, TRANSANAL BIOPSY OF RECTAL MASS WITH TRUCUT NEEDLE, PROCTOSCOPY;  Surgeon: Hiram Viveros MD;  Location:  [facility];  Service: General;  Laterality: N/A;   • PERIPHERALLY INSERTED CENTRAL CATHETER INSERTION      Removed 11/28/2022   • VENOUS ACCESS DEVICE (PORT) INSERTION Right 11/28/2022       Family History:   Family History   Problem Relation Age of Onset   • Diabetes Mother    • Diabetes Father    • Heart disease Father        Social History:   Social History     Socioeconomic History   • Marital status:    Tobacco Use   • Smoking status: Never   • Smokeless tobacco: Never   Vaping Use   • Vaping Use: Never used   Substance and Sexual Activity   • Alcohol use: Not Currently     Comment: previously drank 2 glasses of wine/beer nightly   • Drug use: Never   • Sexual activity: Defer       Medications:     Current Outpatient Medications:   •  acyclovir (ZOVIRAX) 400 MG tablet, Take 1 tablet by mouth 2 (Two) Times a Day. to prevent shingles reactivation while on chemotherapy., Disp: 60 tablet, Rfl: 3  •  dicyclomine (BENTYL) 20 MG tablet, TAKE ONE TABLET BY MOUTH EVERY 6 HOURS AS NEEDED FOR ABDOMINAL CRAMPS, Disp: 30 tablet, Rfl: 1  •  fluticasone (FLONASE) 50 MCG/ACT nasal spray, 1 spray into the nostril(s) as directed by provider Daily As Needed for Allergies or Rhinitis. OTC, Disp: , Rfl:   •  lidocaine-prilocaine (EMLA) 2.5-2.5 % cream, Apply 1 application topically to the appropriate area as directed As Needed (45-60 minutes prior to port access.  Cover with saran/plastic wrap.)., Disp: 30 g, Rfl: 3  •  loratadine (CLARITIN) 10 MG tablet, Take 1 tablet by mouth Daily., Disp: 30 tablet, Rfl: 5  •   "ondansetron (Zofran) 8 MG tablet, Take 1 tablet by mouth Every 8 (Eight) Hours As Needed for Nausea or Vomiting., Disp: 30 tablet, Rfl: 5  •  ondansetron (ZOFRAN) 8 MG tablet, Take 1 tablet by mouth 3 (Three) Times a Day As Needed for Nausea or Vomiting., Disp: 30 tablet, Rfl: 5  •  pantoprazole (PROTONIX) 40 MG EC tablet, Take 1 tablet by mouth Daily., Disp: 90 tablet, Rfl: 1  •  predniSONE (DELTASONE) 50 MG tablet, Take 2 tablets by mouth Daily With Breakfast. on days 1-4 of each 21-day cycle., Disp: 8 tablet, Rfl: 3  •  prochlorperazine (COMPAZINE) 5 MG tablet, Take 1 tablet by mouth Every 6 (Six) Hours As Needed for Nausea or Vomiting., Disp: 30 tablet, Rfl: 2  •  rosuvastatin (CRESTOR) 10 MG tablet, Take 1 tablet by mouth Daily., Disp: , Rfl:   •  sulfamethoxazole-trimethoprim (BACTRIM DS,SEPTRA DS) 800-160 MG per tablet, Take 1 tablet by mouth 3 (Three) Times a Week. on Monday, Wednesday, Friday for prevention of PJP pneumonia., Disp: 12 tablet, Rfl: 3  •  traMADol (ULTRAM) 50 MG tablet, Take 2 tablets by mouth Every 8 (Eight) Hours As Needed for Moderate Pain (cancer pain) for up to 30 days., Disp: 180 tablet, Rfl: 0  No current facility-administered medications for this visit.    Allergies:   No Known Allergies    Objective     Vital Signs:   Vitals:    06/13/23 1036   BP: 112/68   Pulse: 62   Resp: 14   Temp: 97.7 °F (36.5 °C)   TempSrc: Temporal   SpO2: 96%   Weight: 68 kg (150 lb)   Height: 162.6 cm (64\")  Comment: per pt   PainSc: 0-No pain    Body mass index is 25.75 kg/m².   Pain Score    06/13/23 1036   PainSc: 0-No pain       ECOG Performance Status: 2    Physical Exam:   General: No acute distress. Well appearing.  HEENT: Normocephalic, atraumatic. Sclera anicteric.   Neck: supple, no palpable LAD.  Cardiovascular: regular rate and rhythm. No murmurs.   Respiratory: Normal rate. Clear to auscultation bilaterally  Abdomen: Soft, nontender, non distended with normoactive bowel sounds. Ostomy " LLQ.  Lymph: no supraclavicular or axillary adenopathy  Neuro: Alert and oriented x 3.   Ext: Symmetric, no swelling.   Psych: Euthymic  Skin: right port site  C/d/i      Laboratory/Imaging Reviewed:   Hospital Outpatient Visit on 06/13/2023   Component Date Value Ref Range Status   • Glucose 06/13/2023 104 (H)  65 - 99 mg/dL Final   • BUN 06/13/2023 13  8 - 23 mg/dL Final   • Creatinine 06/13/2023 0.57 (L)  0.76 - 1.27 mg/dL Final   • Sodium 06/13/2023 134 (L)  136 - 145 mmol/L Final   • Potassium 06/13/2023 4.6  3.5 - 5.2 mmol/L Final   • Chloride 06/13/2023 98  98 - 107 mmol/L Final   • CO2 06/13/2023 25.0  22.0 - 29.0 mmol/L Final   • Calcium 06/13/2023 9.5  8.6 - 10.5 mg/dL Final   • Total Protein 06/13/2023 7.2  6.0 - 8.5 g/dL Final   • Albumin 06/13/2023 3.8  3.5 - 5.2 g/dL Final   • ALT (SGPT) 06/13/2023 10  1 - 41 U/L Final   • AST (SGOT) 06/13/2023 13  1 - 40 U/L Final   • Alkaline Phosphatase 06/13/2023 85  39 - 117 U/L Final   • Total Bilirubin 06/13/2023 0.3  0.0 - 1.2 mg/dL Final   • Globulin 06/13/2023 3.4  gm/dL Final    Calculated Result   • A/G Ratio 06/13/2023 1.1  g/dL Final   • BUN/Creatinine Ratio 06/13/2023 22.8  7.0 - 25.0 Final   • Anion Gap 06/13/2023 11.0  5.0 - 15.0 mmol/L Final   • eGFR 06/13/2023 104.8  >60.0 mL/min/1.73 Final   • WBC 06/13/2023 4.31  3.40 - 10.80 10*3/mm3 Final   • RBC 06/13/2023 3.59 (L)  4.14 - 5.80 10*6/mm3 Final   • Hemoglobin 06/13/2023 10.9 (L)  13.0 - 17.7 g/dL Final   • Hematocrit 06/13/2023 33.3 (L)  37.5 - 51.0 % Final   • MCV 06/13/2023 92.8  79.0 - 97.0 fL Final   • MCH 06/13/2023 30.4  26.6 - 33.0 pg Final   • MCHC 06/13/2023 32.7  31.5 - 35.7 g/dL Final   • RDW 06/13/2023 15.0  12.3 - 15.4 % Final   • RDW-SD 06/13/2023 51.2  37.0 - 54.0 fl Final   • MPV 06/13/2023 8.8  6.0 - 12.0 fL Final   • Platelets 06/13/2023 153  140 - 450 10*3/mm3 Final   • Neutrophil % 06/13/2023 77.1 (H)  42.7 - 76.0 % Final   • Lymphocyte % 06/13/2023 15.8 (L)  19.6 - 45.3 %  Final   • Monocyte % 06/13/2023 6.0  5.0 - 12.0 % Final   • Eosinophil % 06/13/2023 0.7  0.3 - 6.2 % Final   • Basophil % 06/13/2023 0.2  0.0 - 1.5 % Final   • Immature Grans % 06/13/2023 0.2  0.0 - 0.5 % Final   • Neutrophils, Absolute 06/13/2023 3.32  1.70 - 7.00 10*3/mm3 Final   • Lymphocytes, Absolute 06/13/2023 0.68 (L)  0.70 - 3.10 10*3/mm3 Final   • Monocytes, Absolute 06/13/2023 0.26  0.10 - 0.90 10*3/mm3 Final   • Eosinophils, Absolute 06/13/2023 0.03  0.00 - 0.40 10*3/mm3 Final   • Basophils, Absolute 06/13/2023 0.01  0.00 - 0.20 10*3/mm3 Final   • Immature Grans, Absolute 06/13/2023 0.01  0.00 - 0.05 10*3/mm3 Final   Hospital Outpatient Visit on 06/06/2023   Component Date Value Ref Range Status   • Glucose 06/06/2023 85  65 - 99 mg/dL Final   • BUN 06/06/2023 14  8 - 23 mg/dL Final   • Creatinine 06/06/2023 0.60 (L)  0.76 - 1.27 mg/dL Final   • Sodium 06/06/2023 136  136 - 145 mmol/L Final   • Potassium 06/06/2023 4.2  3.5 - 5.2 mmol/L Final   • Chloride 06/06/2023 98  98 - 107 mmol/L Final   • CO2 06/06/2023 29.0  22.0 - 29.0 mmol/L Final   • Calcium 06/06/2023 9.2  8.6 - 10.5 mg/dL Final   • Total Protein 06/06/2023 7.2  6.0 - 8.5 g/dL Final   • Albumin 06/06/2023 3.7  3.5 - 5.2 g/dL Final   • ALT (SGPT) 06/06/2023 7  1 - 41 U/L Final   • AST (SGOT) 06/06/2023 13  1 - 40 U/L Final   • Alkaline Phosphatase 06/06/2023 77  39 - 117 U/L Final   • Total Bilirubin 06/06/2023 0.2  0.0 - 1.2 mg/dL Final   • Globulin 06/06/2023 3.5  gm/dL Final    Calculated Result   • A/G Ratio 06/06/2023 1.1  g/dL Final   • BUN/Creatinine Ratio 06/06/2023 23.3  7.0 - 25.0 Final   • Anion Gap 06/06/2023 9.0  5.0 - 15.0 mmol/L Final   • eGFR 06/06/2023 103.2  >60.0 mL/min/1.73 Final   • WBC 06/06/2023 3.09 (L)  3.40 - 10.80 10*3/mm3 Final   • RBC 06/06/2023 3.64 (L)  4.14 - 5.80 10*6/mm3 Final   • Hemoglobin 06/06/2023 11.1 (L)  13.0 - 17.7 g/dL Final   • Hematocrit 06/06/2023 34.3 (L)  37.5 - 51.0 % Final   • MCV 06/06/2023  94.2  79.0 - 97.0 fL Final   • MCH 06/06/2023 30.5  26.6 - 33.0 pg Final   • MCHC 06/06/2023 32.4  31.5 - 35.7 g/dL Final   • RDW 06/06/2023 14.2  12.3 - 15.4 % Final   • RDW-SD 06/06/2023 49.2  37.0 - 54.0 fl Final   • MPV 06/06/2023 8.7  6.0 - 12.0 fL Final   • Platelets 06/06/2023 272  140 - 450 10*3/mm3 Final   • Neutrophil % 06/06/2023 60.9  42.7 - 76.0 % Final   • Lymphocyte % 06/06/2023 21.7  19.6 - 45.3 % Final   • Monocyte % 06/06/2023 13.6 (H)  5.0 - 12.0 % Final   • Eosinophil % 06/06/2023 2.9  0.3 - 6.2 % Final   • Basophil % 06/06/2023 0.6  0.0 - 1.5 % Final   • Immature Grans % 06/06/2023 0.3  0.0 - 0.5 % Final   • Neutrophils, Absolute 06/06/2023 1.88  1.70 - 7.00 10*3/mm3 Final   • Lymphocytes, Absolute 06/06/2023 0.67 (L)  0.70 - 3.10 10*3/mm3 Final   • Monocytes, Absolute 06/06/2023 0.42  0.10 - 0.90 10*3/mm3 Final   • Eosinophils, Absolute 06/06/2023 0.09  0.00 - 0.40 10*3/mm3 Final   • Basophils, Absolute 06/06/2023 0.02  0.00 - 0.20 10*3/mm3 Final   • Immature Grans, Absolute 06/06/2023 0.01  0.00 - 0.05 10*3/mm3 Final   Lab on 06/01/2023   Component Date Value Ref Range Status   • LDH 06/01/2023 180  135 - 225 U/L Final   • Glucose 06/01/2023 112 (H)  65 - 99 mg/dL Final   • BUN 06/01/2023 13  8 - 23 mg/dL Final   • Creatinine 06/01/2023 0.56 (L)  0.76 - 1.27 mg/dL Final   • Sodium 06/01/2023 139  136 - 145 mmol/L Final   • Potassium 06/01/2023 3.9  3.5 - 5.2 mmol/L Final   • Chloride 06/01/2023 101  98 - 107 mmol/L Final   • CO2 06/01/2023 28.0  22.0 - 29.0 mmol/L Final   • Calcium 06/01/2023 9.8  8.6 - 10.5 mg/dL Final   • Total Protein 06/01/2023 7.3  6.0 - 8.5 g/dL Final   • Albumin 06/01/2023 3.6  3.5 - 5.2 g/dL Final   • ALT (SGPT) 06/01/2023 10  1 - 41 U/L Final   • AST (SGOT) 06/01/2023 14  1 - 40 U/L Final   • Alkaline Phosphatase 06/01/2023 87  39 - 117 U/L Final   • Total Bilirubin 06/01/2023 0.2  0.0 - 1.2 mg/dL Final   • Globulin 06/01/2023 3.7  gm/dL Final    Calculated Result   •  A/G Ratio 06/01/2023 1.0  g/dL Final   • BUN/Creatinine Ratio 06/01/2023 23.2  7.0 - 25.0 Final   • Anion Gap 06/01/2023 10.0  5.0 - 15.0 mmol/L Final   • eGFR 06/01/2023 105.4  >60.0 mL/min/1.73 Final   • WBC 06/01/2023 4.79  3.40 - 10.80 10*3/mm3 Final   • RBC 06/01/2023 3.66 (L)  4.14 - 5.80 10*6/mm3 Final   • Hemoglobin 06/01/2023 11.0 (L)  13.0 - 17.7 g/dL Final   • Hematocrit 06/01/2023 34.2 (L)  37.5 - 51.0 % Final   • MCV 06/01/2023 93.4  79.0 - 97.0 fL Final   • MCH 06/01/2023 30.1  26.6 - 33.0 pg Final   • MCHC 06/01/2023 32.2  31.5 - 35.7 g/dL Final   • RDW 06/01/2023 13.8  12.3 - 15.4 % Final   • RDW-SD 06/01/2023 47.5  37.0 - 54.0 fl Final   • MPV 06/01/2023 8.4  6.0 - 12.0 fL Final   • Platelets 06/01/2023 364  140 - 450 10*3/mm3 Final   • Neutrophil % 06/01/2023 60.2  42.7 - 76.0 % Final   • Lymphocyte % 06/01/2023 24.8  19.6 - 45.3 % Final   • Monocyte % 06/01/2023 11.9  5.0 - 12.0 % Final   • Eosinophil % 06/01/2023 2.3  0.3 - 6.2 % Final   • Basophil % 06/01/2023 0.2  0.0 - 1.5 % Final   • Immature Grans % 06/01/2023 0.6 (H)  0.0 - 0.5 % Final   • Neutrophils, Absolute 06/01/2023 2.88  1.70 - 7.00 10*3/mm3 Final   • Lymphocytes, Absolute 06/01/2023 1.19  0.70 - 3.10 10*3/mm3 Final   • Monocytes, Absolute 06/01/2023 0.57  0.10 - 0.90 10*3/mm3 Final   • Eosinophils, Absolute 06/01/2023 0.11  0.00 - 0.40 10*3/mm3 Final   • Basophils, Absolute 06/01/2023 0.01  0.00 - 0.20 10*3/mm3 Final   • Immature Grans, Absolute 06/01/2023 0.03  0.00 - 0.05 10*3/mm3 Final     L_> R before during and after treatment.        L-->R After treatment-->interval PET          Narrative & Impression   XR CHEST PA AND LATERAL     Date of Exam: 5/22/2023 12:40 PM EDT     Indication: c81.90 lymphoma hodgkins     Comparison: March 10, 2023, PET/CT May 8, 2023, chest radiograph October 14, 2022     Findings:  There is a right IJ port catheter with tip projecting in the region of the cavoatrial junction, as before.     The  opacities seen in the bilateral lungs on the prior chest radiograph have decreased in the interval. There are some residual airspace and interstitial opacities in these locations. No definitive new or worsening infiltrate is seen at this time. No   significant effusion or pneumothorax. Heart size appears within normal limits. Mediastinal contour appears grossly unchanged.     There are degenerative changes in the thoracic spine. Embolization coils are seen in the upper abdomen on the lateral view.     IMPRESSION:  Impression:  Interval decrease in bilateral lung opacities compared to the most recent prior chest radiograph. There is some residual interstitial and airspace opacities. Findings suggest improving pneumonia with possible residual infectious/inflammatory changes and   scarring. Continued follow-up is recommended.       Assessment / Plan      Assessment/Plan:   1.  CD30 positive classical Hodgkin's lymphoma  2.  Residual PET avid rectal mass, now enlarging on radiation planning CT with development of recurrent retroperitoneal adenopathy, biopsy confirms recurrent classic Hodgkin lymphoma   3.  Pulmonary interstitial infiltrates, resolving    -He completed 6 cycles of BV-AVD 3/23/2023.   -Cycle 6 was complicated by coronavirus infection and multifocal pneumonia.  He is clinically improving from this but posttherapy PET showed persistent bilateral interstitial changes and mild adenopathy, that I believe is most likely reactive to his recent infection. A repeat chest CT shows continued improvement in the pulmonary infiltrates consistent with a reactive infectious etiology.    - He had clear response with resolution of the hepatic lesions, marked improvement in the retroperitoneal adenopathy, and 50% reduction in the size of the rectal mass.  However he had persistent uptake in the rectal mass on posttreatment PET, [place] 5, which may be secondary to inflammatory/posttreatment uptake, but certainly could  represent some amount of residual disease (He did not have a pretreatment PET/CT given that he was profoundly ill and admitted for cycle #1). We elected to proceed with biopsy to confirm whether he does have residual disease followed by consolidative radiotherapy to the rectal mass given initial bulky disease.    -In the interim, while awaiting biopsy, he presented with rectal bleeding, obstructive uropathy symptoms and pain concerning for progressive disease.  His radiation oncologist called me and discussed that his radiation planning CT showed significant interval growth in the rectal mass as well as development of new retroperitoneal adenopathy superior to the rectal mass compared to the his posttreatment PET confirming progressive disease n. He was initiated on palliative radiation. Radiation planning CT showed regrowth of rectal mass and new RP adenopathy compared to prior PET. Repeat biopsy results were reviewed at the [facility] and I discussed these results with pathology.   They confirm residual classic Hodgkin's lymphoma.  Because of development of new adenopathy outside of the radiation field as well as rapid regrowth of his mass, consolidative radiation plan was changed to palliative radiation plan to allow earlier completion for transition to second line chemotherapy.  -His performance status has adequately recovered for treatment initiation.    -Given prior treatment with Brentuximab and national shortage of carbo/cis, I discussed BeGEV.  In the interim he underwent stem cell transplant evaluation at .  I reviewed their note and discussed with my partner Dr. Odom who has been in contact with  last week while I was away when the patient was assessed.  From review of their notes, they feel he is a borderline candidate for stem cell transplantation because of age 73.  However they did recommend avoiding Bendamustine in case of future stem cell collection.  He has a pending ASCT evaluation at . We  discussed that if he is felt to be a transplant candidate and alternative second line (platinum containing) regimen desired by transplant team they may elect to initiate an alternative second line therapy regimen at .  He has also had a chest CT that showed resolution of the pulmonary interstitial infiltrates, making immunotherapy a more attractive option (previously had avoided because of active pulmonary infiltrates concerning for pneumonitis on his PET/CT results but CT last week shows these are resolving c/w infectious etiology).  We discussed the recommended regimen of Keytruda, vinorelbine, Gemzar, and Doxil.  We reviewed chemotherapy and immunotherapy side effects.  We discussed the goals of immunotherapy being cancer control/palliative.  We reviewedimmunotherapy side effects including but not limited to rash, diarrhea, hypothyroidism, adrenal insufficiency, nausea, hepatitis, uveitis, and pneumonitis.  Informed consent was obtained, and the patient elected to proceed.  Pretreatment echo is pending tomorrow.  He has follow-up with  transplant team in 1 month.      4.  Cancer pain  -tramadol to 2 tabs q 8 hours PRN  -Better with radiation    5. GI bleed  -PPI, continue daily.  -New rectal bleeding secondary to malignancy, plan as above    5. Access  -Port    Follow Up:   In 1 week with Day 8 chemo    Kaycee Leary MD  Hematology and Oncology     I have spent a total of 45 min on reviewing test results/preparing to see patient, coordinating care, counseling patient, performing medically appropriate exam, placing orders and documenting clinical information in the electronic or other health record.

## 2023-06-14 NOTE — TELEPHONE ENCOUNTER
Notified patient and his spouse, per Dr. Leary, that the outside path confirms Hodgkin and there is no change to the plan she went over with him on 6/13/23.  Patient and his spouse verbalized understanding.

## 2023-06-15 ENCOUNTER — HOSPITAL ENCOUNTER (OUTPATIENT)
Dept: ONCOLOGY | Facility: HOSPITAL | Age: 73
Discharge: HOME | End: 2023-06-15
Payer: MEDICARE

## 2023-06-15 ENCOUNTER — EDUCATION (OUTPATIENT)
Dept: ONCOLOGY | Facility: HOSPITAL | Age: 73
End: 2023-06-15
Payer: MEDICARE

## 2023-06-15 ENCOUNTER — HOSPITAL ENCOUNTER (OUTPATIENT)
Dept: CARDIOLOGY | Facility: HOSPITAL | Age: 73
Discharge: HOME | End: 2023-06-15
Payer: MEDICARE

## 2023-06-15 VITALS
WEIGHT: 152 LBS | SYSTOLIC BLOOD PRESSURE: 117 MMHG | DIASTOLIC BLOOD PRESSURE: 68 MMHG | RESPIRATION RATE: 16 BRPM | TEMPERATURE: 97.9 F | BODY MASS INDEX: 25.95 KG/M2 | HEIGHT: 64 IN | HEART RATE: 77 BPM

## 2023-06-15 DIAGNOSIS — C81.98 HODGKIN LYMPHOMA OF LYMPH NODES OF MULTIPLE REGIONS, UNSPECIFIED HODGKIN LYMPHOMA TYPE: ICD-10-CM

## 2023-06-15 DIAGNOSIS — C81.98 HODGKIN LYMPHOMA OF LYMPH NODES OF MULTIPLE REGIONS, UNSPECIFIED HODGKIN LYMPHOMA TYPE: Primary | ICD-10-CM

## 2023-06-15 DIAGNOSIS — Z51.11 CHEMOTHERAPY MANAGEMENT, ENCOUNTER FOR: ICD-10-CM

## 2023-06-15 DIAGNOSIS — Z45.2 ENCOUNTER FOR CARE RELATED TO VASCULAR ACCESS PORT: Primary | ICD-10-CM

## 2023-06-15 LAB
BH CV ECHO LEFT VENTRICLE GLOBAL LONGITUDINAL STRAIN: -19.9 %
BH CV ECHO MEAS - AO MAX PG: 6.2 MMHG
BH CV ECHO MEAS - AO MEAN PG: 3 MMHG
BH CV ECHO MEAS - AO ROOT DIAM: 3.7 CM
BH CV ECHO MEAS - AO V2 MAX: 124 CM/SEC
BH CV ECHO MEAS - AO V2 VTI: 28.7 CM
BH CV ECHO MEAS - AVA(I,D): 2.39 CM2
BH CV ECHO MEAS - EDV(CUBED): 97.3 ML
BH CV ECHO MEAS - EDV(MOD-SP2): 98.1 ML
BH CV ECHO MEAS - EDV(MOD-SP4): 125 ML
BH CV ECHO MEAS - EF(MOD-BP): 59.6 %
BH CV ECHO MEAS - EF(MOD-SP2): 57.2 %
BH CV ECHO MEAS - EF(MOD-SP4): 60.6 %
BH CV ECHO MEAS - ESV(CUBED): 29.8 ML
BH CV ECHO MEAS - ESV(MOD-SP2): 42 ML
BH CV ECHO MEAS - ESV(MOD-SP4): 49.2 ML
BH CV ECHO MEAS - FS: 32.6 %
BH CV ECHO MEAS - IVS/LVPW: 1 CM
BH CV ECHO MEAS - IVSD: 1.1 CM
BH CV ECHO MEAS - LA DIMENSION: 3.8 CM
BH CV ECHO MEAS - LAT PEAK E' VEL: 9.5 CM/SEC
BH CV ECHO MEAS - LV MASS(C)D: 181.2 GRAMS
BH CV ECHO MEAS - LV MAX PG: 4.4 MMHG
BH CV ECHO MEAS - LV MEAN PG: 2 MMHG
BH CV ECHO MEAS - LV V1 MAX: 105 CM/SEC
BH CV ECHO MEAS - LV V1 VTI: 21.8 CM
BH CV ECHO MEAS - LVIDD: 4.6 CM
BH CV ECHO MEAS - LVIDS: 3.1 CM
BH CV ECHO MEAS - LVOT AREA: 3.1 CM2
BH CV ECHO MEAS - LVOT DIAM: 2 CM
BH CV ECHO MEAS - LVPWD: 1.1 CM
BH CV ECHO MEAS - MED PEAK E' VEL: 8.2 CM/SEC
BH CV ECHO MEAS - MV A MAX VEL: 77.6 CM/SEC
BH CV ECHO MEAS - MV DEC TIME: 0.26 MSEC
BH CV ECHO MEAS - MV E MAX VEL: 63.4 CM/SEC
BH CV ECHO MEAS - MV E/A: 0.82
BH CV ECHO MEAS - PA ACC TIME: 0.09 SEC
BH CV ECHO MEAS - PA V2 MAX: 89.8 CM/SEC
BH CV ECHO MEAS - RAP SYSTOLE: 3 MMHG
BH CV ECHO MEAS - RVSP: 26 MMHG
BH CV ECHO MEAS - SV(LVOT): 68.5 ML
BH CV ECHO MEAS - SV(MOD-SP2): 56.1 ML
BH CV ECHO MEAS - SV(MOD-SP4): 75.8 ML
BH CV ECHO MEAS - TAPSE (>1.6): 2.09 CM
BH CV ECHO MEAS - TR MAX PG: 22.8 MMHG
BH CV ECHO MEAS - TR MAX VEL: 238.5 CM/SEC
BH CV ECHO MEASUREMENTS AVERAGE E/E' RATIO: 7.16
BH CV XLRA - RV BASE: 3.3 CM
BH CV XLRA - RV LENGTH: 6 CM
BH CV XLRA - RV MID: 2.3 CM
BH CV XLRA - TDI S': 12.9 CM/SEC
LEFT ATRIUM VOLUME INDEX: 41.1 ML/M2
LV EF 2D ECHO EST: 60 %
T4 FREE SERPL-MCNC: 1.23 NG/DL (ref 0.93–1.7)
TSH SERPL DL<=0.05 MIU/L-ACNC: 1.57 UIU/ML (ref 0.27–4.2)

## 2023-06-15 PROCEDURE — 93356 MYOCRD STRAIN IMG SPCKL TRCK: CPT

## 2023-06-15 PROCEDURE — 84439 ASSAY OF FREE THYROXINE: CPT | Performed by: INTERNAL MEDICINE

## 2023-06-15 PROCEDURE — 84443 ASSAY THYROID STIM HORMONE: CPT | Performed by: INTERNAL MEDICINE

## 2023-06-15 PROCEDURE — 36591 DRAW BLOOD OFF VENOUS DEVICE: CPT

## 2023-06-15 PROCEDURE — 25010000002 HEPARIN LOCK FLUSH PER 10 UNITS: Performed by: INTERNAL MEDICINE

## 2023-06-15 PROCEDURE — 93306 TTE W/DOPPLER COMPLETE: CPT

## 2023-06-15 RX ORDER — HEPARIN SODIUM (PORCINE) LOCK FLUSH IV SOLN 100 UNIT/ML 100 UNIT/ML
500 SOLUTION INTRAVENOUS AS NEEDED
Status: CANCELLED | OUTPATIENT
Start: 2023-06-15

## 2023-06-15 RX ORDER — HEPARIN SODIUM (PORCINE) LOCK FLUSH IV SOLN 100 UNIT/ML 100 UNIT/ML
500 SOLUTION INTRAVENOUS AS NEEDED
Status: DISCONTINUED | OUTPATIENT
Start: 2023-06-15 | End: 2023-06-16 | Stop reason: HOSPADM

## 2023-06-15 RX ADMIN — HEPARIN SODIUM (PORCINE) LOCK FLUSH IV SOLN 100 UNIT/ML 500 UNITS: 100 SOLUTION at 10:44

## 2023-06-15 NOTE — PLAN OF CARE
[facility]  [address] [place] [address]  [phone]      CHEMOTHERAPY EDUCATION    NAME:  Abraham Wu      : 1950           DATE: 06/15/23    Medication Education Sheets: (select all that apply)  Gemcitabine, Liposomal Doxorubicin, Pembrolizumab, and Vinorelbine    Other Education Sheets: (select all that apply)  CINV, Constipation, Diarrhea, Hand-Foot Syndrome, HOPA Immune Checkpoint Inhibitors, Checkpoint Inhibitor Wallet Card, and Symptom Tracker Sheet and LASHAUN Information    Chemotherapy Regimen:   Pembrolizumab on D1  Gemcitabine / Vinorelbine / Liposomal Doxorubicin on D1,8   + Udenyca (pegfilgrastim) on D9  every 21 days x 4 cycles    TOPICS EDUCATION PROVIDED COMMENTS   ANEMIA:  role of RBC, cause, s/s, ways to manage, role of transfusion [x] Reviewed the role of RBC and the use of transfusions if hemoglobin decreases too much.  Patient to notify us if he experiences shortness of breath, dizziness, or palpitations. Patient has received a blood transfusion previously.  Let patient know he could feel more tired than usual and to try to stay active, but rest if he needs to.    THROMBOCYTOPENIA:  role of platelet, cause, s/s, ways to prevent bleeding, things to avoid, when to seek help [x] Reviewed the role of platelets in blood clotting and when to call clinic (bloody nose that bleeds for 5 minutes despite pressure, a cut that won't stop bleeding despite pressure, gums that bleed excessively with brushing or flossing). Recommended using an electric razor, soft bristle toothbrush, and blowing your nose gently.    NEUTROPENIA:  role of WBC, cause, infection precautions, s/s of infection, when to call MD [x] Reviewed the role of WBC, good infection prevention practices, and when to call the clinic (temperature 100.4F, sore throat, burning urination, etc)  COVID Vaccines: 2 doses received  Flu Vaccine: Declined flu vaccine    Patient had COVID and  multifocal pneumonia in March, discussed increased risk for lung infections with immunotherapy.    NUTRITION & APPETITE CHANGES:  importance of maintaining healthy diet & weight, ways to manage to improve intake, dietary consult, exercise regimen, electrolyte and/or blood glucose abnormalities [x] Increased Blood Sugar: This patient's oncology therapy can increase blood sugar.  Recommended that the patient monitor blood sugar more closely and contact their primary care provider for management recommendations if blood glucose values start trending upward. Patient does not have diabetes, but does monitor BG at home periodically.  Electrolyte Abnormalities:  Explained that the oncology therapy may lead to abnormalities in electrolytes, specifically: decreased sodium.  Lipid Panel Abnormalities:  Explained that the oncology therapy may lead to abnormalities in lipid values, specifically: high triglycerides.   DIARRHEA:  causes, s/s of dehydration, ways to manage, dietary changes, when to call MD [x] Discussed the risk of diarrhea and immune related colitis. Can use OTC loperamide with diarrhea onset, but call the clinic if 4-6 episodes in 24 hours not relieved by OTC loperamide. Discussed the role of high-dose steroids for the treatment of immune related colitis. Patient has ostomy bag, discussed monitoring output.     Patient reports making an effort to maintain adequate hydration, has a preference for non-water fluids (tea, milk, and decaffeinated coffee). Discussed other options such as flavored water and sports drinks.    CONSTIPATION:  causes, ways to manage, dietary changes, when to call MD [x] Provided supplementary handout with instructions for use of docusate and other OTC therapies to manage constipation.  Instructed patient to call us if medications aren't working. Discussed that this risk is rare given patient's ostomy.   NAUSEA & VOMITING:  cause, use of antiemetics, dietary changes, when to call MD [x]  Emetic risk: Low  Premeds: None  Scheduled home meds:  None  PRN home meds: Ondansetron  Patient has supply of Zofran on hand from previous regimen    Instructed the patient to take a dose of the PRN medication at the first onset of nausea and if it's not working to call us for additional medications.  Also provided non-drug measures to mitigate nausea.    Patient reported no nausea with previous regimens.   MOUTH SORES:  causes, oral care, ways to manage [x] Mouth sores can be prevented by making a mouth wash mixture of salt, baking soda, and water. The patient was instructed to swish and spit four times daily after meals and before bedtime.  Use of a soft bristle toothbrush was recommended.  Also recommended to use a soft bristle toothbrush and avoid alcohol-containing OTC mouthwashes. Patient experienced extensive mouth sores with previous regimen.   ALOPECIA:  cause, ways to manage, resources [x] Discussed the possibility of hair loss with the patient. Recommended covering the head with a hat and/or protecting the skin on the head with SPF 30 or higher.    NERVOUS SYSTEM CHANGES:  causes, s/s, neuropathies, cognitive changes, ways to manage [x] Nerve Pain: Discussed the adverse effect of peripheral neuropathy and signs/symptoms associated with this adverse effect. Instructed patient to call if symptoms become more frequent or worsen.   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   PAIN:  causes, ways to manage [x] Discussed muscle and joint aches/pains/weakness with chemotherapy and immunotherapy, and recommended the use of OTC pain relief with ibuprofen or acetaminophen if needed. Patient currently adequately managing cancer pain with tramadol TID and experienced relief from radiation.   Growth Factors: Discussed the possibility for bone pain with filgrastim / pegfilgrastim, and reviewed the use of loratadine on days 9-15 of each cycle to help manage this side effect. Patient already takes  loratadine nightly for allergies.  Vesicant Pain: Instructed patient to notify the nurse of any pain at the IV site during the infusion.   EMLA Cream: Discussed rx for EMLA cream, and the benefit of use 45-60 minutes prior to access of port for lab draws / infusions.   SKIN & NAIL CHANGES:  cause, s/s, ways to manage [x] Immunotherapy: Discussed the potential for a rash or itchy skin from immunotherapy, offered nonpharmacologic prevention strategies, and instructed the patient to call if a rash develops and worsens.  HFS: Hand-Foot Syndrome was discussed, including signs/symptoms, prevention measures, and treatment measures. A supplementary handout with this information was also given to the patient.   ORGAN TOXICITIES:  cause, s/s, need for diagnostic tests, labs, when to notify MD [x] Discussed potential effects on organ systems, monitoring, diagnostic tests, labs, and when to notify the clinic. Discussed the signs/symptoms of the following: cardiotoxicity, central neurotoxicity (confusion, vision changes, stiff neck, seizure), eye changes (such as blurry vision, watery eyes, photophobia), hepatotoxicity, hormone gland changes (most commonly the thyroid), lung changes, and nephrotoxicity    Patient reports vision changes since diagnosis and being told he may have chemo-induced cataracts.   INFUSION RELATED REACTIONS or INJECTION-SITE REACTION:  Cause, s/s, anaphylaxis, monitoring, etc. [x] Premeds: Dexamethasone, Diphenhydramine, and Famotidine    Discussed the risk of an infusion reaction and symptoms such as: fever, chills, dizziness, itchiness or rash, flushing, trouble breathing, wheezing, sudden back pain, or feeling faint.  Instructed the patient to notify his nurse if he starts feeling weird at any point during his infusion.    Reviewed how infusion reactions are managed. Recommended having someone drive him home after infusions due to potential drowsiness from IV Benadryl.   MISCELLANEOUS:  drug  interactions, administration, labs, etc. [x] Discussed chemotherapy schedule, lab draws, infusion times, and total expected visit time.   DDIs: No significant DDIs  Drug-food interactions:  None  Lab draws: On or before day 1 of each cycle, no sooner than 3 days early.    Doxorubicin Red Body Fluids: Discussed the possibility of red body fluids for about 2-3 days after doxorubicin.  Recommended the patient not wear contacts during this time because they could be stained pink.  Patient instructed to contact clinic if urine appears red / pink more than 3 days after receiving doxorubicin. Patient has been on doxorubicin previously but reports not experiencing stained bodily fluids.   Fluid Retention: Explained the signs/symptoms of fluid retention around ankles, feet, and possibly in the hands.  Reviewed strategies to minimize this and recommended that the patient call the clinic if he/she gains 5 or more pounds in 1 week or experiences shortness of breath without exertion.   INFERTILITY & SEXUALITY:    causes, fertility preservation options, sexuality changes, ways to manage, importance of birth control [x] IV Oncology Therapy: Reviewed safe sex practices and the importance of minimizing exposure to body fluids for 48 hours after each dose of IV oncology therapy. The patient is not sexually active with a woman of childbearing potential.       HOME CARE:  storing of oral chemo, how to manage bodily fluids [x] IV - Counseled on management of soiled linens and proper flush technique.  Discussed how to manage all the side effects at home and advised when to contact the MD office   SURVIVORSHIP:  distress, distress assessment, secondary malignancies, early/late effects, follow-up, social issues, social support [x] Discussed the rare, but possible risk of secondary malignancies months to years after treatment, most commonly acute myeloid leukemia.     Medications:  Prior to Admission medications    Medication Sig Start Date  End Date Taking? Authorizing Provider   acyclovir (ZOVIRAX) 400 MG tablet Take 1 tablet by mouth 2 (Two) Times a Day. to prevent shingles reactivation while on chemotherapy. 6/1/23   Kaycee Leary MD   dicyclomine (BENTYL) 20 MG tablet TAKE ONE TABLET BY MOUTH EVERY 6 HOURS AS NEEDED FOR ABDOMINAL CRAMPS 5/26/23   Kaycee Leary MD   fluticasone (FLONASE) 50 MCG/ACT nasal spray 1 spray into the nostril(s) as directed by provider Daily As Needed for Allergies or Rhinitis. OTC    Provider, MD Marko   lidocaine-prilocaine (EMLA) 2.5-2.5 % cream Apply 1 application topically to the appropriate area as directed As Needed (45-60 minutes prior to port access.  Cover with saran/plastic wrap.). 10/26/22   Kaycee Leary MD   loratadine (CLARITIN) 10 MG tablet Take 1 tablet by mouth Daily. 1/24/23   Kaycee Leary MD   ondansetron (Zofran) 8 MG tablet Take 1 tablet by mouth Every 8 (Eight) Hours As Needed for Nausea or Vomiting. 10/26/22   Kaycee Leary MD   ondansetron (ZOFRAN) 8 MG tablet Take 1 tablet by mouth 3 (Three) Times a Day As Needed for Nausea or Vomiting. 6/1/23   Kaycee Leary MD   pantoprazole (PROTONIX) 40 MG EC tablet Take 1 tablet by mouth Daily. 1/10/23   Kaycee Leary MD   predniSONE (DELTASONE) 50 MG tablet Take 2 tablets by mouth Daily With Breakfast. on days 1-4 of each 21-day cycle. 6/1/23   Kaycee Leary MD   prochlorperazine (COMPAZINE) 5 MG tablet Take 1 tablet by mouth Every 6 (Six) Hours As Needed for Nausea or Vomiting. 11/8/22   Kaycee Leary MD   rosuvastatin (CRESTOR) 10 MG tablet Take 1 tablet by mouth Daily. 11/1/22   ProviderMarko MD   sulfamethoxazole-trimethoprim (BACTRIM DS,SEPTRA DS) 800-160 MG per tablet Take 1 tablet by mouth 3 (Three) Times a Week. on Monday, Wednesday, Friday for prevention of PJP pneumonia. 6/2/23   Kaycee Leary MD   traMADol (ULTRAM) 50 MG tablet Take 2 tablets by mouth Every 8 (Eight) Hours As Needed for Moderate Pain  (cancer pain) for up to 30 days. 5/26/23 6/25/23  Kaycee Leary MD       Notes: Patient accompanied by wife for education. All questions and concerns were addressed. Provided a personalized treatment calendar to patient (includes treatment and lab schedule). Provided patient with contact information for the pharmacist and clinic while instructing them to call if any questions or concerns arise. Informed consent for treatment was obtained. Patient was receptive to information and expressed understanding.    Lashanda HarrisonD Candidate 2024    6/15/2023  11:09 EDT

## 2023-06-16 ENCOUNTER — HOSPITAL ENCOUNTER (OUTPATIENT)
Dept: ONCOLOGY | Facility: HOSPITAL | Age: 73
Discharge: HOME | End: 2023-06-16
Payer: MEDICARE

## 2023-06-16 VITALS
DIASTOLIC BLOOD PRESSURE: 65 MMHG | WEIGHT: 152 LBS | BODY MASS INDEX: 25.95 KG/M2 | HEART RATE: 82 BPM | HEIGHT: 64 IN | SYSTOLIC BLOOD PRESSURE: 128 MMHG | TEMPERATURE: 98.3 F | RESPIRATION RATE: 16 BRPM

## 2023-06-16 DIAGNOSIS — Z45.2 ENCOUNTER FOR CARE RELATED TO VASCULAR ACCESS PORT: ICD-10-CM

## 2023-06-16 DIAGNOSIS — C81.98 HODGKIN LYMPHOMA OF LYMPH NODES OF MULTIPLE REGIONS, UNSPECIFIED HODGKIN LYMPHOMA TYPE: Primary | ICD-10-CM

## 2023-06-16 PROCEDURE — 96375 TX/PRO/DX INJ NEW DRUG ADDON: CPT

## 2023-06-16 PROCEDURE — 25010000002 GEMCITABINE 1 GM/26.3ML SOLUTION 26.3 ML VIAL: Performed by: INTERNAL MEDICINE

## 2023-06-16 PROCEDURE — 96411 CHEMO IV PUSH ADDL DRUG: CPT

## 2023-06-16 PROCEDURE — 25010000002 GEMCITABINE 200 MG/5.26ML SOLUTION 5.26 ML VIAL: Performed by: INTERNAL MEDICINE

## 2023-06-16 PROCEDURE — 25010000002 DEXAMETHASONE SODIUM PHOSPHATE 100 MG/10ML SOLUTION: Performed by: INTERNAL MEDICINE

## 2023-06-16 PROCEDURE — 96415 CHEMO IV INFUSION ADDL HR: CPT

## 2023-06-16 PROCEDURE — 25010000002 HEPARIN LOCK FLUSH PER 10 UNITS: Performed by: INTERNAL MEDICINE

## 2023-06-16 PROCEDURE — 25010000002 VINORELBINE TARTRATE PER 10 MG: Performed by: INTERNAL MEDICINE

## 2023-06-16 PROCEDURE — 25010000002 DOXORUBICIN LIPOSOMAL PER 10 MG: Performed by: INTERNAL MEDICINE

## 2023-06-16 PROCEDURE — 96413 CHEMO IV INFUSION 1 HR: CPT

## 2023-06-16 PROCEDURE — 25010000002 DIPHENHYDRAMINE PER 50 MG

## 2023-06-16 PROCEDURE — 96417 CHEMO IV INFUS EACH ADDL SEQ: CPT

## 2023-06-16 PROCEDURE — 25010000002 PEMBROLIZUMAB 100 MG/4ML SOLUTION 4 ML VIAL: Performed by: INTERNAL MEDICINE

## 2023-06-16 RX ORDER — SODIUM CHLORIDE 9 MG/ML
250 INJECTION, SOLUTION INTRAVENOUS ONCE
Status: COMPLETED | OUTPATIENT
Start: 2023-06-16 | End: 2023-06-16

## 2023-06-16 RX ORDER — HEPARIN SODIUM (PORCINE) LOCK FLUSH IV SOLN 100 UNIT/ML 100 UNIT/ML
500 SOLUTION INTRAVENOUS AS NEEDED
Status: DISCONTINUED | OUTPATIENT
Start: 2023-06-16 | End: 2023-06-17 | Stop reason: HOSPADM

## 2023-06-16 RX ORDER — FAMOTIDINE 10 MG/ML
20 INJECTION, SOLUTION INTRAVENOUS ONCE
Status: COMPLETED | OUTPATIENT
Start: 2023-06-16 | End: 2023-06-16

## 2023-06-16 RX ORDER — HEPARIN SODIUM (PORCINE) LOCK FLUSH IV SOLN 100 UNIT/ML 100 UNIT/ML
500 SOLUTION INTRAVENOUS AS NEEDED
OUTPATIENT
Start: 2023-06-16

## 2023-06-16 RX ORDER — DEXTROSE MONOHYDRATE 50 MG/ML
250 INJECTION, SOLUTION INTRAVENOUS ONCE
Status: COMPLETED | OUTPATIENT
Start: 2023-06-16 | End: 2023-06-16

## 2023-06-16 RX ADMIN — HEPARIN 500 UNITS: 100 SYRINGE at 15:05

## 2023-06-16 RX ADMIN — FAMOTIDINE 20 MG: 10 INJECTION INTRAVENOUS at 11:17

## 2023-06-16 RX ADMIN — SODIUM CHLORIDE 250 ML: 9 INJECTION, SOLUTION INTRAVENOUS at 10:40

## 2023-06-16 RX ADMIN — DEXAMETHASONE SODIUM PHOSPHATE 12 MG: 10 INJECTION, SOLUTION INTRAMUSCULAR; INTRAVENOUS at 11:20

## 2023-06-16 RX ADMIN — DIPHENHYDRAMINE HYDROCHLORIDE 25 MG: 50 INJECTION INTRAMUSCULAR; INTRAVENOUS at 11:20

## 2023-06-16 RX ADMIN — VINORELBINE 35 MG: 10 INJECTION, SOLUTION INTRAVENOUS at 11:56

## 2023-06-16 RX ADMIN — DEXTROSE MONOHYDRATE 250 ML: 50 INJECTION, SOLUTION INTRAVENOUS at 12:54

## 2023-06-16 RX ADMIN — DOXORUBICIN HYDROCHLORIDE 26 MG: 2 INJECTABLE, LIPOSOMAL INTRAVENOUS at 12:57

## 2023-06-16 RX ADMIN — SODIUM CHLORIDE 200 MG: 9 INJECTION, SOLUTION INTRAVENOUS at 10:40

## 2023-06-16 RX ADMIN — GEMCITABINE HYDROCHLORIDE 1800 MG: 1 INJECTION, SOLUTION INTRAVENOUS at 12:16

## 2023-07-13 ENCOUNTER — HOSPITAL ENCOUNTER (OUTPATIENT)
Dept: RADIATION ONCOLOGY | Facility: HOSPITAL | Age: 73
Setting detail: RADIATION/ONCOLOGY SERIES
Discharge: HOME | End: 2023-07-13
Payer: MEDICARE

## 2023-07-20 ENCOUNTER — APPOINTMENT (OUTPATIENT)
Dept: ONCOLOGY | Facility: HOSPITAL | Age: 73
End: 2023-07-20
Payer: MEDICARE

## 2023-08-03 ENCOUNTER — OFFICE VISIT (OUTPATIENT)
Dept: ONCOLOGY | Facility: CLINIC | Age: 73
End: 2023-08-03
Payer: MEDICARE

## 2023-08-03 ENCOUNTER — HOSPITAL ENCOUNTER (OUTPATIENT)
Dept: ONCOLOGY | Facility: HOSPITAL | Age: 73
Discharge: HOME | End: 2023-08-03
Admitting: INTERNAL MEDICINE
Payer: MEDICARE

## 2023-08-03 VITALS
RESPIRATION RATE: 16 BRPM | OXYGEN SATURATION: 98 % | HEART RATE: 67 BPM | SYSTOLIC BLOOD PRESSURE: 122 MMHG | TEMPERATURE: 97.1 F | WEIGHT: 161 LBS | HEIGHT: 64 IN | DIASTOLIC BLOOD PRESSURE: 73 MMHG | BODY MASS INDEX: 27.49 KG/M2

## 2023-08-03 DIAGNOSIS — C81.98 HODGKIN LYMPHOMA OF LYMPH NODES OF MULTIPLE REGIONS, UNSPECIFIED HODGKIN LYMPHOMA TYPE: Primary | ICD-10-CM

## 2023-08-03 DIAGNOSIS — Z45.2 ENCOUNTER FOR CARE RELATED TO VASCULAR ACCESS PORT: ICD-10-CM

## 2023-08-03 LAB
ALBUMIN SERPL-MCNC: 4 G/DL (ref 3.5–5.2)
ALBUMIN/GLOB SERPL: 1.3 G/DL
ALP SERPL-CCNC: 115 U/L (ref 39–117)
ALT SERPL W P-5'-P-CCNC: 14 U/L (ref 1–41)
ANION GAP SERPL CALCULATED.3IONS-SCNC: 9 MMOL/L (ref 5–15)
AST SERPL-CCNC: 15 U/L (ref 1–40)
BASOPHILS # BLD AUTO: 0.05 10*3/MM3 (ref 0–0.2)
BASOPHILS NFR BLD AUTO: 0.4 % (ref 0–1.5)
BILIRUB SERPL-MCNC: 0.2 MG/DL (ref 0–1.2)
BUN SERPL-MCNC: 18 MG/DL (ref 8–23)
BUN/CREAT SERPL: 28.1 (ref 7–25)
CALCIUM SPEC-SCNC: 9.1 MG/DL (ref 8.6–10.5)
CHLORIDE SERPL-SCNC: 100 MMOL/L (ref 98–107)
CO2 SERPL-SCNC: 28 MMOL/L (ref 22–29)
CREAT SERPL-MCNC: 0.64 MG/DL (ref 0.76–1.27)
DEPRECATED RDW RBC AUTO: 71.8 FL (ref 37–54)
EGFRCR SERPLBLD CKD-EPI 2021: 101.2 ML/MIN/1.73
EOSINOPHIL # BLD AUTO: 0.19 10*3/MM3 (ref 0–0.4)
EOSINOPHIL NFR BLD AUTO: 1.7 % (ref 0.3–6.2)
ERYTHROCYTE [DISTWIDTH] IN BLOOD BY AUTOMATED COUNT: 20.1 % (ref 12.3–15.4)
GLOBULIN UR ELPH-MCNC: 3 GM/DL
GLUCOSE SERPL-MCNC: 79 MG/DL (ref 65–99)
HCT VFR BLD AUTO: 32.6 % (ref 37.5–51)
HGB BLD-MCNC: 10.4 G/DL (ref 13–17.7)
IMM GRANULOCYTES # BLD AUTO: 0.19 10*3/MM3 (ref 0–0.05)
IMM GRANULOCYTES NFR BLD AUTO: 1.7 % (ref 0–0.5)
LYMPHOCYTES # BLD AUTO: 1.24 10*3/MM3 (ref 0.7–3.1)
LYMPHOCYTES NFR BLD AUTO: 10.9 % (ref 19.6–45.3)
MCH RBC QN AUTO: 31.5 PG (ref 26.6–33)
MCHC RBC AUTO-ENTMCNC: 31.9 G/DL (ref 31.5–35.7)
MCV RBC AUTO: 98.8 FL (ref 79–97)
MONOCYTES # BLD AUTO: 1.13 10*3/MM3 (ref 0.1–0.9)
MONOCYTES NFR BLD AUTO: 9.9 % (ref 5–12)
NEUTROPHILS NFR BLD AUTO: 75.4 % (ref 42.7–76)
NEUTROPHILS NFR BLD AUTO: 8.56 10*3/MM3 (ref 1.7–7)
PLATELET # BLD AUTO: 311 10*3/MM3 (ref 140–450)
PMV BLD AUTO: 9.2 FL (ref 6–12)
POTASSIUM SERPL-SCNC: 4.4 MMOL/L (ref 3.5–5.2)
PROT SERPL-MCNC: 7 G/DL (ref 6–8.5)
RBC # BLD AUTO: 3.3 10*6/MM3 (ref 4.14–5.8)
SODIUM SERPL-SCNC: 137 MMOL/L (ref 136–145)
T4 FREE SERPL-MCNC: 1.21 NG/DL (ref 0.93–1.7)
TSH SERPL DL<=0.05 MIU/L-ACNC: 1.89 UIU/ML (ref 0.27–4.2)
WBC NRBC COR # BLD: 11.36 10*3/MM3 (ref 3.4–10.8)

## 2023-08-03 PROCEDURE — 84439 ASSAY OF FREE THYROXINE: CPT | Performed by: INTERNAL MEDICINE

## 2023-08-03 PROCEDURE — 25010000002 DOXORUBICIN LIPOSOMAL PER 10 MG: Performed by: INTERNAL MEDICINE

## 2023-08-03 PROCEDURE — 25010000002 PEMBROLIZUMAB 100 MG/4ML SOLUTION 4 ML VIAL: Performed by: INTERNAL MEDICINE

## 2023-08-03 PROCEDURE — 25010000002 VINORELBINE TARTRATE PER 10 MG: Performed by: INTERNAL MEDICINE

## 2023-08-03 PROCEDURE — 25010000002 GEMCITABINE 1 GM/26.3ML SOLUTION 26.3 ML VIAL: Performed by: INTERNAL MEDICINE

## 2023-08-03 PROCEDURE — 80053 COMPREHEN METABOLIC PANEL: CPT | Performed by: INTERNAL MEDICINE

## 2023-08-03 PROCEDURE — 25010000002 HEPARIN LOCK FLUSH PER 10 UNITS: Performed by: INTERNAL MEDICINE

## 2023-08-03 PROCEDURE — 25010000002 DEXAMETHASONE SODIUM PHOSPHATE 100 MG/10ML SOLUTION: Performed by: INTERNAL MEDICINE

## 2023-08-03 PROCEDURE — 96375 TX/PRO/DX INJ NEW DRUG ADDON: CPT

## 2023-08-03 PROCEDURE — 96413 CHEMO IV INFUSION 1 HR: CPT

## 2023-08-03 PROCEDURE — 96411 CHEMO IV PUSH ADDL DRUG: CPT

## 2023-08-03 PROCEDURE — 84443 ASSAY THYROID STIM HORMONE: CPT | Performed by: INTERNAL MEDICINE

## 2023-08-03 PROCEDURE — 96417 CHEMO IV INFUS EACH ADDL SEQ: CPT

## 2023-08-03 PROCEDURE — 85025 COMPLETE CBC W/AUTO DIFF WBC: CPT | Performed by: INTERNAL MEDICINE

## 2023-08-03 PROCEDURE — 25010000002 GEMCITABINE 200 MG/5.26ML SOLUTION 5.26 ML VIAL: Performed by: INTERNAL MEDICINE

## 2023-08-03 RX ORDER — DEXTROSE MONOHYDRATE 50 MG/ML
250 INJECTION, SOLUTION INTRAVENOUS ONCE
Status: CANCELLED | OUTPATIENT
Start: 2023-08-03

## 2023-08-03 RX ORDER — DEXTROSE MONOHYDRATE 50 MG/ML
250 INJECTION, SOLUTION INTRAVENOUS ONCE
OUTPATIENT
Start: 2023-08-10

## 2023-08-03 RX ORDER — HEPARIN SODIUM (PORCINE) LOCK FLUSH IV SOLN 100 UNIT/ML 100 UNIT/ML
500 SOLUTION INTRAVENOUS AS NEEDED
Status: DISCONTINUED | OUTPATIENT
Start: 2023-08-03 | End: 2023-08-04 | Stop reason: HOSPADM

## 2023-08-03 RX ORDER — DEXTROSE MONOHYDRATE 50 MG/ML
250 INJECTION, SOLUTION INTRAVENOUS ONCE
Status: COMPLETED | OUTPATIENT
Start: 2023-08-03 | End: 2023-08-03

## 2023-08-03 RX ORDER — HEPARIN SODIUM (PORCINE) LOCK FLUSH IV SOLN 100 UNIT/ML 100 UNIT/ML
500 SOLUTION INTRAVENOUS AS NEEDED
OUTPATIENT
Start: 2023-08-03

## 2023-08-03 RX ORDER — SODIUM CHLORIDE 9 MG/ML
250 INJECTION, SOLUTION INTRAVENOUS ONCE
Status: CANCELLED | OUTPATIENT
Start: 2023-08-03

## 2023-08-03 RX ORDER — SODIUM CHLORIDE 9 MG/ML
250 INJECTION, SOLUTION INTRAVENOUS ONCE
Status: DISCONTINUED | OUTPATIENT
Start: 2023-08-03 | End: 2023-08-04 | Stop reason: HOSPADM

## 2023-08-03 RX ADMIN — HEPARIN 500 UNITS: 100 SYRINGE at 12:35

## 2023-08-03 RX ADMIN — SODIUM CHLORIDE 200 MG: 9 INJECTION, SOLUTION INTRAVENOUS at 09:40

## 2023-08-03 RX ADMIN — DEXAMETHASONE SODIUM PHOSPHATE 12 MG: 10 INJECTION, SOLUTION INTRAMUSCULAR; INTRAVENOUS at 10:24

## 2023-08-03 RX ADMIN — DOXORUBICIN HYDROCHLORIDE 20 MG: 2 INJECTION, SUSPENSION, LIPOSOMAL INTRAVENOUS at 11:59

## 2023-08-03 RX ADMIN — DEXTROSE MONOHYDRATE 250 ML: 50 INJECTION, SOLUTION INTRAVENOUS at 09:40

## 2023-08-03 RX ADMIN — GEMCITABINE HYDROCHLORIDE 1400 MG: 1 INJECTION, SOLUTION INTRAVENOUS at 11:15

## 2023-08-03 RX ADMIN — VINORELBINE 25 MG: 10 INJECTION, SOLUTION INTRAVENOUS at 10:53

## 2023-08-10 ENCOUNTER — OFFICE VISIT (OUTPATIENT)
Dept: ONCOLOGY | Facility: CLINIC | Age: 73
End: 2023-08-10
Payer: MEDICARE

## 2023-08-10 ENCOUNTER — HOSPITAL ENCOUNTER (OUTPATIENT)
Dept: ONCOLOGY | Facility: HOSPITAL | Age: 73
Discharge: HOME | End: 2023-08-10
Payer: MEDICARE

## 2023-08-10 ENCOUNTER — APPOINTMENT (OUTPATIENT)
Dept: ONCOLOGY | Facility: HOSPITAL | Age: 73
End: 2023-08-10
Payer: MEDICARE

## 2023-08-10 VITALS
TEMPERATURE: 98 F | HEIGHT: 64 IN | DIASTOLIC BLOOD PRESSURE: 71 MMHG | SYSTOLIC BLOOD PRESSURE: 131 MMHG | RESPIRATION RATE: 16 BRPM | BODY MASS INDEX: 27.31 KG/M2 | OXYGEN SATURATION: 99 % | HEART RATE: 66 BPM | WEIGHT: 160 LBS

## 2023-08-10 DIAGNOSIS — C81.98 HODGKIN LYMPHOMA OF LYMPH NODES OF MULTIPLE REGIONS, UNSPECIFIED HODGKIN LYMPHOMA TYPE: Primary | ICD-10-CM

## 2023-08-10 DIAGNOSIS — Z45.2 ENCOUNTER FOR CARE RELATED TO VASCULAR ACCESS PORT: ICD-10-CM

## 2023-08-10 LAB
ALBUMIN SERPL-MCNC: 4 G/DL (ref 3.5–5.2)
ALBUMIN/GLOB SERPL: 1.4 G/DL
ALP SERPL-CCNC: 94 U/L (ref 39–117)
ALT SERPL W P-5'-P-CCNC: 19 U/L (ref 1–41)
ANION GAP SERPL CALCULATED.3IONS-SCNC: 7 MMOL/L (ref 5–15)
AST SERPL-CCNC: 14 U/L (ref 1–40)
BASOPHILS # BLD AUTO: 0.03 10*3/MM3 (ref 0–0.2)
BASOPHILS NFR BLD AUTO: 0.6 % (ref 0–1.5)
BILIRUB SERPL-MCNC: 0.3 MG/DL (ref 0–1.2)
BUN SERPL-MCNC: 20 MG/DL (ref 8–23)
BUN/CREAT SERPL: 35.1 (ref 7–25)
CALCIUM SPEC-SCNC: 9.2 MG/DL (ref 8.6–10.5)
CHLORIDE SERPL-SCNC: 99 MMOL/L (ref 98–107)
CO2 SERPL-SCNC: 29 MMOL/L (ref 22–29)
CREAT SERPL-MCNC: 0.57 MG/DL (ref 0.76–1.27)
DEPRECATED RDW RBC AUTO: 68.7 FL (ref 37–54)
EGFRCR SERPLBLD CKD-EPI 2021: 104.8 ML/MIN/1.73
EOSINOPHIL # BLD AUTO: 0.05 10*3/MM3 (ref 0–0.4)
EOSINOPHIL NFR BLD AUTO: 1 % (ref 0.3–6.2)
ERYTHROCYTE [DISTWIDTH] IN BLOOD BY AUTOMATED COUNT: 19.3 % (ref 12.3–15.4)
GLOBULIN UR ELPH-MCNC: 2.9 GM/DL
GLUCOSE SERPL-MCNC: 70 MG/DL (ref 65–99)
HCT VFR BLD AUTO: 31.7 % (ref 37.5–51)
HGB BLD-MCNC: 10.4 G/DL (ref 13–17.7)
IMM GRANULOCYTES # BLD AUTO: 0.05 10*3/MM3 (ref 0–0.05)
IMM GRANULOCYTES NFR BLD AUTO: 1 % (ref 0–0.5)
LYMPHOCYTES # BLD AUTO: 0.84 10*3/MM3 (ref 0.7–3.1)
LYMPHOCYTES NFR BLD AUTO: 16.6 % (ref 19.6–45.3)
MCH RBC QN AUTO: 31.7 PG (ref 26.6–33)
MCHC RBC AUTO-ENTMCNC: 32.8 G/DL (ref 31.5–35.7)
MCV RBC AUTO: 96.6 FL (ref 79–97)
MONOCYTES # BLD AUTO: 0.46 10*3/MM3 (ref 0.1–0.9)
MONOCYTES NFR BLD AUTO: 9.1 % (ref 5–12)
NEUTROPHILS NFR BLD AUTO: 3.63 10*3/MM3 (ref 1.7–7)
NEUTROPHILS NFR BLD AUTO: 71.7 % (ref 42.7–76)
PLATELET # BLD AUTO: 274 10*3/MM3 (ref 140–450)
PMV BLD AUTO: 9.1 FL (ref 6–12)
POTASSIUM SERPL-SCNC: 4.5 MMOL/L (ref 3.5–5.2)
PROT SERPL-MCNC: 6.9 G/DL (ref 6–8.5)
RBC # BLD AUTO: 3.28 10*6/MM3 (ref 4.14–5.8)
SODIUM SERPL-SCNC: 135 MMOL/L (ref 136–145)
WBC NRBC COR # BLD: 5.06 10*3/MM3 (ref 3.4–10.8)

## 2023-08-10 PROCEDURE — 25010000002 VINORELBINE TARTRATE PER 10 MG: Performed by: INTERNAL MEDICINE

## 2023-08-10 PROCEDURE — 3075F SYST BP GE 130 - 139MM HG: CPT | Performed by: NURSE PRACTITIONER

## 2023-08-10 PROCEDURE — 80053 COMPREHEN METABOLIC PANEL: CPT | Performed by: INTERNAL MEDICINE

## 2023-08-10 PROCEDURE — 3078F DIAST BP <80 MM HG: CPT | Performed by: NURSE PRACTITIONER

## 2023-08-10 PROCEDURE — 1160F RVW MEDS BY RX/DR IN RCRD: CPT | Performed by: NURSE PRACTITIONER

## 2023-08-10 PROCEDURE — 25010000002 DOXORUBICIN LIPOSOMAL PER 10 MG: Performed by: INTERNAL MEDICINE

## 2023-08-10 PROCEDURE — 1126F AMNT PAIN NOTED NONE PRSNT: CPT | Performed by: NURSE PRACTITIONER

## 2023-08-10 PROCEDURE — 25010000002 DEXAMETHASONE SODIUM PHOSPHATE 100 MG/10ML SOLUTION: Performed by: INTERNAL MEDICINE

## 2023-08-10 PROCEDURE — 25010000002 HEPARIN LOCK FLUSH PER 10 UNITS: Performed by: INTERNAL MEDICINE

## 2023-08-10 PROCEDURE — 96411 CHEMO IV PUSH ADDL DRUG: CPT

## 2023-08-10 PROCEDURE — 85025 COMPLETE CBC W/AUTO DIFF WBC: CPT | Performed by: INTERNAL MEDICINE

## 2023-08-10 PROCEDURE — 96417 CHEMO IV INFUS EACH ADDL SEQ: CPT

## 2023-08-10 PROCEDURE — 25010000002 GEMCITABINE 1 GM/26.3ML SOLUTION 26.3 ML VIAL: Performed by: INTERNAL MEDICINE

## 2023-08-10 PROCEDURE — 99214 OFFICE O/P EST MOD 30 MIN: CPT | Performed by: NURSE PRACTITIONER

## 2023-08-10 PROCEDURE — 96413 CHEMO IV INFUSION 1 HR: CPT

## 2023-08-10 PROCEDURE — 1159F MED LIST DOCD IN RCRD: CPT | Performed by: NURSE PRACTITIONER

## 2023-08-10 PROCEDURE — 96375 TX/PRO/DX INJ NEW DRUG ADDON: CPT

## 2023-08-10 PROCEDURE — 25010000002 GEMCITABINE 200 MG/5.26ML SOLUTION 5.26 ML VIAL: Performed by: INTERNAL MEDICINE

## 2023-08-10 RX ORDER — DEXTROSE MONOHYDRATE 50 MG/ML
250 INJECTION, SOLUTION INTRAVENOUS ONCE
Status: COMPLETED | OUTPATIENT
Start: 2023-08-10 | End: 2023-08-10

## 2023-08-10 RX ORDER — HEPARIN SODIUM (PORCINE) LOCK FLUSH IV SOLN 100 UNIT/ML 100 UNIT/ML
500 SOLUTION INTRAVENOUS AS NEEDED
OUTPATIENT
Start: 2023-08-10

## 2023-08-10 RX ORDER — HEPARIN SODIUM (PORCINE) LOCK FLUSH IV SOLN 100 UNIT/ML 100 UNIT/ML
500 SOLUTION INTRAVENOUS AS NEEDED
Status: DISCONTINUED | OUTPATIENT
Start: 2023-08-10 | End: 2023-08-11 | Stop reason: HOSPADM

## 2023-08-10 RX ADMIN — VINORELBINE 25 MG: 10 INJECTION, SOLUTION INTRAVENOUS at 09:38

## 2023-08-10 RX ADMIN — DEXAMETHASONE SODIUM PHOSPHATE 12 MG: 10 INJECTION, SOLUTION INTRAMUSCULAR; INTRAVENOUS at 08:43

## 2023-08-10 RX ADMIN — DOXORUBICIN HYDROCHLORIDE 20 MG: 2 INJECTION, SUSPENSION, LIPOSOMAL INTRAVENOUS at 10:45

## 2023-08-10 RX ADMIN — HEPARIN 500 UNITS: 100 SYRINGE at 11:22

## 2023-08-10 RX ADMIN — DEXTROSE MONOHYDRATE 250 ML: 50 INJECTION, SOLUTION INTRAVENOUS at 10:45

## 2023-08-10 RX ADMIN — GEMCITABINE HYDROCHLORIDE 1400 MG: 1 INJECTION, SOLUTION INTRAVENOUS at 10:06

## 2023-08-10 NOTE — PROGRESS NOTES
[facility]  Office number [phone]    Fax number [phone]     Follow up     Date: 08/10/23    Patient Name: Abraham Wu  MRN: [id number]  : 1950      Chief Complaint: Hodgkin Lymphoma follow up/treatment    Surgeon: Dr. Hiram Viveros MD    Cancer Staging: IV    History of Present Illness: Abraham Wu is a pleasant 73 y.o. male with PMH of hypertension, sleep apnea, hard of hearing who presents today for follow up of Hodgkin Lymphoma.     He initially presented 2022 with intractable diarrhea, low appetite, and a greater than 50 pound weight loss over several month period associated with intermittent fevers.  CT of the chest abdomen pelvis obtained in the ER shows of rectal mass spanning greater than 12 cm with adjacent adenopathy, bulky RP adenopathy, and findings concerning for left renal and liver metastases.  Small right pleural effusion with nodularity.  There was concern for impending obstruction, so he underwent diverting colostomy and biopsy on 2022.  Biopsies from the peritoneam showed a fibrotic nodule histiocytes and eosinophils admixed with CD30 positive large cells.  Rectal biopsies showed involvement by CD30 positive lymphoma, classic Hodgkin lymphoma versus CD30 positive T-cell or B-cell lymphoma.  There was extensive fibrosis and crush artifact.  He underwent repeat transrectal biopsy 10/4/2022 for further characterization which was felt to be most consistent with classical Hodgkin lymphoma.    He initiated chemotherapy with Brentuximab-AVD as an inpatient on 10/8/2022.  Cycle #1 was complicated by GI bleed requiring multiple blood transfusion and ultimately coil artery embolization 10/12; neutropenic fever, Candida esophagitis and mucositis.  He had a prolonged ICU stay  And required enteral feeding.  He was discharged 10/20/2022.    Following his last cycle he was admitted with multifocal PNA and +corona virus  infection.    Treatment history:  Brentuximab-AVD: C1 10/8/22  C2: 11/1/22 onward with DA 20% in BVD; full dose brentuximab  Completed C6 3/22/23  Palliative radiation to rectum 5/30/2023    Interval history:    He is here for follow up and chemotherapy.  Overall doing well.  He is staying active and walking daily.  Neuropathy continues to improve and he is able to button shirts now.  He has occasional diarrhea from ostomy that resolves with Imodium.  No fever or chills.  Denies nausea or vomiting.  Eating well.      Past Medical History:   Past Medical History:   Diagnosis Date    benign polypoid tissue right lung     Cancer     HODGKINS LYMPHOMA, RECTAL CANCER    Diabetes mellitus     Hyperlipidemia     Hypertension     Lymphoma     Mycobacterium mucogenicum     SHERRI (obstructive sleep apnea)     O2 2L/MIN AT NIGHT ONLY    Pneumonia     2023    Seasonal allergies        Past Surgical History:   Past Surgical History:   Procedure Laterality Date    BRONCHOSCOPY      removal of obstructing polypoid tissue right middle lung    COLOSTOMY N/A 09/22/2022    Procedure: LAPAROSCOPIC COLOSTOMY CREATION, FLEXIBLE SIGMOIDOSCOPY;  Surgeon: Hiram Viveros MD;  Location:  [facility];  Service: General;  Laterality: N/A;    DENTAL PROCEDURE      ENDOSCOPY N/A 10/10/2022    Procedure: ESOPHAGOGASTRODUODENOSCOPY;  Surgeon: Neeraj Lynn MD;  Location:  [facility];  Service: Gastroenterology;  Laterality: N/A;    ENDOSCOPY N/A 10/12/2022    Procedure: ESOPHAGOGASTRODUODENOSCOPY;  Surgeon: Brunner, Mark I, MD;  Location:  [facility];  Service: Gastroenterology;  Laterality: N/A;    EXAM UNDER ANESTHESIA, RECTAL BIOPSY N/A 10/04/2022    Procedure: EXAM UNDER ANESTHESIA, TRANSANAL BIOPSY WITH TRUCUT NEEDLE (LITHOTOMY-CANDY CANE);  Surgeon: Hiram Viveros MD;  Location:  [facility];  Service: General;  Laterality: N/A;    EXAM UNDER ANESTHESIA, RECTAL BIOPSY N/A 5/30/2023    Procedure: EXAM UNDER ANESTHESIA,  TRANSANAL BIOPSY OF RECTAL MASS WITH TRUCUT NEEDLE, PROCTOSCOPY;  Surgeon: Hiram Viveros MD;  Location: [facility];  Service: General;  Laterality: N/A;    PERIPHERALLY INSERTED CENTRAL CATHETER INSERTION      Removed 11/28/2022    VENOUS ACCESS DEVICE (PORT) INSERTION Right 11/28/2022       Family History:   Family History   Problem Relation Age of Onset    Diabetes Mother     Diabetes Father     Heart disease Father        Social History:   Social History     Socioeconomic History    Marital status:    Tobacco Use    Smoking status: Never    Smokeless tobacco: Never   Vaping Use    Vaping Use: Never used   Substance and Sexual Activity    Alcohol use: Not Currently     Comment: previously drank 2 glasses of wine/beer nightly    Drug use: Never    Sexual activity: Defer       Medications:     Current Outpatient Medications:     fluticasone (FLONASE) 50 MCG/ACT nasal spray, 1 spray into the nostril(s) as directed by provider Daily As Needed for Allergies or Rhinitis. OTC, Disp: , Rfl:     lidocaine-prilocaine (EMLA) 2.5-2.5 % cream, Apply 1 application topically to the appropriate area as directed As Needed (45-60 minutes prior to port access.  Cover with saran/plastic wrap.)., Disp: 30 g, Rfl: 3    loratadine (CLARITIN) 10 MG tablet, Take 1 tablet by mouth Daily., Disp: 30 tablet, Rfl: 5    ondansetron (Zofran) 8 MG tablet, Take 1 tablet by mouth Every 8 (Eight) Hours As Needed for Nausea or Vomiting., Disp: 30 tablet, Rfl: 5    pantoprazole (PROTONIX) 40 MG EC tablet, Take 1 tablet by mouth Daily., Disp: 90 tablet, Rfl: 1    prochlorperazine (COMPAZINE) 5 MG tablet, Take 1 tablet by mouth Every 6 (Six) Hours As Needed for Nausea or Vomiting., Disp: 30 tablet, Rfl: 2    rosuvastatin (CRESTOR) 10 MG tablet, Take 1 tablet by mouth Daily., Disp: , Rfl:     Allergies:   No Known Allergies    Objective     Vital Signs:   Vitals:    08/10/23 0803   BP: 131/71   Pulse: 66   Resp: 16   Temp: 98 øF (36.7 øC)  "  TempSrc: Infrared   SpO2: 99%   Weight: 72.6 kg (160 lb)   Height: 162.6 cm (64.02\")   PainSc: 0-No pain      Body mass index is 27.45 kg/mý.   Pain Score    08/10/23 0803   PainSc: 0-No pain         ECOG Performance Status: 0    Physical Exam:   General: Well appearing male in NAD  HEENT: Normocephalic, atraumatic. Sclera anicteric.   Neck: supple, no palpable LAD. No axillary adenopathy  Cardiovascular: regular rate and rhythm. No murmurs.   Respiratory: Normal rate. Clear to auscultation bilaterally  Abdomen: Soft, nontender, non distended with normoactive bowel sounds. Ostomy LLQ   Lymph: no supraclavicular or axillary adenopathy  Neuro: Alert and oriented x 3.   Ext: Symmetric, no swelling.   Skin: right port site accessed    Laboratory/Imaging Reviewed:   Hospital Outpatient Visit on 08/10/2023   Component Date Value Ref Range Status    Glucose 08/10/2023 70  65 - 99 mg/dL Final    BUN 08/10/2023 20  8 - 23 mg/dL Final    Creatinine 08/10/2023 0.57 (L)  0.76 - 1.27 mg/dL Final    Sodium 08/10/2023 135 (L)  136 - 145 mmol/L Final    Potassium 08/10/2023 4.5  3.5 - 5.2 mmol/L Final    Chloride 08/10/2023 99  98 - 107 mmol/L Final    CO2 08/10/2023 29.0  22.0 - 29.0 mmol/L Final    Calcium 08/10/2023 9.2  8.6 - 10.5 mg/dL Final    Total Protein 08/10/2023 6.9  6.0 - 8.5 g/dL Final    Albumin 08/10/2023 4.0  3.5 - 5.2 g/dL Final    ALT (SGPT) 08/10/2023 19  1 - 41 U/L Final    AST (SGOT) 08/10/2023 14  1 - 40 U/L Final    Alkaline Phosphatase 08/10/2023 94  39 - 117 U/L Final    Total Bilirubin 08/10/2023 0.3  0.0 - 1.2 mg/dL Final    Globulin 08/10/2023 2.9  gm/dL Final    Calculated Result    A/G Ratio 08/10/2023 1.4  g/dL Final    BUN/Creatinine Ratio 08/10/2023 35.1 (H)  7.0 - 25.0 Final    Anion Gap 08/10/2023 7.0  5.0 - 15.0 mmol/L Final    eGFR 08/10/2023 104.8  >60.0 mL/min/1.73 Final    WBC 08/10/2023 5.06  3.40 - 10.80 10*3/mm3 Final    RBC 08/10/2023 3.28 (L)  4.14 - 5.80 10*6/mm3 Final    Hemoglobin " 08/10/2023 10.4 (L)  13.0 - 17.7 g/dL Final    Hematocrit 08/10/2023 31.7 (L)  37.5 - 51.0 % Final    MCV 08/10/2023 96.6  79.0 - 97.0 fL Final    MCH 08/10/2023 31.7  26.6 - 33.0 pg Final    MCHC 08/10/2023 32.8  31.5 - 35.7 g/dL Final    RDW 08/10/2023 19.3 (H)  12.3 - 15.4 % Final    RDW-SD 08/10/2023 68.7 (H)  37.0 - 54.0 fl Final    MPV 08/10/2023 9.1  6.0 - 12.0 fL Final    Platelets 08/10/2023 274  140 - 450 10*3/mm3 Final    Neutrophil % 08/10/2023 71.7  42.7 - 76.0 % Final    Lymphocyte % 08/10/2023 16.6 (L)  19.6 - 45.3 % Final    Monocyte % 08/10/2023 9.1  5.0 - 12.0 % Final    Eosinophil % 08/10/2023 1.0  0.3 - 6.2 % Final    Basophil % 08/10/2023 0.6  0.0 - 1.5 % Final    Immature Grans % 08/10/2023 1.0 (H)  0.0 - 0.5 % Final    Neutrophils, Absolute 08/10/2023 3.63  1.70 - 7.00 10*3/mm3 Final    Lymphocytes, Absolute 08/10/2023 0.84  0.70 - 3.10 10*3/mm3 Final    Monocytes, Absolute 08/10/2023 0.46  0.10 - 0.90 10*3/mm3 Final    Eosinophils, Absolute 08/10/2023 0.05  0.00 - 0.40 10*3/mm3 Final    Basophils, Absolute 08/10/2023 0.03  0.00 - 0.20 10*3/mm3 Final    Immature Grans, Absolute 08/10/2023 0.05  0.00 - 0.05 10*3/mm3 Final   Hospital Outpatient Visit on 08/03/2023   Component Date Value Ref Range Status    Glucose 08/03/2023 79  65 - 99 mg/dL Final    BUN 08/03/2023 18  8 - 23 mg/dL Final    Creatinine 08/03/2023 0.64 (L)  0.76 - 1.27 mg/dL Final    Sodium 08/03/2023 137  136 - 145 mmol/L Final    Potassium 08/03/2023 4.4  3.5 - 5.2 mmol/L Final    Chloride 08/03/2023 100  98 - 107 mmol/L Final    CO2 08/03/2023 28.0  22.0 - 29.0 mmol/L Final    Calcium 08/03/2023 9.1  8.6 - 10.5 mg/dL Final    Total Protein 08/03/2023 7.0  6.0 - 8.5 g/dL Final    Albumin 08/03/2023 4.0  3.5 - 5.2 g/dL Final    ALT (SGPT) 08/03/2023 14  1 - 41 U/L Final    AST (SGOT) 08/03/2023 15  1 - 40 U/L Final    Alkaline Phosphatase 08/03/2023 115  39 - 117 U/L Final    Total Bilirubin 08/03/2023 0.2  0.0 - 1.2 mg/dL Final     Globulin 08/03/2023 3.0  gm/dL Final    Calculated Result    A/G Ratio 08/03/2023 1.3  g/dL Final    BUN/Creatinine Ratio 08/03/2023 28.1 (H)  7.0 - 25.0 Final    Anion Gap 08/03/2023 9.0  5.0 - 15.0 mmol/L Final    eGFR 08/03/2023 101.2  >60.0 mL/min/1.73 Final    TSH 08/03/2023 1.890  0.270 - 4.200 uIU/mL Final    Free T4 08/03/2023 1.21  0.93 - 1.70 ng/dL Final    WBC 08/03/2023 11.36 (H)  3.40 - 10.80 10*3/mm3 Final    RBC 08/03/2023 3.30 (L)  4.14 - 5.80 10*6/mm3 Final    Hemoglobin 08/03/2023 10.4 (L)  13.0 - 17.7 g/dL Final    Hematocrit 08/03/2023 32.6 (L)  37.5 - 51.0 % Final    MCV 08/03/2023 98.8 (H)  79.0 - 97.0 fL Final    MCH 08/03/2023 31.5  26.6 - 33.0 pg Final    MCHC 08/03/2023 31.9  31.5 - 35.7 g/dL Final    RDW 08/03/2023 20.1 (H)  12.3 - 15.4 % Final    RDW-SD 08/03/2023 71.8 (H)  37.0 - 54.0 fl Final    MPV 08/03/2023 9.2  6.0 - 12.0 fL Final    Platelets 08/03/2023 311  140 - 450 10*3/mm3 Final    Neutrophil % 08/03/2023 75.4  42.7 - 76.0 % Final    Lymphocyte % 08/03/2023 10.9 (L)  19.6 - 45.3 % Final    Monocyte % 08/03/2023 9.9  5.0 - 12.0 % Final    Eosinophil % 08/03/2023 1.7  0.3 - 6.2 % Final    Basophil % 08/03/2023 0.4  0.0 - 1.5 % Final    Immature Grans % 08/03/2023 1.7 (H)  0.0 - 0.5 % Final    Neutrophils, Absolute 08/03/2023 8.56 (H)  1.70 - 7.00 10*3/mm3 Final    Lymphocytes, Absolute 08/03/2023 1.24  0.70 - 3.10 10*3/mm3 Final    Monocytes, Absolute 08/03/2023 1.13 (H)  0.10 - 0.90 10*3/mm3 Final    Eosinophils, Absolute 08/03/2023 0.19  0.00 - 0.40 10*3/mm3 Final    Basophils, Absolute 08/03/2023 0.05  0.00 - 0.20 10*3/mm3 Final    Immature Grans, Absolute 08/03/2023 0.19 (H)  0.00 - 0.05 10*3/mm3 Final     Assessment / Plan      Assessment/Plan:   1.  CD30 positive classical Hodgkin's lymphoma  2.  Residual PET avid rectal mass, now enlarging on radiation planning CT with development of recurrent retroperitoneal adenopathy, biopsy confirms recurrent classic Hodgkin  lymphoma   3.  Chemo neuropathy  4. Chemo neutropenia  -He completed 6 cycles of BV-AVD 3/23/2023.   -Cycle 6 was complicated by coronavirus infection and multifocal pneumonia.  He is clinically improving from this but posttherapy PET showed persistent bilateral interstitial changes and mild adenopathy, that I believe is most likely reactive to his recent infection. A repeat chest CT shows continued improvement in the pulmonary infiltrates consistent with a reactive infectious etiology.    - He had clear response with resolution of the hepatic lesions, marked improvement in the retroperitoneal adenopathy, and 50% reduction in the size of the rectal mass.  However he had persistent uptake in the rectal mass on posttreatment PET, [place] 5, which may be secondary to inflammatory/posttreatment uptake, but certainly could represent some amount of residual disease (He did not have a pretreatment PET/CT given that he was profoundly ill and admitted for cycle #1). We elected to proceed with biopsy to confirm whether he does have residual disease followed by consolidative radiotherapy to the rectal mass given initial bulky disease.    -In the interim, while awaiting biopsy, he presented with rectal bleeding, obstructive uropathy symptoms and pain concerning for progressive disease.  His radiation oncologist called me and discussed that his radiation planning CT showed significant interval growth in the rectal mass as well as development of new retroperitoneal adenopathy superior to the rectal mass compared to the his posttreatment PET confirming progressive disease n. He was initiated on palliative radiation. Radiation planning CT showed regrowth of rectal mass and new RP adenopathy compared to prior PET. Repeat biopsy results were reviewed at the [facility] and I discussed these results with pathology.   They confirm residual classic Hodgkin's lymphoma.  Because of development of new adenopathy outside of the radiation field as  well as rapid regrowth of his mass, consolidative radiation plan was changed to palliative radiation plan to allow earlier completion for transition to second line chemotherapy.  -His performance status has adequately recovered for treatment initiation.    -Given prior treatment with Brentuximab and national shortage of carbo/cis, I discussed BeGEV.  In the interim he underwent stem cell transplant evaluation at . From review of their notes, they feel he is a borderline candidate for stem cell transplantation because of age 73.  However they did recommend avoiding Bendamustine in case of future stem cell collection.  He has a pending ASCT evaluation at . He has also had a chest CT that showed resolution of the pulmonary interstitial infiltrates, making immunotherapy a more attractive option (previously had avoided because of active pulmonary infiltrates concerning for pneumonitis on his PET/CT results).  Due to the national shortage of carboplatin and cis, we proceeded with Keytruda, vinorelbine, Gemzar, and Doxil.  We reviewed chemotherapy and immunotherapy side effects.  We discussed the goals of immunotherapy being cancer control/palliative.  We reviewed immunotherapy side effects including but not limited to rash, diarrhea, hypothyroidism, adrenal insufficiency, nausea, hepatitis, uveitis, and pneumonitis.  Informed consent was obtained, and the patient elected to proceed.  Pretreatment echo adequate  -We will proceed with cycle 3 day 8 with stable DA for prior neutropenia. Blood counts, renal function, electrolytes and hepatic function reviewed and adequate for treatment.    -He is scheduled for PET 8/22/23 which is prior to next cycle  -Neuropathy is improving.    -He has follow-up with  transplant team pending.    5.  Cancer pain  -No longer on pain medication.  -Using tylenol PRN    6. GI bleed  -PPI, continue daily. Monitor for recurrence    7. Access  -Port      Follow Up:   2 weeks for next cycle  and discuss scan results    Vale Patrick APRN  Hematology and Oncology

## 2023-08-11 ENCOUNTER — HOSPITAL ENCOUNTER (OUTPATIENT)
Dept: ONCOLOGY | Facility: HOSPITAL | Age: 73
Discharge: HOME | End: 2023-08-11
Payer: MEDICARE

## 2023-08-11 VITALS
DIASTOLIC BLOOD PRESSURE: 65 MMHG | RESPIRATION RATE: 18 BRPM | HEIGHT: 64 IN | HEART RATE: 75 BPM | TEMPERATURE: 98.2 F | WEIGHT: 161 LBS | BODY MASS INDEX: 27.49 KG/M2 | SYSTOLIC BLOOD PRESSURE: 127 MMHG

## 2023-08-11 DIAGNOSIS — C81.98 HODGKIN LYMPHOMA OF LYMPH NODES OF MULTIPLE REGIONS, UNSPECIFIED HODGKIN LYMPHOMA TYPE: Primary | ICD-10-CM

## 2023-08-11 PROCEDURE — 96372 THER/PROPH/DIAG INJ SC/IM: CPT

## 2023-08-11 PROCEDURE — 25010000002 PEGFILGRASTIM-CBQV 6 MG/0.6ML SOLUTION PREFILLED SYRINGE: Performed by: INTERNAL MEDICINE

## 2023-08-11 RX ADMIN — PEGFILGRASTIM-CBQV 6 MG: 6 INJECTION, SOLUTION SUBCUTANEOUS at 12:36

## 2023-08-22 ENCOUNTER — HOSPITAL ENCOUNTER (OUTPATIENT)
Dept: PET IMAGING | Facility: HOSPITAL | Age: 73
Discharge: HOME | End: 2023-08-22
Payer: MEDICARE

## 2023-08-22 ENCOUNTER — LAB (OUTPATIENT)
Dept: LAB | Facility: HOSPITAL | Age: 73
End: 2023-08-22
Payer: MEDICARE

## 2023-08-22 DIAGNOSIS — C81.98 HODGKIN LYMPHOMA OF LYMPH NODES OF MULTIPLE REGIONS, UNSPECIFIED HODGKIN LYMPHOMA TYPE: ICD-10-CM

## 2023-08-22 DIAGNOSIS — C81.98 HODGKIN LYMPHOMA OF LYMPH NODES OF MULTIPLE REGIONS, UNSPECIFIED HODGKIN LYMPHOMA TYPE: Primary | ICD-10-CM

## 2023-08-22 LAB
ANISOCYTOSIS BLD QL: NORMAL
BASOPHILS # BLD AUTO: 0.04 10*3/MM3 (ref 0–0.2)
BASOPHILS NFR BLD AUTO: 0.4 % (ref 0–1.5)
DEPRECATED RDW RBC AUTO: 75.5 FL (ref 37–54)
EOSINOPHIL # BLD AUTO: 0.13 10*3/MM3 (ref 0–0.4)
EOSINOPHIL NFR BLD AUTO: 1.4 % (ref 0.3–6.2)
ERYTHROCYTE [DISTWIDTH] IN BLOOD BY AUTOMATED COUNT: 21.1 % (ref 12.3–15.4)
GLUCOSE BLDC GLUCOMTR-MCNC: 103 MG/DL (ref 70–130)
HCT VFR BLD AUTO: 32.4 % (ref 37.5–51)
HGB BLD-MCNC: 10.5 G/DL (ref 13–17.7)
IMM GRANULOCYTES # BLD AUTO: 0.15 10*3/MM3 (ref 0–0.05)
IMM GRANULOCYTES NFR BLD AUTO: 1.7 % (ref 0–0.5)
LYMPHOCYTES # BLD AUTO: 0.94 10*3/MM3 (ref 0.7–3.1)
LYMPHOCYTES NFR BLD AUTO: 10.4 % (ref 19.6–45.3)
MACROCYTES BLD QL SMEAR: NORMAL
MCH RBC QN AUTO: 32.1 PG (ref 26.6–33)
MCHC RBC AUTO-ENTMCNC: 32.4 G/DL (ref 31.5–35.7)
MCV RBC AUTO: 99.1 FL (ref 79–97)
MONOCYTES # BLD AUTO: 0.86 10*3/MM3 (ref 0.1–0.9)
MONOCYTES NFR BLD AUTO: 9.6 % (ref 5–12)
NEUTROPHILS NFR BLD AUTO: 6.88 10*3/MM3 (ref 1.7–7)
NEUTROPHILS NFR BLD AUTO: 76.5 % (ref 42.7–76)
NRBC BLD AUTO-RTO: 0 /100 WBC (ref 0–0.2)
PLAT MORPH BLD: NORMAL
PLATELET # BLD AUTO: 218 10*3/MM3 (ref 140–450)
PMV BLD AUTO: 9.9 FL (ref 6–12)
POLYCHROMASIA BLD QL SMEAR: NORMAL
RBC # BLD AUTO: 3.27 10*6/MM3 (ref 4.14–5.8)
WBC MORPH BLD: NORMAL
WBC NRBC COR # BLD: 9 10*3/MM3 (ref 3.4–10.8)

## 2023-08-22 PROCEDURE — 0 FLUDEOXYGLUCOSE F18 SOLUTION: Performed by: INTERNAL MEDICINE

## 2023-08-22 PROCEDURE — 36415 COLL VENOUS BLD VENIPUNCTURE: CPT

## 2023-08-22 PROCEDURE — 85025 COMPLETE CBC W/AUTO DIFF WBC: CPT

## 2023-08-22 PROCEDURE — 85007 BL SMEAR W/DIFF WBC COUNT: CPT

## 2023-08-22 PROCEDURE — 82948 REAGENT STRIP/BLOOD GLUCOSE: CPT

## 2023-08-22 PROCEDURE — A9552 F18 FDG: HCPCS | Performed by: INTERNAL MEDICINE

## 2023-08-22 PROCEDURE — 78815 PET IMAGE W/CT SKULL-THIGH: CPT

## 2023-08-22 RX ADMIN — FLUDEOXYGLUCOSE F18 1 DOSE: 300 INJECTION INTRAVENOUS at 08:34

## 2023-08-24 ENCOUNTER — APPOINTMENT (OUTPATIENT)
Dept: ONCOLOGY | Facility: HOSPITAL | Age: 73
End: 2023-08-24
Payer: MEDICARE

## 2023-08-24 ENCOUNTER — HOSPITAL ENCOUNTER (OUTPATIENT)
Dept: ONCOLOGY | Facility: HOSPITAL | Age: 73
Discharge: HOME | End: 2023-08-24
Admitting: INTERNAL MEDICINE
Payer: MEDICARE

## 2023-08-24 ENCOUNTER — OFFICE VISIT (OUTPATIENT)
Dept: ONCOLOGY | Facility: CLINIC | Age: 73
End: 2023-08-24
Payer: MEDICARE

## 2023-08-24 VITALS
HEIGHT: 64 IN | OXYGEN SATURATION: 98 % | RESPIRATION RATE: 16 BRPM | DIASTOLIC BLOOD PRESSURE: 67 MMHG | TEMPERATURE: 97.8 F | HEART RATE: 62 BPM | WEIGHT: 160 LBS | BODY MASS INDEX: 27.31 KG/M2 | SYSTOLIC BLOOD PRESSURE: 134 MMHG

## 2023-08-24 DIAGNOSIS — C81.98 HODGKIN LYMPHOMA OF LYMPH NODES OF MULTIPLE REGIONS, UNSPECIFIED HODGKIN LYMPHOMA TYPE: Primary | ICD-10-CM

## 2023-08-24 DIAGNOSIS — Z45.2 ENCOUNTER FOR CARE RELATED TO VASCULAR ACCESS PORT: ICD-10-CM

## 2023-08-24 DIAGNOSIS — K62.89 RECTAL MASS: ICD-10-CM

## 2023-08-24 LAB
ALBUMIN SERPL-MCNC: 3.9 G/DL (ref 3.5–5.2)
ALBUMIN/GLOB SERPL: 1.3 G/DL
ALP SERPL-CCNC: 124 U/L (ref 39–117)
ALT SERPL W P-5'-P-CCNC: 13 U/L (ref 1–41)
ANION GAP SERPL CALCULATED.3IONS-SCNC: 7 MMOL/L (ref 5–15)
AST SERPL-CCNC: 16 U/L (ref 1–40)
BILIRUB SERPL-MCNC: 0.2 MG/DL (ref 0–1.2)
BUN SERPL-MCNC: 17 MG/DL (ref 8–23)
BUN/CREAT SERPL: 23.6 (ref 7–25)
CALCIUM SPEC-SCNC: 9.1 MG/DL (ref 8.6–10.5)
CHLORIDE SERPL-SCNC: 99 MMOL/L (ref 98–107)
CO2 SERPL-SCNC: 29 MMOL/L (ref 22–29)
CREAT SERPL-MCNC: 0.72 MG/DL (ref 0.76–1.27)
EGFRCR SERPLBLD CKD-EPI 2021: 97.7 ML/MIN/1.73
GLOBULIN UR ELPH-MCNC: 2.9 GM/DL
GLUCOSE SERPL-MCNC: 85 MG/DL (ref 65–99)
POTASSIUM SERPL-SCNC: 4.3 MMOL/L (ref 3.5–5.2)
PROT SERPL-MCNC: 6.8 G/DL (ref 6–8.5)
SODIUM SERPL-SCNC: 135 MMOL/L (ref 136–145)

## 2023-08-24 PROCEDURE — 80053 COMPREHEN METABOLIC PANEL: CPT | Performed by: INTERNAL MEDICINE

## 2023-08-24 PROCEDURE — 25010000002 DOXORUBICIN LIPOSOMAL PER 10 MG: Performed by: INTERNAL MEDICINE

## 2023-08-24 PROCEDURE — 96417 CHEMO IV INFUS EACH ADDL SEQ: CPT

## 2023-08-24 PROCEDURE — 96375 TX/PRO/DX INJ NEW DRUG ADDON: CPT

## 2023-08-24 PROCEDURE — 25010000002 DEXAMETHASONE SODIUM PHOSPHATE 100 MG/10ML SOLUTION: Performed by: INTERNAL MEDICINE

## 2023-08-24 PROCEDURE — 25010000002 GEMCITABINE 200 MG/5.26ML SOLUTION 5.26 ML VIAL: Performed by: INTERNAL MEDICINE

## 2023-08-24 PROCEDURE — 96411 CHEMO IV PUSH ADDL DRUG: CPT

## 2023-08-24 PROCEDURE — 25010000002 VINORELBINE TARTRATE PER 10 MG: Performed by: INTERNAL MEDICINE

## 2023-08-24 PROCEDURE — 25010000002 PEMBROLIZUMAB 100 MG/4ML SOLUTION 4 ML VIAL: Performed by: INTERNAL MEDICINE

## 2023-08-24 PROCEDURE — 25010000002 GEMCITABINE 1 GM/26.3ML SOLUTION 26.3 ML VIAL: Performed by: INTERNAL MEDICINE

## 2023-08-24 PROCEDURE — 25010000002 HEPARIN LOCK FLUSH PER 10 UNITS: Performed by: INTERNAL MEDICINE

## 2023-08-24 PROCEDURE — 96413 CHEMO IV INFUSION 1 HR: CPT

## 2023-08-24 RX ORDER — DEXTROSE MONOHYDRATE 50 MG/ML
250 INJECTION, SOLUTION INTRAVENOUS ONCE
Status: CANCELLED | OUTPATIENT
Start: 2023-08-24

## 2023-08-24 RX ORDER — SODIUM CHLORIDE 9 MG/ML
250 INJECTION, SOLUTION INTRAVENOUS ONCE
Status: CANCELLED | OUTPATIENT
Start: 2023-08-24

## 2023-08-24 RX ORDER — DEXTROSE MONOHYDRATE 50 MG/ML
250 INJECTION, SOLUTION INTRAVENOUS ONCE
OUTPATIENT
Start: 2023-08-31

## 2023-08-24 RX ORDER — HEPARIN SODIUM (PORCINE) LOCK FLUSH IV SOLN 100 UNIT/ML 100 UNIT/ML
500 SOLUTION INTRAVENOUS AS NEEDED
Status: DISCONTINUED | OUTPATIENT
Start: 2023-08-24 | End: 2023-08-25 | Stop reason: HOSPADM

## 2023-08-24 RX ORDER — HEPARIN SODIUM (PORCINE) LOCK FLUSH IV SOLN 100 UNIT/ML 100 UNIT/ML
500 SOLUTION INTRAVENOUS AS NEEDED
OUTPATIENT
Start: 2023-08-24

## 2023-08-24 RX ORDER — DEXTROSE MONOHYDRATE 50 MG/ML
250 INJECTION, SOLUTION INTRAVENOUS ONCE
Status: COMPLETED | OUTPATIENT
Start: 2023-08-24 | End: 2023-08-24

## 2023-08-24 RX ORDER — SODIUM CHLORIDE 9 MG/ML
250 INJECTION, SOLUTION INTRAVENOUS ONCE
Status: COMPLETED | OUTPATIENT
Start: 2023-08-24 | End: 2023-08-24

## 2023-08-24 RX ADMIN — GEMCITABINE HYDROCHLORIDE 1400 MG: 1 INJECTION, SOLUTION INTRAVENOUS at 11:37

## 2023-08-24 RX ADMIN — HEPARIN 500 UNITS: 100 SYRINGE at 12:55

## 2023-08-24 RX ADMIN — SODIUM CHLORIDE 250 ML: 9 INJECTION, SOLUTION INTRAVENOUS at 09:56

## 2023-08-24 RX ADMIN — DOXORUBICIN HYDROCHLORIDE 20 MG: 2 INJECTABLE, LIPOSOMAL INTRAVENOUS at 12:16

## 2023-08-24 RX ADMIN — SODIUM CHLORIDE 200 MG: 9 INJECTION, SOLUTION INTRAVENOUS at 09:58

## 2023-08-24 RX ADMIN — DEXAMETHASONE SODIUM PHOSPHATE 12 MG: 10 INJECTION, SOLUTION INTRAMUSCULAR; INTRAVENOUS at 10:39

## 2023-08-24 RX ADMIN — DEXTROSE MONOHYDRATE 250 ML: 50 INJECTION, SOLUTION INTRAVENOUS at 12:16

## 2023-08-24 RX ADMIN — VINORELBINE 25 MG: 10 INJECTION, SOLUTION INTRAVENOUS at 11:11

## 2023-08-24 NOTE — PROGRESS NOTES
[facility]ology [facility] [place]  Office number [phone]    Fax number [phone]     Follow up     Date: 23    Patient Name: Abraham Wu  MRN: [id number]  : 1950      Chief Complaint: Hodgkin Lymphoma follow up/treatment    Surgeon: Dr. Hiram Viveros MD    Cancer Staging: IV    History of Present Illness: Abraham Wu is a pleasant 73 y.o. male with PMH of hypertension, sleep apnea, hard of hearing who presents today for follow up of Hodgkin Lymphoma.     He initially presented 2022 with intractable diarrhea, low appetite, and a greater than 50 pound weight loss over several month period associated with intermittent fevers.  CT of the chest abdomen pelvis obtained in the ER shows of rectal mass spanning greater than 12 cm with adjacent adenopathy, bulky RP adenopathy, and findings concerning for left renal and liver metastases.  Small right pleural effusion with nodularity.  There was concern for impending obstruction, so he underwent diverting colostomy and biopsy on 2022.  Biopsies from the peritoneam showed a fibrotic nodule histiocytes and eosinophils admixed with CD30 positive large cells.  Rectal biopsies showed involvement by CD30 positive lymphoma, classic Hodgkin lymphoma versus CD30 positive T-cell or B-cell lymphoma.  There was extensive fibrosis and crush artifact.  He underwent repeat transrectal biopsy 10/4/2022 for further characterization which was felt to be most consistent with classical Hodgkin lymphoma.    He initiated chemotherapy with Brentuximab-AVD as an inpatient on 10/8/2022.  Cycle #1 was complicated by GI bleed requiring multiple blood transfusion and ultimately coil artery embolization 10/12; neutropenic fever, Candida esophagitis and mucositis.  He had a prolonged ICU stay  And required enteral feeding.  He was discharged 10/20/2022.    Following his last cycle he was admitted with multifocal PNA and +corona virus  infection.    Treatment history:  Brentuximab-AVD: C1 10/8/22  C2: 11/1/22 onward with DA 20% in BVD; full dose brentuximab  Completed C6 3/22/23  Palliative radiation to rectum 5/30/2023  GVD+Pembro x 4 cycles: to complete 8/30/2023    Interval history:    He is here for follow up and chemotherapy consideration.   He underwent recent PET CT.  He noted swelling in lateral head of clavicle initially with weight loss but seems worse. No falls.   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 No pain or limited ROM.   Denies substantial residual neuropathy, this has continued to resolve.   Notes his stoma site has been protruding more. This issue as going on when he last followed with CRS but is a little worse. Good ostomy output. No pain. Reducible. No n/v.   No fever or cough.  Urine output good    Past Medical History:   Past Medical History:   Diagnosis Date    benign polypoid tissue right lung     Cancer     HODGKINS LYMPHOMA, RECTAL CANCER    Diabetes mellitus     Hyperlipidemia     Hypertension     Lymphoma     Mycobacterium mucogenicum     SHERRI (obstructive sleep apnea)     O2 2L/MIN AT NIGHT ONLY    Pneumonia     2023    Seasonal allergies        Past Surgical History:   Past Surgical History:   Procedure Laterality Date    BRONCHOSCOPY      removal of obstructing polypoid tissue right middle lung    COLOSTOMY N/A 09/22/2022    Procedure: LAPAROSCOPIC COLOSTOMY CREATION, FLEXIBLE SIGMOIDOSCOPY;  Surgeon: Hiram Viveros MD;  Location: [facility] OR;  Service: General;  Laterality: N/A;    DENTAL PROCEDURE      ENDOSCOPY N/A 10/10/2022    Procedure: ESOPHAGOGASTRODUODENOSCOPY;  Surgeon: Neeraj Lynn MD;  Location:  [facility];  Service: Gastroenterology;  Laterality: N/A;    ENDOSCOPY N/A 10/12/2022    Procedure: ESOPHAGOGASTRODUODENOSCOPY;  Surgeon: Brunner, Mark I, MD;  Location:  [facility];  Service: Gastroenterology;  Laterality: N/A;    EXAM UNDER  ANESTHESIA, RECTAL BIOPSY N/A 10/04/2022    Procedure: EXAM UNDER ANESTHESIA, TRANSANAL BIOPSY WITH TRUCUT NEEDLE (LITHOTOMY-CANDY CANE);  Surgeon: Hiram Viveros MD;  Location:  [facility] OR;  Service: General;  Laterality: N/A;    EXAM UNDER ANESTHESIA, RECTAL BIOPSY N/A 5/30/2023    Procedure: EXAM UNDER ANESTHESIA, TRANSANAL BIOPSY OF RECTAL MASS WITH TRUCUT NEEDLE, PROCTOSCOPY;  Surgeon: Hiram Viveros MD;  Location:  [facility] OR;  Service: General;  Laterality: N/A;    PERIPHERALLY INSERTED CENTRAL CATHETER INSERTION      Removed 11/28/2022    VENOUS ACCESS DEVICE (PORT) INSERTION Right 11/28/2022       Family History:   Family History   Problem Relation Age of Onset    Diabetes Mother     Diabetes Father     Heart disease Father        Social History:   Social History     Socioeconomic History    Marital status:    Tobacco Use    Smoking status: Never    Smokeless tobacco: Never   Vaping Use    Vaping Use: Never used   Substance and Sexual Activity    Alcohol use: Not Currently     Comment: previously drank 2 glasses of wine/beer nightly    Drug use: Never    Sexual activity: Defer       Medications:     Current Outpatient Medications:     fluticasone (FLONASE) 50 MCG/ACT nasal spray, 1 spray into the nostril(s) as directed by provider Daily As Needed for Allergies or Rhinitis. OTC, Disp: , Rfl:     lidocaine-prilocaine (EMLA) 2.5-2.5 % cream, Apply 1 application topically to the appropriate area as directed As Needed (45-60 minutes prior to port access.  Cover with saran/plastic wrap.)., Disp: 30 g, Rfl: 3    loratadine (CLARITIN) 10 MG tablet, Take 1 tablet by mouth Daily., Disp: 30 tablet, Rfl: 5    ondansetron (Zofran) 8 MG tablet, Take 1 tablet by mouth Every 8 (Eight) Hours As Needed for Nausea or Vomiting., Disp: 30 tablet, Rfl: 5    pantoprazole (PROTONIX) 40 MG EC tablet, Take 1 tablet by mouth Daily., Disp: 90 tablet, Rfl: 1    prochlorperazine (COMPAZINE) 5 MG tablet, Take 1 tablet by  "mouth Every 6 (Six) Hours As Needed for Nausea or Vomiting., Disp: 30 tablet, Rfl: 2    rosuvastatin (CRESTOR) 10 MG tablet, Take 1 tablet by mouth Daily., Disp: , Rfl:     Allergies:   No Known Allergies    Objective     Vital Signs:   Vitals:    08/24/23 0808   BP: 134/67   Pulse: 62   Resp: 16   Temp: 97.8 øF (36.6 øC)   TempSrc: Infrared   SpO2: 98%   Weight: 72.6 kg (160 lb)   Height: 162.6 cm (64.02\")   PainSc: 0-No pain    Body mass index is 27.45 kg/mý.   Pain Score    08/24/23 0808   PainSc: 0-No pain       ECOG Performance Status: 1    Physical Exam:   General: Well appearing male in NAD  HEENT: Normocephalic, atraumatic. Sclera anicteric.   Neck: supple, no palpable LAD. No axillary adenopathy  Cardiovascular: regular rate and rhythm. No murmurs.   Respiratory: Normal rate. Clear to auscultation bilaterally  Abdomen: Soft, nontender, non distended with normoactive bowel sounds. Ostomy LLQ with stoma that extends 7 cm from Superior to inferior. Pink. Good output. Photo taken  Lymph: no supraclavicular or axillary adenopathy  Neuro: Alert and oriented x 3.   Ext: Symmetric, no swelling.   Skin: right port site  C/d/i    Laboratory/Imaging Reviewed:     Hospital Outpatient Visit on 08/24/2023   Component Date Value Ref Range Status    Glucose 08/24/2023 85  65 - 99 mg/dL Final    BUN 08/24/2023 17  8 - 23 mg/dL Final    Creatinine 08/24/2023 0.72 (L)  0.76 - 1.27 mg/dL Final    Sodium 08/24/2023 135 (L)  136 - 145 mmol/L Final    Potassium 08/24/2023 4.3  3.5 - 5.2 mmol/L Final    Chloride 08/24/2023 99  98 - 107 mmol/L Final    CO2 08/24/2023 29.0  22.0 - 29.0 mmol/L Final    Calcium 08/24/2023 9.1  8.6 - 10.5 mg/dL Final    Total Protein 08/24/2023 6.8  6.0 - 8.5 g/dL Final    Albumin 08/24/2023 3.9  3.5 - 5.2 g/dL Final    ALT (SGPT) 08/24/2023 13  1 - 41 U/L Final    AST (SGOT) 08/24/2023 16  1 - 40 U/L Final    Alkaline Phosphatase 08/24/2023 124 (H)  39 - 117 U/L Final    Total Bilirubin 08/24/2023 0.2 "  0.0 - 1.2 mg/dL Final    Globulin 08/24/2023 2.9  gm/dL Final    Calculated Result    A/G Ratio 08/24/2023 1.3  g/dL Final    BUN/Creatinine Ratio 08/24/2023 23.6  7.0 - 25.0 Final    Anion Gap 08/24/2023 7.0  5.0 - 15.0 mmol/L Final    eGFR 08/24/2023 97.7  >60.0 mL/min/1.73 Final   Hospital Outpatient Visit on 08/22/2023   Component Date Value Ref Range Status    Glucose 08/22/2023 103  70 - 130 mg/dL Final   Lab on 08/22/2023   Component Date Value Ref Range Status    WBC 08/22/2023 9.00  3.40 - 10.80 10*3/mm3 Final    RBC 08/22/2023 3.27 (L)  4.14 - 5.80 10*6/mm3 Final    Hemoglobin 08/22/2023 10.5 (L)  13.0 - 17.7 g/dL Final    Hematocrit 08/22/2023 32.4 (L)  37.5 - 51.0 % Final    MCV 08/22/2023 99.1 (H)  79.0 - 97.0 fL Final    MCH 08/22/2023 32.1  26.6 - 33.0 pg Final    MCHC 08/22/2023 32.4  31.5 - 35.7 g/dL Final    RDW 08/22/2023 21.1 (H)  12.3 - 15.4 % Final    RDW-SD 08/22/2023 75.5 (H)  37.0 - 54.0 fl Final    MPV 08/22/2023 9.9  6.0 - 12.0 fL Final    Platelets 08/22/2023 218  140 - 450 10*3/mm3 Final    Neutrophil % 08/22/2023 76.5 (H)  42.7 - 76.0 % Final    Lymphocyte % 08/22/2023 10.4 (L)  19.6 - 45.3 % Final    Monocyte % 08/22/2023 9.6  5.0 - 12.0 % Final    Eosinophil % 08/22/2023 1.4  0.3 - 6.2 % Final    Basophil % 08/22/2023 0.4  0.0 - 1.5 % Final    Immature Grans % 08/22/2023 1.7 (H)  0.0 - 0.5 % Final    Neutrophils, Absolute 08/22/2023 6.88  1.70 - 7.00 10*3/mm3 Final    Lymphocytes, Absolute 08/22/2023 0.94  0.70 - 3.10 10*3/mm3 Final    Monocytes, Absolute 08/22/2023 0.86  0.10 - 0.90 10*3/mm3 Final    Eosinophils, Absolute 08/22/2023 0.13  0.00 - 0.40 10*3/mm3 Final    Basophils, Absolute 08/22/2023 0.04  0.00 - 0.20 10*3/mm3 Final    Immature Grans, Absolute 08/22/2023 0.15 (H)  0.00 - 0.05 10*3/mm3 Final    nRBC 08/22/2023 0.0  0.0 - 0.2 /100 WBC Final    Anisocytosis 08/22/2023 Mod/2+  None Seen Final    Macrocytes 08/22/2023 Slight/1+  None Seen Final    Polychromasia 08/22/2023  Slight/1+  None Seen Final    WBC Morphology 08/22/2023 Normal  Normal Final    Platelet Morphology 08/22/2023 Normal  Normal Final       NM PET/CT Skull Base to Mid Thigh    Result Date: 8/23/2023  Narrative: NM PET/CT SKULL BASE TO MID THIGH Date of Exam: 8/22/2023 8:10 AM EDT Indication: hodgkin restaging, compare to prior PET. Comparison: 5/8/2023. Technique: The patient was injected with 12.8 mCi of F-18-FDG. Serum glucose the day of the study was 103 mg/DL. Whole body PET imaging was performed. Correlating noncontrast images were obtained. Fused images were obtained. Findings: Reference values: Mediastinum, SUV max 1.62; liver, SUV max 2.2. Hypermetabolic rectal mass now demonstrates SUV max of 2.56 as compared to 9.9 previously. There is otherwise normal distribution of radionuclide in the abdomen and pelvis. Previously noted mediastinal lymph nodes are no longer hypermetabolic. There are patchy somewhat groundglass appearing infiltrates in the bilateral upper lobes although improved as compared to the prior study. Left upper lobe infiltrates demonstrate an SUV max of 1.32. There is normal distribution of radionuclide in the neck.     Impression: Impression: Improvement in hypermetabolic activity of rectal mass. Improved lung infiltrates which are no longer significantly hypermetabolic above baseline. Previously noted mediastinal nodes also are non-hypermetabolic. No new abnormality identified. Electronically Signed: Araseli Mccormack MD  8/23/2023 8:31 AM EDT  Workstation ID: [id number]       Assessment / Plan      Assessment/Plan:   1.  CD30 positive classical Hodgkin's lymphoma  2.  Residual PET avid rectal mass, enlarging on radiation planning CT with development of recurrent retroperitoneal adenopathy, biopsy confirms recurrent classic Hodgkin lymphoma   3.  Encounter for monitoring chemoimmunotherapy  -He completed 6 cycles of BV-AVD 3/23/2023.   -Cycle 6 was complicated by coronavirus infection and  multifocal pneumonia.  Posttherapy PET showed persistent bilateral interstitial changes and mild adenopathy. Repeat chest CT shows continued improvement in the pulmonary infiltrates consistent with a reactive infectious etiology.  - He had clear response to first line thearpy with resolution of the hepatic lesions, marked improvement in the retroperitoneal adenopathy, and 50% reduction in the size of the rectal mass.  However he had persistent uptake in the rectal mass on posttreatment PET, [place] 5. We elected to proceed with biopsy to confirm whether he does have residual disease followed by consolidative radiotherapy to the rectal mass given initial bulky disease.    -In the interim, while awaiting biopsy, he presented with rectal bleeding, obstructive uropathy symptoms and pain and radiation planning CT showed significant interval growth in the rectal mass as well as development of new retroperitoneal adenopathy superior to the rectal mass compared to the his posttreatment PET confirming progressive disease. Consolidative radiation was converted to palliative course radiation given interval adenopathy development above the radiation field.   -Biopsy confirmed residual classic Hodgkin's lymphoma.  Because of development of new adenopathy outside of the radiation field as well as rapid regrowth of his mass, consolidative radiation plan was changed to palliative radiation plan to allow earlier completion for transition to second line chemotherapy.  -His performance status has adequately recovered for treatment initiation.    -Given prior treatment with Brentuximab and national shortage of carbo/cis, I discussed Kirsten.  In the interim he underwent stem cell transplant evaluation at . From review of their notes, they feel he is a borderline candidate for stem cell transplantation because of age 73.  However they did recommend avoiding Bendamustine in case of future stem cell collection.  He has also had a chest  CT that showed resolution of the pulmonary interstitial infiltrates, making immunotherapy a more attractive option (previously had avoided because of active pulmonary infiltrates concerning for pneumonitis on his PET/CT results).  Due to the national shortage of carboplatin and cis, we proceeded with Keytruda, vinorelbine, Gemzar, and Doxil.  He is here for consideration of cycle 4 day 1 of treatment. CBC/CMP reviewed and adequate to proceed with cycle 4 of treatment  -I personally reviewed his PET and compared to prior which is now showing excellent treatment response with SUV 10-->2.5 in rectal mass and no other FDG avid adenopathy. Cycle 4 will complete in one week.   -Referral back to [facility] for consideration of ASCT. If no trasnplant candidate, consider extension of Keytruda/maintenance after chemo completes  -Discussed with radiation oncology, will evaluate whether additional consolidative radiation dose to rectum should be considered as no additional other areas of disease.   -Discussed with CRS, can ultimately consider ostomy revision when treatment holiday appropriate    4.  GI bleed  -PPI, continue daily. Monitor for recurrence    5. Access  -Port      Follow Up:   1 week Gibran and kaleigh Leary MD  Hematology and Oncology     I have spent a total of 45 min on reviewing imaging, test results/preparing to see patient, counseling patient, performing medically appropriate exam, discussion with specialists, coordination of care and documenting clinical information in the electronic or other health record

## 2023-08-25 ENCOUNTER — TELEPHONE (OUTPATIENT)
Dept: ONCOLOGY | Facility: CLINIC | Age: 73
End: 2023-08-25
Payer: MEDICARE

## 2023-08-25 NOTE — TELEPHONE ENCOUNTER
Spoke with Ms. Wu.  Notified her per Dr. Leary.  New referral placed to [facility] to BM transplant team today.  Ms. Wu notified.  Ms. Wu verbalized understanding.

## 2023-08-25 NOTE — TELEPHONE ENCOUNTER
----- Message from Kaycee Leary MD sent at 8/24/2023 10:42 PM EDT -----  Regarding: Please call patient:  Please call patient: let him know I discussed with his surgeon, ok to continue to monitor stoma.   I would like them to reach out to [facility] to schedule follow up with transplant team for reassessment.

## 2023-08-31 ENCOUNTER — OFFICE VISIT (OUTPATIENT)
Dept: ONCOLOGY | Facility: CLINIC | Age: 73
End: 2023-08-31
Payer: MEDICARE

## 2023-08-31 ENCOUNTER — HOSPITAL ENCOUNTER (OUTPATIENT)
Dept: ONCOLOGY | Facility: HOSPITAL | Age: 73
Discharge: HOME | End: 2023-08-31
Payer: MEDICARE

## 2023-08-31 ENCOUNTER — APPOINTMENT (OUTPATIENT)
Dept: ONCOLOGY | Facility: HOSPITAL | Age: 73
End: 2023-08-31
Payer: MEDICARE

## 2023-08-31 VITALS
OXYGEN SATURATION: 99 % | WEIGHT: 162 LBS | HEART RATE: 60 BPM | SYSTOLIC BLOOD PRESSURE: 154 MMHG | BODY MASS INDEX: 27.66 KG/M2 | RESPIRATION RATE: 16 BRPM | TEMPERATURE: 97.5 F | DIASTOLIC BLOOD PRESSURE: 73 MMHG | HEIGHT: 64 IN

## 2023-08-31 DIAGNOSIS — C81.98 HODGKIN LYMPHOMA OF LYMPH NODES OF MULTIPLE REGIONS, UNSPECIFIED HODGKIN LYMPHOMA TYPE: ICD-10-CM

## 2023-08-31 DIAGNOSIS — Z51.11 CHEMOTHERAPY MANAGEMENT, ENCOUNTER FOR: ICD-10-CM

## 2023-08-31 DIAGNOSIS — Z45.2 ENCOUNTER FOR CARE RELATED TO VASCULAR ACCESS PORT: Primary | ICD-10-CM

## 2023-08-31 DIAGNOSIS — C81.98 HODGKIN LYMPHOMA OF LYMPH NODES OF MULTIPLE REGIONS, UNSPECIFIED HODGKIN LYMPHOMA TYPE: Primary | ICD-10-CM

## 2023-08-31 LAB
ALBUMIN SERPL-MCNC: 3.8 G/DL (ref 3.5–5.2)
ALBUMIN/GLOB SERPL: 1.3 G/DL
ALP SERPL-CCNC: 87 U/L (ref 39–117)
ALT SERPL W P-5'-P-CCNC: 15 U/L (ref 1–41)
ANION GAP SERPL CALCULATED.3IONS-SCNC: 5 MMOL/L (ref 5–15)
AST SERPL-CCNC: 14 U/L (ref 1–40)
BASOPHILS # BLD AUTO: 0.04 10*3/MM3 (ref 0–0.2)
BASOPHILS NFR BLD AUTO: 1.2 % (ref 0–1.5)
BILIRUB SERPL-MCNC: 0.3 MG/DL (ref 0–1.2)
BUN SERPL-MCNC: 24 MG/DL (ref 8–23)
BUN/CREAT SERPL: 40 (ref 7–25)
CALCIUM SPEC-SCNC: 9 MG/DL (ref 8.6–10.5)
CHLORIDE SERPL-SCNC: 100 MMOL/L (ref 98–107)
CO2 SERPL-SCNC: 29 MMOL/L (ref 22–29)
CREAT SERPL-MCNC: 0.6 MG/DL (ref 0.76–1.27)
DEPRECATED RDW RBC AUTO: 70.4 FL (ref 37–54)
EGFRCR SERPLBLD CKD-EPI 2021: 103.2 ML/MIN/1.73
EOSINOPHIL # BLD AUTO: 0.07 10*3/MM3 (ref 0–0.4)
EOSINOPHIL NFR BLD AUTO: 2.1 % (ref 0.3–6.2)
ERYTHROCYTE [DISTWIDTH] IN BLOOD BY AUTOMATED COUNT: 18.9 % (ref 12.3–15.4)
GLOBULIN UR ELPH-MCNC: 2.9 GM/DL
GLUCOSE SERPL-MCNC: 72 MG/DL (ref 65–99)
HCT VFR BLD AUTO: 30 % (ref 37.5–51)
HGB BLD-MCNC: 9.7 G/DL (ref 13–17.7)
IMM GRANULOCYTES # BLD AUTO: 0.02 10*3/MM3 (ref 0–0.05)
IMM GRANULOCYTES NFR BLD AUTO: 0.6 % (ref 0–0.5)
LYMPHOCYTES # BLD AUTO: 0.65 10*3/MM3 (ref 0.7–3.1)
LYMPHOCYTES NFR BLD AUTO: 19.6 % (ref 19.6–45.3)
MCH RBC QN AUTO: 32.4 PG (ref 26.6–33)
MCHC RBC AUTO-ENTMCNC: 32.3 G/DL (ref 31.5–35.7)
MCV RBC AUTO: 100.3 FL (ref 79–97)
MONOCYTES # BLD AUTO: 0.37 10*3/MM3 (ref 0.1–0.9)
MONOCYTES NFR BLD AUTO: 11.1 % (ref 5–12)
NEUTROPHILS NFR BLD AUTO: 2.17 10*3/MM3 (ref 1.7–7)
NEUTROPHILS NFR BLD AUTO: 65.4 % (ref 42.7–76)
PLATELET # BLD AUTO: 241 10*3/MM3 (ref 140–450)
PMV BLD AUTO: 8.9 FL (ref 6–12)
POTASSIUM SERPL-SCNC: 4.2 MMOL/L (ref 3.5–5.2)
PROT SERPL-MCNC: 6.7 G/DL (ref 6–8.5)
RBC # BLD AUTO: 2.99 10*6/MM3 (ref 4.14–5.8)
SODIUM SERPL-SCNC: 134 MMOL/L (ref 136–145)
WBC NRBC COR # BLD: 3.32 10*3/MM3 (ref 3.4–10.8)

## 2023-08-31 PROCEDURE — 25010000002 DEXAMETHASONE SODIUM PHOSPHATE 100 MG/10ML SOLUTION: Performed by: INTERNAL MEDICINE

## 2023-08-31 PROCEDURE — 96409 CHEMO IV PUSH SNGL DRUG: CPT

## 2023-08-31 PROCEDURE — 25010000002 DOXORUBICIN LIPOSOMAL PER 10 MG: Performed by: INTERNAL MEDICINE

## 2023-08-31 PROCEDURE — 80053 COMPREHEN METABOLIC PANEL: CPT | Performed by: INTERNAL MEDICINE

## 2023-08-31 PROCEDURE — 96413 CHEMO IV INFUSION 1 HR: CPT

## 2023-08-31 PROCEDURE — 96375 TX/PRO/DX INJ NEW DRUG ADDON: CPT

## 2023-08-31 PROCEDURE — 85025 COMPLETE CBC W/AUTO DIFF WBC: CPT | Performed by: INTERNAL MEDICINE

## 2023-08-31 PROCEDURE — 25010000002 GEMCITABINE 200 MG/5.26ML SOLUTION 5.26 ML VIAL: Performed by: INTERNAL MEDICINE

## 2023-08-31 PROCEDURE — 96417 CHEMO IV INFUS EACH ADDL SEQ: CPT

## 2023-08-31 PROCEDURE — 25010000002 HEPARIN LOCK FLUSH PER 10 UNITS: Performed by: INTERNAL MEDICINE

## 2023-08-31 PROCEDURE — 96411 CHEMO IV PUSH ADDL DRUG: CPT

## 2023-08-31 PROCEDURE — 25010000002 GEMCITABINE 1 GM/26.3ML SOLUTION 26.3 ML VIAL: Performed by: INTERNAL MEDICINE

## 2023-08-31 PROCEDURE — 25010000002 VINORELBINE TARTRATE PER 10 MG: Performed by: INTERNAL MEDICINE

## 2023-08-31 RX ORDER — HEPARIN SODIUM (PORCINE) LOCK FLUSH IV SOLN 100 UNIT/ML 100 UNIT/ML
500 SOLUTION INTRAVENOUS AS NEEDED
OUTPATIENT
Start: 2023-08-31

## 2023-08-31 RX ORDER — DEXTROSE MONOHYDRATE 50 MG/ML
250 INJECTION, SOLUTION INTRAVENOUS ONCE
Status: COMPLETED | OUTPATIENT
Start: 2023-08-31 | End: 2023-08-31

## 2023-08-31 RX ORDER — HEPARIN SODIUM (PORCINE) LOCK FLUSH IV SOLN 100 UNIT/ML 100 UNIT/ML
500 SOLUTION INTRAVENOUS AS NEEDED
Status: DISCONTINUED | OUTPATIENT
Start: 2023-08-31 | End: 2023-09-01 | Stop reason: HOSPADM

## 2023-08-31 RX ADMIN — DOXORUBICIN HYDROCHLORIDE 20 MG: 2 INJECTABLE, LIPOSOMAL INTRAVENOUS at 12:27

## 2023-08-31 RX ADMIN — DEXAMETHASONE SODIUM PHOSPHATE 12 MG: 10 INJECTION, SOLUTION INTRAMUSCULAR; INTRAVENOUS at 10:28

## 2023-08-31 RX ADMIN — DEXTROSE MONOHYDRATE 250 ML: 50 INJECTION, SOLUTION INTRAVENOUS at 10:24

## 2023-08-31 RX ADMIN — GEMCITABINE 1400 MG: 38 INJECTION, SOLUTION INTRAVENOUS at 11:48

## 2023-08-31 RX ADMIN — VINORELBINE 25 MG: 10 INJECTION, SOLUTION INTRAVENOUS at 11:18

## 2023-08-31 RX ADMIN — HEPARIN 500 UNITS: 100 SYRINGE at 13:12

## 2023-08-31 NOTE — PROGRESS NOTES
Hematology and Oncology [place]  Office number [phone]    Fax number [phone]     Follow up     Date: 23    Patient Name: Abraham Wu  MRN: [id number]  : 1950      Chief Complaint: Hodgkin Lymphoma follow up/treatment    Surgeon: Dr. Hiram Viveros MD    Cancer Staging: IV    History of Present Illness: Abraham Wu is a pleasant 73 y.o. male with PMH of hypertension, sleep apnea, hard of hearing who presents today for follow up of Hodgkin Lymphoma.     He initially presented 2022 with intractable diarrhea, low appetite, and a greater than 50 pound weight loss over several month period associated with intermittent fevers.  CT of the chest abdomen pelvis obtained in the ER shows of rectal mass spanning greater than 12 cm with adjacent adenopathy, bulky RP adenopathy, and findings concerning for left renal and liver metastases.  Small right pleural effusion with nodularity.  There was concern for impending obstruction, so he underwent diverting colostomy and biopsy on 2022.  Biopsies from the peritoneam showed a fibrotic nodule histiocytes and eosinophils admixed with CD30 positive large cells.  Rectal biopsies showed involvement by CD30 positive lymphoma, classic Hodgkin lymphoma versus CD30 positive T-cell or B-cell lymphoma.  There was extensive fibrosis and crush artifact.  He underwent repeat transrectal biopsy 10/4/2022 for further characterization which was felt to be most consistent with classical Hodgkin lymphoma.    He initiated chemotherapy with Brentuximab-AVD as an inpatient on 10/8/2022.  Cycle #1 was complicated by GI bleed requiring multiple blood transfusion and ultimately coil artery embolization 10/12; neutropenic fever, Candida esophagitis and mucositis.  He had a prolonged ICU stay  And required enteral feeding.  He was discharged 10/20/2022.    Following his last cycle he was admitted with multifocal PNA and +corona virus  infection.    Treatment history:  Brentuximab-AVD: C1 10/8/22  C2: 11/1/22 onward with DA 20% in BVD; full dose brentuximab  Completed C6 3/22/23  Palliative radiation to rectum 5/30/2023  GVD+Pembro x 4 cycles: to complete 8/30/2023    Interval history:    He is here for follow up and chemotherapy consideration.  He is feeling well.  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  Neuropathy the in his hands and feet remain improved.  He continues to pass small amounts of clear mucus from his rectum, but no blood.  Good ostomy output.  No poorly controlled nausea or vomiting.    He has been in contact with radiation oncology scheduling and has a simulation planned tomorrow.  He has not yet been contacted by the transplant team for reassessment.    Past Medical History:   Past Medical History:   Diagnosis Date    benign polypoid tissue right lung     Cancer     HODGKINS LYMPHOMA, RECTAL CANCER    Diabetes mellitus     Hyperlipidemia     Hypertension     Lymphoma     Mycobacterium mucogenicum     SHERRI (obstructive sleep apnea)     O2 2L/MIN AT NIGHT ONLY    Pneumonia     2023    Seasonal allergies        Past Surgical History:   Past Surgical History:   Procedure Laterality Date    BRONCHOSCOPY      removal of obstructing polypoid tissue right middle lung    COLOSTOMY N/A 09/22/2022    Procedure: LAPAROSCOPIC COLOSTOMY CREATION, FLEXIBLE SIGMOIDOSCOPY;  Surgeon: Hiram Viveros MD;  Location: [facility] OR;  Service: General;  Laterality: N/A;    DENTAL PROCEDURE      ENDOSCOPY N/A 10/10/2022    Procedure: ESOPHAGOGASTRODUODENOSCOPY;  Surgeon: Neeraj Lynn MD;  Location:  [facility];  Service: Gastroenterology;  Laterality: N/A;    ENDOSCOPY N/A 10/12/2022    Procedure: ESOPHAGOGASTRODUODENOSCOPY;  Surgeon: Brunner, Mark I, MD;  Location:  [facility];  Service: Gastroenterology;  Laterality: N/A;    EXAM UNDER ANESTHESIA, RECTAL BIOPSY N/A 10/04/2022     Procedure: EXAM UNDER ANESTHESIA, TRANSANAL BIOPSY WITH TRUCUT NEEDLE (LITHOTOMY-CANDY CANE);  Surgeon: Hiram Viveros MD;  Location:  [facility] OR;  Service: General;  Laterality: N/A;    EXAM UNDER ANESTHESIA, RECTAL BIOPSY N/A 5/30/2023    Procedure: EXAM UNDER ANESTHESIA, TRANSANAL BIOPSY OF RECTAL MASS WITH TRUCUT NEEDLE, PROCTOSCOPY;  Surgeon: Hiram Viveros MD;  Location:  [facility] OR;  Service: General;  Laterality: N/A;    PERIPHERALLY INSERTED CENTRAL CATHETER INSERTION      Removed 11/28/2022    VENOUS ACCESS DEVICE (PORT) INSERTION Right 11/28/2022       Family History:   Family History   Problem Relation Age of Onset    Diabetes Mother     Diabetes Father     Heart disease Father        Social History:   Social History     Socioeconomic History    Marital status:    Tobacco Use    Smoking status: Never    Smokeless tobacco: Never   Vaping Use    Vaping Use: Never used   Substance and Sexual Activity    Alcohol use: Not Currently     Comment: previously drank 2 glasses of wine/beer nightly    Drug use: Never    Sexual activity: Defer       Medications:     Current Outpatient Medications:     fluticasone (FLONASE) 50 MCG/ACT nasal spray, 1 spray into the nostril(s) as directed by provider Daily As Needed for Allergies or Rhinitis. OTC, Disp: , Rfl:     lidocaine-prilocaine (EMLA) 2.5-2.5 % cream, Apply 1 application topically to the appropriate area as directed As Needed (45-60 minutes prior to port access.  Cover with saran/plastic wrap.)., Disp: 30 g, Rfl: 3    loratadine (CLARITIN) 10 MG tablet, Take 1 tablet by mouth Daily., Disp: 30 tablet, Rfl: 5    ondansetron (Zofran) 8 MG tablet, Take 1 tablet by mouth Every 8 (Eight) Hours As Needed for Nausea or Vomiting., Disp: 30 tablet, Rfl: 5    pantoprazole (PROTONIX) 40 MG EC tablet, Take 1 tablet by mouth Daily., Disp: 90 tablet, Rfl: 1    prochlorperazine (COMPAZINE) 5 MG tablet, Take 1 tablet by mouth Every 6 (Six) Hours As Needed for  "Nausea or Vomiting., Disp: 30 tablet, Rfl: 2    rosuvastatin (CRESTOR) 10 MG tablet, Take 1 tablet by mouth Daily., Disp: , Rfl:     Allergies:   No Known Allergies    Objective     Vital Signs:   Vitals:    08/31/23 0837   BP: 154/73   Pulse: 60   Resp: 16   Temp: 97.5 øF (36.4 øC)   TempSrc: Infrared   SpO2: 99%   Weight: 73.5 kg (162 lb)   Height: 162.6 cm (64.02\")   PainSc: 0-No pain    Body mass index is 27.79 kg/mý.   Pain Score    08/31/23 0837   PainSc: 0-No pain       ECOG Performance Status: 1    Physical Exam:   General: Well appearing male in NAD  HEENT: Normocephalic, atraumatic. Sclera anicteric.   Neck: supple, no palpable LAD. No axillary adenopathy  Cardiovascular: regular rate and rhythm. No murmurs.   Respiratory: Normal rate. Clear to auscultation bilaterally  Abdomen: Soft, nontender, non distended with normoactive bowel sounds. Ostomy LLQ with stoma that extends 7 cm from Superior to inferior. [place]. Good output.   Lymph: no supraclavicular or axillary adenopathy  Neuro: Alert and oriented x 3.   Ext: Symmetric, no swelling.   Skin: right port site  C/d/i    Laboratory/Imaging Reviewed:     Hospital Outpatient Visit on 08/31/2023   Component Date Value Ref Range Status    Glucose 08/31/2023 72  65 - 99 mg/dL Final    BUN 08/31/2023 24 (H)  8 - 23 mg/dL Final    Creatinine 08/31/2023 0.60 (L)  0.76 - 1.27 mg/dL Final    Sodium 08/31/2023 134 (L)  136 - 145 mmol/L Final    Potassium 08/31/2023 4.2  3.5 - 5.2 mmol/L Final    Chloride 08/31/2023 100  98 - 107 mmol/L Final    CO2 08/31/2023 29.0  22.0 - 29.0 mmol/L Final    Calcium 08/31/2023 9.0  8.6 - 10.5 mg/dL Final    Total Protein 08/31/2023 6.7  6.0 - 8.5 g/dL Final    Albumin 08/31/2023 3.8  3.5 - 5.2 g/dL Final    ALT (SGPT) 08/31/2023 15  1 - 41 U/L Final    AST (SGOT) 08/31/2023 14  1 - 40 U/L Final    Alkaline Phosphatase 08/31/2023 87  39 - 117 U/L Final    Total Bilirubin 08/31/2023 0.3  0.0 - 1.2 mg/dL Final    Globulin 08/31/2023 2.9  " gm/dL Final    Calculated Result    A/G Ratio 08/31/2023 1.3  g/dL Final    BUN/Creatinine Ratio 08/31/2023 40.0 (H)  7.0 - 25.0 Final    Anion Gap 08/31/2023 5.0  5.0 - 15.0 mmol/L Final    eGFR 08/31/2023 103.2  >60.0 mL/min/1.73 Final    WBC 08/31/2023 3.32 (L)  3.40 - 10.80 10*3/mm3 Final    RBC 08/31/2023 2.99 (L)  4.14 - 5.80 10*6/mm3 Final    Hemoglobin 08/31/2023 9.7 (L)  13.0 - 17.7 g/dL Final    Hematocrit 08/31/2023 30.0 (L)  37.5 - 51.0 % Final    MCV 08/31/2023 100.3 (H)  79.0 - 97.0 fL Final    MCH 08/31/2023 32.4  26.6 - 33.0 pg Final    MCHC 08/31/2023 32.3  31.5 - 35.7 g/dL Final    RDW 08/31/2023 18.9 (H)  12.3 - 15.4 % Final    RDW-SD 08/31/2023 70.4 (H)  37.0 - 54.0 fl Final    MPV 08/31/2023 8.9  6.0 - 12.0 fL Final    Platelets 08/31/2023 241  140 - 450 10*3/mm3 Final    Neutrophil % 08/31/2023 65.4  42.7 - 76.0 % Final    Lymphocyte % 08/31/2023 19.6  19.6 - 45.3 % Final    Monocyte % 08/31/2023 11.1  5.0 - 12.0 % Final    Eosinophil % 08/31/2023 2.1  0.3 - 6.2 % Final    Basophil % 08/31/2023 1.2  0.0 - 1.5 % Final    Immature Grans % 08/31/2023 0.6 (H)  0.0 - 0.5 % Final    Neutrophils, Absolute 08/31/2023 2.17  1.70 - 7.00 10*3/mm3 Final    Lymphocytes, Absolute 08/31/2023 0.65 (L)  0.70 - 3.10 10*3/mm3 Final    Monocytes, Absolute 08/31/2023 0.37  0.10 - 0.90 10*3/mm3 Final    Eosinophils, Absolute 08/31/2023 0.07  0.00 - 0.40 10*3/mm3 Final    Basophils, Absolute 08/31/2023 0.04  0.00 - 0.20 10*3/mm3 Final    Immature Grans, Absolute 08/31/2023 0.02  0.00 - 0.05 10*3/mm3 Final   Hospital Outpatient Visit on 08/24/2023   Component Date Value Ref Range Status    Glucose 08/24/2023 85  65 - 99 mg/dL Final    BUN 08/24/2023 17  8 - 23 mg/dL Final    Creatinine 08/24/2023 0.72 (L)  0.76 - 1.27 mg/dL Final    Sodium 08/24/2023 135 (L)  136 - 145 mmol/L Final    Potassium 08/24/2023 4.3  3.5 - 5.2 mmol/L Final    Chloride 08/24/2023 99  98 - 107 mmol/L Final    CO2 08/24/2023 29.0  22.0 - 29.0  mmol/L Final    Calcium 08/24/2023 9.1  8.6 - 10.5 mg/dL Final    Total Protein 08/24/2023 6.8  6.0 - 8.5 g/dL Final    Albumin 08/24/2023 3.9  3.5 - 5.2 g/dL Final    ALT (SGPT) 08/24/2023 13  1 - 41 U/L Final    AST (SGOT) 08/24/2023 16  1 - 40 U/L Final    Alkaline Phosphatase 08/24/2023 124 (H)  39 - 117 U/L Final    Total Bilirubin 08/24/2023 0.2  0.0 - 1.2 mg/dL Final    Globulin 08/24/2023 2.9  gm/dL Final    Calculated Result    A/G Ratio 08/24/2023 1.3  g/dL Final    BUN/Creatinine Ratio 08/24/2023 23.6  7.0 - 25.0 Final    Anion Gap 08/24/2023 7.0  5.0 - 15.0 mmol/L Final    eGFR 08/24/2023 97.7  >60.0 mL/min/1.73 Final   Hospital Outpatient Visit on 08/22/2023   Component Date Value Ref Range Status    Glucose 08/22/2023 103  70 - 130 mg/dL Final   Lab on 08/22/2023   Component Date Value Ref Range Status    WBC 08/22/2023 9.00  3.40 - 10.80 10*3/mm3 Final    RBC 08/22/2023 3.27 (L)  4.14 - 5.80 10*6/mm3 Final    Hemoglobin 08/22/2023 10.5 (L)  13.0 - 17.7 g/dL Final    Hematocrit 08/22/2023 32.4 (L)  37.5 - 51.0 % Final    MCV 08/22/2023 99.1 (H)  79.0 - 97.0 fL Final    MCH 08/22/2023 32.1  26.6 - 33.0 pg Final    MCHC 08/22/2023 32.4  31.5 - 35.7 g/dL Final    RDW 08/22/2023 21.1 (H)  12.3 - 15.4 % Final    RDW-SD 08/22/2023 75.5 (H)  37.0 - 54.0 fl Final    MPV 08/22/2023 9.9  6.0 - 12.0 fL Final    Platelets 08/22/2023 218  140 - 450 10*3/mm3 Final    Neutrophil % 08/22/2023 76.5 (H)  42.7 - 76.0 % Final    Lymphocyte % 08/22/2023 10.4 (L)  19.6 - 45.3 % Final    Monocyte % 08/22/2023 9.6  5.0 - 12.0 % Final    Eosinophil % 08/22/2023 1.4  0.3 - 6.2 % Final    Basophil % 08/22/2023 0.4  0.0 - 1.5 % Final    Immature Grans % 08/22/2023 1.7 (H)  0.0 - 0.5 % Final    Neutrophils, Absolute 08/22/2023 6.88  1.70 - 7.00 10*3/mm3 Final    Lymphocytes, Absolute 08/22/2023 0.94  0.70 - 3.10 10*3/mm3 Final    Monocytes, Absolute 08/22/2023 0.86  0.10 - 0.90 10*3/mm3 Final    Eosinophils, Absolute 08/22/2023  0.13  0.00 - 0.40 10*3/mm3 Final    Basophils, Absolute 08/22/2023 0.04  0.00 - 0.20 10*3/mm3 Final    Immature Grans, Absolute 08/22/2023 0.15 (H)  0.00 - 0.05 10*3/mm3 Final    nRBC 08/22/2023 0.0  0.0 - 0.2 /100 WBC Final    Anisocytosis 08/22/2023 Mod/2+  None Seen Final    Macrocytes 08/22/2023 Slight/1+  None Seen Final    Polychromasia 08/22/2023 Slight/1+  None Seen Final    WBC Morphology 08/22/2023 Normal  Normal Final    Platelet Morphology 08/22/2023 Normal  Normal Final       NM PET/CT Skull Base to Mid Thigh    Result Date: 8/23/2023  Narrative: NM PET/CT SKULL BASE TO MID THIGH Date of Exam: 8/22/2023 8:10 AM EDT Indication: hodgkin restaging, compare to prior PET. Comparison: 5/8/2023. Technique: The patient was injected with 12.8 mCi of F-18-FDG. Serum glucose the day of the study was 103 mg/DL. Whole body PET imaging was performed. Correlating noncontrast images were obtained. Fused images were obtained. Findings: Reference values: Mediastinum, SUV max 1.62; liver, SUV max 2.2. Hypermetabolic rectal mass now demonstrates SUV max of 2.56 as compared to 9.9 previously. There is otherwise normal distribution of radionuclide in the abdomen and pelvis. Previously noted mediastinal lymph nodes are no longer hypermetabolic. There are patchy somewhat groundglass appearing infiltrates in the bilateral upper lobes although improved as compared to the prior study. Left upper lobe infiltrates demonstrate an SUV max of 1.32. There is normal distribution of radionuclide in the neck.     Impression: Impression: Improvement in hypermetabolic activity of rectal mass. Improved lung infiltrates which are no longer significantly hypermetabolic above baseline. Previously noted mediastinal nodes also are non-hypermetabolic. No new abnormality identified. Electronically Signed: Araseli Mccormack MD  8/23/2023 8:31 AM EDT  Workstation ID: [id number]       Assessment / Plan      Assessment/Plan:   1.  CD30 positive classical  Hodgkin's lymphoma  2.  Residual PET avid rectal mass, enlarging on radiation planning CT with development of recurrent retroperitoneal adenopathy, biopsy confirms recurrent classic Hodgkin lymphoma   3.  Encounter for monitoring chemoimmunotherapy  -He completed 6 cycles of BV-AVD 3/23/2023.   -Cycle 6 was complicated by coronavirus infection and multifocal pneumonia.  Posttherapy PET showed persistent bilateral interstitial changes and mild adenopathy. Repeat chest CT shows continued improvement in the pulmonary infiltrates consistent with a reactive infectious etiology.  - He had clear response to first line thearpy with resolution of the hepatic lesions, marked improvement in the retroperitoneal adenopathy, and 50% reduction in the size of the rectal mass.  However he had persistent uptake in the rectal mass on posttreatment PET, [place] 5. We elected to proceed with biopsy to confirm whether he does have residual disease followed by consolidative radiotherapy to the rectal mass given initial bulky disease.    -In the interim, while awaiting biopsy, he presented with rectal bleeding, obstructive uropathy symptoms and pain and radiation planning CT showed significant interval growth in the rectal mass as well as development of new retroperitoneal adenopathy superior to the rectal mass compared to the his posttreatment PET confirming progressive disease. Consolidative radiation was converted to palliative course radiation given interval adenopathy development above the radiation field.   -Biopsy confirmed residual classic Hodgkin's lymphoma.  Because of development of new adenopathy outside of the radiation field as well as rapid regrowth of his mass, consolidative radiation plan was changed to palliative radiation plan to allow earlier completion for transition to second line chemotherapy.  -Given prior treatment with Brentuximab and national shortage of carbo/cis, I discussed BeGEV.  In the interim he underwent  stem cell transplant evaluation at . From review of their notes, they feel he is a borderline candidate for stem cell transplantation because of age 73.  However they did recommend avoiding Bendamustine in case of future stem cell collection.  He has also had a chest CT that showed resolution of the pulmonary interstitial infiltrates, making immunotherapy a more attractive option (previously had avoided because of active pulmonary infiltrates concerning for pneumonitis on his PET/CT results). Due to the national shortage of carboplatin and cis, we proceeded with Keytruda, vinorelbine, Gemzar, and Doxil.  He is here for consideration of cycle 4 day 8 of treatment. CBC/CMP reviewed and adequate to proceed.   -I have discussed with radiation oncology and he will proceed with completion of consolidative dose radiotherapy to the rectum.  -PET/CT after cycle 3 showed excellent treatment response with SUV 10-->2.5 in rectal mass and no other FDG avid adenopathy.  I have referred him back to  for reassessment regarding his candidacy for ASCT. If not a transplant candidate, I recommended extension of Keytruda/maintenance, and we will tentatively arrange the first treatment in 2 weeks.    4. Herniation at stoma site  -Discussed with CRS, can ultimately consider ostomy revision when treatment holiday appropriate    5.  H/o GI bleed  -PPI, continue daily.     6. Access  -Port      Follow Up:   2 weeks with possible Keytruda pending input from transplant team and input from radiation oncology regarding consolidative radiotherapy timing.    Kaycee Leary MD  Hematology and Oncology     I have spent a total of 40 min on the day of service including time before, during, and after the office visit on reviewing test results/preparing to see patient, counseling patient, performing medically appropriate exam, chemoimmunotherapy orders, coordinating care, and documenting clinical information in the electronic or other health  record

## 2023-09-01 ENCOUNTER — HOSPITAL ENCOUNTER (OUTPATIENT)
Dept: RADIATION ONCOLOGY | Facility: HOSPITAL | Age: 73
Discharge: HOME | End: 2023-09-01
Payer: MEDICARE

## 2023-09-01 ENCOUNTER — HOSPITAL ENCOUNTER (OUTPATIENT)
Dept: RADIATION ONCOLOGY | Facility: HOSPITAL | Age: 73
Setting detail: RADIATION/ONCOLOGY SERIES
Discharge: HOME | End: 2023-09-01
Payer: MEDICARE

## 2023-09-01 ENCOUNTER — OFFICE VISIT (OUTPATIENT)
Dept: RADIATION ONCOLOGY | Facility: HOSPITAL | Age: 73
End: 2023-09-01
Payer: MEDICARE

## 2023-09-01 ENCOUNTER — HOSPITAL ENCOUNTER (OUTPATIENT)
Dept: ONCOLOGY | Facility: HOSPITAL | Age: 73
Discharge: HOME | End: 2023-09-01
Payer: MEDICARE

## 2023-09-01 VITALS
TEMPERATURE: 98.3 F | HEART RATE: 66 BPM | RESPIRATION RATE: 16 BRPM | BODY MASS INDEX: 28.15 KG/M2 | DIASTOLIC BLOOD PRESSURE: 62 MMHG | SYSTOLIC BLOOD PRESSURE: 130 MMHG | WEIGHT: 164.1 LBS | OXYGEN SATURATION: 98 %

## 2023-09-01 VITALS — HEIGHT: 64 IN | BODY MASS INDEX: 28.15 KG/M2

## 2023-09-01 DIAGNOSIS — C81.98 HODGKIN LYMPHOMA OF LYMPH NODES OF MULTIPLE REGIONS, UNSPECIFIED HODGKIN LYMPHOMA TYPE: Primary | ICD-10-CM

## 2023-09-01 PROCEDURE — 96372 THER/PROPH/DIAG INJ SC/IM: CPT

## 2023-09-01 PROCEDURE — G0463 HOSPITAL OUTPT CLINIC VISIT: HCPCS

## 2023-09-01 PROCEDURE — 25010000002 PEGFILGRASTIM-CBQV 6 MG/0.6ML SOLUTION PREFILLED SYRINGE: Performed by: INTERNAL MEDICINE

## 2023-09-01 PROCEDURE — 77470 SPECIAL RADIATION TREATMENT: CPT | Performed by: RADIOLOGY

## 2023-09-01 RX ADMIN — PEGFILGRASTIM-CBQV 6 MG: 6 INJECTION, SOLUTION SUBCUTANEOUS at 14:28

## 2023-09-01 NOTE — PROGRESS NOTES
FOLLOW UP NOTE    PATIENT:                                                      Abraham Wu  MEDICAL RECORD #:                        [id number]  :                                                          1950  COMPLETION DATE:   2023  DIAGNOSIS:     Hodgkins lymphoma  - Stage IV        BRIEF HISTORY:  Abraham Wu is a very pleasant 73 y.o. male with a known diagnosis of refractory Hodgkin's lymphoma.  The patient was initially diagnosed in 2022 when he presented with intractable diarrhea, anorexia, weight loss, and intermittent fevers.  He was found on CT scans to have a large obstructive rectal mass with adjacent adenopathy, bulky retroperitoneal adenopathy, and findings concerning for left renal and liver metastases.  There was a small right pleural effusion with nodularity.  He underwent diverting colostomy, with rectal biopsies consistent with classic Hodgkin's lymphoma.  The patient initiated chemotherapy with brentuximab-AVD, completing 6 cycles on 3/23/2023.  Repeat PET/CT scan 2023 showed some persistent bilateral interstitial changes and a few new hypermetabolic mediastinal lymph nodes, felt to be potentially reactive given a recent coronavirus infection and multifocal pneumonia.  Imaging also revealed resolution of the hepatic lesions, marked improvement in the retroperitoneal adenopathy, and decreased size of his rectal mass consistent with interval response to treatment.  Given some persistent uptake in the rectal mass concerning for residual disease, the patient was scheduled for outpatient biopsy and subsequent consolidative radiotherapy to the rectal mass.    Unfortunately, while awaiting biopsy, the patient was found on CT simulation for radiation treatment planning to have significant interval growth in the rectal mass as well as development of new retroperitoneal adenopathy superior to the rectal mass consistent with progressive disease.  The patient was having  rectal bleeding, pain, and obstructive uropathy symptoms at that time.  The patient underwent transanal biopsy on 5/30/2023 with Dr. Viveros.  Pathology once again revealed classic Hodgkin's lymphoma.  Because of development of new adenopathy outside of the radiation field as well as rapid regrowth of his mass, consolidative radiation therapy was changed to palliative radiation to allow for earlier transition to second line chemotherapy.  The patient received a quad shot palliatively to the rectum, consisting of 14 Gray in 4 fractions delivered over a 2-day period.  He completed these treatments 6/1/2023.    The patient was continued on chemotherapy.  He had a PET scan that showed interval good response.  The patient continues to do well and continues to work.  The patient was referred back to our clinic to consider consolidative radiotherapy treatments.    The patient reports that he is a still working and still very active.    Past Medical History:   Diagnosis Date    benign polypoid tissue right lung     Cancer     HODGKINS LYMPHOMA, RECTAL CANCER    Diabetes mellitus     Hyperlipidemia     Hypertension     Lymphoma     Mycobacterium mucogenicum     SHERRI (obstructive sleep apnea)     O2 2L/MIN AT NIGHT ONLY    Pneumonia     2023    Seasonal allergies      Past Surgical History:   Procedure Laterality Date    BRONCHOSCOPY      removal of obstructing polypoid tissue right middle lung    COLOSTOMY N/A 09/22/2022    Procedure: LAPAROSCOPIC COLOSTOMY CREATION, FLEXIBLE SIGMOIDOSCOPY;  Surgeon: Hiram Viveros MD;  Location: [facility];  Service: General;  Laterality: N/A;    DENTAL PROCEDURE      ENDOSCOPY N/A 10/10/2022    Procedure: ESOPHAGOGASTRODUODENOSCOPY;  Surgeon: Neeraj Lynn MD;  Location:  [facility];  Service: Gastroenterology;  Laterality: N/A;    ENDOSCOPY N/A 10/12/2022    Procedure: ESOPHAGOGASTRODUODENOSCOPY;  Surgeon: Brunner, Mark I, MD;  Location:  [facility];  Service:  Gastroenterology;  Laterality: N/A;    EXAM UNDER ANESTHESIA, RECTAL BIOPSY N/A 10/04/2022    Procedure: EXAM UNDER ANESTHESIA, TRANSANAL BIOPSY WITH TRUCUT NEEDLE (LITHOTOMY-CANDY CANE);  Surgeon: Hiram Viveros MD;  Location:  [facility];  Service: General;  Laterality: N/A;    EXAM UNDER ANESTHESIA, RECTAL BIOPSY N/A 5/30/2023    Procedure: EXAM UNDER ANESTHESIA, TRANSANAL BIOPSY OF RECTAL MASS WITH TRUCUT NEEDLE, PROCTOSCOPY;  Surgeon: Hiram Viveros MD;  Location:  [facility];  Service: General;  Laterality: N/A;    PERIPHERALLY INSERTED CENTRAL CATHETER INSERTION      Removed 11/28/2022    VENOUS ACCESS DEVICE (PORT) INSERTION Right 11/28/2022       Breast Cancer-related family history is not on file.  Social History     Socioeconomic History    Marital status:    Tobacco Use    Smoking status: Never    Smokeless tobacco: Never   Vaping Use    Vaping Use: Never used   Substance and Sexual Activity    Alcohol use: Not Currently     Comment: previously drank 2 glasses of wine/beer nightly    Drug use: Never    Sexual activity: Defer       MEDICATIONS:     Current Outpatient Medications:     fluticasone (FLONASE) 50 MCG/ACT nasal spray, 1 spray into the nostril(s) as directed by provider Daily As Needed for Allergies or Rhinitis. OTC, Disp: , Rfl:     lidocaine-prilocaine (EMLA) 2.5-2.5 % cream, Apply 1 application topically to the appropriate area as directed As Needed (45-60 minutes prior to port access.  Cover with saran/plastic wrap.)., Disp: 30 g, Rfl: 3    loratadine (CLARITIN) 10 MG tablet, Take 1 tablet by mouth Daily., Disp: 30 tablet, Rfl: 5    ondansetron (Zofran) 8 MG tablet, Take 1 tablet by mouth Every 8 (Eight) Hours As Needed for Nausea or Vomiting., Disp: 30 tablet, Rfl: 5    pantoprazole (PROTONIX) 40 MG EC tablet, Take 1 tablet by mouth Daily., Disp: 90 tablet, Rfl: 1    prochlorperazine (COMPAZINE) 5 MG tablet, Take 1 tablet by mouth Every 6 (Six) Hours As Needed for Nausea or  Vomiting., Disp: 30 tablet, Rfl: 2    rosuvastatin (CRESTOR) 10 MG tablet, Take 1 tablet by mouth Daily., Disp: , Rfl:   No current facility-administered medications for this visit.    Review of Systems   Gastrointestinal:         LLQ ostomy  + Reddish-brown mucus-like discharge from the rectum   All other systems reviewed and are negative.    KPS 80%          Physical Exam  Vitals and nursing note reviewed.   Constitutional:       General: He is not in acute distress.     Appearance: He is well-developed.   HENT:      Head: Normocephalic and atraumatic.   Eyes:      Conjunctiva/sclera: Conjunctivae normal.      Pupils: Pupils are equal, round, and reactive to light.   Neck:      Trachea: No tracheal deviation.      Comments: No obviously enlarged cervical or supraclavicular LAD.  Cardiovascular:      Comments: Patient well perfused. Non cyanotic. No prominent JVD. No pedal edema  Pulmonary:      Effort: Pulmonary effort is normal. No respiratory distress.      Breath sounds: Normal breath sounds. No stridor. No wheezing.   Abdominal:      General: There is no distension.      Palpations: Abdomen is soft.   Musculoskeletal:         General: Normal range of motion.      Cervical back: Normal range of motion and neck supple.      Comments: Patient moves all extremities spontaneously.    Skin:     General: Skin is warm and dry.   Neurological:      Mental Status: He is alert and oriented to person, place, and time.      Cranial Nerves: No cranial nerve deficit.      Coordination: Coordination normal.      Comments: Coordination intact.   Psychiatric:         Behavior: Behavior normal.         Thought Content: Thought content normal.         Judgment: Judgment normal.         The following portions of the patient's history were reviewed and updated as appropriate: allergies, current medications, past family history, past medical history, past social history, past surgical history and problem list.  I have personally  requested reviewed and interpreted the patient's images and radiology reports and pathology reports listed below:  CT Chest Without Contrast Diagnostic    Result Date: 6/5/2023  Impression: Interval improvement in previously noted bilateral areas of peribronchial groundglass opacity, most consistent with improving pneumonia. These findings are not yet resolved and continued follow-up imaging is advised. There are no distinct new or enlarging suspicious nodules. Electronically Signed: Damion Mckee  6/5/2023 6:08 AM EDT  Workstation ID: [id number]    NM PET/CT Skull Base to Mid Thigh    Result Date: 8/23/2023  Impression: Improvement in hypermetabolic activity of rectal mass. Improved lung infiltrates which are no longer significantly hypermetabolic above baseline. Previously noted mediastinal nodes also are non-hypermetabolic. No new abnormality identified. Electronically Signed: Araseli Mccormack MD  8/23/2023 8:31 AM EDT  Workstation ID: [id number]    NM PET/CT Skull Base to Mid Thigh    Result Date: 5/8/2023  Impression: Redemonstration of hypermetabolic rectal mass. Per electronic medical record, this was previously biopsied with results compatible with Hodgkin's lymphoma involvement. Deauville 5. Extensive centrilobular and subpleural groundglass and reticular densities in the bilateral lungs demonstrating low-level FDG avidity, compatible with infection/COVID-19 pneumonia as seen on prior chest x-ray from 4/10/2023. A few newly hypermetabolic mediastinal lymph nodes are presumably reactive to this process although technically indeterminant in the setting of hematologic malignancy; attention on follow-up. Electronically Signed: Santino Kaur  5/8/2023 11:46 AM EDT  Workstation ID: [id number]    XR Chest PA & Lateral    Result Date: 5/22/2023  Impression: Interval decrease in bilateral lung opacities compared to the most recent prior chest radiograph. There is some residual interstitial and airspace opacities. Findings  suggest improving pneumonia with possible residual infectious/inflammatory changes and scarring. Continued follow-up is recommended. Electronically Signed: Ketan Luís  5/22/2023 4:44 PM EDT  Workstation ID: [id number]            There are no diagnoses linked to this encounter.       IMPRESSION:  Abraham Wu is a 73 y.o. gentleman with recent diagnosis of stage IVB Hodgkin's lymphoma.  Restaging PET/CT scan following 6 cycles of brentuximab-AVD showed a good partial response to treatment despite residual disease in the rectum that was PET avid.    The patient initially underwent a quad shot when he have recurred.  He was then started on chemotherapy.  He has since had a good response.  The patient does still have some CT evidence of thickening of his rectum and did have very bulky disease.  We discussed options for treatment today.  Given his previous radiation I recommended dose of 24 Gray in 8 fractions.  The patient also need to continue on with chemotherapy and is being worked up for potential transplant.  The patient presents today for consultation and planning.    Recommend IGR T and I reduce the patient has had had previous radiation treatments.    Special treatment procedure Note: Re-irradiation    The patient has previously received radiation to this or an abutting site.  The previous radiation plans were reviewed and were utilized to create a new plan.  This adds complexity and requires additional time and effort.      Greater than 1 hour was spent preparing for and coordinating this visit. >50% of the time was spent in direct face to face conversation with the patient teaching, answering question, and providing explanations regarding the patient's case.  The decision to treat the patient with radiation is a complex one and carries the risk of long-term side effects and complications.  The patient's malignancy represents a complicated life threatening condition that requires complex multidisciplinary  management for treatment and followup.      RECOMMENDATIONS:    Refractory Hodgkin's lymphoma of the rectum  -Status post diversion  -Status post chemotherapy brentuximab-AVD   -Initiated rapid palliative radiotherapy with quad shot to rectum, 14 Gray in 4 fractions   -Completed 6/1/2023  -Clinical improvement  -Was seen by Dr. Pruitt at  for consideration of stem cell transplant, ultimately considered borderline candidate due to age with recommendations for salvage chemoimmunotherapy  -Receiving Keytruda, Navelbine, Gemzar, and Doxil with Dr. Leary  -Repeat PET/CT scan showed good response  -We will consolidate the patient's initially bulky site of disease with 24 Gray in 8 fractions.  -I recommend IMRT/IGR T      No follow-ups on file.    Tim Alcocer MD

## 2023-09-11 PROCEDURE — 77338 DESIGN MLC DEVICE FOR IMRT: CPT | Performed by: RADIOLOGY

## 2023-09-11 PROCEDURE — 77300 RADIATION THERAPY DOSE PLAN: CPT | Performed by: RADIOLOGY

## 2023-09-11 PROCEDURE — 77301 RADIOTHERAPY DOSE PLAN IMRT: CPT | Performed by: RADIOLOGY

## 2023-09-14 ENCOUNTER — HOSPITAL ENCOUNTER (OUTPATIENT)
Dept: ONCOLOGY | Facility: HOSPITAL | Age: 73
Discharge: HOME | End: 2023-09-14
Payer: MEDICARE

## 2023-09-14 ENCOUNTER — OFFICE VISIT (OUTPATIENT)
Dept: ONCOLOGY | Facility: CLINIC | Age: 73
End: 2023-09-14
Payer: MEDICARE

## 2023-09-14 VITALS
WEIGHT: 164 LBS | SYSTOLIC BLOOD PRESSURE: 138 MMHG | RESPIRATION RATE: 18 BRPM | HEIGHT: 64 IN | TEMPERATURE: 97.5 F | BODY MASS INDEX: 28 KG/M2 | DIASTOLIC BLOOD PRESSURE: 75 MMHG | HEART RATE: 63 BPM | OXYGEN SATURATION: 98 %

## 2023-09-14 DIAGNOSIS — Z51.11 CHEMOTHERAPY MANAGEMENT, ENCOUNTER FOR: ICD-10-CM

## 2023-09-14 DIAGNOSIS — C81.98 HODGKIN LYMPHOMA OF LYMPH NODES OF MULTIPLE REGIONS, UNSPECIFIED HODGKIN LYMPHOMA TYPE: Primary | ICD-10-CM

## 2023-09-14 DIAGNOSIS — Z45.2 ENCOUNTER FOR CARE RELATED TO VASCULAR ACCESS PORT: Primary | ICD-10-CM

## 2023-09-14 DIAGNOSIS — C81.98 HODGKIN LYMPHOMA OF LYMPH NODES OF MULTIPLE REGIONS, UNSPECIFIED HODGKIN LYMPHOMA TYPE: ICD-10-CM

## 2023-09-14 DIAGNOSIS — K92.2 GASTROINTESTINAL HEMORRHAGE, UNSPECIFIED GASTROINTESTINAL HEMORRHAGE TYPE: ICD-10-CM

## 2023-09-14 LAB
ALBUMIN SERPL-MCNC: 3.9 G/DL (ref 3.5–5.2)
ALBUMIN/GLOB SERPL: 1.4 G/DL
ALP SERPL-CCNC: 130 U/L (ref 39–117)
ALT SERPL W P-5'-P-CCNC: 10 U/L (ref 1–41)
ANION GAP SERPL CALCULATED.3IONS-SCNC: 10 MMOL/L (ref 5–15)
AST SERPL-CCNC: 15 U/L (ref 1–40)
BASOPHILS # BLD AUTO: 0.05 10*3/MM3 (ref 0–0.2)
BASOPHILS NFR BLD AUTO: 0.5 % (ref 0–1.5)
BILIRUB SERPL-MCNC: 0.3 MG/DL (ref 0–1.2)
BUN SERPL-MCNC: 18 MG/DL (ref 8–23)
BUN/CREAT SERPL: 26.1 (ref 7–25)
CALCIUM SPEC-SCNC: 9.2 MG/DL (ref 8.6–10.5)
CHLORIDE SERPL-SCNC: 101 MMOL/L (ref 98–107)
CO2 SERPL-SCNC: 26 MMOL/L (ref 22–29)
CREAT SERPL-MCNC: 0.69 MG/DL (ref 0.76–1.27)
DEPRECATED RDW RBC AUTO: 76.7 FL (ref 37–54)
EGFRCR SERPLBLD CKD-EPI 2021: 98.9 ML/MIN/1.73
EOSINOPHIL # BLD AUTO: 0.2 10*3/MM3 (ref 0–0.4)
EOSINOPHIL NFR BLD AUTO: 2 % (ref 0.3–6.2)
ERYTHROCYTE [DISTWIDTH] IN BLOOD BY AUTOMATED COUNT: 19.4 % (ref 12.3–15.4)
GLOBULIN UR ELPH-MCNC: 2.8 GM/DL
GLUCOSE SERPL-MCNC: 89 MG/DL (ref 65–99)
HCT VFR BLD AUTO: 31.6 % (ref 37.5–51)
HGB BLD-MCNC: 10.1 G/DL (ref 13–17.7)
IMM GRANULOCYTES # BLD AUTO: 0.16 10*3/MM3 (ref 0–0.05)
IMM GRANULOCYTES NFR BLD AUTO: 1.6 % (ref 0–0.5)
LYMPHOCYTES # BLD AUTO: 1.01 10*3/MM3 (ref 0.7–3.1)
LYMPHOCYTES NFR BLD AUTO: 10.1 % (ref 19.6–45.3)
MCH RBC QN AUTO: 33.7 PG (ref 26.6–33)
MCHC RBC AUTO-ENTMCNC: 32 G/DL (ref 31.5–35.7)
MCV RBC AUTO: 105.3 FL (ref 79–97)
MONOCYTES # BLD AUTO: 1.1 10*3/MM3 (ref 0.1–0.9)
MONOCYTES NFR BLD AUTO: 11 % (ref 5–12)
NEUTROPHILS NFR BLD AUTO: 7.45 10*3/MM3 (ref 1.7–7)
NEUTROPHILS NFR BLD AUTO: 74.8 % (ref 42.7–76)
PLATELET # BLD AUTO: 229 10*3/MM3 (ref 140–450)
PMV BLD AUTO: 9.5 FL (ref 6–12)
POTASSIUM SERPL-SCNC: 4.5 MMOL/L (ref 3.5–5.2)
PROT SERPL-MCNC: 6.7 G/DL (ref 6–8.5)
RBC # BLD AUTO: 3 10*6/MM3 (ref 4.14–5.8)
SODIUM SERPL-SCNC: 137 MMOL/L (ref 136–145)
T4 FREE SERPL-MCNC: 1.11 NG/DL (ref 0.93–1.7)
TSH SERPL DL<=0.05 MIU/L-ACNC: 2.27 UIU/ML (ref 0.27–4.2)
WBC NRBC COR # BLD: 9.97 10*3/MM3 (ref 3.4–10.8)

## 2023-09-14 PROCEDURE — 85025 COMPLETE CBC W/AUTO DIFF WBC: CPT | Performed by: INTERNAL MEDICINE

## 2023-09-14 PROCEDURE — 25010000002 HEPARIN LOCK FLUSH PER 10 UNITS: Performed by: INTERNAL MEDICINE

## 2023-09-14 PROCEDURE — 84443 ASSAY THYROID STIM HORMONE: CPT | Performed by: INTERNAL MEDICINE

## 2023-09-14 PROCEDURE — 80053 COMPREHEN METABOLIC PANEL: CPT | Performed by: INTERNAL MEDICINE

## 2023-09-14 PROCEDURE — 96413 CHEMO IV INFUSION 1 HR: CPT

## 2023-09-14 PROCEDURE — 25010000002 PEMBROLIZUMAB 100 MG/4ML SOLUTION 4 ML VIAL: Performed by: INTERNAL MEDICINE

## 2023-09-14 PROCEDURE — 84439 ASSAY OF FREE THYROXINE: CPT | Performed by: INTERNAL MEDICINE

## 2023-09-14 RX ORDER — SODIUM CHLORIDE 9 MG/ML
250 INJECTION, SOLUTION INTRAVENOUS ONCE
Status: DISCONTINUED | OUTPATIENT
Start: 2023-09-14 | End: 2023-09-15 | Stop reason: HOSPADM

## 2023-09-14 RX ORDER — HEPARIN SODIUM (PORCINE) LOCK FLUSH IV SOLN 100 UNIT/ML 100 UNIT/ML
500 SOLUTION INTRAVENOUS AS NEEDED
OUTPATIENT
Start: 2023-09-14

## 2023-09-14 RX ORDER — HEPARIN SODIUM (PORCINE) LOCK FLUSH IV SOLN 100 UNIT/ML 100 UNIT/ML
500 SOLUTION INTRAVENOUS AS NEEDED
Status: DISCONTINUED | OUTPATIENT
Start: 2023-09-14 | End: 2023-09-15 | Stop reason: HOSPADM

## 2023-09-14 RX ORDER — SODIUM CHLORIDE 9 MG/ML
250 INJECTION, SOLUTION INTRAVENOUS ONCE
Status: CANCELLED | OUTPATIENT
Start: 2023-09-14

## 2023-09-14 RX ADMIN — SODIUM CHLORIDE 200 MG: 9 INJECTION, SOLUTION INTRAVENOUS at 10:22

## 2023-09-14 RX ADMIN — HEPARIN 500 UNITS: 100 SYRINGE at 11:04

## 2023-09-14 NOTE — PROGRESS NOTES
Hematology and Oncology [facility]  Office number [phone]    Fax number [phone]     Follow up     Date: 23    Patient Name: Abraham Wu  MRN: [id number]  : 1950      Chief Complaint: Hodgkin Lymphoma follow up/treatment    Surgeon: Dr. Hiram Viveros MD    Cancer Staging: IV    History of Present Illness: Abraham Wu is a pleasant 73 y.o. male with PMH of hypertension, sleep apnea, hard of hearing who presents today for follow up of Hodgkin Lymphoma.     He initially presented 2022 with intractable diarrhea, low appetite, and a greater than 50 pound weight loss over several month period associated with intermittent fevers.  CT of the chest abdomen pelvis obtained in the ER shows of rectal mass spanning greater than 12 cm with adjacent adenopathy, bulky RP adenopathy, and findings concerning for left renal and liver metastases.  Small right pleural effusion with nodularity.  There was concern for impending obstruction, so he underwent diverting colostomy and biopsy on 2022.  Biopsies from the peritoneam showed a fibrotic nodule histiocytes and eosinophils admixed with CD30 positive large cells.  Rectal biopsies showed involvement by CD30 positive lymphoma, classic Hodgkin lymphoma versus CD30 positive T-cell or B-cell lymphoma.  There was extensive fibrosis and crush artifact.  He underwent repeat transrectal biopsy 10/4/2022 for further characterization which was felt to be most consistent with classical Hodgkin lymphoma.    He initiated chemotherapy with Brentuximab-AVD as an inpatient on 10/8/2022.  Cycle #1 was complicated by GI bleed requiring multiple blood transfusion and ultimately coil artery embolization 10/12; neutropenic fever, Candida esophagitis and mucositis.  He had a prolonged ICU stay  And required enteral feeding.  He was discharged 10/20/2022.    Following his last cycle he was admitted with multifocal PNA and +corona virus  infection.    Treatment history:  Brentuximab-AVD: C1 10/8/22  C2: 11/1/22 onward with DA 20% in BVD; full dose brentuximab  Completed C6 3/22/23  Palliative radiation to rectum 5/30/2023  GVD+Pembro x 4 cycles: to complete 8/30/2023    Interval history:    Mild bleeding from stoma overnight, resolved spontaneously.   Flaky skin lesion right scalp x 1 month, not resolving. Has established dermatologist. Has not gone to see him about this problem  Did not call [facility] to reschedule appt with transplant team yet  Has been in contact with radiation oncology, awaiting start date for consolidation.     Neuropathy the in his hands and feet remain improved.  No poorly controlled nausea  Intermittent mild ostomy diarrhea. Not uncontrolled not needing medication.     Past Medical History:   Past Medical History:   Diagnosis Date    benign polypoid tissue right lung     Cancer     HODGKINS LYMPHOMA, RECTAL CANCER    Diabetes mellitus     Hyperlipidemia     Hypertension     Lymphoma     Mycobacterium mucogenicum     SHERRI (obstructive sleep apnea)     O2 2L/MIN AT NIGHT ONLY    Pneumonia     2023    Seasonal allergies        Past Surgical History:   Past Surgical History:   Procedure Laterality Date    BRONCHOSCOPY      removal of obstructing polypoid tissue right middle lung    COLOSTOMY N/A 09/22/2022    Procedure: LAPAROSCOPIC COLOSTOMY CREATION, FLEXIBLE SIGMOIDOSCOPY;  Surgeon: Hiram Viveros MD;  Location: [facility];  Service: General;  Laterality: N/A;    DENTAL PROCEDURE      ENDOSCOPY N/A 10/10/2022    Procedure: ESOPHAGOGASTRODUODENOSCOPY;  Surgeon: Neeraj Lynn MD;  Location:  [facility];  Service: Gastroenterology;  Laterality: N/A;    ENDOSCOPY N/A 10/12/2022    Procedure: ESOPHAGOGASTRODUODENOSCOPY;  Surgeon: Brunner, Mark I, MD;  Location:  [facility];  Service: Gastroenterology;  Laterality: N/A;    EXAM UNDER ANESTHESIA, RECTAL BIOPSY N/A 10/04/2022    Procedure: EXAM UNDER ANESTHESIA, TRANSANAL  BIOPSY WITH TRUCUT NEEDLE (LITHOTOMY-CANDY CANE);  Surgeon: Hiram Viveros MD;  Location:  [facility] OR;  Service: General;  Laterality: N/A;    EXAM UNDER ANESTHESIA, RECTAL BIOPSY N/A 5/30/2023    Procedure: EXAM UNDER ANESTHESIA, TRANSANAL BIOPSY OF RECTAL MASS WITH TRUCUT NEEDLE, PROCTOSCOPY;  Surgeon: Hiram Viveros MD;  Location:  [facility] OR;  Service: General;  Laterality: N/A;    PERIPHERALLY INSERTED CENTRAL CATHETER INSERTION      Removed 11/28/2022    VENOUS ACCESS DEVICE (PORT) INSERTION Right 11/28/2022       Family History:   Family History   Problem Relation Age of Onset    Diabetes Mother     Diabetes Father     Heart disease Father        Social History:   Social History     Socioeconomic History    Marital status:    Tobacco Use    Smoking status: Never    Smokeless tobacco: Never   Vaping Use    Vaping Use: Never used   Substance and Sexual Activity    Alcohol use: Not Currently     Comment: previously drank 2 glasses of wine/beer nightly    Drug use: Never    Sexual activity: Defer       Medications:     Current Outpatient Medications:     fluticasone (FLONASE) 50 MCG/ACT nasal spray, 1 spray into the nostril(s) as directed by provider Daily As Needed for Allergies or Rhinitis. OTC, Disp: , Rfl:     lidocaine-prilocaine (EMLA) 2.5-2.5 % cream, Apply 1 application topically to the appropriate area as directed As Needed (45-60 minutes prior to port access.  Cover with saran/plastic wrap.)., Disp: 30 g, Rfl: 3    loratadine (CLARITIN) 10 MG tablet, Take 1 tablet by mouth Daily., Disp: 30 tablet, Rfl: 5    ondansetron (Zofran) 8 MG tablet, Take 1 tablet by mouth Every 8 (Eight) Hours As Needed for Nausea or Vomiting., Disp: 30 tablet, Rfl: 5    pantoprazole (PROTONIX) 40 MG EC tablet, Take 1 tablet by mouth Daily., Disp: 90 tablet, Rfl: 1    prochlorperazine (COMPAZINE) 5 MG tablet, Take 1 tablet by mouth Every 6 (Six) Hours As Needed for Nausea or Vomiting., Disp: 30 tablet, Rfl: 2     "rosuvastatin (CRESTOR) 10 MG tablet, Take 1 tablet by mouth Daily., Disp: , Rfl:     Allergies:   No Known Allergies    Objective     Vital Signs:   Vitals:    09/14/23 0851   BP: 138/75   Pulse: 63   Resp: 18   Temp: 97.5 °F (36.4 °C)   TempSrc: Infrared   SpO2: 98%   Weight: 74.4 kg (164 lb)   Height: 162.6 cm (64.02\")   PainSc: 0-No pain    Body mass index is 28.14 kg/m².   Pain Score    09/14/23 0851   PainSc: 0-No pain       ECOG Performance Status: 1    Physical Exam:   General: Well appearing male in NAD  HEENT: Normocephalic, atraumatic. Sclera anicteric.   Neck: supple, no palpable LAD. No axillary adenopathy  Cardiovascular: regular rate and rhythm. No murmurs.   Respiratory: Normal rate. Clear to auscultation bilaterally  Abdomen: Soft, nontender, non distended with normoactive bowel sounds. Ostomy LLQ with stoma that extends 7 cm from Superior to inferior. Melissa. Good output.   Lymph: no supraclavicular or axillary adenopathy  Neuro: Alert and oriented x 3.   Ext: Symmetric, no swelling.   Skin: right port site  C/d/i    Laboratory/Imaging Reviewed:     Hospital Outpatient Visit on 09/14/2023   Component Date Value Ref Range Status    Glucose 09/14/2023 89  65 - 99 mg/dL Final    BUN 09/14/2023 18  8 - 23 mg/dL Final    Creatinine 09/14/2023 0.69 (L)  0.76 - 1.27 mg/dL Final    Sodium 09/14/2023 137  136 - 145 mmol/L Final    Potassium 09/14/2023 4.5  3.5 - 5.2 mmol/L Final    Slight hemolysis detected by analyzer. Results may be affected.    Chloride 09/14/2023 101  98 - 107 mmol/L Final    CO2 09/14/2023 26.0  22.0 - 29.0 mmol/L Final    Calcium 09/14/2023 9.2  8.6 - 10.5 mg/dL Final    Total Protein 09/14/2023 6.7  6.0 - 8.5 g/dL Final    Albumin 09/14/2023 3.9  3.5 - 5.2 g/dL Final    ALT (SGPT) 09/14/2023 10  1 - 41 U/L Final    AST (SGOT) 09/14/2023 15  1 - 40 U/L Final    Alkaline Phosphatase 09/14/2023 130 (H)  39 - 117 U/L Final    Total Bilirubin 09/14/2023 0.3  0.0 - 1.2 mg/dL Final    Globulin " 09/14/2023 2.8  gm/dL Final    Calculated Result    A/G Ratio 09/14/2023 1.4  g/dL Final    BUN/Creatinine Ratio 09/14/2023 26.1 (H)  7.0 - 25.0 Final    Anion Gap 09/14/2023 10.0  5.0 - 15.0 mmol/L Final    eGFR 09/14/2023 98.9  >60.0 mL/min/1.73 Final    TSH 09/14/2023 2.270  0.270 - 4.200 uIU/mL Final    Free T4 09/14/2023 1.11  0.93 - 1.70 ng/dL Final    WBC 09/14/2023 9.97  3.40 - 10.80 10*3/mm3 Final    RBC 09/14/2023 3.00 (L)  4.14 - 5.80 10*6/mm3 Final    Hemoglobin 09/14/2023 10.1 (L)  13.0 - 17.7 g/dL Final    Hematocrit 09/14/2023 31.6 (L)  37.5 - 51.0 % Final    MCV 09/14/2023 105.3 (H)  79.0 - 97.0 fL Final    MCH 09/14/2023 33.7 (H)  26.6 - 33.0 pg Final    MCHC 09/14/2023 32.0  31.5 - 35.7 g/dL Final    RDW 09/14/2023 19.4 (H)  12.3 - 15.4 % Final    RDW-SD 09/14/2023 76.7 (H)  37.0 - 54.0 fl Final    MPV 09/14/2023 9.5  6.0 - 12.0 fL Final    Platelets 09/14/2023 229  140 - 450 10*3/mm3 Final    Neutrophil % 09/14/2023 74.8  42.7 - 76.0 % Final    Lymphocyte % 09/14/2023 10.1 (L)  19.6 - 45.3 % Final    Monocyte % 09/14/2023 11.0  5.0 - 12.0 % Final    Eosinophil % 09/14/2023 2.0  0.3 - 6.2 % Final    Basophil % 09/14/2023 0.5  0.0 - 1.5 % Final    Immature Grans % 09/14/2023 1.6 (H)  0.0 - 0.5 % Final    Neutrophils, Absolute 09/14/2023 7.45 (H)  1.70 - 7.00 10*3/mm3 Final    Lymphocytes, Absolute 09/14/2023 1.01  0.70 - 3.10 10*3/mm3 Final    Monocytes, Absolute 09/14/2023 1.10 (H)  0.10 - 0.90 10*3/mm3 Final    Eosinophils, Absolute 09/14/2023 0.20  0.00 - 0.40 10*3/mm3 Final    Basophils, Absolute 09/14/2023 0.05  0.00 - 0.20 10*3/mm3 Final    Immature Grans, Absolute 09/14/2023 0.16 (H)  0.00 - 0.05 10*3/mm3 Final       NM PET/CT Skull Base to Mid Thigh    Result Date: 8/23/2023  Narrative: NM PET/CT SKULL BASE TO MID THIGH Date of Exam: 8/22/2023 8:10 AM EDT Indication: hodgkin restaging, compare to prior PET. Comparison: 5/8/2023. Technique: The patient was injected with 12.8 mCi of F-18-FDG.  Serum glucose the day of the study was 103 mg/DL. Whole body PET imaging was performed. Correlating noncontrast images were obtained. Fused images were obtained. Findings: Reference values: Mediastinum, SUV max 1.62; liver, SUV max 2.2. Hypermetabolic rectal mass now demonstrates SUV max of 2.56 as compared to 9.9 previously. There is otherwise normal distribution of radionuclide in the abdomen and pelvis. Previously noted mediastinal lymph nodes are no longer hypermetabolic. There are patchy somewhat groundglass appearing infiltrates in the bilateral upper lobes although improved as compared to the prior study. Left upper lobe infiltrates demonstrate an SUV max of 1.32. There is normal distribution of radionuclide in the neck.     Impression: Impression: Improvement in hypermetabolic activity of rectal mass. Improved lung infiltrates which are no longer significantly hypermetabolic above baseline. Previously noted mediastinal nodes also are non-hypermetabolic. No new abnormality identified. Electronically Signed: Araseli Mccormack MD  8/23/2023 8:31 AM EDT  Workstation ID: [id number]       Assessment / Plan      Assessment/Plan:     1.  CD30 positive classical Hodgkin's lymphoma  2.  Residual PET avid rectal mass, enlarging on radiation planning CT with development of recurrent retroperitoneal adenopathy, biopsy confirms recurrent classic Hodgkin lymphoma   3.  Encounter for monitoring immunotherapy    -He completed 6 cycles of BV-AVD 3/23/2023.   -Cycle 6 was complicated by coronavirus infection and multifocal pneumonia.  Posttherapy PET showed persistent bilateral interstitial changes and mild adenopathy. Repeat chest CT shows continued improvement in the pulmonary infiltrates consistent with a reactive infectious etiology.  - He had clear response to first line therapy with resolution of the hepatic lesions, marked improvement in the retroperitoneal adenopathy, and 50% reduction in the size of the rectal mass.   However he had persistent uptake in the rectal mass on posttreatment PET, [place] 5. We elected to proceed with biopsy to confirm whether he does have residual disease followed by consolidative radiotherapy to the rectal mass given initial bulky disease.  In the interim, while awaiting biopsy, he presented with rectal bleeding, obstructive uropathy symptoms and pain and radiation planning CT showed significant interval growth in the rectal mass as well as development of new retroperitoneal adenopathy superior to the rectal mass compared to the his posttreatment PET confirming progressive disease. Consolidative radiation was converted to palliative course radiation given interval adenopathy development above the radiation field.   -Biopsy confirmed residual classic Hodgkin's lymphoma to allow earlier completion for transition to second line chemotherapy.  -Given prior treatment with Brentuximab and national shortage of carbo/cis, I discussed BeGEV.  In the interim he underwent stem cell transplant evaluation at . From review of their notes, they feel he is a borderline candidate for stem cell transplantation because of age 73.  However they did recommend avoiding Bendamustine in case of future stem cell collection.  He has also had a chest CT that showed resolution of the pulmonary interstitial infiltrates, making immunotherapy a more attractive option (previously had avoided because of active pulmonary infiltrates concerning for pneumonitis on his PET/CT results). Due to the national shortage of carboplatin and cis, we proceeded with Keytruda, vinorelbine, Gemzar, and Doxil. PET shows complete resolution of FDG avidity  -Initiate maintenance Keytruda.   -Completion of consolidative radiotherapy planned.   -I have referred him back to  for reassessment regarding his candidacy for ASCT. Encouraged him to schedule    4. Herniation at stoma site  -Discussed with CRS, can ultimately consider ostomy revision when  treatment holiday appropriate. Call if any recurrent bleeding.     5.  H/o GI bleed  -PPI, continue daily.     6. Access  -Port      Follow Up:     3 weeks    Kaycee Leary MD  Hematology and Oncology

## 2023-09-18 ENCOUNTER — HOSPITAL ENCOUNTER (OUTPATIENT)
Dept: RADIATION ONCOLOGY | Facility: HOSPITAL | Age: 73
Discharge: HOME | End: 2023-09-18
Payer: MEDICARE

## 2023-09-18 ENCOUNTER — TELEPHONE (OUTPATIENT)
Dept: ONCOLOGY | Facility: CLINIC | Age: 73
End: 2023-09-18
Payer: MEDICARE

## 2023-09-18 PROCEDURE — 77386: CPT | Performed by: RADIOLOGY

## 2023-09-18 PROCEDURE — 77387 GUIDANCE FOR RADJ TX DLVR: CPT | Performed by: RADIOLOGY

## 2023-09-18 PROCEDURE — 77280 THER RAD SIMULAJ FIELD SMPL: CPT | Performed by: RADIOLOGY

## 2023-09-18 NOTE — TELEPHONE ENCOUNTER
Ms. Wu stated she has shingles.  Patient has had shingles vaccine and has had shingles.  She wanted to know if patient should take the Acyclovir that was ordered.  Patient has no symptoms and no areas now.  Dr. Leary notified of the above and per MD, Ms. Wu contacted back and advised that she should keep her areas fully covered until they are fully crusted and patient should not touch the areas or clothing that touched it.  Also, per MD, Ms. Wu advised that patient does not need to take Acyclovir.  Ms. Wu verbalized understanding.

## 2023-09-18 NOTE — TELEPHONE ENCOUNTER
Caller: SRINIVASAN HARPER    Relationship: Emergency Contact    Best call back number: [phone]    What is the best time to reach you: ANYTIME    Who are you requesting to speak with (clinical staff, provider,  specific staff member): DR PARRISH OR DENNISE WHITE         What was the call regarding:     WIFE SRINIVASAN RECENTLY DIAGNOSED WITH SHINGLES TODAY IT STARTED YESTERDAY.     WANTED TO KNOW IF THERE WAS ANYTHING WOULD NEED TO DO FOR KAL DUE TO THIS.

## 2023-09-19 ENCOUNTER — DOCUMENTATION (OUTPATIENT)
Dept: NUTRITION | Facility: HOSPITAL | Age: 73
End: 2023-09-19
Payer: MEDICARE

## 2023-09-19 ENCOUNTER — HOSPITAL ENCOUNTER (OUTPATIENT)
Dept: RADIATION ONCOLOGY | Facility: HOSPITAL | Age: 73
Discharge: HOME | End: 2023-09-19
Payer: MEDICARE

## 2023-09-19 VITALS — BODY MASS INDEX: 28.07 KG/M2 | WEIGHT: 163.6 LBS

## 2023-09-19 PROCEDURE — 77386: CPT | Performed by: RADIOLOGY

## 2023-09-19 NOTE — PROGRESS NOTES
ONC Nutrition    Diagnosis: CD30 positive classic Hodgkin lymphoma involving the rectum, perinephric, liver /  bilateral hydronephrosis secondary to malignancy / course has been complicated by a GI bleed / status post coil artery embolization 10/12 / neutropenic fever, Candida esophagitis and mucositis. Hospitalization 9/21/22 - 9/24/22 &10/3/22 - 10/20/22       June 2023 - progressive disease     Surgery:  Status post laparoscopic colostomy and biopsy 9/22/2022      Treatment history:  Brentuximab-AVD: C1 10/8/22  C2: 11/1/22 onward with DA 20% in BVD; full dose brentuximab  Completed C6 3/22/23 - Cycle 6 was complicated by coronavirus infection and multifocal pneumonia      Radiation: Palliative radiation of 14 Gy in 4 fractions of 3.5 Gy each to the rectum due to progressive disease (completed 6/1/23)    Current Radiation: 24 Gray in 8 fraction to residual disease in the rectum     Chemotherapy: OP HODGKIN LYMPHOMA Pembrolizumab + GVD (Gemcitabine / VinORELBine / Liposomal DOXOrubicin) - 21 day cycle - 4/4 cycles completed 9/1/23    Current Treatment Plan / Immunotherapy:  OP BLADDER Pembrolizumab 200 mg - 8/31.23     Current Weight - 163.6 lbs / BMI 28.07   Weight History - 230 # prior to illness / 129# at time of hospital discharge 10/22      Follow up with patient during RAD ONC status checks.  Patient is doing well in regards to nutritional status.  He continues to achieve his goal of weight gain following hospitalization 1 year ago / approximately 10 lbs additional weight gain since June 2023.  Good appetite and eating normal diet.  No issues noted with colostomy functioning.  Reinforced the importance of nutrition with radiation.  Will follow as indicated.

## 2023-09-20 ENCOUNTER — HOSPITAL ENCOUNTER (OUTPATIENT)
Dept: RADIATION ONCOLOGY | Facility: HOSPITAL | Age: 73
Discharge: HOME | End: 2023-09-20
Payer: MEDICARE

## 2023-09-20 PROCEDURE — 77386: CPT | Performed by: RADIOLOGY

## 2023-09-21 ENCOUNTER — HOSPITAL ENCOUNTER (OUTPATIENT)
Dept: RADIATION ONCOLOGY | Facility: HOSPITAL | Age: 73
Discharge: HOME | End: 2023-09-21
Payer: MEDICARE

## 2023-09-21 PROCEDURE — 77386: CPT | Performed by: RADIOLOGY

## 2023-09-22 ENCOUNTER — HOSPITAL ENCOUNTER (OUTPATIENT)
Dept: RADIATION ONCOLOGY | Facility: HOSPITAL | Age: 73
Discharge: HOME | End: 2023-09-22
Payer: MEDICARE

## 2023-09-22 PROCEDURE — 77386: CPT | Performed by: RADIOLOGY

## 2023-09-25 ENCOUNTER — HOSPITAL ENCOUNTER (OUTPATIENT)
Dept: RADIATION ONCOLOGY | Facility: HOSPITAL | Age: 73
Discharge: HOME | End: 2023-09-25
Payer: MEDICARE

## 2023-09-25 PROCEDURE — 77386: CPT | Performed by: RADIOLOGY

## 2023-09-26 ENCOUNTER — HOSPITAL ENCOUNTER (OUTPATIENT)
Dept: RADIATION ONCOLOGY | Facility: HOSPITAL | Age: 73
Discharge: HOME | End: 2023-09-26
Payer: MEDICARE

## 2023-09-26 VITALS — WEIGHT: 166.4 LBS | BODY MASS INDEX: 28.55 KG/M2

## 2023-09-26 PROCEDURE — 77386: CPT | Performed by: RADIOLOGY

## 2023-09-27 ENCOUNTER — HOSPITAL ENCOUNTER (OUTPATIENT)
Dept: RADIATION ONCOLOGY | Facility: HOSPITAL | Age: 73
Discharge: HOME | End: 2023-09-27
Payer: MEDICARE

## 2023-09-27 PROCEDURE — 77386: CPT | Performed by: RADIOLOGY

## 2023-10-02 DIAGNOSIS — C81.98 HODGKIN LYMPHOMA OF LYMPH NODES OF MULTIPLE REGIONS, UNSPECIFIED HODGKIN LYMPHOMA TYPE: Primary | ICD-10-CM

## 2023-10-04 ENCOUNTER — OFFICE VISIT (OUTPATIENT)
Dept: ONCOLOGY | Facility: CLINIC | Age: 73
End: 2023-10-04
Payer: MEDICARE

## 2023-10-04 ENCOUNTER — HOSPITAL ENCOUNTER (OUTPATIENT)
Dept: ONCOLOGY | Facility: HOSPITAL | Age: 73
Discharge: HOME | End: 2023-10-04
Payer: MEDICARE

## 2023-10-04 ENCOUNTER — HOSPITAL ENCOUNTER (OUTPATIENT)
Dept: ONCOLOGY | Facility: HOSPITAL | Age: 73
Discharge: HOME | End: 2023-10-04
Admitting: INTERNAL MEDICINE
Payer: MEDICARE

## 2023-10-04 VITALS
DIASTOLIC BLOOD PRESSURE: 73 MMHG | BODY MASS INDEX: 27.83 KG/M2 | HEIGHT: 64 IN | OXYGEN SATURATION: 98 % | SYSTOLIC BLOOD PRESSURE: 123 MMHG | HEART RATE: 61 BPM | RESPIRATION RATE: 18 BRPM | WEIGHT: 163 LBS | TEMPERATURE: 97.3 F

## 2023-10-04 DIAGNOSIS — C81.98 HODGKIN LYMPHOMA OF LYMPH NODES OF MULTIPLE REGIONS, UNSPECIFIED HODGKIN LYMPHOMA TYPE: Primary | ICD-10-CM

## 2023-10-04 DIAGNOSIS — Z45.2 ENCOUNTER FOR CARE RELATED TO VASCULAR ACCESS PORT: ICD-10-CM

## 2023-10-04 LAB
ALBUMIN SERPL-MCNC: 4.2 G/DL (ref 3.5–5.2)
ALBUMIN/GLOB SERPL: 1.6 G/DL
ALP SERPL-CCNC: 76 U/L (ref 39–117)
ALT SERPL W P-5'-P-CCNC: 10 U/L (ref 1–41)
ANION GAP SERPL CALCULATED.3IONS-SCNC: 7 MMOL/L (ref 5–15)
AST SERPL-CCNC: 15 U/L (ref 1–40)
BASOPHILS # BLD AUTO: 0.03 10*3/MM3 (ref 0–0.2)
BASOPHILS NFR BLD AUTO: 0.7 % (ref 0–1.5)
BILIRUB SERPL-MCNC: 0.4 MG/DL (ref 0–1.2)
BUN SERPL-MCNC: 16 MG/DL (ref 8–23)
BUN/CREAT SERPL: 26.7 (ref 7–25)
CALCIUM SPEC-SCNC: 9.1 MG/DL (ref 8.6–10.5)
CHLORIDE SERPL-SCNC: 99 MMOL/L (ref 98–107)
CO2 SERPL-SCNC: 30 MMOL/L (ref 22–29)
CREAT SERPL-MCNC: 0.6 MG/DL (ref 0.76–1.27)
DEPRECATED RDW RBC AUTO: 58.6 FL (ref 37–54)
EGFRCR SERPLBLD CKD-EPI 2021: 103.2 ML/MIN/1.73
EOSINOPHIL # BLD AUTO: 0.18 10*3/MM3 (ref 0–0.4)
EOSINOPHIL NFR BLD AUTO: 4.2 % (ref 0.3–6.2)
ERYTHROCYTE [DISTWIDTH] IN BLOOD BY AUTOMATED COUNT: 15.2 % (ref 12.3–15.4)
GLOBULIN UR ELPH-MCNC: 2.7 GM/DL
GLUCOSE SERPL-MCNC: 87 MG/DL (ref 65–99)
HCT VFR BLD AUTO: 33.3 % (ref 37.5–51)
HGB BLD-MCNC: 10.8 G/DL (ref 13–17.7)
IMM GRANULOCYTES # BLD AUTO: 0.01 10*3/MM3 (ref 0–0.05)
IMM GRANULOCYTES NFR BLD AUTO: 0.2 % (ref 0–0.5)
LYMPHOCYTES # BLD AUTO: 0.69 10*3/MM3 (ref 0.7–3.1)
LYMPHOCYTES NFR BLD AUTO: 16 % (ref 19.6–45.3)
MCH RBC QN AUTO: 33.8 PG (ref 26.6–33)
MCHC RBC AUTO-ENTMCNC: 32.4 G/DL (ref 31.5–35.7)
MCV RBC AUTO: 104.1 FL (ref 79–97)
MONOCYTES # BLD AUTO: 0.58 10*3/MM3 (ref 0.1–0.9)
MONOCYTES NFR BLD AUTO: 13.5 % (ref 5–12)
NEUTROPHILS NFR BLD AUTO: 2.82 10*3/MM3 (ref 1.7–7)
NEUTROPHILS NFR BLD AUTO: 65.4 % (ref 42.7–76)
PLATELET # BLD AUTO: 164 10*3/MM3 (ref 140–450)
PMV BLD AUTO: 9.7 FL (ref 6–12)
POTASSIUM SERPL-SCNC: 4.2 MMOL/L (ref 3.5–5.2)
PROT SERPL-MCNC: 6.9 G/DL (ref 6–8.5)
RBC # BLD AUTO: 3.2 10*6/MM3 (ref 4.14–5.8)
SODIUM SERPL-SCNC: 136 MMOL/L (ref 136–145)
WBC NRBC COR # BLD: 4.31 10*3/MM3 (ref 3.4–10.8)

## 2023-10-04 PROCEDURE — 25810000003 SODIUM CHLORIDE 0.9 % SOLUTION: Performed by: INTERNAL MEDICINE

## 2023-10-04 PROCEDURE — 96413 CHEMO IV INFUSION 1 HR: CPT

## 2023-10-04 PROCEDURE — 25010000002 HEPARIN LOCK FLUSH PER 10 UNITS: Performed by: INTERNAL MEDICINE

## 2023-10-04 PROCEDURE — 25010000002 PEMBROLIZUMAB 100 MG/4ML SOLUTION 4 ML VIAL: Performed by: INTERNAL MEDICINE

## 2023-10-04 PROCEDURE — 80053 COMPREHEN METABOLIC PANEL: CPT | Performed by: INTERNAL MEDICINE

## 2023-10-04 PROCEDURE — 85025 COMPLETE CBC W/AUTO DIFF WBC: CPT | Performed by: INTERNAL MEDICINE

## 2023-10-04 RX ORDER — HEPARIN SODIUM (PORCINE) LOCK FLUSH IV SOLN 100 UNIT/ML 100 UNIT/ML
500 SOLUTION INTRAVENOUS AS NEEDED
Status: DISCONTINUED | OUTPATIENT
Start: 2023-10-04 | End: 2023-10-05 | Stop reason: HOSPADM

## 2023-10-04 RX ORDER — SODIUM CHLORIDE 9 MG/ML
250 INJECTION, SOLUTION INTRAVENOUS ONCE
Status: COMPLETED | OUTPATIENT
Start: 2023-10-04 | End: 2023-10-04

## 2023-10-04 RX ORDER — HEPARIN SODIUM (PORCINE) LOCK FLUSH IV SOLN 100 UNIT/ML 100 UNIT/ML
500 SOLUTION INTRAVENOUS AS NEEDED
OUTPATIENT
Start: 2023-10-04

## 2023-10-04 RX ORDER — SODIUM CHLORIDE 9 MG/ML
250 INJECTION, SOLUTION INTRAVENOUS ONCE
Status: CANCELLED | OUTPATIENT
Start: 2023-10-05

## 2023-10-04 RX ADMIN — SODIUM CHLORIDE 250 ML: 9 INJECTION, SOLUTION INTRAVENOUS at 11:59

## 2023-10-04 RX ADMIN — HEPARIN 500 UNITS: 100 SYRINGE at 12:34

## 2023-10-04 RX ADMIN — SODIUM CHLORIDE 200 MG: 9 INJECTION, SOLUTION INTRAVENOUS at 12:00

## 2023-10-04 NOTE — PROGRESS NOTES
Hematology and Oncology [place]  Office number [phone]    Fax number [phone]     Follow up     Date: 10/04/23    Patient Name: Abraham Wu  MRN: [id number]  : 1950    Chief Complaint: Hodgkin Lymphoma follow up/treatment    Surgeon: Dr. Hiram Viveros MD    Cancer Staging: IV    History of Present Illness: Abraham Wu is a pleasant 73 y.o. male with PMH of hypertension, sleep apnea, hard of hearing who presents today for follow up of Hodgkin Lymphoma.     He initially presented 2022 with intractable diarrhea, low appetite, and a greater than 50 pound weight loss over several month period associated with intermittent fevers.  CT of the chest abdomen pelvis obtained in the ER shows of rectal mass spanning greater than 12 cm with adjacent adenopathy, bulky RP adenopathy, and findings concerning for left renal and liver metastases.  Small right pleural effusion with nodularity.  There was concern for impending obstruction, so he underwent diverting colostomy and biopsy on 2022.  Biopsies from the peritoneam showed a fibrotic nodule histiocytes and eosinophils admixed with CD30 positive large cells.  Rectal biopsies showed involvement by CD30 positive lymphoma, classic Hodgkin lymphoma versus CD30 positive T-cell or B-cell lymphoma.  There was extensive fibrosis and crush artifact.  He underwent repeat transrectal biopsy 10/4/2022 for further characterization which was felt to be most consistent with classical Hodgkin lymphoma.    He initiated chemotherapy with Brentuximab-AVD as an inpatient on 10/8/2022.  Cycle #1 was complicated by GI bleed requiring multiple blood transfusion and ultimately coil artery embolization 10/12; neutropenic fever, Candida esophagitis and mucositis.  He had a prolonged ICU stay  And required enteral feeding.  He was discharged 10/20/2022.    Following his last cycle he was admitted with multifocal PNA and +corona virus  infection.    Treatment history:  Brentuximab-AVD: C1 10/8/22  C2: 11/1/22 onward with DA 20% in BVD; full dose brentuximab  Completed C6 3/22/23    Palliative radiation to rectum 5/30/2023 (abbreviated due to systemic progression  GVD+Pembro x 4 cycles: completed 8/30/2023  Consolidative radiation to rectum completed 9/2023    Pembrolizumab maintenance: current    Interval history:    Radiation completed 1 week ago. Here for consideration of maintenance immunotherapy. Mild dysuria but otherwise tolerated well  Mild neuropathy in feet. Neuropathy in hands continues to improve. No bleeding.   Stoma is doing better.  Planning cataract extraction.   Strength is improving. Working more.   BM not as solid but no diarrhea    Past Medical History:   Past Medical History:   Diagnosis Date    benign polypoid tissue right lung     Cancer     HODGKINS LYMPHOMA, RECTAL CANCER    Diabetes mellitus     Hyperlipidemia     Hypertension     Lymphoma     Mycobacterium mucogenicum     SHERRI (obstructive sleep apnea)     O2 2L/MIN AT NIGHT ONLY    Pneumonia     2023    Seasonal allergies        Past Surgical History:   Past Surgical History:   Procedure Laterality Date    BRONCHOSCOPY      removal of obstructing polypoid tissue right middle lung    COLOSTOMY N/A 09/22/2022    Procedure: LAPAROSCOPIC COLOSTOMY CREATION, FLEXIBLE SIGMOIDOSCOPY;  Surgeon: Hiram Viveros MD;  Location: [facility] OR;  Service: General;  Laterality: N/A;    DENTAL PROCEDURE      ENDOSCOPY N/A 10/10/2022    Procedure: ESOPHAGOGASTRODUODENOSCOPY;  Surgeon: Neeraj Lynn MD;  Location: [facility] ENDOSCOPY;  Service: Gastroenterology;  Laterality: N/A;    ENDOSCOPY N/A 10/12/2022    Procedure: ESOPHAGOGASTRODUODENOSCOPY;  Surgeon: Brunner, Mark I, MD;  Location:  [facility] ENDOSCOPY;  Service: Gastroenterology;  Laterality: N/A;    EXAM UNDER ANESTHESIA, RECTAL BIOPSY N/A 10/04/2022    Procedure: EXAM UNDER ANESTHESIA, TRANSANAL BIOPSY WITH TRUCUT NEEDLE  (LITHOTOMY-CANDY CANE);  Surgeon: Hiram Viveros MD;  Location:  [facility] OR;  Service: General;  Laterality: N/A;    EXAM UNDER ANESTHESIA, RECTAL BIOPSY N/A 5/30/2023    Procedure: EXAM UNDER ANESTHESIA, TRANSANAL BIOPSY OF RECTAL MASS WITH TRUCUT NEEDLE, PROCTOSCOPY;  Surgeon: Hiram Viveros MD;  Location:  [facility] OR;  Service: General;  Laterality: N/A;    PERIPHERALLY INSERTED CENTRAL CATHETER INSERTION      Removed 11/28/2022    VENOUS ACCESS DEVICE (PORT) INSERTION Right 11/28/2022       Family History:   Family History   Problem Relation Age of Onset    Diabetes Mother     Diabetes Father     Heart disease Father        Social History:   Social History     Socioeconomic History    Marital status:    Tobacco Use    Smoking status: Never    Smokeless tobacco: Never   Vaping Use    Vaping Use: Never used   Substance and Sexual Activity    Alcohol use: Not Currently     Comment: previously drank 2 glasses of wine/beer nightly    Drug use: Never    Sexual activity: Defer       Medications:     Current Outpatient Medications:     fluticasone (FLONASE) 50 MCG/ACT nasal spray, 1 spray into the nostril(s) as directed by provider Daily As Needed for Allergies or Rhinitis. OTC, Disp: , Rfl:     lidocaine-prilocaine (EMLA) 2.5-2.5 % cream, Apply 1 application topically to the appropriate area as directed As Needed (45-60 minutes prior to port access.  Cover with saran/plastic wrap.)., Disp: 30 g, Rfl: 3    loratadine (CLARITIN) 10 MG tablet, Take 1 tablet by mouth Daily., Disp: 30 tablet, Rfl: 5    ondansetron (Zofran) 8 MG tablet, Take 1 tablet by mouth Every 8 (Eight) Hours As Needed for Nausea or Vomiting., Disp: 30 tablet, Rfl: 5    pantoprazole (PROTONIX) 40 MG EC tablet, Take 1 tablet by mouth Daily., Disp: 90 tablet, Rfl: 1    prochlorperazine (COMPAZINE) 5 MG tablet, Take 1 tablet by mouth Every 6 (Six) Hours As Needed for Nausea or Vomiting., Disp: 30 tablet, Rfl: 2    rosuvastatin (CRESTOR) 10  "MG tablet, Take 1 tablet by mouth Daily., Disp: , Rfl:     Allergies:   No Known Allergies    Objective     Vital Signs:   Vitals:    10/04/23 1032   BP: 123/73   Pulse: 61   Resp: 18   Temp: 97.3 øF (36.3 øC)   TempSrc: Infrared   SpO2: 98%   Weight: 73.9 kg (163 lb)   Height: 162.6 cm (64.02\")   PainSc: 0-No pain    Body mass index is 27.96 kg/mý.   Pain Score    10/04/23 1032   PainSc: 0-No pain       ECOG Performance Status: 1    Physical Exam:   General: Well appearing male   HEENT: Normocephalic, atraumatic. Sclera anicteric.   Neck: supple, no palpable LAD. No axillary adenopathy  Cardiovascular: regular rate and rhythm. No murmurs.   Respiratory: Normal rate. Clear to auscultation bilaterally  Abdomen: Soft, nontender, non distended with normoactive bowel sounds. Ostomy LLQ with stoma that is pink with no surrounding bleeding.   Lymph: no supraclavicular or axillary adenopathy  Neuro: Alert and oriented x 3.   Ext: Symmetric, no swelling.   Skin: right port site  C/d/i    Laboratory/Imaging Reviewed:     Hospital Outpatient Visit on 10/04/2023   Component Date Value Ref Range Status    Glucose 10/04/2023 87  65 - 99 mg/dL Final    BUN 10/04/2023 16  8 - 23 mg/dL Final    Creatinine 10/04/2023 0.60 (L)  0.76 - 1.27 mg/dL Final    Sodium 10/04/2023 136  136 - 145 mmol/L Final    Potassium 10/04/2023 4.2  3.5 - 5.2 mmol/L Final    Chloride 10/04/2023 99  98 - 107 mmol/L Final    CO2 10/04/2023 30.0 (H)  22.0 - 29.0 mmol/L Final    Calcium 10/04/2023 9.1  8.6 - 10.5 mg/dL Final    Total Protein 10/04/2023 6.9  6.0 - 8.5 g/dL Final    Albumin 10/04/2023 4.2  3.5 - 5.2 g/dL Final    ALT (SGPT) 10/04/2023 10  1 - 41 U/L Final    AST (SGOT) 10/04/2023 15  1 - 40 U/L Final    Alkaline Phosphatase 10/04/2023 76  39 - 117 U/L Final    Total Bilirubin 10/04/2023 0.4  0.0 - 1.2 mg/dL Final    Globulin 10/04/2023 2.7  gm/dL Final    Calculated Result    A/G Ratio 10/04/2023 1.6  g/dL Final    BUN/Creatinine Ratio " 10/04/2023 26.7 (H)  7.0 - 25.0 Final    Anion Gap 10/04/2023 7.0  5.0 - 15.0 mmol/L Final    eGFR 10/04/2023 103.2  >60.0 mL/min/1.73 Final    WBC 10/04/2023 4.31  3.40 - 10.80 10*3/mm3 Final    RBC 10/04/2023 3.20 (L)  4.14 - 5.80 10*6/mm3 Final    Hemoglobin 10/04/2023 10.8 (L)  13.0 - 17.7 g/dL Final    Hematocrit 10/04/2023 33.3 (L)  37.5 - 51.0 % Final    MCV 10/04/2023 104.1 (H)  79.0 - 97.0 fL Final    MCH 10/04/2023 33.8 (H)  26.6 - 33.0 pg Final    MCHC 10/04/2023 32.4  31.5 - 35.7 g/dL Final    RDW 10/04/2023 15.2  12.3 - 15.4 % Final    RDW-SD 10/04/2023 58.6 (H)  37.0 - 54.0 fl Final    MPV 10/04/2023 9.7  6.0 - 12.0 fL Final    Platelets 10/04/2023 164  140 - 450 10*3/mm3 Final    Neutrophil % 10/04/2023 65.4  42.7 - 76.0 % Final    Lymphocyte % 10/04/2023 16.0 (L)  19.6 - 45.3 % Final    Monocyte % 10/04/2023 13.5 (H)  5.0 - 12.0 % Final    Eosinophil % 10/04/2023 4.2  0.3 - 6.2 % Final    Basophil % 10/04/2023 0.7  0.0 - 1.5 % Final    Immature Grans % 10/04/2023 0.2  0.0 - 0.5 % Final    Neutrophils, Absolute 10/04/2023 2.82  1.70 - 7.00 10*3/mm3 Final    Lymphocytes, Absolute 10/04/2023 0.69 (L)  0.70 - 3.10 10*3/mm3 Final    Monocytes, Absolute 10/04/2023 0.58  0.10 - 0.90 10*3/mm3 Final    Eosinophils, Absolute 10/04/2023 0.18  0.00 - 0.40 10*3/mm3 Final    Basophils, Absolute 10/04/2023 0.03  0.00 - 0.20 10*3/mm3 Final    Immature Grans, Absolute 10/04/2023 0.01  0.00 - 0.05 10*3/mm3 Final       No results found.      Assessment / Plan      Assessment/Plan:     1.  Recurrent CD30 positive classical Hodgkin's lymphoma  2.   Encounter for monitoring immunotherapy    -He completed 6 cycles of BV-AVD 3/23/2023. Cycle 6 was complicated by coronavirus infection and multifocal pneumonia.  Posttherapy PET showed persistent bilateral interstitial changes and mild adenopathy. Repeat chest CT shows continued improvement in the pulmonary infiltrates consistent with a reactive infectious etiology.  - He had  clear response to first line therapy with resolution of the hepatic lesions, marked improvement in the retroperitoneal adenopathy, and 50% reduction in the size of the rectal mass.  However he had persistent uptake in the rectal mass on posttreatment PET, [place] 5. We elected to proceed with biopsy to confirm whether he had residual disease followed by consolidative radiotherapy to the rectal mass given initial bulky disease.  In the interim, while awaiting biopsy, he presented with rectal bleeding, obstructive uropathy symptoms and pain and radiation planning CT showed significant interval growth in the rectal mass as well as development of new retroperitoneal adenopathy superior to the rectal mass compared to the his posttreatment PET confirming progressive disease. Consolidative radiation was converted to palliative course radiation given interval adenopathy development above the radiation field.   -Biopsy confirmed residual classic Hodgkin's lymphoma to allow earlier completion for transition to second line chemotherapy.  -Given prior treatment with Brentuximab and national shortage of carbo/cis, I discussed HumbertoEV.  In the interim he underwent stem cell transplant evaluation at . From review of their notes, they feel he is a borderline candidate for stem cell transplantation because of age 73.  However they did recommend avoiding Bendamustine in case of future stem cell collection.  He has also had a chest CT that showed resolution of the pulmonary interstitial infiltrates, making immunotherapy a more attractive option (previously had avoided because of active pulmonary infiltrates concerning for pneumonitis on his PET/CT results). Due to the national shortage of carboplatin and cis, we proceeded with Keytruda, vinorelbine, Gemzar, and Doxil. He completed 4 cycles 8/2023. Post treatment PET shows complete resolution of FDG avidity  -Labs are adequate to continue maintenance Keytruda. Treatment orders  signed.  -Completion of consolidative radiotherapy completed   -I have referred him back to [facility] for reassessment regarding his candidacy for ASCT. Reviewed their notes. Still no clear recommendation for or against transplant but appears they agreed with maintenance immunotherapy.   -Next imaging due late Nov, will schedule    3. Herniation at stoma site  -Discussed with CRS, can ultimately consider ostomy revision when treatment holiday appropriate. Discussed this with patient. He wishes to defer pending dental procedures and cataract surgery as symptoms less bothersome currently.     4.  H/o GI bleed  -PPI, continue daily.     5. Access  -Port    Follow Up:     3 weeks    Kaycee Leary MD  Hematology and Oncology

## 2023-10-20 DIAGNOSIS — C81.98 HODGKIN LYMPHOMA OF LYMPH NODES OF MULTIPLE REGIONS, UNSPECIFIED HODGKIN LYMPHOMA TYPE: Primary | ICD-10-CM

## 2023-10-24 DIAGNOSIS — C81.98 HODGKIN LYMPHOMA OF LYMPH NODES OF MULTIPLE REGIONS, UNSPECIFIED HODGKIN LYMPHOMA TYPE: Primary | ICD-10-CM

## 2023-10-25 ENCOUNTER — HOSPITAL ENCOUNTER (OUTPATIENT)
Dept: ONCOLOGY | Facility: HOSPITAL | Age: 73
Discharge: HOME | End: 2023-10-25
Admitting: INTERNAL MEDICINE
Payer: MEDICARE

## 2023-10-25 ENCOUNTER — OFFICE VISIT (OUTPATIENT)
Dept: ONCOLOGY | Facility: CLINIC | Age: 73
End: 2023-10-25
Payer: MEDICARE

## 2023-10-25 ENCOUNTER — APPOINTMENT (OUTPATIENT)
Dept: ONCOLOGY | Facility: HOSPITAL | Age: 73
End: 2023-10-25
Payer: MEDICARE

## 2023-10-25 VITALS
HEIGHT: 64 IN | SYSTOLIC BLOOD PRESSURE: 148 MMHG | OXYGEN SATURATION: 98 % | HEART RATE: 64 BPM | RESPIRATION RATE: 18 BRPM | WEIGHT: 166 LBS | DIASTOLIC BLOOD PRESSURE: 71 MMHG | TEMPERATURE: 97.5 F | BODY MASS INDEX: 28.34 KG/M2

## 2023-10-25 DIAGNOSIS — C81.98 HODGKIN LYMPHOMA OF LYMPH NODES OF MULTIPLE REGIONS, UNSPECIFIED HODGKIN LYMPHOMA TYPE: Primary | ICD-10-CM

## 2023-10-25 DIAGNOSIS — Z45.2 ENCOUNTER FOR CARE RELATED TO VASCULAR ACCESS PORT: ICD-10-CM

## 2023-10-25 LAB
ALBUMIN SERPL-MCNC: 4.2 G/DL (ref 3.5–5.2)
ALBUMIN/GLOB SERPL: 1.4 G/DL
ALP SERPL-CCNC: 72 U/L (ref 39–117)
ALT SERPL W P-5'-P-CCNC: 10 U/L (ref 1–41)
ANION GAP SERPL CALCULATED.3IONS-SCNC: 6 MMOL/L (ref 5–15)
AST SERPL-CCNC: 13 U/L (ref 1–40)
BASOPHILS # BLD AUTO: 0.02 10*3/MM3 (ref 0–0.2)
BASOPHILS NFR BLD AUTO: 0.6 % (ref 0–1.5)
BILIRUB SERPL-MCNC: 0.3 MG/DL (ref 0–1.2)
BUN SERPL-MCNC: 20 MG/DL (ref 8–23)
BUN/CREAT SERPL: 35.1 (ref 7–25)
CALCIUM SPEC-SCNC: 9.3 MG/DL (ref 8.6–10.5)
CHLORIDE SERPL-SCNC: 98 MMOL/L (ref 98–107)
CO2 SERPL-SCNC: 30 MMOL/L (ref 22–29)
CORTIS AM PEAK SERPL-MCNC: 8.11 MCG/DL
CREAT SERPL-MCNC: 0.57 MG/DL (ref 0.76–1.27)
DEPRECATED RDW RBC AUTO: 50.3 FL (ref 37–54)
EGFRCR SERPLBLD CKD-EPI 2021: 104.8 ML/MIN/1.73
EOSINOPHIL # BLD AUTO: 0.19 10*3/MM3 (ref 0–0.4)
EOSINOPHIL NFR BLD AUTO: 5.4 % (ref 0.3–6.2)
ERYTHROCYTE [DISTWIDTH] IN BLOOD BY AUTOMATED COUNT: 13.1 % (ref 12.3–15.4)
GLOBULIN UR ELPH-MCNC: 2.9 GM/DL
GLUCOSE SERPL-MCNC: 90 MG/DL (ref 65–99)
HCT VFR BLD AUTO: 36.1 % (ref 37.5–51)
HGB BLD-MCNC: 11.9 G/DL (ref 13–17.7)
IMM GRANULOCYTES # BLD AUTO: 0 10*3/MM3 (ref 0–0.05)
IMM GRANULOCYTES NFR BLD AUTO: 0 % (ref 0–0.5)
LYMPHOCYTES # BLD AUTO: 0.76 10*3/MM3 (ref 0.7–3.1)
LYMPHOCYTES NFR BLD AUTO: 21.5 % (ref 19.6–45.3)
MCH RBC QN AUTO: 33.6 PG (ref 26.6–33)
MCHC RBC AUTO-ENTMCNC: 33 G/DL (ref 31.5–35.7)
MCV RBC AUTO: 102 FL (ref 79–97)
MONOCYTES # BLD AUTO: 0.44 10*3/MM3 (ref 0.1–0.9)
MONOCYTES NFR BLD AUTO: 12.4 % (ref 5–12)
NEUTROPHILS NFR BLD AUTO: 2.13 10*3/MM3 (ref 1.7–7)
NEUTROPHILS NFR BLD AUTO: 60.1 % (ref 42.7–76)
PLATELET # BLD AUTO: 148 10*3/MM3 (ref 140–450)
PMV BLD AUTO: 9.1 FL (ref 6–12)
POTASSIUM SERPL-SCNC: 4.5 MMOL/L (ref 3.5–5.2)
PROT SERPL-MCNC: 7.1 G/DL (ref 6–8.5)
RBC # BLD AUTO: 3.54 10*6/MM3 (ref 4.14–5.8)
SODIUM SERPL-SCNC: 134 MMOL/L (ref 136–145)
T4 FREE SERPL-MCNC: 1.23 NG/DL (ref 0.93–1.7)
TSH SERPL DL<=0.05 MIU/L-ACNC: 1.56 UIU/ML (ref 0.27–4.2)
WBC NRBC COR # BLD: 3.54 10*3/MM3 (ref 3.4–10.8)

## 2023-10-25 PROCEDURE — 25010000002 HEPARIN LOCK FLUSH PER 10 UNITS: Performed by: INTERNAL MEDICINE

## 2023-10-25 PROCEDURE — 25810000003 SODIUM CHLORIDE 0.9 % SOLUTION: Performed by: INTERNAL MEDICINE

## 2023-10-25 PROCEDURE — 25010000002 PEMBROLIZUMAB 100 MG/4ML SOLUTION 4 ML VIAL: Performed by: INTERNAL MEDICINE

## 2023-10-25 PROCEDURE — 82533 TOTAL CORTISOL: CPT | Performed by: INTERNAL MEDICINE

## 2023-10-25 PROCEDURE — 84439 ASSAY OF FREE THYROXINE: CPT | Performed by: INTERNAL MEDICINE

## 2023-10-25 PROCEDURE — 96413 CHEMO IV INFUSION 1 HR: CPT

## 2023-10-25 PROCEDURE — 85025 COMPLETE CBC W/AUTO DIFF WBC: CPT | Performed by: INTERNAL MEDICINE

## 2023-10-25 PROCEDURE — 80053 COMPREHEN METABOLIC PANEL: CPT | Performed by: INTERNAL MEDICINE

## 2023-10-25 PROCEDURE — 84443 ASSAY THYROID STIM HORMONE: CPT | Performed by: INTERNAL MEDICINE

## 2023-10-25 RX ORDER — SODIUM CHLORIDE 9 MG/ML
250 INJECTION, SOLUTION INTRAVENOUS ONCE
Status: CANCELLED | OUTPATIENT
Start: 2023-10-26

## 2023-10-25 RX ORDER — HEPARIN SODIUM (PORCINE) LOCK FLUSH IV SOLN 100 UNIT/ML 100 UNIT/ML
500 SOLUTION INTRAVENOUS AS NEEDED
OUTPATIENT
Start: 2023-10-25

## 2023-10-25 RX ORDER — SODIUM CHLORIDE 9 MG/ML
250 INJECTION, SOLUTION INTRAVENOUS ONCE
Status: COMPLETED | OUTPATIENT
Start: 2023-10-25 | End: 2023-10-25

## 2023-10-25 RX ORDER — HEPARIN SODIUM (PORCINE) LOCK FLUSH IV SOLN 100 UNIT/ML 100 UNIT/ML
500 SOLUTION INTRAVENOUS AS NEEDED
Status: DISCONTINUED | OUTPATIENT
Start: 2023-10-25 | End: 2023-10-26 | Stop reason: HOSPADM

## 2023-10-25 RX ADMIN — SODIUM CHLORIDE 200 MG: 9 INJECTION, SOLUTION INTRAVENOUS at 11:54

## 2023-10-25 RX ADMIN — HEPARIN 500 UNITS: 100 SYRINGE at 12:37

## 2023-10-25 RX ADMIN — SODIUM CHLORIDE 250 ML: 9 INJECTION, SOLUTION INTRAVENOUS at 11:51

## 2023-10-25 NOTE — PROGRESS NOTES
Hematology and Oncology [place]  Office number [phone]    Fax number [phone]     Follow up     Date: 10/25/23    Patient Name: Abraham Wu  MRN: [id number]  : 1950    Chief Complaint: Hodgkin Lymphoma follow up/treatment    Surgeon: Dr. Hiram Viveros MD    Cancer Staging: IV    History of Present Illness: Abraham Wu is a pleasant 73 y.o. male with PMH of hypertension, sleep apnea, hard of hearing who presents today for follow up of Hodgkin Lymphoma.     He initially presented 2022 with intractable diarrhea, low appetite, and a greater than 50 pound weight loss over several month period associated with intermittent fevers.  CT of the chest abdomen pelvis obtained in the ER shows of rectal mass spanning greater than 12 cm with adjacent adenopathy, bulky RP adenopathy, and findings concerning for left renal and liver metastases.  Small right pleural effusion with nodularity.  There was concern for impending obstruction, so he underwent diverting colostomy and biopsy on 2022.  Biopsies from the peritoneam showed a fibrotic nodule histiocytes and eosinophils admixed with CD30 positive large cells.  Rectal biopsies showed involvement by CD30 positive lymphoma, classic Hodgkin lymphoma versus CD30 positive T-cell or B-cell lymphoma.  There was extensive fibrosis and crush artifact.  He underwent repeat transrectal biopsy 10/4/2022 for further characterization which was felt to be most consistent with classical Hodgkin lymphoma.    He initiated chemotherapy with Brentuximab-AVD as an inpatient on 10/8/2022.  Cycle #1 was complicated by GI bleed requiring multiple blood transfusion and ultimately coil artery embolization 10/12; neutropenic fever, Candida esophagitis and mucositis.  He had a prolonged ICU stay  And required enteral feeding.  He was discharged 10/20/2022.    Following his last cycle he was admitted with multifocal PNA and +corona virus  infection.    Treatment history:  Brentuximab-AVD: C1 10/8/22  C2: 11/1/22 onward with DA 20% in BVD; full dose brentuximab  Completed C6 3/22/23    Palliative radiation to rectum 5/30/2023 (abbreviated due to systemic progression  GVD+Pembro x 4 cycles: completed 8/30/2023  Consolidative radiation to rectum completed 9/2023    Pembrolizumab maintenance: current  Radiation completed 9/27/23    Interval history:    Here for consideration of maintenance immunotherapy.   He is feeling very well.    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  No increased volume of stool from his ostomy site and the herniation of his stoma is bothering him last so he is planning to delay any kind of revision.  GERD is controlled with PPI.  No breakthrough nausea or severe headaches. Planning cataract surgery in near future.     Past Medical History:   Past Medical History:   Diagnosis Date    benign polypoid tissue right lung     Cancer     HODGKINS LYMPHOMA, RECTAL CANCER    Diabetes mellitus     Hyperlipidemia     Hypertension     Lymphoma     Mycobacterium mucogenicum     SHERRI (obstructive sleep apnea)     O2 2L/MIN AT NIGHT ONLY    Pneumonia     2023    Seasonal allergies      Past Surgical History:   Past Surgical History:   Procedure Laterality Date    BRONCHOSCOPY      removal of obstructing polypoid tissue right middle lung    COLOSTOMY N/A 09/22/2022    Procedure: LAPAROSCOPIC COLOSTOMY CREATION, FLEXIBLE SIGMOIDOSCOPY;  Surgeon: Hiram Viveros MD;  Location: [facility] OR;  Service: General;  Laterality: N/A;    DENTAL PROCEDURE      ENDOSCOPY N/A 10/10/2022    Procedure: ESOPHAGOGASTRODUODENOSCOPY;  Surgeon: Neeraj Lynn MD;  Location:  [facility];  Service: Gastroenterology;  Laterality: N/A;    ENDOSCOPY N/A 10/12/2022    Procedure: ESOPHAGOGASTRODUODENOSCOPY;  Surgeon: Brunner, Mark I, MD;  Location:  [facility];  Service:  Gastroenterology;  Laterality: N/A;    EXAM UNDER ANESTHESIA, RECTAL BIOPSY N/A 10/04/2022    Procedure: EXAM UNDER ANESTHESIA, TRANSANAL BIOPSY WITH TRUCUT NEEDLE (LITHOTOMY-CANDY CANE);  Surgeon: Hiram Viveros MD;  Location:  [facility];  Service: General;  Laterality: N/A;    EXAM UNDER ANESTHESIA, RECTAL BIOPSY N/A 5/30/2023    Procedure: EXAM UNDER ANESTHESIA, TRANSANAL BIOPSY OF RECTAL MASS WITH TRUCUT NEEDLE, PROCTOSCOPY;  Surgeon: Hiram Viveros MD;  Location:  [facility];  Service: General;  Laterality: N/A;    PERIPHERALLY INSERTED CENTRAL CATHETER INSERTION      Removed 11/28/2022    VENOUS ACCESS DEVICE (PORT) INSERTION Right 11/28/2022     Family History:   Family History   Problem Relation Age of Onset    Diabetes Mother     Diabetes Father     Heart disease Father      Social History:   Social History     Socioeconomic History    Marital status:    Tobacco Use    Smoking status: Never    Smokeless tobacco: Never   Vaping Use    Vaping Use: Never used   Substance and Sexual Activity    Alcohol use: Not Currently     Comment: previously drank 2 glasses of wine/beer nightly    Drug use: Never    Sexual activity: Defer       Medications:     Current Outpatient Medications:     fluticasone (FLONASE) 50 MCG/ACT nasal spray, 1 spray into the nostril(s) as directed by provider Daily As Needed for Allergies or Rhinitis. OTC, Disp: , Rfl:     lidocaine-prilocaine (EMLA) 2.5-2.5 % cream, Apply 1 application topically to the appropriate area as directed As Needed (45-60 minutes prior to port access.  Cover with saran/plastic wrap.)., Disp: 30 g, Rfl: 3    loratadine (CLARITIN) 10 MG tablet, Take 1 tablet by mouth Daily., Disp: 30 tablet, Rfl: 5    ondansetron (Zofran) 8 MG tablet, Take 1 tablet by mouth Every 8 (Eight) Hours As Needed for Nausea or Vomiting., Disp: 30 tablet, Rfl: 5    pantoprazole (PROTONIX) 40 MG EC tablet, Take 1 tablet by mouth Daily., Disp: 90 tablet, Rfl: 1     "prochlorperazine (COMPAZINE) 5 MG tablet, Take 1 tablet by mouth Every 6 (Six) Hours As Needed for Nausea or Vomiting., Disp: 30 tablet, Rfl: 2    rosuvastatin (CRESTOR) 10 MG tablet, Take 1 tablet by mouth Daily., Disp: , Rfl:     Allergies:   No Known Allergies    Objective     Vital Signs:   Vitals:    10/25/23 1038   BP: 148/71   Pulse: 64   Resp: 18   Temp: 97.5 °F (36.4 °C)   TempSrc: Infrared   SpO2: 98%   Weight: 75.3 kg (166 lb)   Height: 162.6 cm (64.02\")   PainSc: 0-No pain    Body mass index is 28.48 kg/m².   Pain Score    10/25/23 1038   PainSc: 0-No pain       ECOG Performance Status: 1    Physical Exam:   General: Well appearing male   HEENT: Normocephalic, atraumatic. Sclera anicteric.   Neck: supple, no palpable LAD. No axillary adenopathy  Cardiovascular: regular rate and rhythm. No murmurs.   Respiratory: Normal rate. Clear to auscultation bilaterally  Abdomen: Soft, nontender, non distended with normoactive bowel sounds. Ostomy LLQ with stoma pink   Lymph: no supraclavicular or axillary adenopathy  Neuro: Alert and oriented x 3.   Ext: Symmetric, no swelling.   Skin: right port site  C/d/i    Laboratory/Imaging Reviewed:     Hospital Outpatient Visit on 10/25/2023   Component Date Value Ref Range Status    Glucose 10/25/2023 90  65 - 99 mg/dL Final    BUN 10/25/2023 20  8 - 23 mg/dL Final    Creatinine 10/25/2023 0.57 (L)  0.76 - 1.27 mg/dL Final    Sodium 10/25/2023 134 (L)  136 - 145 mmol/L Final    Potassium 10/25/2023 4.5  3.5 - 5.2 mmol/L Final    Chloride 10/25/2023 98  98 - 107 mmol/L Final    CO2 10/25/2023 30.0 (H)  22.0 - 29.0 mmol/L Final    Calcium 10/25/2023 9.3  8.6 - 10.5 mg/dL Final    Total Protein 10/25/2023 7.1  6.0 - 8.5 g/dL Final    Albumin 10/25/2023 4.2  3.5 - 5.2 g/dL Final    ALT (SGPT) 10/25/2023 10  1 - 41 U/L Final    AST (SGOT) 10/25/2023 13  1 - 40 U/L Final    Alkaline Phosphatase 10/25/2023 72  39 - 117 U/L Final    Total Bilirubin 10/25/2023 0.3  0.0 - 1.2 mg/dL " Final    Globulin 10/25/2023 2.9  gm/dL Final    Calculated Result    A/G Ratio 10/25/2023 1.4  g/dL Final    BUN/Creatinine Ratio 10/25/2023 35.1 (H)  7.0 - 25.0 Final    Anion Gap 10/25/2023 6.0  5.0 - 15.0 mmol/L Final    eGFR 10/25/2023 104.8  >60.0 mL/min/1.73 Final    TSH 10/25/2023 1.560  0.270 - 4.200 uIU/mL Final    Free T4 10/25/2023 1.23  0.93 - 1.70 ng/dL Final    WBC 10/25/2023 3.54  3.40 - 10.80 10*3/mm3 Final    RBC 10/25/2023 3.54 (L)  4.14 - 5.80 10*6/mm3 Final    Hemoglobin 10/25/2023 11.9 (L)  13.0 - 17.7 g/dL Final    Hematocrit 10/25/2023 36.1 (L)  37.5 - 51.0 % Final    MCV 10/25/2023 102.0 (H)  79.0 - 97.0 fL Final    MCH 10/25/2023 33.6 (H)  26.6 - 33.0 pg Final    MCHC 10/25/2023 33.0  31.5 - 35.7 g/dL Final    RDW 10/25/2023 13.1  12.3 - 15.4 % Final    RDW-SD 10/25/2023 50.3  37.0 - 54.0 fl Final    MPV 10/25/2023 9.1  6.0 - 12.0 fL Final    Platelets 10/25/2023 148  140 - 450 10*3/mm3 Final    Neutrophil % 10/25/2023 60.1  42.7 - 76.0 % Final    Lymphocyte % 10/25/2023 21.5  19.6 - 45.3 % Final    Monocyte % 10/25/2023 12.4 (H)  5.0 - 12.0 % Final    Eosinophil % 10/25/2023 5.4  0.3 - 6.2 % Final    Basophil % 10/25/2023 0.6  0.0 - 1.5 % Final    Immature Grans % 10/25/2023 0.0  0.0 - 0.5 % Final    Neutrophils, Absolute 10/25/2023 2.13  1.70 - 7.00 10*3/mm3 Final    Lymphocytes, Absolute 10/25/2023 0.76  0.70 - 3.10 10*3/mm3 Final    Monocytes, Absolute 10/25/2023 0.44  0.10 - 0.90 10*3/mm3 Final    Eosinophils, Absolute 10/25/2023 0.19  0.00 - 0.40 10*3/mm3 Final    Basophils, Absolute 10/25/2023 0.02  0.00 - 0.20 10*3/mm3 Final    Immature Grans, Absolute 10/25/2023 0.00  0.00 - 0.05 10*3/mm3 Final       No results found.      Assessment / Plan      Assessment/Plan:     1.  Recurrent CD30 positive classical Hodgkin's lymphoma  2.   Encounter for monitoring immunotherapy    -He completed 6 cycles of BV-AVD 3/23/2023. Cycle 6 was complicated by coronavirus infection and multifocal  pneumonia.  Posttherapy PET showed persistent bilateral interstitial changes and mild adenopathy. Repeat chest CT shows continued improvement in the pulmonary infiltrates consistent with a reactive infectious etiology.  - He had clear response to first line therapy with resolution of the hepatic lesions, marked improvement in the retroperitoneal adenopathy, and 50% reduction in the size of the rectal mass.  However he had persistent uptake in the rectal mass on posttreatment PET, [place] 5. We elected to proceed with biopsy to confirm whether he had residual disease followed by consolidative radiotherapy to the rectal mass given initial bulky disease.  In the interim, while awaiting biopsy, he presented with rectal bleeding, obstructive uropathy symptoms and pain and radiation planning CT showed significant interval growth in the rectal mass as well as development of new retroperitoneal adenopathy superior to the rectal mass compared to the his posttreatment PET confirming progressive disease. Consolidative radiation was converted to palliative course radiation given interval adenopathy development above the radiation field.   -Biopsy confirmed residual classic Hodgkin's lymphoma to allow earlier completion for transition to second line chemotherapy.  -Given prior treatment with Brentuximab and national shortage of carbo/cis, I discussed Kirsten.  In the interim he underwent stem cell transplant evaluation at . From review of their notes, they feel he is a borderline candidate for stem cell transplantation because of age 73.  However they did recommend avoiding Bendamustine in case of future stem cell collection.  He has also had a chest CT that showed resolution of the pulmonary interstitial infiltrates, making immunotherapy a more attractive option (previously had avoided because of active pulmonary infiltrates concerning for pneumonitis on his PET/CT results). Due to the national shortage of carboplatin and cis,  we proceeded with Keytruda, vinorelbine, Gemzar, and Doxil. He completed 4 cycles 8/2023. Post treatment PET shows complete resolution of FDG avidity  -Labs are adequate to continue maintenance Keytruda today 10/25/2023. Treatment orders signed and verified.  -Completion of consolidative radiotherapy completed.  Reviewed anticipated recovery from side effects.  -I have referred him back to [facility] for reassessment regarding his candidacy for ASCT. Reviewed their notes. Still no clear recommendation for or against transplant but appears they agreed with maintenance immunotherapy.   -Next imaging due late Nov.    3. Herniation at stoma site  -Discussed with CRS, can ultimately consider ostomy revision when treatment holiday appropriate. Discussed this with patient. He wishes to defer pending dental procedures and cataract surgery as symptoms less bothersome currently.     4.  H/o GI bleed  -PPI, continue daily.  Tolerating well with no recurrent symptoms.    5. Access  -Port    Follow Up:     3 weeks    Kaycee Leary MD  Hematology and Oncology

## 2023-11-03 ENCOUNTER — OFFICE VISIT (OUTPATIENT)
Dept: RADIATION ONCOLOGY | Facility: HOSPITAL | Age: 73
End: 2023-11-03
Payer: MEDICARE

## 2023-11-03 ENCOUNTER — HOSPITAL ENCOUNTER (OUTPATIENT)
Dept: RADIATION ONCOLOGY | Facility: HOSPITAL | Age: 73
Setting detail: RADIATION/ONCOLOGY SERIES
Discharge: HOME | End: 2023-11-03
Payer: MEDICARE

## 2023-11-03 VITALS
HEART RATE: 72 BPM | BODY MASS INDEX: 28.65 KG/M2 | WEIGHT: 167 LBS | SYSTOLIC BLOOD PRESSURE: 138 MMHG | TEMPERATURE: 97.8 F | RESPIRATION RATE: 16 BRPM | DIASTOLIC BLOOD PRESSURE: 71 MMHG | OXYGEN SATURATION: 95 %

## 2023-11-03 DIAGNOSIS — C81.98 HODGKIN LYMPHOMA OF LYMPH NODES OF MULTIPLE REGIONS, UNSPECIFIED HODGKIN LYMPHOMA TYPE: Primary | ICD-10-CM

## 2023-11-03 PROCEDURE — G0463 HOSPITAL OUTPT CLINIC VISIT: HCPCS

## 2023-11-03 NOTE — PROGRESS NOTES
FOLLOW UP NOTE    PATIENT:                                                      Abraham Wu  MEDICAL RECORD #:                        [id number]  :                                                          1950  COMPLETION DATE:   2023  DIAGNOSIS:     Hodgkin lymphoma  - Stage IV      BRIEF HISTORY:  Abraham Wu is a very pleasant 73 y.o. male with a known diagnosis of refractory Hodgkin's lymphoma.  The patient was initially diagnosed in 2022 when he presented with intractable diarrhea, anorexia, weight loss, and intermittent fevers.  He was found on CT scans to have a large obstructive rectal mass with adjacent adenopathy, bulky retroperitoneal adenopathy, and findings concerning for left renal and liver metastases.  There was a small right pleural effusion with nodularity.  He underwent diverting colostomy, with rectal biopsies consistent with classic Hodgkin's lymphoma.  The patient initiated chemotherapy with brentuximab-AVD, completing 6 cycles on 3/23/2023.  Repeat PET/CT scan 2023 showed some persistent bilateral interstitial changes and a few new hypermetabolic mediastinal lymph nodes, felt to be potentially reactive given a recent coronavirus infection and multifocal pneumonia.  Imaging also revealed resolution of the hepatic lesions, marked improvement in the retroperitoneal adenopathy, and decreased size of his rectal mass consistent with interval response to treatment.  Given some persistent uptake in the rectal mass concerning for residual disease, the patient was scheduled for outpatient biopsy and subsequent consolidative radiotherapy to the rectal mass.    Unfortunately, while awaiting biopsy, the patient was found on CT simulation for radiation treatment planning to have significant interval growth in the rectal mass as well as development of new retroperitoneal adenopathy superior to the rectal mass consistent with progressive disease.  The patient was having  rectal bleeding, pain, and obstructive uropathy symptoms at that time.  The patient underwent transanal biopsy on 5/30/2023 with Dr. Viveros.  Pathology once again revealed classical Hodgkin's lymphoma.  Because of development of new adenopathy outside of the radiation field as well as rapid regrowth of his mass, consolidative radiation therapy was changed to palliative radiation to allow for earlier transition to second line chemotherapy.  The patient received a quad shot palliatively to the rectum, consisting of 14 Gray in 4 fractions delivered over a 2-day period.  He completed these treatments 6/1/2023.  The patient was then seen by Dr. Pruitt at  for consideration of bone marrow transplant.  Ultimately, it was decided that the patient should press forward with chemoimmunotherapy.  The patient received Keytruda, Navelbine, Gemzar, and Doxil, completing 4 cycles in 8/2023.  Posttreatment PET/CT scan revealed excellent interval response and complete resolution of FDG avidity.  The patient was then referred back to our clinic for consideration of consolidative radiotherapy treatments.  The patient underwent reirradiation to the rectum consisting of 24 Gray in 8 fractions, completing 9/27/2023.  Patient tolerated treatment well.  He did not develop significant fatigue.  He did not develop radiation dermatitis.  The patient reports ostomy output was a bit looser than normal, but otherwise the patient did not develop any significant GI complaints.  He denies nausea, vomiting, abdominal pain, rectal pain or pressure.  He continues to note some clear mucousy discharge from the rectum but no blood.  The patient reports appetite is good, weight is stable.  The patient reports some mild urinary urgency without incontinence, dysuria, or additional urinary complaints.  The patient denies fever, chills, or additional acute concerns today.  He continues maintenance immunotherapy of Keytruda with   Gibran.        MEDICATIONS: Medication reconciliation for the patient was reviewed and confirmed in the electronic medical record.    Review of Systems   Eyes:  Positive for eye problems (reports upcoming cataract surgery in January).   Gastrointestinal:         +ostomy   Genitourinary:          +urgency   All other systems reviewed and are negative.          KPS 80%      Physical Exam  Vitals and nursing note reviewed.   Constitutional:       General: He is not in acute distress.     Appearance: He is well-developed.      Comments: Pleasant, conversant   HENT:      Head: Normocephalic and atraumatic.   Eyes:      Conjunctiva/sclera: Conjunctivae normal.      Pupils: Pupils are equal, round, and reactive to light.   Neck:      Comments: No obviously enlarged cervical or supraclavicular lymphadenopathy  Cardiovascular:      Rate and Rhythm: Normal rate and regular rhythm.      Heart sounds: No murmur heard.     No friction rub.      Comments: No prominent JVD.  No pedal edema.  Pulmonary:      Effort: Pulmonary effort is normal.      Breath sounds: Normal breath sounds. No wheezing.   Abdominal:      General: Bowel sounds are normal. There is no distension.      Palpations: Abdomen is soft. There is no mass.      Tenderness: There is no abdominal tenderness.      Comments: LLQ ostomy with stoma moist, pink, raised, rosebud appearance.  Light brown liquid stool in bag.  Peristomal skin appears CDI, nonerythematous.   Musculoskeletal:         General: Normal range of motion.      Cervical back: Normal range of motion and neck supple.      Comments: Moves all extremities spontaneously.   Lymphadenopathy:      Cervical: No cervical adenopathy.   Skin:     General: Skin is warm and dry.   Neurological:      Mental Status: He is alert and oriented to person, place, and time.      Comments: Coordination intact.   Psychiatric:         Behavior: Behavior normal.         Thought Content: Thought content normal.          Judgment: Judgment normal.         VITAL SIGNS:   Vitals:    11/03/23 0805   BP: 138/71   Pulse: 72   Resp: 16   Temp: 97.8 °F (36.6 °C)   TempSrc: Temporal   SpO2: 95%   Weight: 75.8 kg (167 lb)   PainSc: 0-No pain             The following portions of the patient's history were reviewed and updated as appropriate: allergies, current medications, past family history, past medical history, past social history, past surgical history and problem list.         Diagnoses and all orders for this visit:    1. Hodgkin lymphoma of lymph nodes of multiple regions, unspecified Hodgkin lymphoma type (Primary)         IMPRESSION:   Abraham uW is a 73 y.o. gentleman with stage IVB Hodgkin's lymphoma.  Restaging PET/CT scan following 6 cycles of brentuximab-AVD showed a good partial response to treatment despite residual disease in the rectum that was PET avid.  The patient was then found to have rapid progression and he underwent a quad shot to the rectum, completing 6/1/2023 with good palliation of his obstructive symptoms.  The patient was reportedly considered a borderline candidate for autologous stem cell transplant, so he resumed salvage chemotherapy with immunotherapy at that time.  The patient has had a good response on subsequent imaging, though still has some CT evidence of thickening of his rectum.  The patient has since undergone retreatment to the rectum/site of initially bulky disease with consolidative radiotherapy which he completed 1 month ago.  The patient tolerated treatment well and did not develop significant acute radiation-related toxicity.  The patient's colostomy is functioning well and he is clinically recovering as expected.  The patient was again seen by Dr. Pruitt at  with no concrete recommendations for autologous stem cell transplant.  The patient has since started maintenance immunotherapy of Keytruda, which he is reportedly tolerating.  The patient is scheduled for restaging PET/CT scan on  12/4/2023 with Dr. Leary to evaluate response to treatment.  I will schedule the patient to return to our clinic at that time for follow-up and imaging review.  We discussed follow-up intervals, surveillance imaging and serial lab work, as well as ongoing expectations for response to treatment and management of potential acute/late side effects.        RECOMMENDATIONS:    Refractory Hodgkin's lymphoma of the rectum  -Status post diversion  -Status post chemotherapy brentuximab-AVD   -Initiated rapid palliative radiotherapy with quad shot to rectum, 14 [place] in 4 fractions              -Completed 6/1/2023  -Clinical improvement  -Was seen by Dr. Pruitt at  for consideration of stem cell transplant, ultimately considered borderline candidate due to age with recommendations for salvage chemoimmunotherapy  -Status post Keytruda, Navelbine, Gemzar, and Doxil with Dr. Leary  -Repeat PET/CT scan showed good response  -Recommendations to consolidate the patient's initially bulky site of disease   -Status post consolidative IMRT/IGR T to the rectum, 24 Gray in 8 fractions given previous radiation dose  -Completed 9/27/2023  -Continues maintenance Keytruda  -Repeat PET/CT scan 12/4/2023 to evaluate response  -RTC at that time  -Follows with Dr. Leary      Return in about 5 weeks (around 12/7/2023) for Office Visit.    PORTILLO Swain      I spent a total of 45 minutes on today's visit, with more than 20 minutes in direct face to face communication, and the remainder of the time spent in reviewing the relevant history, records, available imaging, and for documentation.

## 2023-11-15 ENCOUNTER — TELEPHONE (OUTPATIENT)
Dept: ONCOLOGY | Facility: CLINIC | Age: 73
End: 2023-11-15
Payer: MEDICARE

## 2023-11-15 NOTE — TELEPHONE ENCOUNTER
Caller: SRINIVASAN HARPER    Relationship to patient: Emergency Contact    Best call back number: [phone]     Chief complaint: CALLING TO RESCHEDULE 11/16/23 APPTS    Type of visit: LAB, FU, INFUSION    Requested date: 11/17/23. IF NOT AVAILABLE PLEASE LEAVE AS IS AND RETURN CALL    ATTEMPTED TO WT

## 2023-11-16 ENCOUNTER — OFFICE VISIT (OUTPATIENT)
Dept: ONCOLOGY | Facility: CLINIC | Age: 73
End: 2023-11-16
Payer: MEDICARE

## 2023-11-16 ENCOUNTER — HOSPITAL ENCOUNTER (OUTPATIENT)
Dept: ONCOLOGY | Facility: HOSPITAL | Age: 73
Discharge: HOME | End: 2023-11-16
Admitting: INTERNAL MEDICINE
Payer: MEDICARE

## 2023-11-16 ENCOUNTER — APPOINTMENT (OUTPATIENT)
Dept: ONCOLOGY | Facility: HOSPITAL | Age: 73
End: 2023-11-16
Payer: MEDICARE

## 2023-11-16 VITALS
SYSTOLIC BLOOD PRESSURE: 125 MMHG | RESPIRATION RATE: 16 BRPM | HEIGHT: 64 IN | BODY MASS INDEX: 28.66 KG/M2 | WEIGHT: 167.9 LBS | TEMPERATURE: 97.5 F | OXYGEN SATURATION: 98 % | DIASTOLIC BLOOD PRESSURE: 79 MMHG | HEART RATE: 83 BPM

## 2023-11-16 DIAGNOSIS — Z45.2 ENCOUNTER FOR CARE RELATED TO VASCULAR ACCESS PORT: ICD-10-CM

## 2023-11-16 DIAGNOSIS — C81.98 HODGKIN LYMPHOMA OF LYMPH NODES OF MULTIPLE REGIONS, UNSPECIFIED HODGKIN LYMPHOMA TYPE: Primary | ICD-10-CM

## 2023-11-16 DIAGNOSIS — Z51.11 CHEMOTHERAPY MANAGEMENT, ENCOUNTER FOR: ICD-10-CM

## 2023-11-16 LAB
ALBUMIN SERPL-MCNC: 4 G/DL (ref 3.5–5.2)
ALBUMIN/GLOB SERPL: 1.5 G/DL
ALP SERPL-CCNC: 78 U/L (ref 39–117)
ALT SERPL W P-5'-P-CCNC: 10 U/L (ref 1–41)
ANION GAP SERPL CALCULATED.3IONS-SCNC: 6 MMOL/L (ref 5–15)
AST SERPL-CCNC: 14 U/L (ref 1–40)
BASOPHILS # BLD AUTO: 0.01 10*3/MM3 (ref 0–0.2)
BASOPHILS NFR BLD AUTO: 0.3 % (ref 0–1.5)
BILIRUB SERPL-MCNC: 0.3 MG/DL (ref 0–1.2)
BUN SERPL-MCNC: 16 MG/DL (ref 8–23)
BUN/CREAT SERPL: 23.9 (ref 7–25)
CALCIUM SPEC-SCNC: 9.3 MG/DL (ref 8.6–10.5)
CHLORIDE SERPL-SCNC: 100 MMOL/L (ref 98–107)
CO2 SERPL-SCNC: 30 MMOL/L (ref 22–29)
CREAT SERPL-MCNC: 0.67 MG/DL (ref 0.76–1.27)
DEPRECATED RDW RBC AUTO: 44.7 FL (ref 37–54)
EGFRCR SERPLBLD CKD-EPI 2021: 99.8 ML/MIN/1.73
EOSINOPHIL # BLD AUTO: 0.1 10*3/MM3 (ref 0–0.4)
EOSINOPHIL NFR BLD AUTO: 2.8 % (ref 0.3–6.2)
ERYTHROCYTE [DISTWIDTH] IN BLOOD BY AUTOMATED COUNT: 12.4 % (ref 12.3–15.4)
GLOBULIN UR ELPH-MCNC: 2.7 GM/DL
GLUCOSE SERPL-MCNC: 81 MG/DL (ref 65–99)
HCT VFR BLD AUTO: 36.7 % (ref 37.5–51)
HGB BLD-MCNC: 12.3 G/DL (ref 13–17.7)
IMM GRANULOCYTES # BLD AUTO: 0.01 10*3/MM3 (ref 0–0.05)
IMM GRANULOCYTES NFR BLD AUTO: 0.3 % (ref 0–0.5)
LYMPHOCYTES # BLD AUTO: 0.79 10*3/MM3 (ref 0.7–3.1)
LYMPHOCYTES NFR BLD AUTO: 22.1 % (ref 19.6–45.3)
MCH RBC QN AUTO: 32.8 PG (ref 26.6–33)
MCHC RBC AUTO-ENTMCNC: 33.5 G/DL (ref 31.5–35.7)
MCV RBC AUTO: 97.9 FL (ref 79–97)
MONOCYTES # BLD AUTO: 0.43 10*3/MM3 (ref 0.1–0.9)
MONOCYTES NFR BLD AUTO: 12 % (ref 5–12)
NEUTROPHILS NFR BLD AUTO: 2.23 10*3/MM3 (ref 1.7–7)
NEUTROPHILS NFR BLD AUTO: 62.5 % (ref 42.7–76)
PLATELET # BLD AUTO: 154 10*3/MM3 (ref 140–450)
PMV BLD AUTO: 8.3 FL (ref 6–12)
POTASSIUM SERPL-SCNC: 4.6 MMOL/L (ref 3.5–5.2)
PROT SERPL-MCNC: 6.7 G/DL (ref 6–8.5)
RBC # BLD AUTO: 3.75 10*6/MM3 (ref 4.14–5.8)
SODIUM SERPL-SCNC: 136 MMOL/L (ref 136–145)
WBC NRBC COR # BLD: 3.57 10*3/MM3 (ref 3.4–10.8)

## 2023-11-16 PROCEDURE — 96413 CHEMO IV INFUSION 1 HR: CPT

## 2023-11-16 PROCEDURE — 96365 THER/PROPH/DIAG IV INF INIT: CPT

## 2023-11-16 PROCEDURE — 85025 COMPLETE CBC W/AUTO DIFF WBC: CPT | Performed by: INTERNAL MEDICINE

## 2023-11-16 PROCEDURE — 25010000002 PEMBROLIZUMAB 100 MG/4ML SOLUTION 4 ML VIAL: Performed by: INTERNAL MEDICINE

## 2023-11-16 PROCEDURE — 80053 COMPREHEN METABOLIC PANEL: CPT | Performed by: INTERNAL MEDICINE

## 2023-11-16 PROCEDURE — 25010000002 HEPARIN LOCK FLUSH PER 10 UNITS: Performed by: INTERNAL MEDICINE

## 2023-11-16 RX ORDER — HEPARIN SODIUM (PORCINE) LOCK FLUSH IV SOLN 100 UNIT/ML 100 UNIT/ML
500 SOLUTION INTRAVENOUS AS NEEDED
Status: DISCONTINUED | OUTPATIENT
Start: 2023-11-16 | End: 2023-11-17 | Stop reason: HOSPADM

## 2023-11-16 RX ORDER — HEPARIN SODIUM (PORCINE) LOCK FLUSH IV SOLN 100 UNIT/ML 100 UNIT/ML
500 SOLUTION INTRAVENOUS AS NEEDED
OUTPATIENT
Start: 2023-11-16

## 2023-11-16 RX ORDER — SODIUM CHLORIDE 9 MG/ML
250 INJECTION, SOLUTION INTRAVENOUS ONCE
Status: CANCELLED | OUTPATIENT
Start: 2023-11-16

## 2023-11-16 RX ADMIN — SODIUM CHLORIDE 200 MG: 9 INJECTION, SOLUTION INTRAVENOUS at 11:35

## 2023-11-16 RX ADMIN — HEPARIN 500 UNITS: 100 SYRINGE at 12:20

## 2023-11-16 NOTE — PROGRESS NOTES
Hematology and Oncology [place]  Office number [phone]    Fax number [phone]     Follow up     Date: 23    Patient Name: Abraham Wu  MRN: [id number]  : 1950    Chief Complaint: Hodgkin Lymphoma follow up/treatment    Surgeon: Dr. Hiram Viveros MD    Cancer Staging: IV    History of Present Illness: Abraham Wu is a pleasant 73 y.o. male with PMH of hypertension, sleep apnea, hard of hearing who presents today for follow up of Hodgkin Lymphoma.     He initially presented 2022 with intractable diarrhea, low appetite, and a greater than 50 pound weight loss over several month period associated with intermittent fevers.  CT of the chest abdomen pelvis obtained in the ER shows of rectal mass spanning greater than 12 cm with adjacent adenopathy, bulky RP adenopathy, and findings concerning for left renal and liver metastases.  Small right pleural effusion with nodularity.  There was concern for impending obstruction, so he underwent diverting colostomy and biopsy on 2022.  Biopsies from the peritoneam showed a fibrotic nodule histiocytes and eosinophils admixed with CD30 positive large cells.  Rectal biopsies showed involvement by CD30 positive lymphoma, classic Hodgkin lymphoma versus CD30 positive T-cell or B-cell lymphoma.  There was extensive fibrosis and crush artifact.  He underwent repeat transrectal biopsy 10/4/2022 for further characterization which was felt to be most consistent with classical Hodgkin lymphoma.    He initiated chemotherapy with Brentuximab-AVD as an inpatient on 10/8/2022.  Cycle #1 was complicated by GI bleed requiring multiple blood transfusion and ultimately coil artery embolization 10/12; neutropenic fever, Candida esophagitis and mucositis.  He had a prolonged ICU stay  And required enteral feeding.  He was discharged 10/20/2022.    Following his last cycle he was admitted with multifocal PNA and +corona virus  infection.    Treatment history:  Brentuximab-AVD: C1 10/8/22  C2: 11/1/22 onward with DA 20% in BVD; full dose brentuximab  Completed C6 3/22/23    Palliative radiation to rectum 5/30/2023 (abbreviated due to systemic progression  GVD+Pembro x 4 cycles: completed 8/30/2023  Consolidative radiation to rectum completed 9/2023    Pembrolizumab maintenance: current  Radiation completed 9/27/23    Interval history:    Here for consideration of maintenance immunotherapy. Occasional aches in necks, shoulders, thighs. Takes tyelnol 2-3 x per week. Energy is good. Has been doing more working, walking.   Cataract surgery planned first 2 Fridays in Jan.   No bleeding. Good ostomy output.  His hernia is not as bothersome and he is planning to delay consideration of any surgical repair.  He is anxious for his upcoming PET/CT results.  Mild nasal discharge and postnasal drip which she attributes to seasonal allergies.  Using flonase, claritin  GERD controlled with PPI.  No fever or infectious symptoms.    Past Medical History:   Past Medical History:   Diagnosis Date    benign polypoid tissue right lung     Cancer     HODGKINS LYMPHOMA, RECTAL CANCER    Diabetes mellitus     History of radiation therapy 09/27/2023    re-treat rectum    Hyperlipidemia     Hypertension     Lymphoma     Mycobacterium mucogenicum     SHERRI (obstructive sleep apnea)     O2 2L/MIN AT NIGHT ONLY    Pneumonia     2023    Seasonal allergies      Past Surgical History:   Past Surgical History:   Procedure Laterality Date    BRONCHOSCOPY      removal of obstructing polypoid tissue right middle lung    COLOSTOMY N/A 09/22/2022    Procedure: LAPAROSCOPIC COLOSTOMY CREATION, FLEXIBLE SIGMOIDOSCOPY;  Surgeon: Hiram Viveros MD;  Location: [facility] OR;  Service: General;  Laterality: N/A;    DENTAL PROCEDURE      ENDOSCOPY N/A 10/10/2022    Procedure: ESOPHAGOGASTRODUODENOSCOPY;  Surgeon: Neeraj Lynn MD;  Location: [facility] ENDOSCOPY;  Service:  Gastroenterology;  Laterality: N/A;    ENDOSCOPY N/A 10/12/2022    Procedure: ESOPHAGOGASTRODUODENOSCOPY;  Surgeon: Brunner, Mark I, MD;  Location:  [facility] ENDOSCOPY;  Service: Gastroenterology;  Laterality: N/A;    EXAM UNDER ANESTHESIA, RECTAL BIOPSY N/A 10/04/2022    Procedure: EXAM UNDER ANESTHESIA, TRANSANAL BIOPSY WITH TRUCUT NEEDLE (LITHOTOMY-CANDY CANE);  Surgeon: Hiram Viveros MD;  Location:  [facility] OR;  Service: General;  Laterality: N/A;    EXAM UNDER ANESTHESIA, RECTAL BIOPSY N/A 5/30/2023    Procedure: EXAM UNDER ANESTHESIA, TRANSANAL BIOPSY OF RECTAL MASS WITH TRUCUT NEEDLE, PROCTOSCOPY;  Surgeon: Hiram Viveros MD;  Location:  [facility] OR;  Service: General;  Laterality: N/A;    PERIPHERALLY INSERTED CENTRAL CATHETER INSERTION      Removed 11/28/2022    VENOUS ACCESS DEVICE (PORT) INSERTION Right 11/28/2022     Family History:   Family History   Problem Relation Age of Onset    Diabetes Mother     Diabetes Father     Heart disease Father      Social History:   Social History     Socioeconomic History    Marital status:    Tobacco Use    Smoking status: Never    Smokeless tobacco: Never   Vaping Use    Vaping Use: Never used   Substance and Sexual Activity    Alcohol use: Not Currently     Comment: previously drank 2 glasses of wine/beer nightly    Drug use: Never    Sexual activity: Defer       Medications:     Current Outpatient Medications:     fluticasone (FLONASE) 50 MCG/ACT nasal spray, 1 spray into the nostril(s) as directed by provider Daily As Needed for Allergies or Rhinitis. OTC, Disp: , Rfl:     lidocaine-prilocaine (EMLA) 2.5-2.5 % cream, Apply 1 application topically to the appropriate area as directed As Needed (45-60 minutes prior to port access.  Cover with saran/plastic wrap.)., Disp: 30 g, Rfl: 3    loratadine (CLARITIN) 10 MG tablet, Take 1 tablet by mouth Daily., Disp: 30 tablet, Rfl: 5    ondansetron (Zofran) 8 MG tablet, Take 1 tablet by mouth Every 8 (Eight) Hours  "As Needed for Nausea or Vomiting., Disp: 30 tablet, Rfl: 5    pantoprazole (PROTONIX) 40 MG EC tablet, Take 1 tablet by mouth Daily., Disp: 90 tablet, Rfl: 1    prochlorperazine (COMPAZINE) 5 MG tablet, Take 1 tablet by mouth Every 6 (Six) Hours As Needed for Nausea or Vomiting., Disp: 30 tablet, Rfl: 2    rosuvastatin (CRESTOR) 10 MG tablet, Take 1 tablet by mouth Daily., Disp: , Rfl:     Allergies:   No Known Allergies    Objective     Vital Signs:   Vitals:    11/16/23 1022   BP: 125/79   Pulse: 83   Resp: 16   Temp: 97.5 °F (36.4 °C)   TempSrc: Infrared   SpO2: 98%   Weight: 76.2 kg (167 lb 14.4 oz)   Height: 162.6 cm (64.02\")   PainSc: 0-No pain    Body mass index is 28.81 kg/m².   Pain Score    11/16/23 1022   PainSc: 0-No pain       ECOG Performance Status: 1    Physical Exam:   General: Well appearing male   HEENT: Normocephalic, atraumatic. Sclera anicteric.   Neck: supple, no palpable LAD. No axillary adenopathy  Cardiovascular: regular rate and rhythm. No murmurs.   Respiratory: Normal rate. Clear to auscultation bilaterally  Abdomen: Soft, nontender, non distended with normoactive bowel sounds. Ostomy LLQ with stoma with herniated bowel, pink  Lymph: no supraclavicular or axillary adenopathy  Neuro: Alert and oriented x 3.   Ext: Symmetric, no swelling.   Skin: right port site  C/d/i    Laboratory/Imaging Reviewed:     Hospital Outpatient Visit on 11/16/2023   Component Date Value Ref Range Status    Glucose 11/16/2023 81  65 - 99 mg/dL Final    BUN 11/16/2023 16  8 - 23 mg/dL Final    Creatinine 11/16/2023 0.67 (L)  0.76 - 1.27 mg/dL Final    Sodium 11/16/2023 136  136 - 145 mmol/L Final    Potassium 11/16/2023 4.6  3.5 - 5.2 mmol/L Final    Chloride 11/16/2023 100  98 - 107 mmol/L Final    CO2 11/16/2023 30.0 (H)  22.0 - 29.0 mmol/L Final    Calcium 11/16/2023 9.3  8.6 - 10.5 mg/dL Final    Total Protein 11/16/2023 6.7  6.0 - 8.5 g/dL Final    Albumin 11/16/2023 4.0  3.5 - 5.2 g/dL Final    ALT (SGPT) " 11/16/2023 10  1 - 41 U/L Final    AST (SGOT) 11/16/2023 14  1 - 40 U/L Final    Alkaline Phosphatase 11/16/2023 78  39 - 117 U/L Final    Total Bilirubin 11/16/2023 0.3  0.0 - 1.2 mg/dL Final    Globulin 11/16/2023 2.7  gm/dL Final    Calculated Result    A/G Ratio 11/16/2023 1.5  g/dL Final    BUN/Creatinine Ratio 11/16/2023 23.9  7.0 - 25.0 Final    Anion Gap 11/16/2023 6.0  5.0 - 15.0 mmol/L Final    eGFR 11/16/2023 99.8  >60.0 mL/min/1.73 Final    WBC 11/16/2023 3.57  3.40 - 10.80 10*3/mm3 Final    RBC 11/16/2023 3.75 (L)  4.14 - 5.80 10*6/mm3 Final    Hemoglobin 11/16/2023 12.3 (L)  13.0 - 17.7 g/dL Final    Hematocrit 11/16/2023 36.7 (L)  37.5 - 51.0 % Final    MCV 11/16/2023 97.9 (H)  79.0 - 97.0 fL Final    MCH 11/16/2023 32.8  26.6 - 33.0 pg Final    MCHC 11/16/2023 33.5  31.5 - 35.7 g/dL Final    RDW 11/16/2023 12.4  12.3 - 15.4 % Final    RDW-SD 11/16/2023 44.7  37.0 - 54.0 fl Final    MPV 11/16/2023 8.3  6.0 - 12.0 fL Final    Platelets 11/16/2023 154  140 - 450 10*3/mm3 Final    Neutrophil % 11/16/2023 62.5  42.7 - 76.0 % Final    Lymphocyte % 11/16/2023 22.1  19.6 - 45.3 % Final    Monocyte % 11/16/2023 12.0  5.0 - 12.0 % Final    Eosinophil % 11/16/2023 2.8  0.3 - 6.2 % Final    Basophil % 11/16/2023 0.3  0.0 - 1.5 % Final    Immature Grans % 11/16/2023 0.3  0.0 - 0.5 % Final    Neutrophils, Absolute 11/16/2023 2.23  1.70 - 7.00 10*3/mm3 Final    Lymphocytes, Absolute 11/16/2023 0.79  0.70 - 3.10 10*3/mm3 Final    Monocytes, Absolute 11/16/2023 0.43  0.10 - 0.90 10*3/mm3 Final    Eosinophils, Absolute 11/16/2023 0.10  0.00 - 0.40 10*3/mm3 Final    Basophils, Absolute 11/16/2023 0.01  0.00 - 0.20 10*3/mm3 Final    Immature Grans, Absolute 11/16/2023 0.01  0.00 - 0.05 10*3/mm3 Final       No results found.      Assessment / Plan      Assessment/Plan:     1.  Recurrent CD30 positive classical Hodgkin's lymphoma  2.   Encounter for monitoring immunotherapy    -He completed 6 cycles of BV-AVD 3/23/2023.  Cycle 6 was complicated by coronavirus infection and multifocal pneumonia.  Posttherapy PET showed persistent bilateral interstitial changes and mild adenopathy. Repeat chest CT shows continued improvement in the pulmonary infiltrates consistent with a reactive infectious etiology.  - He had clear response to first line therapy with resolution of the hepatic lesions, marked improvement in the retroperitoneal adenopathy, and 50% reduction in the size of the rectal mass.  However he had persistent uptake in the rectal mass on posttreatment PET, [place] 5. We elected to proceed with biopsy to confirm whether he had residual disease followed by consolidative radiotherapy to the rectal mass given initial bulky disease.  In the interim, while awaiting biopsy, he presented with rectal bleeding, obstructive uropathy symptoms and pain and radiation planning CT showed significant interval growth in the rectal mass as well as development of new retroperitoneal adenopathy superior to the rectal mass compared to the his posttreatment PET confirming progressive disease. Consolidative radiation was converted to palliative course radiation given interval adenopathy development above the radiation field.   -Biopsy confirmed residual classic Hodgkin's lymphoma to allow earlier completion for transition to second line chemotherapy.  -Given prior treatment with Brentuximab and national shortage of carbo/cis, I discussed BeGEV.  In the interim he underwent stem cell transplant evaluation at . From review of their notes, they feel he is a borderline candidate for stem cell transplantation because of age 73.  However they did recommend avoiding Bendamustine in case of future stem cell collection.  He has also had a chest CT that showed resolution of the pulmonary interstitial infiltrates, making immunotherapy a more attractive option (previously had avoided because of active pulmonary infiltrates concerning for pneumonitis on his  PET/CT results). Due to the national shortage of carboplatin and cis, we proceeded with Keytruda, vinorelbine, Gemzar, and Doxil. He completed 4 cycles 8/2023. Post treatment PET shows complete resolution of FDG avidity. Consolidative radiotherapy completed.    -Labs are adequate to continue maintenance Keytruda today 11/16/23.  Treatment orders signed.  -Reviewed anticipated recovery from side effects.  -I have referred him back to [facility] for reassessment regarding his candidacy for ASCT. Reviewed their notes. Still no clear recommendation for or against transplant but appears they agreed with maintenance immunotherapy.   -His repeat imaging is scheduled in early December and he has follow-up with me in the transplant team at that time.    3. Herniation at stoma site  -Discussed with CRS, can ultimately consider ostomy revision when treatment holiday appropriate. Discussed this with patient. He wishes to defer pending dental procedures and cataract surgery as symptoms less bothersome currently.     4.  H/o GI bleed  -PPI, continue daily.  Tolerating well with no recurrent symptoms.    5. Access  -Port    Follow Up:     3 weeks with labs and PET results, for consideration of next cycle     Kaycee Leary MD  Hematology and Oncology

## 2023-12-04 ENCOUNTER — HOSPITAL ENCOUNTER (OUTPATIENT)
Dept: PET IMAGING | Facility: HOSPITAL | Age: 73
Discharge: HOME | End: 2023-12-04
Payer: MEDICARE

## 2023-12-04 DIAGNOSIS — C81.98 HODGKIN LYMPHOMA OF LYMPH NODES OF MULTIPLE REGIONS, UNSPECIFIED HODGKIN LYMPHOMA TYPE: ICD-10-CM

## 2023-12-04 DIAGNOSIS — C81.98 HODGKIN LYMPHOMA OF LYMPH NODES OF MULTIPLE REGIONS, UNSPECIFIED HODGKIN LYMPHOMA TYPE: Primary | ICD-10-CM

## 2023-12-04 LAB — GLUCOSE BLDC GLUCOMTR-MCNC: 94 MG/DL (ref 70–130)

## 2023-12-04 PROCEDURE — 78815 PET IMAGE W/CT SKULL-THIGH: CPT

## 2023-12-04 PROCEDURE — A9552 F18 FDG: HCPCS | Performed by: INTERNAL MEDICINE

## 2023-12-04 PROCEDURE — 0 FLUDEOXYGLUCOSE F18 SOLUTION: Performed by: INTERNAL MEDICINE

## 2023-12-04 PROCEDURE — 82948 REAGENT STRIP/BLOOD GLUCOSE: CPT

## 2023-12-04 RX ORDER — LORATADINE 10 MG/1
10 TABLET ORAL DAILY
Qty: 30 TABLET | Refills: 5 | Status: SHIPPED | OUTPATIENT
Start: 2023-12-04

## 2023-12-04 RX ADMIN — FLUDEOXYGLUCOSE F 18 1 DOSE: 200 INJECTION, SOLUTION INTRAVENOUS at 08:55

## 2023-12-06 DIAGNOSIS — C81.98 HODGKIN LYMPHOMA OF LYMPH NODES OF MULTIPLE REGIONS, UNSPECIFIED HODGKIN LYMPHOMA TYPE: Primary | ICD-10-CM

## 2023-12-06 RX ORDER — PANTOPRAZOLE SODIUM 40 MG/1
40 TABLET, DELAYED RELEASE ORAL DAILY
Qty: 90 TABLET | Refills: 1 | Status: SHIPPED | OUTPATIENT
Start: 2023-12-06

## 2023-12-07 ENCOUNTER — HOSPITAL ENCOUNTER (OUTPATIENT)
Dept: ONCOLOGY | Facility: HOSPITAL | Age: 73
Discharge: HOME | End: 2023-12-07
Payer: MEDICARE

## 2023-12-07 ENCOUNTER — OFFICE VISIT (OUTPATIENT)
Dept: ONCOLOGY | Facility: CLINIC | Age: 73
End: 2023-12-07
Payer: MEDICARE

## 2023-12-07 VITALS
WEIGHT: 169 LBS | DIASTOLIC BLOOD PRESSURE: 71 MMHG | RESPIRATION RATE: 16 BRPM | BODY MASS INDEX: 28.85 KG/M2 | HEIGHT: 64 IN | OXYGEN SATURATION: 97 % | SYSTOLIC BLOOD PRESSURE: 148 MMHG | HEART RATE: 57 BPM | TEMPERATURE: 97.7 F

## 2023-12-07 DIAGNOSIS — Z45.2 ENCOUNTER FOR CARE RELATED TO VASCULAR ACCESS PORT: ICD-10-CM

## 2023-12-07 DIAGNOSIS — C81.98 HODGKIN LYMPHOMA OF LYMPH NODES OF MULTIPLE REGIONS, UNSPECIFIED HODGKIN LYMPHOMA TYPE: Primary | ICD-10-CM

## 2023-12-07 DIAGNOSIS — Z51.11 CHEMOTHERAPY MANAGEMENT, ENCOUNTER FOR: ICD-10-CM

## 2023-12-07 LAB
ALBUMIN SERPL-MCNC: 4 G/DL (ref 3.5–5.2)
ALBUMIN/GLOB SERPL: 1.3 G/DL
ALP SERPL-CCNC: 81 U/L (ref 39–117)
ALT SERPL W P-5'-P-CCNC: 9 U/L (ref 1–41)
ANION GAP SERPL CALCULATED.3IONS-SCNC: 8 MMOL/L (ref 5–15)
AST SERPL-CCNC: 15 U/L (ref 1–40)
BASOPHILS # BLD AUTO: 0.01 10*3/MM3 (ref 0–0.2)
BASOPHILS NFR BLD AUTO: 0.2 % (ref 0–1.5)
BILIRUB SERPL-MCNC: 0.4 MG/DL (ref 0–1.2)
BUN SERPL-MCNC: 16 MG/DL (ref 8–23)
BUN/CREAT SERPL: 23.5 (ref 7–25)
CALCIUM SPEC-SCNC: 9.3 MG/DL (ref 8.6–10.5)
CHLORIDE SERPL-SCNC: 97 MMOL/L (ref 98–107)
CO2 SERPL-SCNC: 29 MMOL/L (ref 22–29)
CREAT SERPL-MCNC: 0.68 MG/DL (ref 0.76–1.27)
DEPRECATED RDW RBC AUTO: 44.8 FL (ref 37–54)
EGFRCR SERPLBLD CKD-EPI 2021: 99.4 ML/MIN/1.73
EOSINOPHIL # BLD AUTO: 0.14 10*3/MM3 (ref 0–0.4)
EOSINOPHIL NFR BLD AUTO: 3.1 % (ref 0.3–6.2)
ERYTHROCYTE [DISTWIDTH] IN BLOOD BY AUTOMATED COUNT: 12.7 % (ref 12.3–15.4)
GLOBULIN UR ELPH-MCNC: 3.1 GM/DL
GLUCOSE SERPL-MCNC: 84 MG/DL (ref 65–99)
HCT VFR BLD AUTO: 37.5 % (ref 37.5–51)
HGB BLD-MCNC: 12.6 G/DL (ref 13–17.7)
IMM GRANULOCYTES # BLD AUTO: 0 10*3/MM3 (ref 0–0.05)
IMM GRANULOCYTES NFR BLD AUTO: 0 % (ref 0–0.5)
LYMPHOCYTES # BLD AUTO: 0.98 10*3/MM3 (ref 0.7–3.1)
LYMPHOCYTES NFR BLD AUTO: 21.8 % (ref 19.6–45.3)
MCH RBC QN AUTO: 32 PG (ref 26.6–33)
MCHC RBC AUTO-ENTMCNC: 33.6 G/DL (ref 31.5–35.7)
MCV RBC AUTO: 95.2 FL (ref 79–97)
MONOCYTES # BLD AUTO: 0.49 10*3/MM3 (ref 0.1–0.9)
MONOCYTES NFR BLD AUTO: 10.9 % (ref 5–12)
NEUTROPHILS NFR BLD AUTO: 2.88 10*3/MM3 (ref 1.7–7)
NEUTROPHILS NFR BLD AUTO: 64 % (ref 42.7–76)
PLATELET # BLD AUTO: 175 10*3/MM3 (ref 140–450)
PMV BLD AUTO: 9 FL (ref 6–12)
POTASSIUM SERPL-SCNC: 4.4 MMOL/L (ref 3.5–5.2)
PROT SERPL-MCNC: 7.1 G/DL (ref 6–8.5)
RBC # BLD AUTO: 3.94 10*6/MM3 (ref 4.14–5.8)
SODIUM SERPL-SCNC: 134 MMOL/L (ref 136–145)
T4 FREE SERPL-MCNC: 1.22 NG/DL (ref 0.93–1.7)
TSH SERPL DL<=0.05 MIU/L-ACNC: 1.7 UIU/ML (ref 0.27–4.2)
WBC NRBC COR # BLD AUTO: 4.5 10*3/MM3 (ref 3.4–10.8)

## 2023-12-07 PROCEDURE — 96413 CHEMO IV INFUSION 1 HR: CPT

## 2023-12-07 PROCEDURE — 84443 ASSAY THYROID STIM HORMONE: CPT | Performed by: INTERNAL MEDICINE

## 2023-12-07 PROCEDURE — 25010000002 HEPARIN LOCK FLUSH PER 10 UNITS: Performed by: INTERNAL MEDICINE

## 2023-12-07 PROCEDURE — 25010000002 PEMBROLIZUMAB 100 MG/4ML SOLUTION 4 ML VIAL: Performed by: INTERNAL MEDICINE

## 2023-12-07 PROCEDURE — 85025 COMPLETE CBC W/AUTO DIFF WBC: CPT | Performed by: INTERNAL MEDICINE

## 2023-12-07 PROCEDURE — 80053 COMPREHEN METABOLIC PANEL: CPT | Performed by: INTERNAL MEDICINE

## 2023-12-07 PROCEDURE — 84439 ASSAY OF FREE THYROXINE: CPT | Performed by: INTERNAL MEDICINE

## 2023-12-07 RX ORDER — SODIUM CHLORIDE 9 MG/ML
250 INJECTION, SOLUTION INTRAVENOUS ONCE
Status: CANCELLED | OUTPATIENT
Start: 2023-12-07

## 2023-12-07 RX ORDER — HEPARIN SODIUM (PORCINE) LOCK FLUSH IV SOLN 100 UNIT/ML 100 UNIT/ML
500 SOLUTION INTRAVENOUS AS NEEDED
OUTPATIENT
Start: 2023-12-07

## 2023-12-07 RX ORDER — HEPARIN SODIUM (PORCINE) LOCK FLUSH IV SOLN 100 UNIT/ML 100 UNIT/ML
500 SOLUTION INTRAVENOUS AS NEEDED
Status: DISCONTINUED | OUTPATIENT
Start: 2023-12-07 | End: 2023-12-08 | Stop reason: HOSPADM

## 2023-12-07 RX ADMIN — HEPARIN 500 UNITS: 100 SYRINGE at 11:36

## 2023-12-07 RX ADMIN — SODIUM CHLORIDE 200 MG: 9 INJECTION, SOLUTION INTRAVENOUS at 10:55

## 2023-12-07 NOTE — PROGRESS NOTES
Hematology and Oncology [place]  Office number [phone]    Fax number [phone]     Follow up     Date: 23    Patient Name: Abraham Wu  MRN: [id number]  : 1950    Chief Complaint: Hodgkin Lymphoma follow up/treatment    Surgeon: Dr. Hiram Viveros MD    Cancer Staging: IV    History of Present Illness: Abraham Wu is a pleasant 73 y.o. male with PMH of hypertension, sleep apnea, hard of hearing who presents today for follow up of Hodgkin Lymphoma.     He initially presented 2022 with intractable diarrhea, low appetite, and a greater than 50 pound weight loss over several month period associated with intermittent fevers.  CT of the chest abdomen pelvis obtained in the ER shows of rectal mass spanning greater than 12 cm with adjacent adenopathy, bulky RP adenopathy, and findings concerning for left renal and liver metastases.  Small right pleural effusion with nodularity.  There was concern for impending obstruction, so he underwent diverting colostomy and biopsy on 2022.  Biopsies from the peritoneam showed a fibrotic nodule histiocytes and eosinophils admixed with CD30 positive large cells.  Rectal biopsies showed involvement by CD30 positive lymphoma, classic Hodgkin lymphoma versus CD30 positive T-cell or B-cell lymphoma.  There was extensive fibrosis and crush artifact.  He underwent repeat transrectal biopsy 10/4/2022 for further characterization which was felt to be most consistent with classical Hodgkin lymphoma.    He initiated chemotherapy with Brentuximab-AVD as an inpatient on 10/8/2022.  Cycle #1 was complicated by GI bleed requiring multiple blood transfusion and ultimately coil artery embolization 10/12; neutropenic fever, Candida esophagitis and mucositis.  He had a prolonged ICU stay  And required enteral feeding.  He was discharged 10/20/2022.    Following his last cycle he was admitted with multifocal PNA and +corona virus  infection.    Treatment history:  Brentuximab-AVD: C1 10/8/22  C2: 11/1/22 onward with DA 20% in BVD; full dose brentuximab  Completed C6 3/22/23    Palliative radiation to rectum 5/30/2023 (abbreviated due to systemic progression)  GVD+Pembro x 4 cycles: completed 8/30/2023  Consolidative radiation to rectum completed 9/27/23    Pembrolizumab maintenance: current      Interval history:    Here for consideration of maintenance immunotherapy.  He is overall feeling well.  Denies any systemic symptoms.  He had nausea and abdominal symptoms since his last visit which subsequently resolved and he did have sick contacts around the time.  He does endorse mild increased low back pain, but not debilitating, low intensity.  Has had intermittent pain in the past. GERD controlled with PPI.  No fever or infectious symptoms.  No cough.    Past Medical History:   Past Medical History:   Diagnosis Date    benign polypoid tissue right lung     Cancer     HODGKINS LYMPHOMA, RECTAL CANCER    Diabetes mellitus     History of radiation therapy 09/27/2023    re-treat rectum    Hyperlipidemia     Hypertension     Lymphoma     Mycobacterium mucogenicum     SHERRI (obstructive sleep apnea)     O2 2L/MIN AT NIGHT ONLY    Pneumonia     2023    Seasonal allergies      Past Surgical History:   Past Surgical History:   Procedure Laterality Date    BRONCHOSCOPY      removal of obstructing polypoid tissue right middle lung    COLOSTOMY N/A 09/22/2022    Procedure: LAPAROSCOPIC COLOSTOMY CREATION, FLEXIBLE SIGMOIDOSCOPY;  Surgeon: Hiram Viveros MD;  Location: [facility];  Service: General;  Laterality: N/A;    DENTAL PROCEDURE      ENDOSCOPY N/A 10/10/2022    Procedure: ESOPHAGOGASTRODUODENOSCOPY;  Surgeon: Neeraj Lynn MD;  Location:  [facility];  Service: Gastroenterology;  Laterality: N/A;    ENDOSCOPY N/A 10/12/2022    Procedure: ESOPHAGOGASTRODUODENOSCOPY;  Surgeon: Brunner, Mark I, MD;  Location:  [facility];  Service:  Gastroenterology;  Laterality: N/A;    EXAM UNDER ANESTHESIA, RECTAL BIOPSY N/A 10/04/2022    Procedure: EXAM UNDER ANESTHESIA, TRANSANAL BIOPSY WITH TRUCUT NEEDLE (LITHOTOMY-CANDY CANE);  Surgeon: Hiram Viveros MD;  Location:  [facility] OR;  Service: General;  Laterality: N/A;    EXAM UNDER ANESTHESIA, RECTAL BIOPSY N/A 5/30/2023    Procedure: EXAM UNDER ANESTHESIA, TRANSANAL BIOPSY OF RECTAL MASS WITH TRUCUT NEEDLE, PROCTOSCOPY;  Surgeon: Hiram Viveros MD;  Location:  [facility] OR;  Service: General;  Laterality: N/A;    PERIPHERALLY INSERTED CENTRAL CATHETER INSERTION      Removed 11/28/2022    VENOUS ACCESS DEVICE (PORT) INSERTION Right 11/28/2022     Family History:   Family History   Problem Relation Age of Onset    Diabetes Mother     Diabetes Father     Heart disease Father      Social History:   Social History     Socioeconomic History    Marital status:    Tobacco Use    Smoking status: Never    Smokeless tobacco: Never   Vaping Use    Vaping Use: Never used   Substance and Sexual Activity    Alcohol use: Not Currently     Comment: previously drank 2 glasses of wine/beer nightly    Drug use: Never    Sexual activity: Defer       Medications:     Current Outpatient Medications:     fluticasone (FLONASE) 50 MCG/ACT nasal spray, 1 spray into the nostril(s) as directed by provider Daily As Needed for Allergies or Rhinitis. OTC, Disp: , Rfl:     lidocaine-prilocaine (EMLA) 2.5-2.5 % cream, Apply 1 application topically to the appropriate area as directed As Needed (45-60 minutes prior to port access.  Cover with saran/plastic wrap.)., Disp: 30 g, Rfl: 3    loratadine (CLARITIN) 10 MG tablet, TAKE ONE TABLET BY MOUTH DAILY, Disp: 30 tablet, Rfl: 5    ondansetron (Zofran) 8 MG tablet, Take 1 tablet by mouth Every 8 (Eight) Hours As Needed for Nausea or Vomiting., Disp: 30 tablet, Rfl: 5    pantoprazole (PROTONIX) 40 MG EC tablet, TAKE ONE TABLET BY MOUTH DAILY, Disp: 90 tablet, Rfl: 1     "prochlorperazine (COMPAZINE) 5 MG tablet, Take 1 tablet by mouth Every 6 (Six) Hours As Needed for Nausea or Vomiting., Disp: 30 tablet, Rfl: 2    rosuvastatin (CRESTOR) 10 MG tablet, Take 1 tablet by mouth Daily., Disp: , Rfl:     Allergies:   No Known Allergies    Objective     Vital Signs:   Vitals:    12/07/23 0904   BP: 148/71   Pulse: 57   Resp: 16   Temp: 97.7 °F (36.5 °C)   TempSrc: Infrared   SpO2: 97%   Weight: 76.7 kg (169 lb)   Height: 162.6 cm (64.02\")   PainSc: 0-No pain    Body mass index is 28.99 kg/m².   Pain Score    12/07/23 0904   PainSc: 0-No pain       ECOG Performance Status: 1    Physical Exam:   General: Well appearing male   HEENT: Normocephalic, atraumatic. Sclera anicteric.   Neck: supple, no palpable LAD. No axillary adenopathy  Cardiovascular: regular rate and rhythm. No murmurs.   Respiratory: Normal rate. Clear to auscultation bilaterally  Abdomen: Soft, nontender, non distended with normoactive bowel sounds. Ostomy LLQ with stoma with herniated bowel, pink  Lymph: no supraclavicular or axillary adenopathy  Neuro: Alert and oriented x 3.   Ext: Symmetric, no swelling.   Skin: right port site  C/d/i    Laboratory/Imaging Reviewed:     Hospital Outpatient Visit on 12/07/2023   Component Date Value Ref Range Status    Glucose 12/07/2023 84  65 - 99 mg/dL Final    BUN 12/07/2023 16  8 - 23 mg/dL Final    Creatinine 12/07/2023 0.68 (L)  0.76 - 1.27 mg/dL Final    Sodium 12/07/2023 134 (L)  136 - 145 mmol/L Final    Potassium 12/07/2023 4.4  3.5 - 5.2 mmol/L Final    Slight hemolysis detected by analyzer. Result may be falsely elevated.    Chloride 12/07/2023 97 (L)  98 - 107 mmol/L Final    CO2 12/07/2023 29.0  22.0 - 29.0 mmol/L Final    Calcium 12/07/2023 9.3  8.6 - 10.5 mg/dL Final    Total Protein 12/07/2023 7.1  6.0 - 8.5 g/dL Final    Albumin 12/07/2023 4.0  3.5 - 5.2 g/dL Final    ALT (SGPT) 12/07/2023 9  1 - 41 U/L Final    AST (SGOT) 12/07/2023 15  1 - 40 U/L Final    Alkaline " Phosphatase 12/07/2023 81  39 - 117 U/L Final    Total Bilirubin 12/07/2023 0.4  0.0 - 1.2 mg/dL Final    Globulin 12/07/2023 3.1  gm/dL Final    Calculated Result    A/G Ratio 12/07/2023 1.3  g/dL Final    BUN/Creatinine Ratio 12/07/2023 23.5  7.0 - 25.0 Final    Anion Gap 12/07/2023 8.0  5.0 - 15.0 mmol/L Final    eGFR 12/07/2023 99.4  >60.0 mL/min/1.73 Final    TSH 12/07/2023 1.700  0.270 - 4.200 uIU/mL Final    Free T4 12/07/2023 1.22  0.93 - 1.70 ng/dL Final    WBC 12/07/2023 4.50  3.40 - 10.80 10*3/mm3 Final    RBC 12/07/2023 3.94 (L)  4.14 - 5.80 10*6/mm3 Final    Hemoglobin 12/07/2023 12.6 (L)  13.0 - 17.7 g/dL Final    Hematocrit 12/07/2023 37.5  37.5 - 51.0 % Final    MCV 12/07/2023 95.2  79.0 - 97.0 fL Final    MCH 12/07/2023 32.0  26.6 - 33.0 pg Final    MCHC 12/07/2023 33.6  31.5 - 35.7 g/dL Final    RDW 12/07/2023 12.7  12.3 - 15.4 % Final    RDW-SD 12/07/2023 44.8  37.0 - 54.0 fl Final    MPV 12/07/2023 9.0  6.0 - 12.0 fL Final    Platelets 12/07/2023 175  140 - 450 10*3/mm3 Final    Neutrophil % 12/07/2023 64.0  42.7 - 76.0 % Final    Lymphocyte % 12/07/2023 21.8  19.6 - 45.3 % Final    Monocyte % 12/07/2023 10.9  5.0 - 12.0 % Final    Eosinophil % 12/07/2023 3.1  0.3 - 6.2 % Final    Basophil % 12/07/2023 0.2  0.0 - 1.5 % Final    Immature Grans % 12/07/2023 0.0  0.0 - 0.5 % Final    Neutrophils, Absolute 12/07/2023 2.88  1.70 - 7.00 10*3/mm3 Final    Lymphocytes, Absolute 12/07/2023 0.98  0.70 - 3.10 10*3/mm3 Final    Monocytes, Absolute 12/07/2023 0.49  0.10 - 0.90 10*3/mm3 Final    Eosinophils, Absolute 12/07/2023 0.14  0.00 - 0.40 10*3/mm3 Final    Basophils, Absolute 12/07/2023 0.01  0.00 - 0.20 10*3/mm3 Final    Immature Grans, Absolute 12/07/2023 0.00  0.00 - 0.05 10*3/mm3 Final   Hospital Outpatient Visit on 12/04/2023   Component Date Value Ref Range Status    Glucose 12/04/2023 94  70 - 130 mg/dL Final       NM PET/CT Skull Base to Mid Thigh    Result Date: 12/5/2023  Narrative: NM PET/CT  SKULL BASE TO MID THIGH Date of Exam: 12/4/2023 8:57 AM EST Indication: refractory hodgkin lymphoma treatment assessment. Comparison: 8/22/2023. Technique: 13.22 mCi of F-18 FDG was administered intravenously. PET imaging was obtained from skull base to mid-thigh approximately 60 minutes after radiotracer injection. A low dose non contrast CT was obtained for attenuation correction and anatomic localization. Fused PET-CT and 3D MIP reconstructions were utilized for image interpretation.  Fasting blood glucose level: 94 mg/dl. Reference uptake values: Mediastinum: 2.11 SUVmax Liver: 2.51 SUVmax Normalization method: Body Weight Findings: There is a new small hypermetabolic right hilar node with an SUV max of 5.03. Previously noted left rectal mass demonstrates an SUV max of 1.38 currently as compared to 2.56 previously. There is a new hypermetabolic lytic lucency in the L4 vertebral body  anteriorly with an SUV max of 6.27. There is normal distribution of radiotracer in the neck. Previously noted patchy somewhat groundglass appearing infiltrates of the bilateral upper lobes have improved somewhat since the prior exam and are not hypermetabolic above baseline. There is a 6 mm noncalcified nodule of the left upper lobe which is not hypermetabolic above baseline.    Impression: Impression: New hypermetabolic right hilar node and new L4 vertebral body lesion, most compatible with recurrent lymphoma. Decreased hypermetabolic activity of the rectal mass. Improving lung infiltrates. Electronically Signed: Araseli Mccormack MD  12/5/2023 8:29 AM EST  Workstation ID: [id number]         Assessment / Plan      Assessment/Plan:     1.  Recurrent CD30 positive classical Hodgkin's lymphoma  2.   Encounter for monitoring immunotherapy    -He completed 6 cycles of BV-AVD 3/23/2023. Cycle 6 was complicated by coronavirus infection and multifocal pneumonia.  Posttherapy PET showed persistent bilateral interstitial changes and mild  adenopathy. Repeat chest CT shows continued improvement in the pulmonary infiltrates consistent with a reactive infectious etiology.  - He had clear response to first line therapy with resolution of the hepatic lesions, marked improvement in the retroperitoneal adenopathy, and 50% reduction in the size of the rectal mass.  However he had persistent uptake in the rectal mass on posttreatment PET, [place] 5. We elected to proceed with biopsy to confirm whether he had residual disease followed by consolidative radiotherapy to the rectal mass given initial bulky disease.  In the interim, while awaiting biopsy, he presented with rectal bleeding, obstructive uropathy symptoms and pain and radiation planning CT showed significant interval growth in the rectal mass as well as development of new retroperitoneal adenopathy superior to the rectal mass compared to the his posttreatment PET, confirming progressive disease. Consolidative radiation was converted to palliative course radiation given interval adenopathy development above the radiation field to allow for earlier introduction of second line chemotherapy.   -Biopsy confirmed residual classic Hodgkin's lymphoma.  -Given national shortage of carbo/cis, we proceeded with Keytruda, vinorelbine, Gemzar, and Doxil. He completed 4 cycles 8/2023.   Post treatment PET shows complete resolution of FDG avidity.  I personally reviewed his current PET and compared to his 2 prior PET.  He has had resolution of FDG avidity in the rectal mass which has decreased in size.  There is a new area of FDG avidity and a lytic lesion in the L4 region as well as mild SUV uptake in a right hilar node.  I have requested radiologist addend report to include [place] scoring. The hilar LN is small  -I am going to continue with his maintenance Keytruda for today.  If indeed this represents low volume progression we could consider SBRT to the lytic lesion in either monitoring or SBRT to the small  hilar lymph node versus tissue sampling versus systemic therapy change.  I did review his imaging with Dr. Alcocer of radiation oncology and the L4 lesion could be targeted with palliative radiation, particularly as he is symptomatic with low back pain, although the degree of symptomatology is difficult to assess as he has had previous low back pain from degenerative disc disease.  -He does have a pending appointment next week with his transplant team.  We are awaiting a final decision from them as to whether he is a candidate for autologous stem cell transplant.  - at this juncture I would favor continuing pembrolizumab maintenance, possibly considering SBRT to the L4 lesion, and a 6-week interval diagnostic chest CT.  If this demonstrated's further enlargement of the right hilar node or other adenopathy consider bronchoscopy with biopsy versus systemic therapy change.  Will of course await input from his transplant team and I will follow-up with him in 2 weeks with their recommendations.  For now we will schedule next cycle of pembrolizumab 12/28/2023 and diagnostic chestCT in 6 weeks.      3. Herniation at stoma site  -Discussed with CRS, can ultimately consider ostomy revision when treatment holiday appropriate. Discussed this with patient. He wishes to defer pending dental procedures and cataract surgery as symptoms less bothersome currently.     4.  H/o GI bleed  -PPI, continue daily.  Tolerating well with no recurrent symptoms.    5. Access  -Port    Follow Up:   As above    Kaycee Leary MD  Hematology and Oncology     I have spent a total of 40 min on reviewing test results/preparing to see patient, counseling patient, personally reviewing imaging, discussion/coordination of care performing medically appropriate exam and documenting clinical information in the electronic or other health record.

## 2023-12-08 ENCOUNTER — HOSPITAL ENCOUNTER (OUTPATIENT)
Dept: RADIATION ONCOLOGY | Facility: HOSPITAL | Age: 73
Setting detail: RADIATION/ONCOLOGY SERIES
Discharge: HOME | End: 2023-12-08
Payer: MEDICARE

## 2023-12-08 ENCOUNTER — APPOINTMENT (OUTPATIENT)
Dept: RADIATION ONCOLOGY | Facility: HOSPITAL | Age: 73
End: 2023-12-08
Payer: MEDICARE

## 2023-12-19 ENCOUNTER — OFFICE VISIT (OUTPATIENT)
Dept: ONCOLOGY | Facility: CLINIC | Age: 73
End: 2023-12-19
Payer: MEDICARE

## 2023-12-19 VITALS
HEART RATE: 65 BPM | WEIGHT: 167 LBS | HEIGHT: 64 IN | SYSTOLIC BLOOD PRESSURE: 119 MMHG | BODY MASS INDEX: 28.51 KG/M2 | RESPIRATION RATE: 16 BRPM | DIASTOLIC BLOOD PRESSURE: 71 MMHG | OXYGEN SATURATION: 97 % | TEMPERATURE: 98.6 F

## 2023-12-19 DIAGNOSIS — C81.98 HODGKIN LYMPHOMA OF LYMPH NODES OF MULTIPLE REGIONS, UNSPECIFIED HODGKIN LYMPHOMA TYPE: Primary | ICD-10-CM

## 2023-12-19 RX ORDER — AZITHROMYCIN 250 MG/1
TABLET, FILM COATED ORAL
Qty: 6 TABLET | Refills: 0 | Status: SHIPPED | OUTPATIENT
Start: 2023-12-19

## 2023-12-19 RX ORDER — SODIUM CHLORIDE 9 MG/ML
250 INJECTION, SOLUTION INTRAVENOUS ONCE
OUTPATIENT
Start: 2023-12-28

## 2023-12-19 NOTE — PROGRESS NOTES
Hematology and Oncology [place]  Office number [phone]    Fax number [phone]     Follow up     Date: 23    Patient Name: Abraham Wu  MRN: [id number]  : 1950    Chief Complaint: Hodgkin Lymphoma follow up/treatment    Surgeon: Dr. Hiram Viveros MD    Cancer Staging: IV    History of Present Illness: Abraham Wu is a pleasant 73 y.o. male with PMH of hypertension, sleep apnea, hard of hearing who presents today for follow up of Hodgkin Lymphoma.     He initially presented 2022 with intractable diarrhea, low appetite, and a greater than 50 pound weight loss over several month period associated with intermittent fevers.  CT of the chest abdomen pelvis obtained in the ER shows of rectal mass spanning greater than 12 cm with adjacent adenopathy, bulky RP adenopathy, and findings concerning for left renal and liver metastases.  Small right pleural effusion with nodularity.  There was concern for impending obstruction, so he underwent diverting colostomy and biopsy on 2022.  Biopsies from the peritoneam showed a fibrotic nodule histiocytes and eosinophils admixed with CD30 positive large cells.  Rectal biopsies showed involvement by CD30 positive lymphoma, classic Hodgkin lymphoma versus CD30 positive T-cell or B-cell lymphoma.  There was extensive fibrosis and crush artifact.  He underwent repeat transrectal biopsy 10/4/2022 for further characterization which was felt to be most consistent with classical Hodgkin lymphoma.    He initiated chemotherapy with Brentuximab-AVD as an inpatient on 10/8/2022.  Cycle #1 was complicated by GI bleed requiring multiple blood transfusion and ultimately coil artery embolization 10/12; neutropenic fever, Candida esophagitis and mucositis.  He had a prolonged ICU stay  And required enteral feeding.  He was discharged 10/20/2022.    Following his last cycle he was admitted with multifocal PNA and +corona virus  infection.    Treatment history:  Brentuximab-AVD: C1 10/8/22  C2: 11/1/22 onward with DA 20% in BVD; full dose brentuximab  Completed C6 3/22/23    Palliative radiation to rectum 5/30/2023 (abbreviated due to systemic progression)  GVD+Pembro x 4 cycles: completed 8/30/2023  Consolidative radiation to rectum completed 9/27/23    Pembrolizumab maintenance: current    Interval history:    He is here for follow-up.  He was assessed by the transplant team and no plans to consider stem cell transplant.  He notes an increased cough productive of yellow sputum.  No fever or chills.  Low back pain mild.  No other new concerns.  He has questions regarding additional treatment.    Past Medical History:   Past Medical History:   Diagnosis Date    benign polypoid tissue right lung     Cancer     HODGKINS LYMPHOMA, RECTAL CANCER    Diabetes mellitus     History of radiation therapy 09/27/2023    re-treat rectum    Hyperlipidemia     Hypertension     Lymphoma     Mycobacterium mucogenicum     SHERRI (obstructive sleep apnea)     O2 2L/MIN AT NIGHT ONLY    Pneumonia     2023    Seasonal allergies      Past Surgical History:   Past Surgical History:   Procedure Laterality Date    BRONCHOSCOPY      removal of obstructing polypoid tissue right middle lung    COLOSTOMY N/A 09/22/2022    Procedure: LAPAROSCOPIC COLOSTOMY CREATION, FLEXIBLE SIGMOIDOSCOPY;  Surgeon: Hiram Viveros MD;  Location: [facility] OR;  Service: General;  Laterality: N/A;    DENTAL PROCEDURE      ENDOSCOPY N/A 10/10/2022    Procedure: ESOPHAGOGASTRODUODENOSCOPY;  Surgeon: Neeraj Lynn MD;  Location: [facility] ENDOSCOPY;  Service: Gastroenterology;  Laterality: N/A;    ENDOSCOPY N/A 10/12/2022    Procedure: ESOPHAGOGASTRODUODENOSCOPY;  Surgeon: Brunner, Mark I, MD;  Location: [facility] ENDOSCOPY;  Service: Gastroenterology;  Laterality: N/A;    EXAM UNDER ANESTHESIA, RECTAL BIOPSY N/A 10/04/2022    Procedure: EXAM UNDER ANESTHESIA, TRANSANAL BIOPSY WITH TRUCUT  NEEDLE (LITHOTOMY-CANDY CANE);  Surgeon: Hiram Viveros MD;  Location:  [facility] OR;  Service: General;  Laterality: N/A;    EXAM UNDER ANESTHESIA, RECTAL BIOPSY N/A 5/30/2023    Procedure: EXAM UNDER ANESTHESIA, TRANSANAL BIOPSY OF RECTAL MASS WITH TRUCUT NEEDLE, PROCTOSCOPY;  Surgeon: Hiram Viveros MD;  Location:  [facility] OR;  Service: General;  Laterality: N/A;    PERIPHERALLY INSERTED CENTRAL CATHETER INSERTION      Removed 11/28/2022    VENOUS ACCESS DEVICE (PORT) INSERTION Right 11/28/2022     Family History:   Family History   Problem Relation Age of Onset    Diabetes Mother     Diabetes Father     Heart disease Father      Social History:   Social History     Socioeconomic History    Marital status:    Tobacco Use    Smoking status: Never    Smokeless tobacco: Never   Vaping Use    Vaping Use: Never used   Substance and Sexual Activity    Alcohol use: Not Currently     Comment: previously drank 2 glasses of wine/beer nightly    Drug use: Never    Sexual activity: Defer       Medications:     Current Outpatient Medications:     azithromycin (ZITHROMAX) 250 MG tablet, Take 2 tablets the first day, then 1 tablet daily for 4 days., Disp: 6 tablet, Rfl: 0    fluticasone (FLONASE) 50 MCG/ACT nasal spray, 1 spray into the nostril(s) as directed by provider Daily As Needed for Allergies or Rhinitis. OTC, Disp: , Rfl:     lidocaine-prilocaine (EMLA) 2.5-2.5 % cream, Apply 1 application topically to the appropriate area as directed As Needed (45-60 minutes prior to port access.  Cover with saran/plastic wrap.)., Disp: 30 g, Rfl: 3    loratadine (CLARITIN) 10 MG tablet, TAKE ONE TABLET BY MOUTH DAILY, Disp: 30 tablet, Rfl: 5    ondansetron (Zofran) 8 MG tablet, Take 1 tablet by mouth Every 8 (Eight) Hours As Needed for Nausea or Vomiting., Disp: 30 tablet, Rfl: 5    pantoprazole (PROTONIX) 40 MG EC tablet, TAKE ONE TABLET BY MOUTH DAILY, Disp: 90 tablet, Rfl: 1    prochlorperazine (COMPAZINE) 5 MG  "tablet, Take 1 tablet by mouth Every 6 (Six) Hours As Needed for Nausea or Vomiting., Disp: 30 tablet, Rfl: 2    rosuvastatin (CRESTOR) 10 MG tablet, Take 1 tablet by mouth Daily., Disp: , Rfl:     Allergies:   No Known Allergies    Objective     Vital Signs:   Vitals:    12/19/23 1433   BP: 119/71   Pulse: 65   Resp: 16   Temp: 98.6 °F (37 °C)   TempSrc: Infrared   SpO2: 97%   Weight: 75.8 kg (167 lb)   Height: 162.6 cm (64.02\")   PainSc: 0-No pain    Body mass index is 28.65 kg/m².   Pain Score    12/19/23 1433   PainSc: 0-No pain       ECOG Performance Status: 1    Physical Exam:   General: Well appearing male   HEENT: Normocephalic, atraumatic. Sclera anicteric.   Neck: supple, no palpable LAD. No axillary adenopathy  Cardiovascular: regular rate and rhythm. No murmurs.   Respiratory: Normal rate. Clear to auscultation bilaterally  Abdomen: Soft, nontender, non distended with normoactive bowel sounds. Ostomy LLQ with stoma with herniated bowel, pink  Lymph: no supraclavicular or axillary adenopathy  Neuro: Alert and oriented x 3.   Ext: Symmetric, no swelling.   Skin: right port site  C/d/I  Accurate as of 12/19/2023    Laboratory/Imaging Reviewed:     Hospital Outpatient Visit on 12/07/2023   Component Date Value Ref Range Status    Glucose 12/07/2023 84  65 - 99 mg/dL Final    BUN 12/07/2023 16  8 - 23 mg/dL Final    Creatinine 12/07/2023 0.68 (L)  0.76 - 1.27 mg/dL Final    Sodium 12/07/2023 134 (L)  136 - 145 mmol/L Final    Potassium 12/07/2023 4.4  3.5 - 5.2 mmol/L Final    Slight hemolysis detected by analyzer. Result may be falsely elevated.    Chloride 12/07/2023 97 (L)  98 - 107 mmol/L Final    CO2 12/07/2023 29.0  22.0 - 29.0 mmol/L Final    Calcium 12/07/2023 9.3  8.6 - 10.5 mg/dL Final    Total Protein 12/07/2023 7.1  6.0 - 8.5 g/dL Final    Albumin 12/07/2023 4.0  3.5 - 5.2 g/dL Final    ALT (SGPT) 12/07/2023 9  1 - 41 U/L Final    AST (SGOT) 12/07/2023 15  1 - 40 U/L Final    Alkaline Phosphatase " 12/07/2023 81  39 - 117 U/L Final    Total Bilirubin 12/07/2023 0.4  0.0 - 1.2 mg/dL Final    Globulin 12/07/2023 3.1  gm/dL Final    Calculated Result    A/G Ratio 12/07/2023 1.3  g/dL Final    BUN/Creatinine Ratio 12/07/2023 23.5  7.0 - 25.0 Final    Anion Gap 12/07/2023 8.0  5.0 - 15.0 mmol/L Final    eGFR 12/07/2023 99.4  >60.0 mL/min/1.73 Final    TSH 12/07/2023 1.700  0.270 - 4.200 uIU/mL Final    Free T4 12/07/2023 1.22  0.93 - 1.70 ng/dL Final    WBC 12/07/2023 4.50  3.40 - 10.80 10*3/mm3 Final    RBC 12/07/2023 3.94 (L)  4.14 - 5.80 10*6/mm3 Final    Hemoglobin 12/07/2023 12.6 (L)  13.0 - 17.7 g/dL Final    Hematocrit 12/07/2023 37.5  37.5 - 51.0 % Final    MCV 12/07/2023 95.2  79.0 - 97.0 fL Final    MCH 12/07/2023 32.0  26.6 - 33.0 pg Final    MCHC 12/07/2023 33.6  31.5 - 35.7 g/dL Final    RDW 12/07/2023 12.7  12.3 - 15.4 % Final    RDW-SD 12/07/2023 44.8  37.0 - 54.0 fl Final    MPV 12/07/2023 9.0  6.0 - 12.0 fL Final    Platelets 12/07/2023 175  140 - 450 10*3/mm3 Final    Neutrophil % 12/07/2023 64.0  42.7 - 76.0 % Final    Lymphocyte % 12/07/2023 21.8  19.6 - 45.3 % Final    Monocyte % 12/07/2023 10.9  5.0 - 12.0 % Final    Eosinophil % 12/07/2023 3.1  0.3 - 6.2 % Final    Basophil % 12/07/2023 0.2  0.0 - 1.5 % Final    Immature Grans % 12/07/2023 0.0  0.0 - 0.5 % Final    Neutrophils, Absolute 12/07/2023 2.88  1.70 - 7.00 10*3/mm3 Final    Lymphocytes, Absolute 12/07/2023 0.98  0.70 - 3.10 10*3/mm3 Final    Monocytes, Absolute 12/07/2023 0.49  0.10 - 0.90 10*3/mm3 Final    Eosinophils, Absolute 12/07/2023 0.14  0.00 - 0.40 10*3/mm3 Final    Basophils, Absolute 12/07/2023 0.01  0.00 - 0.20 10*3/mm3 Final    Immature Grans, Absolute 12/07/2023 0.00  0.00 - 0.05 10*3/mm3 Final       NM PET/CT Skull Base to Mid Thigh    Addendum Date: 12/8/2023 Addendum:   ADDENDUM #1 ADDENDUM: Jose E score 4. Electronically Signed: Araseli Mccormack MD  12/8/2023 8:37 AM EST  Workstation ID: [id number] ORIGINAL REPORT:  NM PET/CT SKULL BASE TO MID THIGH Date of Exam: 12/4/2023 8:57 AM EST Indication: refractory hodgkin lymphoma treatment assessment. Comparison: 8/22/2023. Technique: 13.22 mCi of F-18 FDG was administered intravenously. PET imaging was obtained from skull base to mid-thigh approximately 60 minutes after radiotracer injection. A low dose non contrast CT was obtained for attenuation correction and anatomic localization. Fused PET-CT and 3D MIP reconstructions were utilized for image interpretation.  Fasting blood glucose level: 94 mg/dl. Reference uptake values: Mediastinum: 2.11 SUVmax Liver: 2.51 SUVmax Normalization method: Body Weight Findings: There is a new small hypermetabolic right hilar node with an SUV max of 5.03. Previously noted left rectal mass demonstrates an SUV max of 1.38 currently as compared to 2.56 previously. There is a new hypermetabolic lytic lucency in the L4 vertebral body  anteriorly with an SUV max of 6.27. There is normal distribution of radiotracer in the neck. Previously noted patchy somewhat groundglass appearing infiltrates of the bilateral upper lobes have improved somewhat since the prior exam and are not hypermetabolic above baseline. There is a 6 mm noncalcified nodule of the left upper lobe which is not hypermetabolic above baseline. Impression: New hypermetabolic right hilar node and new L4 vertebral body lesion, most compatible with recurrent lymphoma. Decreased hypermetabolic activity of the rectal mass. Improving lung infiltrates. Electronically Signed: Araseli Mccormack MD  12/5/2023 8:29 AM EST  Workstation ID: [id number]    Result Date: 12/8/2023  Narrative: NM PET/CT SKULL BASE TO MID THIGH Date of Exam: 12/4/2023 8:57 AM EST Indication: refractory hodgkin lymphoma treatment assessment. Comparison: 8/22/2023. Technique: 13.22 mCi of F-18 FDG was administered intravenously. PET imaging was obtained from skull base to mid-thigh approximately 60 minutes after radiotracer  injection. A low dose non contrast CT was obtained for attenuation correction and anatomic localization. Fused PET-CT and 3D MIP reconstructions were utilized for image interpretation.  Fasting blood glucose level: 94 mg/dl. Reference uptake values: Mediastinum: 2.11 SUVmax Liver: 2.51 SUVmax Normalization method: Body Weight Findings: There is a new small hypermetabolic right hilar node with an SUV max of 5.03. Previously noted left rectal mass demonstrates an SUV max of 1.38 currently as compared to 2.56 previously. There is a new hypermetabolic lytic lucency in the L4 vertebral body  anteriorly with an SUV max of 6.27. There is normal distribution of radiotracer in the neck. Previously noted patchy somewhat groundglass appearing infiltrates of the bilateral upper lobes have improved somewhat since the prior exam and are not hypermetabolic above baseline. There is a 6 mm noncalcified nodule of the left upper lobe which is not hypermetabolic above baseline.    Impression: Impression: New hypermetabolic right hilar node and new L4 vertebral body lesion, most compatible with recurrent lymphoma. Decreased hypermetabolic activity of the rectal mass. Improving lung infiltrates. Electronically Signed: Araseli Mccormack MD  12/5/2023 8:29 AM EST  Workstation ID: [id number]         Assessment / Plan      Assessment/Plan:     1.   Recurrent CD30 positive classical Hodgkin's lymphoma  2.   Encounter for monitoring immunotherapy  3.  Indeterminate hilar lymph node  4.  Isolated lytic lesion  -He completed 6 cycles of BV-AVD 3/23/2023. Cycle 6 was complicated by coronavirus infection and multifocal pneumonia.  Posttherapy PET showed persistent bilateral interstitial changes and mild adenopathy. Repeat chest CT shows continued improvement in the pulmonary infiltrates consistent with a reactive infectious etiology.  - He had clear response to first line therapy with resolution of the hepatic lesions, marked improvement in the  retroperitoneal adenopathy, and 50% reduction in the size of the rectal mass.  However he had persistent uptake in the rectal mass on posttreatment PET, [place] 5. We elected to proceed with biopsy to confirm whether he had residual disease followed by consolidative radiotherapy to the rectal mass given initial bulky disease.  In the interim, while awaiting biopsy, he presented with rectal bleeding, obstructive uropathy symptoms and pain and radiation planning CT showed significant interval growth in the rectal mass as well as development of new retroperitoneal adenopathy superior to the rectal mass compared to the his posttreatment PET, confirming progressive disease. Consolidative radiation was converted to palliative course radiation given interval adenopathy development above the radiation field to allow for earlier introduction of second line chemotherapy.   -Biopsy confirmed residual classic Hodgkin's lymphoma.  -Given national shortage of carbo/cis, we proceeded with Keytruda, vinorelbine, Gemzar, and Doxil. He completed 4 cycles 8/2023.   Post treatment PET shows complete resolution of FDG avidity.  I personally reviewed his current PET and compared to his 2 prior PET.  He has had resolution of FDG avidity in the rectal mass which has decreased in size.  There is a new area of FDG avidity and a lytic lesion in the L4 region as well as mild SUV uptake in a right hilar node.  Lytic lesion appears to have been there previously but with increased activity.  I discussed with radiation oncology.  I would recommend stereotactic radiation therapy to this area.  The hilar LN is small and indeterminate.  I recommended continuation of maintenance Keytruda and short interval follow-up CT of the chest.  If this shows continued enlargement of the hilar lymph node, would recommend bronchoscopy with tissue biopsy.  We discussed the option of proceeding with CT-guided biopsy of the bone lesion prior to stereotactic  radiation, but given presence on previous imaging at the time of his biopsy-proven rectal progression I am unsure if this would be of much yield.  -Reviewed stem cell transplant team note.  Stem cell transplant not being recommended.  -He has follow-up pending with Dr. Alcocer next week and will continue on with maintenance Keytruda.  I am going to go ahead and schedule his next CT scan in mid January to reevaluate the hilar lymph node.    5. Herniation at stoma site  -Discussed with CRS, can ultimately consider ostomy revision when treatment holiday appropriate. Discussed this with patient. He wishes to defer.    6.  H/o GI bleed  -PPI, continue daily.  Tolerating well with no recurrent symptoms.    5. Access  -Port    Follow Up:   As above    Kaycee Leary MD  Hematology and Oncology

## 2023-12-28 ENCOUNTER — OFFICE VISIT (OUTPATIENT)
Dept: ONCOLOGY | Facility: CLINIC | Age: 73
End: 2023-12-28
Payer: MEDICARE

## 2023-12-28 ENCOUNTER — HOSPITAL ENCOUNTER (OUTPATIENT)
Dept: ONCOLOGY | Facility: HOSPITAL | Age: 73
Discharge: HOME | End: 2023-12-28
Payer: MEDICARE

## 2023-12-28 VITALS
BODY MASS INDEX: 29.33 KG/M2 | DIASTOLIC BLOOD PRESSURE: 74 MMHG | OXYGEN SATURATION: 98 % | SYSTOLIC BLOOD PRESSURE: 146 MMHG | HEART RATE: 68 BPM | TEMPERATURE: 97.4 F | WEIGHT: 171.8 LBS | HEIGHT: 64 IN

## 2023-12-28 DIAGNOSIS — M89.9 LYTIC BONE LESIONS ON XRAY: ICD-10-CM

## 2023-12-28 DIAGNOSIS — C81.98 HODGKIN LYMPHOMA OF LYMPH NODES OF MULTIPLE REGIONS, UNSPECIFIED HODGKIN LYMPHOMA TYPE: Primary | ICD-10-CM

## 2023-12-28 DIAGNOSIS — Z51.11 CHEMOTHERAPY MANAGEMENT, ENCOUNTER FOR: ICD-10-CM

## 2023-12-28 DIAGNOSIS — Z45.2 ENCOUNTER FOR CARE RELATED TO VASCULAR ACCESS PORT: ICD-10-CM

## 2023-12-28 LAB
ALBUMIN SERPL-MCNC: 3.9 G/DL (ref 3.5–5.2)
ALBUMIN/GLOB SERPL: 1.3 G/DL
ALP SERPL-CCNC: 86 U/L (ref 39–117)
ALT SERPL W P-5'-P-CCNC: 7 U/L (ref 1–41)
ANION GAP SERPL CALCULATED.3IONS-SCNC: 7 MMOL/L (ref 5–15)
AST SERPL-CCNC: 12 U/L (ref 1–40)
BASOPHILS # BLD AUTO: 0.02 10*3/MM3 (ref 0–0.2)
BASOPHILS NFR BLD AUTO: 0.4 % (ref 0–1.5)
BILIRUB SERPL-MCNC: 0.4 MG/DL (ref 0–1.2)
BUN SERPL-MCNC: 15 MG/DL (ref 8–23)
BUN/CREAT SERPL: 25 (ref 7–25)
CALCIUM SPEC-SCNC: 9.3 MG/DL (ref 8.6–10.5)
CHLORIDE SERPL-SCNC: 96 MMOL/L (ref 98–107)
CO2 SERPL-SCNC: 30 MMOL/L (ref 22–29)
CREAT SERPL-MCNC: 0.6 MG/DL (ref 0.76–1.27)
DEPRECATED RDW RBC AUTO: 44.4 FL (ref 37–54)
EGFRCR SERPLBLD CKD-EPI 2021: 103.2 ML/MIN/1.73
EOSINOPHIL # BLD AUTO: 0.14 10*3/MM3 (ref 0–0.4)
EOSINOPHIL NFR BLD AUTO: 2.5 % (ref 0.3–6.2)
ERYTHROCYTE [DISTWIDTH] IN BLOOD BY AUTOMATED COUNT: 12.9 % (ref 12.3–15.4)
GLOBULIN UR ELPH-MCNC: 3 GM/DL
GLUCOSE SERPL-MCNC: 81 MG/DL (ref 65–99)
HCT VFR BLD AUTO: 35.8 % (ref 37.5–51)
HGB BLD-MCNC: 12.3 G/DL (ref 13–17.7)
IMM GRANULOCYTES # BLD AUTO: 0.01 10*3/MM3 (ref 0–0.05)
IMM GRANULOCYTES NFR BLD AUTO: 0.2 % (ref 0–0.5)
LYMPHOCYTES # BLD AUTO: 0.78 10*3/MM3 (ref 0.7–3.1)
LYMPHOCYTES NFR BLD AUTO: 14.1 % (ref 19.6–45.3)
MCH RBC QN AUTO: 32.2 PG (ref 26.6–33)
MCHC RBC AUTO-ENTMCNC: 34.4 G/DL (ref 31.5–35.7)
MCV RBC AUTO: 93.7 FL (ref 79–97)
MONOCYTES # BLD AUTO: 0.66 10*3/MM3 (ref 0.1–0.9)
MONOCYTES NFR BLD AUTO: 11.9 % (ref 5–12)
NEUTROPHILS NFR BLD AUTO: 3.94 10*3/MM3 (ref 1.7–7)
NEUTROPHILS NFR BLD AUTO: 70.9 % (ref 42.7–76)
PLATELET # BLD AUTO: 224 10*3/MM3 (ref 140–450)
PMV BLD AUTO: 8.4 FL (ref 6–12)
POTASSIUM SERPL-SCNC: 4.3 MMOL/L (ref 3.5–5.2)
PROT SERPL-MCNC: 6.9 G/DL (ref 6–8.5)
RBC # BLD AUTO: 3.82 10*6/MM3 (ref 4.14–5.8)
SODIUM SERPL-SCNC: 133 MMOL/L (ref 136–145)
WBC NRBC COR # BLD AUTO: 5.55 10*3/MM3 (ref 3.4–10.8)

## 2023-12-28 PROCEDURE — 25010000002 HEPARIN LOCK FLUSH PER 10 UNITS: Performed by: INTERNAL MEDICINE

## 2023-12-28 PROCEDURE — 85025 COMPLETE CBC W/AUTO DIFF WBC: CPT | Performed by: INTERNAL MEDICINE

## 2023-12-28 PROCEDURE — 80053 COMPREHEN METABOLIC PANEL: CPT | Performed by: INTERNAL MEDICINE

## 2023-12-28 PROCEDURE — 25010000002 PEMBROLIZUMAB 100 MG/4ML SOLUTION 4 ML VIAL: Performed by: INTERNAL MEDICINE

## 2023-12-28 PROCEDURE — 96413 CHEMO IV INFUSION 1 HR: CPT

## 2023-12-28 RX ORDER — HEPARIN SODIUM (PORCINE) LOCK FLUSH IV SOLN 100 UNIT/ML 100 UNIT/ML
500 SOLUTION INTRAVENOUS AS NEEDED
Status: DISCONTINUED | OUTPATIENT
Start: 2023-12-28 | End: 2023-12-29 | Stop reason: HOSPADM

## 2023-12-28 RX ORDER — HEPARIN SODIUM (PORCINE) LOCK FLUSH IV SOLN 100 UNIT/ML 100 UNIT/ML
500 SOLUTION INTRAVENOUS AS NEEDED
OUTPATIENT
Start: 2023-12-28

## 2023-12-28 RX ADMIN — HEPARIN 500 UNITS: 100 SYRINGE at 10:29

## 2023-12-28 RX ADMIN — SODIUM CHLORIDE 200 MG: 9 INJECTION, SOLUTION INTRAVENOUS at 09:43

## 2023-12-28 NOTE — PROGRESS NOTES
[facility]  Office number [phone]    Fax number [phone]     Follow up     Date: 23    Patient Name: Abraham Wu  MRN: [id number]  : 1950    Chief Complaint: Hodgkin Lymphoma follow up/treatment    Surgeon: Dr. Hiram Viveros MD    Cancer Staging: IV    History of Present Illness: Abraham Wu is a pleasant 73 y.o. male with PMH of hypertension, sleep apnea, hard of hearing who presents today for follow up of Hodgkin Lymphoma.     He initially presented 2022 with intractable diarrhea, low appetite, and a greater than 50 pound weight loss over several month period associated with intermittent fevers.  CT of the chest abdomen pelvis obtained in the ER shows of rectal mass spanning greater than 12 cm with adjacent adenopathy, bulky RP adenopathy, and findings concerning for left renal and liver metastases.  Small right pleural effusion with nodularity.  There was concern for impending obstruction, so he underwent diverting colostomy and biopsy on 2022.  Biopsies from the peritoneam showed a fibrotic nodule histiocytes and eosinophils admixed with CD30 positive large cells.  Rectal biopsies showed involvement by CD30 positive lymphoma, classic Hodgkin lymphoma versus CD30 positive T-cell or B-cell lymphoma.  There was extensive fibrosis and crush artifact.  He underwent repeat transrectal biopsy 10/4/2022 for further characterization which was felt to be most consistent with classical Hodgkin lymphoma.    He initiated chemotherapy with Brentuximab-AVD as an inpatient on 10/8/2022.  Cycle #1 was complicated by GI bleed requiring multiple blood transfusion and ultimately coil artery embolization 10/12; neutropenic fever, Candida esophagitis and mucositis.  He had a prolonged ICU stay  And required enteral feeding.  He was discharged 10/20/2022.    Following his last cycle he was admitted with multifocal PNA and +corona virus  infection.    Treatment history:  Brentuximab-AVD: C1 10/8/22  C2: 11/1/22 onward with DA 20% in BVD; full dose brentuximab  Completed C6 3/22/23    Palliative radiation to rectum 5/30/2023 (abbreviated due to systemic progression)  GVD+Pembro x 4 cycles: completed 8/30/2023  Consolidative radiation to rectum completed 9/27/23    Pembrolizumab maintenance: current    Interval history:    He is here for follow-up. Cough substantially improved with antibiotics. Mild productive cough but much less volume and thinner. No fever. Breathing well. Mild shoulder soreness. Ostomy output good. No rectal bleeding. Low back pain/soreness, persists. Worse with rising from chair. Has appt with radiation oncology pending tomorrow.     Past Medical History:   Past Medical History:   Diagnosis Date    benign polypoid tissue right lung     Cancer     HODGKINS LYMPHOMA, RECTAL CANCER    Diabetes mellitus     History of radiation therapy 09/27/2023    re-treat rectum    Hyperlipidemia     Hypertension     Lymphoma     Mycobacterium mucogenicum     SHERRI (obstructive sleep apnea)     O2 2L/MIN AT NIGHT ONLY    Pneumonia     2023    Seasonal allergies      Past Surgical History:   Past Surgical History:   Procedure Laterality Date    BRONCHOSCOPY      removal of obstructing polypoid tissue right middle lung    COLOSTOMY N/A 09/22/2022    Procedure: LAPAROSCOPIC COLOSTOMY CREATION, FLEXIBLE SIGMOIDOSCOPY;  Surgeon: Hiram Viveros MD;  [facility];  Service: General;  Laterality: N/A;    DENTAL PROCEDURE      ENDOSCOPY N/A 10/10/2022    Procedure: ESOPHAGOGASTRODUODENOSCOPY;  Surgeon: Neeraj Lynn MD;  Location:  [facility];  Service: Gastroenterology;  Laterality: N/A;    ENDOSCOPY N/A 10/12/2022    Procedure: ESOPHAGOGASTRODUODENOSCOPY;  Surgeon: Brunner, Mark I, MD;  Location:  [facility];  Service: Gastroenterology;  Laterality: N/A;    EXAM UNDER ANESTHESIA, RECTAL BIOPSY N/A 10/04/2022    Procedure: EXAM  UNDER ANESTHESIA, TRANSANAL BIOPSY WITH TRUCUT NEEDLE (LITHOTOMY-CANDY CANE);  Surgeon: Hiram Viveros MD;  Location:  [facility];  Service: General;  Laterality: N/A;    EXAM UNDER ANESTHESIA, RECTAL BIOPSY N/A 5/30/2023    Procedure: EXAM UNDER ANESTHESIA, TRANSANAL BIOPSY OF RECTAL MASS WITH TRUCUT NEEDLE, PROCTOSCOPY;  Surgeon: Hiram Viveros MD;  Location:  [facility];  Service: General;  Laterality: N/A;    PERIPHERALLY INSERTED CENTRAL CATHETER INSERTION      Removed 11/28/2022    VENOUS ACCESS DEVICE (PORT) INSERTION Right 11/28/2022     Family History:   Family History   Problem Relation Age of Onset    Diabetes Mother     Diabetes Father     Heart disease Father      Social History:   Social History     Socioeconomic History    Marital status:    Tobacco Use    Smoking status: Never    Smokeless tobacco: Never   Vaping Use    Vaping Use: Never used   Substance and Sexual Activity    Alcohol use: Not Currently     Comment: previously drank 2 glasses of wine/beer nightly    Drug use: Never    Sexual activity: Defer       Medications:     Current Outpatient Medications:     fluticasone (FLONASE) 50 MCG/ACT nasal spray, 1 spray into the nostril(s) as directed by provider Daily As Needed for Allergies or Rhinitis. OTC, Disp: , Rfl:     lidocaine-prilocaine (EMLA) 2.5-2.5 % cream, Apply 1 application topically to the appropriate area as directed As Needed (45-60 minutes prior to port access.  Cover with saran/plastic wrap.)., Disp: 30 g, Rfl: 3    loratadine (CLARITIN) 10 MG tablet, TAKE ONE TABLET BY MOUTH DAILY, Disp: 30 tablet, Rfl: 5    ondansetron (Zofran) 8 MG tablet, Take 1 tablet by mouth Every 8 (Eight) Hours As Needed for Nausea or Vomiting., Disp: 30 tablet, Rfl: 5    pantoprazole (PROTONIX) 40 MG EC tablet, TAKE ONE TABLET BY MOUTH DAILY, Disp: 90 tablet, Rfl: 1    prochlorperazine (COMPAZINE) 5 MG tablet, Take 1 tablet by mouth Every 6 (Six) Hours As Needed for Nausea or Vomiting.,  "Disp: 30 tablet, Rfl: 2    rosuvastatin (CRESTOR) 10 MG tablet, Take 1 tablet by mouth Daily., Disp: , Rfl:     Allergies:   No Known Allergies    Objective     Vital Signs:   Vitals:    12/28/23 0819   BP: 146/74   Pulse: 68   Temp: 97.4 °F (36.3 °C)   TempSrc: Infrared   SpO2: 98%   Weight: 77.9 kg (171 lb 12.8 oz)   Height: 162.6 cm (64.02\")   PainSc: 0-No pain    Body mass index is 29.47 kg/m².   Pain Score    12/28/23 0819   PainSc: 0-No pain       ECOG Performance Status: 1    Physical Exam:   General: Well appearing male   HEENT: Normocephalic, atraumatic. Sclera anicteric.   Neck: supple, no palpable LAD. No axillary adenopathy  Cardiovascular: regular rate and rhythm. No murmurs.   Respiratory: Normal rate. Clear to auscultation bilaterally  Abdomen: Soft, nontender, non distended with normoactive bowel sounds. Ostomy LLQ   Lymph: no supraclavicular or axillary adenopathy  Neuro: Alert and oriented x 3.   Ext: Symmetric, no swelling.   Skin: right port site  C/d/I  Accurate as of 12/28/2023    Laboratory/Imaging Reviewed:     Hospital Outpatient Visit on 12/28/2023   Component Date Value Ref Range Status    WBC 12/28/2023 5.55  3.40 - 10.80 10*3/mm3 Final    RBC 12/28/2023 3.82 (L)  4.14 - 5.80 10*6/mm3 Final    Hemoglobin 12/28/2023 12.3 (L)  13.0 - 17.7 g/dL Final    Hematocrit 12/28/2023 35.8 (L)  37.5 - 51.0 % Final    MCV 12/28/2023 93.7  79.0 - 97.0 fL Final    MCH 12/28/2023 32.2  26.6 - 33.0 pg Final    MCHC 12/28/2023 34.4  31.5 - 35.7 g/dL Final    RDW 12/28/2023 12.9  12.3 - 15.4 % Final    RDW-SD 12/28/2023 44.4  37.0 - 54.0 fl Final    MPV 12/28/2023 8.4  6.0 - 12.0 fL Final    Platelets 12/28/2023 224  140 - 450 10*3/mm3 Final    Neutrophil % 12/28/2023 70.9  42.7 - 76.0 % Final    Lymphocyte % 12/28/2023 14.1 (L)  19.6 - 45.3 % Final    Monocyte % 12/28/2023 11.9  5.0 - 12.0 % Final    Eosinophil % 12/28/2023 2.5  0.3 - 6.2 % Final    Basophil % 12/28/2023 0.4  0.0 - 1.5 % Final    Immature " Grans % 12/28/2023 0.2  0.0 - 0.5 % Final    Neutrophils, Absolute 12/28/2023 3.94  1.70 - 7.00 10*3/mm3 Final    Lymphocytes, Absolute 12/28/2023 0.78  0.70 - 3.10 10*3/mm3 Final    Monocytes, Absolute 12/28/2023 0.66  0.10 - 0.90 10*3/mm3 Final    Eosinophils, Absolute 12/28/2023 0.14  0.00 - 0.40 10*3/mm3 Final    Basophils, Absolute 12/28/2023 0.02  0.00 - 0.20 10*3/mm3 Final    Immature Grans, Absolute 12/28/2023 0.01  0.00 - 0.05 10*3/mm3 Final       NM PET/CT Skull Base to Mid Thigh    Addendum Date: 12/8/2023 Addendum:   ADDENDUM #1 ADDENDUM: Jose E score 4. Electronically Signed: Araseli Mccormack MD  12/8/2023 8:37 AM EST  Workstation ID: [id number] ORIGINAL REPORT: NM PET/CT SKULL BASE TO MID THIGH Date of Exam: 12/4/2023 8:57 AM EST Indication: refractory hodgkin lymphoma treatment assessment. Comparison: 8/22/2023. Technique: 13.22 mCi of F-18 FDG was administered intravenously. PET imaging was obtained from skull base to mid-thigh approximately 60 minutes after radiotracer injection. A low dose non contrast CT was obtained for attenuation correction and anatomic localization. Fused PET-CT and 3D MIP reconstructions were utilized for image interpretation.  Fasting blood glucose level: 94 mg/dl. Reference uptake values: Mediastinum: 2.11 SUVmax Liver: 2.51 SUVmax Normalization method: Body Weight Findings: There is a new small hypermetabolic right hilar node with an SUV max of 5.03. Previously noted left rectal mass demonstrates an SUV max of 1.38 currently as compared to 2.56 previously. There is a new hypermetabolic lytic lucency in the L4 vertebral body  anteriorly with an SUV max of 6.27. There is normal distribution of radiotracer in the neck. Previously noted patchy somewhat groundglass appearing infiltrates of the bilateral upper lobes have improved somewhat since the prior exam and are not hypermetabolic above baseline. There is a 6 mm noncalcified nodule of the left upper lobe which is not  hypermetabolic above baseline. Impression: New hypermetabolic right hilar node and new L4 vertebral body lesion, most compatible with recurrent lymphoma. Decreased hypermetabolic activity of the rectal mass. Improving lung infiltrates. Electronically Signed: Araseli Mccormack MD  12/5/2023 8:29 AM EST  Workstation ID: [id number]    Result Date: 12/8/2023  Narrative: NM PET/CT SKULL BASE TO MID THIGH Date of Exam: 12/4/2023 8:57 AM EST Indication: refractory hodgkin lymphoma treatment assessment. Comparison: 8/22/2023. Technique: 13.22 mCi of F-18 FDG was administered intravenously. PET imaging was obtained from skull base to mid-thigh approximately 60 minutes after radiotracer injection. A low dose non contrast CT was obtained for attenuation correction and anatomic localization. Fused PET-CT and 3D MIP reconstructions were utilized for image interpretation.  Fasting blood glucose level: 94 mg/dl. Reference uptake values: Mediastinum: 2.11 SUVmax Liver: 2.51 SUVmax Normalization method: Body Weight Findings: There is a new small hypermetabolic right hilar node with an SUV max of 5.03. Previously noted left rectal mass demonstrates an SUV max of 1.38 currently as compared to 2.56 previously. There is a new hypermetabolic lytic lucency in the L4 vertebral body  anteriorly with an SUV max of 6.27. There is normal distribution of radiotracer in the neck. Previously noted patchy somewhat groundglass appearing infiltrates of the bilateral upper lobes have improved somewhat since the prior exam and are not hypermetabolic above baseline. There is a 6 mm noncalcified nodule of the left upper lobe which is not hypermetabolic above baseline.    Impression: Impression: New hypermetabolic right hilar node and new L4 vertebral body lesion, most compatible with recurrent lymphoma. Decreased hypermetabolic activity of the rectal mass. Improving lung infiltrates. Electronically Signed: Araseli Mccormack MD  12/5/2023 8:29 AM EST   Workstation ID: [id number]         Assessment / Plan      Assessment/Plan:     1.   Recurrent CD30 positive classical Hodgkin's lymphoma  2.   Encounter for monitoring immunotherapy  3.   Indeterminate hilar lymph node  4.   Isolated lytic lesion L spine  -He completed 6 cycles of BV-AVD 3/23/2023. Cycle 6 was complicated by coronavirus infection and multifocal pneumonia.  Posttherapy PET showed persistent bilateral interstitial changes and mild adenopathy. Repeat chest CT shows continued improvement in the pulmonary infiltrates consistent with a reactive infectious etiology.  - He had clear response to first line therapy with resolution of the hepatic lesions, marked improvement in the retroperitoneal adenopathy, and 50% reduction in the size of the rectal mass.  However he had persistent uptake in the rectal mass on posttreatment PET, [place] 5. We elected to proceed with biopsy to confirm whether he had residual disease followed by consolidative radiotherapy to the rectal mass given initial bulky disease.  In the interim, while awaiting biopsy, he presented with rectal bleeding, obstructive uropathy symptoms and pain and radiation planning CT showed significant interval growth in the rectal mass as well as development of new retroperitoneal adenopathy superior to the rectal mass compared to the his posttreatment PET, confirming progressive disease. Consolidative radiation was converted to palliative course radiation given interval adenopathy development above the radiation field to allow for earlier introduction of second line chemotherapy.   -Biopsy confirmed residual classic Hodgkin's lymphoma.  -Given national shortage of carbo/cis, we proceeded with Keytruda, vinorelbine, Gemzar, and Doxil. He completed 4 cycles 8/2023. Post treatment PET showed complete resolution of FDG avidity.    -Current PET from 12/2023 with resolution of FDG avidity in the rectal mass which has decreased in size.  There is a new area of  FDG avidity and a lytic lesion in the L4 region as well as mild SUV uptake in a right hilar node.  Lytic lesion appears to have been there previously on my review of prior PETs but with increased activity.  I would recommend stereotactic radiation therapy to this area as he is having pain.  We discussed the option of proceeding with CT-guided biopsy of the bone lesion prior to stereotactic radiation, but given presence on previous imaging at the time of his biopsy-proven rectal progression I am unsure if this would be of much yield.  -The hilar LN is small and indeterminate.  I recommended continuation of maintenance Keytruda and short interval follow-up CT of the chest to reassess this.  If this shows continued enlargement of the hilar lymph node, would recommend bronchoscopy with tissue biopsy/discussed possible systemic therapy change in that instance.    -He has follow-up pending with Dr. Alcocer tomorrow.  -CBC reviewed and CMP pending for treatment today 12/28/23. Immunotherapy orders signed.   -Follow up with CT results mid January to reevaluate the hilar lymph node and consideration of next maintenance immunotherapy vs further workup pending those results.     5. Herniation at stoma site  -Discussed with CRS, can ultimately consider ostomy revision when treatment holiday appropriate. Pt wishes to defer    6.  H/o GI bleed  -PPI, continue daily.  Tolerating well. CBC stable.    5. Access  -Port c/d/i    Follow Up:   3 weeks    Kaycee Leary MD  Hematology and Oncology

## 2023-12-29 ENCOUNTER — OFFICE VISIT (OUTPATIENT)
Dept: RADIATION ONCOLOGY | Facility: HOSPITAL | Age: 73
End: 2023-12-29
Payer: MEDICARE

## 2023-12-29 VITALS
DIASTOLIC BLOOD PRESSURE: 71 MMHG | SYSTOLIC BLOOD PRESSURE: 125 MMHG | RESPIRATION RATE: 16 BRPM | WEIGHT: 171.6 LBS | OXYGEN SATURATION: 97 % | BODY MASS INDEX: 29.44 KG/M2 | TEMPERATURE: 97.7 F | HEART RATE: 64 BPM

## 2023-12-29 DIAGNOSIS — C81.98 HODGKIN LYMPHOMA OF LYMPH NODES OF MULTIPLE REGIONS, UNSPECIFIED HODGKIN LYMPHOMA TYPE: Primary | ICD-10-CM

## 2023-12-29 PROCEDURE — G0463 HOSPITAL OUTPT CLINIC VISIT: HCPCS

## 2023-12-29 RX ORDER — CODEINE PHOSPHATE/GUAIFENESIN 10-100MG/5
5 LIQUID (ML) ORAL 3 TIMES DAILY PRN
Qty: 180 ML | Refills: 0 | Status: SHIPPED | OUTPATIENT
Start: 2023-12-29

## 2023-12-29 NOTE — PROGRESS NOTES
New problem note    PATIENT:                                                      Abraham Wu  MEDICAL RECORD #:                        [id number]  :                                                          1950  COMPLETION DATE:   2023  DIAGNOSIS:     Hodgkin lymphoma  - Stage IV      BRIEF HISTORY:  Abraham Wu is a very pleasant 73 y.o. male with a known diagnosis of refractory Hodgkin's lymphoma.  The patient was initially diagnosed in 2022 when he presented with intractable diarrhea, anorexia, weight loss, and intermittent fevers.  He was found on CT scans to have a large obstructive rectal mass with adjacent adenopathy, bulky retroperitoneal adenopathy, and findings concerning for left renal and liver metastases.  There was a small right pleural effusion with nodularity.  He underwent diverting colostomy, with rectal biopsies consistent with classic Hodgkin's lymphoma.  The patient initiated chemotherapy with brentuximab-AVD, completing 6 cycles on 3/23/2023.  Repeat PET/CT scan 2023 showed some persistent bilateral interstitial changes and a few new hypermetabolic mediastinal lymph nodes, felt to be potentially reactive given a recent coronavirus infection and multifocal pneumonia.  Imaging also revealed resolution of the hepatic lesions, marked improvement in the retroperitoneal adenopathy, and decreased size of his rectal mass consistent with interval response to treatment.  Given some persistent uptake in the rectal mass concerning for residual disease, the patient was scheduled for outpatient biopsy and subsequent consolidative radiotherapy to the rectal mass.    Unfortunately, while awaiting biopsy, the patient was found on CT simulation for radiation treatment planning to have significant interval growth in the rectal mass as well as development of new retroperitoneal adenopathy superior to the rectal mass consistent with progressive disease.  The patient was having  rectal bleeding, pain, and obstructive uropathy symptoms at that time.  The patient underwent transanal biopsy on 5/30/2023 with Dr. Viveros.  Pathology once again revealed classical Hodgkin's lymphoma.  Because of development of new adenopathy outside of the radiation field as well as rapid regrowth of his mass, consolidative radiation therapy was changed to palliative radiation to allow for earlier transition to second line chemotherapy.  The patient received a quad shot palliatively to the rectum, consisting of 14 Gray in 4 fractions delivered over a 2-day period.  He completed these treatments 6/1/2023.  The patient was then seen by Dr. Pruitt at  for consideration of bone marrow transplant.  Ultimately, it was decided that the patient should press forward with chemoimmunotherapy.  The patient received Keytruda, Navelbine, Gemzar, and Doxil, completing 4 cycles in 8/2023.  Posttreatment PET/CT scan revealed excellent interval response and complete resolution of FDG avidity.  The patient was then referred back to our clinic for consideration of consolidative radiotherapy treatments.  The patient underwent reirradiation to the rectum consisting of 24 Gray in 8 fractions, completing 9/27/2023.     The patient has continued with immunotherapy with Dr. Leary in the meantime.  Patient had a PET scan that showed uptake in the L4 spine which was new and corresponds to an area with lytic destruction.  The patient also had a questionable area of uptake in the thorax and some lymph nodes that are very questionable.    The patient discussed options with Dr. Leary the patient was sent for consideration of radiotherapy to this region.    Patient reports that largely he feels good and is very happy to have options for treatment.    The patient does have some pain midline in his spine.  He forces a worse when gets up and walks around.    MEDICATIONS: Medication reconciliation for the patient was reviewed and confirmed  in the electronic medical record.    Review of Systems   Eyes:  Positive for eye problems (reports upcoming cataract surgery in January).   Gastrointestinal:         +ostomy   Genitourinary:          +urgency   All other systems reviewed and are negative.      Karnofsky score: 80   KPS 80%      Physical Exam  Vitals and nursing note reviewed.   Constitutional:       General: He is not in acute distress.     Appearance: He is well-developed.      Comments: Pleasant, conversant   HENT:      Head: Normocephalic and atraumatic.   Eyes:      Conjunctiva/sclera: Conjunctivae normal.      Pupils: Pupils are equal, round, and reactive to light.   Neck:      Comments: No obviously enlarged cervical or supraclavicular lymphadenopathy  Cardiovascular:      Rate and Rhythm: Normal rate and regular rhythm.      Heart sounds: No murmur heard.     No friction rub.      Comments: No prominent JVD.  No pedal edema.  Pulmonary:      Effort: Pulmonary effort is normal.      Breath sounds: Normal breath sounds. No wheezing.   Abdominal:      General: Bowel sounds are normal. There is no distension.      Palpations: Abdomen is soft. There is no mass.      Tenderness: There is no abdominal tenderness.      Comments: LLQ ostomy   Musculoskeletal:         General: Normal range of motion.      Cervical back: Normal range of motion and neck supple.      Comments: Moves all extremities spontaneously.   Lymphadenopathy:      Cervical: No cervical adenopathy.   Skin:     General: Skin is warm and dry.   Neurological:      Mental Status: He is alert and oriented to person, place, and time.      Comments: Coordination intact.   Psychiatric:         Behavior: Behavior normal.         Thought Content: Thought content normal.         Judgment: Judgment normal.         VITAL SIGNS:   Vitals:    12/29/23 0850   BP: 125/71   Pulse: 64   Resp: 16   Temp: 97.7 °F (36.5 °C)   TempSrc: Temporal   SpO2: 97%   Weight: 77.8 kg (171 lb 9.6 oz)   PainSc:   5              The following portions of the patient's history were reviewed and updated as appropriate: allergies, current medications, past family history, past medical history, past social history, past surgical history and problem list.  I have personally requested reviewed and interpreted the patient's images and radiology reports and pathology reports listed below:  NM PET/CT Skull Base to Mid Thigh    Addendum Date: 12/8/2023    ADDENDUM #1 ADDENDUM: Jose E leyva 4. Electronically Signed: Araseli Mccormack MD  12/8/2023 8:37 AM EST  Workstation ID: [id number] ORIGINAL REPORT: NM PET/CT SKULL BASE TO MID THIGH Date of Exam: 12/4/2023 8:57 AM EST Indication: refractory hodgkin lymphoma treatment assessment. Comparison: 8/22/2023. Technique: 13.22 mCi of F-18 FDG was administered intravenously. PET imaging was obtained from skull base to mid-thigh approximately 60 minutes after radiotracer injection. A low dose non contrast CT was obtained for attenuation correction and anatomic localization. Fused PET-CT and 3D MIP reconstructions were utilized for image interpretation.  Fasting blood glucose level: 94 mg/dl. Reference uptake values: Mediastinum: 2.11 SUVmax Liver: 2.51 SUVmax Normalization method: Body Weight Findings: There is a new small hypermetabolic right hilar node with an SUV max of 5.03. Previously noted left rectal mass demonstrates an SUV max of 1.38 currently as compared to 2.56 previously. There is a new hypermetabolic lytic lucency in the L4 vertebral body  anteriorly with an SUV max of 6.27. There is normal distribution of radiotracer in the neck. Previously noted patchy somewhat groundglass appearing infiltrates of the bilateral upper lobes have improved somewhat since the prior exam and are not hypermetabolic above baseline. There is a 6 mm noncalcified nodule of the left upper lobe which is not hypermetabolic above baseline. Impression: New hypermetabolic right hilar node and new L4 vertebral body  lesion, most compatible with recurrent lymphoma. Decreased hypermetabolic activity of the rectal mass. Improving lung infiltrates. Electronically Signed: Araseli Mccormack MD  12/5/2023 8:29 AM EST  Workstation ID: [id number]    Result Date: 12/8/2023  Impression: New hypermetabolic right hilar node and new L4 vertebral body lesion, most compatible with recurrent lymphoma. Decreased hypermetabolic activity of the rectal mass. Improving lung infiltrates. Electronically Signed: Araseli Mccormack MD  12/5/2023 8:29 AM EST  Workstation ID: [id number]     I reviewed Dr. Leary's note.  I reviewed the patient's previous PET scans dating back to 12/7/2022.       There are no diagnoses linked to this encounter.       IMPRESSION:   Abraham Wu is a 73 y.o. gentleman with stage IVB Hodgkin's lymphoma.  Restaging PET/CT scan following 6 cycles of brentuximab-AVD showed a good partial response to treatment despite residual disease in the rectum that was PET avid.  The patient was then found to have rapid progression and he underwent a quad shot to the rectum, completing 6/1/2023 with good palliation of his obstructive symptoms.  The patient was reportedly considered a borderline candidate for autologous stem cell transplant, so he resumed salvage chemotherapy with immunotherapy at that time.  The patient has had a good response on subsequent imaging, though still has some CT evidence of thickening of his rectum.  The patient has since undergone retreatment to the rectum/site of initially bulky disease with consolidative radiotherapy which he completed on 8/27/2023.  The patient's interval PET scan shows good response to the treated area.  He now has an L4 lesion with increased uptake since his previous scans.  Recommend radiotherapy treatments to this.  This borders area previously treated with radiation x 2.  The patient will require a SBRT to limit dose to the cord kidneys and bowel.  Recommend SIB approach with 8 Gray x 3 to PET  avid lesion and 6 Gray x 3 to remainder of vertebral body.  Recommend return for CT simulation.  The patient has eye surgeries scheduled for the fifth and 12.  He would like to start treatments on the 16th when he has a CT scan.    Greater than 1 hour was spent preparing for and coordinating this visit. >50% of the time was spent in direct face to face conversation with the patient teaching, answering question, and providing explanations regarding the patient's case.  The decision to treat the patient with radiation is a complex one and carries the risk of long-term side effects and complications.  The patient's malignancy represents a complicated life threatening condition that requires complex multidisciplinary management for treatment and followup.    Special treatment procedure Note: Re-irradiation    The patient has previously received radiation to this or an abutting site.  The previous radiation plans were reviewed and were utilized to create a new plan.  This adds complexity and requires additional time and effort.        RECOMMENDATIONS:      L4 lesion  -Increased avidity on PET scan compared to previous  -Recommend stereotactic radiotherapy  -Abuts previously irradiated area x 2  -Recommend SBRT to limit dose to cord, bowel, and ostomy  -Recommend 8 Gray x 3 and 6 Gray x 3 SIB      Right thoracic hilar lymph nodes  -Questionable on PET scan  -Continue to monitor    Refractory Hodgkin's lymphoma of the rectum  -Status post diversion  -Status post chemotherapy brentuximab-AVD   -Initiated rapid palliative radiotherapy with quad shot to rectum, 14 [place] in 4 fractions              -Completed 6/1/2023  -Clinical improvement  -Was seen by Dr. Pruitt at  for consideration of stem cell transplant, ultimately considered borderline candidate due to age with recommendations for salvage chemoimmunotherapy  -Status post Keytruda, Navelbine, Gemzar, and Doxil with Dr. Leary  -Repeat PET/CT scan showed good  response  -Recommendations to consolidate the patient's initially bulky site of disease   -Status post consolidative IMRT/IGR T to the rectum, 24 Gray in 8 fractions given previous radiation dose  -Completed 9/27/2023  -Continues maintenance Keytruda  -Repeat PET/CT scan 12/4/2023 to evaluate response  -RTC at that time  -Follows with Dr. Leary      No follow-ups on file.    Tim Alcocer MD

## 2024-01-02 ENCOUNTER — HOSPITAL ENCOUNTER (OUTPATIENT)
Dept: RADIATION ONCOLOGY | Facility: HOSPITAL | Age: 74
Setting detail: RADIATION/ONCOLOGY SERIES
Discharge: HOME | End: 2024-01-02
Payer: MEDICARE

## 2024-01-02 ENCOUNTER — HOSPITAL ENCOUNTER (OUTPATIENT)
Dept: RADIATION ONCOLOGY | Facility: HOSPITAL | Age: 74
Setting detail: RADIATION/ONCOLOGY SERIES
Discharge: HOME | End: 2024-01-02

## 2024-01-02 PROCEDURE — 77470 SPECIAL RADIATION TREATMENT: CPT | Performed by: RADIOLOGY

## 2024-01-02 PROCEDURE — 77290 THER RAD SIMULAJ FIELD CPLX: CPT | Performed by: RADIOLOGY

## 2024-01-05 PROCEDURE — 77300 RADIATION THERAPY DOSE PLAN: CPT | Performed by: RADIOLOGY

## 2024-01-05 PROCEDURE — 77334 RADIATION TREATMENT AID(S): CPT | Performed by: RADIOLOGY

## 2024-01-05 PROCEDURE — 77370 RADIATION PHYSICS CONSULT: CPT | Performed by: RADIOLOGY

## 2024-01-05 PROCEDURE — 77295 3-D RADIOTHERAPY PLAN: CPT | Performed by: RADIOLOGY

## 2024-01-11 DIAGNOSIS — C81.98 HODGKIN LYMPHOMA OF LYMPH NODES OF MULTIPLE REGIONS, UNSPECIFIED HODGKIN LYMPHOMA TYPE: Primary | ICD-10-CM

## 2024-01-16 ENCOUNTER — HOSPITAL ENCOUNTER (OUTPATIENT)
Dept: RADIATION ONCOLOGY | Facility: HOSPITAL | Age: 74
Discharge: HOME | End: 2024-01-16

## 2024-01-16 ENCOUNTER — HOSPITAL ENCOUNTER (OUTPATIENT)
Dept: CT IMAGING | Facility: HOSPITAL | Age: 74
Discharge: HOME | End: 2024-01-16
Admitting: INTERNAL MEDICINE
Payer: MEDICARE

## 2024-01-16 DIAGNOSIS — C81.98 HODGKIN LYMPHOMA OF LYMPH NODES OF MULTIPLE REGIONS, UNSPECIFIED HODGKIN LYMPHOMA TYPE: ICD-10-CM

## 2024-01-16 PROCEDURE — 0 DIATRIZOATE MEGLUMINE & SODIUM PER 1 ML: Performed by: INTERNAL MEDICINE

## 2024-01-16 PROCEDURE — 74177 CT ABD & PELVIS W/CONTRAST: CPT

## 2024-01-16 PROCEDURE — 25510000001 IOPAMIDOL 61 % SOLUTION: Performed by: INTERNAL MEDICINE

## 2024-01-16 PROCEDURE — 77280 THER RAD SIMULAJ FIELD SMPL: CPT | Performed by: RADIOLOGY

## 2024-01-16 PROCEDURE — 77373 STRTCTC BDY RAD THER TX DLVR: CPT | Performed by: RADIOLOGY

## 2024-01-16 PROCEDURE — 71260 CT THORAX DX C+: CPT

## 2024-01-16 RX ADMIN — DIATRIZOATE MEGLUMINE AND DIATRIZOATE SODIUM 15 ML: 660; 100 LIQUID ORAL; RECTAL at 11:48

## 2024-01-16 RX ADMIN — IOPAMIDOL 90 ML: 612 INJECTION, SOLUTION INTRAVENOUS at 11:48

## 2024-01-18 ENCOUNTER — HOSPITAL ENCOUNTER (OUTPATIENT)
Dept: RADIATION ONCOLOGY | Facility: HOSPITAL | Age: 74
Discharge: HOME | End: 2024-01-18

## 2024-01-18 ENCOUNTER — OFFICE VISIT (OUTPATIENT)
Dept: ONCOLOGY | Facility: CLINIC | Age: 74
End: 2024-01-18
Payer: MEDICARE

## 2024-01-18 ENCOUNTER — HOSPITAL ENCOUNTER (OUTPATIENT)
Dept: ONCOLOGY | Facility: HOSPITAL | Age: 74
Discharge: HOME | End: 2024-01-18
Admitting: INTERNAL MEDICINE
Payer: MEDICARE

## 2024-01-18 VITALS
OXYGEN SATURATION: 97 % | DIASTOLIC BLOOD PRESSURE: 72 MMHG | TEMPERATURE: 97.4 F | RESPIRATION RATE: 18 BRPM | BODY MASS INDEX: 29.02 KG/M2 | WEIGHT: 170 LBS | HEIGHT: 64 IN | HEART RATE: 65 BPM | SYSTOLIC BLOOD PRESSURE: 155 MMHG

## 2024-01-18 DIAGNOSIS — Z45.2 ENCOUNTER FOR CARE RELATED TO VASCULAR ACCESS PORT: ICD-10-CM

## 2024-01-18 DIAGNOSIS — C81.98 HODGKIN LYMPHOMA OF LYMPH NODES OF MULTIPLE REGIONS, UNSPECIFIED HODGKIN LYMPHOMA TYPE: Primary | ICD-10-CM

## 2024-01-18 LAB
ALBUMIN SERPL-MCNC: 3.9 G/DL (ref 3.5–5.2)
ALBUMIN/GLOB SERPL: 1.3 G/DL
ALP SERPL-CCNC: 83 U/L (ref 39–117)
ALT SERPL W P-5'-P-CCNC: 8 U/L (ref 1–41)
ANION GAP SERPL CALCULATED.3IONS-SCNC: 7 MMOL/L (ref 5–15)
AST SERPL-CCNC: 11 U/L (ref 1–40)
BASOPHILS # BLD AUTO: 0.02 10*3/MM3 (ref 0–0.2)
BASOPHILS NFR BLD AUTO: 0.5 % (ref 0–1.5)
BILIRUB SERPL-MCNC: 0.4 MG/DL (ref 0–1.2)
BUN SERPL-MCNC: 12 MG/DL (ref 8–23)
BUN/CREAT SERPL: 20 (ref 7–25)
CALCIUM SPEC-SCNC: 9.2 MG/DL (ref 8.6–10.5)
CHLORIDE SERPL-SCNC: 97 MMOL/L (ref 98–107)
CO2 SERPL-SCNC: 29 MMOL/L (ref 22–29)
CREAT SERPL-MCNC: 0.6 MG/DL (ref 0.76–1.27)
DEPRECATED RDW RBC AUTO: 48.8 FL (ref 37–54)
EGFRCR SERPLBLD CKD-EPI 2021: 103.2 ML/MIN/1.73
EOSINOPHIL # BLD AUTO: 0.08 10*3/MM3 (ref 0–0.4)
EOSINOPHIL NFR BLD AUTO: 1.9 % (ref 0.3–6.2)
ERYTHROCYTE [DISTWIDTH] IN BLOOD BY AUTOMATED COUNT: 14.2 % (ref 12.3–15.4)
GLOBULIN UR ELPH-MCNC: 2.9 GM/DL
GLUCOSE SERPL-MCNC: 84 MG/DL (ref 65–99)
HCT VFR BLD AUTO: 33.1 % (ref 37.5–51)
HGB BLD-MCNC: 11 G/DL (ref 13–17.7)
IMM GRANULOCYTES # BLD AUTO: 0.01 10*3/MM3 (ref 0–0.05)
IMM GRANULOCYTES NFR BLD AUTO: 0.2 % (ref 0–0.5)
LYMPHOCYTES # BLD AUTO: 0.81 10*3/MM3 (ref 0.7–3.1)
LYMPHOCYTES NFR BLD AUTO: 19.2 % (ref 19.6–45.3)
MCH RBC QN AUTO: 31.4 PG (ref 26.6–33)
MCHC RBC AUTO-ENTMCNC: 33.2 G/DL (ref 31.5–35.7)
MCV RBC AUTO: 94.6 FL (ref 79–97)
MONOCYTES # BLD AUTO: 0.48 10*3/MM3 (ref 0.1–0.9)
MONOCYTES NFR BLD AUTO: 11.4 % (ref 5–12)
NEUTROPHILS NFR BLD AUTO: 2.81 10*3/MM3 (ref 1.7–7)
NEUTROPHILS NFR BLD AUTO: 66.8 % (ref 42.7–76)
PLATELET # BLD AUTO: 211 10*3/MM3 (ref 140–450)
PMV BLD AUTO: 8.2 FL (ref 6–12)
POTASSIUM SERPL-SCNC: 4.3 MMOL/L (ref 3.5–5.2)
PROT SERPL-MCNC: 6.8 G/DL (ref 6–8.5)
RBC # BLD AUTO: 3.5 10*6/MM3 (ref 4.14–5.8)
SODIUM SERPL-SCNC: 133 MMOL/L (ref 136–145)
WBC NRBC COR # BLD AUTO: 4.21 10*3/MM3 (ref 3.4–10.8)

## 2024-01-18 PROCEDURE — 25010000002 HEPARIN LOCK FLUSH PER 10 UNITS: Performed by: INTERNAL MEDICINE

## 2024-01-18 PROCEDURE — 77280 THER RAD SIMULAJ FIELD SMPL: CPT | Performed by: RADIOLOGY

## 2024-01-18 PROCEDURE — 25010000002 PEMBROLIZUMAB 100 MG/4ML SOLUTION 4 ML VIAL: Performed by: INTERNAL MEDICINE

## 2024-01-18 PROCEDURE — 25810000003 SODIUM CHLORIDE 0.9 % SOLUTION: Performed by: INTERNAL MEDICINE

## 2024-01-18 PROCEDURE — 77373 STRTCTC BDY RAD THER TX DLVR: CPT | Performed by: RADIOLOGY

## 2024-01-18 PROCEDURE — 85025 COMPLETE CBC W/AUTO DIFF WBC: CPT | Performed by: INTERNAL MEDICINE

## 2024-01-18 PROCEDURE — 96413 CHEMO IV INFUSION 1 HR: CPT

## 2024-01-18 PROCEDURE — 80053 COMPREHEN METABOLIC PANEL: CPT | Performed by: INTERNAL MEDICINE

## 2024-01-18 RX ORDER — SODIUM CHLORIDE 9 MG/ML
250 INJECTION, SOLUTION INTRAVENOUS ONCE
Status: CANCELLED | OUTPATIENT
Start: 2024-01-18

## 2024-01-18 RX ORDER — AMOXICILLIN AND CLAVULANATE POTASSIUM 500; 125 MG/1; MG/1
1 TABLET, FILM COATED ORAL 2 TIMES DAILY
Qty: 20 TABLET | Refills: 0 | Status: SHIPPED | OUTPATIENT
Start: 2024-01-18

## 2024-01-18 RX ORDER — HEPARIN SODIUM (PORCINE) LOCK FLUSH IV SOLN 100 UNIT/ML 100 UNIT/ML
500 SOLUTION INTRAVENOUS AS NEEDED
Status: DISCONTINUED | OUTPATIENT
Start: 2024-01-18 | End: 2024-01-19 | Stop reason: HOSPADM

## 2024-01-18 RX ORDER — SODIUM CHLORIDE 9 MG/ML
250 INJECTION, SOLUTION INTRAVENOUS ONCE
Status: COMPLETED | OUTPATIENT
Start: 2024-01-18 | End: 2024-01-18

## 2024-01-18 RX ORDER — HEPARIN SODIUM (PORCINE) LOCK FLUSH IV SOLN 100 UNIT/ML 100 UNIT/ML
500 SOLUTION INTRAVENOUS AS NEEDED
OUTPATIENT
Start: 2024-01-18

## 2024-01-18 RX ADMIN — HEPARIN 500 UNITS: 100 SYRINGE at 13:13

## 2024-01-18 RX ADMIN — SODIUM CHLORIDE 200 MG: 9 INJECTION, SOLUTION INTRAVENOUS at 12:30

## 2024-01-18 RX ADMIN — SODIUM CHLORIDE 250 ML: 9 INJECTION, SOLUTION INTRAVENOUS at 12:25

## 2024-01-18 NOTE — PROGRESS NOTES
Hematology and Oncology [place]  Office number [phone]    Fax number [phone]     Follow up     Date: 24    Patient Name: Abraham Wu  MRN: [id number]  : 1950    Chief Complaint: Hodgkin Lymphoma follow up/treatment    Surgeon: Dr. Hiram Viveros MD    Cancer Staging: IV    History of Present Illness: Abraham Wu is a pleasant 73 y.o. male with PMH of hypertension, sleep apnea, hard of hearing who presents today for follow up of Hodgkin Lymphoma.     He initially presented 2022 with intractable diarrhea, low appetite, and a greater than 50 pound weight loss over several month period associated with intermittent fevers.  CT of the chest abdomen pelvis obtained in the ER shows of rectal mass spanning greater than 12 cm with adjacent adenopathy, bulky RP adenopathy, and findings concerning for left renal and liver metastases.  Small right pleural effusion with nodularity.  There was concern for impending obstruction, so he underwent diverting colostomy and biopsy on 2022.  Biopsies from the peritoneam showed a fibrotic nodule histiocytes and eosinophils admixed with CD30 positive large cells.  Rectal biopsies showed involvement by CD30 positive lymphoma, classic Hodgkin lymphoma versus CD30 positive T-cell or B-cell lymphoma.  There was extensive fibrosis and crush artifact.  He underwent repeat transrectal biopsy 10/4/2022 for further characterization which was felt to be most consistent with classical Hodgkin lymphoma.    He initiated chemotherapy with Brentuximab-AVD as an inpatient on 10/8/2022.  Cycle #1 was complicated by GI bleed requiring multiple blood transfusion and ultimately coil artery embolization 10/12; neutropenic fever, Candida esophagitis and mucositis.  He had a prolonged ICU stay  And required enteral feeding.  He was discharged 10/20/2022.    Following his last cycle he was admitted with multifocal PNA and +corona virus  infection.    Treatment history:  Brentuximab-AVD: C1 10/8/22  C2: 11/1/22 onward with DA 20% in BVD; full dose brentuximab  Completed C6 3/22/23    Palliative radiation to rectum 5/30/2023 (abbreviated due to systemic progression)  GVD+Pembro x 4 cycles: completed 8/30/2023  Consolidative radiation to rectum completed 9/27/23    Pembrolizumab maintenance: current  Radiation to spine 1/16, 1/18 and 1/22/24    Interval history:    He is here for follow up. Completed z pack 12/2023, last levaquin 6/2023.Cough has persisted, increased slightly since off abx.Sputum remains white/clear. Non bloody. No increased SOA.   He is otherwise feeling well.   Back pain stable. Completing Cyber knife this week and next.     He provides additional PMH of a polyp in airway removed 2020 [facility] Dr. Ketan Monet.      Past Medical History:   Past Medical History:   Diagnosis Date    benign polypoid tissue right lung     Cancer     HODGKINS LYMPHOMA, RECTAL CANCER    Diabetes mellitus     History of radiation therapy 09/27/2023    re-treat rectum    Hyperlipidemia     Hypertension     Lymphoma     Mycobacterium mucogenicum     SHERRI (obstructive sleep apnea)     O2 2L/MIN AT NIGHT ONLY    Pneumonia     2023    Seasonal allergies      Past Surgical History:   Past Surgical History:   Procedure Laterality Date    BRONCHOSCOPY      removal of obstructing polypoid tissue right middle lung    COLOSTOMY N/A 09/22/2022    Procedure: LAPAROSCOPIC COLOSTOMY CREATION, FLEXIBLE SIGMOIDOSCOPY;  Surgeon: Hiram Viveros MD;  Location: [facility] OR;  Service: General;  Laterality: N/A;    DENTAL PROCEDURE      ENDOSCOPY N/A 10/10/2022    Procedure: ESOPHAGOGASTRODUODENOSCOPY;  Surgeon: Neeraj Lynn MD;  Location:  [facility];  Service: Gastroenterology;  Laterality: N/A;    ENDOSCOPY N/A 10/12/2022    Procedure: ESOPHAGOGASTRODUODENOSCOPY;  Surgeon: Brunner, Mark I, MD;  Location:  [facility];  Service: Gastroenterology;   Laterality: N/A;    EXAM UNDER ANESTHESIA, RECTAL BIOPSY N/A 10/04/2022    Procedure: EXAM UNDER ANESTHESIA, TRANSANAL BIOPSY WITH TRUCUT NEEDLE (LITHOTOMY-CANDY CANE);  Surgeon: Hiram Viveros MD;  Location:  [facility];  Service: General;  Laterality: N/A;    EXAM UNDER ANESTHESIA, RECTAL BIOPSY N/A 5/30/2023    Procedure: EXAM UNDER ANESTHESIA, TRANSANAL BIOPSY OF RECTAL MASS WITH TRUCUT NEEDLE, PROCTOSCOPY;  Surgeon: Hiram Viveros MD;  Location:  [facility];  Service: General;  Laterality: N/A;    PERIPHERALLY INSERTED CENTRAL CATHETER INSERTION      Removed 11/28/2022    VENOUS ACCESS DEVICE (PORT) INSERTION Right 11/28/2022     Family History:   Family History   Problem Relation Age of Onset    Diabetes Mother     Diabetes Father     Heart disease Father      Social History:   Social History     Socioeconomic History    Marital status:    Tobacco Use    Smoking status: Never    Smokeless tobacco: Never   Vaping Use    Vaping Use: Never used   Substance and Sexual Activity    Alcohol use: Not Currently     Comment: previously drank 2 glasses of wine/beer nightly    Drug use: Never    Sexual activity: Defer       Medications:     Current Outpatient Medications:     fluticasone (FLONASE) 50 MCG/ACT nasal spray, 1 spray into the nostril(s) as directed by provider Daily As Needed for Allergies or Rhinitis. OTC, Disp: , Rfl:     guaiFENesin-codeine (GUAIFENESIN AC) 100-10 MG/5ML liquid, Take 5 mL by mouth 3 (Three) Times a Day As Needed for Cough., Disp: 180 mL, Rfl: 0    lidocaine-prilocaine (EMLA) 2.5-2.5 % cream, Apply 1 application topically to the appropriate area as directed As Needed (45-60 minutes prior to port access.  Cover with saran/plastic wrap.)., Disp: 30 g, Rfl: 3    loratadine (CLARITIN) 10 MG tablet, TAKE ONE TABLET BY MOUTH DAILY, Disp: 30 tablet, Rfl: 5    ondansetron (Zofran) 8 MG tablet, Take 1 tablet by mouth Every 8 (Eight) Hours As Needed for Nausea or Vomiting., Disp: 30  "tablet, Rfl: 5    pantoprazole (PROTONIX) 40 MG EC tablet, TAKE ONE TABLET BY MOUTH DAILY, Disp: 90 tablet, Rfl: 1    prochlorperazine (COMPAZINE) 5 MG tablet, Take 1 tablet by mouth Every 6 (Six) Hours As Needed for Nausea or Vomiting., Disp: 30 tablet, Rfl: 2    rosuvastatin (CRESTOR) 10 MG tablet, Take 1 tablet by mouth Daily., Disp: , Rfl:     Allergies:   No Known Allergies    Objective     Vital Signs:   Vitals:    01/18/24 1019   BP: 155/72   Pulse: 65   Resp: 18   Temp: 97.4 °F (36.3 °C)   TempSrc: Temporal   SpO2: 97%   Weight: 77.1 kg (170 lb)   Height: 162.6 cm (64.02\")   PainSc: 0-No pain    Body mass index is 29.17 kg/m².   Pain Score    01/18/24 1019   PainSc: 0-No pain       ECOG Performance Status: 1    Physical Exam:   General: Well appearing male   HEENT: Normocephalic, atraumatic. Sclera anicteric.   Neck: supple, no palpable LAD. No axillary adenopathy  Cardiovascular: regular rate and rhythm. No murmurs.   Respiratory: Normal rate. Clear to auscultation bilaterally  Abdomen: Soft, nontender, non distended with normoactive bowel sounds. Ostomy LLQ   Lymph: no supraclavicular or axillary adenopathy  Neuro: Alert and oriented x 3.   Ext: Symmetric, no swelling.   Skin: right port site  C/d/I  Accurate as of 1/18/24    Laboratory/Imaging Reviewed:     Hospital Outpatient Visit on 01/18/2024   Component Date Value Ref Range Status    Glucose 01/18/2024 84  65 - 99 mg/dL Final    BUN 01/18/2024 12  8 - 23 mg/dL Final    Creatinine 01/18/2024 0.60 (L)  0.76 - 1.27 mg/dL Final    Sodium 01/18/2024 133 (L)  136 - 145 mmol/L Final    Potassium 01/18/2024 4.3  3.5 - 5.2 mmol/L Final    Chloride 01/18/2024 97 (L)  98 - 107 mmol/L Final    CO2 01/18/2024 29.0  22.0 - 29.0 mmol/L Final    Calcium 01/18/2024 9.2  8.6 - 10.5 mg/dL Final    Total Protein 01/18/2024 6.8  6.0 - 8.5 g/dL Final    Albumin 01/18/2024 3.9  3.5 - 5.2 g/dL Final    ALT (SGPT) 01/18/2024 8  1 - 41 U/L Final    AST (SGOT) 01/18/2024 11  1 " - 40 U/L Final    Alkaline Phosphatase 01/18/2024 83  39 - 117 U/L Final    Total Bilirubin 01/18/2024 0.4  0.0 - 1.2 mg/dL Final    Globulin 01/18/2024 2.9  gm/dL Final    Calculated Result    A/G Ratio 01/18/2024 1.3  g/dL Final    BUN/Creatinine Ratio 01/18/2024 20.0  7.0 - 25.0 Final    Anion Gap 01/18/2024 7.0  5.0 - 15.0 mmol/L Final    eGFR 01/18/2024 103.2  >60.0 mL/min/1.73 Final    WBC 01/18/2024 4.21  3.40 - 10.80 10*3/mm3 Final    RBC 01/18/2024 3.50 (L)  4.14 - 5.80 10*6/mm3 Final    Hemoglobin 01/18/2024 11.0 (L)  13.0 - 17.7 g/dL Final    Hematocrit 01/18/2024 33.1 (L)  37.5 - 51.0 % Final    MCV 01/18/2024 94.6  79.0 - 97.0 fL Final    MCH 01/18/2024 31.4  26.6 - 33.0 pg Final    MCHC 01/18/2024 33.2  31.5 - 35.7 g/dL Final    RDW 01/18/2024 14.2  12.3 - 15.4 % Final    RDW-SD 01/18/2024 48.8  37.0 - 54.0 fl Final    MPV 01/18/2024 8.2  6.0 - 12.0 fL Final    Platelets 01/18/2024 211  140 - 450 10*3/mm3 Final    Neutrophil % 01/18/2024 66.8  42.7 - 76.0 % Final    Lymphocyte % 01/18/2024 19.2 (L)  19.6 - 45.3 % Final    Monocyte % 01/18/2024 11.4  5.0 - 12.0 % Final    Eosinophil % 01/18/2024 1.9  0.3 - 6.2 % Final    Basophil % 01/18/2024 0.5  0.0 - 1.5 % Final    Immature Grans % 01/18/2024 0.2  0.0 - 0.5 % Final    Neutrophils, Absolute 01/18/2024 2.81  1.70 - 7.00 10*3/mm3 Final    Lymphocytes, Absolute 01/18/2024 0.81  0.70 - 3.10 10*3/mm3 Final    Monocytes, Absolute 01/18/2024 0.48  0.10 - 0.90 10*3/mm3 Final    Eosinophils, Absolute 01/18/2024 0.08  0.00 - 0.40 10*3/mm3 Final    Basophils, Absolute 01/18/2024 0.02  0.00 - 0.20 10*3/mm3 Final    Immature Grans, Absolute 01/18/2024 0.01  0.00 - 0.05 10*3/mm3 Final       CT Chest With Contrast Diagnostic    Result Date: 1/17/2024  Narrative: CT CHEST W CONTRAST DIAGNOSTIC, CT ABDOMEN PELVIS W CONTRAST Date of Exam: 1/16/2024 11:20 AM EST Indication: hilar nodule follow up h/o hodgkin. Stage IV Hodgkin's lymphoma with adenopathy and bone  metastases and rectal mass. Restaging Comparison: PET/CT dated 12/4/2023 Technique:  Axial CT images were obtained of the chest, abdomen and pelvis after the uneventful intravenous administration of 90 cc Isovue-300.  Sagittal and coronal reconstructions were performed.  Automated exposure control and iterative reconstruction  methods were used. Findings: Chest: A right-sided port remains in place with the tips at the junction of the SVC and right atrium. Coronary artery calcifications are present. No pleural or pericardial effusions are seen. There is a 4.0 cm mid ascending thoracic aortic aneurysm at the level of the right main pulmonary artery, unchanged. Pulmonary arteries are normal in caliber. There is no pneumothorax. Extending from the right hilum peripherally along the posterior right middle lobe and abutting the major fissure there is volume loss and consolidation without enhancing mass. There are calcified right hilar nodes. There is also a possible broncholith. Low density areas within this consolidation are present possibly from mucous plugging. In the aerated portions of the right middle  lobe there our groundglass and tree-in-bud opacities with smaller multifocal tree-in-bud opacities throughout the upper lobes. On axial CT image 48 there is an 8 mm nodule which is more lobular in appearance. On the study from 6 months ago this was approximately 5 mm in size. No pathologically enlarged thoracic or axillary lymph nodes. The thyroid gland is normal in size. An acute bony abnormality or aggressive appearing focal osseous lesion in the bony thorax is not demonstrated. Abdomen/pelvis: Definitive focal hepatic or splenic masses are not seen on the current exam and liver and spleen are within upper range of normal limits for size, this has significantly improved since 10/11/2022. The pancreas is unremarkable. There are embolization coils adjacent to the head of the pancreas and adjacent to the first/second  portion of the duodenum from known arterial embolization of the gastroduodenal artery and superior pancreaticoduodenal arteries and right gastroepiploic artery and 2022. The adrenal glands and kidneys are unremarkable except for a 3.5 cm area of residual amorphous soft tissue in the anterior left pararenal space significantly decreased in size since 10/11/2022. This area was not hypermetabolic on the most recent PET/CT from 1 month ago. The gallbladder is present. There is no biliary dilatation. The prostate gland is normal in size. The bladder is diffusely thick-walled with mild surrounding fat stranding. It is nearly completely empty. This can be due to infection/cystitis could also be due to radiation therapy if there is the appropriate clinical history. The stomach and small bowel loops are decompressed at the level of the umbilicus there is a small umbilical hernia containing the outer wall of a loop of small bowel and a small amount of abdominal fat. No distended loops of small bowel or signs of bowel  ischemia or obstruction are seen. There is a large stool burden. A left lower quadrant colostomy is present. There is extensive descending colonic diverticulosis. Extending posteriorly and laterally from the rectal wall is a Morphis masslike soft tissue  with surrounding fat stranding, this area measures approximately 4.6 x 3.7 cm compatible with the biopsy-proven lymphoma. This is not significantly changed in size since the PET/CT from 12/4/2023 and has dramatically improved since 10/11/2022. No pathologically enlarged abdominal pelvic or inguinal lymph nodes are demonstrated on the current exam. Visualized skeletal structures demonstrate degenerative changes and chronic bilateral L5 pars defects with grade 1 spondylolisthesis. No aggressive appearing focal osseous lesion is not demonstrated on the current exam.     Impression: Impression: CHEST: 1.There is consolidation and volume loss in the posterior right  middle lobe extending from the right hilum to the major fissure. There is a possible broncholith in the right middle lobe. There are multifocal areas of groundglass and tree-in-bud opacities  in the aerated portions of the right middle lobe and to a lesser extent in the upper lobes. These findings are likely infectious or inflammatory. 2.There is an 8 mm nodule in the right middle lobe which has increased in size from 5 mm on the study from 6 months ago. This could be infectious, inflammatory, or neoplastic. 3. There is a 4.0 cm mid ascending thoracic aortic aneurysm. ABDOMEN AND PELVIS: There is an amorphous soft tissue arising from the posterolateral left rectal wall. This area is 4.6 x 3.7 cm in size similar to the PET/CT from 1 month ago in this patient with known lymphoma in this region. No new focal abnormalities in the abdomen pelvis or visualized skeletal structures to indicate recurrent or metastatic disease. Electronically Signed: Luis Jha DO  1/17/2024 12:05 AM EST  Workstation ID: [id number]    CT Abdomen Pelvis With Contrast    Result Date: 1/17/2024  Narrative: CT CHEST W CONTRAST DIAGNOSTIC, CT ABDOMEN PELVIS W CONTRAST Date of Exam: 1/16/2024 11:20 AM EST Indication: hilar nodule follow up h/o hodgkin. Stage IV Hodgkin's lymphoma with adenopathy and bone metastases and rectal mass. Restaging Comparison: PET/CT dated 12/4/2023 Technique:  Axial CT images were obtained of the chest, abdomen and pelvis after the uneventful intravenous administration of 90 cc Isovue-300.  Sagittal and coronal reconstructions were performed.  Automated exposure control and iterative reconstruction  methods were used. Findings: Chest: A right-sided port remains in place with the tips at the junction of the SVC and right atrium. Coronary artery calcifications are present. No pleural or pericardial effusions are seen. There is a 4.0 cm mid ascending thoracic aortic aneurysm at the level of the right main pulmonary  artery, unchanged. Pulmonary arteries are normal in caliber. There is no pneumothorax. Extending from the right hilum peripherally along the posterior right middle lobe and abutting the major fissure there is volume loss and consolidation without enhancing mass. There are calcified right hilar nodes. There is also a possible broncholith. Low density areas within this consolidation are present possibly from mucous plugging. In the aerated portions of the right middle  lobe there our groundglass and tree-in-bud opacities with smaller multifocal tree-in-bud opacities throughout the upper lobes. On axial CT image 48 there is an 8 mm nodule which is more lobular in appearance. On the study from 6 months ago this was approximately 5 mm in size. No pathologically enlarged thoracic or axillary lymph nodes. The thyroid gland is normal in size. An acute bony abnormality or aggressive appearing focal osseous lesion in the bony thorax is not demonstrated. Abdomen/pelvis: Definitive focal hepatic or splenic masses are not seen on the current exam and liver and spleen are within upper range of normal limits for size, this has significantly improved since 10/11/2022. The pancreas is unremarkable. There are embolization coils adjacent to the head of the pancreas and adjacent to the first/second portion of the duodenum from known arterial embolization of the gastroduodenal artery and superior pancreaticoduodenal arteries and right gastroepiploic artery and 2022. The adrenal glands and kidneys are unremarkable except for a 3.5 cm area of residual amorphous soft tissue in the anterior left pararenal space significantly decreased in size since 10/11/2022. This area was not hypermetabolic on the most recent PET/CT from 1 month ago. The gallbladder is present. There is no biliary dilatation. The prostate gland is normal in size. The bladder is diffusely thick-walled with mild surrounding fat stranding. It is nearly completely empty. This  can be due to infection/cystitis could also be due to radiation therapy if there is the appropriate clinical history. The stomach and small bowel loops are decompressed at the level of the umbilicus there is a small umbilical hernia containing the outer wall of a loop of small bowel and a small amount of abdominal fat. No distended loops of small bowel or signs of bowel  ischemia or obstruction are seen. There is a large stool burden. A left lower quadrant colostomy is present. There is extensive descending colonic diverticulosis. Extending posteriorly and laterally from the rectal wall is a Morphis masslike soft tissue  with surrounding fat stranding, this area measures approximately 4.6 x 3.7 cm compatible with the biopsy-proven lymphoma. This is not significantly changed in size since the PET/CT from 12/4/2023 and has dramatically improved since 10/11/2022. No pathologically enlarged abdominal pelvic or inguinal lymph nodes are demonstrated on the current exam. Visualized skeletal structures demonstrate degenerative changes and chronic bilateral L5 pars defects with grade 1 spondylolisthesis. No aggressive appearing focal osseous lesion is not demonstrated on the current exam.     Impression: Impression: CHEST: 1.There is consolidation and volume loss in the posterior right middle lobe extending from the right hilum to the major fissure. There is a possible broncholith in the right middle lobe. There are multifocal areas of groundglass and tree-in-bud opacities  in the aerated portions of the right middle lobe and to a lesser extent in the upper lobes. These findings are likely infectious or inflammatory. 2.There is an 8 mm nodule in the right middle lobe which has increased in size from 5 mm on the study from 6 months ago. This could be infectious, inflammatory, or neoplastic. 3. There is a 4.0 cm mid ascending thoracic aortic aneurysm. ABDOMEN AND PELVIS: There is an amorphous soft tissue arising from the  posterolateral left rectal wall. This area is 4.6 x 3.7 cm in size similar to the PET/CT from 1 month ago in this patient with known lymphoma in this region. No new focal abnormalities in the abdomen pelvis or visualized skeletal structures to indicate recurrent or metastatic disease. Electronically Signed: Luisjhonatan Jha DO  1/17/2024 12:05 AM EST  Workstation ID: [id number]           Current L, Most recent PET right:     Assessment / Plan      Assessment/Plan:     1.   Recurrent CD30 positive classical Hodgkin's lymphoma  2.   Encounter for monitoring immunotherapy  3.   RML infiltrate  4.   Isolated lytic lesion L spine  -He completed 6 cycles of BV-AVD 3/23/2023. Cycle 6 was complicated by coronavirus infection and multifocal pneumonia.  Posttherapy PET showed persistent bilateral interstitial changes and mild adenopathy. Repeat chest CT shows continued improvement in the pulmonary infiltrates consistent with a reactive infectious etiology.  - He had clear response to first line therapy with resolution of the hepatic lesions, marked improvement in the retroperitoneal adenopathy, and 50% reduction in the size of the rectal mass.  However he had persistent uptake in the rectal mass on posttreatment PET, [place] 5. We elected to proceed with biopsy to confirm whether he had residual disease followed by consolidative radiotherapy to the rectal mass given initial bulky disease.  In the interim, while awaiting biopsy, he presented with rectal bleeding, obstructive uropathy symptoms and pain and radiation planning CT showed significant interval growth in the rectal mass as well as development of new retroperitoneal adenopathy superior to the rectal mass compared to the his posttreatment PET, confirming progressive disease. Consolidative radiation was converted to palliative course radiation given interval adenopathy development above the radiation field to allow for earlier introduction of second line chemotherapy.    -Biopsy confirmed residual classic Hodgkin's lymphoma.  -Given national shortage of carbo/cis, we proceeded with Keytruda, vinorelbine, Gemzar, and Doxil. He completed 4 cycles 8/2023. Post treatment PET showed complete resolution of FDG avidity.    -Most recent PET from 12/2023 with resolution of FDG avidity in the rectal mass which has decreased in size.  There is a new area of FDG avidity and a lytic lesion in the L4 region as well as mild SUV uptake in a right hilar node.  Lytic lesion appears to have been there previously on my review of prior PETs but with increased activity.  I would recommend stereotactic radiation therapy to this area as he is having pain.  We discussed the option of proceeding with CT-guided biopsy of the bone lesion prior to stereotactic radiation, but given presence on previous imaging at the time of his biopsy-proven rectal progression I am unsure if this would be of much yield.  -The hilar LN is small and indeterminate.  I recommended continuation of maintenance Keytruda and short interval follow-up CT of the chest to reassess this. He did get treated with a z-pack for bronchitis symptoms recently.  If this shows continued enlargement of the hilar lymph node, would recommend bronchoscopy with tissue biopsy/discussed possible systemic therapy change in that instance if progression confirmed.    -He is undergoing cyberknife to the isolated bone lesion.  -CBC reviewed and CMP adequate for Keytruda today.  -Follow up short interval CT reviewed personally and compared to prior PET. Worsening consolidative RML from hilum to major fissure with possible broncholith and no specific mass. Pt also reports remote h/o prior airway polyps. Will obtain these records. Will treat with Augmentin given recent macrolide in case of an infectious component. Referral for bronchoscopy to evaluate for possible progression vs alternative etiology. I have been in contact with the lung navigator to see if this  could be expedited.    5. Herniation at stoma site  -Discussed with CRS, can ultimately consider ostomy revision when treatment holiday appropriate. Pt wishes to defer    6.  H/o GI bleed  -PPI, continue daily.  Tolerating well. CBC stable.    5. Access  -Port c/d/i    Follow Up:   3 weeks    Kaycee Leary MD  Hematology and Oncology     Total time of care on the day of service on the day of service including time before, during, and after the office visit exceeded 40 min. This included personally reviewing imaging/preparing to see patient, counseling patient, performing medically appropriate exam, discussions with nursing/staff/navigation and documenting clinical information in the electronic or other health record

## 2024-01-20 ENCOUNTER — PREP FOR SURGERY (OUTPATIENT)
Dept: OTHER | Facility: HOSPITAL | Age: 74
End: 2024-01-20
Payer: MEDICARE

## 2024-01-20 DIAGNOSIS — R91.1 LUNG NODULE: Primary | ICD-10-CM

## 2024-01-22 ENCOUNTER — HOSPITAL ENCOUNTER (OUTPATIENT)
Dept: RADIATION ONCOLOGY | Facility: HOSPITAL | Age: 74
Discharge: HOME | End: 2024-01-22
Payer: MEDICARE

## 2024-01-22 PROCEDURE — 77336 RADIATION PHYSICS CONSULT: CPT | Performed by: RADIOLOGY

## 2024-01-22 PROCEDURE — 77373 STRTCTC BDY RAD THER TX DLVR: CPT | Performed by: RADIOLOGY

## 2024-01-29 PROBLEM — R91.1 LUNG NODULE: Status: ACTIVE | Noted: 2024-01-20

## 2024-01-29 RX ORDER — LIDOCAINE HYDROCHLORIDE 40 MG/ML
4 INJECTION, SOLUTION RETROBULBAR; TOPICAL ONCE
Status: CANCELLED | OUTPATIENT
Start: 2024-01-29 | End: 2024-01-29

## 2024-01-30 ENCOUNTER — PRE-ADMISSION TESTING (OUTPATIENT)
Dept: PREADMISSION TESTING | Facility: HOSPITAL | Age: 74
End: 2024-01-30
Payer: MEDICARE

## 2024-01-30 VITALS — HEIGHT: 64 IN | BODY MASS INDEX: 28.7 KG/M2 | WEIGHT: 168.12 LBS

## 2024-01-30 DIAGNOSIS — R91.1 LUNG NODULE: ICD-10-CM

## 2024-01-30 LAB
ALBUMIN SERPL-MCNC: 4.1 G/DL (ref 3.5–5.2)
ALBUMIN/GLOB SERPL: 1.3 G/DL
ALP SERPL-CCNC: 83 U/L (ref 39–117)
ALT SERPL W P-5'-P-CCNC: 15 U/L (ref 1–41)
ANION GAP SERPL CALCULATED.3IONS-SCNC: 9 MMOL/L (ref 5–15)
APTT PPP: 32.1 SECONDS (ref 22–39)
AST SERPL-CCNC: 18 U/L (ref 1–40)
BASOPHILS # BLD AUTO: 0.02 10*3/MM3 (ref 0–0.2)
BASOPHILS NFR BLD AUTO: 0.6 % (ref 0–1.5)
BILIRUB SERPL-MCNC: 0.5 MG/DL (ref 0–1.2)
BUN SERPL-MCNC: 12 MG/DL (ref 8–23)
BUN/CREAT SERPL: 20.7 (ref 7–25)
CALCIUM SPEC-SCNC: 9.4 MG/DL (ref 8.6–10.5)
CHLORIDE SERPL-SCNC: 96 MMOL/L (ref 98–107)
CO2 SERPL-SCNC: 30 MMOL/L (ref 22–29)
CREAT SERPL-MCNC: 0.58 MG/DL (ref 0.76–1.27)
DEPRECATED RDW RBC AUTO: 50.4 FL (ref 37–54)
EGFRCR SERPLBLD CKD-EPI 2021: 104.3 ML/MIN/1.73
EOSINOPHIL # BLD AUTO: 0.07 10*3/MM3 (ref 0–0.4)
EOSINOPHIL NFR BLD AUTO: 2.3 % (ref 0.3–6.2)
ERYTHROCYTE [DISTWIDTH] IN BLOOD BY AUTOMATED COUNT: 14.4 % (ref 12.3–15.4)
GLOBULIN UR ELPH-MCNC: 3.2 GM/DL
GLUCOSE SERPL-MCNC: 93 MG/DL (ref 65–99)
HCT VFR BLD AUTO: 34.7 % (ref 37.5–51)
HGB BLD-MCNC: 11.5 G/DL (ref 13–17.7)
IMM GRANULOCYTES # BLD AUTO: 0.01 10*3/MM3 (ref 0–0.05)
IMM GRANULOCYTES NFR BLD AUTO: 0.3 % (ref 0–0.5)
INR PPP: 1.06 (ref 0.89–1.12)
LYMPHOCYTES # BLD AUTO: 0.55 10*3/MM3 (ref 0.7–3.1)
LYMPHOCYTES NFR BLD AUTO: 17.7 % (ref 19.6–45.3)
MCH RBC QN AUTO: 31.5 PG (ref 26.6–33)
MCHC RBC AUTO-ENTMCNC: 33.1 G/DL (ref 31.5–35.7)
MCV RBC AUTO: 95.1 FL (ref 79–97)
MONOCYTES # BLD AUTO: 0.34 10*3/MM3 (ref 0.1–0.9)
MONOCYTES NFR BLD AUTO: 11 % (ref 5–12)
NEUTROPHILS NFR BLD AUTO: 2.11 10*3/MM3 (ref 1.7–7)
NEUTROPHILS NFR BLD AUTO: 68.1 % (ref 42.7–76)
NRBC BLD AUTO-RTO: 0 /100 WBC (ref 0–0.2)
PLATELET # BLD AUTO: 214 10*3/MM3 (ref 140–450)
PMV BLD AUTO: 8.8 FL (ref 6–12)
POTASSIUM SERPL-SCNC: 4.1 MMOL/L (ref 3.5–5.2)
PROT SERPL-MCNC: 7.3 G/DL (ref 6–8.5)
PROTHROMBIN TIME: 13.9 SECONDS (ref 12.2–14.5)
QT INTERVAL: 424 MS
QTC INTERVAL: 397 MS
RBC # BLD AUTO: 3.65 10*6/MM3 (ref 4.14–5.8)
SODIUM SERPL-SCNC: 135 MMOL/L (ref 136–145)
WBC NRBC COR # BLD AUTO: 3.1 10*3/MM3 (ref 3.4–10.8)

## 2024-01-30 PROCEDURE — 85730 THROMBOPLASTIN TIME PARTIAL: CPT

## 2024-01-30 PROCEDURE — 93010 ELECTROCARDIOGRAM REPORT: CPT | Performed by: INTERNAL MEDICINE

## 2024-01-30 PROCEDURE — 80053 COMPREHEN METABOLIC PANEL: CPT

## 2024-01-30 PROCEDURE — 85025 COMPLETE CBC W/AUTO DIFF WBC: CPT

## 2024-01-30 PROCEDURE — 36415 COLL VENOUS BLD VENIPUNCTURE: CPT

## 2024-01-30 PROCEDURE — 85610 PROTHROMBIN TIME: CPT

## 2024-01-30 PROCEDURE — 93005 ELECTROCARDIOGRAM TRACING: CPT

## 2024-01-31 ENCOUNTER — ANESTHESIA EVENT (OUTPATIENT)
Dept: GASTROENTEROLOGY | Facility: HOSPITAL | Age: 74
End: 2024-01-31
Payer: MEDICARE

## 2024-01-31 RX ORDER — SODIUM CHLORIDE 9 MG/ML
40 INJECTION, SOLUTION INTRAVENOUS AS NEEDED
Status: CANCELLED | OUTPATIENT
Start: 2024-01-31

## 2024-01-31 RX ORDER — SODIUM CHLORIDE 0.9 % (FLUSH) 0.9 %
10 SYRINGE (ML) INJECTION AS NEEDED
Status: CANCELLED | OUTPATIENT
Start: 2024-01-31

## 2024-01-31 RX ORDER — LIDOCAINE HYDROCHLORIDE 10 MG/ML
0.5 INJECTION, SOLUTION EPIDURAL; INFILTRATION; INTRACAUDAL; PERINEURAL ONCE AS NEEDED
Status: CANCELLED | OUTPATIENT
Start: 2024-01-31

## 2024-01-31 RX ORDER — FAMOTIDINE 20 MG/1
20 TABLET, FILM COATED ORAL ONCE
Status: CANCELLED | OUTPATIENT
Start: 2024-01-31 | End: 2024-01-31

## 2024-01-31 RX ORDER — MIDAZOLAM HYDROCHLORIDE 1 MG/ML
0.5 INJECTION INTRAMUSCULAR; INTRAVENOUS
Status: CANCELLED | OUTPATIENT
Start: 2024-01-31

## 2024-01-31 RX ORDER — SODIUM CHLORIDE, SODIUM LACTATE, POTASSIUM CHLORIDE, CALCIUM CHLORIDE 600; 310; 30; 20 MG/100ML; MG/100ML; MG/100ML; MG/100ML
9 INJECTION, SOLUTION INTRAVENOUS CONTINUOUS
Status: CANCELLED | OUTPATIENT
Start: 2024-01-31

## 2024-01-31 RX ORDER — SODIUM CHLORIDE 0.9 % (FLUSH) 0.9 %
10 SYRINGE (ML) INJECTION EVERY 12 HOURS SCHEDULED
Status: CANCELLED | OUTPATIENT
Start: 2024-01-31

## 2024-01-31 RX ORDER — FAMOTIDINE 10 MG/ML
20 INJECTION, SOLUTION INTRAVENOUS ONCE
Status: CANCELLED | OUTPATIENT
Start: 2024-01-31 | End: 2024-01-31

## 2024-02-01 ENCOUNTER — APPOINTMENT (OUTPATIENT)
Dept: GENERAL RADIOLOGY | Facility: HOSPITAL | Age: 74
End: 2024-02-01
Payer: MEDICARE

## 2024-02-01 ENCOUNTER — HOSPITAL ENCOUNTER (OUTPATIENT)
Facility: HOSPITAL | Age: 74
Setting detail: HOSPITAL OUTPATIENT SURGERY
Discharge: HOME | End: 2024-02-01
Attending: INTERNAL MEDICINE | Admitting: INTERNAL MEDICINE
Payer: MEDICARE

## 2024-02-01 ENCOUNTER — ANESTHESIA (OUTPATIENT)
Dept: GASTROENTEROLOGY | Facility: HOSPITAL | Age: 74
End: 2024-02-01
Payer: MEDICARE

## 2024-02-01 VITALS
OXYGEN SATURATION: 100 % | HEIGHT: 64 IN | TEMPERATURE: 97 F | HEART RATE: 80 BPM | BODY MASS INDEX: 28.71 KG/M2 | DIASTOLIC BLOOD PRESSURE: 76 MMHG | WEIGHT: 168.2 LBS | SYSTOLIC BLOOD PRESSURE: 120 MMHG | RESPIRATION RATE: 20 BRPM

## 2024-02-01 DIAGNOSIS — R91.1 LUNG NODULE: ICD-10-CM

## 2024-02-01 PROCEDURE — 31625 BRONCHOSCOPY W/BIOPSY(S): CPT | Performed by: INTERNAL MEDICINE

## 2024-02-01 PROCEDURE — 31624 DX BRONCHOSCOPE/LAVAGE: CPT | Performed by: INTERNAL MEDICINE

## 2024-02-01 PROCEDURE — 31628 BRONCHOSCOPY/LUNG BX EACH: CPT | Performed by: INTERNAL MEDICINE

## 2024-02-01 PROCEDURE — 76000 FLUOROSCOPY <1 HR PHYS/QHP: CPT

## 2024-02-01 PROCEDURE — 87206 SMEAR FLUORESCENT/ACID STAI: CPT | Performed by: INTERNAL MEDICINE

## 2024-02-01 PROCEDURE — 87205 SMEAR GRAM STAIN: CPT | Performed by: INTERNAL MEDICINE

## 2024-02-01 PROCEDURE — 88360 TUMOR IMMUNOHISTOCHEM/MANUAL: CPT | Performed by: INTERNAL MEDICINE

## 2024-02-01 PROCEDURE — 25010000002 ONDANSETRON PER 1 MG: Performed by: NURSE ANESTHETIST, CERTIFIED REGISTERED

## 2024-02-01 PROCEDURE — 87147 CULTURE TYPE IMMUNOLOGIC: CPT | Performed by: INTERNAL MEDICINE

## 2024-02-01 PROCEDURE — 25010000002 DEXAMETHASONE PER 1 MG: Performed by: NURSE ANESTHETIST, CERTIFIED REGISTERED

## 2024-02-01 PROCEDURE — 88305 TISSUE EXAM BY PATHOLOGIST: CPT | Performed by: INTERNAL MEDICINE

## 2024-02-01 PROCEDURE — 71045 X-RAY EXAM CHEST 1 VIEW: CPT

## 2024-02-01 PROCEDURE — 87186 SC STD MICRODIL/AGAR DIL: CPT | Performed by: INTERNAL MEDICINE

## 2024-02-01 PROCEDURE — 99215 OFFICE O/P EST HI 40 MIN: CPT | Performed by: INTERNAL MEDICINE

## 2024-02-01 PROCEDURE — 31652 BRONCH EBUS SAMPLNG 1/2 NODE: CPT | Performed by: INTERNAL MEDICINE

## 2024-02-01 PROCEDURE — 25010000002 SUGAMMADEX 200 MG/2ML SOLUTION: Performed by: NURSE ANESTHETIST, CERTIFIED REGISTERED

## 2024-02-01 PROCEDURE — 88342 IMHCHEM/IMCYTCHM 1ST ANTB: CPT | Performed by: INTERNAL MEDICINE

## 2024-02-01 PROCEDURE — 31623 DX BRONCHOSCOPE/BRUSH: CPT | Performed by: INTERNAL MEDICINE

## 2024-02-01 PROCEDURE — 87116 MYCOBACTERIA CULTURE: CPT | Performed by: INTERNAL MEDICINE

## 2024-02-01 PROCEDURE — 87070 CULTURE OTHR SPECIMN AEROBIC: CPT | Performed by: INTERNAL MEDICINE

## 2024-02-01 PROCEDURE — 25010000002 PROPOFOL 10 MG/ML EMULSION: Performed by: NURSE ANESTHETIST, CERTIFIED REGISTERED

## 2024-02-01 PROCEDURE — 88365 INSITU HYBRIDIZATION (FISH): CPT | Performed by: INTERNAL MEDICINE

## 2024-02-01 PROCEDURE — 88341 IMHCHEM/IMCYTCHM EA ADD ANTB: CPT | Performed by: INTERNAL MEDICINE

## 2024-02-01 PROCEDURE — 31641 BRONCHOSCOPY TREAT BLOCKAGE: CPT | Performed by: INTERNAL MEDICINE

## 2024-02-01 PROCEDURE — 25810000003 SODIUM CHLORIDE 0.9 % SOLUTION: Performed by: NURSE ANESTHETIST, CERTIFIED REGISTERED

## 2024-02-01 PROCEDURE — 76000 FLUOROSCOPY <1 HR PHYS/QHP: CPT | Performed by: INTERNAL MEDICINE

## 2024-02-01 PROCEDURE — 31632 BRONCHOSCOPY/LUNG BX ADDL: CPT | Performed by: INTERNAL MEDICINE

## 2024-02-01 PROCEDURE — 87102 FUNGUS ISOLATION CULTURE: CPT | Performed by: INTERNAL MEDICINE

## 2024-02-01 PROCEDURE — C1726 CATH, BAL DIL, NON-VASCULAR: HCPCS | Performed by: INTERNAL MEDICINE

## 2024-02-01 RX ORDER — LIDOCAINE HYDROCHLORIDE 10 MG/ML
INJECTION, SOLUTION EPIDURAL; INFILTRATION; INTRACAUDAL; PERINEURAL AS NEEDED
Status: DISCONTINUED | OUTPATIENT
Start: 2024-02-01 | End: 2024-02-01 | Stop reason: SURG

## 2024-02-01 RX ORDER — ONDANSETRON 2 MG/ML
4 INJECTION INTRAMUSCULAR; INTRAVENOUS ONCE AS NEEDED
Status: DISCONTINUED | OUTPATIENT
Start: 2024-02-01 | End: 2024-02-01 | Stop reason: HOSPADM

## 2024-02-01 RX ORDER — PROPOFOL 10 MG/ML
VIAL (ML) INTRAVENOUS AS NEEDED
Status: DISCONTINUED | OUTPATIENT
Start: 2024-02-01 | End: 2024-02-01 | Stop reason: SURG

## 2024-02-01 RX ORDER — ONDANSETRON 2 MG/ML
INJECTION INTRAMUSCULAR; INTRAVENOUS AS NEEDED
Status: DISCONTINUED | OUTPATIENT
Start: 2024-02-01 | End: 2024-02-01 | Stop reason: SURG

## 2024-02-01 RX ORDER — ALBUTEROL SULFATE 2.5 MG/3ML
2.5 SOLUTION RESPIRATORY (INHALATION) ONCE AS NEEDED
Status: DISCONTINUED | OUTPATIENT
Start: 2024-02-01 | End: 2024-02-01 | Stop reason: HOSPADM

## 2024-02-01 RX ORDER — SODIUM CHLORIDE 9 MG/ML
INJECTION, SOLUTION INTRAVENOUS CONTINUOUS PRN
Status: DISCONTINUED | OUTPATIENT
Start: 2024-02-01 | End: 2024-02-01 | Stop reason: SURG

## 2024-02-01 RX ORDER — HYDROMORPHONE HYDROCHLORIDE 1 MG/ML
0.5 INJECTION, SOLUTION INTRAMUSCULAR; INTRAVENOUS; SUBCUTANEOUS
Status: DISCONTINUED | OUTPATIENT
Start: 2024-02-01 | End: 2024-02-01 | Stop reason: HOSPADM

## 2024-02-01 RX ORDER — EPHEDRINE SULFATE 50 MG/ML
INJECTION INTRAVENOUS AS NEEDED
Status: DISCONTINUED | OUTPATIENT
Start: 2024-02-01 | End: 2024-02-01 | Stop reason: SURG

## 2024-02-01 RX ORDER — ROCURONIUM BROMIDE 10 MG/ML
INJECTION, SOLUTION INTRAVENOUS AS NEEDED
Status: DISCONTINUED | OUTPATIENT
Start: 2024-02-01 | End: 2024-02-01 | Stop reason: SURG

## 2024-02-01 RX ORDER — DEXAMETHASONE SODIUM PHOSPHATE 4 MG/ML
INJECTION, SOLUTION INTRA-ARTICULAR; INTRALESIONAL; INTRAMUSCULAR; INTRAVENOUS; SOFT TISSUE AS NEEDED
Status: DISCONTINUED | OUTPATIENT
Start: 2024-02-01 | End: 2024-02-01 | Stop reason: SURG

## 2024-02-01 RX ORDER — FENTANYL CITRATE 50 UG/ML
50 INJECTION, SOLUTION INTRAMUSCULAR; INTRAVENOUS
Status: DISCONTINUED | OUTPATIENT
Start: 2024-02-01 | End: 2024-02-01 | Stop reason: HOSPADM

## 2024-02-01 RX ADMIN — PROPOFOL 140 MG: 10 INJECTION, EMULSION INTRAVENOUS at 07:41

## 2024-02-01 RX ADMIN — ONDANSETRON 4 MG: 2 INJECTION INTRAMUSCULAR; INTRAVENOUS at 07:45

## 2024-02-01 RX ADMIN — ROCURONIUM BROMIDE 35 MG: 10 SOLUTION INTRAVENOUS at 07:41

## 2024-02-01 RX ADMIN — EPHEDRINE SULFATE 10 MG: 50 INJECTION INTRAVENOUS at 08:17

## 2024-02-01 RX ADMIN — EPHEDRINE SULFATE 10 MG: 50 INJECTION INTRAVENOUS at 07:58

## 2024-02-01 RX ADMIN — SUGAMMADEX 200 MG: 100 INJECTION, SOLUTION INTRAVENOUS at 08:21

## 2024-02-01 RX ADMIN — LIDOCAINE HYDROCHLORIDE 50 MG: 10 INJECTION, SOLUTION EPIDURAL; INFILTRATION; INTRACAUDAL; PERINEURAL at 07:41

## 2024-02-01 RX ADMIN — SODIUM CHLORIDE: 9 INJECTION, SOLUTION INTRAVENOUS at 07:30

## 2024-02-01 RX ADMIN — DEXAMETHASONE SODIUM PHOSPHATE 4 MG: 4 INJECTION, SOLUTION INTRAMUSCULAR; INTRAVENOUS at 07:45

## 2024-02-01 NOTE — H&P
PULMONARY  NOTE    Chief Complaint     Abnormal CT scan of the chest, rectal cancer    History of Present Illness     73-year-old male referred for an abnormal CT scan of the chest    He was last seen by our group when he was admitted to the hospital in 2022 with sepsis and a rectal mass    He has a history of rectal cancer underwent resection and a colostomy in 2022  He has received adjuvant therapy    Most recent chest imaging is as noted below    Past history of pneumonia    Also history of Hodgkin's lymphoma    Patient Active Problem List   Diagnosis    Rectal mass s/p laparoscopic colostomy (9/2022)    Anemia secondary to chemotherapy and acute blood loss    Essential hypertension    Dyslipidemia    Unintentional weight loss    Hydronephrosis    Neutropenic fever    Hypokalemia    Hodgkin lymphoma    Acute upper GI bleed    Melena    Moderate malnutrition    Esophageal candidiasis    SHERRI (obstructive sleep apnea)    ICU delirium     Encounter for care related to vascular access port    Community acquired pneumonia, bilateral    Immunosuppression due to drug therapy    Lung nodule      No Known Allergies  No current facility-administered medications for this encounter.  MEDICATION LIST AND ALLERGIES REVIEWED.    Family History   Problem Relation Age of Onset    Diabetes Mother     Diabetes Father     Heart disease Father      Social History     Tobacco Use    Smoking status: Never    Smokeless tobacco: Never   Vaping Use    Vaping Use: Never used   Substance Use Topics    Alcohol use: Not Currently     Comment: previously drank 2 glasses of wine/beer nightly    Drug use: Never     Social History     Social History Narrative    Not on file     FAMILY AND SOCIAL HISTORY REVIEWED.    Review of Systems  IF PRESENT REFER TO SCANNED ROS SHEET FROM SAME DATE  OTHERWISE ROS OBTAINED AND NON-CONTRIBUTORY OVER HPI.    /90 (BP Location: Left arm, Patient Position: Lying)   Pulse 65   Temp 97.4 °F (36.3 °C)  "(Temporal)   Resp 18   Ht 162.6 cm (64\")   Wt 76.3 kg (168 lb 3.2 oz)   SpO2 99%   BMI 28.87 kg/m²   Physical Exam  Vitals and nursing note reviewed.   Constitutional:       General: He is not in acute distress.     Appearance: He is well-developed. He is not diaphoretic.   HENT:      Head: Normocephalic and atraumatic.   Neck:      Thyroid: No thyromegaly.   Cardiovascular:      Rate and Rhythm: Normal rate and regular rhythm.      Heart sounds: Normal heart sounds. No murmur heard.  Pulmonary:      Effort: Pulmonary effort is normal.      Breath sounds: Normal breath sounds. No stridor.   Lymphadenopathy:      Cervical: No cervical adenopathy.      Upper Body:      Right upper body: No supraclavicular or epitrochlear adenopathy.      Left upper body: No supraclavicular or epitrochlear adenopathy.   Skin:     General: Skin is warm and dry.   Neurological:      Mental Status: He is alert and oriented to person, place, and time.   Psychiatric:         Behavior: Behavior normal.         Results     CT and PET CT scan reviewed on PACS  Partial right middle lobe atelectasis  Possible proximal obstructing broncholith  Several pulmonary nodules, max 6-8 mm in size  The left upper lobe pulmonary nodule does not exhibit abnormal hypermetabolic activity  Abnormal hypermetabolic activity in the right hilum    Immunization History   Administered Date(s) Administered    COVID-19 (PFIZER) Purple Cap Monovalent 03/23/2022    Covid-19 (Pfizer) Gray Cap Monovalent 04/13/2022     Problem List     Abnormal CT scan of the chest  History of rectal cancer    Discussion     The etiology of his CT scan findings is unclear.  May be infectious, malignant, or simply due to a broncholith.  Our plan is to pursue a bronchoscopy with inspection of the airway.  Will also have EBUS available to interrogate lymph nodes  Given the lack of hypermetabolic activity in the small left upper lobe nodule will not pursue a navigational bronchoscopy at " this time    If he does have an obstructing broncholith we will have to decide whether to pursue that now or do that under more controlled circumstances, such as in the operating room    Risks of the procedure which primarily are bleeding and pneumothorax discussed with the patient and his wife at the bedside    Further workup based on the results of the above    Level of service justified based on 42 minutes spent in patient care on this date of service including, but not limited to: preparing to see the patient, obtaining and/or reviewing history, performing medically appropriate examination, ordering tests/medicine/procedures, independently interpreting results, documenting clinical information in EHR, and counseling/education of patient/family/caregiver (excluding time spent on other separate services such as performing procedures or test interpretation, if applicable). (Level 4 30-39 minutes; Level 5 40-54 minutes)    Chris Pruitt MD  Note electronically signed    CC: Jatinder Haynes MD

## 2024-02-01 NOTE — ANESTHESIA PREPROCEDURE EVALUATION
Anesthesia Evaluation                  Airway   Mallampati: I  TM distance: >3 FB  Neck ROM: full  No difficulty expected  Dental      Pulmonary    (+) ,sleep apnea  Cardiovascular     ECG reviewed    (+) hypertension      Neuro/Psych  (+) psychiatric history  GI/Hepatic/Renal/Endo    (+) GI bleeding , renal disease-, diabetes mellitus    Musculoskeletal     Abdominal    Substance History      OB/GYN          Other   arthritis, blood dyscrasia,   history of cancer                Anesthesia Plan    ASA 3     general     intravenous induction     Anesthetic plan, risks, benefits, and alternatives have been provided, discussed and informed consent has been obtained with: patient.    Plan discussed with CRNA.    CODE STATUS:

## 2024-02-01 NOTE — OP NOTE
Bronchoscopy Procedure Note    Pre-op Diagnosis: History of colon cancer with right middle lobe atelectasis, bilateral pulmonary nodules, and bilateral upper lobe groundglass/micro nodular infiltrates    Post-op Diagnosis: Same.  Obstructing process of the right middle lobe    Surgeon: Chris Pruitt MD    Anesthesia: General    Operation: Flexible fiberoptic bronchoscopy    Findings: Heaped up, irregular, friable mucosa obstructing the right middle lobe otherwise no discrete endobronchial lesions    Specimen(s): Transbronchial biopsies right upper lobe and right middle lobe.  Endobronchial biopsies right middle lobe.  Cytology brush and BAL right middle lobe.  Transbronchial needle aspiration station 7    Estimated Blood Loss: Minimal/None    Complications: No immediate.  Chest x-ray pending at the time of this dictation    Indications and History:  Abraham Wu is a 73 y.o. male  with abnormal CT scan of the chest and history of colon cancer.  The risks, benefits, complications, treatment options and expected outcomes were discussed with the patient.  The possibilities of reaction to medication, pulmonary aspiration, perforation of a viscus, bleeding, failure to diagnose a condition and creating a complication requiring transfusion or operation were discussed with the patient who freely signed the consent.      Description of Procedure:  The patient was seen in the Holding Room and an immediate patient assessment was done prior to the administration of moderate and/or deep conscious sedation.  The patient was taken to the Endoscopy Suite, identified as Abraham Wu  and the procedure verified as Flexible Fiberoptic Bronchoscopy.  A Time Out was held and the above information confirmed.    After the induction of general anesthesia the patient was intubated with an 8.5 endotracheal tube    Bronchoscope was introduced via the endotracheal tube which confirmed good position of the distal one third  "of the trachea    The scope is advanced into the left mainstem bronchus.  Left upper lobe, lingula, left lower lobe, and superior segment of the left lower lobe revealed no discrete endobronchial lesions and minimal clear secretions.    The scope is advanced into the right mainstem bronchus.  The right upper lobe, bronchus intermedius, and right lower lobe are unremarkable.    Under fluoroscopic guidance under my direct supervision I obtained multiple transbronchial biopsies from the right upper lobe subsegments and also of the right middle lobe.  These were submitted for routine histopathology.     At the right middle lobe takeoff there was heaped up, irregular, and friable mucosa.  The tissue was quite delicate and had the appearance of either severe inflammation or malignancy, if malignancy probably adeno.  Initially the airway was essentially obstructed.  I used biopsy forceps to debride the airway and ultimately I was able to extend the scope into the right middle lobe until the first division.  There was copious necrotic appearing tissue but I was never able to identify a broncholith.  I used fluoroscopy to try and manipulate the biopsy forceps to where the calcified hilar nodule could be seen but never actually \"felt\" a broncholith.    In addition to using biopsy forceps to debride the airway I attempted to use the cryoprobe but the system was malfunctioning.    Eventually clean the airway out to a degree.  We could do much more debriding and cleaning up of the airway but if this is a malignancy I am not sure that is going to ultimately be helpful.  However if this is all inflammatory then repeat bronchoscopy with a functioning cryoprobe to clean up the airway and possibly find a more distal bronchial list I think would be helpful.  However, would not pursue that until we have the pathology back.    The regular scope was removed and the EBUS scope was reintroduced.  Station 4, station 7, station 10 and 11 " were interrogated.  There was a few small lymph nodes at station 7 and multiple passes were taken with a 21-gauge needle.    The EBUS scope was removed the regular scope was reintroduced.  The airways were suctioned clear and the scope was withdrawn without difficulty.  There are no immediate complications.    Fluoroscopy    Fluoroscopy was performed under my direct supervision without a radiologist present for obtaining transbronchial biopsy and brushing specimens.  Fluoroscopy was used at the end the procedure to exclude a pneumothorax.  None was identified.  Less than 5 minutes of fluoroscopy time was used in total.    The Patient was taken to the Endoscopy Recovery area in satisfactory condition.    Attestation: I performed the procedure    Chris Pruitt MD, [facility]

## 2024-02-01 NOTE — ANESTHESIA PROCEDURE NOTES
Airway  Urgency: elective    Date/Time: 2/1/2024 7:48 AM  Airway not difficult    General Information and Staff    Patient location during procedure: OR    Indications and Patient Condition  Indications for airway management: airway protection    Preoxygenated: yes  MILS not maintained throughout  Mask difficulty assessment: 1 - vent by mask    Final Airway Details  Final airway type: endotracheal airway      Successful airway: ETT  Cuffed: yes   Successful intubation technique: direct laryngoscopy  Facilitating devices/methods: anterior pressure/BURP  Endotracheal tube insertion site: oral  Blade: Erick  Blade size: 4  ETT size (mm): 8.5  Cormack-Lehane Classification: grade I - full view of glottis  Placement verified by: chest auscultation and capnometry   Measured from: lips  ETT/EBT  to lips (cm): 20  Number of attempts at approach: 1  Assessment: lips, teeth, and gum same as pre-op and atraumatic intubation    Additional Comments  Negative epigastric sounds, Breath sound equal bilaterally with symmetric chest rise and fall

## 2024-02-01 NOTE — ANESTHESIA POSTPROCEDURE EVALUATION
Patient: Abraham Wu    Procedure Summary       Date: 02/01/24 Room / Location:  [facility]    Anesthesia Start: 0730 Anesthesia Stop: 0831    Procedure: BRONCHOSCOPY WITH ENDOBRONCHIAL ULTRASOUND AND FLOURO (Bronchus) Diagnosis:       Lung nodule      (Lung nodule [R91.1])    Surgeons: Chris Pruitt MD Provider: Jhoan Tom Jr., MD    Anesthesia Type: general ASA Status: 3            Anesthesia Type: general    Vitals  Vitals Value Taken Time   BP     Temp     Pulse 61 02/01/24 0730   Resp     SpO2     Vitals shown include unfiled device data.        Post Anesthesia Care and Evaluation    Patient location during evaluation: PACU  Patient participation: complete - patient participated  Level of consciousness: awake and alert  Pain management: adequate    Airway patency: patent  Anesthetic complications: No anesthetic complications  PONV Status: none  Cardiovascular status: hemodynamically stable and acceptable  Respiratory status: nonlabored ventilation, acceptable and nasal cannula  Hydration status: acceptable    Comments: 123/67 rr14 97.1 99% hr74

## 2024-02-02 LAB
GIE STN SPEC: NORMAL
NIGHT BLUE STAIN TISS: NORMAL
REF LAB TEST METHOD: NORMAL

## 2024-02-03 LAB
BACTERIA SPEC RESP CULT: ABNORMAL
BACTERIA SPEC RESP CULT: ABNORMAL
GRAM STN SPEC: ABNORMAL

## 2024-02-05 ENCOUNTER — TELEPHONE (OUTPATIENT)
Dept: ONCOLOGY | Facility: CLINIC | Age: 74
End: 2024-02-05

## 2024-02-05 DIAGNOSIS — C81.98 HODGKIN LYMPHOMA OF LYMPH NODES OF MULTIPLE REGIONS, UNSPECIFIED HODGKIN LYMPHOMA TYPE: Primary | ICD-10-CM

## 2024-02-05 NOTE — TELEPHONE ENCOUNTER
Caller: SRINIVASAN HARPER     Relationship: WIFE     Best call back number: [phone]    What is the best time to reach you: ANYTIME    Who are you requesting to speak with (clinical staff, provider,  specific staff member): CLINICAL     Do you know the name of the person who called: SRINIVASAN     What was the call regarding: PATIENT IS NEEDING A LETTER FOR [employer] ON WHY HE IS GETTING THE [employer] INFUSIONS. IT NEEDS TO STATE IT ACTIVATES THE CELLS IN HIS BODY TO KILL THE CANCER.    CALL TO ADVISE WOULD LIKE TO  ON 2/8/20240    Is it okay if the provider responds through MyChart:

## 2024-02-06 LAB
CYTO UR: NORMAL
LAB AP CASE REPORT: NORMAL
LAB AP CLINICAL INFORMATION: NORMAL
LAB AP DIAGNOSIS COMMENT: NORMAL
PATH REPORT.FINAL DX SPEC: NORMAL
PATH REPORT.GROSS SPEC: NORMAL

## 2024-02-07 DIAGNOSIS — C81.98 HODGKIN LYMPHOMA OF LYMPH NODES OF MULTIPLE REGIONS, UNSPECIFIED HODGKIN LYMPHOMA TYPE: Primary | ICD-10-CM

## 2024-02-08 ENCOUNTER — SPECIALTY PHARMACY (OUTPATIENT)
Dept: ONCOLOGY | Facility: HOSPITAL | Age: 74
End: 2024-02-08
Payer: MEDICARE

## 2024-02-08 ENCOUNTER — OFFICE VISIT (OUTPATIENT)
Dept: ONCOLOGY | Facility: CLINIC | Age: 74
End: 2024-02-08
Payer: MEDICARE

## 2024-02-08 ENCOUNTER — HOSPITAL ENCOUNTER (OUTPATIENT)
Dept: ONCOLOGY | Facility: HOSPITAL | Age: 74
Discharge: HOME | End: 2024-02-08
Payer: MEDICARE

## 2024-02-08 ENCOUNTER — APPOINTMENT (OUTPATIENT)
Dept: ONCOLOGY | Facility: HOSPITAL | Age: 74
End: 2024-02-08
Payer: MEDICARE

## 2024-02-08 VITALS
OXYGEN SATURATION: 98 % | SYSTOLIC BLOOD PRESSURE: 140 MMHG | BODY MASS INDEX: 28.73 KG/M2 | TEMPERATURE: 97.3 F | HEIGHT: 64 IN | WEIGHT: 168.3 LBS | DIASTOLIC BLOOD PRESSURE: 73 MMHG | HEART RATE: 62 BPM

## 2024-02-08 DIAGNOSIS — C81.98 HODGKIN LYMPHOMA OF LYMPH NODES OF MULTIPLE REGIONS, UNSPECIFIED HODGKIN LYMPHOMA TYPE: ICD-10-CM

## 2024-02-08 DIAGNOSIS — Z45.2 ENCOUNTER FOR CARE RELATED TO VASCULAR ACCESS PORT: Primary | ICD-10-CM

## 2024-02-08 DIAGNOSIS — C81.98 HODGKIN LYMPHOMA OF LYMPH NODES OF MULTIPLE REGIONS, UNSPECIFIED HODGKIN LYMPHOMA TYPE: Primary | ICD-10-CM

## 2024-02-08 LAB
ALBUMIN SERPL-MCNC: 4 G/DL (ref 3.5–5.2)
ALBUMIN/GLOB SERPL: 1.4 G/DL
ALP SERPL-CCNC: 77 U/L (ref 39–117)
ALT SERPL W P-5'-P-CCNC: 10 U/L (ref 1–41)
ANION GAP SERPL CALCULATED.3IONS-SCNC: 8 MMOL/L (ref 5–15)
AST SERPL-CCNC: 12 U/L (ref 1–40)
BASOPHILS # BLD AUTO: 0.01 10*3/MM3 (ref 0–0.2)
BASOPHILS NFR BLD AUTO: 0.2 % (ref 0–1.5)
BILIRUB SERPL-MCNC: 0.4 MG/DL (ref 0–1.2)
BUN SERPL-MCNC: 15 MG/DL (ref 8–23)
BUN/CREAT SERPL: 30.6 (ref 7–25)
CALCIUM SPEC-SCNC: 9 MG/DL (ref 8.6–10.5)
CHLORIDE SERPL-SCNC: 99 MMOL/L (ref 98–107)
CO2 SERPL-SCNC: 27 MMOL/L (ref 22–29)
CREAT SERPL-MCNC: 0.49 MG/DL (ref 0.76–1.27)
DEPRECATED RDW RBC AUTO: 51.1 FL (ref 37–54)
EGFRCR SERPLBLD CKD-EPI 2021: 109.7 ML/MIN/1.73
EOSINOPHIL # BLD AUTO: 0.1 10*3/MM3 (ref 0–0.4)
EOSINOPHIL NFR BLD AUTO: 2.2 % (ref 0.3–6.2)
ERYTHROCYTE [DISTWIDTH] IN BLOOD BY AUTOMATED COUNT: 14.7 % (ref 12.3–15.4)
FUNGUS WND CULT: NORMAL
GLOBULIN UR ELPH-MCNC: 2.8 GM/DL
GLUCOSE SERPL-MCNC: 91 MG/DL (ref 65–99)
HBV SURFACE AB SER RIA-ACNC: NORMAL
HBV SURFACE AG SERPL QL IA: NORMAL
HCT VFR BLD AUTO: 33.9 % (ref 37.5–51)
HGB BLD-MCNC: 11.4 G/DL (ref 13–17.7)
IMM GRANULOCYTES # BLD AUTO: 0.01 10*3/MM3 (ref 0–0.05)
IMM GRANULOCYTES NFR BLD AUTO: 0.2 % (ref 0–0.5)
LYMPHOCYTES # BLD AUTO: 0.74 10*3/MM3 (ref 0.7–3.1)
LYMPHOCYTES NFR BLD AUTO: 16.6 % (ref 19.6–45.3)
MCH RBC QN AUTO: 32 PG (ref 26.6–33)
MCHC RBC AUTO-ENTMCNC: 33.6 G/DL (ref 31.5–35.7)
MCV RBC AUTO: 95.2 FL (ref 79–97)
MONOCYTES # BLD AUTO: 0.44 10*3/MM3 (ref 0.1–0.9)
MONOCYTES NFR BLD AUTO: 9.9 % (ref 5–12)
MYCOBACTERIUM SPEC CULT: NORMAL
NEUTROPHILS NFR BLD AUTO: 3.16 10*3/MM3 (ref 1.7–7)
NEUTROPHILS NFR BLD AUTO: 70.9 % (ref 42.7–76)
NIGHT BLUE STAIN TISS: NORMAL
PLATELET # BLD AUTO: 185 10*3/MM3 (ref 140–450)
PMV BLD AUTO: 8.7 FL (ref 6–12)
POTASSIUM SERPL-SCNC: 4.5 MMOL/L (ref 3.5–5.2)
PROT SERPL-MCNC: 6.8 G/DL (ref 6–8.5)
RBC # BLD AUTO: 3.56 10*6/MM3 (ref 4.14–5.8)
SODIUM SERPL-SCNC: 134 MMOL/L (ref 136–145)
URATE SERPL-MCNC: 4.6 MG/DL (ref 3.4–7)
WBC NRBC COR # BLD AUTO: 4.46 10*3/MM3 (ref 3.4–10.8)

## 2024-02-08 PROCEDURE — 86706 HEP B SURFACE ANTIBODY: CPT | Performed by: INTERNAL MEDICINE

## 2024-02-08 PROCEDURE — 96523 IRRIG DRUG DELIVERY DEVICE: CPT

## 2024-02-08 PROCEDURE — G0463 HOSPITAL OUTPT CLINIC VISIT: HCPCS

## 2024-02-08 PROCEDURE — 80053 COMPREHEN METABOLIC PANEL: CPT | Performed by: INTERNAL MEDICINE

## 2024-02-08 PROCEDURE — 85025 COMPLETE CBC W/AUTO DIFF WBC: CPT | Performed by: INTERNAL MEDICINE

## 2024-02-08 PROCEDURE — 86704 HEP B CORE ANTIBODY TOTAL: CPT | Performed by: INTERNAL MEDICINE

## 2024-02-08 PROCEDURE — 25010000002 HEPARIN LOCK FLUSH PER 10 UNITS: Performed by: INTERNAL MEDICINE

## 2024-02-08 PROCEDURE — 84550 ASSAY OF BLOOD/URIC ACID: CPT | Performed by: INTERNAL MEDICINE

## 2024-02-08 PROCEDURE — 87340 HEPATITIS B SURFACE AG IA: CPT | Performed by: INTERNAL MEDICINE

## 2024-02-08 RX ORDER — HEPARIN SODIUM (PORCINE) LOCK FLUSH IV SOLN 100 UNIT/ML 100 UNIT/ML
500 SOLUTION INTRAVENOUS AS NEEDED
Status: DISCONTINUED | OUTPATIENT
Start: 2024-02-08 | End: 2024-02-09 | Stop reason: HOSPADM

## 2024-02-08 RX ORDER — ALLOPURINOL 300 MG/1
300 TABLET ORAL DAILY
Qty: 30 TABLET | Refills: 0 | Status: SHIPPED | OUTPATIENT
Start: 2024-02-08

## 2024-02-08 RX ORDER — HEPARIN SODIUM (PORCINE) LOCK FLUSH IV SOLN 100 UNIT/ML 100 UNIT/ML
500 SOLUTION INTRAVENOUS AS NEEDED
OUTPATIENT
Start: 2024-02-08

## 2024-02-08 RX ADMIN — HEPARIN 500 UNITS: 100 SYRINGE at 10:05

## 2024-02-08 NOTE — PROGRESS NOTES
Outpatient Infusion  [address] [id number]  [phone]      CHEMOTHERAPY EDUCATION    NAME:  Abraham Wu      : 1950           DATE: 24    Medication Education Sheets: (select all that apply)  Carboplatin, Etoposide, Ifosfamide, Mesna, and Rituximab    Other Education Sheets: (select all that apply)  CINV and Symptom Tracker Sheet and LASHAUN Information    Chemotherapy Regimen:   IP LYMPHOMA RICE RiTUXimab / Ifosfamide + Mesna / CARBOplatin AUC = 5 / Etoposide every 21 days x 4 cycles, inpatient at [facility]  Day 1: rituximab IV and etoposide IV  Day 2: etoposide IV and carboplatin IV + ifosfamide IV and mesna IV both given as continuous IV infusions over 24 hours  Day 3: etoposide IV    TOPICS EDUCATION PROVIDED COMMENTS   ANEMIA:  role of RBC, cause, s/s, ways to manage, role of transfusion [x] Reviewed the role of RBC and the use of transfusions if hemoglobin decreases too much - patient has required these in the past.  He will notify us if he experiences shortness of breath, dizziness, or palpitations.  Also let patient know he could feel more tired than usual and to try to stay active, but rest if he needs to.    THROMBOCYTOPENIA:  role of platelet, cause, s/s, ways to prevent bleeding, things to avoid, when to seek help [x] Reviewed the role of platelets in blood clotting and when to call clinic (bloody nose that bleeds for 5 minutes despite pressure, a cut that won't stop bleeding despite pressure, gums that bleed excessively with brushing or flossing). Recommend using an electric razor, soft bristle toothbrush, and blowing the nose gently.    NEUTROPENIA:  role of WBC, cause, infection precautions, s/s of infection, when to call MD [x] Reviewed the role of WBC, good infection prevention practices, and when to call the clinic (temperature 100.4F, sore throat, burning urination, etc).   NUTRITION & APPETITE CHANGES:  importance of maintaining healthy diet  & weight, ways to manage to improve intake, dietary consult, exercise regimen, electrolyte and/or blood glucose abnormalities [x] Metallic Taste: Informed patient of the potential for developing metallic taste and smell. Recommended flavoring water if it tastes metallic, using plastic utensils instead of metal utensils, and avoiding drinking from a metal can or cup to help mitigate this adverse effect.  Electrolyte Abnormalities:  Explained that the oncology therapy may lead to abnormalities in electrolytes, specifically: low calcium, magnesium, potatssium.    NAUSEA & VOMITING:  cause, use of antiemetics, dietary changes, when to call MD [x] Emetic risk: High - due to carboplatin  Premeds: Dexamethasone, Fosaprepitant, and Ondansetron  Scheduled home meds: Dexamethasone 4 mg tabs - take 2 tabs PO QAM with breakfast on days 4 and 5 of each chemo cycle   PRN home meds: Ondansetron 8mg PO TID PRN N/V and Prochlorperazine 10mg PO Q6H PRN N/V  Pharmacy home meds sent to: [facility] in [place]    Instructed the patient to take the scheduled anti-nausea medications even if he does not feel nauseous.  I explained this is to prevent delayed nausea.    Instructed the patient to take a dose of the PRN medication at the first onset of nausea and if it's not working to call us for additional medications.  Also provided non-drug measures to mitigate nausea.   SKIN REACTIONS and MOUTH SORES:  causes, ways to manage [x] Mouth sores can be prevented by making a mouth wash mixture of salt, baking soda, and water. The patient was instructed to swish and spit four times daily after meals and before bedtime. Encouraged good oral hygiene and the use of a soft-bristled tooth brush. Recommended avoiding alcohol-containing mouth rinses. Patient notes mucositis was a significant issue with prior treatment, and he is familiar with use of MMW, if needed.  Also discussed the rare possibility of a severe skin reaction. Encouraged the  patient to notify the clinic immediately of any signs of severe rash or peeling skin.    NERVOUS SYSTEM CHANGES:  causes, s/s, neuropathies, cognitive changes, ways to manage [x] Discussed the adverse effect of peripheral neuropathy and signs/symptoms associated with this adverse effect. Instructed patient to call if symptoms become more frequent or worsen. He notes some neuropathy with past treatment, but this has since resolved.   ORGAN TOXICITIES:  cause, s/s, need for diagnostic tests, labs, when to notify MD [x] Discussed potential effects on organ systems, monitoring, diagnostic tests, labs, and when to notify the clinic. Discussed the signs/symptoms of the following: cardiotoxicity, central neurotoxicity (confusion, vision changes, stiff neck, seizure), GI perforation, hepatotoxicity, nephrotoxicity, and ototoxicity (loss of ability to hear high-pitched sounds). Also reviewed the possibility for tumor lysis syndrome, and discussed the purpose of allopurinol for the first month of treatment.   INFUSION RELATED REACTIONS or INJECTION-SITE REACTION:  Cause, s/s, anaphylaxis, monitoring, etc. [x] Premeds: Acetaminophen, Dexamethasone, and Diphenhydramine - prior to rituximab infusion    Discussed the risk of an infusion reaction and symptoms such as: fever, chills, dizziness, itchiness or rash, flushing, trouble breathing, wheezing, sudden back pain, or feeling faint.  Instructed the patient to notify his nurse if he starts feeling weird at any point during his infusion.    Discussed the rate of infusion will be slower with the initial dose of rituximab and can be increased for subsequent doses if he tolerates the first dose without a reaction.    Reviewed how infusion reactions are managed.   SURVIVORSHIP:  distress, distress assessment, secondary malignancies, early/late effects, follow-up, social issues, social support [x] Discussed the rare, but possible risk of secondary malignancies months to years after  treatment, most commonly leukemia.   MISCELLANEOUS:  drug interactions, administration, labs, etc. [x] Discussed chemotherapy schedule, lab draws, infusion times, and total expected visit time.   DDIs: No significant DDIs  Drug-food interactions: Patient advised to avoid eating grapefruit and drinking grapefruit juice.  Lab draws: On or before day 1 of each cycle, no sooner than 3 days early.  Reviewed the possibility for hemorrhagic cystitis and emphasized the importance of adequate hydration and frequent voiding of the bladder.   INFERTILITY & SEXUALITY:    causes, fertility preservation options, sexuality changes, ways to manage, importance of birth control [x] IV Oncology Therapy: Reviewed safe sex practices and the importance of minimizing exposure to body fluids for 48 hours after each dose of IV oncology therapy. The patient is not sexually active with a woman of childbearing potential.   HOME CARE:  storing of oral chemo, how to manage bodily fluids [x] IV - Counseled on management of soiled linens and proper flush technique.  Discussed how to manage all the side effects at home and advised when to contact the MD office.     Medications:  Prior to Admission medications    Medication Sig Start Date End Date Taking? Authorizing Provider   fluticasone (FLONASE) 50 MCG/ACT nasal spray 1 spray into the nostril(s) as directed by provider Daily As Needed for Allergies or Rhinitis. OTC    Provider, MD Marko   lidocaine-prilocaine (EMLA) 2.5-2.5 % cream Apply 1 application topically to the appropriate area as directed As Needed (45-60 minutes prior to port access.  Cover with saran/plastic wrap.). 10/26/22   Kaycee Leary MD   loratadine (CLARITIN) 10 MG tablet TAKE ONE TABLET BY MOUTH DAILY 12/4/23   Kaycee Leary MD   ondansetron (Zofran) 8 MG tablet Take 1 tablet by mouth Every 8 (Eight) Hours As Needed for Nausea or Vomiting. 10/26/22   Kaycee Leary MD   pantoprazole (PROTONIX) 40 MG EC tablet  TAKE ONE TABLET BY MOUTH DAILY 12/6/23   Kaycee Leary MD   prochlorperazine (COMPAZINE) 5 MG tablet Take 1 tablet by mouth Every 6 (Six) Hours As Needed for Nausea or Vomiting. 11/8/22   Kaycee Leary MD   rosuvastatin (CRESTOR) 10 MG tablet Take 1 tablet by mouth Daily. 11/1/22   Provider, MD Marko   guaiFENesin-codeine (GUAIFENESIN AC) 100-10 MG/5ML liquid Take 5 mL by mouth 3 (Three) Times a Day As Needed for Cough. 12/29/23 1/30/24  Tim Alcocer MD     Notes: All questions and concerns were addressed. Provided a personalized treatment calendar to patient (includes treatment and lab schedule). Provided patient with contact information for the pharmacist and clinic while instructing him to call if any questions or concerns arise. Informed consent for treatment was obtained. Patient was receptive to information and expressed understanding.     Ann Cowden Mayer, PharmD, [facility]  Oncology Clinical Pharmacist  Phone [phone]    2/8/2024  10:31 EST

## 2024-02-08 NOTE — PROGRESS NOTES
[facility]  Office number [phone]    Fax number [phone]     Follow up     Date: 24    Patient Name: Abraham Wu  MRN: [id number]  : 1950    Chief Complaint: Hodgkin Lymphoma follow up/treatment    Surgeon: Dr. Hiram Viveros MD    Cancer Staging: IV    History of Present Illness: Abraham Wu is a pleasant 73 y.o. male with PMH of hypertension, sleep apnea, hard of hearing who presents today for follow up of Hodgkin Lymphoma.     He initially presented 2022 with intractable diarrhea, low appetite, and a greater than 50 pound weight loss over several month period associated with intermittent fevers.  CT of the chest abdomen pelvis obtained in the ER shows of rectal mass spanning greater than 12 cm with adjacent adenopathy, bulky RP adenopathy, and findings concerning for left renal and liver metastases.  Small right pleural effusion with nodularity.  There was concern for impending obstruction, so he underwent diverting colostomy and biopsy on 2022.  Biopsies from the peritoneam showed a fibrotic nodule histiocytes and eosinophils admixed with CD30 positive large cells.  Rectal biopsies showed involvement by CD30 positive lymphoma, classic Hodgkin lymphoma versus CD30 positive T-cell or B-cell lymphoma.  There was extensive fibrosis and crush artifact.  He underwent repeat transrectal biopsy 10/4/2022 for further characterization which was felt to be most consistent with classical Hodgkin lymphoma.    He initiated chemotherapy with Brentuximab-AVD as an inpatient on 10/8/2022.  Cycle #1 was complicated by GI bleed requiring multiple blood transfusion and ultimately coil artery embolization 10/12; neutropenic fever, Candida esophagitis and mucositis.  He had a prolonged ICU stay  And required enteral feeding.  He was discharged 10/20/2022.    Following his last cycle he was admitted with multifocal PNA and +corona virus  infection.    Treatment history:  Brentuximab-AVD: C1 10/8/22  C2: 11/1/22 onward with DA 20% in BVD; full dose brentuximab  Completed C6 3/22/23    2. Palliative radiation to rectum 5/30/2023 (abbreviated due to systemic progression)  3. GVD+Pembro x 4 cycles: completed 8/30/2023  4. Consolidative radiation to rectum completed 9/27/23    5. Pembrolizumab: current  6. Radiation to spine 1/16, 1/18 and 1/22/24    7. Rituximab-ICE: planning 2/2024    Interval history:    He is here for follow up.   He underwent brochoscopy 2/1/24 showing heaped up, irregular, friable mucosa obstructing the right middle lobe otherwise no discrete endobronchial lesions. Pathology showed persistent/recurrent CD30 positive lymphoma; see comment. These continue to have CD30 and RACHELE positivity, consistent with involvement by the same process. However, the lymphoma cells are now more numerous and have an intact B-cell expression profile, with strong diffuse CD20 expression, strong PAX5 expression, and expression of CD45 and CD79a. This is strongly favored to represent continued involvement with the same process with the development of a slightly different immunophenotype after pembrolizumab therapy as opposed to a secondary lymphoma. The strong expression of CD20 may be a therapeutic target. CD30 expression remains intact as well.     His cough has improved. He is still coughing up clear sputum, blood tinged for a few days after bronch but this has resolved. No persistent SOA. Otherwise feeling well.     Past Medical History:   Past Medical History:   Diagnosis Date    Arthritis     benign polypoid tissue right lung     Cancer     HODGKINS LYMPHOMA, RECTAL CANCER    Diabetes mellitus     patient reports that he doesn't have diabetes secondary to changing diet and weight loss.    History of radiation therapy 12/31/2023    re-treat rectum    History of transfusion     no reaction, transfusions received at [facility] in 2022    Hyperlipidemia      Hypertension     Lymphoma     Mycobacterium mucogenicum     SHERRI (obstructive sleep apnea)     O2 2L/MIN AT NIGHT ONLY    Pneumonia     2023    Seasonal allergies    Polyp in airway removed 2020 [facility] Dr. Ketan Monet.    Past Surgical History:   Past Surgical History:   Procedure Laterality Date    BRONCHOSCOPY      removal of obstructing polypoid tissue right middle lung    BRONCHOSCOPY N/A 2/1/2024    Procedure: BRONCHOSCOPY WITH ENDOBRONCHIAL ULTRASOUND AND FLUORO;  Surgeon: Chris Pruitt MD;  Location:  [facility];  Service: Pulmonary;  Laterality: N/A;  EBUS BALLOON INTACT    COLONOSCOPY      COLOSTOMY N/A 09/22/2022    Procedure: LAPAROSCOPIC COLOSTOMY CREATION, FLEXIBLE SIGMOIDOSCOPY;  Surgeon: Hiram Viveros MD;  Location:  [facility];  Service: General;  Laterality: N/A;    DENTAL PROCEDURE      dental implants    ENDOSCOPY N/A 10/10/2022    Procedure: ESOPHAGOGASTRODUODENOSCOPY;  Surgeon: Neeraj Lynn MD;  Location:  [facility];  Service: Gastroenterology;  Laterality: N/A;    ENDOSCOPY N/A 10/12/2022    Procedure: ESOPHAGOGASTRODUODENOSCOPY;  Surgeon: Brunner, Mark I, MD;  Location:  [facility];  Service: Gastroenterology;  Laterality: N/A;    EXAM UNDER ANESTHESIA, RECTAL BIOPSY N/A 10/04/2022    Procedure: EXAM UNDER ANESTHESIA, TRANSANAL BIOPSY WITH TRUCUT NEEDLE (LITHOTOMY-CANDY CANE);  Surgeon: Hiram Viveros MD;  Location:  [facility];  Service: General;  Laterality: N/A;    EXAM UNDER ANESTHESIA, RECTAL BIOPSY N/A 05/30/2023    Procedure: EXAM UNDER ANESTHESIA, TRANSANAL BIOPSY OF RECTAL MASS WITH TRUCUT NEEDLE, PROCTOSCOPY;  Surgeon: Hiram Viveros MD;  Location:  [facility];  Service: General;  Laterality: N/A;    PERIPHERALLY INSERTED CENTRAL CATHETER INSERTION      Removed 11/28/2022    VENOUS ACCESS DEVICE (PORT) INSERTION Right 11/28/2022     Family History:   Family History   Problem Relation Age of Onset    Diabetes Mother     Diabetes Father      Heart disease Father      Social History:   Social History     Socioeconomic History    Marital status:    Tobacco Use    Smoking status: Never    Smokeless tobacco: Never   Vaping Use    Vaping Use: Never used   Substance and Sexual Activity    Alcohol use: Not Currently     Comment: previously drank 2 glasses of wine/beer nightly    Drug use: Never    Sexual activity: Defer       Medications:     Current Outpatient Medications:     amoxicillin-clavulanate (AUGMENTIN) 500-125 MG per tablet, Take 1 tablet by mouth 2 (Two) Times a Day., Disp: 20 tablet, Rfl: 0    fluticasone (FLONASE) 50 MCG/ACT nasal spray, 1 spray into the nostril(s) as directed by provider Daily As Needed for Allergies or Rhinitis. OTC, Disp: , Rfl:     lidocaine-prilocaine (EMLA) 2.5-2.5 % cream, Apply 1 application topically to the appropriate area as directed As Needed (45-60 minutes prior to port access.  Cover with saran/plastic wrap.)., Disp: 30 g, Rfl: 3    loratadine (CLARITIN) 10 MG tablet, TAKE ONE TABLET BY MOUTH DAILY, Disp: 30 tablet, Rfl: 5    ondansetron (Zofran) 8 MG tablet, Take 1 tablet by mouth Every 8 (Eight) Hours As Needed for Nausea or Vomiting., Disp: 30 tablet, Rfl: 5    pantoprazole (PROTONIX) 40 MG EC tablet, TAKE ONE TABLET BY MOUTH DAILY, Disp: 90 tablet, Rfl: 1    prochlorperazine (COMPAZINE) 5 MG tablet, Take 1 tablet by mouth Every 6 (Six) Hours As Needed for Nausea or Vomiting., Disp: 30 tablet, Rfl: 2    rosuvastatin (CRESTOR) 10 MG tablet, Take 1 tablet by mouth Daily., Disp: , Rfl:     Allergies:   No Known Allergies    Objective     Vital Signs:   There were no vitals filed for this visit.   There is no height or weight on file to calculate BMI.   There were no vitals filed for this visit.      ECOG Performance Status: 1    Physical Exam:   General: Well appearing male   HEENT: Normocephalic, atraumatic. Sclera anicteric.   Neck: supple, no palpable LAD. No axillary adenopathy  Cardiovascular:  regular rate and rhythm. No murmurs.   Respiratory: Normal rate. Clear to auscultation bilaterally  Abdomen: Soft, nontender, non distended with normoactive bowel sounds. Ostomy LLQ   Lymph: no supraclavicular or axillary adenopathy  Neuro: Alert and oriented x 3.   Ext: Symmetric, no swelling.   Skin: right port site  C/d/I  Accurate as of 2/8/24    Laboratory/Imaging Reviewed:     Admission on 02/01/2024, Discharged on 02/01/2024   Component Date Value Ref Range Status    Case Report 02/01/2024    Final                    Value:Surgical Pathology Report                         Case: [id number]                                  Authorizing Provider:  Chris Pruitt MD Collected:           02/01/2024 07:48 AM          Ordering Location:     [facility]   Received:            02/01/2024 08:58 AM                                 ENDO SUITES                                                                  Pathologist:           Jatinder Foss MD                                                          Specimens:   1) - Lung, Right Upper Lobe, RUL TBBX                                                               2) - Lung, Right Middle Lobe, RML TBBX                                                              3) - Lung, Right Middle Lobe, RML ENDOBRONCHIAL BX                                                  4) - Lymph Node, STATION 7 TBNA                                                            Clinical Information 02/01/2024    Final                    Value:This result contains rich text formatting which cannot be displayed here.    Final Diagnosis 02/01/2024    Final                    Value:This result contains rich text formatting which cannot be displayed here.    Comment 02/01/2024    Final                    Value:This result contains rich text formatting which cannot be displayed here.    Gross Description 02/01/2024    Final                    Value:This result contains rich text  formatting which cannot be displayed here.    Microscopic Description 2024    Final                    Value:This result contains rich text formatting which cannot be displayed here.    Reference Lab Report 2024    Final                    Value:[facility]  [address] [place] [address]  Phone: [phone] or [phone]  Fax: [phone]  Naun Guerrero M.D., Medical Director    PATIENT NAME                                 LABORATORY NO.  KAL WOOTEN                         [id number]  [id number]                                     AGE                SEX     SSN            CLIENT REF #  [facility]                       73       1950          [id number]    [id number]  [address]                          REQUESTING M.D.       ATTENDING M.D..        [place] [address]                            CURRANURVASHI  DATE COLLECTED        DATE RECEIVED          DATE REPORTED  2024    DIAGNOSIS:  A.      BRONCH BRUSH, RIGHT MIDDLE LOBE:  Negative for malignant cells.  B.      BRONCH WASH:  Negative for malignant cells.    MICROSCOPIC DESCRIPTION:  A.     Numerous benign bronchial                           epithelial cells are present. Pulmonary  macrophages and inflammatory cells are present.  B.     Numerous benign bronchial epithelial cells are present. Pulmonary  macrophages and inflammatory cells are present.    Professional interpretation rendered by Naun Guerrero M.D., F.C.A.P. at [facility], [facility], [address] [place] [address], [place] [address].    CLINICAL HISTORY:  Lung nodule    SPECIMENS SUBMITTED:  A.    BRONCH BRUSH, RIGHT MIDDLE LOBE  B.    BRONCH WASH    GROSS SPECIMEN DESCRIPTION:  A.     30cc of clear colorless fluid with 1 brush in fixative  Automated paraffin embedded cell block has been examined.  B.     40cc of red, opaque fluid with sediment in  fixative  Cell block has been examined.    CYTOTECHNOLOGIST:         JAK IZQUIERDO (ASCP)                       REVIEWED, DIAGNOSED AND ELECTRONICALLY  SIGNED BY:    Naun Guerrero M.D., F.C.A.P.  CPT CODES:  88112x2, 88305x2      Fungus Culture 02/01/2024 No fungus isolated at less than 1 week   Preliminary    AFB Culture 02/01/2024 No AFB isolated at less than 1 week   Preliminary    AFB Stain 02/01/2024 No acid fast bacilli seen on concentrated smear   Preliminary    Fungal Stain 02/01/2024 No yeast or hyphal elements seen   Final    Respiratory Culture 02/01/2024 Heavy growth (4+) Staphylococcus aureus (A)   Final    Respiratory Culture 02/01/2024 No Normal Respiratory Luz Maria (A)   Corrected    Comment:                              Appended report. These results have been appended to a previously final verified report.    Gram Stain 02/01/2024 Many (4+) WBCs seen   Final    Gram Stain 02/01/2024 Moderate (3+) Red blood cells   Final    Gram Stain 02/01/2024 Rare (1+) Gram positive cocci in pairs   Final   Pre-Admission Testing on 01/30/2024   Component Date Value Ref Range Status    Glucose 01/30/2024 93  65 - 99 mg/dL Final    BUN 01/30/2024 12  8 - 23 mg/dL Final    Creatinine 01/30/2024 0.58 (L)  0.76 - 1.27 mg/dL Final    Sodium 01/30/2024 135 (L)  136 - 145 mmol/L Final    Potassium 01/30/2024 4.1  3.5 - 5.2 mmol/L Final    Chloride 01/30/2024 96 (L)  98 - 107 mmol/L Final    CO2 01/30/2024 30.0 (H)  22.0 - 29.0 mmol/L Final    Calcium 01/30/2024 9.4  8.6 - 10.5 mg/dL Final    Total Protein 01/30/2024 7.3  6.0 - 8.5 g/dL Final    Albumin 01/30/2024 4.1  3.5 - 5.2 g/dL Final    ALT (SGPT) 01/30/2024 15  1 - 41 U/L Final    AST (SGOT) 01/30/2024 18  1 - 40 U/L Final    Alkaline Phosphatase 01/30/2024 83  39 - 117 U/L Final    Total Bilirubin 01/30/2024 0.5  0.0 - 1.2 mg/dL Final    Globulin 01/30/2024 3.2  gm/dL Final    Calculated Result    A/G Ratio 01/30/2024 1.3  g/dL Final    BUN/Creatinine Ratio 01/30/2024  20.7  7.0 - 25.0 Final    Anion Gap 01/30/2024 9.0  5.0 - 15.0 mmol/L Final    eGFR 01/30/2024 104.3  >60.0 mL/min/1.73 Final    Protime 01/30/2024 13.9  12.2 - 14.5 Seconds Final    INR 01/30/2024 1.06  0.89 - 1.12 Final    PTT 01/30/2024 32.1  22.0 - 39.0 seconds Final    WBC 01/30/2024 3.10 (L)  3.40 - 10.80 10*3/mm3 Final    RBC 01/30/2024 3.65 (L)  4.14 - 5.80 10*6/mm3 Final    Hemoglobin 01/30/2024 11.5 (L)  13.0 - 17.7 g/dL Final    Hematocrit 01/30/2024 34.7 (L)  37.5 - 51.0 % Final    MCV 01/30/2024 95.1  79.0 - 97.0 fL Final    MCH 01/30/2024 31.5  26.6 - 33.0 pg Final    MCHC 01/30/2024 33.1  31.5 - 35.7 g/dL Final    RDW 01/30/2024 14.4  12.3 - 15.4 % Final    RDW-SD 01/30/2024 50.4  37.0 - 54.0 fl Final    MPV 01/30/2024 8.8  6.0 - 12.0 fL Final    Platelets 01/30/2024 214  140 - 450 10*3/mm3 Final    Neutrophil % 01/30/2024 68.1  42.7 - 76.0 % Final    Lymphocyte % 01/30/2024 17.7 (L)  19.6 - 45.3 % Final    Monocyte % 01/30/2024 11.0  5.0 - 12.0 % Final    Eosinophil % 01/30/2024 2.3  0.3 - 6.2 % Final    Basophil % 01/30/2024 0.6  0.0 - 1.5 % Final    Immature Grans % 01/30/2024 0.3  0.0 - 0.5 % Final    Neutrophils, Absolute 01/30/2024 2.11  1.70 - 7.00 10*3/mm3 Final    Lymphocytes, Absolute 01/30/2024 0.55 (L)  0.70 - 3.10 10*3/mm3 Final    Monocytes, Absolute 01/30/2024 0.34  0.10 - 0.90 10*3/mm3 Final    Eosinophils, Absolute 01/30/2024 0.07  0.00 - 0.40 10*3/mm3 Final    Basophils, Absolute 01/30/2024 0.02  0.00 - 0.20 10*3/mm3 Final    Immature Grans, Absolute 01/30/2024 0.01  0.00 - 0.05 10*3/mm3 Final    nRBC 01/30/2024 0.0  0.0 - 0.2 /100 WBC Final    QT Interval 01/30/2024 424  ms Final    QTC Interval 01/30/2024 397  ms Final       XR Chest 1 View    Result Date: 2/1/2024  Narrative: XR CHEST 1 VW Date of Exam: 2/1/2024 8:33 AM EST Indication: R/O PTX Comparison: 5/22/2023. Findings: Right chest port is unchanged in position. There is no pneumothorax. Heart and pulmonary vessels appear  within normal limits. No acute infiltrates or effusions are detected.     Impression: Impression: No acute process. No pneumothorax. Electronically Signed: Araseli Mccormack MD  2/1/2024 8:48 AM EST  Workstation ID: [id number]    FL C Arm During Surgery    Result Date: 2/1/2024  Narrative: This procedure was auto-finalized with no dictation required.    CT Chest With Contrast Diagnostic    Result Date: 1/17/2024  Narrative: CT CHEST W CONTRAST DIAGNOSTIC, CT ABDOMEN PELVIS W CONTRAST Date of Exam: 1/16/2024 11:20 AM EST Indication: hilar nodule follow up h/o hodgkin. Stage IV Hodgkin's lymphoma with adenopathy and bone metastases and rectal mass. Restaging Comparison: PET/CT dated 12/4/2023 Technique:  Axial CT images were obtained of the chest, abdomen and pelvis after the uneventful intravenous administration of 90 cc Isovue-300.  Sagittal and coronal reconstructions were performed.  Automated exposure control and iterative reconstruction  methods were used. Findings: Chest: A right-sided port remains in place with the tips at the junction of the SVC and right atrium. Coronary artery calcifications are present. No pleural or pericardial effusions are seen. There is a 4.0 cm mid ascending thoracic aortic aneurysm at the level of the right main pulmonary artery, unchanged. Pulmonary arteries are normal in caliber. There is no pneumothorax. Extending from the right hilum peripherally along the posterior right middle lobe and abutting the major fissure there is volume loss and consolidation without enhancing mass. There are calcified right hilar nodes. There is also a possible broncholith. Low density areas within this consolidation are present possibly from mucous plugging. In the aerated portions of the right middle  lobe there our groundglass and tree-in-bud opacities with smaller multifocal tree-in-bud opacities throughout the upper lobes. On axial CT image 48 there is an 8 mm nodule which is more lobular in  appearance. On the study from 6 months ago this was approximately 5 mm in size. No pathologically enlarged thoracic or axillary lymph nodes. The thyroid gland is normal in size. An acute bony abnormality or aggressive appearing focal osseous lesion in the bony thorax is not demonstrated. Abdomen/pelvis: Definitive focal hepatic or splenic masses are not seen on the current exam and liver and spleen are within upper range of normal limits for size, this has significantly improved since 10/11/2022. The pancreas is unremarkable. There are embolization coils adjacent to the head of the pancreas and adjacent to the first/second portion of the duodenum from known arterial embolization of the gastroduodenal artery and superior pancreaticoduodenal arteries and right gastroepiploic artery and 2022. The adrenal glands and kidneys are unremarkable except for a 3.5 cm area of residual amorphous soft tissue in the anterior left pararenal space significantly decreased in size since 10/11/2022. This area was not hypermetabolic on the most recent PET/CT from 1 month ago. The gallbladder is present. There is no biliary dilatation. The prostate gland is normal in size. The bladder is diffusely thick-walled with mild surrounding fat stranding. It is nearly completely empty. This can be due to infection/cystitis could also be due to radiation therapy if there is the appropriate clinical history. The stomach and small bowel loops are decompressed at the level of the umbilicus there is a small umbilical hernia containing the outer wall of a loop of small bowel and a small amount of abdominal fat. No distended loops of small bowel or signs of bowel  ischemia or obstruction are seen. There is a large stool burden. A left lower quadrant colostomy is present. There is extensive descending colonic diverticulosis. Extending posteriorly and laterally from the rectal wall is a Morphis masslike soft tissue  with surrounding fat stranding, this  area measures approximately 4.6 x 3.7 cm compatible with the biopsy-proven lymphoma. This is not significantly changed in size since the PET/CT from 12/4/2023 and has dramatically improved since 10/11/2022. No pathologically enlarged abdominal pelvic or inguinal lymph nodes are demonstrated on the current exam. Visualized skeletal structures demonstrate degenerative changes and chronic bilateral L5 pars defects with grade 1 spondylolisthesis. No aggressive appearing focal osseous lesion is not demonstrated on the current exam.     Impression: Impression: CHEST: 1.There is consolidation and volume loss in the posterior right middle lobe extending from the right hilum to the major fissure. There is a possible broncholith in the right middle lobe. There are multifocal areas of groundglass and tree-in-bud opacities  in the aerated portions of the right middle lobe and to a lesser extent in the upper lobes. These findings are likely infectious or inflammatory. 2.There is an 8 mm nodule in the right middle lobe which has increased in size from 5 mm on the study from 6 months ago. This could be infectious, inflammatory, or neoplastic. 3. There is a 4.0 cm mid ascending thoracic aortic aneurysm. ABDOMEN AND PELVIS: There is an amorphous soft tissue arising from the posterolateral left rectal wall. This area is 4.6 x 3.7 cm in size similar to the PET/CT from 1 month ago in this patient with known lymphoma in this region. No new focal abnormalities in the abdomen pelvis or visualized skeletal structures to indicate recurrent or metastatic disease. Electronically Signed: Luis Jha DO  1/17/2024 12:05 AM EST  Workstation ID: [id number]    CT Abdomen Pelvis With Contrast    Result Date: 1/17/2024  Narrative: CT CHEST W CONTRAST DIAGNOSTIC, CT ABDOMEN PELVIS W CONTRAST Date of Exam: 1/16/2024 11:20 AM EST Indication: hilar nodule follow up h/o hodgkin. Stage IV Hodgkin's lymphoma with adenopathy and bone metastases and rectal  mass. Restaging Comparison: PET/CT dated 12/4/2023 Technique:  Axial CT images were obtained of the chest, abdomen and pelvis after the uneventful intravenous administration of 90 cc Isovue-300.  Sagittal and coronal reconstructions were performed.  Automated exposure control and iterative reconstruction  methods were used. Findings: Chest: A right-sided port remains in place with the tips at the junction of the SVC and right atrium. Coronary artery calcifications are present. No pleural or pericardial effusions are seen. There is a 4.0 cm mid ascending thoracic aortic aneurysm at the level of the right main pulmonary artery, unchanged. Pulmonary arteries are normal in caliber. There is no pneumothorax. Extending from the right hilum peripherally along the posterior right middle lobe and abutting the major fissure there is volume loss and consolidation without enhancing mass. There are calcified right hilar nodes. There is also a possible broncholith. Low density areas within this consolidation are present possibly from mucous plugging. In the aerated portions of the right middle  lobe there our groundglass and tree-in-bud opacities with smaller multifocal tree-in-bud opacities throughout the upper lobes. On axial CT image 48 there is an 8 mm nodule which is more lobular in appearance. On the study from 6 months ago this was approximately 5 mm in size. No pathologically enlarged thoracic or axillary lymph nodes. The thyroid gland is normal in size. An acute bony abnormality or aggressive appearing focal osseous lesion in the bony thorax is not demonstrated. Abdomen/pelvis: Definitive focal hepatic or splenic masses are not seen on the current exam and liver and spleen are within upper range of normal limits for size, this has significantly improved since 10/11/2022. The pancreas is unremarkable. There are embolization coils adjacent to the head of the pancreas and adjacent to the first/second portion of the duodenum  from known arterial embolization of the gastroduodenal artery and superior pancreaticoduodenal arteries and right gastroepiploic artery and 2022. The adrenal glands and kidneys are unremarkable except for a 3.5 cm area of residual amorphous soft tissue in the anterior left pararenal space significantly decreased in size since 10/11/2022. This area was not hypermetabolic on the most recent PET/CT from 1 month ago. The gallbladder is present. There is no biliary dilatation. The prostate gland is normal in size. The bladder is diffusely thick-walled with mild surrounding fat stranding. It is nearly completely empty. This can be due to infection/cystitis could also be due to radiation therapy if there is the appropriate clinical history. The stomach and small bowel loops are decompressed at the level of the umbilicus there is a small umbilical hernia containing the outer wall of a loop of small bowel and a small amount of abdominal fat. No distended loops of small bowel or signs of bowel  ischemia or obstruction are seen. There is a large stool burden. A left lower quadrant colostomy is present. There is extensive descending colonic diverticulosis. Extending posteriorly and laterally from the rectal wall is a Morphis masslike soft tissue  with surrounding fat stranding, this area measures approximately 4.6 x 3.7 cm compatible with the biopsy-proven lymphoma. This is not significantly changed in size since the PET/CT from 12/4/2023 and has dramatically improved since 10/11/2022. No pathologically enlarged abdominal pelvic or inguinal lymph nodes are demonstrated on the current exam. Visualized skeletal structures demonstrate degenerative changes and chronic bilateral L5 pars defects with grade 1 spondylolisthesis. No aggressive appearing focal osseous lesion is not demonstrated on the current exam.     Impression: Impression: CHEST: 1.There is consolidation and volume loss in the posterior right middle lobe extending  from the right hilum to the major fissure. There is a possible broncholith in the right middle lobe. There are multifocal areas of groundglass and tree-in-bud opacities  in the aerated portions of the right middle lobe and to a lesser extent in the upper lobes. These findings are likely infectious or inflammatory. 2.There is an 8 mm nodule in the right middle lobe which has increased in size from 5 mm on the study from 6 months ago. This could be infectious, inflammatory, or neoplastic. 3. There is a 4.0 cm mid ascending thoracic aortic aneurysm. ABDOMEN AND PELVIS: There is an amorphous soft tissue arising from the posterolateral left rectal wall. This area is 4.6 x 3.7 cm in size similar to the PET/CT from 1 month ago in this patient with known lymphoma in this region. No new focal abnormalities in the abdomen pelvis or visualized skeletal structures to indicate recurrent or metastatic disease. Electronically Signed: Luis Jha DO  1/17/2024 12:05 AM EST  Workstation ID: [id number]           Current L, Most recent PET right:     Assessment / Plan      Assessment/Plan:     1.   Recurrent CD30 positive classical Hodgkin's lymphoma, now with CD 20/CD 45 positive on repeat biopsy    -He completed 6 cycles of BV-AVD 3/23/2023. Cycle 6 was complicated by coronavirus infection and multifocal pneumonia.  Posttherapy PET showed persistent bilateral interstitial changes and mild adenopathy. Repeat chest CT shows continued improvement in the pulmonary infiltrates consistent with a reactive infectious etiology.  - He had clear response to first line therapy with resolution of the hepatic lesions, marked improvement in the retroperitoneal adenopathy, and 50% reduction in the size of the rectal mass.  However he had persistent uptake in the rectal mass on posttreatment PET, Jaskaran 5. We elected to proceed with biopsy to confirm whether he had residual disease followed by consolidative radiotherapy to the rectal mass given  initial bulky disease.  In the interim, while awaiting biopsy, he presented with rectal bleeding, obstructive uropathy symptoms and pain and radiation planning CT showed significant interval growth in the rectal mass as well as development of new retroperitoneal adenopathy superior to the rectal mass compared to the his posttreatment PET, confirming progressive disease. Consolidative radiation was converted to palliative course radiation given interval adenopathy development above the radiation field to allow for earlier introduction of second line chemotherapy.   -Repeat biopsy confirmed residual classic Hodgkin's lymphoma.  -Given national shortage of carbo/cis, we proceeded with Keytruda, vinorelbine, Gemzar, and Doxil. He completed 4 cycles 8/2023. Post treatment PET showed complete resolution of FDG avidity.  He continued Keytruda maintenance  -Most recent PET from 12/2023 with resolution of FDG avidity in the rectal mass which has decreased in size.  There is a new area of FDG avidity and a lytic lesion in the L4 region as well as mild SUV uptake in a right hilar node.  Lytic lesion appears to have been there previously on my review of prior PETs but with increased activity. He completed stereotactic radiation therapy to this area as he was having pain.  There was also an indeterminate hilar LN.  I recommended continuation of maintenance Keytruda and short interval follow-up CT of the chest to reassess this. CT in Jan compared to prior PET showed worsening consolidative RML from hilum to major fissure with possible broncholith and no specific mass.  I personally reviewed his bronchoscopy report.  I reviewed his pathology.  I have discussed with both pulmonary and pathology and recounted this discussion for the patient today.  Findings confirm recurrent CD30 positive Hodgkin's lymphoma, but he now has developed strong CD20 positivity.  Morphologically this is felt to represent the same underlying Hodgkin's  process, but with immunophenotype switch.  I recommended third line systemic therapy.  We discussed potential treatment with Rituximab/ICE and he is amenable.  We reviewed schedules and side effects.  He will undergo chemotherapy education today.  I will bring him back on Tuesday with plan for inpatient treatment.  In the interim, because his lesion was obstructing, we will plan to proceed with dexamethasone 20 mg daily for 3 days.  I have started him on allopurinol.  Because of his age and prior neutropenic fever will request Udenyca support.  Labs reviewed today including CBC and CMP.  Hepatitis studies pending.    2.  H/o GI bleed  -PPI, continue daily.  Tolerating well.     3. Access  -Port c/d/I    4.  Right middle lobe obstruction  -Secondary to her malignancy.  He is on room air.  I have discussed with pulmonary and he would potentially be candidate for his stent if he has progressive obstructive symptoms.  However, he is currently on room air and I do expect him to have a good response to chemotherapy, so we will defer further interventions for now which is his strong preference.    Follow Up:   3 weeks    Kaycee Leary MD  Hematology and Oncology     I have spent a total of 50 min on reviewing test results/preparing to see patient, counseling patient, performing medically appropriate exam, placing orders, coordinating care and documenting clinical information in the electronic or other health record

## 2024-02-09 LAB — HBV CORE AB SERPL QL IA: NEGATIVE

## 2024-02-09 RX ORDER — DEXAMETHASONE 4 MG/1
20 TABLET ORAL DAILY
Qty: 15 TABLET | Refills: 0 | Status: SHIPPED | OUTPATIENT
Start: 2024-02-09 | End: 2024-02-12

## 2024-02-12 ENCOUNTER — TELEPHONE (OUTPATIENT)
Dept: ONCOLOGY | Facility: CLINIC | Age: 74
End: 2024-02-12
Payer: MEDICARE

## 2024-02-12 DIAGNOSIS — C81.98 HODGKIN LYMPHOMA OF LYMPH NODES OF MULTIPLE REGIONS, UNSPECIFIED HODGKIN LYMPHOMA TYPE: Primary | ICD-10-CM

## 2024-02-13 ENCOUNTER — HOSPITAL ENCOUNTER (OUTPATIENT)
Dept: ONCOLOGY | Facility: HOSPITAL | Age: 74
Discharge: HOME | End: 2024-02-13
Payer: MEDICARE

## 2024-02-13 ENCOUNTER — OFFICE VISIT (OUTPATIENT)
Dept: ONCOLOGY | Facility: CLINIC | Age: 74
End: 2024-02-13
Payer: MEDICARE

## 2024-02-13 ENCOUNTER — HOSPITAL ENCOUNTER (INPATIENT)
Facility: HOSPITAL | Age: 74
LOS: 3 days | Discharge: HOME | DRG: 841 | End: 2024-02-16
Attending: INTERNAL MEDICINE | Admitting: PEDIATRICS
Payer: MEDICARE

## 2024-02-13 VITALS
HEIGHT: 64 IN | SYSTOLIC BLOOD PRESSURE: 124 MMHG | WEIGHT: 170 LBS | TEMPERATURE: 98.1 F | RESPIRATION RATE: 18 BRPM | OXYGEN SATURATION: 97 % | HEART RATE: 65 BPM | DIASTOLIC BLOOD PRESSURE: 62 MMHG | BODY MASS INDEX: 29.02 KG/M2

## 2024-02-13 DIAGNOSIS — C81.98 HODGKIN LYMPHOMA OF LYMPH NODES OF MULTIPLE REGIONS, UNSPECIFIED HODGKIN LYMPHOMA TYPE: Primary | ICD-10-CM

## 2024-02-13 DIAGNOSIS — Z51.11 CHEMOTHERAPY MANAGEMENT, ENCOUNTER FOR: ICD-10-CM

## 2024-02-13 DIAGNOSIS — Z45.2 ENCOUNTER FOR CARE RELATED TO VASCULAR ACCESS PORT: Primary | ICD-10-CM

## 2024-02-13 DIAGNOSIS — C81.98 HODGKIN LYMPHOMA OF LYMPH NODES OF MULTIPLE REGIONS, UNSPECIFIED HODGKIN LYMPHOMA TYPE: ICD-10-CM

## 2024-02-13 PROBLEM — C81.90 HODGKIN'S LYMPHOMA: Status: ACTIVE | Noted: 2024-02-13

## 2024-02-13 LAB
ALBUMIN SERPL-MCNC: 3.9 G/DL (ref 3.5–5.2)
ALBUMIN SERPL-MCNC: 3.9 G/DL (ref 3.5–5.2)
ALBUMIN/GLOB SERPL: 1.6 G/DL
ALBUMIN/GLOB SERPL: 1.6 G/DL
ALP SERPL-CCNC: 70 U/L (ref 39–117)
ALP SERPL-CCNC: 73 U/L (ref 39–117)
ALT SERPL W P-5'-P-CCNC: 14 U/L (ref 1–41)
ALT SERPL W P-5'-P-CCNC: 15 U/L (ref 1–41)
ANION GAP SERPL CALCULATED.3IONS-SCNC: 7 MMOL/L (ref 5–15)
ANION GAP SERPL CALCULATED.3IONS-SCNC: 7 MMOL/L (ref 5–15)
AST SERPL-CCNC: 13 U/L (ref 1–40)
AST SERPL-CCNC: 13 U/L (ref 1–40)
BASOPHILS # BLD AUTO: 0.01 10*3/MM3 (ref 0–0.2)
BASOPHILS # BLD AUTO: 0.01 10*3/MM3 (ref 0–0.2)
BASOPHILS NFR BLD AUTO: 0.2 % (ref 0–1.5)
BASOPHILS NFR BLD AUTO: 0.2 % (ref 0–1.5)
BILIRUB SERPL-MCNC: 0.3 MG/DL (ref 0–1.2)
BILIRUB SERPL-MCNC: 0.3 MG/DL (ref 0–1.2)
BUN SERPL-MCNC: 20 MG/DL (ref 8–23)
BUN SERPL-MCNC: 22 MG/DL (ref 8–23)
BUN/CREAT SERPL: 29.3 (ref 7–25)
BUN/CREAT SERPL: 33.3 (ref 7–25)
CALCIUM SPEC-SCNC: 8.8 MG/DL (ref 8.6–10.5)
CALCIUM SPEC-SCNC: 9 MG/DL (ref 8.6–10.5)
CHLORIDE SERPL-SCNC: 100 MMOL/L (ref 98–107)
CHLORIDE SERPL-SCNC: 101 MMOL/L (ref 98–107)
CO2 SERPL-SCNC: 29 MMOL/L (ref 22–29)
CO2 SERPL-SCNC: 29 MMOL/L (ref 22–29)
CREAT SERPL-MCNC: 0.6 MG/DL (ref 0.76–1.27)
CREAT SERPL-MCNC: 0.75 MG/DL (ref 0.76–1.27)
DEPRECATED RDW RBC AUTO: 52.6 FL (ref 37–54)
DEPRECATED RDW RBC AUTO: 55 FL (ref 37–54)
EGFRCR SERPLBLD CKD-EPI 2021: 103.2 ML/MIN/1.73
EGFRCR SERPLBLD CKD-EPI 2021: 96.5 ML/MIN/1.73
EOSINOPHIL # BLD AUTO: 0.04 10*3/MM3 (ref 0–0.4)
EOSINOPHIL # BLD AUTO: 0.06 10*3/MM3 (ref 0–0.4)
EOSINOPHIL NFR BLD AUTO: 0.6 % (ref 0.3–6.2)
EOSINOPHIL NFR BLD AUTO: 1.1 % (ref 0.3–6.2)
ERYTHROCYTE [DISTWIDTH] IN BLOOD BY AUTOMATED COUNT: 15.1 % (ref 12.3–15.4)
ERYTHROCYTE [DISTWIDTH] IN BLOOD BY AUTOMATED COUNT: 15.4 % (ref 12.3–15.4)
GLOBULIN UR ELPH-MCNC: 2.4 GM/DL
GLOBULIN UR ELPH-MCNC: 2.4 GM/DL
GLUCOSE BLDC GLUCOMTR-MCNC: 108 MG/DL (ref 70–130)
GLUCOSE BLDC GLUCOMTR-MCNC: 160 MG/DL (ref 70–130)
GLUCOSE BLDC GLUCOMTR-MCNC: 169 MG/DL (ref 70–130)
GLUCOSE SERPL-MCNC: 100 MG/DL (ref 65–99)
GLUCOSE SERPL-MCNC: 88 MG/DL (ref 65–99)
HCT VFR BLD AUTO: 34.9 % (ref 37.5–51)
HCT VFR BLD AUTO: 35.2 % (ref 37.5–51)
HGB BLD-MCNC: 11.6 G/DL (ref 13–17.7)
HGB BLD-MCNC: 11.8 G/DL (ref 13–17.7)
IMM GRANULOCYTES # BLD AUTO: 0.03 10*3/MM3 (ref 0–0.05)
IMM GRANULOCYTES # BLD AUTO: 0.05 10*3/MM3 (ref 0–0.05)
IMM GRANULOCYTES NFR BLD AUTO: 0.5 % (ref 0–0.5)
IMM GRANULOCYTES NFR BLD AUTO: 0.8 % (ref 0–0.5)
LDH SERPL-CCNC: 141 U/L (ref 135–225)
LDH SERPL-CCNC: 148 U/L (ref 135–225)
LYMPHOCYTES # BLD AUTO: 0.62 10*3/MM3 (ref 0.7–3.1)
LYMPHOCYTES # BLD AUTO: 0.68 10*3/MM3 (ref 0.7–3.1)
LYMPHOCYTES NFR BLD AUTO: 12.3 % (ref 19.6–45.3)
LYMPHOCYTES NFR BLD AUTO: 9.8 % (ref 19.6–45.3)
MAGNESIUM SERPL-MCNC: 2 MG/DL (ref 1.6–2.4)
MAGNESIUM SERPL-MCNC: 2 MG/DL (ref 1.6–2.4)
MCH RBC QN AUTO: 32.1 PG (ref 26.6–33)
MCH RBC QN AUTO: 32.2 PG (ref 26.6–33)
MCHC RBC AUTO-ENTMCNC: 33.2 G/DL (ref 31.5–35.7)
MCHC RBC AUTO-ENTMCNC: 33.5 G/DL (ref 31.5–35.7)
MCV RBC AUTO: 96.2 FL (ref 79–97)
MCV RBC AUTO: 96.7 FL (ref 79–97)
MONOCYTES # BLD AUTO: 0.6 10*3/MM3 (ref 0.1–0.9)
MONOCYTES # BLD AUTO: 0.64 10*3/MM3 (ref 0.1–0.9)
MONOCYTES NFR BLD AUTO: 11.6 % (ref 5–12)
MONOCYTES NFR BLD AUTO: 9.5 % (ref 5–12)
NEUTROPHILS NFR BLD AUTO: 4.12 10*3/MM3 (ref 1.7–7)
NEUTROPHILS NFR BLD AUTO: 4.99 10*3/MM3 (ref 1.7–7)
NEUTROPHILS NFR BLD AUTO: 74.3 % (ref 42.7–76)
NEUTROPHILS NFR BLD AUTO: 79.1 % (ref 42.7–76)
NRBC BLD AUTO-RTO: 0 /100 WBC (ref 0–0.2)
PHOSPHATE SERPL-MCNC: 3.6 MG/DL (ref 2.5–4.5)
PHOSPHATE SERPL-MCNC: 3.8 MG/DL (ref 2.5–4.5)
PLATELET # BLD AUTO: 169 10*3/MM3 (ref 140–450)
PLATELET # BLD AUTO: 189 10*3/MM3 (ref 140–450)
PMV BLD AUTO: 8.7 FL (ref 6–12)
PMV BLD AUTO: 9.1 FL (ref 6–12)
POTASSIUM SERPL-SCNC: 4.1 MMOL/L (ref 3.5–5.2)
POTASSIUM SERPL-SCNC: 4.2 MMOL/L (ref 3.5–5.2)
PROT SERPL-MCNC: 6.3 G/DL (ref 6–8.5)
PROT SERPL-MCNC: 6.3 G/DL (ref 6–8.5)
RBC # BLD AUTO: 3.61 10*6/MM3 (ref 4.14–5.8)
RBC # BLD AUTO: 3.66 10*6/MM3 (ref 4.14–5.8)
SODIUM SERPL-SCNC: 136 MMOL/L (ref 136–145)
SODIUM SERPL-SCNC: 137 MMOL/L (ref 136–145)
URATE SERPL-MCNC: 2.8 MG/DL (ref 3.4–7)
URATE SERPL-MCNC: 3.1 MG/DL (ref 3.4–7)
WBC NRBC COR # BLD AUTO: 5.54 10*3/MM3 (ref 3.4–10.8)
WBC NRBC COR # BLD AUTO: 6.31 10*3/MM3 (ref 3.4–10.8)

## 2024-02-13 PROCEDURE — 25010000002 HEPARIN LOCK FLUSH PER 10 UNITS: Performed by: INTERNAL MEDICINE

## 2024-02-13 PROCEDURE — 25010000002 RITUXIMAB-ARRX 500 MG/50ML SOLUTION 50 ML VIAL: Performed by: INTERNAL MEDICINE

## 2024-02-13 PROCEDURE — 85025 COMPLETE CBC W/AUTO DIFF WBC: CPT | Performed by: INTERNAL MEDICINE

## 2024-02-13 PROCEDURE — 25010000002 DEXAMETHASONE SODIUM PHOSPHATE 100 MG/10ML SOLUTION: Performed by: INTERNAL MEDICINE

## 2024-02-13 PROCEDURE — 25010000002 RITUXIMAB-ARRX 100 MG/10ML SOLUTION 10 ML VIAL: Performed by: INTERNAL MEDICINE

## 2024-02-13 PROCEDURE — 83615 LACTATE (LD) (LDH) ENZYME: CPT | Performed by: INTERNAL MEDICINE

## 2024-02-13 PROCEDURE — 84550 ASSAY OF BLOOD/URIC ACID: CPT | Performed by: INTERNAL MEDICINE

## 2024-02-13 PROCEDURE — 25010000002 DIPHENHYDRAMINE PER 50 MG: Performed by: INTERNAL MEDICINE

## 2024-02-13 PROCEDURE — 84100 ASSAY OF PHOSPHORUS: CPT | Performed by: INTERNAL MEDICINE

## 2024-02-13 PROCEDURE — 25810000003 SODIUM CHLORIDE 0.9 % SOLUTION: Performed by: INTERNAL MEDICINE

## 2024-02-13 PROCEDURE — 99222 1ST HOSP IP/OBS MODERATE 55: CPT | Performed by: INTERNAL MEDICINE

## 2024-02-13 PROCEDURE — 25010000002 ENOXAPARIN PER 10 MG: Performed by: INTERNAL MEDICINE

## 2024-02-13 PROCEDURE — 83735 ASSAY OF MAGNESIUM: CPT | Performed by: INTERNAL MEDICINE

## 2024-02-13 PROCEDURE — 25810000003 SODIUM CHLORIDE 0.9 % SOLUTION 250 ML FLEX CONT: Performed by: INTERNAL MEDICINE

## 2024-02-13 PROCEDURE — 36591 DRAW BLOOD OFF VENOUS DEVICE: CPT

## 2024-02-13 PROCEDURE — 80053 COMPREHEN METABOLIC PANEL: CPT | Performed by: INTERNAL MEDICINE

## 2024-02-13 PROCEDURE — 25810000003 SODIUM CHLORIDE 0.9 % SOLUTION 500 ML FLEX CONT: Performed by: INTERNAL MEDICINE

## 2024-02-13 PROCEDURE — 63710000001 INSULIN LISPRO (HUMAN) PER 5 UNITS: Performed by: INTERNAL MEDICINE

## 2024-02-13 PROCEDURE — 3E03305 INTRODUCTION OF OTHER ANTINEOPLASTIC INTO PERIPHERAL VEIN, PERCUTANEOUS APPROACH: ICD-10-PCS | Performed by: PEDIATRICS

## 2024-02-13 PROCEDURE — 25010000002 ETOPOSIDE 1 GM/50ML SOLUTION 50 ML VIAL: Performed by: INTERNAL MEDICINE

## 2024-02-13 PROCEDURE — 82948 REAGENT STRIP/BLOOD GLUCOSE: CPT

## 2024-02-13 RX ORDER — SODIUM CHLORIDE 0.9 % (FLUSH) 0.9 %
10 SYRINGE (ML) INJECTION AS NEEDED
Status: DISCONTINUED | OUTPATIENT
Start: 2024-02-13 | End: 2024-02-16 | Stop reason: HOSPADM

## 2024-02-13 RX ORDER — SODIUM CHLORIDE 9 MG/ML
125 INJECTION, SOLUTION INTRAVENOUS CONTINUOUS
Status: DISCONTINUED | OUTPATIENT
Start: 2024-02-13 | End: 2024-02-16 | Stop reason: HOSPADM

## 2024-02-13 RX ORDER — DEXAMETHASONE 0.5 MG/1
8 TABLET ORAL EVERY 24 HOURS
Status: CANCELLED | OUTPATIENT
Start: 2024-02-16

## 2024-02-13 RX ORDER — INSULIN LISPRO 100 [IU]/ML
2-7 INJECTION, SOLUTION INTRAVENOUS; SUBCUTANEOUS
Status: DISCONTINUED | OUTPATIENT
Start: 2024-02-13 | End: 2024-02-16 | Stop reason: HOSPADM

## 2024-02-13 RX ORDER — BISACODYL 10 MG
10 SUPPOSITORY, RECTAL RECTAL DAILY PRN
Status: DISCONTINUED | OUTPATIENT
Start: 2024-02-13 | End: 2024-02-16 | Stop reason: HOSPADM

## 2024-02-13 RX ORDER — NICOTINE POLACRILEX 4 MG
15 LOZENGE BUCCAL
Status: DISCONTINUED | OUTPATIENT
Start: 2024-02-13 | End: 2024-02-16 | Stop reason: HOSPADM

## 2024-02-13 RX ORDER — DIPHENHYDRAMINE HYDROCHLORIDE 50 MG/ML
50 INJECTION INTRAMUSCULAR; INTRAVENOUS AS NEEDED
Status: DISCONTINUED | OUTPATIENT
Start: 2024-02-13 | End: 2024-02-16 | Stop reason: HOSPADM

## 2024-02-13 RX ORDER — DEXTROSE MONOHYDRATE 25 G/50ML
25 INJECTION, SOLUTION INTRAVENOUS
Status: DISCONTINUED | OUTPATIENT
Start: 2024-02-13 | End: 2024-02-16 | Stop reason: HOSPADM

## 2024-02-13 RX ORDER — ENOXAPARIN SODIUM 100 MG/ML
40 INJECTION SUBCUTANEOUS DAILY
Status: DISCONTINUED | OUTPATIENT
Start: 2024-02-13 | End: 2024-02-16 | Stop reason: HOSPADM

## 2024-02-13 RX ORDER — IBUPROFEN 600 MG/1
1 TABLET ORAL
Status: DISCONTINUED | OUTPATIENT
Start: 2024-02-13 | End: 2024-02-16 | Stop reason: HOSPADM

## 2024-02-13 RX ORDER — HEPARIN SODIUM (PORCINE) LOCK FLUSH IV SOLN 100 UNIT/ML 100 UNIT/ML
500 SOLUTION INTRAVENOUS AS NEEDED
Status: CANCELLED | OUTPATIENT
Start: 2024-02-13

## 2024-02-13 RX ORDER — DIPHENHYDRAMINE HYDROCHLORIDE 50 MG/ML
50 INJECTION INTRAMUSCULAR; INTRAVENOUS AS NEEDED
Status: CANCELLED | OUTPATIENT
Start: 2024-02-13

## 2024-02-13 RX ORDER — ACETAMINOPHEN 325 MG/1
650 TABLET ORAL EVERY 4 HOURS PRN
Status: DISCONTINUED | OUTPATIENT
Start: 2024-02-13 | End: 2024-02-16 | Stop reason: HOSPADM

## 2024-02-13 RX ORDER — MEPERIDINE HYDROCHLORIDE 25 MG/ML
25 INJECTION INTRAMUSCULAR; INTRAVENOUS; SUBCUTANEOUS
Status: DISCONTINUED | OUTPATIENT
Start: 2024-02-13 | End: 2024-02-16 | Stop reason: HOSPADM

## 2024-02-13 RX ORDER — FAMOTIDINE 10 MG/ML
20 INJECTION, SOLUTION INTRAVENOUS AS NEEDED
Status: DISCONTINUED | OUTPATIENT
Start: 2024-02-13 | End: 2024-02-16 | Stop reason: HOSPADM

## 2024-02-13 RX ORDER — LIDOCAINE 40 MG/G
CREAM TOPICAL AS NEEDED
Status: DISCONTINUED | OUTPATIENT
Start: 2024-02-13 | End: 2024-02-16 | Stop reason: HOSPADM

## 2024-02-13 RX ORDER — POLYETHYLENE GLYCOL 3350 17 G/17G
17 POWDER, FOR SOLUTION ORAL DAILY PRN
Status: DISCONTINUED | OUTPATIENT
Start: 2024-02-13 | End: 2024-02-16 | Stop reason: HOSPADM

## 2024-02-13 RX ORDER — SODIUM CHLORIDE 9 MG/ML
40 INJECTION, SOLUTION INTRAVENOUS AS NEEDED
Status: DISCONTINUED | OUTPATIENT
Start: 2024-02-13 | End: 2024-02-16 | Stop reason: HOSPADM

## 2024-02-13 RX ORDER — PROCHLORPERAZINE EDISYLATE 5 MG/ML
5 INJECTION INTRAMUSCULAR; INTRAVENOUS EVERY 6 HOURS PRN
Status: DISCONTINUED | OUTPATIENT
Start: 2024-02-13 | End: 2024-02-16 | Stop reason: HOSPADM

## 2024-02-13 RX ORDER — AMOXICILLIN 250 MG
2 CAPSULE ORAL 2 TIMES DAILY PRN
Status: DISCONTINUED | OUTPATIENT
Start: 2024-02-13 | End: 2024-02-16 | Stop reason: HOSPADM

## 2024-02-13 RX ORDER — MEPERIDINE HYDROCHLORIDE 25 MG/ML
25 INJECTION INTRAMUSCULAR; INTRAVENOUS; SUBCUTANEOUS
Status: CANCELLED | OUTPATIENT
Start: 2024-02-13

## 2024-02-13 RX ORDER — SODIUM CHLORIDE 0.9 % (FLUSH) 0.9 %
10 SYRINGE (ML) INJECTION EVERY 12 HOURS SCHEDULED
Status: DISCONTINUED | OUTPATIENT
Start: 2024-02-13 | End: 2024-02-16 | Stop reason: HOSPADM

## 2024-02-13 RX ORDER — PANTOPRAZOLE SODIUM 40 MG/1
40 TABLET, DELAYED RELEASE ORAL DAILY
Status: DISCONTINUED | OUTPATIENT
Start: 2024-02-13 | End: 2024-02-16 | Stop reason: HOSPADM

## 2024-02-13 RX ORDER — ACETAMINOPHEN 325 MG/1
650 TABLET ORAL ONCE
Status: COMPLETED | OUTPATIENT
Start: 2024-02-13 | End: 2024-02-13

## 2024-02-13 RX ORDER — HEPARIN SODIUM (PORCINE) LOCK FLUSH IV SOLN 100 UNIT/ML 100 UNIT/ML
500 SOLUTION INTRAVENOUS AS NEEDED
Status: DISCONTINUED | OUTPATIENT
Start: 2024-02-13 | End: 2024-02-14 | Stop reason: HOSPADM

## 2024-02-13 RX ORDER — ACETAMINOPHEN 325 MG/1
650 TABLET ORAL ONCE
Status: CANCELLED | OUTPATIENT
Start: 2024-02-13

## 2024-02-13 RX ORDER — ALLOPURINOL 300 MG/1
300 TABLET ORAL DAILY
Status: DISCONTINUED | OUTPATIENT
Start: 2024-02-13 | End: 2024-02-16 | Stop reason: HOSPADM

## 2024-02-13 RX ORDER — DEXAMETHASONE 4 MG/1
8 TABLET ORAL
Qty: 4 TABLET | Refills: 3 | Status: SHIPPED | OUTPATIENT
Start: 2024-02-13

## 2024-02-13 RX ORDER — FAMOTIDINE 10 MG/ML
20 INJECTION, SOLUTION INTRAVENOUS AS NEEDED
Status: CANCELLED | OUTPATIENT
Start: 2024-02-13

## 2024-02-13 RX ORDER — BISACODYL 5 MG/1
5 TABLET, DELAYED RELEASE ORAL DAILY PRN
Status: DISCONTINUED | OUTPATIENT
Start: 2024-02-13 | End: 2024-02-16 | Stop reason: HOSPADM

## 2024-02-13 RX ADMIN — RITUXIMAB-ARRX 700 MG: 500 INJECTION, SOLUTION INTRAVENOUS at 12:28

## 2024-02-13 RX ADMIN — Medication 10 ML: at 20:39

## 2024-02-13 RX ADMIN — INSULIN LISPRO 2 UNITS: 100 INJECTION, SOLUTION INTRAVENOUS; SUBCUTANEOUS at 20:38

## 2024-02-13 RX ADMIN — ENOXAPARIN SODIUM 40 MG: 100 INJECTION SUBCUTANEOUS at 11:23

## 2024-02-13 RX ADMIN — SODIUM CHLORIDE 180 MG: 0.9 INJECTION, SOLUTION INTRAVENOUS at 17:27

## 2024-02-13 RX ADMIN — ACETAMINOPHEN 650 MG: 325 TABLET ORAL at 11:23

## 2024-02-13 RX ADMIN — DEXAMETHASONE SODIUM PHOSPHATE 12 MG: 10 INJECTION, SOLUTION INTRAMUSCULAR; INTRAVENOUS at 16:24

## 2024-02-13 RX ADMIN — DIPHENHYDRAMINE HYDROCHLORIDE 50 MG: 50 INJECTION INTRAMUSCULAR; INTRAVENOUS at 11:48

## 2024-02-13 RX ADMIN — SODIUM CHLORIDE 125 ML/HR: 900 INJECTION INTRAVENOUS at 11:42

## 2024-02-13 RX ADMIN — HEPARIN 500 UNITS: 100 SYRINGE at 08:50

## 2024-02-13 RX ADMIN — Medication 10 ML: at 11:10

## 2024-02-13 NOTE — H&P
[facility] Medicine Services  HISTORY AND PHYSICAL    Patient Name: Abraham Wu  : 1950  MRN: [id number]  Primary Care Physician: Jatinder Haynes MD  Date of admission: 2024      Subjective   Subjective     Chief Complaint:      HPI:  Abraham Wu is a 73 y.o. male w PMH of hypertension, sleep apnea, hard of hearing .  He was diagnosed with Hodgkin lymphoma (rectal mass) in 2022, initially underwent diverting colostomy, then underwent chemotherapy starting 10/2022 and palliative XRT to the rectum.  On 24 a bronchoscopy showed persistent/recurrent disease in the RM lobe.  He is here for scheduled chemotherapy with Rituxan.     He states he has been feeling baseline recently, no fevers or chills, no dyspnea or hemoptysis, no GI complaints or dysuria.          Personal History     Past Medical History:   Diagnosis Date    Arthritis     benign polypoid tissue right lung     Cancer     HODGKINS LYMPHOMA, RECTAL CANCER    Diabetes mellitus     patient reports that he doesn't have diabetes secondary to changing diet and weight loss.    History of radiation therapy 2023    re-treat rectum    History of transfusion     no reaction, transfusions received at [facility] in     Hyperlipidemia     Hypertension     Lymphoma     Mycobacterium mucogenicum     SHERRI (obstructive sleep apnea)     O2 2L/MIN AT NIGHT ONLY    Pneumonia         Seasonal allergies          Oncology Problem List:  Hodgkin lymphoma (10/07/2022; Status: Active)  Oncology/Hematology History   Hodgkin lymphoma   10/7/2022 Initial Diagnosis    Hodgkin lymphoma (HCC)     10/8/2022 - 3/22/2023 Chemotherapy    OP HODGKIN LYMPHOMA  BV+AVD (Brentuximab vedotin / Doxorubicin / Vinblastine / Dacarbazine)     10/28/2022 Cancer Staged    Staging form: Hodgkin And Non-Hodgkin Lymphoma, AJCC 8th Edition  - Clinical: Stage IV - Signed by Kaycee Leary MD on 10/28/2022     2023 - 2023 Radiation     Radiation OncologyTreatment Course:  Abraham Wu received 1400 cGy in 4 bid fractions to rectal mass via External Beam Radiation - EBRT.     6/13/2023 - 6/13/2023 Chemotherapy    OP HODGKIN LYMPHOMA BeGV (Bendamustine / Gemcitabine / VinORELBine)     9/14/2023 - 1/18/2024 Chemotherapy    OP HL Pembrolizumab 200 mg     9/18/2023 - 9/27/2023 Radiation    Radiation OncologyTreatment Course:  Abraham Wu received 2400 cGy in 8 fractions to rectum via External Beam Radiation - EBRT.     1/16/2024 - 1/22/2024 Radiation    Radiation OncologyTreatment Course:  Abraham Wu received 2100 cGy in 3 fractions to L4 spine lesion via Stereotactic Radiation Therapy - SRT.     2/8/2024 - 2/8/2024 Chemotherapy    IP LYMPHOMA DHAP-R Dexamethasone / CISplatin / Cytarabine / RiTUXimab     2/13/2024 -  Chemotherapy    IP LYMPHOMA RICE RiTUXimab / Ifosfamide + Mesna / CARBOplatin AUC = 5 / Etoposide       Past Surgical History:   Procedure Laterality Date    BRONCHOSCOPY      removal of obstructing polypoid tissue right middle lung    BRONCHOSCOPY N/A 2/1/2024    Procedure: BRONCHOSCOPY WITH ENDOBRONCHIAL ULTRASOUND AND FLUORO;  Surgeon: Chris Pruitt MD;  Location:  [facility];  Service: Pulmonary;  Laterality: N/A;  EBUS BALLOON INTACT    COLONOSCOPY      COLOSTOMY N/A 09/22/2022    Procedure: LAPAROSCOPIC COLOSTOMY CREATION, FLEXIBLE SIGMOIDOSCOPY;  Surgeon: Hiram Viveros MD;  Location:  [facility];  Service: General;  Laterality: N/A;    DENTAL PROCEDURE      dental implants    ENDOSCOPY N/A 10/10/2022    Procedure: ESOPHAGOGASTRODUODENOSCOPY;  Surgeon: Neeraj Lynn MD;  Location:  [facility];  Service: Gastroenterology;  Laterality: N/A;    ENDOSCOPY N/A 10/12/2022    Procedure: ESOPHAGOGASTRODUODENOSCOPY;  Surgeon: Brunner, Mark I, MD;  Location:  [facility];  Service: Gastroenterology;  Laterality: N/A;    EXAM UNDER ANESTHESIA, RECTAL BIOPSY N/A 10/04/2022    Procedure: EXAM  UNDER ANESTHESIA, TRANSANAL BIOPSY WITH TRUCUT NEEDLE (LITHOTOMY-CANDY CANE);  Surgeon: Hiram Viveros MD;  Location:  [facility];  Service: General;  Laterality: N/A;    EXAM UNDER ANESTHESIA, RECTAL BIOPSY N/A 05/30/2023    Procedure: EXAM UNDER ANESTHESIA, TRANSANAL BIOPSY OF RECTAL MASS WITH TRUCUT NEEDLE, PROCTOSCOPY;  Surgeon: Hiram Viveros MD;  Location:  [facility];  Service: General;  Laterality: N/A;    PERIPHERALLY INSERTED CENTRAL CATHETER INSERTION      Removed 11/28/2022    VENOUS ACCESS DEVICE (PORT) INSERTION Right 11/28/2022       Family History: family history includes Diabetes in his father and mother; Heart disease in his father.     Social History:  reports that he has never smoked. He has never used smokeless tobacco. He reports that he does not currently use alcohol. He reports that he does not use drugs.  Social History     Social History Narrative    Not on file       Medications:  Available home medication information reviewed.  allopurinol, dexAMETHasone, fluticasone, lidocaine-prilocaine, loratadine, ondansetron, pantoprazole, prochlorperazine, and rosuvastatin    No Known Allergies    Objective   Objective     Vital Signs:   Temp:  [98.1 °F (36.7 °C)] 98.1 °F (36.7 °C)  Heart Rate:  [65] 65  Resp:  [18] 18  BP: (124)/(62) 124/62       Physical Exam   Gen:  WD/WN man in NAD in bed,  family present   Neuro: alert and oriented, clear speech, follows commands, grossly nonfocal  HEENT:  NC/AT , alopecia   Neck:  Supple, no LAD  Heart RRR no murmur,  Port R chest without erythema or tenderness at site   Lungs cta not labored   Abd:  Soft, nontender, no rebound or guarding, pos BS.  Stoma LLQ.    Extrem:  No c/c/e      Result Review:  I have personally reviewed the results from the time of this admission to 2/13/2024 09:54 EST and agree with these findings:  [x]  Laboratory list / accordion  []  Microbiology  []  Radiology  []  EKG/Telemetry   []  Cardiology/Vascular   []   Pathology  [x]  Old records  []  Other:  Most notable findings include: wbc 5.5.  Cr 0.6       LAB RESULTS:      Lab 02/13/24  0836 02/08/24  0824   WBC 5.54 4.46   HEMOGLOBIN 11.6* 11.4*   HEMATOCRIT 34.9* 33.9*   PLATELETS 189 185   NEUTROS ABS 4.12 3.16   IMMATURE GRANS (ABS) 0.03 0.01   LYMPHS ABS 0.68* 0.74   MONOS ABS 0.64 0.44   EOS ABS 0.06 0.10   MCV 96.7 95.2     --          Lab 02/13/24  0836 02/08/24  0824   SODIUM 137 134*   POTASSIUM 4.2 4.5   CHLORIDE 101 99   CO2 29.0 27.0   ANION GAP 7.0 8.0   BUN 20 15   CREATININE 0.60* 0.49*   EGFR 103.2 109.7   GLUCOSE 88 91   CALCIUM 8.8 9.0   MAGNESIUM 2.0  --    PHOSPHORUS 3.8  --          Lab 02/13/24  0836 02/08/24  0824   TOTAL PROTEIN 6.3 6.8   ALBUMIN 3.9 4.0   GLOBULIN 2.4 2.8   ALT (SGPT) 14 10   AST (SGOT) 13 12   BILIRUBIN 0.3 0.4   ALK PHOS 70 77                     UA          4/10/2023    06:19 5/25/2023    14:50   Urinalysis   Specific Gravity, UA 1.022  1.021    Ketones, UA Negative  Trace    Blood, UA Negative  Negative    Leukocytes, UA Negative  Negative    Nitrite, UA Negative  Negative        Microbiology Results (last 10 days)       ** No results found for the last 240 hours. **            No radiology results from the last 24 hrs    Results for orders placed during the hospital encounter of 06/15/23    Adult Transthoracic Echo Complete W/ Cont if Necessary Per Protocol    Interpretation Summary    Left ventricular systolic function is normal. Estimated left ventricular EF = 60%    Normal global longitudinal strain pattern (-19.9%)    The cardiac valves are anatomically and functionally normal.    Estimated right ventricular systolic pressure from tricuspid regurgitation is normal (<35 mmHg).      Assessment & Plan   Assessment & Plan       * No active hospital problems. *      73 yr old man with hodgkin lymphoma, here for chemotherapy    Hodgkins lymphoma  - PET scan 12/23 showed R hilar and L4 hypermetabolic activity  -  bronchoscopy 2/1/24 showed RML lymphoma   - Dr Kaycee Leary managing chemo  - daily labs  - supportive care   - FSBS/ SSI while on decadron    HTN    Ostomy  - supportive care       DVT prophylaxis:  No DVT prophylaxis order currently exists.          CODE STATUS:    There are no questions and answers to display.       Expected Discharge   Expected discharge date/ time has not been documented.     Helene Sherman MD  02/13/24

## 2024-02-13 NOTE — PLAN OF CARE
Problem: Adult Inpatient Plan of Care  Goal: Plan of Care Review  Outcome: Ongoing, Progressing  Flowsheets (Taken 2/13/2024 1614)  Progress: declining  Plan of Care Reviewed With: patient  Outcome Evaluation: VSS. Resting well. Rituxan infusing. Port accessed. Fx at BS. Tolerating chemo well. No complaints. Continue oncology care.   Goal Outcome Evaluation:  Plan of Care Reviewed With: patient        Progress: declining  Outcome Evaluation: VSS. Resting well. Rituxan infusing. Port accessed. Fx at BS. Tolerating chemo well. No complaints. Continue oncology care.

## 2024-02-14 ENCOUNTER — TELEPHONE (OUTPATIENT)
Dept: UROLOGY | Facility: CLINIC | Age: 74
End: 2024-02-14
Payer: MEDICARE

## 2024-02-14 LAB
ALBUMIN SERPL-MCNC: 3.7 G/DL (ref 3.5–5.2)
ALBUMIN/GLOB SERPL: 1.8 G/DL
ALP SERPL-CCNC: 75 U/L (ref 39–117)
ALT SERPL W P-5'-P-CCNC: 12 U/L (ref 1–41)
ANION GAP SERPL CALCULATED.3IONS-SCNC: 10 MMOL/L (ref 5–15)
AST SERPL-CCNC: 10 U/L (ref 1–40)
BACTERIA UR QL AUTO: ABNORMAL /HPF
BASOPHILS # BLD AUTO: 0 10*3/MM3 (ref 0–0.2)
BASOPHILS NFR BLD AUTO: 0 % (ref 0–1.5)
BILIRUB SERPL-MCNC: 0.4 MG/DL (ref 0–1.2)
BILIRUB UR QL STRIP: NEGATIVE
BUN SERPL-MCNC: 17 MG/DL (ref 8–23)
BUN/CREAT SERPL: 33.3 (ref 7–25)
CALCIUM SPEC-SCNC: 8.8 MG/DL (ref 8.6–10.5)
CHLORIDE SERPL-SCNC: 100 MMOL/L (ref 98–107)
CLARITY UR: CLEAR
CO2 SERPL-SCNC: 26 MMOL/L (ref 22–29)
COLOR UR: YELLOW
CREAT SERPL-MCNC: 0.51 MG/DL (ref 0.76–1.27)
DEPRECATED RDW RBC AUTO: 51.5 FL (ref 37–54)
EGFRCR SERPLBLD CKD-EPI 2021: 108.4 ML/MIN/1.73
EOSINOPHIL # BLD AUTO: 0 10*3/MM3 (ref 0–0.4)
EOSINOPHIL NFR BLD AUTO: 0 % (ref 0.3–6.2)
ERYTHROCYTE [DISTWIDTH] IN BLOOD BY AUTOMATED COUNT: 15 % (ref 12.3–15.4)
GLOBULIN UR ELPH-MCNC: 2.1 GM/DL
GLUCOSE BLDC GLUCOMTR-MCNC: 130 MG/DL (ref 70–130)
GLUCOSE BLDC GLUCOMTR-MCNC: 155 MG/DL (ref 70–130)
GLUCOSE BLDC GLUCOMTR-MCNC: 183 MG/DL (ref 70–130)
GLUCOSE BLDC GLUCOMTR-MCNC: 242 MG/DL (ref 70–130)
GLUCOSE SERPL-MCNC: 168 MG/DL (ref 65–99)
GLUCOSE UR STRIP-MCNC: NEGATIVE MG/DL
HCT VFR BLD AUTO: 34.9 % (ref 37.5–51)
HGB BLD-MCNC: 11.5 G/DL (ref 13–17.7)
HGB UR QL STRIP.AUTO: ABNORMAL
HYALINE CASTS UR QL AUTO: ABNORMAL /LPF
IMM GRANULOCYTES # BLD AUTO: 0.02 10*3/MM3 (ref 0–0.05)
IMM GRANULOCYTES NFR BLD AUTO: 0.5 % (ref 0–0.5)
KETONES UR QL STRIP: NEGATIVE
LDH SERPL-CCNC: 137 U/L (ref 135–225)
LEUKOCYTE ESTERASE UR QL STRIP.AUTO: NEGATIVE
LYMPHOCYTES # BLD AUTO: 0.24 10*3/MM3 (ref 0.7–3.1)
LYMPHOCYTES NFR BLD AUTO: 6.2 % (ref 19.6–45.3)
MAGNESIUM SERPL-MCNC: 2.1 MG/DL (ref 1.6–2.4)
MCH RBC QN AUTO: 30.8 PG (ref 26.6–33)
MCHC RBC AUTO-ENTMCNC: 33 G/DL (ref 31.5–35.7)
MCV RBC AUTO: 93.6 FL (ref 79–97)
MONOCYTES # BLD AUTO: 0.1 10*3/MM3 (ref 0.1–0.9)
MONOCYTES NFR BLD AUTO: 2.6 % (ref 5–12)
NEUTROPHILS NFR BLD AUTO: 3.5 10*3/MM3 (ref 1.7–7)
NEUTROPHILS NFR BLD AUTO: 90.7 % (ref 42.7–76)
NITRITE UR QL STRIP: NEGATIVE
NRBC BLD AUTO-RTO: 0 /100 WBC (ref 0–0.2)
PH UR STRIP.AUTO: 6.5 [PH] (ref 5–8)
PHOSPHATE SERPL-MCNC: 4 MG/DL (ref 2.5–4.5)
PLATELET # BLD AUTO: 182 10*3/MM3 (ref 140–450)
PMV BLD AUTO: 8.9 FL (ref 6–12)
POTASSIUM SERPL-SCNC: 4.3 MMOL/L (ref 3.5–5.2)
PROT SERPL-MCNC: 5.8 G/DL (ref 6–8.5)
PROT UR QL STRIP: NEGATIVE
RBC # BLD AUTO: 3.73 10*6/MM3 (ref 4.14–5.8)
RBC # UR STRIP: ABNORMAL /HPF
REF LAB TEST METHOD: ABNORMAL
SODIUM SERPL-SCNC: 136 MMOL/L (ref 136–145)
SP GR UR STRIP: 1.02 (ref 1–1.03)
SQUAMOUS #/AREA URNS HPF: ABNORMAL /HPF
URATE SERPL-MCNC: 3.3 MG/DL (ref 3.4–7)
UROBILINOGEN UR QL STRIP: ABNORMAL
WBC # UR STRIP: ABNORMAL /HPF
WBC NRBC COR # BLD AUTO: 3.86 10*3/MM3 (ref 3.4–10.8)

## 2024-02-14 PROCEDURE — 83735 ASSAY OF MAGNESIUM: CPT | Performed by: INTERNAL MEDICINE

## 2024-02-14 PROCEDURE — 82948 REAGENT STRIP/BLOOD GLUCOSE: CPT

## 2024-02-14 PROCEDURE — 63710000001 INSULIN LISPRO (HUMAN) PER 5 UNITS: Performed by: INTERNAL MEDICINE

## 2024-02-14 PROCEDURE — 99233 SBSQ HOSP IP/OBS HIGH 50: CPT | Performed by: INTERNAL MEDICINE

## 2024-02-14 PROCEDURE — 25010000002 MESNA PER 200 MG: Performed by: INTERNAL MEDICINE

## 2024-02-14 PROCEDURE — 25010000002 FOSAPREPITANT PER 1 MG: Performed by: INTERNAL MEDICINE

## 2024-02-14 PROCEDURE — 25810000003 SODIUM CHLORIDE 0.9 % SOLUTION: Performed by: INTERNAL MEDICINE

## 2024-02-14 PROCEDURE — 25010000002 ENOXAPARIN PER 10 MG: Performed by: INTERNAL MEDICINE

## 2024-02-14 PROCEDURE — 84100 ASSAY OF PHOSPHORUS: CPT | Performed by: INTERNAL MEDICINE

## 2024-02-14 PROCEDURE — 80053 COMPREHEN METABOLIC PANEL: CPT | Performed by: INTERNAL MEDICINE

## 2024-02-14 PROCEDURE — 85025 COMPLETE CBC W/AUTO DIFF WBC: CPT | Performed by: INTERNAL MEDICINE

## 2024-02-14 PROCEDURE — 81001 URINALYSIS AUTO W/SCOPE: CPT | Performed by: INTERNAL MEDICINE

## 2024-02-14 PROCEDURE — 99232 SBSQ HOSP IP/OBS MODERATE 35: CPT | Performed by: PEDIATRICS

## 2024-02-14 PROCEDURE — 25810000003 SODIUM CHLORIDE 0.9 % SOLUTION 500 ML FLEX CONT: Performed by: INTERNAL MEDICINE

## 2024-02-14 PROCEDURE — 84550 ASSAY OF BLOOD/URIC ACID: CPT | Performed by: INTERNAL MEDICINE

## 2024-02-14 PROCEDURE — 25010000002 ONDANSETRON PER 1 MG: Performed by: INTERNAL MEDICINE

## 2024-02-14 PROCEDURE — 25010000002 IFOSFAMIDE PER 1 G: Performed by: INTERNAL MEDICINE

## 2024-02-14 PROCEDURE — 25010000002 DEXAMETHASONE PER 1 MG: Performed by: INTERNAL MEDICINE

## 2024-02-14 PROCEDURE — 83615 LACTATE (LD) (LDH) ENZYME: CPT | Performed by: INTERNAL MEDICINE

## 2024-02-14 PROCEDURE — 25010000002 ETOPOSIDE 500 MG/25ML SOLUTION 25 ML VIAL: Performed by: INTERNAL MEDICINE

## 2024-02-14 PROCEDURE — 25810000003 SODIUM CHLORIDE 0.9 % SOLUTION 250 ML FLEX CONT: Performed by: INTERNAL MEDICINE

## 2024-02-14 PROCEDURE — 25010000002 CARBOPLATIN PER 50 MG: Performed by: INTERNAL MEDICINE

## 2024-02-14 RX ORDER — SODIUM CHLORIDE 9 MG/ML
125 INJECTION, SOLUTION INTRAVENOUS CONTINUOUS
Status: SHIPPED | OUTPATIENT
Start: 2024-02-14

## 2024-02-14 RX ADMIN — SODIUM CHLORIDE 125 ML/HR: 900 INJECTION INTRAVENOUS at 12:14

## 2024-02-14 RX ADMIN — ETOPOSIDE 180 MG: 20 INJECTION, SOLUTION INTRAVENOUS at 13:14

## 2024-02-14 RX ADMIN — PANTOPRAZOLE SODIUM 40 MG: 40 TABLET, DELAYED RELEASE ORAL at 09:32

## 2024-02-14 RX ADMIN — Medication 10 ML: at 09:33

## 2024-02-14 RX ADMIN — ONDANSETRON: 2 INJECTION INTRAMUSCULAR; INTRAVENOUS at 12:14

## 2024-02-14 RX ADMIN — ALLOPURINOL 300 MG: 300 TABLET ORAL at 09:32

## 2024-02-14 RX ADMIN — INSULIN LISPRO 3 UNITS: 100 INJECTION, SOLUTION INTRAVENOUS; SUBCUTANEOUS at 21:06

## 2024-02-14 RX ADMIN — INSULIN LISPRO 2 UNITS: 100 INJECTION, SOLUTION INTRAVENOUS; SUBCUTANEOUS at 12:30

## 2024-02-14 RX ADMIN — MESNA 9000 MG: 100 INJECTION, SOLUTION INTRAVENOUS at 15:01

## 2024-02-14 RX ADMIN — SODIUM CHLORIDE 125 ML/HR: 900 INJECTION INTRAVENOUS at 20:45

## 2024-02-14 RX ADMIN — CARBOPLATIN 750 MG: 10 INJECTION, SOLUTION INTRAVENOUS at 14:21

## 2024-02-14 RX ADMIN — ENOXAPARIN SODIUM 40 MG: 100 INJECTION SUBCUTANEOUS at 09:32

## 2024-02-14 RX ADMIN — FOSAPREPITANT DIMEGLUMINE 150 MG: 150 INJECTION, POWDER, LYOPHILIZED, FOR SOLUTION INTRAVENOUS at 12:30

## 2024-02-14 NOTE — CASE MANAGEMENT/SOCIAL WORK
Discharge Planning Assessment  [facility]     Patient Name: Abraham Wu  MRN: [id number]  Today's Date: 2/14/2024    Admit Date: 2/13/2024    Plan: Home with wife Peggy [phone] transporting.   Discharge Needs Assessment       Row Name 02/14/24 0954       Living Environment    People in Home spouse    Name(s) of People in Home álvaro Woods [phone]    Primary Care Provided by self    Provides Primary Care For no one    Family Caregiver if Needed spouse    Family Caregiver Names wife Peggy [phone]    Quality of Family Relationships involved;helpful    Able to Return to Prior Arrangements yes       Transition Planning    Transportation Anticipated family or friend will provide  álvaro Woods [phone]       Discharge Needs Assessment    Readmission Within the Last 30 Days no previous admission in last 30 days    Equipment Currently Used at Home oxygen  oxygen only at night                   Discharge Plan       Row Name 02/14/24 0955       Plan    Plan Home with álvaro Woods [phone] transporting.    Plan Comments 'er met with patient at bedside. Patient was sitting up in his chair. Patient is being seen for evaluation of cycle 1 day 2 chemotherapy. Lives in [facility] with his wife Peggy. Patient is independent with ADL's, DME includes oxygen only at night and no HH. Patients PCP is Jatinder Haynes MD. Patient has Medicare and [employer] insurance. Patient has a LW not in [employer]. [facility] Patient was sitting up in his chair. Patient is being seen for evaluation of cycle 1 day 2 chemotherapy. Lives in [facility] with his wife Peggy. Patient is independent with ADL's, DME includes oxygen only at night and no HH. Patients PCP is Jatinder Haynes MD. Patient has Medicare and [employer] insurance. Patient has a LW not in [employer]. Plan is Home with wife Peggy [phone] transporting.    Final Discharge Disposition Code 01 - home or self-care                   Continued Care and Services - Admitted Since 2/13/2024    Coordination has not been started for this encounter.       Expected Discharge Date and Time       Expected Discharge Date Expected Discharge Time    Feb 19, 2024            Demographic Summary       Row Name 02/14/24 0953       General Information    Admission Type inpatient    Referral Source admission list    Reason for Consult discharge planning    Preferred Language English                   Functional Status       Row Name 02/14/24 0953       Functional Status    Usual Activity Tolerance good    Current Activity Tolerance good       Functional Status, IADL    Medications independent    Meal Preparation independent    Housekeeping independent    Laundry independent    Shopping independent    IADL Comments independent with ADL's, DME includes oxygen only at night and no HH       Employment/    Employment/ Comments Medicare and [facility] insurance                   Psychosocial    No documentation.                  Abuse/Neglect    No documentation.                  Legal    No documentation.                  Substance Abuse    No documentation.                  Patient Forms    No documentation.                     CARLOS A Rondon (Kay)

## 2024-02-14 NOTE — PLAN OF CARE
Problem: Adult Inpatient Plan of Care  Goal: Plan of Care Review  Outcome: Ongoing, Progressing  Flowsheets  Taken 2/14/2024 1452  Progress: declining  Outcome Evaluation: VSS. Resting well. Heart rate on lower end of normal. Chemo infusing despite high urine RBC per MD order. Fx at BS. No complaints. Tolerating chemo well. Continue care.  Taken 2/13/2024 1614  Plan of Care Reviewed With: patient   Goal Outcome Evaluation:  Plan of Care Reviewed With: patient        Progress: declining  Outcome Evaluation: VSS. Resting well. Heart rate on lower end of normal. Chemo infusing despite high urine RBC per MD order. Fx at BS. No complaints. Tolerating chemo well. Continue care.

## 2024-02-14 NOTE — PROGRESS NOTES
[facility] Medicine Services  PROGRESS NOTE    Patient Name: Abraham Wu  : 1950  MRN: [id number]    Date of Admission: 2024  Primary Care Physician: Jatinder Haynes MD    Subjective   Subjective     CC:  chemotherapy    HPI:  Doing ok this AM.  Tolerating chemo well.  Eating well.       Objective   Objective     Vital Signs:   Temp:  [97 °F (36.1 °C)-98.9 °F (37.2 °C)] 97 °F (36.1 °C)  Heart Rate:  [53-68] 58  Resp:  [14-18] 16  BP: (125-154)/(62-95) 125/76     Physical Exam:  Constitutional: No acute distress, awake, alert  HENT: NCAT, mucous membranes moist  Respiratory: Clear to auscultation bilaterally, respiratory effort normal   Cardiovascular: RRR, no murmurs, rubs, or gallops  Gastrointestinal: Positive bowel sounds, soft, nontender, nondistended, colostomy bag in place  Musculoskeletal: No bilateral ankle edema  Psychiatric: Appropriate affect, cooperative  Neurologic: Oriented x 3, strength symmetric in all extremities, Cranial Nerves grossly intact to confrontation, speech clear  Skin: No rashes      Results Reviewed:  LAB RESULTS:      Lab 24  1041 24  0836 24  0824   WBC 3.86 6.31 5.54 4.46   HEMOGLOBIN 11.5* 11.8* 11.6* 11.4*   HEMATOCRIT 34.9* 35.2* 34.9* 33.9*   PLATELETS 182 169 189 185   NEUTROS ABS 3.50 4.99 4.12 3.16   IMMATURE GRANS (ABS) 0.02 0.05 0.03 0.01   LYMPHS ABS 0.24* 0.62* 0.68* 0.74   MONOS ABS 0.10 0.60 0.64 0.44   EOS ABS 0.00 0.04 0.06 0.10   MCV 93.6 96.2 96.7 95.2    148 141  --          Lab 24  1041 24  0836 24  0824   SODIUM 136 136 137 134*   POTASSIUM 4.3 4.1 4.2 4.5   CHLORIDE 100 100 101 99   CO2 26.0 29.0 29.0 27.0   ANION GAP 10.0 7.0 7.0 8.0   BUN 17 22 20 15   CREATININE 0.51* 0.75* 0.60* 0.49*   EGFR 108.4 96.5 103.2 109.7   GLUCOSE 168* 100* 88 91   CALCIUM 8.8 9.0 8.8 9.0   MAGNESIUM 2.1 2.0 2.0  --    PHOSPHORUS 4.0 3.6 3.8  --          Lab  02/14/24  0435 02/13/24  1041 02/13/24  0836 02/08/24  0824   TOTAL PROTEIN 5.8* 6.3 6.3 6.8   ALBUMIN 3.7 3.9 3.9 4.0   GLOBULIN 2.1 2.4 2.4 2.8   ALT (SGPT) 12 15 14 10   AST (SGOT) 10 13 13 12   BILIRUBIN 0.4 0.3 0.3 0.4   ALK PHOS 75 73 70 77                     Brief Urine Lab Results  (Last result in the past 365 days)        Color   Clarity   Blood   Leuk Est   Nitrite   Protein   CREAT   Urine HCG        02/14/24 0733 Yellow   Clear   Small (1+)   Negative   Negative   Negative                   Microbiology Results Abnormal       None            No radiology results from the last 24 hrs    Results for orders placed during the hospital encounter of 06/15/23    Adult Transthoracic Echo Complete W/ Cont if Necessary Per Protocol    Interpretation Summary    Left ventricular systolic function is normal. Estimated left ventricular EF = 60%    Normal global longitudinal strain pattern (-19.9%)    The cardiac valves are anatomically and functionally normal.    Estimated right ventricular systolic pressure from tricuspid regurgitation is normal (<35 mmHg).      Current medications:  Scheduled Meds:allopurinol, 300 mg, Oral, Daily  CARBOplatin (PARAPLATIN) 750 mg in sodium chloride 0.9 % 325 mL chemo IVPB, 750 mg, Intravenous, Once  enoxaparin, 40 mg, Subcutaneous, Daily  etoposide (TOPOSAR) 180 mg in sodium chloride 0.9 % 509 mL chemo IVPB, 100 mg/m2 (Treatment Plan Recorded), Intravenous, Once  fosaprepitant (EMEND) IVPB, 150 mg, Intravenous, Once  ifosfamide (IFEX) 9,100 mg, mesna (MESNEX) 9,100 mg in sodium chloride 0.9 % 500 mL chemo IVPB, 5,000 mg/m2 (Treatment Plan Recorded), Intravenous, Once  insulin lispro, 2-7 Units, Subcutaneous, 4x Daily AC & at Bedtime  ondansetron (ZOFRAN) 16 mg, dexAMETHasone (DECADRON) 12 mg in sodium chloride 0.9 % 50 mL IVPB, , Intravenous, Once  pantoprazole, 40 mg, Oral, Daily  sodium chloride, 10 mL, Intravenous, Q12H      Continuous Infusions:sodium chloride, 125 mL/hr, Last  Rate: 125 mL/hr (02/13/24 1142)      PRN Meds:.  acetaminophen    senna-docusate sodium **AND** polyethylene glycol **AND** bisacodyl **AND** bisacodyl    dextrose    dextrose    diphenhydrAMINE    famotidine    glucagon (human recombinant)    Hydrocortisone Sod Suc (PF)    lidocaine    meperidine    ondansetron    prochlorperazine    sodium chloride    sodium chloride    Assessment & Plan   Assessment & Plan     Active Hospital Problems    Diagnosis  POA    **Hodgkin's lymphoma [C81.90]  Yes      Resolved Hospital Problems   No resolved problems to display.        Brief Hospital Course to date:  Abraham Wu is a 73 y.o. 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   -Cont allopurinol/PPI    Microscopic hematuria  --noted on UA this AM.  --Discussed with Dr. Leary--feels this may be related to rectal radiation and mild radiation cystitis.  --OK to continue with chemotherapy plan.    --Monitor with serial UA.  If worsens then would consult urology.     HTN   -Monitor BP, not on any meds    Ostomy  - supportive care     Expected Discharge Location and Transportation:   Expected Discharge   Expected Discharge Date: 2/19/2024; Expected Discharge Time:      DVT prophylaxis:  Medical DVT prophylaxis orders are present.         AM-PAC 6 Clicks Score (PT): 24 (02/14/24 1714)    CODE STATUS:   Code Status and Medical Interventions:   Ordered at: 02/13/24 1028     Level Of Support Discussed With:    Patient     Code Status (Patient has no pulse and is not breathing):    CPR (Attempt to  Resuscitate)     Medical Interventions (Patient has pulse or is breathing):    Full Support       Preeti Borrero MD  02/14/24

## 2024-02-14 NOTE — PROGRESS NOTES
HEMATOLOGY/ONCOLOGY PROGRESS NOTE    S: He is seen for evaluation of cycle 1 day 2 chemotherapy.  Overall he is feeling well.  Denies any nausea or vomiting.  Tolerated Rituxan infusion well.  Good urine output.  Normal bowel movements.  No weakness.  No shortness of breath or cough. No chest pain or dizziness.     Medications:  The current medication list was reviewed in the EMR    ALLERGIES:  No Known Allergies      Physical Exam    VITAL SIGNS:  /76 (BP Location: Left arm, Patient Position: Sitting)   Pulse 58   Temp 97 °F (36.1 °C) (Temporal)   Resp 16   SpO2 95%   Temp:  [97 °F (36.1 °C)-98.9 °F (37.2 °C)] 97 °F (36.1 °C)      Performance Status: 1                Physical Exam    General: well appearing, in no acute distress  HEENT: sclerae anicteric, oropharynx clear  Lymphatics: no cervical, supraclavicular, or axillary adenopathy  Cardiovascular: regular rate and rhythm, no murmurs, rubs or gallops  Lungs: clear to auscultation bilaterally  Abdomen: soft, nontender, nondistended.  No palpable organomegaly  Extremities: no lower extremity edema  Skin: no rashes, lesions, bruising, or petechiae  Neuro: Alert and oriented.  Bilateral strength is intact.  Normal finger-nose.        RECENT LABS:    Lab Results   Component Value Date    HGB 11.5 (L) 02/14/2024    HCT 34.9 (L) 02/14/2024    MCV 93.6 02/14/2024     02/14/2024    WBC 3.86 02/14/2024    NEUTROABS 3.50 02/14/2024    LYMPHSABS 0.24 (L) 02/14/2024    MONOSABS 0.10 02/14/2024    EOSABS 0.00 02/14/2024    BASOSABS 0.00 02/14/2024       Lab Results   Component Value Date    GLUCOSE 168 (H) 02/14/2024    BUN 17 02/14/2024    CREATININE 0.51 (L) 02/14/2024     02/14/2024    K 4.3 02/14/2024     02/14/2024    CO2 26.0 02/14/2024    CALCIUM 8.8 02/14/2024    PROTEINTOT 5.8 (L) 02/14/2024    ALBUMIN 3.7 02/14/2024    BILITOT 0.4 02/14/2024    ALKPHOS 75 02/14/2024    AST 10 02/14/2024    ALT 12 02/14/2024          Assessment/Plan    1.   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 Chemo monitoring  -Today is day 2 of RICE chemotherapy.  Tolerating therapy well.  CBC, CMP, magnesium, LDH, phosphorus, and uric acid reviewed and adequate for treatment today.  UA with microscopic is pending.  I reviewed treatment parameters and  -Fluid and lab orders discussed with oncology pharmacy and nursing.  Patient is okay to proceed with day 2 of treatment.  -Continue to monitor closely for side effects and toxicity of chemotherapy.  Continue daily CBC and electrolytes.  Transfuse for hemoglobin less than 7 or platelets less than 10 unless active bleeding with only leukoreduced irradiated blood products.  -Baseline EKG reviewed and adequate.    3.  SHERRI  -Continues on nocturnal oxygen with stable requirement.    MDM High: Relapsed lymphoma undergoing salvage chemotherapy requiring close inpatient monitoring for side effects and toxicity of therapy.    Addendum: baseline UA shows mild RBCs in urine. He has h/o rectal radiation and mild thickening of bladder wall on most recent CT which could be mild radiation cystitis. Will update his urologist but as asymptomatic will plan to monitor for symptoms/serial UA and consult urology if worsening hematuria.     Kaycee Leary MD  [facility]    2/14/2024

## 2024-02-14 NOTE — PLAN OF CARE
Goal Outcome Evaluation:                           VSS. 2L @ night. Patient resting well. Coverage administered for blood glucose. Family at bedside.

## 2024-02-15 LAB
ALBUMIN SERPL-MCNC: 3.4 G/DL (ref 3.5–5.2)
ALBUMIN/GLOB SERPL: 1.9 G/DL
ALP SERPL-CCNC: 62 U/L (ref 39–117)
ALT SERPL W P-5'-P-CCNC: 11 U/L (ref 1–41)
ANION GAP SERPL CALCULATED.3IONS-SCNC: 9 MMOL/L (ref 5–15)
AST SERPL-CCNC: 9 U/L (ref 1–40)
BACTERIA UR QL AUTO: ABNORMAL /HPF
BASOPHILS # BLD AUTO: 0 10*3/MM3 (ref 0–0.2)
BASOPHILS NFR BLD AUTO: 0 % (ref 0–1.5)
BILIRUB SERPL-MCNC: 0.2 MG/DL (ref 0–1.2)
BILIRUB UR QL STRIP: NEGATIVE
BUN SERPL-MCNC: 19 MG/DL (ref 8–23)
BUN/CREAT SERPL: 38 (ref 7–25)
CALCIUM SPEC-SCNC: 8.4 MG/DL (ref 8.6–10.5)
CHLORIDE SERPL-SCNC: 106 MMOL/L (ref 98–107)
CLARITY UR: CLEAR
CO2 SERPL-SCNC: 23 MMOL/L (ref 22–29)
COLOR UR: YELLOW
CREAT SERPL-MCNC: 0.5 MG/DL (ref 0.76–1.27)
DEPRECATED RDW RBC AUTO: 49.3 FL (ref 37–54)
EGFRCR SERPLBLD CKD-EPI 2021: 109 ML/MIN/1.73
EOSINOPHIL # BLD AUTO: 0 10*3/MM3 (ref 0–0.4)
EOSINOPHIL NFR BLD AUTO: 0 % (ref 0.3–6.2)
ERYTHROCYTE [DISTWIDTH] IN BLOOD BY AUTOMATED COUNT: 14.7 % (ref 12.3–15.4)
FUNGUS WND CULT: NORMAL
GLOBULIN UR ELPH-MCNC: 1.8 GM/DL
GLUCOSE BLDC GLUCOMTR-MCNC: 128 MG/DL (ref 70–130)
GLUCOSE BLDC GLUCOMTR-MCNC: 130 MG/DL (ref 70–130)
GLUCOSE BLDC GLUCOMTR-MCNC: 203 MG/DL (ref 70–130)
GLUCOSE BLDC GLUCOMTR-MCNC: 204 MG/DL (ref 70–130)
GLUCOSE SERPL-MCNC: 137 MG/DL (ref 65–99)
GLUCOSE UR STRIP-MCNC: NEGATIVE MG/DL
HCT VFR BLD AUTO: 31.1 % (ref 37.5–51)
HGB BLD-MCNC: 10.5 G/DL (ref 13–17.7)
HGB UR QL STRIP.AUTO: ABNORMAL
HYALINE CASTS UR QL AUTO: ABNORMAL /LPF
IMM GRANULOCYTES # BLD AUTO: 0.03 10*3/MM3 (ref 0–0.05)
IMM GRANULOCYTES NFR BLD AUTO: 0.5 % (ref 0–0.5)
KETONES UR QL STRIP: NEGATIVE
LDH SERPL-CCNC: 135 U/L (ref 135–225)
LEUKOCYTE ESTERASE UR QL STRIP.AUTO: NEGATIVE
LYMPHOCYTES # BLD AUTO: 0.43 10*3/MM3 (ref 0.7–3.1)
LYMPHOCYTES NFR BLD AUTO: 7.1 % (ref 19.6–45.3)
MAGNESIUM SERPL-MCNC: 2.3 MG/DL (ref 1.6–2.4)
MCH RBC QN AUTO: 31.4 PG (ref 26.6–33)
MCHC RBC AUTO-ENTMCNC: 33.8 G/DL (ref 31.5–35.7)
MCV RBC AUTO: 93.1 FL (ref 79–97)
MONOCYTES # BLD AUTO: 0.17 10*3/MM3 (ref 0.1–0.9)
MONOCYTES NFR BLD AUTO: 2.8 % (ref 5–12)
MYCOBACTERIUM SPEC CULT: NORMAL
NEUTROPHILS NFR BLD AUTO: 5.45 10*3/MM3 (ref 1.7–7)
NEUTROPHILS NFR BLD AUTO: 89.6 % (ref 42.7–76)
NIGHT BLUE STAIN TISS: NORMAL
NITRITE UR QL STRIP: NEGATIVE
NRBC BLD AUTO-RTO: 0 /100 WBC (ref 0–0.2)
PH UR STRIP.AUTO: 6.5 [PH] (ref 5–8)
PHOSPHATE SERPL-MCNC: 3.3 MG/DL (ref 2.5–4.5)
PLATELET # BLD AUTO: 159 10*3/MM3 (ref 140–450)
PMV BLD AUTO: 9.5 FL (ref 6–12)
POTASSIUM SERPL-SCNC: 4.2 MMOL/L (ref 3.5–5.2)
PROT SERPL-MCNC: 5.2 G/DL (ref 6–8.5)
PROT UR QL STRIP: NEGATIVE
RBC # BLD AUTO: 3.34 10*6/MM3 (ref 4.14–5.8)
RBC # UR STRIP: ABNORMAL /HPF
REF LAB TEST METHOD: ABNORMAL
SODIUM SERPL-SCNC: 138 MMOL/L (ref 136–145)
SP GR UR STRIP: 1.03 (ref 1–1.03)
SQUAMOUS #/AREA URNS HPF: ABNORMAL /HPF
URATE SERPL-MCNC: 3.2 MG/DL (ref 3.4–7)
UROBILINOGEN UR QL STRIP: ABNORMAL
WBC # UR STRIP: ABNORMAL /HPF
WBC NRBC COR # BLD AUTO: 6.08 10*3/MM3 (ref 3.4–10.8)

## 2024-02-15 PROCEDURE — 63710000001 INSULIN LISPRO (HUMAN) PER 5 UNITS: Performed by: INTERNAL MEDICINE

## 2024-02-15 PROCEDURE — 83735 ASSAY OF MAGNESIUM: CPT | Performed by: INTERNAL MEDICINE

## 2024-02-15 PROCEDURE — 25810000003 SODIUM CHLORIDE 0.9 % SOLUTION: Performed by: INTERNAL MEDICINE

## 2024-02-15 PROCEDURE — 25010000002 ETOPOSIDE 500 MG/25ML SOLUTION 25 ML VIAL: Performed by: INTERNAL MEDICINE

## 2024-02-15 PROCEDURE — 97116 GAIT TRAINING THERAPY: CPT

## 2024-02-15 PROCEDURE — 25010000002 ENOXAPARIN PER 10 MG: Performed by: INTERNAL MEDICINE

## 2024-02-15 PROCEDURE — 82948 REAGENT STRIP/BLOOD GLUCOSE: CPT

## 2024-02-15 PROCEDURE — 83615 LACTATE (LD) (LDH) ENZYME: CPT | Performed by: INTERNAL MEDICINE

## 2024-02-15 PROCEDURE — 25010000002 ONDANSETRON PER 1 MG: Performed by: INTERNAL MEDICINE

## 2024-02-15 PROCEDURE — 81001 URINALYSIS AUTO W/SCOPE: CPT | Performed by: INTERNAL MEDICINE

## 2024-02-15 PROCEDURE — 85025 COMPLETE CBC W/AUTO DIFF WBC: CPT | Performed by: INTERNAL MEDICINE

## 2024-02-15 PROCEDURE — 97161 PT EVAL LOW COMPLEX 20 MIN: CPT

## 2024-02-15 PROCEDURE — 84550 ASSAY OF BLOOD/URIC ACID: CPT | Performed by: INTERNAL MEDICINE

## 2024-02-15 PROCEDURE — 99232 SBSQ HOSP IP/OBS MODERATE 35: CPT | Performed by: INTERNAL MEDICINE

## 2024-02-15 PROCEDURE — 25810000003 SODIUM CHLORIDE 0.9 % SOLUTION 500 ML FLEX CONT: Performed by: INTERNAL MEDICINE

## 2024-02-15 PROCEDURE — 80053 COMPREHEN METABOLIC PANEL: CPT | Performed by: INTERNAL MEDICINE

## 2024-02-15 PROCEDURE — 84100 ASSAY OF PHOSPHORUS: CPT | Performed by: INTERNAL MEDICINE

## 2024-02-15 PROCEDURE — 25010000002 DEXAMETHASONE PER 1 MG: Performed by: INTERNAL MEDICINE

## 2024-02-15 PROCEDURE — 99232 SBSQ HOSP IP/OBS MODERATE 35: CPT | Performed by: PEDIATRICS

## 2024-02-15 RX ADMIN — ENOXAPARIN SODIUM 40 MG: 100 INJECTION SUBCUTANEOUS at 09:01

## 2024-02-15 RX ADMIN — PANTOPRAZOLE SODIUM 40 MG: 40 TABLET, DELAYED RELEASE ORAL at 09:01

## 2024-02-15 RX ADMIN — SODIUM CHLORIDE 125 ML/HR: 900 INJECTION INTRAVENOUS at 11:40

## 2024-02-15 RX ADMIN — SODIUM CHLORIDE 125 ML/HR: 900 INJECTION INTRAVENOUS at 19:19

## 2024-02-15 RX ADMIN — INSULIN LISPRO 3 UNITS: 100 INJECTION, SOLUTION INTRAVENOUS; SUBCUTANEOUS at 21:10

## 2024-02-15 RX ADMIN — ALLOPURINOL 300 MG: 300 TABLET ORAL at 09:01

## 2024-02-15 RX ADMIN — INSULIN LISPRO 3 UNITS: 100 INJECTION, SOLUTION INTRAVENOUS; SUBCUTANEOUS at 12:06

## 2024-02-15 RX ADMIN — ETOPOSIDE 180 MG: 20 INJECTION, SOLUTION INTRAVENOUS at 15:23

## 2024-02-15 RX ADMIN — ONDANSETRON: 2 INJECTION INTRAMUSCULAR; INTRAVENOUS at 15:05

## 2024-02-15 NOTE — PLAN OF CARE
Goal Outcome Evaluation:  Plan of Care Reviewed With: patient, spouse        Progress: improving (PT IE)  Outcome Evaluation: PT eval complete. Pt presents near baseline for functional mobility including ambulation and transfers. IPPT services not warranted, PT signing off. Rec continued mobility with nsg and home with assist at d/c.      Anticipated Discharge Disposition (PT): home with assist

## 2024-02-15 NOTE — PLAN OF CARE
VSS.  Patient denies pain.  Adequate UO- clear yellow urine.  Chemotherapy continues.

## 2024-02-15 NOTE — PROGRESS NOTES
[facility] Medicine Services  PROGRESS NOTE    Patient Name: Abraham Wu  : 1950  MRN: [id number]    Date of Admission: 2024  Primary Care Physician: Jatinder Haynes MD    Subjective   Subjective     CC:  chemotherapy    HPI:  Doing ok this AM.  Tolerating chemo well.  Eating well.  Walking around the room.  Wife present in the room today.      Objective   Objective     Vital Signs:   Temp:  [96.9 °F (36.1 °C)-98.2 °F (36.8 °C)] 98.1 °F (36.7 °C)  Heart Rate:  [46-62] 61  Resp:  [16-18] 16  BP: (120-157)/(65-74) 133/72     Physical Exam:  Constitutional: No acute distress, awake, alert  HENT: NCAT, mucous membranes moist  Respiratory:  respiratory effort normal   Cardiovascular: well perfused  Gastrointestinal: Positive bowel sounds, soft, nontender, nondistended, colostomy bag in place  Musculoskeletal: No bilateral ankle edema  Psychiatric: Appropriate affect, cooperative  Neurologic: Oriented x 3, strength symmetric in all extremities, Cranial Nerves grossly intact to confrontation, speech clear  Skin: No rashes      Results Reviewed:  LAB RESULTS:      Lab 02/15/24  0459 02/14/24  0435 24  1041 24  0836   WBC 6.08 3.86 6.31 5.54   HEMOGLOBIN 10.5* 11.5* 11.8* 11.6*   HEMATOCRIT 31.1* 34.9* 35.2* 34.9*   PLATELETS 159 182 169 189   NEUTROS ABS 5.45 3.50 4.99 4.12   IMMATURE GRANS (ABS) 0.03 0.02 0.05 0.03   LYMPHS ABS 0.43* 0.24* 0.62* 0.68*   MONOS ABS 0.17 0.10 0.60 0.64   EOS ABS 0.00 0.00 0.04 0.06   MCV 93.1 93.6 96.2 96.7    137 148 141         Lab 02/15/24  04524  0435 24  1041 24  0836   SODIUM 138 136 136 137   POTASSIUM 4.2 4.3 4.1 4.2   CHLORIDE 106 100 100 101   CO2 23.0 26.0 29.0 29.0   ANION GAP 9.0 10.0 7.0 7.0   BUN 19 17 22 20   CREATININE 0.50* 0.51* 0.75* 0.60*   EGFR 109.0 108.4 96.5 103.2   GLUCOSE 137* 168* 100* 88   CALCIUM 8.4* 8.8 9.0 8.8   MAGNESIUM 2.3 2.1 2.0 2.0   PHOSPHORUS 3.3 4.0 3.6 3.8          Lab 02/15/24  0459 02/14/24  0435 02/13/24  1041 02/13/24  0836   TOTAL PROTEIN 5.2* 5.8* 6.3 6.3   ALBUMIN 3.4* 3.7 3.9 3.9   GLOBULIN 1.8 2.1 2.4 2.4   ALT (SGPT) 11 12 15 14   AST (SGOT) 9 10 13 13   BILIRUBIN 0.2 0.4 0.3 0.3   ALK PHOS 62 75 73 70                     Brief Urine Lab Results  (Last result in the past 365 days)        Color   Clarity   Blood   Leuk Est   Nitrite   Protein   CREAT   Urine HCG        02/15/24 0900 Yellow   Clear   Trace   Negative   Negative   Negative                   Microbiology Results Abnormal       None            No radiology results from the last 24 hrs    Results for orders placed during the hospital encounter of 06/15/23    Adult Transthoracic Echo Complete W/ Cont if Necessary Per Protocol    Interpretation Summary    Left ventricular systolic function is normal. Estimated left ventricular EF = 60%    Normal global longitudinal strain pattern (-19.9%)    The cardiac valves are anatomically and functionally normal.    Estimated right ventricular systolic pressure from tricuspid regurgitation is normal (<35 mmHg).      Current medications:  Scheduled Meds:allopurinol, 300 mg, Oral, Daily  enoxaparin, 40 mg, Subcutaneous, Daily  etoposide (TOPOSAR) 180 mg in sodium chloride 0.9 % 509 mL chemo IVPB, 100 mg/m2 (Treatment Plan Recorded), Intravenous, Once  ifosfamide (IFEX) 9,000 mg, mesna (MESNEX) 9,000 mg in sodium chloride 0.9 % 770 mL chemo IVPB, 9,000 mg, Intravenous, Once  insulin lispro, 2-7 Units, Subcutaneous, 4x Daily AC & at Bedtime  ondansetron (ZOFRAN) 16 mg, dexAMETHasone (DECADRON) 12 mg in sodium chloride 0.9 % 50 mL IVPB, , Intravenous, Once  pantoprazole, 40 mg, Oral, Daily  sodium chloride, 10 mL, Intravenous, Q12H      Continuous Infusions:sodium chloride, 125 mL/hr, Last Rate: 125 mL/hr (02/15/24 1140)      PRN Meds:.  acetaminophen    senna-docusate sodium **AND** polyethylene glycol **AND** bisacodyl **AND** bisacodyl    dextrose    dextrose     diphenhydrAMINE    famotidine    glucagon (human recombinant)    Hydrocortisone Sod Suc (PF)    lidocaine    meperidine    ondansetron    prochlorperazine    sodium chloride    sodium chloride    Assessment & Plan   Assessment & Plan     Active Hospital Problems    Diagnosis  POA    **Hodgkin's lymphoma [C81.90]  Yes      Resolved Hospital Problems   No resolved problems to display.        Brief Hospital Course to date:  Abraham Wu is a 73 y.o. 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   -Cont allopurinol/PPI    Microscopic hematuria  --improved today.  --OK to continue with chemotherapy plan.    --Monitor with serial UA.  If worsens then would consult urology.     HTN   -Monitor BP, not on any meds    Ostomy  - supportive care     Expected Discharge Location and Transportation:   Expected Discharge   Expected Discharge Date: 2/19/2024; Expected Discharge Time:      DVT prophylaxis:  Medical DVT prophylaxis orders are present.         AM-PAC 6 Clicks Score (PT): 24 (02/15/24 0830)    CODE STATUS:   Code Status and Medical Interventions:   Ordered at: 02/13/24 1028     Level Of Support Discussed With:    Patient     Code Status (Patient has no pulse and is not breathing):    CPR (Attempt to Resuscitate)     Medical Interventions (Patient has pulse or is breathing):    Full Support       Preeti Borrero MD  02/15/24

## 2024-02-15 NOTE — PLAN OF CARE
Goal Outcome Evaluation:              Outcome Evaluation: VSS, UOP adequate, eating, chemo completed, walked with PT, wife at bedside part of shift, will continue  to monitor

## 2024-02-15 NOTE — PROGRESS NOTES
HEMATOLOGY/ONCOLOGY PROGRESS NOTE    S: He is seen for evaluation of cycle 1 day 3 chemotherapy.  Continues to feel well.  Denies any nausea or vomiting.  Tolerated Rituxan infusion well.  Good urine output.  Passing gas, no diarrhea. No weakness.  No shortness of breath or cough. No chest pain or dizziness.     Medications:  The current medication list was reviewed in the EMR    ALLERGIES:  No Known Allergies      Physical Exam    VITAL SIGNS:  /73 (BP Location: Left arm, Patient Position: Lying)   Pulse (!) 46   Temp 96.9 °F (36.1 °C)   Resp 16   SpO2 96%   Temp:  [96.9 °F (36.1 °C)-98.9 °F (37.2 °C)] 96.9 °F (36.1 °C)      Performance Status: 1                Physical Exam    General: well appearing, in no acute distress  HEENT: sclerae anicteric, oropharynx clear  Lymphatics: no cervical, supraclavicular, or axillary adenopathy  Cardiovascular: regular rate and rhythm, no murmurs, rubs or gallops  Lungs: clear to auscultation bilaterally  Abdomen: soft, nontender, nondistended.  No palpable organomegaly. LLQ ostomy  Extremities: no lower extremity edema  Skin: no rashes, lesions, bruising, or petechiae  Neuro: Alert and oriented.  Bilateral strength is intact.  Normal finger-nose.    RECENT LABS:    Lab Results   Component Value Date    HGB 10.5 (L) 02/15/2024    HCT 31.1 (L) 02/15/2024    MCV 93.1 02/15/2024     02/15/2024    WBC 6.08 02/15/2024    NEUTROABS 5.45 02/15/2024    LYMPHSABS 0.43 (L) 02/15/2024    MONOSABS 0.17 02/15/2024    EOSABS 0.00 02/15/2024    BASOSABS 0.00 02/15/2024       Lab Results   Component Value Date    GLUCOSE 137 (H) 02/15/2024    BUN 19 02/15/2024    CREATININE 0.50 (L) 02/15/2024     02/15/2024    K 4.2 02/15/2024     02/15/2024    CO2 23.0 02/15/2024    CALCIUM 8.4 (L) 02/15/2024    PROTEINTOT 5.2 (L) 02/15/2024    ALBUMIN 3.4 (L) 02/15/2024    BILITOT 0.2 02/15/2024    ALKPHOS 62 02/15/2024    AST 9 02/15/2024    ALT 11 02/15/2024     Component       Latest Ref [facility] 2/15/2024   WBC      3.40 - 10.80 10*3/mm3 6.08    RBC      4.14 - 5.80 10*6/mm3 3.34 (L)    Hemoglobin      13.0 - 17.7 g/dL 10.5 (L)    Hematocrit      37.5 - 51.0 % 31.1 (L)    MCV      79.0 - 97.0 fL 93.1    MCH      26.6 - 33.0 pg 31.4    MCHC      31.5 - 35.7 g/dL 33.8    RDW      12.3 - 15.4 % 14.7    RDW-SD      37.0 - 54.0 fl 49.3    MPV      6.0 - 12.0 fL 9.5    Platelets      140 - 450 10*3/mm3 159    Neutrophil Rel %      42.7 - 76.0 % 89.6 (H)    Lymphocyte Rel %      19.6 - 45.3 % 7.1 (L)    Monocyte Rel %      5.0 - 12.0 % 2.8 (L)    Eosinophil Rel %      0.3 - 6.2 % 0.0 (L)    Basophil Rel %      0.0 - 1.5 % 0.0    Immature Granulocyte Rel %      0.0 - 0.5 % 0.5    Neutrophils Absolute      1.70 - 7.00 10*3/mm3 5.45    Lymphocytes Absolute      0.70 - 3.10 10*3/mm3 0.43 (L)    Monocytes Absolute      0.10 - 0.90 10*3/mm3 0.17    Eosinophils Absolute      0.00 - 0.40 10*3/mm3 0.00    Basophils Absolute      0.00 - 0.20 10*3/mm3 0.00    Immature Grans, Absolute      0.00 - 0.05 10*3/mm3 0.03    nRBC      0.0 - 0.2 /100 WBC 0.0    Glucose      65 - 99 mg/dL 137 (H)    BUN      8 - 23 mg/dL 19    Creatinine      0.76 - 1.27 mg/dL 0.50 (L)    Sodium      136 - 145 mmol/L 138    Potassium      3.5 - 5.2 mmol/L 4.2    Chloride      98 - 107 mmol/L 106    CO2      22.0 - 29.0 mmol/L 23.0    Calcium      8.6 - 10.5 mg/dL 8.4 (L)    Total Protein      6.0 - 8.5 g/dL 5.2 (L)    Albumin      3.5 - 5.2 g/dL 3.4 (L)    ALT (SGPT)      1 - 41 U/L 11    AST (SGOT)      1 - 40 U/L 9    Alkaline Phosphatase      39 - 117 U/L 62    Total Bilirubin      0.0 - 1.2 mg/dL 0.2    Globulin      gm/dL 1.8    A/G Ratio      g/dL 1.9    BUN/Creatinine Ratio      7.0 - 25.0  38.0 (H)    Anion Gap      5.0 - 15.0 mmol/L 9.0    eGFR      >60.0 mL/min/1.73 109.0    Magnesium      1.6 - 2.4 mg/dL 2.3    LDH      135 - 225 U/L 135    Phosphorus      2.5 - 4.5 mg/dL 3.3    Uric Acid      3.4 - 7.0 mg/dL 3.2 (L)        Assessment/Plan    1.   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 Chemo monitoring  -Today is day 2 of RICE chemotherapy.  Tolerating therapy well.    -CBC/CMP/ Uric acid/phos reviewed and stable.   -Patient is okay to proceed with day 3 of treatment.  -Continue to monitor closely for side effects and toxicity of chemotherapy.  Continue daily CBC and electrolytes.  Transfuse for hemoglobin less than 7 or platelets less than 10 unless active bleeding with only leukoreduced irradiated blood products.  -Baseline EKG reviewed and adequate.    3.  SHERRI  -Continues on nocturnal oxygen with stable requirement.    4.  Microscopic hematuria  -He has a history of rectal radiation and there were mild changes suggestive of radiation cystitis on his most recent imaging.  He is completely asymptomatic.  This predated ifosfamide.  I have discussed with his urologist.  Will plan to monitor symptoms and serial UA and consult urology if any worsening hematuria.  UA pending today.      Kaycee Leary MD  [facility]    2/15/2024

## 2024-02-15 NOTE — THERAPY DISCHARGE NOTE
Patient Name: Abraham Wu  : 1950    MRN: [id number]                              Today's Date: 2/15/2024       Admit Date: 2024    Visit Dx:     ICD-10-CM ICD-9-CM   1. Hodgkin lymphoma of lymph nodes of multiple regions, unspecified Hodgkin lymphoma type  C81.98 201.98     Patient Active Problem List   Diagnosis    Rectal mass s/p laparoscopic colostomy (2022)    Anemia secondary to chemotherapy and acute blood loss    Essential hypertension    Dyslipidemia    Unintentional weight loss    Hydronephrosis    Neutropenic fever    Hypokalemia    Hodgkin lymphoma    Acute upper GI bleed    Melena    Moderate malnutrition    Esophageal candidiasis    SHERRI (obstructive sleep apnea)    ICU delirium     Encounter for care related to vascular access port    Community acquired pneumonia, bilateral    Immunosuppression due to drug therapy    Lung nodule    Hodgkin's lymphoma     Past Medical History:   Diagnosis Date    Arthritis     benign polypoid tissue right lung     Cancer     HODGKINS LYMPHOMA, RECTAL CANCER    Diabetes mellitus     patient reports that he doesn't have diabetes secondary to changing diet and weight loss.    History of radiation therapy 2023    re-treat rectum    History of transfusion     no reaction, transfusions received at [facility] in     Hyperlipidemia     Hypertension     Lymphoma     Mycobacterium mucogenicum     SHERRI (obstructive sleep apnea)     O2 2L/MIN AT NIGHT ONLY    Pneumonia         Seasonal allergies      Past Surgical History:   Procedure Laterality Date    BRONCHOSCOPY      removal of obstructing polypoid tissue right middle lung    BRONCHOSCOPY N/A 2024    Procedure: BRONCHOSCOPY WITH ENDOBRONCHIAL ULTRASOUND AND FLUORO;  Surgeon: Chris Pruitt MD;  Location: [facility];  Service: Pulmonary;  Laterality: N/A;  EBUS BALLOON INTACT    COLONOSCOPY      COLOSTOMY N/A 2022    Procedure: LAPAROSCOPIC COLOSTOMY CREATION, FLEXIBLE  SIGMOIDOSCOPY;  Surgeon: Hiram Viveros MD;  Location:  [facility];  Service: General;  Laterality: N/A;    DENTAL PROCEDURE      dental implants    ENDOSCOPY N/A 10/10/2022    Procedure: ESOPHAGOGASTRODUODENOSCOPY;  Surgeon: Neeraj Lynn MD;  Location:  [facility];  Service: Gastroenterology;  Laterality: N/A;    ENDOSCOPY N/A 10/12/2022    Procedure: ESOPHAGOGASTRODUODENOSCOPY;  Surgeon: Brunner, Mark I, MD;  Location:  [facility];  Service: Gastroenterology;  Laterality: N/A;    EXAM UNDER ANESTHESIA, RECTAL BIOPSY N/A 10/04/2022    Procedure: EXAM UNDER ANESTHESIA, TRANSANAL BIOPSY WITH TRUCUT NEEDLE (LITHOTOMY-CANDY CANE);  Surgeon: Hiram Viveros MD;  Location:  [facility];  Service: General;  Laterality: N/A;    EXAM UNDER ANESTHESIA, RECTAL BIOPSY N/A 05/30/2023    Procedure: EXAM UNDER ANESTHESIA, TRANSANAL BIOPSY OF RECTAL MASS WITH TRUCUT NEEDLE, PROCTOSCOPY;  Surgeon: Hiram Viveros MD;  Location:  [facility];  Service: General;  Laterality: N/A;    PERIPHERALLY INSERTED CENTRAL CATHETER INSERTION      Removed 11/28/2022    VENOUS ACCESS DEVICE (PORT) INSERTION Right 11/28/2022      General Information       Row Name 02/15/24 1458          Physical Therapy Time and Intention    Document Type discharge evaluation/summary  -ES     Mode of Treatment physical therapy  -ES       Row Name 02/15/24 1458          General Information    Patient Profile Reviewed yes  -ES     Prior Level of Function independent:;all household mobility;community mobility;transfer;bed mobility;ADL's;home management  -ES     Existing Precautions/Restrictions fall;other (see comments)  colostomy. chemo  -ES     Barriers to Rehab medically complex  -ES       Row Name 02/15/24 1458          Living Environment    People in Home spouse  -ES       Row Name 02/15/24 1458          Home Main Entrance    Number of Stairs, Main Entrance one  -ES       Row Name 02/15/24 1458          Stairs Within Home, Primary    Number  of Stairs, Within Home, Primary none  -ES       Row Name 02/15/24 1458          Cognition    Orientation Status (Cognition) oriented x 3  -ES       Row Name 02/15/24 1458          Safety Issues, Functional Mobility    Safety Issues Affecting Function (Mobility) awareness of need for assistance  -ES     Comment, Safety Issues/Impairments (Mobility) No c/o dizziness or increased pain with mobility  -ES               User Key  (r) = Recorded By, (t) = Taken By, (c) = Cosigned By      Initials Name Provider Type    ES Ana Bryan PT Physical Therapist                   Mobility       Row Name 02/15/24 1506          Bed Mobility    Bed Mobility sit-supine;supine-sit  -ES     Supine-Sit Curry (Bed Mobility) independent  -ES     Sit-Supine Curry (Bed Mobility) independent  -ES       Row Name 02/15/24 1506          Bed-Chair Transfer    Comment, (Bed-Chair Transfer) Pt declined sitting UIC  -ES       Row Name 02/15/24 1506          Sit-Stand Transfer    Sit-Stand Curry (Transfers) independent  -ES       Row Name 02/15/24 1506          Gait/Stairs (Locomotion)    Curry Level (Gait) independent  -ES     Assistive Device (Gait) other (see comments)  none  -ES     Distance in Feet (Gait) 300  -ES     Deviations/Abnormal Patterns (Gait) bilateral deviations;stride length decreased  -ES     Bilateral Gait Deviations forward flexed posture  -ES     Comment, (Gait/Stairs) Pt ambulated independently and without AD. Demo'd good step-through gait pattern with decreased stride length and forward flexed posture. No LOB, demo'd good safety awareness and sequencing throughout mobility  -ES               User Key  (r) = Recorded By, (t) = Taken By, (c) = Cosigned By      Initials Name Provider Type    ES Ana Bryan PT Physical Therapist                   Obj/Interventions       Row Name 02/15/24 1508          Range of Motion Comprehensive    General Range of Motion bilateral lower extremity ROM WFL   -ES       Row Name 02/15/24 1508          Strength Comprehensive (MMT)    General Manual Muscle Testing (MMT) Assessment no strength deficits identified  -ES       Row Name 02/15/24 1508          Balance    Balance Assessment sitting static balance;sitting dynamic balance;sit to stand dynamic balance;standing static balance;standing dynamic balance  -ES     Static Sitting Balance independent  -ES     Dynamic Sitting Balance independent  -ES     Position, Sitting Balance unsupported;sitting edge of bed  -ES     Sit to Stand Dynamic Balance independent  -ES     Static Standing Balance independent  -ES     Dynamic Standing Balance independent  -ES     Position/Device Used, Standing Balance unsupported  -ES     Balance Interventions sitting;standing;sit to stand;static;dynamic;occupation based/functional task  -ES     Comment, Balance no LOB  -ES       Row Name 02/15/24 1508          Sensory Assessment (Somatosensory)    Sensory Assessment (Somatosensory) LE sensation intact  -ES               User Key  (r) = Recorded By, (t) = Taken By, (c) = Cosigned By      Initials Name Provider Type    ES Ana Bryan, PT Physical Therapist                   Goals/Plan    No documentation.                  Clinical Impression       Row Name 02/15/24 1508          Pain    Pretreatment Pain Rating 0/10 - no pain  -ES     Posttreatment Pain Rating 0/10 - no pain  -ES     Pre/Posttreatment Pain Comment tolerated  -ES     Pain Intervention(s) Repositioned;Ambulation/increased activity  -ES       Row Name 02/15/24 1504          Plan of Care Review    Plan of Care Reviewed With patient;spouse  -ES     Progress improving  PT IE  -ES     Outcome Evaluation PT eval complete. Pt presents near baseline for functional mobility including ambulation and transfers. IPPT services not warranted, PT signing off. Rec continued mobility with nsg and home with assist at d/c.  -ES       Row Name 02/15/24 1500          Therapy Assessment/Plan (PT)     Criteria for Skilled Interventions Met (PT) no;no problems identified which require skilled intervention  -ES     Therapy Frequency (PT) evaluation only  -ES       Row Name 02/15/24 1508          Vital Signs    Pre Systolic BP Rehab --  non-tele. cleared by RN.  -ES     O2 Delivery Pre Treatment room air  -ES     O2 Delivery Intra Treatment room air  -ES     O2 Delivery Post Treatment room air  -ES     Pre Patient Position Supine  -ES     Intra Patient Position Standing  -ES     Post Patient Position Supine  -ES       Row Name 02/15/24 1508          Positioning and Restraints    Pre-Treatment Position in bed  -ES     Post Treatment Position bed  -ES     In Bed notified nsg;fowlers;call light within reach;encouraged to call for assist;with family/caregiver  -ES               User Key  (r) = Recorded By, (t) = Taken By, (c) = Cosigned By      Initials Name Provider Type    Ana Pringle, PT Physical Therapist                   Outcome Measures       Row Name 02/15/24 1509 02/15/24 0830       How much help from another person do you currently need...    Turning from your back to your side while in flat bed without using bedrails? 4  -ES 4  -JS    Moving from lying on back to sitting on the side of a flat bed without bedrails? 4  -ES 4  -JS    Moving to and from a bed to a chair (including a wheelchair)? 4  -ES 4  -JS    Standing up from a chair using your arms (e.g., wheelchair, bedside chair)? 4  -ES 4  -JS    Climbing 3-5 steps with a railing? 4  -ES 4  -JS    To walk in hospital room? 4  -ES 4  -JS    AM-PAC 6 Clicks Score (PT) 24  -ES 24  -JS    Highest Level of Mobility Goal 8 --> Walked 250 feet or more  -ES 8 --> Walked 250 feet or more  -JS      Row Name 02/15/24 1509          Functional Assessment    Outcome Measure Options AM-PAC 6 Clicks Basic Mobility (PT)  -ES               User Key  (r) = Recorded By, (t) = Taken By, (c) = Cosigned By      Initials Name Provider Type    Memo Becker RN  Registered Nurse    Ana Pringle, PT Physical Therapist                  Physical Therapy Education       Title: PT OT SLP Therapies (In Progress)       Topic: Physical Therapy (In Progress)       Point: Mobility training (Done)       Learning Progress Summary             Patient Acceptance, E,TB, VU by ES at 2/15/2024 1510   Family Acceptance, E,TB, VU by ES at 2/15/2024 1510                         Point: Home exercise program (Not Started)       Learner Progress:  Not documented in this visit.              Point: Body mechanics (Done)       Learning Progress Summary             Patient Acceptance, E,TB, VU by ES at 2/15/2024 1510   Family Acceptance, E,TB, VU by ES at 2/15/2024 1510                         Point: Precautions (Done)       Learning Progress Summary             Patient Acceptance, E,TB, VU by ES at 2/15/2024 1510   Family Acceptance, E,TB, VU by ES at 2/15/2024 1510                                         User Key       Initials Effective Dates Name Provider Type Discipline    SEFERINO 08/11/22 -  Ana Bryan, COLLINS Physical Therapist PT                  PT Recommendation and Plan     Plan of Care Reviewed With: patient, spouse  Progress: improving (PT IE)  Outcome Evaluation: PT eval complete. Pt presents near baseline for functional mobility including ambulation and transfers. IPPT services not warranted, PT signing off. Rec continued mobility with nsg and home with assist at d/c.     Time Calculation:   PT Evaluation Complexity  History, PT Evaluation Complexity: 1-2 personal factors and/or comorbidities  Examination of Body Systems (PT Eval Complexity): total of 3 or more elements  Clinical Presentation (PT Evaluation Complexity): evolving  Clinical Decision Making (PT Evaluation Complexity): low complexity  Overall Complexity (PT Evaluation Complexity): low complexity     PT Charges       Row Name 02/15/24 1511             Time Calculation    Start Time 1443  -      PT Received On 02/15/24   -ES         Time Calculation- PT    Total Timed Code Minutes- PT 11 minute(s)  -ES         Timed Charges    58249 - Gait Training Minutes  11  -ES         Untimed Charges    PT Eval/Re-eval Minutes 32  -ES         Total Minutes    Timed Charges Total Minutes 11  -ES      Untimed Charges Total Minutes 32  -ES       Total Minutes 43  -ES                User Key  (r) = Recorded By, (t) = Taken By, (c) = Cosigned By      Initials Name Provider Type    ES Ana Bryan, PT Physical Therapist                  Therapy Charges for Today       Code Description Service Date Service Provider Modifiers Qty    [id number]  GAIT TRAINING EA 15 MIN 2/15/2024 Ana Bryan, PT GP 1    [id number]  PT EVAL LOW COMPLEXITY 3 2/15/2024 Ana Bryan, PT GP 1            PT G-Codes  Outcome Measure Options: AM-PAC 6 Clicks Basic Mobility (PT)  AM-PAC 6 Clicks Score (PT): 24    PT Discharge Summary  Anticipated Discharge Disposition (PT): home with assist  Reason for Discharge: At baseline function    Ana Bryan PT  2/15/2024

## 2024-02-16 ENCOUNTER — READMISSION MANAGEMENT (OUTPATIENT)
Dept: CALL CENTER | Facility: HOSPITAL | Age: 74
End: 2024-02-16
Payer: MEDICARE

## 2024-02-16 VITALS
OXYGEN SATURATION: 95 % | SYSTOLIC BLOOD PRESSURE: 148 MMHG | TEMPERATURE: 97.1 F | RESPIRATION RATE: 18 BRPM | DIASTOLIC BLOOD PRESSURE: 67 MMHG | HEART RATE: 50 BPM

## 2024-02-16 PROBLEM — R50.81 NEUTROPENIC FEVER: Status: RESOLVED | Noted: 2022-10-03 | Resolved: 2024-02-16

## 2024-02-16 PROBLEM — K92.1 MELENA: Status: RESOLVED | Noted: 2022-10-03 | Resolved: 2024-02-16

## 2024-02-16 PROBLEM — J18.9 COMMUNITY ACQUIRED PNEUMONIA, BILATERAL: Status: RESOLVED | Noted: 2023-04-10 | Resolved: 2024-02-16

## 2024-02-16 PROBLEM — B37.81 ESOPHAGEAL CANDIDIASIS: Status: RESOLVED | Noted: 2022-10-11 | Resolved: 2024-02-16

## 2024-02-16 PROBLEM — R41.0 DELIRIUM: Status: RESOLVED | Noted: 2022-10-16 | Resolved: 2024-02-16

## 2024-02-16 PROBLEM — K92.2 ACUTE UPPER GI BLEED: Status: RESOLVED | Noted: 2022-10-03 | Resolved: 2024-02-16

## 2024-02-16 PROBLEM — D70.9 NEUTROPENIC FEVER: Status: RESOLVED | Noted: 2022-10-03 | Resolved: 2024-02-16

## 2024-02-16 LAB
ALBUMIN SERPL-MCNC: 3.5 G/DL (ref 3.5–5.2)
ALBUMIN/GLOB SERPL: 2.1 G/DL
ALP SERPL-CCNC: 60 U/L (ref 39–117)
ALT SERPL W P-5'-P-CCNC: 11 U/L (ref 1–41)
ANION GAP SERPL CALCULATED.3IONS-SCNC: 5 MMOL/L (ref 5–15)
AST SERPL-CCNC: 9 U/L (ref 1–40)
BACTERIA UR QL AUTO: NORMAL /HPF
BASOPHILS # BLD AUTO: 0.01 10*3/MM3 (ref 0–0.2)
BASOPHILS NFR BLD AUTO: 0.1 % (ref 0–1.5)
BILIRUB SERPL-MCNC: 0.3 MG/DL (ref 0–1.2)
BILIRUB UR QL STRIP: NEGATIVE
BUN SERPL-MCNC: 18 MG/DL (ref 8–23)
BUN/CREAT SERPL: 32.7 (ref 7–25)
CALCIUM SPEC-SCNC: 8.5 MG/DL (ref 8.6–10.5)
CHLORIDE SERPL-SCNC: 106 MMOL/L (ref 98–107)
CLARITY UR: CLEAR
CO2 SERPL-SCNC: 27 MMOL/L (ref 22–29)
COLOR UR: YELLOW
CREAT SERPL-MCNC: 0.55 MG/DL (ref 0.76–1.27)
DEPRECATED RDW RBC AUTO: 52.3 FL (ref 37–54)
EGFRCR SERPLBLD CKD-EPI 2021: 106 ML/MIN/1.73
EOSINOPHIL # BLD AUTO: 0 10*3/MM3 (ref 0–0.4)
EOSINOPHIL NFR BLD AUTO: 0 % (ref 0.3–6.2)
ERYTHROCYTE [DISTWIDTH] IN BLOOD BY AUTOMATED COUNT: 15.3 % (ref 12.3–15.4)
GLOBULIN UR ELPH-MCNC: 1.7 GM/DL
GLUCOSE BLDC GLUCOMTR-MCNC: 87 MG/DL (ref 70–130)
GLUCOSE SERPL-MCNC: 120 MG/DL (ref 65–99)
GLUCOSE UR STRIP-MCNC: NEGATIVE MG/DL
HCT VFR BLD AUTO: 33.2 % (ref 37.5–51)
HGB BLD-MCNC: 10.9 G/DL (ref 13–17.7)
HGB UR QL STRIP.AUTO: NEGATIVE
HYALINE CASTS UR QL AUTO: NORMAL /LPF
IMM GRANULOCYTES # BLD AUTO: 0.03 10*3/MM3 (ref 0–0.05)
IMM GRANULOCYTES NFR BLD AUTO: 0.4 % (ref 0–0.5)
KETONES UR QL STRIP: NEGATIVE
LDH SERPL-CCNC: 123 U/L (ref 135–225)
LEUKOCYTE ESTERASE UR QL STRIP.AUTO: NEGATIVE
LYMPHOCYTES # BLD AUTO: 0.45 10*3/MM3 (ref 0.7–3.1)
LYMPHOCYTES NFR BLD AUTO: 6.7 % (ref 19.6–45.3)
MAGNESIUM SERPL-MCNC: 2.1 MG/DL (ref 1.6–2.4)
MCH RBC QN AUTO: 31.4 PG (ref 26.6–33)
MCHC RBC AUTO-ENTMCNC: 32.8 G/DL (ref 31.5–35.7)
MCV RBC AUTO: 95.7 FL (ref 79–97)
MONOCYTES # BLD AUTO: 0.27 10*3/MM3 (ref 0.1–0.9)
MONOCYTES NFR BLD AUTO: 4 % (ref 5–12)
NEUTROPHILS NFR BLD AUTO: 5.99 10*3/MM3 (ref 1.7–7)
NEUTROPHILS NFR BLD AUTO: 88.8 % (ref 42.7–76)
NITRITE UR QL STRIP: NEGATIVE
NRBC BLD AUTO-RTO: 0 /100 WBC (ref 0–0.2)
PH UR STRIP.AUTO: 7 [PH] (ref 5–8)
PHOSPHATE SERPL-MCNC: 3.4 MG/DL (ref 2.5–4.5)
PLATELET # BLD AUTO: 150 10*3/MM3 (ref 140–450)
PMV BLD AUTO: 9.2 FL (ref 6–12)
POTASSIUM SERPL-SCNC: 4.4 MMOL/L (ref 3.5–5.2)
PROT SERPL-MCNC: 5.2 G/DL (ref 6–8.5)
PROT UR QL STRIP: NEGATIVE
RBC # BLD AUTO: 3.47 10*6/MM3 (ref 4.14–5.8)
RBC # UR STRIP: NORMAL /HPF
REF LAB TEST METHOD: NORMAL
SODIUM SERPL-SCNC: 138 MMOL/L (ref 136–145)
SP GR UR STRIP: 1.01 (ref 1–1.03)
SQUAMOUS #/AREA URNS HPF: NORMAL /HPF
URATE SERPL-MCNC: 2.9 MG/DL (ref 3.4–7)
UROBILINOGEN UR QL STRIP: NORMAL
WBC # UR STRIP: NORMAL /HPF
WBC NRBC COR # BLD AUTO: 6.75 10*3/MM3 (ref 3.4–10.8)

## 2024-02-16 PROCEDURE — 25010000002 ENOXAPARIN PER 10 MG: Performed by: INTERNAL MEDICINE

## 2024-02-16 PROCEDURE — 82948 REAGENT STRIP/BLOOD GLUCOSE: CPT

## 2024-02-16 PROCEDURE — 99238 HOSP IP/OBS DSCHRG MGMT 30/<: CPT | Performed by: PEDIATRICS

## 2024-02-16 PROCEDURE — 25010000002 HEPARIN LOCK FLUSH PER 10 UNITS: Performed by: INTERNAL MEDICINE

## 2024-02-16 PROCEDURE — 83615 LACTATE (LD) (LDH) ENZYME: CPT | Performed by: INTERNAL MEDICINE

## 2024-02-16 PROCEDURE — 80053 COMPREHEN METABOLIC PANEL: CPT | Performed by: INTERNAL MEDICINE

## 2024-02-16 PROCEDURE — 99232 SBSQ HOSP IP/OBS MODERATE 35: CPT | Performed by: INTERNAL MEDICINE

## 2024-02-16 PROCEDURE — 81001 URINALYSIS AUTO W/SCOPE: CPT | Performed by: INTERNAL MEDICINE

## 2024-02-16 PROCEDURE — 85025 COMPLETE CBC W/AUTO DIFF WBC: CPT | Performed by: INTERNAL MEDICINE

## 2024-02-16 PROCEDURE — 83735 ASSAY OF MAGNESIUM: CPT | Performed by: INTERNAL MEDICINE

## 2024-02-16 PROCEDURE — 84100 ASSAY OF PHOSPHORUS: CPT | Performed by: INTERNAL MEDICINE

## 2024-02-16 PROCEDURE — 84550 ASSAY OF BLOOD/URIC ACID: CPT | Performed by: INTERNAL MEDICINE

## 2024-02-16 RX ORDER — HEPARIN SODIUM (PORCINE) LOCK FLUSH IV SOLN 100 UNIT/ML 100 UNIT/ML
5 SOLUTION INTRAVENOUS AS NEEDED
Status: DISCONTINUED | OUTPATIENT
Start: 2024-02-16 | End: 2024-02-16 | Stop reason: HOSPADM

## 2024-02-16 RX ADMIN — ENOXAPARIN SODIUM 40 MG: 100 INJECTION SUBCUTANEOUS at 08:45

## 2024-02-16 RX ADMIN — PANTOPRAZOLE SODIUM 40 MG: 40 TABLET, DELAYED RELEASE ORAL at 08:44

## 2024-02-16 RX ADMIN — ALLOPURINOL 300 MG: 300 TABLET ORAL at 08:44

## 2024-02-16 RX ADMIN — HEPARIN 500 UNITS: 100 SYRINGE at 11:19

## 2024-02-16 NOTE — PROGRESS NOTES
HEMATOLOGY/ONCOLOGY PROGRESS NOTE    S: He is seen for follow up of cycle 1 day 4 chemotherapy.  Continues to feel well.  Denies any nausea or vomiting.  No hematuria or dysuria. Endorses good urine output.  Passing gas, no diarrhea. No weakness.  No shortness of breath or cough. No chest pain or dizziness.     Medications:  The current medication list was reviewed in the EMR    ALLERGIES:  No Known Allergies      Physical Exam    VITAL SIGNS:  /85 (BP Location: Left arm, Patient Position: Lying)   Pulse 50   Temp 97.7 °F (36.5 °C) (Temporal)   Resp 18   SpO2 100%   Temp:  [96.7 °F (35.9 °C)-98.1 °F (36.7 °C)] 97.7 °F (36.5 °C)      Performance Status: 1                Physical Exam    General: well appearing, in no acute distress  HEENT: sclerae anicteric, oropharynx clear  Lymphatics: no cervical, supraclavicular, or axillary adenopathy  Cardiovascular: regular rate and rhythm, no murmurs, rubs or gallops  Lungs: clear to auscultation bilaterally  Abdomen: soft, nontender, nondistended.  No palpable organomegaly. LLQ ostomy  Extremities: no lower extremity edema  Skin: no rashes, lesions, bruising, or petechiae  Neuro: Alert and oriented.  Bilateral strength is intact throughout.  Normal finger-nose.    RECENT LABS:    Lab Results   Component Value Date    HGB 10.9 (L) 02/16/2024    HCT 33.2 (L) 02/16/2024    MCV 95.7 02/16/2024     02/16/2024    WBC 6.75 02/16/2024    NEUTROABS 5.99 02/16/2024    LYMPHSABS 0.45 (L) 02/16/2024    MONOSABS 0.27 02/16/2024    EOSABS 0.00 02/16/2024    BASOSABS 0.01 02/16/2024       Lab Results   Component Value Date    GLUCOSE 120 (H) 02/16/2024    BUN 18 02/16/2024    CREATININE 0.55 (L) 02/16/2024     02/16/2024    K 4.4 02/16/2024     02/16/2024    CO2 27.0 02/16/2024    CALCIUM 8.5 (L) 02/16/2024    PROTEINTOT 5.2 (L) 02/16/2024    ALBUMIN 3.5 02/16/2024    BILITOT 0.3 02/16/2024    ALKPHOS 60 02/16/2024    AST 9 02/16/2024    ALT 11 02/16/2024      Component      Latest Ref [facility] 2/15/2024   WBC      3.40 - 10.80 10*3/mm3 6.08    RBC      4.14 - 5.80 10*6/mm3 3.34 (L)    Hemoglobin      13.0 - 17.7 g/dL 10.5 (L)    Hematocrit      37.5 - 51.0 % 31.1 (L)    MCV      79.0 - 97.0 fL 93.1    MCH      26.6 - 33.0 pg 31.4    MCHC      31.5 - 35.7 g/dL 33.8    RDW      12.3 - 15.4 % 14.7    RDW-SD      37.0 - 54.0 fl 49.3    MPV      6.0 - 12.0 fL 9.5    Platelets      140 - 450 10*3/mm3 159    Neutrophil Rel %      42.7 - 76.0 % 89.6 (H)    Lymphocyte Rel %      19.6 - 45.3 % 7.1 (L)    Monocyte Rel %      5.0 - 12.0 % 2.8 (L)    Eosinophil Rel %      0.3 - 6.2 % 0.0 (L)    Basophil Rel %      0.0 - 1.5 % 0.0    Immature Granulocyte Rel %      0.0 - 0.5 % 0.5    Neutrophils Absolute      1.70 - 7.00 10*3/mm3 5.45    Lymphocytes Absolute      0.70 - 3.10 10*3/mm3 0.43 (L)    Monocytes Absolute      0.10 - 0.90 10*3/mm3 0.17    Eosinophils Absolute      0.00 - 0.40 10*3/mm3 0.00    Basophils Absolute      0.00 - 0.20 10*3/mm3 0.00    Immature Grans, Absolute      0.00 - 0.05 10*3/mm3 0.03    nRBC      0.0 - 0.2 /100 WBC 0.0    Glucose      65 - 99 mg/dL 137 (H)    BUN      8 - 23 mg/dL 19    Creatinine      0.76 - 1.27 mg/dL 0.50 (L)    Sodium      136 - 145 mmol/L 138    Potassium      3.5 - 5.2 mmol/L 4.2    Chloride      98 - 107 mmol/L 106    CO2      22.0 - 29.0 mmol/L 23.0    Calcium      8.6 - 10.5 mg/dL 8.4 (L)    Total Protein      6.0 - 8.5 g/dL 5.2 (L)    Albumin      3.5 - 5.2 g/dL 3.4 (L)    ALT (SGPT)      1 - 41 U/L 11    AST (SGOT)      1 - 40 U/L 9    Alkaline Phosphatase      39 - 117 U/L 62    Total Bilirubin      0.0 - 1.2 mg/dL 0.2    Globulin      gm/dL 1.8    A/G Ratio      g/dL 1.9    BUN/Creatinine Ratio      7.0 - 25.0  38.0 (H)    Anion Gap      5.0 - 15.0 mmol/L 9.0    eGFR      >60.0 mL/min/1.73 109.0    Magnesium      1.6 - 2.4 mg/dL 2.3    LDH      135 - 225 U/L 135    Phosphorus      2.5 - 4.5 mg/dL 3.3    Uric Acid      3.4 - 7.0  mg/dL 3.2 (L)       Assessment/Plan    1.   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 Chemo monitoring  -Today is day 4 of R-ICE chemotherapy.  Tolerated therapy well.    -CBC/CMP/ Uric acid/phos reviewed and stable today.   -Patient is okay to proceed with day 4 steroids  -RTC Monday for labs and neulasta.   -RTC 1 week with exam and labs  -Pt has home supply of dexamethasone and instructed to take today and tomorrow.  -Continue allopurinol    3.  SHERRI  -Continues on nocturnal oxygen with stable requirement.    4.  Microscopic hematuria  -He has a history of rectal radiation and there were mild changes suggestive of radiation cystitis on his most recent imaging.  He is completely asymptomatic.  This predated ifosfamide.  I have discussed with his urologist.  Will plan to monitor symptoms and serial UA and consult urology if any worsening hematuria.  UA pending today. Ok for discharge after this is collected. No current symptoms.       Kaycee Leary MD  [facility]    2/16/2024

## 2024-02-16 NOTE — PLAN OF CARE
Goal Outcome Evaluation:              Outcome Evaluation: VSS, UOP adequate, no prn meds, walking, eating, appointments scheduled, waiting for DC

## 2024-02-16 NOTE — PLAN OF CARE
VSS. Bradycardia.   Denies pain, denies nausea.   Voiding.  Ambulating.  IVF continue with PAC.  Anticipate d/c today per patient.

## 2024-02-16 NOTE — DISCHARGE SUMMARY
[facility] Medicine Services  DISCHARGE SUMMARY    Patient Name: Abraham Wu  : 1950  MRN: [id number]    Date of Admission: 2024  9:06 AM  Date of Discharge:  2024  Primary Care Physician: Jatinder Haynes MD    Consults       Date and Time Order Name Status Description    2024 10:28 AM Inpatient Hematology & Oncology Consult              Hospital Course     Presenting Problem: chemotherapy admission    Active Hospital Problems    Diagnosis  POA    **Hodgkin's lymphoma [C81.90]  Yes    Essential hypertension [I10]  Yes    Dyslipidemia [E78.5]  Yes      Resolved Hospital Problems   No resolved problems to display.          Hospital Course:  Abraham Wu is a 73 y.o. male with hodgkin lymphoma, here for chemotherapy     Hodgkins lymphoma  - PET scan  showed R hilar and L4 hypermetabolic activity  - bronchoscopy 24 showed RML lymphoma   - Dr Kaycee Leary managing chemo-  - daily labs monitored without any evidence of tumor lysis  -Cont allopurinol/PPI     Microscopic hematuria  --Monitor with serial UA.  If worsens then would consult urology with concern for possible radiation cystitis.       Discharge Follow Up Recommendations for outpatient labs/diagnostics:   As instructed by oncology    Day of Discharge     HPI:   Pt doing well.  Tolerated chemo.  Good appetite.  Ready to dc home.        Vital Signs:   Temp:  [96.7 °F (35.9 °C)-98.1 °F (36.7 °C)] 97.7 °F (36.5 °C)  Heart Rate:  [47-61] 50  Resp:  [16-18] 18  BP: (133-157)/(69-85) 147/85  Flow (L/min):  [2] 2      Physical Exam:  Constitutional: No acute distress, awake, alert  Respiratory:  Scattered slight wheezes, respiratory effort normal   Cardiovascular: well perfused  Gastrointestinal: colostomy bag in place with stool present  Musculoskeletal: No bilateral ankle edema  Psychiatric: Appropriate affect, cooperative  Neurologic: Oriented x 3, strength symmetric in all extremities, Cranial  Nerves grossly intact to confrontation, speech clear  Skin: No rashes    Pertinent  and/or Most Recent Results     LAB RESULTS:      Lab 02/16/24  0527 02/15/24  0459 02/14/24  0435 02/13/24  1041 02/13/24  0836   WBC 6.75 6.08 3.86 6.31 5.54   HEMOGLOBIN 10.9* 10.5* 11.5* 11.8* 11.6*   HEMATOCRIT 33.2* 31.1* 34.9* 35.2* 34.9*   PLATELETS 150 159 182 169 189   NEUTROS ABS 5.99 5.45 3.50 4.99 4.12   IMMATURE GRANS (ABS) 0.03 0.03 0.02 0.05 0.03   LYMPHS ABS 0.45* 0.43* 0.24* 0.62* 0.68*   MONOS ABS 0.27 0.17 0.10 0.60 0.64   EOS ABS 0.00 0.00 0.00 0.04 0.06   MCV 95.7 93.1 93.6 96.2 96.7   * 135 137 148 141         Lab 02/16/24  0527 02/15/24  0459 02/14/24  0435 02/13/24  1041 02/13/24  0836   SODIUM 138 138 136 136 137   POTASSIUM 4.4 4.2 4.3 4.1 4.2   CHLORIDE 106 106 100 100 101   CO2 27.0 23.0 26.0 29.0 29.0   ANION GAP 5.0 9.0 10.0 7.0 7.0   BUN 18 19 17 22 20   CREATININE 0.55* 0.50* 0.51* 0.75* 0.60*   EGFR 106.0 109.0 108.4 96.5 103.2   GLUCOSE 120* 137* 168* 100* 88   CALCIUM 8.5* 8.4* 8.8 9.0 8.8   MAGNESIUM 2.1 2.3 2.1 2.0 2.0   PHOSPHORUS 3.4 3.3 4.0 3.6 3.8         Lab 02/16/24  0527 02/15/24  0459 02/14/24  0435 02/13/24  1041 02/13/24  0836   TOTAL PROTEIN 5.2* 5.2* 5.8* 6.3 6.3   ALBUMIN 3.5 3.4* 3.7 3.9 3.9   GLOBULIN 1.7 1.8 2.1 2.4 2.4   ALT (SGPT) 11 11 12 15 14   AST (SGOT) 9 9 10 13 13   BILIRUBIN 0.3 0.2 0.4 0.3 0.3   ALK PHOS 60 62 75 73 70                     Brief Urine Lab Results  (Last result in the past 365 days)        Color   Clarity   Blood   Leuk Est   Nitrite   Protein   CREAT   Urine HCG        02/15/24 0900 Yellow   Clear   Trace   Negative   Negative   Negative                 Microbiology Results (last 10 days)       ** No results found for the last 240 hours. **            No radiology results for the last 10 days    Results for orders placed during the hospital encounter of 10/31/22    Duplex Venous Upper Extremity - Right CAR    Interpretation Summary    Normal right  upper extremity venous duplex scan.    PICC line is noted in the right brachial vein.      Results for orders placed during the hospital encounter of 10/31/22    Duplex Venous Upper Extremity - Right CAR    Interpretation Summary    Normal right upper extremity venous duplex scan.    PICC line is noted in the right brachial vein.      Results for orders placed during the hospital encounter of 06/15/23    Adult Transthoracic Echo Complete W/ Cont if Necessary Per Protocol    Interpretation Summary    Left ventricular systolic function is normal. Estimated left ventricular EF = 60%    Normal global longitudinal strain pattern (-19.9%)    The cardiac valves are anatomically and functionally normal.    Estimated right ventricular systolic pressure from tricuspid regurgitation is normal (<35 mmHg).      Plan for Follow-up of Pending Labs/Results:     Discharge Details        Discharge Medications        Changes to Medications        Instructions Start Date   dexAMETHasone 4 MG tablet  Commonly known as: DECADRON  What changed: Another medication with the same name was removed. Continue taking this medication, and follow the directions you see here.   8 mg, Oral, Daily With Breakfast, Take 2 tablets by mouth with breakfast on days 4 and 5 of each chemotherapy cycle.             Continue These Medications        Instructions Start Date   allopurinol 300 MG tablet  Commonly known as: ZYLOPRIM   300 mg, Oral, Daily      fluticasone 50 MCG/ACT nasal spray  Commonly known as: FLONASE   1 spray, Nasal, Daily PRN, OTC      lidocaine-prilocaine 2.5-2.5 % cream  Commonly known as: EMLA   1 application , Topical, As Needed      loratadine 10 MG tablet  Commonly known as: CLARITIN   10 mg, Oral, Daily      ondansetron 8 MG tablet  Commonly known as: Zofran   8 mg, Oral, Every 8 Hours PRN      pantoprazole 40 MG EC tablet  Commonly known as: PROTONIX   40 mg, Oral, Daily      prochlorperazine 5 MG tablet  Commonly known as:  COMPAZINE   5 mg, Oral, Every 6 Hours PRN      rosuvastatin 10 MG tablet  Commonly known as: CRESTOR   10 mg, Oral, Daily               No Known Allergies      Discharge Disposition:  Home or Self Care    Diet:  Hospital:  Diet Order   Procedures    Diet: Regular/House Diet; Texture: Regular Texture (IDDSI 7); Fluid Consistency: Thin (IDDSI 0)            Activity:  Activity Instructions       Activity as Tolerated              Restrictions or Other Recommendations:  As tolerated       CODE STATUS:    Code Status and Medical Interventions:   Ordered at: 02/13/24 1028     Level Of Support Discussed With:    Patient     Code Status (Patient has no pulse and is not breathing):    CPR (Attempt to Resuscitate)     Medical Interventions (Patient has pulse or is breathing):    Full Support       Future Appointments   Date Time Provider Department Center   2/19/2024  9:00 AM CHAIR 21 [facility]   2/19/2024 11:30 AM Tim Alcocer MD [facility] None   2/22/2024  8:15 AM ACCESS AND LAB DRAW CHAIR 1 [facility]   2/22/2024  9:00 AM Kaycee Leary MD [facility]   2/26/2024 11:15 AM ACCESS AND LAB DRAW CHAIR 1 [facility]   3/4/2024 11:15 AM ACCESS AND LAB DRAW CHAIR 1 [facility]   3/5/2024  8:45 AM Kaycee Leary MD [facility]   3/5/2024 10:00 AM CHAIR 18 [facility]   3/6/2024  1:00 PM ROOM E [facility]   3/7/2024  1:30 PM CHAIR 16 [facility]       Additional Instructions for the Follow-ups that You Need to Schedule       Discharge Follow-up with PCP   As directed       Currently Documented PCP:    Jatinder Haynes MD    PCP Phone Number:    [phone]     Follow Up Details: call for appointment as needed.        Discharge Follow-up with Specified Provider: Dr. Leary   As directed      To: Dr. Leary   Follow Up Details: keep scheduled appointment                      Preeti Borrero MD  02/16/24      Time Spent on Discharge:  I spent  23  minutes on this discharge  activity which included: face-to-face encounter with the patient, reviewing the data in the system, coordination of the care with the nursing staff as well as consultants, documentation, and entering orders.

## 2024-02-19 ENCOUNTER — HOSPITAL ENCOUNTER (OUTPATIENT)
Dept: RADIATION ONCOLOGY | Facility: HOSPITAL | Age: 74
Setting detail: RADIATION/ONCOLOGY SERIES
End: 2024-02-19
Payer: MEDICARE

## 2024-02-19 ENCOUNTER — HOSPITAL ENCOUNTER (OUTPATIENT)
Dept: ONCOLOGY | Facility: HOSPITAL | Age: 74
Discharge: HOME | End: 2024-02-19
Payer: MEDICARE

## 2024-02-19 ENCOUNTER — OFFICE VISIT (OUTPATIENT)
Dept: RADIATION ONCOLOGY | Facility: HOSPITAL | Age: 74
End: 2024-02-19
Payer: MEDICARE

## 2024-02-19 ENCOUNTER — HOSPITAL ENCOUNTER (OUTPATIENT)
Dept: ONCOLOGY | Facility: HOSPITAL | Age: 74
Discharge: HOME | DRG: 841 | End: 2024-02-19
Payer: MEDICARE

## 2024-02-19 ENCOUNTER — APPOINTMENT (OUTPATIENT)
Dept: RADIATION ONCOLOGY | Facility: HOSPITAL | Age: 74
End: 2024-02-19
Payer: MEDICARE

## 2024-02-19 VITALS
TEMPERATURE: 98.4 F | BODY MASS INDEX: 29.02 KG/M2 | WEIGHT: 170 LBS | SYSTOLIC BLOOD PRESSURE: 110 MMHG | HEART RATE: 77 BPM | RESPIRATION RATE: 18 BRPM | DIASTOLIC BLOOD PRESSURE: 64 MMHG | HEIGHT: 64 IN

## 2024-02-19 VITALS
RESPIRATION RATE: 16 BRPM | BODY MASS INDEX: 29.33 KG/M2 | DIASTOLIC BLOOD PRESSURE: 63 MMHG | SYSTOLIC BLOOD PRESSURE: 103 MMHG | OXYGEN SATURATION: 96 % | TEMPERATURE: 98.6 F | WEIGHT: 170.9 LBS | HEART RATE: 63 BPM

## 2024-02-19 DIAGNOSIS — C81.98 HODGKIN LYMPHOMA OF LYMPH NODES OF MULTIPLE REGIONS, UNSPECIFIED HODGKIN LYMPHOMA TYPE: Primary | ICD-10-CM

## 2024-02-19 DIAGNOSIS — C81.98 HODGKIN LYMPHOMA OF LYMPH NODES OF MULTIPLE REGIONS, UNSPECIFIED HODGKIN LYMPHOMA TYPE: ICD-10-CM

## 2024-02-19 DIAGNOSIS — Z45.2 ENCOUNTER FOR CARE RELATED TO VASCULAR ACCESS PORT: Primary | ICD-10-CM

## 2024-02-19 LAB
ANION GAP SERPL CALCULATED.3IONS-SCNC: 5 MMOL/L (ref 5–15)
BASOPHILS # BLD AUTO: 0.02 10*3/MM3 (ref 0–0.2)
BASOPHILS NFR BLD AUTO: 0.3 % (ref 0–1.5)
BILIRUB UR QL STRIP: NEGATIVE
BUN SERPL-MCNC: 21 MG/DL (ref 8–23)
BUN/CREAT SERPL: 33.9 (ref 7–25)
CALCIUM SPEC-SCNC: 9.1 MG/DL (ref 8.6–10.5)
CHLORIDE SERPL-SCNC: 96 MMOL/L (ref 98–107)
CLARITY UR: CLEAR
CO2 SERPL-SCNC: 30 MMOL/L (ref 22–29)
COLOR UR: YELLOW
CREAT SERPL-MCNC: 0.62 MG/DL (ref 0.76–1.27)
DEPRECATED RDW RBC AUTO: 53.9 FL (ref 37–54)
EGFRCR SERPLBLD CKD-EPI 2021: 102.2 ML/MIN/1.73
EOSINOPHIL # BLD AUTO: 0.12 10*3/MM3 (ref 0–0.4)
EOSINOPHIL NFR BLD AUTO: 1.9 % (ref 0.3–6.2)
ERYTHROCYTE [DISTWIDTH] IN BLOOD BY AUTOMATED COUNT: 15 % (ref 12.3–15.4)
GLUCOSE SERPL-MCNC: 86 MG/DL (ref 65–99)
GLUCOSE UR STRIP-MCNC: NEGATIVE MG/DL
HCT VFR BLD AUTO: 37.1 % (ref 37.5–51)
HGB BLD-MCNC: 11.9 G/DL (ref 13–17.7)
HGB UR QL STRIP.AUTO: NEGATIVE
IMM GRANULOCYTES # BLD AUTO: 0.06 10*3/MM3 (ref 0–0.05)
IMM GRANULOCYTES NFR BLD AUTO: 0.9 % (ref 0–0.5)
KETONES UR QL STRIP: ABNORMAL
LEUKOCYTE ESTERASE UR QL STRIP.AUTO: NEGATIVE
LYMPHOCYTES # BLD AUTO: 0.47 10*3/MM3 (ref 0.7–3.1)
LYMPHOCYTES NFR BLD AUTO: 7.3 % (ref 19.6–45.3)
MAGNESIUM SERPL-MCNC: 2.3 MG/DL (ref 1.6–2.4)
MCH RBC QN AUTO: 31.3 PG (ref 26.6–33)
MCHC RBC AUTO-ENTMCNC: 32.1 G/DL (ref 31.5–35.7)
MCV RBC AUTO: 97.6 FL (ref 79–97)
MONOCYTES # BLD AUTO: 0.07 10*3/MM3 (ref 0.1–0.9)
MONOCYTES NFR BLD AUTO: 1.1 % (ref 5–12)
NEUTROPHILS NFR BLD AUTO: 5.71 10*3/MM3 (ref 1.7–7)
NEUTROPHILS NFR BLD AUTO: 88.5 % (ref 42.7–76)
NITRITE UR QL STRIP: NEGATIVE
NRBC BLD AUTO-RTO: 0 /100 WBC (ref 0–0.2)
PH UR STRIP.AUTO: 7 [PH] (ref 5–8)
PLATELET # BLD AUTO: 171 10*3/MM3 (ref 140–450)
PMV BLD AUTO: 9.3 FL (ref 6–12)
POTASSIUM SERPL-SCNC: 4.6 MMOL/L (ref 3.5–5.2)
PROT UR QL STRIP: ABNORMAL
RBC # BLD AUTO: 3.8 10*6/MM3 (ref 4.14–5.8)
SODIUM SERPL-SCNC: 131 MMOL/L (ref 136–145)
SP GR UR STRIP: 1.03 (ref 1–1.03)
UROBILINOGEN UR QL STRIP: ABNORMAL
WBC NRBC COR # BLD AUTO: 6.45 10*3/MM3 (ref 3.4–10.8)

## 2024-02-19 PROCEDURE — 81003 URINALYSIS AUTO W/O SCOPE: CPT | Performed by: INTERNAL MEDICINE

## 2024-02-19 PROCEDURE — 25010000002 HEPARIN LOCK FLUSH PER 10 UNITS: Performed by: INTERNAL MEDICINE

## 2024-02-19 PROCEDURE — 80048 BASIC METABOLIC PNL TOTAL CA: CPT | Performed by: INTERNAL MEDICINE

## 2024-02-19 PROCEDURE — 85025 COMPLETE CBC W/AUTO DIFF WBC: CPT | Performed by: INTERNAL MEDICINE

## 2024-02-19 PROCEDURE — 96372 THER/PROPH/DIAG INJ SC/IM: CPT

## 2024-02-19 PROCEDURE — G0463 HOSPITAL OUTPT CLINIC VISIT: HCPCS

## 2024-02-19 PROCEDURE — 83735 ASSAY OF MAGNESIUM: CPT | Performed by: INTERNAL MEDICINE

## 2024-02-19 PROCEDURE — 36416 COLLJ CAPILLARY BLOOD SPEC: CPT

## 2024-02-19 PROCEDURE — 25010000002 PEGFILGRASTIM-CBQV 6 MG/0.6ML SOLUTION AUTO-INJECTOR: Performed by: INTERNAL MEDICINE

## 2024-02-19 RX ORDER — HEPARIN SODIUM (PORCINE) LOCK FLUSH IV SOLN 100 UNIT/ML 100 UNIT/ML
500 SOLUTION INTRAVENOUS AS NEEDED
Status: CANCELLED | OUTPATIENT
Start: 2024-02-19

## 2024-02-19 RX ORDER — HEPARIN SODIUM (PORCINE) LOCK FLUSH IV SOLN 100 UNIT/ML 100 UNIT/ML
500 SOLUTION INTRAVENOUS AS NEEDED
Status: DISCONTINUED | OUTPATIENT
Start: 2024-02-19 | End: 2024-02-20 | Stop reason: HOSPADM

## 2024-02-19 RX ADMIN — HEPARIN 500 UNITS: 100 SYRINGE at 09:10

## 2024-02-19 RX ADMIN — PEGFILGRASTIM-CBQV 6 MG: 6 INJECTION, SOLUTION SUBCUTANEOUS at 09:11

## 2024-02-19 NOTE — PROGRESS NOTES
Follow-up note    PATIENT:                                                      Abraham Wu  MEDICAL RECORD #:                        [id number]  :                                                          1950  COMPLETION DATE:   2023  DIAGNOSIS:     Hodgkin lymphoma  - Stage IV      BRIEF HISTORY:  Abraham Wu is a very pleasant 73 y.o. male with a known diagnosis of refractory Hodgkin's lymphoma.  The patient was initially diagnosed in 2022 when he presented with intractable diarrhea, anorexia, weight loss, and intermittent fevers.  He was found on CT scans to have a large obstructive rectal mass with adjacent adenopathy, bulky retroperitoneal adenopathy, and findings concerning for left renal and liver metastases.  There was a small right pleural effusion with nodularity.  He underwent diverting colostomy, with rectal biopsies consistent with classic Hodgkin's lymphoma.  The patient initiated chemotherapy with brentuximab-AVD, completing 6 cycles on 3/23/2023.  Repeat PET/CT scan 2023 showed some persistent bilateral interstitial changes and a few new hypermetabolic mediastinal lymph nodes, felt to be potentially reactive given a recent coronavirus infection and multifocal pneumonia.  Imaging also revealed resolution of the hepatic lesions, marked improvement in the retroperitoneal adenopathy, and decreased size of his rectal mass consistent with interval response to treatment.  Given some persistent uptake in the rectal mass concerning for residual disease, the patient was scheduled for outpatient biopsy and subsequent consolidative radiotherapy to the rectal mass.    Unfortunately, while awaiting biopsy, the patient was found on CT simulation for radiation treatment planning to have significant interval growth in the rectal mass as well as development of new retroperitoneal adenopathy superior to the rectal mass consistent with progressive disease.  The patient was having  rectal bleeding, pain, and obstructive uropathy symptoms at that time.  The patient underwent transanal biopsy on 5/30/2023 with Dr. Viveros.  Pathology once again revealed classical Hodgkin's lymphoma.  Because of development of new adenopathy outside of the radiation field as well as rapid regrowth of his mass, consolidative radiation therapy was changed to palliative radiation to allow for earlier transition to second line chemotherapy.  The patient received a quad shot palliatively to the rectum, consisting of 14 Gray in 4 fractions delivered over a 2-day period.  He completed these treatments 6/1/2023.  The patient was then seen by Dr. Pruitt at  for consideration of bone marrow transplant.  Ultimately, it was decided that the patient should press forward with chemoimmunotherapy.  The patient received Keytruda, Navelbine, Gemzar, and Doxil, completing 4 cycles in 8/2023.  Posttreatment PET/CT scan revealed excellent interval response and complete resolution of FDG avidity.  The patient was then referred back to our clinic for consideration of consolidative radiotherapy treatments.  The patient underwent reirradiation to the rectum consisting of 24 Gray in 8 fractions, completing 9/27/2023.     The patient was treated to the L-spine and completed this on 1/22/2024.  The patient reports today as a routine follow-up    The patient has restarted chemotherapy with Dr. Leary and has continued plans for chemo going forward.    MEDICATIONS: Medication reconciliation for the patient was reviewed and confirmed in the electronic medical record.    Review of Systems   Eyes:  Positive for eye problems (reports upcoming cataract surgery in January).   Gastrointestinal:         +ostomy   Genitourinary:          +urgency   All other systems reviewed and are negative.      Karnofsky score: 80   KPS 80%      Physical Exam  Vitals and nursing note reviewed.   Constitutional:       General: He is not in acute distress.      Appearance: He is well-developed.      Comments: Pleasant, conversant   HENT:      Head: Normocephalic and atraumatic.   Eyes:      Conjunctiva/sclera: Conjunctivae normal.      Pupils: Pupils are equal, round, and reactive to light.   Neck:      Comments: No obviously enlarged cervical or supraclavicular lymphadenopathy  Cardiovascular:      Rate and Rhythm: Normal rate and regular rhythm.      Heart sounds: No murmur heard.     No friction rub.      Comments: No prominent JVD.  No pedal edema.  Pulmonary:      Effort: Pulmonary effort is normal.      Breath sounds: Normal breath sounds. No wheezing.   Abdominal:      General: Bowel sounds are normal. There is no distension.      Palpations: Abdomen is soft. There is no mass.      Tenderness: There is no abdominal tenderness.      Comments: LLQ ostomy   Musculoskeletal:         General: Normal range of motion.      Cervical back: Normal range of motion and neck supple.      Comments: Moves all extremities spontaneously.   Lymphadenopathy:      Cervical: No cervical adenopathy.   Skin:     General: Skin is warm and dry.   Neurological:      Mental Status: He is alert and oriented to person, place, and time.      Comments: Coordination intact.   Psychiatric:         Behavior: Behavior normal.         Thought Content: Thought content normal.         Judgment: Judgment normal.         VITAL SIGNS:   Vitals:    02/19/24 0930   BP: 103/63   Pulse: 63   Resp: 16   Temp: 98.6 °F (37 °C)   TempSrc: Temporal   SpO2: 96%   Weight: 77.5 kg (170 lb 14.4 oz)   PainSc: 0-No pain             The following portions of the patient's history were reviewed and updated as appropriate: allergies, current medications, past family history, past medical history, past social history, past surgical history and problem list.  I have personally requested reviewed and interpreted the patient's images and radiology reports and pathology reports listed below:  XR Chest 1 View    Result Date:  2/1/2024  Impression: No acute process. No pneumothorax. Electronically Signed: Araseli Mccormack MD  2/1/2024 8:48 AM EST  Workstation ID: [id number]    CT Chest With Contrast Diagnostic    Result Date: 1/17/2024  Impression: CHEST: 1.There is consolidation and volume loss in the posterior right middle lobe extending from the right hilum to the major fissure. There is a possible broncholith in the right middle lobe. There are multifocal areas of groundglass and tree-in-bud opacities  in the aerated portions of the right middle lobe and to a lesser extent in the upper lobes. These findings are likely infectious or inflammatory. 2.There is an 8 mm nodule in the right middle lobe which has increased in size from 5 mm on the study from 6 months ago. This could be infectious, inflammatory, or neoplastic. 3. There is a 4.0 cm mid ascending thoracic aortic aneurysm. ABDOMEN AND PELVIS: There is an amorphous soft tissue arising from the posterolateral left rectal wall. This area is 4.6 x 3.7 cm in size similar to the PET/CT from 1 month ago in this patient with known lymphoma in this region. No new focal abnormalities in the abdomen pelvis or visualized skeletal structures to indicate recurrent or metastatic disease. Electronically Signed: Luis Jha DO  1/17/2024 12:05 AM EST  Workstation ID: [id number]    CT Abdomen Pelvis With Contrast    Result Date: 1/17/2024  Impression: CHEST: 1.There is consolidation and volume loss in the posterior right middle lobe extending from the right hilum to the major fissure. There is a possible broncholith in the right middle lobe. There are multifocal areas of groundglass and tree-in-bud opacities  in the aerated portions of the right middle lobe and to a lesser extent in the upper lobes. These findings are likely infectious or inflammatory. 2.There is an 8 mm nodule in the right middle lobe which has increased in size from 5 mm on the study from 6 months ago. This could be infectious,  inflammatory, or neoplastic. 3. There is a 4.0 cm mid ascending thoracic aortic aneurysm. ABDOMEN AND PELVIS: There is an amorphous soft tissue arising from the posterolateral left rectal wall. This area is 4.6 x 3.7 cm in size similar to the PET/CT from 1 month ago in this patient with known lymphoma in this region. No new focal abnormalities in the abdomen pelvis or visualized skeletal structures to indicate recurrent or metastatic disease. Electronically Signed: Luis Jha DO  1/17/2024 12:05 AM EST  Workstation ID: [id number]    NM PET/CT Skull Base to Mid Thigh    Addendum Date: 12/8/2023    ADDENDUM #1 ADDENDUM: Jose E leyva 4. Electronically Signed: Araseli Mccormack MD  12/8/2023 8:37 AM EST  Workstation ID: [id number] ORIGINAL REPORT: NM PET/CT SKULL BASE TO MID THIGH Date of Exam: 12/4/2023 8:57 AM EST Indication: refractory hodgkin lymphoma treatment assessment. Comparison: 8/22/2023. Technique: 13.22 mCi of F-18 FDG was administered intravenously. PET imaging was obtained from skull base to mid-thigh approximately 60 minutes after radiotracer injection. A low dose non contrast CT was obtained for attenuation correction and anatomic localization. Fused PET-CT and 3D MIP reconstructions were utilized for image interpretation.  Fasting blood glucose level: 94 mg/dl. Reference uptake values: Mediastinum: 2.11 SUVmax Liver: 2.51 SUVmax Normalization method: Body Weight Findings: There is a new small hypermetabolic right hilar node with an SUV max of 5.03. Previously noted left rectal mass demonstrates an SUV max of 1.38 currently as compared to 2.56 previously. There is a new hypermetabolic lytic lucency in the L4 vertebral body  anteriorly with an SUV max of 6.27. There is normal distribution of radiotracer in the neck. Previously noted patchy somewhat groundglass appearing infiltrates of the bilateral upper lobes have improved somewhat since the prior exam and are not hypermetabolic above baseline. There  is a 6 mm noncalcified nodule of the left upper lobe which is not hypermetabolic above baseline. Impression: New hypermetabolic right hilar node and new L4 vertebral body lesion, most compatible with recurrent lymphoma. Decreased hypermetabolic activity of the rectal mass. Improving lung infiltrates. Electronically Signed: Araseli Mccormack MD  12/5/2023 8:29 AM EST  Workstation ID: [id number]    Result Date: 12/8/2023  Impression: New hypermetabolic right hilar node and new L4 vertebral body lesion, most compatible with recurrent lymphoma. Decreased hypermetabolic activity of the rectal mass. Improving lung infiltrates. Electronically Signed: Araseli Mccormack MD  12/5/2023 8:29 AM EST  Workstation ID: [id number]     I reviewed Dr. Leary's notes.         There are no diagnoses linked to this encounter.       IMPRESSION:   Abraham Wu is a 73 y.o. gentleman with stage IVB Hodgkin's lymphoma.  Restaging PET/CT scan following 6 cycles of brentuximab-AVD showed a good partial response to treatment despite residual disease in the rectum that was PET avid.  The patient was then found to have rapid progression and he underwent a quad shot to the rectum, completing 6/1/2023 with good palliation of his obstructive symptoms.  The patient was reportedly considered a borderline candidate for autologous stem cell transplant, so he resumed salvage chemotherapy with immunotherapy at that time.  The patient has had a good response on subsequent imaging, though still has some CT evidence of thickening of his rectum.  The patient has since undergone retreatment to the rectum/site of initially bulky disease with consolidative radiotherapy which he completed on 8/27/2023.  The patient was then treated palliatively to L4.  He completed this in January 2024.    Patient is getting chemotherapy with Dr. Leary.  I recommend he continue with his treatments with Dr. Leary.  Will follow-up with him as needed in the  future.            RECOMMENDATIONS:      L4 lesion  -Increased avidity on PET scan compared to previous  -Recommend stereotactic radiotherapy  -Abuts previously irradiated area x 2  -Recommend SBRT to limit dose to cord, bowel, and ostomy  -S/p 8 Shoemaker x 3 and 6 Gray x 3 SIB  -COMPleated treatment 1/22/2024.      Right thoracic hilar lymph nodes  -Questionable on PET scan  -Continue to monitor    Refractory Hodgkin's lymphoma of the rectum  -Status post diversion  -Status post chemotherapy brentuximab-AVD   -Initiated rapid palliative radiotherapy with quad shot to rectum, 14 Shoemaker in 4 fractions              -Completed 6/1/2023  -Was seen by Dr. Pruitt at  for consideration of stem cell transplant, ultimately considered borderline candidate due to age with recommendations for salvage chemoimmunotherapy  -Status post Keytruda, Navelbine, Gemzar, and Doxil with Dr. Leary  -Repeat PET/CT scan showed good response  -Recommendations to consolidate the patient's initially bulky site of disease   -Status post consolidative IMRT/IGR T to the rectum, 24 Gray in 8 fractions given previous radiation dose  -Completed 9/27/2023  -On R-ICE with Dr. Leary.  -Follows with Dr. Leary  -Follow-up as needed with us      No follow-ups on file.    Tim Alcocer MD

## 2024-02-19 NOTE — ADDENDUM NOTE
Encounter addended by: Hattie Velez RN on: 2/19/2024 12:10 PM   Actions taken: Charge Capture section accepted

## 2024-02-20 ENCOUNTER — READMISSION MANAGEMENT (OUTPATIENT)
Dept: CALL CENTER | Facility: HOSPITAL | Age: 74
End: 2024-02-20
Payer: MEDICARE

## 2024-02-20 NOTE — OUTREACH NOTE
Medical Week 1 Survey      Flowsheet Row Responses   [facility] patient discharged from? [place]   Does the patient have one of the following disease processes/diagnoses(primary or secondary)? Other   Week 1 attempt successful? No   Unsuccessful attempts Attempt 1            Clemencia LEON - Registered Nurse

## 2024-02-21 ENCOUNTER — TELEPHONE (OUTPATIENT)
Dept: ONCOLOGY | Facility: CLINIC | Age: 74
End: 2024-02-21
Payer: MEDICARE

## 2024-02-22 ENCOUNTER — TELEPHONE (OUTPATIENT)
Dept: ONCOLOGY | Facility: CLINIC | Age: 74
End: 2024-02-22

## 2024-02-22 ENCOUNTER — HOSPITAL ENCOUNTER (OUTPATIENT)
Dept: ONCOLOGY | Facility: HOSPITAL | Age: 74
Discharge: HOME | End: 2024-02-22
Admitting: INTERNAL MEDICINE
Payer: MEDICARE

## 2024-02-22 ENCOUNTER — OFFICE VISIT (OUTPATIENT)
Dept: ONCOLOGY | Facility: CLINIC | Age: 74
End: 2024-02-22
Payer: MEDICARE

## 2024-02-22 ENCOUNTER — READMISSION MANAGEMENT (OUTPATIENT)
Dept: CALL CENTER | Facility: HOSPITAL | Age: 74
End: 2024-02-22
Payer: MEDICARE

## 2024-02-22 VITALS
HEIGHT: 64 IN | DIASTOLIC BLOOD PRESSURE: 84 MMHG | HEART RATE: 68 BPM | WEIGHT: 170 LBS | BODY MASS INDEX: 29.02 KG/M2 | SYSTOLIC BLOOD PRESSURE: 126 MMHG | OXYGEN SATURATION: 99 % | TEMPERATURE: 98.6 F

## 2024-02-22 DIAGNOSIS — C81.98 HODGKIN LYMPHOMA OF LYMPH NODES OF MULTIPLE REGIONS, UNSPECIFIED HODGKIN LYMPHOMA TYPE: ICD-10-CM

## 2024-02-22 DIAGNOSIS — Z45.2 ENCOUNTER FOR CARE RELATED TO VASCULAR ACCESS PORT: Primary | ICD-10-CM

## 2024-02-22 DIAGNOSIS — C81.98 HODGKIN LYMPHOMA OF LYMPH NODES OF MULTIPLE REGIONS, UNSPECIFIED HODGKIN LYMPHOMA TYPE: Primary | ICD-10-CM

## 2024-02-22 LAB
ALBUMIN SERPL-MCNC: 4 G/DL (ref 3.5–5.2)
ALBUMIN/GLOB SERPL: 1.5 G/DL
ALP SERPL-CCNC: 88 U/L (ref 39–117)
ALT SERPL W P-5'-P-CCNC: 16 U/L (ref 1–41)
ANION GAP SERPL CALCULATED.3IONS-SCNC: 9 MMOL/L (ref 5–15)
AST SERPL-CCNC: 10 U/L (ref 1–40)
BASOPHILS # BLD AUTO: 0.01 10*3/MM3 (ref 0–0.2)
BASOPHILS NFR BLD AUTO: 1.2 % (ref 0–1.5)
BILIRUB SERPL-MCNC: 0.5 MG/DL (ref 0–1.2)
BUN SERPL-MCNC: 19 MG/DL (ref 8–23)
BUN/CREAT SERPL: 32.8 (ref 7–25)
CALCIUM SPEC-SCNC: 9.1 MG/DL (ref 8.6–10.5)
CHLORIDE SERPL-SCNC: 96 MMOL/L (ref 98–107)
CO2 SERPL-SCNC: 27 MMOL/L (ref 22–29)
CREAT SERPL-MCNC: 0.58 MG/DL (ref 0.76–1.27)
DEPRECATED RDW RBC AUTO: 52.4 FL (ref 37–54)
EGFRCR SERPLBLD CKD-EPI 2021: 104.3 ML/MIN/1.73
EOSINOPHIL # BLD AUTO: 0.03 10*3/MM3 (ref 0–0.4)
EOSINOPHIL NFR BLD AUTO: 3.5 % (ref 0.3–6.2)
ERYTHROCYTE [DISTWIDTH] IN BLOOD BY AUTOMATED COUNT: 14.6 % (ref 12.3–15.4)
FUNGUS WND CULT: NORMAL
GLOBULIN UR ELPH-MCNC: 2.6 GM/DL
GLUCOSE SERPL-MCNC: 85 MG/DL (ref 65–99)
HCT VFR BLD AUTO: 31.9 % (ref 37.5–51)
HGB BLD-MCNC: 10.6 G/DL (ref 13–17.7)
IMM GRANULOCYTES # BLD AUTO: 0.01 10*3/MM3 (ref 0–0.05)
IMM GRANULOCYTES NFR BLD AUTO: 1.2 % (ref 0–0.5)
LDH SERPL-CCNC: 134 U/L (ref 135–225)
LYMPHOCYTES # BLD AUTO: 0.29 10*3/MM3 (ref 0.7–3.1)
LYMPHOCYTES NFR BLD AUTO: 33.7 % (ref 19.6–45.3)
MAGNESIUM SERPL-MCNC: 1.9 MG/DL (ref 1.6–2.4)
MCH RBC QN AUTO: 31.9 PG (ref 26.6–33)
MCHC RBC AUTO-ENTMCNC: 33.2 G/DL (ref 31.5–35.7)
MCV RBC AUTO: 96.1 FL (ref 79–97)
MONOCYTES # BLD AUTO: 0.1 10*3/MM3 (ref 0.1–0.9)
MONOCYTES NFR BLD AUTO: 11.6 % (ref 5–12)
MYCOBACTERIUM SPEC CULT: NORMAL
NEUTROPHILS NFR BLD AUTO: 0.42 10*3/MM3 (ref 1.7–7)
NEUTROPHILS NFR BLD AUTO: 48.8 % (ref 42.7–76)
NIGHT BLUE STAIN TISS: NORMAL
PHOSPHATE SERPL-MCNC: 3.7 MG/DL (ref 2.5–4.5)
PLATELET # BLD AUTO: 90 10*3/MM3 (ref 140–450)
PMV BLD AUTO: 9.4 FL (ref 6–12)
POTASSIUM SERPL-SCNC: 4.6 MMOL/L (ref 3.5–5.2)
PROT SERPL-MCNC: 6.6 G/DL (ref 6–8.5)
RBC # BLD AUTO: 3.32 10*6/MM3 (ref 4.14–5.8)
SODIUM SERPL-SCNC: 132 MMOL/L (ref 136–145)
URATE SERPL-MCNC: 2.1 MG/DL (ref 3.4–7)
WBC NRBC COR # BLD AUTO: 0.86 10*3/MM3 (ref 3.4–10.8)

## 2024-02-22 PROCEDURE — 83735 ASSAY OF MAGNESIUM: CPT | Performed by: INTERNAL MEDICINE

## 2024-02-22 PROCEDURE — 36591 DRAW BLOOD OFF VENOUS DEVICE: CPT

## 2024-02-22 PROCEDURE — 80053 COMPREHEN METABOLIC PANEL: CPT | Performed by: INTERNAL MEDICINE

## 2024-02-22 PROCEDURE — 84100 ASSAY OF PHOSPHORUS: CPT | Performed by: INTERNAL MEDICINE

## 2024-02-22 PROCEDURE — 83615 LACTATE (LD) (LDH) ENZYME: CPT | Performed by: INTERNAL MEDICINE

## 2024-02-22 PROCEDURE — 84550 ASSAY OF BLOOD/URIC ACID: CPT | Performed by: INTERNAL MEDICINE

## 2024-02-22 PROCEDURE — 85025 COMPLETE CBC W/AUTO DIFF WBC: CPT | Performed by: INTERNAL MEDICINE

## 2024-02-22 PROCEDURE — 25010000002 HEPARIN LOCK FLUSH PER 10 UNITS: Performed by: INTERNAL MEDICINE

## 2024-02-22 RX ORDER — GINSENG 100 MG
1 CAPSULE ORAL 2 TIMES DAILY
Qty: 14 G | Refills: 1 | Status: SHIPPED | OUTPATIENT
Start: 2024-02-22

## 2024-02-22 RX ORDER — DIPHENHYDRAMINE HYDROCHLORIDE AND LIDOCAINE HYDROCHLORIDE AND ALUMINUM HYDROXIDE AND MAGNESIUM HYDRO
5 KIT EVERY 6 HOURS
Qty: 240 ML | Refills: 3 | Status: SHIPPED | OUTPATIENT
Start: 2024-02-22 | End: 2024-02-22 | Stop reason: SDUPTHER

## 2024-02-22 RX ORDER — LEVOFLOXACIN 500 MG/1
500 TABLET, FILM COATED ORAL DAILY
Qty: 7 TABLET | Refills: 0 | Status: SHIPPED | OUTPATIENT
Start: 2024-02-22 | End: 2024-02-29

## 2024-02-22 RX ORDER — DIPHENHYDRAMINE HYDROCHLORIDE AND LIDOCAINE HYDROCHLORIDE AND ALUMINUM HYDROXIDE AND MAGNESIUM HYDRO
5 KIT EVERY 6 HOURS
Qty: 240 ML | Refills: 3 | Status: SHIPPED | OUTPATIENT
Start: 2024-02-22

## 2024-02-22 RX ORDER — HEPARIN SODIUM (PORCINE) LOCK FLUSH IV SOLN 100 UNIT/ML 100 UNIT/ML
500 SOLUTION INTRAVENOUS AS NEEDED
Status: DISCONTINUED | OUTPATIENT
Start: 2024-02-22 | End: 2024-02-23 | Stop reason: HOSPADM

## 2024-02-22 RX ORDER — HEPARIN SODIUM (PORCINE) LOCK FLUSH IV SOLN 100 UNIT/ML 100 UNIT/ML
500 SOLUTION INTRAVENOUS AS NEEDED
Status: CANCELLED | OUTPATIENT
Start: 2024-02-22

## 2024-02-22 RX ADMIN — HEPARIN 500 UNITS: 100 SYRINGE at 08:05

## 2024-02-22 NOTE — TELEPHONE ENCOUNTER
----- Message from Agustin Wilson sent at 2/22/2024  2:56 PM EST -----  Regarding: kathy/Leonora Kessler did not have to magic mouthwash for the patient today.  They were to told to call back to the office.  [phone]

## 2024-02-22 NOTE — TELEPHONE ENCOUNTER
RN called and spoke to patient's wife. Let her know that magic mouthwash has to be sent to a compounding pharmacy so [facility] cannot fill magic mouthwash. Patient's wife stated that they got it filled [facility] [place] previously. RN will get prescription re-sent to the [facility]. Patient's wife v/u and appreciation.

## 2024-02-22 NOTE — OUTREACH NOTE
Medical Week 1 Survey      Flowsheet Row Responses   [facility] patient discharged from? [place]   Does the patient have one of the following disease processes/diagnoses(primary or secondary)? Other   Week 1 attempt successful? No   Unsuccessful attempts Attempt 2            Sherlyn REYEZ - Registered Nurse

## 2024-02-22 NOTE — PROGRESS NOTES
Hematology and Oncology [place]  Office number [phone]    Fax number [phone]     Follow up     Date: 24    Patient Name: Abraham Wu  MRN: [id number]  : 1950    Chief Complaint: Hodgkin Lymphoma follow up/treatment    Surgeon: Dr. Hiram Viveros MD    Cancer Staging: IV    History of Present Illness: Abraham Wu is a pleasant 73 y.o. male with PMH of hypertension, sleep apnea, hard of hearing who presents today for follow up of Hodgkin Lymphoma.     He initially presented 2022 with intractable diarrhea, low appetite, and a greater than 50 pound weight loss over several month period associated with intermittent fevers.  CT of the chest abdomen pelvis obtained in the ER shows of rectal mass spanning greater than 12 cm with adjacent adenopathy, bulky RP adenopathy, and findings concerning for left renal and liver metastases.  Small right pleural effusion with nodularity.  There was concern for impending obstruction, so he underwent diverting colostomy and biopsy on 2022.  Biopsies from the peritoneam showed a fibrotic nodule histiocytes and eosinophils admixed with CD30 positive large cells.  Rectal biopsies showed involvement by CD30 positive lymphoma, classic Hodgkin lymphoma versus CD30 positive T-cell or B-cell lymphoma.  There was extensive fibrosis and crush artifact.  He underwent repeat transrectal biopsy 10/4/2022 for further characterization which was felt to be most consistent with classical Hodgkin lymphoma.    He initiated chemotherapy with Brentuximab-AVD as an inpatient on 10/8/2022.  Cycle #1 was complicated by GI bleed requiring multiple blood transfusion and ultimately coil artery embolization 10/12; neutropenic fever, Candida esophagitis and mucositis.  He had a prolonged ICU stay  And required enteral feeding.  He was discharged 10/20/2022.    Following his last cycle he was admitted with multifocal PNA and +corona virus  infection.  ]  He underwent brochoscopy 2/1/24 showing heaped up, irregular, friable mucosa obstructing the right middle lobe otherwise no discrete endobronchial lesions. Pathology showed persistent/recurrent CD30 positive lymphoma; see comment. These continue to have CD30 and RACHELE positivity, consistent with involvement by the same process. However, the lymphoma cells are now more numerous and have an intact B-cell expression profile, with strong diffuse CD20 expression, strong PAX5 expression, and expression of CD45 and CD79a. This is strongly favored to represent continued involvement with the same process with the development of a slightly different immunophenotype after pembrolizumab therapy as opposed to a secondary lymphoma. The strong expression of CD20 may be a therapeutic target. CD30 expression remains intact as well.     Treatment history:  Brentuximab-AVD: C1 10/8/22  C2: 11/1/22 onward with DA 20% in BVD; full dose brentuximab  Completed C6 3/22/23    2. Palliative radiation to rectum 5/30/2023 (abbreviated due to systemic progression)  3. GVD+Pembro x 4 cycles: completed 8/30/2023  4. Consolidative radiation to rectum completed 9/27/23    5. Pembrolizumab: current  6. Radiation to spine 1/16, 1/18 and 1/22/24    7. Rituximab-ICE: planning 2/13/2024    Interval history:    He is here for follow up and toxicity check after cycle 1. Gained 15 lb after steroids/hospital with lower extremity swelling and abdominal bloating, now back to near normal. Pain in back of throat with swallowing. +angular chelitis. Fatigue. Feels globally weak. Eating and drinking well. Able to get up and about but runs out of steam faster. Eating well. No N/V. Very mild diarrhea, self limited. Had low back pain that resolved. No fever or infectious symptoms. Stopped coughing, breathing well. No melena, hematochezia or rectal pain.   Has had vivid dreams. These were worst after chemo and now improved. No hallucinations or focal  neuro changes    Past Medical History:   Past Medical History:   Diagnosis Date    Arthritis     benign polypoid tissue right lung     Cancer     HODGKINS LYMPHOMA, RECTAL CANCER    Diabetes mellitus     patient reports that he doesn't have diabetes secondary to changing diet and weight loss.    History of radiation therapy 12/31/2023    re-treat rectum    History of transfusion     no reaction, transfusions received at [facility] in 2022    Hyperlipidemia     Hypertension     Lymphoma     Mycobacterium mucogenicum     SHERRI (obstructive sleep apnea)     O2 2L/MIN AT NIGHT ONLY    Pneumonia     2023    Seasonal allergies    Polyp in airway removed 2020 [facility] Dr. Ketan Monet.    Past Surgical History:   Past Surgical History:   Procedure Laterality Date    BRONCHOSCOPY      removal of obstructing polypoid tissue right middle lung    BRONCHOSCOPY N/A 2/1/2024    Procedure: BRONCHOSCOPY WITH ENDOBRONCHIAL ULTRASOUND AND FLUORO;  Surgeon: Chris Pruitt MD;  Location:  [facility];  Service: Pulmonary;  Laterality: N/A;  EBUS BALLOON INTACT    COLONOSCOPY      COLOSTOMY N/A 09/22/2022    Procedure: LAPAROSCOPIC COLOSTOMY CREATION, FLEXIBLE SIGMOIDOSCOPY;  Surgeon: Hiram Viveros MD;  Location:  [facility];  Service: General;  Laterality: N/A;    DENTAL PROCEDURE      dental implants    ENDOSCOPY N/A 10/10/2022    Procedure: ESOPHAGOGASTRODUODENOSCOPY;  Surgeon: Neeraj Lynn MD;  Location:  [facility];  Service: Gastroenterology;  Laterality: N/A;    ENDOSCOPY N/A 10/12/2022    Procedure: ESOPHAGOGASTRODUODENOSCOPY;  Surgeon: Brunner, Mark I, MD;  Location:  [facility];  Service: Gastroenterology;  Laterality: N/A;    EXAM UNDER ANESTHESIA, RECTAL BIOPSY N/A 10/04/2022    Procedure: EXAM UNDER ANESTHESIA, TRANSANAL BIOPSY WITH TRUCUT NEEDLE (LITHOTOMY-CANDY CANE);  Surgeon: Hiram Viveros MD;  Location:  [facility];  Service: General;  Laterality: N/A;    EXAM UNDER ANESTHESIA, RECTAL  BIOPSY N/A 05/30/2023    Procedure: EXAM UNDER ANESTHESIA, TRANSANAL BIOPSY OF RECTAL MASS WITH TRUCUT NEEDLE, PROCTOSCOPY;  Surgeon: Hiram Viveros MD;  Location: [facility];  Service: General;  Laterality: N/A;    PERIPHERALLY INSERTED CENTRAL CATHETER INSERTION      Removed 11/28/2022    VENOUS ACCESS DEVICE (PORT) INSERTION Right 11/28/2022     Family History:   Family History   Problem Relation Age of Onset    Diabetes Mother     Diabetes Father     Heart disease Father      Social History:   Social History     Socioeconomic History    Marital status:    Tobacco Use    Smoking status: Never    Smokeless tobacco: Never   Vaping Use    Vaping Use: Never used   Substance and Sexual Activity    Alcohol use: Not Currently     Comment: previously drank 2 glasses of wine/beer nightly    Drug use: Never    Sexual activity: Defer       Medications:     Current Outpatient Medications:     allopurinol (ZYLOPRIM) 300 MG tablet, Take 1 tablet by mouth Daily., Disp: 30 tablet, Rfl: 0    dexAMETHasone (DECADRON) 4 MG tablet, Take 2 tablets by mouth Daily With Breakfast. Take 2 tablets by mouth with breakfast on days 4 and 5 of each chemotherapy cycle., Disp: 4 tablet, Rfl: 3    fluticasone (FLONASE) 50 MCG/ACT nasal spray, 1 spray into the nostril(s) as directed by provider Daily As Needed for Allergies or Rhinitis. OTC, Disp: , Rfl:     lidocaine-prilocaine (EMLA) 2.5-2.5 % cream, Apply 1 application topically to the appropriate area as directed As Needed (45-60 minutes prior to port access.  Cover with saran/plastic wrap.)., Disp: 30 g, Rfl: 3    loratadine (CLARITIN) 10 MG tablet, TAKE ONE TABLET BY MOUTH DAILY, Disp: 30 tablet, Rfl: 5    ondansetron (Zofran) 8 MG tablet, Take 1 tablet by mouth Every 8 (Eight) Hours As Needed for Nausea or Vomiting., Disp: 30 tablet, Rfl: 5    pantoprazole (PROTONIX) 40 MG EC tablet, TAKE ONE TABLET BY MOUTH DAILY, Disp: 90 tablet, Rfl: 1    prochlorperazine (COMPAZINE) 5 MG  "tablet, Take 1 tablet by mouth Every 6 (Six) Hours As Needed for Nausea or Vomiting., Disp: 30 tablet, Rfl: 2    rosuvastatin (CRESTOR) 10 MG tablet, Take 1 tablet by mouth Daily., Disp: , Rfl:   No current facility-administered medications for this visit.    Facility-Administered Medications Ordered in Other Visits:     heparin injection 500 Units, 500 Units, Intravenous, PRN, Kaycee Leary MD, 500 Units at 02/22/24 0805    sodium chloride 0.9 % infusion, 125 mL/hr, Intravenous, Continuous, Kaycee Leary MD    Allergies:   No Known Allergies    Objective     Vital Signs:   Vitals:    02/22/24 0821   BP: 126/84   Pulse: 68   Temp: 98.6 °F (37 °C)   TempSrc: Temporal   SpO2: 99%   Weight: 77.1 kg (170 lb)   Height: 162.6 cm (64.02\")   PainSc: 0-No pain      Body mass index is 29.17 kg/m².   Pain Score    02/22/24 0821   PainSc: 0-No pain         ECOG Performance Status: 1    Physical Exam:   General: Well appearing male   HEENT: Normocephalic, atraumatic. Sclera anicteric. TM clear  Neck: supple, no palpable LAD. No axillary adenopathy  Cardiovascular: regular rate and rhythm. No murmurs.   Respiratory: Normal rate. Clear to auscultation bilaterally  Abdomen: Soft, nontender, non distended with normoactive bowel sounds. Ostomy LLQ   Lymph: no supraclavicular or axillary adenopathy  Neuro: Alert and oriented x 3.   Ext: Symmetric, no swelling.   Skin: right port site  C/d/I      Laboratory/Imaging Reviewed:     Hospital Outpatient Visit on 02/22/2024   Component Date Value Ref Range Status    Glucose 02/22/2024 85  65 - 99 mg/dL Final    BUN 02/22/2024 19  8 - 23 mg/dL Final    Creatinine 02/22/2024 0.58 (L)  0.76 - 1.27 mg/dL Final    Sodium 02/22/2024 132 (L)  136 - 145 mmol/L Final    Potassium 02/22/2024 4.6  3.5 - 5.2 mmol/L Final    Chloride 02/22/2024 96 (L)  98 - 107 mmol/L Final    CO2 02/22/2024 27.0  22.0 - 29.0 mmol/L Final    Calcium 02/22/2024 9.1  8.6 - 10.5 mg/dL Final    Total Protein 02/22/2024 " 6.6  6.0 - 8.5 g/dL Final    Albumin 02/22/2024 4.0  3.5 - 5.2 g/dL Final    ALT (SGPT) 02/22/2024 16  1 - 41 U/L Final    AST (SGOT) 02/22/2024 10  1 - 40 U/L Final    Alkaline Phosphatase 02/22/2024 88  39 - 117 U/L Final    Total Bilirubin 02/22/2024 0.5  0.0 - 1.2 mg/dL Final    Globulin 02/22/2024 2.6  gm/dL Final    Calculated Result    A/G Ratio 02/22/2024 1.5  g/dL Final    BUN/Creatinine Ratio 02/22/2024 32.8 (H)  7.0 - 25.0 Final    Anion Gap 02/22/2024 9.0  5.0 - 15.0 mmol/L Final    eGFR 02/22/2024 104.3  >60.0 mL/min/1.73 Final    Magnesium 02/22/2024 1.9  1.6 - 2.4 mg/dL Final    LDH 02/22/2024 134 (L)  135 - 225 U/L Final    Phosphorus 02/22/2024 3.7  2.5 - 4.5 mg/dL Final    Uric Acid 02/22/2024 2.1 (L)  3.4 - 7.0 mg/dL Final    Falsely depressed results may occur on samples drawn from patients receiving N-Acetylcysteine (NAC) or Metamizole.    WBC 02/22/2024 0.86 (C)  3.40 - 10.80 10*3/mm3 Final    RBC 02/22/2024 3.32 (L)  4.14 - 5.80 10*6/mm3 Final    Hemoglobin 02/22/2024 10.6 (L)  13.0 - 17.7 g/dL Final    Hematocrit 02/22/2024 31.9 (L)  37.5 - 51.0 % Final    MCV 02/22/2024 96.1  79.0 - 97.0 fL Final    MCH 02/22/2024 31.9  26.6 - 33.0 pg Final    MCHC 02/22/2024 33.2  31.5 - 35.7 g/dL Final    RDW 02/22/2024 14.6  12.3 - 15.4 % Final    RDW-SD 02/22/2024 52.4  37.0 - 54.0 fl Final    MPV 02/22/2024 9.4  6.0 - 12.0 fL Final    Platelets 02/22/2024 90 (L)  140 - 450 10*3/mm3 Final    Neutrophil % 02/22/2024 48.8  42.7 - 76.0 % Final    Lymphocyte % 02/22/2024 33.7  19.6 - 45.3 % Final    Monocyte % 02/22/2024 11.6  5.0 - 12.0 % Final    Eosinophil % 02/22/2024 3.5  0.3 - 6.2 % Final    Basophil % 02/22/2024 1.2  0.0 - 1.5 % Final    Immature Grans % 02/22/2024 1.2 (H)  0.0 - 0.5 % Final    Neutrophils, Absolute 02/22/2024 0.42 (L)  1.70 - 7.00 10*3/mm3 Final    Lymphocytes, Absolute 02/22/2024 0.29 (L)  0.70 - 3.10 10*3/mm3 Final    Monocytes, Absolute 02/22/2024 0.10  0.10 - 0.90 10*3/mm3 Final     Eosinophils, Absolute 02/22/2024 0.03  0.00 - 0.40 10*3/mm3 Final    Basophils, Absolute 02/22/2024 0.01  0.00 - 0.20 10*3/mm3 Final    Immature Grans, Absolute 02/22/2024 0.01  0.00 - 0.05 10*3/mm3 Final   Hospital Outpatient Visit on 02/19/2024   Component Date Value Ref Range Status    Glucose 02/19/2024 86  65 - 99 mg/dL Final    BUN 02/19/2024 21  8 - 23 mg/dL Final    Creatinine 02/19/2024 0.62 (L)  0.76 - 1.27 mg/dL Final    Sodium 02/19/2024 131 (L)  136 - 145 mmol/L Final    Potassium 02/19/2024 4.6  3.5 - 5.2 mmol/L Final    Chloride 02/19/2024 96 (L)  98 - 107 mmol/L Final    CO2 02/19/2024 30.0 (H)  22.0 - 29.0 mmol/L Final    Calcium 02/19/2024 9.1  8.6 - 10.5 mg/dL Final    BUN/Creatinine Ratio 02/19/2024 33.9 (H)  7.0 - 25.0 Final    Anion Gap 02/19/2024 5.0  5.0 - 15.0 mmol/L Final    eGFR 02/19/2024 102.2  >60.0 mL/min/1.73 Final    Magnesium 02/19/2024 2.3  1.6 - 2.4 mg/dL Final    WBC 02/19/2024 6.45  3.40 - 10.80 10*3/mm3 Final    RBC 02/19/2024 3.80 (L)  4.14 - 5.80 10*6/mm3 Final    Hemoglobin 02/19/2024 11.9 (L)  13.0 - 17.7 g/dL Final    Hematocrit 02/19/2024 37.1 (L)  37.5 - 51.0 % Final    MCV 02/19/2024 97.6 (H)  79.0 - 97.0 fL Final    MCH 02/19/2024 31.3  26.6 - 33.0 pg Final    MCHC 02/19/2024 32.1  31.5 - 35.7 g/dL Final    RDW 02/19/2024 15.0  12.3 - 15.4 % Final    RDW-SD 02/19/2024 53.9  37.0 - 54.0 fl Final    MPV 02/19/2024 9.3  6.0 - 12.0 fL Final    Platelets 02/19/2024 171  140 - 450 10*3/mm3 Final    Neutrophil % 02/19/2024 88.5 (H)  42.7 - 76.0 % Final    Lymphocyte % 02/19/2024 7.3 (L)  19.6 - 45.3 % Final    Monocyte % 02/19/2024 1.1 (L)  5.0 - 12.0 % Final    Eosinophil % 02/19/2024 1.9  0.3 - 6.2 % Final    Basophil % 02/19/2024 0.3  0.0 - 1.5 % Final    Immature Grans % 02/19/2024 0.9 (H)  0.0 - 0.5 % Final    Neutrophils, Absolute 02/19/2024 5.71  1.70 - 7.00 10*3/mm3 Final    Lymphocytes, Absolute 02/19/2024 0.47 (L)  0.70 - 3.10 10*3/mm3 Final    Monocytes,  Absolute 02/19/2024 0.07 (L)  0.10 - 0.90 10*3/mm3 Final    Eosinophils, Absolute 02/19/2024 0.12  0.00 - 0.40 10*3/mm3 Final    Basophils, Absolute 02/19/2024 0.02  0.00 - 0.20 10*3/mm3 Final    Immature Grans, Absolute 02/19/2024 0.06 (H)  0.00 - 0.05 10*3/mm3 Final    nRBC 02/19/2024 0.0  0.0 - 0.2 /100 WBC Final   No results displayed because visit has over 200 results.      Hospital Outpatient Visit on 02/13/2024   Component Date Value Ref Range Status    Glucose 02/13/2024 88  65 - 99 mg/dL Final    BUN 02/13/2024 20  8 - 23 mg/dL Final    Creatinine 02/13/2024 0.60 (L)  0.76 - 1.27 mg/dL Final    Sodium 02/13/2024 137  136 - 145 mmol/L Final    Potassium 02/13/2024 4.2  3.5 - 5.2 mmol/L Final    Chloride 02/13/2024 101  98 - 107 mmol/L Final    CO2 02/13/2024 29.0  22.0 - 29.0 mmol/L Final    Calcium 02/13/2024 8.8  8.6 - 10.5 mg/dL Final    Total Protein 02/13/2024 6.3  6.0 - 8.5 g/dL Final    Albumin 02/13/2024 3.9  3.5 - 5.2 g/dL Final    ALT (SGPT) 02/13/2024 14  1 - 41 U/L Final    AST (SGOT) 02/13/2024 13  1 - 40 U/L Final    Alkaline Phosphatase 02/13/2024 70  39 - 117 U/L Final    Total Bilirubin 02/13/2024 0.3  0.0 - 1.2 mg/dL Final    Globulin 02/13/2024 2.4  gm/dL Final    Calculated Result    A/G Ratio 02/13/2024 1.6  g/dL Final    BUN/Creatinine Ratio 02/13/2024 33.3 (H)  7.0 - 25.0 Final    Anion Gap 02/13/2024 7.0  5.0 - 15.0 mmol/L Final    eGFR 02/13/2024 103.2  >60.0 mL/min/1.73 Final    Magnesium 02/13/2024 2.0  1.6 - 2.4 mg/dL Final    LDH 02/13/2024 141  135 - 225 U/L Final    Phosphorus 02/13/2024 3.8  2.5 - 4.5 mg/dL Final    Uric Acid 02/13/2024 2.8 (L)  3.4 - 7.0 mg/dL Final    Falsely depressed results may occur on samples drawn from patients receiving N-Acetylcysteine (NAC) or Metamizole.    WBC 02/13/2024 5.54  3.40 - 10.80 10*3/mm3 Final    RBC 02/13/2024 3.61 (L)  4.14 - 5.80 10*6/mm3 Final    Hemoglobin 02/13/2024 11.6 (L)  13.0 - 17.7 g/dL Final    Hematocrit 02/13/2024 34.9  (L)  37.5 - 51.0 % Final    MCV 02/13/2024 96.7  79.0 - 97.0 fL Final    MCH 02/13/2024 32.1  26.6 - 33.0 pg Final    MCHC 02/13/2024 33.2  31.5 - 35.7 g/dL Final    RDW 02/13/2024 15.4  12.3 - 15.4 % Final    RDW-SD 02/13/2024 55.0 (H)  37.0 - 54.0 fl Final    MPV 02/13/2024 8.7  6.0 - 12.0 fL Final    Platelets 02/13/2024 189  140 - 450 10*3/mm3 Final    Neutrophil % 02/13/2024 74.3  42.7 - 76.0 % Final    Lymphocyte % 02/13/2024 12.3 (L)  19.6 - 45.3 % Final    Monocyte % 02/13/2024 11.6  5.0 - 12.0 % Final    Eosinophil % 02/13/2024 1.1  0.3 - 6.2 % Final    Basophil % 02/13/2024 0.2  0.0 - 1.5 % Final    Immature Grans % 02/13/2024 0.5  0.0 - 0.5 % Final    Neutrophils, Absolute 02/13/2024 4.12  1.70 - 7.00 10*3/mm3 Final    Lymphocytes, Absolute 02/13/2024 0.68 (L)  0.70 - 3.10 10*3/mm3 Final    Monocytes, Absolute 02/13/2024 0.64  0.10 - 0.90 10*3/mm3 Final    Eosinophils, Absolute 02/13/2024 0.06  0.00 - 0.40 10*3/mm3 Final    Basophils, Absolute 02/13/2024 0.01  0.00 - 0.20 10*3/mm3 Final    Immature Grans, Absolute 02/13/2024 0.03  0.00 - 0.05 10*3/mm3 Final   Orders Only on 02/13/2024   Component Date Value Ref Range Status    Color, UA 02/19/2024 Yellow  Yellow, Straw Final    Appearance, UA 02/19/2024 Clear  Clear Final    pH, UA 02/19/2024 7.0  5.0 - 8.0 Final    Specific Gravity, UA 02/19/2024 1.029  1.001 - 1.030 Final    Glucose, UA 02/19/2024 Negative  Negative Final    Ketones, UA 02/19/2024 Trace (A)  Negative Final    Bilirubin, UA 02/19/2024 Negative  Negative Final    Blood, UA 02/19/2024 Negative  Negative Final    Protein, UA 02/19/2024 Trace (A)  Negative Final    Leuk Esterase, UA 02/19/2024 Negative  Negative Final    Nitrite, UA 02/19/2024 Negative  Negative Final    Urobilinogen, UA 02/19/2024 1.0 E.U./dL  0.2 - 1.0 E.U./dL Final       XR Chest 1 View    Result Date: 2/1/2024  Narrative: XR CHEST 1 VW Date of Exam: 2/1/2024 8:33 AM EST Indication: R/O PTX Comparison: 5/22/2023.  Findings: Right chest port is unchanged in position. There is no pneumothorax. Heart and pulmonary vessels appear within normal limits. No acute infiltrates or effusions are detected.     Impression: Impression: No acute process. No pneumothorax. Electronically Signed: Araseli Mccormack MD  2/1/2024 8:48 AM EST  Workstation ID: [id number]    FL C Arm During Surgery    Result Date: 2/1/2024  Narrative: This procedure was auto-finalized with no dictation required.     Assessment / Plan      Assessment/Plan:     1.  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  Chemo monitoring  3.  Pancytopenia secondary to antineoplastic therapy.   -He completed 6 cycles of BV-AVD 3/23/2023. Cycle 6 was complicated by coronavirus infection and multifocal pneumonia.  Posttherapy PET showed persistent bilateral interstitial changes and mild adenopathy. Repeat chest CT shows continued improvement in the pulmonary infiltrates consistent with a reactive infectious etiology.  - He had clear response to first line therapy with resolution of the hepatic lesions, marked improvement in the retroperitoneal adenopathy, and 50% reduction in the size of the rectal mass.  However he had persistent uptake in the rectal mass on posttreatment PET, [place] 5. We elected to proceed with biopsy to confirm whether he had residual disease followed by consolidative radiotherapy to the rectal mass given initial bulky disease.  In the interim, while awaiting biopsy, he presented with rectal bleeding, obstructive uropathy symptoms and pain and radiation planning CT showed significant interval growth in the rectal mass as well as development of new retroperitoneal adenopathy superior to the rectal mass compared to the his posttreatment PET, confirming progressive disease. Consolidative radiation was converted to palliative course radiation given interval adenopathy development above the radiation field to  allow for earlier introduction of second line chemotherapy.   -Repeat biopsy confirmed residual classic Hodgkin's lymphoma.  -Given national shortage of carbo/cis, we proceeded with Keytruda, vinorelbine, Gemzar, and Doxil. He completed 4 cycles 8/2023. Post treatment PET showed complete resolution of FDG avidity.  He continued Keytruda maintenance  -Most recent PET from 12/2023 with resolution of FDG avidity in the rectal mass which has decreased in size.  There is a new area of FDG avidity and a lytic lesion in the L4 region as well as mild SUV uptake in a right hilar node.  Lytic lesion appears to have been there previously on my review of prior PETs but with increased activity. He completed stereotactic radiation therapy to this area as he was having pain.  There was also an indeterminate hilar LN. I recommended continuation of maintenance Keytruda and short interval follow-up CT of the chest to reassess this. CT in Jan compared to prior PET showed worsening consolidative RML from hilum to major fissure with possible broncholith and no specific mass. He underwent bronchoscopy with findings confirming recurrent CD30 positive Hodgkin's lymphoma, but he now has developed strong CD20 positivity.  Morphologically this is felt to represent the same underlying Hodgkin's process, but with immunophenotype switch.  I recommended third line systemic therapy.  He initiated  Rituximab/ICE  -He is tolerating chemotherapy well.  CBC reviewed.  He has expected chemotherapy-induced pancytopenia.  Continue twice weekly CBC with cycle #1.  I am going to initiate Levaquin prophylaxis for low ANC.  He did receive Neulasta on Monday.  -He will continue with daily allopurinol for duration of cycle 1.  Tumor lysis labs within acceptable range today.  -Plan admission on 3/5 for consideration of cycle 2.  Message left with admission/hospitalist and will discuss with hospitalist to arrange this.  -I reviewed his case with Dr. Pino at [facility]  yesterday and recounted this discussion with the patient today.  We will plan a PET/CT after 2 cycles of chemotherapy and he will follow-up with Dr. Pino at that time to discuss stem cell collection if he has had a good early response.    2.  H/o GI bleed  -PPI, continue daily.  Tolerating well.     3. Access  -Port c/d/I    4.  Right middle lobe obstruction  -Secondary to his malignancy.  He is on room air.  I have discussed with pulmonary and he would potentially be candidate for his stent if he has progressive obstructive symptoms.  However, he is currently on room air.  He has had an early response in his symptoms with treatment initiation which is suggestive or early response.     5.  Mucositis  -Add Magic mouthwash    6.  Angular cheilitis  -Add bacitracin    Follow Up:   Twice weekly blood counts pending yoav.  1 week with repeat labs.  2 weeks with consideration of repeat admission for RICE cycle #2    Kaycee Leary MD  Hematology and Oncology     I have spent a total of 40 min on the day of service including time before, during, and after the office visit on reviewing test results/preparing to see patient, counseling patient, performing medically appropriate exam, placing orders, coordinating care, discussion with nursing and documenting clinical information in the electronic or other health record

## 2024-02-22 NOTE — TELEPHONE ENCOUNTER
Critical Test Results      MD: Dr. Leary     Date: 2/22/24     Critical test result: WBC 0.86,     Time results received: 0852

## 2024-02-22 NOTE — TELEPHONE ENCOUNTER
Name of Physician notified: Kaycee Leary MD    Time Physician Notified: 08:58      []  Orders received    []  Protocol/Standing orders followed    [x]  No new orders

## 2024-02-26 ENCOUNTER — READMISSION MANAGEMENT (OUTPATIENT)
Dept: CALL CENTER | Facility: HOSPITAL | Age: 74
End: 2024-02-26
Payer: MEDICARE

## 2024-02-26 ENCOUNTER — HOSPITAL ENCOUNTER (OUTPATIENT)
Dept: ONCOLOGY | Facility: HOSPITAL | Age: 74
Discharge: HOME | End: 2024-02-26
Admitting: INTERNAL MEDICINE
Payer: MEDICARE

## 2024-02-26 VITALS
DIASTOLIC BLOOD PRESSURE: 57 MMHG | SYSTOLIC BLOOD PRESSURE: 126 MMHG | HEIGHT: 64 IN | BODY MASS INDEX: 29.11 KG/M2 | WEIGHT: 170.5 LBS | RESPIRATION RATE: 18 BRPM | HEART RATE: 82 BPM | TEMPERATURE: 98.5 F

## 2024-02-26 DIAGNOSIS — C81.98 HODGKIN LYMPHOMA OF LYMPH NODES OF MULTIPLE REGIONS, UNSPECIFIED HODGKIN LYMPHOMA TYPE: ICD-10-CM

## 2024-02-26 DIAGNOSIS — Z45.2 ENCOUNTER FOR CARE RELATED TO VASCULAR ACCESS PORT: Primary | ICD-10-CM

## 2024-02-26 LAB
ALBUMIN SERPL-MCNC: 3.9 G/DL (ref 3.5–5.2)
ALBUMIN/GLOB SERPL: 1.6 G/DL
ALP SERPL-CCNC: 86 U/L (ref 39–117)
ALT SERPL W P-5'-P-CCNC: 12 U/L (ref 1–41)
ANION GAP SERPL CALCULATED.3IONS-SCNC: 10 MMOL/L (ref 5–15)
AST SERPL-CCNC: 11 U/L (ref 1–40)
BASOPHILS # BLD AUTO: 0.01 10*3/MM3 (ref 0–0.2)
BASOPHILS NFR BLD AUTO: 0.3 % (ref 0–1.5)
BILIRUB SERPL-MCNC: 0.4 MG/DL (ref 0–1.2)
BUN SERPL-MCNC: 15 MG/DL (ref 8–23)
BUN/CREAT SERPL: 27.3 (ref 7–25)
CALCIUM SPEC-SCNC: 8.9 MG/DL (ref 8.6–10.5)
CHLORIDE SERPL-SCNC: 98 MMOL/L (ref 98–107)
CO2 SERPL-SCNC: 26 MMOL/L (ref 22–29)
CREAT SERPL-MCNC: 0.55 MG/DL (ref 0.76–1.27)
DEPRECATED RDW RBC AUTO: 51.8 FL (ref 37–54)
EGFRCR SERPLBLD CKD-EPI 2021: 106 ML/MIN/1.73
EOSINOPHIL # BLD AUTO: 0.02 10*3/MM3 (ref 0–0.4)
EOSINOPHIL NFR BLD AUTO: 0.6 % (ref 0.3–6.2)
ERYTHROCYTE [DISTWIDTH] IN BLOOD BY AUTOMATED COUNT: 15.3 % (ref 12.3–15.4)
GLOBULIN UR ELPH-MCNC: 2.5 GM/DL
GLUCOSE SERPL-MCNC: 123 MG/DL (ref 65–99)
HCT VFR BLD AUTO: 29 % (ref 37.5–51)
HGB BLD-MCNC: 9.9 G/DL (ref 13–17.7)
IMM GRANULOCYTES # BLD AUTO: 0.19 10*3/MM3 (ref 0–0.05)
IMM GRANULOCYTES NFR BLD AUTO: 5.5 % (ref 0–0.5)
LDH SERPL-CCNC: 138 U/L (ref 135–225)
LYMPHOCYTES # BLD AUTO: 0.41 10*3/MM3 (ref 0.7–3.1)
LYMPHOCYTES NFR BLD AUTO: 11.8 % (ref 19.6–45.3)
MAGNESIUM SERPL-MCNC: 1.8 MG/DL (ref 1.6–2.4)
MCH RBC QN AUTO: 32.7 PG (ref 26.6–33)
MCHC RBC AUTO-ENTMCNC: 34.1 G/DL (ref 31.5–35.7)
MCV RBC AUTO: 95.7 FL (ref 79–97)
MONOCYTES # BLD AUTO: 0.57 10*3/MM3 (ref 0.1–0.9)
MONOCYTES NFR BLD AUTO: 16.4 % (ref 5–12)
NEUTROPHILS NFR BLD AUTO: 2.28 10*3/MM3 (ref 1.7–7)
NEUTROPHILS NFR BLD AUTO: 65.4 % (ref 42.7–76)
PHOSPHATE SERPL-MCNC: 3.3 MG/DL (ref 2.5–4.5)
PLATELET # BLD AUTO: 49 10*3/MM3 (ref 140–450)
PMV BLD AUTO: 10.2 FL (ref 6–12)
POTASSIUM SERPL-SCNC: 4.3 MMOL/L (ref 3.5–5.2)
PROT SERPL-MCNC: 6.4 G/DL (ref 6–8.5)
RBC # BLD AUTO: 3.03 10*6/MM3 (ref 4.14–5.8)
SODIUM SERPL-SCNC: 134 MMOL/L (ref 136–145)
URATE SERPL-MCNC: 3 MG/DL (ref 3.4–7)
WBC NRBC COR # BLD AUTO: 3.48 10*3/MM3 (ref 3.4–10.8)

## 2024-02-26 PROCEDURE — 84100 ASSAY OF PHOSPHORUS: CPT | Performed by: INTERNAL MEDICINE

## 2024-02-26 PROCEDURE — 80053 COMPREHEN METABOLIC PANEL: CPT | Performed by: INTERNAL MEDICINE

## 2024-02-26 PROCEDURE — 85025 COMPLETE CBC W/AUTO DIFF WBC: CPT | Performed by: INTERNAL MEDICINE

## 2024-02-26 PROCEDURE — 36591 DRAW BLOOD OFF VENOUS DEVICE: CPT

## 2024-02-26 PROCEDURE — 83735 ASSAY OF MAGNESIUM: CPT | Performed by: INTERNAL MEDICINE

## 2024-02-26 PROCEDURE — 83615 LACTATE (LD) (LDH) ENZYME: CPT | Performed by: INTERNAL MEDICINE

## 2024-02-26 PROCEDURE — 25010000002 HEPARIN LOCK FLUSH PER 10 UNITS: Performed by: INTERNAL MEDICINE

## 2024-02-26 PROCEDURE — 84550 ASSAY OF BLOOD/URIC ACID: CPT | Performed by: INTERNAL MEDICINE

## 2024-02-26 RX ORDER — HEPARIN SODIUM (PORCINE) LOCK FLUSH IV SOLN 100 UNIT/ML 100 UNIT/ML
500 SOLUTION INTRAVENOUS AS NEEDED
Status: CANCELLED | OUTPATIENT
Start: 2024-02-26

## 2024-02-26 RX ORDER — HEPARIN SODIUM (PORCINE) LOCK FLUSH IV SOLN 100 UNIT/ML 100 UNIT/ML
500 SOLUTION INTRAVENOUS AS NEEDED
Status: DISCONTINUED | OUTPATIENT
Start: 2024-02-26 | End: 2024-02-27 | Stop reason: HOSPADM

## 2024-02-26 RX ADMIN — HEPARIN 500 UNITS: 100 SYRINGE at 11:16

## 2024-02-26 NOTE — OUTREACH NOTE
Medical Week 1 Survey      Flowsheet Row Responses   [facility] patient discharged from? [place]   Does the patient have one of the following disease processes/diagnoses(primary or secondary)? Other   Week 1 attempt successful? No   Unsuccessful attempts Attempt 3            María TOLENTINO - Registered Nurse

## 2024-02-27 RX ORDER — DIPHENHYDRAMINE HYDROCHLORIDE AND LIDOCAINE HYDROCHLORIDE AND ALUMINUM HYDROXIDE AND MAGNESIUM HYDRO
5 KIT EVERY 6 HOURS
Qty: 240 ML | Refills: 3 | Status: CANCELLED | OUTPATIENT
Start: 2024-02-27

## 2024-02-29 ENCOUNTER — TELEPHONE (OUTPATIENT)
Dept: ONCOLOGY | Facility: CLINIC | Age: 74
End: 2024-02-29

## 2024-02-29 ENCOUNTER — HOSPITAL ENCOUNTER (OUTPATIENT)
Dept: ONCOLOGY | Facility: HOSPITAL | Age: 74
Discharge: HOME | End: 2024-02-29
Admitting: INTERNAL MEDICINE
Payer: MEDICARE

## 2024-02-29 ENCOUNTER — OFFICE VISIT (OUTPATIENT)
Dept: ONCOLOGY | Facility: CLINIC | Age: 74
End: 2024-02-29
Payer: MEDICARE

## 2024-02-29 VITALS
SYSTOLIC BLOOD PRESSURE: 111 MMHG | HEART RATE: 73 BPM | HEIGHT: 64 IN | BODY MASS INDEX: 28.85 KG/M2 | OXYGEN SATURATION: 98 % | TEMPERATURE: 97.5 F | DIASTOLIC BLOOD PRESSURE: 65 MMHG | RESPIRATION RATE: 18 BRPM | WEIGHT: 169 LBS

## 2024-02-29 DIAGNOSIS — C81.98 HODGKIN LYMPHOMA OF LYMPH NODES OF MULTIPLE REGIONS, UNSPECIFIED HODGKIN LYMPHOMA TYPE: Primary | ICD-10-CM

## 2024-02-29 DIAGNOSIS — Z45.2 ENCOUNTER FOR CARE RELATED TO VASCULAR ACCESS PORT: Primary | ICD-10-CM

## 2024-02-29 DIAGNOSIS — C81.98 HODGKIN LYMPHOMA OF LYMPH NODES OF MULTIPLE REGIONS, UNSPECIFIED HODGKIN LYMPHOMA TYPE: ICD-10-CM

## 2024-02-29 LAB
ALBUMIN SERPL-MCNC: 4.1 G/DL (ref 3.5–5.2)
ALBUMIN/GLOB SERPL: 1.8 G/DL
ALP SERPL-CCNC: 99 U/L (ref 39–117)
ALT SERPL W P-5'-P-CCNC: 11 U/L (ref 1–41)
ANION GAP SERPL CALCULATED.3IONS-SCNC: 8 MMOL/L (ref 5–15)
AST SERPL-CCNC: 13 U/L (ref 1–40)
BASOPHILS # BLD AUTO: 0.02 10*3/MM3 (ref 0–0.2)
BASOPHILS NFR BLD AUTO: 0.4 % (ref 0–1.5)
BILIRUB SERPL-MCNC: 0.2 MG/DL (ref 0–1.2)
BUN SERPL-MCNC: 11 MG/DL (ref 8–23)
BUN/CREAT SERPL: 19.6 (ref 7–25)
CALCIUM SPEC-SCNC: 9.2 MG/DL (ref 8.6–10.5)
CHLORIDE SERPL-SCNC: 97 MMOL/L (ref 98–107)
CO2 SERPL-SCNC: 29 MMOL/L (ref 22–29)
CREAT SERPL-MCNC: 0.56 MG/DL (ref 0.76–1.27)
DEPRECATED RDW RBC AUTO: 51.7 FL (ref 37–54)
EGFRCR SERPLBLD CKD-EPI 2021: 105.4 ML/MIN/1.73
EOSINOPHIL # BLD AUTO: 0.02 10*3/MM3 (ref 0–0.4)
EOSINOPHIL NFR BLD AUTO: 0.4 % (ref 0.3–6.2)
ERYTHROCYTE [DISTWIDTH] IN BLOOD BY AUTOMATED COUNT: 15.6 % (ref 12.3–15.4)
FUNGUS WND CULT: NORMAL
GLOBULIN UR ELPH-MCNC: 2.3 GM/DL
GLUCOSE SERPL-MCNC: 110 MG/DL (ref 65–99)
HCT VFR BLD AUTO: 29.2 % (ref 37.5–51)
HGB BLD-MCNC: 10 G/DL (ref 13–17.7)
IMM GRANULOCYTES # BLD AUTO: 0.25 10*3/MM3 (ref 0–0.05)
IMM GRANULOCYTES NFR BLD AUTO: 5.1 % (ref 0–0.5)
LDH SERPL-CCNC: 169 U/L (ref 135–225)
LYMPHOCYTES # BLD AUTO: 0.5 10*3/MM3 (ref 0.7–3.1)
LYMPHOCYTES NFR BLD AUTO: 10.1 % (ref 19.6–45.3)
MAGNESIUM SERPL-MCNC: 1.9 MG/DL (ref 1.6–2.4)
MCH RBC QN AUTO: 32.7 PG (ref 26.6–33)
MCHC RBC AUTO-ENTMCNC: 34.2 G/DL (ref 31.5–35.7)
MCV RBC AUTO: 95.4 FL (ref 79–97)
MONOCYTES # BLD AUTO: 0.81 10*3/MM3 (ref 0.1–0.9)
MONOCYTES NFR BLD AUTO: 16.4 % (ref 5–12)
MYCOBACTERIUM SPEC CULT: NORMAL
NEUTROPHILS NFR BLD AUTO: 3.33 10*3/MM3 (ref 1.7–7)
NEUTROPHILS NFR BLD AUTO: 67.6 % (ref 42.7–76)
NIGHT BLUE STAIN TISS: NORMAL
PHOSPHATE SERPL-MCNC: 3.5 MG/DL (ref 2.5–4.5)
PLATELET # BLD AUTO: 81 10*3/MM3 (ref 140–450)
PMV BLD AUTO: 9.8 FL (ref 6–12)
POTASSIUM SERPL-SCNC: 4.1 MMOL/L (ref 3.5–5.2)
PROT SERPL-MCNC: 6.4 G/DL (ref 6–8.5)
RBC # BLD AUTO: 3.06 10*6/MM3 (ref 4.14–5.8)
SODIUM SERPL-SCNC: 134 MMOL/L (ref 136–145)
URATE SERPL-MCNC: 3.7 MG/DL (ref 3.4–7)
WBC NRBC COR # BLD AUTO: 4.93 10*3/MM3 (ref 3.4–10.8)

## 2024-02-29 PROCEDURE — 85025 COMPLETE CBC W/AUTO DIFF WBC: CPT | Performed by: INTERNAL MEDICINE

## 2024-02-29 PROCEDURE — 80053 COMPREHEN METABOLIC PANEL: CPT | Performed by: INTERNAL MEDICINE

## 2024-02-29 PROCEDURE — 25010000002 HEPARIN LOCK FLUSH PER 10 UNITS: Performed by: INTERNAL MEDICINE

## 2024-02-29 PROCEDURE — 84100 ASSAY OF PHOSPHORUS: CPT | Performed by: INTERNAL MEDICINE

## 2024-02-29 PROCEDURE — 36591 DRAW BLOOD OFF VENOUS DEVICE: CPT

## 2024-02-29 PROCEDURE — 83735 ASSAY OF MAGNESIUM: CPT | Performed by: INTERNAL MEDICINE

## 2024-02-29 PROCEDURE — 84550 ASSAY OF BLOOD/URIC ACID: CPT | Performed by: INTERNAL MEDICINE

## 2024-02-29 PROCEDURE — 83615 LACTATE (LD) (LDH) ENZYME: CPT | Performed by: INTERNAL MEDICINE

## 2024-02-29 RX ORDER — MEPERIDINE HYDROCHLORIDE 25 MG/ML
25 INJECTION INTRAMUSCULAR; INTRAVENOUS; SUBCUTANEOUS
OUTPATIENT
Start: 2024-03-05

## 2024-02-29 RX ORDER — DIPHENHYDRAMINE HYDROCHLORIDE 50 MG/ML
50 INJECTION INTRAMUSCULAR; INTRAVENOUS AS NEEDED
OUTPATIENT
Start: 2024-03-05

## 2024-02-29 RX ORDER — FAMOTIDINE 10 MG/ML
20 INJECTION, SOLUTION INTRAVENOUS AS NEEDED
OUTPATIENT
Start: 2024-03-05

## 2024-02-29 RX ORDER — HEPARIN SODIUM (PORCINE) LOCK FLUSH IV SOLN 100 UNIT/ML 100 UNIT/ML
500 SOLUTION INTRAVENOUS AS NEEDED
Status: DISCONTINUED | OUTPATIENT
Start: 2024-02-29 | End: 2024-03-01 | Stop reason: HOSPADM

## 2024-02-29 RX ORDER — SODIUM CHLORIDE 9 MG/ML
125 INJECTION, SOLUTION INTRAVENOUS CONTINUOUS
Start: 2024-03-06

## 2024-02-29 RX ORDER — HEPARIN SODIUM (PORCINE) LOCK FLUSH IV SOLN 100 UNIT/ML 100 UNIT/ML
500 SOLUTION INTRAVENOUS AS NEEDED
OUTPATIENT
Start: 2024-02-29

## 2024-02-29 RX ORDER — SODIUM CHLORIDE 9 MG/ML
125 INJECTION, SOLUTION INTRAVENOUS CONTINUOUS
OUTPATIENT
Start: 2024-03-05

## 2024-02-29 RX ORDER — VALACYCLOVIR HYDROCHLORIDE 500 MG/1
500 TABLET, FILM COATED ORAL DAILY
Qty: 30 TABLET | Refills: 3 | Status: SHIPPED | OUTPATIENT
Start: 2024-02-29

## 2024-02-29 RX ORDER — DEXAMETHASONE 0.5 MG/1
8 TABLET ORAL EVERY 24 HOURS
OUTPATIENT
Start: 2024-03-08

## 2024-02-29 RX ORDER — ACETAMINOPHEN 325 MG/1
650 TABLET ORAL ONCE
OUTPATIENT
Start: 2024-03-05

## 2024-02-29 RX ADMIN — HEPARIN 500 UNITS: 100 SYRINGE at 10:36

## 2024-02-29 NOTE — TELEPHONE ENCOUNTER
Notified patient's spouse that on 3/5/24, go to [facility] for labs and port access (Per Dr. Leary order and clearance from [facility] supervisor, Dayana), then see Dr. Leary and then registration for hospital admission. Advised her patient should not go to registration before going to [facility] and MD.  She verbalized understanding.

## 2024-02-29 NOTE — PROGRESS NOTES
Hematology and Oncology [place]  Office number [phone]    Fax number [phone]     Follow up     Date: 24    Patient Name: Abraham Wu  MRN: [id number]  : 1950    Chief Complaint: Hodgkin Lymphoma follow up/treatment    Surgeon: Dr. Hirma Viveros MD    Cancer Staging: IV    History of Present Illness: Abraham Wu is a pleasant 73 y.o. male with PMH of hypertension, sleep apnea, hard of hearing who presents today for follow up of Hodgkin Lymphoma.     He initially presented 2022 with intractable diarrhea, low appetite, and a greater than 50 pound weight loss over several month period associated with intermittent fevers.  CT of the chest abdomen pelvis obtained in the ER shows of rectal mass spanning greater than 12 cm with adjacent adenopathy, bulky RP adenopathy, and findings concerning for left renal and liver metastases.  Small right pleural effusion with nodularity.  There was concern for impending obstruction, so he underwent diverting colostomy and biopsy on 2022.  Biopsies from the peritoneam showed a fibrotic nodule histiocytes and eosinophils admixed with CD30 positive large cells.  Rectal biopsies showed involvement by CD30 positive lymphoma, classic Hodgkin lymphoma versus CD30 positive T-cell or B-cell lymphoma.  There was extensive fibrosis and crush artifact.  He underwent repeat transrectal biopsy 10/4/2022 for further characterization which was felt to be most consistent with classical Hodgkin lymphoma.    He initiated chemotherapy with Brentuximab-AVD as an inpatient on 10/8/2022.  Cycle #1 was complicated by GI bleed requiring multiple blood transfusion and ultimately coil artery embolization 10/12; neutropenic fever, Candida esophagitis and mucositis.  He had a prolonged ICU stay  And required enteral feeding.  He was discharged 10/20/2022.    Following his last cycle he was admitted with multifocal PNA and +corona virus  infection.  ]  He underwent brochoscopy 2/1/24 showing heaped up, irregular, friable mucosa obstructing the right middle lobe otherwise no discrete endobronchial lesions. Pathology showed persistent/recurrent CD30 positive lymphoma; see comment. These continue to have CD30 and RACHELE positivity, consistent with involvement by the same process. However, the lymphoma cells are now more numerous and have an intact B-cell expression profile, with strong diffuse CD20 expression, strong PAX5 expression, and expression of CD45 and CD79a. This is strongly favored to represent continued involvement with the same process with the development of a slightly different immunophenotype after pembrolizumab therapy as opposed to a secondary lymphoma. The strong expression of CD20 may be a therapeutic target. CD30 expression remains intact as well.     Treatment history:  Brentuximab-AVD: C1 10/8/22  C2: 11/1/22 onward with DA 20% in BVD; full dose brentuximab  Completed C6 3/22/23    2. Palliative radiation to rectum 5/30/2023 (abbreviated due to systemic progression)  3. GVD+Pembro x 4 cycles: completed 8/30/2023  4. Consolidative radiation to rectum completed 9/27/23    5. Pembrolizumab: current  6. Radiation to spine 1/16, 1/18 and 1/22/24    7. Rituximab-ICE: planning 2/13/2024    Interval history:    He is here for follow up.   Feeling better in last couple of day, energy good. Still with sores on external lips. +h/o fever blisters in past. Mucositis resolved. No n/v/diarrhea. Good ostomy output  Cough remains resolved. Breathing comfortably.    Past Medical History:   Past Medical History:   Diagnosis Date    Arthritis     benign polypoid tissue right lung     Cancer     HODGKINS LYMPHOMA, RECTAL CANCER    Diabetes mellitus     patient reports that he doesn't have diabetes secondary to changing diet and weight loss.    History of radiation therapy 12/31/2023    re-treat rectum    History of transfusion     no reaction,  transfusions received at [facility] in 2022    Hyperlipidemia     Hypertension     Lymphoma     Mycobacterium mucogenicum     SHERRI (obstructive sleep apnea)     O2 2L/MIN AT NIGHT ONLY    Pneumonia     2023    Seasonal allergies    Polyp in airway removed 2020 [facility] Dr. Ketan Monet.    Past Surgical History:   Past Surgical History:   Procedure Laterality Date    BRONCHOSCOPY      removal of obstructing polypoid tissue right middle lung    BRONCHOSCOPY N/A 2/1/2024    Procedure: BRONCHOSCOPY WITH ENDOBRONCHIAL ULTRASOUND AND FLUORO;  Surgeon: Chris Pruitt MD;  Location:  [facility];  Service: Pulmonary;  Laterality: N/A;  EBUS BALLOON INTACT    COLONOSCOPY      COLOSTOMY N/A 09/22/2022    Procedure: LAPAROSCOPIC COLOSTOMY CREATION, FLEXIBLE SIGMOIDOSCOPY;  Surgeon: Hiram Viveros MD;  Location:  [facility];  Service: General;  Laterality: N/A;    DENTAL PROCEDURE      dental implants    ENDOSCOPY N/A 10/10/2022    Procedure: ESOPHAGOGASTRODUODENOSCOPY;  Surgeon: Neeraj Lynn MD;  Location:  [facility];  Service: Gastroenterology;  Laterality: N/A;    ENDOSCOPY N/A 10/12/2022    Procedure: ESOPHAGOGASTRODUODENOSCOPY;  Surgeon: Brunner, Mark I, MD;  Location:  [facility];  Service: Gastroenterology;  Laterality: N/A;    EXAM UNDER ANESTHESIA, RECTAL BIOPSY N/A 10/04/2022    Procedure: EXAM UNDER ANESTHESIA, TRANSANAL BIOPSY WITH TRUCUT NEEDLE (LITHOTOMY-CANDY CANE);  Surgeon: Hiram Viveros MD;  Location:  [facility];  Service: General;  Laterality: N/A;    EXAM UNDER ANESTHESIA, RECTAL BIOPSY N/A 05/30/2023    Procedure: EXAM UNDER ANESTHESIA, TRANSANAL BIOPSY OF RECTAL MASS WITH TRUCUT NEEDLE, PROCTOSCOPY;  Surgeon: Hiram Viveros MD;  Location:  [facility];  Service: General;  Laterality: N/A;    PERIPHERALLY INSERTED CENTRAL CATHETER INSERTION      Removed 11/28/2022    VENOUS ACCESS DEVICE (PORT) INSERTION Right 11/28/2022     Family History:   Family History   Problem  Relation Age of Onset    Diabetes Mother     Diabetes Father     Heart disease Father      Social History:   Social History     Socioeconomic History    Marital status:    Tobacco Use    Smoking status: Never    Smokeless tobacco: Never   Vaping Use    Vaping Use: Never used   Substance and Sexual Activity    Alcohol use: Not Currently     Comment: previously drank 2 glasses of wine/beer nightly    Drug use: Never    Sexual activity: Defer       Medications:     Current Outpatient Medications:     allopurinol (ZYLOPRIM) 300 MG tablet, Take 1 tablet by mouth Daily., Disp: 30 tablet, Rfl: 0    bacitracin 500 UNIT/GM ointment, Apply 1 Application topically to the appropriate area as directed 2 (Two) Times a Day., Disp: 14 g, Rfl: 1    dexAMETHasone (DECADRON) 4 MG tablet, Take 2 tablets by mouth Daily With Breakfast. Take 2 tablets by mouth with breakfast on days 4 and 5 of each chemotherapy cycle., Disp: 4 tablet, Rfl: 3    DPH-Lido-AlHydr-MgHydr-Simeth (First Mouthwash, Magic Mouthwash,) suspension, Swish and swallow 5 mL Every 6 (Six) Hours., Disp: 240 mL, Rfl: 3    fluticasone (FLONASE) 50 MCG/ACT nasal spray, 1 spray into the nostril(s) as directed by provider Daily As Needed for Allergies or Rhinitis. OTC, Disp: , Rfl:     levoFLOXacin (Levaquin) 500 MG tablet, Take 1 tablet by mouth Daily for 7 days. 1 tablet, Disp: 7 tablet, Rfl: 0    lidocaine-prilocaine (EMLA) 2.5-2.5 % cream, Apply 1 application topically to the appropriate area as directed As Needed (45-60 minutes prior to port access.  Cover with saran/plastic wrap.)., Disp: 30 g, Rfl: 3    loratadine (CLARITIN) 10 MG tablet, TAKE ONE TABLET BY MOUTH DAILY, Disp: 30 tablet, Rfl: 5    ondansetron (Zofran) 8 MG tablet, Take 1 tablet by mouth Every 8 (Eight) Hours As Needed for Nausea or Vomiting., Disp: 30 tablet, Rfl: 5    pantoprazole (PROTONIX) 40 MG EC tablet, TAKE ONE TABLET BY MOUTH DAILY, Disp: 90 tablet, Rfl: 1    prochlorperazine (COMPAZINE)  "5 MG tablet, Take 1 tablet by mouth Every 6 (Six) Hours As Needed for Nausea or Vomiting., Disp: 30 tablet, Rfl: 2    rosuvastatin (CRESTOR) 10 MG tablet, Take 1 tablet by mouth Daily., Disp: , Rfl:     valACYclovir (VALTREX) 500 MG tablet, Take 1 tablet by mouth Daily., Disp: 30 tablet, Rfl: 3  No current facility-administered medications for this visit.    Facility-Administered Medications Ordered in Other Visits:     heparin injection 500 Units, 500 Units, Intravenous, PRN, Kaycee Leary MD, 500 Units at 02/29/24 1036    sodium chloride 0.9 % infusion, 125 mL/hr, Intravenous, Continuous, Kaycee Leary MD    Allergies:   No Known Allergies    Objective     Vital Signs:   Vitals:    02/29/24 1140   BP: 111/65   Pulse: 73   Resp: 18   Temp: 97.5 °F (36.4 °C)   TempSrc: Temporal   SpO2: 98%   Weight: 76.7 kg (169 lb)   Height: 162.6 cm (64.02\")   PainSc: 0-No pain      Body mass index is 28.99 kg/m².   Pain Score    02/29/24 1140   PainSc: 0-No pain     ECOG Performance Status: 1    Physical Exam:   General: Well appearing male   HEENT: Normocephalic, atraumatic. Sclera anicteric. TM clear  Neck: supple, no palpable LAD. No axillary adenopathy  Cardiovascular: regular rate and rhythm. No murmurs.   Respiratory: Normal rate. Clear to auscultation bilaterally  Abdomen: Soft, nontender, non distended with normoactive bowel sounds. Ostomy LLQ   Lymph: no supraclavicular or axillary adenopathy  Neuro: Alert and oriented x 3.   Ext: Symmetric, no swelling.   Skin: right port site  C/d/i      Laboratory/Imaging Reviewed:     Hospital Outpatient Visit on 02/29/2024   Component Date Value Ref Range Status    Glucose 02/29/2024 110 (H)  65 - 99 mg/dL Final    BUN 02/29/2024 11  8 - 23 mg/dL Final    Creatinine 02/29/2024 0.56 (L)  0.76 - 1.27 mg/dL Final    Sodium 02/29/2024 134 (L)  136 - 145 mmol/L Final    Potassium 02/29/2024 4.1  3.5 - 5.2 mmol/L Final    Chloride 02/29/2024 97 (L)  98 - 107 mmol/L Final    CO2 " 02/29/2024 29.0  22.0 - 29.0 mmol/L Final    Calcium 02/29/2024 9.2  8.6 - 10.5 mg/dL Final    Total Protein 02/29/2024 6.4  6.0 - 8.5 g/dL Final    Albumin 02/29/2024 4.1  3.5 - 5.2 g/dL Final    ALT (SGPT) 02/29/2024 11  1 - 41 U/L Final    AST (SGOT) 02/29/2024 13  1 - 40 U/L Final    Alkaline Phosphatase 02/29/2024 99  39 - 117 U/L Final    Total Bilirubin 02/29/2024 0.2  0.0 - 1.2 mg/dL Final    Globulin 02/29/2024 2.3  gm/dL Final    Calculated Result    A/G Ratio 02/29/2024 1.8  g/dL Final    BUN/Creatinine Ratio 02/29/2024 19.6  7.0 - 25.0 Final    Anion Gap 02/29/2024 8.0  5.0 - 15.0 mmol/L Final    eGFR 02/29/2024 105.4  >60.0 mL/min/1.73 Final    Magnesium 02/29/2024 1.9  1.6 - 2.4 mg/dL Final    LDH 02/29/2024 169  135 - 225 U/L Final    Phosphorus 02/29/2024 3.5  2.5 - 4.5 mg/dL Final    Uric Acid 02/29/2024 3.7  3.4 - 7.0 mg/dL Final    Falsely depressed results may occur on samples drawn from patients receiving N-Acetylcysteine (NAC) or Metamizole.    WBC 02/29/2024 4.93  3.40 - 10.80 10*3/mm3 Final    RBC 02/29/2024 3.06 (L)  4.14 - 5.80 10*6/mm3 Final    Hemoglobin 02/29/2024 10.0 (L)  13.0 - 17.7 g/dL Final    Hematocrit 02/29/2024 29.2 (L)  37.5 - 51.0 % Final    MCV 02/29/2024 95.4  79.0 - 97.0 fL Final    MCH 02/29/2024 32.7  26.6 - 33.0 pg Final    MCHC 02/29/2024 34.2  31.5 - 35.7 g/dL Final    RDW 02/29/2024 15.6 (H)  12.3 - 15.4 % Final    RDW-SD 02/29/2024 51.7  37.0 - 54.0 fl Final    MPV 02/29/2024 9.8  6.0 - 12.0 fL Final    Platelets 02/29/2024 81 (L)  140 - 450 10*3/mm3 Final    Neutrophil % 02/29/2024 67.6  42.7 - 76.0 % Final    Lymphocyte % 02/29/2024 10.1 (L)  19.6 - 45.3 % Final    Monocyte % 02/29/2024 16.4 (H)  5.0 - 12.0 % Final    Eosinophil % 02/29/2024 0.4  0.3 - 6.2 % Final    Basophil % 02/29/2024 0.4  0.0 - 1.5 % Final    Immature Grans % 02/29/2024 5.1 (H)  0.0 - 0.5 % Final    Neutrophils, Absolute 02/29/2024 3.33  1.70 - 7.00 10*3/mm3 Final    Lymphocytes, Absolute  02/29/2024 0.50 (L)  0.70 - 3.10 10*3/mm3 Final    Monocytes, Absolute 02/29/2024 0.81  0.10 - 0.90 10*3/mm3 Final    Eosinophils, Absolute 02/29/2024 0.02  0.00 - 0.40 10*3/mm3 Final    Basophils, Absolute 02/29/2024 0.02  0.00 - 0.20 10*3/mm3 Final    Immature Grans, Absolute 02/29/2024 0.25 (H)  0.00 - 0.05 10*3/mm3 Final   Hospital Outpatient Visit on 02/26/2024   Component Date Value Ref Range Status    Glucose 02/26/2024 123 (H)  65 - 99 mg/dL Final    BUN 02/26/2024 15  8 - 23 mg/dL Final    Creatinine 02/26/2024 0.55 (L)  0.76 - 1.27 mg/dL Final    Sodium 02/26/2024 134 (L)  136 - 145 mmol/L Final    Potassium 02/26/2024 4.3  3.5 - 5.2 mmol/L Final    Chloride 02/26/2024 98  98 - 107 mmol/L Final    CO2 02/26/2024 26.0  22.0 - 29.0 mmol/L Final    Calcium 02/26/2024 8.9  8.6 - 10.5 mg/dL Final    Total Protein 02/26/2024 6.4  6.0 - 8.5 g/dL Final    Albumin 02/26/2024 3.9  3.5 - 5.2 g/dL Final    ALT (SGPT) 02/26/2024 12  1 - 41 U/L Final    AST (SGOT) 02/26/2024 11  1 - 40 U/L Final    Alkaline Phosphatase 02/26/2024 86  39 - 117 U/L Final    Total Bilirubin 02/26/2024 0.4  0.0 - 1.2 mg/dL Final    Globulin 02/26/2024 2.5  gm/dL Final    Calculated Result    A/G Ratio 02/26/2024 1.6  g/dL Final    BUN/Creatinine Ratio 02/26/2024 27.3 (H)  7.0 - 25.0 Final    Anion Gap 02/26/2024 10.0  5.0 - 15.0 mmol/L Final    eGFR 02/26/2024 106.0  >60.0 mL/min/1.73 Final    Magnesium 02/26/2024 1.8  1.6 - 2.4 mg/dL Final    LDH 02/26/2024 138  135 - 225 U/L Final    Phosphorus 02/26/2024 3.3  2.5 - 4.5 mg/dL Final    Uric Acid 02/26/2024 3.0 (L)  3.4 - 7.0 mg/dL Final    Falsely depressed results may occur on samples drawn from patients receiving N-Acetylcysteine (NAC) or Metamizole.    WBC 02/26/2024 3.48  3.40 - 10.80 10*3/mm3 Final    RBC 02/26/2024 3.03 (L)  4.14 - 5.80 10*6/mm3 Final    Hemoglobin 02/26/2024 9.9 (L)  13.0 - 17.7 g/dL Final    Hematocrit 02/26/2024 29.0 (L)  37.5 - 51.0 % Final    MCV 02/26/2024 95.7   79.0 - 97.0 fL Final    MCH 02/26/2024 32.7  26.6 - 33.0 pg Final    MCHC 02/26/2024 34.1  31.5 - 35.7 g/dL Final    RDW 02/26/2024 15.3  12.3 - 15.4 % Final    RDW-SD 02/26/2024 51.8  37.0 - 54.0 fl Final    MPV 02/26/2024 10.2  6.0 - 12.0 fL Final    Platelets 02/26/2024 49 (L)  140 - 450 10*3/mm3 Final    Neutrophil % 02/26/2024 65.4  42.7 - 76.0 % Final    Lymphocyte % 02/26/2024 11.8 (L)  19.6 - 45.3 % Final    Monocyte % 02/26/2024 16.4 (H)  5.0 - 12.0 % Final    Eosinophil % 02/26/2024 0.6  0.3 - 6.2 % Final    Basophil % 02/26/2024 0.3  0.0 - 1.5 % Final    Immature Grans % 02/26/2024 5.5 (H)  0.0 - 0.5 % Final    Neutrophils, Absolute 02/26/2024 2.28  1.70 - 7.00 10*3/mm3 Final    Lymphocytes, Absolute 02/26/2024 0.41 (L)  0.70 - 3.10 10*3/mm3 Final    Monocytes, Absolute 02/26/2024 0.57  0.10 - 0.90 10*3/mm3 Final    Eosinophils, Absolute 02/26/2024 0.02  0.00 - 0.40 10*3/mm3 Final    Basophils, Absolute 02/26/2024 0.01  0.00 - 0.20 10*3/mm3 Final    Immature Grans, Absolute 02/26/2024 0.19 (H)  0.00 - 0.05 10*3/mm3 Final   Hospital Outpatient Visit on 02/22/2024   Component Date Value Ref Range Status    Glucose 02/22/2024 85  65 - 99 mg/dL Final    BUN 02/22/2024 19  8 - 23 mg/dL Final    Creatinine 02/22/2024 0.58 (L)  0.76 - 1.27 mg/dL Final    Sodium 02/22/2024 132 (L)  136 - 145 mmol/L Final    Potassium 02/22/2024 4.6  3.5 - 5.2 mmol/L Final    Chloride 02/22/2024 96 (L)  98 - 107 mmol/L Final    CO2 02/22/2024 27.0  22.0 - 29.0 mmol/L Final    Calcium 02/22/2024 9.1  8.6 - 10.5 mg/dL Final    Total Protein 02/22/2024 6.6  6.0 - 8.5 g/dL Final    Albumin 02/22/2024 4.0  3.5 - 5.2 g/dL Final    ALT (SGPT) 02/22/2024 16  1 - 41 U/L Final    AST (SGOT) 02/22/2024 10  1 - 40 U/L Final    Alkaline Phosphatase 02/22/2024 88  39 - 117 U/L Final    Total Bilirubin 02/22/2024 0.5  0.0 - 1.2 mg/dL Final    Globulin 02/22/2024 2.6  gm/dL Final    Calculated Result    A/G Ratio 02/22/2024 1.5  g/dL Final     BUN/Creatinine Ratio 02/22/2024 32.8 (H)  7.0 - 25.0 Final    Anion Gap 02/22/2024 9.0  5.0 - 15.0 mmol/L Final    eGFR 02/22/2024 104.3  >60.0 mL/min/1.73 Final    Magnesium 02/22/2024 1.9  1.6 - 2.4 mg/dL Final    LDH 02/22/2024 134 (L)  135 - 225 U/L Final    Phosphorus 02/22/2024 3.7  2.5 - 4.5 mg/dL Final    Uric Acid 02/22/2024 2.1 (L)  3.4 - 7.0 mg/dL Final    Falsely depressed results may occur on samples drawn from patients receiving N-Acetylcysteine (NAC) or Metamizole.    WBC 02/22/2024 0.86 (C)  3.40 - 10.80 10*3/mm3 Final    RBC 02/22/2024 3.32 (L)  4.14 - 5.80 10*6/mm3 Final    Hemoglobin 02/22/2024 10.6 (L)  13.0 - 17.7 g/dL Final    Hematocrit 02/22/2024 31.9 (L)  37.5 - 51.0 % Final    MCV 02/22/2024 96.1  79.0 - 97.0 fL Final    MCH 02/22/2024 31.9  26.6 - 33.0 pg Final    MCHC 02/22/2024 33.2  31.5 - 35.7 g/dL Final    RDW 02/22/2024 14.6  12.3 - 15.4 % Final    RDW-SD 02/22/2024 52.4  37.0 - 54.0 fl Final    MPV 02/22/2024 9.4  6.0 - 12.0 fL Final    Platelets 02/22/2024 90 (L)  140 - 450 10*3/mm3 Final    Neutrophil % 02/22/2024 48.8  42.7 - 76.0 % Final    Lymphocyte % 02/22/2024 33.7  19.6 - 45.3 % Final    Monocyte % 02/22/2024 11.6  5.0 - 12.0 % Final    Eosinophil % 02/22/2024 3.5  0.3 - 6.2 % Final    Basophil % 02/22/2024 1.2  0.0 - 1.5 % Final    Immature Grans % 02/22/2024 1.2 (H)  0.0 - 0.5 % Final    Neutrophils, Absolute 02/22/2024 0.42 (L)  1.70 - 7.00 10*3/mm3 Final    Lymphocytes, Absolute 02/22/2024 0.29 (L)  0.70 - 3.10 10*3/mm3 Final    Monocytes, Absolute 02/22/2024 0.10  0.10 - 0.90 10*3/mm3 Final    Eosinophils, Absolute 02/22/2024 0.03  0.00 - 0.40 10*3/mm3 Final    Basophils, Absolute 02/22/2024 0.01  0.00 - 0.20 10*3/mm3 Final    Immature Grans, Absolute 02/22/2024 0.01  0.00 - 0.05 10*3/mm3 Final   Hospital Outpatient Visit on 02/19/2024   Component Date Value Ref Range Status    Glucose 02/19/2024 86  65 - 99 mg/dL Final    BUN 02/19/2024 21  8 - 23 mg/dL Final     Creatinine 02/19/2024 0.62 (L)  0.76 - 1.27 mg/dL Final    Sodium 02/19/2024 131 (L)  136 - 145 mmol/L Final    Potassium 02/19/2024 4.6  3.5 - 5.2 mmol/L Final    Chloride 02/19/2024 96 (L)  98 - 107 mmol/L Final    CO2 02/19/2024 30.0 (H)  22.0 - 29.0 mmol/L Final    Calcium 02/19/2024 9.1  8.6 - 10.5 mg/dL Final    BUN/Creatinine Ratio 02/19/2024 33.9 (H)  7.0 - 25.0 Final    Anion Gap 02/19/2024 5.0  5.0 - 15.0 mmol/L Final    eGFR 02/19/2024 102.2  >60.0 mL/min/1.73 Final    Magnesium 02/19/2024 2.3  1.6 - 2.4 mg/dL Final    WBC 02/19/2024 6.45  3.40 - 10.80 10*3/mm3 Final    RBC 02/19/2024 3.80 (L)  4.14 - 5.80 10*6/mm3 Final    Hemoglobin 02/19/2024 11.9 (L)  13.0 - 17.7 g/dL Final    Hematocrit 02/19/2024 37.1 (L)  37.5 - 51.0 % Final    MCV 02/19/2024 97.6 (H)  79.0 - 97.0 fL Final    MCH 02/19/2024 31.3  26.6 - 33.0 pg Final    MCHC 02/19/2024 32.1  31.5 - 35.7 g/dL Final    RDW 02/19/2024 15.0  12.3 - 15.4 % Final    RDW-SD 02/19/2024 53.9  37.0 - 54.0 fl Final    MPV 02/19/2024 9.3  6.0 - 12.0 fL Final    Platelets 02/19/2024 171  140 - 450 10*3/mm3 Final    Neutrophil % 02/19/2024 88.5 (H)  42.7 - 76.0 % Final    Lymphocyte % 02/19/2024 7.3 (L)  19.6 - 45.3 % Final    Monocyte % 02/19/2024 1.1 (L)  5.0 - 12.0 % Final    Eosinophil % 02/19/2024 1.9  0.3 - 6.2 % Final    Basophil % 02/19/2024 0.3  0.0 - 1.5 % Final    Immature Grans % 02/19/2024 0.9 (H)  0.0 - 0.5 % Final    Neutrophils, Absolute 02/19/2024 5.71  1.70 - 7.00 10*3/mm3 Final    Lymphocytes, Absolute 02/19/2024 0.47 (L)  0.70 - 3.10 10*3/mm3 Final    Monocytes, Absolute 02/19/2024 0.07 (L)  0.10 - 0.90 10*3/mm3 Final    Eosinophils, Absolute 02/19/2024 0.12  0.00 - 0.40 10*3/mm3 Final    Basophils, Absolute 02/19/2024 0.02  0.00 - 0.20 10*3/mm3 Final    Immature Grans, Absolute 02/19/2024 0.06 (H)  0.00 - 0.05 10*3/mm3 Final    nRBC 02/19/2024 0.0  0.0 - 0.2 /100 WBC Final   No results displayed because visit has over 200 results.           XR Chest 1 View    Result Date: 2/1/2024  Narrative: XR CHEST 1 VW Date of Exam: 2/1/2024 8:33 AM EST Indication: R/O PTX Comparison: 5/22/2023. Findings: Right chest port is unchanged in position. There is no pneumothorax. Heart and pulmonary vessels appear within normal limits. No acute infiltrates or effusions are detected.     Impression: Impression: No acute process. No pneumothorax. Electronically Signed: Araseli Mccormack MD  2/1/2024 8:48 AM EST  Workstation ID: [id number]    FL C Arm During Surgery    Result Date: 2/1/2024  Narrative: This procedure was auto-finalized with no dictation required.     Assessment / Plan      Assessment/Plan:     1.  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  Chemo monitoring  3.  Pancytopenia secondary to antineoplastic therapy  -He completed 6 cycles of BV-AVD 3/23/2023. Cycle 6 was complicated by coronavirus infection and multifocal pneumonia.  Posttherapy PET showed persistent bilateral interstitial changes and mild adenopathy. Repeat chest CT shows continued improvement in the pulmonary infiltrates consistent with a reactive infectious etiology.  - He had clear response to first line therapy with resolution of the hepatic lesions, marked improvement in the retroperitoneal adenopathy, and 50% reduction in the size of the rectal mass.  However he had persistent uptake in the rectal mass on posttreatment PET, [place] 5. We elected to proceed with biopsy to confirm whether he had residual disease followed by consolidative radiotherapy to the rectal mass given initial bulky disease.  In the interim, while awaiting biopsy, he presented with rectal bleeding, obstructive uropathy symptoms and pain and radiation planning CT showed significant interval growth in the rectal mass as well as development of new retroperitoneal adenopathy superior to the rectal mass compared to the his posttreatment PET, confirming  progressive disease. Consolidative radiation was converted to palliative course radiation given interval adenopathy development above the radiation field to allow for earlier introduction of second line chemotherapy.   -Repeat biopsy confirmed residual classic Hodgkin's lymphoma.  -Given national shortage of carbo/cis, we proceeded with Keytruda, vinorelbine, Gemzar, and Doxil. He completed 4 cycles 8/2023. Post treatment PET showed complete resolution of FDG avidity.  He continued Keytruda maintenance  -Most recent PET from 12/2023 with resolution of FDG avidity in the rectal mass which has decreased in size.  There is a new area of FDG avidity and a lytic lesion in the L4 region as well as mild SUV uptake in a right hilar node.  Lytic lesion appears to have been there previously on my review of prior PETs but with increased activity. He completed stereotactic radiation therapy to this area as he was having pain.  There was also an indeterminate hilar LN. I recommended continuation of maintenance Keytruda and short interval follow-up CT of the chest to reassess this. CT in Jan compared to prior PET showed worsening consolidative RML from hilum to major fissure with possible broncholith and no specific mass. He underwent bronchoscopy with findings confirming recurrent CD30 positive Hodgkin's lymphoma, but he now has developed strong CD20 positivity.  Morphologically this is felt to represent the same underlying Hodgkin's process, but with immunophenotype switch.  I recommended third line systemic therapy.  He initiated  Rituximab/ICE  -He is tolerating chemotherapy well.  CBC reviewed.  He has expected chemotherapy-induced pancytopenia.  Continue twice weekly CBC with cycle #1.  I am going to initiate Levaquin prophylaxis for low ANC.  He did receive Neulasta on Monday.  -He will continue with daily allopurinol for duration of cycle 1.  Tumor lysis labs within acceptable range today.  -Plan admission on 3/5 for  consideration of cycle 2. RN confirmed with admissions.  -Follow up Tues with labs and possible admission pending results.   -Follow up wiht Dr. Samson at  with PET/CT after 2 cycles of chemotherapy for possible stem cell collection    2.  H/o GI bleed  -PPI, continue daily.  Tolerating well.     3. Access  -Port c/d/I    4.  Right middle lobe obstruction  -Secondary to his malignancy.  He is on room air.  I have discussed with pulmonary and he would potentially be candidate for his stent if he has progressive obstructive symptoms.  However, he is currently on room air.  He has had an early response in his symptoms with treatment initiation which is suggestive or early response.     5.  Mucositis  -Magic mouthwash  -Add valtrex prophylaxis    Follow Up:   1 week with repeat labs and admission for RICE cycle #2  Weekly labs   Neulasta 3/11      Kaycee Leary MD  Hematology and Oncology

## 2024-03-01 DIAGNOSIS — C81.98 HODGKIN LYMPHOMA OF LYMPH NODES OF MULTIPLE REGIONS, UNSPECIFIED HODGKIN LYMPHOMA TYPE: Primary | ICD-10-CM

## 2024-03-05 ENCOUNTER — LAB (OUTPATIENT)
Dept: LAB | Facility: HOSPITAL | Age: 74
End: 2024-03-05
Payer: MEDICARE

## 2024-03-05 ENCOUNTER — OFFICE VISIT (OUTPATIENT)
Dept: ONCOLOGY | Facility: CLINIC | Age: 74
End: 2024-03-05
Payer: MEDICARE

## 2024-03-05 ENCOUNTER — HOSPITAL ENCOUNTER (OUTPATIENT)
Dept: ONCOLOGY | Facility: HOSPITAL | Age: 74
Discharge: HOME | End: 2024-03-05
Payer: MEDICARE

## 2024-03-05 ENCOUNTER — HOSPITAL ENCOUNTER (INPATIENT)
Facility: HOSPITAL | Age: 74
LOS: 2 days | Discharge: HOME | End: 2024-03-07
Attending: INTERNAL MEDICINE | Admitting: STUDENT IN AN ORGANIZED HEALTH CARE EDUCATION/TRAINING PROGRAM
Payer: MEDICARE

## 2024-03-05 VITALS
HEIGHT: 64 IN | WEIGHT: 171 LBS | TEMPERATURE: 97.5 F | OXYGEN SATURATION: 98 % | HEART RATE: 75 BPM | SYSTOLIC BLOOD PRESSURE: 130 MMHG | BODY MASS INDEX: 29.19 KG/M2 | DIASTOLIC BLOOD PRESSURE: 66 MMHG

## 2024-03-05 DIAGNOSIS — C81.98 HODGKIN LYMPHOMA OF LYMPH NODES OF MULTIPLE REGIONS, UNSPECIFIED HODGKIN LYMPHOMA TYPE: ICD-10-CM

## 2024-03-05 DIAGNOSIS — C81.98 HODGKIN LYMPHOMA OF LYMPH NODES OF MULTIPLE REGIONS, UNSPECIFIED HODGKIN LYMPHOMA TYPE: Primary | ICD-10-CM

## 2024-03-05 PROBLEM — C85.90 LYMPHOMA: Status: ACTIVE | Noted: 2024-03-05

## 2024-03-05 LAB
ALBUMIN SERPL-MCNC: 4 G/DL (ref 3.5–5.2)
ALBUMIN SERPL-MCNC: 4.2 G/DL (ref 3.5–5.2)
ALBUMIN/GLOB SERPL: 1.8 G/DL
ALP SERPL-CCNC: 105 U/L (ref 39–117)
ALP SERPL-CCNC: 110 U/L (ref 39–117)
ALT SERPL W P-5'-P-CCNC: 9 U/L (ref 1–41)
ALT SERPL W P-5'-P-CCNC: 9 U/L (ref 1–41)
ANION GAP SERPL CALCULATED.3IONS-SCNC: 10 MMOL/L (ref 5–15)
ANION GAP SERPL CALCULATED.3IONS-SCNC: 10 MMOL/L (ref 5–15)
ANISOCYTOSIS BLD QL: ABNORMAL
AST SERPL-CCNC: 13 U/L (ref 1–40)
AST SERPL-CCNC: 14 U/L (ref 1–40)
BACTERIA UR QL AUTO: ABNORMAL /HPF
BACTERIA UR QL AUTO: ABNORMAL /HPF
BASOPHILS # BLD AUTO: 0.02 10*3/MM3 (ref 0–0.2)
BASOPHILS # BLD MANUAL: 0 10*3/MM3 (ref 0–0.2)
BASOPHILS NFR BLD AUTO: 0.3 % (ref 0–1.5)
BASOPHILS NFR BLD MANUAL: 0 % (ref 0–1.5)
BILIRUB CONJ SERPL-MCNC: <0.2 MG/DL (ref 0–0.3)
BILIRUB INDIRECT SERPL-MCNC: NORMAL MG/DL
BILIRUB SERPL-MCNC: 0.3 MG/DL (ref 0–1.2)
BILIRUB SERPL-MCNC: 0.3 MG/DL (ref 0–1.2)
BILIRUB UR QL STRIP: NEGATIVE
BILIRUB UR QL STRIP: NEGATIVE
BUN SERPL-MCNC: 15 MG/DL (ref 8–23)
BUN SERPL-MCNC: 16 MG/DL (ref 8–23)
BUN/CREAT SERPL: 27.3 (ref 7–25)
BUN/CREAT SERPL: 28.1 (ref 7–25)
CALCIUM SPEC-SCNC: 8.9 MG/DL (ref 8.6–10.5)
CALCIUM SPEC-SCNC: 8.9 MG/DL (ref 8.6–10.5)
CHLORIDE SERPL-SCNC: 100 MMOL/L (ref 98–107)
CHLORIDE SERPL-SCNC: 100 MMOL/L (ref 98–107)
CLARITY UR: CLEAR
CLARITY UR: CLEAR
CO2 SERPL-SCNC: 27 MMOL/L (ref 22–29)
CO2 SERPL-SCNC: 27 MMOL/L (ref 22–29)
COLOR UR: YELLOW
COLOR UR: YELLOW
CREAT SERPL-MCNC: 0.55 MG/DL (ref 0.76–1.27)
CREAT SERPL-MCNC: 0.57 MG/DL (ref 0.76–1.27)
DEPRECATED RDW RBC AUTO: 52.5 FL (ref 37–54)
DEPRECATED RDW RBC AUTO: 53.5 FL (ref 37–54)
EGFRCR SERPLBLD CKD-EPI 2021: 104.8 ML/MIN/1.73
EGFRCR SERPLBLD CKD-EPI 2021: 106 ML/MIN/1.73
EOSINOPHIL # BLD AUTO: 0.02 10*3/MM3 (ref 0–0.4)
EOSINOPHIL # BLD MANUAL: 0.08 10*3/MM3 (ref 0–0.4)
EOSINOPHIL NFR BLD AUTO: 0.3 % (ref 0.3–6.2)
EOSINOPHIL NFR BLD MANUAL: 1 % (ref 0.3–6.2)
ERYTHROCYTE [DISTWIDTH] IN BLOOD BY AUTOMATED COUNT: 15.9 % (ref 12.3–15.4)
ERYTHROCYTE [DISTWIDTH] IN BLOOD BY AUTOMATED COUNT: 16.1 % (ref 12.3–15.4)
GLOBULIN UR ELPH-MCNC: 2.2 GM/DL
GLUCOSE SERPL-MCNC: 90 MG/DL (ref 65–99)
GLUCOSE SERPL-MCNC: 94 MG/DL (ref 65–99)
GLUCOSE UR STRIP-MCNC: NEGATIVE MG/DL
GLUCOSE UR STRIP-MCNC: NEGATIVE MG/DL
HCT VFR BLD AUTO: 29.6 % (ref 37.5–51)
HCT VFR BLD AUTO: 31 % (ref 37.5–51)
HGB BLD-MCNC: 10 G/DL (ref 13–17.7)
HGB BLD-MCNC: 10.6 G/DL (ref 13–17.7)
HGB UR QL STRIP.AUTO: ABNORMAL
HGB UR QL STRIP.AUTO: ABNORMAL
HYALINE CASTS UR QL AUTO: ABNORMAL /LPF
HYALINE CASTS UR QL AUTO: ABNORMAL /LPF
IMM GRANULOCYTES # BLD AUTO: 0.57 10*3/MM3 (ref 0–0.05)
IMM GRANULOCYTES NFR BLD AUTO: 7.7 % (ref 0–0.5)
KETONES UR QL STRIP: NEGATIVE
KETONES UR QL STRIP: NEGATIVE
LDH SERPL-CCNC: 226 U/L (ref 135–225)
LDH SERPL-CCNC: 245 U/L (ref 135–225)
LEUKOCYTE ESTERASE UR QL STRIP.AUTO: NEGATIVE
LEUKOCYTE ESTERASE UR QL STRIP.AUTO: NEGATIVE
LYMPHOCYTES # BLD AUTO: 0.64 10*3/MM3 (ref 0.7–3.1)
LYMPHOCYTES # BLD MANUAL: 0.64 10*3/MM3 (ref 0.7–3.1)
LYMPHOCYTES NFR BLD AUTO: 8.7 % (ref 19.6–45.3)
LYMPHOCYTES NFR BLD MANUAL: 8 % (ref 5–12)
MAGNESIUM SERPL-MCNC: 2 MG/DL (ref 1.6–2.4)
MAGNESIUM SERPL-MCNC: 2.3 MG/DL (ref 1.6–2.4)
MCH RBC QN AUTO: 32.6 PG (ref 26.6–33)
MCH RBC QN AUTO: 32.8 PG (ref 26.6–33)
MCHC RBC AUTO-ENTMCNC: 33.8 G/DL (ref 31.5–35.7)
MCHC RBC AUTO-ENTMCNC: 34.2 G/DL (ref 31.5–35.7)
MCV RBC AUTO: 96 FL (ref 79–97)
MCV RBC AUTO: 96.4 FL (ref 79–97)
METAMYELOCYTES NFR BLD MANUAL: 4 % (ref 0–0)
MONOCYTES # BLD AUTO: 0.73 10*3/MM3 (ref 0.1–0.9)
MONOCYTES # BLD: 0.64 10*3/MM3 (ref 0.1–0.9)
MONOCYTES NFR BLD AUTO: 9.9 % (ref 5–12)
NEUTROPHILS # BLD AUTO: 6.36 10*3/MM3 (ref 1.7–7)
NEUTROPHILS NFR BLD AUTO: 5.41 10*3/MM3 (ref 1.7–7)
NEUTROPHILS NFR BLD AUTO: 73.1 % (ref 42.7–76)
NEUTROPHILS NFR BLD MANUAL: 66 % (ref 42.7–76)
NEUTS BAND NFR BLD MANUAL: 13 % (ref 0–5)
NITRITE UR QL STRIP: NEGATIVE
NITRITE UR QL STRIP: NEGATIVE
NRBC SPEC MANUAL: 0 /100 WBC (ref 0–0.2)
OVALOCYTES BLD QL SMEAR: ABNORMAL
PH UR STRIP.AUTO: 6.5 [PH] (ref 5–8)
PH UR STRIP.AUTO: 7.5 [PH] (ref 5–8)
PHOSPHATE SERPL-MCNC: 3.7 MG/DL (ref 2.5–4.5)
PHOSPHATE SERPL-MCNC: 4.1 MG/DL (ref 2.5–4.5)
PLAT MORPH BLD: NORMAL
PLATELET # BLD AUTO: 194 10*3/MM3 (ref 140–450)
PLATELET # BLD AUTO: 215 10*3/MM3 (ref 140–450)
PMV BLD AUTO: 8.7 FL (ref 6–12)
PMV BLD AUTO: 9.6 FL (ref 6–12)
POTASSIUM SERPL-SCNC: 4.4 MMOL/L (ref 3.5–5.2)
POTASSIUM SERPL-SCNC: 4.4 MMOL/L (ref 3.5–5.2)
PROT SERPL-MCNC: 6.2 G/DL (ref 6–8.5)
PROT SERPL-MCNC: 6.8 G/DL (ref 6–8.5)
PROT UR QL STRIP: NEGATIVE
PROT UR QL STRIP: NEGATIVE
RBC # BLD AUTO: 3.07 10*6/MM3 (ref 4.14–5.8)
RBC # BLD AUTO: 3.23 10*6/MM3 (ref 4.14–5.8)
RBC # UR STRIP: ABNORMAL /HPF
RBC # UR STRIP: ABNORMAL /HPF
REF LAB TEST METHOD: ABNORMAL
REF LAB TEST METHOD: ABNORMAL
SODIUM SERPL-SCNC: 137 MMOL/L (ref 136–145)
SODIUM SERPL-SCNC: 137 MMOL/L (ref 136–145)
SP GR UR STRIP: 1.02 (ref 1–1.03)
SP GR UR STRIP: 1.02 (ref 1–1.03)
SQUAMOUS #/AREA URNS HPF: ABNORMAL /HPF
SQUAMOUS #/AREA URNS HPF: ABNORMAL /HPF
URATE SERPL-MCNC: 3.1 MG/DL (ref 3.4–7)
URATE SERPL-MCNC: 3.3 MG/DL (ref 3.4–7)
UROBILINOGEN UR QL STRIP: ABNORMAL
UROBILINOGEN UR QL STRIP: ABNORMAL
VARIANT LYMPHS NFR BLD MANUAL: 8 % (ref 19.6–45.3)
WBC # UR STRIP: ABNORMAL /HPF
WBC # UR STRIP: ABNORMAL /HPF
WBC MORPH BLD: NORMAL
WBC NRBC COR # BLD AUTO: 7.39 10*3/MM3 (ref 3.4–10.8)
WBC NRBC COR # BLD AUTO: 8.05 10*3/MM3 (ref 3.4–10.8)

## 2024-03-05 PROCEDURE — 84100 ASSAY OF PHOSPHORUS: CPT | Performed by: INTERNAL MEDICINE

## 2024-03-05 PROCEDURE — 1126F AMNT PAIN NOTED NONE PRSNT: CPT | Performed by: INTERNAL MEDICINE

## 2024-03-05 PROCEDURE — 81001 URINALYSIS AUTO W/SCOPE: CPT | Performed by: INTERNAL MEDICINE

## 2024-03-05 PROCEDURE — 25010000002 ENOXAPARIN PER 10 MG: Performed by: INTERNAL MEDICINE

## 2024-03-05 PROCEDURE — 25810000003 SODIUM CHLORIDE 0.9 % SOLUTION: Performed by: INTERNAL MEDICINE

## 2024-03-05 PROCEDURE — 82248 BILIRUBIN DIRECT: CPT

## 2024-03-05 PROCEDURE — 25010000002 DIPHENHYDRAMINE PER 50 MG: Performed by: INTERNAL MEDICINE

## 2024-03-05 PROCEDURE — 85025 COMPLETE CBC W/AUTO DIFF WBC: CPT | Performed by: INTERNAL MEDICINE

## 2024-03-05 PROCEDURE — 81001 URINALYSIS AUTO W/SCOPE: CPT

## 2024-03-05 PROCEDURE — 25010000002 DEXAMETHASONE SODIUM PHOSPHATE 100 MG/10ML SOLUTION: Performed by: INTERNAL MEDICINE

## 2024-03-05 PROCEDURE — 80053 COMPREHEN METABOLIC PANEL: CPT

## 2024-03-05 PROCEDURE — 25010000002 RITUXIMAB-ARRX 500 MG/50ML SOLUTION 50 ML VIAL: Performed by: INTERNAL MEDICINE

## 2024-03-05 PROCEDURE — 84550 ASSAY OF BLOOD/URIC ACID: CPT

## 2024-03-05 PROCEDURE — 25010000002 ETOPOSIDE 1 GM/50ML SOLUTION 50 ML VIAL: Performed by: INTERNAL MEDICINE

## 2024-03-05 PROCEDURE — 25810000003 LACTATED RINGERS PER 1000 ML: Performed by: INTERNAL MEDICINE

## 2024-03-05 PROCEDURE — 84100 ASSAY OF PHOSPHORUS: CPT

## 2024-03-05 PROCEDURE — 84550 ASSAY OF BLOOD/URIC ACID: CPT | Performed by: INTERNAL MEDICINE

## 2024-03-05 PROCEDURE — 85007 BL SMEAR W/DIFF WBC COUNT: CPT | Performed by: INTERNAL MEDICINE

## 2024-03-05 PROCEDURE — 25810000003 SODIUM CHLORIDE 0.9 % SOLUTION 250 ML FLEX CONT: Performed by: INTERNAL MEDICINE

## 2024-03-05 PROCEDURE — 36415 COLL VENOUS BLD VENIPUNCTURE: CPT

## 2024-03-05 PROCEDURE — 3078F DIAST BP <80 MM HG: CPT | Performed by: INTERNAL MEDICINE

## 2024-03-05 PROCEDURE — 83735 ASSAY OF MAGNESIUM: CPT | Performed by: INTERNAL MEDICINE

## 2024-03-05 PROCEDURE — 99214 OFFICE O/P EST MOD 30 MIN: CPT | Performed by: INTERNAL MEDICINE

## 2024-03-05 PROCEDURE — 3075F SYST BP GE 130 - 139MM HG: CPT | Performed by: INTERNAL MEDICINE

## 2024-03-05 PROCEDURE — 25810000003 SODIUM CHLORIDE 0.9 % SOLUTION 500 ML FLEX CONT: Performed by: INTERNAL MEDICINE

## 2024-03-05 PROCEDURE — 25010000002 RITUXIMAB-ARRX 100 MG/10ML SOLUTION 10 ML VIAL: Performed by: INTERNAL MEDICINE

## 2024-03-05 PROCEDURE — 83615 LACTATE (LD) (LDH) ENZYME: CPT | Performed by: INTERNAL MEDICINE

## 2024-03-05 PROCEDURE — 83615 LACTATE (LD) (LDH) ENZYME: CPT

## 2024-03-05 PROCEDURE — 36591 DRAW BLOOD OFF VENOUS DEVICE: CPT

## 2024-03-05 PROCEDURE — 99222 1ST HOSP IP/OBS MODERATE 55: CPT | Performed by: INTERNAL MEDICINE

## 2024-03-05 RX ORDER — SODIUM CHLORIDE 9 MG/ML
40 INJECTION, SOLUTION INTRAVENOUS AS NEEDED
Status: DISCONTINUED | OUTPATIENT
Start: 2024-03-05 | End: 2024-03-07 | Stop reason: HOSPADM

## 2024-03-05 RX ORDER — ACETAMINOPHEN 325 MG/1
650 TABLET ORAL ONCE
Status: COMPLETED | OUTPATIENT
Start: 2024-03-05 | End: 2024-03-05

## 2024-03-05 RX ORDER — BISACODYL 5 MG/1
5 TABLET, DELAYED RELEASE ORAL DAILY PRN
Status: DISCONTINUED | OUTPATIENT
Start: 2024-03-05 | End: 2024-03-07 | Stop reason: HOSPADM

## 2024-03-05 RX ORDER — FAMOTIDINE 10 MG/ML
20 INJECTION, SOLUTION INTRAVENOUS AS NEEDED
Status: DISCONTINUED | OUTPATIENT
Start: 2024-03-05 | End: 2024-03-07 | Stop reason: HOSPADM

## 2024-03-05 RX ORDER — ALLOPURINOL 300 MG/1
300 TABLET ORAL DAILY
Qty: 30 TABLET | Refills: 0 | OUTPATIENT
Start: 2024-03-05

## 2024-03-05 RX ORDER — GINSENG 100 MG
1 CAPSULE ORAL 2 TIMES DAILY
Status: DISCONTINUED | OUTPATIENT
Start: 2024-03-05 | End: 2024-03-07 | Stop reason: HOSPADM

## 2024-03-05 RX ORDER — ACETAMINOPHEN 325 MG/1
650 TABLET ORAL EVERY 4 HOURS PRN
Status: DISCONTINUED | OUTPATIENT
Start: 2024-03-05 | End: 2024-03-07 | Stop reason: HOSPADM

## 2024-03-05 RX ORDER — BISACODYL 10 MG
10 SUPPOSITORY, RECTAL RECTAL DAILY PRN
Status: DISCONTINUED | OUTPATIENT
Start: 2024-03-05 | End: 2024-03-07 | Stop reason: HOSPADM

## 2024-03-05 RX ORDER — LEVOFLOXACIN 500 MG/1
500 TABLET, FILM COATED ORAL DAILY
Qty: 7 TABLET | Refills: 0 | Status: SHIPPED | OUTPATIENT
Start: 2024-03-05 | End: 2024-03-07 | Stop reason: HOSPADM

## 2024-03-05 RX ORDER — AMOXICILLIN 250 MG
2 CAPSULE ORAL 2 TIMES DAILY PRN
Status: DISCONTINUED | OUTPATIENT
Start: 2024-03-05 | End: 2024-03-07 | Stop reason: HOSPADM

## 2024-03-05 RX ORDER — PROCHLORPERAZINE MALEATE 5 MG/1
5 TABLET ORAL EVERY 6 HOURS PRN
Status: DISCONTINUED | OUTPATIENT
Start: 2024-03-05 | End: 2024-03-07 | Stop reason: HOSPADM

## 2024-03-05 RX ORDER — ALLOPURINOL 300 MG/1
300 TABLET ORAL DAILY
Status: DISCONTINUED | OUTPATIENT
Start: 2024-03-06 | End: 2024-03-07 | Stop reason: HOSPADM

## 2024-03-05 RX ORDER — DIPHENHYDRAMINE HYDROCHLORIDE 50 MG/ML
50 INJECTION INTRAMUSCULAR; INTRAVENOUS AS NEEDED
Status: DISCONTINUED | OUTPATIENT
Start: 2024-03-05 | End: 2024-03-07 | Stop reason: HOSPADM

## 2024-03-05 RX ORDER — SODIUM CHLORIDE 9 MG/ML
125 INJECTION, SOLUTION INTRAVENOUS CONTINUOUS
Status: DISCONTINUED | OUTPATIENT
Start: 2024-03-05 | End: 2024-03-05

## 2024-03-05 RX ORDER — ONDANSETRON 4 MG/1
4 TABLET, ORALLY DISINTEGRATING ORAL EVERY 6 HOURS PRN
Status: DISCONTINUED | OUTPATIENT
Start: 2024-03-05 | End: 2024-03-07 | Stop reason: HOSPADM

## 2024-03-05 RX ORDER — SODIUM CHLORIDE 0.9 % (FLUSH) 0.9 %
10 SYRINGE (ML) INJECTION AS NEEDED
Status: DISCONTINUED | OUTPATIENT
Start: 2024-03-05 | End: 2024-03-07 | Stop reason: HOSPADM

## 2024-03-05 RX ORDER — ACETAMINOPHEN 160 MG/5ML
650 SOLUTION ORAL EVERY 4 HOURS PRN
Status: DISCONTINUED | OUTPATIENT
Start: 2024-03-05 | End: 2024-03-07 | Stop reason: HOSPADM

## 2024-03-05 RX ORDER — PANTOPRAZOLE SODIUM 40 MG/1
40 TABLET, DELAYED RELEASE ORAL DAILY
Status: DISCONTINUED | OUTPATIENT
Start: 2024-03-06 | End: 2024-03-07 | Stop reason: HOSPADM

## 2024-03-05 RX ORDER — ACETAMINOPHEN 650 MG/1
650 SUPPOSITORY RECTAL EVERY 4 HOURS PRN
Status: DISCONTINUED | OUTPATIENT
Start: 2024-03-05 | End: 2024-03-07 | Stop reason: HOSPADM

## 2024-03-05 RX ORDER — DIPHENHYDRAMINE HYDROCHLORIDE AND LIDOCAINE HYDROCHLORIDE AND ALUMINUM HYDROXIDE AND MAGNESIUM HYDRO
5 KIT EVERY 6 HOURS
Status: DISCONTINUED | OUTPATIENT
Start: 2024-03-05 | End: 2024-03-07 | Stop reason: HOSPADM

## 2024-03-05 RX ORDER — SODIUM CHLORIDE 9 MG/ML
20 INJECTION, SOLUTION INTRAVENOUS ONCE
Status: COMPLETED | OUTPATIENT
Start: 2024-03-05 | End: 2024-03-05

## 2024-03-05 RX ORDER — ONDANSETRON 2 MG/ML
4 INJECTION INTRAMUSCULAR; INTRAVENOUS EVERY 6 HOURS PRN
Status: DISCONTINUED | OUTPATIENT
Start: 2024-03-05 | End: 2024-03-07 | Stop reason: HOSPADM

## 2024-03-05 RX ORDER — SODIUM CHLORIDE 0.9 % (FLUSH) 0.9 %
10 SYRINGE (ML) INJECTION EVERY 12 HOURS SCHEDULED
Status: DISCONTINUED | OUTPATIENT
Start: 2024-03-05 | End: 2024-03-07 | Stop reason: HOSPADM

## 2024-03-05 RX ORDER — MEPERIDINE HYDROCHLORIDE 25 MG/ML
25 INJECTION INTRAMUSCULAR; INTRAVENOUS; SUBCUTANEOUS
Status: DISCONTINUED | OUTPATIENT
Start: 2024-03-05 | End: 2024-03-07 | Stop reason: HOSPADM

## 2024-03-05 RX ORDER — SODIUM CHLORIDE, SODIUM LACTATE, POTASSIUM CHLORIDE, CALCIUM CHLORIDE 600; 310; 30; 20 MG/100ML; MG/100ML; MG/100ML; MG/100ML
100 INJECTION, SOLUTION INTRAVENOUS CONTINUOUS
Status: DISCONTINUED | OUTPATIENT
Start: 2024-03-05 | End: 2024-03-07 | Stop reason: HOSPADM

## 2024-03-05 RX ORDER — ROSUVASTATIN CALCIUM 10 MG/1
10 TABLET, COATED ORAL DAILY
Status: DISCONTINUED | OUTPATIENT
Start: 2024-03-06 | End: 2024-03-07 | Stop reason: HOSPADM

## 2024-03-05 RX ORDER — POLYETHYLENE GLYCOL 3350 17 G/17G
17 POWDER, FOR SOLUTION ORAL DAILY PRN
Status: DISCONTINUED | OUTPATIENT
Start: 2024-03-05 | End: 2024-03-07 | Stop reason: HOSPADM

## 2024-03-05 RX ORDER — ENOXAPARIN SODIUM 100 MG/ML
40 INJECTION SUBCUTANEOUS DAILY
Status: DISCONTINUED | OUTPATIENT
Start: 2024-03-05 | End: 2024-03-07 | Stop reason: HOSPADM

## 2024-03-05 RX ORDER — VALACYCLOVIR HYDROCHLORIDE 500 MG/1
500 TABLET, FILM COATED ORAL DAILY
Status: DISCONTINUED | OUTPATIENT
Start: 2024-03-05 | End: 2024-03-05

## 2024-03-05 RX ORDER — FLUTICASONE PROPIONATE 50 MCG
1 SPRAY, SUSPENSION (ML) NASAL DAILY PRN
Status: DISCONTINUED | OUTPATIENT
Start: 2024-03-05 | End: 2024-03-07 | Stop reason: HOSPADM

## 2024-03-05 RX ADMIN — SODIUM CHLORIDE 20 ML/HR: 9 INJECTION, SOLUTION INTRAVENOUS at 12:33

## 2024-03-05 RX ADMIN — ENOXAPARIN SODIUM 40 MG: 100 INJECTION SUBCUTANEOUS at 12:22

## 2024-03-05 RX ADMIN — ACETAMINOPHEN 650 MG: 325 TABLET ORAL at 12:22

## 2024-03-05 RX ADMIN — SODIUM CHLORIDE 180 MG: 0.9 INJECTION, SOLUTION INTRAVENOUS at 17:25

## 2024-03-05 RX ADMIN — BACITRACIN 0.9 G: 500 OINTMENT TOPICAL at 22:23

## 2024-03-05 RX ADMIN — SODIUM CHLORIDE, POTASSIUM CHLORIDE, SODIUM LACTATE AND CALCIUM CHLORIDE 100 ML/HR: 600; 310; 30; 20 INJECTION, SOLUTION INTRAVENOUS at 12:33

## 2024-03-05 RX ADMIN — DIPHENHYDRAMINE HYDROCHLORIDE AND LIDOCAINE HYDROCHLORIDE AND ALUMINUM HYDROXIDE AND MAGNESIUM HYDRO 5 ML: KIT at 17:52

## 2024-03-05 RX ADMIN — SODIUM CHLORIDE, POTASSIUM CHLORIDE, SODIUM LACTATE AND CALCIUM CHLORIDE 100 ML/HR: 600; 310; 30; 20 INJECTION, SOLUTION INTRAVENOUS at 19:53

## 2024-03-05 RX ADMIN — DIPHENHYDRAMINE HYDROCHLORIDE AND LIDOCAINE HYDROCHLORIDE AND ALUMINUM HYDROXIDE AND MAGNESIUM HYDRO 5 ML: KIT at 13:00

## 2024-03-05 RX ADMIN — DIPHENHYDRAMINE HYDROCHLORIDE 50 MG: 50 INJECTION INTRAMUSCULAR; INTRAVENOUS at 12:23

## 2024-03-05 RX ADMIN — RITUXIMAB-ARRX 700 MG: 500 INJECTION, SOLUTION INTRAVENOUS at 13:11

## 2024-03-05 RX ADMIN — DEXAMETHASONE SODIUM PHOSPHATE 12 MG: 10 INJECTION, SOLUTION INTRAMUSCULAR; INTRAVENOUS at 16:24

## 2024-03-05 RX ADMIN — Medication 10 ML: at 12:23

## 2024-03-05 RX ADMIN — Medication 10 ML: at 20:00

## 2024-03-05 NOTE — PROGRESS NOTES
Hematology and Oncology [place]  Office number [phone]    Fax number [phone]     Follow up     Date: 24    Patient Name: Abraham Wu  MRN: [id number]  : 1950    Chief Complaint: Hodgkin Lymphoma follow up/treatment    Surgeon: Dr. Hiram Viveros MD    Cancer Staging: IV    History of Present Illness: Abraham Wu is a pleasant 73 y.o. male with PMH of hypertension, sleep apnea, hard of hearing who presents today for follow up of Hodgkin Lymphoma.     He initially presented 2022 with intractable diarrhea, low appetite, and a greater than 50 pound weight loss over several month period associated with intermittent fevers.  CT of the chest abdomen pelvis obtained in the ER shows of rectal mass spanning greater than 12 cm with adjacent adenopathy, bulky RP adenopathy, and findings concerning for left renal and liver metastases.  Small right pleural effusion with nodularity.  There was concern for impending obstruction, so he underwent diverting colostomy and biopsy on 2022.  Biopsies from the peritoneam showed a fibrotic nodule histiocytes and eosinophils admixed with CD30 positive large cells.  Rectal biopsies showed involvement by CD30 positive lymphoma, classic Hodgkin lymphoma versus CD30 positive T-cell or B-cell lymphoma.  There was extensive fibrosis and crush artifact.  He underwent repeat transrectal biopsy 10/4/2022 for further characterization which was felt to be most consistent with classical Hodgkin lymphoma.    He initiated chemotherapy with Brentuximab-AVD as an inpatient on 10/8/2022.  Cycle #1 was complicated by GI bleed requiring multiple blood transfusion and ultimately coil artery embolization 10/12; neutropenic fever, Candida esophagitis and mucositis.  He had a prolonged ICU stay  And required enteral feeding.  He was discharged 10/20/2022.    Following his last cycle he was admitted with multifocal PNA and +corona virus  infection.    He underwent brochoscopy 2/1/24 showing heaped up, irregular, friable mucosa obstructing the right middle lobe otherwise no discrete endobronchial lesions. Pathology showed persistent/recurrent CD30 positive lymphoma; see comment. These continue to have CD30 and RACHELE positivity, consistent with involvement by the same process. However, the lymphoma cells are now more numerous and have an intact B-cell expression profile, with strong diffuse CD20 expression, strong PAX5 expression, and expression of CD45 and CD79a. This is strongly favored to represent continued involvement with the same process with the development of a slightly different immunophenotype after pembrolizumab therapy as opposed to a secondary lymphoma. The strong expression of CD20 may be a therapeutic target. CD30 expression remains intact as well.     Treatment history:  Brentuximab-AVD: C1 10/8/22  C2: 11/1/22 onward with DA 20% in BVD; full dose brentuximab  Completed C6 3/22/23    2. Palliative radiation to rectum 5/30/2023 (abbreviated due to systemic progression)  3. GVD+Pembro x 4 cycles: completed 8/30/2023  4. Consolidative radiation to rectum completed 9/27/23    5. Pembrolizumab: current  6. Radiation to spine 1/16, 1/18 and 1/22/24    7. Rituximab-ICE: planning 2/13/2024    Interval history:    He is here for follow up. Energy good. Mouth sores resolved as have sores on external lips. No cough in last week. Ostomy output is soft. Had some diarrhea early in the week that resolved.  Denies epistaxis, gum bleeding, hematuria, melena or hematochezia.  No fever   No abdominal pain  Breathing comfortably.  No dysuria or hematuria  No weakness or incoordination    Past Medical History:   Past Medical History:   Diagnosis Date    Arthritis     benign polypoid tissue right lung     Cancer     HODGKINS LYMPHOMA, RECTAL CANCER    Diabetes mellitus     patient reports that he doesn't have diabetes secondary to changing diet and weight  loss.    History of radiation therapy 12/31/2023    re-treat rectum    History of transfusion     no reaction, transfusions received at [facility] in 2022    Hyperlipidemia     Hypertension     Lymphoma     Mycobacterium mucogenicum     SHERRI (obstructive sleep apnea)     O2 2L/MIN AT NIGHT ONLY    Pneumonia     2023    Seasonal allergies    Polyp in airway removed 2020 [facility] Dr. Ketan Monet.    Past Surgical History:   Past Surgical History:   Procedure Laterality Date    BRONCHOSCOPY      removal of obstructing polypoid tissue right middle lung    BRONCHOSCOPY N/A 2/1/2024    Procedure: BRONCHOSCOPY WITH ENDOBRONCHIAL ULTRASOUND AND FLUORO;  Surgeon: Chris Pruitt MD;  Location:  [facility];  Service: Pulmonary;  Laterality: N/A;  EBUS BALLOON INTACT    COLONOSCOPY      COLOSTOMY N/A 09/22/2022    Procedure: LAPAROSCOPIC COLOSTOMY CREATION, FLEXIBLE SIGMOIDOSCOPY;  Surgeon: Hiram Viveros MD;  Location:  [facility];  Service: General;  Laterality: N/A;    DENTAL PROCEDURE      dental implants    ENDOSCOPY N/A 10/10/2022    Procedure: ESOPHAGOGASTRODUODENOSCOPY;  Surgeon: Neeraj Lynn MD;  Location:  [facility];  Service: Gastroenterology;  Laterality: N/A;    ENDOSCOPY N/A 10/12/2022    Procedure: ESOPHAGOGASTRODUODENOSCOPY;  Surgeon: Brunner, Mark I, MD;  Location:  [facility];  Service: Gastroenterology;  Laterality: N/A;    EXAM UNDER ANESTHESIA, RECTAL BIOPSY N/A 10/04/2022    Procedure: EXAM UNDER ANESTHESIA, TRANSANAL BIOPSY WITH TRUCUT NEEDLE (LITHOTOMY-CANDY CANE);  Surgeon: Hiram Viveros MD;  Location:  [facility];  Service: General;  Laterality: N/A;    EXAM UNDER ANESTHESIA, RECTAL BIOPSY N/A 05/30/2023    Procedure: EXAM UNDER ANESTHESIA, TRANSANAL BIOPSY OF RECTAL MASS WITH TRUCUT NEEDLE, PROCTOSCOPY;  Surgeon: Hiram Viveros MD;  Location:  [facility];  Service: General;  Laterality: N/A;    PERIPHERALLY INSERTED CENTRAL CATHETER INSERTION      Removed  11/28/2022    VENOUS ACCESS DEVICE (PORT) INSERTION Right 11/28/2022     Family History:   Family History   Problem Relation Age of Onset    Diabetes Mother     Diabetes Father     Heart disease Father      Social History:   Social History     Socioeconomic History    Marital status:    Tobacco Use    Smoking status: Never    Smokeless tobacco: Never   Vaping Use    Vaping status: Never Used   Substance and Sexual Activity    Alcohol use: Not Currently     Comment: previously drank 2 glasses of wine/beer nightly    Drug use: Never    Sexual activity: Defer       Medications:     Current Outpatient Medications:     allopurinol (ZYLOPRIM) 300 MG tablet, Take 1 tablet by mouth Daily., Disp: 30 tablet, Rfl: 0    bacitracin 500 UNIT/GM ointment, Apply 1 Application topically to the appropriate area as directed 2 (Two) Times a Day., Disp: 14 g, Rfl: 1    dexAMETHasone (DECADRON) 4 MG tablet, Take 2 tablets by mouth Daily With Breakfast. Take 2 tablets by mouth with breakfast on days 4 and 5 of each chemotherapy cycle., Disp: 4 tablet, Rfl: 3    DPH-Lido-AlHydr-MgHydr-Simeth (First Mouthwash, Magic Mouthwash,) suspension, Swish and swallow 5 mL Every 6 (Six) Hours., Disp: 240 mL, Rfl: 3    fluticasone (FLONASE) 50 MCG/ACT nasal spray, 1 spray into the nostril(s) as directed by provider Daily As Needed for Allergies or Rhinitis. OTC, Disp: , Rfl:     lidocaine-prilocaine (EMLA) 2.5-2.5 % cream, Apply 1 application topically to the appropriate area as directed As Needed (45-60 minutes prior to port access.  Cover with saran/plastic wrap.)., Disp: 30 g, Rfl: 3    loratadine (CLARITIN) 10 MG tablet, TAKE ONE TABLET BY MOUTH DAILY, Disp: 30 tablet, Rfl: 5    ondansetron (Zofran) 8 MG tablet, Take 1 tablet by mouth Every 8 (Eight) Hours As Needed for Nausea or Vomiting., Disp: 30 tablet, Rfl: 5    pantoprazole (PROTONIX) 40 MG EC tablet, TAKE ONE TABLET BY MOUTH DAILY, Disp: 90 tablet, Rfl: 1    prochlorperazine  "(COMPAZINE) 5 MG tablet, Take 1 tablet by mouth Every 6 (Six) Hours As Needed for Nausea or Vomiting., Disp: 30 tablet, Rfl: 2    rosuvastatin (CRESTOR) 10 MG tablet, Take 1 tablet by mouth Daily., Disp: , Rfl:     valACYclovir (VALTREX) 500 MG tablet, Take 1 tablet by mouth Daily., Disp: 30 tablet, Rfl: 3  No current facility-administered medications for this visit.    Facility-Administered Medications Ordered in Other Visits:     sodium chloride 0.9 % infusion, 125 mL/hr, Intravenous, Continuous, Kaycee Leary MD    Allergies:   No Known Allergies    Objective     Vital Signs:   Vitals:    03/05/24 0806   BP: 130/66   Pulse: 75   Temp: 97.5 °F (36.4 °C)   TempSrc: Temporal   SpO2: 98%   Weight: 77.6 kg (171 lb)   Height: 162.6 cm (64.02\")   PainSc: 0-No pain      Body mass index is 29.34 kg/m².   Pain Score    03/05/24 0806   PainSc: 0-No pain     ECOG Performance Status: 1    Physical Exam:   General: Well appearing male   HEENT: Normocephalic, atraumatic. Sclera anicteric. OP clear  Neck: supple, no palpable LAD. No axillary adenopathy  Cardiovascular: regular rate and rhythm. No murmurs.   Respiratory: Normal rate. Clear to auscultation bilaterally  Abdomen: Soft, nontender, non distended with normoactive bowel sounds. Ostomy LLQ   Lymph: no supraclavicular or axillary adenopathy  Neuro: Alert and oriented x 3. Normal BLE strength. Normal FTN  Ext: Symmetric, no swelling.   Skin: right port site  C/d/i      Laboratory/Imaging Reviewed:     Hospital Outpatient Visit on 03/05/2024   Component Date Value Ref Range Status    Glucose 03/05/2024 94  65 - 99 mg/dL Final    BUN 03/05/2024 15  8 - 23 mg/dL Final    Creatinine 03/05/2024 0.55 (L)  0.76 - 1.27 mg/dL Final    Sodium 03/05/2024 137  136 - 145 mmol/L Final    Potassium 03/05/2024 4.4  3.5 - 5.2 mmol/L Final    Chloride 03/05/2024 100  98 - 107 mmol/L Final    CO2 03/05/2024 27.0  22.0 - 29.0 mmol/L Final    Calcium 03/05/2024 8.9  8.6 - 10.5 mg/dL Final "    BUN/Creatinine Ratio 03/05/2024 27.3 (H)  7.0 - 25.0 Final    Anion Gap 03/05/2024 10.0  5.0 - 15.0 mmol/L Final    eGFR 03/05/2024 106.0  >60.0 mL/min/1.73 Final    Magnesium 03/05/2024 2.3  1.6 - 2.4 mg/dL Final    WBC 03/05/2024 7.39  3.40 - 10.80 10*3/mm3 Final    RBC 03/05/2024 3.07 (L)  4.14 - 5.80 10*6/mm3 Final    Hemoglobin 03/05/2024 10.0 (L)  13.0 - 17.7 g/dL Final    Hematocrit 03/05/2024 29.6 (L)  37.5 - 51.0 % Final    MCV 03/05/2024 96.4  79.0 - 97.0 fL Final    MCH 03/05/2024 32.6  26.6 - 33.0 pg Final    MCHC 03/05/2024 33.8  31.5 - 35.7 g/dL Final    RDW 03/05/2024 15.9 (H)  12.3 - 15.4 % Final    RDW-SD 03/05/2024 53.5  37.0 - 54.0 fl Final    MPV 03/05/2024 8.7  6.0 - 12.0 fL Final    Platelets 03/05/2024 194  140 - 450 10*3/mm3 Final    Neutrophil % 03/05/2024 73.1  42.7 - 76.0 % Final    Lymphocyte % 03/05/2024 8.7 (L)  19.6 - 45.3 % Final    Monocyte % 03/05/2024 9.9  5.0 - 12.0 % Final    Eosinophil % 03/05/2024 0.3  0.3 - 6.2 % Final    Basophil % 03/05/2024 0.3  0.0 - 1.5 % Final    Immature Grans % 03/05/2024 7.7 (H)  0.0 - 0.5 % Final    Neutrophils, Absolute 03/05/2024 5.41  1.70 - 7.00 10*3/mm3 Final    Lymphocytes, Absolute 03/05/2024 0.64 (L)  0.70 - 3.10 10*3/mm3 Final    Monocytes, Absolute 03/05/2024 0.73  0.10 - 0.90 10*3/mm3 Final    Eosinophils, Absolute 03/05/2024 0.02  0.00 - 0.40 10*3/mm3 Final    Basophils, Absolute 03/05/2024 0.02  0.00 - 0.20 10*3/mm3 Final    Immature Grans, Absolute 03/05/2024 0.57 (H)  0.00 - 0.05 10*3/mm3 Final   Hospital Outpatient Visit on 02/29/2024   Component Date Value Ref Range Status    Glucose 02/29/2024 110 (H)  65 - 99 mg/dL Final    BUN 02/29/2024 11  8 - 23 mg/dL Final    Creatinine 02/29/2024 0.56 (L)  0.76 - 1.27 mg/dL Final    Sodium 02/29/2024 134 (L)  136 - 145 mmol/L Final    Potassium 02/29/2024 4.1  3.5 - 5.2 mmol/L Final    Chloride 02/29/2024 97 (L)  98 - 107 mmol/L Final    CO2 02/29/2024 29.0  22.0 - 29.0 mmol/L Final     Calcium 02/29/2024 9.2  8.6 - 10.5 mg/dL Final    Total Protein 02/29/2024 6.4  6.0 - 8.5 g/dL Final    Albumin 02/29/2024 4.1  3.5 - 5.2 g/dL Final    ALT (SGPT) 02/29/2024 11  1 - 41 U/L Final    AST (SGOT) 02/29/2024 13  1 - 40 U/L Final    Alkaline Phosphatase 02/29/2024 99  39 - 117 U/L Final    Total Bilirubin 02/29/2024 0.2  0.0 - 1.2 mg/dL Final    Globulin 02/29/2024 2.3  gm/dL Final    Calculated Result    A/G Ratio 02/29/2024 1.8  g/dL Final    BUN/Creatinine Ratio 02/29/2024 19.6  7.0 - 25.0 Final    Anion Gap 02/29/2024 8.0  5.0 - 15.0 mmol/L Final    eGFR 02/29/2024 105.4  >60.0 mL/min/1.73 Final    Magnesium 02/29/2024 1.9  1.6 - 2.4 mg/dL Final    LDH 02/29/2024 169  135 - 225 U/L Final    Phosphorus 02/29/2024 3.5  2.5 - 4.5 mg/dL Final    Uric Acid 02/29/2024 3.7  3.4 - 7.0 mg/dL Final    Falsely depressed results may occur on samples drawn from patients receiving N-Acetylcysteine (NAC) or Metamizole.    WBC 02/29/2024 4.93  3.40 - 10.80 10*3/mm3 Final    RBC 02/29/2024 3.06 (L)  4.14 - 5.80 10*6/mm3 Final    Hemoglobin 02/29/2024 10.0 (L)  13.0 - 17.7 g/dL Final    Hematocrit 02/29/2024 29.2 (L)  37.5 - 51.0 % Final    MCV 02/29/2024 95.4  79.0 - 97.0 fL Final    MCH 02/29/2024 32.7  26.6 - 33.0 pg Final    MCHC 02/29/2024 34.2  31.5 - 35.7 g/dL Final    RDW 02/29/2024 15.6 (H)  12.3 - 15.4 % Final    RDW-SD 02/29/2024 51.7  37.0 - 54.0 fl Final    MPV 02/29/2024 9.8  6.0 - 12.0 fL Final    Platelets 02/29/2024 81 (L)  140 - 450 10*3/mm3 Final    Neutrophil % 02/29/2024 67.6  42.7 - 76.0 % Final    Lymphocyte % 02/29/2024 10.1 (L)  19.6 - 45.3 % Final    Monocyte % 02/29/2024 16.4 (H)  5.0 - 12.0 % Final    Eosinophil % 02/29/2024 0.4  0.3 - 6.2 % Final    Basophil % 02/29/2024 0.4  0.0 - 1.5 % Final    Immature Grans % 02/29/2024 5.1 (H)  0.0 - 0.5 % Final    Neutrophils, Absolute 02/29/2024 3.33  1.70 - 7.00 10*3/mm3 Final    Lymphocytes, Absolute 02/29/2024 0.50 (L)  0.70 - 3.10 10*3/mm3 Final     Monocytes, Absolute 02/29/2024 0.81  0.10 - 0.90 10*3/mm3 Final    Eosinophils, Absolute 02/29/2024 0.02  0.00 - 0.40 10*3/mm3 Final    Basophils, Absolute 02/29/2024 0.02  0.00 - 0.20 10*3/mm3 Final    Immature Grans, Absolute 02/29/2024 0.25 (H)  0.00 - 0.05 10*3/mm3 Final   Hospital Outpatient Visit on 02/26/2024   Component Date Value Ref Range Status    Glucose 02/26/2024 123 (H)  65 - 99 mg/dL Final    BUN 02/26/2024 15  8 - 23 mg/dL Final    Creatinine 02/26/2024 0.55 (L)  0.76 - 1.27 mg/dL Final    Sodium 02/26/2024 134 (L)  136 - 145 mmol/L Final    Potassium 02/26/2024 4.3  3.5 - 5.2 mmol/L Final    Chloride 02/26/2024 98  98 - 107 mmol/L Final    CO2 02/26/2024 26.0  22.0 - 29.0 mmol/L Final    Calcium 02/26/2024 8.9  8.6 - 10.5 mg/dL Final    Total Protein 02/26/2024 6.4  6.0 - 8.5 g/dL Final    Albumin 02/26/2024 3.9  3.5 - 5.2 g/dL Final    ALT (SGPT) 02/26/2024 12  1 - 41 U/L Final    AST (SGOT) 02/26/2024 11  1 - 40 U/L Final    Alkaline Phosphatase 02/26/2024 86  39 - 117 U/L Final    Total Bilirubin 02/26/2024 0.4  0.0 - 1.2 mg/dL Final    Globulin 02/26/2024 2.5  gm/dL Final    Calculated Result    A/G Ratio 02/26/2024 1.6  g/dL Final    BUN/Creatinine Ratio 02/26/2024 27.3 (H)  7.0 - 25.0 Final    Anion Gap 02/26/2024 10.0  5.0 - 15.0 mmol/L Final    eGFR 02/26/2024 106.0  >60.0 mL/min/1.73 Final    Magnesium 02/26/2024 1.8  1.6 - 2.4 mg/dL Final    LDH 02/26/2024 138  135 - 225 U/L Final    Phosphorus 02/26/2024 3.3  2.5 - 4.5 mg/dL Final    Uric Acid 02/26/2024 3.0 (L)  3.4 - 7.0 mg/dL Final    Falsely depressed results may occur on samples drawn from patients receiving N-Acetylcysteine (NAC) or Metamizole.    WBC 02/26/2024 3.48  3.40 - 10.80 10*3/mm3 Final    RBC 02/26/2024 3.03 (L)  4.14 - 5.80 10*6/mm3 Final    Hemoglobin 02/26/2024 9.9 (L)  13.0 - 17.7 g/dL Final    Hematocrit 02/26/2024 29.0 (L)  37.5 - 51.0 % Final    MCV 02/26/2024 95.7  79.0 - 97.0 fL Final    MCH 02/26/2024 32.7   26.6 - 33.0 pg Final    MCHC 02/26/2024 34.1  31.5 - 35.7 g/dL Final    RDW 02/26/2024 15.3  12.3 - 15.4 % Final    RDW-SD 02/26/2024 51.8  37.0 - 54.0 fl Final    MPV 02/26/2024 10.2  6.0 - 12.0 fL Final    Platelets 02/26/2024 49 (L)  140 - 450 10*3/mm3 Final    Neutrophil % 02/26/2024 65.4  42.7 - 76.0 % Final    Lymphocyte % 02/26/2024 11.8 (L)  19.6 - 45.3 % Final    Monocyte % 02/26/2024 16.4 (H)  5.0 - 12.0 % Final    Eosinophil % 02/26/2024 0.6  0.3 - 6.2 % Final    Basophil % 02/26/2024 0.3  0.0 - 1.5 % Final    Immature Grans % 02/26/2024 5.5 (H)  0.0 - 0.5 % Final    Neutrophils, Absolute 02/26/2024 2.28  1.70 - 7.00 10*3/mm3 Final    Lymphocytes, Absolute 02/26/2024 0.41 (L)  0.70 - 3.10 10*3/mm3 Final    Monocytes, Absolute 02/26/2024 0.57  0.10 - 0.90 10*3/mm3 Final    Eosinophils, Absolute 02/26/2024 0.02  0.00 - 0.40 10*3/mm3 Final    Basophils, Absolute 02/26/2024 0.01  0.00 - 0.20 10*3/mm3 Final    Immature Grans, Absolute 02/26/2024 0.19 (H)  0.00 - 0.05 10*3/mm3 Final   Hospital Outpatient Visit on 02/22/2024   Component Date Value Ref Range Status    Glucose 02/22/2024 85  65 - 99 mg/dL Final    BUN 02/22/2024 19  8 - 23 mg/dL Final    Creatinine 02/22/2024 0.58 (L)  0.76 - 1.27 mg/dL Final    Sodium 02/22/2024 132 (L)  136 - 145 mmol/L Final    Potassium 02/22/2024 4.6  3.5 - 5.2 mmol/L Final    Chloride 02/22/2024 96 (L)  98 - 107 mmol/L Final    CO2 02/22/2024 27.0  22.0 - 29.0 mmol/L Final    Calcium 02/22/2024 9.1  8.6 - 10.5 mg/dL Final    Total Protein 02/22/2024 6.6  6.0 - 8.5 g/dL Final    Albumin 02/22/2024 4.0  3.5 - 5.2 g/dL Final    ALT (SGPT) 02/22/2024 16  1 - 41 U/L Final    AST (SGOT) 02/22/2024 10  1 - 40 U/L Final    Alkaline Phosphatase 02/22/2024 88  39 - 117 U/L Final    Total Bilirubin 02/22/2024 0.5  0.0 - 1.2 mg/dL Final    Globulin 02/22/2024 2.6  gm/dL Final    Calculated Result    A/G Ratio 02/22/2024 1.5  g/dL Final    BUN/Creatinine Ratio 02/22/2024 32.8 (H)  7.0 -  25.0 Final    Anion Gap 02/22/2024 9.0  5.0 - 15.0 mmol/L Final    eGFR 02/22/2024 104.3  >60.0 mL/min/1.73 Final    Magnesium 02/22/2024 1.9  1.6 - 2.4 mg/dL Final    LDH 02/22/2024 134 (L)  135 - 225 U/L Final    Phosphorus 02/22/2024 3.7  2.5 - 4.5 mg/dL Final    Uric Acid 02/22/2024 2.1 (L)  3.4 - 7.0 mg/dL Final    Falsely depressed results may occur on samples drawn from patients receiving N-Acetylcysteine (NAC) or Metamizole.    WBC 02/22/2024 0.86 (C)  3.40 - 10.80 10*3/mm3 Final    RBC 02/22/2024 3.32 (L)  4.14 - 5.80 10*6/mm3 Final    Hemoglobin 02/22/2024 10.6 (L)  13.0 - 17.7 g/dL Final    Hematocrit 02/22/2024 31.9 (L)  37.5 - 51.0 % Final    MCV 02/22/2024 96.1  79.0 - 97.0 fL Final    MCH 02/22/2024 31.9  26.6 - 33.0 pg Final    MCHC 02/22/2024 33.2  31.5 - 35.7 g/dL Final    RDW 02/22/2024 14.6  12.3 - 15.4 % Final    RDW-SD 02/22/2024 52.4  37.0 - 54.0 fl Final    MPV 02/22/2024 9.4  6.0 - 12.0 fL Final    Platelets 02/22/2024 90 (L)  140 - 450 10*3/mm3 Final    Neutrophil % 02/22/2024 48.8  42.7 - 76.0 % Final    Lymphocyte % 02/22/2024 33.7  19.6 - 45.3 % Final    Monocyte % 02/22/2024 11.6  5.0 - 12.0 % Final    Eosinophil % 02/22/2024 3.5  0.3 - 6.2 % Final    Basophil % 02/22/2024 1.2  0.0 - 1.5 % Final    Immature Grans % 02/22/2024 1.2 (H)  0.0 - 0.5 % Final    Neutrophils, Absolute 02/22/2024 0.42 (L)  1.70 - 7.00 10*3/mm3 Final    Lymphocytes, Absolute 02/22/2024 0.29 (L)  0.70 - 3.10 10*3/mm3 Final    Monocytes, Absolute 02/22/2024 0.10  0.10 - 0.90 10*3/mm3 Final    Eosinophils, Absolute 02/22/2024 0.03  0.00 - 0.40 10*3/mm3 Final    Basophils, Absolute 02/22/2024 0.01  0.00 - 0.20 10*3/mm3 Final    Immature Grans, Absolute 02/22/2024 0.01  0.00 - 0.05 10*3/mm3 Final       No results found.    Assessment / Plan      Assessment/Plan:     1.  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  Chemo monitoring  -He completed 6 cycles of  BV-AVD 3/23/2023. Cycle 6 was complicated by coronavirus infection and multifocal pneumonia.  Posttherapy PET showed persistent bilateral interstitial changes and mild adenopathy. Repeat chest CT shows continued improvement in the pulmonary infiltrates consistent with a reactive infectious etiology.  - He had clear response to first line therapy with resolution of the hepatic lesions, marked improvement in the retroperitoneal adenopathy, and 50% reduction in the size of the rectal mass.  However he had persistent uptake in the rectal mass on posttreatment PET, [place] 5. We elected to proceed with biopsy to confirm whether he had residual disease followed by consolidative radiotherapy to the rectal mass given initial bulky disease.  In the interim, while awaiting biopsy, he presented with rectal bleeding, obstructive uropathy symptoms and pain and radiation planning CT showed significant interval growth in the rectal mass as well as development of new retroperitoneal adenopathy superior to the rectal mass compared to the his posttreatment PET, confirming progressive disease. Consolidative radiation was converted to palliative course radiation given interval adenopathy development above the radiation field to allow for earlier introduction of second line chemotherapy.   -Repeat biopsy confirmed residual classic Hodgkin's lymphoma.  -Given national shortage of carbo/cis, we proceeded with Keytruda, vinorelbine, Gemzar, and Doxil. He completed 4 cycles 8/2023. Post treatment PET showed complete resolution of FDG avidity.  He continued Keytruda maintenance  -Most recent PET from 12/2023 with resolution of FDG avidity in the rectal mass which has decreased in size.  There is a new area of FDG avidity and a lytic lesion in the L4 region as well as mild SUV uptake in a right hilar node.  Lytic lesion appears to have been there previously on my review of prior PETs but with increased activity. He completed stereotactic  radiation therapy to this area as he was having pain.  There was also an indeterminate hilar LN. I recommended continuation of maintenance Keytruda and short interval follow-up CT of the chest to reassess this. CT in Jan compared to prior PET showed worsening consolidative RML from hilum to major fissure with possible broncholith and no specific mass. He underwent bronchoscopy with findings confirming recurrent CD30 positive Hodgkin's lymphoma, but he now has developed strong CD20 positivity.  Morphologically this is felt to represent the same underlying Hodgkin's process, but with immunophenotype switch.  I recommended third line systemic therapy.  He initiated  Rituximab/ICE  -He is tolerating chemotherapy well.  CBC reviewed and adequate for cycle 2. CMP, LDH, uric acid pending. UA pending for ifosphamide. He will be admitted today and initiate day 1.   -Start levaquin when ANC < 1  -Weekly blood counts  -Schedule neulasta on Monday.   -He will continue with daily allopurinol for duration of cycle 1.  Tumor lysis labs within acceptable range today.  -Plan admission on 3/5 for consideration of cycle 2. RN confirmed with admissions.  -Follow up Tues with labs and possible admission pending results.   -Follow up nan Samson at  with PET/CT after 2 cycles of chemotherapy for possible stem cell collection.    2.  H/o GI bleed  -PPI, continue daily.  Tolerating well.     3. Access  -Port c/d/I    4.  Right middle lobe obstruction  -Secondary to his malignancy.  He is on room air.  I have discussed with pulmonary and he would potentially be candidate for his stent if he has progressive obstructive symptoms.  However, he is currently on room air.  He has had an early response in his symptoms with treatment initiation which is suggestive or early response.     5.  Mucositis  -Continue valtrex prophylaxis    Follow Up:   1 week with repeat labs and admission for RICE cycle #2  Weekly labs   Neulasta 3/11      Kaycee DANIELLE  MD [facility]  Hematology and Oncology

## 2024-03-05 NOTE — H&P
[facility] Services  HISTORY AND PHYSICAL    Patient Name: Abraham Wu  : 1950  MRN: [id number]  Primary Care Physician: Jatinder Haynes MD  Date of admission: 3/5/2024      Subjective   Subjective     Chief Complaint: Hodgkin Lymphoma    HPI:  Abraham Wu is a 73 y.o. male admitted from Dr. Leary's office for scheduled treatment for his Hodgkin Lymphoma. Since his last visit he has been doing well. Has been eating fairly well. Denies fatigue, fever/chills, abd pain.      Personal History     Past Medical History:   Diagnosis Date    Arthritis     benign polypoid tissue right lung     Cancer     HODGKINS LYMPHOMA, RECTAL CANCER    Diabetes mellitus     patient reports that he doesn't have diabetes secondary to changing diet and weight loss.    History of radiation therapy 2023    re-treat rectum    History of transfusion     no reaction, transfusions received at [facility] in     Hyperlipidemia     Hypertension     Lymphoma     Mycobacterium mucogenicum     SHERRI (obstructive sleep apnea)     O2 2L/MIN AT NIGHT ONLY    Pneumonia         Seasonal allergies          Oncology Problem List:  Lymphoma (2024; Status: Active)  Hodgkin's lymphoma (2024; Status: Active)  Hodgkin lymphoma (10/07/2022; Status: Active)    Oncology/Hematology History   Hodgkin lymphoma   10/7/2022 Initial Diagnosis    Hodgkin lymphoma (HCC)     10/8/2022 - 3/22/2023 Chemotherapy    OP HODGKIN LYMPHOMA  BV+AVD (Brentuximab vedotin / Doxorubicin / Vinblastine / Dacarbazine)     10/28/2022 Cancer Staged    Staging form: Hodgkin And Non-Hodgkin Lymphoma, AJCC 8th Edition  - Clinical: Stage IV - Signed by Kaycee Leary MD on 10/28/2022     2023 - 2023 Radiation    Radiation OncologyTreatment Course:  Abraham Wu received 1400 cGy in 4 bid fractions to rectal mass via External Beam Radiation - EBRT.     2023 - 2023 Chemotherapy    OP HODGKIN  LYMPHOMA BeGV (Bendamustine / Gemcitabine / VinORELBine)     9/14/2023 - 1/18/2024 Chemotherapy    OP HL Pembrolizumab 200 mg     9/18/2023 - 9/27/2023 Radiation    Radiation OncologyTreatment Course:  Abraham Wu received 2400 cGy in 8 fractions to rectum via External Beam Radiation - EBRT.     1/16/2024 - 1/22/2024 Radiation    Radiation OncologyTreatment Course:  Abraham Wu received 2100 cGy in 3 fractions to L4 spine lesion via Stereotactic Radiation Therapy - SRT.     2/8/2024 - 2/8/2024 Chemotherapy    IP LYMPHOMA DHAP-R Dexamethasone / CISplatin / Cytarabine / RiTUXimab     2/13/2024 -  Chemotherapy    IP LYMPHOMA RICE RiTUXimab / Ifosfamide + Mesna / CARBOplatin AUC = 5 / Etoposide       Past Surgical History:   Procedure Laterality Date    BRONCHOSCOPY      removal of obstructing polypoid tissue right middle lung    BRONCHOSCOPY N/A 2/1/2024    Procedure: BRONCHOSCOPY WITH ENDOBRONCHIAL ULTRASOUND AND FLUORO;  Surgeon: Chris Pruitt MD;  Location:  [facility];  Service: Pulmonary;  Laterality: N/A;  EBUS BALLOON INTACT    COLONOSCOPY      COLOSTOMY N/A 09/22/2022    Procedure: LAPAROSCOPIC COLOSTOMY CREATION, FLEXIBLE SIGMOIDOSCOPY;  Surgeon: Hiram Viveros MD;  Location:  [facility];  Service: General;  Laterality: N/A;    DENTAL PROCEDURE      dental implants    ENDOSCOPY N/A 10/10/2022    Procedure: ESOPHAGOGASTRODUODENOSCOPY;  Surgeon: Neeraj Lynn MD;  Location:  [facility];  Service: Gastroenterology;  Laterality: N/A;    ENDOSCOPY N/A 10/12/2022    Procedure: ESOPHAGOGASTRODUODENOSCOPY;  Surgeon: Brunner, Mark I, MD;  Location:  [facility];  Service: Gastroenterology;  Laterality: N/A;    EXAM UNDER ANESTHESIA, RECTAL BIOPSY N/A 10/04/2022    Procedure: EXAM UNDER ANESTHESIA, TRANSANAL BIOPSY WITH TRUCUT NEEDLE (LITHOTOMY-CANDY CANE);  Surgeon: Hiram Viveros MD;  Location:  [facility];  Service: General;  Laterality: N/A;    EXAM UNDER ANESTHESIA,  RECTAL BIOPSY N/A 05/30/2023    Procedure: EXAM UNDER ANESTHESIA, TRANSANAL BIOPSY OF RECTAL MASS WITH TRUCUT NEEDLE, PROCTOSCOPY;  Surgeon: Hiram Viveros MD;  Location: [facility];  Service: General;  Laterality: N/A;    PERIPHERALLY INSERTED CENTRAL CATHETER INSERTION      Removed 11/28/2022    VENOUS ACCESS DEVICE (PORT) INSERTION Right 11/28/2022       Family History: family history includes Diabetes in his father and mother; Heart disease in his father.     Social History:  reports that he has never smoked. He has never used smokeless tobacco. He reports that he does not currently use alcohol. He reports that he does not use drugs.  Social History     Social History Narrative    Not on file       Medications:  Available home medication information reviewed.  First Mouthwash (Magic Mouthwash), allopurinol, bacitracin, dexAMETHasone, fluticasone, levoFLOXacin, lidocaine-prilocaine, loratadine, ondansetron, pantoprazole, prochlorperazine, rosuvastatin, and valACYclovir    No Known Allergies    Objective   Objective     Vital Signs:   Temp:  [96.9 °F (36.1 °C)-97.5 °F (36.4 °C)] 96.9 °F (36.1 °C)  Heart Rate:  [68-75] 68  Resp:  [18] 18  BP: (130-133)/(66-77) 133/77       Physical Exam   Constitutional: Awake, alert  Eyes: PERRLA, sclerae anicteric, no conjunctival injection  HENT: NCAT, mucous membranes moist  Neck: Supple, no thyromegaly, no lymphadenopathy, trachea midline  Respiratory: Clear to auscultation bilaterally, nonlabored respirations   Cardiovascular: RRR, no murmurs, rubs, or gallops, palpable pedal pulses bilaterally  Gastrointestinal: Positive bowel sounds, soft, nontender, nondistended, ostomy  Musculoskeletal: No bilateral ankle edema, no clubbing or cyanosis to extremities  Psychiatric: Appropriate affect, cooperative  Neurologic: Oriented x 3, strength symmetric in all extremities, Cranial Nerves grossly intact to confrontation, speech clear  Skin: No rashes     Result Review:  I have  personally reviewed the results from the time of this admission to 3/5/2024 10:45 EST and agree with these findings:  []  Laboratory list / accordion  []  Microbiology  []  Radiology  []  EKG/Telemetry   []  Cardiology/Vascular   []  Pathology  []  Old records  []  Other:  Most notable findings include:       LAB RESULTS:      Lab 03/05/24  0854 03/05/24  0744 02/29/24  1032   WBC  --  7.39 4.93   HEMOGLOBIN  --  10.0* 10.0*   HEMATOCRIT  --  29.6* 29.2*   PLATELETS  --  194 81*   NEUTROS ABS  --  5.41 3.33   IMMATURE GRANS (ABS)  --  0.57* 0.25*   LYMPHS ABS  --  0.64* 0.50*   MONOS ABS  --  0.73 0.81   EOS ABS  --  0.02 0.02   MCV  --  96.4 95.4   *  --  169         Lab 03/05/24  0854 03/05/24  0744 02/29/24  1032   SODIUM  --  137 134*   POTASSIUM  --  4.4 4.1   CHLORIDE  --  100 97*   CO2  --  27.0 29.0   ANION GAP  --  10.0 8.0   BUN  --  15 11   CREATININE  --  0.55* 0.56*   EGFR  --  106.0 105.4   GLUCOSE  --  94 110*   CALCIUM  --  8.9 9.2   MAGNESIUM  --  2.3 1.9   PHOSPHORUS 3.7  --  3.5         Lab 03/05/24  0854 02/29/24  1032   TOTAL PROTEIN 6.8 6.4   ALBUMIN 4.2 4.1   GLOBULIN  --  2.3   ALT (SGPT) 9 11   AST (SGOT) 13 13   BILIRUBIN 0.3 0.2   BILIRUBIN DIRECT <0.2  --    ALK PHOS 110 99                     UA          2/16/2024    09:58 2/19/2024    08:57 3/5/2024    08:59   Urinalysis   Squamous Epithelial Cells, UA 0-2   0-2    Specific Gravity, UA 1.015  1.029  1.020    Ketones, UA Negative  Trace  Negative    Blood, UA Negative  Negative  Small (1+)    Leukocytes, UA Negative  Negative  Negative    Nitrite, UA Negative  Negative  Negative    RBC, UA 0-2   21-50    WBC, UA None Seen   0-2    Bacteria, UA None Seen   None Seen        Microbiology Results (last 10 days)       ** No results found for the last 240 hours. **            No radiology results from the last 24 hrs    Results for orders placed during the hospital encounter of 06/15/23    Adult Transthoracic Echo Complete W/ Cont if  Necessary Per Protocol    Interpretation Summary    Left ventricular systolic function is normal. Estimated left ventricular EF = 60%    Normal global longitudinal strain pattern (-19.9%)    The cardiac valves are anatomically and functionally normal.    Estimated right ventricular systolic pressure from tricuspid regurgitation is normal (<35 mmHg).      Assessment & Plan   Assessment & Plan       Essential hypertension    Dyslipidemia    Hodgkin lymphoma    73 yr old man with hodgkin lymphoma, here for chemotherapy     Hodgkins lymphoma  Chronic anemia, at baseline  - Dr Kaycee Leary follows, chemotherapy ordered.  - Continue daily labs  - IVF, antiemetics.     HTN  - Home meds.     Ostomy    DVT prophylaxis:  Medical DVT prophylaxis orders are present.          CODE STATUS:    Code Status and Medical Interventions:   Ordered at: 03/05/24 1045     Code Status (Patient has no pulse and is not breathing):    CPR (Attempt to Resuscitate)     Medical Interventions (Patient has pulse or is breathing):    Full Support       Expected Discharge   Expected discharge date/ time has not been documented.     Aarti Hernandez II, DO  03/05/24

## 2024-03-05 NOTE — PLAN OF CARE
Problem: Adult Inpatient Plan of Care  Goal: Plan of Care Review  3/5/2024 1631 by Phillip Mccall RN  Outcome: Ongoing, Progressing  Flowsheets (Taken 3/5/2024 1631)  Progress: improving  Plan of Care Reviewed With: patient  Outcome Evaluation: VSS. Resting well. UOP adq. Chemo precautions. Up ad elis. Eating well. No pain/ nausea. Chemo tolerated well today thus far. Continue care.   Goal Outcome Evaluation:  Plan of Care Reviewed With: patient        Progress: improving  Outcome Evaluation: VSS. Resting well. UOP adq. Chemo precautions. Up ad elis. Eating well. No pain/ nausea. Chemo tolerated well today thus far. Continue care.

## 2024-03-06 LAB
ALBUMIN SERPL-MCNC: 3.8 G/DL (ref 3.5–5.2)
ALBUMIN/GLOB SERPL: 1.5 G/DL
ALP SERPL-CCNC: 99 U/L (ref 39–117)
ALT SERPL W P-5'-P-CCNC: 8 U/L (ref 1–41)
ANION GAP SERPL CALCULATED.3IONS-SCNC: 9 MMOL/L (ref 5–15)
AST SERPL-CCNC: 11 U/L (ref 1–40)
BACTERIA UR QL AUTO: ABNORMAL /HPF
BASOPHILS # BLD AUTO: 0.03 10*3/MM3 (ref 0–0.2)
BASOPHILS NFR BLD AUTO: 0.3 % (ref 0–1.5)
BILIRUB SERPL-MCNC: 0.2 MG/DL (ref 0–1.2)
BUN SERPL-MCNC: 14 MG/DL (ref 8–23)
BUN/CREAT SERPL: 31.1 (ref 7–25)
CALCIUM SPEC-SCNC: 8.9 MG/DL (ref 8.6–10.5)
CHLORIDE SERPL-SCNC: 101 MMOL/L (ref 98–107)
CO2 SERPL-SCNC: 25 MMOL/L (ref 22–29)
CREAT SERPL-MCNC: 0.45 MG/DL (ref 0.76–1.27)
DEPRECATED RDW RBC AUTO: 49.5 FL (ref 37–54)
EGFRCR SERPLBLD CKD-EPI 2021: 112.6 ML/MIN/1.73
EOSINOPHIL # BLD AUTO: 0 10*3/MM3 (ref 0–0.4)
EOSINOPHIL NFR BLD AUTO: 0 % (ref 0.3–6.2)
ERYTHROCYTE [DISTWIDTH] IN BLOOD BY AUTOMATED COUNT: 15.7 % (ref 12.3–15.4)
GLOBULIN UR ELPH-MCNC: 2.5 GM/DL
GLUCOSE SERPL-MCNC: 154 MG/DL (ref 65–99)
HCT VFR BLD AUTO: 28.8 % (ref 37.5–51)
HGB BLD-MCNC: 9.7 G/DL (ref 13–17.7)
HYALINE CASTS UR QL AUTO: ABNORMAL /LPF
IMM GRANULOCYTES # BLD AUTO: 0.57 10*3/MM3 (ref 0–0.05)
IMM GRANULOCYTES NFR BLD AUTO: 5.8 % (ref 0–0.5)
LDH SERPL-CCNC: 235 U/L (ref 135–225)
LYMPHOCYTES # BLD AUTO: 0.54 10*3/MM3 (ref 0.7–3.1)
LYMPHOCYTES NFR BLD AUTO: 5.5 % (ref 19.6–45.3)
MAGNESIUM SERPL-MCNC: 2 MG/DL (ref 1.6–2.4)
MCH RBC QN AUTO: 31.4 PG (ref 26.6–33)
MCHC RBC AUTO-ENTMCNC: 33.7 G/DL (ref 31.5–35.7)
MCV RBC AUTO: 93.2 FL (ref 79–97)
MONOCYTES # BLD AUTO: 0.15 10*3/MM3 (ref 0.1–0.9)
MONOCYTES NFR BLD AUTO: 1.5 % (ref 5–12)
NEUTROPHILS NFR BLD AUTO: 8.59 10*3/MM3 (ref 1.7–7)
NEUTROPHILS NFR BLD AUTO: 86.9 % (ref 42.7–76)
NRBC BLD AUTO-RTO: 0 /100 WBC (ref 0–0.2)
PHOSPHATE SERPL-MCNC: 2.9 MG/DL (ref 2.5–4.5)
PLATELET # BLD AUTO: 210 10*3/MM3 (ref 140–450)
PMV BLD AUTO: 9.1 FL (ref 6–12)
POTASSIUM SERPL-SCNC: 4.4 MMOL/L (ref 3.5–5.2)
PROT SERPL-MCNC: 6.3 G/DL (ref 6–8.5)
RBC # BLD AUTO: 3.09 10*6/MM3 (ref 4.14–5.8)
RBC # UR STRIP: ABNORMAL /HPF
REF LAB TEST METHOD: ABNORMAL
SODIUM SERPL-SCNC: 135 MMOL/L (ref 136–145)
SQUAMOUS #/AREA URNS HPF: ABNORMAL /HPF
URATE SERPL-MCNC: 3 MG/DL (ref 3.4–7)
WBC # UR STRIP: ABNORMAL /HPF
WBC NRBC COR # BLD AUTO: 9.88 10*3/MM3 (ref 3.4–10.8)

## 2024-03-06 PROCEDURE — 83615 LACTATE (LD) (LDH) ENZYME: CPT | Performed by: INTERNAL MEDICINE

## 2024-03-06 PROCEDURE — 80053 COMPREHEN METABOLIC PANEL: CPT | Performed by: INTERNAL MEDICINE

## 2024-03-06 PROCEDURE — 84100 ASSAY OF PHOSPHORUS: CPT | Performed by: INTERNAL MEDICINE

## 2024-03-06 PROCEDURE — 25010000002 ONDANSETRON PER 1 MG: Performed by: INTERNAL MEDICINE

## 2024-03-06 PROCEDURE — 99222 1ST HOSP IP/OBS MODERATE 55: CPT | Performed by: INTERNAL MEDICINE

## 2024-03-06 PROCEDURE — 25810000003 LACTATED RINGERS PER 1000 ML: Performed by: INTERNAL MEDICINE

## 2024-03-06 PROCEDURE — 3E03305 INTRODUCTION OF OTHER ANTINEOPLASTIC INTO PERIPHERAL VEIN, PERCUTANEOUS APPROACH: ICD-10-PCS | Performed by: INTERNAL MEDICINE

## 2024-03-06 PROCEDURE — 25010000002 FOSAPREPITANT PER 1 MG: Performed by: INTERNAL MEDICINE

## 2024-03-06 PROCEDURE — 83735 ASSAY OF MAGNESIUM: CPT | Performed by: INTERNAL MEDICINE

## 2024-03-06 PROCEDURE — 25010000002 IFOSFAMIDE PER 1 G: Performed by: INTERNAL MEDICINE

## 2024-03-06 PROCEDURE — 25010000002 DEXAMETHASONE PER 1 MG: Performed by: INTERNAL MEDICINE

## 2024-03-06 PROCEDURE — 25010000002 ENOXAPARIN PER 10 MG: Performed by: INTERNAL MEDICINE

## 2024-03-06 PROCEDURE — 85025 COMPLETE CBC W/AUTO DIFF WBC: CPT | Performed by: INTERNAL MEDICINE

## 2024-03-06 PROCEDURE — 25010000002 ETOPOSIDE 1 GM/50ML SOLUTION 50 ML VIAL: Performed by: INTERNAL MEDICINE

## 2024-03-06 PROCEDURE — 99232 SBSQ HOSP IP/OBS MODERATE 35: CPT | Performed by: STUDENT IN AN ORGANIZED HEALTH CARE EDUCATION/TRAINING PROGRAM

## 2024-03-06 PROCEDURE — 25810000003 SODIUM CHLORIDE 0.9 % SOLUTION 500 ML FLEX CONT: Performed by: INTERNAL MEDICINE

## 2024-03-06 PROCEDURE — 81015 MICROSCOPIC EXAM OF URINE: CPT | Performed by: INTERNAL MEDICINE

## 2024-03-06 PROCEDURE — 25810000003 SODIUM CHLORIDE 0.9 % SOLUTION: Performed by: INTERNAL MEDICINE

## 2024-03-06 PROCEDURE — 25010000002 MESNA PER 200 MG: Performed by: INTERNAL MEDICINE

## 2024-03-06 PROCEDURE — 84550 ASSAY OF BLOOD/URIC ACID: CPT | Performed by: INTERNAL MEDICINE

## 2024-03-06 PROCEDURE — 25810000003 SODIUM CHLORIDE 0.9 % SOLUTION 250 ML FLEX CONT: Performed by: INTERNAL MEDICINE

## 2024-03-06 PROCEDURE — 25010000002 CARBOPLATIN PER 50 MG: Performed by: INTERNAL MEDICINE

## 2024-03-06 RX ORDER — SODIUM CHLORIDE 9 MG/ML
125 INJECTION, SOLUTION INTRAVENOUS CONTINUOUS
Status: DISCONTINUED | OUTPATIENT
Start: 2024-03-06 | End: 2024-03-07 | Stop reason: HOSPADM

## 2024-03-06 RX ADMIN — FOSAPREPITANT DIMEGLUMINE 150 MG: 150 INJECTION, POWDER, LYOPHILIZED, FOR SOLUTION INTRAVENOUS at 13:42

## 2024-03-06 RX ADMIN — SODIUM CHLORIDE, POTASSIUM CHLORIDE, SODIUM LACTATE AND CALCIUM CHLORIDE 100 ML/HR: 600; 310; 30; 20 INJECTION, SOLUTION INTRAVENOUS at 05:42

## 2024-03-06 RX ADMIN — ALLOPURINOL 300 MG: 300 TABLET ORAL at 08:41

## 2024-03-06 RX ADMIN — DIPHENHYDRAMINE HYDROCHLORIDE AND LIDOCAINE HYDROCHLORIDE AND ALUMINUM HYDROXIDE AND MAGNESIUM HYDRO 5 ML: KIT at 23:37

## 2024-03-06 RX ADMIN — SODIUM CHLORIDE 125 ML/HR: 9 INJECTION, SOLUTION INTRAVENOUS at 13:26

## 2024-03-06 RX ADMIN — DIPHENHYDRAMINE HYDROCHLORIDE AND LIDOCAINE HYDROCHLORIDE AND ALUMINUM HYDROXIDE AND MAGNESIUM HYDRO 5 ML: KIT at 17:36

## 2024-03-06 RX ADMIN — MESNA 9000 MG: 100 INJECTION, SOLUTION INTRAVENOUS at 16:38

## 2024-03-06 RX ADMIN — Medication 10 ML: at 21:29

## 2024-03-06 RX ADMIN — DIPHENHYDRAMINE HYDROCHLORIDE AND LIDOCAINE HYDROCHLORIDE AND ALUMINUM HYDROXIDE AND MAGNESIUM HYDRO 5 ML: KIT at 13:00

## 2024-03-06 RX ADMIN — ROSUVASTATIN CALCIUM 10 MG: 10 TABLET, FILM COATED ORAL at 08:40

## 2024-03-06 RX ADMIN — SODIUM CHLORIDE 180 MG: 0.9 INJECTION, SOLUTION INTRAVENOUS at 14:18

## 2024-03-06 RX ADMIN — CARBOPLATIN 750 MG: 10 INJECTION INTRAVENOUS at 15:24

## 2024-03-06 RX ADMIN — DIPHENHYDRAMINE HYDROCHLORIDE AND LIDOCAINE HYDROCHLORIDE AND ALUMINUM HYDROXIDE AND MAGNESIUM HYDRO 5 ML: KIT at 00:45

## 2024-03-06 RX ADMIN — ENOXAPARIN SODIUM 40 MG: 100 INJECTION SUBCUTANEOUS at 08:40

## 2024-03-06 RX ADMIN — PANTOPRAZOLE SODIUM 40 MG: 40 TABLET, DELAYED RELEASE ORAL at 08:41

## 2024-03-06 RX ADMIN — ONDANSETRON: 2 INJECTION INTRAMUSCULAR; INTRAVENOUS at 13:24

## 2024-03-06 RX ADMIN — DIPHENHYDRAMINE HYDROCHLORIDE AND LIDOCAINE HYDROCHLORIDE AND ALUMINUM HYDROXIDE AND MAGNESIUM HYDRO 5 ML: KIT at 05:42

## 2024-03-06 RX ADMIN — SODIUM CHLORIDE 125 ML/HR: 9 INJECTION, SOLUTION INTRAVENOUS at 23:44

## 2024-03-06 RX ADMIN — BACITRACIN 0.9 G: 500 OINTMENT TOPICAL at 21:29

## 2024-03-06 NOTE — CASE MANAGEMENT/SOCIAL WORK
Discharge Planning Assessment  [facility]     Patient Name: Abraham Wu  MRN: [id number]  Today's Date: 3/6/2024    Admit Date: 3/5/2024    Plan: Home with wife Peggy [phone] transporting.   Discharge Needs Assessment       Row Name 03/06/24 0957       Living Environment    People in Home spouse    Name(s) of People in Home álvaro Woods [phone]    Primary Care Provided by self    Provides Primary Care For no one    Family Caregiver if Needed spouse    Family Caregiver Names álvaro Woods [phone]    Quality of Family Relationships involved;helpful    Able to Return to Prior Arrangements yes       Transition Planning    Transportation Anticipated family or friend will provide  álvaro Woods [phone]       Discharge Needs Assessment    Readmission Within the Last 30 Days no previous admission in last 30 days    Equipment Currently Used at Home oxygen  @night only                   Discharge Plan       Row Name 03/06/24 0958       Plan    Plan Home with wife Peggy [phone] transporting.    Plan Comments SW'er met with patient at bedside. Patient is being seen for evaluation of chemotherapy. Lives in [place] with his wife Peggy. Patient is independent with ADL's, DME includes oxygen only at night ([employer]) and no HH. Patients PCP is Jatinder Haynes MD. Patient has Medicare and [employer] insurance. Patient has a LW not in [employer]; reports the LW is in his safe but he lost the key. Plan is Home with wife Peggy [phone] transporting.    Final Discharge Disposition Code 01 - home or self-care                  Continued Care and Services - Admitted Since 3/5/2024    No active coordination exists for this encounter.       Expected Discharge Date and Time       Expected Discharge Date Expected Discharge Time    Mar 9, 2024            Demographic Summary       Row Name 03/06/24 0955       General Information    Admission Type inpatient    Referral Source admission list    Reason for  Consult discharge planning    Preferred Language English                   Functional Status       Row Name 03/06/24 0955       Functional Status    Usual Activity Tolerance good    Current Activity Tolerance good       Functional Status, IADL    Medications independent    Meal Preparation independent    Housekeeping independent    Laundry independent    Shopping independent    IADL Comments independent with ADL's, DME includes oxygen only at night ([facility])       Employment/    Employment/ Comments Medicare and [facility] insurance                   Psychosocial    No documentation.                  Abuse/Neglect    No documentation.                  Legal    No documentation.                  Substance Abuse    No documentation.                  Patient Forms    No documentation.                     CARLOS A Rondon (Kay)

## 2024-03-06 NOTE — PLAN OF CARE
Goal Outcome Evaluation:  Plan of Care Reviewed With: patient           Outcome Evaluation: VSS on RA - 2.5 L NC while sleeping (baseline). Up ad elis. Adequate UOP. Urine clear yellow. No c/o pain or nausea. chemo tolerated as of this shift. Pt rested well. Family at bedside.

## 2024-03-06 NOTE — CONSULTS
HEMATOLOGY/ONCOLOGY INPATIENT CONSULTATION      REFERRING PHYSICIAN: Marcella Harry MD    PRIMARY CARE PROVIDER: Jatinder Haynes MD    REASON FOR CONSULTATION: supervision of chemotherapy      HISTORY OF PRESENT ILLNESS: Abraham Wu is a 73-year-old gentleman who is well-known to me with a history of relapsed Hodgkin's lymphoma, CD20 and CD30 positive, who was admitted for cycle 2 of RICE chemotherapy with a plan for stem cell collection pending remission induction.    He tolerated day 1 of treatment well.  He denies any gross hematuria.  He denies any lower extremity weakness, numbness, or incoordination.  No nausea, vomiting, mucositis, abdominal pain, chest pain, or shortness of breath.  No leg swelling.      No Known Allergies    Past Medical History:   Diagnosis Date    Arthritis     benign polypoid tissue right lung     Cancer     HODGKINS LYMPHOMA, RECTAL CANCER    Diabetes mellitus     patient reports that he doesn't have diabetes secondary to changing diet and weight loss.    History of radiation therapy 12/31/2023    re-treat rectum    History of transfusion     no reaction, transfusions received at [facility] in 2022    Hyperlipidemia     Hypertension     Lymphoma     Mycobacterium mucogenicum     SHERRI (obstructive sleep apnea)     O2 2L/MIN AT NIGHT ONLY    Pneumonia     2023    Seasonal allergies          Current Facility-Administered Medications:     acetaminophen (TYLENOL) tablet 650 mg, 650 mg, Oral, Q4H PRN **OR** acetaminophen (TYLENOL) 160 MG/5ML oral solution 650 mg, 650 mg, Oral, Q4H PRN **OR** acetaminophen (TYLENOL) suppository 650 mg, 650 mg, Rectal, Q4H PRN, Mary, Aarti M II, DO    allopurinol (ZYLOPRIM) tablet 300 mg, 300 mg, Oral, Daily, Mary Aarti M II, DO    bacitracin 500 UNIT/GM ointment 0.9 g, 1 Application, Topical, BID, Mary, Aarti M II, DO, 0.9 g at 03/05/24 2223    sennosides-docusate (PERICOLACE) 8.6-50 MG per tablet 2 tablet, 2 tablet, Oral, BID PRN **AND** polyethylene  glycol (MIRALAX) packet 17 g, 17 g, Oral, Daily PRN **AND** bisacodyl (DULCOLAX) EC tablet 5 mg, 5 mg, Oral, Daily PRN **AND** bisacodyl (DULCOLAX) suppository 10 mg, 10 mg, Rectal, Daily PRN, Aarti Hernandez II, DO    Calcium Replacement - Follow Nurse / BPA Driven Protocol, , Does not apply, PRN, Aarti Hernandez II, DO    diphenhydrAMINE (BENADRYL) injection 50 mg, 50 mg, Intravenous, PRN, Kaycee Leary MD    Enoxaparin Sodium (LOVENOX) syringe 40 mg, 40 mg, Subcutaneous, Daily, Aarti Hernandez II, DO, 40 mg at 03/05/24 1222    famotidine (PEPCID) injection 20 mg, 20 mg, Intravenous, PRN, Kaycee Leary MD    First Mouthwash (Magic Mouthwash) 5 mL, 5 mL, Swish & Swallow, Q6H, Aarti Hernandez II, DO, 5 mL at 03/06/24 0542    fluticasone (FLONASE) 50 MCG/ACT nasal spray 1 spray, 1 spray, Nasal, Daily PRN, Aarti Hernandez II, DO    Hydrocortisone Sod Suc (PF) (Solu-CORTEF) injection 100 mg, 100 mg, Intravenous, PRN, Kaycee Leary MD    lactated ringers infusion, 100 mL/hr, Intravenous, Continuous, Aarti Hernandez II, DO, Last Rate: 100 mL/hr at 03/06/24 0542, 100 mL/hr at 03/06/24 0542    Magnesium Standard Dose Replacement - Follow Nurse / BPA Driven Protocol, , Does not apply, PRN, Aarti Hernandez II, DO    meperidine (DEMEROL) injection 25 mg, 25 mg, Intravenous, Q20 Min PRN, Kaycee Leary MD    ondansetron ODT (ZOFRAN-ODT) disintegrating tablet 4 mg, 4 mg, Oral, Q6H PRN **OR** ondansetron (ZOFRAN) injection 4 mg, 4 mg, Intravenous, Q6H PRN, Mary, Aarti M II, DO    pantoprazole (PROTONIX) EC tablet 40 mg, 40 mg, Oral, Daily, Aarti Hernandez II, DO    Phosphorus Replacement - Follow Nurse / BPA Driven Protocol, , Does not apply, PRN, Aarti Hernandez II, DO    Potassium Replacement - Follow Nurse / BPA Driven Protocol, , Does not apply, PRN, Aarti Hernandez II, DO    prochlorperazine (COMPAZINE) tablet 5 mg, 5 mg, Oral, Q6H PRN, Aarti Hrenandez II, DO    rosuvastatin (CRESTOR) tablet 10 mg,  10 mg, Oral, Daily, Mary, Aarti M II, DO    sodium chloride 0.9 % flush 10 mL, 10 mL, Intravenous, Q12H, Mary, Aarti M II, DO, 10 mL at 03/05/24 2000    sodium chloride 0.9 % flush 10 mL, 10 mL, Intravenous, PRN, Mary, Aarti M II, DO    sodium chloride 0.9 % infusion 40 mL, 40 mL, Intravenous, PRN, Mary, Aarti M II, DO    Facility-Administered Medications Ordered in Other Encounters:     sodium chloride 0.9 % infusion, 125 mL/hr, Intravenous, Continuous, Kaycee Leary MD    Past Surgical History:   Procedure Laterality Date    BRONCHOSCOPY      removal of obstructing polypoid tissue right middle lung    BRONCHOSCOPY N/A 2/1/2024    Procedure: BRONCHOSCOPY WITH ENDOBRONCHIAL ULTRASOUND AND FLUORO;  Surgeon: Chris Pruitt MD;  Location:  [facility];  Service: Pulmonary;  Laterality: N/A;  EBUS BALLOON INTACT    COLONOSCOPY      COLOSTOMY N/A 09/22/2022    Procedure: LAPAROSCOPIC COLOSTOMY CREATION, FLEXIBLE SIGMOIDOSCOPY;  Surgeon: Hiram Viveros MD;  Location:  [facility];  Service: General;  Laterality: N/A;    DENTAL PROCEDURE      dental implants    ENDOSCOPY N/A 10/10/2022    Procedure: ESOPHAGOGASTRODUODENOSCOPY;  Surgeon: Neeraj Lynn MD;  Location:  [facility];  Service: Gastroenterology;  Laterality: N/A;    ENDOSCOPY N/A 10/12/2022    Procedure: ESOPHAGOGASTRODUODENOSCOPY;  Surgeon: Brunner, Mark I, MD;  Location:  [facility];  Service: Gastroenterology;  Laterality: N/A;    EXAM UNDER ANESTHESIA, RECTAL BIOPSY N/A 10/04/2022    Procedure: EXAM UNDER ANESTHESIA, TRANSANAL BIOPSY WITH TRUCUT NEEDLE (LITHOTOMY-CANDY CANE);  Surgeon: Hiram Viveros MD;  Location:  [facility];  Service: General;  Laterality: N/A;    EXAM UNDER ANESTHESIA, RECTAL BIOPSY N/A 05/30/2023    Procedure: EXAM UNDER ANESTHESIA, TRANSANAL BIOPSY OF RECTAL MASS WITH TRUCUT NEEDLE, PROCTOSCOPY;  Surgeon: Hiram Viveros MD;  Location:  [facility];  Service: General;  Laterality: N/A;     PERIPHERALLY INSERTED CENTRAL CATHETER INSERTION      Removed 11/28/2022    VENOUS ACCESS DEVICE (PORT) INSERTION Right 11/28/2022       Social History     Socioeconomic History    Marital status:    Tobacco Use    Smoking status: Never    Smokeless tobacco: Never   Vaping Use    Vaping status: Never Used   Substance and Sexual Activity    Alcohol use: Not Currently     Comment: previously drank 2 glasses of wine/beer nightly    Drug use: Never    Sexual activity: Defer       Family History   Problem Relation Age of Onset    Diabetes Mother     Diabetes Father     Heart disease Father        Oncology/Hematology History   Hodgkin lymphoma   10/7/2022 Initial Diagnosis    Hodgkin lymphoma (HCC)     10/8/2022 - 3/22/2023 Chemotherapy    OP HODGKIN LYMPHOMA  BV+AVD (Brentuximab vedotin / Doxorubicin / Vinblastine / Dacarbazine)     10/28/2022 Cancer Staged    Staging form: Hodgkin And Non-Hodgkin Lymphoma, AJCC 8th Edition  - Clinical: Stage IV - Signed by Kaycee Leary MD on 10/28/2022     5/31/2023 - 6/1/2023 Radiation    Radiation OncologyTreatment Course:  Abraham Wu received 1400 cGy in 4 bid fractions to rectal mass via External Beam Radiation - EBRT.     6/13/2023 - 6/13/2023 Chemotherapy    OP HODGKIN LYMPHOMA BeGV (Bendamustine / Gemcitabine / VinORELBine)     6/16/2023 - 9/1/2023 Biopsy    OP HODGKIN LYMPHOMA Pembrolizumab + GVD (Gemcitabine / VinORELBine / Liposomal DOXOrubicin)  Plan Provider: Kaycee Leary MD  Treatment goal: Control  Line of treatment: Second Line     9/14/2023 - 1/18/2024 Chemotherapy    OP HL Pembrolizumab 200 mg     9/18/2023 - 9/27/2023 Radiation    Radiation OncologyTreatment Course:  Abraham Wu received 2400 cGy in 8 fractions to rectum via External Beam Radiation - EBRT.     1/16/2024 - 1/22/2024 Radiation    Radiation OncologyTreatment Course:  Abraham Wu received 2100 cGy in 3 fractions to L4 spine lesion via Stereotactic Radiation Therapy -  SRT.     2/8/2024 - 2/8/2024 Chemotherapy    IP LYMPHOMA DHAP-R Dexamethasone / CISplatin / Cytarabine / RiTUXimab     2/13/2024 -  Chemotherapy    IP LYMPHOMA RICE RiTUXimab / Ifosfamide + Mesna / CARBOplatin AUC = 5 / Etoposide     2/15/2024 - 2/15/2024 Biopsy    OP LYMPHOMA Gemcitabine / Dexamethasone / CARBOplatin AUC=5 / RiTUXimab  Plan Provider: Kaycee Leary MD  Treatment goal: Control  Line of treatment: [No plan line of treatment]         Objective     Vitals:    03/05/24 1830 03/05/24 1935 03/05/24 2300 03/06/24 0402   BP: 122/62 137/68 132/66 142/67   BP Location:  Left arm Left arm Left arm   Patient Position:  Lying Lying Lying   Pulse: 62 64 61 61   Resp: 18 18 18 18   Temp: 98.1 °F (36.7 °C) 97.8 °F (36.6 °C) 97.9 °F (36.6 °C) 97.8 °F (36.6 °C)   TempSrc:  Axillary Oral Oral   SpO2: 96% 97% 96% 99%                   Temp:  [96.9 °F (36.1 °C)-98.2 °F (36.8 °C)] 97.8 °F (36.6 °C)     Performance Status: 0    Physical Exam    General: well appearing male in no acute distress  HEENT: sclerae anicteric, oropharynx clear  Lymphatics: no cervical, supraclavicular, or axillary adenopathy  Cardiovascular: regular rate and rhythm, no murmurs  Lungs: clear to auscultation bilaterally  Abdomen: soft, nontender, nondistended.  Left lower quadrant ostomy  Extremities: no lower extremity edema  Skin: no rashes, lesions, bruising, or petechiae  Neuro: Alert and oriented x 3.  Strength is intact bilaterally.      LABS:    Lab Results   Component Value Date    HGB 9.7 (L) 03/06/2024    HCT 28.8 (L) 03/06/2024    MCV 93.2 03/06/2024     03/06/2024    WBC 9.88 03/06/2024    NEUTROABS 8.59 (H) 03/06/2024    LYMPHSABS 0.54 (L) 03/06/2024    MONOSABS 0.15 03/06/2024    EOSABS 0.00 03/06/2024    BASOSABS 0.03 03/06/2024     Lab Results   Component Value Date    GLUCOSE 154 (H) 03/06/2024    BUN 14 03/06/2024    CREATININE 0.45 (L) 03/06/2024     (L) 03/06/2024    K 4.4 03/06/2024     03/06/2024    CO2  25.0 03/06/2024    CALCIUM 8.9 03/06/2024    PROTEINTOT 6.3 03/06/2024    ALBUMIN 3.8 03/06/2024    BILITOT 0.2 03/06/2024    ALKPHOS 99 03/06/2024    AST 11 03/06/2024    ALT 8 03/06/2024     Assessment/Plan    1.   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 Chemo monitoring  -Today is day 2 of cycle 2 of RICE chemotherapy.  Tolerating therapy well today.    -Continue daily tumor lysis labs.  CBC/CMP/ Uric acid/phosphorus reviewed and stable.   -Patient is okay to proceed with day 2 of treatment.  -Microscopic hematuria is noted.  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  Has not increased.  Will monitor for signs of gross hematuria.  Check a repeat UA with microscopic tomorrow.  Consult urology if progressive hematuria.  -Continue to monitor closely for side effects and toxicity of chemotherapy.  Continue daily CBC and electrolytes.  Transfuse for hemoglobin less than 7 or platelets less than 10 unless active bleeding with only leukoreduced irradiated blood products.  No transfusion needs today.     3.  SHERRI  -Continues on nocturnal oxygen with stable requirement.     4.  Microscopic hematuria  -He has a history of rectal radiation and there were mild changes suggestive of radiation cystitis on his most recent imaging.  He is completely asymptomatic.  This predated ifosfamide.  I have discussed with his urologist.  Will plan to monitor symptoms and serial UA and consult urology if any worsening hematuria.  UA from today reviewed.  He has outpatient urology follow-up scheduled unless clinical symptoms develop while inpatient.  Chemotherapy orders signed.  Plan discussed with the patient nurse.         Kaycee Leary MD    3/6/2024

## 2024-03-06 NOTE — PROGRESS NOTES
[facility] Medicine Services  PROGRESS NOTE    Patient Name: Abraham Wu  : 1950  MRN: [id number]    Date of Admission: 3/5/2024  Primary Care Physician: Jatinder Haynes MD    Subjective   Subjective     CC:  F/u inpatient chemo    HPI:  He is resting comfortably, no pain/n/v/d. He does not notice any hematuria      Objective   Objective     Vital Signs:   Temp:  [97.2 °F (36.2 °C)-98.3 °F (36.8 °C)] 98.2 °F (36.8 °C)  Heart Rate:  [56-70] 56  Resp:  [16-18] 18  BP: (119-146)/(58-74) 137/65  Flow (L/min):  [2.5] 2.5     Physical Exam:  Constitutional: No acute distress, awake, alert  HENT: NCAT, mucous membranes moist  Respiratory: Clear to auscultation bilaterally, respiratory effort normal   Cardiovascular: RRR, no murmurs, rubs, or gallops  Gastrointestinal: Positive bowel sounds, soft, nontender, nondistended  Musculoskeletal: No bilateral ankle edema  Psychiatric: Appropriate affect, cooperative  Neurologic: Oriented x 3, strength symmetric, speech clear, no focal deficit  Skin: No rashes. Port in place      Results Reviewed:  LAB RESULTS:      Lab 24  04424  1051 24  0854 24  0744 24  1032   WBC 9.88 8.05  --  7.39 4.93   HEMOGLOBIN 9.7* 10.6*  --  10.0* 10.0*   HEMATOCRIT 28.8* 31.0*  --  29.6* 29.2*   PLATELETS 210 215  --  194 81*   NEUTROS ABS 8.59* 6.36  --  5.41 3.33   IMMATURE GRANS (ABS) 0.57*  --   --  0.57* 0.25*   LYMPHS ABS 0.54*  --   --  0.64* 0.50*   MONOS ABS 0.15  --   --  0.73 0.81   EOS ABS 0.00 0.08  --  0.02 0.02   MCV 93.2 96.0  --  96.4 95.4   * 245* 226*  --  169         Lab 24  0449 24  1051 24  0854 24  0744 24  1032   SODIUM 135* 137  --  137 134*   POTASSIUM 4.4 4.4  --  4.4 4.1   CHLORIDE 101 100  --  100 97*   CO2 25.0 27.0  --  27.0 29.0   ANION GAP 9.0 10.0  --  10.0 8.0   BUN 14 16  --  15 11   CREATININE 0.45* 0.57*  --  0.55* 0.56*   EGFR 112.6 104.8  --  106.0  105.4   GLUCOSE 154* 90  --  94 110*   CALCIUM 8.9 8.9  --  8.9 9.2   MAGNESIUM 2.0 2.0  --  2.3 1.9   PHOSPHORUS 2.9 4.1 3.7  --  3.5         Lab 03/06/24  0449 03/05/24  1051 03/05/24  0854 02/29/24  1032   TOTAL PROTEIN 6.3 6.2 6.8 6.4   ALBUMIN 3.8 4.0 4.2 4.1   GLOBULIN 2.5 2.2  --  2.3   ALT (SGPT) 8 9 9 11   AST (SGOT) 11 14 13 13   BILIRUBIN 0.2 0.3 0.3 0.2   BILIRUBIN DIRECT  --   --  <0.2  --    ALK PHOS 99 105 110 99                     Brief Urine Lab Results  (Last result in the past 365 days)        Color   Clarity   Blood   Leuk Est   Nitrite   Protein   CREAT   Urine HCG        03/05/24 1036 Yellow   Clear   Trace   Negative   Negative   Negative                   Microbiology Results Abnormal       None            No radiology results from the last 24 hrs    Results for orders placed during the hospital encounter of 06/15/23    Adult Transthoracic Echo Complete W/ Cont if Necessary Per Protocol    Interpretation Summary    Left ventricular systolic function is normal. Estimated left ventricular EF = 60%    Normal global longitudinal strain pattern (-19.9%)    The cardiac valves are anatomically and functionally normal.    Estimated right ventricular systolic pressure from tricuspid regurgitation is normal (<35 mmHg).      Current medications:  Scheduled Meds:allopurinol, 300 mg, Oral, Daily  bacitracin, 1 Application, Topical, BID  CARBOplatin (PARAPLATIN) 750 mg in sodium chloride 0.9 % 325 mL chemo IVPB, 750 mg, Intravenous, Once  enoxaparin, 40 mg, Subcutaneous, Daily  First Mouthwash (Magic Mouthwash), 5 mL, Swish & Swallow, Q6H  ifosfamide (IFEX) 9,000 mg, mesna (MESNEX) 9,000 mg in sodium chloride 0.9 % 770 mL chemo IVPB, 9,000 mg, Intravenous, Once  pantoprazole, 40 mg, Oral, Daily  rosuvastatin, 10 mg, Oral, Daily  sodium chloride, 10 mL, Intravenous, Q12H      Continuous Infusions:lactated ringers, 100 mL/hr, Last Rate: Stopped (03/06/24 0910)  sodium chloride, 125 mL/hr, Last Rate: 125  mL/hr (03/06/24 1326)      PRN Meds:.  acetaminophen **OR** acetaminophen **OR** acetaminophen    senna-docusate sodium **AND** polyethylene glycol **AND** bisacodyl **AND** bisacodyl    Calcium Replacement - Follow Nurse / BPA Driven Protocol    diphenhydrAMINE    famotidine    fluticasone    Hydrocortisone Sod Suc (PF)    Magnesium Standard Dose Replacement - Follow Nurse / BPA Driven Protocol    meperidine    ondansetron ODT **OR** ondansetron    Phosphorus Replacement - Follow Nurse / BPA Driven Protocol    Potassium Replacement - Follow Nurse / BPA Driven Protocol    prochlorperazine    sodium chloride    sodium chloride    Assessment & Plan   Assessment & Plan     Active Hospital Problems    Diagnosis  POA    Hodgkin lymphoma [C81.90]  Yes    Essential hypertension [I10]  Yes    Dyslipidemia [E78.5]  Yes      Resolved Hospital Problems   No resolved problems to display.        Brief Hospital Course to date:  Abraham Wu is a 73 y.o. male with hodgkin lymphoma, here for chemotherapy     Hodgkins lymphoma  Chronic anemia, at baseline  - Dr Kaycee Leary follows, continuing chemo while here  - Continue daily labs  -noted microscopic hematuria likely secondary to radiation cystitis. CTM. If progressive hematuria will consult urology. He does have outpatient urology follow up in place  - IVF, antiemetics.     HTN  - Home meds.     Ostomy        Expected Discharge Location and Transportation: home  Expected Discharge   Expected Discharge Date: 3/9/2024; Expected Discharge Time:      DVT prophylaxis:  Medical DVT prophylaxis orders are present.         AM-PAC 6 Clicks Score (PT): 24 (03/05/24 1935)    CODE STATUS:   Code Status and Medical Interventions:   Ordered at: 03/05/24 1045     Code Status (Patient has no pulse and is not breathing):    CPR (Attempt to Resuscitate)     Medical Interventions (Patient has pulse or is breathing):    Full Support       Marcella Harry MD  03/06/24

## 2024-03-07 ENCOUNTER — TELEPHONE (OUTPATIENT)
Dept: ONCOLOGY | Facility: CLINIC | Age: 74
End: 2024-03-07
Payer: MEDICARE

## 2024-03-07 ENCOUNTER — READMISSION MANAGEMENT (OUTPATIENT)
Dept: CALL CENTER | Facility: HOSPITAL | Age: 74
End: 2024-03-07
Payer: MEDICARE

## 2024-03-07 VITALS
WEIGHT: 178.2 LBS | DIASTOLIC BLOOD PRESSURE: 63 MMHG | BODY MASS INDEX: 30.57 KG/M2 | RESPIRATION RATE: 16 BRPM | TEMPERATURE: 97.6 F | SYSTOLIC BLOOD PRESSURE: 127 MMHG | HEART RATE: 61 BPM | OXYGEN SATURATION: 100 %

## 2024-03-07 LAB
ALBUMIN SERPL-MCNC: 3.7 G/DL (ref 3.5–5.2)
ALBUMIN/GLOB SERPL: 2.5 G/DL
ALP SERPL-CCNC: 83 U/L (ref 39–117)
ALT SERPL W P-5'-P-CCNC: 8 U/L (ref 1–41)
ANION GAP SERPL CALCULATED.3IONS-SCNC: 7 MMOL/L (ref 5–15)
AST SERPL-CCNC: 9 U/L (ref 1–40)
BACTERIA UR QL AUTO: ABNORMAL /HPF
BACTERIA UR QL AUTO: NORMAL /HPF
BASOPHILS # BLD AUTO: 0.02 10*3/MM3 (ref 0–0.2)
BASOPHILS NFR BLD AUTO: 0.2 % (ref 0–1.5)
BILIRUB SERPL-MCNC: 0.2 MG/DL (ref 0–1.2)
BILIRUB UR QL STRIP: NEGATIVE
BILIRUB UR QL STRIP: NEGATIVE
BUN SERPL-MCNC: 14 MG/DL (ref 8–23)
BUN/CREAT SERPL: 27.5 (ref 7–25)
CALCIUM SPEC-SCNC: 8.7 MG/DL (ref 8.6–10.5)
CHLORIDE SERPL-SCNC: 105 MMOL/L (ref 98–107)
CLARITY UR: CLEAR
CLARITY UR: CLEAR
CO2 SERPL-SCNC: 26 MMOL/L (ref 22–29)
COLOR UR: YELLOW
COLOR UR: YELLOW
CREAT SERPL-MCNC: 0.51 MG/DL (ref 0.76–1.27)
DEPRECATED RDW RBC AUTO: 51.3 FL (ref 37–54)
EGFRCR SERPLBLD CKD-EPI 2021: 108.4 ML/MIN/1.73
EOSINOPHIL # BLD AUTO: 0 10*3/MM3 (ref 0–0.4)
EOSINOPHIL NFR BLD AUTO: 0 % (ref 0.3–6.2)
ERYTHROCYTE [DISTWIDTH] IN BLOOD BY AUTOMATED COUNT: 15.9 % (ref 12.3–15.4)
FUNGUS WND CULT: NORMAL
GLOBULIN UR ELPH-MCNC: 1.5 GM/DL
GLUCOSE SERPL-MCNC: 137 MG/DL (ref 65–99)
GLUCOSE UR STRIP-MCNC: NEGATIVE MG/DL
GLUCOSE UR STRIP-MCNC: NEGATIVE MG/DL
HCT VFR BLD AUTO: 26 % (ref 37.5–51)
HGB BLD-MCNC: 8.6 G/DL (ref 13–17.7)
HGB UR QL STRIP.AUTO: NEGATIVE
HGB UR QL STRIP.AUTO: NEGATIVE
HYALINE CASTS UR QL AUTO: ABNORMAL /LPF
HYALINE CASTS UR QL AUTO: NORMAL /LPF
IMM GRANULOCYTES # BLD AUTO: 0.29 10*3/MM3 (ref 0–0.05)
IMM GRANULOCYTES NFR BLD AUTO: 2.4 % (ref 0–0.5)
KETONES UR QL STRIP: ABNORMAL
KETONES UR QL STRIP: ABNORMAL
LDH SERPL-CCNC: 171 U/L (ref 135–225)
LEUKOCYTE ESTERASE UR QL STRIP.AUTO: NEGATIVE
LEUKOCYTE ESTERASE UR QL STRIP.AUTO: NEGATIVE
LYMPHOCYTES # BLD AUTO: 0.46 10*3/MM3 (ref 0.7–3.1)
LYMPHOCYTES NFR BLD AUTO: 3.8 % (ref 19.6–45.3)
MAGNESIUM SERPL-MCNC: 1.9 MG/DL (ref 1.6–2.4)
MCH RBC QN AUTO: 31.5 PG (ref 26.6–33)
MCHC RBC AUTO-ENTMCNC: 33.1 G/DL (ref 31.5–35.7)
MCV RBC AUTO: 95.2 FL (ref 79–97)
MONOCYTES # BLD AUTO: 0.27 10*3/MM3 (ref 0.1–0.9)
MONOCYTES NFR BLD AUTO: 2.3 % (ref 5–12)
MYCOBACTERIUM SPEC CULT: NORMAL
NEUTROPHILS NFR BLD AUTO: 10.92 10*3/MM3 (ref 1.7–7)
NEUTROPHILS NFR BLD AUTO: 91.3 % (ref 42.7–76)
NIGHT BLUE STAIN TISS: NORMAL
NITRITE UR QL STRIP: NEGATIVE
NITRITE UR QL STRIP: NEGATIVE
NRBC BLD AUTO-RTO: 0 /100 WBC (ref 0–0.2)
PH UR STRIP.AUTO: 6.5 [PH] (ref 5–8)
PH UR STRIP.AUTO: 6.5 [PH] (ref 5–8)
PHOSPHATE SERPL-MCNC: 3.4 MG/DL (ref 2.5–4.5)
PLATELET # BLD AUTO: 197 10*3/MM3 (ref 140–450)
PMV BLD AUTO: 9.3 FL (ref 6–12)
POTASSIUM SERPL-SCNC: 4.1 MMOL/L (ref 3.5–5.2)
PROT SERPL-MCNC: 5.2 G/DL (ref 6–8.5)
PROT UR QL STRIP: NEGATIVE
PROT UR QL STRIP: NEGATIVE
RBC # BLD AUTO: 2.73 10*6/MM3 (ref 4.14–5.8)
RBC # UR STRIP: ABNORMAL /HPF
RBC # UR STRIP: NORMAL /HPF
REF LAB TEST METHOD: ABNORMAL
REF LAB TEST METHOD: NORMAL
SODIUM SERPL-SCNC: 138 MMOL/L (ref 136–145)
SP GR UR STRIP: 1.01 (ref 1–1.03)
SP GR UR STRIP: 1.02 (ref 1–1.03)
SQUAMOUS #/AREA URNS HPF: ABNORMAL /HPF
SQUAMOUS #/AREA URNS HPF: NORMAL /HPF
URATE SERPL-MCNC: 2.7 MG/DL (ref 3.4–7)
UROBILINOGEN UR QL STRIP: ABNORMAL
UROBILINOGEN UR QL STRIP: ABNORMAL
WBC # UR STRIP: ABNORMAL /HPF
WBC # UR STRIP: NORMAL /HPF
WBC NRBC COR # BLD AUTO: 11.96 10*3/MM3 (ref 3.4–10.8)

## 2024-03-07 PROCEDURE — 25010000002 ETOPOSIDE 1 GM/50ML SOLUTION 50 ML VIAL: Performed by: INTERNAL MEDICINE

## 2024-03-07 PROCEDURE — 25810000003 SODIUM CHLORIDE 0.9 % SOLUTION 500 ML FLEX CONT: Performed by: INTERNAL MEDICINE

## 2024-03-07 PROCEDURE — 25810000003 SODIUM CHLORIDE 0.9 % SOLUTION: Performed by: INTERNAL MEDICINE

## 2024-03-07 PROCEDURE — 83735 ASSAY OF MAGNESIUM: CPT | Performed by: INTERNAL MEDICINE

## 2024-03-07 PROCEDURE — 83615 LACTATE (LD) (LDH) ENZYME: CPT | Performed by: INTERNAL MEDICINE

## 2024-03-07 PROCEDURE — 85025 COMPLETE CBC W/AUTO DIFF WBC: CPT | Performed by: INTERNAL MEDICINE

## 2024-03-07 PROCEDURE — 99233 SBSQ HOSP IP/OBS HIGH 50: CPT | Performed by: INTERNAL MEDICINE

## 2024-03-07 PROCEDURE — 84100 ASSAY OF PHOSPHORUS: CPT | Performed by: INTERNAL MEDICINE

## 2024-03-07 PROCEDURE — 25010000002 ENOXAPARIN PER 10 MG: Performed by: INTERNAL MEDICINE

## 2024-03-07 PROCEDURE — 81001 URINALYSIS AUTO W/SCOPE: CPT | Performed by: INTERNAL MEDICINE

## 2024-03-07 PROCEDURE — 99239 HOSP IP/OBS DSCHRG MGMT >30: CPT | Performed by: STUDENT IN AN ORGANIZED HEALTH CARE EDUCATION/TRAINING PROGRAM

## 2024-03-07 PROCEDURE — 84550 ASSAY OF BLOOD/URIC ACID: CPT | Performed by: INTERNAL MEDICINE

## 2024-03-07 PROCEDURE — 25010000002 ONDANSETRON PER 1 MG: Performed by: INTERNAL MEDICINE

## 2024-03-07 PROCEDURE — 25010000002 HEPARIN LOCK FLUSH PER 10 UNITS: Performed by: STUDENT IN AN ORGANIZED HEALTH CARE EDUCATION/TRAINING PROGRAM

## 2024-03-07 PROCEDURE — 25010000002 DEXAMETHASONE SODIUM PHOSPHATE 100 MG/10ML SOLUTION 10 ML VIAL: Performed by: INTERNAL MEDICINE

## 2024-03-07 PROCEDURE — 80053 COMPREHEN METABOLIC PANEL: CPT | Performed by: INTERNAL MEDICINE

## 2024-03-07 PROCEDURE — 25810000003 SODIUM CHLORIDE 0.9 % SOLUTION

## 2024-03-07 RX ORDER — SODIUM CHLORIDE 9 MG/ML
20 INJECTION, SOLUTION INTRAVENOUS ONCE
Qty: 1000 ML | Refills: 0 | Status: COMPLETED | OUTPATIENT
Start: 2024-03-07 | End: 2024-03-07

## 2024-03-07 RX ORDER — HEPARIN SODIUM (PORCINE) LOCK FLUSH IV SOLN 100 UNIT/ML 100 UNIT/ML
500 SOLUTION INTRAVENOUS ONCE
Status: COMPLETED | OUTPATIENT
Start: 2024-03-07 | End: 2024-03-07

## 2024-03-07 RX ADMIN — DIPHENHYDRAMINE HYDROCHLORIDE AND LIDOCAINE HYDROCHLORIDE AND ALUMINUM HYDROXIDE AND MAGNESIUM HYDRO 5 ML: KIT at 12:20

## 2024-03-07 RX ADMIN — DIPHENHYDRAMINE HYDROCHLORIDE AND LIDOCAINE HYDROCHLORIDE AND ALUMINUM HYDROXIDE AND MAGNESIUM HYDRO 5 ML: KIT at 06:37

## 2024-03-07 RX ADMIN — DIPHENHYDRAMINE HYDROCHLORIDE AND LIDOCAINE HYDROCHLORIDE AND ALUMINUM HYDROXIDE AND MAGNESIUM HYDRO 5 ML: KIT at 17:26

## 2024-03-07 RX ADMIN — ALLOPURINOL 300 MG: 300 TABLET ORAL at 08:12

## 2024-03-07 RX ADMIN — HEPARIN 500 UNITS: 100 SYRINGE at 19:16

## 2024-03-07 RX ADMIN — SODIUM CHLORIDE 180 MG: 9 INJECTION, SOLUTION INTRAVENOUS at 17:22

## 2024-03-07 RX ADMIN — PANTOPRAZOLE SODIUM 40 MG: 40 TABLET, DELAYED RELEASE ORAL at 08:12

## 2024-03-07 RX ADMIN — SODIUM CHLORIDE 20 ML/HR: 9 INJECTION, SOLUTION INTRAVENOUS at 16:58

## 2024-03-07 RX ADMIN — SODIUM CHLORIDE 125 ML/HR: 9 INJECTION, SOLUTION INTRAVENOUS at 07:42

## 2024-03-07 RX ADMIN — ONDANSETRON: 2 INJECTION INTRAMUSCULAR; INTRAVENOUS at 16:51

## 2024-03-07 RX ADMIN — BACITRACIN 0.9 G: 500 OINTMENT TOPICAL at 08:12

## 2024-03-07 RX ADMIN — ROSUVASTATIN CALCIUM 10 MG: 10 TABLET, FILM COATED ORAL at 08:12

## 2024-03-07 RX ADMIN — ENOXAPARIN SODIUM 40 MG: 100 INJECTION SUBCUTANEOUS at 08:13

## 2024-03-07 NOTE — PLAN OF CARE
Goal Outcome Evaluation:  Plan of Care Reviewed With: patient           Outcome Evaluation: VSS on RA - 2.5 L NC while sleeping (baseline). Up ad elis. Adequate UOP. Colostomy cared for independently. Urine clear yellow. UA obtained. No c/o pain or nausea. chemo tolerated as of this shift. Pt rested well. Up in chair this AM. Family at bedside.

## 2024-03-07 NOTE — PROGRESS NOTES
HEMATOLOGY/ONCOLOGY PROGRESS NOTE    S: Today is day 3 of chemotherapy.  He continues to tolerate treatment well.  He denies any gross hematuria.  Good urine output.  Good ostomy output.  No mucositis or oral lesions.  No poorly controlled nausea or vomiting.  Appetite good.  He has gained 7 pounds but denies any lower extremity swelling, shortness of breath, orthopnea.        Past medical history, social history and family history was reviewed and unchanged from prior visit.  Medications:  The current medication list was reviewed in the EMR    ALLERGIES:  No Known Allergies      Physical Exam    VITAL SIGNS:  /66 (BP Location: Left arm, Patient Position: Sitting)   Pulse 54   Temp 97.5 °F (36.4 °C) (Oral)   Resp 16   Wt 80.8 kg (178 lb 3.2 oz)   SpO2 99%   BMI 30.57 kg/m²   Temp:  [97 °F (36.1 °C)-98.3 °F (36.8 °C)] 97.5 °F (36.4 °C)      Performance Status: 0                Physical Exam    General: well appearing, in no acute distress  HEENT: sclerae anicteric, oropharynx clear,   Cardiovascular: regular rate and rhythm, no murmurs, rubs or gallops  Lungs: clear to auscultation bilaterally  Abdomen: soft, nontender, nondistended.  No palpable organomegaly  Extremities: no lower extremity edema  Skin: no rashes, lesions, bruising, or petechiae  Msk:  Shows no weakness of the large muscle groups  Neuro: Alert and oriented x 3.  Intact bilateral lower extremity strength.  Finger-to-nose normal bilaterally.  Skin: Port is accessed with no surrounding erythema.      RECENT LABS:    Lab Results   Component Value Date    HGB 8.6 (L) 03/07/2024    HCT 26.0 (L) 03/07/2024    MCV 95.2 03/07/2024     03/07/2024    WBC 11.96 (H) 03/07/2024    NEUTROABS 10.92 (H) 03/07/2024    LYMPHSABS 0.46 (L) 03/07/2024    MONOSABS 0.27 03/07/2024    EOSABS 0.00 03/07/2024    BASOSABS 0.02 03/07/2024       Lab Results   Component Value Date    GLUCOSE 137 (H) 03/07/2024    BUN 14 03/07/2024    CREATININE 0.51 (L)  03/07/2024     03/07/2024    K 4.1 03/07/2024     03/07/2024    CO2 26.0 03/07/2024    CALCIUM 8.7 03/07/2024    PROTEINTOT 5.2 (L) 03/07/2024    ALBUMIN 3.7 03/07/2024    BILITOT 0.2 03/07/2024    ALKPHOS 83 03/07/2024    AST 9 03/07/2024    ALT 8 03/07/2024         Assessment/Plan    1.  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  Chemo monitoring  -He completed 6 cycles of BV-AVD 3/23/2023. Cycle 6 was complicated by coronavirus infection and multifocal pneumonia.  Posttherapy PET showed persistent bilateral interstitial changes and mild adenopathy. Repeat chest CT shows continued improvement in the pulmonary infiltrates consistent with a reactive infectious etiology.  - He had clear response to first line therapy with resolution of the hepatic lesions, marked improvement in the retroperitoneal adenopathy, and 50% reduction in the size of the rectal mass.  However he had persistent uptake in the rectal mass on posttreatment PET, [place] 5. We elected to proceed with biopsy to confirm whether he had residual disease followed by consolidative radiotherapy to the rectal mass given initial bulky disease.  In the interim, while awaiting biopsy, he presented with rectal bleeding, obstructive uropathy symptoms and pain and radiation planning CT showed significant interval growth in the rectal mass as well as development of new retroperitoneal adenopathy superior to the rectal mass compared to the his posttreatment PET, confirming progressive disease. Consolidative radiation was converted to palliative course radiation given interval adenopathy development above the radiation field to allow for earlier introduction of second line chemotherapy.   -Repeat biopsy confirmed residual classic Hodgkin's lymphoma.  -Given national shortage of carbo/cis, we proceeded with Keytruda, vinorelbine, Gemzar, and Doxil. He completed 4 cycles 8/2023. Post  treatment PET showed complete resolution of FDG avidity.  He continued Keytruda maintenance  -Most recent PET from 12/2023 with resolution of FDG avidity in the rectal mass which has decreased in size.  There is a new area of FDG avidity and a lytic lesion in the L4 region as well as mild SUV uptake in a right hilar node.  Lytic lesion appears to have been there previously on my review of prior PETs but with increased activity. He completed stereotactic radiation therapy to this area as he was having pain.  There was also an indeterminate hilar LN. I recommended continuation of maintenance Keytruda and short interval follow-up CT of the chest to reassess this. CT in Jan compared to prior PET showed worsening consolidative RML from hilum to major fissure with possible broncholith and no specific mass. He underwent bronchoscopy with findings confirming recurrent CD30 positive Hodgkin's lymphoma, but he now has developed strong CD20 positivity.  Morphologically this is felt to represent the same underlying Hodgkin's process, but with immunophenotype switch.  I recommended third line systemic therapy.  He initiated  Rituximab/ICE  -He is tolerating Cycle 2 chemotherapy well.    CBC, CMP, electrolytes, uric acid, urine microscopy reviewed and stable.  He will complete day 3 of chemotherapy today.    -From my standpoint he is okay to be discharged when chemotherapy and post hydration completes.  He will take oral dexamethasone for day 4 and 5 and return on Monday for labs and Neulasta injection.    -Start levaquin when ANC < 1  -Weekly blood counts  -He has a PET/CT pending and follow-up with the transplant team to consider stem cell collection.  I will see him back on 321 and we will tentatively plan admission on 3/26 for consideration of cycle 3.      2.  H/o GI bleed  -PPI, continue daily.  Tolerating well.      3. Access  -Port c/d/I     4.  Right middle lobe obstruction  -Secondary to his malignancy.  He is on room  air.  I have discussed with pulmonary and he would potentially be candidate for his stent if he has progressive obstructive symptoms.  However, he is currently on room air.  He has had an early response in his symptoms with treatment initiation which is suggestive or early response.      5.  Mucositis  -Continue valtrex prophylaxis     Follow Up:   -Weekly labs.  Start Levaquin when ANC less than 1  -Neulasta 3/11  -Follow-up with PET results on 3/21    Kaycee Leary MD  [facility] Hematology and Oncology    3/7/2024

## 2024-03-07 NOTE — DISCHARGE SUMMARY
[facility] Medicine Services  DISCHARGE SUMMARY    Patient Name: Abraham Wu  : 1950  MRN: [id number]    Date of Admission: 3/5/2024  9:13 AM  Date of Discharge:  3/7/2024  Primary Care Physician: Jatinder Haynes MD    Consults       No orders found from 2024 to 3/6/2024.            Hospital Course     Presenting Problem: hodgkin lymphoma    Active Hospital Problems    Diagnosis  POA    Hodgkin lymphoma [C81.90]  Yes    Essential hypertension [I10]  Yes    Dyslipidemia [E78.5]  Yes      Resolved Hospital Problems   No resolved problems to display.          Hospital Course:  Abraham Wu is a 73 y.o. male with hodgkin lymphoma, here for chemotherapy. He has been followed by his oncologist, Dr Leary who started him on chemotherapy while inpatient.  He has been tolerating chemotherapy well, he will complete day 3 of chemotherapy today.  From oncology standpoint he is okay to discharge when chemotherapy and post hydration completes.  Will need to take his oral dexamethasone for day 4 and 5 and return on Monday for labs and Neulasta injection.  He is to continue weekly blood counts and has upcoming follow-up with oncology as well as a PET/CT pending.  He will likely be admitted again for consideration of cycle 3 later this month.  He did have some microscopic hematuria during the chemotherapy which is suspected to be a side effect in this was monitored.  He has outpatient urology follow up pending    Discharge Follow Up Recommendations for outpatient labs/diagnostics:   Oncology, PCP    Day of Discharge     HPI:   Doing well, no new issues overnight    Review of Systems  Gen- No fevers, chills  CV- No chest pain, palpitations  Resp- No cough, dyspnea  GI- No N/V/D, abd pain        Vital Signs:   Temp:  [97 °F (36.1 °C)-98.3 °F (36.8 °C)] 97.5 °F (36.4 °C)  Heart Rate:  [52-65] 54  Resp:  [16-20] 16  BP: (119-144)/(58-70) 131/66  Flow (L/min):  [2.5]  2.5      Physical Exam:  Constitutional: No acute distress, awake, alert  HENT: NCAT, mucous membranes moist  Respiratory: Clear to auscultation bilaterally, respiratory effort normal   Cardiovascular: RRR, no murmurs, rubs, or gallops  Gastrointestinal: Positive bowel sounds, soft, nontender, nondistended  Musculoskeletal: No bilateral ankle edema  Psychiatric: Appropriate affect, cooperative  Neurologic: Oriented x 3, strength symmetric, speech clear, no focal deficit  Skin: No rashes. Port in place    Pertinent  and/or Most Recent Results     LAB RESULTS:      Lab 03/07/24  0457 03/06/24 0449 03/05/24  1051 03/05/24  0854 03/05/24  0744   WBC 11.96* 9.88 8.05  --  7.39   HEMOGLOBIN 8.6* 9.7* 10.6*  --  10.0*   HEMATOCRIT 26.0* 28.8* 31.0*  --  29.6*   PLATELETS 197 210 215  --  194   NEUTROS ABS 10.92* 8.59* 6.36  --  5.41   IMMATURE GRANS (ABS) 0.29* 0.57*  --   --  0.57*   LYMPHS ABS 0.46* 0.54*  --   --  0.64*   MONOS ABS 0.27 0.15  --   --  0.73   EOS ABS 0.00 0.00 0.08  --  0.02   MCV 95.2 93.2 96.0  --  96.4    235* 245* 226*  --          Lab 03/07/24  0457 03/06/24 0449 03/05/24  1051 03/05/24  0854 03/05/24  0744   SODIUM 138 135* 137  --  137   POTASSIUM 4.1 4.4 4.4  --  4.4   CHLORIDE 105 101 100  --  100   CO2 26.0 25.0 27.0  --  27.0   ANION GAP 7.0 9.0 10.0  --  10.0   BUN 14 14 16  --  15   CREATININE 0.51* 0.45* 0.57*  --  0.55*   EGFR 108.4 112.6 104.8  --  106.0   GLUCOSE 137* 154* 90  --  94   CALCIUM 8.7 8.9 8.9  --  8.9   MAGNESIUM 1.9 2.0 2.0  --  2.3   PHOSPHORUS 3.4 2.9 4.1 3.7  --          Lab 03/07/24  0457 03/06/24  0449 03/05/24  1051 03/05/24  0854   TOTAL PROTEIN 5.2* 6.3 6.2 6.8   ALBUMIN 3.7 3.8 4.0 4.2   GLOBULIN 1.5 2.5 2.2  --    ALT (SGPT) 8 8 9 9   AST (SGOT) 9 11 14 13   BILIRUBIN 0.2 0.2 0.3 0.3   BILIRUBIN DIRECT  --   --   --  <0.2   ALK PHOS 83 99 105 110                     Brief Urine Lab Results  (Last result in the past 365 days)        Color   Clarity   Blood    Leuk Est   Nitrite   Protein   CREAT   Urine HCG        03/07/24 0422 Yellow   Clear   Negative   Negative   Negative   Negative                 Microbiology Results (last 10 days)       ** No results found for the last 240 hours. **            No radiology results for the last 10 days    Results for orders placed during the hospital encounter of 10/31/22    Duplex Venous Upper Extremity - Right CAR    Interpretation Summary    Normal right upper extremity venous duplex scan.    PICC line is noted in the right brachial vein.      Results for orders placed during the hospital encounter of 10/31/22    Duplex Venous Upper Extremity - Right CAR    Interpretation Summary    Normal right upper extremity venous duplex scan.    PICC line is noted in the right brachial vein.      Results for orders placed during the hospital encounter of 06/15/23    Adult Transthoracic Echo Complete W/ Cont if Necessary Per Protocol    Interpretation Summary    Left ventricular systolic function is normal. Estimated left ventricular EF = 60%    Normal global longitudinal strain pattern (-19.9%)    The cardiac valves are anatomically and functionally normal.    Estimated right ventricular systolic pressure from tricuspid regurgitation is normal (<35 mmHg).      Plan for Follow-up of Pending Labs/Results: no pending results    Discharge Details        Discharge Medications        Continue These Medications        Instructions Start Date   allopurinol 300 MG tablet  Commonly known as: ZYLOPRIM   300 mg, Oral, Daily      bacitracin 500 UNIT/GM ointment   0.9 g, Topical, 2 Times Daily      dexAMETHasone 4 MG tablet  Commonly known as: DECADRON   8 mg, Oral, Daily With Breakfast, Take 2 tablets by mouth with breakfast on days 4 and 5 of each chemotherapy cycle.      First Mouthwash (Magic Mouthwash) suspension   5 mL, Swish & Swallow, Every 6 Hours      fluticasone 50 MCG/ACT nasal spray  Commonly known as: FLONASE   1 spray, Nasal, Daily PRN,  OTC      lidocaine-prilocaine 2.5-2.5 % cream  Commonly known as: EMLA   1 application , Topical, As Needed      loratadine 10 MG tablet  Commonly known as: CLARITIN   10 mg, Oral, Daily      ondansetron 8 MG tablet  Commonly known as: Zofran   8 mg, Oral, Every 8 Hours PRN      pantoprazole 40 MG EC tablet  Commonly known as: PROTONIX   40 mg, Oral, Daily      prochlorperazine 5 MG tablet  Commonly known as: COMPAZINE   5 mg, Oral, Every 6 Hours PRN      rosuvastatin 10 MG tablet  Commonly known as: CRESTOR   10 mg, Oral, Daily             Stop These Medications      levoFLOXacin 500 MG tablet  Commonly known as: Levaquin     valACYclovir 500 MG tablet  Commonly known as: VALTREX              No Known Allergies      Discharge Disposition:  Home or Self Carehome    Diet:  Hospital:  Diet Order   Procedures    Diet: Regular/House; Fluid Consistency: Thin (IDDSI 0)            Activity:  as tolerated    Restrictions or Other Recommendations:  none       CODE STATUS:    Code Status and Medical Interventions:   Ordered at: 03/05/24 1045     Code Status (Patient has no pulse and is not breathing):    CPR (Attempt to Resuscitate)     Medical Interventions (Patient has pulse or is breathing):    Full Support       Future Appointments   Date Time Provider Department Center   3/11/2024  2:30 PM CHAIR 7  [facility]   3/18/2024  8:30 AM [facility] PET ADMIN RM1  [facility]   3/18/2024  9:30 AM [facility] PETCT 1  [facility]   3/18/2024 11:30 AM ACCESS AND LAB DRAW CHAIR 1  [facility]   3/21/2024  9:15 AM Kaycee Leary MD [facility]   3/26/2024  7:30 AM ACCESS AND LAB DRAW CHAIR 1  [facility]   3/26/2024  8:00 AM Kaycee Leary MD [facility]   5/28/2024  8:40 AM Bernadine Almodovar MD [facility]                 Marcella Harry MD  03/07/24      Time Spent on Discharge:  I spent  45  minutes on this discharge activity which included: face-to-face encounter with the patient, reviewing the data  in the system, coordination of the care with the nursing staff as well as consultants, documentation, and entering orders.

## 2024-03-07 NOTE — TELEPHONE ENCOUNTER
Advised MsSheldon Bryce that patient's dexamethasone PO is due for him to start tomorrow and on 3-9-24, for days 4 and 5 after chemotherapy.  She verbalized understanding and stated it is waiting for her to  at the pharmacy. Also, per request of Dr. Leary admissions contacted for patient to be admitted on 3-26-24 for next cycle.

## 2024-03-07 NOTE — PLAN OF CARE
Goal Outcome Evaluation:              Outcome Evaluation: VSS, Chemo complete tolereated well, eating, UOP adequate, awaiting DC

## 2024-03-11 ENCOUNTER — HOSPITAL ENCOUNTER (OUTPATIENT)
Dept: ONCOLOGY | Facility: HOSPITAL | Age: 74
Discharge: HOME | End: 2024-03-11
Admitting: INTERNAL MEDICINE
Payer: MEDICARE

## 2024-03-11 VITALS
DIASTOLIC BLOOD PRESSURE: 62 MMHG | HEIGHT: 64 IN | HEART RATE: 85 BPM | SYSTOLIC BLOOD PRESSURE: 132 MMHG | RESPIRATION RATE: 18 BRPM | WEIGHT: 170 LBS | BODY MASS INDEX: 29.02 KG/M2 | TEMPERATURE: 97.2 F

## 2024-03-11 DIAGNOSIS — Z45.2 ENCOUNTER FOR CARE RELATED TO VASCULAR ACCESS PORT: ICD-10-CM

## 2024-03-11 DIAGNOSIS — C81.98 HODGKIN LYMPHOMA OF LYMPH NODES OF MULTIPLE REGIONS, UNSPECIFIED HODGKIN LYMPHOMA TYPE: Primary | ICD-10-CM

## 2024-03-11 LAB
ANION GAP SERPL CALCULATED.3IONS-SCNC: 11 MMOL/L (ref 5–15)
BACTERIA UR QL AUTO: ABNORMAL /HPF
BASOPHILS # BLD AUTO: 0.01 10*3/MM3 (ref 0–0.2)
BASOPHILS NFR BLD AUTO: 0.2 % (ref 0–1.5)
BILIRUB UR QL STRIP: NEGATIVE
BUN SERPL-MCNC: 21 MG/DL (ref 8–23)
BUN/CREAT SERPL: 38.9 (ref 7–25)
CALCIUM SPEC-SCNC: 8.9 MG/DL (ref 8.6–10.5)
CHLORIDE SERPL-SCNC: 96 MMOL/L (ref 98–107)
CLARITY UR: CLEAR
CO2 SERPL-SCNC: 27 MMOL/L (ref 22–29)
COLOR UR: YELLOW
CREAT SERPL-MCNC: 0.54 MG/DL (ref 0.76–1.27)
DEPRECATED RDW RBC AUTO: 53.1 FL (ref 37–54)
EGFRCR SERPLBLD CKD-EPI 2021: 106.5 ML/MIN/1.73
EOSINOPHIL # BLD AUTO: 0.01 10*3/MM3 (ref 0–0.4)
EOSINOPHIL NFR BLD AUTO: 0.2 % (ref 0.3–6.2)
ERYTHROCYTE [DISTWIDTH] IN BLOOD BY AUTOMATED COUNT: 15.8 % (ref 12.3–15.4)
GLUCOSE SERPL-MCNC: 105 MG/DL (ref 65–99)
GLUCOSE UR STRIP-MCNC: NEGATIVE MG/DL
HCT VFR BLD AUTO: 29.3 % (ref 37.5–51)
HGB BLD-MCNC: 10 G/DL (ref 13–17.7)
HGB UR QL STRIP.AUTO: ABNORMAL
HYALINE CASTS UR QL AUTO: ABNORMAL /LPF
IMM GRANULOCYTES # BLD AUTO: 0.06 10*3/MM3 (ref 0–0.05)
IMM GRANULOCYTES NFR BLD AUTO: 1.3 % (ref 0–0.5)
KETONES UR QL STRIP: NEGATIVE
LEUKOCYTE ESTERASE UR QL STRIP.AUTO: NEGATIVE
LYMPHOCYTES # BLD AUTO: 0.39 10*3/MM3 (ref 0.7–3.1)
LYMPHOCYTES NFR BLD AUTO: 8.4 % (ref 19.6–45.3)
MAGNESIUM SERPL-MCNC: 2.2 MG/DL (ref 1.6–2.4)
MCH RBC QN AUTO: 32.5 PG (ref 26.6–33)
MCHC RBC AUTO-ENTMCNC: 34.1 G/DL (ref 31.5–35.7)
MCV RBC AUTO: 95.1 FL (ref 79–97)
MONOCYTES # BLD AUTO: 0.13 10*3/MM3 (ref 0.1–0.9)
MONOCYTES NFR BLD AUTO: 2.8 % (ref 5–12)
NEUTROPHILS NFR BLD AUTO: 4.03 10*3/MM3 (ref 1.7–7)
NEUTROPHILS NFR BLD AUTO: 87.1 % (ref 42.7–76)
NITRITE UR QL STRIP: NEGATIVE
PH UR STRIP.AUTO: 6.5 [PH] (ref 5–8)
PLATELET # BLD AUTO: 284 10*3/MM3 (ref 140–450)
PMV BLD AUTO: 8.8 FL (ref 6–12)
POTASSIUM SERPL-SCNC: 4.3 MMOL/L (ref 3.5–5.2)
PROT UR QL STRIP: NEGATIVE
RBC # BLD AUTO: 3.08 10*6/MM3 (ref 4.14–5.8)
RBC # UR STRIP: ABNORMAL /HPF
REF LAB TEST METHOD: ABNORMAL
SODIUM SERPL-SCNC: 134 MMOL/L (ref 136–145)
SP GR UR STRIP: 1.02 (ref 1–1.03)
SQUAMOUS #/AREA URNS HPF: ABNORMAL /HPF
UROBILINOGEN UR QL STRIP: ABNORMAL
WBC # UR STRIP: ABNORMAL /HPF
WBC NRBC COR # BLD AUTO: 4.63 10*3/MM3 (ref 3.4–10.8)

## 2024-03-11 PROCEDURE — 25010000002 HEPARIN LOCK FLUSH PER 10 UNITS: Performed by: INTERNAL MEDICINE

## 2024-03-11 PROCEDURE — 83735 ASSAY OF MAGNESIUM: CPT | Performed by: INTERNAL MEDICINE

## 2024-03-11 PROCEDURE — 81001 URINALYSIS AUTO W/SCOPE: CPT | Performed by: INTERNAL MEDICINE

## 2024-03-11 PROCEDURE — 96372 THER/PROPH/DIAG INJ SC/IM: CPT

## 2024-03-11 PROCEDURE — 25010000002 PEGFILGRASTIM-CBQV 6 MG/0.6ML SOLUTION AUTO-INJECTOR: Performed by: INTERNAL MEDICINE

## 2024-03-11 PROCEDURE — 85025 COMPLETE CBC W/AUTO DIFF WBC: CPT | Performed by: INTERNAL MEDICINE

## 2024-03-11 PROCEDURE — 80048 BASIC METABOLIC PNL TOTAL CA: CPT | Performed by: INTERNAL MEDICINE

## 2024-03-11 RX ORDER — HEPARIN SODIUM (PORCINE) LOCK FLUSH IV SOLN 100 UNIT/ML 100 UNIT/ML
500 SOLUTION INTRAVENOUS AS NEEDED
Status: CANCELLED | OUTPATIENT
Start: 2024-03-11

## 2024-03-11 RX ORDER — HEPARIN SODIUM (PORCINE) LOCK FLUSH IV SOLN 100 UNIT/ML 100 UNIT/ML
500 SOLUTION INTRAVENOUS AS NEEDED
Status: DISCONTINUED | OUTPATIENT
Start: 2024-03-11 | End: 2024-03-12 | Stop reason: HOSPADM

## 2024-03-11 RX ADMIN — HEPARIN 500 UNITS: 100 SYRINGE at 14:54

## 2024-03-11 RX ADMIN — PEGFILGRASTIM-CBQV 6 MG: 6 INJECTION, SOLUTION SUBCUTANEOUS at 14:51

## 2024-03-12 ENCOUNTER — READMISSION MANAGEMENT (OUTPATIENT)
Dept: CALL CENTER | Facility: HOSPITAL | Age: 74
End: 2024-03-12
Payer: MEDICARE

## 2024-03-12 NOTE — OUTREACH NOTE
Medical Week 1 Survey      Flowsheet Row Responses   [facility] patient discharged from? [place]   Does the patient have one of the following disease processes/diagnoses(primary or secondary)? Other   Week 1 attempt successful? Yes   Call start time 0956   Call end time 0957   Discharge diagnosis Hodgkin lymphoma (C81.90)     Yes    Essential hypertension   Is patient permission given to speak with other caregiver? Yes   List who call center can speak with Peggy spouse   Person spoke with today (if not patient) and relationship Peggy spouse   Meds reviewed with patient/caregiver? Yes   Is the patient having any side effects they believe may be caused by any medication additions or changes? No   Does the patient have all medications ordered at discharge? N/A   Is the patient taking all medications as directed (includes completed medication regime)? Yes   Does the patient have a primary care provider?  Yes   Has the patient kept scheduled appointments due by today? Yes   Has home health visited the patient within 72 hours of discharge? N/A   Did the patient receive a copy of their discharge instructions? Yes   Nursing interventions Reviewed instructions with patient   What is the patient's perception of their health status since discharge? Improving   Is the patient/caregiver able to teach back signs and symptoms related to disease process for when to call PCP? Yes   Is the patient/caregiver able to teach back signs and symptoms related to disease process for when to call 911? Yes   Is the patient/caregiver able to teach back the hierarchy of who to call/visit for symptoms/problems? PCP, Specialist, Home health nurse, Urgent Care, ED, 911 Yes   If the patient is a current smoker, are they able to teach back resources for cessation? Not a smoker   Week 1 call completed? Yes   Graduated Yes   Did the patient feel the follow up calls were helpful during their recovery period? Yes   Graduated/Revoked comments Pt  improving-spouse states no additional follow up calls are needed   Call end time [id number]            Sherlyn H - Registered Nurse

## 2024-03-13 NOTE — PROGRESS NOTES
Blood counts ok. Based on previous cycle blood counts, please ask him to start Levaquin this Friday, continue 7 days. Repeat labs as scheduled 3/18

## 2024-03-14 ENCOUNTER — TELEPHONE (OUTPATIENT)
Dept: ONCOLOGY | Facility: CLINIC | Age: 74
End: 2024-03-14
Payer: MEDICARE

## 2024-03-14 LAB
FUNGUS WND CULT: NORMAL
MYCOBACTERIUM SPEC CULT: NORMAL
NIGHT BLUE STAIN TISS: NORMAL

## 2024-03-14 NOTE — TELEPHONE ENCOUNTER
Called to notify patient per Dr. Leary: Blood counts ok. Based on previous cycle blood counts, please ask him to start Levaquin this Friday, continue 7 days. Repeat labs as scheduled 3/18/24.  Patient's spouse stated she is out of state and not with patient.  She asked that we call him at [phone].

## 2024-03-14 NOTE — TELEPHONE ENCOUNTER
Patient unreachable at phone number provided.  Contacted Ms. Wu back and advised her per Dr. Leary.  She verbalized understanding.  She stated she will tell patient now, as he does not answer his phone if he doesn't know who it is.  She said she will have him make sure he has Levaquin and if he doesn't or doesn't have enough, she will notify this office.

## 2024-03-18 ENCOUNTER — HOSPITAL ENCOUNTER (OUTPATIENT)
Dept: ONCOLOGY | Facility: HOSPITAL | Age: 74
Discharge: HOME | End: 2024-03-18
Payer: MEDICARE

## 2024-03-18 ENCOUNTER — HOSPITAL ENCOUNTER (OUTPATIENT)
Dept: PET IMAGING | Facility: HOSPITAL | Age: 74
Discharge: HOME | End: 2024-03-18
Payer: MEDICARE

## 2024-03-18 VITALS
HEIGHT: 64 IN | BODY MASS INDEX: 29.19 KG/M2 | WEIGHT: 171 LBS | SYSTOLIC BLOOD PRESSURE: 130 MMHG | TEMPERATURE: 98.5 F | HEART RATE: 94 BPM | RESPIRATION RATE: 16 BRPM | DIASTOLIC BLOOD PRESSURE: 83 MMHG

## 2024-03-18 DIAGNOSIS — C81.98 HODGKIN LYMPHOMA OF LYMPH NODES OF MULTIPLE REGIONS, UNSPECIFIED HODGKIN LYMPHOMA TYPE: ICD-10-CM

## 2024-03-18 DIAGNOSIS — Z45.2 ENCOUNTER FOR CARE RELATED TO VASCULAR ACCESS PORT: Primary | ICD-10-CM

## 2024-03-18 LAB
BASOPHILS # BLD AUTO: 0.01 10*3/MM3 (ref 0–0.2)
BASOPHILS NFR BLD AUTO: 0.1 % (ref 0–1.5)
DEPRECATED RDW RBC AUTO: 57.9 FL (ref 37–54)
EOSINOPHIL # BLD AUTO: 0.01 10*3/MM3 (ref 0–0.4)
EOSINOPHIL NFR BLD AUTO: 0.1 % (ref 0.3–6.2)
ERYTHROCYTE [DISTWIDTH] IN BLOOD BY AUTOMATED COUNT: 18.4 % (ref 12.3–15.4)
GLUCOSE BLDC GLUCOMTR-MCNC: 94 MG/DL (ref 70–130)
HCT VFR BLD AUTO: 26.9 % (ref 37.5–51)
HGB BLD-MCNC: 9.2 G/DL (ref 13–17.7)
IMM GRANULOCYTES # BLD AUTO: 1.22 10*3/MM3 (ref 0–0.05)
IMM GRANULOCYTES NFR BLD AUTO: 10.1 % (ref 0–0.5)
LYMPHOCYTES # BLD AUTO: 0.68 10*3/MM3 (ref 0.7–3.1)
LYMPHOCYTES NFR BLD AUTO: 5.7 % (ref 19.6–45.3)
MCH RBC QN AUTO: 33.1 PG (ref 26.6–33)
MCHC RBC AUTO-ENTMCNC: 34.2 G/DL (ref 31.5–35.7)
MCV RBC AUTO: 96.8 FL (ref 79–97)
MONOCYTES # BLD AUTO: 1.93 10*3/MM3 (ref 0.1–0.9)
MONOCYTES NFR BLD AUTO: 16 % (ref 5–12)
NEUTROPHILS NFR BLD AUTO: 68 % (ref 42.7–76)
NEUTROPHILS NFR BLD AUTO: 8.18 10*3/MM3 (ref 1.7–7)
PLATELET # BLD AUTO: 66 10*3/MM3 (ref 140–450)
PMV BLD AUTO: 10.6 FL (ref 6–12)
RBC # BLD AUTO: 2.78 10*6/MM3 (ref 4.14–5.8)
WBC NRBC COR # BLD AUTO: 12.03 10*3/MM3 (ref 3.4–10.8)

## 2024-03-18 PROCEDURE — 78815 PET IMAGE W/CT SKULL-THIGH: CPT

## 2024-03-18 PROCEDURE — 36591 DRAW BLOOD OFF VENOUS DEVICE: CPT

## 2024-03-18 PROCEDURE — 0 FLUDEOXYGLUCOSE F18 SOLUTION: Performed by: INTERNAL MEDICINE

## 2024-03-18 PROCEDURE — 25010000002 HEPARIN LOCK FLUSH PER 10 UNITS: Performed by: INTERNAL MEDICINE

## 2024-03-18 PROCEDURE — A9552 F18 FDG: HCPCS | Performed by: INTERNAL MEDICINE

## 2024-03-18 PROCEDURE — 85025 COMPLETE CBC W/AUTO DIFF WBC: CPT | Performed by: INTERNAL MEDICINE

## 2024-03-18 PROCEDURE — 82948 REAGENT STRIP/BLOOD GLUCOSE: CPT

## 2024-03-18 RX ORDER — HEPARIN SODIUM (PORCINE) LOCK FLUSH IV SOLN 100 UNIT/ML 100 UNIT/ML
500 SOLUTION INTRAVENOUS AS NEEDED
OUTPATIENT
Start: 2024-03-18

## 2024-03-18 RX ORDER — HEPARIN SODIUM (PORCINE) LOCK FLUSH IV SOLN 100 UNIT/ML 100 UNIT/ML
500 SOLUTION INTRAVENOUS AS NEEDED
Status: DISCONTINUED | OUTPATIENT
Start: 2024-03-18 | End: 2024-03-19 | Stop reason: HOSPADM

## 2024-03-18 RX ADMIN — FLUDEOXYGLUCOSE F 18 1 DOSE: 200 INJECTION, SOLUTION INTRAVENOUS at 08:49

## 2024-03-18 RX ADMIN — HEPARIN 500 UNITS: 100 SYRINGE at 10:58

## 2024-03-21 ENCOUNTER — OFFICE VISIT (OUTPATIENT)
Dept: ONCOLOGY | Facility: CLINIC | Age: 74
End: 2024-03-21
Payer: MEDICARE

## 2024-03-21 VITALS
BODY MASS INDEX: 29.37 KG/M2 | DIASTOLIC BLOOD PRESSURE: 65 MMHG | WEIGHT: 172 LBS | OXYGEN SATURATION: 98 % | HEIGHT: 64 IN | HEART RATE: 70 BPM | TEMPERATURE: 97.8 F | SYSTOLIC BLOOD PRESSURE: 133 MMHG

## 2024-03-21 DIAGNOSIS — Z51.11 CHEMOTHERAPY MANAGEMENT, ENCOUNTER FOR: ICD-10-CM

## 2024-03-21 DIAGNOSIS — T45.1X5A CHEMOTHERAPY-INDUCED NEUROPATHY: ICD-10-CM

## 2024-03-21 DIAGNOSIS — C81.98 HODGKIN LYMPHOMA OF LYMPH NODES OF MULTIPLE REGIONS, UNSPECIFIED HODGKIN LYMPHOMA TYPE: Primary | ICD-10-CM

## 2024-03-21 DIAGNOSIS — G62.0 CHEMOTHERAPY-INDUCED NEUROPATHY: ICD-10-CM

## 2024-03-21 NOTE — PROGRESS NOTES
Hematology and Oncology [place]  Office number [phone]    Fax number [phone]     Follow up     Date: 24    Patient Name: bAraham Wu  MRN: [id number]  : 1950    Chief Complaint: Hodgkin Lymphoma follow up/treatment    Surgeon: Dr. Hiram Viveros MD    Cancer Staging: IV    History of Present Illness: Abraham Wu is a pleasant 74 y.o. male with PMH of hypertension, sleep apnea, hard of hearing who presents today for follow up of Hodgkin Lymphoma.     He initially presented 2022 with intractable diarrhea, low appetite, and a greater than 50 pound weight loss over several month period associated with intermittent fevers.  CT of the chest abdomen pelvis obtained in the ER shows of rectal mass spanning greater than 12 cm with adjacent adenopathy, bulky RP adenopathy, and findings concerning for left renal and liver metastases.  Small right pleural effusion with nodularity.  There was concern for impending obstruction, so he underwent diverting colostomy and biopsy on 2022.  Biopsies from the peritoneam showed a fibrotic nodule histiocytes and eosinophils admixed with CD30 positive large cells.  Rectal biopsies showed involvement by CD30 positive lymphoma, classic Hodgkin lymphoma versus CD30 positive T-cell or B-cell lymphoma.  There was extensive fibrosis and crush artifact.  He underwent repeat transrectal biopsy 10/4/2022 for further characterization which was felt to be most consistent with classical Hodgkin lymphoma.    He initiated chemotherapy with Brentuximab-AVD as an inpatient on 10/8/2022.  Cycle #1 was complicated by GI bleed requiring multiple blood transfusion and ultimately coil artery embolization 10/12; neutropenic fever, Candida esophagitis and mucositis.  He had a prolonged ICU stay  And required enteral feeding.  He was discharged 10/20/2022.    Following his last cycle he was admitted with multifocal PNA and +corona virus  infection.    He underwent brochoscopy 2/1/24 showing heaped up, irregular, friable mucosa obstructing the right middle lobe otherwise no discrete endobronchial lesions. Pathology showed persistent/recurrent CD30 positive lymphoma; see comment. These continue to have CD30 and RACHELE positivity, consistent with involvement by the same process. However, the lymphoma cells are now more numerous and have an intact B-cell expression profile, with strong diffuse CD20 expression, strong PAX5 expression, and expression of CD45 and CD79a. This is strongly favored to represent continued involvement with the same process with the development of a slightly different immunophenotype after pembrolizumab therapy as opposed to a secondary lymphoma. The strong expression of CD20 may be a therapeutic target. CD30 expression remains intact as well.     Treatment history:  Brentuximab-AVD: C1 10/8/22  C2: 11/1/22 onward with DA 20% in BVD; full dose brentuximab  Completed C6 3/22/23    2. Palliative radiation to rectum 5/30/2023 (abbreviated due to systemic progression)  3. GVD+Pembro x 4 cycles: completed 8/30/2023  4. Consolidative radiation to rectum completed 9/27/23    5. Pembrolizumab: current  6. Radiation to spine 1/16, 1/18 and 1/22/24    7. Rituximab-ICE: C1 2/13/2024; C2 3/5/24    Interval history:    He is here for follow up.  He tolerated cycle #2 of treatment well.  He has not had any gross hematuria.  He denies any new neuropathy.  He denies nausea, vomiting, mucositis, or diarrhea.  No bleeding symptoms.  Energy good.     He met with the transplant team yesterday at .  They discussed consideration of CAR-T cell therapy or autologous stem cell transplant.  He is awaiting tumor board input next week.  He has questions about each of these options.    He underwent PET/CT since his last visit.      Past Medical History:   Past Medical History:   Diagnosis Date    Arthritis     benign polypoid tissue right lung     Cancer      HODGKINS LYMPHOMA, RECTAL CANCER    Diabetes mellitus     patient reports that he doesn't have diabetes secondary to changing diet and weight loss.    History of radiation therapy 12/31/2023    re-treat rectum    History of transfusion     no reaction, transfusions received [facility] in 2022    Hyperlipidemia     Hypertension     Lymphoma     Mycobacterium mucogenicum     SHERRI (obstructive sleep apnea)     O2 2L/MIN AT NIGHT ONLY    Pneumonia     2023    Seasonal allergies    Polyp in airway removed 2020 [facility] Dr. Ketan Monet.    Past Surgical History:   Past Surgical History:   Procedure Laterality Date    BRONCHOSCOPY      removal of obstructing polypoid tissue right middle lung    BRONCHOSCOPY N/A 2/1/2024    Procedure: BRONCHOSCOPY WITH ENDOBRONCHIAL ULTRASOUND AND FLUORO;  Surgeon: Chris Pruitt MD;  Location:  [facility];  Service: Pulmonary;  Laterality: N/A;  EBUS BALLOON INTACT    COLONOSCOPY      COLOSTOMY N/A 09/22/2022    Procedure: LAPAROSCOPIC COLOSTOMY CREATION, FLEXIBLE SIGMOIDOSCOPY;  Surgeon: Hiram Viveros MD;  Location:  [facility];  Service: General;  Laterality: N/A;    DENTAL PROCEDURE      dental implants    ENDOSCOPY N/A 10/10/2022    Procedure: ESOPHAGOGASTRODUODENOSCOPY;  Surgeon: Neeraj Lynn MD;  Location:  [facility];  Service: Gastroenterology;  Laterality: N/A;    ENDOSCOPY N/A 10/12/2022    Procedure: ESOPHAGOGASTRODUODENOSCOPY;  Surgeon: Brunner, Mark I, MD;  Location:  [facility];  Service: Gastroenterology;  Laterality: N/A;    EXAM UNDER ANESTHESIA, RECTAL BIOPSY N/A 10/04/2022    Procedure: EXAM UNDER ANESTHESIA, TRANSANAL BIOPSY WITH TRUCUT NEEDLE (LITHOTOMY-CANDY CANE);  Surgeon: Hiram Viveros MD;  Location:  [facility];  Service: General;  Laterality: N/A;    EXAM UNDER ANESTHESIA, RECTAL BIOPSY N/A 05/30/2023    Procedure: EXAM UNDER ANESTHESIA, TRANSANAL BIOPSY OF RECTAL MASS WITH TRUCUT NEEDLE, PROCTOSCOPY;  Surgeon: Felice  Hiram ROBERTS MD;  Location: [facility];  Service: General;  Laterality: N/A;    PERIPHERALLY INSERTED CENTRAL CATHETER INSERTION      Removed 11/28/2022    VENOUS ACCESS DEVICE (PORT) INSERTION Right 11/28/2022     Family History:   Family History   Problem Relation Age of Onset    Diabetes Mother     Diabetes Father     Heart disease Father      Social History:   Social History     Socioeconomic History    Marital status:    Tobacco Use    Smoking status: Never    Smokeless tobacco: Never   Vaping Use    Vaping status: Never Used   Substance and Sexual Activity    Alcohol use: Not Currently     Comment: previously drank 2 glasses of wine/beer nightly    Drug use: Never    Sexual activity: Defer       Medications:     Current Outpatient Medications:     allopurinol (ZYLOPRIM) 300 MG tablet, Take 1 tablet by mouth Daily., Disp: 30 tablet, Rfl: 0    bacitracin 500 UNIT/GM ointment, Apply 1 Application topically to the appropriate area as directed 2 (Two) Times a Day., Disp: 14 g, Rfl: 1    dexAMETHasone (DECADRON) 4 MG tablet, Take 2 tablets by mouth Daily With Breakfast. Take 2 tablets by mouth with breakfast on days 4 and 5 of each chemotherapy cycle., Disp: 4 tablet, Rfl: 3    DPH-Lido-AlHydr-MgHydr-Simeth (First Mouthwash, Magic Mouthwash,) suspension, Swish and swallow 5 mL Every 6 (Six) Hours., Disp: 240 mL, Rfl: 3    fluticasone (FLONASE) 50 MCG/ACT nasal spray, 1 spray into the nostril(s) as directed by provider Daily As Needed for Allergies or Rhinitis. OTC, Disp: , Rfl:     lidocaine-prilocaine (EMLA) 2.5-2.5 % cream, Apply 1 application topically to the appropriate area as directed As Needed (45-60 minutes prior to port access.  Cover with saran/plastic wrap.)., Disp: 30 g, Rfl: 3    loratadine (CLARITIN) 10 MG tablet, TAKE ONE TABLET BY MOUTH DAILY, Disp: 30 tablet, Rfl: 5    ondansetron (Zofran) 8 MG tablet, Take 1 tablet by mouth Every 8 (Eight) Hours As Needed for Nausea or Vomiting., Disp: 30 tablet,  "Rfl: 5    pantoprazole (PROTONIX) 40 MG EC tablet, TAKE ONE TABLET BY MOUTH DAILY, Disp: 90 tablet, Rfl: 1    prochlorperazine (COMPAZINE) 5 MG tablet, Take 1 tablet by mouth Every 6 (Six) Hours As Needed for Nausea or Vomiting., Disp: 30 tablet, Rfl: 2    rosuvastatin (CRESTOR) 10 MG tablet, Take 1 tablet by mouth Daily., Disp: , Rfl:   No current facility-administered medications for this visit.    Facility-Administered Medications Ordered in Other Visits:     sodium chloride 0.9 % infusion, 125 mL/hr, Intravenous, Continuous, Kaycee Leary MD    Allergies:   No Known Allergies    Objective     Vital Signs:   Vitals:    03/21/24 0912   BP: 133/65   Pulse: 70   Temp: 97.8 °F (36.6 °C)   TempSrc: Temporal   SpO2: 98%   Weight: 78 kg (172 lb)   Height: 162.6 cm (64.02\")   PainSc: 0-No pain      Body mass index is 29.51 kg/m².   Pain Score    03/21/24 0912   PainSc: 0-No pain     ECOG Performance Status: 1    Physical Exam:   General: Well appearing male   HEENT: Normocephalic, atraumatic. Sclera anicteric. OP clear  Neck: supple, no palpable LAD. No axillary adenopathy  Cardiovascular: regular rate and rhythm. No murmurs.   Respiratory: Normal rate. Clear to auscultation bilaterally  Abdomen: Soft, nontender, non distended with normoactive bowel sounds. Ostomy LLQ   Lymph: no supraclavicular or axillary adenopathy  Neuro: Alert and oriented x 3. Normal BLE strength. Normal FTN  Ext: Symmetric, no swelling.   Skin: right port site  C/D/I    Laboratory/Imaging Reviewed:     Hospital Outpatient Visit on 03/18/2024   Component Date Value Ref Range Status    WBC 03/18/2024 12.03 (H)  3.40 - 10.80 10*3/mm3 Final    RBC 03/18/2024 2.78 (L)  4.14 - 5.80 10*6/mm3 Final    Hemoglobin 03/18/2024 9.2 (L)  13.0 - 17.7 g/dL Final    Hematocrit 03/18/2024 26.9 (L)  37.5 - 51.0 % Final    MCV 03/18/2024 96.8  79.0 - 97.0 fL Final    MCH 03/18/2024 33.1 (H)  26.6 - 33.0 pg Final    MCHC 03/18/2024 34.2  31.5 - 35.7 g/dL Final    " RDW 03/18/2024 18.4 (H)  12.3 - 15.4 % Final    RDW-SD 03/18/2024 57.9 (H)  37.0 - 54.0 fl Final    MPV 03/18/2024 10.6  6.0 - 12.0 fL Final    Platelets 03/18/2024 66 (L)  140 - 450 10*3/mm3 Final    Neutrophil % 03/18/2024 68.0  42.7 - 76.0 % Final    Lymphocyte % 03/18/2024 5.7 (L)  19.6 - 45.3 % Final    Monocyte % 03/18/2024 16.0 (H)  5.0 - 12.0 % Final    Eosinophil % 03/18/2024 0.1 (L)  0.3 - 6.2 % Final    Basophil % 03/18/2024 0.1  0.0 - 1.5 % Final    Immature Grans % 03/18/2024 10.1 (H)  0.0 - 0.5 % Final    Neutrophils, Absolute 03/18/2024 8.18 (H)  1.70 - 7.00 10*3/mm3 Final    Lymphocytes, Absolute 03/18/2024 0.68 (L)  0.70 - 3.10 10*3/mm3 Final    Monocytes, Absolute 03/18/2024 1.93 (H)  0.10 - 0.90 10*3/mm3 Final    Eosinophils, Absolute 03/18/2024 0.01  0.00 - 0.40 10*3/mm3 Final    Basophils, Absolute 03/18/2024 0.01  0.00 - 0.20 10*3/mm3 Final    Immature Grans, Absolute 03/18/2024 1.22 (H)  0.00 - 0.05 10*3/mm3 Final   Hospital Outpatient Visit on 03/18/2024   Component Date Value Ref Range Status    Glucose 03/18/2024 94  70 - 130 mg/dL Final   Hospital Outpatient Visit on 03/11/2024   Component Date Value Ref Range Status    Color, UA 03/11/2024 Yellow  Yellow, Straw Final    Appearance, UA 03/11/2024 Clear  Clear Final    pH, UA 03/11/2024 6.5  5.0 - 8.0 Final    Specific Gravity, UA 03/11/2024 1.020  1.001 - 1.030 Final    Glucose, UA 03/11/2024 Negative  Negative Final    Ketones, UA 03/11/2024 Negative  Negative Final    Bilirubin, UA 03/11/2024 Negative  Negative Final    Blood, UA 03/11/2024 Trace (A)  Negative Final    Protein, UA 03/11/2024 Negative  Negative Final    Leuk Esterase, UA 03/11/2024 Negative  Negative Final    Nitrite, UA 03/11/2024 Negative  Negative Final    Urobilinogen, UA 03/11/2024 1.0 E.U./dL  0.2 - 1.0 E.U./dL Final    RBC, UA 03/11/2024 6-10 (A)  None Seen, 0-2 /HPF Final    WBC, UA 03/11/2024 0-2  None Seen, 0-2 /HPF Final    Bacteria, UA 03/11/2024 None Seen   None Seen, Trace /HPF Final    Squamous Epithelial Cells, UA 03/11/2024 0-2  None Seen, 0-2 /HPF Final    Hyaline Casts, UA 03/11/2024 None Seen  0 - 6 /LPF Final    Methodology 03/11/2024 Automated Microscopy   Final    Glucose 03/11/2024 105 (H)  65 - 99 mg/dL Final    BUN 03/11/2024 21  8 - 23 mg/dL Final    Creatinine 03/11/2024 0.54 (L)  0.76 - 1.27 mg/dL Final    Sodium 03/11/2024 134 (L)  136 - 145 mmol/L Final    Potassium 03/11/2024 4.3  3.5 - 5.2 mmol/L Final    Chloride 03/11/2024 96 (L)  98 - 107 mmol/L Final    CO2 03/11/2024 27.0  22.0 - 29.0 mmol/L Final    Calcium 03/11/2024 8.9  8.6 - 10.5 mg/dL Final    BUN/Creatinine Ratio 03/11/2024 38.9 (H)  7.0 - 25.0 Final    Anion Gap 03/11/2024 11.0  5.0 - 15.0 mmol/L Final    eGFR 03/11/2024 106.5  >60.0 mL/min/1.73 Final    Magnesium 03/11/2024 2.2  1.6 - 2.4 mg/dL Final    WBC 03/11/2024 4.63  3.40 - 10.80 10*3/mm3 Final    RBC 03/11/2024 3.08 (L)  4.14 - 5.80 10*6/mm3 Final    Hemoglobin 03/11/2024 10.0 (L)  13.0 - 17.7 g/dL Final    Hematocrit 03/11/2024 29.3 (L)  37.5 - 51.0 % Final    MCV 03/11/2024 95.1  79.0 - 97.0 fL Final    MCH 03/11/2024 32.5  26.6 - 33.0 pg Final    MCHC 03/11/2024 34.1  31.5 - 35.7 g/dL Final    RDW 03/11/2024 15.8 (H)  12.3 - 15.4 % Final    RDW-SD 03/11/2024 53.1  37.0 - 54.0 fl Final    MPV 03/11/2024 8.8  6.0 - 12.0 fL Final    Platelets 03/11/2024 284  140 - 450 10*3/mm3 Final    Neutrophil % 03/11/2024 87.1 (H)  42.7 - 76.0 % Final    Lymphocyte % 03/11/2024 8.4 (L)  19.6 - 45.3 % Final    Monocyte % 03/11/2024 2.8 (L)  5.0 - 12.0 % Final    Eosinophil % 03/11/2024 0.2 (L)  0.3 - 6.2 % Final    Basophil % 03/11/2024 0.2  0.0 - 1.5 % Final    Immature Grans % 03/11/2024 1.3 (H)  0.0 - 0.5 % Final    Neutrophils, Absolute 03/11/2024 4.03  1.70 - 7.00 10*3/mm3 Final    Lymphocytes, Absolute 03/11/2024 0.39 (L)  0.70 - 3.10 10*3/mm3 Final    Monocytes, Absolute 03/11/2024 0.13  0.10 - 0.90 10*3/mm3 Final    Eosinophils,  Absolute 03/11/2024 0.01  0.00 - 0.40 10*3/mm3 Final    Basophils, Absolute 03/11/2024 0.01  0.00 - 0.20 10*3/mm3 Final    Immature Grans, Absolute 03/11/2024 0.06 (H)  0.00 - 0.05 10*3/mm3 Final       NM PET/CT Skull Base to Mid Thigh    Result Date: 3/19/2024  Narrative: NM PET/CT SKULL BASE TO MID THIGH Date of Exam: 3/18/2024 8:45 AM EDT Indication: lymphoma restaging. Comparison: PET/CT 12/4/2023, CT chest abdomen pelvis 1/16/2024. Technique: 12.38 mCi of F-18 FDG was administered intravenously. PET imaging was obtained from skull base to mid-thigh approximately 60 minutes after radiotracer injection. A low dose non contrast CT was obtained for attenuation correction and anatomic localization. Fused PET-CT and 3D MIP reconstructions were utilized for image interpretation.  Fasting blood glucose level: 94 mg/dl. Reference uptake values: Mediastinum: 2.2 SUVmax Liver: 3.0 SUVmax Normalization method: Body Weight Findings: Head and Neck: No suspicious metabolic activity. Physiologic activity is present within the aerodigestive structures, muscles of phonation, and brain in a symmetric fasion. Thorax: No suspicious metabolic activity. Resolution of the previously abnormal hypermetabolic activity in the right hilar region. Improved aeration in the right middle lobe after bronchoscopy. Scattered pulmonary nodules are not significantly changed from prior diagnostic chest CT, and otherwise are not well characterized on this low-dose nonbreath-hold CT, and below the resolution of PET. Abdomen and Pelvis: No suspicious metabolic activity. Rectal mass has no significant metabolic activity and appears unchanged in size compared to recent CT. Physiologic activity is present within the gastrointestinal and genitourinary tracts. Similar ill-defined soft tissue in the left retroperitoneum anterior to the kidney, which is no significant metabolic activity. Musculoskeletal and Extremities: New diffuse and relatively homogenous  radiotracer uptake throughout the axial and appendicular skeleton, likely related to marrow stimulation. Findings obscure evaluation of underlying focal lesions.     Impression: Impression: No significant/pathologic hypermetabolic activity (Deauville score 1). Resolved hypermetabolism in the right hilar region. No hypermetabolism associated with the rectal mass. New radiotracer uptake throughout the axial and appendicular skeleton, likely related to marrow stimulation. This high level of background activity obscures evaluation of underlying focal osseous lesions. Improved aeration of the right middle lobe after bronchoscopy, noted to be secondary to malignant involvement (per chart review). No hypermetabolism within this region. Scattered small pulmonary nodules are unchanged compared to prior exam and below the resolution of PET. Electronically Signed: Sonido Stokes MD  3/19/2024 9:59 AM EDT  Workstation ID: [id number]     Assessment / Plan      Assessment/Plan:     1.  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  Chemo monitoring  -He completed 6 cycles of BV-AVD 3/23/2023. Cycle 6 was complicated by coronavirus infection and multifocal pneumonia.  Posttherapy PET showed persistent bilateral interstitial changes and mild adenopathy. Repeat chest CT shows continued improvement in the pulmonary infiltrates consistent with a reactive infectious etiology.  - He had clear response to first line therapy with resolution of the hepatic lesions, marked improvement in the retroperitoneal adenopathy, and 50% reduction in the size of the rectal mass.  However he had persistent uptake in the rectal mass on posttreatment PET, Deuville 5. We elected to proceed with biopsy to confirm whether he had residual disease followed by consolidative radiotherapy to the rectal mass given initial bulky disease.  In the interim, while awaiting biopsy, he presented with rectal  bleeding, obstructive uropathy symptoms and pain and radiation planning CT showed significant interval growth in the rectal mass as well as development of new retroperitoneal adenopathy superior to the rectal mass compared to the his posttreatment PET, confirming progressive disease. Consolidative radiation was converted to palliative course radiation given interval adenopathy development above the radiation field to allow for earlier introduction of second line chemotherapy.   -Repeat biopsy confirmed residual classic Hodgkin's lymphoma.  -Given national shortage of carbo/cis, we proceeded with Keytruda, vinorelbine, Gemzar, and Doxil. He completed 4 cycles 8/2023. Post treatment PET showed complete resolution of FDG avidity.  He continued Keytruda maintenance  -Most recent PET from 12/2023 with resolution of FDG avidity in the rectal mass which has decreased in size.  There is a new area of FDG avidity and a lytic lesion in the L4 region as well as mild SUV uptake in a right hilar node.  Lytic lesion appears to have been there previously on my review of prior PETs but with increased activity. He completed stereotactic radiation therapy to this area as he was having pain.  There was also an indeterminate hilar LN. I recommended continuation of maintenance Keytruda and short interval follow-up CT of the chest to reassess this. CT in Jan compared to prior PET showed worsening consolidative RML from hilum to major fissure with possible broncholith and no specific mass. He underwent bronchoscopy with findings confirming recurrent CD30 positive Hodgkin's lymphoma, but he now has developed strong CD20 positivity.  Morphologically this is felt to represent the same underlying Hodgkin's process, but with immunophenotype switch.  I recommended third line systemic therapy.  He initiated  Rituximab/ICE  -He is tolerating chemotherapy well.  He is having expected treatment related pancytopenia.  Blood counts from yesterday  reviewed.  He will have a follow-up CBC on Tuesday and if counts have further recovered we will proceed with cycle 3 of RICE.  I have discussed his case with Dr. Pruitt at  and recounted that discussion with the patient today.  We will continue with the plan for 3-4 cycles of RICE pending input from the tumor board regarding candidacy for autologous stem cell transplant or potentially CAR-T cell therapy.  Given multiply relapsed disease, he is at high risk for progression, but PET/CT which I personally reviewed is consistent with treatment response.  The diffuse bone uptake is consistent with recent Neulasta use.  CBC reviewed and adequate for cycle 2. CMP, LDH, uric acid pending. UA pending for ifosphamide. He will be admitted today and initiate day 1.   -Plan admission on 3/26 for consideration of cycle 3. RN confirmed with admissions.  -Follow up Tues with labs and possible admission pending results.     2.  H/o GI bleed  -PPI, continue daily.  Tolerating well.     3. Access  -Port c/d/I    4.  Right middle lobe obstruction  -Secondary to his malignancy.  He is on room air.  I have discussed with pulmonary and he would potentially be candidate for his stent if he has progressive obstructive symptoms.    -Imaging consistent with treatment response.    5.  Mucositis  -Continue valtrex prophylaxis    Follow Up:   1 week with repeat labs and admission for RICE cycle #3      Kaycee Leary MD  Hematology and Oncology       I have spent a total of >40 min on reviewing test results/imaging/consult notes/preparing to see patient, counseling patient, performing medically appropriate exam, placing orders, coordinating care and documenting clinical information in the electronic or other health record

## 2024-03-26 ENCOUNTER — HOSPITAL ENCOUNTER (OUTPATIENT)
Dept: ONCOLOGY | Facility: HOSPITAL | Age: 74
Discharge: HOME | End: 2024-03-26
Payer: MEDICARE

## 2024-03-26 ENCOUNTER — OFFICE VISIT (OUTPATIENT)
Dept: ONCOLOGY | Facility: CLINIC | Age: 74
End: 2024-03-26
Payer: MEDICARE

## 2024-03-26 ENCOUNTER — HOSPITAL ENCOUNTER (INPATIENT)
Facility: HOSPITAL | Age: 74
LOS: 2 days | Discharge: HOME | End: 2024-03-28
Attending: INTERNAL MEDICINE | Admitting: FAMILY MEDICINE
Payer: MEDICARE

## 2024-03-26 VITALS
OXYGEN SATURATION: 98 % | RESPIRATION RATE: 18 BRPM | HEART RATE: 74 BPM | HEIGHT: 64 IN | BODY MASS INDEX: 29.88 KG/M2 | SYSTOLIC BLOOD PRESSURE: 130 MMHG | TEMPERATURE: 97.7 F | WEIGHT: 175 LBS | DIASTOLIC BLOOD PRESSURE: 62 MMHG

## 2024-03-26 DIAGNOSIS — C81.98 HODGKIN LYMPHOMA OF LYMPH NODES OF MULTIPLE REGIONS, UNSPECIFIED HODGKIN LYMPHOMA TYPE: Primary | ICD-10-CM

## 2024-03-26 LAB
ALBUMIN SERPL-MCNC: 4 G/DL (ref 3.5–5.2)
ALBUMIN SERPL-MCNC: 4.2 G/DL (ref 3.5–5.2)
ALBUMIN/GLOB SERPL: 1.8 G/DL
ALBUMIN/GLOB SERPL: 2 G/DL
ALP SERPL-CCNC: 110 U/L (ref 39–117)
ALP SERPL-CCNC: 118 U/L (ref 39–117)
ALT SERPL W P-5'-P-CCNC: 9 U/L (ref 1–41)
ALT SERPL W P-5'-P-CCNC: 9 U/L (ref 1–41)
ANION GAP SERPL CALCULATED.3IONS-SCNC: 8 MMOL/L (ref 5–15)
ANION GAP SERPL CALCULATED.3IONS-SCNC: 8 MMOL/L (ref 5–15)
AST SERPL-CCNC: 13 U/L (ref 1–40)
AST SERPL-CCNC: 14 U/L (ref 1–40)
BACTERIA UR QL AUTO: ABNORMAL /HPF
BASOPHILS # BLD AUTO: 0.05 10*3/MM3 (ref 0–0.2)
BASOPHILS # BLD AUTO: 0.06 10*3/MM3 (ref 0–0.2)
BASOPHILS NFR BLD AUTO: 0.5 % (ref 0–1.5)
BASOPHILS NFR BLD AUTO: 0.5 % (ref 0–1.5)
BILIRUB SERPL-MCNC: 0.2 MG/DL (ref 0–1.2)
BILIRUB SERPL-MCNC: 0.2 MG/DL (ref 0–1.2)
BILIRUB UR QL STRIP: NEGATIVE
BUN SERPL-MCNC: 15 MG/DL (ref 8–23)
BUN SERPL-MCNC: 18 MG/DL (ref 8–23)
BUN/CREAT SERPL: 25.9 (ref 7–25)
BUN/CREAT SERPL: 34.6 (ref 7–25)
CALCIUM SPEC-SCNC: 9 MG/DL (ref 8.6–10.5)
CALCIUM SPEC-SCNC: 9 MG/DL (ref 8.6–10.5)
CHLORIDE SERPL-SCNC: 97 MMOL/L (ref 98–107)
CHLORIDE SERPL-SCNC: 99 MMOL/L (ref 98–107)
CLARITY UR: CLEAR
CO2 SERPL-SCNC: 28 MMOL/L (ref 22–29)
CO2 SERPL-SCNC: 29 MMOL/L (ref 22–29)
COLOR UR: YELLOW
CREAT SERPL-MCNC: 0.52 MG/DL (ref 0.76–1.27)
CREAT SERPL-MCNC: 0.58 MG/DL (ref 0.76–1.27)
DEPRECATED RDW RBC AUTO: 62.3 FL (ref 37–54)
DEPRECATED RDW RBC AUTO: 66.4 FL (ref 37–54)
EGFRCR SERPLBLD CKD-EPI 2021: 103.6 ML/MIN/1.73
EGFRCR SERPLBLD CKD-EPI 2021: 107.1 ML/MIN/1.73
EOSINOPHIL # BLD AUTO: 0.05 10*3/MM3 (ref 0–0.4)
EOSINOPHIL # BLD AUTO: 0.05 10*3/MM3 (ref 0–0.4)
EOSINOPHIL NFR BLD AUTO: 0.4 % (ref 0.3–6.2)
EOSINOPHIL NFR BLD AUTO: 0.5 % (ref 0.3–6.2)
ERYTHROCYTE [DISTWIDTH] IN BLOOD BY AUTOMATED COUNT: 18.7 % (ref 12.3–15.4)
ERYTHROCYTE [DISTWIDTH] IN BLOOD BY AUTOMATED COUNT: 18.8 % (ref 12.3–15.4)
GLOBULIN UR ELPH-MCNC: 2 GM/DL
GLOBULIN UR ELPH-MCNC: 2.4 GM/DL
GLUCOSE SERPL-MCNC: 129 MG/DL (ref 65–99)
GLUCOSE SERPL-MCNC: 86 MG/DL (ref 65–99)
GLUCOSE UR STRIP-MCNC: NEGATIVE MG/DL
HCT VFR BLD AUTO: 27.7 % (ref 37.5–51)
HCT VFR BLD AUTO: 28.3 % (ref 37.5–51)
HGB BLD-MCNC: 9.2 G/DL (ref 13–17.7)
HGB BLD-MCNC: 9.6 G/DL (ref 13–17.7)
HGB UR QL STRIP.AUTO: ABNORMAL
HYALINE CASTS UR QL AUTO: ABNORMAL /LPF
IMM GRANULOCYTES # BLD AUTO: 0.29 10*3/MM3 (ref 0–0.05)
IMM GRANULOCYTES # BLD AUTO: 0.39 10*3/MM3 (ref 0–0.05)
IMM GRANULOCYTES NFR BLD AUTO: 2.5 % (ref 0–0.5)
IMM GRANULOCYTES NFR BLD AUTO: 4.3 % (ref 0–0.5)
KETONES UR QL STRIP: NEGATIVE
LDH SERPL-CCNC: 209 U/L (ref 135–225)
LDH SERPL-CCNC: 236 U/L (ref 135–225)
LEUKOCYTE ESTERASE UR QL STRIP.AUTO: NEGATIVE
LYMPHOCYTES # BLD AUTO: 0.48 10*3/MM3 (ref 0.7–3.1)
LYMPHOCYTES # BLD AUTO: 0.63 10*3/MM3 (ref 0.7–3.1)
LYMPHOCYTES NFR BLD AUTO: 4.1 % (ref 19.6–45.3)
LYMPHOCYTES NFR BLD AUTO: 6.9 % (ref 19.6–45.3)
MAGNESIUM SERPL-MCNC: 1.8 MG/DL (ref 1.6–2.4)
MAGNESIUM SERPL-MCNC: 1.9 MG/DL (ref 1.6–2.4)
MCH RBC QN AUTO: 32.5 PG (ref 26.6–33)
MCH RBC QN AUTO: 32.9 PG (ref 26.6–33)
MCHC RBC AUTO-ENTMCNC: 33.2 G/DL (ref 31.5–35.7)
MCHC RBC AUTO-ENTMCNC: 33.9 G/DL (ref 31.5–35.7)
MCV RBC AUTO: 95.9 FL (ref 79–97)
MCV RBC AUTO: 98.9 FL (ref 79–97)
MONOCYTES # BLD AUTO: 0.63 10*3/MM3 (ref 0.1–0.9)
MONOCYTES # BLD AUTO: 0.67 10*3/MM3 (ref 0.1–0.9)
MONOCYTES NFR BLD AUTO: 5.4 % (ref 5–12)
MONOCYTES NFR BLD AUTO: 7.3 % (ref 5–12)
NEUTROPHILS NFR BLD AUTO: 10.12 10*3/MM3 (ref 1.7–7)
NEUTROPHILS NFR BLD AUTO: 7.35 10*3/MM3 (ref 1.7–7)
NEUTROPHILS NFR BLD AUTO: 80.5 % (ref 42.7–76)
NEUTROPHILS NFR BLD AUTO: 87.1 % (ref 42.7–76)
NITRITE UR QL STRIP: NEGATIVE
NRBC BLD AUTO-RTO: 0.2 /100 WBC (ref 0–0.2)
NRBC BLD AUTO-RTO: 0.3 /100 WBC (ref 0–0.2)
PH UR STRIP.AUTO: 7.5 [PH] (ref 5–8)
PHOSPHATE SERPL-MCNC: 3.8 MG/DL (ref 2.5–4.5)
PHOSPHATE SERPL-MCNC: 3.9 MG/DL (ref 2.5–4.5)
PLATELET # BLD AUTO: 181 10*3/MM3 (ref 140–450)
PLATELET # BLD AUTO: 201 10*3/MM3 (ref 140–450)
PMV BLD AUTO: 9.3 FL (ref 6–12)
PMV BLD AUTO: 9.4 FL (ref 6–12)
POTASSIUM SERPL-SCNC: 4 MMOL/L (ref 3.5–5.2)
POTASSIUM SERPL-SCNC: 4.3 MMOL/L (ref 3.5–5.2)
PROT SERPL-MCNC: 6 G/DL (ref 6–8.5)
PROT SERPL-MCNC: 6.6 G/DL (ref 6–8.5)
PROT UR QL STRIP: NEGATIVE
RBC # BLD AUTO: 2.8 10*6/MM3 (ref 4.14–5.8)
RBC # BLD AUTO: 2.95 10*6/MM3 (ref 4.14–5.8)
RBC # UR STRIP: ABNORMAL /HPF
REF LAB TEST METHOD: ABNORMAL
SODIUM SERPL-SCNC: 134 MMOL/L (ref 136–145)
SODIUM SERPL-SCNC: 135 MMOL/L (ref 136–145)
SP GR UR STRIP: 1.02 (ref 1–1.03)
SQUAMOUS #/AREA URNS HPF: ABNORMAL /HPF
URATE SERPL-MCNC: 5.1 MG/DL (ref 3.4–7)
URATE SERPL-MCNC: 5.4 MG/DL (ref 3.4–7)
UROBILINOGEN UR QL STRIP: ABNORMAL
WBC # UR STRIP: ABNORMAL /HPF
WBC NRBC COR # BLD AUTO: 11.63 10*3/MM3 (ref 3.4–10.8)
WBC NRBC COR # BLD AUTO: 9.14 10*3/MM3 (ref 3.4–10.8)

## 2024-03-26 PROCEDURE — 80053 COMPREHEN METABOLIC PANEL: CPT | Performed by: INTERNAL MEDICINE

## 2024-03-26 PROCEDURE — 3078F DIAST BP <80 MM HG: CPT | Performed by: INTERNAL MEDICINE

## 2024-03-26 PROCEDURE — 83615 LACTATE (LD) (LDH) ENZYME: CPT | Performed by: INTERNAL MEDICINE

## 2024-03-26 PROCEDURE — 25010000002 DIPHENHYDRAMINE PER 50 MG: Performed by: INTERNAL MEDICINE

## 2024-03-26 PROCEDURE — 25010000002 RITUXIMAB-ARRX 100 MG/10ML SOLUTION 10 ML VIAL: Performed by: INTERNAL MEDICINE

## 2024-03-26 PROCEDURE — 25010000002 RITUXIMAB-ARRX 500 MG/50ML SOLUTION 50 ML VIAL: Performed by: INTERNAL MEDICINE

## 2024-03-26 PROCEDURE — 84100 ASSAY OF PHOSPHORUS: CPT | Performed by: INTERNAL MEDICINE

## 2024-03-26 PROCEDURE — 81001 URINALYSIS AUTO W/SCOPE: CPT | Performed by: INTERNAL MEDICINE

## 2024-03-26 PROCEDURE — 84550 ASSAY OF BLOOD/URIC ACID: CPT | Performed by: INTERNAL MEDICINE

## 2024-03-26 PROCEDURE — 99215 OFFICE O/P EST HI 40 MIN: CPT | Performed by: INTERNAL MEDICINE

## 2024-03-26 PROCEDURE — 25810000003 SODIUM CHLORIDE 0.9 % SOLUTION: Performed by: INTERNAL MEDICINE

## 2024-03-26 PROCEDURE — 25010000002 DEXAMETHASONE SODIUM PHOSPHATE 100 MG/10ML SOLUTION: Performed by: INTERNAL MEDICINE

## 2024-03-26 PROCEDURE — 3075F SYST BP GE 130 - 139MM HG: CPT | Performed by: INTERNAL MEDICINE

## 2024-03-26 PROCEDURE — 1126F AMNT PAIN NOTED NONE PRSNT: CPT | Performed by: INTERNAL MEDICINE

## 2024-03-26 PROCEDURE — 83735 ASSAY OF MAGNESIUM: CPT | Performed by: INTERNAL MEDICINE

## 2024-03-26 PROCEDURE — 85025 COMPLETE CBC W/AUTO DIFF WBC: CPT | Performed by: INTERNAL MEDICINE

## 2024-03-26 PROCEDURE — 25010000002 ENOXAPARIN PER 10 MG: Performed by: INTERNAL MEDICINE

## 2024-03-26 PROCEDURE — 99222 1ST HOSP IP/OBS MODERATE 55: CPT | Performed by: INTERNAL MEDICINE

## 2024-03-26 PROCEDURE — 25810000003 SODIUM CHLORIDE 0.9 % SOLUTION 500 ML FLEX CONT: Performed by: INTERNAL MEDICINE

## 2024-03-26 PROCEDURE — 25010000002 ETOPOSIDE 1 GM/50ML SOLUTION 50 ML VIAL: Performed by: INTERNAL MEDICINE

## 2024-03-26 PROCEDURE — 25810000003 SODIUM CHLORIDE 0.9 % SOLUTION 250 ML FLEX CONT: Performed by: INTERNAL MEDICINE

## 2024-03-26 PROCEDURE — 36591 DRAW BLOOD OFF VENOUS DEVICE: CPT

## 2024-03-26 RX ORDER — SODIUM CHLORIDE 9 MG/ML
125 INJECTION, SOLUTION INTRAVENOUS CONTINUOUS
Status: CANCELLED
Start: 2024-03-27

## 2024-03-26 RX ORDER — ACETAMINOPHEN 325 MG/1
650 TABLET ORAL ONCE
Status: CANCELLED | OUTPATIENT
Start: 2024-03-26

## 2024-03-26 RX ORDER — ACETAMINOPHEN 325 MG/1
650 TABLET ORAL ONCE
Status: COMPLETED | OUTPATIENT
Start: 2024-03-26 | End: 2024-03-26

## 2024-03-26 RX ORDER — SODIUM CHLORIDE 9 MG/ML
40 INJECTION, SOLUTION INTRAVENOUS AS NEEDED
Status: DISCONTINUED | OUTPATIENT
Start: 2024-03-26 | End: 2024-03-28 | Stop reason: HOSPADM

## 2024-03-26 RX ORDER — SODIUM CHLORIDE 0.9 % (FLUSH) 0.9 %
10 SYRINGE (ML) INJECTION AS NEEDED
Status: DISCONTINUED | OUTPATIENT
Start: 2024-03-26 | End: 2024-03-28 | Stop reason: HOSPADM

## 2024-03-26 RX ORDER — ONDANSETRON 2 MG/ML
4 INJECTION INTRAMUSCULAR; INTRAVENOUS EVERY 6 HOURS PRN
Status: DISCONTINUED | OUTPATIENT
Start: 2024-03-26 | End: 2024-03-28 | Stop reason: HOSPADM

## 2024-03-26 RX ORDER — ENOXAPARIN SODIUM 100 MG/ML
40 INJECTION SUBCUTANEOUS DAILY
Status: DISCONTINUED | OUTPATIENT
Start: 2024-03-26 | End: 2024-03-28 | Stop reason: HOSPADM

## 2024-03-26 RX ORDER — POLYETHYLENE GLYCOL 3350 17 G/17G
17 POWDER, FOR SOLUTION ORAL DAILY PRN
Status: DISCONTINUED | OUTPATIENT
Start: 2024-03-26 | End: 2024-03-28 | Stop reason: HOSPADM

## 2024-03-26 RX ORDER — ONDANSETRON 4 MG/1
4 TABLET, ORALLY DISINTEGRATING ORAL EVERY 6 HOURS PRN
Status: DISCONTINUED | OUTPATIENT
Start: 2024-03-26 | End: 2024-03-28 | Stop reason: HOSPADM

## 2024-03-26 RX ORDER — DIPHENHYDRAMINE HYDROCHLORIDE 50 MG/ML
50 INJECTION INTRAMUSCULAR; INTRAVENOUS AS NEEDED
Status: DISCONTINUED | OUTPATIENT
Start: 2024-03-26 | End: 2024-03-28 | Stop reason: HOSPADM

## 2024-03-26 RX ORDER — ACETAMINOPHEN 325 MG/1
650 TABLET ORAL EVERY 4 HOURS PRN
Status: DISCONTINUED | OUTPATIENT
Start: 2024-03-26 | End: 2024-03-28 | Stop reason: HOSPADM

## 2024-03-26 RX ORDER — SODIUM CHLORIDE 9 MG/ML
125 INJECTION, SOLUTION INTRAVENOUS CONTINUOUS
Status: DISCONTINUED | OUTPATIENT
Start: 2024-03-26 | End: 2024-03-28 | Stop reason: HOSPADM

## 2024-03-26 RX ORDER — BISACODYL 10 MG
10 SUPPOSITORY, RECTAL RECTAL DAILY PRN
Status: DISCONTINUED | OUTPATIENT
Start: 2024-03-26 | End: 2024-03-28 | Stop reason: HOSPADM

## 2024-03-26 RX ORDER — SODIUM CHLORIDE 9 MG/ML
125 INJECTION, SOLUTION INTRAVENOUS CONTINUOUS
Status: CANCELLED | OUTPATIENT
Start: 2024-03-26

## 2024-03-26 RX ORDER — MEPERIDINE HYDROCHLORIDE 25 MG/ML
25 INJECTION INTRAMUSCULAR; INTRAVENOUS; SUBCUTANEOUS
Status: CANCELLED | OUTPATIENT
Start: 2024-03-26

## 2024-03-26 RX ORDER — AMOXICILLIN 250 MG
2 CAPSULE ORAL 2 TIMES DAILY PRN
Status: DISCONTINUED | OUTPATIENT
Start: 2024-03-26 | End: 2024-03-28 | Stop reason: HOSPADM

## 2024-03-26 RX ORDER — BISACODYL 5 MG/1
5 TABLET, DELAYED RELEASE ORAL DAILY PRN
Status: DISCONTINUED | OUTPATIENT
Start: 2024-03-26 | End: 2024-03-28 | Stop reason: HOSPADM

## 2024-03-26 RX ORDER — DEXAMETHASONE 0.5 MG/1
8 TABLET ORAL EVERY 24 HOURS
Status: CANCELLED | OUTPATIENT
Start: 2024-03-29

## 2024-03-26 RX ORDER — DIPHENHYDRAMINE HYDROCHLORIDE 50 MG/ML
50 INJECTION INTRAMUSCULAR; INTRAVENOUS AS NEEDED
Status: CANCELLED | OUTPATIENT
Start: 2024-03-26

## 2024-03-26 RX ORDER — FAMOTIDINE 10 MG/ML
20 INJECTION, SOLUTION INTRAVENOUS AS NEEDED
Status: CANCELLED | OUTPATIENT
Start: 2024-03-26

## 2024-03-26 RX ORDER — FAMOTIDINE 10 MG/ML
20 INJECTION, SOLUTION INTRAVENOUS AS NEEDED
Status: DISCONTINUED | OUTPATIENT
Start: 2024-03-26 | End: 2024-03-28 | Stop reason: HOSPADM

## 2024-03-26 RX ORDER — SODIUM CHLORIDE 0.9 % (FLUSH) 0.9 %
10 SYRINGE (ML) INJECTION EVERY 12 HOURS SCHEDULED
Status: DISCONTINUED | OUTPATIENT
Start: 2024-03-26 | End: 2024-03-28 | Stop reason: HOSPADM

## 2024-03-26 RX ORDER — MEPERIDINE HYDROCHLORIDE 25 MG/ML
25 INJECTION INTRAMUSCULAR; INTRAVENOUS; SUBCUTANEOUS
Status: DISCONTINUED | OUTPATIENT
Start: 2024-03-26 | End: 2024-03-28 | Stop reason: HOSPADM

## 2024-03-26 RX ADMIN — DEXAMETHASONE SODIUM PHOSPHATE 12 MG: 10 INJECTION, SOLUTION INTRAMUSCULAR; INTRAVENOUS at 15:16

## 2024-03-26 RX ADMIN — Medication 10 ML: at 10:42

## 2024-03-26 RX ADMIN — DIPHENHYDRAMINE HYDROCHLORIDE 50 MG: 50 INJECTION INTRAMUSCULAR; INTRAVENOUS at 11:23

## 2024-03-26 RX ADMIN — SODIUM CHLORIDE 180 MG: 0.9 INJECTION, SOLUTION INTRAVENOUS at 15:42

## 2024-03-26 RX ADMIN — SODIUM CHLORIDE 125 ML/HR: 9 INJECTION, SOLUTION INTRAVENOUS at 10:42

## 2024-03-26 RX ADMIN — ACETAMINOPHEN 650 MG: 325 TABLET ORAL at 11:23

## 2024-03-26 RX ADMIN — SODIUM CHLORIDE 125 ML/HR: 9 INJECTION, SOLUTION INTRAVENOUS at 18:17

## 2024-03-26 RX ADMIN — RITUXIMAB-ARRX 700 MG: 500 INJECTION, SOLUTION INTRAVENOUS at 11:56

## 2024-03-26 RX ADMIN — ENOXAPARIN SODIUM 40 MG: 100 INJECTION SUBCUTANEOUS at 11:23

## 2024-03-26 RX ADMIN — Medication 10 ML: at 21:05

## 2024-03-26 NOTE — CASE MANAGEMENT/SOCIAL WORK
Case Management Readmission Assessment Note    Case Management Readmission Assessment (all recorded)       Readmission Interview       Row Name 03/26/24 1046             Readmission Indications    Is this hospitalization related to the prior hospital diagnosis? Yes      What was the reason you were admitted? Hodgkin Lymphoma        Row Name 03/26/24 1046             Recommendation for rehospitalization    Did you speak with your physician prior to coming to the hospital Yes      If yes, what physician did you speak with? Kaycee Leary MD      Who recommended you return to the hospital? Specialist      Did you seek care elsewhere prior to coming to the hospital? Yes  Oncologist        Row Name 03/26/24 1046             Follow up appointment    Do you have a PCP? Yes      Did you have an appointment with PCP/specialist after hospitalization within 7 days? No      Did you keep your appointment? Yes  Specialist/Oncologist        Row Name 03/26/24 1046             Medications    Did you have newly prescribed medications at discharge? --  unknown      Did you understand the reasons for your medications at discharge and how to take them? Yes      Did you understand the side effects of your medications? Yes      Are you taking all of you prescribed medications? Yes        Row Name 03/26/24 1046             Discharge Instructions    Did you understand your discharge instructions? Yes      Did your family/caregiver hear your instructions? Yes      Were you told to eat a special diet? No      Did you adhere to the diet? --  NA-regular diet        Row Name 03/26/24 1046             Index discharge location/services    Where did you go upon discharge? Home      Do you have supportive family or friends in the home? Yes  spouse      What services were arranged at discharge? DME

## 2024-03-26 NOTE — PLAN OF CARE
Problem: Adult Inpatient Plan of Care  Goal: Optimal Comfort and Wellbeing  Outcome: Ongoing, Progressing  Intervention: Provide Person-Centered Care  Recent Flowsheet Documentation  Taken 3/26/2024 0911 by Daljit Moreira, RN  Trust Relationship/Rapport:   care explained   choices provided   Goal Outcome Evaluation:  Plan of Care Reviewed With: patient, spouse        Progress: improving            Admission day 1 for Chemotherapy. Tolerated treatment plan without complications today. Tolerating PO. Voiding spontaneously. Wife at bedside. Up ad elis.

## 2024-03-26 NOTE — CASE MANAGEMENT/SOCIAL WORK
Discharge Planning Assessment  [facility]     Patient Name: Abraham Wu  MRN: [id number]  Today's Date: 3/26/2024    Admit Date: 3/26/2024    Plan: Home with spouse   Discharge Needs Assessment       Row Name 03/26/24 1037       Living Environment    People in Home spouse    Name(s) of People in Home Resides in [facility] with spouse, Peggy Wu at [phone]    Current Living Arrangements home    Potentially Unsafe Housing Conditions none    In the past 12 months has the electric, gas, oil, or water company threatened to shut off services in your home? No    Primary Care Provided by self    Family Caregiver if Needed spouse    Family Caregiver Names Spouse, Peggy Wu    Quality of Family Relationships helpful;involved;supportive    Able to Return to Prior Arrangements yes    Living Arrangement Comments Resides in private residence with spouse       Resource/Environmental Concerns    Resource/Environmental Concerns none    Transportation Concerns none       Transportation Needs    In the past 12 months, has lack of transportation kept you from medical appointments or from getting medications? no    In the past 12 months, has lack of transportation kept you from meetings, work, or from getting things needed for daily living? No       Food Insecurity    Within the past 12 months, you worried that your food would run out before you got the money to buy more. Never true    Within the past 12 months, the food you bought just didn't last and you didn't have money to get more. Never true       Transition Planning    Patient/Family Anticipates Transition to home with family    Patient/Family Anticipated Services at Transition ;durable medical equipment-[facility] for home oxygen    Transportation Anticipated family or friend will provide-spouse       Discharge Needs Assessment    Readmission Within the Last 30 Days planned readmission    Equipment Currently Used at Home oxygen     Concerns to be Addressed discharge planning    Concerns Comments Case Management following for all needs/discharge planning    Equipment Needed After Discharge oxygen    Patient's Choice of Community Agency(s) [employer] for home oxygen    Discharge Coordination/Progress Anticipate patient will return home with spouse at hospital discharge.                   Discharge Plan       Row Name 03/26/24 1042       Plan    Plan Home with spouse    Plan Comments Mr. Wu resides in [facility] with his spouse Peggy.  Currently with home 02 in place through [employer]-nocturnal use.  Independent with ADL's, no home health involvement.  Anticipate home at discharge/post chemotherapy.     Final Discharge Disposition Code 01 - home or self-care                  Continued Care and Services - Admitted Since 3/26/2024    No active coordination exists for this encounter.       Expected Discharge Date and Time       Expected Discharge Date Expected Discharge Time    Mar 28, 2024            Demographic Summary       Row Name 03/26/24 1035       General Information    Arrived From home;physician office - internal    Referral Source admission list;interdisciplinary rounds    Reason for Consult discharge planning    Preferred Language English       Contact Information    Contact Information Comments Spouse, Peggy Wu at [phone]                   Functional Status    No documentation.                  Psychosocial    No documentation.                  Abuse/Neglect    No documentation.                  Legal    No documentation.                  Substance Abuse    No documentation.                  Patient Forms    No documentation.                     CARLOS A Preston

## 2024-03-26 NOTE — H&P
[facility] Medicine Services  HISTORY AND PHYSICAL    Patient Name: Abraham Wu  : 1950  MRN: [id number]  Primary Care Physician: Jatinder Haynes MD  Date of admission: 3/26/2024      Subjective   Subjective     Chief Complaint:  Hodgkin lymphoma     HPI:  Abraham Wu is a 74 y.o. male with HTN, sleep apnea and [place]; also dianosed with Hodgkin lymphoma in  (rectal mass), had dverting colostomy then chemotherapy and palliative XRT to rectum.  On 24 a bronchoscopy showed disease in the RM lobe and chemo was restarted.      He is admitted for cycle 3 RICE.  Microscopic hematuria was noted last cycle and is ascribed to radiation cystitis.  He denies dysuria or other pain.  He has been eating well.  He denies fevers. .           Personal History     Past Medical History:   Diagnosis Date    Arthritis     benign polypoid tissue right lung     Cancer     HODGKINS LYMPHOMA, RECTAL CANCER    Diabetes mellitus     patient reports that he doesn't have diabetes secondary to changing diet and weight loss.    History of radiation therapy 2023    re-treat rectum    History of transfusion     no reaction, transfusions received at [facility] in     Hyperlipidemia     Hypertension     Lymphoma     Mycobacterium mucogenicum     SHERRI (obstructive sleep apnea)     O2 2L/MIN AT NIGHT ONLY    Pneumonia         Seasonal allergies          Oncology Problem List:  Lymphoma (2024; Status: Active)  Hodgkin's lymphoma (2024; Status: Active)  Hodgkin lymphoma (10/07/2022; Status: Active)    Oncology/Hematology History   Hodgkin lymphoma   10/7/2022 Initial Diagnosis    Hodgkin lymphoma (HCC)     10/8/2022 - 3/22/2023 Chemotherapy    OP HODGKIN LYMPHOMA  BV+AVD (Brentuximab vedotin / Doxorubicin / Vinblastine / Dacarbazine)     10/28/2022 Cancer Staged    Staging form: Hodgkin And Non-Hodgkin Lymphoma, AJCC 8th Edition  - Clinical: Stage IV - Signed by Kaycee Leary MD  on 10/28/2022     5/31/2023 - 6/1/2023 Radiation    Radiation OncologyTreatment Course:  Abraham Wu received 1400 cGy in 4 bid fractions to rectal mass via External Beam Radiation - EBRT.     6/13/2023 - 6/13/2023 Chemotherapy    OP HODGKIN LYMPHOMA BeGV (Bendamustine / Gemcitabine / VinORELBine)     9/14/2023 - 1/18/2024 Chemotherapy    OP HL Pembrolizumab 200 mg     9/18/2023 - 9/27/2023 Radiation    Radiation OncologyTreatment Course:  Abraham Wu received 2400 cGy in 8 fractions to rectum via External Beam Radiation - EBRT.     1/16/2024 - 1/22/2024 Radiation    Radiation OncologyTreatment Course:  Abraham Wu received 2100 cGy in 3 fractions to L4 spine lesion via Stereotactic Radiation Therapy - SRT.     2/8/2024 - 2/8/2024 Chemotherapy    IP LYMPHOMA DHAP-R Dexamethasone / CISplatin / Cytarabine / RiTUXimab     2/13/2024 -  Chemotherapy    IP LYMPHOMA RICE RiTUXimab / Ifosfamide + Mesna / CARBOplatin AUC = 5 / Etoposide       Past Surgical History:   Procedure Laterality Date    BRONCHOSCOPY      removal of obstructing polypoid tissue right middle lung    BRONCHOSCOPY N/A 2/1/2024    Procedure: BRONCHOSCOPY WITH ENDOBRONCHIAL ULTRASOUND AND FLUORO;  Surgeon: Chris Pruitt MD;  Location:  [facility];  Service: Pulmonary;  Laterality: N/A;  EBUS BALLOON INTACT    COLONOSCOPY      COLOSTOMY N/A 09/22/2022    Procedure: LAPAROSCOPIC COLOSTOMY CREATION, FLEXIBLE SIGMOIDOSCOPY;  Surgeon: Hiram Viveros MD;  Location: [facility] OR;  Service: General;  Laterality: N/A;    DENTAL PROCEDURE      dental implants    ENDOSCOPY N/A 10/10/2022    Procedure: ESOPHAGOGASTRODUODENOSCOPY;  Surgeon: Neeraj Lynn MD;  Location:  [facility];  Service: Gastroenterology;  Laterality: N/A;    ENDOSCOPY N/A 10/12/2022    Procedure: ESOPHAGOGASTRODUODENOSCOPY;  Surgeon: Brunner, Mark I, MD;  Location:  [facility];  Service: Gastroenterology;  Laterality: N/A;    EXAM UNDER  ANESTHESIA, RECTAL BIOPSY N/A 10/04/2022    Procedure: EXAM UNDER ANESTHESIA, TRANSANAL BIOPSY WITH TRUCUT NEEDLE (LITHOTOMY-CANDY CANE);  Surgeon: Hiram Viveros MD;  Location: [facility] OR;  Service: General;  Laterality: N/A;    EXAM UNDER ANESTHESIA, RECTAL BIOPSY N/A 05/30/2023    Procedure: EXAM UNDER ANESTHESIA, TRANSANAL BIOPSY OF RECTAL MASS WITH TRUCUT NEEDLE, PROCTOSCOPY;  Surgeon: Hiram Viveros MD;  Location: [facility] OR;  Service: General;  Laterality: N/A;    PERIPHERALLY INSERTED CENTRAL CATHETER INSERTION      Removed 11/28/2022    VENOUS ACCESS DEVICE (PORT) INSERTION Right 11/28/2022       Family History: family history includes Diabetes in his father and mother; Heart disease in his father.     Social History:  reports that he has never smoked. He has never used smokeless tobacco. He reports that he does not currently use alcohol. He reports that he does not use drugs.  Social History     Social History Narrative    Not on file       Medications:  Available home medication information reviewed.  First Mouthwash (Magic Mouthwash), allopurinol, bacitracin, dexAMETHasone, fluticasone, lidocaine-prilocaine, loratadine, ondansetron, pantoprazole, prochlorperazine, and rosuvastatin    No Known Allergies    Objective   Objective     Vital Signs:   Temp:  [96.1 °F (35.6 °C)-98 °F (36.7 °C)] 98 °F (36.7 °C)  Heart Rate:  [60-74] 60  Resp:  [16-18] 16  BP: (118-133)/(60-75) 120/74       Physical Exam   Constitutional: Awake, alert sitting up in bed, family present   Eyes: PER  HENT: NCAT, mucous membranes moist  Neck: Supple, , trachea midline  Respiratory: Clear to auscultation bilaterally, nonlabored respirations   Cardiovascular: RRR, no murmurs,   Gastrointestinal: Positive bowel sounds, soft, ostomy   Musculoskeletal: No bilateral ankle edema, no clubbing or cyanosis to extremities  Psychiatric: Appropriate affect, cooperative  Neurologic: Oriented x 3, strength symmetric in all extremities,  Cranial Nerves grossly intact to confrontation, speech clear  Skin: No rashes      Result Review:  I have personally reviewed the results from the time of this admission to 3/26/2024 13:30 EDT and agree with these findings:  [x]  Laboratory list / accordion  []  Microbiology  []  Radiology  []  EKG/Telemetry   []  Cardiology/Vascular   []  Pathology  [x]  Old records  []  Other:  Most notable findings include: Cr 0.5.  .  Glucose 129       LAB RESULTS:      Lab 03/26/24  1029 03/26/24  0734 03/20/24  0915   WBC 11.63* 9.14 10.89*   HEMOGLOBIN 9.2* 9.6* 9.1*   HEMATOCRIT 27.7* 28.3* 26.7*   PLATELETS 181 201 58*   NEUTROS ABS 10.12* 7.35* 8.28*   IMMATURE GRANS (ABS) 0.29* 0.39*  --    LYMPHS ABS 0.48* 0.63* 1.09*   MONOS ABS 0.63 0.67 0.44   EOS ABS 0.05 0.05 0.00   MCV 98.9* 95.9 96    236* 293*         Lab 03/26/24  1029 03/26/24  0734   SODIUM 135* 134*   POTASSIUM 4.0 4.3   CHLORIDE 99 97*   CO2 28.0 29.0   ANION GAP 8.0 8.0   BUN 18 15   CREATININE 0.52* 0.58*   EGFR 107.1 103.6   GLUCOSE 129* 86   CALCIUM 9.0 9.0   MAGNESIUM 1.8 1.9   PHOSPHORUS 3.8 3.9         Lab 03/26/24  1029 03/26/24  0734   TOTAL PROTEIN 6.0 6.6   ALBUMIN 4.0 4.2   GLOBULIN 2.0 2.4   ALT (SGPT) 9 9   AST (SGOT) 13 14   BILIRUBIN 0.2 0.2   ALK PHOS 110 118*                     UA          3/7/2024    04:22 3/7/2024    13:02 3/11/2024    14:27 3/26/2024    07:38   Urinalysis   Squamous Epithelial Cells, UA None Seen  None Seen  0-2  None Seen    Specific [place], UA 1.011  1.021  1.020  1.017    Ketones, UA 40 mg/dL (2+)  80 mg/dL (3+)  Negative  Negative    Blood, UA Negative  Negative  Trace  Small (1+)    Leukocytes, UA Negative  Negative  Negative  Negative    Nitrite, UA Negative  Negative  Negative  Negative    RBC, UA 0-2  3-5  6-10  21-50    WBC, UA 0-2  0-2  0-2  0-2    Bacteria, UA None Seen  None Seen  None Seen  None Seen        Microbiology Results (last 10 days)       ** No results found for the last 240 hours.  **            No radiology results from the last 24 hrs    Results for orders placed during the hospital encounter of 06/15/23    Adult Transthoracic Echo Complete W/ Cont if Necessary Per Protocol    Interpretation Summary    Left ventricular systolic function is normal. Estimated left ventricular EF = 60%    Normal global longitudinal strain pattern (-19.9%)    The cardiac valves are anatomically and functionally normal.    Estimated right ventricular systolic pressure from tricuspid regurgitation is normal (<35 mmHg).      Assessment & Plan   Assessment & Plan       Hodgkin lymphoma    74 yr old with Hodgkin lymphoma, admitted for RICE chemotherapy cycle 3.     Hodgkin lymphoma diagnosed 2022, recurrence 2/2024  - followed by  Dr Kaycee Leary  - recurred in right middle lobe  - RICE chemotherapy  -  anticipate DC on 3/28     Hematuria, monitor  - likely from radiation cystitis     Ostomy since 2022  - due to rectal lymphoma    HTN      DVT prophylaxis:  Medical DVT prophylaxis orders are present.          CODE STATUS:    Code Status and Medical Interventions:   Ordered at: 03/26/24 0926     Level Of Support Discussed With:    Patient     Code Status (Patient has no pulse and is not breathing):    CPR (Attempt to Resuscitate)     Medical Interventions (Patient has pulse or is breathing):    Full Support       Expected Discharge   Expected Discharge Date: 3/28/2024; Expected Discharge Time:      Helene Sherman MD  03/26/24

## 2024-03-26 NOTE — PROGRESS NOTES
Hematology and Oncology [place]  Office number [phone]    Fax number [phone]     Follow up     Date: 24    Patient Name: Abraham Wu  MRN: [id number]  : 1950    Chief Complaint: Hodgkin Lymphoma follow up/treatment    Surgeon: Dr. Hiram Viveros MD    Cancer Staging: IV    History of Present Illness: Abraham Wu is a pleasant 74 y.o. male with PMH of hypertension, sleep apnea, hard of hearing who presents today for follow up of Hodgkin Lymphoma.     He initially presented 2022 with intractable diarrhea, low appetite, and a greater than 50 pound weight loss over several month period associated with intermittent fevers.  CT of the chest abdomen pelvis obtained in the ER shows of rectal mass spanning greater than 12 cm with adjacent adenopathy, bulky RP adenopathy, and findings concerning for left renal and liver metastases.  Small right pleural effusion with nodularity.  There was concern for impending obstruction, so he underwent diverting colostomy and biopsy on 2022.  Biopsies from the peritoneam showed a fibrotic nodule histiocytes and eosinophils admixed with CD30 positive large cells.  Rectal biopsies showed involvement by CD30 positive lymphoma, classic Hodgkin lymphoma versus CD30 positive T-cell or B-cell lymphoma.  There was extensive fibrosis and crush artifact.  He underwent repeat transrectal biopsy 10/4/2022 for further characterization which was felt to be most consistent with classical Hodgkin lymphoma.    He initiated chemotherapy with Brentuximab-AVD as an inpatient on 10/8/2022.  Cycle #1 was complicated by GI bleed requiring multiple blood transfusion and ultimately coil artery embolization 10/12; neutropenic fever, Candida esophagitis and mucositis.  He had a prolonged ICU stay  And required enteral feeding.  He was discharged 10/20/2022.    Following his last cycle he was admitted with multifocal PNA and +corona virus  infection.    He underwent brochoscopy 2/1/24 showing heaped up, irregular, friable mucosa obstructing the right middle lobe otherwise no discrete endobronchial lesions. Pathology showed persistent/recurrent CD30 positive lymphoma; see comment. These continue to have CD30 and RACHELE positivity, consistent with involvement by the same process. However, the lymphoma cells are now more numerous and have an intact B-cell expression profile, with strong diffuse CD20 expression, strong PAX5 expression, and expression of CD45 and CD79a. This is strongly favored to represent continued involvement with the same process with the development of a slightly different immunophenotype after pembrolizumab therapy as opposed to a secondary lymphoma. The strong expression of CD20 may be a therapeutic target. CD30 expression remains intact as well.     Treatment history:  Brentuximab-AVD: C1 10/8/22  C2: 11/1/22 onward with DA 20% in BVD; full dose brentuximab  Completed C6 3/22/23    2. Palliative radiation to rectum 5/30/2023 (abbreviated due to systemic progression)  3. GVD+Pembro x 4 cycles: completed 8/30/2023  4. Consolidative radiation to rectum completed 9/27/23    5. Pembrolizumab: current  6. Radiation to spine 1/16, 1/18 and 1/22/24    7. Rituximab-ICE: C1 2/13/2024; C2 3/5/24; C3 3/26/24    Interval history:    He is here for follow up and consideration of cycle #3 of RICE. He had transient right sided back pain 1 week after Neulasta, which has resolved. Last week had mild blood tingle sputum when he blew his nose. He noted one time when he saw blood on toilet paper in bowl, but thinks he had thrown this in after blowing his nose and remainder of urine was not bloody. No hematuria since. Good ostomy output. No nausea or abdominal pain. No neuropathy in his hands. Has occasional cramping in his feet. No unsteadiness, falls or incoordination.     Past Medical History:   Past Medical History:   Diagnosis Date    Arthritis      benign polypoid tissue right lung     Cancer     HODGKINS LYMPHOMA, RECTAL CANCER    Diabetes mellitus     patient reports that he doesn't have diabetes secondary to changing diet and weight loss.    History of radiation therapy 12/31/2023    re-treat rectum    History of transfusion     no reaction, transfusions received at [facility] in 2022    Hyperlipidemia     Hypertension     Lymphoma     Mycobacterium mucogenicum     SHERRI (obstructive sleep apnea)     O2 2L/MIN AT NIGHT ONLY    Pneumonia     2023    Seasonal allergies    Polyp in airway removed 2020 [facility] Dr. Ketan Monet.    Past Surgical History:   Past Surgical History:   Procedure Laterality Date    BRONCHOSCOPY      removal of obstructing polypoid tissue right middle lung    BRONCHOSCOPY N/A 2/1/2024    Procedure: BRONCHOSCOPY WITH ENDOBRONCHIAL ULTRASOUND AND FLUORO;  Surgeon: Chris Pruitt MD;  Location:  [facility];  Service: Pulmonary;  Laterality: N/A;  EBUS BALLOON INTACT    COLONOSCOPY      COLOSTOMY N/A 09/22/2022    Procedure: LAPAROSCOPIC COLOSTOMY CREATION, FLEXIBLE SIGMOIDOSCOPY;  Surgeon: Hiram Viveros MD;  Location:  [facility];  Service: General;  Laterality: N/A;    DENTAL PROCEDURE      dental implants    ENDOSCOPY N/A 10/10/2022    Procedure: ESOPHAGOGASTRODUODENOSCOPY;  Surgeon: Neeraj Lynn MD;  Location:  [facility];  Service: Gastroenterology;  Laterality: N/A;    ENDOSCOPY N/A 10/12/2022    Procedure: ESOPHAGOGASTRODUODENOSCOPY;  Surgeon: Brunner, Mark I, MD;  Location:  [facility];  Service: Gastroenterology;  Laterality: N/A;    EXAM UNDER ANESTHESIA, RECTAL BIOPSY N/A 10/04/2022    Procedure: EXAM UNDER ANESTHESIA, TRANSANAL BIOPSY WITH TRUCUT NEEDLE (LITHOTOMY-CANDY CANE);  Surgeon: Hiram Viveros MD;  Location:  [facility];  Service: General;  Laterality: N/A;    EXAM UNDER ANESTHESIA, RECTAL BIOPSY N/A 05/30/2023    Procedure: EXAM UNDER ANESTHESIA, TRANSANAL BIOPSY OF RECTAL MASS WITH  TRUCUT NEEDLE, PROCTOSCOPY;  Surgeon: Hiram Viveros MD;  Location: [facility];  Service: General;  Laterality: N/A;    PERIPHERALLY INSERTED CENTRAL CATHETER INSERTION      Removed 11/28/2022    VENOUS ACCESS DEVICE (PORT) INSERTION Right 11/28/2022     Family History:   Family History   Problem Relation Age of Onset    Diabetes Mother     Diabetes Father     Heart disease Father      Social History:   Social History     Socioeconomic History    Marital status:    Tobacco Use    Smoking status: Never    Smokeless tobacco: Never   Vaping Use    Vaping status: Never Used   Substance and Sexual Activity    Alcohol use: Not Currently     Comment: previously drank 2 glasses of wine/beer nightly    Drug use: Never    Sexual activity: Defer       Medications:     Current Outpatient Medications:     allopurinol (ZYLOPRIM) 300 MG tablet, Take 1 tablet by mouth Daily., Disp: 30 tablet, Rfl: 0    bacitracin 500 UNIT/GM ointment, Apply 1 Application topically to the appropriate area as directed 2 (Two) Times a Day., Disp: 14 g, Rfl: 1    dexAMETHasone (DECADRON) 4 MG tablet, Take 2 tablets by mouth Daily With Breakfast. Take 2 tablets by mouth with breakfast on days 4 and 5 of each chemotherapy cycle., Disp: 4 tablet, Rfl: 3    DPH-Lido-AlHydr-MgHydr-Simeth (First Mouthwash, Magic Mouthwash,) suspension, Swish and swallow 5 mL Every 6 (Six) Hours., Disp: 240 mL, Rfl: 3    fluticasone (FLONASE) 50 MCG/ACT nasal spray, 1 spray into the nostril(s) as directed by provider Daily As Needed for Allergies or Rhinitis. OTC, Disp: , Rfl:     lidocaine-prilocaine (EMLA) 2.5-2.5 % cream, Apply 1 application topically to the appropriate area as directed As Needed (45-60 minutes prior to port access.  Cover with saran/plastic wrap.)., Disp: 30 g, Rfl: 3    loratadine (CLARITIN) 10 MG tablet, TAKE ONE TABLET BY MOUTH DAILY, Disp: 30 tablet, Rfl: 5    ondansetron (Zofran) 8 MG tablet, Take 1 tablet by mouth Every 8 (Eight) Hours  "As Needed for Nausea or Vomiting., Disp: 30 tablet, Rfl: 5    pantoprazole (PROTONIX) 40 MG EC tablet, TAKE ONE TABLET BY MOUTH DAILY, Disp: 90 tablet, Rfl: 1    prochlorperazine (COMPAZINE) 5 MG tablet, Take 1 tablet by mouth Every 6 (Six) Hours As Needed for Nausea or Vomiting., Disp: 30 tablet, Rfl: 2    rosuvastatin (CRESTOR) 10 MG tablet, Take 1 tablet by mouth Daily., Disp: , Rfl:   No current facility-administered medications for this visit.    Facility-Administered Medications Ordered in Other Visits:     sodium chloride 0.9 % infusion, 125 mL/hr, Intravenous, Continuous, Kaycee Leary MD    Allergies:   No Known Allergies    Objective     Vital Signs:   Vitals:    03/26/24 0757   BP: 130/62   Pulse: 74   Resp: 18   Temp: 97.7 °F (36.5 °C)   TempSrc: Temporal   SpO2: 98%   Weight: 79.4 kg (175 lb)   Height: 162.6 cm (64.02\")   PainSc: 0-No pain      Body mass index is 30.02 kg/m².   Pain Score    03/26/24 0757   PainSc: 0-No pain     ECOG Performance Status: 1    Physical Exam:   General: Well appearing male   HEENT: Normocephalic, atraumatic. Sclera anicteric. OP clear  Neck: supple, no palpable LAD. No axillary adenopathy  Cardiovascular: regular rate and rhythm. No murmurs.   Respiratory: Normal rate. Clear to auscultation bilaterally  Abdomen: Soft, nontender, non distended with normoactive bowel sounds. Ostomy LLQ   Lymph: no supraclavicular or axillary adenopathy  Neuro: Alert and oriented x 3. Normal BLE strength. Normal FTN  Ext: Symmetric, no swelling.   Skin: right port site  C/D/I    Laboratory/Imaging Reviewed:     Hospital Outpatient Visit on 03/26/2024   Component Date Value Ref Range Status    Color, UA 03/26/2024 Yellow  Yellow, Straw Final    Appearance, UA 03/26/2024 Clear  Clear Final    pH, UA 03/26/2024 7.5  5.0 - 8.0 Final    Specific Gravity, UA 03/26/2024 1.017  1.001 - 1.030 Final    Glucose, UA 03/26/2024 Negative  Negative Final    Ketones, UA 03/26/2024 Negative  Negative Final "    Bilirubin, UA 03/26/2024 Negative  Negative Final    Blood, UA 03/26/2024 Small (1+) (A)  Negative Final    Protein, UA 03/26/2024 Negative  Negative Final    Leuk Esterase, UA 03/26/2024 Negative  Negative Final    Nitrite, UA 03/26/2024 Negative  Negative Final    Urobilinogen, UA 03/26/2024 1.0 E.U./dL  0.2 - 1.0 E.U./dL Final    Glucose 03/26/2024 86  65 - 99 mg/dL Final    BUN 03/26/2024 15  8 - 23 mg/dL Final    Creatinine 03/26/2024 0.58 (L)  0.76 - 1.27 mg/dL Final    Sodium 03/26/2024 134 (L)  136 - 145 mmol/L Final    Potassium 03/26/2024 4.3  3.5 - 5.2 mmol/L Final    Chloride 03/26/2024 97 (L)  98 - 107 mmol/L Final    CO2 03/26/2024 29.0  22.0 - 29.0 mmol/L Final    Calcium 03/26/2024 9.0  8.6 - 10.5 mg/dL Final    Total Protein 03/26/2024 6.6  6.0 - 8.5 g/dL Final    Albumin 03/26/2024 4.2  3.5 - 5.2 g/dL Final    ALT (SGPT) 03/26/2024 9  1 - 41 U/L Final    AST (SGOT) 03/26/2024 14  1 - 40 U/L Final    Alkaline Phosphatase 03/26/2024 118 (H)  39 - 117 U/L Final    Total Bilirubin 03/26/2024 0.2  0.0 - 1.2 mg/dL Final    Globulin 03/26/2024 2.4  gm/dL Final    Calculated Result    A/G Ratio 03/26/2024 1.8  g/dL Final    BUN/Creatinine Ratio 03/26/2024 25.9 (H)  7.0 - 25.0 Final    Anion Gap 03/26/2024 8.0  5.0 - 15.0 mmol/L Final    eGFR 03/26/2024 103.6  >60.0 mL/min/1.73 Final    Magnesium 03/26/2024 1.9  1.6 - 2.4 mg/dL Final    LDH 03/26/2024 236 (H)  135 - 225 U/L Final    Phosphorus 03/26/2024 3.9  2.5 - 4.5 mg/dL Final    Uric Acid 03/26/2024 5.4  3.4 - 7.0 mg/dL Final    Falsely depressed results may occur on samples drawn from patients receiving N-Acetylcysteine (NAC) or Metamizole.    RBC, UA 03/26/2024 21-50 (A)  None Seen, 0-2 /HPF Final    WBC, UA 03/26/2024 0-2  None Seen, 0-2 /HPF Final    Bacteria, UA 03/26/2024 None Seen  None Seen, Trace /HPF Final    Squamous Epithelial Cells, UA 03/26/2024 None Seen  None Seen, 0-2 /HPF Final    Hyaline Casts, UA 03/26/2024 None Seen  0 - 6 /LPF  Final    Methodology 03/26/2024 Automated Microscopy   Final   Hospital Outpatient Visit on 03/18/2024   Component Date Value Ref Range Status    WBC 03/18/2024 12.03 (H)  3.40 - 10.80 10*3/mm3 Final    RBC 03/18/2024 2.78 (L)  4.14 - 5.80 10*6/mm3 Final    Hemoglobin 03/18/2024 9.2 (L)  13.0 - 17.7 g/dL Final    Hematocrit 03/18/2024 26.9 (L)  37.5 - 51.0 % Final    MCV 03/18/2024 96.8  79.0 - 97.0 fL Final    MCH 03/18/2024 33.1 (H)  26.6 - 33.0 pg Final    MCHC 03/18/2024 34.2  31.5 - 35.7 g/dL Final    RDW 03/18/2024 18.4 (H)  12.3 - 15.4 % Final    RDW-SD 03/18/2024 57.9 (H)  37.0 - 54.0 fl Final    MPV 03/18/2024 10.6  6.0 - 12.0 fL Final    Platelets 03/18/2024 66 (L)  140 - 450 10*3/mm3 Final    Neutrophil % 03/18/2024 68.0  42.7 - 76.0 % Final    Lymphocyte % 03/18/2024 5.7 (L)  19.6 - 45.3 % Final    Monocyte % 03/18/2024 16.0 (H)  5.0 - 12.0 % Final    Eosinophil % 03/18/2024 0.1 (L)  0.3 - 6.2 % Final    Basophil % 03/18/2024 0.1  0.0 - 1.5 % Final    Immature Grans % 03/18/2024 10.1 (H)  0.0 - 0.5 % Final    Neutrophils, Absolute 03/18/2024 8.18 (H)  1.70 - 7.00 10*3/mm3 Final    Lymphocytes, Absolute 03/18/2024 0.68 (L)  0.70 - 3.10 10*3/mm3 Final    Monocytes, Absolute 03/18/2024 1.93 (H)  0.10 - 0.90 10*3/mm3 Final    Eosinophils, Absolute 03/18/2024 0.01  0.00 - 0.40 10*3/mm3 Final    Basophils, Absolute 03/18/2024 0.01  0.00 - 0.20 10*3/mm3 Final    Immature Grans, Absolute 03/18/2024 1.22 (H)  0.00 - 0.05 10*3/mm3 Final   Hospital Outpatient Visit on 03/18/2024   Component Date Value Ref Range Status    Glucose 03/18/2024 94  70 - 130 mg/dL Final       NM PET/CT Skull Base to Mid Thigh    Result Date: 3/19/2024  Narrative: NM PET/CT SKULL BASE TO MID THIGH Date of Exam: 3/18/2024 8:45 AM EDT Indication: lymphoma restaging. Comparison: PET/CT 12/4/2023, CT chest abdomen pelvis 1/16/2024. Technique: 12.38 mCi of F-18 FDG was administered intravenously. PET imaging was obtained from skull base to  mid-thigh approximately 60 minutes after radiotracer injection. A low dose non contrast CT was obtained for attenuation correction and anatomic localization. Fused PET-CT and 3D MIP reconstructions were utilized for image interpretation.  Fasting blood glucose level: 94 mg/dl. Reference uptake values: Mediastinum: 2.2 SUVmax Liver: 3.0 SUVmax Normalization method: Body Weight Findings: Head and Neck: No suspicious metabolic activity. Physiologic activity is present within the aerodigestive structures, muscles of phonation, and brain in a symmetric fasion. Thorax: No suspicious metabolic activity. Resolution of the previously abnormal hypermetabolic activity in the right hilar region. Improved aeration in the right middle lobe after bronchoscopy. Scattered pulmonary nodules are not significantly changed from prior diagnostic chest CT, and otherwise are not well characterized on this low-dose nonbreath-hold CT, and below the resolution of PET. Abdomen and Pelvis: No suspicious metabolic activity. Rectal mass has no significant metabolic activity and appears unchanged in size compared to recent CT. Physiologic activity is present within the gastrointestinal and genitourinary tracts. Similar ill-defined soft tissue in the left retroperitoneum anterior to the kidney, which is no significant metabolic activity. Musculoskeletal and Extremities: New diffuse and relatively homogenous radiotracer uptake throughout the axial and appendicular skeleton, likely related to marrow stimulation. Findings obscure evaluation of underlying focal lesions.     Impression: Impression: No significant/pathologic hypermetabolic activity (Deauville score 1). Resolved hypermetabolism in the right hilar region. No hypermetabolism associated with the rectal mass. New radiotracer uptake throughout the axial and appendicular skeleton, likely related to marrow stimulation. This high level of background activity obscures evaluation of underlying  focal osseous lesions. Improved aeration of the right middle lobe after bronchoscopy, noted to be secondary to malignant involvement (per chart review). No hypermetabolism within this region. Scattered small pulmonary nodules are unchanged compared to prior exam and below the resolution of PET. Electronically Signed: Sonido Stokes MD  3/19/2024 9:59 AM EDT  Workstation ID: [id number]     Assessment / Plan      Assessment/Plan:     1.  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  Chemo monitoring  -He completed 6 cycles of BV-AVD 3/23/2023. Cycle 6 was complicated by coronavirus infection and multifocal pneumonia.  Posttherapy PET showed persistent bilateral interstitial changes and mild adenopathy. Repeat chest CT shows continued improvement in the pulmonary infiltrates consistent with a reactive infectious etiology.  - He had clear response to first line therapy with resolution of the hepatic lesions, marked improvement in the retroperitoneal adenopathy, and 50% reduction in the size of the rectal mass.  However he had persistent uptake in the rectal mass on posttreatment PET, Deuville 5. We elected to proceed with biopsy to confirm whether he had residual disease followed by consolidative radiotherapy to the rectal mass given initial bulky disease.  In the interim, while awaiting biopsy, he presented with rectal bleeding, obstructive uropathy symptoms and pain and radiation planning CT showed significant interval growth in the rectal mass as well as development of new retroperitoneal adenopathy superior to the rectal mass compared to the his posttreatment PET, confirming progressive disease. Consolidative radiation was converted to palliative course radiation given interval adenopathy development above the radiation field to allow for earlier introduction of second line chemotherapy.   -Repeat biopsy confirmed residual classic Hodgkin's lymphoma.  -Given  national shortage of carbo/cis, we proceeded with Keytruda, vinorelbine, Gemzar, and Doxil. He completed 4 cycles 8/2023. Post treatment PET showed complete resolution of FDG avidity.  He continued Keytruda maintenance  -Most recent PET from 12/2023 with resolution of FDG avidity in the rectal mass which has decreased in size.  There is a new area of FDG avidity and a lytic lesion in the L4 region as well as mild SUV uptake in a right hilar node.  Lytic lesion appears to have been there previously on my review of prior PETs but with increased activity. He completed stereotactic radiation therapy to this area as he was having pain.  There was also an indeterminate hilar LN. I recommended continuation of maintenance Keytruda and short interval follow-up CT of the chest to reassess this. CT in Jan compared to prior PET showed worsening consolidative RML from hilum to major fissure with possible broncholith and no specific mass. He underwent bronchoscopy with findings confirming recurrent CD30 positive Hodgkin's lymphoma, but he now has developed strong CD20 positivity.  Morphologically this is felt to represent the same underlying Hodgkin's process, but with immunophenotype switch.  I recommended third line systemic therapy.  He initiated  Rituximab/ICE  -He is tolerating chemotherapy well.  I have discussed his case with Dr. Pruitt at [facility] and plan is to continue with the plan for 3-4 cycles of RICE pending input from [facility] regarding candidacy for autologous stem cell transplant or potentially CAR-T cell therapy.  Given multiply relapsed disease, he is at high risk for progression.  Has another follow-up with the transplant team next month.   -CMP, LDH, and uric acid reviewed and adequate for treatment today.  UA is notable for microscopic hematuria, but this is stable from his last cycle and consistent with radiation cystitis.  He does have follow-up with urology pending.  We will monitor daily UA with microscopy and  consult urology if any gross hematuria.  CBC shows adequate count recovery. Check CBC/CMP/uric acid daily.  -Chemo orders and premeds signed.  -He will receive day 5 Neulasta.  -Weekly blood counts.    -I spoke with Dr. Pruitt after his office visit He is not a candidate for ASCT due to short duration of remission per [facility] transplant team. He is being considered for CAR-T cell trial and I have requested pathology to add on a CD 19 stain to help facilitate assessment for candidacy for these.    2.  H/o GI bleed  -PPI, continue daily.  Tolerating well.     3. Access  -Port c/d/I    4.  Right middle lobe obstruction  -Secondary to his malignancy.  He is on room air.  I have discussed with pulmonary and he would potentially be candidate for his stent if he has progressive obstructive symptoms.    -Imaging consistent with treatment response.    5.  Mucositis  -Continue valtrex prophylaxis    Follow Up:   Weekly blood counts  Neulasta on Monday  Gibran in 3 weeks with treatment labs for consideration of next cycle of chemotherapy.  I will continue to follow patient on a daily basis while admitted.      Kaycee Leary MD  Hematology and Oncology     I have spent a total of 40 min on the day of service including time before, during, and after the office visit  on reviewing test results/preparing to see patient, counseling patient, performing medically appropriate exam, placing orders, coordinating care, discussion with outside specialist and documenting clinical information in the electronic or other health record

## 2024-03-27 LAB
ALBUMIN SERPL-MCNC: 3.9 G/DL (ref 3.5–5.2)
ALBUMIN SERPL-MCNC: 4 G/DL (ref 3.5–5.2)
ALBUMIN/GLOB SERPL: 2 G/DL
ALBUMIN/GLOB SERPL: 2.3 G/DL
ALP SERPL-CCNC: 107 U/L (ref 39–117)
ALP SERPL-CCNC: 99 U/L (ref 39–117)
ALT SERPL W P-5'-P-CCNC: 7 U/L (ref 1–41)
ALT SERPL W P-5'-P-CCNC: 9 U/L (ref 1–41)
ANION GAP SERPL CALCULATED.3IONS-SCNC: 10 MMOL/L (ref 5–15)
ANION GAP SERPL CALCULATED.3IONS-SCNC: 9 MMOL/L (ref 5–15)
AST SERPL-CCNC: 11 U/L (ref 1–40)
AST SERPL-CCNC: 14 U/L (ref 1–40)
BACTERIA UR QL AUTO: ABNORMAL /HPF
BASOPHILS # BLD AUTO: 0.02 10*3/MM3 (ref 0–0.2)
BASOPHILS NFR BLD AUTO: 0.1 % (ref 0–1.5)
BILIRUB SERPL-MCNC: 0.2 MG/DL (ref 0–1.2)
BILIRUB SERPL-MCNC: 0.3 MG/DL (ref 0–1.2)
BILIRUB UR QL STRIP: NEGATIVE
BUN SERPL-MCNC: 12 MG/DL (ref 8–23)
BUN SERPL-MCNC: 13 MG/DL (ref 8–23)
BUN/CREAT SERPL: 26.5 (ref 7–25)
BUN/CREAT SERPL: 26.7 (ref 7–25)
CALCIUM SPEC-SCNC: 8.4 MG/DL (ref 8.6–10.5)
CALCIUM SPEC-SCNC: 9 MG/DL (ref 8.6–10.5)
CHLORIDE SERPL-SCNC: 102 MMOL/L (ref 98–107)
CHLORIDE SERPL-SCNC: 103 MMOL/L (ref 98–107)
CLARITY UR: CLEAR
CO2 SERPL-SCNC: 24 MMOL/L (ref 22–29)
CO2 SERPL-SCNC: 25 MMOL/L (ref 22–29)
COLOR UR: YELLOW
CREAT SERPL-MCNC: 0.45 MG/DL (ref 0.76–1.27)
CREAT SERPL-MCNC: 0.49 MG/DL (ref 0.76–1.27)
DEPRECATED RDW RBC AUTO: 63.8 FL (ref 37–54)
DEPRECATED RDW RBC AUTO: 65.5 FL (ref 37–54)
EGFRCR SERPLBLD CKD-EPI 2021: 109 ML/MIN/1.73
EGFRCR SERPLBLD CKD-EPI 2021: 111.9 ML/MIN/1.73
EOSINOPHIL # BLD AUTO: 0 10*3/MM3 (ref 0–0.4)
EOSINOPHIL NFR BLD AUTO: 0 % (ref 0.3–6.2)
ERYTHROCYTE [DISTWIDTH] IN BLOOD BY AUTOMATED COUNT: 18.8 % (ref 12.3–15.4)
ERYTHROCYTE [DISTWIDTH] IN BLOOD BY AUTOMATED COUNT: 18.8 % (ref 12.3–15.4)
GLOBULIN UR ELPH-MCNC: 1.7 GM/DL
GLOBULIN UR ELPH-MCNC: 2 GM/DL
GLUCOSE SERPL-MCNC: 129 MG/DL (ref 65–99)
GLUCOSE SERPL-MCNC: 140 MG/DL (ref 65–99)
GLUCOSE UR STRIP-MCNC: NEGATIVE MG/DL
HCT VFR BLD AUTO: 26.1 % (ref 37.5–51)
HCT VFR BLD AUTO: 26.1 % (ref 37.5–51)
HGB BLD-MCNC: 8.5 G/DL (ref 13–17.7)
HGB BLD-MCNC: 8.7 G/DL (ref 13–17.7)
HGB UR QL STRIP.AUTO: NEGATIVE
HYALINE CASTS UR QL AUTO: ABNORMAL /LPF
IMM GRANULOCYTES # BLD AUTO: 0.23 10*3/MM3 (ref 0–0.05)
IMM GRANULOCYTES NFR BLD AUTO: 1.5 % (ref 0–0.5)
KETONES UR QL STRIP: NEGATIVE
LDH SERPL-CCNC: 204 U/L (ref 135–225)
LEUKOCYTE ESTERASE UR QL STRIP.AUTO: NEGATIVE
LYMPHOCYTES # BLD AUTO: 0.56 10*3/MM3 (ref 0.7–3.1)
LYMPHOCYTES NFR BLD AUTO: 3.6 % (ref 19.6–45.3)
MAGNESIUM SERPL-MCNC: 1.7 MG/DL (ref 1.6–2.4)
MCH RBC QN AUTO: 32 PG (ref 26.6–33)
MCH RBC QN AUTO: 32.5 PG (ref 26.6–33)
MCHC RBC AUTO-ENTMCNC: 32.6 G/DL (ref 31.5–35.7)
MCHC RBC AUTO-ENTMCNC: 33.3 G/DL (ref 31.5–35.7)
MCV RBC AUTO: 97.4 FL (ref 79–97)
MCV RBC AUTO: 98.1 FL (ref 79–97)
MONOCYTES # BLD AUTO: 0.48 10*3/MM3 (ref 0.1–0.9)
MONOCYTES NFR BLD AUTO: 3.1 % (ref 5–12)
NEUTROPHILS NFR BLD AUTO: 14.29 10*3/MM3 (ref 1.7–7)
NEUTROPHILS NFR BLD AUTO: 91.7 % (ref 42.7–76)
NITRITE UR QL STRIP: NEGATIVE
NRBC BLD AUTO-RTO: 0 /100 WBC (ref 0–0.2)
PH UR STRIP.AUTO: 7 [PH] (ref 5–8)
PHOSPHATE SERPL-MCNC: 2.9 MG/DL (ref 2.5–4.5)
PLATELET # BLD AUTO: 189 10*3/MM3 (ref 140–450)
PLATELET # BLD AUTO: 216 10*3/MM3 (ref 140–450)
PMV BLD AUTO: 9.4 FL (ref 6–12)
PMV BLD AUTO: 9.7 FL (ref 6–12)
POTASSIUM SERPL-SCNC: 3.9 MMOL/L (ref 3.5–5.2)
POTASSIUM SERPL-SCNC: 4.3 MMOL/L (ref 3.5–5.2)
PROT SERPL-MCNC: 5.6 G/DL (ref 6–8.5)
PROT SERPL-MCNC: 6 G/DL (ref 6–8.5)
PROT UR QL STRIP: NEGATIVE
RBC # BLD AUTO: 2.66 10*6/MM3 (ref 4.14–5.8)
RBC # BLD AUTO: 2.68 10*6/MM3 (ref 4.14–5.8)
RBC # UR STRIP: ABNORMAL /HPF
REF LAB TEST METHOD: ABNORMAL
SODIUM SERPL-SCNC: 135 MMOL/L (ref 136–145)
SODIUM SERPL-SCNC: 138 MMOL/L (ref 136–145)
SP GR UR STRIP: 1.01 (ref 1–1.03)
SQUAMOUS #/AREA URNS HPF: ABNORMAL /HPF
URATE SERPL-MCNC: 4.2 MG/DL (ref 3.4–7)
URATE SERPL-MCNC: 4.3 MG/DL (ref 3.4–7)
UROBILINOGEN UR QL STRIP: NORMAL
WBC # UR STRIP: ABNORMAL /HPF
WBC NRBC COR # BLD AUTO: 15.58 10*3/MM3 (ref 3.4–10.8)
WBC NRBC COR # BLD AUTO: 15.64 10*3/MM3 (ref 3.4–10.8)

## 2024-03-27 PROCEDURE — 25010000002 CARBOPLATIN PER 50 MG: Performed by: INTERNAL MEDICINE

## 2024-03-27 PROCEDURE — 25010000002 ENOXAPARIN PER 10 MG: Performed by: INTERNAL MEDICINE

## 2024-03-27 PROCEDURE — 25010000002 FOSAPREPITANT PER 1 MG: Performed by: INTERNAL MEDICINE

## 2024-03-27 PROCEDURE — 25810000003 SODIUM CHLORIDE 0.9 % SOLUTION 250 ML FLEX CONT: Performed by: INTERNAL MEDICINE

## 2024-03-27 PROCEDURE — 84550 ASSAY OF BLOOD/URIC ACID: CPT | Performed by: INTERNAL MEDICINE

## 2024-03-27 PROCEDURE — 25010000002 DEXAMETHASONE PER 1 MG: Performed by: INTERNAL MEDICINE

## 2024-03-27 PROCEDURE — 25810000003 SODIUM CHLORIDE 0.9 % SOLUTION: Performed by: INTERNAL MEDICINE

## 2024-03-27 PROCEDURE — 99222 1ST HOSP IP/OBS MODERATE 55: CPT | Performed by: INTERNAL MEDICINE

## 2024-03-27 PROCEDURE — 83735 ASSAY OF MAGNESIUM: CPT | Performed by: INTERNAL MEDICINE

## 2024-03-27 PROCEDURE — 25810000003 SODIUM CHLORIDE 0.9 % SOLUTION 500 ML FLEX CONT: Performed by: INTERNAL MEDICINE

## 2024-03-27 PROCEDURE — 25010000002 ETOPOSIDE 1 GM/50ML SOLUTION 50 ML VIAL: Performed by: INTERNAL MEDICINE

## 2024-03-27 PROCEDURE — 25010000002 MESNA PER 200 MG: Performed by: INTERNAL MEDICINE

## 2024-03-27 PROCEDURE — 84100 ASSAY OF PHOSPHORUS: CPT | Performed by: INTERNAL MEDICINE

## 2024-03-27 PROCEDURE — 25010000002 IFOSFAMIDE PER 1 G: Performed by: INTERNAL MEDICINE

## 2024-03-27 PROCEDURE — 83615 LACTATE (LD) (LDH) ENZYME: CPT | Performed by: INTERNAL MEDICINE

## 2024-03-27 PROCEDURE — 85025 COMPLETE CBC W/AUTO DIFF WBC: CPT | Performed by: INTERNAL MEDICINE

## 2024-03-27 PROCEDURE — 81001 URINALYSIS AUTO W/SCOPE: CPT | Performed by: INTERNAL MEDICINE

## 2024-03-27 PROCEDURE — 80053 COMPREHEN METABOLIC PANEL: CPT | Performed by: INTERNAL MEDICINE

## 2024-03-27 PROCEDURE — 85027 COMPLETE CBC AUTOMATED: CPT | Performed by: INTERNAL MEDICINE

## 2024-03-27 PROCEDURE — 99232 SBSQ HOSP IP/OBS MODERATE 35: CPT | Performed by: INTERNAL MEDICINE

## 2024-03-27 PROCEDURE — 25010000002 ONDANSETRON PER 1 MG: Performed by: INTERNAL MEDICINE

## 2024-03-27 RX ORDER — SODIUM CHLORIDE 9 MG/ML
125 INJECTION, SOLUTION INTRAVENOUS CONTINUOUS
Status: DISCONTINUED | OUTPATIENT
Start: 2024-03-27 | End: 2024-03-28 | Stop reason: HOSPADM

## 2024-03-27 RX ADMIN — SODIUM CHLORIDE 125 ML/HR: 9 INJECTION, SOLUTION INTRAVENOUS at 18:14

## 2024-03-27 RX ADMIN — FOSAPREPITANT 150 MG: 150 INJECTION, POWDER, LYOPHILIZED, FOR SOLUTION INTRAVENOUS at 13:12

## 2024-03-27 RX ADMIN — ONDANSETRON: 2 INJECTION INTRAMUSCULAR; INTRAVENOUS at 13:48

## 2024-03-27 RX ADMIN — ENOXAPARIN SODIUM 40 MG: 100 INJECTION SUBCUTANEOUS at 09:11

## 2024-03-27 RX ADMIN — CARBOPLATIN 750 MG: 10 INJECTION, SOLUTION INTRAVENOUS at 15:34

## 2024-03-27 RX ADMIN — SODIUM CHLORIDE 180 MG: 9 INJECTION, SOLUTION INTRAVENOUS at 14:15

## 2024-03-27 RX ADMIN — SODIUM CHLORIDE 125 ML/HR: 9 INJECTION, SOLUTION INTRAVENOUS at 02:12

## 2024-03-27 RX ADMIN — MESNA 9000 MG: 100 INJECTION, SOLUTION INTRAVENOUS at 16:12

## 2024-03-27 RX ADMIN — SODIUM CHLORIDE 125 ML/HR: 9 INJECTION, SOLUTION INTRAVENOUS at 09:54

## 2024-03-27 RX ADMIN — Medication 10 ML: at 20:47

## 2024-03-27 NOTE — CONSULTS
HEMATOLOGY/ONCOLOGY INPATIENT CONSULTATION      REFERRING PHYSICIAN: Helene Sherman MD    PRIMARY CARE PROVIDER: Jatinder Haynes MD    REASON FOR CONSULTATION: supervision of chemotherapy      HISTORY OF PRESENT ILLNESS: Abarham Wu is a 73-year-old gentleman who is well-known to me with a history of relapsed Hodgkin's lymphoma, CD20 and CD30 positive, who was admitted for cycle 3 of RICE chemotherapy.    He tolerated day 1 of treatment well. No hematuria, n/v/mucositis, weakness, soa diarrhea or incoordination      No Known Allergies    Past Medical History:   Diagnosis Date    Arthritis     benign polypoid tissue right lung     Cancer     HODGKINS LYMPHOMA, RECTAL CANCER    Diabetes mellitus     patient reports that he doesn't have diabetes secondary to changing diet and weight loss.    History of radiation therapy 12/31/2023    re-treat rectum    History of transfusion     no reaction, transfusions received at [facility] in 2022    Hyperlipidemia     Hypertension     Lymphoma     Mycobacterium mucogenicum     SHERRI (obstructive sleep apnea)     O2 2L/MIN AT NIGHT ONLY    Pneumonia     2023    Seasonal allergies          Current Facility-Administered Medications:     acetaminophen (TYLENOL) tablet 650 mg, 650 mg, Oral, Q4H PRN, Helene Sherman MD    sennosides-docusate (PERICOLACE) 8.6-50 MG per tablet 2 tablet, 2 tablet, Oral, BID PRN **AND** polyethylene glycol (MIRALAX) packet 17 g, 17 g, Oral, Daily PRN **AND** bisacodyl (DULCOLAX) EC tablet 5 mg, 5 mg, Oral, Daily PRN **AND** bisacodyl (DULCOLAX) suppository 10 mg, 10 mg, Rectal, Daily PRN, Helene Sherman MD    diphenhydrAMINE (BENADRYL) injection 50 mg, 50 mg, Intravenous, PRN, Kaycee Leary MD    Enoxaparin Sodium (LOVENOX) syringe 40 mg, 40 mg, Subcutaneous, Daily, Helene Sherman MD, 40 mg at 03/26/24 1123    famotidine (PEPCID) injection 20 mg, 20 mg, Intravenous, PRN, Kaycee Leary MD    Hydrocortisone Sod Suc (PF) (Solu-CORTEF) injection 100 mg, 100 mg,  Intravenous, PRN, Kaycee Leary MD    meperidine (DEMEROL) injection 25 mg, 25 mg, Intravenous, Q20 Min PRN, Kaycee Leary MD    ondansetron ODT (ZOFRAN-ODT) disintegrating tablet 4 mg, 4 mg, Oral, Q6H PRN **OR** ondansetron (ZOFRAN) injection 4 mg, 4 mg, Intravenous, Q6H PRN, Helene Sherman MD    sodium chloride 0.9 % flush 10 mL, 10 mL, Intravenous, Q12H, Helene Sherman MD, 10 mL at 03/26/24 2105    sodium chloride 0.9 % flush 10 mL, 10 mL, Intravenous, PRN, Helene Sherman MD    sodium chloride 0.9 % infusion 40 mL, 40 mL, Intravenous, PRN, Helene Sherman MD    sodium chloride 0.9 % infusion, 125 mL/hr, Intravenous, Continuous, Kaycee Leary MD, Stopped at 03/27/24 0213    Facility-Administered Medications Ordered in Other Encounters:     sodium chloride 0.9 % infusion, 125 mL/hr, Intravenous, Continuous, Kaycee Leary MD    Past Surgical History:   Procedure Laterality Date    BRONCHOSCOPY      removal of obstructing polypoid tissue right middle lung    BRONCHOSCOPY N/A 2/1/2024    Procedure: BRONCHOSCOPY WITH ENDOBRONCHIAL ULTRASOUND AND FLUORO;  Surgeon: Chris Pruitt MD;  [facility];  Service: Pulmonary;  Laterality: N/A;  EBUS BALLOON INTACT    COLONOSCOPY      COLOSTOMY N/A 09/22/2022    Procedure: LAPAROSCOPIC COLOSTOMY CREATION, FLEXIBLE SIGMOIDOSCOPY;  Surgeon: Hiram Viveros MD;  [facility];  Service: General;  Laterality: N/A;    DENTAL PROCEDURE      dental implants    ENDOSCOPY N/A 10/10/2022    Procedure: ESOPHAGOGASTRODUODENOSCOPY;  Surgeon: Neeraj Lynn MD;  Location:  [facility] ENDOSCOPY;  Service: Gastroenterology;  Laterality: N/A;    ENDOSCOPY N/A 10/12/2022    Procedure: ESOPHAGOGASTRODUODENOSCOPY;  Surgeon: Brunner, Mark I, MD;  Location:  [facility] ENDOSCOPY;  Service: Gastroenterology;  Laterality: N/A;    EXAM UNDER ANESTHESIA, RECTAL BIOPSY N/A 10/04/2022    Procedure: EXAM UNDER ANESTHESIA, TRANSANAL BIOPSY WITH TRUCUT NEEDLE  (LITHOTOMY-CANDY CANE);  Surgeon: Hiram Viveros MD;  Location:  [facility] OR;  Service: General;  Laterality: N/A;    EXAM UNDER ANESTHESIA, RECTAL BIOPSY N/A 05/30/2023    Procedure: EXAM UNDER ANESTHESIA, TRANSANAL BIOPSY OF RECTAL MASS WITH TRUCUT NEEDLE, PROCTOSCOPY;  Surgeon: Hiram Viveros MD;  Location:  [facility] OR;  Service: General;  Laterality: N/A;    PERIPHERALLY INSERTED CENTRAL CATHETER INSERTION      Removed 11/28/2022    VENOUS ACCESS DEVICE (PORT) INSERTION Right 11/28/2022       Social History     Socioeconomic History    Marital status:    Tobacco Use    Smoking status: Never    Smokeless tobacco: Never   Vaping Use    Vaping status: Never Used   Substance and Sexual Activity    Alcohol use: Not Currently     Comment: previously drank 2 glasses of wine/beer nightly    Drug use: Never    Sexual activity: Defer       Family History   Problem Relation Age of Onset    Diabetes Mother     Diabetes Father     Heart disease Father        Oncology/Hematology History   Hodgkin lymphoma   10/7/2022 Initial Diagnosis    Hodgkin lymphoma (HCC)     10/8/2022 - 3/22/2023 Chemotherapy    OP HODGKIN LYMPHOMA  BV+AVD (Brentuximab vedotin / Doxorubicin / Vinblastine / Dacarbazine)     10/28/2022 Cancer Staged    Staging form: Hodgkin And Non-Hodgkin Lymphoma, AJCC 8th Edition  - Clinical: Stage IV - Signed by Kaycee Leary MD on 10/28/2022     5/31/2023 - 6/1/2023 Radiation    Radiation OncologyTreatment Course:  Abraham Wu received 1400 cGy in 4 bid fractions to rectal mass via External Beam Radiation - EBRT.     6/13/2023 - 6/13/2023 Chemotherapy    OP HODGKIN LYMPHOMA BeGV (Bendamustine / Gemcitabine / VinORELBine)     6/16/2023 - 9/1/2023 Biopsy    OP HODGKIN LYMPHOMA Pembrolizumab + GVD (Gemcitabine / VinORELBine / Liposomal DOXOrubicin)  Plan Provider: Kaycee Leary MD  Treatment goal: Control  Line of treatment: Second Line     9/14/2023 - 1/18/2024 Chemotherapy    OP HL  Pembrolizumab 200 mg     9/18/2023 - 9/27/2023 Radiation    Radiation OncologyTreatment Course:  Abraham Wu received 2400 cGy in 8 fractions to rectum via External Beam Radiation - EBRT.     1/16/2024 - 1/22/2024 Radiation    Radiation OncologyTreatment Course:  Abraham Wu received 2100 cGy in 3 fractions to L4 spine lesion via Stereotactic Radiation Therapy - SRT.     2/8/2024 - 2/8/2024 Chemotherapy    IP LYMPHOMA DHAP-R Dexamethasone / CISplatin / Cytarabine / RiTUXimab     2/13/2024 -  Chemotherapy    IP LYMPHOMA RICE RiTUXimab / Ifosfamide + Mesna / CARBOplatin AUC = 5 / Etoposide     2/15/2024 - 2/15/2024 Biopsy    OP LYMPHOMA Gemcitabine / Dexamethasone / CARBOplatin AUC=5 / RiTUXimab  Plan Provider: Kaycee Leary MD  Treatment goal: Control  Line of treatment: [No plan line of treatment]         Objective     Vitals:    03/26/24 1646 03/26/24 1852 03/26/24 2303 03/27/24 0255   BP: 128/72 155/85 136/69 131/67   BP Location: Right arm Left arm Left arm Left arm   Patient Position: Lying Sitting Lying Lying   Pulse: 56 69 57 70   Resp: 16 18 18 18   Temp: 97.8 °F (36.6 °C) 97.7 °F (36.5 °C) 97 °F (36.1 °C) 97.7 °F (36.5 °C)   TempSrc: Oral Temporal Temporal Temporal   SpO2: 99% 98% 99% 99%                   Temp:  [96.1 °F (35.6 °C)-98 °F (36.7 °C)] 97.7 °F (36.5 °C)     Performance Status: 0    Physical Exam    General: well appearing male in no acute distress  HEENT: sclerae anicteric, oropharynx clear  Lymphatics: no cervical, supraclavicular, or axillary adenopathy  Cardiovascular: regular rate and rhythm, no murmurs  Lungs: clear to auscultation bilaterally  Abdomen: soft, nontender, nondistended.  Left lower quadrant ostomy  Extremities: no lower extremity edema  Skin: no rashes, lesions, bruising, or petechiae  Neuro: Alert and oriented x 3.  Strength is intact bilaterally.Normal FTN and FACUNDO      LABS:    Lab Results   Component Value Date    HGB 9.2 (L) 03/26/2024    HCT 27.7 (L)  03/26/2024    MCV 98.9 (H) 03/26/2024     03/26/2024    WBC 11.63 (H) 03/26/2024    NEUTROABS 10.12 (H) 03/26/2024    LYMPHSABS 0.48 (L) 03/26/2024    MONOSABS 0.63 03/26/2024    EOSABS 0.05 03/26/2024    BASOSABS 0.06 03/26/2024     Lab Results   Component Value Date    GLUCOSE 129 (H) 03/26/2024    BUN 18 03/26/2024    CREATININE 0.52 (L) 03/26/2024     (L) 03/26/2024    K 4.0 03/26/2024    CL 99 03/26/2024    CO2 28.0 03/26/2024    CALCIUM 9.0 03/26/2024    PROTEINTOT 6.0 03/26/2024    ALBUMIN 4.0 03/26/2024    BILITOT 0.2 03/26/2024    ALKPHOS 110 03/26/2024    AST 13 03/26/2024    ALT 9 03/26/2024     Assessment/Plan    1.   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 Chemo monitoring  -Today is day 2 of cycle 3 of RICE chemotherapy.  Tolerating therapy well today.    -Continue daily tumor lysis labs.  CBC/CMP/ Uric acid/ reviewed today andand stable.   -Patient is okay to proceed with day 2 of treatment pending UA/micro.  -Microscopic hematuria is noted.  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  Has not increased.  Will monitor for signs of gross hematuria.  Check a repeat UA with microscopic today.  Consult urology if progressive hematuria.  -Continue to monitor closely for side effects and toxicity of chemotherapy.  Continue daily CBC and electrolytes.  Transfuse for hemoglobin less than 7 or platelets less than 10 unless active bleeding with only leukoreduced irradiated blood products.  No transfusion needs today.  -I spoke with Dr. Pruitt yesterday and updated the patient today. He is not a candidate for ASCT due to short duration of remission per [facility] transplant team. He is being considered for CAR-T cell trial and I have requested pathology to add on a CD 19 stain to help facilitate assessment for candidacy for these.     3.  SHERRI  -Continues on nocturnal oxygen with stable requirement.      4.  Microscopic hematuria  -He has a history of rectal radiation and there were mild changes suggestive of radiation cystitis on his most recent imaging.  He is completely asymptomatic.  This predated ifosfamide.  I have discussed with his urologist.  Will plan to monitor symptoms and serial UA and consult urology if any worsening hematuria.  UA from today reviewed.  He has outpatient urology follow-up scheduled unless clinical symptoms develop while inpatient.  Chemotherapy orders signed.           Kaycee Leary MD    3/27/2024

## 2024-03-27 NOTE — PROGRESS NOTES
[facility] Medicine Services  PROGRESS NOTE    Patient Name: Abraham Wu  : 1950  MRN: [id number]    Date of Admission: 3/26/2024  Primary Care Physician: Jatinder Haynes MD    Subjective   Subjective     CC:  hodgkins lymphoma    HPI:  doing well on day 2 of chemotherapy.  Thinks it might wrap up late tomorrow afternoon and hopes to go home then.       Objective   Objective     Vital Signs:   Temp:  [97 °F (36.1 °C)-97.8 °F (36.6 °C)] 97.3 °F (36.3 °C)  Heart Rate:  [56-70] 59  Resp:  [16-18] 16  BP: (118-155)/(63-85) 127/63  Flow (L/min):  [2] 2     Physical Exam:  Constitutional: Awake, alert  Eyes: PER  HENT: NCAT, mucous membranes moist  Neck: Supple,  trachea midline  Respiratory: Clear to auscultation bilaterally, nonlabored respirations   Cardiovascular: RRR,  Gastrointestinal: soft.  Ostomy   Musculoskeletal: No bilateral ankle edema, no clubbing or cyanosis to extremities  Psychiatric: Appropriate affect, cooperative  Neurologic: Oriented x 3, strength symmetric in all extremities, Cranial Nerves grossly intact to confrontation, speech clear  Skin: No rashes      Results Reviewed:  LAB RESULTS:      Lab 24  1117 24  0538 24  10224  0734   WBC 15.58* 15.64* 11.63* 9.14   HEMOGLOBIN 8.7* 8.5* 9.2* 9.6*   HEMATOCRIT 26.1* 26.1* 27.7* 28.3*   PLATELETS 216 189 181 201   NEUTROS ABS 14.29*  --  10.12* 7.35*   IMMATURE GRANS (ABS) 0.23*  --  0.29* 0.39*   LYMPHS ABS 0.56*  --  0.48* 0.63*   MONOS ABS 0.48  --  0.63 0.67   EOS ABS 0.00  --  0.05 0.05   MCV 97.4* 98.1* 98.9* 95.9     --  209 236*         Lab 24  1117 24  0538 24  1029 03/26/24  0734   SODIUM 135* 138 135* 134*   POTASSIUM 3.9 4.3 4.0 4.3   CHLORIDE 102 103 99 97*   CO2 24.0 25.0 28.0 29.0   ANION GAP 9.0 10.0 8.0 8.0   BUN 13 12 18 15   CREATININE 0.49* 0.45* 0.52* 0.58*   EGFR 109.0 111.9 107.1 103.6   GLUCOSE 129* 140* 129* 86   CALCIUM 9.0 8.4* 9.0  9.0   MAGNESIUM 1.7  --  1.8 1.9   PHOSPHORUS 2.9  --  3.8 3.9         Lab 03/27/24  1117 03/27/24  0538 03/26/24  1029 03/26/24  0734   TOTAL PROTEIN 6.0 5.6* 6.0 6.6   ALBUMIN 4.0 3.9 4.0 4.2   GLOBULIN 2.0 1.7 2.0 2.4   ALT (SGPT) 9 7 9 9   AST (SGOT) 14 11 13 14   BILIRUBIN 0.3 0.2 0.2 0.2   ALK PHOS 107 99 110 118*                     Brief Urine Lab Results  (Last result in the past 365 days)        Color   Clarity   Blood   Leuk Est   Nitrite   Protein   CREAT   Urine HCG        03/27/24 0911 Yellow   Clear   Negative   Negative   Negative   Negative                   Microbiology Results Abnormal       None            No radiology results from the last 24 hrs    Results for orders placed during the hospital encounter of 06/15/23    Adult Transthoracic Echo Complete W/ Cont if Necessary Per Protocol    Interpretation Summary    Left ventricular systolic function is normal. Estimated left ventricular EF = 60%    Normal global longitudinal strain pattern (-19.9%)    The cardiac valves are anatomically and functionally normal.    Estimated right ventricular systolic pressure from tricuspid regurgitation is normal (<35 mmHg).      Current medications:  Scheduled Meds:CARBOplatin (PARAPLATIN) 750 mg in sodium chloride 0.9 % 325 mL chemo IVPB, 750 mg, Intravenous, Once  enoxaparin, 40 mg, Subcutaneous, Daily  etoposide (TOPOSAR) 180 mg in sodium chloride 0.9 % 509 mL chemo IVPB, 100 mg/m2 (Treatment Plan Recorded), Intravenous, Once  fosaprepitant (EMEND) IVPB, 150 mg, Intravenous, Once  ifosfamide (IFEX) 9,000 mg, mesna (MESNEX) 9,000 mg in sodium chloride 0.9 % 770 mL chemo IVPB, 9,000 mg, Intravenous, Once  ondansetron (ZOFRAN) 16 mg, dexAMETHasone (DECADRON) 12 mg in sodium chloride 0.9 % 50 mL IVPB, , Intravenous, Once  sodium chloride, 10 mL, Intravenous, Q12H      Continuous Infusions:sodium chloride, 125 mL/hr, Last Rate: 125 mL/hr (03/27/24 0954)  sodium chloride, 125 mL/hr, Last Rate: 125 mL/hr (03/27/24  1216)      PRN Meds:.  acetaminophen    senna-docusate sodium **AND** polyethylene glycol **AND** bisacodyl **AND** bisacodyl    diphenhydrAMINE    famotidine    Hydrocortisone Sod Suc (PF)    meperidine    ondansetron ODT **OR** ondansetron    sodium chloride    sodium chloride    Assessment & Plan   Assessment & Plan     Active Hospital Problems    Diagnosis  POA    **Hodgkin lymphoma [C81.90]  Yes      Resolved Hospital Problems   No resolved problems to display.        Brief Hospital Course to date:  Abraham Wu is a 74 y.o. male  with HTN, sleep apnea and [place]; also dianosed with Hodgkin lymphoma in 2022 (rectal mass), had dverting colostomy then chemotherapy and palliative XRT to rectum.  On 2/1/24 a bronchoscopy showed disease in the RM lobe and chemo was restarted.       He is admitted for cycle 3 RICE.     Hodgkin lymphoma, pulmonary relapse  - RICE chemotherapy    Microscopic hematuria  - attributed to XRT hisotry   Monitor     SHERRI - nocturnal oxygen     Expected Discharge Location and Transportation: home by car  Expected Discharge   Expected Discharge Date: 3/28/2024; Expected Discharge Time:      DVT prophylaxis:  Medical DVT prophylaxis orders are present.         AM-PAC 6 Clicks Score (PT): 24 (03/26/24 6208)    CODE STATUS:   Code Status and Medical Interventions:   Ordered at: 03/26/24 1416     Level Of Support Discussed With:    Patient     Code Status (Patient has no pulse and is not breathing):    CPR (Attempt to Resuscitate)     Medical Interventions (Patient has pulse or is breathing):    Full Support       Helene Sherman MD  03/27/24

## 2024-03-27 NOTE — PLAN OF CARE
Problem: Adult Inpatient Plan of Care  Goal: Plan of Care Review  Outcome: Ongoing, Progressing  Flowsheets (Taken 3/27/2024 0658)  Progress: improving  Plan of Care Reviewed With: patient  Outcome Evaluation: VSS. Adequate I&O. Denies pain. Rested well. Possbile chemo in am.   Goal Outcome Evaluation:  Plan of Care Reviewed With: patient        Progress: improving  Outcome Evaluation: VSS. Adequate I&O. Denies pain. Rested well. Possbile chemo in am.

## 2024-03-27 NOTE — PLAN OF CARE
Goal Outcome Evaluation:              Outcome Evaluation: VSS, tolerating chemo well, eating, UOP adequate, ambulating, will continue to monitor

## 2024-03-28 ENCOUNTER — TELEPHONE (OUTPATIENT)
Dept: ONCOLOGY | Facility: CLINIC | Age: 74
End: 2024-03-28
Payer: MEDICARE

## 2024-03-28 ENCOUNTER — READMISSION MANAGEMENT (OUTPATIENT)
Dept: CALL CENTER | Facility: HOSPITAL | Age: 74
End: 2024-03-28
Payer: MEDICARE

## 2024-03-28 VITALS
DIASTOLIC BLOOD PRESSURE: 60 MMHG | HEIGHT: 64 IN | OXYGEN SATURATION: 100 % | WEIGHT: 175 LBS | SYSTOLIC BLOOD PRESSURE: 124 MMHG | BODY MASS INDEX: 29.88 KG/M2 | HEART RATE: 58 BPM | RESPIRATION RATE: 16 BRPM | TEMPERATURE: 97.7 F

## 2024-03-28 LAB
ALBUMIN SERPL-MCNC: 3.8 G/DL (ref 3.5–5.2)
ALBUMIN/GLOB SERPL: 2.4 G/DL
ALP SERPL-CCNC: 85 U/L (ref 39–117)
ALT SERPL W P-5'-P-CCNC: 10 U/L (ref 1–41)
ANION GAP SERPL CALCULATED.3IONS-SCNC: 11 MMOL/L (ref 5–15)
AST SERPL-CCNC: 12 U/L (ref 1–40)
BACTERIA UR QL AUTO: ABNORMAL /HPF
BASOPHILS # BLD AUTO: 0.02 10*3/MM3 (ref 0–0.2)
BASOPHILS NFR BLD AUTO: 0.1 % (ref 0–1.5)
BILIRUB SERPL-MCNC: 0.2 MG/DL (ref 0–1.2)
BUN SERPL-MCNC: 13 MG/DL (ref 8–23)
BUN/CREAT SERPL: 24.1 (ref 7–25)
CALCIUM SPEC-SCNC: 8.5 MG/DL (ref 8.6–10.5)
CHLORIDE SERPL-SCNC: 106 MMOL/L (ref 98–107)
CO2 SERPL-SCNC: 25 MMOL/L (ref 22–29)
CREAT SERPL-MCNC: 0.54 MG/DL (ref 0.76–1.27)
DEPRECATED RDW RBC AUTO: 66.8 FL (ref 37–54)
EGFRCR SERPLBLD CKD-EPI 2021: 105.9 ML/MIN/1.73
EOSINOPHIL # BLD AUTO: 0 10*3/MM3 (ref 0–0.4)
EOSINOPHIL NFR BLD AUTO: 0 % (ref 0.3–6.2)
ERYTHROCYTE [DISTWIDTH] IN BLOOD BY AUTOMATED COUNT: 19.1 % (ref 12.3–15.4)
GLOBULIN UR ELPH-MCNC: 1.6 GM/DL
GLUCOSE SERPL-MCNC: 120 MG/DL (ref 65–99)
HCT VFR BLD AUTO: 24.7 % (ref 37.5–51)
HGB BLD-MCNC: 8 G/DL (ref 13–17.7)
HYALINE CASTS UR QL AUTO: ABNORMAL /LPF
IMM GRANULOCYTES # BLD AUTO: 0.26 10*3/MM3 (ref 0–0.05)
IMM GRANULOCYTES NFR BLD AUTO: 1.8 % (ref 0–0.5)
LDH SERPL-CCNC: 179 U/L (ref 135–225)
LYMPHOCYTES # BLD AUTO: 0.37 10*3/MM3 (ref 0.7–3.1)
LYMPHOCYTES NFR BLD AUTO: 2.6 % (ref 19.6–45.3)
MAGNESIUM SERPL-MCNC: 2.7 MG/DL (ref 1.6–2.4)
MCH RBC QN AUTO: 32.5 PG (ref 26.6–33)
MCHC RBC AUTO-ENTMCNC: 32.4 G/DL (ref 31.5–35.7)
MCV RBC AUTO: 100.4 FL (ref 79–97)
MONOCYTES # BLD AUTO: 0.28 10*3/MM3 (ref 0.1–0.9)
MONOCYTES NFR BLD AUTO: 1.9 % (ref 5–12)
NEUTROPHILS NFR BLD AUTO: 13.57 10*3/MM3 (ref 1.7–7)
NEUTROPHILS NFR BLD AUTO: 93.6 % (ref 42.7–76)
NRBC BLD AUTO-RTO: 0 /100 WBC (ref 0–0.2)
PHOSPHATE SERPL-MCNC: 3.8 MG/DL (ref 2.5–4.5)
PLATELET # BLD AUTO: 172 10*3/MM3 (ref 140–450)
PMV BLD AUTO: 9.5 FL (ref 6–12)
POTASSIUM SERPL-SCNC: 4.2 MMOL/L (ref 3.5–5.2)
PROT SERPL-MCNC: 5.4 G/DL (ref 6–8.5)
RBC # BLD AUTO: 2.46 10*6/MM3 (ref 4.14–5.8)
RBC # UR STRIP: ABNORMAL /HPF
REF LAB TEST METHOD: ABNORMAL
SODIUM SERPL-SCNC: 142 MMOL/L (ref 136–145)
SQUAMOUS #/AREA URNS HPF: ABNORMAL /HPF
URATE SERPL-MCNC: 4 MG/DL (ref 3.4–7)
WBC # UR STRIP: ABNORMAL /HPF
WBC NRBC COR # BLD AUTO: 14.5 10*3/MM3 (ref 3.4–10.8)

## 2024-03-28 PROCEDURE — 25810000003 SODIUM CHLORIDE 0.9 % SOLUTION: Performed by: INTERNAL MEDICINE

## 2024-03-28 PROCEDURE — 99232 SBSQ HOSP IP/OBS MODERATE 35: CPT | Performed by: INTERNAL MEDICINE

## 2024-03-28 PROCEDURE — 81015 MICROSCOPIC EXAM OF URINE: CPT | Performed by: INTERNAL MEDICINE

## 2024-03-28 PROCEDURE — 84100 ASSAY OF PHOSPHORUS: CPT | Performed by: INTERNAL MEDICINE

## 2024-03-28 PROCEDURE — 25010000002 ETOPOSIDE 1 GM/50ML SOLUTION 50 ML VIAL: Performed by: INTERNAL MEDICINE

## 2024-03-28 PROCEDURE — 99239 HOSP IP/OBS DSCHRG MGMT >30: CPT | Performed by: FAMILY MEDICINE

## 2024-03-28 PROCEDURE — 84550 ASSAY OF BLOOD/URIC ACID: CPT | Performed by: INTERNAL MEDICINE

## 2024-03-28 PROCEDURE — 25010000002 DEXAMETHASONE PER 1 MG: Performed by: INTERNAL MEDICINE

## 2024-03-28 PROCEDURE — 80053 COMPREHEN METABOLIC PANEL: CPT | Performed by: INTERNAL MEDICINE

## 2024-03-28 PROCEDURE — 83615 LACTATE (LD) (LDH) ENZYME: CPT | Performed by: INTERNAL MEDICINE

## 2024-03-28 PROCEDURE — 25010000002 ONDANSETRON PER 1 MG: Performed by: INTERNAL MEDICINE

## 2024-03-28 PROCEDURE — 25010000002 ENOXAPARIN PER 10 MG: Performed by: INTERNAL MEDICINE

## 2024-03-28 PROCEDURE — 83735 ASSAY OF MAGNESIUM: CPT | Performed by: INTERNAL MEDICINE

## 2024-03-28 PROCEDURE — 25810000003 SODIUM CHLORIDE 0.9 % SOLUTION 500 ML FLEX CONT: Performed by: INTERNAL MEDICINE

## 2024-03-28 PROCEDURE — 85025 COMPLETE CBC W/AUTO DIFF WBC: CPT | Performed by: INTERNAL MEDICINE

## 2024-03-28 RX ORDER — HEPARIN SODIUM (PORCINE) LOCK FLUSH IV SOLN 100 UNIT/ML 100 UNIT/ML
500 SOLUTION INTRAVENOUS ONCE
Status: DISCONTINUED | OUTPATIENT
Start: 2024-03-28 | End: 2024-03-28 | Stop reason: HOSPADM

## 2024-03-28 RX ORDER — LEVOFLOXACIN 500 MG/1
500 TABLET, FILM COATED ORAL DAILY
Qty: 7 TABLET | Refills: 0 | Status: SHIPPED | OUTPATIENT
Start: 2024-03-28 | End: 2024-04-04

## 2024-03-28 RX ORDER — DEXAMETHASONE 4 MG/1
8 TABLET ORAL
Qty: 4 TABLET | Refills: 3 | Status: SHIPPED | OUTPATIENT
Start: 2024-03-28

## 2024-03-28 RX ADMIN — SODIUM CHLORIDE 125 ML/HR: 9 INJECTION, SOLUTION INTRAVENOUS at 02:21

## 2024-03-28 RX ADMIN — ONDANSETRON: 2 INJECTION INTRAMUSCULAR; INTRAVENOUS at 18:10

## 2024-03-28 RX ADMIN — SODIUM CHLORIDE 180 MG: 9 INJECTION, SOLUTION INTRAVENOUS at 16:49

## 2024-03-28 RX ADMIN — SODIUM CHLORIDE 125 ML/HR: 9 INJECTION, SOLUTION INTRAVENOUS at 10:07

## 2024-03-28 RX ADMIN — ENOXAPARIN SODIUM 40 MG: 100 INJECTION SUBCUTANEOUS at 08:32

## 2024-03-28 NOTE — PLAN OF CARE
Problem: Adult Inpatient Plan of Care  Goal: Plan of Care Review  Outcome: Ongoing, Progressing  Flowsheets (Taken 3/28/2024 5371)  Progress: no change  Outcome Evaluation: VSS. Adequate I&O. Denies pain. Tolerating chemo w/o difficulty.  Rested well.    Goal Outcome Evaluation:  Plan of Care Reviewed With: patient        Progress: no change  Outcome Evaluation: VSS. Adequate I&O. Denies pain. Tolerating chemo w/o difficulty.

## 2024-03-28 NOTE — TELEPHONE ENCOUNTER
Notified Ms. Wu per Dr. Leary that patient should take day 4 and 5 steroid starting tomorrow for 2 days and start Levaquin for one week next Thursday, Day 10.  She verbalized understanding and stated he needs a refill on both.  Refill requests sent to MD.  Also, contacted bed management to request admission on 4-18-24 for cycle 4.

## 2024-03-28 NOTE — DISCHARGE SUMMARY
[facility] Medicine Services  DISCHARGE SUMMARY    Patient Name: Abraham Wu  : 1950  MRN: [id number]    Date of Admission: 3/26/2024  8:40 AM  Date of Discharge:  3/28/2024  Primary Care Physician: Jatinder Haynes MD    Consults       Date and Time Order Name Status Description    3/26/2024  9:18 AM Inpatient Hematology & Oncology Consult              Hospital Course       Active Hospital Problems    Diagnosis  POA    **Hodgkin lymphoma [C81.90]  Yes      Resolved Hospital Problems   No resolved problems to display.          Hospital Course:  Abraham Wu is a 74 y.o. male with HTN, sleep apnea and [place]; also dianosed with Hodgkin lymphoma in  (rectal mass), had diverting colostomy then chemotherapy and palliative XRT to rectum.  On 24 a bronchoscopy showed disease in the RM lobe and chemo was restarted.       He was admitted for cycle 3 RICE.      Hodgkin lymphoma, pulmonary relapse  - RICE chemotherapy  - OK to ID home after chemo today per Dr Leary.  Take his day 4 and 5 dexamethasone starting tomorrow.  He will initiate Levaquin on day 10 and continue for 1 week.   - Weekly blood draws  - Follow-up on  with admission for consideration of cycle #4 of RICE      Microscopic hematuria  - attributed to XRT hisotry   Monitor      SHERRI - nocturnal oxygen     Discharge Follow Up Recommendations for outpatient labs/diagnostics:  -PCP 1 week  -Dr Leary as scheduled     Day of Discharge     HPI:   Patient seen and examined. Up in chair, undergoing chemotherapy. No nausea. No pain. Appetite good.    Review of Systems  Gen- No fevers, chills  CV- No chest pain, palpitations  Resp- No cough, dyspnea  GI- No N/V/D, abd pain    Vital Signs:   Temp:  [97.3 °F (36.3 °C)-98.1 °F (36.7 °C)] 97.9 °F (36.6 °C)  Heart Rate:  [49-76] 49  Resp:  [16] 16  BP: (117-144)/(58-66) 144/65      Physical Exam:  Constitutional: No acute distress, awake, alert, non-toxic, pleasant,  up in chair   HENT: NCAT, mucous membranes moist  Respiratory: Clear to auscultation bilaterally, respiratory effort normal   Cardiovascular: RRR, no murmurs, rubs, or gallops  Gastrointestinal: Positive bowel sounds, soft, nontender, non-distended, +ostomy   Musculoskeletal: No bilateral ankle edema  Psychiatric: Appropriate affect, cooperative  Neurologic: Oriented x 3, TENA, speech clear  Skin: No rashes, Port R chest     Pertinent  and/or Most Recent Results     LAB RESULTS:      Lab 03/28/24  0647 03/28/24  0646 03/27/24  1117 03/27/24  0538 03/26/24  1029 03/26/24  0734   WBC 14.50*  --  15.58* 15.64* 11.63* 9.14   HEMOGLOBIN 8.0*  --  8.7* 8.5* 9.2* 9.6*   HEMATOCRIT 24.7*  --  26.1* 26.1* 27.7* 28.3*   PLATELETS 172  --  216 189 181 201   NEUTROS ABS 13.57*  --  14.29*  --  10.12* 7.35*   IMMATURE GRANS (ABS) 0.26*  --  0.23*  --  0.29* 0.39*   LYMPHS ABS 0.37*  --  0.56*  --  0.48* 0.63*   MONOS ABS 0.28  --  0.48  --  0.63 0.67   EOS ABS 0.00  --  0.00  --  0.05 0.05   .4*  --  97.4* 98.1* 98.9* 95.9   LDH  --  179 204  --  209 236*         Lab 03/28/24  0646 03/27/24  1117 03/27/24  0538 03/26/24  1029 03/26/24  0734   SODIUM 142 135* 138 135* 134*   POTASSIUM 4.2 3.9 4.3 4.0 4.3   CHLORIDE 106 102 103 99 97*   CO2 25.0 24.0 25.0 28.0 29.0   ANION GAP 11.0 9.0 10.0 8.0 8.0   BUN 13 13 12 18 15   CREATININE 0.54* 0.49* 0.45* 0.52* 0.58*   EGFR 105.9 109.0 111.9 107.1 103.6   GLUCOSE 120* 129* 140* 129* 86   CALCIUM 8.5* 9.0 8.4* 9.0 9.0   MAGNESIUM 2.7* 1.7  --  1.8 1.9   PHOSPHORUS 3.8 2.9  --  3.8 3.9         Lab 03/28/24  0646 03/27/24  1117 03/27/24  0538 03/26/24  1029 03/26/24  0734   TOTAL PROTEIN 5.4* 6.0 5.6* 6.0 6.6   ALBUMIN 3.8 4.0 3.9 4.0 4.2   GLOBULIN 1.6 2.0 1.7 2.0 2.4   ALT (SGPT) 10 9 7 9 9   AST (SGOT) 12 14 11 13 14   BILIRUBIN 0.2 0.3 0.2 0.2 0.2   ALK PHOS 85 107 99 110 118*                     Brief Urine Lab Results  (Last result in the past 365 days)        Color   Clarity    Blood   Leuk Est   Nitrite   Protein   CREAT   Urine HCG        03/27/24 0911 Yellow   Clear   Negative   Negative   Negative   Negative                 Microbiology Results (last 10 days)       ** No results found for the last 240 hours. **            NM PET/CT Skull Base to Mid Thigh    Result Date: 3/19/2024  NM PET/CT SKULL BASE TO MID THIGH Date of Exam: 3/18/2024 8:45 AM EDT Indication: lymphoma restaging. Comparison: PET/CT 12/4/2023, CT chest abdomen pelvis 1/16/2024. Technique: 12.38 mCi of F-18 FDG was administered intravenously. PET imaging was obtained from skull base to mid-thigh approximately 60 minutes after radiotracer injection. A low dose non contrast CT was obtained for attenuation correction and anatomic localization. Fused PET-CT and 3D MIP reconstructions were utilized for image interpretation.  Fasting blood glucose level: 94 mg/dl. Reference uptake values: Mediastinum: 2.2 SUVmax Liver: 3.0 SUVmax Normalization method: Body Weight Findings: Head and Neck: No suspicious metabolic activity. Physiologic activity is present within the aerodigestive structures, muscles of phonation, and brain in a symmetric fasion. Thorax: No suspicious metabolic activity. Resolution of the previously abnormal hypermetabolic activity in the right hilar region. Improved aeration in the right middle lobe after bronchoscopy. Scattered pulmonary nodules are not significantly changed from prior diagnostic chest CT, and otherwise are not well characterized on this low-dose nonbreath-hold CT, and below the resolution of PET. Abdomen and Pelvis: No suspicious metabolic activity. Rectal mass has no significant metabolic activity and appears unchanged in size compared to recent CT. Physiologic activity is present within the gastrointestinal and genitourinary tracts. Similar ill-defined soft tissue in the left retroperitoneum anterior to the kidney, which is no significant metabolic activity. Musculoskeletal and Extremities:  New diffuse and relatively homogenous radiotracer uptake throughout the axial and appendicular skeleton, likely related to marrow stimulation. Findings obscure evaluation of underlying focal lesions.     Impression: No significant/pathologic hypermetabolic activity (Deauville score 1). Resolved hypermetabolism in the right hilar region. No hypermetabolism associated with the rectal mass. New radiotracer uptake throughout the axial and appendicular skeleton, likely related to marrow stimulation. This high level of background activity obscures evaluation of underlying focal osseous lesions. Improved aeration of the right middle lobe after bronchoscopy, noted to be secondary to malignant involvement (per chart review). No hypermetabolism within this region. Scattered small pulmonary nodules are unchanged compared to prior exam and below the resolution of PET. Electronically Signed: Sonido Stokes MD  3/19/2024 9:59 AM EDT  Workstation ID: [id number]     Results for orders placed during the hospital encounter of 10/31/22    Duplex Venous Upper Extremity - Right CAR    Interpretation Summary    Normal right upper extremity venous duplex scan.    PICC line is noted in the right brachial vein.      Results for orders placed during the hospital encounter of 10/31/22    Duplex Venous Upper Extremity - Right CAR    Interpretation Summary    Normal right upper extremity venous duplex scan.    PICC line is noted in the right brachial vein.      Results for orders placed during the hospital encounter of 06/15/23    Adult Transthoracic Echo Complete W/ Cont if Necessary Per Protocol    Interpretation Summary    Left ventricular systolic function is normal. Estimated left ventricular EF = 60%    Normal global longitudinal strain pattern (-19.9%)    The cardiac valves are anatomically and functionally normal.    Estimated right ventricular systolic pressure from tricuspid regurgitation is normal (<35 mmHg).      Plan for Follow-up of  Pending Labs/Results: Inbox     Discharge Details        Discharge Medications        Continue These Medications        Instructions Start Date   bacitracin 500 UNIT/GM ointment   0.9 g, Topical, 2 Times Daily      dexAMETHasone 4 MG tablet  Commonly known as: DECADRON   8 mg, Oral, Daily With Breakfast, Take 2 tablets by mouth with breakfast on days 4 and 5 of each chemotherapy cycle.      First Mouthwash (Magic Mouthwash) suspension   5 mL, Swish & Swallow, Every 6 Hours      fluticasone 50 MCG/ACT nasal spray  Commonly known as: FLONASE   1 spray, Nasal, Daily PRN, OTC      lidocaine-prilocaine 2.5-2.5 % cream  Commonly known as: EMLA   1 application , Topical, As Needed      loratadine 10 MG tablet  Commonly known as: CLARITIN   10 mg, Oral, Daily      ondansetron 8 MG tablet  Commonly known as: Zofran   8 mg, Oral, Every 8 Hours PRN      pantoprazole 40 MG EC tablet  Commonly known as: PROTONIX   40 mg, Oral, Daily      prochlorperazine 5 MG tablet  Commonly known as: COMPAZINE   5 mg, Oral, Every 6 Hours PRN      rosuvastatin 10 MG tablet  Commonly known as: CRESTOR   10 mg, Oral, Daily             ASK your doctor about these medications        Instructions Start Date   allopurinol 300 MG tablet  Commonly known as: ZYLOPRIM   300 mg, Oral, Daily               No Known Allergies      Discharge Disposition:  Home or Self Care    Diet:  Hospital:  Diet Order   Procedures    Diet: Cardiac; Healthy Heart (2-3 Na+); Fluid Consistency: Thin (IDDSI 0)            Activity:      Restrictions or Other Recommendations:       CODE STATUS:    Code Status and Medical Interventions:   Ordered at: 03/26/24 0926     Level Of Support Discussed With:    Patient     Code Status (Patient has no pulse and is not breathing):    CPR (Attempt to Resuscitate)     Medical Interventions (Patient has pulse or is breathing):    Full Support       Future Appointments   Date Time Provider Department Center   4/1/2024  3:30 PM CHAIR Beverly CROCKETT  [facility]   4/11/2024  2:00 PM ACCESS AND LAB DRAW CHAIR 1  [facility]   4/18/2024  7:30 AM ACCESS AND LAB DRAW CHAIR 1  [facility]   4/18/2024  8:30 AM Kaycee Leary MD [facility]   5/28/2024  8:40 AM Bernadine Almodovar MD [facility]       Additional Instructions for the Follow-ups that You Need to Schedule       Discharge Follow-up with PCP   As directed       Currently Documented PCP:    Jatinder Haynes MD    PCP Phone Number:    [phone]     Follow Up Details: 1 week        Discharge Follow-up with Specified Provider: Dr Leary as scheduled   As directed      To: Dr Leary as scheduled                      Elsa Chery DO  03/28/24      Time Spent on Discharge:  I spent  48  minutes on this discharge activity which included: face-to-face encounter with the patient, reviewing the data in the system, coordination of the care with the nursing staff as well as consultants, documentation, and entering orders.

## 2024-03-28 NOTE — CASE MANAGEMENT/SOCIAL WORK
Continued Stay Note  [facility]     Patient Name: Abraham Wu  MRN: [id number]  Today's Date: 3/28/2024    Admit Date: 3/26/2024    Plan: Home with spouse   Discharge Plan       Row Name 03/28/24 1437       Plan    Final Discharge Disposition Code 01 - home or self-care                   Discharge Codes    No documentation.                 Expected Discharge Date and Time       Expected Discharge Date Expected Discharge Time    Mar 28, 2024               CARLOS A Preston

## 2024-03-28 NOTE — PROGRESS NOTES
HEMATOLOGY/ONCOLOGY PROGRESS NOTE    S: Doing great this morning.  He denies any hematuria, nausea, vomiting, mucositis, diarrhea.  No weakness or incoordination.  His chemotherapy will complete this evening.  He has a follow-up with the  transplant team next week.        Past medical history, social history and family history was reviewed and unchanged from prior visit.  Medications:  The current medication list was reviewed in the EMR    ALLERGIES:  No Known Allergies      Physical Exam    VITAL SIGNS:  /65 (BP Location: Left arm, Patient Position: Lying)   Pulse (!) 49   Temp 97.9 °F (36.6 °C)   Resp 16   SpO2 97%   Temp:  [97.3 °F (36.3 °C)-98.1 °F (36.7 °C)] 97.9 °F (36.6 °C)      Performance Status:                Physical Exam    General: well appearing, in no acute distress  HEENT: sclerae anicteric, oropharynx clear, neck is supple  Lymphatics: no cervical, supraclavicular, or axillary adenopathy  Cardiovascular: regular rate and rhythm, no murmurs, rubs or gallops  Lungs: clear to auscultation bilaterally  Abdomen: soft, nontender, nondistended.  No palpable organomegaly  Extremities: Trace edema  Skin: no rashes, lesions, bruising, or petechiae  Msk:  Shows no weakness of the large muscle groups  Neurologic: No incoordination.  Alert and oriented x 3.        RECENT LABS:    Lab Results   Component Value Date    HGB 8.7 (L) 03/27/2024    HCT 26.1 (L) 03/27/2024    MCV 97.4 (H) 03/27/2024     03/27/2024    WBC 15.58 (H) 03/27/2024    NEUTROABS 14.29 (H) 03/27/2024    LYMPHSABS 0.56 (L) 03/27/2024    MONOSABS 0.48 03/27/2024    EOSABS 0.00 03/27/2024    BASOSABS 0.02 03/27/2024       Lab Results   Component Value Date    GLUCOSE 120 (H) 03/28/2024    BUN 13 03/28/2024    CREATININE 0.54 (L) 03/28/2024     03/28/2024    K 4.2 03/28/2024     03/28/2024    CO2 25.0 03/28/2024    CALCIUM 8.5 (L) 03/28/2024    PROTEINTOT 5.4 (L) 03/28/2024    ALBUMIN 3.8 03/28/2024    BILITOT 0.2  03/28/2024    ALKPHOS 85 03/28/2024    AST 12 03/28/2024    ALT 10 03/28/2024         Assessment/Plan  1.   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 Chemo monitoring  -Today is day 2 of cycle 3 of RICE chemotherapy.  Tolerating therapy well today.    -Continue daily tumor lysis labs.  CBC/CMP/ Uric acid/ reviewed today andand stable.   -Patient is okay to proceed with day 2 of treatment pending UA/micro.  -Microscopic hematuria is noted.  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  Stable microscopic scopic hematuria on microscopy this morning.  CBC, CMP pending.  -Continue to monitor closely for side effects and toxicity of chemotherapy.   Transfuse for hemoglobin less than 7 or platelets less than 10 unless active bleeding with only leukoreduced irradiated blood products.   -I spoke with Dr. Pruitt. He is not a candidate for ASCT due to short duration of remission per [facility] transplant team. He is being considered for CAR-T cell trial and I have requested pathology to add on a CD 19 stain to help facilitate assessment for candidacy for these.  -He is okay from the oncology standpoint to be discharged today after he completes his chemotherapy.  I reminded him to take his day 4 and 5 dexamethasone starting tomorrow.  He will initiate Levaquin on day 10 and continue for 1 week.  -Weekly blood draws.  -Follow-up on 418 with admission for consideration of cycle #4 of RICE     3.  SHERRI  -Continues on nocturnal oxygen with stable requirement.     4.  Microscopic hematuria  -He has a history of rectal radiation and there were mild changes suggestive of radiation cystitis on his most recent imaging.  He is completely asymptomatic.  This predated ifosfamide.  I have discussed with his urologist.  Will plan to monitor symptoms and serial UA and consult urology if any worsening hematuria.  UA from today reviewed.  He has  outpatient urology follow-up scheduled unless clinical symptoms develop while inpatient.     Kaycee Leary MD  [facility] On[facility]    3/28/2024

## 2024-03-28 NOTE — PLAN OF CARE
Goal Outcome Evaluation:              Outcome Evaluation: VSS, UOP adequate, eating, ambulating, tolerating chemo ready for DC,

## 2024-03-29 LAB
CYTO UR: NORMAL
DX PRELIMINARY: NORMAL
LAB AP CASE REPORT: NORMAL
LAB AP CLINICAL INFORMATION: NORMAL
LAB AP DIAGNOSIS COMMENT: NORMAL
LAB AP OUTSIDE REPORT, ADDENDUM 2: NORMAL
LAB AP OUTSIDE REPORT, ADDENDUM: NORMAL
PATH REPORT.FINAL DX SPEC: NORMAL
PATH REPORT.GROSS SPEC: NORMAL

## 2024-03-29 NOTE — OUTREACH NOTE
Prep Survey      Flowsheet Row Responses   [facility] discharged from? [place]   Is LACE score < 7 ? No   Eligibility Readm Mgmt   Discharge diagnosis Hodgkin lymphoma, admit for chemo   Does the patient have one of the following disease processes/diagnoses(primary or secondary)? Other   Does the patient have Home health ordered? No   Is there a DME ordered? No   Prep survey completed? Yes            Sridevi TOLENTINO - Registered Nurse

## 2024-04-01 ENCOUNTER — HOSPITAL ENCOUNTER (OUTPATIENT)
Dept: ONCOLOGY | Facility: HOSPITAL | Age: 74
Discharge: HOME | End: 2024-04-01
Admitting: INTERNAL MEDICINE
Payer: MEDICARE

## 2024-04-01 VITALS
HEART RATE: 90 BPM | TEMPERATURE: 98.2 F | DIASTOLIC BLOOD PRESSURE: 59 MMHG | HEIGHT: 64 IN | WEIGHT: 175 LBS | RESPIRATION RATE: 18 BRPM | SYSTOLIC BLOOD PRESSURE: 105 MMHG | BODY MASS INDEX: 29.88 KG/M2

## 2024-04-01 DIAGNOSIS — C81.98 HODGKIN LYMPHOMA OF LYMPH NODES OF MULTIPLE REGIONS, UNSPECIFIED HODGKIN LYMPHOMA TYPE: Primary | ICD-10-CM

## 2024-04-01 DIAGNOSIS — Z45.2 ENCOUNTER FOR CARE RELATED TO VASCULAR ACCESS PORT: ICD-10-CM

## 2024-04-01 LAB
ANION GAP SERPL CALCULATED.3IONS-SCNC: 9 MMOL/L (ref 5–15)
BASOPHILS # BLD AUTO: 0.01 10*3/MM3 (ref 0–0.2)
BASOPHILS NFR BLD AUTO: 0.2 % (ref 0–1.5)
BUN SERPL-MCNC: 22 MG/DL (ref 8–23)
BUN/CREAT SERPL: 37.9 (ref 7–25)
CALCIUM SPEC-SCNC: 8.6 MG/DL (ref 8.6–10.5)
CHLORIDE SERPL-SCNC: 97 MMOL/L (ref 98–107)
CO2 SERPL-SCNC: 26 MMOL/L (ref 22–29)
CREAT SERPL-MCNC: 0.58 MG/DL (ref 0.76–1.27)
DEPRECATED RDW RBC AUTO: 66.5 FL (ref 37–54)
EGFRCR SERPLBLD CKD-EPI 2021: 103.6 ML/MIN/1.73
EOSINOPHIL # BLD AUTO: 0.03 10*3/MM3 (ref 0–0.4)
EOSINOPHIL NFR BLD AUTO: 0.6 % (ref 0.3–6.2)
ERYTHROCYTE [DISTWIDTH] IN BLOOD BY AUTOMATED COUNT: 18 % (ref 12.3–15.4)
GLUCOSE SERPL-MCNC: 114 MG/DL (ref 65–99)
HCT VFR BLD AUTO: 26 % (ref 37.5–51)
HGB BLD-MCNC: 8.7 G/DL (ref 13–17.7)
IMM GRANULOCYTES # BLD AUTO: 0.05 10*3/MM3 (ref 0–0.05)
IMM GRANULOCYTES NFR BLD AUTO: 1 % (ref 0–0.5)
LYMPHOCYTES # BLD AUTO: 0.28 10*3/MM3 (ref 0.7–3.1)
LYMPHOCYTES NFR BLD AUTO: 5.4 % (ref 19.6–45.3)
MAGNESIUM SERPL-MCNC: 2 MG/DL (ref 1.6–2.4)
MCH RBC QN AUTO: 33.6 PG (ref 26.6–33)
MCHC RBC AUTO-ENTMCNC: 33.5 G/DL (ref 31.5–35.7)
MCV RBC AUTO: 100.4 FL (ref 79–97)
MONOCYTES # BLD AUTO: 0.12 10*3/MM3 (ref 0.1–0.9)
MONOCYTES NFR BLD AUTO: 2.3 % (ref 5–12)
NEUTROPHILS NFR BLD AUTO: 4.74 10*3/MM3 (ref 1.7–7)
NEUTROPHILS NFR BLD AUTO: 90.5 % (ref 42.7–76)
PLATELET # BLD AUTO: 234 10*3/MM3 (ref 140–450)
PMV BLD AUTO: 9.4 FL (ref 6–12)
POTASSIUM SERPL-SCNC: 4.2 MMOL/L (ref 3.5–5.2)
RBC # BLD AUTO: 2.59 10*6/MM3 (ref 4.14–5.8)
SODIUM SERPL-SCNC: 132 MMOL/L (ref 136–145)
WBC NRBC COR # BLD AUTO: 5.23 10*3/MM3 (ref 3.4–10.8)

## 2024-04-01 PROCEDURE — 25010000002 HEPARIN LOCK FLUSH PER 10 UNITS: Performed by: INTERNAL MEDICINE

## 2024-04-01 PROCEDURE — 96372 THER/PROPH/DIAG INJ SC/IM: CPT

## 2024-04-01 PROCEDURE — 36591 DRAW BLOOD OFF VENOUS DEVICE: CPT

## 2024-04-01 PROCEDURE — 83735 ASSAY OF MAGNESIUM: CPT | Performed by: INTERNAL MEDICINE

## 2024-04-01 PROCEDURE — 25010000002 PEGFILGRASTIM-CBQV 6 MG/0.6ML SOLUTION AUTO-INJECTOR: Performed by: INTERNAL MEDICINE

## 2024-04-01 PROCEDURE — 80048 BASIC METABOLIC PNL TOTAL CA: CPT | Performed by: INTERNAL MEDICINE

## 2024-04-01 PROCEDURE — 85025 COMPLETE CBC W/AUTO DIFF WBC: CPT | Performed by: INTERNAL MEDICINE

## 2024-04-01 RX ORDER — HEPARIN SODIUM (PORCINE) LOCK FLUSH IV SOLN 100 UNIT/ML 100 UNIT/ML
500 SOLUTION INTRAVENOUS AS NEEDED
Status: DISCONTINUED | OUTPATIENT
Start: 2024-04-01 | End: 2024-04-02 | Stop reason: HOSPADM

## 2024-04-01 RX ORDER — HEPARIN SODIUM (PORCINE) LOCK FLUSH IV SOLN 100 UNIT/ML 100 UNIT/ML
500 SOLUTION INTRAVENOUS AS NEEDED
OUTPATIENT
Start: 2024-04-01

## 2024-04-01 RX ADMIN — PEGFILGRASTIM-CBQV 6 MG: 6 INJECTION, SOLUTION SUBCUTANEOUS at 15:48

## 2024-04-01 RX ADMIN — HEPARIN 500 UNITS: 100 SYRINGE at 15:42

## 2024-04-05 ENCOUNTER — TELEPHONE (OUTPATIENT)
Dept: ONCOLOGY | Facility: CLINIC | Age: 74
End: 2024-04-05
Payer: MEDICARE

## 2024-04-05 NOTE — TELEPHONE ENCOUNTER
Peggy stated patient is telling her that he usually goes to see Dr. Leary on Tuesdays for hospital admission for treatment and his next one is on a Thursday.  Patient wanted to make sure that this is okay.  Advised RN will discuss with Dr. Leary when she returns but it looks as though he is scheduled correctly.  Advised RN will contact her back next week to let her know what Dr. Leary says. Ms. Bryce verbalized understanding.

## 2024-04-05 NOTE — TELEPHONE ENCOUNTER
Caller: SRINIVASAN HARPER    Relationship: Emergency Contact    Best call back number:     [phone]     What is the best time to reach you: ANYTIME    Who are you requesting to speak with (clinical staff, provider,  specific staff member): DENNISE    What was the call regarding: WOULD LIKE A CALL BACK TO DISCUSS THE PT'S SCHEDULE. THEY HAVE QUESTIONS    Is it okay if the provider responds through MyChart: NO - PLEASE CALL BACK TO ADVISE.

## 2024-04-08 LAB
CYTO UR: NORMAL
LAB AP CASE REPORT: NORMAL
LAB AP CLINICAL INFORMATION: NORMAL
LAB AP DIAGNOSIS COMMENT: NORMAL
LAB AP OUTSIDE REPORT, ADDENDUM: NORMAL
PATH REPORT.ADDENDUM SPEC: NORMAL
PATH REPORT.FINAL DX SPEC: NORMAL
PATH REPORT.GROSS SPEC: NORMAL

## 2024-04-08 NOTE — TELEPHONE ENCOUNTER
Notified Ms. Wu that date of follow up with labs and possible admission to hospital for next chemo cycle is being moved up to 4/16/24 and to watch my chart appointments to reflect 4/16/24 appointments.  She verbalized understanding.  Message was sent to scheduling to move appointments as per request of Dr. Leary.  Notified admissions of date change.

## 2024-04-09 ENCOUNTER — READMISSION MANAGEMENT (OUTPATIENT)
Dept: CALL CENTER | Facility: HOSPITAL | Age: 74
End: 2024-04-09
Payer: MEDICARE

## 2024-04-09 NOTE — OUTREACH NOTE
Medical Week 2 Survey      Flowsheet Row Responses   [facility] patient discharged from? [place]   Does the patient have one of the following disease processes/diagnoses(primary or secondary)? Other   Week 2 attempt successful? Yes   Call start time 1009   Discharge diagnosis Hodgkin lymphoma, admit for chemo   Call end time 1012   Is patient permission given to speak with other caregiver? Yes   Person spoke with today (if not patient) and relationship Peggy spouse   Meds reviewed with patient/caregiver? Yes   Does the patient have all medications ordered at discharge? Yes   Is the patient taking all medications as directed (includes completed medication regime)? Yes   Does the patient have a primary care provider?  Yes   Has the patient kept scheduled appointments due by today? Yes   Comments Next follow up with oncology 4/16   Has home health visited the patient within 72 hours of discharge? N/A   DME comments Wears nighttime O2. Has a pulse ox to monitor sats during day. Reports always in the 90's   Psychosocial issues? No   Did the patient receive a copy of their discharge instructions? Yes   Nursing interventions Reviewed instructions with patient   What is the patient's perception of their health status since discharge? Improving   Is the patient/caregiver able to teach back signs and symptoms related to disease process for when to call PCP? Yes   Is the patient/caregiver able to teach back signs and symptoms related to disease process for when to call 911? Yes   Is the patient/caregiver able to teach back the hierarchy of who to call/visit for symptoms/problems? PCP, Specialist, Home health nurse, Urgent Care, ED, 911 Yes   If the patient is a current smoker, are they able to teach back resources for cessation? Not a smoker   Week 2 Call Completed? Yes   Graduated Yes   Did the patient feel the follow up calls were helpful during their recovery period? Yes   Is the patient interested in additional  calls from an ambulatory ? No   Would this patient benefit from a Referral to [facility] Social Work? No   Graduated/Revoked comments Wife denies any questions or new concerns today. No further calls.   Call end time 1012            KASHMIR CHOI - Registered Nurse

## 2024-04-10 NOTE — TELEPHONE ENCOUNTER
Contacted patient's spouse regarding patient's request for magic mouthwash refill.  Patient's spouse stated okay to send to [facility] in [place].  Prescription routed to [facility] per her request.   Asa Lucas(Attending)

## 2024-04-11 ENCOUNTER — HOSPITAL ENCOUNTER (OUTPATIENT)
Dept: ONCOLOGY | Facility: HOSPITAL | Age: 74
Discharge: HOME | End: 2024-04-11
Payer: MEDICARE

## 2024-04-11 VITALS
RESPIRATION RATE: 18 BRPM | HEART RATE: 93 BPM | HEIGHT: 64 IN | SYSTOLIC BLOOD PRESSURE: 112 MMHG | TEMPERATURE: 97.9 F | DIASTOLIC BLOOD PRESSURE: 56 MMHG | WEIGHT: 175 LBS | BODY MASS INDEX: 29.88 KG/M2

## 2024-04-11 DIAGNOSIS — Z45.2 ENCOUNTER FOR CARE RELATED TO VASCULAR ACCESS PORT: Primary | ICD-10-CM

## 2024-04-11 DIAGNOSIS — C81.98 HODGKIN LYMPHOMA OF LYMPH NODES OF MULTIPLE REGIONS, UNSPECIFIED HODGKIN LYMPHOMA TYPE: ICD-10-CM

## 2024-04-11 LAB
BASOPHILS # BLD AUTO: 0.03 10*3/MM3 (ref 0–0.2)
BASOPHILS NFR BLD AUTO: 0.3 % (ref 0–1.5)
DEPRECATED RDW RBC AUTO: 70.3 FL (ref 37–54)
EOSINOPHIL # BLD AUTO: 0.05 10*3/MM3 (ref 0–0.4)
EOSINOPHIL NFR BLD AUTO: 0.5 % (ref 0.3–6.2)
ERYTHROCYTE [DISTWIDTH] IN BLOOD BY AUTOMATED COUNT: 19.5 % (ref 12.3–15.4)
HCT VFR BLD AUTO: 26 % (ref 37.5–51)
HGB BLD-MCNC: 8.5 G/DL (ref 13–17.7)
IMM GRANULOCYTES # BLD AUTO: 0.55 10*3/MM3 (ref 0–0.05)
IMM GRANULOCYTES NFR BLD AUTO: 5.1 % (ref 0–0.5)
LYMPHOCYTES # BLD AUTO: 0.69 10*3/MM3 (ref 0.7–3.1)
LYMPHOCYTES NFR BLD AUTO: 6.3 % (ref 19.6–45.3)
MCH RBC QN AUTO: 33.2 PG (ref 26.6–33)
MCHC RBC AUTO-ENTMCNC: 32.7 G/DL (ref 31.5–35.7)
MCV RBC AUTO: 101.6 FL (ref 79–97)
MONOCYTES # BLD AUTO: 1.07 10*3/MM3 (ref 0.1–0.9)
MONOCYTES NFR BLD AUTO: 9.8 % (ref 5–12)
NEUTROPHILS NFR BLD AUTO: 78 % (ref 42.7–76)
NEUTROPHILS NFR BLD AUTO: 8.49 10*3/MM3 (ref 1.7–7)
PLATELET # BLD AUTO: 77 10*3/MM3 (ref 140–450)
PMV BLD AUTO: 10 FL (ref 6–12)
RBC # BLD AUTO: 2.56 10*6/MM3 (ref 4.14–5.8)
WBC NRBC COR # BLD AUTO: 10.88 10*3/MM3 (ref 3.4–10.8)

## 2024-04-11 PROCEDURE — 85025 COMPLETE CBC W/AUTO DIFF WBC: CPT | Performed by: INTERNAL MEDICINE

## 2024-04-11 PROCEDURE — 36591 DRAW BLOOD OFF VENOUS DEVICE: CPT

## 2024-04-11 PROCEDURE — 25010000002 HEPARIN LOCK FLUSH PER 10 UNITS: Performed by: INTERNAL MEDICINE

## 2024-04-11 RX ORDER — HEPARIN SODIUM (PORCINE) LOCK FLUSH IV SOLN 100 UNIT/ML 100 UNIT/ML
500 SOLUTION INTRAVENOUS AS NEEDED
Status: DISCONTINUED | OUTPATIENT
Start: 2024-04-11 | End: 2024-04-12 | Stop reason: HOSPADM

## 2024-04-11 RX ORDER — HEPARIN SODIUM (PORCINE) LOCK FLUSH IV SOLN 100 UNIT/ML 100 UNIT/ML
500 SOLUTION INTRAVENOUS AS NEEDED
OUTPATIENT
Start: 2024-04-11

## 2024-04-11 RX ADMIN — HEPARIN 500 UNITS: 100 SYRINGE at 14:25

## 2024-04-16 ENCOUNTER — OFFICE VISIT (OUTPATIENT)
Dept: ONCOLOGY | Facility: CLINIC | Age: 74
End: 2024-04-16
Payer: MEDICARE

## 2024-04-16 ENCOUNTER — TELEPHONE (OUTPATIENT)
Dept: ONCOLOGY | Facility: CLINIC | Age: 74
End: 2024-04-16

## 2024-04-16 ENCOUNTER — HOSPITAL ENCOUNTER (OUTPATIENT)
Dept: ONCOLOGY | Facility: HOSPITAL | Age: 74
Discharge: HOME | End: 2024-04-16
Payer: MEDICARE

## 2024-04-16 ENCOUNTER — HOSPITAL ENCOUNTER (INPATIENT)
Facility: HOSPITAL | Age: 74
LOS: 1 days | Discharge: HOME | End: 2024-04-18
Attending: HOSPITALIST | Admitting: STUDENT IN AN ORGANIZED HEALTH CARE EDUCATION/TRAINING PROGRAM
Payer: MEDICARE

## 2024-04-16 VITALS
SYSTOLIC BLOOD PRESSURE: 122 MMHG | RESPIRATION RATE: 16 BRPM | DIASTOLIC BLOOD PRESSURE: 64 MMHG | BODY MASS INDEX: 30.05 KG/M2 | TEMPERATURE: 98 F | HEART RATE: 71 BPM | HEIGHT: 64 IN | WEIGHT: 176 LBS

## 2024-04-16 VITALS
OXYGEN SATURATION: 96 % | SYSTOLIC BLOOD PRESSURE: 122 MMHG | HEIGHT: 64 IN | TEMPERATURE: 98 F | DIASTOLIC BLOOD PRESSURE: 64 MMHG | HEART RATE: 71 BPM | WEIGHT: 176 LBS | BODY MASS INDEX: 30.05 KG/M2

## 2024-04-16 DIAGNOSIS — C81.98 HODGKIN LYMPHOMA OF LYMPH NODES OF MULTIPLE REGIONS, UNSPECIFIED HODGKIN LYMPHOMA TYPE: Primary | ICD-10-CM

## 2024-04-16 LAB
ALBUMIN SERPL-MCNC: 4.2 G/DL (ref 3.5–5.2)
ALBUMIN/GLOB SERPL: 1.6 G/DL
ALP SERPL-CCNC: 111 U/L (ref 39–117)
ALT SERPL W P-5'-P-CCNC: 9 U/L (ref 1–41)
ANION GAP SERPL CALCULATED.3IONS-SCNC: 9 MMOL/L (ref 5–15)
AST SERPL-CCNC: 16 U/L (ref 1–40)
BACTERIA UR QL AUTO: ABNORMAL /HPF
BASOPHILS # BLD AUTO: 0.04 10*3/MM3 (ref 0–0.2)
BASOPHILS NFR BLD AUTO: 0.5 % (ref 0–1.5)
BILIRUB SERPL-MCNC: 0.3 MG/DL (ref 0–1.2)
BILIRUB UR QL STRIP: NEGATIVE
BUN SERPL-MCNC: 12 MG/DL (ref 8–23)
BUN/CREAT SERPL: 18.8 (ref 7–25)
CALCIUM SPEC-SCNC: 8.9 MG/DL (ref 8.6–10.5)
CHLORIDE SERPL-SCNC: 99 MMOL/L (ref 98–107)
CLARITY UR: CLEAR
CO2 SERPL-SCNC: 28 MMOL/L (ref 22–29)
COLOR UR: YELLOW
CREAT SERPL-MCNC: 0.64 MG/DL (ref 0.76–1.27)
DEPRECATED RDW RBC AUTO: 73.6 FL (ref 37–54)
EGFRCR SERPLBLD CKD-EPI 2021: 100.6 ML/MIN/1.73
EOSINOPHIL # BLD AUTO: 0.05 10*3/MM3 (ref 0–0.4)
EOSINOPHIL NFR BLD AUTO: 0.7 % (ref 0.3–6.2)
ERYTHROCYTE [DISTWIDTH] IN BLOOD BY AUTOMATED COUNT: 20.7 % (ref 12.3–15.4)
GLOBULIN UR ELPH-MCNC: 2.6 GM/DL
GLUCOSE SERPL-MCNC: 95 MG/DL (ref 65–99)
GLUCOSE UR STRIP-MCNC: NEGATIVE MG/DL
HCT VFR BLD AUTO: 26.4 % (ref 37.5–51)
HGB BLD-MCNC: 8.7 G/DL (ref 13–17.7)
HGB UR QL STRIP.AUTO: NEGATIVE
HYALINE CASTS UR QL AUTO: ABNORMAL /LPF
IMM GRANULOCYTES # BLD AUTO: 0.21 10*3/MM3 (ref 0–0.05)
IMM GRANULOCYTES NFR BLD AUTO: 2.8 % (ref 0–0.5)
KETONES UR QL STRIP: NEGATIVE
LDH SERPL-CCNC: 206 U/L (ref 135–225)
LEUKOCYTE ESTERASE UR QL STRIP.AUTO: NEGATIVE
LYMPHOCYTES # BLD AUTO: 0.81 10*3/MM3 (ref 0.7–3.1)
LYMPHOCYTES NFR BLD AUTO: 10.9 % (ref 19.6–45.3)
MAGNESIUM SERPL-MCNC: 1.9 MG/DL (ref 1.6–2.4)
MCH RBC QN AUTO: 34 PG (ref 26.6–33)
MCHC RBC AUTO-ENTMCNC: 33 G/DL (ref 31.5–35.7)
MCV RBC AUTO: 103.1 FL (ref 79–97)
MONOCYTES # BLD AUTO: 0.62 10*3/MM3 (ref 0.1–0.9)
MONOCYTES NFR BLD AUTO: 8.4 % (ref 5–12)
NEUTROPHILS NFR BLD AUTO: 5.68 10*3/MM3 (ref 1.7–7)
NEUTROPHILS NFR BLD AUTO: 76.7 % (ref 42.7–76)
NITRITE UR QL STRIP: NEGATIVE
PH UR STRIP.AUTO: 7.5 [PH] (ref 5–8)
PHOSPHATE SERPL-MCNC: 4.1 MG/DL (ref 2.5–4.5)
PLATELET # BLD AUTO: 139 10*3/MM3 (ref 140–450)
PMV BLD AUTO: 9.7 FL (ref 6–12)
POTASSIUM SERPL-SCNC: 4.4 MMOL/L (ref 3.5–5.2)
PROT SERPL-MCNC: 6.8 G/DL (ref 6–8.5)
PROT UR QL STRIP: NEGATIVE
RBC # BLD AUTO: 2.56 10*6/MM3 (ref 4.14–5.8)
RBC # UR STRIP: ABNORMAL /HPF
REF LAB TEST METHOD: ABNORMAL
SODIUM SERPL-SCNC: 136 MMOL/L (ref 136–145)
SP GR UR STRIP: 1.02 (ref 1–1.03)
SQUAMOUS #/AREA URNS HPF: ABNORMAL /HPF
URATE SERPL-MCNC: 5.3 MG/DL (ref 3.4–7)
UROBILINOGEN UR QL STRIP: NORMAL
WBC # UR STRIP: ABNORMAL /HPF
WBC NRBC COR # BLD AUTO: 7.41 10*3/MM3 (ref 3.4–10.8)

## 2024-04-16 PROCEDURE — 83735 ASSAY OF MAGNESIUM: CPT | Performed by: INTERNAL MEDICINE

## 2024-04-16 PROCEDURE — 25010000002 RITUXIMAB-ARRX 500 MG/50ML SOLUTION 50 ML VIAL: Performed by: INTERNAL MEDICINE

## 2024-04-16 PROCEDURE — 25010000002 ETOPOSIDE 500 MG/25ML SOLUTION 25 ML VIAL: Performed by: INTERNAL MEDICINE

## 2024-04-16 PROCEDURE — 25810000003 SODIUM CHLORIDE 0.9 % SOLUTION: Performed by: INTERNAL MEDICINE

## 2024-04-16 PROCEDURE — G0378 HOSPITAL OBSERVATION PER HR: HCPCS

## 2024-04-16 PROCEDURE — 84100 ASSAY OF PHOSPHORUS: CPT | Performed by: INTERNAL MEDICINE

## 2024-04-16 PROCEDURE — 25810000003 SODIUM CHLORIDE 0.9 % SOLUTION 250 ML FLEX CONT: Performed by: INTERNAL MEDICINE

## 2024-04-16 PROCEDURE — 99222 1ST HOSP IP/OBS MODERATE 55: CPT | Performed by: INTERNAL MEDICINE

## 2024-04-16 PROCEDURE — 80053 COMPREHEN METABOLIC PANEL: CPT | Performed by: INTERNAL MEDICINE

## 2024-04-16 PROCEDURE — 36591 DRAW BLOOD OFF VENOUS DEVICE: CPT

## 2024-04-16 PROCEDURE — 84550 ASSAY OF BLOOD/URIC ACID: CPT | Performed by: INTERNAL MEDICINE

## 2024-04-16 PROCEDURE — 25010000002 RITUXIMAB-ARRX 100 MG/10ML SOLUTION 10 ML VIAL: Performed by: INTERNAL MEDICINE

## 2024-04-16 PROCEDURE — 85025 COMPLETE CBC W/AUTO DIFF WBC: CPT | Performed by: INTERNAL MEDICINE

## 2024-04-16 PROCEDURE — 81001 URINALYSIS AUTO W/SCOPE: CPT | Performed by: INTERNAL MEDICINE

## 2024-04-16 PROCEDURE — 99214 OFFICE O/P EST MOD 30 MIN: CPT | Performed by: INTERNAL MEDICINE

## 2024-04-16 PROCEDURE — 25010000002 DIPHENHYDRAMINE PER 50 MG: Performed by: INTERNAL MEDICINE

## 2024-04-16 PROCEDURE — 1126F AMNT PAIN NOTED NONE PRSNT: CPT | Performed by: INTERNAL MEDICINE

## 2024-04-16 PROCEDURE — 25810000003 SODIUM CHLORIDE 0.9 % SOLUTION 500 ML FLEX CONT: Performed by: INTERNAL MEDICINE

## 2024-04-16 PROCEDURE — 25010000002 DEXAMETHASONE SODIUM PHOSPHATE 100 MG/10ML SOLUTION: Performed by: INTERNAL MEDICINE

## 2024-04-16 PROCEDURE — 83615 LACTATE (LD) (LDH) ENZYME: CPT | Performed by: INTERNAL MEDICINE

## 2024-04-16 PROCEDURE — 3E0330M INTRODUCTION OF MONOCLONAL ANTIBODY INTO PERIPHERAL VEIN, PERCUTANEOUS APPROACH: ICD-10-PCS | Performed by: INTERNAL MEDICINE

## 2024-04-16 PROCEDURE — 3074F SYST BP LT 130 MM HG: CPT | Performed by: INTERNAL MEDICINE

## 2024-04-16 PROCEDURE — 3078F DIAST BP <80 MM HG: CPT | Performed by: INTERNAL MEDICINE

## 2024-04-16 RX ORDER — DIPHENHYDRAMINE HYDROCHLORIDE 50 MG/ML
50 INJECTION INTRAMUSCULAR; INTRAVENOUS AS NEEDED
Status: CANCELLED | OUTPATIENT
Start: 2024-04-16

## 2024-04-16 RX ORDER — DEXAMETHASONE 0.5 MG/1
8 TABLET ORAL EVERY 24 HOURS
Status: CANCELLED | OUTPATIENT
Start: 2024-04-21

## 2024-04-16 RX ORDER — FAMOTIDINE 10 MG/ML
20 INJECTION, SOLUTION INTRAVENOUS AS NEEDED
Status: DISCONTINUED | OUTPATIENT
Start: 2024-04-16 | End: 2024-04-18 | Stop reason: HOSPADM

## 2024-04-16 RX ORDER — MEPERIDINE HYDROCHLORIDE 25 MG/ML
25 INJECTION INTRAMUSCULAR; INTRAVENOUS; SUBCUTANEOUS
Status: DISCONTINUED | OUTPATIENT
Start: 2024-04-16 | End: 2024-04-18 | Stop reason: HOSPADM

## 2024-04-16 RX ORDER — FAMOTIDINE 10 MG/ML
20 INJECTION, SOLUTION INTRAVENOUS AS NEEDED
Status: CANCELLED | OUTPATIENT
Start: 2024-04-16

## 2024-04-16 RX ORDER — MEPERIDINE HYDROCHLORIDE 25 MG/ML
25 INJECTION INTRAMUSCULAR; INTRAVENOUS; SUBCUTANEOUS
Status: CANCELLED | OUTPATIENT
Start: 2024-04-16

## 2024-04-16 RX ORDER — BISACODYL 5 MG/1
5 TABLET, DELAYED RELEASE ORAL DAILY PRN
Status: DISCONTINUED | OUTPATIENT
Start: 2024-04-16 | End: 2024-04-18 | Stop reason: HOSPADM

## 2024-04-16 RX ORDER — SODIUM CHLORIDE 9 MG/ML
125 INJECTION, SOLUTION INTRAVENOUS CONTINUOUS
Status: CANCELLED | OUTPATIENT
Start: 2024-04-16

## 2024-04-16 RX ORDER — ONDANSETRON 2 MG/ML
4 INJECTION INTRAMUSCULAR; INTRAVENOUS EVERY 6 HOURS PRN
Status: DISCONTINUED | OUTPATIENT
Start: 2024-04-16 | End: 2024-04-18 | Stop reason: HOSPADM

## 2024-04-16 RX ORDER — POLYETHYLENE GLYCOL 3350 17 G/17G
17 POWDER, FOR SOLUTION ORAL DAILY PRN
Status: DISCONTINUED | OUTPATIENT
Start: 2024-04-16 | End: 2024-04-18 | Stop reason: HOSPADM

## 2024-04-16 RX ORDER — FLUTICASONE PROPIONATE 50 MCG
1 SPRAY, SUSPENSION (ML) NASAL DAILY PRN
Status: DISCONTINUED | OUTPATIENT
Start: 2024-04-16 | End: 2024-04-18 | Stop reason: HOSPADM

## 2024-04-16 RX ORDER — ROSUVASTATIN CALCIUM 10 MG/1
10 TABLET, COATED ORAL NIGHTLY
Status: DISCONTINUED | OUTPATIENT
Start: 2024-04-16 | End: 2024-04-18 | Stop reason: HOSPADM

## 2024-04-16 RX ORDER — SODIUM CHLORIDE 0.9 % (FLUSH) 0.9 %
10 SYRINGE (ML) INJECTION AS NEEDED
Status: DISCONTINUED | OUTPATIENT
Start: 2024-04-16 | End: 2024-04-18 | Stop reason: HOSPADM

## 2024-04-16 RX ORDER — PANTOPRAZOLE SODIUM 40 MG/1
40 TABLET, DELAYED RELEASE ORAL DAILY
Status: DISCONTINUED | OUTPATIENT
Start: 2024-04-16 | End: 2024-04-18 | Stop reason: HOSPADM

## 2024-04-16 RX ORDER — ACETAMINOPHEN 325 MG/1
650 TABLET ORAL EVERY 4 HOURS PRN
Status: DISCONTINUED | OUTPATIENT
Start: 2024-04-16 | End: 2024-04-18 | Stop reason: HOSPADM

## 2024-04-16 RX ORDER — DIPHENHYDRAMINE HYDROCHLORIDE 50 MG/ML
50 INJECTION INTRAMUSCULAR; INTRAVENOUS AS NEEDED
Status: DISCONTINUED | OUTPATIENT
Start: 2024-04-16 | End: 2024-04-18 | Stop reason: HOSPADM

## 2024-04-16 RX ORDER — SODIUM CHLORIDE 9 MG/ML
125 INJECTION, SOLUTION INTRAVENOUS CONTINUOUS
Status: DISCONTINUED | OUTPATIENT
Start: 2024-04-16 | End: 2024-04-17

## 2024-04-16 RX ORDER — BISACODYL 10 MG
10 SUPPOSITORY, RECTAL RECTAL DAILY PRN
Status: DISCONTINUED | OUTPATIENT
Start: 2024-04-16 | End: 2024-04-18 | Stop reason: HOSPADM

## 2024-04-16 RX ORDER — AMOXICILLIN 250 MG
2 CAPSULE ORAL 2 TIMES DAILY PRN
Status: DISCONTINUED | OUTPATIENT
Start: 2024-04-16 | End: 2024-04-18 | Stop reason: HOSPADM

## 2024-04-16 RX ORDER — ACETAMINOPHEN 325 MG/1
650 TABLET ORAL ONCE
Status: COMPLETED | OUTPATIENT
Start: 2024-04-16 | End: 2024-04-16

## 2024-04-16 RX ORDER — SODIUM CHLORIDE 9 MG/ML
40 INJECTION, SOLUTION INTRAVENOUS AS NEEDED
Status: DISCONTINUED | OUTPATIENT
Start: 2024-04-16 | End: 2024-04-18 | Stop reason: HOSPADM

## 2024-04-16 RX ORDER — VALACYCLOVIR HYDROCHLORIDE 500 MG/1
500 TABLET, FILM COATED ORAL DAILY
Status: DISCONTINUED | OUTPATIENT
Start: 2024-04-16 | End: 2024-04-18 | Stop reason: HOSPADM

## 2024-04-16 RX ORDER — SODIUM CHLORIDE 0.9 % (FLUSH) 0.9 %
10 SYRINGE (ML) INJECTION EVERY 12 HOURS SCHEDULED
Status: DISCONTINUED | OUTPATIENT
Start: 2024-04-16 | End: 2024-04-18 | Stop reason: HOSPADM

## 2024-04-16 RX ORDER — VALACYCLOVIR HYDROCHLORIDE 500 MG/1
500 TABLET, FILM COATED ORAL DAILY
Qty: 30 TABLET | Refills: 3 | Status: SHIPPED | OUTPATIENT
Start: 2024-04-16

## 2024-04-16 RX ORDER — ACETAMINOPHEN 325 MG/1
650 TABLET ORAL ONCE
Status: CANCELLED | OUTPATIENT
Start: 2024-04-16

## 2024-04-16 RX ORDER — SODIUM CHLORIDE 9 MG/ML
125 INJECTION, SOLUTION INTRAVENOUS CONTINUOUS
Status: CANCELLED
Start: 2024-04-19

## 2024-04-16 RX ADMIN — VALACYCLOVIR HYDROCHLORIDE 500 MG: 500 TABLET, FILM COATED ORAL at 12:40

## 2024-04-16 RX ADMIN — RITUXIMAB-ARRX 700 MG: 500 INJECTION, SOLUTION INTRAVENOUS at 14:03

## 2024-04-16 RX ADMIN — SODIUM CHLORIDE 125 ML/HR: 9 INJECTION, SOLUTION INTRAVENOUS at 22:13

## 2024-04-16 RX ADMIN — Medication 10 ML: at 12:32

## 2024-04-16 RX ADMIN — PANTOPRAZOLE SODIUM 40 MG: 40 TABLET, DELAYED RELEASE ORAL at 12:40

## 2024-04-16 RX ADMIN — SODIUM CHLORIDE 125 ML/HR: 9 INJECTION, SOLUTION INTRAVENOUS at 12:33

## 2024-04-16 RX ADMIN — DEXAMETHASONE SODIUM PHOSPHATE 12 MG: 10 INJECTION, SOLUTION INTRAMUSCULAR; INTRAVENOUS at 16:03

## 2024-04-16 RX ADMIN — SODIUM CHLORIDE 180 MG: 9 INJECTION, SOLUTION INTRAVENOUS at 17:21

## 2024-04-16 RX ADMIN — ROSUVASTATIN CALCIUM 10 MG: 10 TABLET, FILM COATED ORAL at 20:30

## 2024-04-16 RX ADMIN — DIPHENHYDRAMINE HYDROCHLORIDE 50 MG: 50 INJECTION, SOLUTION INTRAMUSCULAR; INTRAVENOUS at 13:32

## 2024-04-16 RX ADMIN — Medication 10 ML: at 20:30

## 2024-04-16 RX ADMIN — ACETAMINOPHEN 650 MG: 325 TABLET ORAL at 12:40

## 2024-04-16 NOTE — H&P
[facility] Medicine Services  HISTORY AND PHYSICAL    Patient Name: Abraham Wu  : 1950  MRN: [id number]  Primary Care Physician: Jatinder Haynes MD  Date of admission: 2024      Subjective   Subjective     Chief Complaint:  Admit for Chemo    HPI:  Abraham Wu is a 74 y.o. male with PMHx significant for HTN, HLD, SHERRI and Hodgkin's Lymphoma (dx ), he is followed by Dr. Yanet Leary. He is being admitted for chemotherapy with monitoring. He currently feels at baseline. No complaints. Says he feels well. No fever/chills, abdominal pain or SOA. No gross hematuria.      Personal History     Past Medical History:   Diagnosis Date    Arthritis     benign polypoid tissue right lung     Cancer     HODGKINS LYMPHOMA, RECTAL CANCER    Diabetes mellitus     patient reports that he doesn't have diabetes secondary to changing diet and weight loss.    History of radiation therapy 2023    re-treat rectum    History of transfusion     no reaction, transfusions received at [facility] in     Hyperlipidemia     Hypertension     Lymphoma     Mycobacterium mucogenicum     SHERRI (obstructive sleep apnea)     O2 2L/MIN AT NIGHT ONLY    Pneumonia         Seasonal allergies          Oncology Problem List:  Lymphoma (2024; Status: Active)  Hodgkin's lymphoma (2024; Status: Active)  Hodgkin lymphoma (10/07/2022; Status: Active)  Oncology/Hematology History   Hodgkin lymphoma   10/7/2022 Initial Diagnosis    Hodgkin lymphoma (HCC)     10/8/2022 - 3/22/2023 Chemotherapy    OP HODGKIN LYMPHOMA  BV+AVD (Brentuximab vedotin / Doxorubicin / Vinblastine / Dacarbazine)     10/28/2022 Cancer Staged    Staging form: Hodgkin And Non-Hodgkin Lymphoma, AJCC 8th Edition  - Clinical: Stage IV - Signed by Kaycee Leary MD on 10/28/2022     2023 - 2023 Radiation    Radiation OncologyTreatment Course:  Abraham Wu received 1400 cGy in 4 bid fractions to rectal mass  via External Beam Radiation - EBRT.     6/13/2023 - 6/13/2023 Chemotherapy    OP HODGKIN LYMPHOMA BeGV (Bendamustine / Gemcitabine / VinORELBine)     9/14/2023 - 1/18/2024 Chemotherapy    OP HL Pembrolizumab 200 mg     9/18/2023 - 9/27/2023 Radiation    Radiation OncologyTreatment Course:  Abraham Wu received 2400 cGy in 8 fractions to rectum via External Beam Radiation - EBRT.     1/16/2024 - 1/22/2024 Radiation    Radiation OncologyTreatment Course:  Abraham Wu received 2100 cGy in 3 fractions to L4 spine lesion via Stereotactic Radiation Therapy - SRT.     2/8/2024 - 2/8/2024 Chemotherapy    IP LYMPHOMA DHAP-R Dexamethasone / CISplatin / Cytarabine / RiTUXimab     2/13/2024 -  Chemotherapy    IP LYMPHOMA RICE RiTUXimab / Ifosfamide + Mesna / CARBOplatin AUC = 5 / Etoposide       Past Surgical History:   Procedure Laterality Date    BRONCHOSCOPY      removal of obstructing polypoid tissue right middle lung    BRONCHOSCOPY N/A 2/1/2024    Procedure: BRONCHOSCOPY WITH ENDOBRONCHIAL ULTRASOUND AND FLUORO;  Surgeon: Chris Pruitt MD;  Location:  [facility];  Service: Pulmonary;  Laterality: N/A;  EBUS BALLOON INTACT    COLONOSCOPY      COLOSTOMY N/A 09/22/2022    Procedure: LAPAROSCOPIC COLOSTOMY CREATION, FLEXIBLE SIGMOIDOSCOPY;  Surgeon: Hiram Viveros MD;  Location:  [facility];  Service: General;  Laterality: N/A;    DENTAL PROCEDURE      dental implants    ENDOSCOPY N/A 10/10/2022    Procedure: ESOPHAGOGASTRODUODENOSCOPY;  Surgeon: Neeraj Lynn MD;  Location:  [facility];  Service: Gastroenterology;  Laterality: N/A;    ENDOSCOPY N/A 10/12/2022    Procedure: ESOPHAGOGASTRODUODENOSCOPY;  Surgeon: Brunner, Mark I, MD;  Location:  [facility];  Service: Gastroenterology;  Laterality: N/A;    EXAM UNDER ANESTHESIA, RECTAL BIOPSY N/A 10/04/2022    Procedure: EXAM UNDER ANESTHESIA, TRANSANAL BIOPSY WITH TRUCUT NEEDLE (LITHOTOMY-CANDY CANE);  Surgeon: Hiram Viveros  MD;  Location: [facility] OR;  Service: General;  Laterality: N/A;    EXAM UNDER ANESTHESIA, RECTAL BIOPSY N/A 05/30/2023    Procedure: EXAM UNDER ANESTHESIA, TRANSANAL BIOPSY OF RECTAL MASS WITH TRUCUT NEEDLE, PROCTOSCOPY;  Surgeon: Hiram Viveros MD;  Location: [facility] OR;  Service: General;  Laterality: N/A;    PERIPHERALLY INSERTED CENTRAL CATHETER INSERTION      Removed 11/28/2022    VENOUS ACCESS DEVICE (PORT) INSERTION Right 11/28/2022       Family History: family history includes Diabetes in his father and mother; Heart disease in his father.     Social History:  reports that he has never smoked. He has never used smokeless tobacco. He reports that he does not currently use alcohol. He reports that he does not use drugs.  Social History     Social History Narrative    Not on file       Medications:  Available home medication information reviewed.  First Mouthwash (Magic Mouthwash), bacitracin, dexAMETHasone, fluticasone, lidocaine-prilocaine, loratadine, ondansetron, pantoprazole, prochlorperazine, rosuvastatin, and valACYclovir    No Known Allergies    Objective   Objective     Vital Signs:   Temp:  [97.6 °F (36.4 °C)-98 °F (36.7 °C)] 97.6 °F (36.4 °C)  Heart Rate:  [62-71] 62  Resp:  [15-16] 15  BP: (122-131)/(64-83) 131/83       Physical Exam   Constitutional: Awake, alert  Eyes: PERRLA, sclerae anicteric, no conjunctival injection  HENT: NCAT, mucous membranes moist  Neck: Supple, no thyromegaly, no lymphadenopathy, trachea midline  Respiratory: Clear to auscultation bilaterally, nonlabored respirations   Cardiovascular: RRR, no murmurs, rubs, or gallops, palpable pedal pulses bilaterally  Gastrointestinal: Positive bowel sounds, soft, nontender, nondistended, ostomy in place   Musculoskeletal: No bilateral ankle edema, no clubbing or cyanosis to extremities  Psychiatric: Appropriate affect, cooperative  Neurologic: Oriented x 3, strength symmetric in all extremities, Cranial Nerves grossly intact to  confrontation, speech clear  Skin: No rashes      Result Review:  I have personally reviewed the results from the time of this admission to 4/16/2024 12:53 EDT and agree with these findings:  [x]  Laboratory list / accordion  [x]  Microbiology  [x]  Radiology  [x]  EKG/Telemetry   []  Cardiology/Vascular   [x]  Pathology  [x]  Old records  []  Other:  Most notable findings include:       LAB RESULTS:      Lab 04/16/24  0925 04/11/24  1421   WBC 7.41 10.88*   HEMOGLOBIN 8.7* 8.5*   HEMATOCRIT 26.4* 26.0*   PLATELETS 139* 77*   NEUTROS ABS 5.68 8.49*   IMMATURE GRANS (ABS) 0.21* 0.55*   LYMPHS ABS 0.81 0.69*   MONOS ABS 0.62 1.07*   EOS ABS 0.05 0.05   .1* 101.6*     --          Lab 04/16/24  0925   SODIUM 136   POTASSIUM 4.4   CHLORIDE 99   CO2 28.0   ANION GAP 9.0   BUN 12   CREATININE 0.64*   EGFR 100.6   GLUCOSE 95   CALCIUM 8.9   MAGNESIUM 1.9   PHOSPHORUS 4.1         Lab 04/16/24  0925   TOTAL PROTEIN 6.8   ALBUMIN 4.2   GLOBULIN 2.6   ALT (SGPT) 9   AST (SGOT) 16   BILIRUBIN 0.3   ALK PHOS 111                     UA          3/27/2024    09:11 3/28/2024    06:38 4/16/2024    09:25   Urinalysis   Squamous Epithelial Cells, UA None Seen  None Seen  0-2    Specific Gravity, UA 1.014   1.017    Ketones, UA Negative   Negative    Blood, UA Negative   Negative    Leukocytes, UA Negative   Negative    Nitrite, UA Negative   Negative    RBC, UA 3-5  3-5  3-5    WBC, UA 0-2  0-2  0-2    Bacteria, UA None Seen  None Seen  None Seen        Microbiology Results (last 10 days)       ** No results found for the last 240 hours. **            No radiology results from the last 24 hrs    Results for orders placed during the hospital encounter of 06/15/23    Adult Transthoracic Echo Complete W/ Cont if Necessary Per Protocol    Interpretation Summary    Left ventricular systolic function is normal. Estimated left ventricular EF = 60%    Normal global longitudinal strain pattern (-19.9%)    The cardiac valves are  anatomically and functionally normal.    Estimated right ventricular systolic pressure from tricuspid regurgitation is normal (<35 mmHg).      Assessment & Plan   Assessment & Plan       Hodgkin lymphoma    Essential hypertension    Dyslipidemia    SHERRI (obstructive sleep apnea)    Abraham Wu is a 74 y.o. male with PMHx significant for HTN, HLD, SHERRI and Hodgkin's Lymphoma (dx 2022), he is followed by Dr. Yanet Leary who is being admitted for chemotherapy.    Hogkin's Lymphoma  RML recurrence   - Dr. Leary managing Chemo, planning cycle 4 of Rituxan- ICE (last cycle 3/26)  - monitor daily CMP, CBC and Uric Acid  - following with transplant team at   - Neulasta planned for Monday  - supportive care, PRN anti-emetics    Mucositis:  - continue valtrex    HLD:  - statin    Hematuria:  - monitor UA    DVT prophylaxis:  Mechanical DVT prophylaxis orders are present.    CODE STATUS:    Code Status and Medical Interventions:   Ordered at: 04/16/24 1143     Level Of Support Discussed With:    Patient     Code Status (Patient has no pulse and is not breathing):    CPR (Attempt to Resuscitate)     Medical Interventions (Patient has pulse or is breathing):    Full Support       Expected Discharge   Expected discharge date/ time has not been documented.     Evelin Jacinto, DO  04/16/24

## 2024-04-16 NOTE — TELEPHONE ENCOUNTER
Caller: [phone] [address] [phone] [facility] [phone]     Relationship: Pharmacy    Best call back number: [phone]      What was the call regarding: PHARMACY NEEDS CLARIFICATION ON DIRECTIONS FOR THE VALACYCLOVIR

## 2024-04-16 NOTE — PLAN OF CARE
Goal Outcome Evaluation:  Plan of Care Reviewed With: patient, spouse        Progress: improving  Outcome Evaluation: Pt completed chemo today (Day 1 Cycle 4: Rituximab-arrx and etoposide). Tolerated well. No adverse reactions noted.

## 2024-04-16 NOTE — CASE MANAGEMENT/SOCIAL WORK
Discharge Planning Assessment  [facility]     Patient Name: Abraham Wu  MRN: [id number]  Today's Date: 4/16/2024    Admit Date: 4/16/2024    Plan: Home   Discharge Needs Assessment       Row Name 04/16/24 1549       Living Environment    People in Home spouse    Current Living Arrangements home    Potentially Unsafe Housing Conditions none    Primary Care Provided by self    Provides Primary Care For no one    Family Caregiver if Needed spouse    Quality of Family Relationships involved;supportive    Able to Return to Prior Arrangements yes       Resource/Environmental Concerns    Resource/Environmental Concerns none       Transition Planning    Patient/Family Anticipates Transition to home with family    Patient/Family Anticipated Services at Transition     Transportation Anticipated family or friend will provide       Discharge Needs Assessment    Readmission Within the Last 30 Days no previous admission in last 30 days    Equipment Currently Used at Home oxygen    Concerns to be Addressed discharge planning    Anticipated Changes Related to Illness none    Equipment Needed After Discharge none                   Discharge Plan       Row Name 04/16/24 4596       Plan    Plan Home    Patient/Family in Agreement with Plan yes    Plan Comments Spoke with patient in room to initiate discharge planning.  He lives with his wife in [facility].  He is independent with ADL's.  He has nocturnal O2 through [employer] and is not current with home health.  His PCP is Jatinder Haynes.  He does not have an advanced directive.  Verified that he has Medicare/[employer].  Mr. Wu has RX coverage and has his scripts filled at [facility].  His plan is to return home at discharge.  No needs noted at this time.  CM will continue to follow.    Final Discharge Disposition Code 01 - home or self-care                  Continued Care and Services - Admitted Since 4/16/2024    No active coordination exists for this  encounter.          Demographic Summary       Row Name 04/16/24 1549       General Information    Admission Type observation    Arrived From home    Referral Source admission list    Reason for Consult discharge planning    Preferred Language English                   Functional Status       Row Name 04/16/24 1549       Functional Status    Usual Activity Tolerance moderate    Current Activity Tolerance moderate       Functional Status, IADL    Medications independent    Meal Preparation independent    Housekeeping independent    Laundry independent    Shopping independent                   Psychosocial    No documentation.                  Abuse/Neglect    No documentation.                  Legal    No documentation.                  Substance Abuse    No documentation.                  Patient Forms    No documentation.                     Cyndie Newsome RN

## 2024-04-16 NOTE — PROGRESS NOTES
[facility]ology [facility]  Office number [phone]    Fax number [phone]     Follow up     Date: 24    Patient Name: Abraham Wu  MRN: [id number]  : 1950    Chief Complaint: Hodgkin Lymphoma follow up/treatment    Surgeon: Dr. Hiram Viveros MD    Cancer Staging: IV    History of Present Illness: Abraham Wu is a pleasant 74 y.o. male with PMH of hypertension, sleep apnea, hard of hearing who presents today for follow up of Hodgkin Lymphoma.     He initially presented 2022 with intractable diarrhea, low appetite, and a greater than 50 pound weight loss over several month period associated with intermittent fevers.  CT of the chest abdomen pelvis obtained in the ER shows of rectal mass spanning greater than 12 cm with adjacent adenopathy, bulky RP adenopathy, and findings concerning for left renal and liver metastases.  Small right pleural effusion with nodularity.  There was concern for impending obstruction, so he underwent diverting colostomy and biopsy on 2022.  Biopsies from the peritoneam showed a fibrotic nodule histiocytes and eosinophils admixed with CD30 positive large cells.  Rectal biopsies showed involvement by CD30 positive lymphoma, classic Hodgkin lymphoma versus CD30 positive T-cell or B-cell lymphoma.  There was extensive fibrosis and crush artifact.  He underwent repeat transrectal biopsy 10/4/2022 for further characterization which was felt to be most consistent with classical Hodgkin lymphoma.    He initiated chemotherapy with Brentuximab-AVD as an inpatient on 10/8/2022.  Cycle #1 was complicated by GI bleed requiring multiple blood transfusion and ultimately coil artery embolization 10/12; neutropenic fever, Candida esophagitis and mucositis.  He had a prolonged ICU stay  And required enteral feeding.  He was discharged 10/20/2022.    Following his last cycle he was admitted with multifocal PNA and +corona virus  infection.    He underwent brochoscopy 2/1/24 showing heaped up, irregular, friable mucosa obstructing the right middle lobe otherwise no discrete endobronchial lesions. Pathology showed persistent/recurrent CD30 positive lymphoma; see comment. These continue to have CD30 and RACHELE positivity, consistent with involvement by the same process. However, the lymphoma cells are now more numerous and have an intact B-cell expression profile, with strong diffuse CD20 expression, strong PAX5 expression, and expression of CD45 and CD79a. This is strongly favored to represent continued involvement with the same process with the development of a slightly different immunophenotype after pembrolizumab therapy as opposed to a secondary lymphoma. The strong expression of CD20 may be a therapeutic target. CD30 expression remains intact as well.     Treatment history:  Brentuximab-AVD: C1 10/8/22  C2: 11/1/22 onward with DA 20% in BVD; full dose brentuximab  Completed C6 3/22/23    2. Palliative radiation to rectum 5/30/2023 (abbreviated due to systemic progression)  3. GVD+Pembro x 4 cycles: completed 8/30/2023  4. Consolidative radiation to rectum completed 9/27/23    5. Pembrolizumab: current  6. Radiation to spine 1/16, 1/18 and 1/22/24    7. Rituximab-ICE: C1 2/13/2024; C2 3/5/24; C3 3/26/24; C4 planning 4/16/24    Interval history:    He is here for follow up and consideration of cycle #4 of RICE. Energy is low until last week. No hematuria, nausea or vomiting. No falls, weakness, new neuropathy symptoms or incoordination.     Past Medical History:   Past Medical History:   Diagnosis Date    Arthritis     benign polypoid tissue right lung     Cancer     HODGKINS LYMPHOMA, RECTAL CANCER    Diabetes mellitus     patient reports that he doesn't have diabetes secondary to changing diet and weight loss.    History of radiation therapy 12/31/2023    re-treat rectum    History of transfusion     no reaction, transfusions received at  [facility] in 2022    Hyperlipidemia     Hypertension     Lymphoma     Mycobacterium mucogenicum     SHERRI (obstructive sleep apnea)     O2 2L/MIN AT NIGHT ONLY    Pneumonia     2023    Seasonal allergies    Polyp in airway removed 2020 [facility] Dr. Ketan Monet.    Past Surgical History:   Past Surgical History:   Procedure Laterality Date    BRONCHOSCOPY      removal of obstructing polypoid tissue right middle lung    BRONCHOSCOPY N/A 2/1/2024    Procedure: BRONCHOSCOPY WITH ENDOBRONCHIAL ULTRASOUND AND FLUORO;  Surgeon: Chris Pruitt MD;  Location:  [facility];  Service: Pulmonary;  Laterality: N/A;  EBUS BALLOON INTACT    COLONOSCOPY      COLOSTOMY N/A 09/22/2022    Procedure: LAPAROSCOPIC COLOSTOMY CREATION, FLEXIBLE SIGMOIDOSCOPY;  Surgeon: Hiram Viveros MD;  Location:  [facility];  Service: General;  Laterality: N/A;    DENTAL PROCEDURE      dental implants    ENDOSCOPY N/A 10/10/2022    Procedure: ESOPHAGOGASTRODUODENOSCOPY;  Surgeon: Neeraj Lynn MD;  Location:  [facility];  Service: Gastroenterology;  Laterality: N/A;    ENDOSCOPY N/A 10/12/2022    Procedure: ESOPHAGOGASTRODUODENOSCOPY;  Surgeon: Brunner, Mark I, MD;  Location:  [facility];  Service: Gastroenterology;  Laterality: N/A;    EXAM UNDER ANESTHESIA, RECTAL BIOPSY N/A 10/04/2022    Procedure: EXAM UNDER ANESTHESIA, TRANSANAL BIOPSY WITH TRUCUT NEEDLE (LITHOTOMY-CANDY CANE);  Surgeon: Hiram Viveros MD;  Location:  [facility];  Service: General;  Laterality: N/A;    EXAM UNDER ANESTHESIA, RECTAL BIOPSY N/A 05/30/2023    Procedure: EXAM UNDER ANESTHESIA, TRANSANAL BIOPSY OF RECTAL MASS WITH TRUCUT NEEDLE, PROCTOSCOPY;  Surgeon: Hiram Viveros MD;  Location:  [facility];  Service: General;  Laterality: N/A;    PERIPHERALLY INSERTED CENTRAL CATHETER INSERTION      Removed 11/28/2022    VENOUS ACCESS DEVICE (PORT) INSERTION Right 11/28/2022     Family History:   Family History   Problem Relation Age of Onset     Diabetes Mother     Diabetes Father     Heart disease Father      Social History:   Social History     Socioeconomic History    Marital status:    Tobacco Use    Smoking status: Never    Smokeless tobacco: Never   Vaping Use    Vaping status: Never Used   Substance and Sexual Activity    Alcohol use: Not Currently     Comment: previously drank 2 glasses of wine/beer nightly    Drug use: Never    Sexual activity: Defer       Medications:     Current Outpatient Medications:     allopurinol (ZYLOPRIM) 300 MG tablet, Take 1 tablet by mouth Daily. (Patient not taking: Reported on 3/26/2024), Disp: 30 tablet, Rfl: 0    bacitracin 500 UNIT/GM ointment, Apply 1 Application topically to the appropriate area as directed 2 (Two) Times a Day., Disp: 14 g, Rfl: 1    dexAMETHasone (DECADRON) 4 MG tablet, Take 2 tablets by mouth Daily With Breakfast. Take 2 tablets by mouth with breakfast on days 4 and 5 of each chemotherapy cycle., Disp: 4 tablet, Rfl: 3    DPH-Lido-AlHydr-MgHydr-Simeth (First Mouthwash, Magic Mouthwash,) suspension, Swish and swallow 5 mL Every 6 (Six) Hours., Disp: 240 mL, Rfl: 3    fluticasone (FLONASE) 50 MCG/ACT nasal spray, 1 spray into the nostril(s) as directed by provider Daily As Needed for Allergies or Rhinitis. OTC, Disp: , Rfl:     lidocaine-prilocaine (EMLA) 2.5-2.5 % cream, Apply 1 application topically to the appropriate area as directed As Needed (45-60 minutes prior to port access.  Cover with saran/plastic wrap.)., Disp: 30 g, Rfl: 3    loratadine (CLARITIN) 10 MG tablet, TAKE ONE TABLET BY MOUTH DAILY, Disp: 30 tablet, Rfl: 5    ondansetron (Zofran) 8 MG tablet, Take 1 tablet by mouth Every 8 (Eight) Hours As Needed for Nausea or Vomiting., Disp: 30 tablet, Rfl: 5    pantoprazole (PROTONIX) 40 MG EC tablet, TAKE ONE TABLET BY MOUTH DAILY, Disp: 90 tablet, Rfl: 1    prochlorperazine (COMPAZINE) 5 MG tablet, Take 1 tablet by mouth Every 6 (Six) Hours As Needed for Nausea or Vomiting.,  "Disp: 30 tablet, Rfl: 2    rosuvastatin (CRESTOR) 10 MG tablet, Take 1 tablet by mouth Daily., Disp: , Rfl:   No current facility-administered medications for this visit.    Facility-Administered Medications Ordered in Other Visits:     sodium chloride 0.9 % infusion, 125 mL/hr, Intravenous, Continuous, Kaycee Leary MD    Allergies:   No Known Allergies    Objective     Vital Signs:   Vitals:    04/16/24 1017   BP: 122/64   Pulse: 71   Temp: 98 °F (36.7 °C)   TempSrc: Temporal   SpO2: 96%   Weight: 79.8 kg (176 lb)   Height: 162.6 cm (64.02\")   PainSc: 0-No pain      Body mass index is 30.2 kg/m².   Pain Score    04/16/24 1017   PainSc: 0-No pain     ECOG Performance Status: 1    Physical Exam:   General: Well appearing male   HEENT: Normocephalic, atraumatic. Sclera anicteric. OP clear  Neck: supple, no palpable LAD. No axillary adenopathy  Cardiovascular: regular rate and rhythm. No murmurs.   Respiratory: Normal rate. Clear to auscultation bilaterally  Abdomen: Soft, nontender, non distended with normoactive bowel sounds. Ostomy LLQ   Lymph: no supraclavicular or axillary adenopathy  Neuro: Alert and oriented x 3. Normal BLE strength. Normal FTN  Ext: Symmetric, no swelling.   Skin: right port site  C/D/I    Laboratory/Imaging Reviewed:     Hospital Outpatient Visit on 04/16/2024   Component Date Value Ref Range Status    Glucose 04/16/2024 95  65 - 99 mg/dL Final    BUN 04/16/2024 12  8 - 23 mg/dL Final    Creatinine 04/16/2024 0.64 (L)  0.76 - 1.27 mg/dL Final    Sodium 04/16/2024 136  136 - 145 mmol/L Final    Potassium 04/16/2024 4.4  3.5 - 5.2 mmol/L Final    Chloride 04/16/2024 99  98 - 107 mmol/L Final    CO2 04/16/2024 28.0  22.0 - 29.0 mmol/L Final    Calcium 04/16/2024 8.9  8.6 - 10.5 mg/dL Final    Total Protein 04/16/2024 6.8  6.0 - 8.5 g/dL Final    Albumin 04/16/2024 4.2  3.5 - 5.2 g/dL Final    ALT (SGPT) 04/16/2024 9  1 - 41 U/L Final    AST (SGOT) 04/16/2024 16  1 - 40 U/L Final    Alkaline " Phosphatase 04/16/2024 111  39 - 117 U/L Final    Total Bilirubin 04/16/2024 0.3  0.0 - 1.2 mg/dL Final    Globulin 04/16/2024 2.6  gm/dL Final    Calculated Result    A/G Ratio 04/16/2024 1.6  g/dL Final    BUN/Creatinine Ratio 04/16/2024 18.8  7.0 - 25.0 Final    Anion Gap 04/16/2024 9.0  5.0 - 15.0 mmol/L Final    eGFR 04/16/2024 100.6  >60.0 mL/min/1.73 Final    Magnesium 04/16/2024 1.9  1.6 - 2.4 mg/dL Final    LDH 04/16/2024 206  135 - 225 U/L Final    Phosphorus 04/16/2024 4.1  2.5 - 4.5 mg/dL Final    Uric Acid 04/16/2024 5.3  3.4 - 7.0 mg/dL Final    Falsely depressed results may occur on samples drawn from patients receiving N-Acetylcysteine (NAC) or Metamizole.    Color, UA 04/16/2024 Yellow  Yellow, Straw Final    Appearance, UA 04/16/2024 Clear  Clear Final    pH, UA 04/16/2024 7.5  5.0 - 8.0 Final    Specific Gravity, UA 04/16/2024 1.017  1.001 - 1.030 Final    Glucose, UA 04/16/2024 Negative  Negative Final    Ketones, UA 04/16/2024 Negative  Negative Final    Bilirubin, UA 04/16/2024 Negative  Negative Final    Blood, UA 04/16/2024 Negative  Negative Final    Protein, UA 04/16/2024 Negative  Negative Final    Leuk Esterase, UA 04/16/2024 Negative  Negative Final    Nitrite, UA 04/16/2024 Negative  Negative Final    Urobilinogen, UA 04/16/2024 1.0 E.U./dL  0.2 - 1.0 E.U./dL Final    RBC, UA 04/16/2024 3-5 (A)  None Seen, 0-2 /HPF Final    WBC, UA 04/16/2024 0-2  None Seen, 0-2 /HPF Final    Bacteria, UA 04/16/2024 None Seen  None Seen, Trace /HPF Final    Squamous Epithelial Cells, UA 04/16/2024 0-2  None Seen, 0-2 /HPF Final    Hyaline Casts, UA 04/16/2024 None Seen  0 - 6 /LPF Final    Methodology 04/16/2024 Automated Microscopy   Final    WBC 04/16/2024 7.41  3.40 - 10.80 10*3/mm3 Final    RBC 04/16/2024 2.56 (L)  4.14 - 5.80 10*6/mm3 Final    Hemoglobin 04/16/2024 8.7 (L)  13.0 - 17.7 g/dL Final    Hematocrit 04/16/2024 26.4 (L)  37.5 - 51.0 % Final    MCV 04/16/2024 103.1 (H)  79.0 - 97.0 fL  Final    MCH 04/16/2024 34.0 (H)  26.6 - 33.0 pg Final    MCHC 04/16/2024 33.0  31.5 - 35.7 g/dL Final    RDW 04/16/2024 20.7 (H)  12.3 - 15.4 % Final    RDW-SD 04/16/2024 73.6 (H)  37.0 - 54.0 fl Final    MPV 04/16/2024 9.7  6.0 - 12.0 fL Final    Platelets 04/16/2024 139 (L)  140 - 450 10*3/mm3 Final    Neutrophil % 04/16/2024 76.7 (H)  42.7 - 76.0 % Final    Lymphocyte % 04/16/2024 10.9 (L)  19.6 - 45.3 % Final    Monocyte % 04/16/2024 8.4  5.0 - 12.0 % Final    Eosinophil % 04/16/2024 0.7  0.3 - 6.2 % Final    Basophil % 04/16/2024 0.5  0.0 - 1.5 % Final    Immature Grans % 04/16/2024 2.8 (H)  0.0 - 0.5 % Final    Neutrophils, Absolute 04/16/2024 5.68  1.70 - 7.00 10*3/mm3 Final    Lymphocytes, Absolute 04/16/2024 0.81  0.70 - 3.10 10*3/mm3 Final    Monocytes, Absolute 04/16/2024 0.62  0.10 - 0.90 10*3/mm3 Final    Eosinophils, Absolute 04/16/2024 0.05  0.00 - 0.40 10*3/mm3 Final    Basophils, Absolute 04/16/2024 0.04  0.00 - 0.20 10*3/mm3 Final    Immature Grans, Absolute 04/16/2024 0.21 (H)  0.00 - 0.05 10*3/mm3 Final   Hospital Outpatient Visit on 04/11/2024   Component Date Value Ref Range Status    WBC 04/11/2024 10.88 (H)  3.40 - 10.80 10*3/mm3 Final    RBC 04/11/2024 2.56 (L)  4.14 - 5.80 10*6/mm3 Final    Hemoglobin 04/11/2024 8.5 (L)  13.0 - 17.7 g/dL Final    Hematocrit 04/11/2024 26.0 (L)  37.5 - 51.0 % Final    MCV 04/11/2024 101.6 (H)  79.0 - 97.0 fL Final    MCH 04/11/2024 33.2 (H)  26.6 - 33.0 pg Final    MCHC 04/11/2024 32.7  31.5 - 35.7 g/dL Final    RDW 04/11/2024 19.5 (H)  12.3 - 15.4 % Final    RDW-SD 04/11/2024 70.3 (H)  37.0 - 54.0 fl Final    MPV 04/11/2024 10.0  6.0 - 12.0 fL Final    Platelets 04/11/2024 77 (L)  140 - 450 10*3/mm3 Final    Neutrophil % 04/11/2024 78.0 (H)  42.7 - 76.0 % Final    Lymphocyte % 04/11/2024 6.3 (L)  19.6 - 45.3 % Final    Monocyte % 04/11/2024 9.8  5.0 - 12.0 % Final    Eosinophil % 04/11/2024 0.5  0.3 - 6.2 % Final    Basophil % 04/11/2024 0.3  0.0 - 1.5 %  Final    Immature Grans % 04/11/2024 5.1 (H)  0.0 - 0.5 % Final    Neutrophils, Absolute 04/11/2024 8.49 (H)  1.70 - 7.00 10*3/mm3 Final    Lymphocytes, Absolute 04/11/2024 0.69 (L)  0.70 - 3.10 10*3/mm3 Final    Monocytes, Absolute 04/11/2024 1.07 (H)  0.10 - 0.90 10*3/mm3 Final    Eosinophils, Absolute 04/11/2024 0.05  0.00 - 0.40 10*3/mm3 Final    Basophils, Absolute 04/11/2024 0.03  0.00 - 0.20 10*3/mm3 Final    Immature Grans, Absolute 04/11/2024 0.55 (H)  0.00 - 0.05 10*3/mm3 Final       NM PET/CT Skull Base to Mid Thigh    Result Date: 3/19/2024  Narrative: NM PET/CT SKULL BASE TO MID THIGH Date of Exam: 3/18/2024 8:45 AM EDT Indication: lymphoma restaging. Comparison: PET/CT 12/4/2023, CT chest abdomen pelvis 1/16/2024. Technique: 12.38 mCi of F-18 FDG was administered intravenously. PET imaging was obtained from skull base to mid-thigh approximately 60 minutes after radiotracer injection. A low dose non contrast CT was obtained for attenuation correction and anatomic localization. Fused PET-CT and 3D MIP reconstructions were utilized for image interpretation.  Fasting blood glucose level: 94 mg/dl. Reference uptake values: Mediastinum: 2.2 SUVmax Liver: 3.0 SUVmax Normalization method: Body Weight Findings: Head and Neck: No suspicious metabolic activity. Physiologic activity is present within the aerodigestive structures, muscles of phonation, and brain in a symmetric fasion. Thorax: No suspicious metabolic activity. Resolution of the previously abnormal hypermetabolic activity in the right hilar region. Improved aeration in the right middle lobe after bronchoscopy. Scattered pulmonary nodules are not significantly changed from prior diagnostic chest CT, and otherwise are not well characterized on this low-dose nonbreath-hold CT, and below the resolution of PET. Abdomen and Pelvis: No suspicious metabolic activity. Rectal mass has no significant metabolic activity and appears unchanged in size compared to  recent CT. Physiologic activity is present within the gastrointestinal and genitourinary tracts. Similar ill-defined soft tissue in the left retroperitoneum anterior to the kidney, which is no significant metabolic activity. Musculoskeletal and Extremities: New diffuse and relatively homogenous radiotracer uptake throughout the axial and appendicular skeleton, likely related to marrow stimulation. Findings obscure evaluation of underlying focal lesions.     Impression: Impression: No significant/pathologic hypermetabolic activity (Deauville score 1). Resolved hypermetabolism in the right hilar region. No hypermetabolism associated with the rectal mass. New radiotracer uptake throughout the axial and appendicular skeleton, likely related to marrow stimulation. This high level of background activity obscures evaluation of underlying focal osseous lesions. Improved aeration of the right middle lobe after bronchoscopy, noted to be secondary to malignant involvement (per chart review). No hypermetabolism within this region. Scattered small pulmonary nodules are unchanged compared to prior exam and below the resolution of PET. Electronically Signed: Sonido Stokes MD  3/19/2024 9:59 AM EDT  Workstation ID: [id number]     Assessment / Plan      Assessment/Plan:     1.  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  Chemo monitoring  -He completed 6 cycles of BV-AVD 3/23/2023. Cycle 6 was complicated by coronavirus infection and multifocal pneumonia.  Posttherapy PET showed persistent bilateral interstitial changes and mild adenopathy. Repeat chest CT shows continued improvement in the pulmonary infiltrates consistent with a reactive infectious etiology.  - He had clear response to first line therapy with resolution of the hepatic lesions, marked improvement in the retroperitoneal adenopathy, and 50% reduction in the size of the rectal mass.  However he had persistent  uptake in the rectal mass on posttreatment PET, [place] 5. We elected to proceed with biopsy to confirm whether he had residual disease followed by consolidative radiotherapy to the rectal mass given initial bulky disease.  In the interim, while awaiting biopsy, he presented with rectal bleeding, obstructive uropathy symptoms and pain and radiation planning CT showed significant interval growth in the rectal mass as well as development of new retroperitoneal adenopathy superior to the rectal mass compared to the his posttreatment PET, confirming progressive disease. Consolidative radiation was converted to palliative course radiation given interval adenopathy development above the radiation field to allow for earlier introduction of second line chemotherapy.   -Repeat biopsy confirmed residual classic Hodgkin's lymphoma.  -Given national shortage of carbo/cis, we proceeded with Keytruda, vinorelbine, Gemzar, and Doxil. He completed 4 cycles 8/2023. Post treatment PET showed complete resolution of FDG avidity.  He continued Keytruda maintenance  -Most recent PET from 12/2023 with resolution of FDG avidity in the rectal mass which has decreased in size.  There is a new area of FDG avidity and a lytic lesion in the L4 region as well as mild SUV uptake in a right hilar node.  Lytic lesion appears to have been there previously on my review of prior PETs but with increased activity. He completed stereotactic radiation therapy to this area as he was having pain.  There was also an indeterminate hilar LN. I recommended continuation of maintenance Keytruda and short interval follow-up CT of the chest to reassess this. CT in Jan compared to prior PET showed worsening consolidative RML from hilum to major fissure with possible broncholith and no specific mass. He underwent bronchoscopy with findings confirming recurrent CD30 positive Hodgkin's lymphoma, but he now has developed strong CD20 positivity.  Morphologically this is  felt to represent the same underlying Hodgkin's process, but with immunophenotype switch.  I recommended third line systemic therapy.  He initiated  Rituximab/ICE  - I have discussed his case with Dr. Pruitt at  and plan is to continue with the plan for 4 cycles of RICE pending input from [facility] regarding candidacy for autologous stem cell transplant or potentially CAR-T cell therapy.  Given multiply relapsed disease, he is at high risk for progression.  Has another follow-up with the transplant team next week  -CBC, CMP, LDH, and uric acid reviewed and adequate for treatment today.  UA is notable for microscopic hematuria, but this is stable from his last cycle and consistent with radiation cystitis.  He does have follow-up with urology in May.  We will monitor daily UA with microscopy and consult urology if any gross hematuria.  CBC shows adequate count recovery but progressive treatment related anemia. Discussed possible need for transfusions if worsens  =- Check CBC/CMP/uric acid daily.  -Chemo orders and premeds signed today.  -He will receive Neulasta 4/22/24.  -Weekly blood counts. Transfuse with only leukoreduced irradiated blood products for hemoglobin < 7. No transfusion needs today.  -Discussed with hospitalist    -I spoke with Dr. Pruitt after his office visit He is not a candidate for ASCT due to short duration of remission per [facility] transplant team. He is being considered for CAR-T cell trial and I have requested pathology to add on a CD 19 stain to help facilitate assessment for candidacy for these: Follow up on results.     2.  H/o GI bleed  -PPI, continue daily.  Tolerating well.     3. Access  -Port c/d/I    4.  Right middle lobe obstruction  -Secondary to his malignancy.  He is on room air.  I have discussed with pulmonary and he would potentially be candidate for his stent if he has progressive obstructive symptoms.    -Improving on treatment    5.  Mucositis  -Continue valtrex prophylaxis,  refilled    Follow Up:   Weekly blood counts  Neulasta on Monday  Gibran in 2 weeks      Kaycee Leary MD  Hematology and Oncology

## 2024-04-16 NOTE — TELEPHONE ENCOUNTER
Spoke with Tania at [facility] regarding clarification.  Advised her after clarifying with Dr. Leary, instructions for Valtrex sent today are for one tablet PO daily.  She verbalized understanding and stated no new script needed.

## 2024-04-17 LAB
ALBUMIN SERPL-MCNC: 3.8 G/DL (ref 3.5–5.2)
ALBUMIN/GLOB SERPL: 1.9 G/DL
ALP SERPL-CCNC: 106 U/L (ref 39–117)
ALT SERPL W P-5'-P-CCNC: 8 U/L (ref 1–41)
ANION GAP SERPL CALCULATED.3IONS-SCNC: 9 MMOL/L (ref 5–15)
AST SERPL-CCNC: 11 U/L (ref 1–40)
BACTERIA UR QL AUTO: NORMAL /HPF
BASOPHILS # BLD AUTO: 0.02 10*3/MM3 (ref 0–0.2)
BASOPHILS NFR BLD AUTO: 0.2 % (ref 0–1.5)
BILIRUB SERPL-MCNC: 0.3 MG/DL (ref 0–1.2)
BILIRUB UR QL STRIP: NEGATIVE
BUN SERPL-MCNC: 11 MG/DL (ref 8–23)
BUN/CREAT SERPL: 23.9 (ref 7–25)
CALCIUM SPEC-SCNC: 8.8 MG/DL (ref 8.6–10.5)
CHLORIDE SERPL-SCNC: 103 MMOL/L (ref 98–107)
CLARITY UR: CLEAR
CO2 SERPL-SCNC: 24 MMOL/L (ref 22–29)
COLOR UR: YELLOW
CREAT SERPL-MCNC: 0.46 MG/DL (ref 0.76–1.27)
DEPRECATED RDW RBC AUTO: 75.2 FL (ref 37–54)
EGFRCR SERPLBLD CKD-EPI 2021: 111.1 ML/MIN/1.73
EOSINOPHIL # BLD AUTO: 0 10*3/MM3 (ref 0–0.4)
EOSINOPHIL NFR BLD AUTO: 0 % (ref 0.3–6.2)
ERYTHROCYTE [DISTWIDTH] IN BLOOD BY AUTOMATED COUNT: 20.3 % (ref 12.3–15.4)
GLOBULIN UR ELPH-MCNC: 2 GM/DL
GLUCOSE SERPL-MCNC: 143 MG/DL (ref 65–99)
GLUCOSE UR STRIP-MCNC: NEGATIVE MG/DL
HCT VFR BLD AUTO: 26.4 % (ref 37.5–51)
HGB BLD-MCNC: 8.4 G/DL (ref 13–17.7)
HGB UR QL STRIP.AUTO: ABNORMAL
HYALINE CASTS UR QL AUTO: NORMAL /LPF
IMM GRANULOCYTES # BLD AUTO: 0.28 10*3/MM3 (ref 0–0.05)
IMM GRANULOCYTES NFR BLD AUTO: 2.2 % (ref 0–0.5)
KETONES UR QL STRIP: NEGATIVE
LEUKOCYTE ESTERASE UR QL STRIP.AUTO: NEGATIVE
LYMPHOCYTES # BLD AUTO: 0.51 10*3/MM3 (ref 0.7–3.1)
LYMPHOCYTES NFR BLD AUTO: 4 % (ref 19.6–45.3)
MCH RBC QN AUTO: 32.8 PG (ref 26.6–33)
MCHC RBC AUTO-ENTMCNC: 31.8 G/DL (ref 31.5–35.7)
MCV RBC AUTO: 103.1 FL (ref 79–97)
MONOCYTES # BLD AUTO: 0.16 10*3/MM3 (ref 0.1–0.9)
MONOCYTES NFR BLD AUTO: 1.3 % (ref 5–12)
NEUTROPHILS NFR BLD AUTO: 11.82 10*3/MM3 (ref 1.7–7)
NEUTROPHILS NFR BLD AUTO: 92.3 % (ref 42.7–76)
NITRITE UR QL STRIP: NEGATIVE
NRBC BLD AUTO-RTO: 0.2 /100 WBC (ref 0–0.2)
PH UR STRIP.AUTO: 6.5 [PH] (ref 5–8)
PLATELET # BLD AUTO: 146 10*3/MM3 (ref 140–450)
PMV BLD AUTO: 10.4 FL (ref 6–12)
POTASSIUM SERPL-SCNC: 4.4 MMOL/L (ref 3.5–5.2)
PROT SERPL-MCNC: 5.8 G/DL (ref 6–8.5)
PROT UR QL STRIP: NEGATIVE
RBC # BLD AUTO: 2.56 10*6/MM3 (ref 4.14–5.8)
RBC # UR STRIP: NORMAL /HPF
REF LAB TEST METHOD: NORMAL
SODIUM SERPL-SCNC: 136 MMOL/L (ref 136–145)
SP GR UR STRIP: 1.01 (ref 1–1.03)
SQUAMOUS #/AREA URNS HPF: NORMAL /HPF
URATE SERPL-MCNC: 4.4 MG/DL (ref 3.4–7)
UROBILINOGEN UR QL STRIP: ABNORMAL
WBC # UR STRIP: NORMAL /HPF
WBC NRBC COR # BLD AUTO: 12.79 10*3/MM3 (ref 3.4–10.8)

## 2024-04-17 PROCEDURE — 25810000003 SODIUM CHLORIDE 0.9 % SOLUTION 500 ML FLEX CONT: Performed by: INTERNAL MEDICINE

## 2024-04-17 PROCEDURE — 25810000003 SODIUM CHLORIDE 0.9 % SOLUTION: Performed by: INTERNAL MEDICINE

## 2024-04-17 PROCEDURE — 99232 SBSQ HOSP IP/OBS MODERATE 35: CPT | Performed by: STUDENT IN AN ORGANIZED HEALTH CARE EDUCATION/TRAINING PROGRAM

## 2024-04-17 PROCEDURE — 25010000002 DEXAMETHASONE PER 1 MG: Performed by: INTERNAL MEDICINE

## 2024-04-17 PROCEDURE — 25010000002 CARBOPLATIN PER 50 MG: Performed by: INTERNAL MEDICINE

## 2024-04-17 PROCEDURE — 85025 COMPLETE CBC W/AUTO DIFF WBC: CPT | Performed by: INTERNAL MEDICINE

## 2024-04-17 PROCEDURE — 84550 ASSAY OF BLOOD/URIC ACID: CPT | Performed by: INTERNAL MEDICINE

## 2024-04-17 PROCEDURE — 80053 COMPREHEN METABOLIC PANEL: CPT | Performed by: INTERNAL MEDICINE

## 2024-04-17 PROCEDURE — 99223 1ST HOSP IP/OBS HIGH 75: CPT | Performed by: INTERNAL MEDICINE

## 2024-04-17 PROCEDURE — 25010000002 FOSAPREPITANT PER 1 MG: Performed by: INTERNAL MEDICINE

## 2024-04-17 PROCEDURE — 25010000002 ETOPOSIDE 1 GM/50ML SOLUTION 50 ML VIAL: Performed by: INTERNAL MEDICINE

## 2024-04-17 PROCEDURE — 25010000002 MESNA PER 200 MG: Performed by: INTERNAL MEDICINE

## 2024-04-17 PROCEDURE — 25010000002 ONDANSETRON PER 1 MG: Performed by: INTERNAL MEDICINE

## 2024-04-17 PROCEDURE — 25810000003 SODIUM CHLORIDE 0.9 % SOLUTION 250 ML FLEX CONT: Performed by: INTERNAL MEDICINE

## 2024-04-17 PROCEDURE — 81001 URINALYSIS AUTO W/SCOPE: CPT | Performed by: INTERNAL MEDICINE

## 2024-04-17 PROCEDURE — 25010000002 IFOSFAMIDE PER 1 G: Performed by: INTERNAL MEDICINE

## 2024-04-17 RX ORDER — SODIUM CHLORIDE 9 MG/ML
125 INJECTION, SOLUTION INTRAVENOUS CONTINUOUS
Status: DISCONTINUED | OUTPATIENT
Start: 2024-04-17 | End: 2024-04-18 | Stop reason: HOSPADM

## 2024-04-17 RX ADMIN — FOSAPREPITANT DIMEGLUMINE 150 MG: 150 INJECTION, POWDER, LYOPHILIZED, FOR SOLUTION INTRAVENOUS at 14:18

## 2024-04-17 RX ADMIN — Medication 10 ML: at 20:03

## 2024-04-17 RX ADMIN — ONDANSETRON: 2 INJECTION INTRAMUSCULAR; INTRAVENOUS at 14:52

## 2024-04-17 RX ADMIN — PANTOPRAZOLE SODIUM 40 MG: 40 TABLET, DELAYED RELEASE ORAL at 09:24

## 2024-04-17 RX ADMIN — MESNA 9000 MG: 100 INJECTION, SOLUTION INTRAVENOUS at 17:06

## 2024-04-17 RX ADMIN — SODIUM CHLORIDE 180 MG: 9 INJECTION, SOLUTION INTRAVENOUS at 15:11

## 2024-04-17 RX ADMIN — VALACYCLOVIR HYDROCHLORIDE 500 MG: 500 TABLET, FILM COATED ORAL at 09:24

## 2024-04-17 RX ADMIN — SODIUM CHLORIDE 125 ML/HR: 9 INJECTION, SOLUTION INTRAVENOUS at 09:25

## 2024-04-17 RX ADMIN — Medication 10 ML: at 09:24

## 2024-04-17 RX ADMIN — CARBOPLATIN 750 MG: 10 INJECTION, SOLUTION INTRAVENOUS at 16:21

## 2024-04-17 RX ADMIN — ROSUVASTATIN CALCIUM 10 MG: 10 TABLET, FILM COATED ORAL at 20:03

## 2024-04-17 NOTE — PROGRESS NOTES
[facility] Medicine Services  PROGRESS NOTE    Patient Name: Abraham Wu  : 1950  MRN: [id number]    Date of Admission: 2024  Primary Care Physician: Jatinder Haynes MD    Subjective   Subjective     CC:  Chemotherapy monitoring     HPI:  Patient reports feeling well this morning.  Denies any uncontrolled pain or discomforts.  Denies fevers, chills, sweats.      Objective   Objective     Vital Signs:   Temp:  [96.6 °F (35.9 °C)-99 °F (37.2 °C)] 96.9 °F (36.1 °C)  Heart Rate:  [52-67] 52  Resp:  [14-17] 16  BP: (116-138)/(63-83) 134/73     Physical Exam:  General appearance: alert, awake, oriented, no acute distress   Cardiovascular: RRR, no murmurs or rubs, radial pulse full 2/4 BL   Respiratory: CTAB, no crackles or wheezes   Abdomen: soft, non-tender, no organomegaly, bowel sounds normoactive, ostomy w brown stool output    Neuro/CNS: alert and oriented x3, normal speech    Results Reviewed:  LAB RESULTS:      Lab 24  0512 24  0925 24  1421   WBC 12.79* 7.41 10.88*   HEMOGLOBIN 8.4* 8.7* 8.5*   HEMATOCRIT 26.4* 26.4* 26.0*   PLATELETS 146 139* 77*   NEUTROS ABS 11.82* 5.68 8.49*   IMMATURE GRANS (ABS) 0.28* 0.21* 0.55*   LYMPHS ABS 0.51* 0.81 0.69*   MONOS ABS 0.16 0.62 1.07*   EOS ABS 0.00 0.05 0.05   .1* 103.1* 101.6*   LDH  --  206  --          Lab 24  0512 24  0925   SODIUM 136 136   POTASSIUM 4.4 4.4   CHLORIDE 103 99   CO2 24.0 28.0   ANION GAP 9.0 9.0   BUN 11 12   CREATININE 0.46* 0.64*   EGFR 111.1 100.6   GLUCOSE 143* 95   CALCIUM 8.8 8.9   MAGNESIUM  --  1.9   PHOSPHORUS  --  4.1         Lab 24  0512 24  0925   TOTAL PROTEIN 5.8* 6.8   ALBUMIN 3.8 4.2   GLOBULIN 2.0 2.6   ALT (SGPT) 8 9   AST (SGOT) 11 16   BILIRUBIN 0.3 0.3   ALK PHOS 106 111                     Brief Urine Lab Results  (Last result in the past 365 days)        Color   Clarity   Blood   Leuk Est   Nitrite   Protein   CREAT   Urine HCG         04/17/24 0719 Yellow   Clear   Trace   Negative   Negative   Negative                   Microbiology Results Abnormal       None            No radiology results from the last 24 hrs    Results for orders placed during the hospital encounter of 06/15/23    Adult Transthoracic Echo Complete W/ Cont if Necessary Per Protocol    Interpretation Summary    Left ventricular systolic function is normal. Estimated left ventricular EF = 60%    Normal global longitudinal strain pattern (-19.9%)    The cardiac valves are anatomically and functionally normal.    Estimated right ventricular systolic pressure from tricuspid regurgitation is normal (<35 mmHg).      Current medications:  Scheduled Meds:CARBOplatin (PARAPLATIN) 750 mg in sodium chloride 0.9 % 325 mL chemo IVPB, 750 mg, Intravenous, Once  etoposide (TOPOSAR) 180 mg in sodium chloride 0.9 % 509 mL chemo IVPB, 100 mg/m2 (Treatment Plan Recorded), Intravenous, Once  fosaprepitant (EMEND) IVPB, 150 mg, Intravenous, Once  ifosfamide (IFEX) 9,000 mg, mesna (MESNEX) 9,000 mg in sodium chloride 0.9 % 770 mL chemo IVPB, 9,000 mg, Intravenous, Once  ondansetron (ZOFRAN) 16 mg, dexAMETHasone (DECADRON) 12 mg in sodium chloride 0.9 % 50 mL IVPB, , Intravenous, Once  pantoprazole, 40 mg, Oral, Daily  rosuvastatin, 10 mg, Oral, Nightly  sodium chloride, 10 mL, Intravenous, Q12H  valACYclovir, 500 mg, Oral, Daily      Continuous Infusions:sodium chloride, 125 mL/hr, Last Rate: 125 mL/hr (04/17/24 0613)  sodium chloride, 125 mL/hr, Last Rate: 125 mL/hr (04/17/24 0925)      PRN Meds:.  acetaminophen    senna-docusate sodium **AND** polyethylene glycol **AND** bisacodyl **AND** bisacodyl    Calcium Replacement - Follow Nurse / BPA Driven Protocol    diphenhydrAMINE    famotidine    fluticasone    Hydrocortisone Sod Suc (PF)    Magnesium Standard Dose Replacement - Follow Nurse / BPA Driven Protocol    meperidine    ondansetron    Phosphorus Replacement - Follow Nurse / BPA Driven  Protocol    Potassium Replacement - Follow Nurse / BPA Driven Protocol    sodium chloride    sodium chloride    Assessment & Plan   Assessment & Plan     Active Hospital Problems    Diagnosis  POA    **Hodgkin lymphoma [C81.90]  Yes    SHERRI (obstructive sleep apnea) [G47.33]  Yes    Essential hypertension [I10]  Yes    Dyslipidemia [E78.5]  Yes      Resolved Hospital Problems   No resolved problems to display.        Brief Hospital Course to date:  Abraham Wu is a 74 y.o. male with past medical history significant for hypertension, SHERRI, Hodgkin's lymphoma, admitted for chemotherapy.  Oncology consulted and following.    Hogkin's Lymphoma  RML recurrence   - Dr. Leary managing Chemo, planning cycle 4 of RICE (last cycle 3/26)  - Day 2 of therapy   - monitor daily CMP, CBC and Uric Acid  - following with transplant team at 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         AM-PAC 6 Clicks Score (PT): 24 (04/17/24 0800)    CODE STATUS:   Code Status and Medical Interventions:   Ordered at: 04/16/24 1143     Level Of Support Discussed With:    Patient     Code Status (Patient has no pulse and is not breathing):    CPR (Attempt to Resuscitate)     Medical Interventions (Patient has pulse or is breathing):    Full Support       Naun Rush,  DO  04/17/24

## 2024-04-17 NOTE — PLAN OF CARE
Problem: Adult Inpatient Plan of Care  Goal: Plan of Care Review  Outcome: Ongoing, Progressing  Flowsheets  Taken 4/17/2024 1657 by Phillip Mccall, RN  Progress: improving  Outcome Evaluation: VSS. Resting well. Up in chair. Tolerating cycle 4 day 2 well. Will hang Ifex prior to shift change. Fx at BS. No complaints. Pt walking around room. Adaquate output. Continue care.  Taken 4/16/2024 1847 by Chanelle Britt RN  Plan of Care Reviewed With:   patient   spouse   Goal Outcome Evaluation:           Progress: improving  Outcome Evaluation: VSS. Resting well. Up in chair. Tolerating cycle 4 day 2 well. Will hang Ifex prior to shift change. Fx at BS. No complaints. Pt walking around room. Adaquate output. Continue care.

## 2024-04-17 NOTE — CONSULTS
HEMATOLOGY/ONCOLOGY INPATIENT CONSULTATION      REFERRING PHYSICIAN: Naun Rush DO    PRIMARY CARE PROVIDER: Jatinder Haynes MD    REASON FOR CONSULTATION: lymphoma      HISTORY OF PRESENT ILLNESS: Abraham Wu is a 74-year-old gentleman who is well-known to me with a history of relapsed Hodgkin's lymphoma, CD20 and CD30 positive, who was admitted for cycle 4 of RICE chemotherapy. He is being considered for a CAR-T cell clinical trial at  pending re-evaluation next week.      He tolerated day 1 of treatment well.  He denies any hematuria.  He denies  lower extremity weakness, numbness, or incoordination.  No nausea, vomiting, mucositis, abdominal pain, chest pain, or shortness of breath.  No leg swelling. SCDs are uncomfortable. He is ambulating frequently.      No Known Allergies    Past Medical History:   Diagnosis Date    Arthritis     benign polypoid tissue right lung     Cancer     HODGKINS LYMPHOMA, RECTAL CANCER    Diabetes mellitus     patient reports that he doesn't have diabetes secondary to changing diet and weight loss.    History of radiation therapy 12/31/2023    re-treat rectum    History of transfusion     no reaction, transfusions received at [facility] in 2022    Hyperlipidemia     Hypertension     Lymphoma     Mycobacterium mucogenicum     SHERRI (obstructive sleep apnea)     O2 2L/MIN AT NIGHT ONLY    Pneumonia     2023    Seasonal allergies          Current Facility-Administered Medications:     acetaminophen (TYLENOL) tablet 650 mg, 650 mg, Oral, Q4H PRN, Evelin Jacinto DO    sennosides-docusate (PERICOLACE) 8.6-50 MG per tablet 2 tablet, 2 tablet, Oral, BID PRN **AND** polyethylene glycol (MIRALAX) packet 17 g, 17 g, Oral, Daily PRN **AND** bisacodyl (DULCOLAX) EC tablet 5 mg, 5 mg, Oral, Daily PRN **AND** bisacodyl (DULCOLAX) suppository 10 mg, 10 mg, Rectal, Daily PRN, Evelin Jacinto DO    Calcium Replacement - Follow Nurse / BPA Driven Protocol, , Does not apply, PRN, [place]  Evelin ROBERTS DO    CARBOplatin (PARAPLATIN) 750 mg in sodium chloride 0.9 % 325 mL chemo IVPB, 750 mg, Intravenous, Once, Kaycee Leary MD    diphenhydrAMINE (BENADRYL) injection 50 mg, 50 mg, Intravenous, PRN, Kaycee Leary MD    etoposide (TOPOSAR) 180 mg in sodium chloride 0.9 % 509 mL chemo IVPB, 100 mg/m2 (Treatment Plan Recorded), Intravenous, Once, Kaycee Leary MD    famotidine (PEPCID) injection 20 mg, 20 mg, Intravenous, PRN, Kaycee Leary MD    fluticasone (FLONASE) 50 MCG/ACT nasal spray 1 spray, 1 spray, Nasal, Daily PRN, Evelin Jacinto DO    fosaprepitant (EMEND) 150 mg/100mL NS, 150 mg, Intravenous, Once, Kaycee Leary MD    Hydrocortisone Sod Suc (PF) (Solu-CORTEF) injection 100 mg, 100 mg, Intravenous, PRN, Kaycee Leary MD    ifosfamide (IFEX) 9,100 mg, mesna (MESNEX) 9,100 mg in sodium chloride 0.9 % 500 mL chemo IVPB, 5,000 mg/m2 (Treatment Plan Recorded), Intravenous, Once, Kaycee Leary MD    Magnesium Standard Dose Replacement - Follow Nurse / BPA Driven Protocol, , Does not apply, PRN, Evelin Jacinto DO    meperidine (DEMEROL) injection 25 mg, 25 mg, Intravenous, Q20 Min PRN, Kaycee Leary MD    ondansetron (ZOFRAN) 16 mg, dexAMETHasone (DECADRON) 12 mg in sodium chloride 0.9 % 50 mL IVPB, , Intravenous, Once, Kaycee Leary MD    ondansetron (ZOFRAN) injection 4 mg, 4 mg, Intravenous, Q6H PRN, Evelin Jacinto DO    pantoprazole (PROTONIX) EC tablet 40 mg, 40 mg, Oral, Daily, Evelin Jacinto DO, 40 mg at 04/16/24 1240    Phosphorus Replacement - Follow Nurse / BPA Driven Protocol, , Does not apply, PRN, Evelin Jacinto DO    Potassium Replacement - Follow Nurse / BPA Driven Protocol, , Does not apply, PRN, Evelin Jacinto,     rosuvastatin (CRESTOR) tablet 10 mg, 10 mg, Oral, Nightly, Evelin Jacinto, , 10 mg at 04/16/24 2030    sodium chloride 0.9 % flush 10 mL, 10 mL, Intravenous, Q12H, Evelin Jacinto DO, 10 mL at 04/16/24 2030    sodium  chloride 0.9 % flush 10 mL, 10 mL, Intravenous, PRN, Evelin Jacinto R, DO    sodium chloride 0.9 % infusion 40 mL, 40 mL, Intravenous, PRN, Evelin Jacinto R, DO    sodium chloride 0.9 % infusion, 125 mL/hr, Intravenous, Continuous, Kaycee Leary MD, Last Rate: 125 mL/hr at 04/17/24 0613, 125 mL/hr at 04/17/24 0613    sodium chloride 0.9 % infusion, 125 mL/hr, Intravenous, Continuous, Kaycee Leary MD    valACYclovir (VALTREX) tablet 500 mg, 500 mg, Oral, Daily, Evelin Jacinto R, , 500 mg at 04/16/24 1240    Facility-Administered Medications Ordered in Other Encounters:     sodium chloride 0.9 % infusion, 125 mL/hr, Intravenous, Continuous, Kaycee Leary MD    Past Surgical History:   Procedure Laterality Date    BRONCHOSCOPY      removal of obstructing polypoid tissue right middle lung    BRONCHOSCOPY N/A 2/1/2024    Procedure: BRONCHOSCOPY WITH ENDOBRONCHIAL ULTRASOUND AND FLUORO;  Surgeon: Chris Pruitt MD;  Location:  [facility];  Service: Pulmonary;  Laterality: N/A;  EBUS BALLOON INTACT    COLONOSCOPY      COLOSTOMY N/A 09/22/2022    Procedure: LAPAROSCOPIC COLOSTOMY CREATION, FLEXIBLE SIGMOIDOSCOPY;  Surgeon: Hiram Viveros MD;  Location:  [facility];  Service: General;  Laterality: N/A;    DENTAL PROCEDURE      dental implants    ENDOSCOPY N/A 10/10/2022    Procedure: ESOPHAGOGASTRODUODENOSCOPY;  Surgeon: Neeraj Lynn MD;  Location:  [facility];  Service: Gastroenterology;  Laterality: N/A;    ENDOSCOPY N/A 10/12/2022    Procedure: ESOPHAGOGASTRODUODENOSCOPY;  Surgeon: Brunner, Mark I, MD;  Location:  [facility];  Service: Gastroenterology;  Laterality: N/A;    EXAM UNDER ANESTHESIA, RECTAL BIOPSY N/A 10/04/2022    Procedure: EXAM UNDER ANESTHESIA, TRANSANAL BIOPSY WITH TRUCUT NEEDLE (LITHOTOMY-CANDY CANE);  Surgeon: Hiram Viveros MD;  Location:  [facility];  Service: General;  Laterality: N/A;    EXAM UNDER ANESTHESIA, RECTAL BIOPSY N/A 05/30/2023     Procedure: EXAM UNDER ANESTHESIA, TRANSANAL BIOPSY OF RECTAL MASS WITH TRUCUT NEEDLE, PROCTOSCOPY;  Surgeon: Hiram Viveros MD;  Location: [facility] OR;  Service: General;  Laterality: N/A;    PERIPHERALLY INSERTED CENTRAL CATHETER INSERTION      Removed 11/28/2022    VENOUS ACCESS DEVICE (PORT) INSERTION Right 11/28/2022       Social History     Socioeconomic History    Marital status:    Tobacco Use    Smoking status: Never    Smokeless tobacco: Never   Vaping Use    Vaping status: Never Used   Substance and Sexual Activity    Alcohol use: Not Currently     Comment: previously drank 2 glasses of wine/beer nightly    Drug use: Never    Sexual activity: Defer       Family History   Problem Relation Age of Onset    Diabetes Mother     Diabetes Father     Heart disease Father        Oncology/Hematology History   Hodgkin lymphoma   10/7/2022 Initial Diagnosis    Hodgkin lymphoma (HCC)     10/8/2022 - 3/22/2023 Chemotherapy    OP HODGKIN LYMPHOMA  BV+AVD (Brentuximab vedotin / Doxorubicin / Vinblastine / Dacarbazine)     10/28/2022 Cancer Staged    Staging form: Hodgkin And Non-Hodgkin Lymphoma, AJCC 8th Edition  - Clinical: Stage IV - Signed by Kaycee Leary MD on 10/28/2022     5/31/2023 - 6/1/2023 Radiation    Radiation OncologyTreatment Course:  Abraham Wu received 1400 cGy in 4 bid fractions to rectal mass via External Beam Radiation - EBRT.     6/13/2023 - 6/13/2023 Chemotherapy    OP HODGKIN LYMPHOMA BeGV (Bendamustine / Gemcitabine / VinORELBine)     6/16/2023 - 9/1/2023 Biopsy    OP HODGKIN LYMPHOMA Pembrolizumab + GVD (Gemcitabine / VinORELBine / Liposomal DOXOrubicin)  Plan Provider: Kaycee Leary MD  Treatment goal: Control  Line of treatment: Second Line     9/14/2023 - 1/18/2024 Chemotherapy    OP HL Pembrolizumab 200 mg     9/18/2023 - 9/27/2023 Radiation    Radiation OncologyTreatment Course:  Abraham Wu received 2400 cGy in 8 fractions to rectum via External Beam Radiation  - EBRT.     1/16/2024 - 1/22/2024 Radiation    Radiation OncologyTreatment Course:  Abraham Wu received 2100 cGy in 3 fractions to L4 spine lesion via Stereotactic Radiation Therapy - SRT.     2/8/2024 - 2/8/2024 Chemotherapy    IP LYMPHOMA DHAP-R Dexamethasone / CISplatin / Cytarabine / RiTUXimab     2/13/2024 -  Chemotherapy    IP LYMPHOMA RICE RiTUXimab / Ifosfamide + Mesna / CARBOplatin AUC = 5 / Etoposide     2/15/2024 - 2/15/2024 Biopsy    OP LYMPHOMA Gemcitabine / Dexamethasone / CARBOplatin AUC=5 / RiTUXimab  Plan Provider: Kaycee Leary MD  Treatment goal: Control  Line of treatment: [No plan line of treatment]               Objective     Vitals:    04/16/24 1514 04/16/24 1947 04/16/24 2300 04/17/24 0403   BP: 121/69 138/67 135/78 132/70   BP Location: Left arm Left arm Left arm Left arm   Patient Position: Lying Sitting Lying Lying   Pulse: 67 60 63 62   Resp: 16 16 14 17   Temp: 96.6 °F (35.9 °C) 97.5 °F (36.4 °C) 99 °F (37.2 °C) 97.7 °F (36.5 °C)   TempSrc: Temporal Temporal Temporal Temporal   SpO2: 98% 96% 99% 99%   Weight:       Height:                       Temp:  [96.6 °F (35.9 °C)-99 °F (37.2 °C)] 97.7 °F (36.5 °C)     Performance Status: 0    Physical Exam    General: well appearing male in no acute distress  HEENT: sclerae anicteric, oropharynx clear  Lymphatics: no cervical, supraclavicular, or axillary adenopathy  Cardiovascular: regular rate and rhythm, no murmurs  Lungs: clear to auscultation bilaterally  Abdomen: soft, nontender, nondistended.  No palpable organomegaly  Extremities: no lower extremity edema  Skin: no rashes  Neuro: A and O x 3. Strength 5/5. FTN intact bilateral    LABS:    Lab Results   Component Value Date    HGB 8.4 (L) 04/17/2024    HCT 26.4 (L) 04/17/2024    .1 (H) 04/17/2024     04/17/2024    WBC 12.79 (H) 04/17/2024    NEUTROABS 11.82 (H) 04/17/2024    LYMPHSABS 0.51 (L) 04/17/2024    MONOSABS 0.16 04/17/2024    EOSABS 0.00 04/17/2024     BASOSABS 0.02 04/17/2024     Lab Results   Component Value Date    GLUCOSE 143 (H) 04/17/2024    BUN 11 04/17/2024    CREATININE 0.46 (L) 04/17/2024     04/17/2024    K 4.4 04/17/2024     04/17/2024    CO2 24.0 04/17/2024    CALCIUM 8.8 04/17/2024    PROTEINTOT 5.8 (L) 04/17/2024    ALBUMIN 3.8 04/17/2024    BILITOT 0.3 04/17/2024    ALKPHOS 106 04/17/2024    AST 11 04/17/2024    ALT 8 04/17/2024         IMAGING    NM PET/CT Skull Base to Mid Thigh    Result Date: 3/19/2024  NM PET/CT SKULL BASE TO MID THIGH Date of Exam: 3/18/2024 8:45 AM EDT Indication: lymphoma restaging. Comparison: PET/CT 12/4/2023, CT chest abdomen pelvis 1/16/2024. Technique: 12.38 mCi of F-18 FDG was administered intravenously. PET imaging was obtained from skull base to mid-thigh approximately 60 minutes after radiotracer injection. A low dose non contrast CT was obtained for attenuation correction and anatomic localization. Fused PET-CT and 3D MIP reconstructions were utilized for image interpretation.  Fasting blood glucose level: 94 mg/dl. Reference uptake values: Mediastinum: 2.2 SUVmax Liver: 3.0 SUVmax Normalization method: Body Weight Findings: Head and Neck: No suspicious metabolic activity. Physiologic activity is present within the aerodigestive structures, muscles of phonation, and brain in a symmetric fasion. Thorax: No suspicious metabolic activity. Resolution of the previously abnormal hypermetabolic activity in the right hilar region. Improved aeration in the right middle lobe after bronchoscopy. Scattered pulmonary nodules are not significantly changed from prior diagnostic chest CT, and otherwise are not well characterized on this low-dose nonbreath-hold CT, and below the resolution of PET. Abdomen and Pelvis: No suspicious metabolic activity. Rectal mass has no significant metabolic activity and appears unchanged in size compared to recent CT. Physiologic activity is present within the gastrointestinal and  genitourinary tracts. Similar ill-defined soft tissue in the left retroperitoneum anterior to the kidney, which is no significant metabolic activity. Musculoskeletal and Extremities: New diffuse and relatively homogenous radiotracer uptake throughout the axial and appendicular skeleton, likely related to marrow stimulation. Findings obscure evaluation of underlying focal lesions.     Impression: No significant/pathologic hypermetabolic activity (Deauville score 1). Resolved hypermetabolism in the right hilar region. No hypermetabolism associated with the rectal mass. New radiotracer uptake throughout the axial and appendicular skeleton, likely related to marrow stimulation. This high level of background activity obscures evaluation of underlying focal osseous lesions. Improved aeration of the right middle lobe after bronchoscopy, noted to be secondary to malignant involvement (per chart review). No hypermetabolism within this region. Scattered small pulmonary nodules are unchanged compared to prior exam and below the resolution of PET. Electronically Signed: Sonido Stokes MD  3/19/2024 9:59 AM EDT  Workstation ID: [id number]      ASSESSMENT/PLAN:  1.  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  Chemo monitoring  -Today is day 2 of cycle 2 of cycle 4 RICE chemotherapy.    -Tolerating therapy well.  -CBC/CMP/ UA with micro reviewed and stable.   -Patient is okay to proceed with day 2 of treatment. Discussed with RN  -Microscopic hematuria likely secondary to radiation cystitis and was present prior to treatment. Monitor for signs of gross hematuria. Consult urology if progressive hematuria.  -Continue to monitor closely for side effects and toxicity of chemotherapy. - Continue daily CBC and electrolytes.  Transfuse for hemoglobin less than 7 or platelets less than 10 unless active bleeding with only leukoreduced irradiated blood products.  No transfusion  needs today.   -CD 19 positive on pathology report reviewed: CAR-T assessment next week.     3.  SHERRI  -Continues on nocturnal oxygen with stable requirement.     4.  Microscopic hematuria  -He has a history of rectal radiation and there were mild changes suggestive of radiation cystitis on his most recent imaging.  He is completely asymptomatic.  This predated ifosfamide.  I have discussed with his urologist.  Will plan to monitor symptoms and serial UA and consult urology if any worsening hematuria.  UA stable.  He has outpatient urology follow-up next month.    5. PUD  -PPI    6.  Prophylaxis  -Valtrex     I will continue to follow up with pt while inpatient Anticipate discharge tomorrow evening after chemo completes.   Will complete D4/5 steroid at home.   Follow up Monday for Neulasta  Weekly CBC  F/u  1 week  F/u me 2 weeks      Kaycee Leary MD    4/17/2024

## 2024-04-17 NOTE — PLAN OF CARE
Goal Outcome Evaluation:                                            VSS. 3 L NC at night baseline. Urinal within reach-- adequate UOP. Pt independent with colostomy care-- pt reassured to ask for assistance as needed. Tolerating PO. Chemo tomorrow,.

## 2024-04-18 ENCOUNTER — READMISSION MANAGEMENT (OUTPATIENT)
Dept: CALL CENTER | Facility: HOSPITAL | Age: 74
End: 2024-04-18
Payer: MEDICARE

## 2024-04-18 VITALS
HEIGHT: 64 IN | HEART RATE: 60 BPM | TEMPERATURE: 98.1 F | SYSTOLIC BLOOD PRESSURE: 128 MMHG | DIASTOLIC BLOOD PRESSURE: 62 MMHG | BODY MASS INDEX: 30.05 KG/M2 | OXYGEN SATURATION: 95 % | RESPIRATION RATE: 18 BRPM | WEIGHT: 175.99 LBS

## 2024-04-18 DIAGNOSIS — C81.98 HODGKIN LYMPHOMA OF LYMPH NODES OF MULTIPLE REGIONS, UNSPECIFIED HODGKIN LYMPHOMA TYPE: Primary | ICD-10-CM

## 2024-04-18 LAB
ALBUMIN SERPL-MCNC: 3.7 G/DL (ref 3.5–5.2)
ALBUMIN/GLOB SERPL: 2.2 G/DL
ALP SERPL-CCNC: 88 U/L (ref 39–117)
ALT SERPL W P-5'-P-CCNC: 9 U/L (ref 1–41)
ANION GAP SERPL CALCULATED.3IONS-SCNC: 10 MMOL/L (ref 5–15)
AST SERPL-CCNC: 12 U/L (ref 1–40)
BACTERIA UR QL AUTO: NORMAL /HPF
BASOPHILS # BLD AUTO: 0.01 10*3/MM3 (ref 0–0.2)
BASOPHILS NFR BLD AUTO: 0.1 % (ref 0–1.5)
BILIRUB SERPL-MCNC: 0.2 MG/DL (ref 0–1.2)
BILIRUB UR QL STRIP: NEGATIVE
BUN SERPL-MCNC: 9 MG/DL (ref 8–23)
BUN/CREAT SERPL: 17 (ref 7–25)
CALCIUM SPEC-SCNC: 8.5 MG/DL (ref 8.6–10.5)
CHLORIDE SERPL-SCNC: 105 MMOL/L (ref 98–107)
CLARITY UR: CLEAR
CO2 SERPL-SCNC: 25 MMOL/L (ref 22–29)
COLOR UR: YELLOW
CREAT SERPL-MCNC: 0.53 MG/DL (ref 0.76–1.27)
DEPRECATED RDW RBC AUTO: 76.9 FL (ref 37–54)
EGFRCR SERPLBLD CKD-EPI 2021: 106.5 ML/MIN/1.73
EOSINOPHIL # BLD AUTO: 0 10*3/MM3 (ref 0–0.4)
EOSINOPHIL NFR BLD AUTO: 0 % (ref 0.3–6.2)
ERYTHROCYTE [DISTWIDTH] IN BLOOD BY AUTOMATED COUNT: 21.1 % (ref 12.3–15.4)
GLOBULIN UR ELPH-MCNC: 1.7 GM/DL
GLUCOSE SERPL-MCNC: 121 MG/DL (ref 65–99)
GLUCOSE UR STRIP-MCNC: NEGATIVE MG/DL
HCT VFR BLD AUTO: 23.5 % (ref 37.5–51)
HGB BLD-MCNC: 7.6 G/DL (ref 13–17.7)
HGB UR QL STRIP.AUTO: NEGATIVE
HYALINE CASTS UR QL AUTO: NORMAL /LPF
IMM GRANULOCYTES # BLD AUTO: 0.14 10*3/MM3 (ref 0–0.05)
IMM GRANULOCYTES NFR BLD AUTO: 1.3 % (ref 0–0.5)
KETONES UR QL STRIP: ABNORMAL
LEUKOCYTE ESTERASE UR QL STRIP.AUTO: NEGATIVE
LYMPHOCYTES # BLD AUTO: 0.36 10*3/MM3 (ref 0.7–3.1)
LYMPHOCYTES NFR BLD AUTO: 3.4 % (ref 19.6–45.3)
MCH RBC QN AUTO: 33.3 PG (ref 26.6–33)
MCHC RBC AUTO-ENTMCNC: 32.3 G/DL (ref 31.5–35.7)
MCV RBC AUTO: 103.1 FL (ref 79–97)
MONOCYTES # BLD AUTO: 0.17 10*3/MM3 (ref 0.1–0.9)
MONOCYTES NFR BLD AUTO: 1.6 % (ref 5–12)
NEUTROPHILS NFR BLD AUTO: 9.89 10*3/MM3 (ref 1.7–7)
NEUTROPHILS NFR BLD AUTO: 93.6 % (ref 42.7–76)
NITRITE UR QL STRIP: NEGATIVE
NRBC BLD AUTO-RTO: 0 /100 WBC (ref 0–0.2)
PH UR STRIP.AUTO: 6 [PH] (ref 5–8)
PLATELET # BLD AUTO: 128 10*3/MM3 (ref 140–450)
PMV BLD AUTO: 10.1 FL (ref 6–12)
POTASSIUM SERPL-SCNC: 4.1 MMOL/L (ref 3.5–5.2)
PROT SERPL-MCNC: 5.4 G/DL (ref 6–8.5)
PROT UR QL STRIP: NEGATIVE
RBC # BLD AUTO: 2.28 10*6/MM3 (ref 4.14–5.8)
RBC # UR STRIP: NORMAL /HPF
REF LAB TEST METHOD: NORMAL
SODIUM SERPL-SCNC: 140 MMOL/L (ref 136–145)
SP GR UR STRIP: 1.01 (ref 1–1.03)
SQUAMOUS #/AREA URNS HPF: NORMAL /HPF
URATE SERPL-MCNC: 3.8 MG/DL (ref 3.4–7)
UROBILINOGEN UR QL STRIP: ABNORMAL
WBC # UR STRIP: NORMAL /HPF
WBC NRBC COR # BLD AUTO: 10.57 10*3/MM3 (ref 3.4–10.8)

## 2024-04-18 PROCEDURE — 25010000002 ETOPOSIDE 1 GM/50ML SOLUTION 50 ML VIAL: Performed by: INTERNAL MEDICINE

## 2024-04-18 PROCEDURE — 85025 COMPLETE CBC W/AUTO DIFF WBC: CPT | Performed by: INTERNAL MEDICINE

## 2024-04-18 PROCEDURE — 99239 HOSP IP/OBS DSCHRG MGMT >30: CPT | Performed by: STUDENT IN AN ORGANIZED HEALTH CARE EDUCATION/TRAINING PROGRAM

## 2024-04-18 PROCEDURE — 84550 ASSAY OF BLOOD/URIC ACID: CPT | Performed by: INTERNAL MEDICINE

## 2024-04-18 PROCEDURE — 99232 SBSQ HOSP IP/OBS MODERATE 35: CPT | Performed by: INTERNAL MEDICINE

## 2024-04-18 PROCEDURE — 25010000002 HEPARIN LOCK FLUSH PER 10 UNITS: Performed by: STUDENT IN AN ORGANIZED HEALTH CARE EDUCATION/TRAINING PROGRAM

## 2024-04-18 PROCEDURE — 25010000002 DEXAMETHASONE PER 1 MG: Performed by: INTERNAL MEDICINE

## 2024-04-18 PROCEDURE — 81001 URINALYSIS AUTO W/SCOPE: CPT | Performed by: INTERNAL MEDICINE

## 2024-04-18 PROCEDURE — 80053 COMPREHEN METABOLIC PANEL: CPT | Performed by: INTERNAL MEDICINE

## 2024-04-18 PROCEDURE — 25010000002 ONDANSETRON PER 1 MG: Performed by: INTERNAL MEDICINE

## 2024-04-18 PROCEDURE — 25810000003 SODIUM CHLORIDE 0.9 % SOLUTION 500 ML FLEX CONT: Performed by: INTERNAL MEDICINE

## 2024-04-18 RX ORDER — HEPARIN SODIUM,PORCINE/PF 1 UNIT/ML
1 SYRINGE (ML) INTRAVENOUS EVERY 8 HOURS SCHEDULED
Status: DISCONTINUED | OUTPATIENT
Start: 2024-04-18 | End: 2024-04-18

## 2024-04-18 RX ORDER — LEVOFLOXACIN 500 MG/1
500 TABLET, FILM COATED ORAL DAILY
Qty: 7 TABLET | Refills: 0 | Status: SHIPPED | OUTPATIENT
Start: 2024-04-18 | End: 2024-04-25

## 2024-04-18 RX ORDER — HEPARIN SODIUM (PORCINE) LOCK FLUSH IV SOLN 100 UNIT/ML 100 UNIT/ML
500 SOLUTION INTRAVENOUS ONCE
Status: COMPLETED | OUTPATIENT
Start: 2024-04-18 | End: 2024-04-18

## 2024-04-18 RX ORDER — DEXAMETHASONE 2 MG/1
2 TABLET ORAL 2 TIMES DAILY WITH MEALS
COMMUNITY

## 2024-04-18 RX ADMIN — HEPARIN 500 UNITS: 100 SYRINGE at 18:49

## 2024-04-18 RX ADMIN — VALACYCLOVIR HYDROCHLORIDE 500 MG: 500 TABLET, FILM COATED ORAL at 09:07

## 2024-04-18 RX ADMIN — ONDANSETRON: 2 INJECTION INTRAMUSCULAR; INTRAVENOUS at 17:23

## 2024-04-18 RX ADMIN — Medication 10 ML: at 09:07

## 2024-04-18 RX ADMIN — SODIUM CHLORIDE 180 MG: 9 INJECTION, SOLUTION INTRAVENOUS at 17:44

## 2024-04-18 RX ADMIN — PANTOPRAZOLE SODIUM 40 MG: 40 TABLET, DELAYED RELEASE ORAL at 09:07

## 2024-04-18 NOTE — PLAN OF CARE
Goal Outcome Evaluation:                           VSS. 3 L NC while sleeping, baseline. Chemo infusing. Adequate UOP-- urinal in reach with yellow urine. Family at bedside.

## 2024-04-18 NOTE — CASE MANAGEMENT/SOCIAL WORK
Case Management Discharge Note      Final Note: Patient's plan is still to return home with his wife at discharge.  No needs noted.         Selected Continued Care - Admitted Since 4/16/2024       Destination    No services have been selected for the patient.                Durable Medical Equipment    No services have been selected for the patient.                Dialysis/Infusion    No services have been selected for the patient.                Home Medical Care    No services have been selected for the patient.                Therapy    No services have been selected for the patient.                Community Resources    No services have been selected for the patient.                Community & DME    No services have been selected for the patient.                         Final Discharge Disposition Code: 01 - home or self-care

## 2024-04-18 NOTE — PROGRESS NOTES
"HEMATOLOGY/ONCOLOGY PROGRESS NOTE    S:Doing well. No hematuria, nausea, vomiting mucositis or diarrhea. No weakness or incoordination. No SOA. No bloating or swelling.      Medications:  The current medication list was reviewed in the EMR    ALLERGIES:  No Known Allergies      Physical Exam    VITAL SIGNS:  /70 (BP Location: Left arm, Patient Position: Lying)   Pulse 57   Temp 97.6 °F (36.4 °C) (Temporal)   Resp 16   Ht 162.6 cm (64.02\")   Wt 79.8 kg (175 lb 15.9 oz)   SpO2 100%   BMI 30.19 kg/m²   Temp:  [96.8 °F (36 °C)-98.5 °F (36.9 °C)] 97.6 °F (36.4 °C)      Performance Status:                Physical Exam    General: well appearing, in no acute distress  HEENT: sclerae anicteric, oropharynx clear  Lymphatics: no cervical, supraclavicular, or axillary adenopathy  Cardiovascular: regular rate and rhythm, no murmurs, rubs or gallops  Lungs: clear to auscultation bilaterally  Abdomen: soft, nontender, nondistended.  LLQ ostomy  Extremities: no lower extremity edema  Skin: no rashes, lesions, bruising, or petechiae  Msk:  Shows no weakness of the large muscle groups  Psych: Mood is stable        RECENT LABS:    Lab Results   Component Value Date    HGB 7.6 (L) 04/18/2024    HCT 23.5 (L) 04/18/2024    .1 (H) 04/18/2024     (L) 04/18/2024    WBC 10.57 04/18/2024    NEUTROABS 9.89 (H) 04/18/2024    LYMPHSABS 0.36 (L) 04/18/2024    MONOSABS 0.17 04/18/2024    EOSABS 0.00 04/18/2024    BASOSABS 0.01 04/18/2024       Lab Results   Component Value Date    GLUCOSE 121 (H) 04/18/2024    BUN 9 04/18/2024    CREATININE 0.53 (L) 04/18/2024     04/18/2024    K 4.1 04/18/2024     04/18/2024    CO2 25.0 04/18/2024    CALCIUM 8.5 (L) 04/18/2024    PROTEINTOT 5.4 (L) 04/18/2024    ALBUMIN 3.7 04/18/2024    BILITOT 0.2 04/18/2024    ALKPHOS 88 04/18/2024    AST 12 04/18/2024    ALT 9 04/18/2024         Assessment/Plan  1.  Recurrent CD30 positive classical Hodgkin's lymphoma, now with CD 20/CD " 45 positive on repeat biopsy  2.  Chemo monitoring  -Today is day 3 of cycle 4 of cycle 4 RICE chemotherapy.    -Tolerating therapy well.  -CBC/CMP/ UA with micro reviewed and adequate for day 3 treatment.   -Microscopic hematuria likely secondary to radiation cystitis and was present prior to treatment. Monitor for signs of gross hematuria. Consult urology if progressive hematuria. UA normal today.  -Continue to monitor closely for side effects and toxicity of chemotherapy.   - Transfuse for hemoglobin less than 7 or platelets less than 10 unless active bleeding with only leukoreduced irradiated blood products.  No transfusion needs today but hemoglobin is downtrending and anticipate he may need blood products next week.  -CD 19 positive on pathology report reviewed: CAR-T assessment next week.     3.  SHERRI  -Continues on nocturnal oxygen with stable requirement.     4.  Microscopic hematuria  -He has a history of rectal radiation and there were mild changes suggestive of radiation cystitis on his most recent imaging.  He is completely asymptomatic.  This predated ifosfamide.  I have discussed with his urologist.  Will plan to monitor symptoms and serial UA and consult urology if any worsening hematuria.  UA stable.  He has outpatient urology follow-up next month.     5. PUD  -PPI     6.  Prophylaxis  -Valtrex daily  -Levaquin day 8-15     Anticipate discharge this evening after chemo completes.   Will complete D4/5 steroid at home.   Follow up Monday for Neulasta  Weekly CBC  F/u  1 week  F/u me 2 weeks    Kaycee Leary MD  [facility]    4/18/2024

## 2024-04-18 NOTE — PLAN OF CARE
Problem: Adult Inpatient Plan of Care  Goal: Plan of Care Review  Outcome: Ongoing, Progressing  Flowsheets  Taken 4/18/2024 1529 by Phillip Mccall, RN  Outcome Evaluation: VSS. Resting well. Ifex infusing. Done @ 1700. Hoping to DC pt after Etoposide around 1900. No complaints. Continue care.  Taken 4/17/2024 1657 by Phillip Mccall, RN  Progress: improving  Taken 4/16/2024 1847 by Chanelle Britt, RN  Plan of Care Reviewed With:   patient   spouse   Goal Outcome Evaluation:           Progress: improving  Outcome Evaluation: VSS. Resting well. Ifex infusing. Done @ 1700. Hoping to DC pt after Etoposide around 1900. No complaints. Continue care.

## 2024-04-18 NOTE — DISCHARGE SUMMARY
[facility]  DISCHARGE SUMMARY    Patient Name: Abraham Wu  : 1950  MRN: [id number]    Date of Admission: 2024 11:03 AM  Date of Discharge:  2024  Primary Care Physician: Jatinder Haynes MD    Consults       No orders found from 3/18/2024 to 2024.            Hospital Course     Presenting Problem: Chemotherapy monitoring     Active Hospital Problems    Diagnosis  POA    **Hodgkin lymphoma [C81.90]  Yes    SHERRI (obstructive sleep apnea) [G47.33]  Yes    Essential hypertension [I10]  Yes    Dyslipidemia [E78.5]  Yes      Resolved Hospital Problems   No resolved problems to display.          Hospital Course:  Abraham Wu is a 74 y.o. male with past medical history significant for hypertension, SHERRI, and recurrent CD30 positive classical Hodgkin's lymphoma, admitted for chemotherapy monitoring.  Oncology followed during inpatient course, managing chemotherapy.  Patient was able to complete chemotherapy during his hospitalization without complication.  He was determined to be stable for discharge, instructed to follow-up on Monday for Neulasta, complete steroids at home, follow-up with oncology in 2 weeks and [facility] in 1 week for CAR-T assessment.     Classical Hogkin's Lymphoma  RML recurrence   - Dr. Leary managing Chemo, planning cycle 4 of RICE (last cycle 3/26)  - Completed Day 3 of therapy on   - Following with transplant team at ; will have CAR-T assessment next week after CD19 positive path report  - Neulasta planned for Monday  - Continue valtrex and levaquin for prophylaxis   -Continue Decadron on discharge per oncology  - Possible discharge tomorrow evening after chemotherapy   - Will need weekly CBC      Microscopic hematuria secondary to radiation cystitis  -Continue to monitor  -Follow up outpatient; Dr. Leary corresponding with Urology      SHERRI  - nocturnal O2     PUD  -PPI     Discharge Follow Up Recommendations for  outpatient labs/diagnostics:  Follow-up with PCP in 1 week  Follow-up with oncology in 2 weeks  Follow-up with  for CAR-T assessment in 1 week  Follow-up for Neulasta on Monday 4/22    Day of Discharge     HPI:   States he feels well this morning.  Denies shortness of breath, chest pain, fevers, chills, sweats.  Is agreeable to discharge this evening after completion of chemotherapy      Vital Signs:   Temp:  [96.8 °F (36 °C)-98.5 °F (36.9 °C)] 97.8 °F (36.6 °C)  Heart Rate:  [52-64] 64  Resp:  [16-18] 18  BP: (116-147)/(56-70) 121/63  Flow (L/min):  [2] 2      Physical Exam:  General appearance: alert, awake, oriented, no acute distress   Cardiovascular: RRR, no murmurs or rubs, radial pulse full 2/4 BL   Respiratory: CTAB, no crackles or wheezes   Abdomen: soft, non-tender, no organomegaly, bowel sounds normoactive, ostomy w brown stool output    Neuro/CNS: alert and oriented x3, normal speech    Pertinent  and/or Most Recent Results     LAB RESULTS:      Lab 04/18/24  0531 04/17/24  0512 04/16/24  0925 04/11/24  1421   WBC 10.57 12.79* 7.41 10.88*   HEMOGLOBIN 7.6* 8.4* 8.7* 8.5*   HEMATOCRIT 23.5* 26.4* 26.4* 26.0*   PLATELETS 128* 146 139* 77*   NEUTROS ABS 9.89* 11.82* 5.68 8.49*   IMMATURE GRANS (ABS) 0.14* 0.28* 0.21* 0.55*   LYMPHS ABS 0.36* 0.51* 0.81 0.69*   MONOS ABS 0.17 0.16 0.62 1.07*   EOS ABS 0.00 0.00 0.05 0.05   .1* 103.1* 103.1* 101.6*   LDH  --   --  206  --          Lab 04/18/24  0531 04/17/24  0512 04/16/24  0925   SODIUM 140 136 136   POTASSIUM 4.1 4.4 4.4   CHLORIDE 105 103 99   CO2 25.0 24.0 28.0   ANION GAP 10.0 9.0 9.0   BUN 9 11 12   CREATININE 0.53* 0.46* 0.64*   EGFR 106.5 111.1 100.6   GLUCOSE 121* 143* 95   CALCIUM 8.5* 8.8 8.9   MAGNESIUM  --   --  1.9   PHOSPHORUS  --   --  4.1         Lab 04/18/24  0531 04/17/24  0512 04/16/24  0925   TOTAL PROTEIN 5.4* 5.8* 6.8   ALBUMIN 3.7 3.8 4.2   GLOBULIN 1.7 2.0 2.6   ALT (SGPT) 9 8 9   AST (SGOT) 12 11 16   BILIRUBIN 0.2 0.3 0.3    ALK PHOS 88 106 111                     Brief Urine Lab Results  (Last result in the past 365 days)        Color   Clarity   Blood   Leuk Est   Nitrite   Protein   CREAT   Urine HCG        04/18/24 0540 Yellow   Clear   Negative   Negative   Negative   Negative                 Microbiology Results (last 10 days)       ** No results found for the last 240 hours. **            No radiology results for the last 10 days    Results for orders placed during the hospital encounter of 10/31/22    Duplex Venous Upper Extremity - Right CAR    Interpretation Summary    Normal right upper extremity venous duplex scan.    PICC line is noted in the right brachial vein.      Results for orders placed during the hospital encounter of 10/31/22    Duplex Venous Upper Extremity - Right CAR    Interpretation Summary    Normal right upper extremity venous duplex scan.    PICC line is noted in the right brachial vein.      Results for orders placed during the hospital encounter of 06/15/23    Adult Transthoracic Echo Complete W/ Cont if Necessary Per Protocol    Interpretation Summary    Left ventricular systolic function is normal. Estimated left ventricular EF = 60%    Normal global longitudinal strain pattern (-19.9%)    The cardiac valves are anatomically and functionally normal.    Estimated right ventricular systolic pressure from tricuspid regurgitation is normal (<35 mmHg).      Plan for Follow-up of Pending Labs/Results:     Discharge Details        Discharge Medications        New Medications        Instructions Start Date   levoFLOXacin 500 MG tablet  Commonly known as: Levaquin   500 mg, Oral, Daily, 1 tablet             Continue These Medications        Instructions Start Date   bacitracin 500 UNIT/GM ointment   0.9 g, Topical, 2 Times Daily      dexAMETHasone 2 MG tablet  Commonly known as: DECADRON   2 mg, Oral, 2 Times Daily With Meals      First Mouthwash (Magic Mouthwash) suspension   5 mL, Swish & Swallow, Every 6  Hours      fluticasone 50 MCG/ACT nasal spray  Commonly known as: FLONASE   1 spray, Nasal, Daily PRN, OTC      lidocaine-prilocaine 2.5-2.5 % cream  Commonly known as: EMLA   1 application , Topical, As Needed      loratadine 10 MG tablet  Commonly known as: CLARITIN   10 mg, Oral, Daily      ondansetron 8 MG tablet  Commonly known as: Zofran   8 mg, Oral, Every 8 Hours PRN      pantoprazole 40 MG EC tablet  Commonly known as: PROTONIX   40 mg, Oral, Daily      prochlorperazine 5 MG tablet  Commonly known as: COMPAZINE   5 mg, Oral, Every 6 Hours PRN      rosuvastatin 10 MG tablet  Commonly known as: CRESTOR   10 mg, Oral, Daily      valACYclovir 500 MG tablet  Commonly known as: VALTREX   500 mg, Oral, Daily, Two tablets TID               No Known Allergies      Discharge Disposition:  Home or Self Care    Diet:  Hospital:  Diet Order   Procedures    Diet: Regular/House; Fluid Consistency: Thin (IDDSI 0)       Diet Instructions       Diet: Regular/House Diet; Thin (IDDSI 0)      Discharge Diet: Regular/House Diet    Fluid Consistency: Thin (IDDSI 0)             Activity:  Activity Instructions       Activity as Tolerated              Restrictions or Other Recommendations:         CODE STATUS:    Code Status and Medical Interventions:   Ordered at: 04/16/24 1143     Level Of Support Discussed With:    Patient     Code Status (Patient has no pulse and is not breathing):    CPR (Attempt to Resuscitate)     Medical Interventions (Patient has pulse or is breathing):    Full Support       Future Appointments   Date Time Provider Department Center   4/22/2024  3:30 PM CHAIR 2  [facility]   4/23/2024  7:30 AM PROCEDURE ROOM  [facility]   4/30/2024 10:00 AM ACCESS AND LAB DRAW CHAIR 1  [facility]   4/30/2024 11:15 AM Kaycee Leary MD [facility]   5/28/2024  8:40 AM Bernadine Almodovar MD [facility]       Additional Instructions for the Follow-ups that You Need to Schedule       Discharge Follow-up with PCP    As directed       Currently Documented PCP:    Jatinder Haynes MD    PCP Phone Number:    [phone]     Follow Up Details: 1 week        Discharge Follow-up with Specialty: [facility] CAR-T Therapy; 1 Week   As directed      Specialty: [facility] CAR-T Therapy   Follow Up: 1 Week        Discharge Follow-up with Specified Provider: Dr. Leary; 2 Weeks   As directed      To: Dr. Leary   Follow Up: 2 Weeks   Follow Up Details: Neulasta administration 4/22/24                      Naun Rush DO  04/18/24      Time Spent on Discharge:  I spent  39  minutes on this discharge activity which included: face-to-face encounter with the patient, reviewing the data in the system, coordination of the care with the nursing staff as well as consultants, documentation, and entering orders.

## 2024-04-19 ENCOUNTER — TELEPHONE (OUTPATIENT)
Dept: ONCOLOGY | Facility: CLINIC | Age: 74
End: 2024-04-19
Payer: MEDICARE

## 2024-04-19 NOTE — TELEPHONE ENCOUNTER
Caller: SRINIVASAN HARPER    Relationship: Emergency Contact    Best call back number:  [phone]    What is the best time to reach you: ANYTIME    Who are you requesting to speak with (clinical staff, provider,  specific staff member): DENNISE WHITE         What was the call regarding:     [facility] GOT OUT OF HOSPITAL YESTERDAY AND GOT PRINT OUT AND     SAYS SUPPOSED TO COME IN TUESDAY FOR INFUSION 04/23     AND WAS NOT AWARE OF THIS OR WHAT THIS WAS FOR WANTED TO ASK ABOUT THIS TO CLARIFY

## 2024-04-19 NOTE — OUTREACH NOTE
Prep Survey      Flowsheet Row Responses   [facility] facility patient discharged from? [place]   Is LACE score < 7 ? No   Eligibility Readm Mgmt   Discharge diagnosis Hodgkin lymphoma   Does the patient have one of the following disease processes/diagnoses(primary or secondary)? Other   Does the patient have Home health ordered? No   Is there a DME ordered? No   Prep survey completed? Yes            KASHMIR FOY - Registered Nurse

## 2024-04-19 NOTE — TELEPHONE ENCOUNTER
Notified patient's spouse that patient is to get labs on 4-22-24 and if blood transfusion is needed, Dr. Leary wanted him to come in on 4-23-24 to receive it, so a spot for possible blood transfusion was scheduled if it is needed.  She verbalized understanding and stated she will notify patient.

## 2024-04-22 ENCOUNTER — HOSPITAL ENCOUNTER (OUTPATIENT)
Dept: ONCOLOGY | Facility: HOSPITAL | Age: 74
Discharge: HOME | End: 2024-04-22
Admitting: INTERNAL MEDICINE
Payer: MEDICARE

## 2024-04-22 VITALS
WEIGHT: 177 LBS | HEART RATE: 95 BPM | HEIGHT: 64 IN | SYSTOLIC BLOOD PRESSURE: 123 MMHG | RESPIRATION RATE: 16 BRPM | TEMPERATURE: 98.5 F | DIASTOLIC BLOOD PRESSURE: 55 MMHG | BODY MASS INDEX: 30.22 KG/M2

## 2024-04-22 DIAGNOSIS — C81.98 HODGKIN LYMPHOMA OF LYMPH NODES OF MULTIPLE REGIONS, UNSPECIFIED HODGKIN LYMPHOMA TYPE: Primary | ICD-10-CM

## 2024-04-22 DIAGNOSIS — Z45.2 ENCOUNTER FOR CARE RELATED TO VASCULAR ACCESS PORT: ICD-10-CM

## 2024-04-22 LAB
ABO GROUP BLD: NORMAL
ANION GAP SERPL CALCULATED.3IONS-SCNC: 8 MMOL/L (ref 5–15)
BASOPHILS # BLD AUTO: 0.02 10*3/MM3 (ref 0–0.2)
BASOPHILS NFR BLD AUTO: 0.4 % (ref 0–1.5)
BLD GP AB SCN SERPL QL: NEGATIVE
BUN SERPL-MCNC: 25 MG/DL (ref 8–23)
BUN/CREAT SERPL: 42.4 (ref 7–25)
CALCIUM SPEC-SCNC: 9.1 MG/DL (ref 8.6–10.5)
CHLORIDE SERPL-SCNC: 103 MMOL/L (ref 98–107)
CO2 SERPL-SCNC: 27 MMOL/L (ref 22–29)
CREAT SERPL-MCNC: 0.59 MG/DL (ref 0.76–1.27)
DEPRECATED RDW RBC AUTO: 73.2 FL (ref 37–54)
EGFRCR SERPLBLD CKD-EPI 2021: 103.1 ML/MIN/1.73
EOSINOPHIL # BLD AUTO: 0.05 10*3/MM3 (ref 0–0.4)
EOSINOPHIL NFR BLD AUTO: 1.1 % (ref 0.3–6.2)
ERYTHROCYTE [DISTWIDTH] IN BLOOD BY AUTOMATED COUNT: 19.3 % (ref 12.3–15.4)
GLUCOSE SERPL-MCNC: 69 MG/DL (ref 65–99)
HCT VFR BLD AUTO: 24.3 % (ref 37.5–51)
HGB BLD-MCNC: 8 G/DL (ref 13–17.7)
IMM GRANULOCYTES # BLD AUTO: 0.04 10*3/MM3 (ref 0–0.05)
IMM GRANULOCYTES NFR BLD AUTO: 0.9 % (ref 0–0.5)
LYMPHOCYTES # BLD AUTO: 0.35 10*3/MM3 (ref 0.7–3.1)
LYMPHOCYTES NFR BLD AUTO: 7.5 % (ref 19.6–45.3)
MAGNESIUM SERPL-MCNC: 2.3 MG/DL (ref 1.6–2.4)
MCH RBC QN AUTO: 34 PG (ref 26.6–33)
MCHC RBC AUTO-ENTMCNC: 32.9 G/DL (ref 31.5–35.7)
MCV RBC AUTO: 103.4 FL (ref 79–97)
MONOCYTES # BLD AUTO: 0.12 10*3/MM3 (ref 0.1–0.9)
MONOCYTES NFR BLD AUTO: 2.6 % (ref 5–12)
NEUTROPHILS NFR BLD AUTO: 4.09 10*3/MM3 (ref 1.7–7)
NEUTROPHILS NFR BLD AUTO: 87.5 % (ref 42.7–76)
PLATELET # BLD AUTO: 167 10*3/MM3 (ref 140–450)
PMV BLD AUTO: 9.6 FL (ref 6–12)
POTASSIUM SERPL-SCNC: 4.6 MMOL/L (ref 3.5–5.2)
RBC # BLD AUTO: 2.35 10*6/MM3 (ref 4.14–5.8)
RH BLD: POSITIVE
SODIUM SERPL-SCNC: 138 MMOL/L (ref 136–145)
T&S EXPIRATION DATE: NORMAL
WBC NRBC COR # BLD AUTO: 4.67 10*3/MM3 (ref 3.4–10.8)

## 2024-04-22 PROCEDURE — 86901 BLOOD TYPING SEROLOGIC RH(D): CPT | Performed by: INTERNAL MEDICINE

## 2024-04-22 PROCEDURE — 25010000002 HEPARIN LOCK FLUSH PER 10 UNITS: Performed by: INTERNAL MEDICINE

## 2024-04-22 PROCEDURE — 80048 BASIC METABOLIC PNL TOTAL CA: CPT | Performed by: INTERNAL MEDICINE

## 2024-04-22 PROCEDURE — 86850 RBC ANTIBODY SCREEN: CPT | Performed by: INTERNAL MEDICINE

## 2024-04-22 PROCEDURE — 86900 BLOOD TYPING SEROLOGIC ABO: CPT | Performed by: INTERNAL MEDICINE

## 2024-04-22 PROCEDURE — 83735 ASSAY OF MAGNESIUM: CPT | Performed by: INTERNAL MEDICINE

## 2024-04-22 PROCEDURE — 85025 COMPLETE CBC W/AUTO DIFF WBC: CPT | Performed by: INTERNAL MEDICINE

## 2024-04-22 PROCEDURE — 96372 THER/PROPH/DIAG INJ SC/IM: CPT

## 2024-04-22 PROCEDURE — 36591 DRAW BLOOD OFF VENOUS DEVICE: CPT

## 2024-04-22 PROCEDURE — 25010000002 PEGFILGRASTIM-CBQV 6 MG/0.6ML SOLUTION AUTO-INJECTOR: Performed by: INTERNAL MEDICINE

## 2024-04-22 RX ORDER — HEPARIN SODIUM (PORCINE) LOCK FLUSH IV SOLN 100 UNIT/ML 100 UNIT/ML
500 SOLUTION INTRAVENOUS AS NEEDED
Status: DISCONTINUED | OUTPATIENT
Start: 2024-04-22 | End: 2024-04-23 | Stop reason: HOSPADM

## 2024-04-22 RX ORDER — HEPARIN SODIUM (PORCINE) LOCK FLUSH IV SOLN 100 UNIT/ML 100 UNIT/ML
500 SOLUTION INTRAVENOUS AS NEEDED
OUTPATIENT
Start: 2024-04-22

## 2024-04-22 RX ADMIN — HEPARIN 500 UNITS: 100 SYRINGE at 15:48

## 2024-04-22 RX ADMIN — PEGFILGRASTIM-CBQV 6 MG: 6 INJECTION, SOLUTION SUBCUTANEOUS at 15:39

## 2024-04-23 ENCOUNTER — HOSPITAL ENCOUNTER (OUTPATIENT)
Dept: ONCOLOGY | Facility: HOSPITAL | Age: 74
Discharge: HOME | End: 2024-04-23
Payer: MEDICARE

## 2024-04-23 ENCOUNTER — READMISSION MANAGEMENT (OUTPATIENT)
Dept: CALL CENTER | Facility: HOSPITAL | Age: 74
End: 2024-04-23
Payer: MEDICARE

## 2024-04-23 NOTE — OUTREACH NOTE
Medical Week 1 Survey      Flowsheet Row Responses   [facility] patient discharged from? [place]   Does the patient have one of the following disease processes/diagnoses(primary or secondary)? Other   Week 1 attempt successful? No   Unsuccessful attempts Attempt 1            Lakeshia Jones Registered Nurse

## 2024-04-25 DIAGNOSIS — C81.98 HODGKIN LYMPHOMA OF LYMPH NODES OF MULTIPLE REGIONS, UNSPECIFIED HODGKIN LYMPHOMA TYPE: Primary | ICD-10-CM

## 2024-04-30 ENCOUNTER — OFFICE VISIT (OUTPATIENT)
Dept: ONCOLOGY | Facility: CLINIC | Age: 74
End: 2024-04-30
Payer: MEDICARE

## 2024-04-30 ENCOUNTER — HOSPITAL ENCOUNTER (OUTPATIENT)
Dept: ONCOLOGY | Facility: HOSPITAL | Age: 74
Discharge: HOME | End: 2024-04-30
Payer: MEDICARE

## 2024-04-30 ENCOUNTER — TELEPHONE (OUTPATIENT)
Dept: ONCOLOGY | Facility: CLINIC | Age: 74
End: 2024-04-30

## 2024-04-30 VITALS
SYSTOLIC BLOOD PRESSURE: 125 MMHG | RESPIRATION RATE: 18 BRPM | HEIGHT: 64 IN | OXYGEN SATURATION: 96 % | WEIGHT: 179 LBS | BODY MASS INDEX: 30.56 KG/M2 | HEART RATE: 97 BPM | TEMPERATURE: 98 F | DIASTOLIC BLOOD PRESSURE: 77 MMHG

## 2024-04-30 VITALS
TEMPERATURE: 98 F | SYSTOLIC BLOOD PRESSURE: 120 MMHG | DIASTOLIC BLOOD PRESSURE: 57 MMHG | HEART RATE: 60 BPM | RESPIRATION RATE: 16 BRPM

## 2024-04-30 DIAGNOSIS — C81.98 HODGKIN LYMPHOMA OF LYMPH NODES OF MULTIPLE REGIONS, UNSPECIFIED HODGKIN LYMPHOMA TYPE: ICD-10-CM

## 2024-04-30 DIAGNOSIS — Z45.2 ENCOUNTER FOR CARE RELATED TO VASCULAR ACCESS PORT: Primary | ICD-10-CM

## 2024-04-30 DIAGNOSIS — C81.98 HODGKIN LYMPHOMA OF LYMPH NODES OF MULTIPLE REGIONS, UNSPECIFIED HODGKIN LYMPHOMA TYPE: Primary | ICD-10-CM

## 2024-04-30 LAB
ABO GROUP BLD: NORMAL
ALBUMIN SERPL-MCNC: 4.1 G/DL (ref 3.5–5.2)
ALBUMIN/GLOB SERPL: 1.8 G/DL
ALP SERPL-CCNC: 113 U/L (ref 39–117)
ALT SERPL W P-5'-P-CCNC: 10 U/L (ref 1–41)
ANION GAP SERPL CALCULATED.3IONS-SCNC: 8 MMOL/L (ref 5–15)
AST SERPL-CCNC: 10 U/L (ref 1–40)
BASOPHILS # BLD AUTO: 0.03 10*3/MM3 (ref 0–0.2)
BASOPHILS NFR BLD AUTO: 0.4 % (ref 0–1.5)
BILIRUB SERPL-MCNC: 0.4 MG/DL (ref 0–1.2)
BLD GP AB SCN SERPL QL: NEGATIVE
BUN SERPL-MCNC: 14 MG/DL (ref 8–23)
BUN/CREAT SERPL: 26.9 (ref 7–25)
CALCIUM SPEC-SCNC: 9.1 MG/DL (ref 8.6–10.5)
CHLORIDE SERPL-SCNC: 99 MMOL/L (ref 98–107)
CO2 SERPL-SCNC: 27 MMOL/L (ref 22–29)
CREAT SERPL-MCNC: 0.52 MG/DL (ref 0.76–1.27)
DEPRECATED RDW RBC AUTO: 71 FL (ref 37–54)
EGFRCR SERPLBLD CKD-EPI 2021: 107.1 ML/MIN/1.73
EOSINOPHIL # BLD AUTO: 0.03 10*3/MM3 (ref 0–0.4)
EOSINOPHIL NFR BLD AUTO: 0.4 % (ref 0.3–6.2)
ERYTHROCYTE [DISTWIDTH] IN BLOOD BY AUTOMATED COUNT: 19.3 % (ref 12.3–15.4)
GLOBULIN UR ELPH-MCNC: 2.3 GM/DL
GLUCOSE SERPL-MCNC: 110 MG/DL (ref 65–99)
HCT VFR BLD AUTO: 20.6 % (ref 37.5–51)
HGB BLD-MCNC: 6.7 G/DL (ref 13–17.7)
IMM GRANULOCYTES # BLD AUTO: 0.24 10*3/MM3 (ref 0–0.05)
IMM GRANULOCYTES NFR BLD AUTO: 3.2 % (ref 0–0.5)
LYMPHOCYTES # BLD AUTO: 0.51 10*3/MM3 (ref 0.7–3.1)
LYMPHOCYTES NFR BLD AUTO: 6.9 % (ref 19.6–45.3)
MAGNESIUM SERPL-MCNC: 1.7 MG/DL (ref 1.6–2.4)
MCH RBC QN AUTO: 34.4 PG (ref 26.6–33)
MCHC RBC AUTO-ENTMCNC: 32.5 G/DL (ref 31.5–35.7)
MCV RBC AUTO: 105.6 FL (ref 79–97)
MONOCYTES # BLD AUTO: 0.83 10*3/MM3 (ref 0.1–0.9)
MONOCYTES NFR BLD AUTO: 11.2 % (ref 5–12)
NEUTROPHILS NFR BLD AUTO: 5.8 10*3/MM3 (ref 1.7–7)
NEUTROPHILS NFR BLD AUTO: 77.9 % (ref 42.7–76)
NRBC BLD AUTO-RTO: 0 /100 WBC (ref 0–0.2)
PLATELET # BLD AUTO: 23 10*3/MM3 (ref 140–450)
PMV BLD AUTO: 10 FL (ref 6–12)
POTASSIUM SERPL-SCNC: 4.1 MMOL/L (ref 3.5–5.2)
PROT SERPL-MCNC: 6.4 G/DL (ref 6–8.5)
RBC # BLD AUTO: 1.95 10*6/MM3 (ref 4.14–5.8)
RH BLD: POSITIVE
SODIUM SERPL-SCNC: 134 MMOL/L (ref 136–145)
T&S EXPIRATION DATE: NORMAL
WBC NRBC COR # BLD AUTO: 7.44 10*3/MM3 (ref 3.4–10.8)

## 2024-04-30 PROCEDURE — 36430 TRANSFUSION BLD/BLD COMPNT: CPT

## 2024-04-30 PROCEDURE — 85025 COMPLETE CBC W/AUTO DIFF WBC: CPT | Performed by: INTERNAL MEDICINE

## 2024-04-30 PROCEDURE — 25010000002 HEPARIN LOCK FLUSH PER 10 UNITS: Performed by: INTERNAL MEDICINE

## 2024-04-30 PROCEDURE — 86923 COMPATIBILITY TEST ELECTRIC: CPT

## 2024-04-30 PROCEDURE — 1126F AMNT PAIN NOTED NONE PRSNT: CPT | Performed by: INTERNAL MEDICINE

## 2024-04-30 PROCEDURE — 3074F SYST BP LT 130 MM HG: CPT | Performed by: INTERNAL MEDICINE

## 2024-04-30 PROCEDURE — 83735 ASSAY OF MAGNESIUM: CPT | Performed by: INTERNAL MEDICINE

## 2024-04-30 PROCEDURE — 36591 DRAW BLOOD OFF VENOUS DEVICE: CPT

## 2024-04-30 PROCEDURE — 86901 BLOOD TYPING SEROLOGIC RH(D): CPT | Performed by: INTERNAL MEDICINE

## 2024-04-30 PROCEDURE — 3078F DIAST BP <80 MM HG: CPT | Performed by: INTERNAL MEDICINE

## 2024-04-30 PROCEDURE — 86850 RBC ANTIBODY SCREEN: CPT | Performed by: INTERNAL MEDICINE

## 2024-04-30 PROCEDURE — 86900 BLOOD TYPING SEROLOGIC ABO: CPT | Performed by: INTERNAL MEDICINE

## 2024-04-30 PROCEDURE — P9040 RBC LEUKOREDUCED IRRADIATED: HCPCS

## 2024-04-30 PROCEDURE — 99214 OFFICE O/P EST MOD 30 MIN: CPT | Performed by: INTERNAL MEDICINE

## 2024-04-30 PROCEDURE — 80053 COMPREHEN METABOLIC PANEL: CPT | Performed by: INTERNAL MEDICINE

## 2024-04-30 PROCEDURE — 86900 BLOOD TYPING SEROLOGIC ABO: CPT

## 2024-04-30 RX ORDER — SODIUM CHLORIDE 9 MG/ML
250 INJECTION, SOLUTION INTRAVENOUS AS NEEDED
Status: CANCELLED | OUTPATIENT
Start: 2024-04-30

## 2024-04-30 RX ORDER — HEPARIN SODIUM (PORCINE) LOCK FLUSH IV SOLN 100 UNIT/ML 100 UNIT/ML
500 SOLUTION INTRAVENOUS AS NEEDED
Status: CANCELLED | OUTPATIENT
Start: 2024-04-30

## 2024-04-30 RX ORDER — SODIUM CHLORIDE 9 MG/ML
250 INJECTION, SOLUTION INTRAVENOUS AS NEEDED
Status: DISCONTINUED | OUTPATIENT
Start: 2024-04-30 | End: 2024-05-01 | Stop reason: HOSPADM

## 2024-04-30 RX ORDER — HEPARIN SODIUM (PORCINE) LOCK FLUSH IV SOLN 100 UNIT/ML 100 UNIT/ML
500 SOLUTION INTRAVENOUS AS NEEDED
Status: DISCONTINUED | OUTPATIENT
Start: 2024-04-30 | End: 2024-05-01 | Stop reason: HOSPADM

## 2024-04-30 RX ADMIN — HEPARIN 500 UNITS: 100 SYRINGE at 09:58

## 2024-04-30 RX ADMIN — HEPARIN 500 UNITS: 100 SYRINGE at 15:30

## 2024-04-30 NOTE — TELEPHONE ENCOUNTER
----- Message from Kita DANIELS sent at 4/30/2024 10:38 AM EDT -----  Regarding: critical labs      Critical Test Results      MD: Gibran    Date: 04/30/2024     Critical test result: Hgb 6.7, Hct 20.6, Platelets 23    Time results received: 1037 am

## 2024-04-30 NOTE — TELEPHONE ENCOUNTER
Name of Physician notified: Dr. Kaycee Leary    Time Physician Notified: 11:15      [x]  Orders received    []  Protocol/Standing orders followed    []  No new orders    Order received to transfuse one unit irradiated pRBC today or tomorrow.  Order placed. Infusion charge RNCathy, and blood bank notified. Patient can receive today and Dr. Leary notified.

## 2024-04-30 NOTE — PROGRESS NOTES
[facility] [place]  Office number [phone]    Fax number [phone]     Follow up     Date: 24    Patient Name: Abraham Wu  MRN: [id number]  : 1950    Chief Complaint: Hodgkin Lymphoma follow up/treatment    Surgeon: Dr. Hiram Viveros MD    Cancer Staging: IV    History of Present Illness: Abraham Wu is a pleasant 74 y.o. male with PMH of hypertension, sleep apnea, hard of hearing who presents today for follow up of Hodgkin Lymphoma.     He initially presented 2022 with intractable diarrhea, low appetite, and a greater than 50 pound weight loss over several month period associated with intermittent fevers.  CT of the chest abdomen pelvis obtained in the ER shows of rectal mass spanning greater than 12 cm with adjacent adenopathy, bulky RP adenopathy, and findings concerning for left renal and liver metastases.  Small right pleural effusion with nodularity.  There was concern for impending obstruction, so he underwent diverting colostomy and biopsy on 2022.  Biopsies from the peritoneam showed a fibrotic nodule histiocytes and eosinophils admixed with CD30 positive large cells.  Rectal biopsies showed involvement by CD30 positive lymphoma, classic Hodgkin lymphoma versus CD30 positive T-cell or B-cell lymphoma.  There was extensive fibrosis and crush artifact.  He underwent repeat transrectal biopsy 10/4/2022 for further characterization which was felt to be most consistent with classical Hodgkin lymphoma.    He initiated chemotherapy with Brentuximab-AVD as an inpatient on 10/8/2022.  Cycle #1 was complicated by GI bleed requiring multiple blood transfusion and ultimately coil artery embolization 10/12; neutropenic fever, Candida esophagitis and mucositis.  He had a prolonged ICU stay  And required enteral feeding.  He was discharged 10/20/2022.    Following his last cycle he was admitted with multifocal PNA and +corona virus  infection.    He underwent brochoscopy 2/1/24 showing heaped up, irregular, friable mucosa obstructing the right middle lobe otherwise no discrete endobronchial lesions. Pathology showed persistent/recurrent CD30 positive lymphoma; see comment. These continue to have CD30 and RACHELE positivity, consistent with involvement by the same process. However, the lymphoma cells are now more numerous and have an intact B-cell expression profile, with strong diffuse CD20 expression, strong PAX5 expression, and expression of CD45 and CD79a. This is strongly favored to represent continued involvement with the same process with the development of a slightly different immunophenotype after pembrolizumab therapy as opposed to a secondary lymphoma. The strong expression of CD20 may be a therapeutic target. CD30 expression remains intact as well.     Treatment history:  Brentuximab-AVD: C1 10/8/22  C2: 11/1/22 onward with DA 20% in BVD; full dose brentuximab  Completed C6 3/22/23    2. Palliative radiation to rectum 5/30/2023 (abbreviated due to systemic progression)  3. GVD+Pembro x 4 cycles: completed 8/30/2023  4. Consolidative radiation to rectum completed 9/27/23    5. Pembrolizumab: current  6. Radiation to spine 1/16, 1/18 and 1/22/24    7. Rituximab-ICE: C1 2/13/2024; C2 3/5/24; C3 3/26/24; C4 4/16/24    Interval history:    He is here for follow up after completion of cycle #4 of RICE. He has been more dyspneic with activity and tired. No bleeding symptoms. Did bleed from port stick this morning but that resolved with pressure  Saw Dr. Pruitt and being considered for CAR-T trial    Past Medical History:   Past Medical History:   Diagnosis Date    Arthritis     benign polypoid tissue right lung     Cancer     HODGKINS LYMPHOMA, RECTAL CANCER    Diabetes mellitus     patient reports that he doesn't have diabetes secondary to changing diet and weight loss.    History of radiation therapy 12/31/2023    re-treat rectum    History  of transfusion     no reaction, transfusions received [facility] in [facility]22    Hyperlipidemia     Hypertension     Lymphoma     Mycobacterium mucogenicum     SHERRI (obstructive sleep apnea)     O2 2L/MIN AT NIGHT ONLY    Pneumonia     2023    Seasonal allergies    Polyp in airway removed 2020 [facility] Dr. Ketan Monet.    Past Surgical History:   Past Surgical History:   Procedure Laterality Date    BRONCHOSCOPY      removal of obstructing polypoid tissue right middle lung    BRONCHOSCOPY N/A 2/1/2024    Procedure: BRONCHOSCOPY WITH ENDOBRONCHIAL ULTRASOUND AND FLUORO;  Surgeon: Chris Pruitt MD;  Location:  [facility];  Service: Pulmonary;  Laterality: N/A;  EBUS BALLOON INTACT    COLONOSCOPY      COLOSTOMY N/A 09/22/2022    Procedure: LAPAROSCOPIC COLOSTOMY CREATION, FLEXIBLE SIGMOIDOSCOPY;  Surgeon: Hiram Viveros MD;  Location:  [facility];  Service: General;  Laterality: N/A;    DENTAL PROCEDURE      dental implants    ENDOSCOPY N/A 10/10/2022    Procedure: ESOPHAGOGASTRODUODENOSCOPY;  Surgeon: Neeraj Lynn MD;  Location:  [facility];  Service: Gastroenterology;  Laterality: N/A;    ENDOSCOPY N/A 10/12/2022    Procedure: ESOPHAGOGASTRODUODENOSCOPY;  Surgeon: Brunner, Mark I, MD;  Location:  [facility];  Service: Gastroenterology;  Laterality: N/A;    EXAM UNDER ANESTHESIA, RECTAL BIOPSY N/A 10/04/2022    Procedure: EXAM UNDER ANESTHESIA, TRANSANAL BIOPSY WITH TRUCUT NEEDLE (LITHOTOMY-CANDY CANE);  Surgeon: Hiram Viveros MD;  Location:  [facility];  Service: General;  Laterality: N/A;    EXAM UNDER ANESTHESIA, RECTAL BIOPSY N/A 05/30/2023    Procedure: EXAM UNDER ANESTHESIA, TRANSANAL BIOPSY OF RECTAL MASS WITH TRUCUT NEEDLE, PROCTOSCOPY;  Surgeon: Hiram Viveros MD;  Location:  [facility];  Service: General;  Laterality: N/A;    PERIPHERALLY INSERTED CENTRAL CATHETER INSERTION      Removed 11/28/2022    VENOUS ACCESS DEVICE (PORT) INSERTION Right 11/28/2022     Family  History:   Family History   Problem Relation Age of Onset    Diabetes Mother     Diabetes Father     Heart disease Father      Social History:   Social History     Socioeconomic History    Marital status:    Tobacco Use    Smoking status: Never    Smokeless tobacco: Never   Vaping Use    Vaping status: Never Used   Substance and Sexual Activity    Alcohol use: Not Currently     Comment: previously drank 2 glasses of wine/beer nightly    Drug use: Never    Sexual activity: Defer       Medications:     Current Outpatient Medications:     bacitracin 500 UNIT/GM ointment, Apply 1 Application topically to the appropriate area as directed 2 (Two) Times a Day., Disp: 14 g, Rfl: 1    dexAMETHasone (DECADRON) 2 MG tablet, Take 1 tablet by mouth 2 (Two) Times a Day With Meals., Disp: , Rfl:     DPH-Lido-AlHydr-MgHydr-Simeth (First Mouthwash, Magic Mouthwash,) suspension, Swish and swallow 5 mL Every 6 (Six) Hours., Disp: 240 mL, Rfl: 3    fluticasone (FLONASE) 50 MCG/ACT nasal spray, 1 spray into the nostril(s) as directed by provider Daily As Needed for Allergies or Rhinitis. OTC, Disp: , Rfl:     lidocaine-prilocaine (EMLA) 2.5-2.5 % cream, Apply 1 application topically to the appropriate area as directed As Needed (45-60 minutes prior to port access.  Cover with saran/plastic wrap.)., Disp: 30 g, Rfl: 3    loratadine (CLARITIN) 10 MG tablet, TAKE ONE TABLET BY MOUTH DAILY, Disp: 30 tablet, Rfl: 5    ondansetron (Zofran) 8 MG tablet, Take 1 tablet by mouth Every 8 (Eight) Hours As Needed for Nausea or Vomiting., Disp: 30 tablet, Rfl: 5    pantoprazole (PROTONIX) 40 MG EC tablet, TAKE ONE TABLET BY MOUTH DAILY, Disp: 90 tablet, Rfl: 1    prochlorperazine (COMPAZINE) 5 MG tablet, Take 1 tablet by mouth Every 6 (Six) Hours As Needed for Nausea or Vomiting., Disp: 30 tablet, Rfl: 2    rosuvastatin (CRESTOR) 10 MG tablet, Take 1 tablet by mouth Daily., Disp: , Rfl:     valACYclovir (VALTREX) 500 MG tablet, Take 1 tablet  "by mouth Daily. Two tablets TID, Disp: 30 tablet, Rfl: 3  No current facility-administered medications for this visit.    Facility-Administered Medications Ordered in Other Visits:     heparin injection 500 Units, 500 Units, Intravenous, PRN, Kaycee Leary MD, 500 Units at 04/30/24 0958    sodium chloride 0.9 % infusion, 125 mL/hr, Intravenous, Continuous, Kaycee Leary MD    Allergies:   No Known Allergies    Objective     Vital Signs:   Vitals:    04/30/24 1036   BP: 125/77   Pulse: 97   Resp: 18   Temp: 98 °F (36.7 °C)   TempSrc: Temporal   SpO2: 96%   Weight: 81.2 kg (179 lb)   Height: 162.6 cm (64.02\")   PainSc: 0-No pain      Body mass index is 30.71 kg/m².   Pain Score    04/30/24 1036   PainSc: 0-No pain     ECOG Performance Status: 1    Physical Exam:   General: Well appearing male   HEENT: Normocephalic, atraumatic. Sclera anicteric.   Neck: supple, no palpable LAD. No axillary adenopathy  Cardiovascular: regular rate and rhythm. No murmurs.   Respiratory: Normal rate. Clear to auscultation bilaterally  Abdomen: Soft, nontender, non distended with normoactive bowel sounds. Ostomy LLQ   Lymph: no supraclavicular or axillary adenopathy  Neuro: Alert and oriented x 3.  Ext: Symmetric, no swelling.   Skin: right port site  C/D/I    Laboratory/Imaging Reviewed:     Hospital Outpatient Visit on 04/30/2024   Component Date Value Ref Range Status    Glucose 04/30/2024 110 (H)  65 - 99 mg/dL Final    BUN 04/30/2024 14  8 - 23 mg/dL Final    Creatinine 04/30/2024 0.52 (L)  0.76 - 1.27 mg/dL Final    Sodium 04/30/2024 134 (L)  136 - 145 mmol/L Final    Potassium 04/30/2024 4.1  3.5 - 5.2 mmol/L Final    Chloride 04/30/2024 99  98 - 107 mmol/L Final    CO2 04/30/2024 27.0  22.0 - 29.0 mmol/L Final    Calcium 04/30/2024 9.1  8.6 - 10.5 mg/dL Final    Total Protein 04/30/2024 6.4  6.0 - 8.5 g/dL Final    Albumin 04/30/2024 4.1  3.5 - 5.2 g/dL Final    ALT (SGPT) 04/30/2024 10  1 - 41 U/L Final    AST (SGOT) " 04/30/2024 10  1 - 40 U/L Final    Alkaline Phosphatase 04/30/2024 113  39 - 117 U/L Final    Total Bilirubin 04/30/2024 0.4  0.0 - 1.2 mg/dL Final    Globulin 04/30/2024 2.3  gm/dL Final    Calculated Result    A/G Ratio 04/30/2024 1.8  g/dL Final    BUN/Creatinine Ratio 04/30/2024 26.9 (H)  7.0 - 25.0 Final    Anion Gap 04/30/2024 8.0  5.0 - 15.0 mmol/L Final    eGFR 04/30/2024 107.1  >60.0 mL/min/1.73 Final    Magnesium 04/30/2024 1.7  1.6 - 2.4 mg/dL Final    WBC 04/30/2024 7.44  3.40 - 10.80 10*3/mm3 Final    RBC 04/30/2024 1.95 (L)  4.14 - 5.80 10*6/mm3 Final    Hemoglobin 04/30/2024 6.7 (C)  13.0 - 17.7 g/dL Final    Hematocrit 04/30/2024 20.6 (C)  37.5 - 51.0 % Final    MCV 04/30/2024 105.6 (H)  79.0 - 97.0 fL Final    MCH 04/30/2024 34.4 (H)  26.6 - 33.0 pg Final    MCHC 04/30/2024 32.5  31.5 - 35.7 g/dL Final    RDW 04/30/2024 19.3 (H)  12.3 - 15.4 % Final    RDW-SD 04/30/2024 71.0 (H)  37.0 - 54.0 fl Final    MPV 04/30/2024 10.0  6.0 - 12.0 fL Final    Platelets 04/30/2024 23 (C)  140 - 450 10*3/mm3 Final    Neutrophil % 04/30/2024 77.9 (H)  42.7 - 76.0 % Final    Lymphocyte % 04/30/2024 6.9 (L)  19.6 - 45.3 % Final    Monocyte % 04/30/2024 11.2  5.0 - 12.0 % Final    Eosinophil % 04/30/2024 0.4  0.3 - 6.2 % Final    Basophil % 04/30/2024 0.4  0.0 - 1.5 % Final    Immature Grans % 04/30/2024 3.2 (H)  0.0 - 0.5 % Final    Neutrophils, Absolute 04/30/2024 5.80  1.70 - 7.00 10*3/mm3 Final    Lymphocytes, Absolute 04/30/2024 0.51 (L)  0.70 - 3.10 10*3/mm3 Final    Monocytes, Absolute 04/30/2024 0.83  0.10 - 0.90 10*3/mm3 Final    Eosinophils, Absolute 04/30/2024 0.03  0.00 - 0.40 10*3/mm3 Final    Basophils, Absolute 04/30/2024 0.03  0.00 - 0.20 10*3/mm3 Final    Immature Grans, Absolute 04/30/2024 0.24 (H)  0.00 - 0.05 10*3/mm3 Final    nRBC 04/30/2024 0.0  0.0 - 0.2 /100 WBC Final   Hospital Outpatient Visit on 04/22/2024   Component Date Value Ref Range Status    Glucose 04/22/2024 69  65 - 99 mg/dL Final     BUN 04/22/2024 25 (H)  8 - 23 mg/dL Final    Creatinine 04/22/2024 0.59 (L)  0.76 - 1.27 mg/dL Final    Sodium 04/22/2024 138  136 - 145 mmol/L Final    Potassium 04/22/2024 4.6  3.5 - 5.2 mmol/L Final    Slight hemolysis detected by analyzer. Result may be falsely elevated.    Chloride 04/22/2024 103  98 - 107 mmol/L Final    CO2 04/22/2024 27.0  22.0 - 29.0 mmol/L Final    Calcium 04/22/2024 9.1  8.6 - 10.5 mg/dL Final    BUN/Creatinine Ratio 04/22/2024 42.4 (H)  7.0 - 25.0 Final    Anion Gap 04/22/2024 8.0  5.0 - 15.0 mmol/L Final    eGFR 04/22/2024 103.1  >60.0 mL/min/1.73 Final    Magnesium 04/22/2024 2.3  1.6 - 2.4 mg/dL Final    ABO Type 04/22/2024 O   Final    RH type 04/22/2024 Positive   Final    Antibody Screen 04/22/2024 Negative   Final    T&S Expiration Date 04/22/2024 4/25/2024 11:59:59 PM   Final    WBC 04/22/2024 4.67  3.40 - 10.80 10*3/mm3 Final    RBC 04/22/2024 2.35 (L)  4.14 - 5.80 10*6/mm3 Final    Hemoglobin 04/22/2024 8.0 (L)  13.0 - 17.7 g/dL Final    Hematocrit 04/22/2024 24.3 (L)  37.5 - 51.0 % Final    MCV 04/22/2024 103.4 (H)  79.0 - 97.0 fL Final    MCH 04/22/2024 34.0 (H)  26.6 - 33.0 pg Final    MCHC 04/22/2024 32.9  31.5 - 35.7 g/dL Final    RDW 04/22/2024 19.3 (H)  12.3 - 15.4 % Final    RDW-SD 04/22/2024 73.2 (H)  37.0 - 54.0 fl Final    MPV 04/22/2024 9.6  6.0 - 12.0 fL Final    Platelets 04/22/2024 167  140 - 450 10*3/mm3 Final    Neutrophil % 04/22/2024 87.5 (H)  42.7 - 76.0 % Final    Lymphocyte % 04/22/2024 7.5 (L)  19.6 - 45.3 % Final    Monocyte % 04/22/2024 2.6 (L)  5.0 - 12.0 % Final    Eosinophil % 04/22/2024 1.1  0.3 - 6.2 % Final    Basophil % 04/22/2024 0.4  0.0 - 1.5 % Final    Immature Grans % 04/22/2024 0.9 (H)  0.0 - 0.5 % Final    Neutrophils, Absolute 04/22/2024 4.09  1.70 - 7.00 10*3/mm3 Final    Lymphocytes, Absolute 04/22/2024 0.35 (L)  0.70 - 3.10 10*3/mm3 Final    Monocytes, Absolute 04/22/2024 0.12  0.10 - 0.90 10*3/mm3 Final    Eosinophils, Absolute  04/22/2024 0.05  0.00 - 0.40 10*3/mm3 Final    Basophils, Absolute 04/22/2024 0.02  0.00 - 0.20 10*3/mm3 Final    Immature Grans, Absolute 04/22/2024 0.04  0.00 - 0.05 10*3/mm3 Final   Admission on 04/16/2024, Discharged on 04/18/2024   Component Date Value Ref Range Status    Glucose 04/17/2024 143 (H)  65 - 99 mg/dL Final    BUN 04/17/2024 11  8 - 23 mg/dL Final    Creatinine 04/17/2024 0.46 (L)  0.76 - 1.27 mg/dL Final    Sodium 04/17/2024 136  136 - 145 mmol/L Final    Potassium 04/17/2024 4.4  3.5 - 5.2 mmol/L Final    Chloride 04/17/2024 103  98 - 107 mmol/L Final    CO2 04/17/2024 24.0  22.0 - 29.0 mmol/L Final    Calcium 04/17/2024 8.8  8.6 - 10.5 mg/dL Final    Total Protein 04/17/2024 5.8 (L)  6.0 - 8.5 g/dL Final    Albumin 04/17/2024 3.8  3.5 - 5.2 g/dL Final    ALT (SGPT) 04/17/2024 8  1 - 41 U/L Final    AST (SGOT) 04/17/2024 11  1 - 40 U/L Final    Alkaline Phosphatase 04/17/2024 106  39 - 117 U/L Final    Total Bilirubin 04/17/2024 0.3  0.0 - 1.2 mg/dL Final    Globulin 04/17/2024 2.0  gm/dL Final    Calculated Result    A/G Ratio 04/17/2024 1.9  g/dL Final    BUN/Creatinine Ratio 04/17/2024 23.9  7.0 - 25.0 Final    Anion Gap 04/17/2024 9.0  5.0 - 15.0 mmol/L Final    eGFR 04/17/2024 111.1  >60.0 mL/min/1.73 Final    Uric Acid 04/17/2024 4.4  3.4 - 7.0 mg/dL Final    Falsely depressed results may occur on samples drawn from patients receiving N-Acetylcysteine (NAC) or Metamizole.    WBC 04/17/2024 12.79 (H)  3.40 - 10.80 10*3/mm3 Final    RBC 04/17/2024 2.56 (L)  4.14 - 5.80 10*6/mm3 Final    Hemoglobin 04/17/2024 8.4 (L)  13.0 - 17.7 g/dL Final    Hematocrit 04/17/2024 26.4 (L)  37.5 - 51.0 % Final    MCV 04/17/2024 103.1 (H)  79.0 - 97.0 fL Final    MCH 04/17/2024 32.8  26.6 - 33.0 pg Final    MCHC 04/17/2024 31.8  31.5 - 35.7 g/dL Final    RDW 04/17/2024 20.3 (H)  12.3 - 15.4 % Final    RDW-SD 04/17/2024 75.2 (H)  37.0 - 54.0 fl Final    MPV 04/17/2024 10.4  6.0 - 12.0 fL Final    Platelets  04/17/2024 146  140 - 450 10*3/mm3 Final    Neutrophil % 04/17/2024 92.3 (H)  42.7 - 76.0 % Final    Lymphocyte % 04/17/2024 4.0 (L)  19.6 - 45.3 % Final    Monocyte % 04/17/2024 1.3 (L)  5.0 - 12.0 % Final    Eosinophil % 04/17/2024 0.0 (L)  0.3 - 6.2 % Final    Basophil % 04/17/2024 0.2  0.0 - 1.5 % Final    Immature Grans % 04/17/2024 2.2 (H)  0.0 - 0.5 % Final    Neutrophils, Absolute 04/17/2024 11.82 (H)  1.70 - 7.00 10*3/mm3 Final    Lymphocytes, Absolute 04/17/2024 0.51 (L)  0.70 - 3.10 10*3/mm3 Final    Monocytes, Absolute 04/17/2024 0.16  0.10 - 0.90 10*3/mm3 Final    Eosinophils, Absolute 04/17/2024 0.00  0.00 - 0.40 10*3/mm3 Final    Basophils, Absolute 04/17/2024 0.02  0.00 - 0.20 10*3/mm3 Final    Immature Grans, Absolute 04/17/2024 0.28 (H)  0.00 - 0.05 10*3/mm3 Final    nRBC 04/17/2024 0.2  0.0 - 0.2 /100 WBC Final    Color, UA 04/17/2024 Yellow  Yellow, Straw Final    Appearance, UA 04/17/2024 Clear  Clear Final    pH, UA 04/17/2024 6.5  5.0 - 8.0 Final    Specific Gravity, UA 04/17/2024 1.015  1.001 - 1.030 Final    Glucose, UA 04/17/2024 Negative  Negative Final    Ketones, UA 04/17/2024 Negative  Negative Final    Bilirubin, UA 04/17/2024 Negative  Negative Final    Blood, UA 04/17/2024 Trace (A)  Negative Final    Protein, UA 04/17/2024 Negative  Negative Final    Leuk Esterase, UA 04/17/2024 Negative  Negative Final    Nitrite, UA 04/17/2024 Negative  Negative Final    Urobilinogen, UA 04/17/2024 1.0 E.U./dL  0.2 - 1.0 E.U./dL Final    RBC, UA 04/17/2024 0-2  None Seen, 0-2 /HPF Final    WBC, UA 04/17/2024 0-2  None Seen, 0-2 /HPF Final    Bacteria, UA 04/17/2024 None Seen  None Seen, Trace /HPF Final    Squamous Epithelial Cells, UA 04/17/2024 0-2  None Seen, 0-2 /HPF Final    Hyaline Casts, UA 04/17/2024 None Seen  0 - 6 /LPF Final    Methodology 04/17/2024 Manual Light Microscopy   Final    Glucose 04/18/2024 121 (H)  65 - 99 mg/dL Final    BUN 04/18/2024 9  8 - 23 mg/dL Final    Creatinine  04/18/2024 0.53 (L)  0.76 - 1.27 mg/dL Final    Sodium 04/18/2024 140  136 - 145 mmol/L Final    Potassium 04/18/2024 4.1  3.5 - 5.2 mmol/L Final    Chloride 04/18/2024 105  98 - 107 mmol/L Final    CO2 04/18/2024 25.0  22.0 - 29.0 mmol/L Final    Calcium 04/18/2024 8.5 (L)  8.6 - 10.5 mg/dL Final    Total Protein 04/18/2024 5.4 (L)  6.0 - 8.5 g/dL Final    Albumin 04/18/2024 3.7  3.5 - 5.2 g/dL Final    ALT (SGPT) 04/18/2024 9  1 - 41 U/L Final    AST (SGOT) 04/18/2024 12  1 - 40 U/L Final    Alkaline Phosphatase 04/18/2024 88  39 - 117 U/L Final    Total Bilirubin 04/18/2024 0.2  0.0 - 1.2 mg/dL Final    Globulin 04/18/2024 1.7  gm/dL Final    Calculated Result    A/G Ratio 04/18/2024 2.2  g/dL Final    BUN/Creatinine Ratio 04/18/2024 17.0  7.0 - 25.0 Final    Anion Gap 04/18/2024 10.0  5.0 - 15.0 mmol/L Final    eGFR 04/18/2024 106.5  >60.0 mL/min/1.73 Final    Uric Acid 04/18/2024 3.8  3.4 - 7.0 mg/dL Final    Falsely depressed results may occur on samples drawn from patients receiving N-Acetylcysteine (NAC) or Metamizole.    WBC 04/18/2024 10.57  3.40 - 10.80 10*3/mm3 Final    RBC 04/18/2024 2.28 (L)  4.14 - 5.80 10*6/mm3 Final    Hemoglobin 04/18/2024 7.6 (L)  13.0 - 17.7 g/dL Final    Hematocrit 04/18/2024 23.5 (L)  37.5 - 51.0 % Final    MCV 04/18/2024 103.1 (H)  79.0 - 97.0 fL Final    MCH 04/18/2024 33.3 (H)  26.6 - 33.0 pg Final    MCHC 04/18/2024 32.3  31.5 - 35.7 g/dL Final    RDW 04/18/2024 21.1 (H)  12.3 - 15.4 % Final    RDW-SD 04/18/2024 76.9 (H)  37.0 - 54.0 fl Final    MPV 04/18/2024 10.1  6.0 - 12.0 fL Final    Platelets 04/18/2024 128 (L)  140 - 450 10*3/mm3 Final    Neutrophil % 04/18/2024 93.6 (H)  42.7 - 76.0 % Final    Lymphocyte % 04/18/2024 3.4 (L)  19.6 - 45.3 % Final    Monocyte % 04/18/2024 1.6 (L)  5.0 - 12.0 % Final    Eosinophil % 04/18/2024 0.0 (L)  0.3 - 6.2 % Final    Basophil % 04/18/2024 0.1  0.0 - 1.5 % Final    Immature Grans % 04/18/2024 1.3 (H)  0.0 - 0.5 % Final     Neutrophils, Absolute 04/18/2024 9.89 (H)  1.70 - 7.00 10*3/mm3 Final    Lymphocytes, Absolute 04/18/2024 0.36 (L)  0.70 - 3.10 10*3/mm3 Final    Monocytes, Absolute 04/18/2024 0.17  0.10 - 0.90 10*3/mm3 Final    Eosinophils, Absolute 04/18/2024 0.00  0.00 - 0.40 10*3/mm3 Final    Basophils, Absolute 04/18/2024 0.01  0.00 - 0.20 10*3/mm3 Final    Immature Grans, Absolute 04/18/2024 0.14 (H)  0.00 - 0.05 10*3/mm3 Final    nRBC 04/18/2024 0.0  0.0 - 0.2 /100 WBC Final    Color, UA 04/18/2024 Yellow  Yellow, Straw Final    Appearance, UA 04/18/2024 Clear  Clear Final    pH, UA 04/18/2024 6.0  5.0 - 8.0 Final    Specific Gravity, UA 04/18/2024 1.010  1.001 - 1.030 Final    Glucose, UA 04/18/2024 Negative  Negative Final    Ketones, UA 04/18/2024 40 mg/dL (2+) (A)  Negative Final    Bilirubin, UA 04/18/2024 Negative  Negative Final    Blood, UA 04/18/2024 Negative  Negative Final    Protein, UA 04/18/2024 Negative  Negative Final    Leuk Esterase, UA 04/18/2024 Negative  Negative Final    Nitrite, UA 04/18/2024 Negative  Negative Final    Urobilinogen, UA 04/18/2024 0.2 E.U./dL  0.2 - 1.0 E.U./dL Final    RBC, UA 04/18/2024 0-2  None Seen, 0-2 /HPF Final    WBC, UA 04/18/2024 0-2  None Seen, 0-2 /HPF Final    Bacteria, UA 04/18/2024 None Seen  None Seen, Trace /HPF Final    Squamous Epithelial Cells, UA 04/18/2024 None Seen  None Seen, 0-2 /HPF Final    Hyaline Casts, UA 04/18/2024 None Seen  0 - 6 /LPF Final    Methodology 04/18/2024 Automated Microscopy   Final       No results found.    Assessment / Plan      Assessment/Plan:     1.  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  Chemo monitoring  -He completed 6 cycles of BV-AVD 3/23/2023. Cycle 6 was complicated by coronavirus infection and multifocal pneumonia.  Posttherapy PET showed persistent bilateral interstitial changes and mild adenopathy. Repeat chest CT shows continued improvement in the pulmonary  infiltrates consistent with a reactive infectious etiology.  - He had clear response to first line therapy with resolution of the hepatic lesions, marked improvement in the retroperitoneal adenopathy, and 50% reduction in the size of the rectal mass.  However he had persistent uptake in the rectal mass on posttreatment PET, [place] 5. We elected to proceed with biopsy to confirm whether he had residual disease followed by consolidative radiotherapy to the rectal mass given initial bulky disease.  In the interim, while awaiting biopsy, he presented with rectal bleeding, obstructive uropathy symptoms and pain and radiation planning CT showed significant interval growth in the rectal mass as well as development of new retroperitoneal adenopathy superior to the rectal mass compared to the his posttreatment PET, confirming progressive disease. Consolidative radiation was converted to palliative course radiation given interval adenopathy development above the radiation field to allow for earlier introduction of second line chemotherapy.   -Repeat biopsy confirmed residual classic Hodgkin's lymphoma.  -Given national shortage of carbo/cis, we proceeded with Keytruda, vinorelbine, Gemzar, and Doxil. He completed 4 cycles 8/2023. Post treatment PET showed complete resolution of FDG avidity.  He continued Keytruda maintenance  -Most recent PET from 12/2023 with resolution of FDG avidity in the rectal mass which has decreased in size.  There is a new area of FDG avidity and a lytic lesion in the L4 region as well as mild SUV uptake in a right hilar node.  Lytic lesion appears to have been there previously on my review of prior PETs but with increased activity. He completed stereotactic radiation therapy to this area as he was having pain.  There was also an indeterminate hilar LN. I recommended continuation of maintenance Keytruda and short interval follow-up CT of the chest to reassess this. CT in Jan compared to prior PET  showed worsening consolidative RML from hilum to major fissure with possible broncholith and no specific mass. He underwent bronchoscopy with findings confirming recurrent CD30 positive Hodgkin's lymphoma, but he now has developed strong CD20 positivity.  Morphologically this is felt to represent the same underlying Hodgkin's process, but with immunophenotype switch.  I recommended third line systemic therapy.  He initiated  Rituximab/ICE and completed 4th cycle 4/2024  -I reviewed Dr. Pruitt's note, have re-discussed case with him and updated Abraham today.   -He is being assessed for possible CAR-T cell trial.   -CBC reviewed. He has expected chemotherapy induced cytopenias. Transfuse prbc today. Check labs in 2 days, may need plt count prior to the weekend. Will plan weekly blood counts pending recovery of counts. Transfuse with only leukoreduced irradiated blood products for hemoglobin < 7.   -Plan next PET in June unless otherwise desired by his CAR-T team.    2.  H/o GI bleed  -PPI, continue daily.  Tolerating well.     3. Access  -Port c/d/I    4.  Right middle lobe obstruction  -Secondary to his malignancy.  He is on room air.  I have discussed with pulmonary and he would potentially be candidate for his stent if he has progressive obstructive symptoms.    -Improving on treatment    5.  Mucositis  -Continue valtrex prophylaxis, refilled    Follow Up:   Weekly blood counts  Gibran 4 weeks  -Scan in mid June      Kaycee Leary MD  Hematology and Oncology

## 2024-05-01 LAB
BH BB BLOOD EXPIRATION DATE: NORMAL
BH BB BLOOD TYPE BARCODE: 9500
BH BB DISPENSE STATUS: NORMAL
BH BB PRODUCT CODE: NORMAL
BH BB UNIT NUMBER: NORMAL
CROSSMATCH INTERPRETATION: NORMAL
UNIT  ABO: NORMAL
UNIT  RH: NORMAL

## 2024-05-02 ENCOUNTER — TELEPHONE (OUTPATIENT)
Dept: ONCOLOGY | Facility: CLINIC | Age: 74
End: 2024-05-02
Payer: MEDICARE

## 2024-05-02 ENCOUNTER — HOSPITAL ENCOUNTER (OUTPATIENT)
Dept: ONCOLOGY | Facility: HOSPITAL | Age: 74
Discharge: HOME | End: 2024-05-02
Payer: MEDICARE

## 2024-05-02 VITALS
HEART RATE: 85 BPM | WEIGHT: 177 LBS | HEIGHT: 64 IN | DIASTOLIC BLOOD PRESSURE: 58 MMHG | RESPIRATION RATE: 16 BRPM | BODY MASS INDEX: 30.22 KG/M2 | TEMPERATURE: 98.6 F | SYSTOLIC BLOOD PRESSURE: 115 MMHG

## 2024-05-02 DIAGNOSIS — C81.98 HODGKIN LYMPHOMA OF LYMPH NODES OF MULTIPLE REGIONS, UNSPECIFIED HODGKIN LYMPHOMA TYPE: ICD-10-CM

## 2024-05-02 DIAGNOSIS — Z45.2 ENCOUNTER FOR CARE RELATED TO VASCULAR ACCESS PORT: Primary | ICD-10-CM

## 2024-05-02 LAB
BASOPHILS # BLD AUTO: 0.01 10*3/MM3 (ref 0–0.2)
BASOPHILS NFR BLD AUTO: 0.2 % (ref 0–1.5)
DEPRECATED RDW RBC AUTO: 67.8 FL (ref 37–54)
EOSINOPHIL # BLD AUTO: 0.03 10*3/MM3 (ref 0–0.4)
EOSINOPHIL NFR BLD AUTO: 0.6 % (ref 0.3–6.2)
ERYTHROCYTE [DISTWIDTH] IN BLOOD BY AUTOMATED COUNT: 19.4 % (ref 12.3–15.4)
HCT VFR BLD AUTO: 21.4 % (ref 37.5–51)
HGB BLD-MCNC: 7.2 G/DL (ref 13–17.7)
IMM GRANULOCYTES # BLD AUTO: 0.08 10*3/MM3 (ref 0–0.05)
IMM GRANULOCYTES NFR BLD AUTO: 1.6 % (ref 0–0.5)
LYMPHOCYTES # BLD AUTO: 0.41 10*3/MM3 (ref 0.7–3.1)
LYMPHOCYTES NFR BLD AUTO: 8.5 % (ref 19.6–45.3)
MCH RBC QN AUTO: 34.4 PG (ref 26.6–33)
MCHC RBC AUTO-ENTMCNC: 33.6 G/DL (ref 31.5–35.7)
MCV RBC AUTO: 102.4 FL (ref 79–97)
MONOCYTES # BLD AUTO: 0.78 10*3/MM3 (ref 0.1–0.9)
MONOCYTES NFR BLD AUTO: 16.1 % (ref 5–12)
NEUTROPHILS NFR BLD AUTO: 3.54 10*3/MM3 (ref 1.7–7)
NEUTROPHILS NFR BLD AUTO: 73 % (ref 42.7–76)
PLATELET # BLD AUTO: 32 10*3/MM3 (ref 140–450)
PMV BLD AUTO: 10.7 FL (ref 6–12)
RBC # BLD AUTO: 2.09 10*6/MM3 (ref 4.14–5.8)
WBC NRBC COR # BLD AUTO: 4.85 10*3/MM3 (ref 3.4–10.8)

## 2024-05-02 PROCEDURE — 85025 COMPLETE CBC W/AUTO DIFF WBC: CPT | Performed by: INTERNAL MEDICINE

## 2024-05-02 PROCEDURE — 25010000002 HEPARIN LOCK FLUSH PER 10 UNITS: Performed by: INTERNAL MEDICINE

## 2024-05-02 PROCEDURE — 36591 DRAW BLOOD OFF VENOUS DEVICE: CPT

## 2024-05-02 RX ORDER — HEPARIN SODIUM (PORCINE) LOCK FLUSH IV SOLN 100 UNIT/ML 100 UNIT/ML
500 SOLUTION INTRAVENOUS AS NEEDED
OUTPATIENT
Start: 2024-05-02

## 2024-05-02 RX ORDER — HEPARIN SODIUM (PORCINE) LOCK FLUSH IV SOLN 100 UNIT/ML 100 UNIT/ML
500 SOLUTION INTRAVENOUS AS NEEDED
Status: DISCONTINUED | OUTPATIENT
Start: 2024-05-02 | End: 2024-05-03 | Stop reason: HOSPADM

## 2024-05-02 RX ADMIN — HEPARIN 500 UNITS: 100 SYRINGE at 08:56

## 2024-05-02 NOTE — TELEPHONE ENCOUNTER
Notified Dr. Leary of patient's hemoglobin and platelet results from today.  Per Dr. Leary, no transfusion needed today.  Notified infusion RN.

## 2024-05-06 ENCOUNTER — READMISSION MANAGEMENT (OUTPATIENT)
Dept: CALL CENTER | Facility: HOSPITAL | Age: 74
End: 2024-05-06
Payer: MEDICARE

## 2024-05-07 ENCOUNTER — TELEPHONE (OUTPATIENT)
Dept: ONCOLOGY | Facility: CLINIC | Age: 74
End: 2024-05-07
Payer: MEDICARE

## 2024-05-07 ENCOUNTER — HOSPITAL ENCOUNTER (OUTPATIENT)
Dept: ONCOLOGY | Facility: HOSPITAL | Age: 74
Discharge: HOME | End: 2024-05-07
Payer: MEDICARE

## 2024-05-07 VITALS
DIASTOLIC BLOOD PRESSURE: 68 MMHG | WEIGHT: 178.8 LBS | BODY MASS INDEX: 30.67 KG/M2 | TEMPERATURE: 97.4 F | SYSTOLIC BLOOD PRESSURE: 124 MMHG | HEART RATE: 88 BPM

## 2024-05-07 DIAGNOSIS — C81.98 HODGKIN LYMPHOMA OF LYMPH NODES OF MULTIPLE REGIONS, UNSPECIFIED HODGKIN LYMPHOMA TYPE: ICD-10-CM

## 2024-05-07 DIAGNOSIS — Z45.2 ENCOUNTER FOR CARE RELATED TO VASCULAR ACCESS PORT: Primary | ICD-10-CM

## 2024-05-07 DIAGNOSIS — C81.98 HODGKIN LYMPHOMA OF LYMPH NODES OF MULTIPLE REGIONS, UNSPECIFIED HODGKIN LYMPHOMA TYPE: Primary | ICD-10-CM

## 2024-05-07 LAB
BASOPHILS # BLD AUTO: 0.02 10*3/MM3 (ref 0–0.2)
BASOPHILS NFR BLD AUTO: 0.3 % (ref 0–1.5)
DEPRECATED RDW RBC AUTO: 75.1 FL (ref 37–54)
EOSINOPHIL # BLD AUTO: 0.03 10*3/MM3 (ref 0–0.4)
EOSINOPHIL NFR BLD AUTO: 0.5 % (ref 0.3–6.2)
ERYTHROCYTE [DISTWIDTH] IN BLOOD BY AUTOMATED COUNT: 20.3 % (ref 12.3–15.4)
HCT VFR BLD AUTO: 23.6 % (ref 37.5–51)
HGB BLD-MCNC: 7.7 G/DL (ref 13–17.7)
IMM GRANULOCYTES # BLD AUTO: 0.21 10*3/MM3 (ref 0–0.05)
IMM GRANULOCYTES NFR BLD AUTO: 3.6 % (ref 0–0.5)
LYMPHOCYTES # BLD AUTO: 0.59 10*3/MM3 (ref 0.7–3.1)
LYMPHOCYTES NFR BLD AUTO: 10.1 % (ref 19.6–45.3)
MCH RBC QN AUTO: 34.5 PG (ref 26.6–33)
MCHC RBC AUTO-ENTMCNC: 32.6 G/DL (ref 31.5–35.7)
MCV RBC AUTO: 105.8 FL (ref 79–97)
MONOCYTES # BLD AUTO: 0.47 10*3/MM3 (ref 0.1–0.9)
MONOCYTES NFR BLD AUTO: 8 % (ref 5–12)
NEUTROPHILS NFR BLD AUTO: 4.54 10*3/MM3 (ref 1.7–7)
NEUTROPHILS NFR BLD AUTO: 77.5 % (ref 42.7–76)
PLATELET # BLD AUTO: 100 10*3/MM3 (ref 140–450)
PMV BLD AUTO: 10.6 FL (ref 6–12)
RBC # BLD AUTO: 2.23 10*6/MM3 (ref 4.14–5.8)
WBC NRBC COR # BLD AUTO: 5.86 10*3/MM3 (ref 3.4–10.8)

## 2024-05-07 PROCEDURE — 36591 DRAW BLOOD OFF VENOUS DEVICE: CPT

## 2024-05-07 PROCEDURE — 25010000002 HEPARIN LOCK FLUSH PER 10 UNITS: Performed by: INTERNAL MEDICINE

## 2024-05-07 PROCEDURE — G0463 HOSPITAL OUTPT CLINIC VISIT: HCPCS

## 2024-05-07 PROCEDURE — 85025 COMPLETE CBC W/AUTO DIFF WBC: CPT | Performed by: INTERNAL MEDICINE

## 2024-05-07 RX ORDER — HEPARIN SODIUM (PORCINE) LOCK FLUSH IV SOLN 100 UNIT/ML 100 UNIT/ML
500 SOLUTION INTRAVENOUS AS NEEDED
OUTPATIENT
Start: 2024-05-07

## 2024-05-07 RX ORDER — HEPARIN SODIUM (PORCINE) LOCK FLUSH IV SOLN 100 UNIT/ML 100 UNIT/ML
500 SOLUTION INTRAVENOUS AS NEEDED
Status: DISCONTINUED | OUTPATIENT
Start: 2024-05-07 | End: 2024-05-08 | Stop reason: HOSPADM

## 2024-05-07 RX ADMIN — HEPARIN 500 UNITS: 100 SYRINGE at 08:04

## 2024-05-10 ENCOUNTER — READMISSION MANAGEMENT (OUTPATIENT)
Dept: CALL CENTER | Facility: HOSPITAL | Age: 74
End: 2024-05-10
Payer: MEDICARE

## 2024-05-14 ENCOUNTER — TELEPHONE (OUTPATIENT)
Dept: ONCOLOGY | Facility: CLINIC | Age: 74
End: 2024-05-14
Payer: MEDICARE

## 2024-05-14 ENCOUNTER — HOSPITAL ENCOUNTER (OUTPATIENT)
Dept: ONCOLOGY | Facility: HOSPITAL | Age: 74
Discharge: HOME | End: 2024-05-14
Admitting: INTERNAL MEDICINE
Payer: MEDICARE

## 2024-05-14 VITALS
DIASTOLIC BLOOD PRESSURE: 68 MMHG | BODY MASS INDEX: 30.36 KG/M2 | HEART RATE: 68 BPM | RESPIRATION RATE: 18 BRPM | WEIGHT: 177 LBS | TEMPERATURE: 97.7 F | SYSTOLIC BLOOD PRESSURE: 139 MMHG

## 2024-05-14 DIAGNOSIS — C81.98 HODGKIN LYMPHOMA OF LYMPH NODES OF MULTIPLE REGIONS, UNSPECIFIED HODGKIN LYMPHOMA TYPE: ICD-10-CM

## 2024-05-14 DIAGNOSIS — Z45.2 ENCOUNTER FOR CARE RELATED TO VASCULAR ACCESS PORT: Primary | ICD-10-CM

## 2024-05-14 LAB
BASOPHILS # BLD AUTO: 0.02 10*3/MM3 (ref 0–0.2)
BASOPHILS NFR BLD AUTO: 0.4 % (ref 0–1.5)
DEPRECATED RDW RBC AUTO: 83.2 FL (ref 37–54)
EOSINOPHIL # BLD AUTO: 0.03 10*3/MM3 (ref 0–0.4)
EOSINOPHIL NFR BLD AUTO: 0.6 % (ref 0.3–6.2)
ERYTHROCYTE [DISTWIDTH] IN BLOOD BY AUTOMATED COUNT: 20.9 % (ref 12.3–15.4)
HCT VFR BLD AUTO: 28.4 % (ref 37.5–51)
HGB BLD-MCNC: 9.2 G/DL (ref 13–17.7)
IMM GRANULOCYTES # BLD AUTO: 0.04 10*3/MM3 (ref 0–0.05)
IMM GRANULOCYTES NFR BLD AUTO: 0.8 % (ref 0–0.5)
LYMPHOCYTES # BLD AUTO: 0.67 10*3/MM3 (ref 0.7–3.1)
LYMPHOCYTES NFR BLD AUTO: 13.6 % (ref 19.6–45.3)
MCH RBC QN AUTO: 35 PG (ref 26.6–33)
MCHC RBC AUTO-ENTMCNC: 32.4 G/DL (ref 31.5–35.7)
MCV RBC AUTO: 108 FL (ref 79–97)
MONOCYTES # BLD AUTO: 0.61 10*3/MM3 (ref 0.1–0.9)
MONOCYTES NFR BLD AUTO: 12.4 % (ref 5–12)
NEUTROPHILS NFR BLD AUTO: 3.56 10*3/MM3 (ref 1.7–7)
NEUTROPHILS NFR BLD AUTO: 72.2 % (ref 42.7–76)
PLATELET # BLD AUTO: 236 10*3/MM3 (ref 140–450)
PMV BLD AUTO: 9.3 FL (ref 6–12)
RBC # BLD AUTO: 2.63 10*6/MM3 (ref 4.14–5.8)
WBC NRBC COR # BLD AUTO: 4.93 10*3/MM3 (ref 3.4–10.8)

## 2024-05-14 PROCEDURE — 25010000002 HEPARIN LOCK FLUSH PER 10 UNITS: Performed by: INTERNAL MEDICINE

## 2024-05-14 PROCEDURE — 36591 DRAW BLOOD OFF VENOUS DEVICE: CPT

## 2024-05-14 PROCEDURE — 85025 COMPLETE CBC W/AUTO DIFF WBC: CPT | Performed by: INTERNAL MEDICINE

## 2024-05-14 RX ORDER — HEPARIN SODIUM (PORCINE) LOCK FLUSH IV SOLN 100 UNIT/ML 100 UNIT/ML
500 SOLUTION INTRAVENOUS AS NEEDED
OUTPATIENT
Start: 2024-05-14

## 2024-05-14 RX ORDER — HEPARIN SODIUM (PORCINE) LOCK FLUSH IV SOLN 100 UNIT/ML 100 UNIT/ML
500 SOLUTION INTRAVENOUS AS NEEDED
Status: DISCONTINUED | OUTPATIENT
Start: 2024-05-14 | End: 2024-05-15 | Stop reason: HOSPADM

## 2024-05-14 RX ADMIN — HEPARIN 500 UNITS: 100 SYRINGE at 08:31

## 2024-05-14 NOTE — TELEPHONE ENCOUNTER
Notified patient's spouse per Dr. Leary that patient does not need anymore weekly CBC's completed.  She verbalized understanding. Message sent to infusion scheduling to cancel appointments.

## 2024-05-15 ENCOUNTER — TELEPHONE (OUTPATIENT)
Dept: ONCOLOGY | Facility: CLINIC | Age: 74
End: 2024-05-15
Payer: MEDICARE

## 2024-05-15 NOTE — TELEPHONE ENCOUNTER
!!!RESENT SENT TO BRAND-YOURSELF      Caller: MEREDITHSRINIVASAN    Relationship: Emergency Contact    Best call back number:     [phone]     What is the best time to reach you: ANYTIME    Who are you requesting to speak with (clinical staff, provider,  specific staff member): DENNISE    What was the call regarding: PT WIFE IS CALLING AND WOULD LIKE A CALL BACK FROM DENNISE. THEY ARE NEEDING TO KNOW THE PT'S CANCER TYPE?    Is it okay if the provider responds through [facility]: NO - PLEASE CALL TO ADVISE.

## 2024-06-03 RX ORDER — PANTOPRAZOLE SODIUM 40 MG/1
40 TABLET, DELAYED RELEASE ORAL DAILY
Qty: 90 TABLET | Refills: 1 | Status: SHIPPED | OUTPATIENT
Start: 2024-06-03

## 2024-06-03 NOTE — TELEPHONE ENCOUNTER
UPCOMING APPTS  With Oncology (Kaycee Leary MD)  06/18/2024 at 9:00 AM  LAST OFFICE VISIT - THIS DEPT  4/30/2024 Kaycee Leary MD  Last refill 12/6/23 90 tablets 1 refills

## 2024-06-11 ENCOUNTER — HOSPITAL ENCOUNTER (OUTPATIENT)
Facility: HOSPITAL | Age: 74
Discharge: HOME | End: 2024-06-11
Payer: MEDICARE

## 2024-06-11 DIAGNOSIS — C81.98 HODGKIN LYMPHOMA OF LYMPH NODES OF MULTIPLE REGIONS, UNSPECIFIED HODGKIN LYMPHOMA TYPE: ICD-10-CM

## 2024-06-11 LAB — GLUCOSE BLDC GLUCOMTR-MCNC: 102 MG/DL (ref 70–130)

## 2024-06-11 PROCEDURE — 0 FLUDEOXYGLUCOSE F18 SOLUTION: Performed by: INTERNAL MEDICINE

## 2024-06-11 PROCEDURE — A9552 F18 FDG: HCPCS | Performed by: INTERNAL MEDICINE

## 2024-06-11 PROCEDURE — 78815 PET IMAGE W/CT SKULL-THIGH: CPT

## 2024-06-11 PROCEDURE — 82948 REAGENT STRIP/BLOOD GLUCOSE: CPT

## 2024-06-11 RX ADMIN — FLUDEOXYGLUCOSE F18 1 DOSE: 300 INJECTION INTRAVENOUS at 08:03

## 2024-06-18 ENCOUNTER — OFFICE VISIT (OUTPATIENT)
Dept: ONCOLOGY | Facility: CLINIC | Age: 74
End: 2024-06-18
Payer: MEDICARE

## 2024-06-18 VITALS
OXYGEN SATURATION: 99 % | TEMPERATURE: 97.5 F | RESPIRATION RATE: 18 BRPM | DIASTOLIC BLOOD PRESSURE: 70 MMHG | HEART RATE: 62 BPM | SYSTOLIC BLOOD PRESSURE: 148 MMHG | WEIGHT: 180 LBS | BODY MASS INDEX: 30.73 KG/M2 | HEIGHT: 64 IN

## 2024-06-18 DIAGNOSIS — C81.98 HODGKIN LYMPHOMA OF LYMPH NODES OF MULTIPLE REGIONS, UNSPECIFIED HODGKIN LYMPHOMA TYPE: Primary | ICD-10-CM

## 2024-06-18 PROCEDURE — 3077F SYST BP >= 140 MM HG: CPT | Performed by: INTERNAL MEDICINE

## 2024-06-18 PROCEDURE — 1159F MED LIST DOCD IN RCRD: CPT | Performed by: INTERNAL MEDICINE

## 2024-06-18 PROCEDURE — 3078F DIAST BP <80 MM HG: CPT | Performed by: INTERNAL MEDICINE

## 2024-06-18 PROCEDURE — 1126F AMNT PAIN NOTED NONE PRSNT: CPT | Performed by: INTERNAL MEDICINE

## 2024-06-18 PROCEDURE — 1160F RVW MEDS BY RX/DR IN RCRD: CPT | Performed by: INTERNAL MEDICINE

## 2024-06-18 PROCEDURE — 99214 OFFICE O/P EST MOD 30 MIN: CPT | Performed by: INTERNAL MEDICINE

## 2024-06-18 NOTE — PROGRESS NOTES
Hematology and Oncology [place]  Office number [phone]    Fax number [phone]     Follow up     Date: 24    Patient Name: Abraham Wu  MRN: [id number]  : 1950    Chief Complaint: Hodgkin Lymphoma follow up/treatment    Surgeon: Dr. Hiram Viveros MD    Cancer Staging: IV    History of Present Illness: Abraham Wu is a pleasant 74 y.o. male with PMH of hypertension, sleep apnea, hard of hearing who presents today for follow up of Hodgkin Lymphoma.     He initially presented 2022 with intractable diarrhea, low appetite, and a greater than 50 pound weight loss over several month period associated with intermittent fevers.  CT of the chest abdomen pelvis obtained in the ER shows of rectal mass spanning greater than 12 cm with adjacent adenopathy, bulky RP adenopathy, and findings concerning for left renal and liver metastases.  Small right pleural effusion with nodularity.  There was concern for impending obstruction, so he underwent diverting colostomy and biopsy on 2022.  Biopsies from the peritoneam showed a fibrotic nodule histiocytes and eosinophils admixed with CD30 positive large cells.  Rectal biopsies showed involvement by CD30 positive lymphoma, classic Hodgkin lymphoma versus CD30 positive T-cell or B-cell lymphoma.  There was extensive fibrosis and crush artifact.  He underwent repeat transrectal biopsy 10/4/2022 for further characterization which was felt to be most consistent with classical Hodgkin lymphoma.    He initiated chemotherapy with Brentuximab-AVD as an inpatient on 10/8/2022.  Cycle #1 was complicated by GI bleed requiring multiple blood transfusion and ultimately coil artery embolization 10/12; neutropenic fever, Candida esophagitis and mucositis.  He had a prolonged ICU stay  And required enteral feeding.  He was discharged 10/20/2022.    Following his last cycle he was admitted with multifocal PNA and +corona virus  infection.    He underwent brochoscopy 2/1/24 showing heaped up, irregular, friable mucosa obstructing the right middle lobe otherwise no discrete endobronchial lesions. Pathology showed persistent/recurrent CD30 positive lymphoma; see comment. These continue to have CD30 and RACHELE positivity, consistent with involvement by the same process. However, the lymphoma cells are now more numerous and have an intact B-cell expression profile, with strong diffuse CD20 expression, strong PAX5 expression, and expression of CD45 and CD79a. This is strongly favored to represent continued involvement with the same process with the development of a slightly different immunophenotype after pembrolizumab therapy as opposed to a secondary lymphoma. The strong expression of CD20 may be a therapeutic target. CD30 expression remains intact as well.     Treatment history:  Brentuximab-AVD: C1 10/8/22  C2: 11/1/22 onward with DA 20% in BVD; full dose brentuximab  Completed C6 3/22/23    2. Palliative radiation to rectum 5/30/2023 (abbreviated due to systemic progression)  3. GVD+Pembro x 4 cycles: completed 8/30/2023  4. Consolidative radiation to rectum completed 9/27/23    5. Pembrolizumab: current  6. Radiation to spine 1/16, 1/18 and 1/22/24    7. Rituximab-ICE: C1 2/13/2024; C2 3/5/24; C3 3/26/24; C4 4/16/24    Interval history:    He is here for follow up and PET results. Feeling well. Stable ostomy output. No B symptoms. No pain or cough  Has a basal cell carcinoma on right scalp, planning Mohs next month  Seeing Dr. Pruitt, being considered for CAR-T trial    Past Medical History:   Past Medical History:   Diagnosis Date    Arthritis     benign polypoid tissue right lung     Cancer     HODGKINS LYMPHOMA, RECTAL CANCER    Diabetes mellitus     patient reports that he doesn't have diabetes secondary to changing diet and weight loss.    History of radiation therapy 12/31/2023    re-treat rectum    History of transfusion     no  reaction, transfusions received at [facility] in 2022    Hyperlipidemia     Hypertension     Lymphoma     Mycobacterium mucogenicum     SHERRI (obstructive sleep apnea)     O2 2L/MIN AT NIGHT ONLY    Pneumonia     2023    Seasonal allergies    Polyp in airway removed 2020 [facility] Dr. Ketan Monet.    Past Surgical History:   Past Surgical History:   Procedure Laterality Date    BRONCHOSCOPY      removal of obstructing polypoid tissue right middle lung    BRONCHOSCOPY N/A 2/1/2024    Procedure: BRONCHOSCOPY WITH ENDOBRONCHIAL ULTRASOUND AND FLUORO;  Surgeon: Chris Pruitt MD;  Location:  [facility];  Service: Pulmonary;  Laterality: N/A;  EBUS BALLOON INTACT    COLONOSCOPY      COLOSTOMY N/A 09/22/2022    Procedure: LAPAROSCOPIC COLOSTOMY CREATION, FLEXIBLE SIGMOIDOSCOPY;  Surgeon: Hiram Viveros MD;  Location:  [facility];  Service: General;  Laterality: N/A;    DENTAL PROCEDURE      dental implants    ENDOSCOPY N/A 10/10/2022    Procedure: ESOPHAGOGASTRODUODENOSCOPY;  Surgeon: Neeraj Lynn MD;  Location:  [facility];  Service: Gastroenterology;  Laterality: N/A;    ENDOSCOPY N/A 10/12/2022    Procedure: ESOPHAGOGASTRODUODENOSCOPY;  Surgeon: Brunner, Mark I, MD;  Location:  [facility];  Service: Gastroenterology;  Laterality: N/A;    EXAM UNDER ANESTHESIA, RECTAL BIOPSY N/A 10/04/2022    Procedure: EXAM UNDER ANESTHESIA, TRANSANAL BIOPSY WITH TRUCUT NEEDLE (LITHOTOMY-CANDY CANE);  Surgeon: Hiram Viveros MD;  Location:  [facility];  Service: General;  Laterality: N/A;    EXAM UNDER ANESTHESIA, RECTAL BIOPSY N/A 05/30/2023    Procedure: EXAM UNDER ANESTHESIA, TRANSANAL BIOPSY OF RECTAL MASS WITH TRUCUT NEEDLE, PROCTOSCOPY;  Surgeon: Hiarm Viveros MD;  Location:  [facility];  Service: General;  Laterality: N/A;    PERIPHERALLY INSERTED CENTRAL CATHETER INSERTION      Removed 11/28/2022    VENOUS ACCESS DEVICE (PORT) INSERTION Right 11/28/2022     Family History:   Family History    Problem Relation Age of Onset    Diabetes Mother     Diabetes Father     Heart disease Father      Social History:   Social History     Socioeconomic History    Marital status:    Tobacco Use    Smoking status: Never    Smokeless tobacco: Never   Vaping Use    Vaping status: Never Used   Substance and Sexual Activity    Alcohol use: Not Currently     Comment: previously drank 2 glasses of wine/beer nightly    Drug use: Never    Sexual activity: Defer       Medications:     Current Outpatient Medications:     bacitracin 500 UNIT/GM ointment, Apply 1 Application topically to the appropriate area as directed 2 (Two) Times a Day., Disp: 14 g, Rfl: 1    dexAMETHasone (DECADRON) 2 MG tablet, Take 1 tablet by mouth 2 (Two) Times a Day With Meals., Disp: , Rfl:     DPH-Lido-AlHydr-MgHydr-Simeth (First Mouthwash, Magic Mouthwash,) suspension, Swish and swallow 5 mL Every 6 (Six) Hours., Disp: 240 mL, Rfl: 3    fluticasone (FLONASE) 50 MCG/ACT nasal spray, 1 spray into the nostril(s) as directed by provider Daily As Needed for Allergies or Rhinitis. OTC, Disp: , Rfl:     lidocaine-prilocaine (EMLA) 2.5-2.5 % cream, Apply 1 application topically to the appropriate area as directed As Needed (45-60 minutes prior to port access.  Cover with saran/plastic wrap.)., Disp: 30 g, Rfl: 3    loratadine (CLARITIN) 10 MG tablet, TAKE ONE TABLET BY MOUTH DAILY, Disp: 30 tablet, Rfl: 5    ondansetron (Zofran) 8 MG tablet, Take 1 tablet by mouth Every 8 (Eight) Hours As Needed for Nausea or Vomiting., Disp: 30 tablet, Rfl: 5    pantoprazole (PROTONIX) 40 MG EC tablet, TAKE 1 TABLET BY MOUTH DAILY, Disp: 90 tablet, Rfl: 1    prochlorperazine (COMPAZINE) 5 MG tablet, Take 1 tablet by mouth Every 6 (Six) Hours As Needed for Nausea or Vomiting., Disp: 30 tablet, Rfl: 2    rosuvastatin (CRESTOR) 10 MG tablet, Take 1 tablet by mouth Daily., Disp: , Rfl:     valACYclovir (VALTREX) 500 MG tablet, Take 1 tablet by mouth Daily. Two tablets  "TID, Disp: 30 tablet, Rfl: 3  No current facility-administered medications for this visit.    Facility-Administered Medications Ordered in Other Visits:     sodium chloride 0.9 % infusion, 125 mL/hr, Intravenous, Continuous, Kaycee Leary MD    Allergies:   No Known Allergies    Objective     Vital Signs:   Vitals:    06/18/24 0904   BP: 148/70   Pulse: 62   Resp: 18   Temp: 97.5 °F (36.4 °C)   TempSrc: Infrared   SpO2: 99%   Weight: 81.6 kg (180 lb)   Height: 162.6 cm (64.02\")   PainSc: 0-No pain      Body mass index is 30.88 kg/m².   Pain Score    06/18/24 0904   PainSc: 0-No pain     ECOG Performance Status: 1    Physical Exam:   General: Well appearing male   HEENT: Normocephalic, atraumatic. Sclera anicteric.   Neck: supple, no palpable LAD. No axillary adenopathy  Cardiovascular: regular rate and rhythm. No murmurs.   Respiratory: Normal rate. Clear to auscultation bilaterally  Abdomen: Soft, nontender, non distended with normoactive bowel sounds. Ostomy LLQ   Lymph: no supraclavicular or axillary adenopathy  Neuro: Alert and oriented x 3.  Ext: Symmetric, no swelling.   Skin: right port site  C/D/I    Laboratory/Imaging Reviewed:     Hospital Outpatient Visit on 06/11/2024   Component Date Value Ref Range Status    Glucose 06/11/2024 102  70 - 130 mg/dL Final    Serial Number: [id number]:  [id number]       Echocardiogram Bi-v Optimization    Result Date: 6/17/2024  Narrative: This result has an attachment that is not available.   Left Ventricle: The left ventricle is normal size. There is normal left ventricular myocardial thickness and mass. The left ventricular systolic function is mildly reduced.  The LVEF as measured by Heart Model 3D volume is 50%. The global longitudinal strain is normal (<-18%). The diastolic function is indeterminate. The left ventricular filling pressure is normal. No regional wall motion abnormalities are seen.   Right Ventricle: The right ventricle is normal in size. The " right ventricular systolic function is normal. Right ventricular systolic pressure is normal (<35mmHg). The estimated right ventricular systolic pressure is 28 mmHg.   All cardiac valves were reasonably well interrogated with 2D imaging and/or Doppler assessment and no significant valve regurgitation or stenosis is seen.   Pericardium: No pericardial effusion.   There is no recent study available for direct bmft-mr-ajej comparison.    Left Ventricle The left ventricle is normal size. There is normal left ventricular myocardial thickness and mass. No left ventricular mass or thrombus is seen. The left ventricular systolic function is mildly reduced. The LVEF as measured by Heart Model 3D volume is 50%. The global longitudinal strain is normal (<-18%). The diastolic function is indeterminate. The left ventricular filling pressure is normal. No regional wall motion abnormalities are seen. Right Ventricle The right ventricle is normal in size. The right ventricular systolic function is normal. The estimated global right ventricular systolic function based upon the tricuspid annular plane of systolic excursion (TAPSE) is normal (>=17 mm). The estimated global right ventricular systolic function based upon the TDI maximal systolic velocity is normal (>=9.5 cm/s). Right ventricular systolic pressure is normal (<35mmHg). The estimated right ventricular systolic pressure is 28 mmHg. Left Atrium The left atrial size is normal with an indexed volume of 16-34 mL/m2. The interatrial septum is intact with no evidence for an atrial septal defect. Right Atrium The right atrial size is normal. IVC/SVC Based on the IVC size and respiratory variation, the estimated right atrial pressure is 3mmHg. Mitral Valve The mitral valve leaflets are normal in appearance with no evidence of mitral valve prolapse. There is no mitral valve vegetation. There is trace mitral regurgitation. There is no mitral stenosis. Tricuspid Valve The tricuspid  valve is normal in appearance. There is no tricuspid valve vegetation. There is trace tricuspid regurgitation. There is no tricuspid stenosis. Aortic Valve The aortic valve appears to be trileaflet. There is no aortic valve vegetation. There is no valvular regurgitation. There is no hemodynamically significant valvular aortic stenosis. Pulmonic Valve The pulmonic valve is normal in appearance. There is no pulmonic regurgitation. There is no pulmonic stenosis. Pericardium No pericardial effusion. Great Vessels The aortic root is normal in size. The sinus of Valsalva (aortic root) diameter is 37 mm by leading edge to leading edge method. In the maximally visualized portion, the ascending aorta appears mildly dilated. In the maximally visualized portion, the aortic arch appears normal in size. The main pulmonary artery is normal in size. Study Details A complete transthoracic echocardiogram using two-dimensional (2D), m-mode, color and spectral flow Doppler, 3D and strain imaging was performed. During the study the apical, parasternal, subcostal and suprasternal view was captured. Overall the study quality was good. BP: 142/76 mmHg. Heart Rate: 59 bpm. Heart rate was bradycardic. Height: 162.0 cm. Weight: 81.0 kg. BSA: 1.86 m2. The heart rhythm during this exam was most suggestive of a sinus rhythm. Study Recommendation All cardiac valves were reasonably well interrogated with 2D imaging and/or Doppler assessment and no significant valve regurgitation or stenosis is seen. There is no recent study available for direct ohmx-cf-crco comparison.    XR Chest 2 View    Result Date: 6/17/2024  Narrative: CLINICAL INDICATION: IEC TXP work up DX Hodgkin's Lymphoma TECHNIQUE: XR CHEST 2 VIEWS COMPARISON: PET/CT 8/22/2023 FINDINGS: Right IJ venous catheter terminates near the superior cavoatrial junction. No pleural effusion or pneumothorax. Mediastinal contour within normal limits.    Impression: No acute findings. CRITICAL  RESULT:   No. COMMUNICATION: Per this written report. By electronically signing this report, I, the attending physician, attest that I have personally reviewed the images/data for the above examination(s) and agree with the final edited report. Drafted by Franck Finnegan MD on 6/17/2024 1:19 PM Final report signed by Hussain Wells MD on 6/17/2024 1:50 PM    NM PET/CT Skull Base to Mid Thigh    Result Date: 6/13/2024  Narrative: NM PET/CT SKULL BASE TO MID THIGH Date of Exam: 6/11/2024 8:09 AM EDT Indication: lymphoma. Comparison: 3/18/2024 whole-body PET CT scan Technique: 12.9 mCi of F-18 FDG was administered intravenously. PET imaging was obtained from skull base to mid-thigh approximately 60 minutes after radiotracer injection. A low dose non contrast CT was obtained for attenuation correction and anatomic localization. Fused PET-CT and 3D MIP reconstructions were utilized for image interpretation.  Fasting blood glucose level: 102 mg/dl. Reference uptake values: Mediastinum: 3.3 SUVmax Liver: 4.5 SUVmax Normalization method: Body Weight Findings: Prior study from 3/18/2024 by report was negative. Extensive marrow space uptake noted, potentially masking marrow space lesions.  Today's 3D images show uptake in the region of the muscles of phonation, low-level uptake in shoulders, and a small new focus of activity in the left lower neck that appears to correspond to stable facet DJD on CT scan. GI tract activity noted in the patient's ostomy hernia. Punctate areas of activity regional to the GI tract. No particular findings suspicious for recurrent disease on 3D series. The previous very extensive marrow space uptake has resolved. Multiplanar images show no significant asymmetry of uptake in the brain. No hypermetabolic node or mass is seen in the neck soft tissues. Facet DJD associated uptake has maximal SUV of 8.2, but bony structures are visually stable from prior study. In the chest, no hypermetabolic  mediastinal, hilar, axillary, or pulmonary parenchymal disease is seen. Non-hypermetabolic pulmonary nodules, CT image 153 and 183 left upper lobe is unchanged on CT. Chronic appearing lung changes also appear stable. Below the diaphragm, no hypermetabolic solid organ disease or adenopathy is seen. There is expected GI and  tract uptake. Nodule/mass of the rectum at approximately 5:00 is visually unchanged on CT and has SUV identical to normal-appearing colon elsewhere, maximal SUV 3.0.     Impression: Impression: 1. Negative PET scan for evidence of lymphoma. 2. Hypermetabolic activity associated with degenerative left-sided facet joint at what appears to be C6-7. Appearance is unchanged on CT scan, and infection and tumor are considered unlikely. Uptake in this area could have been present previously but masked by extensive marrow space uptake on the prior study. Please correlate with any symptoms in this region for 3. Non-hypermetabolic left upper lobe pulmonary nodule unchanged. Electronically Signed: Del Wilks MD  6/13/2024 7:27 PM EDT  Workstation ID: [id number]     Assessment / Plan      Assessment/Plan:     1.  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  Chemo monitoring  -He completed 6 cycles of BV-AVD 3/23/2023. Cycle 6 was complicated by coronavirus infection and multifocal pneumonia.  Posttherapy PET showed persistent bilateral interstitial changes and mild adenopathy. Repeat chest CT shows continued improvement in the pulmonary infiltrates consistent with a reactive infectious etiology.  - He had clear response to first line therapy with resolution of the hepatic lesions, marked improvement in the retroperitoneal adenopathy, and 50% reduction in the size of the rectal mass.  However he had persistent uptake in the rectal mass on posttreatment PET, [place] 5. We elected to proceed with biopsy to confirm whether he had residual disease  followed by consolidative radiotherapy to the rectal mass given initial bulky disease.  In the interim, while awaiting biopsy, he presented with rectal bleeding, obstructive uropathy symptoms and pain and radiation planning CT showed significant interval growth in the rectal mass as well as development of new retroperitoneal adenopathy superior to the rectal mass compared to the his posttreatment PET, confirming progressive disease. Consolidative radiation was converted to palliative course radiation given interval adenopathy development above the radiation field to allow for earlier introduction of second line chemotherapy.   -Repeat biopsy confirmed residual classic Hodgkin's lymphoma.  -Given national shortage of carbo/cis, we proceeded with Keytruda, vinorelbine, Gemzar, and Doxil. He completed 4 cycles 8/2023. Post treatment PET showed complete resolution of FDG avidity.  He continued Keytruda maintenance  -Most recent PET from 12/2023 with resolution of FDG avidity in the rectal mass which has decreased in size.  There is a new area of FDG avidity and a lytic lesion in the L4 region as well as mild SUV uptake in a right hilar node.  Lytic lesion appears to have been there previously on my review of prior PETs but with increased activity. He completed stereotactic radiation therapy to this area as he was having pain.  There was also an indeterminate hilar LN. I recommended continuation of maintenance Keytruda and short interval follow-up CT of the chest to reassess this. CT in Jan compared to prior PET showed worsening consolidative RML from hilum to major fissure with possible broncholith and no specific mass. He underwent bronchoscopy with findings confirming recurrent CD30 positive Hodgkin's lymphoma, but he now has developed strong CD20 positivity.  Morphologically this is felt to represent the same underlying Hodgkin's process, but with immunophenotype switch.  I recommended third line systemic therapy.   He initiated  Rituximab/ICE and completed 4th cycle 4/2024  -PET CT reviewed and consistent with CR  -He is being assessed for possible CAR-T cell trial now vs on progression. Has follow up with transplant team this week.   -CBC, CMP , LDH reviewed  -Plan next PET in 3 mo unless otherwise desired by his CAR-T team.    2.  H/o GI bleed  -PPI, continue daily.  Tolerating well.     3. Access  -Port c/d/I    4.  Right middle lobe obstruction  -Secondary to his malignancy. No current obstruction.  I have discussed with pulmonary and he would potentially be candidate for his stent if he has progressive obstructive symptoms.      Follow Up:   6 weeks CBC/CMP/LDH  PET 3 mo      Kaycee Leary MD  Hematology and Oncology

## 2024-07-19 ENCOUNTER — TELEPHONE (OUTPATIENT)
Dept: ONCOLOGY | Facility: CLINIC | Age: 74
End: 2024-07-19
Payer: MEDICARE

## 2024-07-19 ENCOUNTER — HOSPITAL ENCOUNTER (EMERGENCY)
Facility: HOSPITAL | Age: 74
Discharge: HOME | End: 2024-07-19
Payer: MEDICARE

## 2024-07-19 ENCOUNTER — HOSPITAL ENCOUNTER (OUTPATIENT)
Dept: CT IMAGING | Facility: HOSPITAL | Age: 74
Discharge: HOME | End: 2024-07-19
Payer: MEDICARE

## 2024-07-19 VITALS
SYSTOLIC BLOOD PRESSURE: 162 MMHG | HEART RATE: 64 BPM | WEIGHT: 180 LBS | DIASTOLIC BLOOD PRESSURE: 93 MMHG | HEIGHT: 64 IN | OXYGEN SATURATION: 100 % | TEMPERATURE: 98.4 F | RESPIRATION RATE: 16 BRPM | BODY MASS INDEX: 30.73 KG/M2

## 2024-07-19 DIAGNOSIS — L03.90 CELLULITIS, UNSPECIFIED CELLULITIS SITE: Primary | ICD-10-CM

## 2024-07-19 DIAGNOSIS — B99.9 INFECTION: ICD-10-CM

## 2024-07-19 DIAGNOSIS — Z51.89 VISIT FOR WOUND CHECK: ICD-10-CM

## 2024-07-19 PROCEDURE — 25510000001 IOPAMIDOL 61 % SOLUTION: Performed by: NURSE PRACTITIONER

## 2024-07-19 PROCEDURE — 99283 EMERGENCY DEPT VISIT LOW MDM: CPT

## 2024-07-19 PROCEDURE — 87040 BLOOD CULTURE FOR BACTERIA: CPT | Performed by: NURSE PRACTITIONER

## 2024-07-19 PROCEDURE — 74177 CT ABD & PELVIS W/CONTRAST: CPT

## 2024-07-19 PROCEDURE — 82565 ASSAY OF CREATININE: CPT

## 2024-07-19 PROCEDURE — 99285 EMERGENCY DEPT VISIT HI MDM: CPT

## 2024-07-19 PROCEDURE — 80053 COMPREHEN METABOLIC PANEL: CPT | Performed by: EMERGENCY MEDICINE

## 2024-07-19 PROCEDURE — 83605 ASSAY OF LACTIC ACID: CPT | Performed by: EMERGENCY MEDICINE

## 2024-07-19 PROCEDURE — 85025 COMPLETE CBC W/AUTO DIFF WBC: CPT | Performed by: EMERGENCY MEDICINE

## 2024-07-19 RX ADMIN — IOPAMIDOL 80 ML: 612 INJECTION, SOLUTION INTRAVENOUS at 13:10

## 2024-07-19 NOTE — TELEPHONE ENCOUNTER
Late entry due to downtime:  Patient's spouse called at 08:12 on 7-19-24.  She stated patient has a blister by the stoma.  It is infected or getting there. Dr. Leary notified. Returned call to Ms. Wu at 08:55 on 7-19-24.  Advised her per Dr. Leary that patient should contact his surgeon's office and let them know what is going on and that he needs to be seen so that site can be assessed and treated accordingly.  Ms. Wu verbalized understanding.  She stated patient is in the emergency room now being assessed.

## 2024-07-20 LAB
ALBUMIN SERPL-MCNC: 4.1 G/DL (ref 3.5–5.2)
ALBUMIN/GLOB SERPL: 1.6 G/DL
ALP SERPL-CCNC: 78 U/L (ref 39–117)
ALT SERPL W P-5'-P-CCNC: 10 U/L (ref 1–41)
ANION GAP SERPL CALCULATED.3IONS-SCNC: 7 MMOL/L (ref 5–15)
AST SERPL-CCNC: 14 U/L (ref 1–40)
BASOPHILS # BLD AUTO: 0.02 10*3/MM3 (ref 0–0.2)
BASOPHILS NFR BLD AUTO: 0.5 % (ref 0–1.5)
BILIRUB SERPL-MCNC: 0.4 MG/DL (ref 0–1.2)
BUN SERPL-MCNC: 16 MG/DL (ref 8–23)
BUN/CREAT SERPL: 21.3 (ref 7–25)
CALCIUM SPEC-SCNC: 8.9 MG/DL (ref 8.6–10.5)
CHLORIDE SERPL-SCNC: 100 MMOL/L (ref 98–107)
CO2 SERPL-SCNC: 28 MMOL/L (ref 22–29)
CREAT SERPL-MCNC: 0.75 MG/DL (ref 0.76–1.27)
D-LACTATE SERPL-SCNC: 0.6 MMOL/L (ref 0.5–2)
DEPRECATED RDW RBC AUTO: 48.1 FL (ref 37–54)
EGFRCR SERPLBLD CKD-EPI 2021: 95.9 ML/MIN/1.73
EOSINOPHIL # BLD AUTO: 0.13 10*3/MM3 (ref 0–0.4)
EOSINOPHIL NFR BLD AUTO: 3.5 % (ref 0.3–6.2)
ERYTHROCYTE [DISTWIDTH] IN BLOOD BY AUTOMATED COUNT: 13.4 % (ref 12.3–15.4)
GLOBULIN UR ELPH-MCNC: 2.5 GM/DL
GLUCOSE SERPL-MCNC: 98 MG/DL (ref 65–99)
HCT VFR BLD AUTO: 37.4 % (ref 37.5–51)
HGB BLD-MCNC: 12.5 G/DL (ref 13–17.7)
IMM GRANULOCYTES # BLD AUTO: 0.01 10*3/MM3 (ref 0–0.05)
IMM GRANULOCYTES NFR BLD AUTO: 0.3 % (ref 0–0.5)
LYMPHOCYTES # BLD AUTO: 0.63 10*3/MM3 (ref 0.7–3.1)
LYMPHOCYTES NFR BLD AUTO: 17 % (ref 19.6–45.3)
MCH RBC QN AUTO: 32.9 PG (ref 26.6–33)
MCHC RBC AUTO-ENTMCNC: 33.4 G/DL (ref 31.5–35.7)
MCV RBC AUTO: 98.4 FL (ref 79–97)
MONOCYTES # BLD AUTO: 0.51 10*3/MM3 (ref 0.1–0.9)
MONOCYTES NFR BLD AUTO: 13.8 % (ref 5–12)
NEUTROPHILS NFR BLD AUTO: 2.4 10*3/MM3 (ref 1.7–7)
NEUTROPHILS NFR BLD AUTO: 64.9 % (ref 42.7–76)
NRBC BLD AUTO-RTO: 0 /100 WBC (ref 0–0.2)
PLATELET # BLD AUTO: 165 10*3/MM3 (ref 140–450)
PMV BLD AUTO: 9.7 FL (ref 6–12)
POTASSIUM SERPL-SCNC: 4.1 MMOL/L (ref 3.5–5.2)
PROT SERPL-MCNC: 6.6 G/DL (ref 6–8.5)
RBC # BLD AUTO: 3.8 10*6/MM3 (ref 4.14–5.8)
SODIUM SERPL-SCNC: 135 MMOL/L (ref 136–145)
WBC NRBC COR # BLD AUTO: 3.7 10*3/MM3 (ref 3.4–10.8)

## 2024-07-22 LAB — CREAT BLDA-MCNC: 0.7 MG/DL (ref 0.6–1.3)

## 2024-07-24 ENCOUNTER — PATIENT OUTREACH (OUTPATIENT)
Dept: CASE MANAGEMENT | Facility: OTHER | Age: 74
End: 2024-07-24
Payer: MEDICARE

## 2024-07-24 LAB
BACTERIA SPEC AEROBE CULT: NORMAL
BACTERIA SPEC AEROBE CULT: NORMAL

## 2024-07-24 NOTE — OUTREACH NOTE
Patient scheduled for Car-T transplant om 08/08/2024 at . Wife reports patient is well connected with interdisciplinary transplant team. Service declined.

## 2024-07-25 NOTE — ED PROVIDER NOTES
EMERGENCY DEPARTMENT ENCOUNTER    Pt Name: Abraham Wu  MRN: [id number]  Pt :   1950  Room Number:  Room/bed info not found  Date of encounter:  2024  PCP: Jatinder Haynes MD  ED Provider: PORTILLO Estes    Historian: Patient, spouse      HPI:  Chief Complaint: Erythema surrounding stoma.  Wound check        Context: Abraham Wu is a 74 y.o. male who presents to the ED c/o erythema, drainage on the left side of the stoma.  Patient reports worsening since 1 week ago.  Patient reports mild discomfort and small amount of purulent drainage from the site.  No bowel changes, abdominal pain, fever, malaise or other  systemic symptoms.  Awaits CarT transplant in 2 weeks, wants to make sure no infection that will impede with transplant      PAST MEDICAL HISTORY  Past Medical History:   Diagnosis Date    Arthritis     benign polypoid tissue right lung     Cancer     HODGKINS LYMPHOMA, RECTAL CANCER    Diabetes mellitus     patient reports that he doesn't have diabetes secondary to changing diet and weight loss.    History of radiation therapy 2023    re-treat rectum    History of transfusion     no reaction, transfusions received at [facility] in     Hyperlipidemia     Hypertension     Lymphoma     Mycobacterium mucogenicum     SHERRI (obstructive sleep apnea)     O2 2L/MIN AT NIGHT ONLY    Pneumonia         Seasonal allergies          PAST SURGICAL HISTORY  Past Surgical History:   Procedure Laterality Date    BRONCHOSCOPY      removal of obstructing polypoid tissue right middle lung    BRONCHOSCOPY N/A 2024    Procedure: BRONCHOSCOPY WITH ENDOBRONCHIAL ULTRASOUND AND FLUORO;  Surgeon: Chris Pruitt MD;  Location: [facility] ENDOSCOPY;  Service: Pulmonary;  Laterality: N/A;  EBUS BALLOON INTACT    COLONOSCOPY      COLOSTOMY N/A 2022    Procedure: LAPAROSCOPIC COLOSTOMY CREATION, FLEXIBLE SIGMOIDOSCOPY;  Surgeon: Hiram Viveros MD;  Location: [facility] OR;  Service:  General;  Laterality: N/A;    DENTAL PROCEDURE      dental implants    ENDOSCOPY N/A 10/10/2022    Procedure: ESOPHAGOGASTRODUODENOSCOPY;  Surgeon: Neeraj Lynn MD;  Location:  [facility] ENDOSCOPY;  Service: Gastroenterology;  Laterality: N/A;    ENDOSCOPY N/A 10/12/2022    Procedure: ESOPHAGOGASTRODUODENOSCOPY;  Surgeon: Brunner, Mark I, MD;  Location:  [facility] ENDOSCOPY;  Service: Gastroenterology;  Laterality: N/A;    EXAM UNDER ANESTHESIA, RECTAL BIOPSY N/A 10/04/2022    Procedure: EXAM UNDER ANESTHESIA, TRANSANAL BIOPSY WITH TRUCUT NEEDLE (LITHOTOMY-CANDY CANE);  Surgeon: Hiram Viveros MD;  Location:  [facility] OR;  Service: General;  Laterality: N/A;    EXAM UNDER ANESTHESIA, RECTAL BIOPSY N/A 05/30/2023    Procedure: EXAM UNDER ANESTHESIA, TRANSANAL BIOPSY OF RECTAL MASS WITH TRUCUT NEEDLE, PROCTOSCOPY;  Surgeon: Hiram Viveros MD;  Location:  [facility] OR;  Service: General;  Laterality: N/A;    PERIPHERALLY INSERTED CENTRAL CATHETER INSERTION      Removed 11/28/2022    VENOUS ACCESS DEVICE (PORT) INSERTION Right 11/28/2022         FAMILY HISTORY  Family History   Problem Relation Age of Onset    Diabetes Mother     Diabetes Father     Heart disease Father          SOCIAL HISTORY  Social History     Socioeconomic History    Marital status:    Tobacco Use    Smoking status: Never    Smokeless tobacco: Never   Vaping Use    Vaping status: Never Used   Substance and Sexual Activity    Alcohol use: Not Currently     Comment: previously drank 2 glasses of wine/beer nightly    Drug use: Never    Sexual activity: Defer         ALLERGIES  Patient has no known allergies.        REVIEW OF SYSTEMS  Review of Systems       All systems reviewed and negative except for those discussed in HPI.       PHYSICAL EXAM    I have reviewed the triage vital signs and nursing notes.    ED Triage Vitals [07/19/24 0842]   Temp Heart Rate Resp BP SpO2   98.4 °F (36.9 °C) 64 16 162/93 100 %      Temp src Heart Rate Source  Patient Position BP Location FiO2 (%)   Oral Monitor -- -- --       Physical Exam  GENERAL:   Appears in no acute distress.   HENT: Nares patent.  EYES: No scleral icterus.  CV: Regular rhythm, regular rate.  RESPIRATORY: Normal effort.  No audible wheezes, rales or rhonchi.  ABDOMEN: Soft, nontender  MUSCULOSKELETAL: No deformities.   NEURO: Alert, moves all extremities, follows commands.  SKIN: Warm, dry, no rash visualized.  Erythema, excoriation, mild skin thickening to the left side of the stoma.  No streaking redness, no palpable fluctuance.  Small amount of yellow drainage at the excoriated region.  Stoma is protuberant, beefy, healthy-appearing      LAB RESULTS  No results found for this or any previous visit (from the past 24 hour(s)).    If labs were ordered, I independently reviewed the results and considered them in treating the patient.        RADIOLOGY  No Radiology Exams Resulted Within Past 24 Hours    I ordered and independently reviewed the above noted radiographic studies.            PROCEDURES    Procedures    No orders to display       MEDICATIONS GIVEN IN ER    Medications - No data to display      MEDICAL DECISION MAKING, PROGRESS, and CONSULTS    All labs, if obtained, have been independently reviewed by me.  All radiology studies, if obtained, have been reviewed by me and the radiologist dictating the report.  All EKG's, if obtained, have been independently viewed and interpreted by me/my attending physician.      Discussion below represents my analysis of pertinent findings related to patient's condition, differential diagnosis, treatment plan and final disposition.  Patient is 74-year-old male present for evaluation of erythema to the left side of the stoma since 1 week ago.  Patient is concerned with infection.  Physical exam he was nontoxic-appearing, vital signs were stable, he was mildly hypertensive.  Patient was seen during [facility] downtime.  Lab work was unremarkable, CT of the abdomen  with IV contrast shows no abscess to the area of interest.  Patient discharged home with Keflex and mupirocin for superficial skin infection.  Handwritten prescription given on discharge.  Advised close follow-up with PCP and return precaution for any worsening.                       Differential diagnosis:    Cellulitis, abscess, fistula, NF      Additional sources:    - Discussed/ obtained information from independent historians: Spouse    - External (non-ED) record review:      - Chronic or social conditions impacting care: Malignancy, advanced age    - Shared decision making: Patient, spouse      Orders placed during this visit:  Orders Placed This Encounter   Procedures    Blood Culture - Blood,    Blood Culture - Blood,    CBC Auto Differential    Comprehensive Metabolic Panel    Lactic Acid, Plasma    LABS SCANNED    POC Creatinine    CBC & Differential         Additional orders considered but not ordered:      ED Course:    Consultants:                  Shared Decision Making:  After my consideration of clinical presentation and any laboratory/radiology studies obtained, I discussed the findings with the patient/patient representative who is in agreement with the treatment plan and the final disposition.   Risks and benefits of discharge and/or observation/admission were discussed.       AS OF 07:13 EDT VITALS:    BP - 162/93  HR - 64  TEMP - 98.4 °F (36.9 °C) (Oral)  O2 SATS - 100%                  DIAGNOSIS  Final diagnoses:   Cellulitis, unspecified cellulitis site   Visit for wound check         DISPOSITION  DISCHARGE    Patient discharged in stable condition.    Reviewed implications of results, diagnosis, meds, responsibility to follow up, warning signs and symptoms of possible worsening, potential complications and reasons to return to ER.    Patient/Family voiced understanding of above instructions.    Discussed plan for discharge, as there is no emergent indication for admission.  Pt/family is  agreeable and understands need for follow up and possible repeat testing.  Pt/family is aware that discharge does not mean that nothing is wrong but that it indicates no emergency is currently present that requires admission and they must continue care with follow-up as given below or with a physician of their choice.     FOLLOW-UP  Jatinder Haynse MD  [address] [facility] [address]  [phone]          [facility] EMERGENCY DEPARTMENT  [address] [address] [phone]    If symptoms worsen         Medication List      No changes were made to your prescriptions during this visit.            Please note that portions of this document were completed with voice recognition software.     PORTILLO Estes   07/25/24   07:13 EDT        Neva Quick APRN  07/25/24 0713

## 2024-07-26 ENCOUNTER — TELEPHONE (OUTPATIENT)
Dept: ONCOLOGY | Facility: CLINIC | Age: 74
End: 2024-07-26

## 2024-07-26 ENCOUNTER — TELEPHONE (OUTPATIENT)
Dept: WOUND CARE | Facility: HOSPITAL | Age: 74
End: 2024-07-26
Payer: MEDICARE

## 2024-07-26 NOTE — TELEPHONE ENCOUNTER
Abraham Wu  1950  [id number]    Summary: Received call from patient regarding his ostomy. Returned call. No answer. Left voicemail. Waiting for fredrick back.    Time spent teleconferencing with patient was: 15 minutes

## 2024-07-29 RX ORDER — LORATADINE 10 MG/1
10 TABLET ORAL DAILY
Qty: 30 TABLET | Refills: 5 | Status: SHIPPED | OUTPATIENT
Start: 2024-07-29

## 2024-07-29 NOTE — TELEPHONE ENCOUNTER
UPCOMING APPTS  With Oncology (Kaycee Leary MD)  09/18/2024 at 9:00 AM  LAST OFFICE VISIT - THIS DEPT  6/18/2024 Kaycee Leary MD  Last refill 12/4/23 30 tablets 5 refills

## 2024-09-03 ENCOUNTER — TELEPHONE (OUTPATIENT)
Dept: ONCOLOGY | Facility: CLINIC | Age: 74
End: 2024-09-03
Payer: MEDICARE

## 2024-09-03 NOTE — TELEPHONE ENCOUNTER
Caller: SRINIVASAN HARPER (ON  [facility])    Relationship: Emergency Contact    Best call back number: [phone]    What is the best time to reach you: ASAP     Who are you requesting to speak with (clinical staff, provider,  specific staff member): CLINICAL     Do you know the name of the person who called: SRINIVASAN     What was the call regarding: PATIENT IS SCHEDULED TO HAVE A PET SCAN FOR DR. ANTONIO SARKAR ON 9/6/2024. HE HAS ONE SCHEDULED FOR OUR OFFICE ON 9/11/2024. DOES HE NEED THE PET SCAN TO BE DONE WITH [facility]     CALL TO ADVISE     Is it okay if the provider responds through MyChart: YES

## 2024-09-03 NOTE — TELEPHONE ENCOUNTER
Advised Peggy that patient can have it performed wherever he wishes but it looks like PET is authorized for [employer].  She verbalized understanding and stated she will contact Dr. Pruitt and let them know and if changes need to be made, she will contact this office back.

## 2024-09-05 ENCOUNTER — TELEPHONE (OUTPATIENT)
Dept: ONCOLOGY | Facility: CLINIC | Age: 74
End: 2024-09-05
Payer: MEDICARE

## 2024-09-05 NOTE — TELEPHONE ENCOUNTER
Caller: SRINIVASAN HARPER    Relationship to patient: Emergency Contact    Best call back number: [phone]    Chief complaint: SCHEDULING    Type of visit: PET SCAN    Additional notes:REQUESTING TO CANCEL THE PET SCAN SCHEDULED FOR 9-11 THEY WILL BE GOING TO [facility] TOMORROW 9-6 TO HAVE IT DONE

## 2024-09-17 ENCOUNTER — OFFICE VISIT (OUTPATIENT)
Dept: ONCOLOGY | Facility: CLINIC | Age: 74
End: 2024-09-17
Payer: MEDICARE

## 2024-09-17 VITALS
WEIGHT: 182 LBS | BODY MASS INDEX: 31.07 KG/M2 | OXYGEN SATURATION: 97 % | TEMPERATURE: 97.3 F | SYSTOLIC BLOOD PRESSURE: 145 MMHG | DIASTOLIC BLOOD PRESSURE: 74 MMHG | HEIGHT: 64 IN | HEART RATE: 76 BPM

## 2024-09-17 DIAGNOSIS — C81.98 HODGKIN LYMPHOMA OF LYMPH NODES OF MULTIPLE REGIONS, UNSPECIFIED HODGKIN LYMPHOMA TYPE: Primary | ICD-10-CM

## 2024-09-17 PROCEDURE — 1126F AMNT PAIN NOTED NONE PRSNT: CPT | Performed by: INTERNAL MEDICINE

## 2024-09-17 PROCEDURE — 1159F MED LIST DOCD IN RCRD: CPT | Performed by: INTERNAL MEDICINE

## 2024-09-17 PROCEDURE — 3077F SYST BP >= 140 MM HG: CPT | Performed by: INTERNAL MEDICINE

## 2024-09-17 PROCEDURE — 1160F RVW MEDS BY RX/DR IN RCRD: CPT | Performed by: INTERNAL MEDICINE

## 2024-09-17 PROCEDURE — 3078F DIAST BP <80 MM HG: CPT | Performed by: INTERNAL MEDICINE

## 2024-09-17 PROCEDURE — 99214 OFFICE O/P EST MOD 30 MIN: CPT | Performed by: INTERNAL MEDICINE

## 2024-09-17 RX ORDER — SULFAMETHOXAZOLE/TRIMETHOPRIM 800-160 MG
1 TABLET ORAL
COMMUNITY
Start: 2024-09-07

## 2024-09-17 RX ORDER — ACYCLOVIR 800 MG/1
800 TABLET ORAL
COMMUNITY
Start: 2024-09-10 | End: 2024-10-10

## 2024-11-15 ENCOUNTER — TELEPHONE (OUTPATIENT)
Dept: ONCOLOGY | Facility: CLINIC | Age: 74
End: 2024-11-15
Payer: MEDICARE

## 2024-11-15 NOTE — TELEPHONE ENCOUNTER
Left VM for Peggy that likely okay to cancel PET but will check with Dr. Leary when she returns on Monday and RN will call her back.  Will request imaging from [facility].

## 2024-11-15 NOTE — TELEPHONE ENCOUNTER
Caller: SRINIVASAN HARPER    Relationship: Emergency Contact    Best call back number: [phone]  MAY LEAVE VM    What is the best time to reach you: ANYTIME    Who are you requesting to speak with (clinical staff, provider,  specific staff member): CLINICAL    What was the call regarding: CALLING TO ADVISE THAT PT HAD A PET SCAN AT [facility] ON 11-4-2024 AND WANTS TO KNOW IF THEY CAN CANCEL THE ONE ON 12-10

## 2024-11-18 NOTE — TELEPHONE ENCOUNTER
Advised Peggy lancaster MD to cancel PET at  on 12/10/24 and keep follow up as scheduled.  She verbalized understanding.  Message sent to scheduling to cancel PET appointment at [facility] and to [place] to obtain imaging on PET at  from 11/4/24.  Peggy verbalized understanding.

## 2024-12-18 ENCOUNTER — OFFICE VISIT (OUTPATIENT)
Dept: ONCOLOGY | Facility: CLINIC | Age: 74
End: 2024-12-18
Payer: MEDICARE

## 2024-12-18 VITALS
DIASTOLIC BLOOD PRESSURE: 73 MMHG | RESPIRATION RATE: 16 BRPM | SYSTOLIC BLOOD PRESSURE: 123 MMHG | WEIGHT: 185.5 LBS | BODY MASS INDEX: 31.67 KG/M2 | TEMPERATURE: 99.3 F | HEIGHT: 64 IN | HEART RATE: 84 BPM | OXYGEN SATURATION: 97 %

## 2024-12-18 DIAGNOSIS — C81.98 HODGKIN LYMPHOMA OF LYMPH NODES OF MULTIPLE REGIONS, UNSPECIFIED HODGKIN LYMPHOMA TYPE: Primary | ICD-10-CM

## 2024-12-18 PROCEDURE — 1160F RVW MEDS BY RX/DR IN RCRD: CPT | Performed by: INTERNAL MEDICINE

## 2024-12-18 PROCEDURE — 3074F SYST BP LT 130 MM HG: CPT | Performed by: INTERNAL MEDICINE

## 2024-12-18 PROCEDURE — 99213 OFFICE O/P EST LOW 20 MIN: CPT | Performed by: INTERNAL MEDICINE

## 2024-12-18 PROCEDURE — 1125F AMNT PAIN NOTED PAIN PRSNT: CPT | Performed by: INTERNAL MEDICINE

## 2024-12-18 PROCEDURE — 1159F MED LIST DOCD IN RCRD: CPT | Performed by: INTERNAL MEDICINE

## 2024-12-18 PROCEDURE — 3078F DIAST BP <80 MM HG: CPT | Performed by: INTERNAL MEDICINE

## 2024-12-18 RX ORDER — LEVOFLOXACIN 500 MG/1
TABLET, FILM COATED ORAL
COMMUNITY
Start: 2024-12-02

## 2024-12-18 RX ORDER — ACYCLOVIR 800 MG/1
800 TABLET ORAL
COMMUNITY
Start: 2024-11-04

## 2024-12-18 RX ORDER — FLUCONAZOLE 200 MG/1
TABLET ORAL
COMMUNITY
Start: 2024-12-02

## 2024-12-18 RX ORDER — DAPSONE 100 MG/1
100 TABLET ORAL DAILY
COMMUNITY
Start: 2024-11-04

## 2024-12-18 NOTE — PROGRESS NOTES
Hematology and Oncology [place]  Office number [phone]    Fax number [phone]     Follow up     Date: 24    Patient Name: Abraham Wu  MRN: [id number]  : 1950    Chief Complaint: Hodgkin Lymphoma follow up    Surgeon: Dr. Hiram Viveros MD    Cancer Staging: IV    History of Present Illness: Abraham Wu is a pleasant 74 y.o. male with PMH of hypertension, sleep apnea, hard of hearing who presents today for follow up of Hodgkin Lymphoma.     He initially presented 2022 with intractable diarrhea, low appetite, and a greater than 50 pound weight loss over several month period associated with intermittent fevers.  CT of the chest abdomen pelvis obtained in the ER shows of rectal mass spanning greater than 12 cm with adjacent adenopathy, bulky RP adenopathy, and findings concerning for left renal and liver metastases.  Small right pleural effusion with nodularity.  There was concern for impending obstruction, so he underwent diverting colostomy and biopsy on 2022.  Biopsies from the peritoneam showed a fibrotic nodule histiocytes and eosinophils admixed with CD30 positive large cells.  Rectal biopsies showed involvement by CD30 positive lymphoma, classic Hodgkin lymphoma versus CD30 positive T-cell or B-cell lymphoma.  There was extensive fibrosis and crush artifact.  He underwent repeat transrectal biopsy 10/4/2022 for further characterization which was felt to be most consistent with classical Hodgkin lymphoma.    He initiated chemotherapy with Brentuximab-AVD as an inpatient on 10/8/2022.  Cycle #1 was complicated by GI bleed requiring multiple blood transfusion and ultimately coil artery embolization 10/12; neutropenic fever, Candida esophagitis and mucositis.  He had a prolonged ICU stay  And required enteral feeding.  He was discharged 10/20/2022.    Following his last cycle he was admitted with multifocal PNA and +corona virus infection.    He  underwent brochoscopy 2/1/24 showing heaped up, irregular, friable mucosa obstructing the right middle lobe otherwise no discrete endobronchial lesions. Pathology showed persistent/recurrent CD30 positive lymphoma; see comment. These continue to have CD30 and RACHELE positivity, consistent with involvement by the same process. However, the lymphoma cells are now more numerous and have an intact B-cell expression profile, with strong diffuse CD20 expression, strong PAX5 expression, and expression of CD45 and CD79a. This is strongly favored to represent continued involvement with the same process with the development of a slightly different immunophenotype after pembrolizumab therapy as opposed to a secondary lymphoma. The strong expression of CD20 may be a therapeutic target. CD30 expression remains intact as well.     Treatment history:  Brentuximab-AVD: C1 10/8/22  C2: 11/1/22 onward with DA 20% in BVD; full dose brentuximab  Completed C6 3/22/23    2. Palliative radiation to rectum 5/30/2023 (abbreviated due to systemic progression)  3. GVD+Pembro x 4 cycles: completed 8/30/2023  4. Consolidative radiation to rectum completed 9/27/23    5. Pembrolizumab: current  6. Radiation to spine 1/16, 1/18 and 1/22/24    7. Rituximab-ICE: C1 2/13/2024; C2 3/5/24; C3 3/26/24; C4 4/16/24    8. Fludarabine and cyclophosphamide lymphodepleting chemotherapy 7/31/24--8/2/24, followed by lisocabtagene maraleucel (Breyanzi) IEC infusion (T0: 8/5/24).    Interval history:    He is continuing to feel well.  He has had problems with recurrent neutropenia and has been receiving G-CSF.  He does note more Bromanate since that time.  Neutrophils have been improved and this is being decreased to once weekly with a plan to consider bone marrow biopsy if his counts fail to recover.  No fever or infectious symptoms.  No recurrent adenopathy.  Ostomy output stable.     Past Medical History:   Past Medical History:   Diagnosis Date    Arthritis      benign polypoid tissue right lung     Cancer     HODGKINS LYMPHOMA, RECTAL CANCER    Diabetes mellitus     patient reports that he doesn't have diabetes secondary to changing diet and weight loss.    History of radiation therapy 12/31/2023    re-treat rectum    History of transfusion     no reaction, transfusions received [facility] in 2022    Hyperlipidemia     Hypertension     Lymphoma     Mycobacterium mucogenicum     SHERRI (obstructive sleep apnea)     O2 2L/MIN AT NIGHT ONLY    Pneumonia     2023    Seasonal allergies    Polyp in airway removed 2020 [facility] Dr. Ketan Monet.    Past Surgical History:   Past Surgical History:   Procedure Laterality Date    BRONCHOSCOPY      removal of obstructing polypoid tissue right middle lung    BRONCHOSCOPY N/A 2/1/2024    Procedure: BRONCHOSCOPY WITH ENDOBRONCHIAL ULTRASOUND AND FLUORO;  Surgeon: Chris Pruitt MD;  Location:  [facility];  Service: Pulmonary;  Laterality: N/A;  EBUS BALLOON INTACT    COLONOSCOPY      COLOSTOMY N/A 09/22/2022    Procedure: LAPAROSCOPIC COLOSTOMY CREATION, FLEXIBLE SIGMOIDOSCOPY;  Surgeon: Hiram Viveros MD;  Location:  [facility];  Service: General;  Laterality: N/A;    DENTAL PROCEDURE      dental implants    ENDOSCOPY N/A 10/10/2022    Procedure: ESOPHAGOGASTRODUODENOSCOPY;  Surgeon: Neeraj Lynn MD;  Location:  [facility];  Service: Gastroenterology;  Laterality: N/A;    ENDOSCOPY N/A 10/12/2022    Procedure: ESOPHAGOGASTRODUODENOSCOPY;  Surgeon: Brunner, Mark I, MD;  Location:  [facility];  Service: Gastroenterology;  Laterality: N/A;    EXAM UNDER ANESTHESIA, RECTAL BIOPSY N/A 10/04/2022    Procedure: EXAM UNDER ANESTHESIA, TRANSANAL BIOPSY WITH TRUCUT NEEDLE (LITHOTOMY-CANDY CANE);  Surgeon: Hiram Viveros MD;  Location:  [facility];  Service: General;  Laterality: N/A;    EXAM UNDER ANESTHESIA, RECTAL BIOPSY N/A 05/30/2023    Procedure: EXAM UNDER ANESTHESIA, TRANSANAL BIOPSY OF RECTAL MASS WITH  TRUCUT NEEDLE, PROCTOSCOPY;  Surgeon: Hiram Viveros MD;  Location: [facility];  Service: General;  Laterality: N/A;    PERIPHERALLY INSERTED CENTRAL CATHETER INSERTION      Removed 11/28/2022    VENOUS ACCESS DEVICE (PORT) INSERTION Right 11/28/2022     Family History:   Family History   Problem Relation Age of Onset    Diabetes Mother     Diabetes Father     Heart disease Father      Social History:   Social History     Socioeconomic History    Marital status:    Tobacco Use    Smoking status: Never    Smokeless tobacco: Never   Vaping Use    Vaping status: Never Used   Substance and Sexual Activity    Alcohol use: Not Currently     Comment: previously drank 2 glasses of wine/beer nightly    Drug use: Never    Sexual activity: Defer       Medications:     Current Outpatient Medications:     acyclovir (ZOVIRAX) 800 MG tablet, Take 1 tablet by mouth., Disp: , Rfl:     dapsone 100 MG tablet, Take 1 tablet by mouth Daily., Disp: , Rfl:     fluconazole (DIFLUCAN) 200 MG tablet, , Disp: , Rfl:     fluticasone (FLONASE) 50 MCG/ACT nasal spray, Administer 1 spray into the nostril(s) as directed by provider Daily As Needed for Allergies or Rhinitis. OTC, Disp: , Rfl:     levoFLOXacin (LEVAQUIN) 500 MG tablet, , Disp: , Rfl:     lidocaine-prilocaine (EMLA) 2.5-2.5 % cream, Apply 1 application topically to the appropriate area as directed As Needed (45-60 minutes prior to port access.  Cover with saran/plastic wrap.)., Disp: 30 g, Rfl: 3    loratadine (CLARITIN) 10 MG tablet, TAKE 1 TABLET BY MOUTH DAILY, Disp: 30 tablet, Rfl: 5    ondansetron (Zofran) 8 MG tablet, Take 1 tablet by mouth Every 8 (Eight) Hours As Needed for Nausea or Vomiting., Disp: 30 tablet, Rfl: 5    pantoprazole (PROTONIX) 40 MG EC tablet, TAKE 1 TABLET BY MOUTH DAILY, Disp: 90 tablet, Rfl: 1    prochlorperazine (COMPAZINE) 5 MG tablet, Take 1 tablet by mouth Every 6 (Six) Hours As Needed for Nausea or Vomiting., Disp: 30 tablet, Rfl: 2     "rosuvastatin (CRESTOR) 10 MG tablet, Take 1 tablet by mouth Daily., Disp: , Rfl:     sulfamethoxazole-trimethoprim (BACTRIM DS,SEPTRA DS) 800-160 MG per tablet, Take 1 tablet by mouth. (Patient not taking: Reported on 12/18/2024), Disp: , Rfl:   No current facility-administered medications for this visit.    Facility-Administered Medications Ordered in Other Visits:     sodium chloride 0.9 % infusion, 125 mL/hr, Intravenous, Continuous, Kaycee Leary MD    Allergies:   Allergies   Allergen Reactions    Dexamethasone Other (See Comments)     **Avoid use of systemic corticosteroids as it may interfere with activity of immune effector cell therapy**       Objective     Vital Signs:   Vitals:    12/18/24 1127   BP: 123/73   Pulse: 84   Resp: 16   Temp: 99.3 °F (37.4 °C)   TempSrc: Infrared   SpO2: 97%  Comment: RA   Weight: 84.1 kg (185 lb 8 oz)   Height: 162.6 cm (64\")   PainSc: 5  Comment: osteoarthritis   PainLoc: Generalized      Body mass index is 31.84 kg/m².   Pain Score    12/18/24 1127   PainSc: 5  Comment: osteoarthritis   PainLoc: Generalized     ECOG Performance Status: 1    Physical Exam:   General: Well appearing male   HEENT: Normocephalic, atraumatic. Sclera anicteric.   Neck: supple, no palpable LAD. No axillary adenopathy  Cardiovascular: regular rate and rhythm. No murmurs.   Respiratory: Normal rate. Clear to auscultation bilaterally  Abdomen: Soft, nontender, non distended with normoactive bowel sounds. Ostomy LLQ   Lymph: no supraclavicular or axillary adenopathy  Neuro: Alert and oriented x 3.  Ext: Symmetric, no swelling.   Skin: right port site  C/D/I  Accurate 12/18/24    Laboratory/Imaging Reviewed:   Assessment / Plan      Assessment/Plan:     1.  Recurrent CD30 positive classical Hodgkin's lymphoma, now with CD 20/CD 45 positive on repeat biopsy    -He completed 6 cycles of BV-AVD 3/23/2023. Cycle 6 was complicated by coronavirus infection and multifocal pneumonia.  Posttherapy PET showed " persistent bilateral interstitial changes and mild adenopathy. Repeat chest CT shows continued improvement in the pulmonary infiltrates consistent with a reactive infectious etiology.  - He had clear response to first line therapy with resolution of the hepatic lesions, marked improvement in the retroperitoneal adenopathy, and 50% reduction in the size of the rectal mass.  However he had persistent uptake in the rectal mass on posttreatment PET, [place] 5. We elected to proceed with biopsy to confirm whether he had residual disease followed by consolidative radiotherapy to the rectal mass given initial bulky disease.  In the interim, while awaiting biopsy, he presented with rectal bleeding, obstructive uropathy symptoms and pain and radiation planning CT showed significant interval growth in the rectal mass as well as development of new retroperitoneal adenopathy superior to the rectal mass compared to the his posttreatment PET, confirming progressive disease. Consolidative radiation was converted to palliative course radiation given interval adenopathy development above the radiation field to allow for earlier introduction of second line chemotherapy.   -Repeat biopsy confirmed residual classic Hodgkin's lymphoma.  -Given national shortage of carbo/cis, we proceeded with Keytruda, vinorelbine, Gemzar, and Doxil. He completed 4 cycles 8/2023. Post treatment PET showed complete resolution of FDG avidity.  He continued Keytruda maintenance  -Most recent PET from 12/2023 with resolution of FDG avidity in the rectal mass which has decreased in size.  There is a new area of FDG avidity and a lytic lesion in the L4 region as well as mild SUV uptake in a right hilar node.  Lytic lesion appears to have been there previously on my review of prior PETs but with increased activity. He completed stereotactic radiation therapy to this area as he was having pain.  There was also an indeterminate hilar LN. I recommended  continuation of maintenance Keytruda and short interval follow-up CT of the chest to reassess this. CT in Jan compared to prior PET showed worsening consolidative RML from hilum to major fissure with possible broncholith and no specific mass. He underwent bronchoscopy with findings confirming recurrent CD30 positive Hodgkin's lymphoma, but he now has developed strong CD20 positivity.  Morphologically this is felt to represent the same underlying Hodgkin's process, but with immunophenotype switch.  I recommended third line systemic therapy.  He initiated  Rituximab/ICE and completed 4th cycle 4/2024  -He is now status post Breyanzi therapy at  8/5/24.   -PET CT reviewed and GAGAN/complete CR 11/2024.   -Continue treatment related cytopenias.  Interval blood counts reviewed.  Continuing G-CSF and close CBC monitoring at .  We will have him follow-up here after his next PET in February.    2.  H/o GI bleed  -PPI, continue daily.        Follow Up:   3 mo CBC/CMP/LDH/PET       Kaycee Leary MD  Hematology and Oncology

## 2025-02-03 RX ORDER — ONDANSETRON 8 MG/1
TABLET, FILM COATED ORAL
Qty: 30 TABLET | Refills: 5 | Status: SHIPPED | OUTPATIENT
Start: 2025-02-03

## 2025-02-03 NOTE — TELEPHONE ENCOUNTER
Upcoming Appts  With Oncology (Kaycee Leary MD)  03/19/2025 at 9:30 AM  Last Office Visit - This Dept  12/18/2024 Kaycee Leary MD  Last refill 10/26/22 30 tablets 5 refills

## 2025-03-19 ENCOUNTER — OFFICE VISIT (OUTPATIENT)
Dept: ONCOLOGY | Facility: CLINIC | Age: 75
End: 2025-03-19
Payer: MEDICARE

## 2025-03-19 VITALS
DIASTOLIC BLOOD PRESSURE: 82 MMHG | SYSTOLIC BLOOD PRESSURE: 157 MMHG | OXYGEN SATURATION: 97 % | TEMPERATURE: 97.1 F | HEART RATE: 76 BPM | HEIGHT: 64 IN | WEIGHT: 187 LBS | BODY MASS INDEX: 31.92 KG/M2

## 2025-03-19 DIAGNOSIS — T45.1X5A ANTINEOPLASTIC CHEMOTHERAPY INDUCED PANCYTOPENIA: ICD-10-CM

## 2025-03-19 DIAGNOSIS — C81.98 HODGKIN LYMPHOMA OF LYMPH NODES OF MULTIPLE REGIONS, UNSPECIFIED HODGKIN LYMPHOMA TYPE: Primary | ICD-10-CM

## 2025-03-19 DIAGNOSIS — D61.810 ANTINEOPLASTIC CHEMOTHERAPY INDUCED PANCYTOPENIA: ICD-10-CM

## 2025-03-19 NOTE — PROGRESS NOTES
[facility] [place]  Office number [phone]    Fax number [phone]     Follow up     Date: 25    Patient Name: Abraham Wu  MRN: [id number]  : 1950    Chief Complaint: Hodgkin Lymphoma follow up    Surgeon: Dr. Hiram Viveros MD    Cancer Staging: IV    History of Present Illness: Abraham Wu is a pleasant 74 y.o. male with PMH of hypertension, sleep apnea, hard of hearing who presents today for follow up of Hodgkin Lymphoma.     He initially presented 2022 with intractable diarrhea, low appetite, and a greater than 50 pound weight loss over several month period associated with intermittent fevers.  CT of the chest abdomen pelvis obtained in the ER shows of rectal mass spanning greater than 12 cm with adjacent adenopathy, bulky RP adenopathy, and findings concerning for left renal and liver metastases.  Small right pleural effusion with nodularity.  There was concern for impending obstruction, so he underwent diverting colostomy and biopsy on 2022.  Biopsies from the peritoneam showed a fibrotic nodule histiocytes and eosinophils admixed with CD30 positive large cells.  Rectal biopsies showed involvement by CD30 positive lymphoma, classic Hodgkin lymphoma versus CD30 positive T-cell or B-cell lymphoma.  There was extensive fibrosis and crush artifact.  He underwent repeat transrectal biopsy 10/4/2022 for further characterization which was felt to be most consistent with classical Hodgkin lymphoma.    He initiated chemotherapy with Brentuximab-AVD as an inpatient on 10/8/2022.  Cycle #1 was complicated by GI bleed requiring multiple blood transfusion and ultimately coil artery embolization 10/12; neutropenic fever, Candida esophagitis and mucositis.  He had a prolonged ICU stay  And required enteral feeding.  He was discharged 10/20/2022.    Following his last cycle he was admitted with multifocal PNA and +corona virus infection.    He  underwent brochoscopy 2/1/24 showing heaped up, irregular, friable mucosa obstructing the right middle lobe otherwise no discrete endobronchial lesions. Pathology showed persistent/recurrent CD30 positive lymphoma; see comment. These continue to have CD30 and RACHELE positivity, consistent with involvement by the same process. However, the lymphoma cells are now more numerous and have an intact B-cell expression profile, with strong diffuse CD20 expression, strong PAX5 expression, and expression of CD45 and CD79a. This is strongly favored to represent continued involvement with the same process with the development of a slightly different immunophenotype after pembrolizumab therapy as opposed to a secondary lymphoma. The strong expression of CD20 may be a therapeutic target. CD30 expression remains intact as well.     Treatment history:  Brentuximab-AVD: C1 10/8/22  C2: 11/1/22 onward with DA 20% in BVD; full dose brentuximab  Completed C6 3/22/23    2. Palliative radiation to rectum 5/30/2023 (abbreviated due to systemic progression)  3. GVD+Pembro x 4 cycles: completed 8/30/2023  4. Consolidative radiation to rectum completed 9/27/23    5. Pembrolizumab: current  6. Radiation to spine 1/16, 1/18 and 1/22/24    7. Rituximab-ICE: C1 2/13/2024; C2 3/5/24; C3 3/26/24; C4 4/16/24    8. Fludarabine and cyclophosphamide lymphodepleting chemotherapy 7/31/24--8/2/24, followed by lisocabtagene maraleucel (Breyanzi) IEC infusion (T0: 8/5/24).    Interval history:    Doing very well. Had delayed neutrophil recovery but now off G-CSF since Jan 2025.   Has gained weight and muscle tone.   PET 2/2025 GAGAN.   Ostomy output stable.   No new concerns/complaints.     Past Medical History:   Past Medical History:   Diagnosis Date    Arthritis     benign polypoid tissue right lung     Cancer     HODGKINS LYMPHOMA, RECTAL CANCER    Diabetes mellitus     patient reports that he doesn't have diabetes secondary to changing diet and weight  loss.    History of radiation therapy 12/31/2023    re-treat rectum    History of transfusion     no reaction, transfusions received at [facility] in 2022    Hyperlipidemia     Hypertension     Lymphoma     Mycobacterium mucogenicum     SHERRI (obstructive sleep apnea)     O2 2L/MIN AT NIGHT ONLY    Pneumonia     2023    Seasonal allergies    Polyp in airway removed 2020 [facility] Dr. Ketan Monet.    Past Surgical History:   Past Surgical History:   Procedure Laterality Date    BRONCHOSCOPY      removal of obstructing polypoid tissue right middle lung    BRONCHOSCOPY N/A 2/1/2024    Procedure: BRONCHOSCOPY WITH ENDOBRONCHIAL ULTRASOUND AND FLUORO;  Surgeon: Chris Pruitt MD;  Location:  [facility];  Service: Pulmonary;  Laterality: N/A;  EBUS BALLOON INTACT    COLONOSCOPY      COLOSTOMY N/A 09/22/2022    Procedure: LAPAROSCOPIC COLOSTOMY CREATION, FLEXIBLE SIGMOIDOSCOPY;  Surgeon: Hiram Viveros MD;  Location:  [facility];  Service: General;  Laterality: N/A;    DENTAL PROCEDURE      dental implants    ENDOSCOPY N/A 10/10/2022    Procedure: ESOPHAGOGASTRODUODENOSCOPY;  Surgeon: Neeraj Lynn MD;  Location:  [facility];  Service: Gastroenterology;  Laterality: N/A;    ENDOSCOPY N/A 10/12/2022    Procedure: ESOPHAGOGASTRODUODENOSCOPY;  Surgeon: Brunner, Mark I, MD;  Location:  [facility];  Service: Gastroenterology;  Laterality: N/A;    EXAM UNDER ANESTHESIA, RECTAL BIOPSY N/A 10/04/2022    Procedure: EXAM UNDER ANESTHESIA, TRANSANAL BIOPSY WITH TRUCUT NEEDLE (LITHOTOMY-CANDY CANE);  Surgeon: Hiram Viveros MD;  Location:  [facility];  Service: General;  Laterality: N/A;    EXAM UNDER ANESTHESIA, RECTAL BIOPSY N/A 05/30/2023    Procedure: EXAM UNDER ANESTHESIA, TRANSANAL BIOPSY OF RECTAL MASS WITH TRUCUT NEEDLE, PROCTOSCOPY;  Surgeon: Hiram Viverso MD;  Location:  [facility];  Service: General;  Laterality: N/A;    PERIPHERALLY INSERTED CENTRAL CATHETER INSERTION      Removed  11/28/2022    VENOUS ACCESS DEVICE (PORT) INSERTION Right 11/28/2022     Family History:   Family History   Problem Relation Age of Onset    Diabetes Mother     Diabetes Father     Heart disease Father      Social History:   Social History     Socioeconomic History    Marital status:    Tobacco Use    Smoking status: Never    Smokeless tobacco: Never   Vaping Use    Vaping status: Never Used   Substance and Sexual Activity    Alcohol use: Not Currently     Comment: previously drank 2 glasses of wine/beer nightly    Drug use: Never    Sexual activity: Defer       Medications:     Current Outpatient Medications:     acyclovir (ZOVIRAX) 800 MG tablet, Take 1 tablet by mouth., Disp: , Rfl:     dapsone 100 MG tablet, Take 1 tablet by mouth Daily., Disp: , Rfl:     fluconazole (DIFLUCAN) 200 MG tablet, , Disp: , Rfl:     fluticasone (FLONASE) 50 MCG/ACT nasal spray, Administer 1 spray into the nostril(s) as directed by provider Daily As Needed for Allergies or Rhinitis. OTC, Disp: , Rfl:     levoFLOXacin (LEVAQUIN) 500 MG tablet, , Disp: , Rfl:     lidocaine-prilocaine (EMLA) 2.5-2.5 % cream, Apply 1 application topically to the appropriate area as directed As Needed (45-60 minutes prior to port access.  Cover with saran/plastic wrap.)., Disp: 30 g, Rfl: 3    loratadine (CLARITIN) 10 MG tablet, TAKE 1 TABLET BY MOUTH DAILY, Disp: 30 tablet, Rfl: 5    ondansetron (ZOFRAN) 8 MG tablet, TAKE 1 TABLET BY MOUTH 3 TIMES A DAY AS NEEDED FOR NAUSEA AND VOMITING, Disp: 30 tablet, Rfl: 5    pantoprazole (PROTONIX) 40 MG EC tablet, TAKE 1 TABLET BY MOUTH DAILY, Disp: 90 tablet, Rfl: 1    prochlorperazine (COMPAZINE) 5 MG tablet, Take 1 tablet by mouth Every 6 (Six) Hours As Needed for Nausea or Vomiting., Disp: 30 tablet, Rfl: 2    rosuvastatin (CRESTOR) 10 MG tablet, Take 1 tablet by mouth Daily., Disp: , Rfl:     sulfamethoxazole-trimethoprim (BACTRIM DS,SEPTRA DS) 800-160 MG per tablet, Take 1 tablet by mouth. (Patient not  "taking: Reported on 12/18/2024), Disp: , Rfl:   No current facility-administered medications for this visit.    Facility-Administered Medications Ordered in Other Visits:     sodium chloride 0.9 % infusion, 125 mL/hr, Intravenous, Continuous, Kaycee Leary MD    Allergies:   Allergies   Allergen Reactions    Dexamethasone Other (See Comments)     **Avoid use of systemic corticosteroids as it may interfere with activity of immune effector cell therapy**       Objective     Vital Signs:   Vitals:    03/19/25 0933   BP: 157/82   Pulse: 76   Temp: 97.1 °F (36.2 °C)   TempSrc: Infrared   SpO2: 97%   Weight: 84.8 kg (187 lb)   Height: 162.6 cm (64.02\")   PainSc: 0-No pain      Body mass index is 32.08 kg/m².   Pain Score    03/19/25 0933   PainSc: 0-No pain     ECOG Performance Status: 1    Physical Exam:   General: Well appearing male   HEENT: Normocephalic, atraumatic. Sclera anicteric.   Neck: supple, no palpable LAD. No axillary adenopathy  Cardiovascular: regular rate and rhythm. No murmurs.   Respiratory: Normal rate. Clear to auscultation bilaterally  Abdomen: Soft, nontender, non distended with normoactive bowel sounds. Ostomy LLQ   Lymph: no supraclavicular or axillary adenopathy  Neuro: Alert and oriented x 3.  Ext: Symmetric, no swelling.   Skin: right port site  C/D/I  Accurate 12/18/24    Laboratory/Imaging Reviewed:     Component      Latest Ref Rng 3/10/2025 3/17/2025   WBC      3.70 - 10.30 10*3/uL 3.86 (E) 3.73 (E)   RBC      4.60 - 6.10 10*6/uL 2.79 (L) (E) 2.85 (L) (E)   Hemoglobin      13.7 - 17.5 g/dL 10 (L) (E) 10.4 (L) (E)   Hematocrit      40.0 - 51.0 % 30.5 (L) (E) 31.6 (L) (E)   Platelets      155 - 369 10*3/uL 142 (L) (E) 128 (L) (E)   MCV      79 - 98 fL 109 (H) (E) 111 (H) (E)   MCH      26.0 - 32.0 pg 35.8 (H) (E) 36.5 (H) (E)   MCHC      30.7 - 35.5 g/dL 32.8 (E) 32.9 (E)   RDW      11.5 - 14.5 % 15 (H) (E) 14.8 (H) (E)   MPV      8.8 - 12.5 fL 9.4 (E) 9.4 (E)   nRBC      <=0.0 per 100 " WBCs 0 (E) 0 (E)   Differential Type Automated (E) Automated (E)   Neutrophil Rel %      % 66 (E) 66 (E)   Lymphocyte Rel %      % 14 (E) 15 (E)   Monocyte Rel %      % 14 (E) 13 (E)   Eosinophil Rel %      % 5 (E) 5 (E)   Basophil Rel %      % 1 (E) 1 (E)   Immature Granulocyte Rel %      % 0 (E) 0 (E)   Neutrophils Absolute      1.60 - 6.10 10*3/uL 2.59 (E) 2.47 (E)   Lymphocytes Absolute      1.20 - 3.90 10*3/uL 0.53 (L) (E) 0.55 (L) (E)   Monocytes Absolute      0.30 - 0.90 10*3/uL 0.53 (E) 0.48 (E)   Eosinophils Absolute      0.00 - 0.50 10*3/uL 0.18 (E) 0.2 (E)   Basophils Absolute      0.00 - 0.10 10*3/uL 0.02 (E) 0.02 (E)   Immature Grans, Absolute      0.00 - 0.06 10*3/uL 0.01 (E) 0.01 (E)    NM PET/CT Skull Base to Mid Thigh  Order: [id number]  Impression    No evidence of metabolically active romain or extranodal disease within the imaged field of view.    No focal FDG activity within grossly unchanged ill-defined soft tissue nodularity abutting the left posterolateral aspect of the rectum to suggest disease recurrence. Jose E 1.    CRITICAL RESULT:  No.    COMMUNICATION:  Per this written report.    Drafted by Cesario Whitehead on 2/5/2025 11:09 AM  Final report signed by Cesario Whitehead on 2/5/2025 11:47 AM  Narrative    CLINICAL INDICATION:  A 74-year-old male with a history of DLBCL transformed from Hodgkin's lymphoma, status post multiple chemotherapy and immunotherapy regimens. Received radiation to rectal mass completed 9/27/2023 and to L4 lytic lesion on January 2024. The most recent regimen was 4 cycles of rituximab+ ICE on 4/2024 followed by IEC therapy on August 2024. PET CT was performed for assessment of treatment response.    TECHNIQUE:  Preparation: Last oral intake (except water) on 2/4/2026 at 1800.  Diabetic: No.    Blood glucose at time of FDG administration: 109 mg/dL.  Radiopharmaceutical: 12.53 mCi of F-18 FDG administered intravenously at right antecubital fossa at 0836.  Incubation  interval: 50 minutes.  Oral contrast: None.  Positioning: Arms by sides.  PET/CT scanner: Siemens Biograph 40 mCT.  PET/CT acquisition: [id number].  Standardized uptake value (SUV): Corrected for body weight only.  CT: Low-dose, non-breath-hold, without intravenous contrast.  TOTAL DLP (Dose Length Product): 884.24 mGy.cm mGy cm.    COMPARISON/CORRELATION:  Compared to FDG PET/CT from 11/4/2024 and 9/6/2024.  No correlative imaging.    FINDINGS:  Technical quality: Diagnostic.    Measurements: Unless otherwise specified, all SUVs refer to the maximum value in the target (mSUV).  Mean SUV liver: 2.5, previously 2.8.  Mean SUV mediastinal blood pool: 2.2, previously 2.1.    Head and Neck:  No suspicious hypermetabolic activity in the head or neck.  No suspicious hypermetabolic cervical adenopathy.    Clear paranasal sinuses.  Unremarkable thyroid gland.  Extensive dental artifacts.    Chest:  No suspicious hypermetabolic activity in the chest.    Patent central airways.  No suspicious pulmonary nodules or masses.  Stable scattered non-FDG-avid subcentimeter pulmonary nodules some are calcified.  No pleural effusion or pericardial effusion.  Minimal bibasilar atelectasis.  Right middle lobe and lingular linear scarring and atelectasis.    No suspicious hypermetabolic mediastinal, hilar, or axillary adenopathy.  Right IJ port-A-catheter remains in place and terminates in the superior cavoatrial junction.    Four-chamber cardiac dilation. Normal caliber of the thoracic aorta.  Extensive coronary artery calcifications.  Scattered calcifications of the aortic arch and the ostia of the great vessels.    Abdomen and Pelvis:  No suspicious hypermetabolic lesions within the abdomen and pelvis.    Grossly stable ill-defined soft tissue nodularity abutting the left posterolateral aspect of the rectum without focal FDG uptake above blood pool level SUV max 1.2, previously 1.9.    No suspicious hypermetabolic or  pathologically enlarged retroperitoneal or pelvic lymph nodes.    GI Tract/Mesentery/Peritoneum: Postsurgical changes of prior colonic resection with left lower quadrant colostomy.  Otherwise, heterogenous bowel activity limits evaluation.  The large and small bowels appear normal in caliber. There were no suspicious peritoneal/mesenteric findings.    Solid Abdominal Organs: No focal hypermetabolic activity in the liver significantly greater than the heterogeneous physiologic uptake.  Unremarkable non-contrast appearance of the liver.  Normal gallbladder. No hydronephrosis.  Spleen showing discrete calcifications without suspicious focal FDG uptake, likely related to old granulomatous disease.  No suspicious adrenal masses. No suspicious pancreatic findings.    Pelvic Viscera:  Unremarkable pelvic viscera.    Vasculature: Normal caliber of the abdominal aorta.    Free Fluid: No ascites or drainable fluid collection.    Skeleton and Soft Tissues: Diffuse increased FDG activity in the axial and proximal imaged bone marrow activity related to bone marrow activation treatment given on 1/22/2025 without focal abnormal uptake above background.  No suspicious hypermetabolic osseous or soft tissue lesions.  No aggressive osseous lesions.    Sequelae of radiation therapy to the lumbar spine.  No suspicious focal FDG uptake within L4 to suggest residual disease or recurrence.    Multilevel degenerative changes with variable degrees of low-grade FDG uptake associated with facet arthropathy most prominently at C5-C6.  Mildly hypermetabolic bilateral shoulder and hip arthritic changes, stable.  Exam End: 02/05/25 10:38 Last Resulted: 02/05/25 11:47   Received From: [facility]  Result Received: 03/19/25 09:26     COMPREHENSIVE METABOLIC PANEL  Order: [id number]  Component  Ref Range & Units 2 wk ago   Glucose  74 - 99 mg/dL 106 High    BUN  8 - 23 mg/dL 14   Creatinine  0.70 - 1.20 mg/dL 0.67 Low    BUN/Creatinine Ratio 21    Sodium  136 - 145 mmol/L 137   Potassium  3.6 - 4.9 mmol/L 4.2   Chloride  97 - 107 mmol/L 102   Total CO2  22 - 29 mmol/L 25   Anion Gap  6 - 16 mmol/L 10   Calcium  8.9 - 10.2 mg/dL 8.8 Low    Total Protein  6.3 - 7.9 g/dL 6.1 Low    Albumin  3.5 - 5.2 g/dL 4.5   AST (SGOT)  10 - 50 U/L 18   ALT (SGPT)  10 - 50 U/L 15   Alkaline Phosphatase  40 - 115 U/L 84   Total Bilirubin  0.2 - 1.1 mg/dL 0.7   eGFRcr  mL/min/1.73m*2 99.2     Assessment / Plan      Assessment/Plan:     1.  Recurrent CD30 positive classical Hodgkin's lymphoma, now with CD 20/CD 45 positive on repeat biopsy    -He completed 6 cycles of BV-AVD 3/23/2023. Cycle 6 was complicated by coronavirus infection and multifocal pneumonia.  Posttherapy PET showed persistent bilateral interstitial changes and mild adenopathy. Repeat chest CT shows continued improvement in the pulmonary infiltrates consistent with a reactive infectious etiology.  - He had clear response to first line therapy with resolution of the hepatic lesions, marked improvement in the retroperitoneal adenopathy, and 50% reduction in the size of the rectal mass.  However he had persistent uptake in the rectal mass on posttreatment PET, [place] 5. We elected to proceed with biopsy to confirm whether he had residual disease followed by consolidative radiotherapy to the rectal mass given initial bulky disease.  In the interim, while awaiting biopsy, he presented with rectal bleeding, obstructive uropathy symptoms and pain and radiation planning CT showed significant interval growth in the rectal mass as well as development of new retroperitoneal adenopathy superior to the rectal mass compared to the his posttreatment PET, confirming progressive disease. Consolidative radiation was converted to palliative course radiation given interval adenopathy development above the radiation field to allow for earlier introduction of second line chemotherapy.   -Repeat biopsy confirmed residual classic  Hodgkin's lymphoma.  -Given national shortage of carbo/cis, we proceeded with Keytruda, vinorelbine, Gemzar, and Doxil. He completed 4 cycles 8/2023. Post treatment PET showed complete resolution of FDG avidity.  He continued Keytruda maintenance  -Most recent PET from 12/2023 with resolution of FDG avidity in the rectal mass which has decreased in size.  There is a new area of FDG avidity and a lytic lesion in the L4 region as well as mild SUV uptake in a right hilar node.  Lytic lesion appears to have been there previously on my review of prior PETs but with increased activity. He completed stereotactic radiation therapy to this area as he was having pain.  There was also an indeterminate hilar LN. I recommended continuation of maintenance Keytruda and short interval follow-up CT of the chest to reassess this. CT in Jan compared to prior PET showed worsening consolidative RML from hilum to major fissure with possible broncholith and no specific mass. He underwent bronchoscopy with findings confirming recurrent CD30 positive Hodgkin's lymphoma, but he now has developed strong CD20 positivity.  Morphologically this is felt to represent the same underlying Hodgkin's process, but with immunophenotype switch.  I recommended third line systemic therapy.  He initiated  Rituximab/ICE and completed 4th cycle 4/2024  -He is now status post Breyanzi therapy at  8/5/24.   -PET CT reviewed and GAGAN/complete CR 3/2025. CBC and CMP adequately recovered 3/2025..   -Continues PET every 3 mo at   -Continues Bactrim and Acyclovir    2.  H/o GI bleed  -PPI, continue daily.      Follow Up:   6 mo CBC/CMP/LDH/PET       Kaycee Leary MD  Hematology and Oncology

## (undated) DEVICE — LEX BASIC NO DRAPE: Brand: MEDLINE INDUSTRIES, INC.

## (undated) DEVICE — SINGLE-USE BIOPSY FORCEPS: Brand: RADIAL JAW 4

## (undated) DEVICE — DRAPE,UNDERBUTTOCKS,STERILE: Brand: MEDLINE

## (undated) DEVICE — BOWL UTIL STRL 32OZ

## (undated) DEVICE — APPL CHLORAPREP TINTED 26ML TEAL

## (undated) DEVICE — SAFELINER SUCTION CANISTER 1000CC: Brand: DEROYAL

## (undated) DEVICE — GOWN,REINFORCE,POLY,SIRUS,BREATH SLV,XLG: Brand: MEDLINE

## (undated) DEVICE — LUBE GEL ENDOGLIDE 1.1OZ

## (undated) DEVICE — SYR LUERLOK 50ML

## (undated) DEVICE — BRUSH CYTO BRONCOSCOPE

## (undated) DEVICE — LEGGINGS, PAIR, 29X43, STERILE: Brand: MEDLINE

## (undated) DEVICE — STRAP STIRUP WO/RNG 19X3.5IN DISP

## (undated) DEVICE — JELLY,LUBE,STERILE,FLIP TOP,TUBE,2-OZ: Brand: MEDLINE

## (undated) DEVICE — SYR SLPTP 20CC

## (undated) DEVICE — ST LINER SAFECAP GRN RED CP STRL

## (undated) DEVICE — SYR LUER SLPTP 50ML

## (undated) DEVICE — CONNECTOR,STRAIGHT,F/02 SUPPLY TUBING: Brand: MEDLINE

## (undated) DEVICE — CATHETER,URETHRAL,REDRUBBER,STRL,16FR: Brand: MEDLINE

## (undated) DEVICE — ENDOCUT SCISSOR TIP, DISPOSABLE: Brand: RENEW

## (undated) DEVICE — THE BITE BLOCK MAXI, LATEX FREE STRAP IS USED TO PROTECT THE ENDOSCOPE INSERTION TUBE FROM BEING BITTEN BY THE PATIENT.

## (undated) DEVICE — STPCK 4WY ON/OFF VLV M/COLAR FIT 45PSI STRL

## (undated) DEVICE — CONTN GRAD MEAS TRIANG 32OZ BLK

## (undated) DEVICE — LOOP OSTOMY BRIDGE - STERILE: Brand: HOLLISTER

## (undated) DEVICE — SYR SLP TP 10ML DISP

## (undated) DEVICE — TUBING, SUCTION, 1/4" X 10', STRAIGHT: Brand: MEDLINE

## (undated) DEVICE — SPNG ENDO BEDSIDE TUB ENZYM

## (undated) DEVICE — SPNG LAP PREWSH SFTPK 18X18IN STRL PK/5

## (undated) DEVICE — HYBRID CO2 TUBING/CAP SET FOR OLYMPUS® SCOPES & CO2 SOURCE: Brand: ERBE

## (undated) DEVICE — INTRO ACCSR BLNT TP

## (undated) DEVICE — FIRST STEP BEDSIDE ADD WATER KIT - RESEALABLE STAND-UP POUCH, ENDOSCOPIC CLEANING PAD - 1 POUCH: Brand: FIRST STEP BEDSIDE ADD WATER KIT - RESEALABLE STAND-UP POUCH, ENDOSCOPIC CLEANIN

## (undated) DEVICE — SINGLE USE SUCTION VALVE MAJ-209: Brand: SINGLE USE SUCTION VALVE (STERILE)

## (undated) DEVICE — MEDI-VAC YANKAUER SUCTION HANDLE: Brand: CARDINAL HEALTH

## (undated) DEVICE — SOL IRR H2O BTL 1000ML STRL

## (undated) DEVICE — Device: Brand: BALLOON

## (undated) DEVICE — PK LAP LASR CHOLE 10

## (undated) DEVICE — DEV HEMOSPRAY ENDO HEMOST 7F 220CM

## (undated) DEVICE — LAPAROSCOPIC SMOKE FILTRATION SYSTEM: Brand: PALL LAPAROSHIELD® PLUS LAPAROSCOPIC SMOKE FILTRATION SYSTEM

## (undated) DEVICE — NDL BIOP W/DEPTH MARK 14G 4 1/4IN

## (undated) DEVICE — TOWEL,OR,DSP,ST,BLUE,STD,4/PK,20PK/CS: Brand: MEDLINE

## (undated) DEVICE — TRENDELENBURG WINGPAD POSITIONING KIT DELUXE - WITHOUT BODY STRAP: Brand: SOULE MEDICAL

## (undated) DEVICE — TOTAL TRAY, 16FR 10ML SIL FOLEY, URN: Brand: MEDLINE

## (undated) DEVICE — DEV CLIP ENDO RESOLUTION360 CONTRL ROT 235CM
Type: IMPLANTABLE DEVICE | Site: DUODENUM | Status: NON-FUNCTIONAL
Removed: 2022-10-10

## (undated) DEVICE — KT ORCA ORCAPOD DISP STRL

## (undated) DEVICE — THE CARR-LOCKE INJECTION NEEDLE IS A SINGLE USE, DISPOSABLE, FLEXIBLE SHEATH INJECTION NEEDLE USED FOR THE INJECTION OF VARIOUS TYPES OF MEDIA THROUGH FLEXIBLE ENDOSCOPES.

## (undated) DEVICE — 450 ML BOTTLE OF 0.05% CHLORHEXIDINE GLUCONATE IN 99.95% STERILE WATER FOR IRRIGATION, USP AND APPLICATOR.: Brand: IRRISEPT ANTIMICROBIAL WOUND LAVAGE

## (undated) DEVICE — SYRINGE, LUER LOCK, 5ML: Brand: MEDLINE

## (undated) DEVICE — TBG PENCL TELESCP MEGADYNE SMOKE EVAC 10FT

## (undated) DEVICE — 1-PIECE DRAINABLE OSTOMY POUCH, FLEXTEND: Brand: PREMIER

## (undated) DEVICE — Device: Brand: SINGLE USE ASPIRATION NEEDLE NA-U401SX

## (undated) DEVICE — 1-PIECE DRAINABLE OSTOMY POUCH, CONVEX: Brand: KARAYA 5

## (undated) DEVICE — SPNG VERSALON 4X4 4PLY NONSTRL LF BG/200

## (undated) DEVICE — IRRIGATOR BULB ASEPTO 60CC STRL

## (undated) DEVICE — ENDOPATH XCEL BLADELESS TROCARS WITH STABILITY SLEEVES: Brand: ENDOPATH XCEL

## (undated) DEVICE — CANNULA,OXY,ADULT,SUPERSOFT,W/7'TUB,UC: Brand: MEDLINE

## (undated) DEVICE — POOLE SUCTION INSTRUMENT WITH REMOVABLE SHEATH: Brand: POOLE

## (undated) DEVICE — PREMIUM DRY TRAY LF: Brand: MEDLINE INDUSTRIES, INC.

## (undated) DEVICE — LUER-LOK 360°: Brand: CONNECTA, LUER-LOK

## (undated) DEVICE — FRCP BX RADJAW4 PULM WO NDL STD1.8X2 100

## (undated) DEVICE — ROD OS LP SURFIT 2 1/2IN

## (undated) DEVICE — SINGLE USE BIOPSY VALVE MAJ-210: Brand: SINGLE USE BIOPSY VALVE (STERILE)

## (undated) DEVICE — 3M™ IOBAN™ 2 ANTIMICROBIAL INCISE DRAPE 6650EZ: Brand: IOBAN™ 2

## (undated) DEVICE — ST EXT MICROBORE FIX M LL 38IN

## (undated) DEVICE — PTCH SENSR ILLUMISITE ADHS

## (undated) DEVICE — [HIGH FLOW INSUFFLATOR,  DO NOT USE IF PACKAGE IS DAMAGED,  KEEP DRY,  KEEP AWAY FROM SUNLIGHT,  PROTECT FROM HEAT AND RADIOACTIVE SOURCES.]: Brand: PNEUMOSURE

## (undated) DEVICE — SOL IRR NACL 0.9PCT BT 1000ML

## (undated) DEVICE — ADAPT SWVL FIBROPTIC BRONCH

## (undated) DEVICE — TRAP,MUCUS SPECIMEN,40CC: Brand: MEDLINE